# Patient Record
Sex: MALE | Race: ASIAN | NOT HISPANIC OR LATINO | URBAN - METROPOLITAN AREA
[De-identification: names, ages, dates, MRNs, and addresses within clinical notes are randomized per-mention and may not be internally consistent; named-entity substitution may affect disease eponyms.]

---

## 2019-08-21 ENCOUNTER — EMERGENCY (EMERGENCY)
Facility: HOSPITAL | Age: 69
LOS: 1 days | Discharge: HOME | End: 2019-08-21
Admitting: EMERGENCY MEDICINE
Payer: MEDICARE

## 2019-08-21 VITALS
SYSTOLIC BLOOD PRESSURE: 189 MMHG | OXYGEN SATURATION: 98 % | HEART RATE: 80 BPM | RESPIRATION RATE: 18 BRPM | DIASTOLIC BLOOD PRESSURE: 88 MMHG | TEMPERATURE: 98 F

## 2019-08-21 VITALS
TEMPERATURE: 99 F | RESPIRATION RATE: 18 BRPM | OXYGEN SATURATION: 100 % | SYSTOLIC BLOOD PRESSURE: 178 MMHG | HEART RATE: 80 BPM | DIASTOLIC BLOOD PRESSURE: 88 MMHG

## 2019-08-21 PROCEDURE — 93971 EXTREMITY STUDY: CPT | Mod: 26,LT

## 2019-08-21 PROCEDURE — 99284 EMERGENCY DEPT VISIT MOD MDM: CPT

## 2019-08-21 PROCEDURE — 73502 X-RAY EXAM HIP UNI 2-3 VIEWS: CPT | Mod: 26,LT

## 2019-08-21 RX ORDER — LIDOCAINE 4 G/100G
1 CREAM TOPICAL ONCE
Refills: 0 | Status: COMPLETED | OUTPATIENT
Start: 2019-08-21 | End: 2019-08-21

## 2019-08-21 RX ORDER — ACETAMINOPHEN 500 MG
650 TABLET ORAL ONCE
Refills: 0 | Status: COMPLETED | OUTPATIENT
Start: 2019-08-21 | End: 2019-08-21

## 2019-08-21 RX ADMIN — Medication 650 MILLIGRAM(S): at 12:44

## 2019-08-21 RX ADMIN — LIDOCAINE 1 PATCH: 4 CREAM TOPICAL at 12:44

## 2019-08-21 NOTE — ED PROVIDER NOTE - CLINICAL SUMMARY MEDICAL DECISION MAKING FREE TEXT BOX
Left hip pain will obtain xray low suspicion for fx, pain control.  Left calf pain will obtain us. Left hip pain will obtain x-ray low suspicion for fx, pain control.  Left calf pain will obtain us.  Follow up PMD/Orthopedics

## 2019-08-21 NOTE — ED PROVIDER NOTE - OBJECTIVE STATEMENT
69 y/o male with a hx of DM(not on meds) presents to the ER c/o left hip pain since yesterday after getting a massage.  Pt reports intermittent left calf pain x 2 months.  Pt denies fall, trauma, numbness, tingling, chest pain, shortness of breath, fecal/urinary incontinence.  Pt took 2 aleve this AM with relief. 69 y/o male with a hx of DM(not on meds) presents to the ER c/o left hip pain since yesterday after getting a massage.  Pt reports intermittent left calf pain x 2 months.  Pt denies fall, trauma, numbness, tingling, chest pain, shortness of breath, fecal/urinary incontinence.  Pt took 2 aleve this AM with relief.  Pt states he took himself off his DM meds pt states he will see PMD tomorrow.

## 2019-08-21 NOTE — ED ADULT TRIAGE NOTE - CHIEF COMPLAINT QUOTE
Patient c/o left hip pain for days,  getting worse, painful to walk . Denies any trauma to the area.

## 2019-08-21 NOTE — ED PROVIDER NOTE - PHYSICAL EXAMINATION
+TTP lateral aspect of left hip, NV intact, FROM, no swelling, no deformity, mild TTP at calf no swelling noted.

## 2019-08-21 NOTE — ED PROVIDER NOTE - PROGRESS NOTE DETAILS
FABIOLA Wilcox: pt feels better pain improved.  Pt is ambulatory.  Discharge and results reviewed with patient.  Pt advised of the importance of follow up ASAP for blood pressure check and DM management.  Pt states he will see his PMD tomorrow.  Family at bedside.

## 2019-08-21 NOTE — ED PROVIDER NOTE - NSFOLLOWUPINSTRUCTIONS_ED_ALL_ED_FT
Follow up with your Primary Medical Doctor tomorrow as discussed.    Follow up with Orthopedics in 1-2 days see attached list.  Repeat ultrasound in 1 week.  Rest.  Heat to back.  Take Tylenol 650mg orally every 6 hours as needed for pain.  Return to the ER for any persistent/worsening or new symptoms weakness, numbness, tingling, chest pain, shortness of breath or any concerning symptoms.

## 2020-07-03 ENCOUNTER — EMERGENCY (EMERGENCY)
Facility: HOSPITAL | Age: 70
LOS: 1 days | Discharge: HOME | End: 2020-07-03
Attending: STUDENT IN AN ORGANIZED HEALTH CARE EDUCATION/TRAINING PROGRAM | Admitting: STUDENT IN AN ORGANIZED HEALTH CARE EDUCATION/TRAINING PROGRAM
Payer: MEDICARE

## 2020-07-03 VITALS — RESPIRATION RATE: 18 BRPM | TEMPERATURE: 98 F | OXYGEN SATURATION: 100 % | HEART RATE: 87 BPM

## 2020-07-03 PROBLEM — E11.9 TYPE 2 DIABETES MELLITUS WITHOUT COMPLICATIONS: Chronic | Status: ACTIVE | Noted: 2019-08-21

## 2020-07-03 PROCEDURE — 99283 EMERGENCY DEPT VISIT LOW MDM: CPT

## 2020-07-03 PROCEDURE — 73660 X-RAY EXAM OF TOE(S): CPT | Mod: 26,RT

## 2020-07-03 NOTE — ED PROVIDER NOTE - OBJECTIVE STATEMENT
70 Y/O M with PMHX of non-insulin dependent diabetes presents to ER for RT greater toe discoloration. Noticed toe nail was discolored when he took his shoes off 3 days ago. Admits to no sensation of greater toe for some years. No known RIVERA, trauma, accident or fall. Evaluated at  who recommended ER for further work up. Has not followed up with podiatry for some time.  Denies chest pain , abdominal pain, headache, dizziness, SOB, NVD or fever.

## 2020-07-03 NOTE — ED ADULT TRIAGE NOTE - CHIEF COMPLAINT QUOTE
Pt with PMH of DM, c/o left big toe discoloration for the past 3 days. Denies injury or pain. Big toe noted bruised under the nailbed. Denies fevers.  Pt with PMH of DM, c/o left big toe discoloration for the past 3 days. Denies injury or pain, states both of his feet feel numb due to DM. Big toe noted bruised under the nailbed. Denies fevers.

## 2020-07-03 NOTE — ED PROVIDER NOTE - CLINICAL SUMMARY MEDICAL DECISION MAKING FREE TEXT BOX
68 Y/O M with PMHX of non-insulin dependent diabetes presents to ER for RT greater toe discoloration. R/O fracture. Referral to podiatry 70 Y/O M with PMHX of non-insulin dependent diabetes presents to ER for RT greater toe discoloration. R/O fracture. Referral to podiatry. no signs of infection 68 Y/O M with PMHX of non-insulin dependent diabetes presents to ER for RT greater toe discoloration. R/O fracture. Referral to podiatry. no signs of infection. 68 Y/O M with PMHX of non-insulin dependent diabetes presents to ER for RT greater toe discoloration. R/O fracture. Referral to podiatry. no signs of infection.    XR demonstrates no acute findings. PT provided podiatry referral. Instructed to f/u for further management. Discussed importance of daily foot checks. Precautions provided. Will f/u for worsening symptoms

## 2020-07-03 NOTE — ED PROVIDER NOTE - NSFOLLOWUPINSTRUCTIONS_ED_ALL_ED_FT
1) Follow up with podiatry or contact [phone] to assist with scheduling appointment for further care  2) Return to the ED immediately for new or worsening symptoms including redness to site, swelling or fever  3) Please continue to take any home medications as prescribed. Take tylenol 325 mg every 4 hours for pain relief/fever control or ibuprofen 600 mg every 6 hours for pain relief/fever control  4) Your test results from your ED visit were discussed with you prior to discharge  5) You were provided with a copy of your test results

## 2020-07-03 NOTE — ED PROVIDER NOTE - ATTENDING CONTRIBUTION TO CARE
68 Y/O M with PMHX of non-insulin dependent diabetes presents to ER for RT greater toe discoloration. Pt states he notice his toe was discolored 3 days prior denies recent known trauma. Pt has peripheral neuropathy and numbness of the right foot at area of toe. He denies fever, chills, chest pain, SOB, abdominal pain, recent trauma, bleeding, discharge. Toe appears discolored w/ bruising no signs of infection.   ROS; otherwise negative  Gen: Awake, Alert, WD, WN, NAD  Head:  NC/AT  Eyes:  PERRL, EOMI, Conjunctiva pink, lids normal, no scleral icterus  ENT: OP clear, no exudates, no erythema, uvula midline,  moist mucus membranes  Neck: supple, nontender, no meningismus, no JVD, trachea midline  Cardiac/CV:  S1 S2, RRR, no M/G/R  Respiratory/Pulm:  CTAB, good air movement, normal resp effort, no wheezes/stridor/retractions/rales/rhonchi  Gastrointestinal/Abdomen:  Soft, nontender, nondistended, +BS, no rebound/guarding  Back:  no CVAT, no MLT  Ext:  warm, well perfused, moving all extremities spontaneously, no peripheral edema, distal pulses intact  Skin: intact, discoloration of right 1st toe, no lesions   Neuro:  AAOx3, sensation decreased in RLE from level of mid foot below, motor 5/5 x 4 extremities, normal gait, speech clear  MDM as above

## 2020-07-03 NOTE — ED PROVIDER NOTE - PHYSICAL EXAMINATION
Vital signs reviewed.   CONSTITUTIONAL: Well-appearing; well-nourished; in no apparent distress. Non-toxic appearing.   HEAD: Normocephalic, atraumatic.  NECK/LYMPH: Supple; non-tender  CARD: Normal S1, S2  RESP: Normal chest excursion with respiration; breath sounds clear and equal bilaterally  ABD/GI: soft, non-distended; non-tender  EXT/MS: RT greater toe subungual hematoma noted. Nailbed intact. FAROM. Diminished sensation. Pedal pulse intact  SKIN: Normal for age and race; warm; dry; good turgor; no apparent lesions or exudate noted.  NEURO: Awake, alert, oriented x 3, no gross deficits, CN II-XII grossly intact  PSYCH: Normal mood; appropriate affect.

## 2020-07-03 NOTE — ED PROVIDER NOTE - PROGRESS NOTE DETAILS
Dr. Rodriguez: I have personally seen and examined this patient at the bedside. I have fully participated in the care of this patient. I have reviewed all pertinent clinical information, including history, physical exam, plan and the PA's note and agree except as noted. HPI above as by me. PE above as by me. DDX PLAN

## 2023-03-29 ENCOUNTER — INPATIENT (INPATIENT)
Facility: HOSPITAL | Age: 73
LOS: 7 days | Discharge: HOME | DRG: 264 | End: 2023-04-06
Attending: STUDENT IN AN ORGANIZED HEALTH CARE EDUCATION/TRAINING PROGRAM | Admitting: STUDENT IN AN ORGANIZED HEALTH CARE EDUCATION/TRAINING PROGRAM
Payer: COMMERCIAL

## 2023-03-29 VITALS
OXYGEN SATURATION: 99 % | DIASTOLIC BLOOD PRESSURE: 72 MMHG | HEIGHT: 65 IN | RESPIRATION RATE: 18 BRPM | TEMPERATURE: 100 F | SYSTOLIC BLOOD PRESSURE: 168 MMHG | WEIGHT: 158.07 LBS | HEART RATE: 86 BPM

## 2023-03-29 PROCEDURE — 99285 EMERGENCY DEPT VISIT HI MDM: CPT

## 2023-03-30 DIAGNOSIS — Z29.9 ENCOUNTER FOR PROPHYLACTIC MEASURES, UNSPECIFIED: ICD-10-CM

## 2023-03-30 DIAGNOSIS — E11.628 TYPE 2 DIABETES MELLITUS WITH OTHER SKIN COMPLICATIONS: ICD-10-CM

## 2023-03-30 DIAGNOSIS — L03.115 CELLULITIS OF RIGHT LOWER LIMB: ICD-10-CM

## 2023-03-30 DIAGNOSIS — E11.9 TYPE 2 DIABETES MELLITUS WITHOUT COMPLICATIONS: ICD-10-CM

## 2023-03-30 DIAGNOSIS — E11.40 TYPE 2 DIABETES MELLITUS WITH DIABETIC NEUROPATHY, UNSPECIFIED: ICD-10-CM

## 2023-03-30 DIAGNOSIS — I10 ESSENTIAL (PRIMARY) HYPERTENSION: ICD-10-CM

## 2023-03-30 LAB
GLUCOSE BLDC GLUCOMTR-MCNC: 112 MG/DL — HIGH (ref 70–99)
GLUCOSE BLDC GLUCOMTR-MCNC: 163 MG/DL — HIGH (ref 70–99)
GLUCOSE BLDC GLUCOMTR-MCNC: 239 MG/DL — HIGH (ref 70–99)
GLUCOSE BLDC GLUCOMTR-MCNC: 249 MG/DL — HIGH (ref 70–99)

## 2023-03-30 PROCEDURE — 99222 1ST HOSP IP/OBS MODERATE 55: CPT

## 2023-03-30 PROCEDURE — 93923 UPR/LXTR ART STDY 3+ LVLS: CPT | Mod: 26

## 2023-03-30 PROCEDURE — 73630 X-RAY EXAM OF FOOT: CPT | Mod: 26,RT

## 2023-03-30 PROCEDURE — 99223 1ST HOSP IP/OBS HIGH 75: CPT

## 2023-03-30 PROCEDURE — 71045 X-RAY EXAM CHEST 1 VIEW: CPT | Mod: 26

## 2023-03-30 PROCEDURE — 93971 EXTREMITY STUDY: CPT | Mod: 26,RT

## 2023-03-30 RX ORDER — LOSARTAN POTASSIUM 100 MG/1
25 TABLET, FILM COATED ORAL DAILY
Refills: 0 | Status: DISCONTINUED | OUTPATIENT
Start: 2023-03-30 | End: 2023-04-01

## 2023-03-30 RX ORDER — ACETAMINOPHEN 500 MG
650 TABLET ORAL EVERY 6 HOURS
Refills: 0 | Status: DISCONTINUED | OUTPATIENT
Start: 2023-03-30 | End: 2023-04-01

## 2023-03-30 RX ORDER — GLYBURIDE 5 MG
1 TABLET ORAL
Refills: 0 | DISCHARGE

## 2023-03-30 RX ORDER — AMLODIPINE BESYLATE 2.5 MG/1
2.5 TABLET ORAL DAILY
Refills: 0 | Status: DISCONTINUED | OUTPATIENT
Start: 2023-03-30 | End: 2023-04-01

## 2023-03-30 RX ORDER — CEFEPIME 1 G/1
1000 INJECTION, POWDER, FOR SOLUTION INTRAMUSCULAR; INTRAVENOUS EVERY 8 HOURS
Refills: 0 | Status: DISCONTINUED | OUTPATIENT
Start: 2023-03-30 | End: 2023-04-01

## 2023-03-30 RX ORDER — HEPARIN SODIUM 5000 [USP'U]/ML
5000 INJECTION INTRAVENOUS; SUBCUTANEOUS EVERY 8 HOURS
Refills: 0 | Status: DISCONTINUED | OUTPATIENT
Start: 2023-03-30 | End: 2023-04-01

## 2023-03-30 RX ORDER — DEXTROSE 50 % IN WATER 50 %
15 SYRINGE (ML) INTRAVENOUS ONCE
Refills: 0 | Status: DISCONTINUED | OUTPATIENT
Start: 2023-03-30 | End: 2023-04-01

## 2023-03-30 RX ORDER — INSULIN LISPRO 100/ML
VIAL (ML) SUBCUTANEOUS
Refills: 0 | Status: DISCONTINUED | OUTPATIENT
Start: 2023-03-30 | End: 2023-04-01

## 2023-03-30 RX ORDER — VANCOMYCIN HCL 1 G
750 VIAL (EA) INTRAVENOUS EVERY 12 HOURS
Refills: 0 | Status: DISCONTINUED | OUTPATIENT
Start: 2023-03-30 | End: 2023-04-01

## 2023-03-30 RX ORDER — GABAPENTIN 400 MG/1
200 CAPSULE ORAL AT BEDTIME
Refills: 0 | Status: DISCONTINUED | OUTPATIENT
Start: 2023-03-30 | End: 2023-04-01

## 2023-03-30 RX ORDER — DEXTROSE 50 % IN WATER 50 %
25 SYRINGE (ML) INTRAVENOUS ONCE
Refills: 0 | Status: DISCONTINUED | OUTPATIENT
Start: 2023-03-30 | End: 2023-04-01

## 2023-03-30 RX ORDER — GLUCAGON INJECTION, SOLUTION 0.5 MG/.1ML
1 INJECTION, SOLUTION SUBCUTANEOUS ONCE
Refills: 0 | Status: DISCONTINUED | OUTPATIENT
Start: 2023-03-30 | End: 2023-04-01

## 2023-03-30 RX ORDER — ASPIRIN/CALCIUM CARB/MAGNESIUM 324 MG
81 TABLET ORAL DAILY
Refills: 0 | Status: DISCONTINUED | OUTPATIENT
Start: 2023-03-30 | End: 2023-04-01

## 2023-03-30 RX ORDER — INSULIN LISPRO 100/ML
VIAL (ML) SUBCUTANEOUS AT BEDTIME
Refills: 0 | Status: DISCONTINUED | OUTPATIENT
Start: 2023-03-30 | End: 2023-04-01

## 2023-03-30 RX ADMIN — HEPARIN SODIUM 5000 UNIT(S): 5000 INJECTION INTRAVENOUS; SUBCUTANEOUS at 21:20

## 2023-03-30 RX ADMIN — Medication 250 MILLIGRAM(S): at 21:11

## 2023-03-30 RX ADMIN — Medication 81 MILLIGRAM(S): at 11:58

## 2023-03-30 RX ADMIN — Medication 650 MILLIGRAM(S): at 20:45

## 2023-03-30 RX ADMIN — GABAPENTIN 200 MILLIGRAM(S): 400 CAPSULE ORAL at 21:19

## 2023-03-30 RX ADMIN — AMLODIPINE BESYLATE 2.5 MILLIGRAM(S): 2.5 TABLET ORAL at 11:58

## 2023-03-30 RX ADMIN — CEFEPIME 100 MILLIGRAM(S): 1 INJECTION, POWDER, FOR SOLUTION INTRAMUSCULAR; INTRAVENOUS at 22:18

## 2023-03-30 RX ADMIN — CEFEPIME 100 MILLIGRAM(S): 1 INJECTION, POWDER, FOR SOLUTION INTRAMUSCULAR; INTRAVENOUS at 12:00

## 2023-03-30 RX ADMIN — Medication 650 MILLIGRAM(S): at 21:30

## 2023-03-30 RX ADMIN — HEPARIN SODIUM 5000 UNIT(S): 5000 INJECTION INTRAVENOUS; SUBCUTANEOUS at 12:00

## 2023-03-30 RX ADMIN — LOSARTAN POTASSIUM 25 MILLIGRAM(S): 100 TABLET, FILM COATED ORAL at 11:58

## 2023-03-30 RX ADMIN — Medication 2: at 11:59

## 2023-03-30 NOTE — CONSULT NOTE ADULT - ASSESSMENT
72M with hx of DM and HTN with R hallux wound.     Plan:  - Admit to vascular with Dr. Mullins   - EYDA/PVR   - Appreciate podiatry recs  - Please document medical optimization for angiogram   - Consent for RLE angiogram, possible angioplasty, possible stent   - Regular diet  - Continue IV abx   - DVT ppx     Plan discussed with Dr. Susanna Briggs PGY-2   Vascular Surgery  [id number]  72M with hx of DM and HTN with R hallux wound.     Plan:  - Admit to vascular with Dr. Mullins   - EYAD/PVR   - Appreciate podiatry recs  - Please document medical optimization for angiogram   - Cardiology consult for optimization for angiogram   - OR Sat for RLE angiogram, possible angioplasty possible stent   - Consent in chart   - Regular diet  - Continue IV abx   - DVT ppx     Plan discussed with Dr. Susanna Briggs PGY-2   Vascular Surgery  [id number]

## 2023-03-30 NOTE — CONSULT NOTE ADULT - ASSESSMENT
ALEE DUNN , [id number]  72y Male with R foot wound to sub Q  - Patient seen and evaluated  - T(F): 100, WBC 7.41, ESR pending , CRP 6.9  -  L foot no signs of infection. R foot hallux blister to dermis, mild fluctuance, mild serous drainage, mild malodor, global erythema to the level of hindfoot, edema globally to R foot, no crepitus to the hallux area.   - Verbal consent obtained, then excisional debridement was carried out with fine suture kit to R foot hallux nail and blister, depth to nail bed dorsally and dermis level to distal plantar aspect, mild serous drainage and mild malodor encountered, then a stab incision was placed to the distal plantar hallux to the depth of subQ, no pus encountered. Irrigated wound with copious amount of sterile saline. Applied Betadine, followed by DSD. Patient tolerated well with no other incidents.   - R foot wound cultured  - recommended Vasc consult   - recommended ABIPVR  - recommended R foot MR   - Pod plan local wound care with IV abx vs possible partial hallux amp pending [facility] recs / MR finding   - Please document Medical and Cardiac optimization for anesthesia for Podiatry surgery.   - Discussed with attending. ALEE DUNN , [id number]  72y Male with R foot wound to sub Q  - Patient seen and evaluated  - T(F): 100, WBC 7.41, ESR pending , CRP 6.9  -  L foot no signs of infection. R foot hallux blister to dermis, mild fluctuance, mild serous drainage, mild malodor, global erythema to the level of hindfoot, edema globally to R foot, no crepitus to the hallux area.   - Verbal consent obtained, then excisional debridement was carried out with fine suture kit to R foot hallux nail and blister, depth to nail bed dorsally and dermis level to distal plantar aspect, mild serous drainage and mild malodor encountered, then a stab incision was placed to the distal plantar hallux to the depth of subQ, no pus encountered. Irrigated wound with copious amount of sterile saline. Applied Betadine, followed by DSD. Patient tolerated well with no other incidents.   - R foot wound cultured  - recommended Vanco Cefepime  - recommended med admission   - recommended Vasc consult   - recommended ABIPVR  - recommended R foot MR   - Pod plan local wound care with IV abx vs possible partial hallux amp pending Vasc recs / MR finding   - Please document Medical and Cardiac optimization for anesthesia for Podiatry surgery.   - Discussed with attending.   72y Male with R foot wound to sub Q  - Patient seen and evaluated  - T(F): 100, WBC 7.41, ESR pending , CRP 6.9  -  L foot no signs of infection. R foot hallux blister to dermis, mild fluctuance, mild serous drainage, mild malodor, global erythema to the level of hindfoot, edema globally to R foot, no crepitus to the hallux area.   - Verbal consent obtained, then excisional debridement was carried out with fine suture kit to R foot hallux nail and blister, depth to nail bed dorsally and dermis level to distal plantar aspect, mild serous drainage and mild malodor encountered, then a stab incision was placed to the distal plantar hallux to the depth of subQ, no pus encountered. Irrigated wound with copious amount of sterile saline. Applied Betadine, followed by DSD. Patient tolerated well with no other incidents.   - R foot Xray: no fracture, no gas.   - R foot wound cultured  - recommended Vanco Cefepime  - recommended med admission   - recommended Vasc consult   - recommended ABIPVR  - recommended R foot MR   - Pod plan local wound care with IV abx vs possible partial hallux amp pending Vasc recs / MR finding   - Please document Medical and Cardiac optimization for anesthesia for Podiatry surgery.   - Discussed with attending.   72y Male with R foot wound to sub Q  - Patient seen and evaluated  - T(F): 100, WBC 7.41, ESR pending , CRP 6.9  -  L foot no signs of infection. R foot hallux blister to dermis, mild fluctuance, mild serous drainage, mild malodor, global erythema to the level of hindfoot, edema globally to R foot, no crepitus to the hallux area.   - Verbal consent obtained, then excisional debridement was carried out with fine suture kit to R foot hallux nail and blister, depth to nail bed dorsally and dermis level to distal plantar aspect, mild serous drainage and mild malodor encountered, then a stab incision was placed to the distal plantar hallux to the depth of subQ, no pus encountered. Irrigated wound with copious amount of sterile saline. Applied Betadine, followed by DSD. Patient tolerated well with no other incidents.   - R foot Xray: no fracture, no gas.   - R foot wound cultured  - recommended Vanco Cefepime  - recommended med admission   - recommended Vasc consult   - recommended ABIPVR  - recommended Bilateral US Duplex   - recommended R foot MR   - Pod plan local wound care with IV abx vs possible partial hallux amp pending Vasc recs / MR finding   - Please document Medical and Cardiac optimization for anesthesia for Podiatry surgery.   - Discussed with attending.

## 2023-03-30 NOTE — H&P ADULT - PROBLEM SELECTOR PLAN 3
Has been on Metformin and Glyburide  - [facility] home meds, for now ISS  - ARB  - Urine to see if proteinuria

## 2023-03-30 NOTE — H&P ADULT - RESPIRATORY
Infant awake with hunger cues. Fed 32cc over 40 mins with some encouragement. Infant very drowsy after first 10 cc. clear to auscultation bilaterally/no wheezes/no rales/no rhonchi/no respiratory distress/breath sounds equal

## 2023-03-30 NOTE — ED ADULT NURSE NOTE - OBJECTIVE STATEMENT
First encounter with the pt, pt received from the previous shift with complaints of right foot wound. Pt is a/ox4 and verbal. Speech is clear and able to speak in full sentences without distress. Airway is patent with no obstruction nor blocking secretions. Breathing is even and unlabored on room air. Pt has strong pulses palpated bilaterally. Neuro check is wnl. Full rom. Pt denies chest pain, n/v and sob. No acute distress noted. Pt has call light within the reach of the pt at the bedside. Will continue to monitor the pt.

## 2023-03-30 NOTE — PATIENT PROFILE ADULT - FALL HARM RISK - HARM RISK INTERVENTIONS
Communicate Risk of Fall with Harm to all staff/Monitor for mental status changes/Monitor gait and stability/Reinforce activity limits and safety measures with patient and family/Reorient to person, place and time as needed/Review medications for side effects contributing to fall risk/Tailored Fall Risk Interventions/Visual Cue: Yellow wristband and red socks/Bed in lowest position, wheels locked, appropriate side rails in place/Call bell, personal items and telephone in reach/Instruct patient to call for assistance before getting out of bed or chair/Non-slip footwear when patient is out of bed/[place] to call system/Physically safe environment - no spills, clutter or unnecessary equipment/Purposeful Proactive Rounding/Room/bathroom lighting operational, light cord in reach

## 2023-03-30 NOTE — CONSULT NOTE ADULT - ASSESSMENT
72M with h/o T2DM with neuropathy, HTN presents with c/o pain, blistering and fluid drainage on R large toe and worsening pain for last 2 days.     Plan:  - Admit to vascular surgery under Dr. Mullins  - Obtain EYAD/PVR  - Plan for RLE angiogram, possible angioplasty, possible stent, possible atherectomy on Saturday 4/1  - Obtain medicine and cardiology clearances  - Reg diet  - DVT ppx  - Analgesia prn  - Ambulate OOB    Discussed with Dr. Mullins, attending vascular surgeon    Vascular Surgery  [id number]

## 2023-03-30 NOTE — H&P ADULT - ASSESSMENT
Patient is a 72M with h/o T2DM with neuropathy, HTN presents with c/o pain, blistering and fluid drainage on R foot and worsening pain fr last 2 days. Denies any trauma, hitting foot. On and off he was having chills and fever for last 1 week.  He states that he has been experiencing bilateral calf pain about a month, pain on walking < 2 blocks, noticed R foot swelling and bleeding from the big toe about two days ago.    Seen his PCP a month ago and at that time he didn't have problem except tingling and numbness. A year ago he had some problem with L foot and didn't require any intervention he added. In ED temp 97.6F, WBC 7.4, Podiatry evaluated and did some excision.   72M with hx of DM and HTN with R hallux wound.     Plan:  - Admit to vascular with Dr. Mullins   - EYAD/PVR   - Appreciate podiatry recs  - Please document medical optimization for angiogram   - Cardiology consult for optimization for angiogram   - OR Sat for RLE angiogram, possible angioplasty possible stent   - Consent in chart   - Regular diet  - Continue IV abx   - DVT ppx     Plan discussed with Dr. Susanna Briggs PGY-2   Vascular Surgery  [id number]

## 2023-03-30 NOTE — H&P ADULT - NEUROLOGICAL
details… AAOx3, nonfocal/cranial nerves II-XII intact/sensation intact/responds to verbal commands/deep reflexes intact/cranial nerves intact/superficial reflexes intact

## 2023-03-30 NOTE — CONSULT NOTE ADULT - SUBJECTIVE AND OBJECTIVE BOX
Echo ordered, pending LE angiogram Saturday    Will risk stratify pending Echo      Matt Calhoun DO [facility]  Cardiovascular Medicine  [address] [place] [address], [address]  Office [phone]  Available via call/text on [employer] Teams

## 2023-03-30 NOTE — CONSULT NOTE ADULT - ATTENDING COMMENTS
PAD  CLTI of RLE  EYAD/PVRs pending  appreciate excellent care by podiatry  RLE angiogram, possible revascularization pending non invasive studies  admit to vascular surgery    H and P to follow

## 2023-03-30 NOTE — PROGRESS NOTE ADULT - ASSESSMENT
72y Male with R foot wound to sub Q  - Patient seen and evaluated  - Afebrile, no leukocytosis  - R foot hallux circumferential wound to subq with granular base and scant areas of partial thickness necrosis, no purulence or malodor, erythema extends to midfoot without fluctuance  - R foot Xray: No OM, no tracking soft tissue air  - R foot wound culture pending  - Cx'd R foot MR, low concern for abscess or OM  - EYAD/PVR pending, nonpalpable pedal pulses, Vascular surgeon Dr. Mateus Mullins aware  - Pod plan local wound care with IV abx pending erythema improvement  - Seen with attending.

## 2023-03-30 NOTE — ED PROVIDER NOTE - CLINICAL SUMMARY MEDICAL DECISION MAKING FREE TEXT BOX
diabetic male presents with 2 days of progressively worsening pain, erythema, swelling over right toe, now with some blistering on exam as well as fever.  There is concern about infection of the toe ranging from cellulitis to necrotizing fasciitis particularly given the rapid transgression of symptoms and blistering.  Patient will get labs, x-ray, IV antibiotics, and may require further advanced imaging such as CT for better characterization and will require podiatry evaluation.  Clinically the foot is well-perfused so less likely to be acute arterial occlusion

## 2023-03-30 NOTE — H&P ADULT - NSHPPHYSICALEXAM_GEN_ALL_CORE
GEN: resting comfortably in bed, in NAD  HEAD: Normocephalic, atraumatic  NECK: Supple, no JVD  RESP: no acute respiratory distress, breathing comfortably     EXT:      - RLE: R hallux wound with tip dry gangrene. 2+ signal DP/PT. Palpable femoral pulse          - LLE: No wounds. 2+ signals DP/PT. Palpable femoral pulse  VASC: Right: 2+ signal DP/PT. Palpable femoral pulse. Left: 2+ signals DP/PT. Palpable femoral pulse  NEURO:  no focal neuro deficits  CV: well perfused  PSYCH: Normal affect

## 2023-03-30 NOTE — H&P ADULT - HISTORY OF PRESENT ILLNESS
Patient is a 72M with h/o T2DM with neuropathy, HTN presents with c/o pain, blistering and fluid drainage on R foot and worsening pain fr last 2 days. Denies any trauma, hitting foot. On and off he was having chills and fever for last 1 week.  He states that he has been experiencing bilateral calf pain about a month, pain on walking < 2 blocks, noticed R foot swelling and bleeding from the big toe about two days ago.    Seen his PCP a month ago and at that time he didn't have problem except tingling and numbness. A year ago he had some problem with L foot and didn't require any intervention he added. Patient is a 72M with h/o T2DM with neuropathy, HTN presents with c/o pain, blistering and fluid drainage on R foot and worsening pain fr last 2 days. Denies any trauma, hitting foot. On and off he was having chills and fever for last 1 week.  He states that he has been experiencing bilateral calf pain about a month, pain on walking < 2 blocks, noticed R foot swelling and bleeding from the big toe about two days ago.    Seen by his PCP a month ago and at that time he didn't have problem except tingling and numbness. A year ago he had some problem with L foot and didn't require any intervention he added. (30 Mar 2023 10:55)

## 2023-03-30 NOTE — ED PROVIDER NOTE - PHYSICAL EXAMINATION
GEN: Well Appearing, Nontoxic, NAD  HEENT: NC/AT, MMM  Neck: supple  CV: reg rate, intact DP pulse on right foot  RESP: normal WOB, no distress  ABD: non-distended  EXT/MSK:  tenderness and swelling over the right toe extending proximally onto dorsum of foot.  Full range of motion in all toes.  SKIN: well perfused with intact cap refill, blister over medial aspect of right toe.  Erythema over toe and dorsum of foot  Neuro: Motor and sensory intact on affected foot

## 2023-03-30 NOTE — H&P ADULT - PROBLEM SELECTOR PLAN 4
BP has been styable  - c/w Home Amlodipine 2.5mg/d and Losartan 25mg/d BP has been stable  - c/w Home Amlodipine 2.5mg/d and Losartan 25mg/d

## 2023-03-30 NOTE — H&P ADULT - ATTENDING COMMENTS
see consult note  PAD  CLTI of RLE  Alexander 5, right toe wound  angiogram, possible revascularization planning  appreciate medical and cardiac risk stratification and optimization  appreciate excellent care by podiatry team under Dr. Abraham Mcclure

## 2023-03-30 NOTE — H&P ADULT - NSICDXPASTMEDICALHX_GEN_ALL_CORE_FT
PAST MEDICAL HISTORY:  DM (diabetes mellitus)     HTN (hypertension)     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

## 2023-03-30 NOTE — H&P ADULT - PROBLEM SELECTOR PLAN 1
Afebrile, WBC wnl. X-ray R foot- no cortical erosion suggestive of OM   - Appreciated Podiatry eval and plans- s/p excisional debridement in ED, mild serous drainage and mild malodor encountered, Irrigated wound with copious amount of sterile saline. Applied Betadine,   - No need of MRI per conversation  - EYAD/PVR ordered for possible ischemic   - Vascular Sx consult called  - c/w IV Anbx- Vanco/Cefepime Afebrile, WBC wnl. X-ray R foot- no cortical erosion suggestive of OM . On exam LE distal pulses are not palpable  - Appreciated Podiatry eval and plans- s/p excisional debridement in ED, mild serous drainage and mild malodor encountered, Irrigated wound with copious amount of sterile saline. Applied Betadine,   - No need of MRI per conversation  - EYAD/PVR ordered for possible ischemic chages, no distal pulses  - Vascular Sx consult called  - c/w IV Anbx- Vanco/Cefepime  - Echo ordered to risk stratification for invasive Vascular procedure

## 2023-03-30 NOTE — ED PROVIDER NOTE - OBJECTIVE STATEMENT
72-year-old diabetic male presents with pain and redness over his right toe.  Patient states he noted 2 days ago that he had numbness and pain over his right toe and it was swollen.  Over the last 2 days it has progressed to blistering and fluid drainage from the blisters and worsening pain.  Patient also endorses fever at home, chills, fatigue, feeling unwell.  He still endorses normal appetite.  He does endorse a cough, but denies shortness of breath, other upper respiratory symptoms.

## 2023-03-30 NOTE — CONSULT NOTE ADULT - SUBJECTIVE AND OBJECTIVE BOX
GENERAL SURGERY CONSULT NOTE  Consulting surgical team: Vascular  Consulting attending: Dr. Mateus Mullins    HPI:  HPI:  Patient is a 72M with h/o T2DM with neuropathy, HTN presents with c/o pain, blistering and fluid drainage on R foot and worsening pain fr last 2 days. Denies any trauma, hitting foot. On and off he was having chills and fever for last 1 week.  He states that he has been experiencing bilateral calf pain about a month, pain on walking < 2 blocks, noticed R foot swelling and bleeding from the big toe about two days ago.    Seen his PCP a month ago and at that time he didn't have problem except tingling and numbness. A year ago he had some problem with L foot and didn't require any intervention he added. (30 Mar 2023 10:55)      PAST MEDICAL HISTORY:  DM (diabetes mellitus)    HTN (hypertension)    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        PAST SURGICAL HISTORY:  No significant past surgical history        SOCIAL HISTORY:  - Denies EtOH abuse, smoking, IVDA    MEDICATIONS:  acetaminophen     Tablet .. 650 milliGRAM(s) Oral every 6 hours PRN  amLODIPine   Tablet 2.5 milliGRAM(s) Oral daily  aspirin enteric coated 81 milliGRAM(s) Oral daily  cefepime   IVPB 1000 milliGRAM(s) IV Intermittent every 8 hours  dextrose 50% Injectable 25 Gram(s) IV Push once  dextrose Oral Gel 15 Gram(s) Oral once  glucagon  Injectable 1 milliGRAM(s) IntraMuscular once  heparin   Injectable 5000 Unit(s) SubCutaneous every 8 hours  insulin lispro (ADMELOG) corrective regimen sliding scale   SubCutaneous three times a day before meals  insulin lispro (ADMELOG) corrective regimen sliding scale   SubCutaneous at bedtime  losartan 25 milliGRAM(s) Oral daily  vancomycin  IVPB 750 milliGRAM(s) IV Intermittent every 12 hours      ALLERGIES:  No Known Allergies      VITALS & I/Os:  Vital Signs Last 24 Hrs  T(C): 36.4 (30 Mar 2023 10:11), Max: 37.8 (29 Mar 2023 18:55)  T(F): 97.6 (30 Mar 2023 10:11), Max: 100 (29 Mar 2023 18:55)  HR: 84 (30 Mar 2023 10:11) (80 - 86)  BP: 171/77 (30 Mar 2023 10:11) (165/75 - 171/77)  BP(mean): --  RR: 17 (30 Mar 2023 10:11) (17 - 18)  SpO2: 98% (30 Mar 2023 10:11) (98% - 99%)    Parameters below as of 30 Mar 2023 10:11  Patient On (Oxygen Delivery Method): room air        I&O's Summary      PHYSICAL EXAM:  GEN: resting comfortably in bed, in NAD  RESP: no acute respiratory distress, breathing comfortably   ABD: soft, non-distended, non-tender   EXT:  Right big toe with erythematous wound superiorly. Absent toe nail. Necrotic tip. No drainage or malodor   VASC: dopplerable PT and DP bilaterally  NEURO:  no focal neuro deficits     LABS:                        11.6   7.41  )-----------( 250      ( 30 Mar 2023 01:26 )             36.0     03-30    136  |  101  |  23  ----------------------------<  221<H>  4.8   |  26  |  0.99    Ca    9.4      30 Mar 2023 03:17    TPro  7.4  /  Alb  3.7  /  TBili  0.3  /  DBili  x   /  AST  13  /  ALT  18  /  AlkPhos  71  03-30    Lactate:  03-30 @ 01:20  0.8    PT/INR - ( 30 Mar 2023 01:26 )   PT: 12.7 sec;   INR: 1.09 ratio         PTT - ( 30 Mar 2023 01:26 )  PTT:31.1 sec              IMAGING:

## 2023-03-30 NOTE — PROGRESS NOTE ADULT - SUBJECTIVE AND OBJECTIVE BOX
Patient is a 72y old  Male who presents with a chief complaint of R toe infection (30 Mar 2023 10:55)       INTERVAL HPI/OVERNIGHT EVENTS:  Patient seen and evaluated at bedside.  Pt is resting comfortable in NAD. Denies N/V/F/C.    Allergies    No Known Allergies    Intolerances        Vital Signs Last 24 Hrs  T(C): 36.4 (30 Mar 2023 10:11), Max: 37.8 (29 Mar 2023 18:55)  T(F): 97.6 (30 Mar 2023 10:11), Max: 100 (29 Mar 2023 18:55)  HR: 84 (30 Mar 2023 10:11) (80 - 86)  BP: 171/77 (30 Mar 2023 10:11) (165/75 - 171/77)  BP(mean): --  RR: 17 (30 Mar 2023 10:11) (17 - 18)  SpO2: 98% (30 Mar 2023 10:11) (98% - 99%)    Parameters below as of 30 Mar 2023 10:11  Patient On (Oxygen Delivery Method): room air        LABS:                        11.6   7.41  )-----------( 250      ( 30 Mar 2023 01:26 )             36.0     03-30    136  |  101  |  23  ----------------------------<  221<H>  4.8   |  26  |  0.99    Ca    9.4      30 Mar 2023 03:17    TPro  7.4  /  Alb  3.7  /  TBili  0.3  /  DBili  x   /  AST  13  /  ALT  18  /  AlkPhos  71  03-30    PT/INR - ( 30 Mar 2023 01:26 )   PT: 12.7 sec;   INR: 1.09 ratio         PTT - ( 30 Mar 2023 01:26 )  PTT:31.1 sec    CAPILLARY BLOOD GLUCOSE      POCT Blood Glucose.: 249 mg/dL (30 Mar 2023 11:18)  POCT Blood Glucose.: 163 mg/dL (30 Mar 2023 08:07)      Lower Extremity Physical Exam:  Vascular: DP/PT 0/4, B/L, CFT <3 seconds B/L, Temperature gradient warm to cool B/L.   Neuro: Epicritic sensation diminished  to the level of forefoot B/L.  Musculoskeletal/Ortho: R foot hallux dorsiflexion strength 3/5.   Skin: R foot hallux circumferential wound to subq with granular base and scant areas of partial thickness necrosis, no purulence or malodor, erythema extends to midfoot without fluctuance    RADIOLOGY & ADDITIONAL TESTS:

## 2023-03-30 NOTE — CONSULT NOTE ADULT - SUBJECTIVE AND OBJECTIVE BOX
Patient is a 72y old  Male who presents with a chief complaint of foot pain     HPI:    patient states he has been experiencing bilateral calf pain about a month, pain on walking < 2 blocks, states he noticed R foot swelling and bleeding from the big toe about two days ago and today decided to come to the ED. Last PCP visit was a month ago, can not recall the last a1c and glucose level.     PAST MEDICAL & SURGICAL HISTORY:  DM (diabetes mellitus)      No significant past surgical history          MEDICATIONS  (STANDING):    MEDICATIONS  (PRN):      Allergies    No Known Allergies    Intolerances        VITALS:    Vital Signs Last 24 Hrs  T(C): 37.8 (29 Mar 2023 18:55), Max: 37.8 (29 Mar 2023 18:55)  T(F): 100 (29 Mar 2023 18:55), Max: 100 (29 Mar 2023 18:55)  HR: 86 (29 Mar 2023 18:55) (86 - 86)  BP: 168/72 (29 Mar 2023 18:55) (168/72 - 168/72)  BP(mean): --  RR: 18 (29 Mar 2023 18:55) (18 - 18)  SpO2: 99% (29 Mar 2023 18:55) (99% - 99%)    Parameters below as of 29 Mar 2023 18:55  Patient On (Oxygen Delivery Method): room air        LABS:                          11.6   7.41  )-----------( 250      ( 30 Mar 2023 01:26 )             36.0       03-30    136  |  101  |  23  ----------------------------<  221<H>  4.8   |  26  |  0.99    Ca    9.4      30 Mar 2023 03:17    TPro  7.4  /  Alb  3.7  /  TBili  0.3  /  DBili  x   /  AST  13  /  ALT  18  /  AlkPhos  71  03-30      CAPILLARY BLOOD GLUCOSE          PT/INR - ( 30 Mar 2023 01:26 )   PT: 12.7 sec;   INR: 1.09 ratio         PTT - ( 30 Mar 2023 01:26 )  PTT:31.1 sec    LOWER EXTREMITY PHYSICAL EXAM:    Vascular: DP/PT 0/4, B/L, CFT <3 seconds B/L, Temperature gradient warm to cool B/L.   Neuro: Epicritic sensation diminished  to the level of forefoot B/L.  Musculoskeletal/Ortho: R foot hallux dorsiflexion strength 3/5.   Skin: L foot no signs of infection. R foot hallux blister to dermis, mild fluctuance, mild serous drainage, mild malodor, global erythema to the level of hindfoot, edema globally to R foot, no crepitus to the hallux area.,         RADIOLOGY & ADDITIONAL STUDIES:    ******PRELIMINARY REPORT******      ******PRELIMINARY REPORT******         ACC: [id number] EXAM:  XR FOOT COMP MIN 3 VIEWS RT   ORDERED BY: LUDA RAY     PROCEDURE DATE:  03/30/2023    ******PRELIMINARY REPORT******      ******PRELIMINARYREPORT******           INTERPRETATION:  CLINICAL INDICATION: Atraumatic right toe pain evaluate   for soft tissue gas.    TECHNIQUE: 3 views of the right foot.    COMPARISON: None.    FINDINGS:  Full evaluation the soft tissues of the foot is limitedsecondary to   overlying sock.    No acute fracture or dislocation. Joint spaces are largely maintained. No   overt evidence of tracking soft tissue gas or discernible fluid   collections. No bony erosions.    IMPRESSION:  No acute fracture or dislocation.    No overt evidence of soft tissue gas.        ******PRELIMINARY REPORT******      ******PRELIMINARY REPORT******        LORENA FAULKNER MD; Resident Radiologist  This document is a PRELIMINARY interpretation and is pending final   attending approval. Mar 30 2023  4:24AM         Patient is a 72y old  Male who presents with a chief complaint of foot pain     HPI:    patient states he has been experiencing bilateral calf pain about a month, pain on walking < 2 blocks, states he noticed R foot swelling and bleeding from the big toe about two days ago and today decided to come to the ED. Last PCP visit was a month ago, can not recall the last a1c and glucose level.  Over the last 2 days it has progressed to blistering and fluid drainage from the blisters and worsening pain.  Patient also endorses fever at home, chills, fatigue, feeling unwell.  He still endorses normal appetite.  He does endorse a cough, but denies shortness of breath, other upper respiratory symptoms.      PAST MEDICAL & SURGICAL HISTORY:  DM (diabetes mellitus)      No significant past surgical history          MEDICATIONS  (STANDING):    MEDICATIONS  (PRN):      Allergies    No Known Allergies    Intolerances        VITALS:    Vital Signs Last 24 Hrs  T(C): 37.8 (29 Mar 2023 18:55), Max: 37.8 (29 Mar 2023 18:55)  T(F): 100 (29 Mar 2023 18:55), Max: 100 (29 Mar 2023 18:55)  HR: 86 (29 Mar 2023 18:55) (86 - 86)  BP: 168/72 (29 Mar 2023 18:55) (168/72 - 168/72)  BP(mean): --  RR: 18 (29 Mar 2023 18:55) (18 - 18)  SpO2: 99% (29 Mar 2023 18:55) (99% - 99%)    Parameters below as of 29 Mar 2023 18:55  Patient On (Oxygen Delivery Method): room air        LABS:                          11.6   7.41  )-----------( 250      ( 30 Mar 2023 01:26 )             36.0       03-30    136  |  101  |  23  ----------------------------<  221<H>  4.8   |  26  |  0.99    Ca    9.4      30 Mar 2023 03:17    TPro  7.4  /  Alb  3.7  /  TBili  0.3  /  DBili  x   /  AST  13  /  ALT  18  /  AlkPhos  71  03-30      CAPILLARY BLOOD GLUCOSE          PT/INR - ( 30 Mar 2023 01:26 )   PT: 12.7 sec;   INR: 1.09 ratio         PTT - ( 30 Mar 2023 01:26 )  PTT:31.1 sec    LOWER EXTREMITY PHYSICAL EXAM:    Vascular: DP/PT 0/4, B/L, CFT <3 seconds B/L, Temperature gradient warm to cool B/L.   Neuro: Epicritic sensation diminished  to the level of forefoot B/L.  Musculoskeletal/Ortho: R foot hallux dorsiflexion strength 3/5.   Skin: L foot no signs of infection. R foot hallux blister to dermis, mild fluctuance, mild serous drainage, mild malodor, global erythema to the level of hindfoot, edema globally to R foot, no crepitus to the hallux area.,         RADIOLOGY & ADDITIONAL STUDIES:    ******PRELIMINARY REPORT******      ******PRELIMINARY REPORT******         ACC: [id number] EXAM:  XR FOOT COMP MIN 3 VIEWS RT   ORDERED BY: LUDA RAY     PROCEDURE DATE:  03/30/2023    ******PRELIMINARY REPORT******      ******PRELIMINARYREPORT******           INTERPRETATION:  CLINICAL INDICATION: Atraumatic right toe pain evaluate   for soft tissue gas.    TECHNIQUE: 3 views of the right foot.    COMPARISON: None.    FINDINGS:  Full evaluation the soft tissues of the foot is limitedsecondary to   overlying sock.    No acute fracture or dislocation. Joint spaces are largely maintained. No   overt evidence of tracking soft tissue gas or discernible fluid   collections. No bony erosions.    IMPRESSION:  No acute fracture or dislocation.    No overt evidence of soft tissue gas.        ******PRELIMINARY REPORT******      ******PRELIMINARY REPORT******        LORENA FAULKNER MD; Resident Radiologist  This document is a PRELIMINARY interpretation and is pending final   attending approval. Mar 30 2023  4:24AM

## 2023-03-30 NOTE — CONSULT NOTE ADULT - SUBJECTIVE AND OBJECTIVE BOX
GENERAL SURGERY CONSULT NOTE  Consulting surgical team: Vascular Surgery  Consulting attending: Dr. Mullins     HPI:  HPI:  Patient is a 72M with h/o T2DM with neuropathy, HTN presents with c/o pain, blistering and fluid drainage on R foot and worsening pain fr last 2 days. Denies any trauma, hitting foot. On and off he was having chills and fever for last 1 week.  He states that he has been experiencing bilateral calf pain about a month, pain on walking < 2 blocks, noticed R foot swelling and bleeding from the big toe about two days ago.    Seen his PCP a month ago and at that time he didn't have problem except tingling and numbness. A year ago he had some problem with L foot and didn't require any intervention he added. (30 Mar 2023 10:55)      PAST MEDICAL HISTORY:  DM (diabetes mellitus)    HTN (hypertension)    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        PAST SURGICAL HISTORY:  No significant past surgical history        SOCIAL HISTORY:  - Denies EtOH abuse, smoking, IVDA    MEDICATIONS:  acetaminophen     Tablet .. 650 milliGRAM(s) Oral every 6 hours PRN  amLODIPine   Tablet 2.5 milliGRAM(s) Oral daily  aspirin enteric coated 81 milliGRAM(s) Oral daily  cefepime   IVPB 1000 milliGRAM(s) IV Intermittent every 8 hours  dextrose 50% Injectable 25 Gram(s) IV Push once  dextrose Oral Gel 15 Gram(s) Oral once  glucagon  Injectable 1 milliGRAM(s) IntraMuscular once  heparin   Injectable 5000 Unit(s) SubCutaneous every 8 hours  insulin lispro (ADMELOG) corrective regimen sliding scale   SubCutaneous three times a day before meals  insulin lispro (ADMELOG) corrective regimen sliding scale   SubCutaneous at bedtime  losartan 25 milliGRAM(s) Oral daily  vancomycin  IVPB 750 milliGRAM(s) IV Intermittent every 12 hours      ALLERGIES:  No Known Allergies      VITALS & I/Os:  Vital Signs Last 24 Hrs  T(C): 36.4 (30 Mar 2023 10:11), Max: 37.8 (29 Mar 2023 18:55)  T(F): 97.6 (30 Mar 2023 10:11), Max: 100 (29 Mar 2023 18:55)  HR: 84 (30 Mar 2023 10:11) (80 - 86)  BP: 171/77 (30 Mar 2023 10:11) (165/75 - 171/77)  BP(mean): --  RR: 17 (30 Mar 2023 10:11) (17 - 18)  SpO2: 98% (30 Mar 2023 10:11) (98% - 99%)    Parameters below as of 30 Mar 2023 10:11  Patient On (Oxygen Delivery Method): room air        I&O's Summary      PHYSICAL EXAM:  GEN: resting comfortably in bed, in NAD  RESP: no acute respiratory distress, breathing comfortably   ABD: soft, non-distended, non-tender   EXT:      - RLE: R hallux wound with tip dry gangrene. 2+ signal DP/PT. Palpable femoral pulse    - LLE: No wounds. 2+ signals DP/PT. Palpable femoral pulse   NEURO:  no focal neuro deficits     LABS:                        11.6   7.41  )-----------( 250      ( 30 Mar 2023 01:26 )             36.0     03-30    136  |  101  |  23  ----------------------------<  221<H>  4.8   |  26  |  0.99    Ca    9.4      30 Mar 2023 03:17    TPro  7.4  /  Alb  3.7  /  TBili  0.3  /  DBili  x   /  AST  13  /  ALT  18  /  AlkPhos  71  03-30    Lactate:  03-30 @ 01:20  0.8    PT/INR - ( 30 Mar 2023 01:26 )   PT: 12.7 sec;   INR: 1.09 ratio         PTT - ( 30 Mar 2023 01:26 )  PTT:31.1 sec              IMAGING:   GENERAL SURGERY CONSULT NOTE Consulting surgical team: Vascular Surgery Consulting attending: Dr. Mullins   HPI: HPI: Patient is a 72M with h/o T2DM with neuropathy, HTN presents with c/o pain, blistering and fluid drainage on R foot and worsening pain fr last 2 days. Denies any trauma, hitting foot. On and off he was having chills and fever for last 1 week. He states that he has been experiencing bilateral calf pain about a month, pain on walking < 2 blocks, noticed R foot swelling and bleeding from the big toe about two days ago.  Seen his PCP a month ago and at that time he didn't have problem except tingling and numbness. A year ago he had some problem with L foot and didn't require any intervention he added. (30 Mar 2023 10:55)   PAST MEDICAL HISTORY: DM (diabetes mellitus)  HTN (hypertension)  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    PAST SURGICAL HISTORY: No significant past surgical history    SOCIAL HISTORY: - Denies EtOH abuse, smoking, IVDA  MEDICATIONS: acetaminophen     Tablet .. 650 milliGRAM(s) Oral every 6 hours PRN amLODIPine   Tablet 2.5 milliGRAM(s) Oral daily aspirin enteric coated 81 milliGRAM(s) Oral daily cefepime   IVPB 1000 milliGRAM(s) IV Intermittent every 8 hours dextrose 50% Injectable 25 Gram(s) IV Push once dextrose Oral Gel 15 Gram(s) Oral once glucagon  Injectable 1 milliGRAM(s) IntraMuscular once heparin   Injectable 5000 Unit(s) SubCutaneous every 8 hours insulin lispro (ADMELOG) corrective regimen sliding scale   SubCutaneous three times a day before meals insulin lispro (ADMELOG) corrective regimen sliding scale   SubCutaneous at bedtime losartan 25 milliGRAM(s) Oral daily vancomycin  IVPB 750 milliGRAM(s) IV Intermittent every 12 hours   ALLERGIES: No Known Allergies   VITALS & I/Os: Vital Signs Last 24 Hrs T(C): 36.4 (30 Mar 2023 10:11), Max: 37.8 (29 Mar 2023 18:55) T(F): 97.6 (30 Mar 2023 10:11), Max: 100 (29 Mar 2023 18:55) HR: 84 (30 Mar 2023 10:11) (80 - 86) BP: 171/77 (30 Mar 2023 10:11) (165/75 - 171/77) BP(mean): -- RR: 17 (30 Mar 2023 10:11) (17 - 18) SpO2: 98% (30 Mar 2023 10:11) (98% - 99%)  Parameters below as of 30 Mar 2023 10:11 Patient On (Oxygen Delivery Method): room air    I&O's Summary   PHYSICAL EXAM: GEN: resting comfortably in bed, in NAD RESP: no acute respiratory distress, breathing comfortably    EXT:     - RLE: R hallux wound with tip dry gangrene. 2+ signal DP/PT. Palpable femoral pulse        - LLE: No wounds. 2+ signals DP/PT. Palpable femoral pulse  NEURO:  no focal neuro deficits    LABS:                      11.6  7.41  )-----------( 250      ( 30 Mar 2023 01:26 )            36.0   03-30  136  |  101  |  23 ----------------------------<  221<H> 4.8   |  26  |  0.99  Ca    9.4      30 Mar 2023 03:17  TPro  7.4  /  Alb  3.7  /  TBili  0.3  /  DBili  x   /  AST  13  /  ALT  18  /  AlkPhos  71  03-30  Lactate:  03-30 @ 01:20  0.8  PT/INR - ( 30 Mar 2023 01:26 )   PT: 12.7 sec;   INR: 1.09 ratio      PTT - ( 30 Mar 2023 01:26 )  PTT:31.1 sec       IMAGING:

## 2023-03-30 NOTE — ED CLERICAL - DIVISION
Per Dr. Wilver Noriega     Patient needs to be scheduled for Cystoscopy with bladder wall injection. Patient will need authorization from Dr. Karie Chester to hold Rivaroxaban (Xarelto) and Aspirin for 7 days. Patient does not have a pacemaker/defibrillator. Patient procedure will not require a type and screen prior. Covid order placed. Cardiac/medical forms sent to Dr. Karie Chester. Patient medication instruction letter is complete and given to him on 2/16/2021 appt with Dr. Nicolas Hernandez. Pending reply from PCP with medical forms. [facility]...

## 2023-03-31 ENCOUNTER — TRANSCRIPTION ENCOUNTER (OUTPATIENT)
Age: 73
End: 2023-03-31

## 2023-03-31 PROBLEM — I73.9 PERIPHERAL VASCULAR DISEASE, UNSPECIFIED: Chronic | Status: ACTIVE | Noted: 2023-03-30

## 2023-03-31 PROBLEM — Z00.00 ENCOUNTER FOR PREVENTIVE HEALTH EXAMINATION: Status: ACTIVE | Noted: 2023-03-31

## 2023-03-31 PROBLEM — I10 ESSENTIAL (PRIMARY) HYPERTENSION: Chronic | Status: ACTIVE | Noted: 2023-03-30

## 2023-03-31 LAB
A1C WITH ESTIMATED AVERAGE GLUCOSE RESULT: 8.3 % — HIGH (ref 4–5.6)
BUN SERPL-MCNC: 22 MG/DL — SIGNIFICANT CHANGE UP (ref 7–23)
CALCIUM SERPL-MCNC: 9.4 MG/DL — SIGNIFICANT CHANGE UP (ref 8.4–10.5)
CHLORIDE SERPL-SCNC: 102 MMOL/L — SIGNIFICANT CHANGE UP (ref 96–108)
CHOLEST SERPL-MCNC: 137 MG/DL — SIGNIFICANT CHANGE UP
CO2 SERPL-SCNC: 26 MMOL/L — SIGNIFICANT CHANGE UP (ref 22–31)
CREAT SERPL-MCNC: 0.97 MG/DL — SIGNIFICANT CHANGE UP (ref 0.5–1.3)
EGFR: 83 ML/MIN/1.73M2 — SIGNIFICANT CHANGE UP
ERYTHROCYTE [SEDIMENTATION RATE] IN BLOOD: 117 MM/HR — HIGH (ref 0–20)
ESTIMATED AVERAGE GLUCOSE: 192 MG/DL — HIGH (ref 68–114)
GLUCOSE BLDC GLUCOMTR-MCNC: 192 MG/DL — HIGH (ref 70–99)
GLUCOSE BLDC GLUCOMTR-MCNC: 225 MG/DL — HIGH (ref 70–99)
GLUCOSE BLDC GLUCOMTR-MCNC: 256 MG/DL — HIGH (ref 70–99)
GLUCOSE BLDC GLUCOMTR-MCNC: 258 MG/DL — HIGH (ref 70–99)
GLUCOSE SERPL-MCNC: 166 MG/DL — HIGH (ref 70–99)
HCT VFR BLD CALC: 34.7 % — LOW (ref 39–50)
HCV AB S/CO SERPL IA: 0.12 S/CO — SIGNIFICANT CHANGE UP (ref 0–0.99)
HCV AB SERPL-IMP: SIGNIFICANT CHANGE UP
HDLC SERPL-MCNC: 37 MG/DL — LOW
HGB BLD-MCNC: 11.4 G/DL — LOW (ref 13–17)
LIPID PNL WITH DIRECT LDL SERPL: 82 MG/DL — SIGNIFICANT CHANGE UP
MCHC RBC-ENTMCNC: 30.3 PG — SIGNIFICANT CHANGE UP (ref 27–34)
MCHC RBC-ENTMCNC: 32.9 GM/DL — SIGNIFICANT CHANGE UP (ref 32–36)
MCV RBC AUTO: 92.3 FL — SIGNIFICANT CHANGE UP (ref 80–100)
NON HDL CHOLESTEROL: 100 MG/DL — SIGNIFICANT CHANGE UP
NRBC # BLD: 0 /100 WBCS — SIGNIFICANT CHANGE UP (ref 0–0)
PLATELET # BLD AUTO: 267 K/UL — SIGNIFICANT CHANGE UP (ref 150–400)
POTASSIUM SERPL-MCNC: 4.7 MMOL/L — SIGNIFICANT CHANGE UP (ref 3.5–5.3)
POTASSIUM SERPL-SCNC: 4.7 MMOL/L — SIGNIFICANT CHANGE UP (ref 3.5–5.3)
RBC # BLD: 3.76 M/UL — LOW (ref 4.2–5.8)
RBC # FLD: 11.4 % — SIGNIFICANT CHANGE UP (ref 10.3–14.5)
SODIUM SERPL-SCNC: 137 MMOL/L — SIGNIFICANT CHANGE UP (ref 135–145)
TRIGL SERPL-MCNC: 89 MG/DL — SIGNIFICANT CHANGE UP
TSH SERPL-MCNC: 1.04 UIU/ML — SIGNIFICANT CHANGE UP (ref 0.27–4.2)
WBC # BLD: 7.6 K/UL — SIGNIFICANT CHANGE UP (ref 3.8–10.5)
WBC # FLD AUTO: 7.6 K/UL — SIGNIFICANT CHANGE UP (ref 3.8–10.5)

## 2023-03-31 PROCEDURE — 99232 SBSQ HOSP IP/OBS MODERATE 35: CPT | Mod: 57

## 2023-03-31 PROCEDURE — 93306 TTE W/DOPPLER COMPLETE: CPT | Mod: 26

## 2023-03-31 RX ORDER — CHLORHEXIDINE GLUCONATE 213 G/1000ML
1 SOLUTION TOPICAL DAILY
Refills: 0 | Status: DISCONTINUED | OUTPATIENT
Start: 2023-03-31 | End: 2023-04-01

## 2023-03-31 RX ORDER — INFLUENZA VIRUS VACCINE 15; 15; 15; 15 UG/.5ML; UG/.5ML; UG/.5ML; UG/.5ML
0.7 SUSPENSION INTRAMUSCULAR ONCE
Refills: 0 | Status: COMPLETED | OUTPATIENT
Start: 2023-03-31 | End: 2023-03-31

## 2023-03-31 RX ADMIN — CEFEPIME 100 MILLIGRAM(S): 1 INJECTION, POWDER, FOR SOLUTION INTRAMUSCULAR; INTRAVENOUS at 21:20

## 2023-03-31 RX ADMIN — AMLODIPINE BESYLATE 2.5 MILLIGRAM(S): 2.5 TABLET ORAL at 06:28

## 2023-03-31 RX ADMIN — Medication 3: at 08:38

## 2023-03-31 RX ADMIN — Medication 3: at 18:48

## 2023-03-31 RX ADMIN — Medication 81 MILLIGRAM(S): at 12:23

## 2023-03-31 RX ADMIN — CEFEPIME 100 MILLIGRAM(S): 1 INJECTION, POWDER, FOR SOLUTION INTRAMUSCULAR; INTRAVENOUS at 14:39

## 2023-03-31 RX ADMIN — LOSARTAN POTASSIUM 25 MILLIGRAM(S): 100 TABLET, FILM COATED ORAL at 06:27

## 2023-03-31 RX ADMIN — GABAPENTIN 200 MILLIGRAM(S): 400 CAPSULE ORAL at 21:20

## 2023-03-31 RX ADMIN — CEFEPIME 100 MILLIGRAM(S): 1 INJECTION, POWDER, FOR SOLUTION INTRAMUSCULAR; INTRAVENOUS at 06:06

## 2023-03-31 RX ADMIN — Medication 250 MILLIGRAM(S): at 17:07

## 2023-03-31 RX ADMIN — HEPARIN SODIUM 5000 UNIT(S): 5000 INJECTION INTRAVENOUS; SUBCUTANEOUS at 06:27

## 2023-03-31 RX ADMIN — Medication 1: at 12:23

## 2023-03-31 RX ADMIN — HEPARIN SODIUM 5000 UNIT(S): 5000 INJECTION INTRAVENOUS; SUBCUTANEOUS at 14:38

## 2023-03-31 RX ADMIN — HEPARIN SODIUM 5000 UNIT(S): 5000 INJECTION INTRAVENOUS; SUBCUTANEOUS at 21:21

## 2023-03-31 RX ADMIN — Medication 250 MILLIGRAM(S): at 07:48

## 2023-03-31 NOTE — DISCHARGE NOTE PROVIDER - NSDCFUADDAPPT_GEN_ALL_CORE_FT
Podiatry Discharge Instructions:  Follow up: Please follow up with Dr. Abraham Mcclure within 1 week of discharge from the hospital, please call [phone] for appointment and discuss that you recently were seen in the hospital.  Wound Care: Please apply mupirocin to right great toe wound followed by sterile 4x4 gauze and radha, daily  Weight bearing: Please weight bear as tolerated in a surgical shoe.  Antibiotics: Please continue as instructed.

## 2023-03-31 NOTE — PROGRESS NOTE ADULT - SUBJECTIVE AND OBJECTIVE BOX
DATE OF SERVICE: 03-31-23 @ 15:13    Patient is a 72y old  Male who presents with a chief complaint of R toe infection (31 Mar 2023 12:20)      INTERVAL HISTORY: Feels ok.     REVIEW OF SYSTEMS:  CONSTITUTIONAL: No weakness  EYES/ENT: No visual changes;  No throat pain   NECK: No pain or stiffness  RESPIRATORY: No cough, wheezing; No shortness of breath  CARDIOVASCULAR: No chest pain or palpitations  GASTROINTESTINAL: No abdominal  pain. No nausea, vomiting, or hematemesis  GENITOURINARY: No dysuria, frequency or hematuria  NEUROLOGICAL: No stroke like symptoms  SKIN: No rashes    	  MEDICATIONS:  amLODIPine   Tablet 2.5 milliGRAM(s) Oral daily  losartan 25 milliGRAM(s) Oral daily        PHYSICAL EXAM:  T(C): 36.8 (03-31-23 @ 14:03), Max: 36.8 (03-30-23 @ 20:51)  HR: 90 (03-31-23 @ 14:03) (77 - 90)  BP: 146/75 (03-31-23 @ 14:03) (139/76 - 154/76)  RR: 18 (03-31-23 @ 14:03) (18 - 18)  SpO2: 96% (03-31-23 @ 14:03) (96% - 99%)  Wt(kg): --  I&O's Summary    30 Mar 2023 07:01  -  31 Mar 2023 07:00  --------------------------------------------------------  IN: 700 mL / OUT: 300 mL / NET: 400 mL    31 Mar 2023 07:01  -  31 Mar 2023 15:13  --------------------------------------------------------  IN: 500 mL / OUT: 0 mL / NET: 500 mL          Appearance: In no distress	  HEENT:    PERRL, EOMI	  Cardiovascular:  S1 S2, No JVD  Respiratory: Lungs clear to auscultation	  Gastrointestinal:  Soft, Non-tender, + BS	  Vascularature:  No edema of LE  Psychiatric: Appropriate affect   Neuro: no acute focal deficits                               11.4   7.60  )-----------( 267      ( 31 Mar 2023 06:43 )             34.7     03-31    137  |  102  |  22  ----------------------------<  166<H>  4.7   |  26  |  0.97    Ca    9.4      31 Mar 2023 06:43    TPro  7.4  /  Alb  3.7  /  TBili  0.3  /  DBili  x   /  AST  13  /  ALT  18  /  AlkPhos  71  03-30        Labs personally reviewed      ASSESSMENT/PLAN: 	    Patient is a 72 year old male with a PMHx of HTN, Type II DM associated with neuropathy whore presents to [facility] with a chief complaint of pain, blistering and fluid drainage on his right foot. Patient reports worsening pain and discomfort in his right lower extremity. Patient denies any chest pain, palpitations, SOB or dyspnea. Patient denies dyspnea on exertion or shortness of breath with ambulation or walking.     Problem/Plan -1  Problem: Cardiac Risk Stratification  - Denies CP or SOB  - Not in decompensated HF  - No hx of tachy supa arrhythmia  - Patient is low to mod risk for low risk peripheral angiogram. No contraindication to proceed pending review of TTE.    Problem/Plan -2  Problem: Hypertension  - c/w amlodipine 2.5mg PO daily  - c/w losartan 25mg PO daily    Problem/Plan -3  Problem: DM II  - Check HgbA1c  - ISS as per primary team      Berenice Branch, RISHABH-NP   Matt Calhoun DO [facility]  Cardiovascular Medicine  [address] [place] [address], [address]  Available through call or text on [employer] TEAM[employer]  Office: [phone]   DATE OF SERVICE: 03-31-23 @ 15:13    Patient is a 72y old  Male who presents with a chief complaint of R toe infection (31 Mar 2023 12:20)      INTERVAL HISTORY: Feels ok.     REVIEW OF SYSTEMS:  CONSTITUTIONAL: No weakness  EYES/ENT: No visual changes;  No throat pain   NECK: No pain or stiffness  RESPIRATORY: No cough, wheezing; No shortness of breath  CARDIOVASCULAR: No chest pain or palpitations  GASTROINTESTINAL: No abdominal  pain. No nausea, vomiting, or hematemesis  GENITOURINARY: No dysuria, frequency or hematuria  NEUROLOGICAL: No stroke like symptoms  SKIN: No rashes    	  MEDICATIONS:  amLODIPine   Tablet 2.5 milliGRAM(s) Oral daily  losartan 25 milliGRAM(s) Oral daily        PHYSICAL EXAM:  T(C): 36.8 (03-31-23 @ 14:03), Max: 36.8 (03-30-23 @ 20:51)  HR: 90 (03-31-23 @ 14:03) (77 - 90)  BP: 146/75 (03-31-23 @ 14:03) (139/76 - 154/76)  RR: 18 (03-31-23 @ 14:03) (18 - 18)  SpO2: 96% (03-31-23 @ 14:03) (96% - 99%)  Wt(kg): --  I&O's Summary    30 Mar 2023 07:01  -  31 Mar 2023 07:00  --------------------------------------------------------  IN: 700 mL / OUT: 300 mL / NET: 400 mL    31 Mar 2023 07:01  -  31 Mar 2023 15:13  --------------------------------------------------------  IN: 500 mL / OUT: 0 mL / NET: 500 mL          Appearance: In no distress	  HEENT:    PERRL, EOMI	  Cardiovascular:  S1 S2, No JVD  Respiratory: Lungs clear to auscultation	  Gastrointestinal:  Soft, Non-tender, + BS	  Vascularature:  No edema of LE  Psychiatric: Appropriate affect   Neuro: no acute focal deficits                               11.4   7.60  )-----------( 267      ( 31 Mar 2023 06:43 )             34.7     03-31    137  |  102  |  22  ----------------------------<  166<H>  4.7   |  26  |  0.97    Ca    9.4      31 Mar 2023 06:43    TPro  7.4  /  Alb  3.7  /  TBili  0.3  /  DBili  x   /  AST  13  /  ALT  18  /  AlkPhos  71  03-30        Labs personally reviewed    < from: TTE Congenital Anomalies W or WO Ultrasound Enhancing Agent (03.31.23 @ 13:27) >      1. The left ventricular systolic function is normal with an ejection fractionof 61 % by Dawson's method of disks.   2. Normal right ventricular cavity size, normal wall thickness and normal systolic function.   3. Mild left ventricular hypertrophy.   4. The aortic root at sinuses of Valsalva is mildly dilated.    < end of copied text >      ASSESSMENT/PLAN: 	    Patient is a 72 year old male with a PMHx of HTN, Type II DM associated with neuropathy whore presents to [facility] with a chief complaint of pain, blistering and fluid drainage on his right foot. Patient reports worsening pain and discomfort in his right lower extremity. Patient denies any chest pain, palpitations, SOB or dyspnea. Patient denies dyspnea on exertion or shortness of breath with ambulation or walking.     Problem/Plan -1  Problem: Cardiac Risk Stratification  - Denies CP or SOB  - TTE shows preserved EF, mild LVH  - Not in decompensated HF  - No hx of tachy supa arrhythmia  - Patient is low to mod risk for low risk peripheral angiogram. No contraindication to proceed.    Problem/Plan -2  Problem: Hypertension  - c/w amlodipine 2.5mg PO daily  - c/w losartan 25mg PO daily    Problem/Plan -3  Problem: DM II  - Check HgbA1c  - ISS as per primary team      Berenice Branch, RISHABH-NP   Matt Calhoun DO [facility]  Cardiovascular Medicine  [address], [address]  Available through call or text on [employer] TEAM[employer]  Office: [phone]   DATE OF SERVICE: 03-31-23 @ 15:13    Patient is a 72y old  Male who presents with a chief complaint of R toe infection (31 Mar 2023 12:20)      INTERVAL HISTORY: Feels ok.     REVIEW OF SYSTEMS:  CONSTITUTIONAL: No weakness  EYES/ENT: No visual changes;  No throat pain   NECK: No pain or stiffness  RESPIRATORY: No cough, wheezing; No shortness of breath  CARDIOVASCULAR: No chest pain or palpitations  GASTROINTESTINAL: No abdominal  pain. No nausea, vomiting, or hematemesis  GENITOURINARY: No dysuria, frequency or hematuria  NEUROLOGICAL: No stroke like symptoms  SKIN: No rashes    	  MEDICATIONS:  amLODIPine   Tablet 2.5 milliGRAM(s) Oral daily  losartan 25 milliGRAM(s) Oral daily        PHYSICAL EXAM:  T(C): 36.8 (03-31-23 @ 14:03), Max: 36.8 (03-30-23 @ 20:51)  HR: 90 (03-31-23 @ 14:03) (77 - 90)  BP: 146/75 (03-31-23 @ 14:03) (139/76 - 154/76)  RR: 18 (03-31-23 @ 14:03) (18 - 18)  SpO2: 96% (03-31-23 @ 14:03) (96% - 99%)  Wt(kg): --  I&O's Summary    30 Mar 2023 07:01  -  31 Mar 2023 07:00  --------------------------------------------------------  IN: 700 mL / OUT: 300 mL / NET: 400 mL    31 Mar 2023 07:01  -  31 Mar 2023 15:13  --------------------------------------------------------  IN: 500 mL / OUT: 0 mL / NET: 500 mL          Appearance: In no distress	  HEENT:    PERRL, EOMI	  Cardiovascular:  S1 S2, No JVD  Respiratory: Lungs clear to auscultation	  Gastrointestinal:  Soft, Non-tender, + BS	  Vascularature:  No edema of LE  Psychiatric: Appropriate affect   Neuro: no acute focal deficits                               11.4   7.60  )-----------( 267      ( 31 Mar 2023 06:43 )             34.7     03-31    137  |  102  |  22  ----------------------------<  166<H>  4.7   |  26  |  0.97    Ca    9.4      31 Mar 2023 06:43    TPro  7.4  /  Alb  3.7  /  TBili  0.3  /  DBili  x   /  AST  13  /  ALT  18  /  AlkPhos  71  03-30        Labs personally reviewed    < from: TTE Congenital Anomalies W or WO Ultrasound Enhancing Agent (03.31.23 @ 13:27) >      1. The left ventricular systolic function is normal with an ejection fractionof 61 % by Dawson's method of disks.   2. Normal right ventricular cavity size, normal wall thickness and normal systolic function.   3. Mild left ventricular hypertrophy.   4. The aortic root at sinuses of Valsalva is mildly dilated.    < end of copied text >      ASSESSMENT/PLAN: 	    Patient is a 72 year old male with a PMHx of HTN, Type II DM associated with neuropathy whore presents to [facility] with a chief complaint of pain, blistering and fluid drainage on his right foot. Patient reports worsening pain and discomfort in his right lower extremity. Patient denies any chest pain, palpitations, SOB or dyspnea. Patient denies dyspnea on exertion or shortness of breath with ambulation or walking.     Problem/Plan -1  Problem: Cardiac Risk Stratification  - Denies CP or SOB  - TTE shows preserved EF, mild LVH  - Not in decompensated HF  - No hx of tachy supa arrhythmia  - Patient is low to mod risk for low risk peripheral angiogram. No contraindication to proceed.    Problem/Plan -2  Problem: Hypertension  - c/w amlodipine 2.5mg PO daily  - c/w losartan 25mg PO daily    Problem/Plan -3  Problem: DM II  - Check HgbA1c  - ISS as per primary team      Berenice Branch, RISHABH-NP   Matt Calhoun DO [facility]  Cardiovascular Medicine  [address], [address]  Available through call or text on [employer] TEAM[employer]  Office: [phone]

## 2023-03-31 NOTE — DISCHARGE NOTE PROVIDER - CARE PROVIDER_API CALL
Mateus Mullins (MD)  Surgery  Vascular  [address], [address] [place] [address]  Phone: [phone]  Fax: [phone]  Follow Up Time: 1-3 days

## 2023-03-31 NOTE — DISCHARGE NOTE PROVIDER - NSDCFUSCHEDAPPT_GEN_ALL_CORE_FT
[facility]  VASCULAR  Communit  Scheduled Appointment: 04/01/2023     [facility]  VASCULAR  Communit  Scheduled Appointment: 04/10/2023

## 2023-03-31 NOTE — DISCHARGE NOTE PROVIDER - NSDCMRMEDTOKEN_GEN_ALL_CORE_FT
amLODIPine 2.5 mg oral tablet: 1 orally once a day  aspirin 81 mg oral tablet: 3 tab(s) orally once a day (in the morning) 5 days a week  aspirin 81 mg oral tablet: 2 tab(s) orally once a day (in the afternoon) 5 days in a week  glipiZIDE 5 mg oral tablet: 1 tab(s) orally once a day (in the morning)  glipiZIDE 5 mg oral tablet: 2 tab(s) orally once a day (in the afternoon) patient takes 1 tab in Am and 2 tabs in PM  losartan 25 mg oral tablet: 1 tab(s) orally once a day  metFORMIN 750 mg oral tablet, extended release: 1 orally once a day  rosuvastatin 10 mg oral tablet: 1 tab(s) orally once a day   acetaminophen 325 mg oral tablet: 2 tab(s) orally every 6 hours As needed Temp greater or equal to 38C (100.4F), Moderate Pain (4 - 6)  amLODIPine 2.5 mg oral tablet: 1 orally once a day  aspirin 81 mg oral tablet: 3 tab(s) orally once a day (in the morning) 5 days a week  aspirin 81 mg oral tablet: 2 tab(s) orally once a day (in the afternoon) 5 days in a week  cephalexin 500 mg oral capsule: 1 cap(s) orally 4 times a day  glipiZIDE 5 mg oral tablet: 1 tab(s) orally once a day (in the morning)  glipiZIDE 5 mg oral tablet: 2 tab(s) orally once a day (in the afternoon) patient takes 1 tab in Am and 2 tabs in PM  levoFLOXacin 500 mg oral tablet: 1 tab(s) orally once a day  losartan 25 mg oral tablet: 1 tab(s) orally once a day  metFORMIN 750 mg oral tablet, extended release: 1 orally once a day  mupirocin 2% topical ointment: Apply topically to affected area once a day  rosuvastatin 10 mg oral tablet: 1 tab(s) orally once a day   acetaminophen 325 mg oral tablet: 2 tab(s) orally every 6 hours As needed Temp greater or equal to 38C (100.4F), Moderate Pain (4 - 6)  amLODIPine 10 mg oral tablet: 1 tab(s) orally once a day  aspirin 81 mg oral delayed release tablet: 1 tab(s) orally once a day  cephalexin 500 mg oral capsule: 1 cap(s) orally 4 times a day  glipiZIDE 5 mg oral tablet: 1 tab(s) orally once a day (in the morning)  glipiZIDE 5 mg oral tablet: 2 tab(s) orally once a day (in the afternoon) patient takes 1 tab in Am and 2 tabs in PM  levoFLOXacin 500 mg oral tablet: 1 tab(s) orally once a day  losartan 25 mg oral tablet: 1 tab(s) orally once a day  metFORMIN 750 mg oral tablet, extended release: 1 orally once a day  mupirocin 2% topical ointment: Apply topically to affected area once a day  rosuvastatin 10 mg oral tablet: 1 tab(s) orally once a day

## 2023-03-31 NOTE — CONSULT NOTE ADULT - SUBJECTIVE AND OBJECTIVE BOX
HPI: Patient is a 72 year old male with a PMHx of HTN, Type II DM associated with neuropathy whore presents to [facility] with a chief complaint of pain, blistering and fluid drainage on his right foot. Patient reports worsening pain and discomfort in his right lower extremity. Patient reports intermittent chills for the past week prior to his arrival. Patient reports that he has been experiencing bilateral calf pain for about one month associated with pain and discomfort with walking less than 2 blocks. Patient reports that he noticed right lower extremity edema and bleeding from his great toe about two days prior to arrival. Internal medicine has been consulted on Mr. Paredes's care for medical management and medical clearance. Patient denies any chest pain, palpitations, SOB or dyspnea. Patient denies dyspnea on exertion or shortness of breath with ambulation or walking. Patient reports that his walking is limited by lower extremity pain. Denies history of HLD, MI, CVA, Cardiac arrythmia, PE or DVT in the past.           REVIEW OF SYSTEMS:    CONSTITUTIONAL: Chills 1 week prior to arrival   EYES/ENT: No visual changes;  No vertigo or throat pain   NECK: No pain or stiffness  RESPIRATORY: No cough, wheezing, hemoptysis; No shortness of breath  CARDIOVASCULAR: No chest pain or palpitations  GASTROINTESTINAL: No abdominal or epigastric pain. No nausea, vomiting, or hematemesis; No diarrhea or constipation. No melena or hematochezia.  GENITOURINARY: No dysuria, frequency or hematuria  NEUROLOGICAL: No numbness or weakness  SKIN: No itching, burning, rashes, or lesions   MSK: RLE pain, discomfort, edema, bleeding from great toe   All other review of systems is negative unless indicated above.      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  No significant past surgical history    MEDICATIONS  (STANDING):  amLODIPine   Tablet 2.5 milliGRAM(s) Oral daily  aspirin enteric coated 81 milliGRAM(s) Oral daily  cefepime   IVPB 1000 milliGRAM(s) IV Intermittent every 8 hours  dextrose 50% Injectable 25 Gram(s) IV Push once  dextrose Oral Gel 15 Gram(s) Oral once  gabapentin 200 milliGRAM(s) Oral at bedtime  glucagon  Injectable 1 milliGRAM(s) IntraMuscular once  heparin   Injectable 5000 Unit(s) SubCutaneous every 8 hours  influenza  Vaccine (HIGH DOSE) 0.7 milliLiter(s) IntraMuscular once  insulin lispro (ADMELOG) corrective regimen sliding scale   SubCutaneous three times a day before meals  insulin lispro (ADMELOG) corrective regimen sliding scale   SubCutaneous at bedtime  losartan 25 milliGRAM(s) Oral daily  vancomycin  IVPB 750 milliGRAM(s) IV Intermittent every 12 hours        Allergies    No Known Allergies    Intolerances        Vital Signs Last 24 Hrs  T(C): 36.8 (31 Mar 2023 09:01), Max: 36.8 (30 Mar 2023 20:51)  T(F): 98.2 (31 Mar 2023 09:01), Max: 98.2 (30 Mar 2023 20:51)  HR: 89 (31 Mar 2023 09:01) (77 - 89)  BP: 149/75 (31 Mar 2023 09:01) (139/76 - 154/76)  BP(mean): --  RR: 18 (31 Mar 2023 09:01) (18 - 18)  SpO2: 99% (31 Mar 2023 09:01) (97% - 99%)    Parameters below as of 31 Mar 2023 09:01  Patient On (Oxygen Delivery Method): room air        Appearance: Normal; Sitting up in bed 	  HEENT:   Normal oral mucosa, Eyes are open 	  Lymphatic: No lymphadenopathy , no edema  Cardiovascular: Normal S1 S2, No JVD, No murmurs   Respiratory: Lungs clear to auscultation, normal effort 	  Gastrointestinal:  Soft, Non-tender, + BS	  Skin: No rashes, No ecchymoses, No cyanosis, warm to touch  Musculoskeletal: Normal range of motion, normal strength  Psychiatry:  Mood & affect appropriate  Ext: RLE dressing                           11.4   7.60  )-----------( 267      ( 31 Mar 2023 06:43 )             34.7               03-31    137  |  102  |  22  ----------------------------<  166<H>  4.7   |  26  |  0.97    Ca    9.4      31 Mar 2023 06:43    TPro  7.4  /  Alb  3.7  /  TBili  0.3  /  DBili  x   /  AST  13  /  ALT  18  /  AlkPhos  71  03-30    PT/INR - ( 30 Mar 2023 01:26 )   PT: 12.7 sec;   INR: 1.09 ratio         PTT - ( 30 Mar 2023 01:26 )  PTT:31.1 sec                         Culture - Blood (03.30.23 @ 01:20)   Specimen Source: .Blood  Culture Results: No growth to date.      Culture - Blood (03.30.23 @ 01:10)   Specimen Source: .Blood  Culture Results: No growth to date.    < from: Xray Chest 1 View- PORTABLE-Urgent (03.30.23 @ 03:53) >    IMPRESSION:  Clear lungs.    < end of copied text >    < from: Xray Foot AP + Lateral + Oblique, Right (03.30.23 @ 03:53) >  IMPRESSION:    Evaluation limited due to the patient's overlying sock.    No acute displaced fracture or dislocation. Osteoarthritic changes   throughout the forefoot. No cortical erosion or periostitis to suggest   osteomyelitis. Vascular calcifications noted.    < end of copied text >        < from: VA Duplex Lower Ext Vein Scan, Right (03.30.23 @ 14:48) >  IMPRESSION:  No evidence of right lower extremity deep venous thrombosis.      < end of copied text >      < from: VA Physiol Extremity Lower 3+ Level, BI (03.30.23 @ 16:59) >  IMPRESSION: Limited study due to noncompressibility of vessels.    PVR waveforms suggestive of small vessel disease in the feet.    < end of copied text >

## 2023-03-31 NOTE — CONSULT NOTE ADULT - ASSESSMENT
Patient is a 72 year old male with a PMHx of HTN, Type II DM associated with neuropathy whore presents to [facility] with a chief complaint of pain, blistering and fluid drainage on his right foot. Patient reports worsening pain and discomfort in his right lower extremity. Patient reports intermittent chills for the past week prior to his arrival. Patient reports that he has been experiencing bilateral calf pain for about one month associated with pain and discomfort with walking less than 2 blocks. Patient reports that he noticed right lower extremity edema and bleeding from his great toe about two days prior to arrival. Internal medicine has been consulted on Mr. Paredes's care for medical management and medical clearance. Patient denies any chest pain, palpitations, SOB or dyspnea. Patient denies dyspnea on exertion or shortness of breath with ambulation or walking. Patient reports that his walking is limited by lower extremity pain. Denies history of HLD, MI, CVA, Cardiac arrythmia, PE or DVT in the past.     Peripheral Arterial Disease, Neuropathy, Right Toe Wound   - LE X-Ray -- Without acute displaced Fx or Dislocation, Osteoarthritic changes, Neg for OM   - EYAD consistent w/ small vessel disease in feer   - LE Duplex -- Neg for DVT   - On ASA 81 and Gabapentin   - Seen by podiatry  - Care per vascular appreciated -- Planned for RLE Angio, possible angioplasty, possible stent     Chills  - 03/30 BCx2 NGTD; F/u final   - F/u wound culture -- in lab   - On Cefepime and Vancomycin for ABX   - No leukocytosis, reports chills have resolved  - Cont to monitor CBC, Temp curve, VS and patient closely  - If febrile, check pan cultures     HTN  - C/w Norvasc 2.5 and Losartan 25   - Monitor BP, VS and patient closely    DM  - Check A1C  - C/w Sliding scale   - Monitor glucose levels     Pre-OP Risk Stratification   - Pending TTE     PPX  - Hep 5K Q8    Patient is a 72 year old male with a PMHx of HTN, Type II DM associated with neuropathy whore presents to [facility] with a chief complaint of pain, blistering and fluid drainage on his right foot. Patient reports worsening pain and discomfort in his right lower extremity. Patient reports intermittent chills for the past week prior to his arrival. Patient reports that he has been experiencing bilateral calf pain for about one month associated with pain and discomfort with walking less than 2 blocks. Patient reports that he noticed right lower extremity edema and bleeding from his great toe about two days prior to arrival. Internal medicine has been consulted on Mr. Paredes's care for medical management and medical clearance. Patient denies any chest pain, palpitations, SOB or dyspnea. Patient denies dyspnea on exertion or shortness of breath with ambulation or walking. Patient reports that his walking is limited by lower extremity pain. Denies history of HLD, MI, CVA, Cardiac arrythmia, PE or DVT in the past.     Peripheral Arterial Disease, Neuropathy, Right Toe Wound   - LE X-Ray -- Without acute displaced Fx or Dislocation, Osteoarthritic changes, Neg for OM   - EYAD consistent w/ small vessel disease in feer   - LE Duplex -- Neg for DVT   - On ASA 81 and Gabapentin   - Seen by podiatry  - Care per vascular appreciated -- Planned for RLE Angio, possible angioplasty, possible stent   - echo noted   - patient is medically optimized for angio.     Chills  - 03/30 BCx2 NGTD; F/u final   - F/u wound culture -- in lab   - On Cefepime and Vancomycin for ABX   - No leukocytosis, reports chills have resolved  - Cont to monitor CBC, Temp curve, VS and patient closely  - If febrile, check pan cultures     HTN  - C/w Norvasc 2.5 and Losartan 25   - Monitor BP, VS and patient closely    DM  - Check A1C  - C/w Sliding scale   - Monitor glucose levels     Pre-OP Risk Stratification   - Pending TTE     PPX  - Hep 5K Q8

## 2023-03-31 NOTE — DISCHARGE NOTE PROVIDER - NSDCCPCAREPLAN_GEN_ALL_CORE_FT
Post Transfusion Instructions for Out-Patients    Most recipients of blood transfusions do not experience any adverse effects. You may resume your normal activities 4 to 6 hours after your blood transfusion.   Occasionally, reactions of blood transfusions
PRINCIPAL DISCHARGE DIAGNOSIS  Diagnosis: Toe infection  Assessment and Plan of Treatment: WOUND CARE:  mupirocin then gauze and kerlix to right toe  BATHING: You may shower and/or sponge bathe.  ACTIVITY: No heavy lifting anything more than 10-15lbs or straining. Otherwise, you may return to your usual level of physical activity. Please take tylenol for pain.   NOTIFY YOUR SURGEON IF: You have any bleeding that does not stop, any fever (over 100.4 F) or chills, persistent nausea/vomiting with inability to tolerate food or liquids, persistent diarrhea, or if your pain is not controlled on your discharge pain medications.  FOLLOW-UP:  You will be sent home on 2 antibiotics- Levaquin 500mg daily and Keflex 500 every 6 hours. Please start taking the Levaquin tomorrow for 3 days. Please start the Keflex tonight at 6 pm until Monday.   Please show up to second floor ([facility]) surgery check in at 6 am on Monday. Please call Dr. Mullins office tomorrow to confirm for Monday.

## 2023-03-31 NOTE — PROGRESS NOTE ADULT - ASSESSMENT
72y Male with R foot wound to sub Q  - Patient seen and evaluated  - Afebrile, no leukocytosis  - R foot hallux circumferential wound to subq with granular base and scant areas of partial thickness necrosis, no purulence or malodor, erythema resolved   - R foot Xray: No OM, no tracking soft tissue air  - R foot wound culture pending  - low concern for abscess or OM  - EYAD/PVR non compressible vessels   - Vascular recs appreciated, angiogram tomorrow   - Pod plan local wound care with IV antibiotics  - Seen with attending.   72y Male with R foot wound to sub Q  - Patient seen and evaluated  - Afebrile, no leukocytosis  - R foot hallux circumferential wound to subq with granular base and scant areas of partial thickness necrosis, no purulence or malodor, erythema resolved   - R foot Xray: No OM, no tracking soft tissue air  - R foot wound culture pending  - low concern for abscess or OM  - EYAD/PVR non compressible vessels   - Z flows ordered, to be worn at all times while in bed  - Vascular recs appreciated, angiogram tomorrow   - Pod plan local wound care with IV antibiotics  - Seen with attending.

## 2023-03-31 NOTE — PROGRESS NOTE ADULT - SUBJECTIVE AND OBJECTIVE BOX
VASCULAR SURGERY DAILY PROGRESS NOTE:     SUBJECTIVE/ROS: Patient seen and examined at bedside. No acute events overnight. Aware of procedure tomorrow.       MEDICATIONS  (STANDING):  amLODIPine   Tablet 2.5 milliGRAM(s) Oral daily  aspirin enteric coated 81 milliGRAM(s) Oral daily  cefepime   IVPB 1000 milliGRAM(s) IV Intermittent every 8 hours  dextrose 50% Injectable 25 Gram(s) IV Push once  dextrose Oral Gel 15 Gram(s) Oral once  gabapentin 200 milliGRAM(s) Oral at bedtime  glucagon  Injectable 1 milliGRAM(s) IntraMuscular once  heparin   Injectable 5000 Unit(s) SubCutaneous every 8 hours  influenza  Vaccine (HIGH DOSE) 0.7 milliLiter(s) IntraMuscular once  insulin lispro (ADMELOG) corrective regimen sliding scale   SubCutaneous three times a day before meals  insulin lispro (ADMELOG) corrective regimen sliding scale   SubCutaneous at bedtime  losartan 25 milliGRAM(s) Oral daily  vancomycin  IVPB 750 milliGRAM(s) IV Intermittent every 12 hours    MEDICATIONS  (PRN):  acetaminophen     Tablet .. 650 milliGRAM(s) Oral every 6 hours PRN Temp greater or equal to 38C (100.4F), Moderate Pain (4 - 6)      OBJECTIVE:    Vital Signs Last 24 Hrs  T(C): 36.4 (31 Mar 2023 05:12), Max: 36.8 (30 Mar 2023 20:51)  T(F): 97.6 (31 Mar 2023 05:12), Max: 98.2 (30 Mar 2023 20:51)  HR: 82 (31 Mar 2023 05:12) (77 - 89)  BP: 154/76 (31 Mar 2023 05:12) (139/76 - 171/77)  BP(mean): --  RR: 18 (31 Mar 2023 05:12) (17 - 18)  SpO2: 99% (31 Mar 2023 05:12) (97% - 99%)    Parameters below as of 31 Mar 2023 05:12  Patient On (Oxygen Delivery Method): room air            I&O's Detail    30 Mar 2023 07:01  -  31 Mar 2023 07:00  --------------------------------------------------------  IN:    IV PiggyBack: 600 mL    Oral Fluid: 100 mL  Total IN: 700 mL    OUT:    Voided (mL): 300 mL  Total OUT: 300 mL    Total NET: 400 mL          Daily     Daily     LABS:                        11.4   7.60  )-----------( 267      ( 31 Mar 2023 06:43 )             34.7     03-31    137  |  102  |  22  ----------------------------<  166<H>  4.7   |  26  |  0.97    Ca    9.4      31 Mar 2023 06:43    TPro  7.4  /  Alb  3.7  /  TBili  0.3  /  DBili  x   /  AST  13  /  ALT  18  /  AlkPhos  71  03-30    PT/INR - ( 30 Mar 2023 01:26 )   PT: 12.7 sec;   INR: 1.09 ratio         PTT - ( 30 Mar 2023 01:26 )  PTT:31.1 sec          Assessment/Plan: 72M with hx of DM and HTN with R hallux wound.     Plan:  - EYAD/PVR completed   - Appreciate podiatry recs  - Please document medical optimization for angiogram   - Cardiology consult for optimization for angiogram pending echo   - OR Sat for RLE angiogram, possible angioplasty possible stent   - Consent in chart   - Regular diet  - Continue IV abx   - DVT ppx       Vascular p [id number]

## 2023-03-31 NOTE — DISCHARGE NOTE PROVIDER - HOSPITAL COURSE
72M with h/o T2DM with neuropathy, HTN presents with c/o pain, blistering and fluid drainage on R foot and worsening pain fr last 2 days. Denies any trauma, hitting foot. On and off he was having chills and fever for last 1 week. He states that he has been experiencing bilateral calf pain about a month, pain on walking < 2 blocks, noticed R foot swelling and bleeding from the big toe about two days ago. R foot hallux circumferential wound to subq with granular base and scant areas of partial thickness necrosis, no purulence or malodor, erythema extends to midfoot without fluctuance. R foot Xray: No OM, no tracking soft tissue air. On 4/1, s/p RLE diagnostic Angiogram, showing single vessel runoff via the posterior tibial artery which is occluded proximally and reconstitutes via collaterals off the popliteal artery. Plan for right superficial femoral artery to posterior tibial artery bypass Monday. s/p Card cath on 4/5. Cleared from Cardiology and Medical standpoint. Will be sent home on keflex/levaquin per ID. Plan to return on Monday with Dr. Mullins.

## 2023-04-01 ENCOUNTER — APPOINTMENT (OUTPATIENT)
Dept: VASCULAR SURGERY | Facility: HOSPITAL | Age: 73
End: 2023-04-01

## 2023-04-01 LAB
A1C WITH ESTIMATED AVERAGE GLUCOSE RESULT: 8.4 % — HIGH (ref 4–5.6)
ANION GAP SERPL CALC-SCNC: 10 MMOL/L — SIGNIFICANT CHANGE UP (ref 5–17)
APTT BLD: 29.2 SEC — SIGNIFICANT CHANGE UP (ref 27.5–35.5)
BLD GP AB SCN SERPL QL: POSITIVE — SIGNIFICANT CHANGE UP
BUN SERPL-MCNC: 24 MG/DL — HIGH (ref 7–23)
CALCIUM SERPL-MCNC: 8.9 MG/DL — SIGNIFICANT CHANGE UP (ref 8.4–10.5)
CHLORIDE SERPL-SCNC: 103 MMOL/L — SIGNIFICANT CHANGE UP (ref 96–108)
CO2 SERPL-SCNC: 22 MMOL/L — SIGNIFICANT CHANGE UP (ref 22–31)
CREAT SERPL-MCNC: 1 MG/DL — SIGNIFICANT CHANGE UP (ref 0.5–1.3)
EGFR: 80 ML/MIN/1.73M2 — SIGNIFICANT CHANGE UP
ESTIMATED AVERAGE GLUCOSE: 194 MG/DL — HIGH (ref 68–114)
GLUCOSE BLDC GLUCOMTR-MCNC: 143 MG/DL — HIGH (ref 70–99)
GLUCOSE BLDC GLUCOMTR-MCNC: 179 MG/DL — HIGH (ref 70–99)
GLUCOSE BLDC GLUCOMTR-MCNC: 200 MG/DL — HIGH (ref 70–99)
GLUCOSE BLDC GLUCOMTR-MCNC: 204 MG/DL — HIGH (ref 70–99)
GLUCOSE SERPL-MCNC: 217 MG/DL — HIGH (ref 70–99)
HCT VFR BLD CALC: 32.8 % — LOW (ref 39–50)
HGB BLD-MCNC: 10.9 G/DL — LOW (ref 13–17)
INR BLD: 1.09 RATIO — SIGNIFICANT CHANGE UP (ref 0.88–1.16)
MAGNESIUM SERPL-MCNC: 2.4 MG/DL — SIGNIFICANT CHANGE UP (ref 1.6–2.6)
MCHC RBC-ENTMCNC: 30.8 PG — SIGNIFICANT CHANGE UP (ref 27–34)
MCHC RBC-ENTMCNC: 33.2 GM/DL — SIGNIFICANT CHANGE UP (ref 32–36)
MCV RBC AUTO: 92.7 FL — SIGNIFICANT CHANGE UP (ref 80–100)
MRSA PCR RESULT.: SIGNIFICANT CHANGE UP
NRBC # BLD: 0 /100 WBCS — SIGNIFICANT CHANGE UP (ref 0–0)
PHOSPHATE SERPL-MCNC: 3.3 MG/DL — SIGNIFICANT CHANGE UP (ref 2.5–4.5)
PLATELET # BLD AUTO: 242 K/UL — SIGNIFICANT CHANGE UP (ref 150–400)
POTASSIUM SERPL-MCNC: 4.5 MMOL/L — SIGNIFICANT CHANGE UP (ref 3.5–5.3)
POTASSIUM SERPL-SCNC: 4.5 MMOL/L — SIGNIFICANT CHANGE UP (ref 3.5–5.3)
PROTHROM AB SERPL-ACNC: 12.6 SEC — SIGNIFICANT CHANGE UP (ref 10.5–13.4)
RBC # BLD: 3.54 M/UL — LOW (ref 4.2–5.8)
RBC # FLD: 11.4 % — SIGNIFICANT CHANGE UP (ref 10.3–14.5)
RH IG SCN BLD-IMP: POSITIVE — SIGNIFICANT CHANGE UP
S AUREUS DNA NOSE QL NAA+PROBE: DETECTED
SODIUM SERPL-SCNC: 135 MMOL/L — SIGNIFICANT CHANGE UP (ref 135–145)
VANCOMYCIN TROUGH SERPL-MCNC: 10.9 UG/ML — SIGNIFICANT CHANGE UP (ref 10–20)
WBC # BLD: 6.57 K/UL — SIGNIFICANT CHANGE UP (ref 3.8–10.5)
WBC # FLD AUTO: 6.57 K/UL — SIGNIFICANT CHANGE UP (ref 3.8–10.5)

## 2023-04-01 PROCEDURE — 86077 PHYS BLOOD BANK SERV XMATCH: CPT

## 2023-04-01 PROCEDURE — 75625 CONTRAST EXAM ABDOMINL AORTA: CPT | Mod: 26

## 2023-04-01 PROCEDURE — 36246 INS CATH ABD/L-EXT ART 2ND: CPT | Mod: RT

## 2023-04-01 PROCEDURE — 75710 ARTERY X-RAYS ARM/LEG: CPT | Mod: 26,RT

## 2023-04-01 PROCEDURE — 93970 EXTREMITY STUDY: CPT | Mod: 26

## 2023-04-01 DEVICE — INTRODUCER SET MICROPUNCTURE ACCESS 21G X 7CM: Type: IMPLANTABLE DEVICE | Site: RIGHT | Status: FUNCTIONAL

## 2023-04-01 DEVICE — CATH QUICK CROSS .035X135CM: Type: IMPLANTABLE DEVICE | Site: RIGHT | Status: FUNCTIONAL

## 2023-04-01 DEVICE — DEVICE CLOSURE 5F MYNX GRIP MUST ORDER MIN OF 10 EA: Type: IMPLANTABLE DEVICE | Site: RIGHT | Status: FUNCTIONAL

## 2023-04-01 DEVICE — GUIDEWIRE BENTSON 0.035" X 145CM: Type: IMPLANTABLE DEVICE | Site: RIGHT | Status: FUNCTIONAL

## 2023-04-01 DEVICE — GUIDEWIRE RADIFOCUS GLIDEWIRE STANDARD ANGLED TIP 0.035" X 260CM: Type: IMPLANTABLE DEVICE | Site: RIGHT | Status: FUNCTIONAL

## 2023-04-01 DEVICE — SHEATH INTRODUCER TERUMO PINNACLE PERIPHERAL 5FR X 10CM: Type: IMPLANTABLE DEVICE | Site: RIGHT | Status: FUNCTIONAL

## 2023-04-01 DEVICE — CATH OMNI FLSH 0.035IN 5FRX65: Type: IMPLANTABLE DEVICE | Site: RIGHT | Status: FUNCTIONAL

## 2023-04-01 RX ORDER — ONDANSETRON 8 MG/1
4 TABLET, FILM COATED ORAL EVERY 6 HOURS
Refills: 0 | Status: DISCONTINUED | OUTPATIENT
Start: 2023-04-01 | End: 2023-04-01

## 2023-04-01 RX ORDER — SODIUM CHLORIDE 9 MG/ML
1000 INJECTION, SOLUTION INTRAVENOUS
Refills: 0 | Status: DISCONTINUED | OUTPATIENT
Start: 2023-04-01 | End: 2023-04-02

## 2023-04-01 RX ORDER — INSULIN LISPRO 100/ML
VIAL (ML) SUBCUTANEOUS
Refills: 0 | Status: DISCONTINUED | OUTPATIENT
Start: 2023-04-01 | End: 2023-04-02

## 2023-04-01 RX ORDER — DEXTROSE 50 % IN WATER 50 %
25 SYRINGE (ML) INTRAVENOUS ONCE
Refills: 0 | Status: DISCONTINUED | OUTPATIENT
Start: 2023-04-01 | End: 2023-04-06

## 2023-04-01 RX ORDER — VANCOMYCIN HCL 1 G
750 VIAL (EA) INTRAVENOUS EVERY 12 HOURS
Refills: 0 | Status: DISCONTINUED | OUTPATIENT
Start: 2023-04-01 | End: 2023-04-02

## 2023-04-01 RX ORDER — GABAPENTIN 400 MG/1
200 CAPSULE ORAL AT BEDTIME
Refills: 0 | Status: DISCONTINUED | OUTPATIENT
Start: 2023-04-01 | End: 2023-04-06

## 2023-04-01 RX ORDER — ASPIRIN/CALCIUM CARB/MAGNESIUM 324 MG
81 TABLET ORAL DAILY
Refills: 0 | Status: DISCONTINUED | OUTPATIENT
Start: 2023-04-01 | End: 2023-04-06

## 2023-04-01 RX ORDER — DEXTROSE 50 % IN WATER 50 %
15 SYRINGE (ML) INTRAVENOUS ONCE
Refills: 0 | Status: DISCONTINUED | OUTPATIENT
Start: 2023-04-01 | End: 2023-04-06

## 2023-04-01 RX ORDER — INSULIN LISPRO 100/ML
VIAL (ML) SUBCUTANEOUS AT BEDTIME
Refills: 0 | Status: DISCONTINUED | OUTPATIENT
Start: 2023-04-01 | End: 2023-04-01

## 2023-04-01 RX ORDER — GLUCAGON INJECTION, SOLUTION 0.5 MG/.1ML
1 INJECTION, SOLUTION SUBCUTANEOUS ONCE
Refills: 0 | Status: DISCONTINUED | OUTPATIENT
Start: 2023-04-01 | End: 2023-04-06

## 2023-04-01 RX ORDER — AMLODIPINE BESYLATE 2.5 MG/1
2.5 TABLET ORAL DAILY
Refills: 0 | Status: DISCONTINUED | OUTPATIENT
Start: 2023-04-01 | End: 2023-04-04

## 2023-04-01 RX ORDER — CEFEPIME 1 G/1
1000 INJECTION, POWDER, FOR SOLUTION INTRAMUSCULAR; INTRAVENOUS EVERY 8 HOURS
Refills: 0 | Status: DISCONTINUED | OUTPATIENT
Start: 2023-04-01 | End: 2023-04-05

## 2023-04-01 RX ORDER — HYDROMORPHONE HYDROCHLORIDE 2 MG/ML
0.25 INJECTION INTRAMUSCULAR; INTRAVENOUS; SUBCUTANEOUS
Refills: 0 | Status: DISCONTINUED | OUTPATIENT
Start: 2023-04-01 | End: 2023-04-01

## 2023-04-01 RX ORDER — INSULIN LISPRO 100/ML
VIAL (ML) SUBCUTANEOUS
Refills: 0 | Status: DISCONTINUED | OUTPATIENT
Start: 2023-04-01 | End: 2023-04-01

## 2023-04-01 RX ORDER — CHLORHEXIDINE GLUCONATE 213 G/1000ML
1 SOLUTION TOPICAL DAILY
Refills: 0 | Status: DISCONTINUED | OUTPATIENT
Start: 2023-04-01 | End: 2023-04-06

## 2023-04-01 RX ORDER — HEPARIN SODIUM 5000 [USP'U]/ML
5000 INJECTION INTRAVENOUS; SUBCUTANEOUS EVERY 8 HOURS
Refills: 0 | Status: DISCONTINUED | OUTPATIENT
Start: 2023-04-01 | End: 2023-04-06

## 2023-04-01 RX ORDER — LOSARTAN POTASSIUM 100 MG/1
25 TABLET, FILM COATED ORAL DAILY
Refills: 0 | Status: DISCONTINUED | OUTPATIENT
Start: 2023-04-01 | End: 2023-04-06

## 2023-04-01 RX ORDER — INSULIN LISPRO 100/ML
VIAL (ML) SUBCUTANEOUS AT BEDTIME
Refills: 0 | Status: DISCONTINUED | OUTPATIENT
Start: 2023-04-01 | End: 2023-04-02

## 2023-04-01 RX ORDER — ACETAMINOPHEN 500 MG
650 TABLET ORAL EVERY 6 HOURS
Refills: 0 | Status: DISCONTINUED | OUTPATIENT
Start: 2023-04-01 | End: 2023-04-06

## 2023-04-01 RX ADMIN — LOSARTAN POTASSIUM 25 MILLIGRAM(S): 100 TABLET, FILM COATED ORAL at 16:33

## 2023-04-01 RX ADMIN — Medication 81 MILLIGRAM(S): at 21:42

## 2023-04-01 RX ADMIN — HEPARIN SODIUM 5000 UNIT(S): 5000 INJECTION INTRAVENOUS; SUBCUTANEOUS at 05:02

## 2023-04-01 RX ADMIN — CHLORHEXIDINE GLUCONATE 1 APPLICATION(S): 213 SOLUTION TOPICAL at 21:43

## 2023-04-01 RX ADMIN — LOSARTAN POTASSIUM 25 MILLIGRAM(S): 100 TABLET, FILM COATED ORAL at 05:03

## 2023-04-01 RX ADMIN — HEPARIN SODIUM 5000 UNIT(S): 5000 INJECTION INTRAVENOUS; SUBCUTANEOUS at 15:38

## 2023-04-01 RX ADMIN — GABAPENTIN 200 MILLIGRAM(S): 400 CAPSULE ORAL at 21:42

## 2023-04-01 RX ADMIN — SODIUM CHLORIDE 75 MILLILITER(S): 9 INJECTION, SOLUTION INTRAVENOUS at 14:10

## 2023-04-01 RX ADMIN — AMLODIPINE BESYLATE 2.5 MILLIGRAM(S): 2.5 TABLET ORAL at 05:03

## 2023-04-01 RX ADMIN — CEFEPIME 100 MILLIGRAM(S): 1 INJECTION, POWDER, FOR SOLUTION INTRAMUSCULAR; INTRAVENOUS at 21:42

## 2023-04-01 RX ADMIN — HEPARIN SODIUM 5000 UNIT(S): 5000 INJECTION INTRAVENOUS; SUBCUTANEOUS at 21:41

## 2023-04-01 RX ADMIN — Medication 250 MILLIGRAM(S): at 06:27

## 2023-04-01 RX ADMIN — Medication 250 MILLIGRAM(S): at 17:45

## 2023-04-01 RX ADMIN — CEFEPIME 100 MILLIGRAM(S): 1 INJECTION, POWDER, FOR SOLUTION INTRAMUSCULAR; INTRAVENOUS at 05:02

## 2023-04-01 RX ADMIN — AMLODIPINE BESYLATE 2.5 MILLIGRAM(S): 2.5 TABLET ORAL at 16:33

## 2023-04-01 NOTE — PROGRESS NOTE ADULT - SUBJECTIVE AND OBJECTIVE BOX
VASCULAR SURGERY DAILY PROGRESS NOTE:     SUBJECTIVE/ROS: Patient seen and examined at bedside. No acute events overnight. Aware of procedure tomorrow.       MEDICATIONS  (STANDING):  amLODIPine   Tablet 2.5 milliGRAM(s) Oral daily  aspirin enteric coated 81 milliGRAM(s) Oral daily  cefepime   IVPB 1000 milliGRAM(s) IV Intermittent every 8 hours  dextrose 50% Injectable 25 Gram(s) IV Push once  dextrose Oral Gel 15 Gram(s) Oral once  gabapentin 200 milliGRAM(s) Oral at bedtime  glucagon  Injectable 1 milliGRAM(s) IntraMuscular once  heparin   Injectable 5000 Unit(s) SubCutaneous every 8 hours  influenza  Vaccine (HIGH DOSE) 0.7 milliLiter(s) IntraMuscular once  insulin lispro (ADMELOG) corrective regimen sliding scale   SubCutaneous three times a day before meals  insulin lispro (ADMELOG) corrective regimen sliding scale   SubCutaneous at bedtime  losartan 25 milliGRAM(s) Oral daily  vancomycin  IVPB 750 milliGRAM(s) IV Intermittent every 12 hours    MEDICATIONS  (PRN):  acetaminophen     Tablet .. 650 milliGRAM(s) Oral every 6 hours PRN Temp greater or equal to 38C (100.4F), Moderate Pain (4 - 6)      OBJECTIVE:    Vital Signs Last 24 Hrs  T(C): 36.4 (31 Mar 2023 05:12), Max: 36.8 (30 Mar 2023 20:51)  T(F): 97.6 (31 Mar 2023 05:12), Max: 98.2 (30 Mar 2023 20:51)  HR: 82 (31 Mar 2023 05:12) (77 - 89)  BP: 154/76 (31 Mar 2023 05:12) (139/76 - 171/77)  BP(mean): --  RR: 18 (31 Mar 2023 05:12) (17 - 18)  SpO2: 99% (31 Mar 2023 05:12) (97% - 99%)    Parameters below as of 31 Mar 2023 05:12  Patient On (Oxygen Delivery Method): room air            I&O's Detail    30 Mar 2023 07:01  -  31 Mar 2023 07:00  --------------------------------------------------------  IN:    IV PiggyBack: 600 mL    Oral Fluid: 100 mL  Total IN: 700 mL    OUT:    Voided (mL): 300 mL  Total OUT: 300 mL    Total NET: 400 mL          Daily     Daily     LABS:                        11.4   7.60  )-----------( 267      ( 31 Mar 2023 06:43 )             34.7     03-31    137  |  102  |  22  ----------------------------<  166<H>  4.7   |  26  |  0.97    Ca    9.4      31 Mar 2023 06:43    TPro  7.4  /  Alb  3.7  /  TBili  0.3  /  DBili  x   /  AST  13  /  ALT  18  /  AlkPhos  71  03-30    PT/INR - ( 30 Mar 2023 01:26 )   PT: 12.7 sec;   INR: 1.09 ratio         PTT - ( 30 Mar 2023 01:26 )  PTT:31.1 sec           VASCULAR SURGERY DAILY PROGRESS NOTE:     SUBJECTIVE/ROS: Patient seen and examined at bedside. No acute events overnight.       MEDICATIONS  (STANDING):  amLODIPine   Tablet 2.5 milliGRAM(s) Oral daily  aspirin enteric coated 81 milliGRAM(s) Oral daily  cefepime   IVPB 1000 milliGRAM(s) IV Intermittent every 8 hours  dextrose 50% Injectable 25 Gram(s) IV Push once  dextrose Oral Gel 15 Gram(s) Oral once  gabapentin 200 milliGRAM(s) Oral at bedtime  glucagon  Injectable 1 milliGRAM(s) IntraMuscular once  heparin   Injectable 5000 Unit(s) SubCutaneous every 8 hours  influenza  Vaccine (HIGH DOSE) 0.7 milliLiter(s) IntraMuscular once  insulin lispro (ADMELOG) corrective regimen sliding scale   SubCutaneous three times a day before meals  insulin lispro (ADMELOG) corrective regimen sliding scale   SubCutaneous at bedtime  losartan 25 milliGRAM(s) Oral daily  vancomycin  IVPB 750 milliGRAM(s) IV Intermittent every 12 hours    MEDICATIONS  (PRN):  acetaminophen     Tablet .. 650 milliGRAM(s) Oral every 6 hours PRN Temp greater or equal to 38C (100.4F), Moderate Pain (4 - 6)      OBJECTIVE:    Vital Signs Last 24 Hrs  T(C): 36.4 (31 Mar 2023 05:12), Max: 36.8 (30 Mar 2023 20:51)  T(F): 97.6 (31 Mar 2023 05:12), Max: 98.2 (30 Mar 2023 20:51)  HR: 82 (31 Mar 2023 05:12) (77 - 89)  BP: 154/76 (31 Mar 2023 05:12) (139/76 - 171/77)  BP(mean): --  RR: 18 (31 Mar 2023 05:12) (17 - 18)  SpO2: 99% (31 Mar 2023 05:12) (97% - 99%)    Parameters below as of 31 Mar 2023 05:12  Patient On (Oxygen Delivery Method): room air            I&O's Detail    30 Mar 2023 07:01  -  31 Mar 2023 07:00  --------------------------------------------------------  IN:    IV PiggyBack: 600 mL    Oral Fluid: 100 mL  Total IN: 700 mL    OUT:    Voided (mL): 300 mL  Total OUT: 300 mL    Total NET: 400 mL    Gen: Laying in bed, NAD,   Neuro: alert and awake  Resp: Unlabored breathing  CV: normal rate, regular rhythm  Abd: soft, NTND  EXT:      - RLE: R hallux wound with dressing c/d/i     - LLE: No wounds.            LABS:                        11.4   7.60  )-----------( 267      ( 31 Mar 2023 06:43 )             34.7     03-31    137  |  102  |  22  ----------------------------<  166<H>  4.7   |  26  |  0.97    Ca    9.4      31 Mar 2023 06:43    TPro  7.4  /  Alb  3.7  /  TBili  0.3  /  DBili  x   /  AST  13  /  ALT  18  /  AlkPhos  71  03-30    PT/INR - ( 30 Mar 2023 01:26 )   PT: 12.7 sec;   INR: 1.09 ratio         PTT - ( 30 Mar 2023 01:26 )  PTT:31.1 sec

## 2023-04-01 NOTE — PRE-OP CHECKLIST - AS BP NONINV SITE
Start using Humalog - 4 units with breakfast and lunch; 6 units with dinner    Continue Lantus 24 units once daily, Trulicity 3mg once weekly and Glimepiride 2mg once daily in the morning
right upper arm

## 2023-04-01 NOTE — CHART NOTE - NSCHARTNOTEFT_GEN_A_CORE
Post Operative Note  Patient: ALEE DUNN 72y (1950) Male   MRN: [id number]  Location: [facility] 9MON 910 D1  Visit: 03-30-23 Inpatient  Date: 04-01-23 @ 16:30    Procedure: S/P right lower extremity angiogram    Subjective: Patient seen and examined post operatively. Reports some back pain but otherwise feels pain is controlled.     Objective:  Vitals: T(F): 97.7 (04-01-23 @ 16:28), Max: 99 (03-31-23 @ 21:36)  HR: 80 (04-01-23 @ 16:28)  BP: 151/77 (04-01-23 @ 16:28) (110/54 - 175/79)  RR: 18 (04-01-23 @ 16:28)  SpO2: 96% (04-01-23 @ 16:28)  Vent Settings:     In:   03-31-23 @ 07:01  -  04-01-23 @ 07:00  --------------------------------------------------------  IN: 1190 mL    04-01-23 @ 07:01  -  04-01-23 @ 16:30  --------------------------------------------------------  IN: 150 mL      IV Fluids: lactated ringers. 1000 milliLiter(s) (75 mL/Hr) IV Continuous <Continuous>      Out:   03-31-23 @ 07:01  -  04-01-23 @ 07:00  --------------------------------------------------------  OUT: 0 mL    04-01-23 @ 07:01  -  04-01-23 @ 16:30  --------------------------------------------------------  OUT: 1000 mL      EBL:     Voided Urine:   03-31-23 @ 07:01  -  04-01-23 @ 07:00  --------------------------------------------------------  OUT: 0 mL    04-01-23 @ 07:01  -  04-01-23 @ 16:30  --------------------------------------------------------  OUT: 1000 mL            Physical Examination:  General: NAD, resting comfortably in bed  HEENT: Normocephalic atraumatic  Respiratory: Nonlabored respirations, normal CW expansion.  Cardio: S1S2, regular rate and rhythm.  Abdomen: soft, NT, ND  Vascular: b/l Lower extremities warm, +PT LE signals  Extremities: b/l lower extremity sensorimotor intact    Imaging:  No post-op imaging studies    Assessment:  72yMale patient S/P right lower extremity angiogram    Plan:  - IV Abx: cefepime and vanc  - Pain control PRN  - Diet: regualr  - IVF  - Activity: ambulate as tolerated  - DVT ppx: SCD's & SQH    Date/Time: 04-01-23 @ 16:30 Post Operative Note  Patient: ALEE DUNN 72y (1950) Male   MRN: [id number]  Location: [facility] 9MON 910 D1  Visit: 03-30-23 Inpatient  Date: 04-01-23 @ 16:30    Procedure: S/P right lower extremity angiogram    Subjective: Patient seen and examined post operatively. Reports some back pain but otherwise feels pain is controlled.     Objective:  Vitals: T(F): 97.7 (04-01-23 @ 16:28), Max: 99 (03-31-23 @ 21:36)  HR: 80 (04-01-23 @ 16:28)  BP: 151/77 (04-01-23 @ 16:28) (110/54 - 175/79)  RR: 18 (04-01-23 @ 16:28)  SpO2: 96% (04-01-23 @ 16:28)  Vent Settings:     In:   03-31-23 @ 07:01  -  04-01-23 @ 07:00  --------------------------------------------------------  IN: 1190 mL    04-01-23 @ 07:01  -  04-01-23 @ 16:30  --------------------------------------------------------  IN: 150 mL      IV Fluids: lactated ringers. 1000 milliLiter(s) (75 mL/Hr) IV Continuous <Continuous>      Out:   03-31-23 @ 07:01  -  04-01-23 @ 07:00  --------------------------------------------------------  OUT: 0 mL    04-01-23 @ 07:01  -  04-01-23 @ 16:30  --------------------------------------------------------  OUT: 1000 mL      EBL:     Voided Urine:   03-31-23 @ 07:01  -  04-01-23 @ 07:00  --------------------------------------------------------  OUT: 0 mL    04-01-23 @ 07:01  -  04-01-23 @ 16:30  --------------------------------------------------------  OUT: 1000 mL            Physical Examination:  General: NAD, resting comfortably in bed  HEENT: Normocephalic atraumatic  Respiratory: Nonlabored respirations, normal CW expansion.  Cardio: S1S2, regular rate and rhythm.  Abdomen: soft, NT, ND  Vascular: b/l Lower extremities warm, +PT LE signals  Extremities: b/l lower extremity sensorimotor intact  Left femoral access: dressing in place, some strikethrough, no hematoma or fluctuance     Imaging:  No post-op imaging studies    Assessment:  72yMale patient S/P right lower extremity angiogram    Plan:  - IV Abx: cefepime and vanc  - Pain control PRN  - Diet: regualr  - IVF  - Activity: ambulate as tolerated  - DVT ppx: SCD's & SQH    Date/Time: 04-01-23 @ 16:30

## 2023-04-01 NOTE — PROGRESS NOTE ADULT - ASSESSMENT
Assessment/Plan: 72M with hx of DM and HTN with R hallux wound.     Plan:  - EYAD/PVR completed   - Appreciate podiatry recs  - Please document medical optimization for angiogram   - Cardiology consult for optimization for angiogram pending echo   - OR Sat for RLE angiogram, possible angioplasty possible stent   - Consent in chart   - Regular diet  - Continue IV abx   - DVT ppx       Vascular p [phone]  Assessment/Plan: 72M with hx of DM and HTN with R hallux wound.     Plan:  - OR today for Right Angiogram, possible angioplasty, possible stent  - Medical and cardiology clearance documented   - Consent in chart   - NPO  - Continue IV abx   - DVT ppx   - Appreciate podiatry recs      Vascular p [phone]

## 2023-04-01 NOTE — PROGRESS NOTE ADULT - SUBJECTIVE AND OBJECTIVE BOX
Name of Patient : ALEE DUNN  MRN: [id number]  Date of visit: 04-01-23     Subjective: Patient seen and examined. No new events except as noted.   doing okay  angio today     REVIEW OF SYSTEMS:    CONSTITUTIONAL: No weakness, fevers or chills  EYES/ENT: No visual changes;  No vertigo or throat pain   NECK: No pain or stiffness  RESPIRATORY: No cough, wheezing, hemoptysis; No shortness of breath  CARDIOVASCULAR: No chest pain or palpitations  GASTROINTESTINAL: No abdominal or epigastric pain. No nausea, vomiting, or hematemesis; No diarrhea or constipation. No melena or hematochezia.  GENITOURINARY: No dysuria, frequency or hematuria  NEUROLOGICAL: No numbness or weakness  SKIN: No itching, burning, rashes, or lesions   All other review of systems is negative unless indicated above.    MEDICATIONS:  MEDICATIONS  (STANDING):  amLODIPine   Tablet 2.5 milliGRAM(s) Oral daily  aspirin enteric coated 81 milliGRAM(s) Oral daily  cefepime   IVPB 1000 milliGRAM(s) IV Intermittent every 8 hours  chlorhexidine 2% Cloths 1 Application(s) Topical daily  dextrose 50% Injectable 25 Gram(s) IV Push once  dextrose Oral Gel 15 Gram(s) Oral once  gabapentin 200 milliGRAM(s) Oral at bedtime  glucagon  Injectable 1 milliGRAM(s) IntraMuscular once  heparin   Injectable 5000 Unit(s) SubCutaneous every 8 hours  influenza  Vaccine (HIGH DOSE) 0.7 milliLiter(s) IntraMuscular once  insulin lispro (ADMELOG) corrective regimen sliding scale   SubCutaneous three times a day before meals  insulin lispro (ADMELOG) corrective regimen sliding scale   SubCutaneous at bedtime  lactated ringers. 1000 milliLiter(s) (75 mL/Hr) IV Continuous <Continuous>  losartan 25 milliGRAM(s) Oral daily  vancomycin  IVPB 750 milliGRAM(s) IV Intermittent every 12 hours      PHYSICAL EXAM:  T(C): 36.5 (04-01-23 @ 18:25), Max: 37.2 (03-31-23 @ 21:36)  HR: 87 (04-01-23 @ 18:25) (68 - 87)  BP: 146/67 (04-01-23 @ 18:25) (110/54 - 175/79)  RR: 18 (04-01-23 @ 18:25) (14 - 18)  SpO2: 96% (04-01-23 @ 16:28) (95% - 100%)  Wt(kg): --  I&O's Summary    31 Mar 2023 07:01  -  01 Apr 2023 07:00  --------------------------------------------------------  IN: 1190 mL / OUT: 0 mL / NET: 1190 mL    01 Apr 2023 07:01  -  01 Apr 2023 20:26  --------------------------------------------------------  IN: 150 mL / OUT: 1000 mL / NET: -850 mL      Height (cm): 165.1 (04-01 @ 11:26)  Weight (kg): 71.7 (04-01 @ 11:26)  BMI (kg/m2): 26.3 (04-01 @ 11:26)  BSA (m2): 1.79 (04-01 @ 11:26)    Appearance: Normal	  HEENT:  PERRLA   Lymphatic: No lymphadenopathy   Cardiovascular: Normal S1 S2, no JVD  Respiratory: normal effort , clear  Gastrointestinal:  Soft, Non-tender  Skin: No rashes,  warm to touch  Psychiatry:  Mood & affect appropriate  Musculuskeletal: No edema    recent labs, Imaging and EKGs personally reviewed   03-31-23 @ 07:01  -  04-01-23 @ 07:00  --------------------------------------------------------  IN: 1190 mL / OUT: 0 mL / NET: 1190 mL    04-01-23 @ 07:01  -  04-01-23 @ 20:26  --------------------------------------------------------  IN: 150 mL / OUT: 1000 mL / NET: -850 mL                          10.9   6.57  )-----------( 242      ( 01 Apr 2023 05:15 )             32.8               04-01    135  |  103  |  24<H>  ----------------------------<  217<H>  4.5   |  22  |  1.00    Ca    8.9      01 Apr 2023 05:15  Phos  3.3     04-01  Mg     2.4     04-01      PT/INR - ( 01 Apr 2023 05:15 )   PT: 12.6 sec;   INR: 1.09 ratio         PTT - ( 01 Apr 2023 05:15 )  PTT:29.2 sec

## 2023-04-01 NOTE — PROGRESS NOTE ADULT - SUBJECTIVE AND OBJECTIVE BOX
DATE OF SERVICE: 04-01-23 @ 14:16    Patient is a 72y old  Male who presents with a chief complaint of R toe infection (01 Apr 2023 06:23)      INTERVAL HISTORY: Feels ok.     REVIEW OF SYSTEMS:  CONSTITUTIONAL: No weakness  EYES/ENT: No visual changes;  No throat pain   NECK: No pain or stiffness  RESPIRATORY: No cough, wheezing; No shortness of breath  CARDIOVASCULAR: No chest pain or palpitations  GASTROINTESTINAL: No abdominal  pain. No nausea, vomiting, or hematemesis  GENITOURINARY: No dysuria, frequency or hematuria  NEUROLOGICAL: No stroke like symptoms  SKIN: No rashes    MEDICATIONS:  amLODIPine   Tablet 2.5 milliGRAM(s) Oral daily  losartan 25 milliGRAM(s) Oral daily        PHYSICAL EXAM:  T(C): 36 (04-01-23 @ 12:15), Max: 37.2 (03-31-23 @ 21:36)  HR: 79 (04-01-23 @ 14:00) (68 - 87)  BP: 149/82 (04-01-23 @ 14:00) (110/54 - 175/79)  RR: 17 (04-01-23 @ 13:45) (14 - 18)  SpO2: 99% (04-01-23 @ 14:00) (95% - 100%)  Wt(kg): --  I&O's Summary    31 Mar 2023 07:01  -  01 Apr 2023 07:00  --------------------------------------------------------  IN: 1190 mL / OUT: 0 mL / NET: 1190 mL      Height (cm): 165.1 (04-01 @ 11:26)  Weight (kg): 71.7 (04-01 @ 11:26)  BMI (kg/m2): 26.3 (04-01 @ 11:26)  BSA (m2): 1.79 (04-01 @ 11:26)    Appearance: In no distress	  HEENT:    PERRL, EOMI	  Cardiovascular:  S1 S2, No JVD  Respiratory: Lungs clear to auscultation	  Gastrointestinal:  Soft, Non-tender, + BS	  Vascularature:  No edema of LE  Psychiatric: Appropriate affect   Neuro: no acute focal deficits                               10.9   6.57  )-----------( 242      ( 01 Apr 2023 05:15 )             32.8     04-01    135  |  103  |  24<H>  ----------------------------<  217<H>  4.5   |  22  |  1.00    Ca    8.9      01 Apr 2023 05:15  Phos  3.3     04-01  Mg     2.4     04-01          Labs personally reviewed      ASSESSMENT/PLAN: 	    Patient is a 72 year old male with a PMHx of HTN, 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 Patient reports worsening pain and discomfort in his right lower extremity. Patient denies any chest pain, palpitations, SOB or dyspnea. Patient denies dyspnea on exertion or shortness of breath with ambulation or walking.     Problem/Plan -1  Problem: Cardiac Risk Stratification  - Denies CP or SOB  - TTE shows preserved EF, mild LVH  - Not in decompensated HF  - No hx of tachy supa arrhythmia  - Patient is low to mod risk for low risk peripheral angiogram. No contraindication to proceed.    Problem/Plan -2  Problem: Hypertension  - c/w amlodipine 2.5mg PO daily  - c/w losartan 25mg PO daily    Problem/Plan -3  Problem: DM II  - Check HgbA1c  - ISS as per primary team        Berenice Branch, RISHABH-NP   Matt Calhoun DO [facility]  Cardiovascular Medicine  [address] [place] [address], [address]  Available through call or text on [employer] TE[employer]  Office: [phone]   DATE OF SERVICE: 04-01-23 @ 14:16    Patient is a 72y old  Male who presents with a chief complaint of R toe infection (01 Apr 2023 06:23)      INTERVAL HISTORY: Feels ok.     REVIEW OF SYSTEMS:  CONSTITUTIONAL: No weakness  EYES/ENT: No visual changes;  No throat pain   NECK: No pain or stiffness  RESPIRATORY: No cough, wheezing; No shortness of breath  CARDIOVASCULAR: No chest pain or palpitations  GASTROINTESTINAL: No abdominal  pain. No nausea, vomiting, or hematemesis  GENITOURINARY: No dysuria, frequency or hematuria  NEUROLOGICAL: No stroke like symptoms  SKIN: No rashes    MEDICATIONS:  amLODIPine   Tablet 2.5 milliGRAM(s) Oral daily  losartan 25 milliGRAM(s) Oral daily        PHYSICAL EXAM:  T(C): 36 (04-01-23 @ 12:15), Max: 37.2 (03-31-23 @ 21:36)  HR: 79 (04-01-23 @ 14:00) (68 - 87)  BP: 149/82 (04-01-23 @ 14:00) (110/54 - 175/79)  RR: 17 (04-01-23 @ 13:45) (14 - 18)  SpO2: 99% (04-01-23 @ 14:00) (95% - 100%)  Wt(kg): --  I&O's Summary    31 Mar 2023 07:01  -  01 Apr 2023 07:00  --------------------------------------------------------  IN: 1190 mL / OUT: 0 mL / NET: 1190 mL      Height (cm): 165.1 (04-01 @ 11:26)  Weight (kg): 71.7 (04-01 @ 11:26)  BMI (kg/m2): 26.3 (04-01 @ 11:26)  BSA (m2): 1.79 (04-01 @ 11:26)    Appearance: In no distress	  HEENT:    PERRL, EOMI	  Cardiovascular:  S1 S2, No JVD  Respiratory: Lungs clear to auscultation	  Gastrointestinal:  Soft, Non-tender, + BS	  Vascularature:  No edema of LE  Psychiatric: Appropriate affect   Neuro: no acute focal deficits                               10.9   6.57  )-----------( 242      ( 01 Apr 2023 05:15 )             32.8     04-01    135  |  103  |  24<H>  ----------------------------<  217<H>  4.5   |  22  |  1.00    Ca    8.9      01 Apr 2023 05:15  Phos  3.3     04-01  Mg     2.4     04-01          Labs personally reviewed      ASSESSMENT/PLAN: 	    Patient is a 72 year old male with a PMHx of HTN, Type II DM associated with neuropathy whore presents to [facility] with a chief complaint of pain, blistering and fluid drainage on his right foot. Patient reports worsening pain and discomfort in his right lower extremity. Patient denies any chest pain, palpitations, SOB or dyspnea. Patient denies dyspnea on exertion or shortness of breath with ambulation or walking.     Problem/Plan -1  Problem: Cardiac Risk Stratification for LE bypass surgery  - Denies CP or SOB  - TTE shows preserved EF, mild LVH  - Not in decompensated HF  - No hx of tachy supa arrhythmia  - NM stress test ordered given bypass planned     Problem/Plan -2  Problem: Hypertension  - c/w amlodipine 2.5mg PO daily  - c/w losartan 25mg PO daily    Problem/Plan -3  Problem: DM II  - Check HgbA1c  - ISS as per primary team        RISHABH Bello-LASHAUN Calhoun DO [facility]  Cardiovascular Medicine  [address] [place] [address], [place] [address]  Available through call or text on [employer] TEAM[employer]  Office: [phone]

## 2023-04-01 NOTE — PROGRESS NOTE ADULT - ASSESSMENT
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 Patient reports worsening pain and discomfort in his right lower extremity. Patient reports intermittent chills for the past week prior to his arrival. Patient reports that he has been experiencing bilateral calf pain for about one month associated with pain and discomfort with walking less than 2 blocks. Patient reports that he noticed right lower extremity edema and bleeding from his great toe about two days prior to arrival. Internal medicine has been consulted on Mr. Paredes's care for medical management and medical clearance. Patient denies any chest pain, palpitations, SOB or dyspnea. Patient denies dyspnea on exertion or shortness of breath with ambulation or walking. Patient reports that his walking is limited by lower extremity pain. Denies history of HLD, MI, CVA, Cardiac arrythmia, PE or DVT in the past.     Peripheral Arterial Disease, Neuropathy, Right Toe Wound   - LE X-Ray -- Without acute displaced Fx or Dislocation, Osteoarthritic changes, Neg for OM   - EYAD consistent w/ small vessel disease in feer   - LE Duplex -- Neg for DVT   - On ASA 81 and Gabapentin   - Seen by podiatry  - Care per vascular appreciated -- Planned for RLE Angio, possible angioplasty, possible stent   - echo noted   - patient is medically optimized for angio.     Chills  - 03/30 BCx2 NGTD; F/u final   - F/u wound culture -- in lab   - On Cefepime and Vancomycin for ABX   - No leukocytosis, reports chills have resolved  - Cont to monitor CBC, Temp curve, VS and patient closely  - If febrile, check pan cultures     HTN  - C/w Norvasc 2.5 and Losartan 25   - Monitor BP, VS and patient closely    DM  - Check A1C  - C/w Sliding scale   - Monitor glucose levels     Pre-OP Risk Stratification   - Pending TTE     PPX  - Hep 5K Q8

## 2023-04-02 LAB
ANION GAP SERPL CALC-SCNC: 10 MMOL/L — SIGNIFICANT CHANGE UP (ref 5–17)
BUN SERPL-MCNC: 18 MG/DL — SIGNIFICANT CHANGE UP (ref 7–23)
CALCIUM SERPL-MCNC: 9 MG/DL — SIGNIFICANT CHANGE UP (ref 8.4–10.5)
CHLORIDE SERPL-SCNC: 102 MMOL/L — SIGNIFICANT CHANGE UP (ref 96–108)
CO2 SERPL-SCNC: 24 MMOL/L — SIGNIFICANT CHANGE UP (ref 22–31)
CREAT SERPL-MCNC: 0.93 MG/DL — SIGNIFICANT CHANGE UP (ref 0.5–1.3)
EGFR: 87 ML/MIN/1.73M2 — SIGNIFICANT CHANGE UP
GLUCOSE BLDC GLUCOMTR-MCNC: 231 MG/DL — HIGH (ref 70–99)
GLUCOSE BLDC GLUCOMTR-MCNC: 261 MG/DL — HIGH (ref 70–99)
GLUCOSE BLDC GLUCOMTR-MCNC: 364 MG/DL — HIGH (ref 70–99)
GLUCOSE SERPL-MCNC: 168 MG/DL — HIGH (ref 70–99)
HCT VFR BLD CALC: 34.2 % — LOW (ref 39–50)
HGB BLD-MCNC: 11.1 G/DL — LOW (ref 13–17)
MAGNESIUM SERPL-MCNC: 2.4 MG/DL — SIGNIFICANT CHANGE UP (ref 1.6–2.6)
MCHC RBC-ENTMCNC: 30.3 PG — SIGNIFICANT CHANGE UP (ref 27–34)
MCHC RBC-ENTMCNC: 32.5 GM/DL — SIGNIFICANT CHANGE UP (ref 32–36)
MCV RBC AUTO: 93.4 FL — SIGNIFICANT CHANGE UP (ref 80–100)
NRBC # BLD: 0 /100 WBCS — SIGNIFICANT CHANGE UP (ref 0–0)
PHOSPHATE SERPL-MCNC: 3 MG/DL — SIGNIFICANT CHANGE UP (ref 2.5–4.5)
PLATELET # BLD AUTO: 279 K/UL — SIGNIFICANT CHANGE UP (ref 150–400)
POTASSIUM SERPL-MCNC: 4.5 MMOL/L — SIGNIFICANT CHANGE UP (ref 3.5–5.3)
POTASSIUM SERPL-SCNC: 4.5 MMOL/L — SIGNIFICANT CHANGE UP (ref 3.5–5.3)
RBC # BLD: 3.66 M/UL — LOW (ref 4.2–5.8)
RBC # FLD: 11.5 % — SIGNIFICANT CHANGE UP (ref 10.3–14.5)
SODIUM SERPL-SCNC: 136 MMOL/L — SIGNIFICANT CHANGE UP (ref 135–145)
VANCOMYCIN TROUGH SERPL-MCNC: 24 UG/ML — HIGH (ref 10–20)
WBC # BLD: 6.9 K/UL — SIGNIFICANT CHANGE UP (ref 3.8–10.5)
WBC # FLD AUTO: 6.9 K/UL — SIGNIFICANT CHANGE UP (ref 3.8–10.5)

## 2023-04-02 PROCEDURE — 99232 SBSQ HOSP IP/OBS MODERATE 35: CPT

## 2023-04-02 PROCEDURE — 78452 HT MUSCLE IMAGE SPECT MULT: CPT | Mod: 26

## 2023-04-02 PROCEDURE — 93016 CV STRESS TEST SUPVJ ONLY: CPT

## 2023-04-02 PROCEDURE — 93018 CV STRESS TEST I&R ONLY: CPT

## 2023-04-02 RX ORDER — INSULIN LISPRO 100/ML
VIAL (ML) SUBCUTANEOUS AT BEDTIME
Refills: 0 | Status: DISCONTINUED | OUTPATIENT
Start: 2023-04-02 | End: 2023-04-05

## 2023-04-02 RX ORDER — VANCOMYCIN HCL 1 G
500 VIAL (EA) INTRAVENOUS EVERY 12 HOURS
Refills: 0 | Status: DISCONTINUED | OUTPATIENT
Start: 2023-04-02 | End: 2023-04-05

## 2023-04-02 RX ORDER — INSULIN LISPRO 100/ML
VIAL (ML) SUBCUTANEOUS
Refills: 0 | Status: DISCONTINUED | OUTPATIENT
Start: 2023-04-02 | End: 2023-04-05

## 2023-04-02 RX ADMIN — GABAPENTIN 200 MILLIGRAM(S): 400 CAPSULE ORAL at 21:37

## 2023-04-02 RX ADMIN — CEFEPIME 100 MILLIGRAM(S): 1 INJECTION, POWDER, FOR SOLUTION INTRAMUSCULAR; INTRAVENOUS at 06:00

## 2023-04-02 RX ADMIN — Medication 81 MILLIGRAM(S): at 13:22

## 2023-04-02 RX ADMIN — HEPARIN SODIUM 5000 UNIT(S): 5000 INJECTION INTRAVENOUS; SUBCUTANEOUS at 06:05

## 2023-04-02 RX ADMIN — HEPARIN SODIUM 5000 UNIT(S): 5000 INJECTION INTRAVENOUS; SUBCUTANEOUS at 21:37

## 2023-04-02 RX ADMIN — CEFEPIME 100 MILLIGRAM(S): 1 INJECTION, POWDER, FOR SOLUTION INTRAMUSCULAR; INTRAVENOUS at 21:37

## 2023-04-02 RX ADMIN — Medication 250 MILLIGRAM(S): at 17:01

## 2023-04-02 RX ADMIN — Medication 250 MILLIGRAM(S): at 06:02

## 2023-04-02 RX ADMIN — Medication 6: at 18:08

## 2023-04-02 RX ADMIN — AMLODIPINE BESYLATE 2.5 MILLIGRAM(S): 2.5 TABLET ORAL at 06:00

## 2023-04-02 RX ADMIN — Medication 5: at 13:23

## 2023-04-02 RX ADMIN — HEPARIN SODIUM 5000 UNIT(S): 5000 INJECTION INTRAVENOUS; SUBCUTANEOUS at 13:23

## 2023-04-02 RX ADMIN — CHLORHEXIDINE GLUCONATE 1 APPLICATION(S): 213 SOLUTION TOPICAL at 13:32

## 2023-04-02 RX ADMIN — LOSARTAN POTASSIUM 25 MILLIGRAM(S): 100 TABLET, FILM COATED ORAL at 06:00

## 2023-04-02 RX ADMIN — CEFEPIME 100 MILLIGRAM(S): 1 INJECTION, POWDER, FOR SOLUTION INTRAMUSCULAR; INTRAVENOUS at 13:23

## 2023-04-02 NOTE — PROGRESS NOTE ADULT - SUBJECTIVE AND OBJECTIVE BOX
DATE OF SERVICE: 04-02-23 @ 11:55    Patient is a 72y old  Male who presents with a chief complaint of R toe infection (02 Apr 2023 07:40)      INTERVAL HISTORY: Feels ok.     REVIEW OF SYSTEMS:  CONSTITUTIONAL: No weakness  EYES/ENT: No visual changes;  No throat pain   NECK: No pain or stiffness  RESPIRATORY: No cough, wheezing; No shortness of breath  CARDIOVASCULAR: No chest pain or palpitations  GASTROINTESTINAL: No abdominal  pain. No nausea, vomiting, or hematemesis  GENITOURINARY: No dysuria, frequency or hematuria  NEUROLOGICAL: No stroke like symptoms  SKIN: No rashes      MEDICATIONS:  amLODIPine   Tablet 2.5 milliGRAM(s) Oral daily  losartan 25 milliGRAM(s) Oral daily        PHYSICAL EXAM:  T(C): 36.6 (04-02-23 @ 05:58), Max: 36.8 (04-01-23 @ 21:08)  HR: 79 (04-02-23 @ 05:58) (68 - 88)  BP: 120/66 (04-02-23 @ 05:58) (110/54 - 164/79)  RR: 18 (04-02-23 @ 05:58) (14 - 18)  SpO2: 99% (04-02-23 @ 05:58) (96% - 100%)  Wt(kg): --  I&O's Summary    01 Apr 2023 07:01  -  02 Apr 2023 07:00  --------------------------------------------------------  IN: 550 mL / OUT: 1400 mL / NET: -850 mL          Appearance: In no distress	  HEENT:    PERRL, EOMI	  Cardiovascular:  S1 S2, No JVD  Respiratory: Lungs clear to auscultation	  Gastrointestinal:  Soft, Non-tender, + BS	  Vascularature:  No edema of LE  Psychiatric: Appropriate affect   Neuro: no acute focal deficits                               11.1   6.90  )-----------( 279      ( 02 Apr 2023 07:03 )             34.2     04-02    136  |  102  |  18  ----------------------------<  168<H>  4.5   |  24  |  0.93    Ca    9.0      02 Apr 2023 07:03  Phos  3.0     04-02  Mg     2.4     04-02          Labs personally reviewed      ASSESSMENT/PLAN: 	      Patient is a 72 year old male with a PMHx of HTN, 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 Patient reports worsening pain and discomfort in his right lower extremity. Patient denies any chest pain, palpitations, SOB or dyspnea. Patient denies dyspnea on exertion or shortness of breath with ambulation or walking.     Problem/Plan -1  Problem: Cardiac Risk Stratification for LE bypass surgery  - Denies CP or SOB  - TTE shows preserved EF, mild LVH  - Not in decompensated HF  - No hx of tachy supa arrhythmia  - NM stress test ordered given bypass planned     Problem/Plan -2  Problem: Hypertension  - c/w amlodipine 2.5mg PO daily  - c/w losartan 25mg PO daily    Problem/Plan -3  Problem: DM II  - Check HgbA1c  - ISS as per primary team          Berenice Branch, AG-NP   Matt Calhoun DO [facility]  Cardiovascular Medicine  [address] [place] [address], [address]  Available through call or text on [employer] TE[employer]  Office: [phone]

## 2023-04-02 NOTE — PROGRESS NOTE ADULT - ASSESSMENT
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 Patient reports worsening pain and discomfort in his right lower extremity. Patient reports intermittent chills for the past week prior to his arrival. Patient reports that he has been experiencing bilateral calf pain for about one month associated with pain and discomfort with walking less than 2 blocks. Patient reports that he noticed right lower extremity edema and bleeding from his great toe about two days prior to arrival. Internal medicine has been consulted on Mr. Paredes's care for medical management and medical clearance. Patient denies any chest pain, palpitations, SOB or dyspnea. Patient denies dyspnea on exertion or shortness of breath with ambulation or walking. Patient reports that his walking is limited by lower extremity pain. Denies history of HLD, MI, CVA, Cardiac arrythmia, PE or DVT in the past.     Peripheral Arterial Disease, Neuropathy, Right Toe Wound   - LE X-Ray -- Without acute displaced Fx or Dislocation, Osteoarthritic changes, Neg for OM   - EYAD consistent w/ small vessel disease in feer   - LE Duplex -- Neg for DVT   - On ASA 81 and Gabapentin   - Seen by podiatry  - Care per vascular appreciated -- S/P Angio, plan for bypass  - chest stress test     Chills  - 03/30 BCx2 NGTD; F/u final   - F/u wound culture -- in lab   - On Cefepime and Vancomycin for ABX   - No leukocytosis, reports chills have resolved  - Cont to monitor CBC, Temp curve, VS and patient closely  - If febrile, check pan cultures     HTN  - C/w Norvasc 2.5 and Losartan 25   - Monitor BP, VS and patient closely    DM  - Check A1C  - C/w Sliding scale   - Monitor glucose levels     Pre-OP Risk Stratification   - Pending TTE     PPX  - Hep 5K Q8

## 2023-04-02 NOTE — PROGRESS NOTE ADULT - SUBJECTIVE AND OBJECTIVE BOX
Name of Patient : ALEE DUNN  MRN: [id number]  Date of visit: 04-02-23       Subjective: Patient seen and examined. No new events except as noted.     REVIEW OF SYSTEMS:    CONSTITUTIONAL: No weakness, fevers or chills  EYES/ENT: No visual changes;  No vertigo or throat pain   NECK: No pain or stiffness  RESPIRATORY: No cough, wheezing, hemoptysis; No shortness of breath  CARDIOVASCULAR: No chest pain or palpitations  GASTROINTESTINAL: No abdominal or epigastric pain. No nausea, vomiting, or hematemesis; No diarrhea or constipation. No melena or hematochezia.  GENITOURINARY: No dysuria, frequency or hematuria  NEUROLOGICAL: No numbness or weakness  SKIN: No itching, burning, rashes, or lesions   All other review of systems is negative unless indicated above.    MEDICATIONS:  MEDICATIONS  (STANDING):  amLODIPine   Tablet 2.5 milliGRAM(s) Oral daily  aspirin enteric coated 81 milliGRAM(s) Oral daily  cefepime   IVPB 1000 milliGRAM(s) IV Intermittent every 8 hours  chlorhexidine 2% Cloths 1 Application(s) Topical daily  dextrose 50% Injectable 25 Gram(s) IV Push once  dextrose Oral Gel 15 Gram(s) Oral once  gabapentin 200 milliGRAM(s) Oral at bedtime  glucagon  Injectable 1 milliGRAM(s) IntraMuscular once  heparin   Injectable 5000 Unit(s) SubCutaneous every 8 hours  influenza  Vaccine (HIGH DOSE) 0.7 milliLiter(s) IntraMuscular once  insulin lispro (ADMELOG) corrective regimen sliding scale   SubCutaneous three times a day before meals  insulin lispro (ADMELOG) corrective regimen sliding scale   SubCutaneous at bedtime  losartan 25 milliGRAM(s) Oral daily  vancomycin  IVPB 500 milliGRAM(s) IV Intermittent every 12 hours      PHYSICAL EXAM:  T(C): 36.7 (04-02-23 @ 21:30), Max: 36.8 (04-02-23 @ 13:16)  HR: 92 (04-02-23 @ 21:30) (69 - 92)  BP: 150/66 (04-02-23 @ 21:30) (120/66 - 155/71)  RR: 18 (04-02-23 @ 21:30) (18 - 18)  SpO2: 97% (04-02-23 @ 21:30) (95% - 99%)  Wt(kg): --  I&O's Summary    01 Apr 2023 07:01  -  02 Apr 2023 07:00  --------------------------------------------------------  IN: 550 mL / OUT: 1400 mL / NET: -850 mL    02 Apr 2023 07:01  -  02 Apr 2023 22:13  --------------------------------------------------------  IN: 480 mL / OUT: 0 mL / NET: 480 mL          Appearance: Normal	  HEENT:  PERRLA   Lymphatic: No lymphadenopathy   Cardiovascular: Normal S1 S2, no JVD  Respiratory: normal effort , clear  Gastrointestinal:  Soft, Non-tender  Skin: No rashes,  warm to touch  Psychiatry:  Mood & affect appropriate  Musculuskeletal: No edema    recent labs, Imaging and EKGs personally reviewed     04-01-23 @ 07:01  -  04-02-23 @ 07:00  --------------------------------------------------------  IN: 550 mL / OUT: 1400 mL / NET: -850 mL    04-02-23 @ 07:01  -  04-02-23 @ 22:13  --------------------------------------------------------  IN: 480 mL / OUT: 0 mL / NET: 480 mL                          11.1   6.90  )-----------( 279      ( 02 Apr 2023 07:03 )             34.2               04-02    136  |  102  |  18  ----------------------------<  168<H>  4.5   |  24  |  0.93    Ca    9.0      02 Apr 2023 07:03  Phos  3.0     04-02  Mg     2.4     04-02      PT/INR - ( 01 Apr 2023 05:15 )   PT: 12.6 sec;   INR: 1.09 ratio         PTT - ( 01 Apr 2023 05:15 )  PTT:29.2 sec

## 2023-04-02 NOTE — PROGRESS NOTE ADULT - SUBJECTIVE AND OBJECTIVE BOX
VASCULAR SURGERY DAILY PROGRESS NOTE:     SUBJECTIVE/ROS: Patient seen and examined at bedside. No acute events overnight.     MEDICATIONS  (STANDING):  amLODIPine   Tablet 2.5 milliGRAM(s) Oral daily  aspirin enteric coated 81 milliGRAM(s) Oral daily  cefepime   IVPB 1000 milliGRAM(s) IV Intermittent every 8 hours  chlorhexidine 2% Cloths 1 Application(s) Topical daily  dextrose 50% Injectable 25 Gram(s) IV Push once  dextrose Oral Gel 15 Gram(s) Oral once  gabapentin 200 milliGRAM(s) Oral at bedtime  glucagon  Injectable 1 milliGRAM(s) IntraMuscular once  heparin   Injectable 5000 Unit(s) SubCutaneous every 8 hours  influenza  Vaccine (HIGH DOSE) 0.7 milliLiter(s) IntraMuscular once  insulin lispro (ADMELOG) corrective regimen sliding scale   SubCutaneous three times a day before meals  insulin lispro (ADMELOG) corrective regimen sliding scale   SubCutaneous at bedtime  lactated ringers. 1000 milliLiter(s) (75 mL/Hr) IV Continuous <Continuous>  losartan 25 milliGRAM(s) Oral daily  vancomycin  IVPB 750 milliGRAM(s) IV Intermittent every 12 hours    MEDICATIONS  (PRN):  acetaminophen     Tablet .. 650 milliGRAM(s) Oral every 6 hours PRN Temp greater or equal to 38C (100.4F), Moderate Pain (4 - 6)      OBJECTIVE:    Vital Signs Last 24 Hrs  T(C): 36.6 (02 Apr 2023 05:58), Max: 36.8 (01 Apr 2023 21:08)  T(F): 97.9 (02 Apr 2023 05:58), Max: 98.3 (01 Apr 2023 21:08)  HR: 79 (02 Apr 2023 05:58) (68 - 88)  BP: 120/66 (02 Apr 2023 05:58) (110/54 - 175/79)  BP(mean): 107 (01 Apr 2023 14:30) (77 - 114)  RR: 18 (02 Apr 2023 05:58) (14 - 18)  SpO2: 99% (02 Apr 2023 05:58) (96% - 100%)    Parameters below as of 02 Apr 2023 05:58  Patient On (Oxygen Delivery Method): room air      I&O's Detail    01 Apr 2023 07:01  -  02 Apr 2023 07:00  --------------------------------------------------------  IN:    Lactated Ringers: 150 mL  Total IN: 150 mL    OUT:    Voided (mL): 1400 mL  Total OUT: 1400 mL    Total NET: -1250 mL          Gen: Laying in bed, NAD,   Neuro: alert and awake  Resp: Unlabored breathing  CV: normal rate, regular rhythm  Abd: soft, NTND  EXT:      - RLE: R hallux wound with dressing c/d/i     - LLE: No wounds.            LABS:                                              10.9   6.57  )-----------( 242      ( 01 Apr 2023 05:15 )             32.8     04-01    135  |  103  |  24<H>  ----------------------------<  217<H>  4.5   |  22  |  1.00    Ca    8.9      01 Apr 2023 05:15  Phos  3.3     04-01  Mg     2.4     04-01               VASCULAR SURGERY DAILY PROGRESS NOTE:     SUBJECTIVE/ROS: Patient seen and examined at bedside. No acute events overnight.     MEDICATIONS  (STANDING):  amLODIPine   Tablet 2.5 milliGRAM(s) Oral daily  aspirin enteric coated 81 milliGRAM(s) Oral daily  cefepime   IVPB 1000 milliGRAM(s) IV Intermittent every 8 hours  chlorhexidine 2% Cloths 1 Application(s) Topical daily  dextrose 50% Injectable 25 Gram(s) IV Push once  dextrose Oral Gel 15 Gram(s) Oral once  gabapentin 200 milliGRAM(s) Oral at bedtime  glucagon  Injectable 1 milliGRAM(s) IntraMuscular once  heparin   Injectable 5000 Unit(s) SubCutaneous every 8 hours  influenza  Vaccine (HIGH DOSE) 0.7 milliLiter(s) IntraMuscular once  insulin lispro (ADMELOG) corrective regimen sliding scale   SubCutaneous three times a day before meals  insulin lispro (ADMELOG) corrective regimen sliding scale   SubCutaneous at bedtime  lactated ringers. 1000 milliLiter(s) (75 mL/Hr) IV Continuous <Continuous>  losartan 25 milliGRAM(s) Oral daily  vancomycin  IVPB 750 milliGRAM(s) IV Intermittent every 12 hours    MEDICATIONS  (PRN):  acetaminophen     Tablet .. 650 milliGRAM(s) Oral every 6 hours PRN Temp greater or equal to 38C (100.4F), Moderate Pain (4 - 6)      OBJECTIVE:    Vital Signs Last 24 Hrs  T(C): 36.6 (02 Apr 2023 05:58), Max: 36.8 (01 Apr 2023 21:08)  T(F): 97.9 (02 Apr 2023 05:58), Max: 98.3 (01 Apr 2023 21:08)  HR: 79 (02 Apr 2023 05:58) (68 - 88)  BP: 120/66 (02 Apr 2023 05:58) (110/54 - 175/79)  BP(mean): 107 (01 Apr 2023 14:30) (77 - 114)  RR: 18 (02 Apr 2023 05:58) (14 - 18)  SpO2: 99% (02 Apr 2023 05:58) (96% - 100%)    Parameters below as of 02 Apr 2023 05:58  Patient On (Oxygen Delivery Method): room air      I&O's Detail    01 Apr 2023 07:01  -  02 Apr 2023 07:00  --------------------------------------------------------  IN:    Lactated Ringers: 150 mL  Total IN: 150 mL    OUT:    Voided (mL): 1400 mL  Total OUT: 1400 mL    Total NET: -1250 mL          Gen: Laying in bed, NAD,   Neuro: alert and awake  Resp: Unlabored breathing  CV: normal rate, regular rhythm  Abd: soft, NTND  EXT:      - RLE: R hallux wound with dressing c/d/i     - LLE: No wounds.            LABS:                                                                    11.1   6.90  )-----------( 279      ( 02 Apr 2023 07:03 )             34.2     04-02    136  |  102  |  18  ----------------------------<  168<H>  4.5   |  24  |  0.93    Ca    9.0      02 Apr 2023 07:03  Phos  3.0     04-02  Mg     2.4     04-02          PT/INR - ( 01 Apr 2023 05:15 )   PT: 12.6 sec;   INR: 1.09 ratio         PTT - ( 01 Apr 2023 05:15 )  PTT:29.2 sec

## 2023-04-02 NOTE — PROGRESS NOTE ADULT - ASSESSMENT
Assessment/Plan: 72M with hx of DM and HTN with R hallux wound, s/p RLE angiogram    Plan:  - Continue IV abx   - DVT ppx   - Appreciate podiatry recs      Vascular p 1235  Assessment/Plan: 72M with hx of DM and HTN with R hallux wound, s/p RLE angiogram    Plan:  -  see attending attestation      Vascular p 5631  72 year old man with peripheral arterial disease, chronic limb threatening ischemia of the right lower extremity, Linh 5, right hallux wound, now POD1 s/p RLE diagnostic angiogram showing single vessel runoff via the posterior tibial artery which is occluded proximally and reconstitutes via collaterals off the popliteal artery  - plan for right superficial femoral artery to posterior tibial artery bypass   - needs echo/stress test  - appreciate medical and cardiac risk stratification and optimization  - bilateral lower extremity vein mapping      Vascular p 4105  72 year old man with peripheral arterial disease, chronic limb threatening ischemia of the right lower extremity, Linh 5, right hallux wound, now POD1 s/p RLE diagnostic angiogram showing single vessel runoff via the posterior tibial artery which is occluded proximally and reconstitutes via collaterals off the popliteal artery  - plan for right superficial femoral artery to posterior tibial artery bypass   - follow-up pharmacologic stress test today  - TTE completed 3/31  - appreciate medical and cardiac risk stratification and optimization  - bilateral lower extremity vein mapping      Vascular p 4224  72 year old man with peripheral arterial disease, chronic limb threatening ischemia of the right lower extremity, Linh 5, right hallux wound, now POD1 s/p RLE diagnostic angiogram showing single vessel runoff via the posterior tibial artery which is occluded proximally and reconstitutes via collaterals off the popliteal artery  - plan for right superficial femoral artery to posterior tibial artery bypass   - follow-up pharmacologic stress test today  - TTE completed 3/31  - appreciate medical and cardiac risk stratification and optimization  - bilateral lower extremity vein mapping--completed 4/1      Vascular p [phone]

## 2023-04-03 LAB
ANION GAP SERPL CALC-SCNC: 10 MMOL/L — SIGNIFICANT CHANGE UP (ref 5–17)
BUN SERPL-MCNC: 30 MG/DL — HIGH (ref 7–23)
CALCIUM SERPL-MCNC: 8.9 MG/DL — SIGNIFICANT CHANGE UP (ref 8.4–10.5)
CHLORIDE SERPL-SCNC: 102 MMOL/L — SIGNIFICANT CHANGE UP (ref 96–108)
CO2 SERPL-SCNC: 23 MMOL/L — SIGNIFICANT CHANGE UP (ref 22–31)
CREAT SERPL-MCNC: 1.23 MG/DL — SIGNIFICANT CHANGE UP (ref 0.5–1.3)
EGFR: 62 ML/MIN/1.73M2 — SIGNIFICANT CHANGE UP
GLUCOSE BLDC GLUCOMTR-MCNC: 151 MG/DL — HIGH (ref 70–99)
GLUCOSE BLDC GLUCOMTR-MCNC: 244 MG/DL — HIGH (ref 70–99)
GLUCOSE BLDC GLUCOMTR-MCNC: 268 MG/DL — HIGH (ref 70–99)
GLUCOSE BLDC GLUCOMTR-MCNC: 325 MG/DL — HIGH (ref 70–99)
GLUCOSE SERPL-MCNC: 244 MG/DL — HIGH (ref 70–99)
HCT VFR BLD CALC: 32.4 % — LOW (ref 39–50)
HGB BLD-MCNC: 10.7 G/DL — LOW (ref 13–17)
MAGNESIUM SERPL-MCNC: 2.2 MG/DL — SIGNIFICANT CHANGE UP (ref 1.6–2.6)
MCHC RBC-ENTMCNC: 30.6 PG — SIGNIFICANT CHANGE UP (ref 27–34)
MCHC RBC-ENTMCNC: 33 GM/DL — SIGNIFICANT CHANGE UP (ref 32–36)
MCV RBC AUTO: 92.6 FL — SIGNIFICANT CHANGE UP (ref 80–100)
NRBC # BLD: 0 /100 WBCS — SIGNIFICANT CHANGE UP (ref 0–0)
PHOSPHATE SERPL-MCNC: 3 MG/DL — SIGNIFICANT CHANGE UP (ref 2.5–4.5)
PLATELET # BLD AUTO: 273 K/UL — SIGNIFICANT CHANGE UP (ref 150–400)
POTASSIUM SERPL-MCNC: 4.4 MMOL/L — SIGNIFICANT CHANGE UP (ref 3.5–5.3)
POTASSIUM SERPL-SCNC: 4.4 MMOL/L — SIGNIFICANT CHANGE UP (ref 3.5–5.3)
RBC # BLD: 3.5 M/UL — LOW (ref 4.2–5.8)
RBC # FLD: 11.3 % — SIGNIFICANT CHANGE UP (ref 10.3–14.5)
SODIUM SERPL-SCNC: 135 MMOL/L — SIGNIFICANT CHANGE UP (ref 135–145)
WBC # BLD: 7.99 K/UL — SIGNIFICANT CHANGE UP (ref 3.8–10.5)
WBC # FLD AUTO: 7.99 K/UL — SIGNIFICANT CHANGE UP (ref 3.8–10.5)

## 2023-04-03 PROCEDURE — 99232 SBSQ HOSP IP/OBS MODERATE 35: CPT

## 2023-04-03 RX ORDER — INSULIN GLARGINE 100 [IU]/ML
8 INJECTION, SOLUTION SUBCUTANEOUS AT BEDTIME
Refills: 0 | Status: DISCONTINUED | OUTPATIENT
Start: 2023-04-03 | End: 2023-04-04

## 2023-04-03 RX ORDER — SODIUM CHLORIDE 9 MG/ML
1000 INJECTION INTRAMUSCULAR; INTRAVENOUS; SUBCUTANEOUS
Refills: 0 | Status: DISCONTINUED | OUTPATIENT
Start: 2023-04-03 | End: 2023-04-04

## 2023-04-03 RX ADMIN — HEPARIN SODIUM 5000 UNIT(S): 5000 INJECTION INTRAVENOUS; SUBCUTANEOUS at 22:00

## 2023-04-03 RX ADMIN — AMLODIPINE BESYLATE 2.5 MILLIGRAM(S): 2.5 TABLET ORAL at 05:41

## 2023-04-03 RX ADMIN — CHLORHEXIDINE GLUCONATE 1 APPLICATION(S): 213 SOLUTION TOPICAL at 11:52

## 2023-04-03 RX ADMIN — Medication 4: at 09:39

## 2023-04-03 RX ADMIN — Medication 100 MILLIGRAM(S): at 06:23

## 2023-04-03 RX ADMIN — LOSARTAN POTASSIUM 25 MILLIGRAM(S): 100 TABLET, FILM COATED ORAL at 05:41

## 2023-04-03 RX ADMIN — Medication 8: at 18:45

## 2023-04-03 RX ADMIN — Medication 6: at 13:25

## 2023-04-03 RX ADMIN — GABAPENTIN 200 MILLIGRAM(S): 400 CAPSULE ORAL at 22:01

## 2023-04-03 RX ADMIN — CEFEPIME 100 MILLIGRAM(S): 1 INJECTION, POWDER, FOR SOLUTION INTRAMUSCULAR; INTRAVENOUS at 22:01

## 2023-04-03 RX ADMIN — CEFEPIME 100 MILLIGRAM(S): 1 INJECTION, POWDER, FOR SOLUTION INTRAMUSCULAR; INTRAVENOUS at 05:41

## 2023-04-03 RX ADMIN — HEPARIN SODIUM 5000 UNIT(S): 5000 INJECTION INTRAVENOUS; SUBCUTANEOUS at 15:32

## 2023-04-03 RX ADMIN — Medication 100 MILLIGRAM(S): at 17:44

## 2023-04-03 RX ADMIN — Medication 81 MILLIGRAM(S): at 11:51

## 2023-04-03 RX ADMIN — SODIUM CHLORIDE 75 MILLILITER(S): 9 INJECTION INTRAMUSCULAR; INTRAVENOUS; SUBCUTANEOUS at 15:37

## 2023-04-03 RX ADMIN — CEFEPIME 100 MILLIGRAM(S): 1 INJECTION, POWDER, FOR SOLUTION INTRAMUSCULAR; INTRAVENOUS at 15:31

## 2023-04-03 RX ADMIN — INSULIN GLARGINE 8 UNIT(S): 100 INJECTION, SOLUTION SUBCUTANEOUS at 22:01

## 2023-04-03 RX ADMIN — HEPARIN SODIUM 5000 UNIT(S): 5000 INJECTION INTRAVENOUS; SUBCUTANEOUS at 05:41

## 2023-04-03 NOTE — PROGRESS NOTE ADULT - ASSESSMENT
72y Male with R foot wound to sub Q  - Patient seen and evaluated  - Afebrile, no leukocytosis  - R foot hallux circumferential dry gangrene, dorsal distal hallux wound with granular wound bed, no drainage, no wet conversion, no boggyness, no purulence or malodor, erythema resolved   - R foot Xray: No OM, no tracking soft tissue air  - R foot wound culture E cloacae, staph lugdunensis delftia acidovorans  - EYAD/PVR non compressible vessels   - Z flows ordered, to be worn at all times while in bed  - s/p RLE angio with 1 vessel runoff via PT at popliteal via collaterals, vasc planning RLE bypass - appreciated   - Pod plan local wound care pending vasc recs   - Discussed with attending

## 2023-04-03 NOTE — PROGRESS NOTE ADULT - ATTENDING COMMENTS
MD advised of situation  
PAD  CLTI of RLE  right hallux wound  s/p diagnostic angiogram  bypass planning  vein mapping reviewed, adequate vein for conduit  appreciate cardiology recommendations  awaiting stress tests result
72 year old man with peripheral arterial disease  chronic limb threatening ischemia of the right lower extremity, [place] 5, right hallux wound  s/p diagnostic angiogram  single vessel runoff via the posterior tibial artery which is occluded proximally and reconstitutes via collaterals off the popliteal artery  will plan for right superficial femoral artery to posterior tibial artery bypass   echo/stress test  appreciate medical and cardiac risk stratification and optimization  bilateral lower extremity vein mapping
OR today right leg angio, possible revascularization

## 2023-04-03 NOTE — PROGRESS NOTE ADULT - SUBJECTIVE AND OBJECTIVE BOX
Patient is a 72y old  Male who presents with a chief complaint of R toe infection (03 Apr 2023 05:16)       INTERVAL HPI/OVERNIGHT EVENTS:  Patient seen and evaluated at bedside.  Pt is resting comfortable in NAD. Denies N/V/F/C.  Pain rated at X/10    Allergies    No Known Allergies    Intolerances        Vital Signs Last 24 Hrs  T(C): 36.7 (03 Apr 2023 09:44), Max: 36.8 (02 Apr 2023 13:16)  T(F): 98 (03 Apr 2023 09:44), Max: 98.2 (02 Apr 2023 13:16)  HR: 75 (03 Apr 2023 09:44) (69 - 92)  BP: 138/71 (03 Apr 2023 09:44) (138/71 - 179/75)  BP(mean): --  RR: 18 (03 Apr 2023 09:44) (18 - 18)  SpO2: 98% (03 Apr 2023 09:44) (95% - 98%)    Parameters below as of 03 Apr 2023 09:44  Patient On (Oxygen Delivery Method): room air        LABS:                        10.7   7.99  )-----------( 273      ( 03 Apr 2023 06:44 )             32.4     04-03    135  |  102  |  30<H>  ----------------------------<  244<H>  4.4   |  23  |  1.23    Ca    8.9      03 Apr 2023 06:44  Phos  3.0     04-03  Mg     2.2     04-03          CAPILLARY BLOOD GLUCOSE      POCT Blood Glucose.: 244 mg/dL (03 Apr 2023 09:38)  POCT Blood Glucose.: 231 mg/dL (02 Apr 2023 21:27)  POCT Blood Glucose.: 261 mg/dL (02 Apr 2023 17:46)  POCT Blood Glucose.: 364 mg/dL (02 Apr 2023 13:02)      Lower Extremity Physical Exam:  Vascular: DP/PT 0/4, B/L, CFT <3 seconds B/L, Temperature gradient warm to cool B/L.   Neuro: Epicritic sensation diminished  to the level of forefoot B/L.  Musculoskeletal/Ortho: R foot hallux dorsiflexion strength 3/5.   Skin: R foot hallux circumferential dry gangrene, dorsal distal hallux wound with granular wound bed, no drainage, no wet conversion, no boggyness, no purulence or malodor, erythema resolved     RADIOLOGY & ADDITIONAL TESTS:

## 2023-04-03 NOTE — PROGRESS NOTE ADULT - SUBJECTIVE AND OBJECTIVE BOX
Name of Patient : ALEE DUNN  MRN: [id number]  Date of visit: 04-03-23 @ 14:59      Subjective: Patient seen and examined. No new events except as noted.   Patient seen earlier this AM.      REVIEW OF SYSTEMS:    CONSTITUTIONAL: No weakness, fevers or chills  EYES/ENT: No visual changes;  No vertigo or throat pain   NECK: No pain or stiffness  RESPIRATORY: No cough, wheezing, hemoptysis; No shortness of breath  CARDIOVASCULAR: No chest pain or palpitations  GASTROINTESTINAL: No abdominal or epigastric pain. No nausea, vomiting, or hematemesis; No diarrhea or constipation. No melena or hematochezia.  GENITOURINARY: No dysuria, frequency or hematuria  NEUROLOGICAL: No numbness or weakness  SKIN: No itching, burning, rashes, or lesions   All other review of systems is negative unless indicated above.    MEDICATIONS:  MEDICATIONS  (STANDING):  amLODIPine   Tablet 2.5 milliGRAM(s) Oral daily  aspirin enteric coated 81 milliGRAM(s) Oral daily  cefepime   IVPB 1000 milliGRAM(s) IV Intermittent every 8 hours  chlorhexidine 2% Cloths 1 Application(s) Topical daily  dextrose 50% Injectable 25 Gram(s) IV Push once  dextrose Oral Gel 15 Gram(s) Oral once  gabapentin 200 milliGRAM(s) Oral at bedtime  glucagon  Injectable 1 milliGRAM(s) IntraMuscular once  heparin   Injectable 5000 Unit(s) SubCutaneous every 8 hours  influenza  Vaccine (HIGH DOSE) 0.7 milliLiter(s) IntraMuscular once  insulin lispro (ADMELOG) corrective regimen sliding scale   SubCutaneous three times a day before meals  insulin lispro (ADMELOG) corrective regimen sliding scale   SubCutaneous at bedtime  losartan 25 milliGRAM(s) Oral daily  sodium chloride 0.9%. 1000 milliLiter(s) (75 mL/Hr) IV Continuous <Continuous>  vancomycin  IVPB 500 milliGRAM(s) IV Intermittent every 12 hours      PHYSICAL EXAM:  T(C): 36.6 (04-03-23 @ 13:24), Max: 36.7 (04-02-23 @ 21:30)  HR: 79 (04-03-23 @ 13:24) (75 - 92)  BP: 148/73 (04-03-23 @ 13:24) (138/71 - 179/75)  RR: 18 (04-03-23 @ 13:24) (18 - 18)  SpO2: 97% (04-03-23 @ 13:24) (96% - 98%)  Wt(kg): --  I&O's Summary    02 Apr 2023 07:01  -  03 Apr 2023 07:00  --------------------------------------------------------  IN: 880 mL / OUT: 0 mL / NET: 880 mL    03 Apr 2023 07:01  -  03 Apr 2023 14:59  --------------------------------------------------------  IN: 220 mL / OUT: 0 mL / NET: 220 mL          Appearance: Normal	  HEENT:  PERRLA   Lymphatic: No lymphadenopathy   Cardiovascular: Normal S1 S2, no JVD  Respiratory: normal effort , clear  Gastrointestinal:  Soft, Non-tender  Skin: No rashes,  warm to touch  Psychiatry:  Mood & affect appropriate  Musculuskeletal: No edema      All labs, Imaging and EKGs personally reviewed                 04-02-23 @ 07:01  -  04-03-23 @ 07:00  --------------------------------------------------------  IN: 880 mL / OUT: 0 mL / NET: 880 mL    04-03-23 @ 07:01  -  04-03-23 @ 15:00  --------------------------------------------------------  IN: 220 mL / OUT: 0 mL / NET: 220 mL             Name of Patient : ALEE DUNN  MRN: [id number]  Date of visit: 04-03-23 @ 14:59      Subjective: Patient seen and examined. No new events except as noted.   Patient seen earlier this AM.  Lying down in bed. S/P Stress test yesterday.     REVIEW OF SYSTEMS:    CONSTITUTIONAL: No weakness, fevers or chills  EYES/ENT: No visual changes;  No vertigo or throat pain   NECK: No pain or stiffness  RESPIRATORY: No cough, wheezing, hemoptysis; No shortness of breath  CARDIOVASCULAR: No chest pain or palpitations  GASTROINTESTINAL: No abdominal or epigastric pain. No nausea, vomiting, or hematemesis; No diarrhea or constipation. No melena or hematochezia.  GENITOURINARY: No dysuria, frequency or hematuria  NEUROLOGICAL: No numbness or weakness  SKIN: No itching, burning, rashes, or lesions   MSK: RLE Wound   All other review of systems is negative unless indicated above.    MEDICATIONS:  MEDICATIONS  (STANDING):  amLODIPine   Tablet 2.5 milliGRAM(s) Oral daily  aspirin enteric coated 81 milliGRAM(s) Oral daily  cefepime   IVPB 1000 milliGRAM(s) IV Intermittent every 8 hours  chlorhexidine 2% Cloths 1 Application(s) Topical daily  dextrose 50% Injectable 25 Gram(s) IV Push once  dextrose Oral Gel 15 Gram(s) Oral once  gabapentin 200 milliGRAM(s) Oral at bedtime  glucagon  Injectable 1 milliGRAM(s) IntraMuscular once  heparin   Injectable 5000 Unit(s) SubCutaneous every 8 hours  influenza  Vaccine (HIGH DOSE) 0.7 milliLiter(s) IntraMuscular once  insulin lispro (ADMELOG) corrective regimen sliding scale   SubCutaneous three times a day before meals  insulin lispro (ADMELOG) corrective regimen sliding scale   SubCutaneous at bedtime  losartan 25 milliGRAM(s) Oral daily  sodium chloride 0.9%. 1000 milliLiter(s) (75 mL/Hr) IV Continuous <Continuous>  vancomycin  IVPB 500 milliGRAM(s) IV Intermittent every 12 hours      PHYSICAL EXAM:  T(C): 36.6 (04-03-23 @ 13:24), Max: 36.7 (04-02-23 @ 21:30)  HR: 79 (04-03-23 @ 13:24) (75 - 92)  BP: 148/73 (04-03-23 @ 13:24) (138/71 - 179/75)  RR: 18 (04-03-23 @ 13:24) (18 - 18)  SpO2: 97% (04-03-23 @ 13:24) (96% - 98%)  Wt(kg): --  I&O's Summary    02 Apr 2023 07:01  -  03 Apr 2023 07:00  --------------------------------------------------------  IN: 880 mL / OUT: 0 mL / NET: 880 mL    03 Apr 2023 07:01  -  03 Apr 2023 14:59  --------------------------------------------------------  IN: 220 mL / OUT: 0 mL / NET: 220 mL          Appearance: Normal	  HEENT:  Eyes are open   Lymphatic: No lymphadenopathy   Cardiovascular: Normal S1 S2, no JVD  Respiratory: normal effort , clear  Gastrointestinal:  Soft, Non-tender  Skin: No rashes,  warm to touch  Psychiatry:  Mood & affect appropriate  Musculoskeletal: ALMAE dressing           04-02-23 @ 07:01  -  04-03-23 @ 07:00  --------------------------------------------------------  IN: 880 mL / OUT: 0 mL / NET: 880 mL    04-03-23 @ 07:01  -  04-03-23 @ 15:00  --------------------------------------------------------  IN: 220 mL / OUT: 0 mL / NET: 220 mL                                  10.7   7.99  )-----------( 273      ( 03 Apr 2023 06:44 )             32.4               04-03    135  |  102  |  30<H>  ----------------------------<  244<H>  4.4   |  23  |  1.23    Ca    8.9      03 Apr 2023 06:44  Phos  3.0     04-03  Mg     2.2     04-03        Culture - Blood (03.30.23 @ 01:20)   Specimen Source: .Blood  Culture Results: No growth to date.    Culture - Blood (03.30.23 @ 01:10)   Specimen Source: .Blood  Culture Results: No growth to date.          < from: VA Duplex Lower Ext Vein Scan, Bilat (04.01.23 @ 19:32) >  IMPRESSION: Diameters of the right and left greater and lesser saphenous   veins as tabulated above.    < end of copied text >      < from: TTE Congenital Anomalies W or WO Ultrasound Enhancing Agent (03.31.23 @ 13:27) >  CONCLUSIONS:      1. The left ventricular systolic function is normal with an ejection fractionof 61 % by Dawson's method of disks.   2. Normal right ventricular cavity size, normal wall thickness and normal systolic function.   3. Mild left ventricular hypertrophy.   4. The aortic root at sinuses of Valsalva is mildly dilated.    < end of copied text >      < from: Nuclear Stress Test-Pharmacologic (Nuclear Stress Test-Pharmacologic .) (04.02.23 @ 11:00) >  IMPRESSIONS:Mildly Abnormal Study  * Chest Pain: No chest pain with administration of  Regadenoson.  * Symptom: No Symptom.  * HR Response: Appropriate.  * BP Response: Appropriate.  * Heart Rhythm: Sinus Rhythm - Occassional VPD's - 78 BPM.  * Baseline ECG: non specific T wave abnormality in III.  * ECG Abnormalities: No ischemic ECG changes beyond  baseline abnormalities.  * Arrhythmia: Occasional VPDs occurred during stress and  recovery. Ventricular couplet in recovery.  * Myocardial Perfusion SPECT results are mildly abnormal.  * Review of raw data shows: The study is of good technical  quality.  * The left ventricle was normal in size. Thereis a small,  mild defect in distal inferior wall that is reversible,  suggestive of mild ischemia. Patient was unable to perform  prone imaging.  * Post-stress gated wall motion analysis was performed  (LVEF = 56 %;LVEDV = 79 ml.), revealing overall preserved  left ventricular ejection fraction with mild hypokinesis  of the distal inferior wall.  ------------------------------------------------------------------------  Confirmed on  4/2/2023 - 13:31:14 by Otis Kiser M.D.  ------------------------------------------------------------------------    < end of copied text >

## 2023-04-03 NOTE — PROGRESS NOTE ADULT - SUBJECTIVE AND OBJECTIVE BOX
DATE OF SERVICE: 04-03-23 @ 23:16    Patient is a 72y old  Male who presents with a chief complaint of R toe infection (03 Apr 2023 14:59)      INTERVAL HISTORY: feels ok    	  MEDICATIONS:  amLODIPine   Tablet 2.5 milliGRAM(s) Oral daily  losartan 25 milliGRAM(s) Oral daily        PHYSICAL EXAM:  T(C): 36.6 (04-03-23 @ 21:24), Max: 36.7 (04-03-23 @ 05:26)  HR: 85 (04-03-23 @ 21:24) (75 - 87)  BP: 136/67 (04-03-23 @ 21:24) (136/67 - 179/75)  RR: 18 (04-03-23 @ 21:24) (18 - 18)  SpO2: 95% (04-03-23 @ 21:24) (95% - 98%)  Wt(kg): --  I&O's Summary    02 Apr 2023 07:01  -  03 Apr 2023 07:00  --------------------------------------------------------  IN: 880 mL / OUT: 0 mL / NET: 880 mL    03 Apr 2023 07:01  -  03 Apr 2023 23:16  --------------------------------------------------------  IN: 1645 mL / OUT: 0 mL / NET: 1645 mL          Appearance: In no distress	  HEENT:    PERRL, EOMI	  Cardiovascular:  S1 S2, No JVD  Respiratory: Lungs clear to auscultation	  Gastrointestinal:  Soft, Non-tender, + BS	  Vascularature:  No edema of LE  Psychiatric: Appropriate affect   Neuro: no acute focal deficits                               10.7   7.99  )-----------( 273      ( 03 Apr 2023 06:44 )             32.4     04-03    135  |  102  |  30<H>  ----------------------------<  244<H>  4.4   |  23  |  1.23    Ca    8.9      03 Apr 2023 06:44  Phos  3.0     04-03  Mg     2.2     04-03          Labs personally reviewed    < from: Nuclear Stress Test-Pharmacologic (Nuclear Stress Test-Pharmacologic .) (04.02.23 @ 11:00) >  IMPRESSIONS:Mildly Abnormal Study  * Chest Pain: No chest pain with administration of  Regadenoson.  * Symptom: No Symptom.  * HR Response: Appropriate.  * BP Response: Appropriate.  * Heart Rhythm: Sinus Rhythm - Occassional VPD's - 78 BPM.  * Baseline ECG: non specific T wave abnormality in III.  * ECG Abnormalities: No ischemic ECG changes beyond  baseline abnormalities.  * Arrhythmia: Occasional VPDs occurred during stress and  recovery. Ventricular couplet in recovery.  * Myocardial Perfusion SPECT results are mildly abnormal.  * Review of raw data shows: The study is of good technical  quality.  * The left ventricle was normal in size. Thereis a small,  mild defect in distal inferior wall that is reversible,  suggestive of mild ischemia. Patient was unable to perform  prone imaging.  * Post-stress gated wall motion analysis was performed  (LVEF = 56 %;LVEDV = 79 ml.), revealing overall preserved  left ventricular ejection fraction with mild hypokinesis  of the distal inferior wall.    < end of copied text >        ASSESSMENT/PLAN: 	  Patient is a 72 year old male with a PMHx of HTN, Type II DM associated with neuropathy whore presents to [facility] with a chief complaint of pain, blistering and fluid drainage on his right foot. Patient reports worsening pain and discomfort in his right lower extremity. Patient denies any chest pain, palpitations, SOB or dyspnea. Patient denies dyspnea on exertion or shortness of breath with ambulation or walking.     Problem/Plan -1  Problem: Cardiac Risk Stratification for LE bypass surgery  - Denies CP or SOB  - TTE shows preserved EF, mild LVH  - Not in decompensated HF  - No hx of tachy supa arrhythmia  - NM stress test with small, mild defect in distal inferior wall that is reversible, suggestive of mild ischemia.  -- given asymptomatic with only mild small area of ischemia in distal inferior wall will manage medically   - elevated risk for high risk vascular surgery, no cardiac contraindication to proceed     Problem/Plan -2  Problem: Hypertension  - c/w amlodipine 2.5mg PO daily  - c/w losartan 25mg PO daily    Problem/Plan -3  Problem: DM II  - Check HgbA1c  - ISS as per primary team            Matt Calhoun DO [facility]  Cardiovascular Medicine  [address] [place] [address], [place] [address]  Office: [phone]  Available via Text/call on Microsoft Teams

## 2023-04-03 NOTE — PROGRESS NOTE ADULT - SUBJECTIVE AND OBJECTIVE BOX
VASCULAR SURGERY DAILY PROGRESS NOTE:     SUBJECTIVE/ROS: Patient seen and examined at bedside. No acute events overnight.     MEDICATIONS  (STANDING):  amLODIPine   Tablet 2.5 milliGRAM(s) Oral daily  aspirin enteric coated 81 milliGRAM(s) Oral daily  cefepime   IVPB 1000 milliGRAM(s) IV Intermittent every 8 hours  chlorhexidine 2% Cloths 1 Application(s) Topical daily  dextrose 50% Injectable 25 Gram(s) IV Push once  dextrose Oral Gel 15 Gram(s) Oral once  gabapentin 200 milliGRAM(s) Oral at bedtime  glucagon  Injectable 1 milliGRAM(s) IntraMuscular once  heparin   Injectable 5000 Unit(s) SubCutaneous every 8 hours  influenza  Vaccine (HIGH DOSE) 0.7 milliLiter(s) IntraMuscular once  insulin lispro (ADMELOG) corrective regimen sliding scale   SubCutaneous three times a day before meals  insulin lispro (ADMELOG) corrective regimen sliding scale   SubCutaneous at bedtime  lactated ringers. 1000 milliLiter(s) (75 mL/Hr) IV Continuous <Continuous>  losartan 25 milliGRAM(s) Oral daily  vancomycin  IVPB 750 milliGRAM(s) IV Intermittent every 12 hours    MEDICATIONS  (PRN):  acetaminophen     Tablet .. 650 milliGRAM(s) Oral every 6 hours PRN Temp greater or equal to 38C (100.4F), Moderate Pain (4 - 6)      OBJECTIVE:    Vital Signs Last 24 Hrs  T(C): 36.6 (02 Apr 2023 05:58), Max: 36.8 (01 Apr 2023 21:08)  T(F): 97.9 (02 Apr 2023 05:58), Max: 98.3 (01 Apr 2023 21:08)  HR: 79 (02 Apr 2023 05:58) (68 - 88)  BP: 120/66 (02 Apr 2023 05:58) (110/54 - 175/79)  BP(mean): 107 (01 Apr 2023 14:30) (77 - 114)  RR: 18 (02 Apr 2023 05:58) (14 - 18)  SpO2: 99% (02 Apr 2023 05:58) (96% - 100%)    Parameters below as of 02 Apr 2023 05:58  Patient On (Oxygen Delivery Method): room air      I&O's Detail    01 Apr 2023 07:01  -  02 Apr 2023 07:00  --------------------------------------------------------  IN:    Lactated Ringers: 150 mL  Total IN: 150 mL    OUT:    Voided (mL): 1400 mL  Total OUT: 1400 mL    Total NET: -1250 mL          Gen: Laying in bed, NAD,   Neuro: alert and awake  Resp: Unlabored breathing  CV: normal rate, regular rhythm  Abd: soft, NTND  EXT:      - RLE: R hallux wound with dressing c/d/i     - LLE: No wounds.            LABS:                                                                    11.1   6.90  )-----------( 279      ( 02 Apr 2023 07:03 )             34.2     04-02    136  |  102  |  18  ----------------------------<  168<H>  4.5   |  24  |  0.93    Ca    9.0      02 Apr 2023 07:03  Phos  3.0     04-02  Mg     2.4     04-02          PT/INR - ( 01 Apr 2023 05:15 )   PT: 12.6 sec;   INR: 1.09 ratio         PTT - ( 01 Apr 2023 05:15 )  PTT:29.2 sec

## 2023-04-03 NOTE — PROGRESS NOTE ADULT - ASSESSMENT
72 year old man with peripheral arterial disease, chronic limb threatening ischemia of the right lower extremity, [place] 5, right hallux wound, now POD1 s/p RLE diagnostic angiogram showing single vessel runoff via the posterior tibial artery which is occluded proximally and reconstitutes via collaterals off the popliteal artery  - plan for right superficial femoral artery to posterior tibial artery bypass   - follow-up pharmacologic stress test today  - TTE completed 3/31  - appreciate medical and cardiac risk stratification and optimization  - bilateral lower extremity vein mapping--completed 4/1      Vascular p [id number]

## 2023-04-03 NOTE — PHARMACOTHERAPY INTERVENTION NOTE - COMMENTS
Confirmed home medications with patient and patient's wife, updated in Outpatient Medication Review.    Home Medications:  amLODIPine 2.5 mg oral tablet: 1 orally once a day   aspirin 81 mg oral tablet: 3 tab(s) orally in the morning, and 2 tab(s) orally in the evening, only 5 days a week  glipiZIDE 5 mg oral tablet: 1 tab(s) orally in the morning, and 2 tab(s) orally in the afteroon  losartan 25 mg oral tablet: 1 tab(s) orally once a day  metFORMIN 750 mg oral tablet, extended release: 1 orally once a day   rosuvastatin 10 mg oral tablet: 1 tab(s) orally once a day     Shayna Perez, PharmD  PGY1 Pharmacy Resident  Available on [employer]  [place] [id number]    
Counseled patient on the following inpatient medications names (brand/generic), indication, and possible side effects:    MEDICATIONS  (STANDING):  amLODIPine Tablet 2.5 milliGRAM(s) Oral daily  aspirin enteric coated 81 milliGRAM(s) Oral daily  cefepime IVPB 1000 milliGRAM(s) IV Intermittent every 8 hours  gabapentin 200 milliGRAM(s) Oral at bedtime  heparin Injectable 5000 Unit(s) SubCutaneous every 8 hours  insulin lispro (ADMELOG) corrective regimen sliding scale SubCutaneous three times a day before meals  insulin lispro (ADMELOG) corrective regimen sliding scale SubCutaneous at bedtime  losartan 25 milliGRAM(s) Oral daily  vancomycin  IVPB 500 milliGRAM(s) IV Intermittent every 12 hours    Provided medication guides. Discussed in depth with patient how vancomycin is dosed and monitored. Patient questions and concerns were answered and addressed. Patient demonstrated understanding.    Shayna Perez, PharmD  PGY1 Pharmacy Resident  Available on [employer] [phone]

## 2023-04-03 NOTE — PROGRESS NOTE ADULT - ASSESSMENT
Patient is a 72 year old male with a PMHx of HTN, Type II DM associated with neuropathy whore presents to [facility] with a chief complaint of pain, blistering and fluid drainage on his right foot. Patient reports worsening pain and discomfort in his right lower extremity. Patient reports intermittent chills for the past week prior to his arrival. Patient reports that he has been experiencing bilateral calf pain for about one month associated with pain and discomfort with walking less than 2 blocks. Patient reports that he noticed right lower extremity edema and bleeding from his great toe about two days prior to arrival. Internal medicine has been consulted on Mr. Paredes's care for medical management and medical clearance. Patient denies any chest pain, palpitations, SOB or dyspnea. Patient denies dyspnea on exertion or shortness of breath with ambulation or walking. Patient reports that his walking is limited by lower extremity pain. Denies history of HLD, MI, CVA, Cardiac arrythmia, PE or DVT in the past.     Peripheral Arterial Disease, Neuropathy, Right Toe Wound   - LE X-Ray -- Without acute displaced Fx or Dislocation, Osteoarthritic changes, Neg for OM   - EYAD consistent w/ small vessel disease in feet   - LE Duplex -- Neg for DVT   - On ASA 81 and Gabapentin   - Seen by podiatry  - Care per vascular appreciated -- S/P Angio, planned for bypass  - LE Duplex as noted, per vascular   - Stress test Abnormal. F/u cardio recs     Chills  - 03/30 BCx2 NGTD; F/u final   - F/u wound culture -- Multiple organisms   - On Cefepime and Vancomycin for ABX, Foot culture sensitive   - No leukocytosis, reports chills have resolved  - Cont to monitor CBC, Temp curve, VS and patient closely  - If febrile, check pan cultures     Up-Trending Cr  - Started on IVF for hydration 75 CC X 24 hours  - Repeat labs in AM   - Monitor Cr closely    HTN  - C/w Norvasc 2.5 and Losartan 25   - Monitor BP, VS and patient closely    DM  - A1C 8.4   - C/w Sliding scale   - Monitor glucose levels     Pre-OP Risk Stratification   - TTE w/ EF of 61%, Normal RV function, Mild LVH   - Stress test abnormal. F/u cardiology     PPX  - Hep 5K Q8

## 2023-04-04 LAB
ANION GAP SERPL CALC-SCNC: 9 MMOL/L — SIGNIFICANT CHANGE UP (ref 5–17)
BUN SERPL-MCNC: 18 MG/DL — SIGNIFICANT CHANGE UP (ref 7–23)
CALCIUM SERPL-MCNC: 8.9 MG/DL — SIGNIFICANT CHANGE UP (ref 8.4–10.5)
CHLORIDE SERPL-SCNC: 106 MMOL/L — SIGNIFICANT CHANGE UP (ref 96–108)
CO2 SERPL-SCNC: 22 MMOL/L — SIGNIFICANT CHANGE UP (ref 22–31)
CREAT SERPL-MCNC: 0.77 MG/DL — SIGNIFICANT CHANGE UP (ref 0.5–1.3)
EGFR: 95 ML/MIN/1.73M2 — SIGNIFICANT CHANGE UP
GLUCOSE BLDC GLUCOMTR-MCNC: 157 MG/DL — HIGH (ref 70–99)
GLUCOSE BLDC GLUCOMTR-MCNC: 163 MG/DL — HIGH (ref 70–99)
GLUCOSE BLDC GLUCOMTR-MCNC: 174 MG/DL — HIGH (ref 70–99)
GLUCOSE BLDC GLUCOMTR-MCNC: 251 MG/DL — HIGH (ref 70–99)
GLUCOSE SERPL-MCNC: 210 MG/DL — HIGH (ref 70–99)
HCT VFR BLD CALC: 32.8 % — LOW (ref 39–50)
HGB BLD-MCNC: 10.6 G/DL — LOW (ref 13–17)
MAGNESIUM SERPL-MCNC: 2.2 MG/DL — SIGNIFICANT CHANGE UP (ref 1.6–2.6)
MCHC RBC-ENTMCNC: 30.3 PG — SIGNIFICANT CHANGE UP (ref 27–34)
MCHC RBC-ENTMCNC: 32.3 GM/DL — SIGNIFICANT CHANGE UP (ref 32–36)
MCV RBC AUTO: 93.7 FL — SIGNIFICANT CHANGE UP (ref 80–100)
NRBC # BLD: 0 /100 WBCS — SIGNIFICANT CHANGE UP (ref 0–0)
PHOSPHATE SERPL-MCNC: 2.8 MG/DL — SIGNIFICANT CHANGE UP (ref 2.5–4.5)
PLATELET # BLD AUTO: 252 K/UL — SIGNIFICANT CHANGE UP (ref 150–400)
POTASSIUM SERPL-MCNC: 4.1 MMOL/L — SIGNIFICANT CHANGE UP (ref 3.5–5.3)
POTASSIUM SERPL-SCNC: 4.1 MMOL/L — SIGNIFICANT CHANGE UP (ref 3.5–5.3)
RBC # BLD: 3.5 M/UL — LOW (ref 4.2–5.8)
RBC # FLD: 11.7 % — SIGNIFICANT CHANGE UP (ref 10.3–14.5)
SODIUM SERPL-SCNC: 137 MMOL/L — SIGNIFICANT CHANGE UP (ref 135–145)
VANCOMYCIN TROUGH SERPL-MCNC: <4 UG/ML — LOW (ref 10–20)
WBC # BLD: 6.09 K/UL — SIGNIFICANT CHANGE UP (ref 3.8–10.5)
WBC # FLD AUTO: 6.09 K/UL — SIGNIFICANT CHANGE UP (ref 3.8–10.5)

## 2023-04-04 RX ORDER — AMLODIPINE BESYLATE 2.5 MG/1
5 TABLET ORAL ONCE
Refills: 0 | Status: COMPLETED | OUTPATIENT
Start: 2023-04-04 | End: 2023-04-04

## 2023-04-04 RX ORDER — POLYETHYLENE GLYCOL 3350 17 G/17G
17 POWDER, FOR SOLUTION ORAL ONCE
Refills: 0 | Status: COMPLETED | OUTPATIENT
Start: 2023-04-04 | End: 2023-04-04

## 2023-04-04 RX ORDER — AMLODIPINE BESYLATE 2.5 MG/1
2.5 TABLET ORAL ONCE
Refills: 0 | Status: COMPLETED | OUTPATIENT
Start: 2023-04-04 | End: 2023-04-04

## 2023-04-04 RX ORDER — AMLODIPINE BESYLATE 2.5 MG/1
5 TABLET ORAL DAILY
Refills: 0 | Status: DISCONTINUED | OUTPATIENT
Start: 2023-04-04 | End: 2023-04-04

## 2023-04-04 RX ORDER — AMLODIPINE BESYLATE 2.5 MG/1
10 TABLET ORAL DAILY
Refills: 0 | Status: DISCONTINUED | OUTPATIENT
Start: 2023-04-05 | End: 2023-04-06

## 2023-04-04 RX ORDER — INSULIN GLARGINE 100 [IU]/ML
10 INJECTION, SOLUTION SUBCUTANEOUS AT BEDTIME
Refills: 0 | Status: DISCONTINUED | OUTPATIENT
Start: 2023-04-04 | End: 2023-04-06

## 2023-04-04 RX ADMIN — CEFEPIME 100 MILLIGRAM(S): 1 INJECTION, POWDER, FOR SOLUTION INTRAMUSCULAR; INTRAVENOUS at 21:46

## 2023-04-04 RX ADMIN — AMLODIPINE BESYLATE 2.5 MILLIGRAM(S): 2.5 TABLET ORAL at 14:49

## 2023-04-04 RX ADMIN — GABAPENTIN 200 MILLIGRAM(S): 400 CAPSULE ORAL at 21:46

## 2023-04-04 RX ADMIN — LOSARTAN POTASSIUM 25 MILLIGRAM(S): 100 TABLET, FILM COATED ORAL at 05:21

## 2023-04-04 RX ADMIN — AMLODIPINE BESYLATE 5 MILLIGRAM(S): 2.5 TABLET ORAL at 16:18

## 2023-04-04 RX ADMIN — POLYETHYLENE GLYCOL 3350 17 GRAM(S): 17 POWDER, FOR SOLUTION ORAL at 14:52

## 2023-04-04 RX ADMIN — AMLODIPINE BESYLATE 2.5 MILLIGRAM(S): 2.5 TABLET ORAL at 05:21

## 2023-04-04 RX ADMIN — Medication 81 MILLIGRAM(S): at 11:41

## 2023-04-04 RX ADMIN — HEPARIN SODIUM 5000 UNIT(S): 5000 INJECTION INTRAVENOUS; SUBCUTANEOUS at 05:21

## 2023-04-04 RX ADMIN — Medication 100 MILLIGRAM(S): at 10:46

## 2023-04-04 RX ADMIN — Medication 2: at 08:18

## 2023-04-04 RX ADMIN — INSULIN GLARGINE 10 UNIT(S): 100 INJECTION, SOLUTION SUBCUTANEOUS at 21:46

## 2023-04-04 RX ADMIN — CEFEPIME 100 MILLIGRAM(S): 1 INJECTION, POWDER, FOR SOLUTION INTRAMUSCULAR; INTRAVENOUS at 05:22

## 2023-04-04 RX ADMIN — CEFEPIME 100 MILLIGRAM(S): 1 INJECTION, POWDER, FOR SOLUTION INTRAMUSCULAR; INTRAVENOUS at 14:43

## 2023-04-04 RX ADMIN — CHLORHEXIDINE GLUCONATE 1 APPLICATION(S): 213 SOLUTION TOPICAL at 11:40

## 2023-04-04 RX ADMIN — HEPARIN SODIUM 5000 UNIT(S): 5000 INJECTION INTRAVENOUS; SUBCUTANEOUS at 13:47

## 2023-04-04 RX ADMIN — Medication 100 MILLIGRAM(S): at 21:47

## 2023-04-04 RX ADMIN — Medication 6: at 13:48

## 2023-04-04 RX ADMIN — Medication 2: at 18:01

## 2023-04-04 RX ADMIN — HEPARIN SODIUM 5000 UNIT(S): 5000 INJECTION INTRAVENOUS; SUBCUTANEOUS at 21:47

## 2023-04-04 NOTE — PROGRESS NOTE ADULT - ASSESSMENT
72 year old man with peripheral arterial disease, chronic limb threatening ischemia of the right lower extremity, Linh 5, right hallux wound, now POD1 s/p RLE diagnostic angiogram showing single vessel runoff via the posterior tibial artery which is occluded proximally and reconstitutes via collaterals off the popliteal artery  - plan for right superficial femoral artery to posterior tibial artery bypass monday  - TTE completed 3/31  - appreciate medical and cardiac risk stratification and optimization  - bilateral lower extremity vein mapping--completed 4/1      Vascular p [id number]  72 year old man with peripheral arterial disease, chronic limb threatening ischemia of the right lower extremity, Linh 5, right hallux wound, now s/p RLE diagnostic angiogram (4/1) showing single vessel runoff via the posterior tibial artery which is occluded proximally and reconstitutes via collaterals off the popliteal artery  - plan for right superficial femoral artery to posterior tibial artery bypass monday  - TTE completed 3/31  - appreciate medical and cardiac risk stratification and optimization  - bilateral lower extremity vein mapping--completed 4/1      Vascular p [phone]

## 2023-04-04 NOTE — PROGRESS NOTE ADULT - NS ATTEND AMEND GEN_ALL_CORE FT
Patient care and plan discussed and reviewed with Advanced Care Provider. Plan as outlined above edited by me to reflect our discussion.
agree with above  PAD  CLTI of RLE   right hallux wound  added on to OR tomorrow right leg angio, possible revascularization  NPO at midnight  am labs
Patient care and plan discussed and reviewed with Advanced Care Provider. Plan as outlined above edited by me to reflect our discussion.
Pt care and plan discussed and reviewed with PA. Plan as outlined above edited by me to reflect our discussion.
agree with above  PAD  CLTI of RLE   right hallux wound  added on to OR tomorrow right leg angio, possible revascularization  NPO at midnight  am labs

## 2023-04-04 NOTE — PROGRESS NOTE ADULT - ASSESSMENT
Patient is a 72 year old male with a PMHx of HTN, Type II DM associated with neuropathy whore presents to [facility] with a chief complaint of pain, blistering and fluid drainage on his right foot. Patient reports worsening pain and discomfort in his right lower extremity. Patient reports intermittent chills for the past week prior to his arrival. Patient reports that he has been experiencing bilateral calf pain for about one month associated with pain and discomfort with walking less than 2 blocks. Patient reports that he noticed right lower extremity edema and bleeding from his great toe about two days prior to arrival. Internal medicine has been consulted on Mr. Paredes's care for medical management and medical clearance. Patient denies any chest pain, palpitations, SOB or dyspnea. Patient denies dyspnea on exertion or shortness of breath with ambulation or walking. Patient reports that his walking is limited by lower extremity pain. Denies history of HLD, MI, CVA, Cardiac arrythmia, PE or DVT in the past.     Peripheral Arterial Disease, Neuropathy, Right Toe Wound   - LE X-Ray -- Without acute displaced Fx or Dislocation, Osteoarthritic changes, Neg for OM   - EYAD consistent w/ small vessel disease in feet   - LE Duplex -- Neg for DVT   - On ASA 81 and Gabapentin   - Seen by podiatry  - Care per vascular appreciated -- S/P Angio, planned for bypass Monday   - LE Duplex as noted, per vascular   - Stress test Abnormal. F/u cardio recs --> Medical management as per cardio     Chills  - 03/30 BCx2 Neg  final   - F/u wound culture -- Multiple organisms   - On Cefepime and Vancomycin for ABX, Foot culture sensitive   - No leukocytosis, reports chills have resolved  - Cont to monitor CBC, Temp curve, VS and patient closely  - If febrile, check pan cultures     Up-Trending Cr  - S/P IVF for hydration   - Cr improving, avoid nephrotoxic agents   - Monitor Cr closely    HTN  - C/w Norvasc 2.5 and Losartan 25   - Monitor BP, VS and patient closely    DM  - A1C 8.4   - C/w Sliding scale   - Monitor glucose levels     Pre-OP Risk Stratification   - TTE w/ EF of 61%, Normal RV function, Mild LVH   - Stress test abnormal. F/u cardiology     PPX  - Hep 5K Q8    Patient is a 72 year old male with a PMHx of HTN, Type II DM associated with neuropathy whore presents to [facility] with a chief complaint of pain, blistering and fluid drainage on his right foot. Patient reports worsening pain and discomfort in his right lower extremity. Patient reports intermittent chills for the past week prior to his arrival. Patient reports that he has been experiencing bilateral calf pain for about one month associated with pain and discomfort with walking less than 2 blocks. Patient reports that he noticed right lower extremity edema and bleeding from his great toe about two days prior to arrival. Internal medicine has been consulted on Mr. Paredes's care for medical management and medical clearance. Patient denies any chest pain, palpitations, SOB or dyspnea. Patient denies dyspnea on exertion or shortness of breath with ambulation or walking. Patient reports that his walking is limited by lower extremity pain. Denies history of HLD, MI, CVA, Cardiac arrythmia, PE or DVT in the past.     Peripheral Arterial Disease, Neuropathy, Right Toe Wound   - LE X-Ray -- Without acute displaced Fx or Dislocation, Osteoarthritic changes, Neg for OM   - EYAD consistent w/ small vessel disease in feet   - LE Duplex -- Neg for DVT   - On ASA 81 and Gabapentin   - Seen by podiatry  - Care per vascular appreciated -- S/P Angio, planned for bypass Monday   - LE Duplex as noted, per vascular   - Stress test Abnormal. F/u cardio recs --> Medical management as per cardio     Chills  - 03/30 BCx2 Neg  final   - F/u wound culture -- Multiple organisms   - On Cefepime and Vancomycin for ABX, Foot culture sensitive   - No leukocytosis, reports chills have resolved  - Cont to monitor CBC, Temp curve, VS and patient closely  - If febrile, check pan cultures     Up-Trending Cr  - S/P IVF for hydration   - Cr improving, avoid nephrotoxic agents   - Monitor Cr closely    HTN  - C/w Norvasc 2.5 and Losartan 25  ---> BP uncontrolled. Norvasc increased to 5 Qd.   - Monitor BP, VS and patient closely    DM  - A1C 8.4   - C/w Sliding scale   - Monitor glucose levels     Pre-OP Risk Stratification   - TTE w/ EF of 61%, Normal RV function, Mild LVH   - Stress test abnormal. F/u cardiology     PPX  - Hep 5K Q8

## 2023-04-04 NOTE — PROGRESS NOTE ADULT - SUBJECTIVE AND OBJECTIVE BOX
Name of Patient : ALEE DUNN  MRN: [id number]  Date of visit: 04-04-23 @ 14:10      Subjective: Patient seen and examined. No new events except as noted.   Patient seen earlier this AM. Sitting OOB TC.   Planned for Bypass on Monday.     REVIEW OF SYSTEMS:    CONSTITUTIONAL: No weakness, fevers or chills  EYES/ENT: No visual changes;  No vertigo or throat pain   NECK: No pain or stiffness  RESPIRATORY: No cough, wheezing, hemoptysis; No shortness of breath  CARDIOVASCULAR: No chest pain or palpitations  GASTROINTESTINAL: No abdominal or epigastric pain. No nausea, vomiting, or hematemesis; No diarrhea or constipation. No melena or hematochezia.  GENITOURINARY: No dysuria, frequency or hematuria  NEUROLOGICAL: No numbness or weakness  SKIN: No itching, burning, rashes, or lesions   MSK: RLE wound   All other review of systems is negative unless indicated above.    MEDICATIONS:  MEDICATIONS  (STANDING):  amLODIPine   Tablet 2.5 milliGRAM(s) Oral daily  aspirin enteric coated 81 milliGRAM(s) Oral daily  cefepime   IVPB 1000 milliGRAM(s) IV Intermittent every 8 hours  chlorhexidine 2% Cloths 1 Application(s) Topical daily  dextrose 50% Injectable 25 Gram(s) IV Push once  dextrose Oral Gel 15 Gram(s) Oral once  gabapentin 200 milliGRAM(s) Oral at bedtime  glucagon  Injectable 1 milliGRAM(s) IntraMuscular once  heparin   Injectable 5000 Unit(s) SubCutaneous every 8 hours  influenza  Vaccine (HIGH DOSE) 0.7 milliLiter(s) IntraMuscular once  insulin glargine Injectable (LANTUS) 8 Unit(s) SubCutaneous at bedtime  insulin lispro (ADMELOG) corrective regimen sliding scale   SubCutaneous three times a day before meals  insulin lispro (ADMELOG) corrective regimen sliding scale   SubCutaneous at bedtime  losartan 25 milliGRAM(s) Oral daily  vancomycin  IVPB 500 milliGRAM(s) IV Intermittent every 12 hours      PHYSICAL EXAM:  T(C): 36.7 (04-04-23 @ 13:19), Max: 36.7 (04-04-23 @ 01:55)  HR: 81 (04-04-23 @ 13:19) (80 - 85)  BP: 156/70 (04-04-23 @ 13:35) (136/67 - 183/72)  RR: 18 (04-04-23 @ 13:19) (18 - 18)  SpO2: 98% (04-04-23 @ 13:19) (95% - 99%)  Wt(kg): --  I&O's Summary    03 Apr 2023 07:01  -  04 Apr 2023 07:00  --------------------------------------------------------  IN: 2370 mL / OUT: 0 mL / NET: 2370 mL    04 Apr 2023 07:01  -  04 Apr 2023 14:10  --------------------------------------------------------  IN: 160 mL / OUT: 0 mL / NET: 160 mL          Appearance: Normal; OOB TC   HEENT:  Eyes are open   Lymphatic: No lymphadenopathy   Cardiovascular: Normal S1 S2, no JVD  Respiratory: normal effort , clear  Gastrointestinal:  Soft, Non-tender  Skin: No rashes,  warm to touch  Psychiatry:  Mood & affect appropriate  Musculoskeletal: RLE dressing          04-03-23 @ 07:01  -  04-04-23 @ 07:00  --------------------------------------------------------  IN: 2370 mL / OUT: 0 mL / NET: 2370 mL    04-04-23 @ 07:01  -  04-04-23 @ 14:10  --------------------------------------------------------  IN: 160 mL / OUT: 0 mL / NET: 160 mL                                10.6   6.09  )-----------( 252      ( 04 Apr 2023 07:05 )             32.8               04-04    137  |  106  |  18  ----------------------------<  210<H>  4.1   |  22  |  0.77    Ca    8.9      04 Apr 2023 07:06  Phos  2.8     04-04  Mg     2.2     04-04               Culture - Blood (03.30.23 @ 01:20)   Specimen Source: .Blood  Culture Results: No Growth Final           Culture - Blood (03.30.23 @ 01:10)   Specimen Source: .Blood  Culture Results: No Growth Final Name of Patient : ALEE DUNN  MRN: [id number]  Date of visit: 04-04-23 @ 14:10      Subjective: Patient seen and examined. No new events except as noted.   Patient seen earlier this AM. Sitting OOB TC.   Planned for Bypass on Monday.   BP uncontrolled. Norvasc increased to 5 Qd.     REVIEW OF SYSTEMS:    CONSTITUTIONAL: No weakness, fevers or chills  EYES/ENT: No visual changes;  No vertigo or throat pain   NECK: No pain or stiffness  RESPIRATORY: No cough, wheezing, hemoptysis; No shortness of breath  CARDIOVASCULAR: No chest pain or palpitations  GASTROINTESTINAL: No abdominal or epigastric pain. No nausea, vomiting, or hematemesis; No diarrhea or constipation. No melena or hematochezia.  GENITOURINARY: No dysuria, frequency or hematuria  NEUROLOGICAL: No numbness or weakness  SKIN: No itching, burning, rashes, or lesions   MSK: RLE wound   All other review of systems is negative unless indicated above.    MEDICATIONS:  MEDICATIONS  (STANDING):  amLODIPine   Tablet 2.5 milliGRAM(s) Oral daily  aspirin enteric coated 81 milliGRAM(s) Oral daily  cefepime   IVPB 1000 milliGRAM(s) IV Intermittent every 8 hours  chlorhexidine 2% Cloths 1 Application(s) Topical daily  dextrose 50% Injectable 25 Gram(s) IV Push once  dextrose Oral Gel 15 Gram(s) Oral once  gabapentin 200 milliGRAM(s) Oral at bedtime  glucagon  Injectable 1 milliGRAM(s) IntraMuscular once  heparin   Injectable 5000 Unit(s) SubCutaneous every 8 hours  influenza  Vaccine (HIGH DOSE) 0.7 milliLiter(s) IntraMuscular once  insulin glargine Injectable (LANTUS) 8 Unit(s) SubCutaneous at bedtime  insulin lispro (ADMELOG) corrective regimen sliding scale   SubCutaneous three times a day before meals  insulin lispro (ADMELOG) corrective regimen sliding scale   SubCutaneous at bedtime  losartan 25 milliGRAM(s) Oral daily  vancomycin  IVPB 500 milliGRAM(s) IV Intermittent every 12 hours      PHYSICAL EXAM:  T(C): 36.7 (04-04-23 @ 13:19), Max: 36.7 (04-04-23 @ 01:55)  HR: 81 (04-04-23 @ 13:19) (80 - 85)  BP: 156/70 (04-04-23 @ 13:35) (136/67 - 183/72)  RR: 18 (04-04-23 @ 13:19) (18 - 18)  SpO2: 98% (04-04-23 @ 13:19) (95% - 99%)  Wt(kg): --  I&O's Summary    03 Apr 2023 07:01  -  04 Apr 2023 07:00  --------------------------------------------------------  IN: 2370 mL / OUT: 0 mL / NET: 2370 mL    04 Apr 2023 07:01  -  04 Apr 2023 14:10  --------------------------------------------------------  IN: 160 mL / OUT: 0 mL / NET: 160 mL          Appearance: Normal; OOB TC   HEENT:  Eyes are open   Lymphatic: No lymphadenopathy   Cardiovascular: Normal S1 S2, no JVD  Respiratory: normal effort , clear  Gastrointestinal:  Soft, Non-tender  Skin: No rashes,  warm to touch  Psychiatry:  Mood & affect appropriate  Musculoskeletal: RLE dressing          04-03-23 @ 07:01  -  04-04-23 @ 07:00  --------------------------------------------------------  IN: 2370 mL / OUT: 0 mL / NET: 2370 mL    04-04-23 @ 07:01  -  04-04-23 @ 14:10  --------------------------------------------------------  IN: 160 mL / OUT: 0 mL / NET: 160 mL                                10.6   6.09  )-----------( 252      ( 04 Apr 2023 07:05 )             32.8               04-04    137  |  106  |  18  ----------------------------<  210<H>  4.1   |  22  |  0.77    Ca    8.9      04 Apr 2023 07:06  Phos  2.8     04-04  Mg     2.2     04-04               Culture - Blood (03.30.23 @ 01:20)   Specimen Source: .Blood  Culture Results: No Growth Final           Culture - Blood (03.30.23 @ 01:10)   Specimen Source: .Blood  Culture Results: No Growth Final

## 2023-04-04 NOTE — PROGRESS NOTE ADULT - SUBJECTIVE AND OBJECTIVE BOX
VASCULAR SURGERY DAILY PROGRESS NOTE:     SUBJECTIVE/ROS: Patient seen and examined at bedside. No acute events overnight.     Vital Signs Last 24 Hrs  T(C): 36.6 (04 Apr 2023 08:39), Max: 36.7 (04 Apr 2023 01:55)  T(F): 97.8 (04 Apr 2023 08:39), Max: 98.1 (04 Apr 2023 01:55)  HR: 81 (04 Apr 2023 08:39) (79 - 85)  BP: 168/72 (04 Apr 2023 08:39) (136/67 - 175/76)  BP(mean): --  RR: 18 (04 Apr 2023 08:39) (18 - 18)  SpO2: 99% (04 Apr 2023 08:39) (95% - 99%)    Parameters below as of 04 Apr 2023 08:39  Patient On (Oxygen Delivery Method): room air      I&O's Detail    03 Apr 2023 07:01  -  04 Apr 2023 07:00  --------------------------------------------------------  IN:    IV PiggyBack: 250 mL    Oral Fluid: 920 mL    sodium chloride 0.9%: 1200 mL  Total IN: 2370 mL    OUT:  Total OUT: 0 mL    Total NET: 2370 mL      04 Apr 2023 07:01  -  04 Apr 2023 12:56  --------------------------------------------------------  IN:    Oral Fluid: 160 mL  Total IN: 160 mL    OUT:  Total OUT: 0 mL    Total NET: 160 mL      PE  Gen: Laying in bed, NAD,   Neuro: alert and awake  Resp: Unlabored breathing  CV: normal rate, regular rhythm  Abd: soft, NTND  EXT:      - RLE: R hallux wound with dressing c/d/i     - LLE: No wounds.            LABS:                                                                                         10.6   6.09  )-----------( 252      ( 04 Apr 2023 07:05 )             32.8     04-04    137  |  106  |  18  ----------------------------<  210<H>  4.1   |  22  |  0.77    Ca    8.9      04 Apr 2023 07:06  Phos  2.8     04-04  Mg     2.2     04-04

## 2023-04-04 NOTE — PROGRESS NOTE ADULT - SUBJECTIVE AND OBJECTIVE BOX
DATE OF SERVICE: 04-04-23 @ 14:19    Patient is a 72y old  Male who presents with a chief complaint of R toe infection (04 Apr 2023 14:10)      INTERVAL HISTORY: Reports feeling well, sitting comfortably in chair     REVIEW OF SYSTEMS:  CONSTITUTIONAL: No weakness  EYES/ENT: No visual changes;  No throat pain   NECK: No pain or stiffness  RESPIRATORY: No cough, wheezing; No shortness of breath  CARDIOVASCULAR: No chest pain or palpitations  GASTROINTESTINAL: No abdominal  pain. No nausea, vomiting, or hematemesis  GENITOURINARY: No dysuria, frequency or hematuria  NEUROLOGICAL: No stroke like symptoms  SKIN: No rashes    TELEMETRY Personally reviewed:  	  MEDICATIONS:  amLODIPine   Tablet 2.5 milliGRAM(s) Oral daily  losartan 25 milliGRAM(s) Oral daily        PHYSICAL EXAM:  T(C): 36.7 (04-04-23 @ 13:19), Max: 36.7 (04-04-23 @ 01:55)  HR: 81 (04-04-23 @ 13:19) (80 - 85)  BP: 156/70 (04-04-23 @ 13:35) (136/67 - 183/72)  RR: 18 (04-04-23 @ 13:19) (18 - 18)  SpO2: 98% (04-04-23 @ 13:19) (95% - 99%)  Wt(kg): --  I&O's Summary    03 Apr 2023 07:01  -  04 Apr 2023 07:00  --------------------------------------------------------  IN: 2370 mL / OUT: 0 mL / NET: 2370 mL    04 Apr 2023 07:01  -  04 Apr 2023 14:19  --------------------------------------------------------  IN: 160 mL / OUT: 0 mL / NET: 160 mL          Appearance: In no distress	  HEENT:    PERRL, EOMI	  Cardiovascular:  S1 S2, No JVD  Respiratory: Lungs clear to auscultation	  Gastrointestinal:  Soft, Non-tender, + BS	  Vascularature:  No edema of LE  Psychiatric: Appropriate affect   Neuro: no acute focal deficits                               10.6   6.09  )-----------( 252      ( 04 Apr 2023 07:05 )             32.8     04-04    137  |  106  |  18  ----------------------------<  210<H>  4.1   |  22  |  0.77    Ca    8.9      04 Apr 2023 07:06  Phos  2.8     04-04  Mg     2.2     04-04          Labs personally reviewed      ASSESSMENT/PLAN: 	  Patient is a 72 year old male with a PMHx of HTN, Type II DM associated with neuropathy whore presents to [facility] with a chief complaint of pain, blistering and fluid drainage on his right foot. Patient reports worsening pain and discomfort in his right lower extremity. Patient denies any chest pain, palpitations, SOB or dyspnea. Patient denies dyspnea on exertion or shortness of breath with ambulation or walking.     Problem/Plan -1  Problem: Cardiac Risk Stratification for LE bypass surgery  - Denies CP or SOB  - TTE shows preserved EF, mild LVH  - Not in decompensated HF  - No hx of tachy supa arrhythmia  - NM stress test with small, mild defect in distal inferior wall that is reversible, suggestive of mild ischemia.  - given asymptomatic with only mild small area of ischemia in distal inferior wall will manage medically   - elevated risk for high risk vascular surgery, no cardiac contraindication to proceed   - Planned Bypass on Monday    Problem/Plan -2  Problem: Hypertension  - Increase amlodipine to 5mg PO daily  - c/w losartan to 25mg PO daily    Problem/Plan -3  Problem: DM II  - HgbA1c 8.4  - ISS as per primary team        Berenice Branch, RISHABH-NP   Matt Calhoun DO [facility]  Cardiovascular Medicine  [address], [address]  Available through call or text on [employer]  Office: [phone]   DATE OF SERVICE: 04-04-23 @ 14:19    Patient is a 72y old  Male who presents with a chief complaint of R toe infection (04 Apr 2023 14:10)      INTERVAL HISTORY: Reports feeling well, sitting comfortably in chair     REVIEW OF SYSTEMS:  CONSTITUTIONAL: No weakness  EYES/ENT: No visual changes;  No throat pain   NECK: No pain or stiffness  RESPIRATORY: No cough, wheezing; No shortness of breath  CARDIOVASCULAR: No chest pain or palpitations  GASTROINTESTINAL: No abdominal  pain. No nausea, vomiting, or hematemesis  GENITOURINARY: No dysuria, frequency or hematuria  NEUROLOGICAL: No stroke like symptoms  SKIN: No rashes    TELEMETRY Personally reviewed:  	  MEDICATIONS:  amLODIPine   Tablet 2.5 milliGRAM(s) Oral daily  losartan 25 milliGRAM(s) Oral daily        PHYSICAL EXAM:  T(C): 36.7 (04-04-23 @ 13:19), Max: 36.7 (04-04-23 @ 01:55)  HR: 81 (04-04-23 @ 13:19) (80 - 85)  BP: 156/70 (04-04-23 @ 13:35) (136/67 - 183/72)  RR: 18 (04-04-23 @ 13:19) (18 - 18)  SpO2: 98% (04-04-23 @ 13:19) (95% - 99%)  Wt(kg): --  I&O's Summary    03 Apr 2023 07:01  -  04 Apr 2023 07:00  --------------------------------------------------------  IN: 2370 mL / OUT: 0 mL / NET: 2370 mL    04 Apr 2023 07:01  -  04 Apr 2023 14:19  --------------------------------------------------------  IN: 160 mL / OUT: 0 mL / NET: 160 mL          Appearance: In no distress	  HEENT:    PERRL, EOMI	  Cardiovascular:  S1 S2, No JVD  Respiratory: Lungs clear to auscultation	  Gastrointestinal:  Soft, Non-tender, + BS	  Vascularature:  No edema of LE  Psychiatric: Appropriate affect   Neuro: no acute focal deficits                               10.6   6.09  )-----------( 252      ( 04 Apr 2023 07:05 )             32.8     04-04    137  |  106  |  18  ----------------------------<  210<H>  4.1   |  22  |  0.77    Ca    8.9      04 Apr 2023 07:06  Phos  2.8     04-04  Mg     2.2     04-04          Labs personally reviewed      ASSESSMENT/PLAN: 	  Patient is a 72 year old male with a PMHx of HTN, Type II DM associated with neuropathy whore presents to [facility] with a chief complaint of pain, blistering and fluid drainage on his right foot. Patient reports worsening pain and discomfort in his right lower extremity. Patient denies any chest pain, palpitations, SOB or dyspnea. Patient denies dyspnea on exertion or shortness of breath with ambulation or walking.     Problem/Plan -1  Problem: Cardiac Risk Stratification for LE bypass surgery  - Denies CP or SOB  - TTE shows preserved EF, mild LVH  - Not in decompensated HF  - No hx of tachy supa arrhythmia  - NM stress test with small, mild defect in distal inferior wall that is reversible, suggestive of mild ischemia.  - given asymptomatic with only mild small area of ischemia in distal inferior wall will manage medically   - discussed with vascular surgery, given high risk surgery will plan for cath Wednesday  - Planned Bypass on Monday    Problem/Plan -2  Problem: Hypertension  - Increase amlodipine to 5mg PO daily  - c/w losartan to 25mg PO daily    Problem/Plan -3  Problem: DM II  - HgbA1c 8.4  - ISS as per primary team        Berenice Branch, RISHABH-NP   Matt Calhoun DO [facility]  Cardiovascular Medicine  [address], [address]  Available through call or text on [employer]  Office: [phone]

## 2023-04-05 LAB
ANION GAP SERPL CALC-SCNC: 10 MMOL/L — SIGNIFICANT CHANGE UP (ref 5–17)
BUN SERPL-MCNC: 19 MG/DL — SIGNIFICANT CHANGE UP (ref 7–23)
CALCIUM SERPL-MCNC: 9.2 MG/DL — SIGNIFICANT CHANGE UP (ref 8.4–10.5)
CHLORIDE SERPL-SCNC: 103 MMOL/L — SIGNIFICANT CHANGE UP (ref 96–108)
CO2 SERPL-SCNC: 24 MMOL/L — SIGNIFICANT CHANGE UP (ref 22–31)
CREAT SERPL-MCNC: 0.82 MG/DL — SIGNIFICANT CHANGE UP (ref 0.5–1.3)
EGFR: 93 ML/MIN/1.73M2 — SIGNIFICANT CHANGE UP
GLUCOSE BLDC GLUCOMTR-MCNC: 163 MG/DL — HIGH (ref 70–99)
GLUCOSE BLDC GLUCOMTR-MCNC: 176 MG/DL — HIGH (ref 70–99)
GLUCOSE BLDC GLUCOMTR-MCNC: 260 MG/DL — HIGH (ref 70–99)
GLUCOSE SERPL-MCNC: 102 MG/DL — HIGH (ref 70–99)
HCT VFR BLD CALC: 31.9 % — LOW (ref 39–50)
HGB BLD-MCNC: 10.3 G/DL — LOW (ref 13–17)
IRON SATN MFR SERPL: 20 % — SIGNIFICANT CHANGE UP (ref 16–55)
IRON SATN MFR SERPL: 48 UG/DL — SIGNIFICANT CHANGE UP (ref 45–165)
MAGNESIUM SERPL-MCNC: 2.2 MG/DL — SIGNIFICANT CHANGE UP (ref 1.6–2.6)
MCHC RBC-ENTMCNC: 30 PG — SIGNIFICANT CHANGE UP (ref 27–34)
MCHC RBC-ENTMCNC: 32.3 GM/DL — SIGNIFICANT CHANGE UP (ref 32–36)
MCV RBC AUTO: 93 FL — SIGNIFICANT CHANGE UP (ref 80–100)
NRBC # BLD: 0 /100 WBCS — SIGNIFICANT CHANGE UP (ref 0–0)
PHOSPHATE SERPL-MCNC: 3.2 MG/DL — SIGNIFICANT CHANGE UP (ref 2.5–4.5)
PLATELET # BLD AUTO: 306 K/UL — SIGNIFICANT CHANGE UP (ref 150–400)
POTASSIUM SERPL-MCNC: 4.2 MMOL/L — SIGNIFICANT CHANGE UP (ref 3.5–5.3)
POTASSIUM SERPL-SCNC: 4.2 MMOL/L — SIGNIFICANT CHANGE UP (ref 3.5–5.3)
RBC # BLD: 3.43 M/UL — LOW (ref 4.2–5.8)
RBC # FLD: 11.7 % — SIGNIFICANT CHANGE UP (ref 10.3–14.5)
SARS-COV-2 RNA SPEC QL NAA+PROBE: SIGNIFICANT CHANGE UP
SODIUM SERPL-SCNC: 137 MMOL/L — SIGNIFICANT CHANGE UP (ref 135–145)
TIBC SERPL-MCNC: 242 UG/DL — SIGNIFICANT CHANGE UP (ref 220–430)
UIBC SERPL-MCNC: 194 UG/DL — SIGNIFICANT CHANGE UP (ref 110–370)
WBC # BLD: 7.04 K/UL — SIGNIFICANT CHANGE UP (ref 3.8–10.5)
WBC # FLD AUTO: 7.04 K/UL — SIGNIFICANT CHANGE UP (ref 3.8–10.5)

## 2023-04-05 PROCEDURE — 99222 1ST HOSP IP/OBS MODERATE 55: CPT

## 2023-04-05 PROCEDURE — 93458 L HRT ARTERY/VENTRICLE ANGIO: CPT | Mod: 26

## 2023-04-05 PROCEDURE — 99152 MOD SED SAME PHYS/QHP 5/>YRS: CPT

## 2023-04-05 RX ORDER — INSULIN LISPRO 100/ML
VIAL (ML) SUBCUTANEOUS
Refills: 0 | Status: DISCONTINUED | OUTPATIENT
Start: 2023-04-05 | End: 2023-04-06

## 2023-04-05 RX ORDER — CEFEPIME 1 G/1
2000 INJECTION, POWDER, FOR SOLUTION INTRAMUSCULAR; INTRAVENOUS EVERY 12 HOURS
Refills: 0 | Status: DISCONTINUED | OUTPATIENT
Start: 2023-04-05 | End: 2023-04-05

## 2023-04-05 RX ORDER — INSULIN LISPRO 100/ML
VIAL (ML) SUBCUTANEOUS EVERY 6 HOURS
Refills: 0 | Status: DISCONTINUED | OUTPATIENT
Start: 2023-04-05 | End: 2023-04-05

## 2023-04-05 RX ORDER — VANCOMYCIN HCL 1 G
750 VIAL (EA) INTRAVENOUS EVERY 12 HOURS
Refills: 0 | Status: DISCONTINUED | OUTPATIENT
Start: 2023-04-05 | End: 2023-04-05

## 2023-04-05 RX ORDER — INSULIN LISPRO 100/ML
VIAL (ML) SUBCUTANEOUS AT BEDTIME
Refills: 0 | Status: DISCONTINUED | OUTPATIENT
Start: 2023-04-05 | End: 2023-04-06

## 2023-04-05 RX ORDER — SODIUM CHLORIDE 9 MG/ML
1000 INJECTION, SOLUTION INTRAVENOUS
Refills: 0 | Status: DISCONTINUED | OUTPATIENT
Start: 2023-04-05 | End: 2023-04-05

## 2023-04-05 RX ORDER — CEFEPIME 1 G/1
2000 INJECTION, POWDER, FOR SOLUTION INTRAMUSCULAR; INTRAVENOUS EVERY 12 HOURS
Refills: 0 | Status: DISCONTINUED | OUTPATIENT
Start: 2023-04-05 | End: 2023-04-06

## 2023-04-05 RX ADMIN — INSULIN GLARGINE 10 UNIT(S): 100 INJECTION, SOLUTION SUBCUTANEOUS at 21:43

## 2023-04-05 RX ADMIN — Medication 250 MILLIGRAM(S): at 06:08

## 2023-04-05 RX ADMIN — CEFEPIME 100 MILLIGRAM(S): 1 INJECTION, POWDER, FOR SOLUTION INTRAMUSCULAR; INTRAVENOUS at 18:46

## 2023-04-05 RX ADMIN — GABAPENTIN 200 MILLIGRAM(S): 400 CAPSULE ORAL at 21:43

## 2023-04-05 RX ADMIN — Medication 2: at 18:48

## 2023-04-05 RX ADMIN — AMLODIPINE BESYLATE 10 MILLIGRAM(S): 2.5 TABLET ORAL at 05:38

## 2023-04-05 RX ADMIN — Medication 1: at 21:43

## 2023-04-05 RX ADMIN — LOSARTAN POTASSIUM 25 MILLIGRAM(S): 100 TABLET, FILM COATED ORAL at 05:38

## 2023-04-05 RX ADMIN — Medication 2: at 12:02

## 2023-04-05 RX ADMIN — HEPARIN SODIUM 5000 UNIT(S): 5000 INJECTION INTRAVENOUS; SUBCUTANEOUS at 21:43

## 2023-04-05 RX ADMIN — Medication 81 MILLIGRAM(S): at 14:05

## 2023-04-05 RX ADMIN — HEPARIN SODIUM 5000 UNIT(S): 5000 INJECTION INTRAVENOUS; SUBCUTANEOUS at 05:38

## 2023-04-05 RX ADMIN — CEFEPIME 100 MILLIGRAM(S): 1 INJECTION, POWDER, FOR SOLUTION INTRAMUSCULAR; INTRAVENOUS at 05:38

## 2023-04-05 NOTE — PROGRESS NOTE ADULT - ASSESSMENT
Patient is a 72 year old male with a PMHx of HTN, Type II DM associated with neuropathy whore presents to [facility] with a chief complaint of pain, blistering and fluid drainage on his right foot. Patient reports worsening pain and discomfort in his right lower extremity. Patient reports intermittent chills for the past week prior to his arrival. Patient reports that he has been experiencing bilateral calf pain for about one month associated with pain and discomfort with walking less than 2 blocks. Patient reports that he noticed right lower extremity edema and bleeding from his great toe about two days prior to arrival. Internal medicine has been consulted on Mr. Paredes's care for medical management and medical clearance. Patient denies any chest pain, palpitations, SOB or dyspnea. Patient denies dyspnea on exertion or shortness of breath with ambulation or walking. Patient reports that his walking is limited by lower extremity pain. Denies history of HLD, MI, CVA, Cardiac arrythmia, PE or DVT in the past.     Peripheral Arterial Disease, Neuropathy, Right Toe Wound   - LE X-Ray -- Without acute displaced Fx or Dislocation, Osteoarthritic changes, Neg for OM   - EYAD consistent w/ small vessel disease in feet   - LE Duplex -- Neg for DVT   - On ASA 81 and Gabapentin   - Seen by podiatry  - Care per vascular appreciated -- S/P Angio, planned for bypass Monday   - LE Duplex as noted, per vascular   - Stress test Abnormal. F/u cardio recs --> Medical management as per cardio     Chills  - 03/30 BCx2 Neg  final   - F/u wound culture -- Multiple organisms   - On Cefepime and Vancomycin for ABX, Foot culture sensitive   - No leukocytosis, reports chills have resolved  - Cont to monitor CBC, Temp curve, VS and patient closely  - If febrile, check pan cultures     Up-Trending Cr  - S/P IVF for hydration   - Cr improving, avoid nephrotoxic agents   - Monitor Cr closely    HTN  - C/w Norvasc 2.5 and Losartan 25  ---> BP uncontrolled. Norvasc increased to 5 Qd.   - Monitor BP, VS and patient closely    DM  - A1C 8.4   - C/w Sliding scale   - Monitor glucose levels     Pre-OP Risk Stratification   - TTE w/ EF of 61%, Normal RV function, Mild LVH   - Stress test abnormal. F/u cardiology     PPX  - Hep 5K Q8    Patient is a 72 year old male with a PMHx of HTN, Type II DM associated with neuropathy whore presents to [facility] with a chief complaint of pain, blistering and fluid drainage on his right foot. Patient reports worsening pain and discomfort in his right lower extremity. Patient reports intermittent chills for the past week prior to his arrival. Patient reports that he has been experiencing bilateral calf pain for about one month associated with pain and discomfort with walking less than 2 blocks. Patient reports that he noticed right lower extremity edema and bleeding from his great toe about two days prior to arrival. Internal medicine has been consulted on Mr. Paredes's care for medical management and medical clearance. Patient denies any chest pain, palpitations, SOB or dyspnea. Patient denies dyspnea on exertion or shortness of breath with ambulation or walking. Patient reports that his walking is limited by lower extremity pain. Denies history of HLD, MI, CVA, Cardiac arrythmia, PE or DVT in the past.     Peripheral Arterial Disease, Neuropathy, Right Toe Wound   - LE X-Ray -- Without acute displaced Fx or Dislocation, Osteoarthritic changes, Neg for OM   - EYAD consistent w/ small vessel disease in feet   - LE Duplex -- Neg for DVT   - On ASA 81 and Gabapentin   - Seen by podiatry  - Care per vascular appreciated -- S/P Angio, planned for bypass Monday   - LE Duplex as noted, per vascular   - Stress test Abnormal. F/u cardio recs --> Planned for cardiac cath today 04/05; F/u results      Chills  - 03/30 BCx2 Neg  final   - F/u wound culture -- Multiple organisms   - On Cefepime and Vancomycin for ABX, Foot culture sensitive --> ID eval may be of benefit   - No leukocytosis, reports chills have resolved  - Cont to monitor CBC, Temp curve, VS and patient closely  - If febrile, check pan cultures     Up-Trending Cr  - S/P IVF for hydration   - Cr improving, avoid nephrotoxic agents   - Monitor Cr closely    HTN  - C/w Norvasc and Losartan 25  ---> BP uncontrolled. Norvasc increased to 10 Qd.   - Monitor BP, VS and patient closely    DM  - A1C 8.4   - C/w Sliding scale   - Monitor glucose levels     Pre-OP Risk Stratification   - TTE w/ EF of 61%, Normal RV function, Mild LVH   - Stress test abnormal. Planned for cath today 04/05    PPX  - Hep 5K Q8

## 2023-04-05 NOTE — PROGRESS NOTE ADULT - SUBJECTIVE AND OBJECTIVE BOX
Name of Patient : ALEE DUNN  MRN: [id number]  Date of visit: 04-05-23 @ 11:35      Subjective: Patient seen and examined. No new events except as noted.   Patient seen earlier this AM. Planned for Cath today. Denies chest pain, palpitations, SOB or dyspnea.   Norvasc increased to 10 for BP control     REVIEW OF SYSTEMS:    CONSTITUTIONAL: No weakness, fevers or chills  EYES/ENT: No visual changes;  No vertigo or throat pain   NECK: No pain or stiffness  RESPIRATORY: No cough, wheezing, hemoptysis; No shortness of breath  CARDIOVASCULAR: No chest pain or palpitations  GASTROINTESTINAL: No abdominal or epigastric pain. No nausea, vomiting, or hematemesis; No diarrhea or constipation. No melena or hematochezia.  GENITOURINARY: No dysuria, frequency or hematuria  NEUROLOGICAL: No numbness or weakness  SKIN: No itching, burning, rashes, or lesions   MSK: RLE wound   All other review of systems is negative unless indicated above.    MEDICATIONS:  MEDICATIONS  (STANDING):  amLODIPine   Tablet 10 milliGRAM(s) Oral daily  aspirin enteric coated 81 milliGRAM(s) Oral daily  cefepime   IVPB 1000 milliGRAM(s) IV Intermittent every 8 hours  chlorhexidine 2% Cloths 1 Application(s) Topical daily  dextrose 50% Injectable 25 Gram(s) IV Push once  dextrose Oral Gel 15 Gram(s) Oral once  gabapentin 200 milliGRAM(s) Oral at bedtime  glucagon  Injectable 1 milliGRAM(s) IntraMuscular once  heparin   Injectable 5000 Unit(s) SubCutaneous every 8 hours  influenza  Vaccine (HIGH DOSE) 0.7 milliLiter(s) IntraMuscular once  insulin glargine Injectable (LANTUS) 10 Unit(s) SubCutaneous at bedtime  insulin lispro (ADMELOG) corrective regimen sliding scale   SubCutaneous every 6 hours  lactated ringers. 1000 milliLiter(s) (50 mL/Hr) IV Continuous <Continuous>  losartan 25 milliGRAM(s) Oral daily  vancomycin  IVPB 750 milliGRAM(s) IV Intermittent every 12 hours      PHYSICAL EXAM:  T(C): 36.7 (04-05-23 @ 10:19), Max: 36.9 (04-05-23 @ 00:45)  HR: 79 (04-05-23 @ 10:19) (78 - 85)  BP: 157/67 (04-05-23 @ 10:19) (147/73 - 183/72)  RR: 18 (04-05-23 @ 10:19) (18 - 18)  SpO2: 98% (04-05-23 @ 10:19) (96% - 98%)  Wt(kg): --  I&O's Summary    04 Apr 2023 07:01  -  05 Apr 2023 07:00  --------------------------------------------------------  IN: 1090 mL / OUT: 0 mL / NET: 1090 mL          Appearance: Normal	  HEENT:  Eyes are open   Lymphatic: No lymphadenopathy   Cardiovascular: Normal S1 S2, no JVD  Respiratory: normal effort , clear  Gastrointestinal:  Soft, Non-tender  Skin: No rashes,  warm to touch  Psychiatry:  Mood & affect appropriate  Musculoskeletal: RLE wound            04-04-23 @ 07:01  -  04-05-23 @ 07:00  --------------------------------------------------------  IN: 1090 mL / OUT: 0 mL / NET: 1090 mL                                10.3   7.04  )-----------( 306      ( 05 Apr 2023 07:16 )             31.9               04-05    137  |  103  |  19  ----------------------------<  102<H>  4.2   |  24  |  0.82    Ca    9.2      05 Apr 2023 07:16  Phos  3.2     04-05  Mg     2.2     04-05

## 2023-04-05 NOTE — CONSULT NOTE ADULT - ASSESSMENT
72M with h/o T2DM (A1c=8.4) with neuropathy, HTN admitted 3/30 with R 1st toe infection.  Banged his tow 3/27 but has neuropathy.  Then noted to be bleeding 3/29.  Here Tm 100, no leukocytosis but elevated inflammatory markers.  Started on vanc/cefepime.  Bedside debridement was polymicrobial.  Angiogram with stenosis.  For Bypass.      Diabetic foot infection, polymicrobial  - s/p debridement  - can stop vancomycin  - change cefepime to 2g IV q12H  - for vascular bypass  - likely limited course given no OM    I have discussed plan of care as detailed above with vascular PA

## 2023-04-05 NOTE — CHART NOTE - NSCHARTNOTEFT_GEN_A_CORE
Post Operative Note  Patient: ALEE DUNN 72y (1950) Male   MRN: [id number]  Location: [facility] 9MON 910 D1  Visit: 03-30-23 Inpatient  Date: 04-05-23 @ 17:07    Procedure: S/P cardiac cath    Subjective: Patient seen and examined post operatively. Denies any pain, numbness or tingling. Denies any chest pain or SOB.     Objective:  Vitals: T(F): 97.9 (04-05-23 @ 14:25), Max: 98.4 (04-05-23 @ 00:45)  HR: 80 (04-05-23 @ 14:25)  BP: 137/69 (04-05-23 @ 14:25) (121/63 - 163/73)  RR: 18 (04-05-23 @ 14:25)  SpO2: 97% (04-05-23 @ 14:25)  Vent Settings:     In:   04-04-23 @ 07:01  -  04-05-23 @ 07:00  --------------------------------------------------------  IN: 1090 mL      IV Fluids:     Out:   04-04-23 @ 07:01  -  04-05-23 @ 07:00  --------------------------------------------------------  OUT: 0 mL      EBL:     Voided Urine:   04-04-23 @ 07:01  -  04-05-23 @ 07:00  --------------------------------------------------------  OUT: 0 mL            Physical Examination:  General: NAD, resting comfortably in bed  HEENT: Normocephalic atraumatic  Respiratory: Nonlabored respirations, normal CW expansion.  Cardio: S1S2, regular rate and rhythm.  Abdomen: soft, ND, NT  Vascular: b/l palpable radial pulses  Extremities: right hand warm, normal sensorimotor function  left right hand warm, normal sensorimotor function    Imaging:  No post-op imaging studies    Assessment:  72yMale patient S/P cardiac cath    Plan:  - plan for right superficial femoral artery to posterior tibial artery bypass Monday  - TTE completed 3/31  - regular diet  - IV abx  - appreciate medical and cardiac risk stratification and optimization  - bilateral lower extremity vein mapping--completed 4/1      Vascular p [id number]   Date/Time: 04-05-23 @ 17:07

## 2023-04-05 NOTE — PROGRESS NOTE ADULT - ASSESSMENT
72 year old man with peripheral arterial disease, chronic limb threatening ischemia of the right lower extremity, Linh 5, right hallux wound, now POD1 s/p RLE diagnostic angiogram showing single vessel runoff via the posterior tibial artery which is occluded proximally and reconstitutes via collaterals off the popliteal artery  - plan for right superficial femoral artery to posterior tibial artery bypass Monday  - TTE completed 3/31  - Possible Cardiac Cath today  - NPO  - appreciate medical and cardiac risk stratification and optimization  - bilateral lower extremity vein mapping--completed 4/1      Vascular p 2530  72 year old man with peripheral arterial disease, chronic limb threatening ischemia of the right lower extremity, Linh 5, right hallux wound, now s/p RLE diagnostic angiogram (4/1) showing single vessel runoff via the posterior tibial artery which is occluded proximally and reconstitutes via collaterals off the popliteal artery  - plan for right superficial femoral artery to posterior tibial artery bypass Monday  - TTE completed 3/31  - Possible Cardiac Cath today  - NPO  - appreciate medical and cardiac risk stratification and optimization  - bilateral lower extremity vein mapping--completed 4/1      Vascular p 0033

## 2023-04-05 NOTE — CONSULT NOTE ADULT - CONSULT REQUESTED DATE/TIME
30-Mar-2023
31-Mar-2023 12:21
30-Mar-2023 05:39
30-Mar-2023 13:20
05-Apr-2023 12:00
30-Mar-2023 13:24

## 2023-04-05 NOTE — CONSULT NOTE ADULT - CONSULT REASON
Right toe wound
Preop RIsk Stratification
Medical clearance
R hallux wound
right foot wound
antibiotic management

## 2023-04-05 NOTE — CONSULT NOTE ADULT - SUBJECTIVE AND OBJECTIVE BOX
Patient is a 72y old  Male who presents with a chief complaint of R toe infection (2023 11:35)    HPI:  72M with h/o T2DM (A1c=8.4) with neuropathy, HTN admitted 3/30 with R 1st toe infection.  Banged his tow 3/27 but has neuropathy.  Then noted to be bleeding 3/29.  Here Tm 100, no leukocytosis but elevated inflammatory markers.  Started on vanc/cefepime.  Bedside debridement was polymicrobial.  Angiogram with stenosis.  For Bypass.      prior hospital charts reviewed [  ]  primary team notes reviewed [  x]  other consultant notes reviewed [ x ]    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    Allergies  No Known Allergies    ANTIMICROBIALS:  cefepime   IVPB 1000 every 8 hours (3/30-)  vancomycin  IVPB 750 every 12 hours (3/30-)    MEDICATIONS  (STANDING):  amLODIPine   Tablet 10 daily  aspirin enteric coated 81 daily  gabapentin 200 at bedtime  heparin   Injectable 5000 every 8 hours  insulin glargine Injectable (LANTUS) 10 at bedtime  insulin lispro (ADMELOG) corrective regimen sliding scale  every 6 hours  losartan 25 daily    SOCIAL HISTORY:   not a smoker but exposed to 2nd hand smoke from father; from [facility]; lives with wife and daughter    FAMILY HISTORY:  sister with diabetes (); mother  age 91 of "old age", father  of a stroke in his 60s    REVIEW OF SYSTEMS  [  ] ROS unobtainable because:    [  ] All other systems negative except as noted below:	    Constitutional:  [ ] fever [ ] chills  [ ] weight loss  [ ] weakness  Skin:  [ ] rash [ ] phlebitis	  Eyes: [ ] icterus [ ] pain  [ ] discharge	  ENMT: [ ] sore throat  [ ] thrush [ ] ulcers [ ] exudates  Respiratory: [ ] dyspnea [ ] hemoptysis [ ] cough [ ] sputum	  Cardiovascular:  [ ] chest pain [ ] palpitations [ ] edema	  Gastrointestinal:  [ ] nausea [ ] vomiting [ ] diarrhea [ ] constipation [ ] pain	  Genitourinary:  [ ] dysuria [ ] frequency [ ] hematuria [ ] discharge [ ] flank pain  [ ] incontinence  Musculoskeletal:  [ ] myalgias [ ] arthralgias [ ] arthritis  [ ] back pain  Neurological:  [ ] headache [ ] seizures  [ ] confusion/altered mental status  Psychiatric:  [ ] anxiety [ ] depression	  Hematology/Lymphatics:  [ ] lymphadenopathy  Endocrine:  [ ] adrenal [ ] thyroid  Allergic/Immunologic:	 [ ] transplant [ ] seasonal    Vital Signs Last 24 Hrs  T(F): 98 (23 @ 10:19), Max: 100 (23 @ 18:55)  Vital Signs Last 24 Hrs  HR: 84 (23 @ 11:35) (78 - 85)  BP: 146/78 (23 @ 11:35) (146/78 - 183/72)  RR: 18 (23 @ 11:35)  SpO2: 96% (23 @ 11:35) (96% - 98%)  Wt(kg): --    PHYSICAL EXAM:  Constitutional: non-toxic  HEAD/EYES: anicteric  ENT:  supple  Cardiovascular:   normal S1, S2  Respiratory:  clear BS bilaterally  GI:  soft, non-tender, normal bowel sounds  :  no cabello  Musculoskeletal:  no synovitis  Neurologic: awake and alert, normal strength  Skin:  R great toe with dry gangrenous changes at the tip; nail removed; decreased sensation; no drainage, mild swelling  Psychiatric:  awake, alert, appropriate mood                        10.3   7.04  )-----------( 306      ( 2023 07:16 )             31.9     137  |  103  |  19  ----------------------------<  102<H>  4.2   |  24  |  0.82    Ca    9.2      2023 07:16  Phos  3.2     -  Mg     2.2     -    WBC Count: 7.04 (23 @ 07:16)  WBC Count: 6.09 (23 @ 07:05)  WBC Count: 7.99 (23 @ 06:44)  WBC Count: 6.90 (23 @ 07:03)  WBC Count: 6.57 (23 @ 05:15)  WBC Count: 7.60 (23 @ 06:43)  WBC Count: 7.41 (23 @ 01:26)    Sedimentation Rate, Erythrocyte: 117 ( @ 06:43)  C-Reactive Protein, Serum: 69 ( @ 01:26)    MICROBIOLOGY:  Vancomycin Level, Trough: <4.0 ( @ 09:29)  Vancomycin Level, Trough: 24.0 ( @ 17:55)  Vancomycin Level, Trough: 10.9 ( @ 05:15)      Culture - Other (collected 23 @ 06:35)  Source: .Other Right foot  Final Report (23 @ 10:47):    Rare Enterobacter cloacae complex    Numerous Staphylococcus lugdunensis    Rare Delftia acidovorans group    Normal skin narcisa isolated  Organism: Enterobacter cloacae complex  Staphylococcus lugdunensis  Delftia acidovorans group (23 @ 10:47)  Organism: Delftia acidovorans group (23 @ 10:47)      Method Type: AMI      -  Amikacin: S <=16      -  Aztreonam: S <=4      -  Cefepime: S <=2      -  Ceftriaxone: S <=1      -  Ciprofloxacin: S <=0.25      -  Gentamicin: R >8      -  Levofloxacin: S <=0.5      -  Meropenem: S <=1      -  Piperacillin/Tazobactam: S <=8      -  Tobramycin: I 8      -  Trimethoprim/Sulfamethoxazole: S <=0.5/9.5  Organism: Staphylococcus lugdunensis (23 @ 10:47)      Method Type: AMI      -  Ampicillin/Sulbactam: S <=8/4      -  Cefazolin: S <=4      -  Clindamycin: S <=0.25      -  Erythromycin: S <=0.25      -  Gentamicin: S <=1 Should not be used as monotherapy      -  Oxacillin: S <=0.25      -  Rifampin: S <=1 Should not be used as monotherapy      -  Tetracycline: S <=1      -  Trimethoprim/Sulfamethoxazole: S <=0.5/9.5      -  Vancomycin: S 1  Organism: Enterobacter cloacae complex (23 @ 10:47)      Method Type: AMI      -  Amikacin: S <=16      -  Amoxicillin/Clavulanic Acid: R >16/8      -  Ampicillin: R <=8 These ampicillin results predict results for amoxicillin      -  Ampicillin/Sulbactam: R <=4/2 Enterobacter, Klebsiella aerogenes, Citrobacter, and Serratia may develop resistance during prolonged therapy (3-4 days)      -  Aztreonam: S <=4      -  Cefazolin: R >16 Enterobacter, Klebsiella aerogenes, Citrobacter, and Serratia may develop resistance during prolonged therapy (3-4 days)      -  Cefepime: S <=2      -  Cefoxitin: R >16      -  Ceftriaxone: S <=1 Enterobacter, Klebsiella aerogenes, Citrobacter, and Serratia may develop resistance during prolonged therapy      -  Ciprofloxacin: S <=0.25      -  Ertapenem: S <=0.5      -  Gentamicin: S <=2      -  Imipenem: S <=1      -  Levofloxacin: S <=0.5      -  Meropenem: S <=1      -  Piperacillin/Tazobactam: S <=8      -  Tobramycin: S <=2      -  Trimethoprim/Sulfamethoxazole: S <=0.5/9.5    Culture - Blood (collected 23 @ 01:20)  Source: .Blood  Final Report (23 @ 09:01):    No Growth Final    Culture - Blood (collected 23 @ 01:10)  Source: .Blood  Final Report (23 @ 09:01):    No Growth Final    Rapid RVP Result: NotDetec ( @ 03:27)    RADIOLOGY:  imaging below personally reviewed and agree with findings    VA Duplex Lower Ext Vein Scan, Bilat (23 @ 19:32) >  IMPRESSION: Diameters of the right and left greater and lesser saphenous veins as tabulated above.    VA Duplex Lower Ext Vein Scan, Right (23 @ 14:48) >  IMPRESSION: No evidence of right lower extremity deep venous thrombosis.    Xray Chest 1 View- PORTABLE-Urgent (23 @ 03:53) >  IMPRESSION:  Clear lungs.    Xray Foot AP + Lateral + Oblique, Right (23 @ 03:53) >  IMPRESSION:  Evaluation limited due to the patient's overlying sock.  No acute displaced fracture or dislocation. Osteoarthritic changes throughout the forefoot. No cortical erosion or periostitis to suggest osteomyelitis. Vascular calcifications noted.

## 2023-04-05 NOTE — PROGRESS NOTE ADULT - SUBJECTIVE AND OBJECTIVE BOX
DATE OF SERVICE: 04-05-23 @ 16:51    Patient is a 72y old  Male who presents with a chief complaint of R toe infection (05 Apr 2023 11:51)      INTERVAL HISTORY: feels ok    TELEMETRY Personally reviewed: no events  	  MEDICATIONS:  amLODIPine   Tablet 10 milliGRAM(s) Oral daily  losartan 25 milliGRAM(s) Oral daily        PHYSICAL EXAM:  T(C): 36.6 (04-05-23 @ 14:25), Max: 36.9 (04-05-23 @ 00:45)  HR: 80 (04-05-23 @ 14:25) (73 - 85)  BP: 137/69 (04-05-23 @ 14:25) (121/63 - 163/73)  RR: 18 (04-05-23 @ 14:25) (18 - 18)  SpO2: 97% (04-05-23 @ 14:25) (95% - 99%)  Wt(kg): --  I&O's Summary    04 Apr 2023 07:01  -  05 Apr 2023 07:00  --------------------------------------------------------  IN: 1090 mL / OUT: 0 mL / NET: 1090 mL          Appearance: In no distress	  HEENT:    PERRL, EOMI	  Cardiovascular:  S1 S2, No JVD  Respiratory: Lungs clear to auscultation	  Gastrointestinal:  Soft, Non-tender, + BS	  Vascularature:  No edema of LE  Psychiatric: Appropriate affect   Neuro: no acute focal deficits                               10.3   7.04  )-----------( 306      ( 05 Apr 2023 07:16 )             31.9     04-05    137  |  103  |  19  ----------------------------<  102<H>  4.2   |  24  |  0.82    Ca    9.2      05 Apr 2023 07:16  Phos  3.2     04-05  Mg     2.2     04-05          Labs personally reviewed      ASSESSMENT/PLAN: 	  Patient is a 72 year old male with a PMHx of HTN, Type II DM associated with neuropathy whore presents to [facility] with a chief complaint of pain, blistering and fluid drainage on his right foot. Patient reports worsening pain and discomfort in his right lower extremity. Patient denies any chest pain, palpitations, SOB or dyspnea. Patient denies dyspnea on exertion or shortness of breath with ambulation or walking.     Problem/Plan -1  Problem: Cardiac Risk Stratification for LE bypass surgery  - Denies CP or SOB  - TTE shows preserved EF, mild LVH  - Not in decompensated HF  - No hx of tachy supa arrhythmia  - NM stress test with small, mild defect in distal inferior wall that is reversible, suggestive of mild ischemia.  - given asymptomatic with only mild small area of ischemia in distal inferior wall will manage medically   -s/p cath with nonobstructive moderate CAD  - Elevated risk for high risk vascular surgery procedure, no contraindication to proceed   - Planned Bypass on Monday    Problem/Plan -2  Problem: Hypertension  - Increase amlodipine to 5mg PO daily  - c/w losartan to 25mg PO daily    Problem/Plan -3  Problem: DM II  - HgbA1c 8.4  - ISS as per primary team              Matt Calhoun DO [facility]  Cardiovascular Medicine  [address] [place] [address], [address]  Office: [phone]  Available via Text/call on [employer] Teams

## 2023-04-06 ENCOUNTER — TRANSCRIPTION ENCOUNTER (OUTPATIENT)
Age: 73
End: 2023-04-06

## 2023-04-06 VITALS
TEMPERATURE: 98 F | RESPIRATION RATE: 18 BRPM | DIASTOLIC BLOOD PRESSURE: 67 MMHG | SYSTOLIC BLOOD PRESSURE: 110 MMHG | OXYGEN SATURATION: 98 % | HEART RATE: 79 BPM

## 2023-04-06 LAB
ANION GAP SERPL CALC-SCNC: 10 MMOL/L — SIGNIFICANT CHANGE UP (ref 5–17)
BUN SERPL-MCNC: 19 MG/DL — SIGNIFICANT CHANGE UP (ref 7–23)
CALCIUM SERPL-MCNC: 9.2 MG/DL — SIGNIFICANT CHANGE UP (ref 8.4–10.5)
CHLORIDE SERPL-SCNC: 103 MMOL/L — SIGNIFICANT CHANGE UP (ref 96–108)
CO2 SERPL-SCNC: 25 MMOL/L — SIGNIFICANT CHANGE UP (ref 22–31)
CREAT SERPL-MCNC: 0.8 MG/DL — SIGNIFICANT CHANGE UP (ref 0.5–1.3)
EGFR: 94 ML/MIN/1.73M2 — SIGNIFICANT CHANGE UP
FERRITIN SERPL-MCNC: 515 NG/ML — HIGH (ref 30–400)
GLUCOSE BLDC GLUCOMTR-MCNC: 149 MG/DL — HIGH (ref 70–99)
GLUCOSE BLDC GLUCOMTR-MCNC: 248 MG/DL — HIGH (ref 70–99)
GLUCOSE SERPL-MCNC: 161 MG/DL — HIGH (ref 70–99)
HCT VFR BLD CALC: 31.8 % — LOW (ref 39–50)
HGB BLD-MCNC: 10.4 G/DL — LOW (ref 13–17)
MAGNESIUM SERPL-MCNC: 2.3 MG/DL — SIGNIFICANT CHANGE UP (ref 1.6–2.6)
MCHC RBC-ENTMCNC: 30.1 PG — SIGNIFICANT CHANGE UP (ref 27–34)
MCHC RBC-ENTMCNC: 32.7 GM/DL — SIGNIFICANT CHANGE UP (ref 32–36)
MCV RBC AUTO: 91.9 FL — SIGNIFICANT CHANGE UP (ref 80–100)
NRBC # BLD: 0 /100 WBCS — SIGNIFICANT CHANGE UP (ref 0–0)
PHOSPHATE SERPL-MCNC: 3.5 MG/DL — SIGNIFICANT CHANGE UP (ref 2.5–4.5)
PLATELET # BLD AUTO: 289 K/UL — SIGNIFICANT CHANGE UP (ref 150–400)
POTASSIUM SERPL-MCNC: 4.2 MMOL/L — SIGNIFICANT CHANGE UP (ref 3.5–5.3)
POTASSIUM SERPL-SCNC: 4.2 MMOL/L — SIGNIFICANT CHANGE UP (ref 3.5–5.3)
RBC # BLD: 3.46 M/UL — LOW (ref 4.2–5.8)
RBC # FLD: 11.7 % — SIGNIFICANT CHANGE UP (ref 10.3–14.5)
SARS-COV-2 RNA SPEC QL NAA+PROBE: SIGNIFICANT CHANGE UP
SODIUM SERPL-SCNC: 138 MMOL/L — SIGNIFICANT CHANGE UP (ref 135–145)
WBC # BLD: 5.42 K/UL — SIGNIFICANT CHANGE UP (ref 3.8–10.5)
WBC # FLD AUTO: 5.42 K/UL — SIGNIFICANT CHANGE UP (ref 3.8–10.5)

## 2023-04-06 PROCEDURE — 82962 GLUCOSE BLOOD TEST: CPT

## 2023-04-06 PROCEDURE — 87077 CULTURE AEROBIC IDENTIFY: CPT

## 2023-04-06 PROCEDURE — C1894: CPT

## 2023-04-06 PROCEDURE — C8929: CPT

## 2023-04-06 PROCEDURE — 82947 ASSAY GLUCOSE BLOOD QUANT: CPT

## 2023-04-06 PROCEDURE — U0003: CPT

## 2023-04-06 PROCEDURE — 87641 MR-STAPH DNA AMP PROBE: CPT

## 2023-04-06 PROCEDURE — 85018 HEMOGLOBIN: CPT

## 2023-04-06 PROCEDURE — 99285 EMERGENCY DEPT VISIT HI MDM: CPT | Mod: 25

## 2023-04-06 PROCEDURE — 36415 COLL VENOUS BLD VENIPUNCTURE: CPT

## 2023-04-06 PROCEDURE — 86870 RBC ANTIBODY IDENTIFICATION: CPT

## 2023-04-06 PROCEDURE — 86900 BLOOD TYPING SEROLOGIC ABO: CPT

## 2023-04-06 PROCEDURE — C1769: CPT

## 2023-04-06 PROCEDURE — 78452 HT MUSCLE IMAGE SPECT MULT: CPT

## 2023-04-06 PROCEDURE — 87070 CULTURE OTHR SPECIMN AEROBIC: CPT

## 2023-04-06 PROCEDURE — 99232 SBSQ HOSP IP/OBS MODERATE 35: CPT

## 2023-04-06 PROCEDURE — C1887: CPT

## 2023-04-06 PROCEDURE — 73630 X-RAY EXAM OF FOOT: CPT

## 2023-04-06 PROCEDURE — 83550 IRON BINDING TEST: CPT

## 2023-04-06 PROCEDURE — 86803 HEPATITIS C AB TEST: CPT

## 2023-04-06 PROCEDURE — 84100 ASSAY OF PHOSPHORUS: CPT

## 2023-04-06 PROCEDURE — A9500: CPT

## 2023-04-06 PROCEDURE — 86901 BLOOD TYPING SEROLOGIC RH(D): CPT

## 2023-04-06 PROCEDURE — 93923 UPR/LXTR ART STDY 3+ LVLS: CPT

## 2023-04-06 PROCEDURE — 76000 FLUOROSCOPY <1 HR PHYS/QHP: CPT

## 2023-04-06 PROCEDURE — 84295 ASSAY OF SERUM SODIUM: CPT

## 2023-04-06 PROCEDURE — 82728 ASSAY OF FERRITIN: CPT

## 2023-04-06 PROCEDURE — 80061 LIPID PANEL: CPT

## 2023-04-06 PROCEDURE — 85027 COMPLETE CBC AUTOMATED: CPT

## 2023-04-06 PROCEDURE — 80202 ASSAY OF VANCOMYCIN: CPT

## 2023-04-06 PROCEDURE — 85025 COMPLETE CBC W/AUTO DIFF WBC: CPT

## 2023-04-06 PROCEDURE — 85610 PROTHROMBIN TIME: CPT

## 2023-04-06 PROCEDURE — 82330 ASSAY OF CALCIUM: CPT

## 2023-04-06 PROCEDURE — 85014 HEMATOCRIT: CPT

## 2023-04-06 PROCEDURE — 71045 X-RAY EXAM CHEST 1 VIEW: CPT

## 2023-04-06 PROCEDURE — 80053 COMPREHEN METABOLIC PANEL: CPT

## 2023-04-06 PROCEDURE — 84132 ASSAY OF SERUM POTASSIUM: CPT

## 2023-04-06 PROCEDURE — 82803 BLOOD GASES ANY COMBINATION: CPT

## 2023-04-06 PROCEDURE — 96374 THER/PROPH/DIAG INJ IV PUSH: CPT

## 2023-04-06 PROCEDURE — 83735 ASSAY OF MAGNESIUM: CPT

## 2023-04-06 PROCEDURE — 85730 THROMBOPLASTIN TIME PARTIAL: CPT

## 2023-04-06 PROCEDURE — 86880 COOMBS TEST DIRECT: CPT

## 2023-04-06 PROCEDURE — 97165 OT EVAL LOW COMPLEX 30 MIN: CPT

## 2023-04-06 PROCEDURE — 96375 TX/PRO/DX INJ NEW DRUG ADDON: CPT

## 2023-04-06 PROCEDURE — 85652 RBC SED RATE AUTOMATED: CPT

## 2023-04-06 PROCEDURE — 86140 C-REACTIVE PROTEIN: CPT

## 2023-04-06 PROCEDURE — 93970 EXTREMITY STUDY: CPT

## 2023-04-06 PROCEDURE — 87640 STAPH A DNA AMP PROBE: CPT

## 2023-04-06 PROCEDURE — 86922 COMPATIBILITY TEST ANTIGLOB: CPT

## 2023-04-06 PROCEDURE — 86850 RBC ANTIBODY SCREEN: CPT

## 2023-04-06 PROCEDURE — 84443 ASSAY THYROID STIM HORMONE: CPT

## 2023-04-06 PROCEDURE — 87040 BLOOD CULTURE FOR BACTERIA: CPT

## 2023-04-06 PROCEDURE — 93017 CV STRESS TEST TRACING ONLY: CPT

## 2023-04-06 PROCEDURE — 80048 BASIC METABOLIC PNL TOTAL CA: CPT

## 2023-04-06 PROCEDURE — 87186 SC STD MICRODIL/AGAR DIL: CPT

## 2023-04-06 PROCEDURE — C1760: CPT

## 2023-04-06 PROCEDURE — 83036 HEMOGLOBIN GLYCOSYLATED A1C: CPT

## 2023-04-06 PROCEDURE — 86905 BLOOD TYPING RBC ANTIGENS: CPT

## 2023-04-06 PROCEDURE — 93458 L HRT ARTERY/VENTRICLE ANGIO: CPT

## 2023-04-06 PROCEDURE — U0005: CPT

## 2023-04-06 PROCEDURE — 93971 EXTREMITY STUDY: CPT

## 2023-04-06 PROCEDURE — 83605 ASSAY OF LACTIC ACID: CPT

## 2023-04-06 PROCEDURE — 83540 ASSAY OF IRON: CPT

## 2023-04-06 PROCEDURE — 82435 ASSAY OF BLOOD CHLORIDE: CPT

## 2023-04-06 PROCEDURE — 0225U NFCT DS DNA&RNA 21 SARSCOV2: CPT

## 2023-04-06 RX ORDER — CEPHALEXIN 500 MG
1 CAPSULE ORAL
Qty: 16 | Refills: 0
Start: 2023-04-06 | End: 2023-04-09

## 2023-04-06 RX ORDER — ASPIRIN/CALCIUM CARB/MAGNESIUM 324 MG
3 TABLET ORAL
Refills: 0 | DISCHARGE

## 2023-04-06 RX ORDER — AMLODIPINE BESYLATE 2.5 MG/1
1 TABLET ORAL
Qty: 30 | Refills: 0
Start: 2023-04-06

## 2023-04-06 RX ORDER — ASPIRIN/CALCIUM CARB/MAGNESIUM 324 MG
1 TABLET ORAL
Qty: 0 | Refills: 0 | DISCHARGE
Start: 2023-04-06

## 2023-04-06 RX ORDER — MUPIROCIN 20 MG/G
1 OINTMENT TOPICAL
Qty: 1 | Refills: 0
Start: 2023-04-06

## 2023-04-06 RX ORDER — AMLODIPINE BESYLATE 2.5 MG/1
1 TABLET ORAL
Refills: 0 | DISCHARGE

## 2023-04-06 RX ORDER — ASPIRIN/CALCIUM CARB/MAGNESIUM 324 MG
2 TABLET ORAL
Refills: 0 | DISCHARGE

## 2023-04-06 RX ORDER — ACETAMINOPHEN 500 MG
2 TABLET ORAL
Qty: 0 | Refills: 0 | DISCHARGE
Start: 2023-04-06

## 2023-04-06 RX ADMIN — Medication 4: at 12:27

## 2023-04-06 RX ADMIN — Medication 81 MILLIGRAM(S): at 12:00

## 2023-04-06 RX ADMIN — CHLORHEXIDINE GLUCONATE 1 APPLICATION(S): 213 SOLUTION TOPICAL at 11:59

## 2023-04-06 RX ADMIN — AMLODIPINE BESYLATE 10 MILLIGRAM(S): 2.5 TABLET ORAL at 05:59

## 2023-04-06 RX ADMIN — HEPARIN SODIUM 5000 UNIT(S): 5000 INJECTION INTRAVENOUS; SUBCUTANEOUS at 05:59

## 2023-04-06 RX ADMIN — CEFEPIME 100 MILLIGRAM(S): 1 INJECTION, POWDER, FOR SOLUTION INTRAMUSCULAR; INTRAVENOUS at 05:59

## 2023-04-06 RX ADMIN — HEPARIN SODIUM 5000 UNIT(S): 5000 INJECTION INTRAVENOUS; SUBCUTANEOUS at 14:29

## 2023-04-06 RX ADMIN — LOSARTAN POTASSIUM 25 MILLIGRAM(S): 100 TABLET, FILM COATED ORAL at 05:59

## 2023-04-06 NOTE — DISCHARGE NOTE NURSING/CASE MANAGEMENT/SOCIAL WORK - NSDCFUADDAPPT_GEN_ALL_CORE_FT
Podiatry Discharge Instructions:  Follow up: Please follow up with Dr. Abraham Mcclure within 1 week of discharge from the hospital, please call [phone] for appointment and discuss that you recently were seen in the hospital.  Wound Care: Please apply mupirocin to right great toe wound followed by sterile 4x4 gauze and radha, daily  Weight bearing: Please weight bear as tolerated in a surgical shoe.  Antibiotics: Please continue as instructed.

## 2023-04-06 NOTE — DISCHARGE NOTE NURSING/CASE MANAGEMENT/SOCIAL WORK - PATIENT PORTAL LINK FT
You can access the FollowMyHealth Patient Portal offered by [facility] by registering at the following website: [email or web address]. By joining [employer]’s FollowMyHealth portal, you will also be able to view your health information using other applications (apps) compatible with our system.

## 2023-04-06 NOTE — DISCHARGE NOTE NURSING/CASE MANAGEMENT/SOCIAL WORK - NSDCPEFALRISK_GEN_ALL_CORE
For information on Fall & Injury Prevention, visit: [email or web address] OR  [email or web address] OR  https://www.cdc.gov/steadi/patient.html

## 2023-04-06 NOTE — PROGRESS NOTE ADULT - ASSESSMENT
72 year old man with peripheral arterial disease, chronic limb threatening ischemia of the right lower extremity, Linh 5, right hallux wound, now POD1 s/p RLE diagnostic angiogram showing single vessel runoff via the posterior tibial artery which is occluded proximally and reconstitutes via collaterals off the popliteal artery  - plan for right superficial femoral artery to posterior tibial artery bypass Monday  - TTE completed 3/31  - s/p cardiac cath yesterday  - Diet: regular  - appreciate medical and cardiac risk stratification and optimization  - bilateral lower extremity vein mapping--completed 4/1      Vascular p [id number]  72 year old man with peripheral arterial disease, chronic limb threatening ischemia of the right lower extremity, Linh 5, right hallux wound, now s/p RLE diagnostic angiogram (4/1) showing single vessel runoff via the posterior tibial artery which is occluded proximally and reconstitutes via collaterals off the popliteal artery  - plan for right superficial femoral artery to posterior tibial artery bypass Monday  - TTE completed 3/31  - s/p cardiac cath yesterday  - Diet: regular  - appreciate medical and cardiac risk stratification and optimization  - bilateral lower extremity vein mapping--completed 4/1      Vascular p [id number]

## 2023-04-06 NOTE — PROGRESS NOTE ADULT - ASSESSMENT
72M with h/o T2DM (A1c=8.4) with neuropathy, HTN admitted 3/30 with R 1st toe infection.  Banged his tow 3/27 but has neuropathy.  Then noted to be bleeding 3/29.  Here Tm 100, no leukocytosis but elevated inflammatory markers.  Started on vanc/cefepime.  Bedside debridement was polymicrobial.  Angiogram with stenosis.  For Bypass.      Diabetic foot infection, polymicrobial  - s/p debridement  - to continue cefepime to 2g IV q12H  - D#8 IV antibiotics  - for vascular bypass 4/10  - likely limited course given no OM   72M with h/o T2DM (A1c=8.4) with neuropathy, HTN admitted 3/30 with R 1st toe infection.  Banged his tow 3/27 but has neuropathy.  Then noted to be bleeding 3/29.  Here Tm 100, no leukocytosis but elevated inflammatory markers.  Started on vanc/cefepime.  Bedside debridement was polymicrobial.  Angiogram with stenosis.  For Bypass.      Diabetic foot infection, polymicrobial  - s/p debridement  - to continue cefepime to 2g IV q12H  - D#8 IV antibiotics  - for vascular bypass 4/10  - likely limited course given no OM  - if plan is for discharge today and readmission 4/10, can change to keflex 500mg 4xd and levaquin 500mg daily until he returns    I have discussed plan of care as detailed above with vascular PA

## 2023-04-06 NOTE — PROGRESS NOTE ADULT - PROVIDER SPECIALTY LIST ADULT
Cardiology
Internal Medicine
Internal Medicine
Vascular Surgery
Vascular Surgery
Cardiology
Infectious Disease
Internal Medicine
Podiatry
Vascular Surgery
Vascular Surgery
Cardiology
Cardiology
Internal Medicine
Internal Medicine
Podiatry
Vascular Surgery
Vascular Surgery
Podiatry
Vascular Surgery

## 2023-04-06 NOTE — PROGRESS NOTE ADULT - REASON FOR ADMISSION
R toe infection

## 2023-04-06 NOTE — PROGRESS NOTE ADULT - SUBJECTIVE AND OBJECTIVE BOX
VASCULAR SURGERY DAILY PROGRESS NOTE:     SUBJECTIVE/ROS: Patient seen and examined at bedside. No acute events overnight.     Vital Signs Last 24 Hrs  T(C): 36.6 (06 Apr 2023 05:52), Max: 36.7 (05 Apr 2023 10:19)  T(F): 97.9 (06 Apr 2023 05:52), Max: 98.1 (05 Apr 2023 21:49)  HR: 71 (06 Apr 2023 05:52) (71 - 85)  BP: 171/78 (06 Apr 2023 05:52) (121/63 - 173/78)  BP(mean): 94 (05 Apr 2023 13:55) (91 - 99)  RR: 18 (06 Apr 2023 05:52) (18 - 18)  SpO2: 98% (06 Apr 2023 05:52) (95% - 99%)    Parameters below as of 06 Apr 2023 05:52  Patient On (Oxygen Delivery Method): room air    I&O's Detail    05 Apr 2023 07:01  -  06 Apr 2023 07:00  --------------------------------------------------------  IN:    Lactated Ringers: 350 mL    Oral Fluid: 220 mL  Total IN: 570 mL    OUT:  Total OUT: 0 mL    Total NET: 570 mL      PE  Gen: Laying in bed, NAD,   Neuro: alert and awake  Resp: Unlabored breathing  CV: normal rate, regular rhythm  Abd: soft, NTND  EXT:      - RLE: R hallux wound with dressing c/d/i     - LLE: No wounds.        LABS:                                                                                                               10.4   5.42  )-----------( 289      ( 06 Apr 2023 07:10 )             31.8     04-06    138  |  103  |  19  ----------------------------<  161<H>  4.2   |  25  |  0.80    Ca    9.2      06 Apr 2023 07:10  Phos  3.5     04-06  Mg     2.3     04-06

## 2023-04-07 RX ORDER — LEVOFLOXACIN 5 MG/ML
1 INJECTION, SOLUTION INTRAVENOUS
Qty: 3 | Refills: 0
Start: 2023-04-07 | End: 2023-04-09

## 2023-04-09 ENCOUNTER — TRANSCRIPTION ENCOUNTER (OUTPATIENT)
Age: 73
End: 2023-04-09

## 2023-04-10 ENCOUNTER — INPATIENT (INPATIENT)
Facility: HOSPITAL | Age: 73
LOS: 6 days | Discharge: HOME HEALTH | DRG: 240 | End: 2023-04-17
Attending: STUDENT IN AN ORGANIZED HEALTH CARE EDUCATION/TRAINING PROGRAM | Admitting: STUDENT IN AN ORGANIZED HEALTH CARE EDUCATION/TRAINING PROGRAM
Payer: COMMERCIAL

## 2023-04-10 ENCOUNTER — APPOINTMENT (OUTPATIENT)
Dept: VASCULAR SURGERY | Facility: HOSPITAL | Age: 73
End: 2023-04-10

## 2023-04-10 ENCOUNTER — TRANSCRIPTION ENCOUNTER (OUTPATIENT)
Age: 73
End: 2023-04-10

## 2023-04-10 VITALS
RESPIRATION RATE: 18 BRPM | HEIGHT: 69 IN | DIASTOLIC BLOOD PRESSURE: 62 MMHG | WEIGHT: 158.95 LBS | TEMPERATURE: 98 F | HEART RATE: 81 BPM | SYSTOLIC BLOOD PRESSURE: 133 MMHG | OXYGEN SATURATION: 99 %

## 2023-04-10 DIAGNOSIS — I73.9 PERIPHERAL VASCULAR DISEASE, UNSPECIFIED: ICD-10-CM

## 2023-04-10 DIAGNOSIS — L03.119 CELLULITIS OF UNSPECIFIED PART OF LIMB: ICD-10-CM

## 2023-04-10 LAB
ANION GAP SERPL CALC-SCNC: 12 MMOL/L — SIGNIFICANT CHANGE UP (ref 5–17)
BLD GP AB SCN SERPL QL: NEGATIVE — SIGNIFICANT CHANGE UP
BUN SERPL-MCNC: 18 MG/DL — SIGNIFICANT CHANGE UP (ref 7–23)
CALCIUM SERPL-MCNC: 8.1 MG/DL — LOW (ref 8.4–10.5)
CHLORIDE SERPL-SCNC: 107 MMOL/L — SIGNIFICANT CHANGE UP (ref 96–108)
CO2 SERPL-SCNC: 21 MMOL/L — LOW (ref 22–31)
CREAT SERPL-MCNC: 0.81 MG/DL — SIGNIFICANT CHANGE UP (ref 0.5–1.3)
EGFR: 94 ML/MIN/1.73M2 — SIGNIFICANT CHANGE UP
GAS PNL BLDA: SIGNIFICANT CHANGE UP
GAS PNL BLDA: SIGNIFICANT CHANGE UP
GLUCOSE BLDC GLUCOMTR-MCNC: 171 MG/DL — HIGH (ref 70–99)
GLUCOSE BLDC GLUCOMTR-MCNC: 210 MG/DL — HIGH (ref 70–99)
GLUCOSE BLDC GLUCOMTR-MCNC: 347 MG/DL — HIGH (ref 70–99)
GLUCOSE SERPL-MCNC: 237 MG/DL — HIGH (ref 70–99)
HCT VFR BLD CALC: 28.4 % — LOW (ref 39–50)
HGB BLD-MCNC: 9.4 G/DL — LOW (ref 13–17)
MCHC RBC-ENTMCNC: 30.4 PG — SIGNIFICANT CHANGE UP (ref 27–34)
MCHC RBC-ENTMCNC: 33.1 GM/DL — SIGNIFICANT CHANGE UP (ref 32–36)
MCV RBC AUTO: 91.9 FL — SIGNIFICANT CHANGE UP (ref 80–100)
NRBC # BLD: 0 /100 WBCS — SIGNIFICANT CHANGE UP (ref 0–0)
PLATELET # BLD AUTO: 245 K/UL — SIGNIFICANT CHANGE UP (ref 150–400)
POTASSIUM SERPL-MCNC: 4.4 MMOL/L — SIGNIFICANT CHANGE UP (ref 3.5–5.3)
POTASSIUM SERPL-SCNC: 4.4 MMOL/L — SIGNIFICANT CHANGE UP (ref 3.5–5.3)
RBC # BLD: 3.09 M/UL — LOW (ref 4.2–5.8)
RBC # FLD: 12 % — SIGNIFICANT CHANGE UP (ref 10.3–14.5)
RH IG SCN BLD-IMP: POSITIVE — SIGNIFICANT CHANGE UP
SODIUM SERPL-SCNC: 140 MMOL/L — SIGNIFICANT CHANGE UP (ref 135–145)
WBC # BLD: 14.28 K/UL — HIGH (ref 3.8–10.5)
WBC # FLD AUTO: 14.28 K/UL — HIGH (ref 3.8–10.5)

## 2023-04-10 PROCEDURE — 35666 BPG FEM-ANT TIB PST TIB/PRNL: CPT | Mod: RT

## 2023-04-10 DEVICE — CLIP APPLIER ETHICON LIGACLIP 9 3/8" SMALL: Type: IMPLANTABLE DEVICE | Site: RIGHT | Status: FUNCTIONAL

## 2023-04-10 DEVICE — KIT A-LINE 1LUM 20G X 12CM SAFE KIT: Type: IMPLANTABLE DEVICE | Site: RIGHT | Status: FUNCTIONAL

## 2023-04-10 DEVICE — SURGICEL NU-KNIT 6 X 9": Type: IMPLANTABLE DEVICE | Site: RIGHT | Status: FUNCTIONAL

## 2023-04-10 DEVICE — CLIP APPLIER ETHICON LIGACLIP 9 3/8" MEDIUM: Type: IMPLANTABLE DEVICE | Site: RIGHT | Status: FUNCTIONAL

## 2023-04-10 DEVICE — GRAFT VASC PROPATEN 6MMX60X80CM TW REMOV RING: Type: IMPLANTABLE DEVICE | Site: RIGHT | Status: FUNCTIONAL

## 2023-04-10 DEVICE — CLIP APPLIER COVIDIEN SURGICLIP II 9.75" MEDIUM: Type: IMPLANTABLE DEVICE | Site: RIGHT | Status: FUNCTIONAL

## 2023-04-10 DEVICE — CLIP APPLIER COVIDIEN SURGICLIP 13" LARGE: Type: IMPLANTABLE DEVICE | Site: RIGHT | Status: FUNCTIONAL

## 2023-04-10 DEVICE — SURGIFOAM PAD 8CM X 12.5CM X 10MM (100): Type: IMPLANTABLE DEVICE | Site: RIGHT | Status: FUNCTIONAL

## 2023-04-10 DEVICE — VISTASEAL FIBRIN HUMAN 4ML: Type: IMPLANTABLE DEVICE | Site: RIGHT | Status: FUNCTIONAL

## 2023-04-10 RX ORDER — DEXTROSE 50 % IN WATER 50 %
15 SYRINGE (ML) INTRAVENOUS ONCE
Refills: 0 | Status: DISCONTINUED | OUTPATIENT
Start: 2023-04-10 | End: 2023-04-14

## 2023-04-10 RX ORDER — CEFAZOLIN SODIUM 1 G
2000 VIAL (EA) INJECTION EVERY 8 HOURS
Refills: 0 | Status: DISCONTINUED | OUTPATIENT
Start: 2023-04-10 | End: 2023-04-14

## 2023-04-10 RX ORDER — OXYCODONE HYDROCHLORIDE 5 MG/1
5 TABLET ORAL EVERY 4 HOURS
Refills: 0 | Status: DISCONTINUED | OUTPATIENT
Start: 2023-04-10 | End: 2023-04-14

## 2023-04-10 RX ORDER — DEXTROSE 50 % IN WATER 50 %
12.5 SYRINGE (ML) INTRAVENOUS ONCE
Refills: 0 | Status: DISCONTINUED | OUTPATIENT
Start: 2023-04-10 | End: 2023-04-14

## 2023-04-10 RX ORDER — FENTANYL CITRATE 50 UG/ML
25 INJECTION INTRAVENOUS
Refills: 0 | Status: DISCONTINUED | OUTPATIENT
Start: 2023-04-10 | End: 2023-04-10

## 2023-04-10 RX ORDER — DEXTROSE 50 % IN WATER 50 %
25 SYRINGE (ML) INTRAVENOUS ONCE
Refills: 0 | Status: DISCONTINUED | OUTPATIENT
Start: 2023-04-10 | End: 2023-04-14

## 2023-04-10 RX ORDER — INSULIN LISPRO 100/ML
VIAL (ML) SUBCUTANEOUS
Refills: 0 | Status: DISCONTINUED | OUTPATIENT
Start: 2023-04-10 | End: 2023-04-14

## 2023-04-10 RX ORDER — ASPIRIN/CALCIUM CARB/MAGNESIUM 324 MG
81 TABLET ORAL DAILY
Refills: 0 | Status: DISCONTINUED | OUTPATIENT
Start: 2023-04-10 | End: 2023-04-14

## 2023-04-10 RX ORDER — SODIUM CHLORIDE 9 MG/ML
1000 INJECTION, SOLUTION INTRAVENOUS
Refills: 0 | Status: DISCONTINUED | OUTPATIENT
Start: 2023-04-10 | End: 2023-04-14

## 2023-04-10 RX ORDER — CEFAZOLIN SODIUM 1 G
VIAL (EA) INJECTION
Refills: 0 | Status: DISCONTINUED | OUTPATIENT
Start: 2023-04-10 | End: 2023-04-14

## 2023-04-10 RX ORDER — ACETAMINOPHEN 500 MG
1000 TABLET ORAL ONCE
Refills: 0 | Status: DISCONTINUED | OUTPATIENT
Start: 2023-04-10 | End: 2023-04-10

## 2023-04-10 RX ORDER — AMLODIPINE BESYLATE 2.5 MG/1
10 TABLET ORAL DAILY
Refills: 0 | Status: DISCONTINUED | OUTPATIENT
Start: 2023-04-10 | End: 2023-04-14

## 2023-04-10 RX ORDER — GLUCAGON INJECTION, SOLUTION 0.5 MG/.1ML
1 INJECTION, SOLUTION SUBCUTANEOUS ONCE
Refills: 0 | Status: DISCONTINUED | OUTPATIENT
Start: 2023-04-10 | End: 2023-04-14

## 2023-04-10 RX ORDER — LOSARTAN POTASSIUM 100 MG/1
25 TABLET, FILM COATED ORAL DAILY
Refills: 0 | Status: DISCONTINUED | OUTPATIENT
Start: 2023-04-10 | End: 2023-04-14

## 2023-04-10 RX ORDER — ONDANSETRON 8 MG/1
4 TABLET, FILM COATED ORAL ONCE
Refills: 0 | Status: DISCONTINUED | OUTPATIENT
Start: 2023-04-10 | End: 2023-04-10

## 2023-04-10 RX ORDER — CEFAZOLIN SODIUM 1 G
2000 VIAL (EA) INJECTION ONCE
Refills: 0 | Status: COMPLETED | OUTPATIENT
Start: 2023-04-10 | End: 2023-04-10

## 2023-04-10 RX ORDER — INSULIN LISPRO 100/ML
VIAL (ML) SUBCUTANEOUS AT BEDTIME
Refills: 0 | Status: DISCONTINUED | OUTPATIENT
Start: 2023-04-10 | End: 2023-04-14

## 2023-04-10 RX ORDER — OXYCODONE HYDROCHLORIDE 5 MG/1
10 TABLET ORAL EVERY 4 HOURS
Refills: 0 | Status: DISCONTINUED | OUTPATIENT
Start: 2023-04-10 | End: 2023-04-14

## 2023-04-10 RX ORDER — SODIUM CHLORIDE 9 MG/ML
1000 INJECTION, SOLUTION INTRAVENOUS
Refills: 0 | Status: DISCONTINUED | OUTPATIENT
Start: 2023-04-10 | End: 2023-04-10

## 2023-04-10 RX ORDER — ATORVASTATIN CALCIUM 80 MG/1
40 TABLET, FILM COATED ORAL AT BEDTIME
Refills: 0 | Status: DISCONTINUED | OUTPATIENT
Start: 2023-04-10 | End: 2023-04-14

## 2023-04-10 RX ORDER — HYDROMORPHONE HYDROCHLORIDE 2 MG/ML
1 INJECTION INTRAMUSCULAR; INTRAVENOUS; SUBCUTANEOUS
Refills: 0 | Status: DISCONTINUED | OUTPATIENT
Start: 2023-04-10 | End: 2023-04-10

## 2023-04-10 RX ADMIN — HYDROMORPHONE HYDROCHLORIDE 1 MILLIGRAM(S): 2 INJECTION INTRAMUSCULAR; INTRAVENOUS; SUBCUTANEOUS at 13:00

## 2023-04-10 RX ADMIN — OXYCODONE HYDROCHLORIDE 10 MILLIGRAM(S): 5 TABLET ORAL at 18:56

## 2023-04-10 RX ADMIN — SODIUM CHLORIDE 75 MILLILITER(S): 9 INJECTION, SOLUTION INTRAVENOUS at 14:50

## 2023-04-10 RX ADMIN — Medication 100 MILLIGRAM(S): at 15:36

## 2023-04-10 RX ADMIN — ATORVASTATIN CALCIUM 40 MILLIGRAM(S): 80 TABLET, FILM COATED ORAL at 21:51

## 2023-04-10 RX ADMIN — HYDROMORPHONE HYDROCHLORIDE 1 MILLIGRAM(S): 2 INJECTION INTRAMUSCULAR; INTRAVENOUS; SUBCUTANEOUS at 13:15

## 2023-04-10 RX ADMIN — Medication 4: at 21:51

## 2023-04-10 RX ADMIN — Medication 4: at 13:45

## 2023-04-10 NOTE — H&P ADULT - HISTORY OF PRESENT ILLNESS
Patient is a 72M with h/o T2DM with neuropathy, HTN presents with c/o pain, blistering and fluid drainage on R foot and worsening pain fr last 2 days. Denies any trauma, hitting foot. On and off he was having chills and fever for last 1 week.  He states that he has been experiencing bilateral calf pain about a month, pain on walking < 2 blocks, noticed R foot swelling and bleeding from the big toe about two days ago.    Seen by his PCP a month ago and at that time he didn't have problem except tingling and numbness. A year ago he had some problem with L foot and didn't require any intervention he added. (30 Mar 2023 10:55)

## 2023-04-10 NOTE — H&P ADULT - ASSESSMENT
72M with hx of DM and HTN with R hallux wound.     Plan:  - Admit to vascular with Dr. Mullins   - EYAD/PVR   - Appreciate podiatry recs  - Please document medical optimization for angiogram   - Cardiology consult for optimization for angiogram   - OR Sat for RLE angiogram, possible angioplasty possible stent   - Consent in chart   - Regular diet  - Continue IV abx   - DVT ppx     Plan discussed with Dr. Susanna Briggs PGY-2   Vascular Surgery  [id number]

## 2023-04-10 NOTE — BRIEF OPERATIVE NOTE - OPERATION/FINDINGS
RLE SFA to TP trunk bypass graft with ptfe  PT signal at end of case RLE SFA to PT bypass graft with ptfe  PT signal at end of case

## 2023-04-10 NOTE — PATIENT PROFILE ADULT - FALL HARM RISK - RISK INTERVENTIONS
Assistance OOB with selected safe patient handling equipment/Assistance with ambulation/Communicate Fall Risk and Risk Factors to all staff, patient, and family/Discuss with provider need for PT consult/Monitor gait and stability/Reinforce activity limits and safety measures with patient and family/Visual Cue: Yellow wristband/Bed in lowest position, wheels locked, appropriate side rails in place/Call bell, personal items and telephone in reach/Instruct patient to call for assistance before getting out of bed or chair/Non-slip footwear when patient is out of bed/[place] to call system/Physically safe environment - no spills, clutter or unnecessary equipment/Purposeful Proactive Rounding/Room/bathroom lighting operational, light cord in reach

## 2023-04-10 NOTE — CHART NOTE - NSCHARTNOTEFT_GEN_A_CORE
Surgery Post-Op Note    Pre-Op Dx: PAD    Procedure: RLE SFA to PT bypass graft with ptfe    Surgeon: Susanna    SUBJECTIVE:  Pt seen and examined at the bedside. Endorses pain in R foot, is thirsty    OBJECTIVE:  Vital Signs Last 24 Hrs  T(C): 36.5 (10 Apr 2023 12:45), Max: 36.9 (10 Apr 2023 06:06)  T(F): 97.7 (10 Apr 2023 12:45), Max: 98.4 (10 Apr 2023 06:06)  HR: 73 (10 Apr 2023 16:00) (71 - 85)  BP: 121/58 (10 Apr 2023 16:00) (115/58 - 159/73)  BP(mean): 84 (10 Apr 2023 16:00) (78 - 105)  RR: 16 (10 Apr 2023 16:00) (16 - 18)  SpO2: 95% (10 Apr 2023 16:00) (95% - 100%)    Parameters below as of 10 Apr 2023 16:00  Patient On (Oxygen Delivery Method): room air        Physical Exam:  General: NAD, resting comfortably in bed  Pulmonary: No respiratory distress  Incision: RLE incision C/D/I, aquacel dressing  Extremities: WWP, RLE AT, PT signals  : [place] to gravity    LABS:                        9.4    14.28 )-----------( 245      ( 10 Apr 2023 13:46 )             28.4     04-10    140  |  107  |  18  ----------------------------<  237<H>  4.4   |  21<L>  |  0.81    Ca    8.1<L>      10 Apr 2023 13:46        CAPILLARY BLOOD GLUCOSE      POCT Blood Glucose.: 210 mg/dL (10 Apr 2023 13:02)  POCT Blood Glucose.: 171 mg/dL (10 Apr 2023 06:00)        ABO Interpretation: B (04-10 @ 06:53)      IMAGING:    ASSESSMENT:72M 4hours s/p RLE SFA to PT bypass graft with PTFE    PLAN:  - home meds  - [place]  - pain control  - oob

## 2023-04-11 LAB
ANION GAP SERPL CALC-SCNC: 10 MMOL/L — SIGNIFICANT CHANGE UP (ref 5–17)
BUN SERPL-MCNC: 19 MG/DL — SIGNIFICANT CHANGE UP (ref 7–23)
CALCIUM SERPL-MCNC: 8.7 MG/DL — SIGNIFICANT CHANGE UP (ref 8.4–10.5)
CHLORIDE SERPL-SCNC: 103 MMOL/L — SIGNIFICANT CHANGE UP (ref 96–108)
CO2 SERPL-SCNC: 24 MMOL/L — SIGNIFICANT CHANGE UP (ref 22–31)
CREAT SERPL-MCNC: 0.85 MG/DL — SIGNIFICANT CHANGE UP (ref 0.5–1.3)
EGFR: 92 ML/MIN/1.73M2 — SIGNIFICANT CHANGE UP
GLUCOSE BLDC GLUCOMTR-MCNC: 186 MG/DL — HIGH (ref 70–99)
GLUCOSE BLDC GLUCOMTR-MCNC: 212 MG/DL — HIGH (ref 70–99)
GLUCOSE BLDC GLUCOMTR-MCNC: 256 MG/DL — HIGH (ref 70–99)
GLUCOSE BLDC GLUCOMTR-MCNC: 284 MG/DL — HIGH (ref 70–99)
GLUCOSE SERPL-MCNC: 184 MG/DL — HIGH (ref 70–99)
HCT VFR BLD CALC: 26.8 % — LOW (ref 39–50)
HGB BLD-MCNC: 8.6 G/DL — LOW (ref 13–17)
MCHC RBC-ENTMCNC: 30.2 PG — SIGNIFICANT CHANGE UP (ref 27–34)
MCHC RBC-ENTMCNC: 32.1 GM/DL — SIGNIFICANT CHANGE UP (ref 32–36)
MCV RBC AUTO: 94 FL — SIGNIFICANT CHANGE UP (ref 80–100)
NRBC # BLD: 0 /100 WBCS — SIGNIFICANT CHANGE UP (ref 0–0)
PLATELET # BLD AUTO: 216 K/UL — SIGNIFICANT CHANGE UP (ref 150–400)
POTASSIUM SERPL-MCNC: 4.1 MMOL/L — SIGNIFICANT CHANGE UP (ref 3.5–5.3)
POTASSIUM SERPL-SCNC: 4.1 MMOL/L — SIGNIFICANT CHANGE UP (ref 3.5–5.3)
RBC # BLD: 2.85 M/UL — LOW (ref 4.2–5.8)
RBC # FLD: 12.1 % — SIGNIFICANT CHANGE UP (ref 10.3–14.5)
SODIUM SERPL-SCNC: 137 MMOL/L — SIGNIFICANT CHANGE UP (ref 135–145)
WBC # BLD: 8.51 K/UL — SIGNIFICANT CHANGE UP (ref 3.8–10.5)
WBC # FLD AUTO: 8.51 K/UL — SIGNIFICANT CHANGE UP (ref 3.8–10.5)

## 2023-04-11 PROCEDURE — 99223 1ST HOSP IP/OBS HIGH 75: CPT

## 2023-04-11 PROCEDURE — 73630 X-RAY EXAM OF FOOT: CPT | Mod: 26,RT

## 2023-04-11 PROCEDURE — 99233 SBSQ HOSP IP/OBS HIGH 50: CPT

## 2023-04-11 PROCEDURE — 99232 SBSQ HOSP IP/OBS MODERATE 35: CPT

## 2023-04-11 RX ORDER — GABAPENTIN 400 MG/1
100 CAPSULE ORAL EVERY 8 HOURS
Refills: 0 | Status: DISCONTINUED | OUTPATIENT
Start: 2023-04-11 | End: 2023-04-12

## 2023-04-11 RX ORDER — AMLODIPINE BESYLATE 2.5 MG/1
10 TABLET ORAL DAILY
Refills: 0 | Status: DISCONTINUED | OUTPATIENT
Start: 2023-04-11 | End: 2023-04-11

## 2023-04-11 RX ORDER — ACETAMINOPHEN 500 MG
975 TABLET ORAL EVERY 8 HOURS
Refills: 0 | Status: DISCONTINUED | OUTPATIENT
Start: 2023-04-11 | End: 2023-04-14

## 2023-04-11 RX ORDER — HEPARIN SODIUM 5000 [USP'U]/ML
5000 INJECTION INTRAVENOUS; SUBCUTANEOUS EVERY 12 HOURS
Refills: 0 | Status: DISCONTINUED | OUTPATIENT
Start: 2023-04-11 | End: 2023-04-14

## 2023-04-11 RX ADMIN — OXYCODONE HYDROCHLORIDE 10 MILLIGRAM(S): 5 TABLET ORAL at 06:27

## 2023-04-11 RX ADMIN — Medication 100 MILLIGRAM(S): at 21:26

## 2023-04-11 RX ADMIN — Medication 975 MILLIGRAM(S): at 21:26

## 2023-04-11 RX ADMIN — LOSARTAN POTASSIUM 25 MILLIGRAM(S): 100 TABLET, FILM COATED ORAL at 05:27

## 2023-04-11 RX ADMIN — AMLODIPINE BESYLATE 10 MILLIGRAM(S): 2.5 TABLET ORAL at 05:27

## 2023-04-11 RX ADMIN — GABAPENTIN 100 MILLIGRAM(S): 400 CAPSULE ORAL at 21:26

## 2023-04-11 RX ADMIN — Medication 2: at 22:27

## 2023-04-11 RX ADMIN — ATORVASTATIN CALCIUM 40 MILLIGRAM(S): 80 TABLET, FILM COATED ORAL at 21:25

## 2023-04-11 RX ADMIN — OXYCODONE HYDROCHLORIDE 10 MILLIGRAM(S): 5 TABLET ORAL at 05:27

## 2023-04-11 RX ADMIN — OXYCODONE HYDROCHLORIDE 10 MILLIGRAM(S): 5 TABLET ORAL at 11:00

## 2023-04-11 RX ADMIN — Medication 100 MILLIGRAM(S): at 13:46

## 2023-04-11 RX ADMIN — Medication 100 MILLIGRAM(S): at 00:16

## 2023-04-11 RX ADMIN — HEPARIN SODIUM 5000 UNIT(S): 5000 INJECTION INTRAVENOUS; SUBCUTANEOUS at 18:35

## 2023-04-11 RX ADMIN — Medication 4: at 18:30

## 2023-04-11 RX ADMIN — OXYCODONE HYDROCHLORIDE 10 MILLIGRAM(S): 5 TABLET ORAL at 10:16

## 2023-04-11 RX ADMIN — Medication 100 MILLIGRAM(S): at 07:45

## 2023-04-11 RX ADMIN — Medication 81 MILLIGRAM(S): at 11:42

## 2023-04-11 RX ADMIN — Medication 6: at 13:44

## 2023-04-11 NOTE — PROGRESS NOTE ADULT - SUBJECTIVE AND OBJECTIVE BOX
VASCULAR SURGERY DAILY PROGRESS NOTE:       SUBJECTIVE/ROS: Patient seen and examined at bedside. Pain controlled. No acute events overnight.       MEDICATIONS  (STANDING):  amLODIPine   Tablet 10 milliGRAM(s) Oral daily  aspirin enteric coated 81 milliGRAM(s) Oral daily  atorvastatin 40 milliGRAM(s) Oral at bedtime  ceFAZolin   IVPB      ceFAZolin   IVPB 2000 milliGRAM(s) IV Intermittent every 8 hours  dextrose 5%. 1000 milliLiter(s) (100 mL/Hr) IV Continuous <Continuous>  dextrose 5%. 1000 milliLiter(s) (50 mL/Hr) IV Continuous <Continuous>  dextrose 50% Injectable 25 Gram(s) IV Push once  dextrose 50% Injectable 12.5 Gram(s) IV Push once  dextrose 50% Injectable 25 Gram(s) IV Push once  glucagon  Injectable 1 milliGRAM(s) IntraMuscular once  heparin   Injectable 5000 Unit(s) SubCutaneous every 12 hours  insulin lispro (ADMELOG) corrective regimen sliding scale   SubCutaneous three times a day before meals  insulin lispro (ADMELOG) corrective regimen sliding scale   SubCutaneous at bedtime  levoFLOXacin  Tablet 500 milliGRAM(s) Oral every 24 hours  losartan 25 milliGRAM(s) Oral daily    MEDICATIONS  (PRN):  dextrose Oral Gel 15 Gram(s) Oral once PRN Blood Glucose LESS THAN 70 milliGRAM(s)/deciliter  oxyCODONE    IR 5 milliGRAM(s) Oral every 4 hours PRN Moderate Pain (4 - 6)  oxyCODONE    IR 10 milliGRAM(s) Oral every 4 hours PRN Severe Pain (7 - 10)      OBJECTIVE:    Vital Signs Last 24 Hrs  T(C): 36.7 (11 Apr 2023 05:14), Max: 36.9 (10 Apr 2023 08:35)  T(F): 98.1 (11 Apr 2023 05:14), Max: 98.5 (10 Apr 2023 23:00)  HR: 87 (11 Apr 2023 05:14) (71 - 87)  BP: 128/61 (11 Apr 2023 05:14) (113/64 - 159/73)  BP(mean): 95 (10 Apr 2023 19:30) (78 - 105)  RR: 18 (11 Apr 2023 05:14) (16 - 18)  SpO2: 96% (11 Apr 2023 05:14) (95% - 100%)    Parameters below as of 11 Apr 2023 05:14  Patient On (Oxygen Delivery Method): room air      Physical Exam:  General: NAD, resting comfortably in bed  Pulmonary: No respiratory distress  Incision: RLE incision C/D/I, aquacel dressing  Extremities: WWP, RLE AT, PT signals  : [place] to gravity      I&O's Detail    10 Apr 2023 07:01  -  11 Apr 2023 07:00  --------------------------------------------------------  IN:    Lactated Ringers: 525 mL    Oral Fluid: 350 mL  Total IN: 875 mL    OUT:    Indwelling Catheter - Urethral (mL): 1160 mL  Total OUT: 1160 mL    Total NET: -285 mL          Daily Height in cm: 175.26 (10 Apr 2023 08:35)    Daily     LABS:                        9.4    14.28 )-----------( 245      ( 10 Apr 2023 13:46 )             28.4     04-10    140  |  107  |  18  ----------------------------<  237<H>  4.4   |  21<L>  |  0.81    Ca    8.1<L>      10 Apr 2023 13:46            Assessment/Plan: 72M s/p RLE SFA to PT bypass graft with PTFE on 4/10     PLAN:  - DVT ppx/ ASA  - f/u with ID regarding abx   - home meds  - discontinue cabello  - pain control prn   - Regular diet   - PT consult      Vascular p 0017

## 2023-04-11 NOTE — PHYSICAL THERAPY INITIAL EVALUATION ADULT - PERTINENT HX OF CURRENT PROBLEM, REHAB EVAL
72M with h/o T2DM with neuropathy, HTN presents with c/o pain, blistering and fluid drainage on R foot and worsening pain fr last 2 days. Denies any trauma, hitting foot. On and off he was having chills and fever for last 1 week. now s/p above procedure.

## 2023-04-11 NOTE — CONSULT NOTE ADULT - SUBJECTIVE AND OBJECTIVE BOX
HPI:  Patient is a 72 year old male with a PMHx of HTN, Type II DM associated with neuropathy whore presents to [facility] with a chief complaint of pain, blistering and fluid drainage on his right foot. Patient reports worsening pain and discomfort in his right lower extremity. Patient reports intermittent chills for the past week prior to his arrival. Patient reports that he has been experiencing bilateral calf pain for about one month associated with pain and discomfort with walking less than 2 blocks. Patient reports that he noticed right lower extremity edema and bleeding from his great toe about two days prior to arrival. Internal medicine has been consulted on Mr. Paredes's care for medical management and medical clearance. Patient is S/P cardiac cath on previous admission. Patient denies any chest pain, palpitations, SOB or dyspnea. Patient denies dyspnea on exertion or shortness of breath with ambulation or walking. Patient reports that his walking is limited by lower extremity pain. Denies history of HLD, MI, CVA, Cardiac arrythmia, PE or DVT in the past.       REVIEW OF SYSTEMS:    CONSTITUTIONAL: Generalized weakness S/P Bypass   EYES/ENT: No visual changes;  No vertigo or throat pain   NECK: No pain or stiffness  RESPIRATORY: No cough, wheezing, hemoptysis; No shortness of breath  CARDIOVASCULAR: No chest pain or palpitations  GASTROINTESTINAL: No abdominal or epigastric pain. No nausea, vomiting, or hematemesis; No diarrhea or constipation. No melena or hematochezia.  GENITOURINARY: No dysuria, frequency or hematuria  NEUROLOGICAL: No numbness or weakness  SKIN: No itching, burning, rashes, or lesions   MSK: S/P RLE Bypass; C/o LE Pain   All other review of systems is negative unless indicated above.      PAST MEDICAL & SURGICAL HISTORY:  DM (diabetes mellitus)    HTN (hypertension)    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    No significant past surgical history    MEDICATIONS  (STANDING):  amLODIPine   Tablet 10 milliGRAM(s) Oral daily  aspirin enteric coated 81 milliGRAM(s) Oral daily  atorvastatin 40 milliGRAM(s) Oral at bedtime  ceFAZolin   IVPB      ceFAZolin   IVPB 2000 milliGRAM(s) IV Intermittent every 8 hours  dextrose 5%. 1000 milliLiter(s) (50 mL/Hr) IV Continuous <Continuous>  dextrose 5%. 1000 milliLiter(s) (100 mL/Hr) IV Continuous <Continuous>  dextrose 50% Injectable 25 Gram(s) IV Push once  dextrose 50% Injectable 12.5 Gram(s) IV Push once  dextrose 50% Injectable 25 Gram(s) IV Push once  glucagon  Injectable 1 milliGRAM(s) IntraMuscular once  heparin   Injectable 5000 Unit(s) SubCutaneous every 12 hours  insulin lispro (ADMELOG) corrective regimen sliding scale   SubCutaneous three times a day before meals  insulin lispro (ADMELOG) corrective regimen sliding scale   SubCutaneous at bedtime  levoFLOXacin  Tablet 500 milliGRAM(s) Oral every 24 hours  losartan 25 milliGRAM(s) Oral daily        Allergies    No Known Allergies    Intolerances        Vital Signs Last 24 Hrs  T(C): 37.2 (11 Apr 2023 13:46), Max: 37.2 (11 Apr 2023 13:46)  T(F): 99 (11 Apr 2023 13:46), Max: 99 (11 Apr 2023 13:46)  HR: 85 (11 Apr 2023 13:46) (71 - 90)  BP: 129/72 (11 Apr 2023 13:46) (113/64 - 147/77)  BP(mean): 95 (10 Apr 2023 19:30) (78 - 96)  RR: 18 (11 Apr 2023 13:46) (16 - 18)  SpO2: 95% (11 Apr 2023 13:46) (94% - 100%)    Parameters below as of 11 Apr 2023 13:46  Patient On (Oxygen Delivery Method): room air        Appearance: Normal; OOB TC   HEENT:   Normal oral mucosa, Eyes are open 	  Lymphatic: No lymphadenopathy , no edema  Cardiovascular: Normal S1 S2, No JVD, No murmurs    Respiratory: Lungs clear to auscultation, normal effort 	  Gastrointestinal:  Soft, Non-tender   Skin: No rashes, No ecchymoses, No cyanosis, warm to touch  Musculoskeletal: Differed; S/P Bypass; Dressings in place   Psychiatry:  Mood & affect appropriate  Ext:  S/P Bypass; Dressings in place                              8.6    8.51  )-----------( 216      ( 11 Apr 2023 07:15 )             26.8               04-11    137  |  103  |  19  ----------------------------<  184<H>  4.1   |  24  |  0.85    Ca    8.7      11 Apr 2023 07:16                       ABG - ( 10 Apr 2023 11:02 )  pH, Arterial: 7.41  pH, Blood: x     /  pCO2: 34    /  pO2: 255   / HCO3: 22    / Base Excess: -2.6  /  SaO2: 98.9                    < from: Cardiac Catheterization (04.05.23 @ 10:37) >    Diagnostic Conclusions:     Moderate nonobstructive atherosclerosis in mLAD and pRCA with TIMI3  flow.  Recommendations:     Optimal medical management for CAD.      < end of copied text >

## 2023-04-11 NOTE — CONSULT NOTE ADULT - SUBJECTIVE AND OBJECTIVE BOX
Patient is a 72y old  Male who presents with a chief complaint of R toe infection (11 Apr 2023 07:52)      HPI:  Patient is a 72M with h/o T2DM with neuropathy, HTN presents with c/o pain, blistering and fluid drainage on R foot and worsening pain fr last 2 days. Denies any trauma, hitting foot. On and off he was having chills and fever for last 1 week.  He states that he has been experiencing bilateral calf pain about a month, pain on walking < 2 blocks, noticed R foot swelling and bleeding from the big toe about two days ago.    Seen by his PCP a month ago and at that time he didn't have problem except tingling and numbness. A year ago he had some problem with L foot and didn't require any intervention he added. (30 Mar 2023 10:55)   (10 Apr 2023 07:16)      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      No significant past surgical history          Social history:   Social History:    Marital Status:   Occupation:   Lives with:     Substance Use :  Tobacco Usage:  (   ) never smoked   (   ) former smoker   (   ) current smoker  (     ) pack year  (        ) last tobacco use date  Alcohol Usage:  Travel:  Pets:          FAMILY HISTORY:      REVIEW OF SYSTEMS  General:	Denies any malaise fatigue or chills. Fevers absent    Skin:No rash  	  Ophthalmologic:Denies any visual complaints,discharge redness or photophobia  	  ENMT:No nasal discharge,headache,sinus congestion or throat pain.No dental complaints    Respiratory and Thorax:No cough,sputum or chest pain.Denies shortness of breath  	  Cardiovascular:	No chest pain,palpitaions or dizziness    Gastrointestinal:	NO nausea,abdominal pain or diarrhea.    Genitourinary:	No dysuria,frequency. No flank pain    Musculoskeletal:	No joint swelling or pain.No weakness    Neurological:No confusion,diziness.No extremity weakness.No bladder or bowel incontinence	    Psychiatric:No delusions or hallucinations	    Hematology/Lymphatics:	No LN swelling.No gum bleeding     Endocrine:	No recent weight gain or loss.No abnormal heat/cold intolerance    Allergic/Immunologic:	No hives or rash     Allergies    No Known Allergies    Intolerances        Antimicrobials:       MEDICATIONS  (prior antimicrobials ):    ceFAZolin   IVPB   100 mL/Hr IV Intermittent (04-10-23 @ 15:36)    ceFAZolin   IVPB   100 mL/Hr IV Intermittent (04-11-23 @ 07:45)   100 mL/Hr IV Intermittent (04-11-23 @ 00:16)    levoFLOXacin  Tablet   500 milliGRAM(s) Oral (04-10-23 @ 21:51)             ceFAZolin   IVPB      ceFAZolin   IVPB 2000 milliGRAM(s) IV Intermittent every 8 hours  levoFLOXacin  Tablet 500 milliGRAM(s) Oral every 24 hours      MEDICATIONS  (STANDING):  amLODIPine   Tablet 10 milliGRAM(s) Oral daily  aspirin enteric coated 81 milliGRAM(s) Oral daily  atorvastatin 40 milliGRAM(s) Oral at bedtime  ceFAZolin   IVPB      ceFAZolin   IVPB 2000 milliGRAM(s) IV Intermittent every 8 hours  dextrose 5%. 1000 milliLiter(s) (50 mL/Hr) IV Continuous <Continuous>  dextrose 5%. 1000 milliLiter(s) (100 mL/Hr) IV Continuous <Continuous>  dextrose 50% Injectable 25 Gram(s) IV Push once  dextrose 50% Injectable 12.5 Gram(s) IV Push once  dextrose 50% Injectable 25 Gram(s) IV Push once  glucagon  Injectable 1 milliGRAM(s) IntraMuscular once  heparin   Injectable 5000 Unit(s) SubCutaneous every 12 hours  insulin lispro (ADMELOG) corrective regimen sliding scale   SubCutaneous three times a day before meals  insulin lispro (ADMELOG) corrective regimen sliding scale   SubCutaneous at bedtime  levoFLOXacin  Tablet 500 milliGRAM(s) Oral every 24 hours  losartan 25 milliGRAM(s) Oral daily    MEDICATIONS  (PRN):  dextrose Oral Gel 15 Gram(s) Oral once PRN Blood Glucose LESS THAN 70 milliGRAM(s)/deciliter  oxyCODONE    IR 5 milliGRAM(s) Oral every 4 hours PRN Moderate Pain (4 - 6)  oxyCODONE    IR 10 milliGRAM(s) Oral every 4 hours PRN Severe Pain (7 - 10)        Vital Signs Last 24 Hrs  T(C): 36.8 (11 Apr 2023 10:50), Max: 36.9 (10 Apr 2023 23:00)  T(F): 98.3 (11 Apr 2023 10:50), Max: 98.5 (10 Apr 2023 23:00)  HR: 90 (11 Apr 2023 10:50) (71 - 90)  BP: 117/69 (11 Apr 2023 10:50) (113/64 - 159/73)  BP(mean): 95 (10 Apr 2023 19:30) (78 - 105)  RR: 18 (11 Apr 2023 10:50) (16 - 18)  SpO2: 94% (11 Apr 2023 10:50) (94% - 100%)    Parameters below as of 11 Apr 2023 10:50  Patient On (Oxygen Delivery Method): room air        PHYSICAL EXAM:Pleasant patient in no acute distress.      Constitutional:Comfortable.Awake and alert  No cachexia     Eyes:PERRL EOMI.NO discharge or conjunctival injection    ENMT:No sinus tenderness.No thrush.No pharyngeal exudate or erythema.Fair dental hygiene    Neck:Supple,No LN,no JVD      Respiratory:Good air entry bilaterally,CTA    Cardiovascular:S1 S2 wnl, No murmurs,rub or gallops    Gastrointestinal:Soft BS(+) no tenderness no masses ,No rebound or guarding    Genitourinary:No CVA tendereness     Rectal:    Extremities:No cyanosis,clubbing or edema.    Vascular:peripheral pulses felt    Neurological:AAO X 3,No grossly focal deficits    Skin:No rash     Lymph Nodes:No palpable LNs    Musculoskeletal:No joint swelling or LOM    Psychiatric:Affect normal.                                8.6    8.51  )-----------( 216      ( 11 Apr 2023 07:15 )             26.8         04-11    137  |  103  |  19  ----------------------------<  184<H>  4.1   |  24  |  0.85    Ca    8.7      11 Apr 2023 07:16                RECENT CULTURES:      MICROBIOLOGY:    MRSA/MSSA PCR (03.31.23 @ 15:34)    MRSA PCR Result.: NotDetec: The results of this test should be interpreted with consideration of  clinical context.  Not Detected result indicates the absence of organisms or that the number  of organisms is below the assay limit of detection.  Detected result indicates the presence of organism nucleic acid.  Indeterminate result may indicate the presence of amplification  inhibitors in specimen; presence or absence of organisms cannot be  determined. Consider collecting new specimen if further testing is still  needed.  This qualitative PCR assay is FDA-approved, and its performance was  established by [facility], [place], [place].   Staph aureus PCR Result: Detected    Culture - Other (03.30.23 @ 06:35)    -  Amikacin: S <=16   -  Amikacin: S <=16   -  Amoxicillin/Clavulanic Acid: R >16/8   -  Ampicillin: R <=8 These ampicillin results predict results for amoxicillin   -  Ampicillin/Sulbactam: R <=4/2 Enterobacter, Klebsiella aerogenes, Citrobacter, and Serratia may develop resistance during prolonged therapy (3-4 days)   -  Ampicillin/Sulbactam: S <=8/4   -  Aztreonam: S <=4   -  Aztreonam: S <=4   -  Cefazolin: R >16 Enterobacter, Klebsiella aerogenes, Citrobacter, and Serratia may develop resistance during prolonged therapy (3-4 days)   -  Cefazolin: S <=4   -  Cefepime: S <=2   -  Cefepime: S <=2   -  Cefoxitin: R >16   -  Ceftriaxone: S <=1 Enterobacter, Klebsiella aerogenes, Citrobacter, and Serratia may develop resistance during prolonged therapy   -  Ceftriaxone: S <=1   -  Ciprofloxacin: S <=0.25   -  Ciprofloxacin: S <=0.25   -  Clindamycin: S <=0.25   -  Ertapenem: S <=0.5   -  Erythromycin: S <=0.25   -  Gentamicin: S <=1 Should not be used as monotherapy   -  Gentamicin: R >8   -  Gentamicin: S <=2   -  Imipenem: S <=1   -  Levofloxacin: S <=0.5   -  Levofloxacin: S <=0.5   -  Meropenem: S <=1   -  Meropenem: S <=1   -  Oxacillin: S <=0.25   -  Piperacillin/Tazobactam: S <=8   -  Piperacillin/Tazobactam: S <=8   -  Rifampin: S <=1 Should not be used as monotherapy   -  Tetracycline: S <=1   -  Tobramycin: I 8   -  Tobramycin: S <=2   -  Trimethoprim/Sulfamethoxazole: S <=0.5/9.5   -  Trimethoprim/Sulfamethoxazole: S <=0.5/9.5   -  Trimethoprim/Sulfamethoxazole: S <=0.5/9.5   -  Vancomycin: S 1   Specimen Source: .Other Right foot   Culture Results:   Rare Enterobacter cloacae complex  Numerous Staphylococcus lugdunensis  Rare Delftia acidovorans group  Normal skin narcisa isolated   Organism Identification: Enterobacter cloacae complex  Staphylococcus lugdunensis  Delftia acidovorans group   Organism: Enterobacter cloacae complex   Organism: Staphylococcus lugdunensis   Organism: Delftia acidovorans group   Method Type: AMI   Method Type: AMI   Method Type: AMI          Radiology:             Patient is a 72y old  Male who presents with a chief complaint of R toe infection (2023 07:52)      HPI:  Patient is a 72M with h/o T2DM with neuropathy, HTN presents with c/o pain, blistering and fluid drainage on R foot and worsening pain fr last 2 days. Denies any trauma, hitting foot. On and off he was having chills and fever for last 1 week.  He states that he has been experiencing bilateral calf pain about a month, pain on walking < 2 blocks, noticed R foot swelling and bleeding from the big toe about two days ago.    Seen by his PCP a month ago and at that time he didn't have problem except tingling and numbness. A year ago he had some problem with L foot and didn't require any intervention he added. (30 Mar 2023 10:55)   (10 Apr 2023 07:16)    Pt s/p hospitalization  -  when he was seen by colleague. R foot hallux circumferential wound to subq with granular base and scant areas of partial thickness necrosis, no purulence or malodor, erythema extends to midfoot without fluctuance. R foot Xray: No OM, no tracking soft tissue air. On , s/p RLE diagnostic Angiogram, showing single vessel runoff via the posterior tibial artery which is occluded proximally and reconstitutes via collaterals off the popliteal artery. Plan for right superficial femoral artery to posterior tibial artery bypass Monday. s/p Card cath on . Pt was sent home on Keflex and cipro in anticipation for readmission for the bypass procedure. Pt underwent RLE SFA to PT bypass graft with PTFE yesterday.  ID is asked to evaluate     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      No significant past surgical history          Social history:   Marital Status:   Occupation:   Lives with:  wife    Substance Use : denies  Tobacco Usage:  ( x  ) never smoked   (   ) former smoker   (   ) current smoker  (     ) pack year  (        ) last tobacco use date  h/o second hand smoke  Alcohol Usage: denies  Travel: from [facility]  Pets: parrot and dog          FAMILY HISTORY:  sister with diabetes (); mother  age 91 of "old age", father  of a stroke in his 60s    REVIEW OF SYSTEMS  General:	Denies any malaise fatigue or chills. Fevers absent    Skin:No rash  	  Ophthalmologic:Denies any visual complaints,discharge redness or photophobia  	  ENMT:No nasal discharge,headache,sinus congestion or throat pain.No dental complaints    Respiratory and Thorax:No cough,sputum or chest pain.Denies shortness of breath  	  Cardiovascular:	No chest pain,palpitaions or dizziness    Gastrointestinal:	NO nausea,abdominal pain or diarrhea.    Genitourinary:	No dysuria,frequency. No flank pain    Musculoskeletal:	 toes as above     Neurological:No confusion,diziness.No extremity weakness.No bladder or bowel incontinence	    Psychiatric:No delusions or hallucinations	    Hematology/Lymphatics:	No LN swelling.No gum bleeding     Endocrine:	No recent weight gain or loss.No abnormal heat/cold intolerance    Allergic/Immunologic:	No hives or rash     Allergies    No Known Allergies    Intolerances        Antimicrobials:       MEDICATIONS  (prior antimicrobials ):    ceFAZolin   IVPB   100 mL/Hr IV Intermittent (04-10-23 @ 15:36)    ceFAZolin   IVPB   100 mL/Hr IV Intermittent (23 @ 07:45)   100 mL/Hr IV Intermittent (23 @ 00:16)    levoFLOXacin  Tablet   500 milliGRAM(s) Oral (04-10-23 @ 21:51)             ceFAZolin   IVPB      ceFAZolin   IVPB 2000 milliGRAM(s) IV Intermittent every 8 hours  levoFLOXacin  Tablet 500 milliGRAM(s) Oral every 24 hours      MEDICATIONS  (STANDING):  amLODIPine   Tablet 10 milliGRAM(s) Oral daily  aspirin enteric coated 81 milliGRAM(s) Oral daily  atorvastatin 40 milliGRAM(s) Oral at bedtime  ceFAZolin   IVPB      ceFAZolin   IVPB 2000 milliGRAM(s) IV Intermittent every 8 hours  dextrose 5%. 1000 milliLiter(s) (50 mL/Hr) IV Continuous <Continuous>  dextrose 5%. 1000 milliLiter(s) (100 mL/Hr) IV Continuous <Continuous>  dextrose 50% Injectable 25 Gram(s) IV Push once  dextrose 50% Injectable 12.5 Gram(s) IV Push once  dextrose 50% Injectable 25 Gram(s) IV Push once  glucagon  Injectable 1 milliGRAM(s) IntraMuscular once  heparin   Injectable 5000 Unit(s) SubCutaneous every 12 hours  insulin lispro (ADMELOG) corrective regimen sliding scale   SubCutaneous three times a day before meals  insulin lispro (ADMELOG) corrective regimen sliding scale   SubCutaneous at bedtime  levoFLOXacin  Tablet 500 milliGRAM(s) Oral every 24 hours  losartan 25 milliGRAM(s) Oral daily    MEDICATIONS  (PRN):  dextrose Oral Gel 15 Gram(s) Oral once PRN Blood Glucose LESS THAN 70 milliGRAM(s)/deciliter  oxyCODONE    IR 5 milliGRAM(s) Oral every 4 hours PRN Moderate Pain (4 - 6)  oxyCODONE    IR 10 milliGRAM(s) Oral every 4 hours PRN Severe Pain (7 - 10)        Vital Signs Last 24 Hrs  T(C): 36.8 (2023 10:50), Max: 36.9 (10 Apr 2023 23:00)  T(F): 98.3 (2023 10:50), Max: 98.5 (10 Apr 2023 23:00)  HR: 90 (2023 10:50) (71 - 90)  BP: 117/69 (2023 10:50) (113/64 - 159/73)  BP(mean): 95 (10 Apr 2023 19:30) (78 - 105)  RR: 18 (2023 10:50) (16 - 18)  SpO2: 94% (2023 10:50) (94% - 100%)    Parameters below as of 2023 10:50  Patient On (Oxygen Delivery Method): room air        PHYSICAL EXAM:Pleasant patient in no acute distress.      Constitutional:Comfortable.Awake and alert  No cachexia     Eyes:PERRL EOMI.NO discharge or conjunctival injection    ENMT:No sinus tenderness.No thrush.No pharyngeal exudate or erythema.Fair dental hygiene    Neck:Supple,No LN,no JVD      Respiratory:Good air entry bilaterally,CTA    Cardiovascular:S1 S2   Gastrointestinal:Soft BS(+) no tenderness     Extremities: RLE wound dressed   toe inspected necrotic tip discolored  nail bed damaged .    Vascular:peripheral pulses felt    Neurological:AAO X 3,No grossly focal deficits    Skin:No rash     Lymph Nodes:No palpable LNs    Musculoskeletal:No joint swelling or LOM    Psychiatric:Affect normal.                                8.6    8.51  )-----------( 216      ( 2023 07:15 )             26.8         04-11    137  |  103  |  19  ----------------------------<  184<H>  4.1   |  24  |  0.85    Ca    8.7      2023 07:16                RECENT CULTURES:      MICROBIOLOGY:    MRSA/MSSA PCR (23 @ 15:34)    MRSA PCR Result.: NotDetec: The results of this test should be interpreted with consideration of  clinical context.  Not Detected result indicates the absence of organisms or that the number  of organisms is below the assay limit of detection.  Detected result indicates the presence of organism nucleic acid.  Indeterminate result may indicate the presence of amplification  inhibitors in specimen; presence or absence of organisms cannot be  determined. Consider collecting new specimen if further testing is still  needed.  This qualitative PCR assay is FDA-approved, and its performance was  established by [facility], [place], [place].   Staph aureus PCR Result: Detected    Culture - Other (23 @ 06:35)    -  Amikacin: S <=16   -  Amikacin: S <=16   -  Amoxicillin/Clavulanic Acid: R >16/8   -  Ampicillin: R <=8 These ampicillin results predict results for amoxicillin   -  Ampicillin/Sulbactam: R <=4/2 Enterobacter, Klebsiella aerogenes, Citrobacter, and Serratia may develop resistance during prolonged therapy (3-4 days)   -  Ampicillin/Sulbactam: S <=8/4   -  Aztreonam: S <=4   -  Aztreonam: S <=4   -  Cefazolin: R >16 Enterobacter, Klebsiella aerogenes, Citrobacter, and Serratia may develop resistance during prolonged therapy (3-4 days)   -  Cefazolin: S <=4   -  Cefepime: S <=2   -  Cefepime: S <=2   -  Cefoxitin: R >16   -  Ceftriaxone: S <=1 Enterobacter, Klebsiella aerogenes, Citrobacter, and Serratia may develop resistance during prolonged therapy   -  Ceftriaxone: S <=1   -  Ciprofloxacin: S <=0.25   -  Ciprofloxacin: S <=0.25   -  Clindamycin: S <=0.25   -  Ertapenem: S <=0.5   -  Erythromycin: S <=0.25   -  Gentamicin: S <=1 Should not be used as monotherapy   -  Gentamicin: R >8   -  Gentamicin: S <=2   -  Imipenem: S <=1   -  Levofloxacin: S <=0.5   -  Levofloxacin: S <=0.5   -  Meropenem: S <=1   -  Meropenem: S <=1   -  Oxacillin: S <=0.25   -  Piperacillin/Tazobactam: S <=8   -  Piperacillin/Tazobactam: S <=8   -  Rifampin: S <=1 Should not be used as monotherapy   -  Tetracycline: S <=1   -  Tobramycin: I 8   -  Tobramycin: S <=2   -  Trimethoprim/Sulfamethoxazole: S <=0.5/9.5   -  Trimethoprim/Sulfamethoxazole: S <=0.5/9.5   -  Trimethoprim/Sulfamethoxazole: S <=0.5/9.5   -  Vancomycin: S 1   Specimen Source: .Other Right foot   Culture Results:   Rare Enterobacter cloacae complex  Numerous Staphylococcus lugdunensis  Rare Delftia acidovorans group  Normal skin narcisa isolated   Organism Identification: Enterobacter cloacae complex  Staphylococcus lugdunensis  Delftia acidovorans group   Organism: Enterobacter cloacae complex   Organism: Staphylococcus lugdunensis   Organism: Delftia acidovorans group   Method Type: AMI   Method Type: AMI   Method Type: AMI          Radiology:      < from: Xray Foot AP + Lateral + Oblique, Right (23 @ 03:53) >    IMPRESSION:    Evaluation limited due to the patient's overlying sock.    No acute displaced fracture or dislocation. Osteoarthritic changes   throughout the forefoot. No cortical erosion or periostitis to suggest   osteomyelitis. Vascular calcifications noted.    --- End of Report ---      < end of copied text >

## 2023-04-11 NOTE — PHYSICAL THERAPY INITIAL EVALUATION ADULT - ADDITIONAL COMMENTS
as per pt, resides in a  with spouse, +2 stairs to enter, one flight up to bedroom, PTA, pt amb (I), (I) with ADLs. able to amb one block outdoor

## 2023-04-11 NOTE — CONSULT NOTE ADULT - ASSESSMENT
ASSESSMENT  - Pt was seen and evaluated.   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 No open wounds or signs of infection to the left foot.   - No culture taken secondary to no signs of infection   - Pt is s/p RLE SFA to PT bypass graft with PT signal w/ Dr. Mullins on 4/10  - Lengthy discussion bedside with the patient regarding risk-benefit of surgical procedure of right foot partial first ray resection. At this time, pt would like to discuss options with his family.   - Pod Plan: Local wound care versus possible right foot partial hallux amputation pending patient decision   - Please document medical AND cardiac clearance for surgical intervention under local anesthesia and light IV sedation  - Discused with attending.  
Patient is a 72 year old male with a PMHx of HTN, Type II DM associated with neuropathy whore presents to [facility] with a chief complaint of pain, blistering and fluid drainage on his right foot. Patient reports worsening pain and discomfort in his right lower extremity. Patient reports intermittent chills for the past week prior to his arrival. Patient reports that he has been experiencing bilateral calf pain for about one month associated with pain and discomfort with walking less than 2 blocks. Patient reports that he noticed right lower extremity edema and bleeding from his great toe about two days prior to arrival. Internal medicine has been consulted on Mr. Paredes's care for medical management and medical clearance. Patient is S/P cardiac cath on previous admission. Patient denies any chest pain, palpitations, SOB or dyspnea. Patient denies dyspnea on exertion or shortness of breath with ambulation or walking. Patient reports that his walking is limited by lower extremity pain. Denies history of HLD, MI, CVA, Cardiac arrythmia, PE or DVT in the past.     Peripheral Arterial Disease, Neuropathy, Right Toe Wound   - LE X-Ray -- Without acute displaced Fx or Dislocation, Osteoarthritic changes, Neg for OM   - EYAD consistent w/ small vessel disease in feet   - LE Duplex -- Neg for DVT   - On ASA 81     - Seen by podiatry on previous admission   - Care per vascular appreciated -- S/P Angio, S/P bypass  with RLE SFA bypass graft   - LE Duplex as noted, per vascular   - Stress test Abnormal. F/u cardio recs --> S/P cardiac cath on previous admission -- Moderate non-obstructive atherosclerosis, on Med management with ASA and Lipitor    - ABX as per ID     R Toe Infection   - 03/30 BCx2 Neg  final   - Previous  wound culture -- Multiple organisms   - On Ancef and Levofloxacin, ABX as per ID   - Cont to monitor CBC, Temp curve, VS and patient closely    Anemia  - Monitor and trend levels  - Previous admission Iron panel WNL and Ferritin elevated  - Trend Hgb closely   - Transfuse for Hgb < 8.0 in view of CAD     HTN  - C/w Norvasc and Losartan 25     - Monitor BP, VS and patient closely    DM  - A1C 8.4   - C/w Sliding scale   - Monitor glucose levels     Pre-OP Risk Stratification   - TTE w/ EF of 61%, Normal RV function, Mild LVH   - Stress test abnormal. S/P cath, non-obstructive CAD     PPX  - Hep 5K Q12   
Patient is a 72M with h/o T2DM with neuropathy, HTN presents with c/o pain, blistering and fluid drainage on R foot and worsening pain fr last 2 days. Denies any trauma, hitting foot. On and off he was having chills and fever for last 1 week.  He states that he has been experiencing bilateral calf pain about a month, pain on walking < 2 blocks, noticed R foot swelling and bleeding from the big toe about two days ago.    Seen by his PCP a month ago and at that time he didn't have problem except tingling and numbness. A year ago he had some problem with L foot and didn't require any intervention he added. (30 Mar 2023 10:55)   (10 Apr 2023 07:16)    Pt s/p hospitalization march 30th - APril 6th when he was seen by colleague. R foot hallux circumferential wound to subq with granular base and scant areas of partial thickness necrosis, no purulence or malodor, erythema extends to midfoot without fluctuance. R foot Xray: No OM, no tracking soft tissue air. On 4/1, s/p RLE diagnostic Angiogram, showing single vessel runoff via the posterior tibial artery which is occluded proximally and reconstitutes via collaterals off the popliteal artery. Plan for right superficial femoral artery to posterior tibial artery bypass Monday. s/p Card cath on 4/5. Cultures grew Enterobacter cloacae complex, Numerous Staphylococcus lugdunensis, and  rare Delftia acidovorans group Pt was sent home on Keflex and cipro in anticipation for readmission for the bypass procedure. Pt underwent RLE SFA to PT bypass graft with PTFE yesterday.  ID is asked to evaluate     A/P  Pt with previous xray without cortical erosion. pt with necrotic tip of toe.   Suggest podiatry input to r/o osteo, would re-image, especially now with revascularization, our best shot at healing if infected or surgery if necesary  continue current abs for now externally no - active signs of infection, no open wounds, no drainage, no malodor. 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 ,   please reach via teams   If no answer, or after 5PM/ weekends,  then please call  [phone]    Assessment and plan discussed with the vascular resident

## 2023-04-11 NOTE — CONSULT NOTE ADULT - SUBJECTIVE AND OBJECTIVE BOX
Patient is a 72y old  Male who presents with a chief complaint of R toe infection (11 Apr 2023 14:08)      HPI:  Patient is a 72M with h/o T2DM with neuropathy, HTN presents with c/o pain, blistering and fluid drainage on R foot and worsening pain fr last 2 days. Denies any trauma, hitting foot. On and off he was having chills and fever for last 1 week.  He states that he has been experiencing bilateral calf pain about a month, pain on walking < 2 blocks, noticed R foot swelling and bleeding from the big toe about two days ago.    Seen by his PCP a month ago and at that time he didn't have problem except tingling and numbness. A year ago he had some problem with L foot and didn't require any intervention he added. (30 Mar 2023 10:55)   (10 Apr 2023 07:16)      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      No significant past surgical history          MEDICATIONS  (STANDING):  amLODIPine   Tablet 10 milliGRAM(s) Oral daily  aspirin enteric coated 81 milliGRAM(s) Oral daily  atorvastatin 40 milliGRAM(s) Oral at bedtime  ceFAZolin   IVPB      ceFAZolin   IVPB 2000 milliGRAM(s) IV Intermittent every 8 hours  dextrose 5%. 1000 milliLiter(s) (50 mL/Hr) IV Continuous <Continuous>  dextrose 5%. 1000 milliLiter(s) (100 mL/Hr) IV Continuous <Continuous>  dextrose 50% Injectable 25 Gram(s) IV Push once  dextrose 50% Injectable 12.5 Gram(s) IV Push once  dextrose 50% Injectable 25 Gram(s) IV Push once  glucagon  Injectable 1 milliGRAM(s) IntraMuscular once  heparin   Injectable 5000 Unit(s) SubCutaneous every 12 hours  insulin lispro (ADMELOG) corrective regimen sliding scale   SubCutaneous three times a day before meals  insulin lispro (ADMELOG) corrective regimen sliding scale   SubCutaneous at bedtime  levoFLOXacin  Tablet 500 milliGRAM(s) Oral every 24 hours  losartan 25 milliGRAM(s) Oral daily    MEDICATIONS  (PRN):  dextrose Oral Gel 15 Gram(s) Oral once PRN Blood Glucose LESS THAN 70 milliGRAM(s)/deciliter  oxyCODONE    IR 5 milliGRAM(s) Oral every 4 hours PRN Moderate Pain (4 - 6)  oxyCODONE    IR 10 milliGRAM(s) Oral every 4 hours PRN Severe Pain (7 - 10)      Allergies    No Known Allergies    Intolerances        VITALS:    Vital Signs Last 24 Hrs  T(C): 37.2 (11 Apr 2023 13:46), Max: 37.2 (11 Apr 2023 13:46)  T(F): 99 (11 Apr 2023 13:46), Max: 99 (11 Apr 2023 13:46)  HR: 85 (11 Apr 2023 13:46) (72 - 90)  BP: 129/72 (11 Apr 2023 13:46) (113/64 - 147/77)  BP(mean): 95 (10 Apr 2023 19:30) (83 - 96)  RR: 18 (11 Apr 2023 13:46) (16 - 18)  SpO2: 95% (11 Apr 2023 13:46) (94% - 100%)    Parameters below as of 11 Apr 2023 13:46  Patient On (Oxygen Delivery Method): room air        LABS:                          8.6    8.51  )-----------( 216      ( 11 Apr 2023 07:15 )             26.8       04-11    137  |  103  |  19  ----------------------------<  184<H>  4.1   |  24  |  0.85    Ca    8.7      11 Apr 2023 07:16        CAPILLARY BLOOD GLUCOSE      POCT Blood Glucose.: 284 mg/dL (11 Apr 2023 13:36)  POCT Blood Glucose.: 186 mg/dL (11 Apr 2023 08:58)  POCT Blood Glucose.: 347 mg/dL (10 Apr 2023 21:46)          LOWER EXTREMITY PHYSICAL EXAM:  Vascular: DP/PT 0/4, B/L, CFT <3 seconds B/L x9, Temperature gradient warm to cool, B/L.   Neuro: Epicritic sensation decreased to the level of digits, B/L.  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 No open wounds or signs of infection to the left foot.       RADIOLOGY & ADDITIONAL STUDIES:

## 2023-04-11 NOTE — PHYSICAL THERAPY INITIAL EVALUATION ADULT - NSPTDISCHREC_GEN_A_CORE
pt preferred home, if home, Home with Home PT for strengthening, bed mob, transfer, gait and balance training, home with assist for ADls, amb with rolling walker, shower chair/Sub-acute Rehab

## 2023-04-12 LAB
ANION GAP SERPL CALC-SCNC: 11 MMOL/L — SIGNIFICANT CHANGE UP (ref 5–17)
BUN SERPL-MCNC: 23 MG/DL — SIGNIFICANT CHANGE UP (ref 7–23)
CALCIUM SERPL-MCNC: 8.9 MG/DL — SIGNIFICANT CHANGE UP (ref 8.4–10.5)
CHLORIDE SERPL-SCNC: 101 MMOL/L — SIGNIFICANT CHANGE UP (ref 96–108)
CO2 SERPL-SCNC: 24 MMOL/L — SIGNIFICANT CHANGE UP (ref 22–31)
CREAT SERPL-MCNC: 0.97 MG/DL — SIGNIFICANT CHANGE UP (ref 0.5–1.3)
EGFR: 83 ML/MIN/1.73M2 — SIGNIFICANT CHANGE UP
GLUCOSE BLDC GLUCOMTR-MCNC: 183 MG/DL — HIGH (ref 70–99)
GLUCOSE BLDC GLUCOMTR-MCNC: 229 MG/DL — HIGH (ref 70–99)
GLUCOSE BLDC GLUCOMTR-MCNC: 264 MG/DL — HIGH (ref 70–99)
GLUCOSE BLDC GLUCOMTR-MCNC: 283 MG/DL — HIGH (ref 70–99)
GLUCOSE SERPL-MCNC: 184 MG/DL — HIGH (ref 70–99)
HCT VFR BLD CALC: 26.4 % — LOW (ref 39–50)
HGB BLD-MCNC: 8.6 G/DL — LOW (ref 13–17)
MAGNESIUM SERPL-MCNC: 2.1 MG/DL — SIGNIFICANT CHANGE UP (ref 1.6–2.6)
MCHC RBC-ENTMCNC: 30.6 PG — SIGNIFICANT CHANGE UP (ref 27–34)
MCHC RBC-ENTMCNC: 32.6 GM/DL — SIGNIFICANT CHANGE UP (ref 32–36)
MCV RBC AUTO: 94 FL — SIGNIFICANT CHANGE UP (ref 80–100)
NRBC # BLD: 0 /100 WBCS — SIGNIFICANT CHANGE UP (ref 0–0)
PHOSPHATE SERPL-MCNC: 2.8 MG/DL — SIGNIFICANT CHANGE UP (ref 2.5–4.5)
PLATELET # BLD AUTO: 199 K/UL — SIGNIFICANT CHANGE UP (ref 150–400)
POTASSIUM SERPL-MCNC: 4 MMOL/L — SIGNIFICANT CHANGE UP (ref 3.5–5.3)
POTASSIUM SERPL-SCNC: 4 MMOL/L — SIGNIFICANT CHANGE UP (ref 3.5–5.3)
RBC # BLD: 2.81 M/UL — LOW (ref 4.2–5.8)
RBC # FLD: 12.2 % — SIGNIFICANT CHANGE UP (ref 10.3–14.5)
SODIUM SERPL-SCNC: 136 MMOL/L — SIGNIFICANT CHANGE UP (ref 135–145)
WBC # BLD: 10.82 K/UL — HIGH (ref 3.8–10.5)
WBC # FLD AUTO: 10.82 K/UL — HIGH (ref 3.8–10.5)

## 2023-04-12 PROCEDURE — 99232 SBSQ HOSP IP/OBS MODERATE 35: CPT

## 2023-04-12 PROCEDURE — 71045 X-RAY EXAM CHEST 1 VIEW: CPT | Mod: 26

## 2023-04-12 RX ORDER — POLYETHYLENE GLYCOL 3350 17 G/17G
17 POWDER, FOR SOLUTION ORAL DAILY
Refills: 0 | Status: DISCONTINUED | OUTPATIENT
Start: 2023-04-12 | End: 2023-04-14

## 2023-04-12 RX ORDER — SENNA PLUS 8.6 MG/1
2 TABLET ORAL AT BEDTIME
Refills: 0 | Status: DISCONTINUED | OUTPATIENT
Start: 2023-04-12 | End: 2023-04-14

## 2023-04-12 RX ORDER — INSULIN GLARGINE 100 [IU]/ML
6 INJECTION, SOLUTION SUBCUTANEOUS AT BEDTIME
Refills: 0 | Status: DISCONTINUED | OUTPATIENT
Start: 2023-04-12 | End: 2023-04-14

## 2023-04-12 RX ORDER — INSULIN LISPRO 100/ML
3 VIAL (ML) SUBCUTANEOUS
Refills: 0 | Status: DISCONTINUED | OUTPATIENT
Start: 2023-04-12 | End: 2023-04-14

## 2023-04-12 RX ORDER — GABAPENTIN 400 MG/1
200 CAPSULE ORAL EVERY 8 HOURS
Refills: 0 | Status: DISCONTINUED | OUTPATIENT
Start: 2023-04-12 | End: 2023-04-14

## 2023-04-12 RX ADMIN — Medication 975 MILLIGRAM(S): at 22:40

## 2023-04-12 RX ADMIN — GABAPENTIN 200 MILLIGRAM(S): 400 CAPSULE ORAL at 21:39

## 2023-04-12 RX ADMIN — Medication 100 MILLIGRAM(S): at 13:46

## 2023-04-12 RX ADMIN — Medication 100 MILLIGRAM(S): at 05:54

## 2023-04-12 RX ADMIN — SENNA PLUS 2 TABLET(S): 8.6 TABLET ORAL at 21:39

## 2023-04-12 RX ADMIN — Medication 3 UNIT(S): at 17:58

## 2023-04-12 RX ADMIN — ATORVASTATIN CALCIUM 40 MILLIGRAM(S): 80 TABLET, FILM COATED ORAL at 21:40

## 2023-04-12 RX ADMIN — Medication 4: at 09:37

## 2023-04-12 RX ADMIN — INSULIN GLARGINE 6 UNIT(S): 100 INJECTION, SOLUTION SUBCUTANEOUS at 21:38

## 2023-04-12 RX ADMIN — Medication 975 MILLIGRAM(S): at 05:53

## 2023-04-12 RX ADMIN — GABAPENTIN 200 MILLIGRAM(S): 400 CAPSULE ORAL at 13:47

## 2023-04-12 RX ADMIN — Medication 6: at 14:09

## 2023-04-12 RX ADMIN — Medication 3 UNIT(S): at 14:18

## 2023-04-12 RX ADMIN — Medication 81 MILLIGRAM(S): at 11:22

## 2023-04-12 RX ADMIN — Medication 975 MILLIGRAM(S): at 21:40

## 2023-04-12 RX ADMIN — LOSARTAN POTASSIUM 25 MILLIGRAM(S): 100 TABLET, FILM COATED ORAL at 05:53

## 2023-04-12 RX ADMIN — GABAPENTIN 100 MILLIGRAM(S): 400 CAPSULE ORAL at 05:52

## 2023-04-12 RX ADMIN — HEPARIN SODIUM 5000 UNIT(S): 5000 INJECTION INTRAVENOUS; SUBCUTANEOUS at 05:53

## 2023-04-12 RX ADMIN — HEPARIN SODIUM 5000 UNIT(S): 5000 INJECTION INTRAVENOUS; SUBCUTANEOUS at 17:59

## 2023-04-12 RX ADMIN — AMLODIPINE BESYLATE 10 MILLIGRAM(S): 2.5 TABLET ORAL at 05:52

## 2023-04-12 RX ADMIN — Medication 975 MILLIGRAM(S): at 13:47

## 2023-04-12 RX ADMIN — Medication 6: at 17:57

## 2023-04-12 RX ADMIN — Medication 975 MILLIGRAM(S): at 14:47

## 2023-04-12 RX ADMIN — POLYETHYLENE GLYCOL 3350 17 GRAM(S): 17 POWDER, FOR SOLUTION ORAL at 13:46

## 2023-04-12 RX ADMIN — Medication 100 MILLIGRAM(S): at 21:42

## 2023-04-12 NOTE — PROGRESS NOTE ADULT - SUBJECTIVE AND OBJECTIVE BOX
Name of Patient : ALEE DUNN  MRN: [id number]  Date of visit: 04-12-23 @ 11:42      Subjective: Patient seen and examined. No new events except as noted.   Patient seen earlier this AM. Lying down in bed. Reports RLE pain is better controlled following pain medication this AM.  On ABX as per ID. Podiatry plans for possible intervention.   Hyperglycemic.     REVIEW OF SYSTEMS:    CONSTITUTIONAL: Generalized weakness   EYES/ENT: No visual changes;  No vertigo or throat pain   NECK: No pain or stiffness  RESPIRATORY: No cough, wheezing, hemoptysis; No shortness of breath  CARDIOVASCULAR: No chest pain or palpitations  GASTROINTESTINAL: No abdominal or epigastric pain. No nausea, vomiting, or hematemesis; No diarrhea or constipation. No melena or hematochezia.  GENITOURINARY: No dysuria, frequency or hematuria  NEUROLOGICAL: No numbness or weakness  SKIN: No itching, burning, rashes, or lesions   MSK: RLE wound; S/P RLE bypass   All other review of systems is negative unless indicated above.    MEDICATIONS:  MEDICATIONS  (STANDING):  acetaminophen     Tablet .. 975 milliGRAM(s) Oral every 8 hours  amLODIPine   Tablet 10 milliGRAM(s) Oral daily  aspirin enteric coated 81 milliGRAM(s) Oral daily  atorvastatin 40 milliGRAM(s) Oral at bedtime  ceFAZolin   IVPB      ceFAZolin   IVPB 2000 milliGRAM(s) IV Intermittent every 8 hours  dextrose 5%. 1000 milliLiter(s) (50 mL/Hr) IV Continuous <Continuous>  dextrose 5%. 1000 milliLiter(s) (100 mL/Hr) IV Continuous <Continuous>  dextrose 50% Injectable 25 Gram(s) IV Push once  dextrose 50% Injectable 12.5 Gram(s) IV Push once  dextrose 50% Injectable 25 Gram(s) IV Push once  gabapentin 100 milliGRAM(s) Oral every 8 hours  glucagon  Injectable 1 milliGRAM(s) IntraMuscular once  heparin   Injectable 5000 Unit(s) SubCutaneous every 12 hours  insulin lispro (ADMELOG) corrective regimen sliding scale   SubCutaneous three times a day before meals  insulin lispro (ADMELOG) corrective regimen sliding scale   SubCutaneous at bedtime  levoFLOXacin  Tablet 500 milliGRAM(s) Oral every 24 hours  losartan 25 milliGRAM(s) Oral daily      PHYSICAL EXAM:  T(C): 36.8 (04-12-23 @ 09:58), Max: 37.2 (04-11-23 @ 13:46)  HR: 91 (04-12-23 @ 09:58) (80 - 103)  BP: 137/71 (04-12-23 @ 09:58) (117/67 - 146/68)  RR: 18 (04-12-23 @ 09:58) (18 - 18)  SpO2: 95% (04-12-23 @ 09:58) (92% - 95%)  Wt(kg): --  I&O's Summary    11 Apr 2023 07:01  -  12 Apr 2023 07:00  --------------------------------------------------------  IN: 850 mL / OUT: 900 mL / NET: -50 mL    12 Apr 2023 07:01  -  12 Apr 2023 11:42  --------------------------------------------------------  IN: 240 mL / OUT: 150 mL / NET: 90 mL          Appearance: Normal	  HEENT: Eyes are open    Lymphatic: No lymphadenopathy   Cardiovascular: Normal S1 S2, no JVD  Respiratory: normal effort , clear  Gastrointestinal:  Soft, Non-tender  Skin: No rashes,  warm to touch  Psychiatry:  Mood & affect appropriate  Musculoskeletal: RLE toe wound; RLE bypass dressings in place           04-11-23 @ 07:01  -  04-12-23 @ 07:00  --------------------------------------------------------  IN: 850 mL / OUT: 900 mL / NET: -50 mL    04-12-23 @ 07:01  -  04-12-23 @ 11:42  --------------------------------------------------------  IN: 240 mL / OUT: 150 mL / NET: 90 mL                              8.6    10.82 )-----------( 199      ( 12 Apr 2023 07:18 )             26.4               04-12    136  |  101  |  23  ----------------------------<  184<H>  4.0   |  24  |  0.97    Ca    8.9      12 Apr 2023 07:18  Phos  2.8     04-12  Mg     2.1     04-12                               < from: Xray Foot AP + Lateral + Oblique, Right (04.11.23 @ 17:04) >  IMPRESSION:  No tracking soft tissue gas collections, radiopaque foreign bodies, or   gross radiographic evidence for osteomyelitis.    No fractures or dislocations.    Tarsometatarsal alignment maintained without evidence for a Lisfranc   injury.    Congenitally fused 5th DIP joint. Preserved remaining visualized joint   spaces and no joint margin erosions. Plantar and posterior calcaneal   enthesophytes.    Generalized osteopenia otherwise no discrete lytic or blastic lesions.    Vascular calcifications.    < end of copied text >

## 2023-04-12 NOTE — PROGRESS NOTE ADULT - ASSESSMENT
Patient is a 72 year old male with a PMHx of HTN, Type II DM associated with neuropathy whore presents to [facility] with a chief complaint of pain, blistering and fluid drainage on his right foot. Patient reports worsening pain and discomfort in his right lower extremity. Patient reports intermittent chills for the past week prior to his arrival. Patient reports that he has been experiencing bilateral calf pain for about one month associated with pain and discomfort with walking less than 2 blocks. Patient reports that he noticed right lower extremity edema and bleeding from his great toe about two days prior to arrival. Internal medicine has been consulted on Mr. Paredes's care for medical management and medical clearance. Patient is S/P cardiac cath on previous admission. Patient denies any chest pain, palpitations, SOB or dyspnea. Patient denies dyspnea on exertion or shortness of breath with ambulation or walking. Patient reports that his walking is limited by lower extremity pain. Denies history of HLD, MI, CVA, Cardiac arrythmia, PE or DVT in the past.     Peripheral Arterial Disease, Neuropathy, Right Toe Wound   - LE X-Ray -- Without acute displaced Fx or Dislocation, Osteoarthritic changes, Neg for OM   - EYAD consistent w/ small vessel disease in feet   - LE Duplex -- Neg for DVT   - On ASA 81  and gabapentin    - Care per vascular appreciated -- LE Duplex as noted, S/P Angio, S/P bypass  with RLE SFA bypass graft   - Stress test Abnormal.--> S/P cardiac cath on previous admission -- Moderate non-obstructive atherosclerosis, on Med management with ASA and Lipitor    - ABX as per ID   - Pain control   - Care per Vascular / Podiatry appreciated    Right Toe Infection   - 03/30 BCx2 Neg  final   - Previous  wound culture -- Multiple organisms   - On Ancef and Levofloxacin, ABX as per ID   - 04/12 Xray - as noted  - Podiatry planning for possible first ray resection of R foot versus local wound care pending patient decision   - Cont to monitor CBC, Temp curve, VS and patient closely    Anemia  - Monitor and trend levels  - Previous admission Iron panel WNL and Ferritin elevated  - Trend Hgb closely   - Transfuse for Hgb < 8.0 in view of CAD     HTN  - C/w Norvasc and Losartan 25     - Monitor BP, VS and patient closely    DM  - A1C 8.4   - C/w Sliding scale   - Hyperglycemic. Will add 3 units pre-meal and 6 units lantus QHs; Monitor and adjust accordingly   - Monitor glucose levels     Pre-OP Risk Stratification   - TTE w/ EF of 61%, Normal RV function, Mild LVH   - Stress test abnormal. S/P cath, non-obstructive CAD   - Patient is medically optimized for Podiatric intervention.     PPX  - Hep 5K Q12    Patient is a 72 year old male with a PMHx of HTN, Type II DM associated with neuropathy whore presents to [facility] with a chief complaint of pain, blistering and fluid drainage on his right foot. Patient reports worsening pain and discomfort in his right lower extremity. Patient reports intermittent chills for the past week prior to his arrival. Patient reports that he has been experiencing bilateral calf pain for about one month associated with pain and discomfort with walking less than 2 blocks. Patient reports that he noticed right lower extremity edema and bleeding from his great toe about two days prior to arrival. Internal medicine has been consulted on Mr. Paredes's care for medical management and medical clearance. Patient is S/P cardiac cath on previous admission. Patient denies any chest pain, palpitations, SOB or dyspnea. Patient denies dyspnea on exertion or shortness of breath with ambulation or walking. Patient reports that his walking is limited by lower extremity pain. Denies history of HLD, MI, CVA, Cardiac arrythmia, PE or DVT in the past.     Peripheral Arterial Disease, Neuropathy, Right Toe Wound   - LE X-Ray -- Without acute displaced Fx or Dislocation, Osteoarthritic changes, Neg for OM   - EYAD consistent w/ small vessel disease in feet   - LE Duplex -- Neg for DVT   - On ASA 81  and gabapentin    - Care per vascular appreciated -- LE Duplex as noted, S/P Angio, S/P bypass  with RLE SFA bypass graft   - Stress test Abnormal.--> S/P cardiac cath on previous admission -- Moderate non-obstructive atherosclerosis, on Med management with ASA and Lipitor    - ABX as per ID   - Pain control   - Care per Vascular / Podiatry appreciated    Right Toe Infection   - 03/30 BCx2 Neg  final   - Previous  wound culture -- Multiple organisms   - On Ancef and Levofloxacin, ABX as per ID   - 04/12 Xray - as noted  - Podiatry planning for possible first ray resection of R foot versus local wound care pending patient decision   - Cont to monitor CBC, Temp curve, VS and patient closely  - Patient is medically optimized for OR     Anemia  - Monitor and trend levels  - Previous admission Iron panel WNL and Ferritin elevated  - Trend Hgb closely   - Transfuse for Hgb < 8.0 in view of CAD     HTN  - C/w Norvasc and Losartan 25     - Monitor BP, VS and patient closely    DM  - A1C 8.4   - C/w Sliding scale   - Hyperglycemic. Will add 3 units pre-meal and 6 units lantus QHs; Monitor and adjust accordingly   - Monitor glucose levels     Pre-OP Risk Stratification   - TTE w/ EF of 61%, Normal RV function, Mild LVH   - Stress test abnormal. S/P cath, non-obstructive CAD   - Patient is medically optimized for Podiatric intervention.     PPX  - Hep 5K Q12

## 2023-04-12 NOTE — PROGRESS NOTE ADULT - ASSESSMENT
72M s/p RLE SFA to PT bypass graft with PTFE on 4/10     PLAN:  - Diet, Regular  - DVT ppx/ ASA  - f/u with ID regarding abx   - Please document cards/med clearance for possible pods intervention  - r/s home meds  - pain control prn   - PT consult      Vascular p [phone]

## 2023-04-12 NOTE — PROGRESS NOTE ADULT - ASSESSMENT
73 y/o M w/ PMHx DM/ PVD, right hallux gangrene  - Pt was seen and evaluated.   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  - Pt is s/p RLE SFA to PT bypass graft with PT signal w/ Dr. Mullins on 4/10  - ID recs appreciated  - Pt amenable to partial hallux amputation, scheduled for R foot partial hallux amputation Friday 4/14 @730am  - Consent signed and in chart, will place pre-op orders tomorrow  - Documented med/cards optimization appreciated  - Seen with attending.

## 2023-04-12 NOTE — PROGRESS NOTE ADULT - ASSESSMENT
Patient is a 72M with h/o T2DM with neuropathy, HTN presents with c/o pain, blistering and fluid drainage on R foot and worsening pain fr last 2 days. Denies any trauma, hitting foot. On and off he was having chills and fever for last 1 week.  He states that he has been experiencing bilateral calf pain about a month, pain on walking < 2 blocks, noticed R foot swelling and bleeding from the big toe about two days ago.    Seen by his PCP a month ago and at that time he didn't have problem except tingling and numbness. A year ago he had some problem with L foot and didn't require any intervention he added. (30 Mar 2023 10:55)   (10 Apr 2023 07:16)    Pt s/p hospitalization march 30th - APril 6th when he was seen by colleague. R foot hallux circumferential wound to subq with granular base and scant areas of partial thickness necrosis, no purulence or malodor, erythema extends to midfoot without fluctuance. R foot Xray: No OM, no tracking soft tissue air. On 4/1, s/p RLE diagnostic Angiogram, showing single vessel runoff via the posterior tibial artery which is occluded proximally and reconstitutes via collaterals off the popliteal artery. Plan for right superficial femoral artery to posterior tibial artery bypass Monday. s/p Card cath on 4/5. Cultures grew Enterobacter cloacae complex, Numerous Staphylococcus lugdunensis, and  rare Delftia acidovorans group Pt was sent home on Keflex and cipro in anticipation for readmission for the bypass procedure. Pt underwent RLE SFA to PT bypass graft with PTFE yesterday.  ID is asked to evaluate     A/P  Pt with previous xray without cortical erosion. pt with necrotic tip of toe.  appreciate podiatry in put  for surgery on friday  discussed the need for cultures of the "clean" margins    Cont to monitor CBC, Temp curve, VS and patient closely  check ESR and CRP  medicine to address the anemia prior to surgery       Marissa Altamirano M.D. ,   please reach via teams   If no answer, or after 5PM/ weekends,  then please call  [phone]    Assessment and plan discussed with podiatry

## 2023-04-12 NOTE — PROGRESS NOTE ADULT - SUBJECTIVE AND OBJECTIVE BOX
Patient is a 72y old  Male who presents with a chief complaint of R toe infection (12 Apr 2023 12:57)       INTERVAL HPI/OVERNIGHT EVENTS:  Patient seen and evaluated at bedside.  Pt is resting comfortable in NAD. Denies N/V/F/C.      Allergies    No Known Allergies    Intolerances        Vital Signs Last 24 Hrs  T(C): 37.7 (12 Apr 2023 13:09), Max: 37.7 (12 Apr 2023 13:09)  T(F): 99.9 (12 Apr 2023 13:09), Max: 99.9 (12 Apr 2023 13:09)  HR: 87 (12 Apr 2023 13:09) (80 - 103)  BP: 119/54 (12 Apr 2023 13:09) (117/67 - 146/68)  BP(mean): --  RR: 18 (12 Apr 2023 13:09) (18 - 18)  SpO2: 98% (12 Apr 2023 13:09) (92% - 98%)    Parameters below as of 12 Apr 2023 13:09  Patient On (Oxygen Delivery Method): room air        LABS:                        8.6    10.82 )-----------( 199      ( 12 Apr 2023 07:18 )             26.4     04-12    136  |  101  |  23  ----------------------------<  184<H>  4.0   |  24  |  0.97    Ca    8.9      12 Apr 2023 07:18  Phos  2.8     04-12  Mg     2.1     04-12          CAPILLARY BLOOD GLUCOSE      POCT Blood Glucose.: 229 mg/dL (12 Apr 2023 09:35)  POCT Blood Glucose.: 256 mg/dL (11 Apr 2023 21:45)  POCT Blood Glucose.: 212 mg/dL (11 Apr 2023 18:16)      Lower Extremity Physical Exam:  Vascular: DP/PT 0/4, B/L, CFT <3 seconds B/L x9, Temperature gradient warm to cool, B/L.   Neuro: Epicritic sensation decreased to the level of digits, B/L.  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 No open wounds or signs of infection to the left foot.     RADIOLOGY & ADDITIONAL TESTS:

## 2023-04-12 NOTE — CONSULT NOTE ADULT - SUBJECTIVE AND OBJECTIVE BOX
DATE OF SERVICE: 04-12-23 @ 09:52    CHIEF COMPLAINT:Patient is a 72y old  Male who presents with a chief complaint of R toe infection (12 Apr 2023 07:47)      HISTORY OF PRESENT ILLNESS:HPI:  Patient is a 72M with h/o T2DM with neuropathy, HTN presents with c/o pain, blistering and fluid drainage on R foot and worsening pain fr last 2 days. Denies any trauma, hitting foot. On and off he was having chills and fever for last 1 week.  He states that he has been experiencing bilateral calf pain about a month, pain on walking < 2 blocks, noticed R foot swelling and bleeding from the big toe about two days ago.    Seen by his PCP a month ago and at that time he didn't have problem except tingling and numbness. A year ago he had some problem with L foot and didn't require any intervention he added. (30 Mar 2023 10:55)   (10 Apr 2023 07:16)      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      No significant past surgical history              MEDICATIONS:  amLODIPine   Tablet 10 milliGRAM(s) Oral daily  aspirin enteric coated 81 milliGRAM(s) Oral daily  heparin   Injectable 5000 Unit(s) SubCutaneous every 12 hours  losartan 25 milliGRAM(s) Oral daily    ceFAZolin   IVPB      ceFAZolin   IVPB 2000 milliGRAM(s) IV Intermittent every 8 hours  levoFLOXacin  Tablet 500 milliGRAM(s) Oral every 24 hours      acetaminophen     Tablet .. 975 milliGRAM(s) Oral every 8 hours  gabapentin 100 milliGRAM(s) Oral every 8 hours  oxyCODONE    IR 5 milliGRAM(s) Oral every 4 hours PRN  oxyCODONE    IR 10 milliGRAM(s) Oral every 4 hours PRN      atorvastatin 40 milliGRAM(s) Oral at bedtime  dextrose 50% Injectable 25 Gram(s) IV Push once  dextrose 50% Injectable 12.5 Gram(s) IV Push once  dextrose 50% Injectable 25 Gram(s) IV Push once  dextrose Oral Gel 15 Gram(s) Oral once PRN  glucagon  Injectable 1 milliGRAM(s) IntraMuscular once  insulin lispro (ADMELOG) corrective regimen sliding scale   SubCutaneous three times a day before meals  insulin lispro (ADMELOG) corrective regimen sliding scale   SubCutaneous at bedtime    dextrose 5%. 1000 milliLiter(s) IV Continuous <Continuous>  dextrose 5%. 1000 milliLiter(s) IV Continuous <Continuous>      FAMILY HISTORY:      Non-contributory    SOCIAL HISTORY:    [ ] Tobacco  [ ] Drugs  [ ] Alcohol    Allergies    No Known Allergies    Intolerances    	    REVIEW OF SYSTEMS:  CONSTITUTIONAL: No fever  EYES: No eye pain, visual disturbances, or discharge  ENMT:  No difficulty hearing, tinnitus  NECK: No pain or stiffness  RESPIRATORY: No cough, wheezing,  CARDIOVASCULAR: No chest pain, palpitations, passing out, dizziness, or leg swelling  GASTROINTESTINAL:  No nausea, vomiting, diarrhea or constipation. No melena.  GENITOURINARY: No dysuria, hematuria  NEUROLOGICAL: No stroke like symptoms  SKIN: No burning or lesions   ENDOCRINE: No heat or cold intolerance  MUSCULOSKELETAL: No joint pain or swelling  PSYCHIATRIC: No  anxiety, mood swings  HEME/LYMPH: No bleeding gums  ALLERGY AND IMMUNOLOGIC: No hives or eczema	    All other ROS negative    PHYSICAL EXAM:  T(C): 36.9 (04-12-23 @ 05:25), Max: 37.2 (04-11-23 @ 13:46)  HR: 89 (04-12-23 @ 05:25) (80 - 103)  BP: 137/63 (04-12-23 @ 05:25) (117/67 - 146/68)  RR: 18 (04-12-23 @ 05:25) (18 - 18)  SpO2: 95% (04-12-23 @ 05:25) (92% - 95%)  Wt(kg): --  I&O's Summary    11 Apr 2023 07:01  -  12 Apr 2023 07:00  --------------------------------------------------------  IN: 850 mL / OUT: 900 mL / NET: -50 mL        Appearance: Normal	  HEENT:   Normal oral mucosa, EOMI	  Cardiovascular:  S1 S2, No JVD,    Respiratory: Lungs clear to auscultation	  Psychiatry: Alert  Gastrointestinal:  Soft, Non-tender, + BS	  Skin: No rashes   Neurologic: Non-focal  Extremities:  No edema  Vascular: Peripheral pulses palpable    	    	  	  CARDIAC MARKERS:  Labs personally reviewed by me                                  8.6    10.82 )-----------( 199      ( 12 Apr 2023 07:18 )             26.4     04-12    136  |  101  |  23  ----------------------------<  184<H>  4.0   |  24  |  0.97    Ca    8.9      12 Apr 2023 07:18  Phos  2.8     04-12  Mg     2.1     04-12            EKG: Personally reviewed by me -   Radiology: Personally reviewed by me -       Assessment /Plan:   1. Preop Risk Stratification - mod risk for low-mod risk pods surgery, no contraindication to proceed    Full recs to follow           Differential diagnosis and plan of care discussed with patient after the evaluation. Counseling on diet, nutritional counseling, weight management, exercise and medication compliance was done.   Advanced care planning/advanced directives discussed with patient/family. DNR status including forceful chest compressions to attempt to restart the heart, ventilator support/artificial breathing, electric shock, artificial nutrition, health care proxy, Molst form all discussed with pt. Pt wishes to consider. More than fifteen minutes spent on discussing advanced directives.           Matt Calhoun DO [facility]  Cardiovascular Medicine  [address] [place] [address], [address]  Office [phone]  Available via call/text on [employer] Teams DATE OF SERVICE: 04-12-23 @ 09:52    CHIEF COMPLAINT:Patient is a 72y old  Male who presents with a chief complaint of R toe infection (12 Apr 2023 07:47)      HISTORY OF PRESENT ILLNESS:HPI:  Patient is a 72M with h/o T2DM with neuropathy, HTN presents with c/o pain, blistering and fluid drainage on R foot and worsening pain fr last 2 days. Denies any trauma, hitting foot. On and off he was having chills and fever for last 1 week.  He states that he has been experiencing bilateral calf pain about a month, pain on walking < 2 blocks, noticed R foot swelling and bleeding from the big toe about two days ago.    Seen by his PCP a month ago and at that time he didn't have problem except tingling and numbness. A year ago he had some problem with L foot and didn't require any intervention he added. (30 Mar 2023 10:55)   (10 Apr 2023 07:16)      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      No significant past surgical history              MEDICATIONS:  amLODIPine   Tablet 10 milliGRAM(s) Oral daily  aspirin enteric coated 81 milliGRAM(s) Oral daily  heparin   Injectable 5000 Unit(s) SubCutaneous every 12 hours  losartan 25 milliGRAM(s) Oral daily    ceFAZolin   IVPB      ceFAZolin   IVPB 2000 milliGRAM(s) IV Intermittent every 8 hours  levoFLOXacin  Tablet 500 milliGRAM(s) Oral every 24 hours      acetaminophen     Tablet .. 975 milliGRAM(s) Oral every 8 hours  gabapentin 100 milliGRAM(s) Oral every 8 hours  oxyCODONE    IR 5 milliGRAM(s) Oral every 4 hours PRN  oxyCODONE    IR 10 milliGRAM(s) Oral every 4 hours PRN      atorvastatin 40 milliGRAM(s) Oral at bedtime  dextrose 50% Injectable 25 Gram(s) IV Push once  dextrose 50% Injectable 12.5 Gram(s) IV Push once  dextrose 50% Injectable 25 Gram(s) IV Push once  dextrose Oral Gel 15 Gram(s) Oral once PRN  glucagon  Injectable 1 milliGRAM(s) IntraMuscular once  insulin lispro (ADMELOG) corrective regimen sliding scale   SubCutaneous three times a day before meals  insulin lispro (ADMELOG) corrective regimen sliding scale   SubCutaneous at bedtime    dextrose 5%. 1000 milliLiter(s) IV Continuous <Continuous>  dextrose 5%. 1000 milliLiter(s) IV Continuous <Continuous>      FAMILY HISTORY:      Non-contributory    SOCIAL HISTORY:    [ ] not a smoker    Allergies    No Known Allergies    Intolerances    	    REVIEW OF SYSTEMS:  CONSTITUTIONAL: No fever  EYES: No eye pain, visual disturbances, or discharge  ENMT:  No difficulty hearing, tinnitus  NECK: No pain or stiffness  RESPIRATORY: No cough, wheezing,  CARDIOVASCULAR: No chest pain, palpitations, passing out, dizziness, or leg swelling  GASTROINTESTINAL:  No nausea, vomiting, diarrhea or constipation. No melena.  GENITOURINARY: No dysuria, hematuria  NEUROLOGICAL: No stroke like symptoms  SKIN: No burning or lesions   ENDOCRINE: No heat or cold intolerance  MUSCULOSKELETAL: No joint pain or swelling  PSYCHIATRIC: No  anxiety, mood swings  HEME/LYMPH: No bleeding gums  ALLERGY AND IMMUNOLOGIC: No hives or eczema	    All other ROS negative    PHYSICAL EXAM:  T(C): 36.9 (04-12-23 @ 05:25), Max: 37.2 (04-11-23 @ 13:46)  HR: 89 (04-12-23 @ 05:25) (80 - 103)  BP: 137/63 (04-12-23 @ 05:25) (117/67 - 146/68)  RR: 18 (04-12-23 @ 05:25) (18 - 18)  SpO2: 95% (04-12-23 @ 05:25) (92% - 95%)  Wt(kg): --  I&O's Summary    11 Apr 2023 07:01  -  12 Apr 2023 07:00  --------------------------------------------------------  IN: 850 mL / OUT: 900 mL / NET: -50 mL        Appearance: Normal	  HEENT:   Normal oral mucosa, EOMI	  Cardiovascular:  S1 S2, No JVD,    Respiratory: Lungs clear to auscultation	  Psychiatry: Alert  Gastrointestinal:  Soft, Non-tender, + BS	  Skin: No rashes   Neurologic: Non-focal  Extremities:  No edema  Vascular: Peripheral pulses palpable    	    	  	  CARDIAC MARKERS:  Labs personally reviewed by me                                  8.6    10.82 )-----------( 199      ( 12 Apr 2023 07:18 )             26.4     04-12    136  |  101  |  23  ----------------------------<  184<H>  4.0   |  24  |  0.97    Ca    8.9      12 Apr 2023 07:18  Phos  2.8     04-12  Mg     2.1     04-12            EKG: Personally reviewed by me - NSR  Radiology: Personally reviewed by me - CXR clear lungs      ASSESSMENT/PLAN: 	  Patient is a 72 year old male with a PMHx of HTN, Type II DM associated with neuropathy whore presents to [facility] with a chief complaint of pain, blistering and fluid drainage on his right foot.    Problem/Plan -1  Problem: Cardiac Risk Stratification for podiatry surgery  - Denies CP or SOB  - TTE shows preserved EF, mild LVH  - Not in decompensated HF  - No hx of tachy supa arrhythmia  -s/p cath with nonobstructive moderate CAD  - Elevated risk for low-mod risk pods surgery procedure, no contraindication to proceed     Problem/Plan -2  Problem: Hypertension  - Increase amlodipine to 5mg PO daily  - c/w losartan to 25mg PO daily    Problem/Plan -3  Problem: DM II  - HgbA1c 8.4  - ISS as per primary team          Differential diagnosis and plan of care discussed with patient after the evaluation. Counseling on diet, nutritional counseling, weight management, exercise and medication compliance was done.   Advanced care planning/advanced directives discussed with patient/family. DNR status including forceful chest compressions to attempt to restart the heart, ventilator support/artificial breathing, electric shock, artificial nutrition, health care proxy, Molst form all discussed with pt. Pt wishes to consider. More than fifteen minutes spent on discussing advanced directives.           Matt Calhoun DO [facility]  Cardiovascular Medicine  [address] [facility], [address]  Office [phone]  Available via call/text on [employer] Teams

## 2023-04-12 NOTE — PROGRESS NOTE ADULT - SUBJECTIVE AND OBJECTIVE BOX
Patient is a 72y old  Male who presents with a chief complaint of R toe infection (12 Apr 2023 11:42)    Being followed by ID for        Interval history:  No other acute events      ROS:  No cough,SOB,CP  No N/V/D  No abd pain  No urinary complaints  No HA  No joint or limb pain  No other complaints    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      No significant past surgical history        Allergies    No Known Allergies    Intolerances      Antimicrobials:    ceFAZolin   IVPB      ceFAZolin   IVPB 2000 milliGRAM(s) IV Intermittent every 8 hours  levoFLOXacin  Tablet 500 milliGRAM(s) Oral every 24 hours    MEDICATIONS  (STANDING):  acetaminophen     Tablet .. 975 milliGRAM(s) Oral every 8 hours  amLODIPine   Tablet 10 milliGRAM(s) Oral daily  aspirin enteric coated 81 milliGRAM(s) Oral daily  atorvastatin 40 milliGRAM(s) Oral at bedtime  ceFAZolin   IVPB      ceFAZolin   IVPB 2000 milliGRAM(s) IV Intermittent every 8 hours  dextrose 5%. 1000 milliLiter(s) (50 mL/Hr) IV Continuous <Continuous>  dextrose 5%. 1000 milliLiter(s) (100 mL/Hr) IV Continuous <Continuous>  dextrose 50% Injectable 25 Gram(s) IV Push once  dextrose 50% Injectable 12.5 Gram(s) IV Push once  dextrose 50% Injectable 25 Gram(s) IV Push once  gabapentin 200 milliGRAM(s) Oral every 8 hours  glucagon  Injectable 1 milliGRAM(s) IntraMuscular once  heparin   Injectable 5000 Unit(s) SubCutaneous every 12 hours  insulin glargine Injectable (LANTUS) 6 Unit(s) SubCutaneous at bedtime  insulin lispro (ADMELOG) corrective regimen sliding scale   SubCutaneous three times a day before meals  insulin lispro (ADMELOG) corrective regimen sliding scale   SubCutaneous at bedtime  insulin lispro Injectable (ADMELOG) 3 Unit(s) SubCutaneous three times a day before meals  levoFLOXacin  Tablet 500 milliGRAM(s) Oral every 24 hours  losartan 25 milliGRAM(s) Oral daily  polyethylene glycol 3350 17 Gram(s) Oral daily  senna 2 Tablet(s) Oral at bedtime      Vital Signs Last 24 Hrs  T(C): 36.8 (04-12-23 @ 09:58), Max: 37.2 (04-11-23 @ 13:46)  T(F): 98.2 (04-12-23 @ 09:58), Max: 99 (04-11-23 @ 13:46)  HR: 91 (04-12-23 @ 09:58) (80 - 103)  BP: 137/71 (04-12-23 @ 09:58) (117/67 - 146/68)  BP(mean): --  RR: 18 (04-12-23 @ 09:58) (18 - 18)  SpO2: 95% (04-12-23 @ 09:58) (92% - 95%)    Physical Exam:    Constitutional well preserved,comfortable,pleasant    HEENT PERRLA EOMI,No pallor or icterus    No oral exudate or erythema    Neck supple no JVD or LN    Chest Good AE,CTA    CVS RRR S1 S2 WNl No murmur or rub or gallop    Abd soft BS normal No tenderness no masses    Ext No cyanosis clubbing or edema    IV site no erythema tenderness or discharge    Joints no swelling or LOM    CNS AAO X 3 no focal    Lab Data:                          8.6    10.82 )-----------( 199      ( 12 Apr 2023 07:18 )             26.4       04-12    136  |  101  |  23  ----------------------------<  184<H>  4.0   |  24  |  0.97    Ca    8.9      12 Apr 2023 07:18  Phos  2.8     04-12  Mg     2.1     04-12                          WBC Count: 10.82 (04-12-23 @ 07:18)  WBC Count: 8.51 (04-11-23 @ 07:15)  WBC Count: 14.28 (04-10-23 @ 13:46)  WBC Count: 5.42 (04-06-23 @ 07:10)             Patient is a 72y old  Male who presents with a chief complaint of R toe infection (12 Apr 2023 11:42)    Being followed by ID for        Interval history:  pt seen with podiatry  pt scheduled for surgery on friday   No other acute events          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      No significant past surgical history        Allergies    No Known Allergies    Intolerances      Antimicrobials:    ceFAZolin   IVPB      ceFAZolin   IVPB 2000 milliGRAM(s) IV Intermittent every 8 hours  levoFLOXacin  Tablet 500 milliGRAM(s) Oral every 24 hours    MEDICATIONS  (STANDING):  acetaminophen     Tablet .. 975 milliGRAM(s) Oral every 8 hours  amLODIPine   Tablet 10 milliGRAM(s) Oral daily  aspirin enteric coated 81 milliGRAM(s) Oral daily  atorvastatin 40 milliGRAM(s) Oral at bedtime  ceFAZolin   IVPB      ceFAZolin   IVPB 2000 milliGRAM(s) IV Intermittent every 8 hours  dextrose 5%. 1000 milliLiter(s) (50 mL/Hr) IV Continuous <Continuous>  dextrose 5%. 1000 milliLiter(s) (100 mL/Hr) IV Continuous <Continuous>  dextrose 50% Injectable 25 Gram(s) IV Push once  dextrose 50% Injectable 12.5 Gram(s) IV Push once  dextrose 50% Injectable 25 Gram(s) IV Push once  gabapentin 200 milliGRAM(s) Oral every 8 hours  glucagon  Injectable 1 milliGRAM(s) IntraMuscular once  heparin   Injectable 5000 Unit(s) SubCutaneous every 12 hours  insulin glargine Injectable (LANTUS) 6 Unit(s) SubCutaneous at bedtime  insulin lispro (ADMELOG) corrective regimen sliding scale   SubCutaneous three times a day before meals  insulin lispro (ADMELOG) corrective regimen sliding scale   SubCutaneous at bedtime  insulin lispro Injectable (ADMELOG) 3 Unit(s) SubCutaneous three times a day before meals  levoFLOXacin  Tablet 500 milliGRAM(s) Oral every 24 hours  losartan 25 milliGRAM(s) Oral daily  polyethylene glycol 3350 17 Gram(s) Oral daily  senna 2 Tablet(s) Oral at bedtime      Vital Signs Last 24 Hrs  T(C): 36.8 (04-12-23 @ 09:58), Max: 37.2 (04-11-23 @ 13:46)  T(F): 98.2 (04-12-23 @ 09:58), Max: 99 (04-11-23 @ 13:46)  HR: 91 (04-12-23 @ 09:58) (80 - 103)  BP: 137/71 (04-12-23 @ 09:58) (117/67 - 146/68)  BP(mean): --  RR: 18 (04-12-23 @ 09:58) (18 - 18)  SpO2: 95% (04-12-23 @ 09:58) (92% - 95%)    Physical Exam:    Constitutional well preserved,comfortable,pleasant    HEENT PERRLA EOMI,No pallor or icterus    No oral exudate or erythema    Neck supple no JVD or LN    Chest Good AE,CTA    CVS S1 S2   Abd soft BS normal No tenderness     Ext No cyanosis clubbing or edema toe unchanged, necrotic tip and base of nail   incisons dressed along leg    IV site no erythema tenderness or discharge    Joints no swelling or LOM    CNS AAO X 3 no focal    Lab Data:                          8.6    10.82 )-----------( 199      ( 12 Apr 2023 07:18 )             26.4       04-12    136  |  101  |  23  ----------------------------<  184<H>  4.0   |  24  |  0.97    Ca    8.9      12 Apr 2023 07:18  Phos  2.8     04-12  Mg     2.1     04-12            WBC Count: 10.82 (04-12-23 @ 07:18)  WBC Count: 8.51 (04-11-23 @ 07:15)  WBC Count: 14.28 (04-10-23 @ 13:46)  WBC Count: 5.42 (04-06-23 @ 07:10)

## 2023-04-13 ENCOUNTER — TRANSCRIPTION ENCOUNTER (OUTPATIENT)
Age: 73
End: 2023-04-13

## 2023-04-13 LAB
ANION GAP SERPL CALC-SCNC: 11 MMOL/L — SIGNIFICANT CHANGE UP (ref 5–17)
BLD GP AB SCN SERPL QL: POSITIVE — SIGNIFICANT CHANGE UP
BUN SERPL-MCNC: 20 MG/DL — SIGNIFICANT CHANGE UP (ref 7–23)
CALCIUM SERPL-MCNC: 8.7 MG/DL — SIGNIFICANT CHANGE UP (ref 8.4–10.5)
CHLORIDE SERPL-SCNC: 99 MMOL/L — SIGNIFICANT CHANGE UP (ref 96–108)
CO2 SERPL-SCNC: 26 MMOL/L — SIGNIFICANT CHANGE UP (ref 22–31)
CREAT SERPL-MCNC: 0.96 MG/DL — SIGNIFICANT CHANGE UP (ref 0.5–1.3)
EGFR: 84 ML/MIN/1.73M2 — SIGNIFICANT CHANGE UP
GLUCOSE BLDC GLUCOMTR-MCNC: 164 MG/DL — HIGH (ref 70–99)
GLUCOSE BLDC GLUCOMTR-MCNC: 238 MG/DL — HIGH (ref 70–99)
GLUCOSE BLDC GLUCOMTR-MCNC: 242 MG/DL — HIGH (ref 70–99)
GLUCOSE BLDC GLUCOMTR-MCNC: 257 MG/DL — HIGH (ref 70–99)
GLUCOSE SERPL-MCNC: 157 MG/DL — HIGH (ref 70–99)
HCT VFR BLD CALC: 26.3 % — LOW (ref 39–50)
HGB BLD-MCNC: 8.6 G/DL — LOW (ref 13–17)
MAGNESIUM SERPL-MCNC: 2.4 MG/DL — SIGNIFICANT CHANGE UP (ref 1.6–2.6)
MCHC RBC-ENTMCNC: 30.3 PG — SIGNIFICANT CHANGE UP (ref 27–34)
MCHC RBC-ENTMCNC: 32.7 GM/DL — SIGNIFICANT CHANGE UP (ref 32–36)
MCV RBC AUTO: 92.6 FL — SIGNIFICANT CHANGE UP (ref 80–100)
NRBC # BLD: 0 /100 WBCS — SIGNIFICANT CHANGE UP (ref 0–0)
PHOSPHATE SERPL-MCNC: 2.5 MG/DL — SIGNIFICANT CHANGE UP (ref 2.5–4.5)
PLATELET # BLD AUTO: 215 K/UL — SIGNIFICANT CHANGE UP (ref 150–400)
POTASSIUM SERPL-MCNC: 4 MMOL/L — SIGNIFICANT CHANGE UP (ref 3.5–5.3)
POTASSIUM SERPL-SCNC: 4 MMOL/L — SIGNIFICANT CHANGE UP (ref 3.5–5.3)
RBC # BLD: 2.84 M/UL — LOW (ref 4.2–5.8)
RBC # FLD: 11.9 % — SIGNIFICANT CHANGE UP (ref 10.3–14.5)
RH IG SCN BLD-IMP: POSITIVE — SIGNIFICANT CHANGE UP
SARS-COV-2 RNA SPEC QL NAA+PROBE: SIGNIFICANT CHANGE UP
SODIUM SERPL-SCNC: 136 MMOL/L — SIGNIFICANT CHANGE UP (ref 135–145)
WBC # BLD: 10 K/UL — SIGNIFICANT CHANGE UP (ref 3.8–10.5)
WBC # FLD AUTO: 10 K/UL — SIGNIFICANT CHANGE UP (ref 3.8–10.5)

## 2023-04-13 PROCEDURE — 86077 PHYS BLOOD BANK SERV XMATCH: CPT

## 2023-04-13 PROCEDURE — 99232 SBSQ HOSP IP/OBS MODERATE 35: CPT

## 2023-04-13 PROCEDURE — 93010 ELECTROCARDIOGRAM REPORT: CPT

## 2023-04-13 RX ADMIN — GABAPENTIN 200 MILLIGRAM(S): 400 CAPSULE ORAL at 13:29

## 2023-04-13 RX ADMIN — OXYCODONE HYDROCHLORIDE 5 MILLIGRAM(S): 5 TABLET ORAL at 11:10

## 2023-04-13 RX ADMIN — Medication 975 MILLIGRAM(S): at 22:10

## 2023-04-13 RX ADMIN — Medication 81 MILLIGRAM(S): at 11:47

## 2023-04-13 RX ADMIN — Medication 4: at 18:18

## 2023-04-13 RX ADMIN — Medication 975 MILLIGRAM(S): at 05:55

## 2023-04-13 RX ADMIN — AMLODIPINE BESYLATE 10 MILLIGRAM(S): 2.5 TABLET ORAL at 05:54

## 2023-04-13 RX ADMIN — OXYCODONE HYDROCHLORIDE 5 MILLIGRAM(S): 5 TABLET ORAL at 10:10

## 2023-04-13 RX ADMIN — Medication 3 UNIT(S): at 10:11

## 2023-04-13 RX ADMIN — POLYETHYLENE GLYCOL 3350 17 GRAM(S): 17 POWDER, FOR SOLUTION ORAL at 11:47

## 2023-04-13 RX ADMIN — Medication 3 UNIT(S): at 14:20

## 2023-04-13 RX ADMIN — HEPARIN SODIUM 5000 UNIT(S): 5000 INJECTION INTRAVENOUS; SUBCUTANEOUS at 05:56

## 2023-04-13 RX ADMIN — Medication 100 MILLIGRAM(S): at 13:32

## 2023-04-13 RX ADMIN — Medication 100 MILLIGRAM(S): at 22:10

## 2023-04-13 RX ADMIN — Medication 975 MILLIGRAM(S): at 06:55

## 2023-04-13 RX ADMIN — INSULIN GLARGINE 6 UNIT(S): 100 INJECTION, SOLUTION SUBCUTANEOUS at 22:11

## 2023-04-13 RX ADMIN — LOSARTAN POTASSIUM 25 MILLIGRAM(S): 100 TABLET, FILM COATED ORAL at 05:55

## 2023-04-13 RX ADMIN — HEPARIN SODIUM 5000 UNIT(S): 5000 INJECTION INTRAVENOUS; SUBCUTANEOUS at 18:18

## 2023-04-13 RX ADMIN — Medication 4: at 14:20

## 2023-04-13 RX ADMIN — Medication 975 MILLIGRAM(S): at 13:29

## 2023-04-13 RX ADMIN — Medication 975 MILLIGRAM(S): at 23:10

## 2023-04-13 RX ADMIN — Medication 3 UNIT(S): at 18:19

## 2023-04-13 RX ADMIN — GABAPENTIN 200 MILLIGRAM(S): 400 CAPSULE ORAL at 22:10

## 2023-04-13 RX ADMIN — Medication 2: at 22:13

## 2023-04-13 RX ADMIN — Medication 975 MILLIGRAM(S): at 14:29

## 2023-04-13 RX ADMIN — ATORVASTATIN CALCIUM 40 MILLIGRAM(S): 80 TABLET, FILM COATED ORAL at 22:10

## 2023-04-13 RX ADMIN — GABAPENTIN 200 MILLIGRAM(S): 400 CAPSULE ORAL at 05:55

## 2023-04-13 RX ADMIN — Medication 100 MILLIGRAM(S): at 06:00

## 2023-04-13 RX ADMIN — Medication 2: at 10:10

## 2023-04-13 NOTE — PROGRESS NOTE ADULT - SUBJECTIVE AND OBJECTIVE BOX
Name of Patient : ALEE DUNN  MRN: [id number]  Date of visit: 04-13-23 @ 12:30      Subjective: Patient seen and examined. No new events except as noted.   Patient seen earlier this AM. Lying down in bed.   Reports RLE discomfort.   Pending OR tomorrow with Vascular surgery    REVIEW OF SYSTEMS:    CONSTITUTIONAL: Generalized weakness   EYES/ENT: No visual changes;  No vertigo or throat pain   NECK: No pain or stiffness  RESPIRATORY: No cough, wheezing, hemoptysis; No shortness of breath  CARDIOVASCULAR: No chest pain or palpitations  GASTROINTESTINAL: No abdominal or epigastric pain. No nausea, vomiting, or hematemesis; No diarrhea or constipation. No melena or hematochezia.  GENITOURINARY: No dysuria, frequency or hematuria  NEUROLOGICAL: No numbness or weakness  SKIN: RLE bypass; RLE toe wound   All other review of systems is negative unless indicated above.    MEDICATIONS:  MEDICATIONS  (STANDING):  acetaminophen     Tablet .. 975 milliGRAM(s) Oral every 8 hours  amLODIPine   Tablet 10 milliGRAM(s) Oral daily  aspirin enteric coated 81 milliGRAM(s) Oral daily  atorvastatin 40 milliGRAM(s) Oral at bedtime  ceFAZolin   IVPB      ceFAZolin   IVPB 2000 milliGRAM(s) IV Intermittent every 8 hours  dextrose 5%. 1000 milliLiter(s) (50 mL/Hr) IV Continuous <Continuous>  dextrose 5%. 1000 milliLiter(s) (100 mL/Hr) IV Continuous <Continuous>  dextrose 50% Injectable 25 Gram(s) IV Push once  dextrose 50% Injectable 12.5 Gram(s) IV Push once  dextrose 50% Injectable 25 Gram(s) IV Push once  gabapentin 200 milliGRAM(s) Oral every 8 hours  glucagon  Injectable 1 milliGRAM(s) IntraMuscular once  heparin   Injectable 5000 Unit(s) SubCutaneous every 12 hours  insulin glargine Injectable (LANTUS) 6 Unit(s) SubCutaneous at bedtime  insulin lispro (ADMELOG) corrective regimen sliding scale   SubCutaneous three times a day before meals  insulin lispro (ADMELOG) corrective regimen sliding scale   SubCutaneous at bedtime  insulin lispro Injectable (ADMELOG) 3 Unit(s) SubCutaneous three times a day before meals  levoFLOXacin  Tablet 500 milliGRAM(s) Oral every 24 hours  losartan 25 milliGRAM(s) Oral daily  polyethylene glycol 3350 17 Gram(s) Oral daily  senna 2 Tablet(s) Oral at bedtime      PHYSICAL EXAM:  T(C): 36.6 (04-13-23 @ 09:42), Max: 37.7 (04-12-23 @ 13:09)  HR: 77 (04-13-23 @ 09:42) (77 - 89)  BP: 103/56 (04-13-23 @ 09:42) (103/56 - 145/75)  RR: 18 (04-13-23 @ 09:42) (18 - 18)  SpO2: 97% (04-13-23 @ 09:42) (96% - 99%)  Wt(kg): --  I&O's Summary    12 Apr 2023 07:01  -  13 Apr 2023 07:00  --------------------------------------------------------  IN: 1390 mL / OUT: 1600 mL / NET: -210 mL    13 Apr 2023 07:01  -  13 Apr 2023 12:30  --------------------------------------------------------  IN: 240 mL / OUT: 300 mL / NET: -60 mL          Appearance: Normal; Lying down in bed 	  HEENT:  Eyes are open   Lymphatic: No lymphadenopathy   Cardiovascular: Normal    Respiratory: normal effort , clear  Gastrointestinal:  Soft, Non-tender  Skin: No rashes,  warm to touch  Psychiatry:  Mood & affect appropriate  Musculoskeletal: RLE dressing         04-12-23 @ 07:01  -  04-13-23 @ 07:00  --------------------------------------------------------  IN: 1390 mL / OUT: 1600 mL / NET: -210 mL    04-13-23 @ 07:01  -  04-13-23 @ 12:30  --------------------------------------------------------  IN: 240 mL / OUT: 300 mL / NET: -60 mL                              8.6    10.00 )-----------( 215      ( 13 Apr 2023 07:14 )             26.3               04-13    136  |  99  |  20  ----------------------------<  157<H>  4.0   |  26  |  0.96    Ca    8.7      13 Apr 2023 07:16  Phos  2.5     04-13  Mg     2.4     04-13                             < from: Xray Chest 1 View- PORTABLE-Urgent (Xray Chest 1 View- PORTABLE-Urgent .) (04.12.23 @ 11:16) >  IMPRESSION:  Clear lungs.    < end of copied text >

## 2023-04-13 NOTE — PROGRESS NOTE ADULT - SUBJECTIVE AND OBJECTIVE BOX
DATE OF SERVICE: 04-13-23 @ 12:31    Patient is a 72y old  Male who presents with a chief complaint of R toe infection (13 Apr 2023 10:50)      INTERVAL HISTORY: Feels ok.     REVIEW OF SYSTEMS:  CONSTITUTIONAL: No weakness  EYES/ENT: No visual changes;  No throat pain   NECK: No pain or stiffness  RESPIRATORY: No cough, wheezing; No shortness of breath  CARDIOVASCULAR: No chest pain or palpitations  GASTROINTESTINAL: No abdominal  pain. No nausea, vomiting, or hematemesis  GENITOURINARY: No dysuria, frequency or hematuria  NEUROLOGICAL: No stroke like symptoms  SKIN: No rashes      	  MEDICATIONS:  amLODIPine   Tablet 10 milliGRAM(s) Oral daily  losartan 25 milliGRAM(s) Oral daily        PHYSICAL EXAM:  T(C): 36.6 (04-13-23 @ 09:42), Max: 37.7 (04-12-23 @ 13:09)  HR: 77 (04-13-23 @ 09:42) (77 - 89)  BP: 103/56 (04-13-23 @ 09:42) (103/56 - 145/75)  RR: 18 (04-13-23 @ 09:42) (18 - 18)  SpO2: 97% (04-13-23 @ 09:42) (96% - 99%)  Wt(kg): --  I&O's Summary    12 Apr 2023 07:01  -  13 Apr 2023 07:00  --------------------------------------------------------  IN: 1390 mL / OUT: 1600 mL / NET: -210 mL    13 Apr 2023 07:01  -  13 Apr 2023 12:31  --------------------------------------------------------  IN: 240 mL / OUT: 300 mL / NET: -60 mL          Appearance: In no distress	  HEENT:    PERRL, EOMI	  Cardiovascular:  S1 S2, No JVD  Respiratory: Lungs clear to auscultation	  Gastrointestinal:  Soft, Non-tender, + BS	  Vascularature:  No edema of LE  Psychiatric: Appropriate affect   Neuro: no acute focal deficits                               8.6    10.00 )-----------( 215      ( 13 Apr 2023 07:14 )             26.3     04-13    136  |  99  |  20  ----------------------------<  157<H>  4.0   |  26  |  0.96    Ca    8.7      13 Apr 2023 07:16  Phos  2.5     04-13  Mg     2.4     04-13          Labs personally reviewed      ASSESSMENT/PLAN: 	      Patient is a 72 year old male with a PMHx of HTN, 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    Problem/Plan -1  Problem: Cardiac Risk Stratification for podiatry surgery  - Denies CP or SOB  - TTE shows preserved EF, mild LVH  - Not in decompensated HF  - No hx of tachy supa arrhythmia  -s/p cath with nonobstructive moderate CAD  - Elevated risk for low-mod risk pods surgery procedure, no contraindication to proceed     Problem/Plan -2  Problem: Hypertension  - amlodipine increased to 5mg PO daily  - c/w losartan to 25mg PO daily    Problem/Plan -3  Problem: DM II  - HgbA1c 8.4  - ISS as per primary team          RISHABH Bello-NP   Matt Calhoun DO [facility]  Cardiovascular Medicine  [address] [place] [address], [address]  Available through call or text on [employer] TEAM[employer]  Office: [phone]   DATE OF SERVICE: 04-13-23 @ 12:31    Patient is a 72y old  Male who presents with a chief complaint of R toe infection (13 Apr 2023 10:50)      INTERVAL HISTORY: Feels ok.     REVIEW OF SYSTEMS:  CONSTITUTIONAL: No weakness  EYES/ENT: No visual changes;  No throat pain   NECK: No pain or stiffness  RESPIRATORY: No cough, wheezing; No shortness of breath  CARDIOVASCULAR: No chest pain or palpitations  GASTROINTESTINAL: No abdominal  pain. No nausea, vomiting, or hematemesis  GENITOURINARY: No dysuria, frequency or hematuria  NEUROLOGICAL: No stroke like symptoms  SKIN: No rashes      	  MEDICATIONS:  amLODIPine   Tablet 10 milliGRAM(s) Oral daily  losartan 25 milliGRAM(s) Oral daily        PHYSICAL EXAM:  T(C): 36.6 (04-13-23 @ 09:42), Max: 37.7 (04-12-23 @ 13:09)  HR: 77 (04-13-23 @ 09:42) (77 - 89)  BP: 103/56 (04-13-23 @ 09:42) (103/56 - 145/75)  RR: 18 (04-13-23 @ 09:42) (18 - 18)  SpO2: 97% (04-13-23 @ 09:42) (96% - 99%)  Wt(kg): --  I&O's Summary    12 Apr 2023 07:01  -  13 Apr 2023 07:00  --------------------------------------------------------  IN: 1390 mL / OUT: 1600 mL / NET: -210 mL    13 Apr 2023 07:01  -  13 Apr 2023 12:31  --------------------------------------------------------  IN: 240 mL / OUT: 300 mL / NET: -60 mL          Appearance: In no distress	  HEENT:    PERRL, EOMI	  Cardiovascular:  S1 S2, No JVD  Respiratory: Lungs clear to auscultation	  Gastrointestinal:  Soft, Non-tender, + BS	  Vascularature:  No edema of LE  Psychiatric: Appropriate affect   Neuro: no acute focal deficits                               8.6    10.00 )-----------( 215      ( 13 Apr 2023 07:14 )             26.3     04-13    136  |  99  |  20  ----------------------------<  157<H>  4.0   |  26  |  0.96    Ca    8.7      13 Apr 2023 07:16  Phos  2.5     04-13  Mg     2.4     04-13          Labs personally reviewed      ASSESSMENT/PLAN: 	      Patient is a 72 year old male with a PMHx of HTN, Type II DM associated with neuropathy whore presents to [facility] with a chief complaint of pain, blistering and fluid drainage on his right foot.    Problem/Plan -1  Problem: Cardiac Risk Stratification for podiatry surgery  - Denies CP or SOB  - TTE shows preserved EF, mild LVH  - Not in decompensated HF  - No hx of tachy supa arrhythmia  -s/p cath with nonobstructive moderate CAD  - Elevated risk for low-mod risk pods surgery procedure, no contraindication to proceed     Problem/Plan -2  Problem: Hypertension  - amlodipine increased to 5mg PO daily  - c/w losartan to 25mg PO daily    Problem/Plan -3  Problem: DM II  - HgbA1c 8.4  - ISS as per primary team          RISHABH Bello-NP   Matt Calhoun DO [facility]  Cardiovascular Medicine  [address] [place] [address], [address]  Available through call or text on [employer] TEAM[employer]  Office: [phone]

## 2023-04-13 NOTE — PROGRESS NOTE ADULT - SUBJECTIVE AND OBJECTIVE BOX
Vascular Surgery Progress Note     Subjective/24hour Events: No acute events overnight.     Vital Signs:  Vital Signs Last 24 Hrs  T(C): 37 (13 Apr 2023 05:50), Max: 37.7 (12 Apr 2023 13:09)  T(F): 98.6 (13 Apr 2023 05:50), Max: 99.9 (12 Apr 2023 13:09)  HR: 87 (13 Apr 2023 05:50) (84 - 91)  BP: 145/75 (13 Apr 2023 05:50) (119/54 - 145/75)  BP(mean): --  RR: 18 (13 Apr 2023 05:50) (18 - 18)  SpO2: 98% (13 Apr 2023 05:50) (95% - 99%)    Parameters below as of 13 Apr 2023 05:50  Patient On (Oxygen Delivery Method): room air            I&O's Detail    12 Apr 2023 07:01  -  13 Apr 2023 07:00  --------------------------------------------------------  IN:    IV PiggyBack: 50 mL    Oral Fluid: 1340 mL  Total IN: 1390 mL    OUT:    Voided (mL): 1600 mL  Total OUT: 1600 mL    Total NET: -210 mL            MEDICATIONS  (STANDING):  acetaminophen     Tablet .. 975 milliGRAM(s) Oral every 8 hours  amLODIPine   Tablet 10 milliGRAM(s) Oral daily  aspirin enteric coated 81 milliGRAM(s) Oral daily  atorvastatin 40 milliGRAM(s) Oral at bedtime  ceFAZolin   IVPB      ceFAZolin   IVPB 2000 milliGRAM(s) IV Intermittent every 8 hours  dextrose 5%. 1000 milliLiter(s) (50 mL/Hr) IV Continuous <Continuous>  dextrose 5%. 1000 milliLiter(s) (100 mL/Hr) IV Continuous <Continuous>  dextrose 50% Injectable 25 Gram(s) IV Push once  dextrose 50% Injectable 12.5 Gram(s) IV Push once  dextrose 50% Injectable 25 Gram(s) IV Push once  gabapentin 200 milliGRAM(s) Oral every 8 hours  glucagon  Injectable 1 milliGRAM(s) IntraMuscular once  heparin   Injectable 5000 Unit(s) SubCutaneous every 12 hours  insulin glargine Injectable (LANTUS) 6 Unit(s) SubCutaneous at bedtime  insulin lispro (ADMELOG) corrective regimen sliding scale   SubCutaneous three times a day before meals  insulin lispro (ADMELOG) corrective regimen sliding scale   SubCutaneous at bedtime  insulin lispro Injectable (ADMELOG) 3 Unit(s) SubCutaneous three times a day before meals  levoFLOXacin  Tablet 500 milliGRAM(s) Oral every 24 hours  losartan 25 milliGRAM(s) Oral daily  polyethylene glycol 3350 17 Gram(s) Oral daily  senna 2 Tablet(s) Oral at bedtime    MEDICATIONS  (PRN):  dextrose Oral Gel 15 Gram(s) Oral once PRN Blood Glucose LESS THAN 70 milliGRAM(s)/deciliter  oxyCODONE    IR 5 milliGRAM(s) Oral every 4 hours PRN Moderate Pain (4 - 6)  oxyCODONE    IR 10 milliGRAM(s) Oral every 4 hours PRN Severe Pain (7 - 10)        Physical Exam:  General: NAD, resting comfortably in bed  Pulmonary: No respiratory distress  Incision: RLE incision C/D/I, aquacel dressing  Extremities: WWP, RLE AT, PT signals    Labs:    04-13    136  |  99  |  20  ----------------------------<  157<H>  4.0   |  26  |  0.96    Ca    8.7      13 Apr 2023 07:16  Phos  2.5     04-13  Mg     2.4     04-13                              8.6    10.00 )-----------( 215      ( 13 Apr 2023 07:14 )             26.3         CAPILLARY BLOOD GLUCOSE      POCT Blood Glucose.: 183 mg/dL (12 Apr 2023 21:24)  POCT Blood Glucose.: 264 mg/dL (12 Apr 2023 17:56)  POCT Blood Glucose.: 283 mg/dL (12 Apr 2023 14:05)  POCT Blood Glucose.: 229 mg/dL (12 Apr 2023 09:35)          Imaging:

## 2023-04-13 NOTE — PROGRESS NOTE ADULT - ASSESSMENT
Patient is a 72M with h/o T2DM with neuropathy, HTN presents with c/o pain, blistering and fluid drainage on R foot and worsening pain fr last 2 days. Denies any trauma, hitting foot. On and off he was having chills and fever for last 1 week.  He states that he has been experiencing bilateral calf pain about a month, pain on walking < 2 blocks, noticed R foot swelling and bleeding from the big toe about two days ago.    Seen by his PCP a month ago and at that time he didn't have problem except tingling and numbness. A year ago he had some problem with L foot and didn't require any intervention he added. (30 Mar 2023 10:55)   (10 Apr 2023 07:16)    Pt s/p hospitalization march 30th - APril 6th when he was seen by colleague. R foot hallux circumferential wound to subq with granular base and scant areas of partial thickness necrosis, no purulence or malodor, erythema extends to midfoot without fluctuance. R foot Xray: No OM, no tracking soft tissue air. On 4/1, s/p RLE diagnostic Angiogram, showing single vessel runoff via the posterior tibial artery which is occluded proximally and reconstitutes via collaterals off the popliteal artery. Plan for right superficial femoral artery to posterior tibial artery bypass Monday. s/p Card cath on 4/5. Cultures grew Enterobacter cloacae complex, Numerous Staphylococcus lugdunensis, and  rare Delftia acidovorans group Pt was sent home on Keflex and cipro in anticipation for readmission for the bypass procedure. Pt underwent RLE SFA to PT bypass graft with PTFE yesterday.  ID is asked to evaluate     A/P  Pt with previous xray without cortical erosion. pt with necrotic tip of toe.  appreciate podiatry in put  for surgery on friday  discussed the need for cultures of the "clean" margins    Cont to monitor CBC, Temp curve, VS and patient closely  check ESR and CRP  PLEASE SEND OR CULTURES OF CLEAN MARGINS       Marissa Altamirano M.D. ,   please reach via teams   If no answer, or after 5PM/ weekends,  then please call  [phone]    Assessment and plan discussed with the primary team .

## 2023-04-13 NOTE — PROGRESS NOTE ADULT - ASSESSMENT
71 y/o M w/ PMHx DM/ PVD, right hallux gangrene  - Pt was seen and evaluated.   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  - Pt is s/p RLE SFA to PT bypass graft with PT signal w/ Dr. Mullins on 4/10  - ID recs appreciated  - To OR tomorrow 4/14 for R foot partial hallux amputation  - NPO midnight, AM labs, coags, type and screen, updated COVID, ecg  - Consent signed and in chart  - Documented med/cards optimization appreciated  - Discussed with attending.

## 2023-04-13 NOTE — PROGRESS NOTE ADULT - SUBJECTIVE AND OBJECTIVE BOX
Patient is a 72y old  Male who presents with a chief complaint of R toe infection (13 Apr 2023 08:42)       INTERVAL HPI/OVERNIGHT EVENTS:  Patient seen and evaluated at bedside.  Pt is resting comfortable in NAD. Denies N/V/F/C.      Allergies    No Known Allergies    Intolerances        Vital Signs Last 24 Hrs  T(C): 36.6 (13 Apr 2023 09:42), Max: 37.7 (12 Apr 2023 13:09)  T(F): 97.8 (13 Apr 2023 09:42), Max: 99.9 (12 Apr 2023 13:09)  HR: 77 (13 Apr 2023 09:42) (77 - 89)  BP: 103/56 (13 Apr 2023 09:42) (103/56 - 145/75)  BP(mean): --  RR: 18 (13 Apr 2023 09:42) (18 - 18)  SpO2: 97% (13 Apr 2023 09:42) (96% - 99%)    Parameters below as of 13 Apr 2023 09:42  Patient On (Oxygen Delivery Method): room air        LABS:                        8.6    10.00 )-----------( 215      ( 13 Apr 2023 07:14 )             26.3     04-13    136  |  99  |  20  ----------------------------<  157<H>  4.0   |  26  |  0.96    Ca    8.7      13 Apr 2023 07:16  Phos  2.5     04-13  Mg     2.4     04-13          CAPILLARY BLOOD GLUCOSE      POCT Blood Glucose.: 164 mg/dL (13 Apr 2023 09:42)  POCT Blood Glucose.: 183 mg/dL (12 Apr 2023 21:24)  POCT Blood Glucose.: 264 mg/dL (12 Apr 2023 17:56)  POCT Blood Glucose.: 283 mg/dL (12 Apr 2023 14:05)      Lower Extremity Physical Exam:  Vascular: DP/PT 0/4, B/L, CFT <3 seconds B/L x9, Temperature gradient warm to cool, B/L.   Neuro: Epicritic sensation decreased to the level of digits, B/L.  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 No open wounds or signs of infection to the left foot.     RADIOLOGY & ADDITIONAL TESTS:

## 2023-04-13 NOTE — PROGRESS NOTE ADULT - SUBJECTIVE AND OBJECTIVE BOX
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 975 milliGRAM(s) Oral every 8 hours  amLODIPine   Tablet 10 milliGRAM(s) Oral daily  aspirin enteric coated 81 milliGRAM(s) Oral daily  atorvastatin 40 milliGRAM(s) Oral at bedtime  ceFAZolin   IVPB      ceFAZolin   IVPB 2000 milliGRAM(s) IV Intermittent every 8 hours  dextrose 5%. 1000 milliLiter(s) (50 mL/Hr) IV Continuous <Continuous>  dextrose 5%. 1000 milliLiter(s) (100 mL/Hr) IV Continuous <Continuous>  dextrose 50% Injectable 25 Gram(s) IV Push once  dextrose 50% Injectable 12.5 Gram(s) IV Push once  dextrose 50% Injectable 25 Gram(s) IV Push once  gabapentin 200 milliGRAM(s) Oral every 8 hours  glucagon  Injectable 1 milliGRAM(s) IntraMuscular once  heparin   Injectable 5000 Unit(s) SubCutaneous every 12 hours  insulin glargine Injectable (LANTUS) 6 Unit(s) SubCutaneous at bedtime  insulin lispro (ADMELOG) corrective regimen sliding scale   SubCutaneous three times a day before meals  insulin lispro (ADMELOG) corrective regimen sliding scale   SubCutaneous at bedtime  insulin lispro Injectable (ADMELOG) 3 Unit(s) SubCutaneous three times a day before meals  levoFLOXacin  Tablet 500 milliGRAM(s) Oral every 24 hours  losartan 25 milliGRAM(s) Oral daily  polyethylene glycol 3350 17 Gram(s) Oral daily  senna 2 Tablet(s) Oral at bedtime      Vital Signs Last 24 Hrs  T(C): 36.7 (04-13-23 @ 17:16), Max: 37.2 (04-12-23 @ 21:08)  T(F): 98.1 (04-13-23 @ 17:16), Max: 99 (04-12-23 @ 21:08)  HR: 79 (04-13-23 @ 17:16) (77 - 87)  BP: 104/64 (04-13-23 @ 17:16) (103/56 - 145/75)  BP(mean): --  RR: 18 (04-13-23 @ 17:16) (18 - 18)  SpO2: 97% (04-13-23 @ 17:16) (96% - 99%)    Physical Exam:    Constitutional well preserved,comfortable,pleasant    HEENT PERRLA EOMI,No pallor or icterus    No oral exudate or erythema    Neck supple no JVD or LN    Chest Good AE,CTA    CVS  S1 S2     Abd soft BS normal No tenderness     Ext RLE wound dressed toe dressed     IV site no erythema tenderness or discharge    Joints no swelling or LOM    CNS AAO X 3 no focal    Lab Data:                          8.6    10.00 )-----------( 215      ( 13 Apr 2023 07:14 )             26.3       04-13    136  |  99  |  20  ----------------------------<  157<H>  4.0   |  26  |  0.96    Ca    8.7      13 Apr 2023 07:16  Phos  2.5     04-13  Mg     2.4     04-13        WBC Count: 10.00 (04-13-23 @ 07:14)  WBC Count: 10.82 (04-12-23 @ 07:18)  WBC Count: 8.51 (04-11-23 @ 07:15)  WBC Count: 14.28 (04-10-23 @ 13:46)

## 2023-04-13 NOTE — PROGRESS NOTE ADULT - ASSESSMENT
72M s/p RLE SFA to PT bypass graft with PTFE on 4/10     PLAN:  - Diet, Regular  - DVT ppx/ ASA  - appreciate ID regarding abx   - Please document cards/med clearance for pods intervention Friday  - Pre op; NPO @ [facility]  - r/s home meds  - pain control prn   - PT: Rec CESAR; PT prefers home w/ PT    Vascular p [id number]

## 2023-04-13 NOTE — PROGRESS NOTE ADULT - ASSESSMENT
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 Patient reports worsening pain and discomfort in his right lower extremity. Patient reports intermittent chills for the past week prior to his arrival. Patient reports that he has been experiencing bilateral calf pain for about one month associated with pain and discomfort with walking less than 2 blocks. Patient reports that he noticed right lower extremity edema and bleeding from his great toe about two days prior to arrival. Internal medicine has been consulted on Mr. Paredes's care for medical management and medical clearance. Patient is S/P cardiac cath on previous admission. Patient denies any chest pain, palpitations, SOB or dyspnea. Patient denies dyspnea on exertion or shortness of breath with ambulation or walking. Patient reports that his walking is limited by lower extremity pain. Denies history of HLD, MI, CVA, Cardiac arrythmia, PE or DVT in the past.     Peripheral Arterial Disease, Neuropathy, Right Toe Wound   - LE X-Ray -- Without acute displaced Fx or Dislocation, Osteoarthritic changes, Neg for OM   - EYAD consistent w/ small vessel disease in feet   - LE Duplex -- Neg for DVT   - On ASA 81  and gabapentin    - Care per vascular appreciated -- LE Duplex as noted, S/P Angio, S/P bypass  with RLE SFA bypass graft   - Stress test Abnormal.--> S/P cardiac cath on previous admission -- Moderate non-obstructive atherosclerosis, on Med management with ASA and Lipitor    - ABX as per ID   - Pain control   - Care per Vascular / Podiatry appreciated    Right Toe Infection   - 03/30 BCx2 Neg  final   - Previous  wound culture -- Multiple organisms   - On Ancef and Levofloxacin, ABX as per ID   - 04/12 Xray - as noted  - Podiatry planning for possible first ray resection of R foot versus local wound care pending patient decision   - Cont to monitor CBC, Temp curve, VS and patient closely  - Patient is medically optimized for OR     Anemia  - Monitor and trend levels  - Previous admission Iron panel WNL and Ferritin elevated  - Trend Hgb closely   - Transfuse for Hgb < 8.0 in view of CAD     HTN  - C/w Norvasc and Losartan 25     - Monitor BP, VS and patient closely    DM  - A1C 8.4   - C/w Sliding scale   - Hyperglycemic. Will add 3 units pre-meal and 6 units lantus QHs; Monitor and adjust accordingly   - Monitor glucose levels     Pre-OP Risk Stratification   - TTE w/ EF of 61%, Normal RV function, Mild LVH   - Stress test abnormal. S/P cath, non-obstructive CAD   - Patient is medically optimized for Podiatric intervention.     PPX  - Hep 5K Q12

## 2023-04-14 ENCOUNTER — RESULT REVIEW (OUTPATIENT)
Age: 73
End: 2023-04-14

## 2023-04-14 ENCOUNTER — TRANSCRIPTION ENCOUNTER (OUTPATIENT)
Age: 73
End: 2023-04-14

## 2023-04-14 LAB
ANION GAP SERPL CALC-SCNC: 8 MMOL/L — SIGNIFICANT CHANGE UP (ref 5–17)
APTT BLD: 28.2 SEC — SIGNIFICANT CHANGE UP (ref 27.5–35.5)
BLD GP AB SCN SERPL QL: POSITIVE — SIGNIFICANT CHANGE UP
BUN SERPL-MCNC: 30 MG/DL — HIGH (ref 7–23)
CALCIUM SERPL-MCNC: 8.4 MG/DL — SIGNIFICANT CHANGE UP (ref 8.4–10.5)
CHLORIDE SERPL-SCNC: 99 MMOL/L — SIGNIFICANT CHANGE UP (ref 96–108)
CO2 SERPL-SCNC: 26 MMOL/L — SIGNIFICANT CHANGE UP (ref 22–31)
CREAT SERPL-MCNC: 1.14 MG/DL — SIGNIFICANT CHANGE UP (ref 0.5–1.3)
EGFR: 68 ML/MIN/1.73M2 — SIGNIFICANT CHANGE UP
GLUCOSE BLDC GLUCOMTR-MCNC: 178 MG/DL — HIGH (ref 70–99)
GLUCOSE BLDC GLUCOMTR-MCNC: 179 MG/DL — HIGH (ref 70–99)
GLUCOSE BLDC GLUCOMTR-MCNC: 179 MG/DL — HIGH (ref 70–99)
GLUCOSE BLDC GLUCOMTR-MCNC: 233 MG/DL — HIGH (ref 70–99)
GLUCOSE BLDC GLUCOMTR-MCNC: 245 MG/DL — HIGH (ref 70–99)
GLUCOSE BLDC GLUCOMTR-MCNC: 266 MG/DL — HIGH (ref 70–99)
GLUCOSE BLDC GLUCOMTR-MCNC: 275 MG/DL — HIGH (ref 70–99)
GLUCOSE SERPL-MCNC: 202 MG/DL — HIGH (ref 70–99)
GRAM STN FLD: SIGNIFICANT CHANGE UP
HCT VFR BLD CALC: 22.9 % — LOW (ref 39–50)
HGB BLD-MCNC: 7.4 G/DL — LOW (ref 13–17)
INR BLD: 1.14 RATIO — SIGNIFICANT CHANGE UP (ref 0.88–1.16)
MAGNESIUM SERPL-MCNC: 2.4 MG/DL — SIGNIFICANT CHANGE UP (ref 1.6–2.6)
MCHC RBC-ENTMCNC: 29.6 PG — SIGNIFICANT CHANGE UP (ref 27–34)
MCHC RBC-ENTMCNC: 32.3 GM/DL — SIGNIFICANT CHANGE UP (ref 32–36)
MCV RBC AUTO: 91.6 FL — SIGNIFICANT CHANGE UP (ref 80–100)
NRBC # BLD: 0 /100 WBCS — SIGNIFICANT CHANGE UP (ref 0–0)
PHOSPHATE SERPL-MCNC: 3.1 MG/DL — SIGNIFICANT CHANGE UP (ref 2.5–4.5)
PLATELET # BLD AUTO: 231 K/UL — SIGNIFICANT CHANGE UP (ref 150–400)
POTASSIUM SERPL-MCNC: 3.9 MMOL/L — SIGNIFICANT CHANGE UP (ref 3.5–5.3)
POTASSIUM SERPL-SCNC: 3.9 MMOL/L — SIGNIFICANT CHANGE UP (ref 3.5–5.3)
PROTHROM AB SERPL-ACNC: 13.3 SEC — SIGNIFICANT CHANGE UP (ref 10.5–13.4)
RBC # BLD: 2.5 M/UL — LOW (ref 4.2–5.8)
RBC # FLD: 11.9 % — SIGNIFICANT CHANGE UP (ref 10.3–14.5)
RH IG SCN BLD-IMP: POSITIVE — SIGNIFICANT CHANGE UP
SODIUM SERPL-SCNC: 133 MMOL/L — LOW (ref 135–145)
SPECIMEN SOURCE: SIGNIFICANT CHANGE UP
WBC # BLD: 6.42 K/UL — SIGNIFICANT CHANGE UP (ref 3.8–10.5)
WBC # FLD AUTO: 6.42 K/UL — SIGNIFICANT CHANGE UP (ref 3.8–10.5)

## 2023-04-14 PROCEDURE — 73630 X-RAY EXAM OF FOOT: CPT | Mod: 26,RT

## 2023-04-14 PROCEDURE — 88305 TISSUE EXAM BY PATHOLOGIST: CPT | Mod: 26

## 2023-04-14 PROCEDURE — 99232 SBSQ HOSP IP/OBS MODERATE 35: CPT

## 2023-04-14 PROCEDURE — 88311 DECALCIFY TISSUE: CPT | Mod: 26

## 2023-04-14 RX ORDER — ONDANSETRON 8 MG/1
4 TABLET, FILM COATED ORAL ONCE
Refills: 0 | Status: DISCONTINUED | OUTPATIENT
Start: 2023-04-14 | End: 2023-04-14

## 2023-04-14 RX ORDER — DEXTROSE 50 % IN WATER 50 %
25 SYRINGE (ML) INTRAVENOUS ONCE
Refills: 0 | Status: DISCONTINUED | OUTPATIENT
Start: 2023-04-14 | End: 2023-04-17

## 2023-04-14 RX ORDER — GABAPENTIN 400 MG/1
200 CAPSULE ORAL EVERY 8 HOURS
Refills: 0 | Status: DISCONTINUED | OUTPATIENT
Start: 2023-04-14 | End: 2023-04-17

## 2023-04-14 RX ORDER — SODIUM CHLORIDE 9 MG/ML
1000 INJECTION, SOLUTION INTRAVENOUS
Refills: 0 | Status: DISCONTINUED | OUTPATIENT
Start: 2023-04-14 | End: 2023-04-14

## 2023-04-14 RX ORDER — SODIUM CHLORIDE 9 MG/ML
1000 INJECTION, SOLUTION INTRAVENOUS
Refills: 0 | Status: DISCONTINUED | OUTPATIENT
Start: 2023-04-14 | End: 2023-04-17

## 2023-04-14 RX ORDER — HEPARIN SODIUM 5000 [USP'U]/ML
5000 INJECTION INTRAVENOUS; SUBCUTANEOUS EVERY 12 HOURS
Refills: 0 | Status: DISCONTINUED | OUTPATIENT
Start: 2023-04-14 | End: 2023-04-17

## 2023-04-14 RX ORDER — HYDROMORPHONE HYDROCHLORIDE 2 MG/ML
0.25 INJECTION INTRAMUSCULAR; INTRAVENOUS; SUBCUTANEOUS
Refills: 0 | Status: DISCONTINUED | OUTPATIENT
Start: 2023-04-14 | End: 2023-04-14

## 2023-04-14 RX ORDER — INSULIN LISPRO 100/ML
VIAL (ML) SUBCUTANEOUS
Refills: 0 | Status: DISCONTINUED | OUTPATIENT
Start: 2023-04-14 | End: 2023-04-17

## 2023-04-14 RX ORDER — POLYETHYLENE GLYCOL 3350 17 G/17G
17 POWDER, FOR SOLUTION ORAL DAILY
Refills: 0 | Status: DISCONTINUED | OUTPATIENT
Start: 2023-04-14 | End: 2023-04-17

## 2023-04-14 RX ORDER — AMLODIPINE BESYLATE 2.5 MG/1
10 TABLET ORAL DAILY
Refills: 0 | Status: DISCONTINUED | OUTPATIENT
Start: 2023-04-14 | End: 2023-04-17

## 2023-04-14 RX ORDER — LOSARTAN POTASSIUM 100 MG/1
25 TABLET, FILM COATED ORAL DAILY
Refills: 0 | Status: DISCONTINUED | OUTPATIENT
Start: 2023-04-14 | End: 2023-04-17

## 2023-04-14 RX ORDER — ASPIRIN/CALCIUM CARB/MAGNESIUM 324 MG
81 TABLET ORAL DAILY
Refills: 0 | Status: DISCONTINUED | OUTPATIENT
Start: 2023-04-14 | End: 2023-04-17

## 2023-04-14 RX ORDER — DEXTROSE 50 % IN WATER 50 %
15 SYRINGE (ML) INTRAVENOUS ONCE
Refills: 0 | Status: DISCONTINUED | OUTPATIENT
Start: 2023-04-14 | End: 2023-04-17

## 2023-04-14 RX ORDER — INSULIN LISPRO 100/ML
3 VIAL (ML) SUBCUTANEOUS
Refills: 0 | Status: DISCONTINUED | OUTPATIENT
Start: 2023-04-14 | End: 2023-04-17

## 2023-04-14 RX ORDER — INSULIN LISPRO 100/ML
VIAL (ML) SUBCUTANEOUS AT BEDTIME
Refills: 0 | Status: DISCONTINUED | OUTPATIENT
Start: 2023-04-14 | End: 2023-04-17

## 2023-04-14 RX ORDER — INSULIN LISPRO 100/ML
VIAL (ML) SUBCUTANEOUS EVERY 6 HOURS
Refills: 0 | Status: DISCONTINUED | OUTPATIENT
Start: 2023-04-14 | End: 2023-04-14

## 2023-04-14 RX ORDER — OXYCODONE HYDROCHLORIDE 5 MG/1
10 TABLET ORAL EVERY 4 HOURS
Refills: 0 | Status: DISCONTINUED | OUTPATIENT
Start: 2023-04-14 | End: 2023-04-17

## 2023-04-14 RX ORDER — GLUCAGON INJECTION, SOLUTION 0.5 MG/.1ML
1 INJECTION, SOLUTION SUBCUTANEOUS ONCE
Refills: 0 | Status: DISCONTINUED | OUTPATIENT
Start: 2023-04-14 | End: 2023-04-17

## 2023-04-14 RX ORDER — OXYCODONE AND ACETAMINOPHEN 5; 325 MG/1; MG/1
2 TABLET ORAL EVERY 4 HOURS
Refills: 0 | Status: DISCONTINUED | OUTPATIENT
Start: 2023-04-14 | End: 2023-04-14

## 2023-04-14 RX ORDER — ATORVASTATIN CALCIUM 80 MG/1
40 TABLET, FILM COATED ORAL AT BEDTIME
Refills: 0 | Status: DISCONTINUED | OUTPATIENT
Start: 2023-04-14 | End: 2023-04-17

## 2023-04-14 RX ORDER — SENNA PLUS 8.6 MG/1
2 TABLET ORAL AT BEDTIME
Refills: 0 | Status: DISCONTINUED | OUTPATIENT
Start: 2023-04-14 | End: 2023-04-17

## 2023-04-14 RX ORDER — ACETAMINOPHEN 500 MG
975 TABLET ORAL EVERY 8 HOURS
Refills: 0 | Status: DISCONTINUED | OUTPATIENT
Start: 2023-04-14 | End: 2023-04-17

## 2023-04-14 RX ORDER — INSULIN GLARGINE 100 [IU]/ML
6 INJECTION, SOLUTION SUBCUTANEOUS AT BEDTIME
Refills: 0 | Status: DISCONTINUED | OUTPATIENT
Start: 2023-04-14 | End: 2023-04-17

## 2023-04-14 RX ORDER — DEXTROSE 50 % IN WATER 50 %
12.5 SYRINGE (ML) INTRAVENOUS ONCE
Refills: 0 | Status: DISCONTINUED | OUTPATIENT
Start: 2023-04-14 | End: 2023-04-17

## 2023-04-14 RX ORDER — OXYCODONE HYDROCHLORIDE 5 MG/1
5 TABLET ORAL EVERY 4 HOURS
Refills: 0 | Status: DISCONTINUED | OUTPATIENT
Start: 2023-04-14 | End: 2023-04-17

## 2023-04-14 RX ORDER — CEFAZOLIN SODIUM 1 G
2000 VIAL (EA) INJECTION EVERY 8 HOURS
Refills: 0 | Status: DISCONTINUED | OUTPATIENT
Start: 2023-04-14 | End: 2023-04-17

## 2023-04-14 RX ORDER — FENTANYL CITRATE 50 UG/ML
25 INJECTION INTRAVENOUS
Refills: 0 | Status: DISCONTINUED | OUTPATIENT
Start: 2023-04-14 | End: 2023-04-14

## 2023-04-14 RX ADMIN — ATORVASTATIN CALCIUM 40 MILLIGRAM(S): 80 TABLET, FILM COATED ORAL at 22:05

## 2023-04-14 RX ADMIN — Medication 3 UNIT(S): at 17:25

## 2023-04-14 RX ADMIN — INSULIN GLARGINE 6 UNIT(S): 100 INJECTION, SOLUTION SUBCUTANEOUS at 22:06

## 2023-04-14 RX ADMIN — GABAPENTIN 200 MILLIGRAM(S): 400 CAPSULE ORAL at 22:05

## 2023-04-14 RX ADMIN — Medication 975 MILLIGRAM(S): at 23:05

## 2023-04-14 RX ADMIN — HEPARIN SODIUM 5000 UNIT(S): 5000 INJECTION INTRAVENOUS; SUBCUTANEOUS at 17:25

## 2023-04-14 RX ADMIN — Medication 100 MILLIGRAM(S): at 15:00

## 2023-04-14 RX ADMIN — Medication 975 MILLIGRAM(S): at 06:44

## 2023-04-14 RX ADMIN — Medication 975 MILLIGRAM(S): at 05:40

## 2023-04-14 RX ADMIN — Medication 3: at 01:38

## 2023-04-14 RX ADMIN — HEPARIN SODIUM 5000 UNIT(S): 5000 INJECTION INTRAVENOUS; SUBCUTANEOUS at 05:43

## 2023-04-14 RX ADMIN — Medication 3 UNIT(S): at 12:20

## 2023-04-14 RX ADMIN — Medication 975 MILLIGRAM(S): at 15:59

## 2023-04-14 RX ADMIN — Medication 100 MILLIGRAM(S): at 22:08

## 2023-04-14 RX ADMIN — Medication 975 MILLIGRAM(S): at 14:59

## 2023-04-14 RX ADMIN — GABAPENTIN 200 MILLIGRAM(S): 400 CAPSULE ORAL at 05:40

## 2023-04-14 RX ADMIN — SENNA PLUS 2 TABLET(S): 8.6 TABLET ORAL at 22:05

## 2023-04-14 RX ADMIN — Medication 2: at 12:17

## 2023-04-14 RX ADMIN — GABAPENTIN 200 MILLIGRAM(S): 400 CAPSULE ORAL at 15:00

## 2023-04-14 RX ADMIN — Medication 100 MILLIGRAM(S): at 05:41

## 2023-04-14 RX ADMIN — Medication 975 MILLIGRAM(S): at 22:05

## 2023-04-14 RX ADMIN — Medication 81 MILLIGRAM(S): at 12:17

## 2023-04-14 RX ADMIN — Medication 4: at 17:24

## 2023-04-14 NOTE — PROGRESS NOTE ADULT - SUBJECTIVE AND OBJECTIVE BOX
Patient is a 72y old  Male who presents with a chief complaint of R toe infection (14 Apr 2023 09:02)    Being followed by ID for        Interval history:  s/p surgery -	s/p R foot Partial hallux Amputation : at proximal resection bone was of good quality, no evidence of purulence or infection tracking proximally. 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 975 milliGRAM(s) Oral every 8 hours  amLODIPine   Tablet 10 milliGRAM(s) Oral daily  aspirin enteric coated 81 milliGRAM(s) Oral daily  atorvastatin 40 milliGRAM(s) Oral at bedtime  ceFAZolin   IVPB 2000 milliGRAM(s) IV Intermittent every 8 hours  dextrose 5%. 1000 milliLiter(s) (100 mL/Hr) IV Continuous <Continuous>  dextrose 5%. 1000 milliLiter(s) (50 mL/Hr) IV Continuous <Continuous>  dextrose 50% Injectable 25 Gram(s) IV Push once  dextrose 50% Injectable 12.5 Gram(s) IV Push once  dextrose 50% Injectable 25 Gram(s) IV Push once  gabapentin 200 milliGRAM(s) Oral every 8 hours  glucagon  Injectable 1 milliGRAM(s) IntraMuscular once  heparin   Injectable 5000 Unit(s) SubCutaneous every 12 hours  insulin glargine Injectable (LANTUS) 6 Unit(s) SubCutaneous at bedtime  insulin lispro (ADMELOG) corrective regimen sliding scale   SubCutaneous three times a day before meals  insulin lispro (ADMELOG) corrective regimen sliding scale   SubCutaneous at bedtime  insulin lispro Injectable (ADMELOG) 3 Unit(s) SubCutaneous three times a day before meals  levoFLOXacin  Tablet 500 milliGRAM(s) Oral every 24 hours  losartan 25 milliGRAM(s) Oral daily  polyethylene glycol 3350 17 Gram(s) Oral daily  senna 2 Tablet(s) Oral at bedtime      Vital Signs Last 24 Hrs  T(C): 36.3 (04-14-23 @ 11:40), Max: 37.1 (04-13-23 @ 21:34)  T(F): 97.4 (04-14-23 @ 11:40), Max: 98.7 (04-13-23 @ 21:34)  HR: 68 (04-14-23 @ 11:40) (59 - 88)  BP: 136/70 (04-14-23 @ 11:40) (101/54 - 138/75)  BP(mean): 82 (04-14-23 @ 09:30) (74 - 95)  RR: 18 (04-14-23 @ 11:40) (16 - 18)  SpO2: 99% (04-14-23 @ 11:40) (96% - 100%)    Physical Exam:    Constitutional well preserved,comfortable,pleasant    HEENT PERRLA EOMI,No pallor or icterus    No oral exudate or erythema    Neck supple no JVD or LN    Chest Good AE,CTA    CVS RRR S1 S2 WNl     Abd soft BS normal No tenderness     Ext right foot dressed ,RLE wound from vascular procedure also dressed  IV site no erythema tenderness or discharge    Joints no swelling or LOM    CNS AAO X 3 no focal    Lab Data:                          7.4    6.42  )-----------( 231      ( 14 Apr 2023 04:51 )             22.9       04-14    133<L>  |  99  |  30<H>  ----------------------------<  202<H>  3.9   |  26  |  1.14    Ca    8.4      14 Apr 2023 04:51  Phos  3.1     04-14  Mg     2.4     04-14        WBC Count: 6.42 (04-14-23 @ 04:51)  WBC Count: 10.00 (04-13-23 @ 07:14)  WBC Count: 10.82 (04-12-23 @ 07:18)  WBC Count: 8.51 (04-11-23 @ 07:15)  WBC Count: 14.28 (04-10-23 @ 13:46)

## 2023-04-14 NOTE — BRIEF OPERATIVE NOTE - COMMENTS
Pt is s/p R foot Partial Hallux Amputation closed  - Low concern for residual bone infection, bone was hard at level of resection and appeared vitalized intra-op  - Low concern for viability, adequate intro-op bleeding.   - Stability of discharge from Podiatry standpoint is pending final ID recs

## 2023-04-14 NOTE — BRIEF OPERATIVE NOTE - NSICDXBRIEFPREOP_GEN_ALL_CORE_FT
PRE-OP DIAGNOSIS:  Dry gangrene 14-Apr-2023 09:00:30  Seth Toure  
PRE-OP DIAGNOSIS:  PAD (peripheral artery disease) 10-Apr-2023 13:07:57  Faina Arellano

## 2023-04-14 NOTE — PROGRESS NOTE ADULT - SUBJECTIVE AND OBJECTIVE BOX
Patient is a 72y old  Male who presents with a chief complaint of R toe infection (13 Apr 2023 17:44)      INTERVAL HPI/OVERNIGHT EVENTS:   Pt is scheduled for R foot partial hallux amputation with Dr. Abraham Mcclure at 730am. Patient is aware of procedure and is NPO since midnight.    MEDICATIONS  (STANDING):  acetaminophen     Tablet .. 975 milliGRAM(s) Oral every 8 hours  amLODIPine   Tablet 10 milliGRAM(s) Oral daily  aspirin enteric coated 81 milliGRAM(s) Oral daily  atorvastatin 40 milliGRAM(s) Oral at bedtime  ceFAZolin   IVPB 2000 milliGRAM(s) IV Intermittent every 8 hours  ceFAZolin   IVPB      dextrose 5%. 1000 milliLiter(s) (50 mL/Hr) IV Continuous <Continuous>  dextrose 5%. 1000 milliLiter(s) (100 mL/Hr) IV Continuous <Continuous>  dextrose 50% Injectable 25 Gram(s) IV Push once  dextrose 50% Injectable 12.5 Gram(s) IV Push once  dextrose 50% Injectable 25 Gram(s) IV Push once  gabapentin 200 milliGRAM(s) Oral every 8 hours  glucagon  Injectable 1 milliGRAM(s) IntraMuscular once  heparin   Injectable 5000 Unit(s) SubCutaneous every 12 hours  insulin glargine Injectable (LANTUS) 6 Unit(s) SubCutaneous at bedtime  insulin lispro (ADMELOG) corrective regimen sliding scale   SubCutaneous every 6 hours  insulin lispro Injectable (ADMELOG) 3 Unit(s) SubCutaneous three times a day before meals  levoFLOXacin  Tablet 500 milliGRAM(s) Oral every 24 hours  losartan 25 milliGRAM(s) Oral daily  polyethylene glycol 3350 17 Gram(s) Oral daily  senna 2 Tablet(s) Oral at bedtime    MEDICATIONS  (PRN):  dextrose Oral Gel 15 Gram(s) Oral once PRN Blood Glucose LESS THAN 70 milliGRAM(s)/deciliter  oxyCODONE    IR 5 milliGRAM(s) Oral every 4 hours PRN Moderate Pain (4 - 6)  oxyCODONE    IR 10 milliGRAM(s) Oral every 4 hours PRN Severe Pain (7 - 10)      Allergies    No Known Allergies    Intolerances        Vital Signs Last 24 Hrs  T(C): 36.7 (14 Apr 2023 06:39), Max: 37.1 (13 Apr 2023 21:34)  T(F): 98.1 (14 Apr 2023 06:35), Max: 98.7 (13 Apr 2023 21:34)  HR: 84 (14 Apr 2023 06:39) (77 - 88)  BP: 138/75 (14 Apr 2023 06:39) (103/56 - 138/75)  BP(mean): 95 (14 Apr 2023 06:39) (95 - 95)  RR: 16 (14 Apr 2023 06:39) (16 - 18)  SpO2: 97% (14 Apr 2023 06:39) (96% - 99%)    Parameters below as of 14 Apr 2023 06:39    O2 Flow (L/min): 2      LABS:                        7.4    6.42  )-----------( 231      ( 14 Apr 2023 04:51 )             22.9     04-14    133<L>  |  99  |  30<H>  ----------------------------<  202<H>  3.9   |  26  |  1.14    Ca    8.4      14 Apr 2023 04:51  Phos  3.1     04-14  Mg     2.4     04-14      PT/INR - ( 14 Apr 2023 04:51 )   PT: 13.3 sec;   INR: 1.14 ratio         PTT - ( 14 Apr 2023 04:51 )  PTT:28.2 sec    CAPILLARY BLOOD GLUCOSE      POCT Blood Glucose.: 178 mg/dL (14 Apr 2023 06:32)  POCT Blood Glucose.: 266 mg/dL (14 Apr 2023 01:36)  POCT Blood Glucose.: 275 mg/dL (14 Apr 2023 00:10)  POCT Blood Glucose.: 257 mg/dL (13 Apr 2023 21:27)  POCT Blood Glucose.: 242 mg/dL (13 Apr 2023 18:16)  POCT Blood Glucose.: 238 mg/dL (13 Apr 2023 14:07)  POCT Blood Glucose.: 164 mg/dL (13 Apr 2023 09:42)      RADIOLOGY & ADDITIONAL TESTS:    Plan:   To OR today at 730 with Dr. Mcclure for R foot partial hallux amputation.   CXR on sunrise.  EKG on sunrise.  Medical/Cardiac clearance since 4/12 and documented in chart.  Consent signed and in chart.  Procedure was explained to patient in detail. All alternatives, risks and complications were discussed. All questions answered.

## 2023-04-14 NOTE — DISCHARGE NOTE PROVIDER - NSDCMRMEDTOKEN_GEN_ALL_CORE_FT
acetaminophen 325 mg oral tablet: 2 tab(s) orally every 6 hours As needed Temp greater or equal to 38C (100.4F), Moderate Pain (4 - 6)  amLODIPine 10 mg oral tablet: 1 tab(s) orally once a day  aspirin 81 mg oral delayed release tablet: 1 tab(s) orally once a day  cephalexin 500 mg oral capsule: 1 cap(s) orally 4 times a day  glipiZIDE 5 mg oral tablet: 1 tab(s) orally once a day (in the morning)  levoFLOXacin 500 mg oral tablet: 1 tab(s) orally once a day  losartan 25 mg oral tablet: 1 tab(s) orally once a day  metFORMIN 750 mg oral tablet, extended release: 1 orally once a day  mupirocin 2% topical ointment: Apply topically to affected area once a day  rolling walker: I73.9  rosuvastatin 10 mg oral tablet: 1 tab(s) orally once a day  shower chair: I73.9  shower chair: I73.9   acetaminophen 325 mg oral tablet: 3 tab(s) orally every 8 hours  amLODIPine 10 mg oral tablet: 1 tab(s) orally once a day  aspirin 81 mg oral delayed release tablet: 1 tab(s) orally once a day  cephalexin 500 mg oral capsule: 1 cap(s) orally 4 times a day  glipiZIDE 5 mg oral tablet: 1 tab(s) orally once a day (in the morning)  levoFLOXacin 500 mg oral tablet: 1 tab(s) orally once a day  losartan 25 mg oral tablet: 1 tab(s) orally once a day  metFORMIN 750 mg oral tablet, extended release: 1 orally once a day  polyethylene glycol 3350 oral powder for reconstitution: 17 gram(s) orally once a day  rolling walker: I73.9  rosuvastatin 10 mg oral tablet: 1 tab(s) orally once a day  senna leaf extract oral tablet: 2 tab(s) orally once a day (at bedtime)  shower chair: I73.9  shower chair: I73.9   acetaminophen 325 mg oral tablet: 3 tab(s) orally every 8 hours  amLODIPine 10 mg oral tablet: 1 tab(s) orally once a day  aspirin 81 mg oral delayed release tablet: 1 tab(s) orally once a day  cephalexin 500 mg oral capsule: 1 cap(s) orally 4 times a day  gabapentin 100 mg oral capsule: 2 cap(s) orally every 8 hours  glipiZIDE 5 mg oral tablet: 1 tab(s) orally once a day (in the morning)  levoFLOXacin 500 mg oral tablet: 1 tab(s) orally once a day  losartan 25 mg oral tablet: 1 tab(s) orally once a day  metFORMIN 750 mg oral tablet, extended release: 1 orally once a day  oxyCODONE 5 mg oral tablet: 1 tab(s) orally every 6 hours as needed for  severe pain MDD: 4  polyethylene glycol 3350 oral powder for reconstitution: 17 gram(s) orally once a day  rolling walker: I73.9  rosuvastatin 10 mg oral tablet: 1 tab(s) orally once a day  senna leaf extract oral tablet: 2 tab(s) orally once a day (at bedtime)  shower chair: I73.9

## 2023-04-14 NOTE — DISCHARGE NOTE PROVIDER - NSDCCPCAREPLAN_GEN_ALL_CORE_FT
PRINCIPAL DISCHARGE DIAGNOSIS  Diagnosis: PAD (peripheral artery disease)  Assessment and Plan of Treatment: Antibiotics: Continue taking levaquin 500mg once daily and Keflex 500mg 4x /day for 1 week, follow up with Dr. Mcclure as bone cultures still pending to determine if a longer duration of antibiotics is needed.  WOUND CARE: RLE dry gauze and paper tape, change daily or as needed.  BATHING: Please do not submerge wound underwater. You may shower and/or sponge bathe.  ACTIVITY: No heavy lifting anything more than 10-15lbs or straining. Otherwise, you may return to your usual level of physical activity. If you are taking narcotic pain medication (such as Percocet), do NOT drive a car, operate machinery or make important decisions.  DIET: Low fiber diet  NOTIFY YOUR SURGEON IF: You have any bleeding that does not stop, any pus draining from your wound, any fever (over 100.4 F) or chills, persistent nausea/vomiting with inability to tolerate food or liquids, persistent diarrhea, or if your pain is not controlled on your discharge pain medications.  FOLLOW-UP:  1. Please call to make a follow-up appointment within one week of discharge with Dr. Mcclure and Dr. Mullins.  2. Please follow up with your primary care physician in one week regarding your hospitalization.     PRINCIPAL DISCHARGE DIAGNOSIS  Diagnosis: PAD (peripheral artery disease)  Assessment and Plan of Treatment: Antibiotics: Continue taking levaquin 500mg once daily and Keflex 500mg 4x /day for 1 week, follow up with Dr. Mcclure as bone cultures still pending to determine if a longer duration of antibiotics is needed.  WOUND CARE: RLE dry gauze and paper tape, change daily or as needed.  BATHING: Please do not submerge wound underwater. You may shower and/or sponge bathe.  ACTIVITY: No heavy lifting anything more than 10-15lbs or straining. Otherwise, you may return to your usual level of physical activity. If you are taking narcotic pain medication (such as Percocet), do NOT drive a car, operate machinery or make important decisions.  DIET: Low fiber diet  PAIN: A prescription for oxycodone has been sent to the pharmacy. You should only take these for severe pain. For mild or moderate pain, you may take 975mg of tylenol every 6 hours. Do not exceed more than 4G per day. Please take a stool softener (senna, colace, or miralax) while taking narcotic pain medication to avoid opioid-induced constipation.   NOTIFY YOUR SURGEON IF: You have any bleeding that does not stop, any pus draining from your wound, any fever (over 100.4 F) or chills, persistent nausea/vomiting with inability to tolerate food or liquids, persistent diarrhea, or if your pain is not controlled on your discharge pain medications.  FOLLOW-UP:  1. Please call to make a follow-up appointment within one week of discharge with Dr. Mcclure and Dr. Mullins.  2. Please follow up with your primary care physician in one week regarding your hospitalization.

## 2023-04-14 NOTE — PROGRESS NOTE ADULT - SUBJECTIVE AND OBJECTIVE BOX
Name of Patient : ALEE DUNN  MRN: [id number]  Date of visit: 04-14-23 @ 16:00      Subjective: Patient seen and examined. No new events except as noted.   Patient seen S/P OR for R foot partial hallux resection with podiatry.   Drop in hemoglobin. Planned for 1 unit PRBCs today   Reports pain is controlled.     REVIEW OF SYSTEMS:    CONSTITUTIONAL: Generalized Post- OR weakness   EYES/ENT: No visual changes;  No vertigo or throat pain   NECK: No pain or stiffness  RESPIRATORY: No cough, wheezing, hemoptysis; No shortness of breath  CARDIOVASCULAR: No chest pain or palpitations  GASTROINTESTINAL: No abdominal or epigastric pain. No nausea, vomiting, or hematemesis; No diarrhea or constipation. No melena or hematochezia.  GENITOURINARY: No dysuria, frequency or hematuria  NEUROLOGICAL: No numbness or weakness  SKIN: No itching, burning, rashes, or lesions   MSK: RLE resection; Dressing in place; Pain controlled   All other review of systems is negative unless indicated above.    MEDICATIONS:  MEDICATIONS  (STANDING):  acetaminophen     Tablet .. 975 milliGRAM(s) Oral every 8 hours  amLODIPine   Tablet 10 milliGRAM(s) Oral daily  aspirin enteric coated 81 milliGRAM(s) Oral daily  atorvastatin 40 milliGRAM(s) Oral at bedtime  ceFAZolin   IVPB 2000 milliGRAM(s) IV Intermittent every 8 hours  dextrose 5%. 1000 milliLiter(s) (50 mL/Hr) IV Continuous <Continuous>  dextrose 5%. 1000 milliLiter(s) (100 mL/Hr) IV Continuous <Continuous>  dextrose 50% Injectable 25 Gram(s) IV Push once  dextrose 50% Injectable 12.5 Gram(s) IV Push once  dextrose 50% Injectable 25 Gram(s) IV Push once  gabapentin 200 milliGRAM(s) Oral every 8 hours  glucagon  Injectable 1 milliGRAM(s) IntraMuscular once  heparin   Injectable 5000 Unit(s) SubCutaneous every 12 hours  insulin glargine Injectable (LANTUS) 6 Unit(s) SubCutaneous at bedtime  insulin lispro (ADMELOG) corrective regimen sliding scale   SubCutaneous three times a day before meals  insulin lispro (ADMELOG) corrective regimen sliding scale   SubCutaneous at bedtime  insulin lispro Injectable (ADMELOG) 3 Unit(s) SubCutaneous three times a day before meals  levoFLOXacin  Tablet 500 milliGRAM(s) Oral every 24 hours  losartan 25 milliGRAM(s) Oral daily  polyethylene glycol 3350 17 Gram(s) Oral daily  senna 2 Tablet(s) Oral at bedtime      PHYSICAL EXAM:  T(C): 36.7 (04-14-23 @ 13:21), Max: 37.1 (04-13-23 @ 21:34)  HR: 80 (04-14-23 @ 13:21) (59 - 88)  BP: 124/68 (04-14-23 @ 13:21) (101/54 - 138/75)  RR: 18 (04-14-23 @ 13:21) (16 - 18)  SpO2: 98% (04-14-23 @ 13:21) (96% - 100%)  Wt(kg): --  I&O's Summary    13 Apr 2023 07:01  -  14 Apr 2023 07:00  --------------------------------------------------------  IN: 840 mL / OUT: 1400 mL / NET: -560 mL    14 Apr 2023 07:01  -  14 Apr 2023 16:00  --------------------------------------------------------  IN: 555 mL / OUT: 675 mL / NET: -120 mL      Height (cm): 175.3 (04-14 @ 06:39)  Weight (kg): 72.1 (04-14 @ 06:39)  BMI (kg/m2): 23.5 (04-14 @ 06:39)  BSA (m2): 1.87 (04-14 @ 06:39)    Appearance: Normal; Lying down in bed   HEENT:  Eyes are open   Lymphatic: No lymphadenopathy   Cardiovascular: Normal S1 S2, no JVD  Respiratory: normal effort , clear  Gastrointestinal:  Soft, Non-tender  Skin: No rashes,  warm to touch  Psychiatry:  Mood & affect appropriate  Musculoskeletal: RLE dressing in place       04-13-23 @ 07:01  -  04-14-23 @ 07:00  --------------------------------------------------------  IN: 840 mL / OUT: 1400 mL / NET: -560 mL    04-14-23 @ 07:01  -  04-14-23 @ 16:00  --------------------------------------------------------  IN: 555 mL / OUT: 675 mL / NET: -120 mL                                7.4    6.42  )-----------( 231      ( 14 Apr 2023 04:51 )             22.9               04-14    133<L>  |  99  |  30<H>  ----------------------------<  202<H>  3.9   |  26  |  1.14    Ca    8.4      14 Apr 2023 04:51  Phos  3.1     04-14  Mg     2.4     04-14      PT/INR - ( 14 Apr 2023 04:51 )   PT: 13.3 sec;   INR: 1.14 ratio         PTT - ( 14 Apr 2023 04:51 )  PTT:28.2 sec                   < from: Xray Foot AP + Lateral + Oblique, Right (04.14.23 @ 08:44) >      IMPRESSION:  Status post partial amputation of the first ray at the level of the   proximal phalanx. Cortical margins are sharp. Joint alignment is intact.   There are vascular calcifications.    --- End of Report ---    < end of copied text >

## 2023-04-14 NOTE — DISCHARGE NOTE PROVIDER - NSDCFUADDAPPT_GEN_ALL_CORE_FT
========================  Podiatry Discharge Instructions:  - Follow up: Please follow up with Dr. Mcclure within 1 week of discharge from the hospital, please call [phone] for appointment and discuss that you recently were seen in the hospital.  - Wound Care: keep dressing  Clean Dry and Intact until follow up  - Weight bearing: Please weight bearing as tolerated in a surgical shoe to R foot   - Antibiotics: Please continue as instructed.    ========================

## 2023-04-14 NOTE — DISCHARGE NOTE PROVIDER - PROVIDER TOKENS
PROVIDER:[TOKEN:[[id number]]] PROVIDER:[TOKEN:[[id number]],FOLLOWUP:[1 week]],PROVIDER:[TOKEN:[[id number]],FOLLOWUP:[1 week]],PROVIDER:[TOKEN:[[id number]]],PROVIDER:[TOKEN:[[id number]]],PROVIDER:[TOKEN:[[id number]]]

## 2023-04-14 NOTE — PRE-ANESTHESIA EVALUATION ADULT - TEMPERATURE IN CELSIUS (DEGREES C)
CHIEF COMPLAINT  Chief Complaint   Patient presents with   • Cancer Pain     OXYCODONE LAST DOSE COUPLE DAYS AGO.       Primary Care  Angelica Rodriguez MD    Subjective   Gladys Garrison is a 59 y.o. female  who presents for cancer-related pain.  She has a significant history of breast cancer which is unfortunately metastatic.  She states she has lesions on her ribs as well as in her low back.  She describes pain in her low back which radiates in her legs as well as pain in her chest and side from reported rib fractures.  She is very hesitant to take any narcotic pain medication because she does not want to become dependent on medication.  She also has significant concerns regarding escalating pain requirements and inability to meet the requirements that she starts on narcotic pain medication.  She states in the past, she had excellent results with steroid injections for her shoulder knee pain.  She was referred by her oncologist for further management and nonnarcotic options.    History of Present Illness     Location: Neck, back, ribs  Onset: Years ago  Duration: Gradually worsening  Timing: Throughout the day  Quality: Stabbing, aching  Severity: Today: 8       Last Week: 8       Worst: 9  Modifying Factors: The pain is fairly constant without any significant exacerbating or relieving factors    Physical Therapy: no    Interval Update 01/10/2022: Recently had her psych eval done.  Awaiting results.  Otherwise, anticipating intrathecal pain pump trial    The following portions of the patient's history were reviewed and updated as appropriate: allergies, current medications, past family history, past medical history, past social history, past surgical history and problem list.      Current Outpatient Medications:   •  amLODIPine (Norvasc) 5 MG tablet, Take 1 tablet by mouth Daily., Disp: 30 tablet, Rfl: 1  •  anastrozole (ARIMIDEX) 1 MG tablet, Take 1 tablet by mouth Daily., Disp: 30 tablet, Rfl: 6  •  aspirin 
"81 MG chewable tablet, Chew 1 tablet Daily., Disp: 30 tablet, Rfl: 1  •  Blood Glucose Monitoring Suppl (Accu-Chek Araceli) device, Use as instructed   To test blood sugar bid, Disp: 1 each, Rfl: 0  •  Calcium Carbonate-Vit D-Min (Calcium 600+D Plus Minerals) 600-400 MG-UNIT chewable tablet, Chew 600 mg 2 (Two) Times a Day., Disp: 60 each, Rfl: 6  •  celecoxib (CeleBREX) 200 MG capsule, Take 1 capsule by mouth Daily., Disp: 30 capsule, Rfl: 1  •  glucose blood (Accu-Chek Araceli Plus) test strip, Use as instructed   To test bid, Disp: 200 each, Rfl: 3  •  insulin NPH-insulin regular (humuLIN 70/30,novoLIN 70/30) (70-30) 100 UNIT/ML injection, Inject 40 Units under the skin into the appropriate area as directed Every Morning., Disp: , Rfl:   •  insulin NPH-insulin regular (humuLIN 70/30,novoLIN 70/30) (70-30) 100 UNIT/ML injection, Inject 30 Units under the skin into the appropriate area as directed Every Evening., Disp: , Rfl:   •  Lancets (accu-chek soft touch) lancets, Test bid, Disp: 200 each, Rfl: 3  •  lisinopril (PRINIVIL,ZESTRIL) 20 MG tablet, Take 20 mg by mouth Daily., Disp: , Rfl:   •  oxyCODONE-acetaminophen (PERCOCET)  MG per tablet, Take 1 tablet by mouth Daily As Needed for Moderate Pain  or Severe Pain . 1 tablet every 4-6 hours prn pain, Disp: 20 tablet, Rfl: 0  •  Palbociclib (Ibrance) 125 MG capsule capsule, Take 1 capsule by mouth Daily. Take on days 1-21 with food and then off for 7 days., Disp: 21 capsule, Rfl: 6  •  rosuvastatin (Crestor) 20 MG tablet, Take 1 tablet by mouth Every Night., Disp: 90 tablet, Rfl: 0    Review of Systems   Cardiovascular: Positive for chest pain.   Genitourinary: Positive for flank pain.   Musculoskeletal: Positive for arthralgias, back pain, gait problem, myalgias and neck pain.       Vitals:    01/10/22 1036   BP: 156/75   Pulse: 87   Resp: 16   SpO2: 95%   Weight: 72.1 kg (159 lb)   Height: 154.9 cm (61\")   PainSc:   8       Objective   Physical Exam  Vitals "
and nursing note reviewed.   Constitutional:       General: She is not in acute distress.     Appearance: Normal appearance. She is obese.   Musculoskeletal:         General: Normal range of motion.   Neurological:      General: No focal deficit present.      Mental Status: She is alert.      Sensory: No sensory deficit.      Motor: No weakness.           Assessment/Plan   Problems Addressed this Visit     None      Visit Diagnoses     Cancer associated pain    -  Primary    Relevant Orders    Epidural Block      Diagnoses       Codes Comments    Cancer associated pain    -  Primary ICD-10-CM: G89.3  ICD-9-CM: 338.3           Plan:  1. She states that she had her psychological evaluation done  2. We will go ahead and schedule intrathecal pain pump trial pending the results of the psych eval  3. Will schedule for intrathecal pain pump trial with 25 mcg of fentanyl  4. She is scheduled to undergo MRI on 19 January  --- Follow-up next available for trial of intrathecal pain pump           INSPECT REPORT    As part of the patient's treatment plan, I may be prescribing controlled substances. The patient has been made aware of appropriate use of such medications, including potential risk of somnolence, limited ability to drive and/or work safely, and the potential for dependence or overdose. It has also bee made clear that these medications are for use by this patient only, without concomitant use of alcohol or other substances unless prescribed.     Patient has completed prescribing agreement detailing terms of continued prescribing of controlled substances, including monitoring JULIA reports, urine drug screening, and pill counts if necessary. The patient is aware that inappropriate use will results in cessation of prescribing such medications.    INSPECT report has been reviewed and scanned into the patient's chart.    As the clinician, I personally reviewed the INSPECT from 1/9/2022.    History and physical exam 
exhibit continued safe and appropriate use of controlled substances.      EMR Dragon/Transcription disclaimer:   Much of this encounter note is an electronic transcription/translation of spoken language to printed text. The electronic translation of spoken language may permit erroneous, or at times, nonsensical words or phrases to be inadvertently transcribed; Although I have reviewed the note for such errors, some may still exist.         
36.7
36.9

## 2023-04-14 NOTE — BRIEF OPERATIVE NOTE - OPERATION/FINDINGS
s/p R foot Partial hallux Amputation : at proximal resection bone was of good quality, no evidence of purulence or infection tracking proximally. Incision primarily closed

## 2023-04-14 NOTE — PRE-OP CHECKLIST - 1.
patient oriented to unit and procedure
emotional support and preop teaching provided to pt and family.

## 2023-04-14 NOTE — BRIEF OPERATIVE NOTE - NSICDXBRIEFPOSTOP_GEN_ALL_CORE_FT
POST-OP DIAGNOSIS:  Dry gangrene 14-Apr-2023 09:01:00  Seth Toure  
POST-OP DIAGNOSIS:  PAD (peripheral artery disease) 10-Apr-2023 13:08:26  Faina Arellano

## 2023-04-14 NOTE — PROGRESS NOTE ADULT - ASSESSMENT
Patient is a 72 year old male with a PMHx of HTN, Type II DM associated with neuropathy whore presents to [facility] with a chief complaint of pain, blistering and fluid drainage on his right foot. Patient reports worsening pain and discomfort in his right lower extremity. Patient reports intermittent chills for the past week prior to his arrival. Patient reports that he has been experiencing bilateral calf pain for about one month associated with pain and discomfort with walking less than 2 blocks. Patient reports that he noticed right lower extremity edema and bleeding from his great toe about two days prior to arrival. Internal medicine has been consulted on Mr. Paredes's care for medical management and medical clearance. Patient is S/P cardiac cath on previous admission. Patient denies any chest pain, palpitations, SOB or dyspnea. Patient denies dyspnea on exertion or shortness of breath with ambulation or walking. Patient reports that his walking is limited by lower extremity pain. Denies history of HLD, MI, CVA, Cardiac arrythmia, PE or DVT in the past.     Peripheral Arterial Disease, Neuropathy, Right Toe Wound   - LE X-Ray -- Without acute displaced Fx or Dislocation, Osteoarthritic changes, Neg for OM   - EYAD consistent w/ small vessel disease in feet   - LE Duplex -- Neg for DVT   - On ASA 81  and gabapentin    - Care per vascular appreciated -- LE Duplex as noted, S/P Angio, S/P bypass  with RLE SFA bypass graft   - Stress test Abnormal --> S/P cardiac cath on previous admission -- Moderate non-obstructive atherosclerosis, on Med management with ASA and Lipitor    - ABX as per ID   - Pain control   - Care per Vascular / Podiatry appreciated    Right Toe Infection   - 03/30 BCx2 Neg  final   - Previous  wound culture -- Multiple organisms   - On Ancef and Levofloxacin, ABX as per ID   - 04/12 Xray - as noted  - S/P OR with podiatry 04/14, S/P partial amputation of 1st ray; F/u pathology    - Cont to monitor CBC, Temp curve, VS and patient closely  - Pain control     Anemia  - Monitor and trend levels  - Previous admission Iron panel WNL and Ferritin elevated  - Trend Hgb closely   - Transfuse for Hgb < 8.0 in view of CAD   - Drop in Hgb, Planned for 1 unit of PRBCs 04/14. Most post-transfusion CBC and for gross bleeding.     HTN  - C/w Norvasc and Losartan 25     - Monitor BP, VS and patient closely    DM  - A1C 8.4   - C/w Sliding scale   - Hyperglycemic. Will add 3 units pre-meal and 6 units lantus QHs; Monitor and adjust accordingly   - Monitor glucose levels     Pre-OP Risk Stratification   - TTE w/ EF of 61%, Normal RV function, Mild LVH   - Stress test abnormal. S/P cath, non-obstructive CAD   - Patient is medically optimized for Podiatric intervention.     PPX  - Hep 5K Q12

## 2023-04-14 NOTE — DISCHARGE NOTE PROVIDER - HOSPITAL COURSE
Patient is a 72M with h/o T2DM with neuropathy, HTN presents with c/o pain, blistering and fluid drainage on R foot and worsening pain fr last 2 days. Denies any trauma, hitting foot. On and off he was having chills and fever for last 1 week.  He states that he has been experiencing bilateral calf pain about a month, pain on walking < 2 blocks, noticed R foot swelling and bleeding from the big toe about two days ago.    Seen by his PCP a month ago and at that time he didn't have problem except tingling and numbness. A year ago he had some problem with L foot and didn't require any intervention he added. (30 Mar 2023 10:55).  Patient admitted to Vascular, s/p RLE SFA to PT bypass graft with PTFE on 4/10. cabello dc and passed TOV.  PT eval: home with no needs. ID/Pods/Medicine/Cards consulted. On Levaquin and Ancef.  Planning for OR Friday with Pods. ON 4/12, pt s/p R foot partial hallux amputation. Patient tolerated and sent to pacu then back to floor. Per ID, ok for po abx on dc x 1 week. At the time of discharge, the patient was hemodynamically stable, was tolerating PO diet, was voiding urine and passing stool, was ambulating, and was comfortable with adequate pain control. The patient was instructed to follow up with Dr. Mullins, Dr. Mcclure, Cards/Medicine  within 1-2 weeks after discharge from the hospital. The patient felt comfortable with discharge. The patient was discharged to home with po abx. The patient had no other issues.

## 2023-04-14 NOTE — PROGRESS NOTE ADULT - ASSESSMENT
Patient is a 72M with h/o T2DM with neuropathy, HTN presents with c/o pain, blistering and fluid drainage on R foot and worsening pain fr last 2 days. Denies any trauma, hitting foot. On and off he was having chills and fever for last 1 week.  He states that he has been experiencing bilateral calf pain about a month, pain on walking < 2 blocks, noticed R foot swelling and bleeding from the big toe about two days ago.    Seen by his PCP a month ago and at that time he didn't have problem except tingling and numbness. A year ago he had some problem with L foot and didn't require any intervention he added. (30 Mar 2023 10:55)   (10 Apr 2023 07:16)    Pt s/p hospitalization march 30th - APril 6th when he was seen by colleague. R foot hallux circumferential wound to subq with granular base and scant areas of partial thickness necrosis, no purulence or malodor, erythema extends to midfoot without fluctuance. R foot Xray: No OM, no tracking soft tissue air. On 4/1, s/p RLE diagnostic Angiogram, showing single vessel runoff via the posterior tibial artery which is occluded proximally and reconstitutes via collaterals off the popliteal artery. Plan for right superficial femoral artery to posterior tibial artery bypass Monday. s/p Card cath on 4/5. Cultures grew Enterobacter cloacae complex, Numerous Staphylococcus lugdunensis, and  rare Delftia acidovorans group Pt was sent home on Keflex and cipro in anticipation for readmission for the bypass procedure. Pt underwent RLE SFA to PT bypass graft with PTFE yesterday.  ID is asked to evaluate     A/P  Pt with previous xray without cortical erosion. pt with necrotic tip of toe.  appreciate podiatry in put  s/p surgery -	s/p R foot Partial hallux Amputation : at proximal resection bone was of good quality, no evidence of purulence or infection tracking proximally. Incision primarily closed  awaitr cultures of the "clean" margins , to make final plans  continue current abs for now  monitor Qtc and LFTs on cipro          Marissa Altamirano M.D. ,   please reach via teams   If no answer, or after 5PM/ weekends,  then please call  [phone]    Assessment and plan discussed with the primary team   ID service will be covering over the weekend. Please call for acute issues or questions. [phone]  .

## 2023-04-14 NOTE — PROGRESS NOTE ADULT - SUBJECTIVE AND OBJECTIVE BOX
DATE OF SERVICE: 04-14-23 @ 14:45    Patient is a 72y old  Male who presents with a chief complaint of R toe infection (14 Apr 2023 11:49)      INTERVAL HISTORY: Feels ok.     REVIEW OF SYSTEMS:  CONSTITUTIONAL: No weakness  EYES/ENT: No visual changes;  No throat pain   NECK: No pain or stiffness  RESPIRATORY: No cough, wheezing; No shortness of breath  CARDIOVASCULAR: No chest pain or palpitations  GASTROINTESTINAL: No abdominal  pain. No nausea, vomiting, or hematemesis  GENITOURINARY: No dysuria, frequency or hematuria  NEUROLOGICAL: No stroke like symptoms  SKIN: No rashes    	  MEDICATIONS:  amLODIPine   Tablet 10 milliGRAM(s) Oral daily  losartan 25 milliGRAM(s) Oral daily        PHYSICAL EXAM:  T(C): 36.3 (04-14-23 @ 11:40), Max: 37.1 (04-13-23 @ 21:34)  HR: 68 (04-14-23 @ 11:40) (59 - 88)  BP: 136/70 (04-14-23 @ 11:40) (101/54 - 138/75)  RR: 18 (04-14-23 @ 11:40) (16 - 18)  SpO2: 99% (04-14-23 @ 11:40) (96% - 100%)  Wt(kg): --  I&O's Summary    13 Apr 2023 07:01  -  14 Apr 2023 07:00  --------------------------------------------------------  IN: 840 mL / OUT: 1400 mL / NET: -560 mL    14 Apr 2023 07:01  -  14 Apr 2023 14:45  --------------------------------------------------------  IN: 75 mL / OUT: 375 mL / NET: -300 mL      Height (cm): 175.3 (04-14 @ 06:39)  Weight (kg): 72.1 (04-14 @ 06:39)  BMI (kg/m2): 23.5 (04-14 @ 06:39)  BSA (m2): 1.87 (04-14 @ 06:39)    Appearance: In no distress	  HEENT:    PERRL, EOMI	  Cardiovascular:  S1 S2, No JVD  Respiratory: Lungs clear to auscultation	  Gastrointestinal:  Soft, Non-tender, + BS	  Vascularature:  No edema of LE  Psychiatric: Appropriate affect   Neuro: no acute focal deficits                               7.4    6.42  )-----------( 231      ( 14 Apr 2023 04:51 )             22.9     04-14    133<L>  |  99  |  30<H>  ----------------------------<  202<H>  3.9   |  26  |  1.14    Ca    8.4      14 Apr 2023 04:51  Phos  3.1     04-14  Mg     2.4     04-14          Labs personally reviewed      ASSESSMENT/PLAN: 	      Patient is a 72 year old male with a PMHx of HTN, Type II DM associated with neuropathy whore presents to [facility] with a chief complaint of pain, blistering and fluid drainage on his right foot.    Problem/Plan -1  Problem: Cardiac Risk Stratification for podiatry surgery  - Denies CP or SOB  - TTE shows preserved EF, mild LVH  - Not in decompensated HF  - No hx of tachy supa arrhythmia  -s/p cath with nonobstructive moderate CAD  - Elevated risk for low-mod risk pods surgery procedure, no contraindication to proceed     Problem/Plan -2  Problem: Hypertension  - amlodipine increased to 5mg PO daily  - c/w losartan to 25mg PO daily    Problem/Plan -3  Problem: DM II  - HgbA1c 8.4  - ISS as per primary team      Berenice Branch, RISHABH-NP   Matt Calhoun DO [facility]  Cardiovascular Medicine  [address] [facility], [address]  Available through call or text on [employer] TEAM[employer]  Office: [phone]   DATE OF SERVICE: 04-14-23 @ 14:45    Patient is a 72y old  Male who presents with a chief complaint of R toe infection (14 Apr 2023 11:49)      INTERVAL HISTORY: Feels ok.     REVIEW OF SYSTEMS:  CONSTITUTIONAL: No weakness  EYES/ENT: No visual changes;  No throat pain   NECK: No pain or stiffness  RESPIRATORY: No cough, wheezing; No shortness of breath  CARDIOVASCULAR: No chest pain or palpitations  GASTROINTESTINAL: No abdominal  pain. No nausea, vomiting, or hematemesis  GENITOURINARY: No dysuria, frequency or hematuria  NEUROLOGICAL: No stroke like symptoms  SKIN: No rashes    	  MEDICATIONS:  amLODIPine   Tablet 10 milliGRAM(s) Oral daily  losartan 25 milliGRAM(s) Oral daily        PHYSICAL EXAM:  T(C): 36.3 (04-14-23 @ 11:40), Max: 37.1 (04-13-23 @ 21:34)  HR: 68 (04-14-23 @ 11:40) (59 - 88)  BP: 136/70 (04-14-23 @ 11:40) (101/54 - 138/75)  RR: 18 (04-14-23 @ 11:40) (16 - 18)  SpO2: 99% (04-14-23 @ 11:40) (96% - 100%)  Wt(kg): --  I&O's Summary    13 Apr 2023 07:01  -  14 Apr 2023 07:00  --------------------------------------------------------  IN: 840 mL / OUT: 1400 mL / NET: -560 mL    14 Apr 2023 07:01  -  14 Apr 2023 14:45  --------------------------------------------------------  IN: 75 mL / OUT: 375 mL / NET: -300 mL      Height (cm): 175.3 (04-14 @ 06:39)  Weight (kg): 72.1 (04-14 @ 06:39)  BMI (kg/m2): 23.5 (04-14 @ 06:39)  BSA (m2): 1.87 (04-14 @ 06:39)    Appearance: In no distress	  HEENT:    PERRL, EOMI	  Cardiovascular:  S1 S2, No JVD  Respiratory: Lungs clear to auscultation	  Gastrointestinal:  Soft, Non-tender, + BS	  Vascularature:  No edema of LE  Psychiatric: Appropriate affect   Neuro: no acute focal deficits                               7.4    6.42  )-----------( 231      ( 14 Apr 2023 04:51 )             22.9     04-14    133<L>  |  99  |  30<H>  ----------------------------<  202<H>  3.9   |  26  |  1.14    Ca    8.4      14 Apr 2023 04:51  Phos  3.1     04-14  Mg     2.4     04-14          Labs personally reviewed      ASSESSMENT/PLAN: 	  Patient is a 72 year old male with a PMHx of HTN, Type II DM associated with neuropathy whore presents to [facility] with a chief complaint of pain, blistering and fluid drainage on his right foot.    Problem/Plan -1  Problem: Cardiac Risk Stratification for podiatry surgery  - Denies CP or SOB  - TTE shows preserved EF, mild LVH  - Not in decompensated HF  - No hx of tachy supa arrhythmia  -s/p cath with nonobstructive moderate CAD  - Elevated risk for low-mod risk pods surgery procedure, no contraindication to proceed     Problem/Plan -2  Problem: Hypertension  - amlodipine increased to 5mg PO daily  - c/w losartan to 25mg PO daily    Problem/Plan -3  Problem: DM II  - HgbA1c 8.4  - ISS as per primary team      Berenice Branch, RISHABH-NP   Matt Calhoun DO [facility]  Cardiovascular Medicine  [address] [facility], [address]  Available through call or text on [employer] TEAMs  Office: [phone]

## 2023-04-14 NOTE — PROGRESS NOTE ADULT - ASSESSMENT
72M s/p RLE SFA to PT bypass graft with PTFE on 4/10     PLAN:  - OR today with podiatry  - Diet: NPO for OR  - DVT ppx/ ASA  - appreciate ID regarding abx   - aquacel dressing takedown POD5 4/15  - r/s home meds  - pain control prn   - PT: Rec CESAR; PT prefers home w/ PT    Vascular p [phone]

## 2023-04-14 NOTE — DISCHARGE NOTE PROVIDER - CARE PROVIDERS DIRECT ADDRESSES
,[email or web address],DirectAddress_Unknown,DirectAddress_Unknown,[email or web address]

## 2023-04-14 NOTE — DISCHARGE NOTE PROVIDER - CARE PROVIDER_API CALL
Abraham Mcclure (DPM)  Podiatric Medicine and Surgery  [address] [place] [address]  Phone: [phone]  Fax: [phone]  Follow Up Time:    Abraham Mcclure (DPM)  Podiatric Medicine and Surgery  [address] [place] [address]  Phone: [phone]  Fax: [phone]  Follow Up Time: 1 week    Mateus Mullins)  Surgery  Vascular  [address] [facility], [address] [place] [address]  Phone: [phone]  Fax: [phone]  Follow Up Time: 1 week    Taz Lord (DO)  Medicine  [address] [place] [address] [address]  [place] [address]  Phone: [phone]  Fax: [phone]  Follow Up Time:     Matt Calhoun (DO)  Cardiovascular Disease; Internal Medicine; Nuclear Cardiology  [address] [facility], [address]  [place] [address]  Phone: [phone]  Fax: [phone]  Follow Up Time:     Jessica Monroy)  Infectious Disease; Internal Medicine  [address] [place] [address]  Phone: [phone]  Fax: [phone]  Follow Up Time:

## 2023-04-15 LAB
ANION GAP SERPL CALC-SCNC: 10 MMOL/L — SIGNIFICANT CHANGE UP (ref 5–17)
BASOPHILS # BLD AUTO: 0.02 K/UL — SIGNIFICANT CHANGE UP (ref 0–0.2)
BASOPHILS NFR BLD AUTO: 0.3 % — SIGNIFICANT CHANGE UP (ref 0–2)
BUN SERPL-MCNC: 18 MG/DL — SIGNIFICANT CHANGE UP (ref 7–23)
CALCIUM SERPL-MCNC: 8.6 MG/DL — SIGNIFICANT CHANGE UP (ref 8.4–10.5)
CHLORIDE SERPL-SCNC: 101 MMOL/L — SIGNIFICANT CHANGE UP (ref 96–108)
CO2 SERPL-SCNC: 27 MMOL/L — SIGNIFICANT CHANGE UP (ref 22–31)
CREAT SERPL-MCNC: 0.86 MG/DL — SIGNIFICANT CHANGE UP (ref 0.5–1.3)
EGFR: 92 ML/MIN/1.73M2 — SIGNIFICANT CHANGE UP
EOSINOPHIL # BLD AUTO: 0.15 K/UL — SIGNIFICANT CHANGE UP (ref 0–0.5)
EOSINOPHIL NFR BLD AUTO: 2.6 % — SIGNIFICANT CHANGE UP (ref 0–6)
GLUCOSE BLDC GLUCOMTR-MCNC: 173 MG/DL — HIGH (ref 70–99)
GLUCOSE BLDC GLUCOMTR-MCNC: 194 MG/DL — HIGH (ref 70–99)
GLUCOSE BLDC GLUCOMTR-MCNC: 223 MG/DL — HIGH (ref 70–99)
GLUCOSE BLDC GLUCOMTR-MCNC: 246 MG/DL — HIGH (ref 70–99)
GLUCOSE SERPL-MCNC: 169 MG/DL — HIGH (ref 70–99)
HCT VFR BLD CALC: 29.1 % — LOW (ref 39–50)
HGB BLD-MCNC: 9.5 G/DL — LOW (ref 13–17)
IMM GRANULOCYTES NFR BLD AUTO: 0.5 % — SIGNIFICANT CHANGE UP (ref 0–0.9)
LYMPHOCYTES # BLD AUTO: 1.48 K/UL — SIGNIFICANT CHANGE UP (ref 1–3.3)
LYMPHOCYTES # BLD AUTO: 25.3 % — SIGNIFICANT CHANGE UP (ref 13–44)
MCHC RBC-ENTMCNC: 29.7 PG — SIGNIFICANT CHANGE UP (ref 27–34)
MCHC RBC-ENTMCNC: 32.6 GM/DL — SIGNIFICANT CHANGE UP (ref 32–36)
MCV RBC AUTO: 90.9 FL — SIGNIFICANT CHANGE UP (ref 80–100)
MONOCYTES # BLD AUTO: 0.65 K/UL — SIGNIFICANT CHANGE UP (ref 0–0.9)
MONOCYTES NFR BLD AUTO: 11.1 % — SIGNIFICANT CHANGE UP (ref 2–14)
NEUTROPHILS # BLD AUTO: 3.53 K/UL — SIGNIFICANT CHANGE UP (ref 1.8–7.4)
NEUTROPHILS NFR BLD AUTO: 60.2 % — SIGNIFICANT CHANGE UP (ref 43–77)
NRBC # BLD: 0 /100 WBCS — SIGNIFICANT CHANGE UP (ref 0–0)
PLATELET # BLD AUTO: 236 K/UL — SIGNIFICANT CHANGE UP (ref 150–400)
POTASSIUM SERPL-MCNC: 4.2 MMOL/L — SIGNIFICANT CHANGE UP (ref 3.5–5.3)
POTASSIUM SERPL-SCNC: 4.2 MMOL/L — SIGNIFICANT CHANGE UP (ref 3.5–5.3)
RBC # BLD: 3.2 M/UL — LOW (ref 4.2–5.8)
RBC # FLD: 13 % — SIGNIFICANT CHANGE UP (ref 10.3–14.5)
SODIUM SERPL-SCNC: 138 MMOL/L — SIGNIFICANT CHANGE UP (ref 135–145)
WBC # BLD: 5.86 K/UL — SIGNIFICANT CHANGE UP (ref 3.8–10.5)
WBC # FLD AUTO: 5.86 K/UL — SIGNIFICANT CHANGE UP (ref 3.8–10.5)

## 2023-04-15 RX ADMIN — HEPARIN SODIUM 5000 UNIT(S): 5000 INJECTION INTRAVENOUS; SUBCUTANEOUS at 05:38

## 2023-04-15 RX ADMIN — HEPARIN SODIUM 5000 UNIT(S): 5000 INJECTION INTRAVENOUS; SUBCUTANEOUS at 17:30

## 2023-04-15 RX ADMIN — GABAPENTIN 200 MILLIGRAM(S): 400 CAPSULE ORAL at 05:37

## 2023-04-15 RX ADMIN — GABAPENTIN 200 MILLIGRAM(S): 400 CAPSULE ORAL at 21:59

## 2023-04-15 RX ADMIN — SENNA PLUS 2 TABLET(S): 8.6 TABLET ORAL at 21:59

## 2023-04-15 RX ADMIN — Medication 100 MILLIGRAM(S): at 22:01

## 2023-04-15 RX ADMIN — Medication 3 UNIT(S): at 14:01

## 2023-04-15 RX ADMIN — Medication 975 MILLIGRAM(S): at 21:58

## 2023-04-15 RX ADMIN — Medication 975 MILLIGRAM(S): at 06:20

## 2023-04-15 RX ADMIN — INSULIN GLARGINE 6 UNIT(S): 100 INJECTION, SOLUTION SUBCUTANEOUS at 21:59

## 2023-04-15 RX ADMIN — GABAPENTIN 200 MILLIGRAM(S): 400 CAPSULE ORAL at 12:49

## 2023-04-15 RX ADMIN — Medication 100 MILLIGRAM(S): at 14:01

## 2023-04-15 RX ADMIN — Medication 4: at 14:00

## 2023-04-15 RX ADMIN — OXYCODONE HYDROCHLORIDE 5 MILLIGRAM(S): 5 TABLET ORAL at 13:49

## 2023-04-15 RX ADMIN — Medication 975 MILLIGRAM(S): at 05:37

## 2023-04-15 RX ADMIN — Medication 975 MILLIGRAM(S): at 13:49

## 2023-04-15 RX ADMIN — AMLODIPINE BESYLATE 10 MILLIGRAM(S): 2.5 TABLET ORAL at 05:37

## 2023-04-15 RX ADMIN — Medication 81 MILLIGRAM(S): at 11:16

## 2023-04-15 RX ADMIN — Medication 4: at 17:29

## 2023-04-15 RX ADMIN — Medication 975 MILLIGRAM(S): at 22:58

## 2023-04-15 RX ADMIN — OXYCODONE HYDROCHLORIDE 5 MILLIGRAM(S): 5 TABLET ORAL at 12:49

## 2023-04-15 RX ADMIN — Medication 3 UNIT(S): at 09:58

## 2023-04-15 RX ADMIN — ATORVASTATIN CALCIUM 40 MILLIGRAM(S): 80 TABLET, FILM COATED ORAL at 21:59

## 2023-04-15 RX ADMIN — Medication 3 UNIT(S): at 17:29

## 2023-04-15 RX ADMIN — OXYCODONE HYDROCHLORIDE 10 MILLIGRAM(S): 5 TABLET ORAL at 21:34

## 2023-04-15 RX ADMIN — LOSARTAN POTASSIUM 25 MILLIGRAM(S): 100 TABLET, FILM COATED ORAL at 05:37

## 2023-04-15 RX ADMIN — OXYCODONE HYDROCHLORIDE 10 MILLIGRAM(S): 5 TABLET ORAL at 20:34

## 2023-04-15 RX ADMIN — Medication 2: at 09:57

## 2023-04-15 RX ADMIN — Medication 100 MILLIGRAM(S): at 05:39

## 2023-04-15 RX ADMIN — Medication 975 MILLIGRAM(S): at 12:49

## 2023-04-15 NOTE — PROGRESS NOTE ADULT - SUBJECTIVE AND OBJECTIVE BOX
Patient is a 72y old  Male who presents with a chief complaint of R toe infection (14 Apr 2023 16:00)       INTERVAL HPI/OVERNIGHT EVENTS:  Patient seen and evaluated at bedside.  Pt is resting comfortable in NAD. Denies N/V/F/C.  Pain rated at X/10    Allergies    No Known Allergies    Intolerances        Vital Signs Last 24 Hrs  T(C): 37 (15 Apr 2023 05:33), Max: 37.3 (14 Apr 2023 20:07)  T(F): 98.6 (15 Apr 2023 05:33), Max: 99.1 (14 Apr 2023 20:07)  HR: 85 (15 Apr 2023 05:33) (59 - 93)  BP: 160/72 (15 Apr 2023 05:33) (101/54 - 160/72)  BP(mean): 82 (14 Apr 2023 09:30) (74 - 87)  RR: 18 (15 Apr 2023 05:33) (16 - 18)  SpO2: 100% (15 Apr 2023 05:33) (96% - 100%)    Parameters below as of 15 Apr 2023 05:33  Patient On (Oxygen Delivery Method): room air        LABS:                        7.4    6.42  )-----------( 231      ( 14 Apr 2023 04:51 )             22.9     04-14    133<L>  |  99  |  30<H>  ----------------------------<  202<H>  3.9   |  26  |  1.14    Ca    8.4      14 Apr 2023 04:51  Phos  3.1     04-14  Mg     2.4     04-14      PT/INR - ( 14 Apr 2023 04:51 )   PT: 13.3 sec;   INR: 1.14 ratio         PTT - ( 14 Apr 2023 04:51 )  PTT:28.2 sec    CAPILLARY BLOOD GLUCOSE      POCT Blood Glucose.: 233 mg/dL (14 Apr 2023 21:51)  POCT Blood Glucose.: 245 mg/dL (14 Apr 2023 17:23)  POCT Blood Glucose.: 179 mg/dL (14 Apr 2023 12:01)  POCT Blood Glucose.: 179 mg/dL (14 Apr 2023 08:27)      Lower Extremity Physical Exam:  Vascular: DP/PT 0/4, B/L, CFT <3 seconds B/L x9, Temperature gradient warm to cool, B/L.   Neuro: Epicritic sensation decreased to the level of digits, B/L.  Musculoskeletal/Ortho: unremarkable   Skin: 4/15 s/p right foot partial hallux amputation, closed: sutures intact, skin well coapted, no dehiscence, no drainage, flaps warm and viable.       RADIOLOGY & ADDITIONAL TESTS:

## 2023-04-15 NOTE — PROGRESS NOTE ADULT - ASSESSMENT
73 y/o M 4/15 s/p right foot partial hallux amputation, closed  - Pt was seen and evaluated.   - Afebrile, no leucocytosis  - 4/15 s/p right foot partial hallux amputation, closed: sutures intact, skin well coapted, no dehiscence, no drainage, flaps warm and viable.   - Pt is s/p RLE SFA to PT bypass graft with PT signal w/ Dr. Mullins on 4/10  - Per intraop findings, low concern for residual infection and viability   - OR clean bone culture and pathology pending   - Patient is stable for discharge from podiatry standpoint pending clean bone culture no growth x 48hrs   - F/u information and instructions can be found in the f/u section of d/c provider note   - Discussed with attending

## 2023-04-15 NOTE — PROGRESS NOTE ADULT - ASSESSMENT
Patient is a 72 year old male with a PMHx of HTN, Type II DM associated with neuropathy whore presents to [facility] with a chief complaint of pain, blistering and fluid drainage on his right foot. Patient reports worsening pain and discomfort in his right lower extremity. Patient reports intermittent chills for the past week prior to his arrival. Patient reports that he has been experiencing bilateral calf pain for about one month associated with pain and discomfort with walking less than 2 blocks. Patient reports that he noticed right lower extremity edema and bleeding from his great toe about two days prior to arrival. Internal medicine has been consulted on Mr. Paredes's care for medical management and medical clearance. Patient is S/P cardiac cath on previous admission. Patient denies any chest pain, palpitations, SOB or dyspnea. Patient denies dyspnea on exertion or shortness of breath with ambulation or walking. Patient reports that his walking is limited by lower extremity pain. Denies history of HLD, MI, CVA, Cardiac arrythmia, PE or DVT in the past.     Peripheral Arterial Disease, Neuropathy, Right Toe Wound   - LE X-Ray -- Without acute displaced Fx or Dislocation, Osteoarthritic changes, Neg for OM   - EYAD consistent w/ small vessel disease in feet   - LE Duplex -- Neg for DVT   - On ASA 81  and gabapentin    - Care per vascular appreciated -- LE Duplex as noted, S/P Angio, S/P bypass  with RLE SFA bypass graft   - Stress test Abnormal --> S/P cardiac cath on previous admission -- Moderate non-obstructive atherosclerosis, on Med management with ASA and Lipitor    - ABX as per ID   - Pain control   - Care per Vascular / Podiatry appreciated    Right Toe Infection   - 03/30 BCx2 Neg  final   - Previous  wound culture -- Multiple organisms   - On Ancef and Levofloxacin, ABX as per ID   - 04/12 Xray - as noted  - S/P OR with podiatry 04/14, S/P partial amputation of 1st ray; F/u pathology    - Cont to monitor CBC, Temp curve, VS and patient closely  - Pain control     Anemia  - Monitor and trend levels  - Previous admission Iron panel WNL and Ferritin elevated  - Trend Hgb closely   - Transfuse for Hgb < 8.0 in view of CAD   - S/P 1 unit PRBC   - Hgb stable post transfusion     HTN  - C/w Norvasc and Losartan 25     - Monitor BP, VS and patient closely    DM  - A1C 8.4   - C/w Sliding scale   - Hyperglycemic. Will add 3 units pre-meal and 6 units lantus QHs; Monitor and adjust accordingly   - Monitor glucose levels     Pre-OP Risk Stratification   - TTE w/ EF of 61%, Normal RV function, Mild LVH   - Stress test abnormal. S/P cath, non-obstructive CAD   - Patient is medically optimized for Podiatric intervention.     PPX  - Hep 5K Q12

## 2023-04-15 NOTE — PROGRESS NOTE ADULT - SUBJECTIVE AND OBJECTIVE BOX
DATE OF SERVICE: 04-15-23 @ 13:21    Patient is a 72y old  Male who presents with a chief complaint of R toe infection (15 Apr 2023 10:12)      INTERVAL HISTORY: no complaints     REVIEW OF SYSTEMS:  CONSTITUTIONAL: No weakness  EYES/ENT: No visual changes;  No throat pain   NECK: No pain or stiffness  RESPIRATORY: No cough, wheezing; No shortness of breath  CARDIOVASCULAR: No chest pain or palpitations  GASTROINTESTINAL: No abdominal  pain. No nausea, vomiting, or hematemesis  GENITOURINARY: No dysuria, frequency or hematuria  NEUROLOGICAL: No stroke like symptoms  SKIN: No rashes  	  MEDICATIONS:  amLODIPine   Tablet 10 milliGRAM(s) Oral daily  losartan 25 milliGRAM(s) Oral daily        PHYSICAL EXAM:  T(C): 36.3 (04-15-23 @ 10:20), Max: 37.3 (04-14-23 @ 20:07)  HR: 81 (04-15-23 @ 10:20) (81 - 93)  BP: 150/73 (04-15-23 @ 10:20) (138/70 - 160/72)  RR: 18 (04-15-23 @ 10:20) (18 - 18)  SpO2: 99% (04-15-23 @ 10:20) (96% - 100%)  Wt(kg): --  I&O's Summary    14 Apr 2023 07:01  -  15 Apr 2023 07:00  --------------------------------------------------------  IN: 1375 mL / OUT: 3375 mL / NET: -2000 mL    15 Apr 2023 07:01  -  15 Apr 2023 13:21  --------------------------------------------------------  IN: 0 mL / OUT: 400 mL / NET: -400 mL          Appearance: In no distress	  HEENT:    PERRL, EOMI	  Cardiovascular:  S1 S2, No JVD  Respiratory: Lungs clear to auscultation	  Gastrointestinal:  Soft, Non-tender, + BS	  Vascularature:  No edema of LE  Psychiatric: Appropriate affect   Neuro: no acute focal deficits                               9.5    5.86  )-----------( 236      ( 15 Apr 2023 07:27 )             29.1     04-15    138  |  101  |  18  ----------------------------<  169<H>  4.2   |  27  |  0.86    Ca    8.6      15 Apr 2023 07:28  Phos  3.1     04-14  Mg     2.4     04-14          Labs personally reviewed      ASSESSMENT/PLAN: 	  Patient is a 72 year old male with a PMHx of HTN, Type II DM associated with neuropathy whore presents to [facility] with a chief complaint of pain, blistering and fluid drainage on his right foot.    Problem/Plan -1  Problem: Cardiac Risk Stratification for podiatry surgery  - Denies CP or SOB  - TTE shows preserved EF, mild LVH  - Not in decompensated HF  - No hx of tachy supa arrhythmia  -s/p cath with nonobstructive moderate CAD  - Elevated risk for low-mod risk pods surgery procedure, no contraindication to proceed     Problem/Plan -2  Problem: Hypertension  - amlodipine increased to 5mg PO daily  - c/w losartan to 25mg PO daily    Problem/Plan -3  Problem: DM II  - HgbA1c 8.4  - ISS as per primary team          SEFERINO Steiner DO [facility]  Cardiovascular Medicine  [address] [place] [address], [address]  Office: [phone]  Available via call/text on [employer] Teams

## 2023-04-15 NOTE — PROGRESS NOTE ADULT - ASSESSMENT
72M s/p RLE SFA to PT bypass graft with PTFE on 4/10 now s/p R hallux partial amp    PLAN:  - DVT ppx/ ASA  - appreciate ID regarding abx   - aquacel dressing takedown POD5 4/15  - r/s home meds  - pain control prn   - PT: Rec CESAR; PT prefers home w/ PT    Vascular p [phone]

## 2023-04-15 NOTE — PROGRESS NOTE ADULT - SUBJECTIVE AND OBJECTIVE BOX
Name of Patient : ALEE DUNN  MRN: [id number]  Date of visit: 04-15-23       Subjective: Patient seen and examined. No new events except as noted.   Doing okay     REVIEW OF SYSTEMS:    CONSTITUTIONAL: No weakness, fevers or chills  EYES/ENT: No visual changes;  No vertigo or throat pain   NECK: No pain or stiffness  RESPIRATORY: No cough, wheezing, hemoptysis; No shortness of breath  CARDIOVASCULAR: No chest pain or palpitations  GASTROINTESTINAL: No abdominal or epigastric pain. No nausea, vomiting, or hematemesis; No diarrhea or constipation. No melena or hematochezia.  GENITOURINARY: No dysuria, frequency or hematuria  NEUROLOGICAL: No numbness or weakness  SKIN: No itching, burning, rashes, or lesions   All other review of systems is negative unless indicated above.    MEDICATIONS:  MEDICATIONS  (STANDING):  acetaminophen     Tablet .. 975 milliGRAM(s) Oral every 8 hours  amLODIPine   Tablet 10 milliGRAM(s) Oral daily  aspirin enteric coated 81 milliGRAM(s) Oral daily  atorvastatin 40 milliGRAM(s) Oral at bedtime  ceFAZolin   IVPB 2000 milliGRAM(s) IV Intermittent every 8 hours  dextrose 5%. 1000 milliLiter(s) (50 mL/Hr) IV Continuous <Continuous>  dextrose 5%. 1000 milliLiter(s) (100 mL/Hr) IV Continuous <Continuous>  dextrose 50% Injectable 25 Gram(s) IV Push once  dextrose 50% Injectable 12.5 Gram(s) IV Push once  dextrose 50% Injectable 25 Gram(s) IV Push once  gabapentin 200 milliGRAM(s) Oral every 8 hours  glucagon  Injectable 1 milliGRAM(s) IntraMuscular once  heparin   Injectable 5000 Unit(s) SubCutaneous every 12 hours  insulin glargine Injectable (LANTUS) 6 Unit(s) SubCutaneous at bedtime  insulin lispro (ADMELOG) corrective regimen sliding scale   SubCutaneous three times a day before meals  insulin lispro (ADMELOG) corrective regimen sliding scale   SubCutaneous at bedtime  insulin lispro Injectable (ADMELOG) 3 Unit(s) SubCutaneous three times a day before meals  levoFLOXacin  Tablet 500 milliGRAM(s) Oral every 24 hours  losartan 25 milliGRAM(s) Oral daily  polyethylene glycol 3350 17 Gram(s) Oral daily  senna 2 Tablet(s) Oral at bedtime      PHYSICAL EXAM:  T(C): 36.7 (04-15-23 @ 16:36), Max: 37.3 (04-14-23 @ 20:07)  HR: 76 (04-15-23 @ 16:36) (76 - 92)  BP: 115/64 (04-15-23 @ 16:36) (115/64 - 160/72)  RR: 18 (04-15-23 @ 16:36) (17 - 18)  SpO2: 98% (04-15-23 @ 16:36) (96% - 100%)  Wt(kg): --  I&O's Summary    14 Apr 2023 07:01  -  15 Apr 2023 07:00  --------------------------------------------------------  IN: 1375 mL / OUT: 3375 mL / NET: -2000 mL    15 Apr 2023 07:01  -  15 Apr 2023 19:21  --------------------------------------------------------  IN: 720 mL / OUT: 900 mL / NET: -180 mL    Appearance: Normal	  HEENT:  PERRLA   Lymphatic: No lymphadenopathy   Cardiovascular: Normal S1 S2, no JVD  Respiratory: normal effort , clear  Gastrointestinal:  Soft, Non-tender  Skin: No rashes,  warm to touch  Psychiatry:  Mood & affect appropriate  Musculuskeletal: No edema    recent labs, Imaging and EKGs personally reviewed     04-14-23 @ 07:01  -  04-15-23 @ 07:00  --------------------------------------------------------  IN: 1375 mL / OUT: 3375 mL / NET: -2000 mL    04-15-23 @ 07:01  -  04-15-23 @ 19:21  --------------------------------------------------------  IN: 720 mL / OUT: 900 mL / NET: -180 mL                          9.5    5.86  )-----------( 236      ( 15 Apr 2023 07:27 )             29.1               04-15    138  |  101  |  18  ----------------------------<  169<H>  4.2   |  27  |  0.86    Ca    8.6      15 Apr 2023 07:28  Phos  3.1     04-14  Mg     2.4     04-14      PT/INR - ( 14 Apr 2023 04:51 )   PT: 13.3 sec;   INR: 1.14 ratio         PTT - ( 14 Apr 2023 04:51 )  PTT:28.2 sec

## 2023-04-15 NOTE — PROGRESS NOTE ADULT - SUBJECTIVE AND OBJECTIVE BOX
TEAM Surgery Progress Note  Patient is a 72y old  Male who presents with a chief complaint of R toe infection (15 Apr 2023 07:53)      INTERVAL EVENTS: R hallux partial amp c pods    SUBJECTIVE: Patient seen and examined at bedside with surgical team.    OBJECTIVE:    Vital Signs Last 24 Hrs  T(C): 37 (15 Apr 2023 05:33), Max: 37.3 (14 Apr 2023 20:07)  T(F): 98.6 (15 Apr 2023 05:33), Max: 99.1 (14 Apr 2023 20:07)  HR: 85 (15 Apr 2023 05:33) (68 - 93)  BP: 160/72 (15 Apr 2023 05:33) (124/68 - 160/72)  BP(mean): --  RR: 18 (15 Apr 2023 05:33) (18 - 18)  SpO2: 100% (15 Apr 2023 05:33) (96% - 100%)    Parameters below as of 15 Apr 2023 05:33  Patient On (Oxygen Delivery Method): room air    I&O's Detail    14 Apr 2023 07:01  -  15 Apr 2023 07:00  --------------------------------------------------------  IN:    Lactated Ringers: 75 mL    Oral Fluid: 1300 mL  Total IN: 1375 mL    OUT:    Incontinent per Condom Catheter (mL): 3375 mL  Total OUT: 3375 mL    Total NET: -2000 mL      MEDICATIONS  (STANDING):  acetaminophen     Tablet .. 975 milliGRAM(s) Oral every 8 hours  amLODIPine   Tablet 10 milliGRAM(s) Oral daily  aspirin enteric coated 81 milliGRAM(s) Oral daily  atorvastatin 40 milliGRAM(s) Oral at bedtime  ceFAZolin   IVPB 2000 milliGRAM(s) IV Intermittent every 8 hours  dextrose 5%. 1000 milliLiter(s) (50 mL/Hr) IV Continuous <Continuous>  dextrose 5%. 1000 milliLiter(s) (100 mL/Hr) IV Continuous <Continuous>  dextrose 50% Injectable 25 Gram(s) IV Push once  dextrose 50% Injectable 12.5 Gram(s) IV Push once  dextrose 50% Injectable 25 Gram(s) IV Push once  gabapentin 200 milliGRAM(s) Oral every 8 hours  glucagon  Injectable 1 milliGRAM(s) IntraMuscular once  heparin   Injectable 5000 Unit(s) SubCutaneous every 12 hours  insulin glargine Injectable (LANTUS) 6 Unit(s) SubCutaneous at bedtime  insulin lispro (ADMELOG) corrective regimen sliding scale   SubCutaneous three times a day before meals  insulin lispro (ADMELOG) corrective regimen sliding scale   SubCutaneous at bedtime  insulin lispro Injectable (ADMELOG) 3 Unit(s) SubCutaneous three times a day before meals  levoFLOXacin  Tablet 500 milliGRAM(s) Oral every 24 hours  losartan 25 milliGRAM(s) Oral daily  polyethylene glycol 3350 17 Gram(s) Oral daily  senna 2 Tablet(s) Oral at bedtime    MEDICATIONS  (PRN):  dextrose Oral Gel 15 Gram(s) Oral once PRN Blood Glucose LESS THAN 70 milliGRAM(s)/deciliter  oxyCODONE    IR 5 milliGRAM(s) Oral every 4 hours PRN Moderate Pain (4 - 6)  oxyCODONE    IR 10 milliGRAM(s) Oral every 4 hours PRN Severe Pain (7 - 10)      Physical Exam:  General: NAD, resting comfortably in bed  Pulmonary: No respiratory distress  Incision: RLE incision C/D/I, aquacel dressing  Extremities: WWP, RLE DP/PT signals    LABS:                        9.5    5.86  )-----------( 236      ( 15 Apr 2023 07:27 )             29.1     04-15    138  |  101  |  18  ----------------------------<  169<H>  4.2   |  27  |  0.86    Ca    8.6      15 Apr 2023 07:28  Phos  3.1     04-14  Mg     2.4     04-14      PT/INR - ( 14 Apr 2023 04:51 )   PT: 13.3 sec;   INR: 1.14 ratio         PTT - ( 14 Apr 2023 04:51 )  PTT:28.2 sec          IMAGING:

## 2023-04-16 LAB
ANION GAP SERPL CALC-SCNC: 11 MMOL/L — SIGNIFICANT CHANGE UP (ref 5–17)
BUN SERPL-MCNC: 18 MG/DL — SIGNIFICANT CHANGE UP (ref 7–23)
CALCIUM SERPL-MCNC: 8.8 MG/DL — SIGNIFICANT CHANGE UP (ref 8.4–10.5)
CHLORIDE SERPL-SCNC: 100 MMOL/L — SIGNIFICANT CHANGE UP (ref 96–108)
CO2 SERPL-SCNC: 25 MMOL/L — SIGNIFICANT CHANGE UP (ref 22–31)
CREAT SERPL-MCNC: 0.92 MG/DL — SIGNIFICANT CHANGE UP (ref 0.5–1.3)
EGFR: 88 ML/MIN/1.73M2 — SIGNIFICANT CHANGE UP
GLUCOSE BLDC GLUCOMTR-MCNC: 181 MG/DL — HIGH (ref 70–99)
GLUCOSE BLDC GLUCOMTR-MCNC: 182 MG/DL — HIGH (ref 70–99)
GLUCOSE BLDC GLUCOMTR-MCNC: 205 MG/DL — HIGH (ref 70–99)
GLUCOSE BLDC GLUCOMTR-MCNC: 285 MG/DL — HIGH (ref 70–99)
GLUCOSE SERPL-MCNC: 172 MG/DL — HIGH (ref 70–99)
HCT VFR BLD CALC: 31.8 % — LOW (ref 39–50)
HGB BLD-MCNC: 10.3 G/DL — LOW (ref 13–17)
MAGNESIUM SERPL-MCNC: 2.2 MG/DL — SIGNIFICANT CHANGE UP (ref 1.6–2.6)
MCHC RBC-ENTMCNC: 29.6 PG — SIGNIFICANT CHANGE UP (ref 27–34)
MCHC RBC-ENTMCNC: 32.4 GM/DL — SIGNIFICANT CHANGE UP (ref 32–36)
MCV RBC AUTO: 91.4 FL — SIGNIFICANT CHANGE UP (ref 80–100)
NRBC # BLD: 0 /100 WBCS — SIGNIFICANT CHANGE UP (ref 0–0)
PHOSPHATE SERPL-MCNC: 3.5 MG/DL — SIGNIFICANT CHANGE UP (ref 2.5–4.5)
PLATELET # BLD AUTO: 254 K/UL — SIGNIFICANT CHANGE UP (ref 150–400)
POTASSIUM SERPL-MCNC: 4.5 MMOL/L — SIGNIFICANT CHANGE UP (ref 3.5–5.3)
POTASSIUM SERPL-SCNC: 4.5 MMOL/L — SIGNIFICANT CHANGE UP (ref 3.5–5.3)
RBC # BLD: 3.48 M/UL — LOW (ref 4.2–5.8)
RBC # FLD: 13 % — SIGNIFICANT CHANGE UP (ref 10.3–14.5)
SODIUM SERPL-SCNC: 136 MMOL/L — SIGNIFICANT CHANGE UP (ref 135–145)
WBC # BLD: 5.81 K/UL — SIGNIFICANT CHANGE UP (ref 3.8–10.5)
WBC # FLD AUTO: 5.81 K/UL — SIGNIFICANT CHANGE UP (ref 3.8–10.5)

## 2023-04-16 RX ADMIN — Medication 6: at 12:28

## 2023-04-16 RX ADMIN — Medication 975 MILLIGRAM(S): at 06:15

## 2023-04-16 RX ADMIN — Medication 975 MILLIGRAM(S): at 13:24

## 2023-04-16 RX ADMIN — HEPARIN SODIUM 5000 UNIT(S): 5000 INJECTION INTRAVENOUS; SUBCUTANEOUS at 06:15

## 2023-04-16 RX ADMIN — Medication 975 MILLIGRAM(S): at 13:54

## 2023-04-16 RX ADMIN — OXYCODONE HYDROCHLORIDE 5 MILLIGRAM(S): 5 TABLET ORAL at 22:46

## 2023-04-16 RX ADMIN — GABAPENTIN 200 MILLIGRAM(S): 400 CAPSULE ORAL at 13:24

## 2023-04-16 RX ADMIN — HEPARIN SODIUM 5000 UNIT(S): 5000 INJECTION INTRAVENOUS; SUBCUTANEOUS at 18:04

## 2023-04-16 RX ADMIN — Medication 3 UNIT(S): at 18:04

## 2023-04-16 RX ADMIN — Medication 975 MILLIGRAM(S): at 07:04

## 2023-04-16 RX ADMIN — OXYCODONE HYDROCHLORIDE 5 MILLIGRAM(S): 5 TABLET ORAL at 21:46

## 2023-04-16 RX ADMIN — Medication 100 MILLIGRAM(S): at 13:32

## 2023-04-16 RX ADMIN — Medication 3 UNIT(S): at 12:29

## 2023-04-16 RX ADMIN — Medication 3 UNIT(S): at 09:55

## 2023-04-16 RX ADMIN — LOSARTAN POTASSIUM 25 MILLIGRAM(S): 100 TABLET, FILM COATED ORAL at 06:16

## 2023-04-16 RX ADMIN — GABAPENTIN 200 MILLIGRAM(S): 400 CAPSULE ORAL at 06:16

## 2023-04-16 RX ADMIN — Medication 975 MILLIGRAM(S): at 22:44

## 2023-04-16 RX ADMIN — Medication 2: at 18:04

## 2023-04-16 RX ADMIN — Medication 975 MILLIGRAM(S): at 21:44

## 2023-04-16 RX ADMIN — SENNA PLUS 2 TABLET(S): 8.6 TABLET ORAL at 21:46

## 2023-04-16 RX ADMIN — GABAPENTIN 200 MILLIGRAM(S): 400 CAPSULE ORAL at 21:45

## 2023-04-16 RX ADMIN — Medication 100 MILLIGRAM(S): at 06:14

## 2023-04-16 RX ADMIN — INSULIN GLARGINE 6 UNIT(S): 100 INJECTION, SOLUTION SUBCUTANEOUS at 21:45

## 2023-04-16 RX ADMIN — AMLODIPINE BESYLATE 10 MILLIGRAM(S): 2.5 TABLET ORAL at 06:16

## 2023-04-16 RX ADMIN — ATORVASTATIN CALCIUM 40 MILLIGRAM(S): 80 TABLET, FILM COATED ORAL at 21:45

## 2023-04-16 RX ADMIN — Medication 100 MILLIGRAM(S): at 21:45

## 2023-04-16 RX ADMIN — Medication 2: at 09:55

## 2023-04-16 RX ADMIN — Medication 81 MILLIGRAM(S): at 13:24

## 2023-04-16 NOTE — PROGRESS NOTE ADULT - SUBJECTIVE AND OBJECTIVE BOX
Name of Patient : ALEE DUNN  MRN: [id number]  Date of visit: 04-16-23       Subjective: Patient seen and examined. No new events except as noted.  Doing okay      REVIEW OF SYSTEMS:    CONSTITUTIONAL: No weakness, fevers or chills  EYES/ENT: No visual changes;  No vertigo or throat pain   NECK: No pain or stiffness  RESPIRATORY: No cough, wheezing, hemoptysis; No shortness of breath  CARDIOVASCULAR: No chest pain or palpitations  GASTROINTESTINAL: No abdominal or epigastric pain. No nausea, vomiting, or hematemesis; No diarrhea or constipation. No melena or hematochezia.  GENITOURINARY: No dysuria, frequency or hematuria  NEUROLOGICAL: No numbness or weakness  SKIN: No itching, burning, rashes, or lesions   All other review of systems is negative unless indicated above.    MEDICATIONS:  MEDICATIONS  (STANDING):  acetaminophen     Tablet .. 975 milliGRAM(s) Oral every 8 hours  amLODIPine   Tablet 10 milliGRAM(s) Oral daily  aspirin enteric coated 81 milliGRAM(s) Oral daily  atorvastatin 40 milliGRAM(s) Oral at bedtime  ceFAZolin   IVPB 2000 milliGRAM(s) IV Intermittent every 8 hours  dextrose 5%. 1000 milliLiter(s) (50 mL/Hr) IV Continuous <Continuous>  dextrose 5%. 1000 milliLiter(s) (100 mL/Hr) IV Continuous <Continuous>  dextrose 50% Injectable 25 Gram(s) IV Push once  dextrose 50% Injectable 25 Gram(s) IV Push once  dextrose 50% Injectable 12.5 Gram(s) IV Push once  gabapentin 200 milliGRAM(s) Oral every 8 hours  glucagon  Injectable 1 milliGRAM(s) IntraMuscular once  heparin   Injectable 5000 Unit(s) SubCutaneous every 12 hours  insulin glargine Injectable (LANTUS) 6 Unit(s) SubCutaneous at bedtime  insulin lispro (ADMELOG) corrective regimen sliding scale   SubCutaneous at bedtime  insulin lispro (ADMELOG) corrective regimen sliding scale   SubCutaneous three times a day before meals  insulin lispro Injectable (ADMELOG) 3 Unit(s) SubCutaneous three times a day before meals  levoFLOXacin  Tablet 500 milliGRAM(s) Oral every 24 hours  losartan 25 milliGRAM(s) Oral daily  polyethylene glycol 3350 17 Gram(s) Oral daily  senna 2 Tablet(s) Oral at bedtime      PHYSICAL EXAM:  T(C): 37.2 (04-16-23 @ 21:41), Max: 37.2 (04-16-23 @ 01:25)  HR: 90 (04-16-23 @ 21:41) (77 - 90)  BP: 163/75 (04-16-23 @ 21:41) (110/66 - 163/75)  RR: 18 (04-16-23 @ 21:41) (18 - 18)  SpO2: 98% (04-16-23 @ 21:41) (94% - 99%)  Wt(kg): --  I&O's Summary    15 Apr 2023 07:01  -  16 Apr 2023 07:00  --------------------------------------------------------  IN: 1020 mL / OUT: 1775 mL / NET: -755 mL    16 Apr 2023 07:01  -  16 Apr 2023 22:59  --------------------------------------------------------  IN: 970 mL / OUT: 1200 mL / NET: -230 mL          Appearance: Normal	  HEENT:  PERRLA   Lymphatic: No lymphadenopathy   Cardiovascular: Normal S1 S2, no JVD  Respiratory: normal effort , clear  Gastrointestinal:  Soft, Non-tender  Skin: No rashes,  warm to touch  Psychiatry:  Mood & affect appropriate  Musculuskeletal: No edema    recent labs, Imaging and EKGs personally reviewed     04-15-23 @ 07:01  -  04-16-23 @ 07:00  --------------------------------------------------------  IN: 1020 mL / OUT: 1775 mL / NET: -755 mL    04-16-23 @ 07:01  -  04-16-23 @ 22:59  --------------------------------------------------------  IN: 970 mL / OUT: 1200 mL / NET: -230 mL                            10.3   5.81  )-----------( 254      ( 16 Apr 2023 07:23 )             31.8               04-16    136  |  100  |  18  ----------------------------<  172<H>  4.5   |  25  |  0.92    Ca    8.8      16 Apr 2023 07:18  Phos  3.5     04-16  Mg     2.2     04-16

## 2023-04-16 NOTE — PROGRESS NOTE ADULT - SUBJECTIVE AND OBJECTIVE BOX
DATE OF SERVICE: 04-16-23 @ 13:51    Patient is a 72y old  Male who presents with a chief complaint of R toe infection (16 Apr 2023 09:37)      INTERVAL HISTORY: no complaints     REVIEW OF SYSTEMS:  CONSTITUTIONAL: No weakness  EYES/ENT: No visual changes;  No throat pain   NECK: No pain or stiffness  RESPIRATORY: No cough, wheezing; No shortness of breath  CARDIOVASCULAR: No chest pain or palpitations  GASTROINTESTINAL: No abdominal  pain. No nausea, vomiting, or hematemesis  GENITOURINARY: No dysuria, frequency or hematuria  NEUROLOGICAL: No stroke like symptoms  SKIN: No rashes    MEDICATIONS:  amLODIPine   Tablet 10 milliGRAM(s) Oral daily  losartan 25 milliGRAM(s) Oral daily        PHYSICAL EXAM:  T(C): 36.8 (04-16-23 @ 10:56), Max: 37.6 (04-15-23 @ 21:49)  HR: 82 (04-16-23 @ 10:56) (76 - 94)  BP: 126/65 (04-16-23 @ 10:56) (115/64 - 167/70)  RR: 18 (04-16-23 @ 10:56) (18 - 18)  SpO2: 98% (04-16-23 @ 10:56) (94% - 98%)  Wt(kg): --  I&O's Summary    15 Apr 2023 07:01  -  16 Apr 2023 07:00  --------------------------------------------------------  IN: 1020 mL / OUT: 1775 mL / NET: -755 mL    16 Apr 2023 07:01  -  16 Apr 2023 13:51  --------------------------------------------------------  IN: 360 mL / OUT: 600 mL / NET: -240 mL          Appearance: In no distress	  HEENT:    PERRL, EOMI	  Cardiovascular:  S1 S2, No JVD  Respiratory: Lungs clear to auscultation	  Gastrointestinal:  Soft, Non-tender, + BS	  Vascularature:  No edema of LE  Psychiatric: Appropriate affect   Neuro: no acute focal deficits                               10.3   5.81  )-----------( 254      ( 16 Apr 2023 07:23 )             31.8     04-16    136  |  100  |  18  ----------------------------<  172<H>  4.5   |  25  |  0.92    Ca    8.8      16 Apr 2023 07:18  Phos  3.5     04-16  Mg     2.2     04-16          Labs personally reviewed      ASSESSMENT/PLAN: 	    Patient is a 72 year old male with a PMHx of HTN, Type II DM associated with neuropathy whore presents to [facility] with a chief complaint of pain, blistering and fluid drainage on his right foot.    Problem/Plan -1  Problem: Cardiac Risk Stratification for podiatry surgery  - Denies CP or SOB  - TTE shows preserved EF, mild LVH  - Not in decompensated HF  - No hx of tachy supa arrhythmia  -s/p cath with nonobstructive moderate CAD  - Elevated risk for low-mod risk pods surgery procedure, no contraindication to proceed     Problem/Plan -2  Problem: Hypertension  - amlodipine increased to 5mg PO daily  - c/w losartan to 25mg PO daily    Problem/Plan -3  Problem: DM II  - HgbA1c 8.4  - ISS as per primary team              SEFERINO Steiner DO [facility]  Cardiovascular Medicine  [address] [place] [address], [address]  Office: [phone]  Available via call/text on [employer] Teams

## 2023-04-16 NOTE — PROGRESS NOTE ADULT - SUBJECTIVE AND OBJECTIVE BOX
TEAM Surgery Progress Note  Patient is a 72y old  Male who presents with a chief complaint of R toe infection (15 Apr 2023 13:21)      SUBJECTIVE: Patient seen and examined at bedside with surgical team.     OBJECTIVE:    Vital Signs Last 24 Hrs  T(C): 36.8 (16 Apr 2023 05:51), Max: 37.6 (15 Apr 2023 21:49)  T(F): 98.3 (16 Apr 2023 05:51), Max: 99.6 (15 Apr 2023 21:49)  HR: 77 (16 Apr 2023 05:51) (76 - 94)  BP: 143/70 (16 Apr 2023 05:51) (115/64 - 167/70)  BP(mean): --  RR: 18 (16 Apr 2023 05:51) (17 - 18)  SpO2: 98% (16 Apr 2023 05:51) (94% - 100%)    Parameters below as of 16 Apr 2023 05:51  Patient On (Oxygen Delivery Method): room air    I&O's Detail    15 Apr 2023 07:01  -  16 Apr 2023 07:00  --------------------------------------------------------  IN:    Oral Fluid: 1020 mL  Total IN: 1020 mL    OUT:    Incontinent per Condom Catheter (mL): 300 mL    Voided (mL): 1475 mL  Total OUT: 1775 mL    Total NET: -755 mL      MEDICATIONS  (STANDING):  acetaminophen     Tablet .. 975 milliGRAM(s) Oral every 8 hours  amLODIPine   Tablet 10 milliGRAM(s) Oral daily  aspirin enteric coated 81 milliGRAM(s) Oral daily  atorvastatin 40 milliGRAM(s) Oral at bedtime  ceFAZolin   IVPB 2000 milliGRAM(s) IV Intermittent every 8 hours  dextrose 5%. 1000 milliLiter(s) (50 mL/Hr) IV Continuous <Continuous>  dextrose 5%. 1000 milliLiter(s) (100 mL/Hr) IV Continuous <Continuous>  dextrose 50% Injectable 12.5 Gram(s) IV Push once  dextrose 50% Injectable 25 Gram(s) IV Push once  dextrose 50% Injectable 25 Gram(s) IV Push once  gabapentin 200 milliGRAM(s) Oral every 8 hours  glucagon  Injectable 1 milliGRAM(s) IntraMuscular once  heparin   Injectable 5000 Unit(s) SubCutaneous every 12 hours  insulin glargine Injectable (LANTUS) 6 Unit(s) SubCutaneous at bedtime  insulin lispro (ADMELOG) corrective regimen sliding scale   SubCutaneous three times a day before meals  insulin lispro (ADMELOG) corrective regimen sliding scale   SubCutaneous at bedtime  insulin lispro Injectable (ADMELOG) 3 Unit(s) SubCutaneous three times a day before meals  levoFLOXacin  Tablet 500 milliGRAM(s) Oral every 24 hours  losartan 25 milliGRAM(s) Oral daily  polyethylene glycol 3350 17 Gram(s) Oral daily  senna 2 Tablet(s) Oral at bedtime    MEDICATIONS  (PRN):  dextrose Oral Gel 15 Gram(s) Oral once PRN Blood Glucose LESS THAN 70 milliGRAM(s)/deciliter  oxyCODONE    IR 10 milliGRAM(s) Oral every 4 hours PRN Severe Pain (7 - 10)  oxyCODONE    IR 5 milliGRAM(s) Oral every 4 hours PRN Moderate Pain (4 - 6)      PHYSICAL EXAM:  Constitutional: A&Ox3, NAD  Respiratory: Unlabored breathing  Extremities: WWP, RLE incision cdi, open to air, R DP/PT signals    LABS:                        10.3   5.81  )-----------( 254      ( 16 Apr 2023 07:23 )             31.8     04-16    136  |  100  |  18  ----------------------------<  172<H>  4.5   |  25  |  0.92    Ca    8.8      16 Apr 2023 07:18  Phos  3.5     04-16  Mg     2.2     04-16                IMAGING:

## 2023-04-16 NOTE — PROGRESS NOTE ADULT - ASSESSMENT
72M s/p RLE SFA to PT bypass graft with PTFE on 4/10 now s/p R hallux partial amp    PLAN:  - DVT ppx/ ASA  - appreciate ID regarding abx   - r/s home meds  - pain control prn   - pending PT re eval p partial hallux amp    Vascular p [id number]  72M s/p RLE SFA to PT bypass graft with PTFE on 4/10 now s/p R hallux partial amp    PLAN:  - DVT ppx/ ASA  - appreciate ID regarding abx   - r/s home meds  - pain control prn   - pending PT re eval p partial hallux amp- cleared to dc home c home PT 4/16  - pods- cleared to dc home 4/16    Vascular p [id number]

## 2023-04-16 NOTE — PROGRESS NOTE ADULT - ASSESSMENT
Patient is a 72 year old male with a PMHx of HTN, Type II DM associated with neuropathy whore presents to [facility] with a chief complaint of pain, blistering and fluid drainage on his right foot. Patient reports worsening pain and discomfort in his right lower extremity. Patient reports intermittent chills for the past week prior to his arrival. Patient reports that he has been experiencing bilateral calf pain for about one month associated with pain and discomfort with walking less than 2 blocks. Patient reports that he noticed right lower extremity edema and bleeding from his great toe about two days prior to arrival. Internal medicine has been consulted on Mr. Paredes's care for medical management and medical clearance. Patient is S/P cardiac cath on previous admission. Patient denies any chest pain, palpitations, SOB or dyspnea. Patient denies dyspnea on exertion or shortness of breath with ambulation or walking. Patient reports that his walking is limited by lower extremity pain. Denies history of HLD, MI, CVA, Cardiac arrythmia, PE or DVT in the past.     Peripheral Arterial Disease, Neuropathy, Right Toe Wound   - LE X-Ray -- Without acute displaced Fx or Dislocation, Osteoarthritic changes, Neg for OM   - EYAD consistent w/ small vessel disease in feet   - LE Duplex -- Neg for DVT   - On ASA 81  and gabapentin    - Care per vascular appreciated -- LE Duplex as noted, S/P Angio, S/P bypass  with RLE SFA bypass graft   - Stress test Abnormal --> S/P cardiac cath on previous admission -- Moderate non-obstructive atherosclerosis, on Med management with ASA and Lipitor    - ABX as per ID   - Pain control   - Care per Vascular / Podiatry appreciated    Right Toe Infection   - 03/30 BCx2 Neg  final   - Previous  wound culture -- Multiple organisms   - On Ancef and Levofloxacin, ABX as per ID   - 04/12 Xray - as noted  - S/P OR with podiatry 04/14, S/P partial amputation of 1st ray; F/u pathology    - Cont to monitor CBC, Temp curve, VS and patient closely  - Pain control     Anemia  - Monitor and trend levels  - Previous admission Iron panel WNL and Ferritin elevated  - Trend Hgb closely   - Transfuse for Hgb < 8.0 in view of CAD   - S/P 1 unit PRBC   - Hgb stable post transfusion     HTN  - C/w Norvasc and Losartan 25     - Monitor BP, VS and patient closely    DM  - A1C 8.4   - C/w Sliding scale   - Hyperglycemic. Will add 3 units pre-meal and 6 units lantus QHs; Monitor and adjust accordingly   - Monitor glucose levels     Pre-OP Risk Stratification   - TTE w/ EF of 61%, Normal RV function, Mild LVH   - Stress test abnormal. S/P cath, non-obstructive CAD   - Patient is medically optimized for Podiatric intervention.     PPX  - Hep 5K Q12

## 2023-04-17 ENCOUNTER — TRANSCRIPTION ENCOUNTER (OUTPATIENT)
Age: 73
End: 2023-04-17

## 2023-04-17 VITALS
RESPIRATION RATE: 17 BRPM | SYSTOLIC BLOOD PRESSURE: 126 MMHG | DIASTOLIC BLOOD PRESSURE: 67 MMHG | HEART RATE: 78 BPM | OXYGEN SATURATION: 98 % | TEMPERATURE: 98 F

## 2023-04-17 LAB
ANION GAP SERPL CALC-SCNC: 14 MMOL/L — SIGNIFICANT CHANGE UP (ref 5–17)
BUN SERPL-MCNC: 18 MG/DL — SIGNIFICANT CHANGE UP (ref 7–23)
CALCIUM SERPL-MCNC: 9.2 MG/DL — SIGNIFICANT CHANGE UP (ref 8.4–10.5)
CHLORIDE SERPL-SCNC: 98 MMOL/L — SIGNIFICANT CHANGE UP (ref 96–108)
CO2 SERPL-SCNC: 25 MMOL/L — SIGNIFICANT CHANGE UP (ref 22–31)
CREAT SERPL-MCNC: 0.89 MG/DL — SIGNIFICANT CHANGE UP (ref 0.5–1.3)
EGFR: 91 ML/MIN/1.73M2 — SIGNIFICANT CHANGE UP
GLUCOSE BLDC GLUCOMTR-MCNC: 154 MG/DL — HIGH (ref 70–99)
GLUCOSE SERPL-MCNC: 153 MG/DL — HIGH (ref 70–99)
HCT VFR BLD CALC: 32.1 % — LOW (ref 39–50)
HGB BLD-MCNC: 10.4 G/DL — LOW (ref 13–17)
MAGNESIUM SERPL-MCNC: 2.2 MG/DL — SIGNIFICANT CHANGE UP (ref 1.6–2.6)
MCHC RBC-ENTMCNC: 29.5 PG — SIGNIFICANT CHANGE UP (ref 27–34)
MCHC RBC-ENTMCNC: 32.4 GM/DL — SIGNIFICANT CHANGE UP (ref 32–36)
MCV RBC AUTO: 91.2 FL — SIGNIFICANT CHANGE UP (ref 80–100)
NRBC # BLD: 0 /100 WBCS — SIGNIFICANT CHANGE UP (ref 0–0)
PHOSPHATE SERPL-MCNC: 3.8 MG/DL — SIGNIFICANT CHANGE UP (ref 2.5–4.5)
PLATELET # BLD AUTO: 251 K/UL — SIGNIFICANT CHANGE UP (ref 150–400)
POTASSIUM SERPL-MCNC: 4.2 MMOL/L — SIGNIFICANT CHANGE UP (ref 3.5–5.3)
POTASSIUM SERPL-SCNC: 4.2 MMOL/L — SIGNIFICANT CHANGE UP (ref 3.5–5.3)
RBC # BLD: 3.52 M/UL — LOW (ref 4.2–5.8)
RBC # FLD: 12.6 % — SIGNIFICANT CHANGE UP (ref 10.3–14.5)
SODIUM SERPL-SCNC: 137 MMOL/L — SIGNIFICANT CHANGE UP (ref 135–145)
WBC # BLD: 6.42 K/UL — SIGNIFICANT CHANGE UP (ref 3.8–10.5)
WBC # FLD AUTO: 6.42 K/UL — SIGNIFICANT CHANGE UP (ref 3.8–10.5)

## 2023-04-17 PROCEDURE — C1889: CPT

## 2023-04-17 PROCEDURE — 97530 THERAPEUTIC ACTIVITIES: CPT

## 2023-04-17 PROCEDURE — 83735 ASSAY OF MAGNESIUM: CPT

## 2023-04-17 PROCEDURE — 87075 CULTR BACTERIA EXCEPT BLOOD: CPT

## 2023-04-17 PROCEDURE — U0005: CPT

## 2023-04-17 PROCEDURE — 86901 BLOOD TYPING SEROLOGIC RH(D): CPT

## 2023-04-17 PROCEDURE — 73630 X-RAY EXAM OF FOOT: CPT

## 2023-04-17 PROCEDURE — 36415 COLL VENOUS BLD VENIPUNCTURE: CPT

## 2023-04-17 PROCEDURE — 85018 HEMOGLOBIN: CPT

## 2023-04-17 PROCEDURE — 36430 TRANSFUSION BLD/BLD COMPNT: CPT

## 2023-04-17 PROCEDURE — 86900 BLOOD TYPING SEROLOGIC ABO: CPT

## 2023-04-17 PROCEDURE — 99232 SBSQ HOSP IP/OBS MODERATE 35: CPT

## 2023-04-17 PROCEDURE — 97162 PT EVAL MOD COMPLEX 30 MIN: CPT

## 2023-04-17 PROCEDURE — 85025 COMPLETE CBC W/AUTO DIFF WBC: CPT

## 2023-04-17 PROCEDURE — 71045 X-RAY EXAM CHEST 1 VIEW: CPT

## 2023-04-17 PROCEDURE — 97110 THERAPEUTIC EXERCISES: CPT

## 2023-04-17 PROCEDURE — 84295 ASSAY OF SERUM SODIUM: CPT

## 2023-04-17 PROCEDURE — 82947 ASSAY GLUCOSE BLOOD QUANT: CPT

## 2023-04-17 PROCEDURE — 86870 RBC ANTIBODY IDENTIFICATION: CPT

## 2023-04-17 PROCEDURE — 85014 HEMATOCRIT: CPT

## 2023-04-17 PROCEDURE — 82803 BLOOD GASES ANY COMBINATION: CPT

## 2023-04-17 PROCEDURE — U0003: CPT

## 2023-04-17 PROCEDURE — 86922 COMPATIBILITY TEST ANTIGLOB: CPT

## 2023-04-17 PROCEDURE — C1769: CPT

## 2023-04-17 PROCEDURE — C1768: CPT

## 2023-04-17 PROCEDURE — 82435 ASSAY OF BLOOD CHLORIDE: CPT

## 2023-04-17 PROCEDURE — 76000 FLUOROSCOPY <1 HR PHYS/QHP: CPT

## 2023-04-17 PROCEDURE — P9016: CPT

## 2023-04-17 PROCEDURE — 93005 ELECTROCARDIOGRAM TRACING: CPT

## 2023-04-17 PROCEDURE — 86880 COOMBS TEST DIRECT: CPT

## 2023-04-17 PROCEDURE — 82565 ASSAY OF CREATININE: CPT

## 2023-04-17 PROCEDURE — 86850 RBC ANTIBODY SCREEN: CPT

## 2023-04-17 PROCEDURE — 82330 ASSAY OF CALCIUM: CPT

## 2023-04-17 PROCEDURE — 84100 ASSAY OF PHOSPHORUS: CPT

## 2023-04-17 PROCEDURE — 87070 CULTURE OTHR SPECIMN AEROBIC: CPT

## 2023-04-17 PROCEDURE — 80048 BASIC METABOLIC PNL TOTAL CA: CPT

## 2023-04-17 PROCEDURE — 85730 THROMBOPLASTIN TIME PARTIAL: CPT

## 2023-04-17 PROCEDURE — 84132 ASSAY OF SERUM POTASSIUM: CPT

## 2023-04-17 PROCEDURE — C9399: CPT

## 2023-04-17 PROCEDURE — 97116 GAIT TRAINING THERAPY: CPT

## 2023-04-17 PROCEDURE — 85610 PROTHROMBIN TIME: CPT

## 2023-04-17 PROCEDURE — 88305 TISSUE EXAM BY PATHOLOGIST: CPT

## 2023-04-17 PROCEDURE — 82962 GLUCOSE BLOOD TEST: CPT

## 2023-04-17 PROCEDURE — 83605 ASSAY OF LACTIC ACID: CPT

## 2023-04-17 PROCEDURE — 85027 COMPLETE CBC AUTOMATED: CPT

## 2023-04-17 PROCEDURE — 88311 DECALCIFY TISSUE: CPT

## 2023-04-17 RX ORDER — POLYETHYLENE GLYCOL 3350 17 G/17G
17 POWDER, FOR SOLUTION ORAL
Qty: 0 | Refills: 0 | DISCHARGE
Start: 2023-04-17

## 2023-04-17 RX ORDER — OXYCODONE HYDROCHLORIDE 5 MG/1
1 TABLET ORAL
Qty: 8 | Refills: 0
Start: 2023-04-17

## 2023-04-17 RX ORDER — SENNA PLUS 8.6 MG/1
2 TABLET ORAL
Qty: 0 | Refills: 0 | DISCHARGE
Start: 2023-04-17

## 2023-04-17 RX ORDER — ACETAMINOPHEN 500 MG
3 TABLET ORAL
Qty: 0 | Refills: 0 | DISCHARGE
Start: 2023-04-17

## 2023-04-17 RX ORDER — CEPHALEXIN 500 MG
1 CAPSULE ORAL
Qty: 28 | Refills: 0
Start: 2023-04-17 | End: 2023-04-23

## 2023-04-17 RX ORDER — GABAPENTIN 400 MG/1
2 CAPSULE ORAL
Qty: 84 | Refills: 0
Start: 2023-04-17 | End: 2023-04-30

## 2023-04-17 RX ORDER — LEVOFLOXACIN 5 MG/ML
1 INJECTION, SOLUTION INTRAVENOUS
Qty: 7 | Refills: 0
Start: 2023-04-17 | End: 2023-04-23

## 2023-04-17 RX ORDER — CEPHALEXIN 500 MG
1 CAPSULE ORAL
Qty: 16 | Refills: 0
Start: 2023-04-17 | End: 2023-04-20

## 2023-04-17 RX ADMIN — AMLODIPINE BESYLATE 10 MILLIGRAM(S): 2.5 TABLET ORAL at 05:28

## 2023-04-17 RX ADMIN — Medication 3 UNIT(S): at 09:05

## 2023-04-17 RX ADMIN — LOSARTAN POTASSIUM 25 MILLIGRAM(S): 100 TABLET, FILM COATED ORAL at 05:28

## 2023-04-17 RX ADMIN — GABAPENTIN 200 MILLIGRAM(S): 400 CAPSULE ORAL at 05:28

## 2023-04-17 RX ADMIN — Medication 975 MILLIGRAM(S): at 06:28

## 2023-04-17 RX ADMIN — Medication 100 MILLIGRAM(S): at 05:28

## 2023-04-17 RX ADMIN — HEPARIN SODIUM 5000 UNIT(S): 5000 INJECTION INTRAVENOUS; SUBCUTANEOUS at 05:27

## 2023-04-17 RX ADMIN — Medication 975 MILLIGRAM(S): at 05:28

## 2023-04-17 RX ADMIN — Medication 2: at 09:04

## 2023-04-17 NOTE — DISCHARGE NOTE NURSING/CASE MANAGEMENT/SOCIAL WORK - NSDCFUADDAPPT_GEN_ALL_CORE_FT
========================  Podiatry Discharge Instructions:  - Follow up: Please follow up with Dr. Mcclure within 1 week of discharge from the hospital, please call [phone] for appointment and discuss that you recently were seen in the hospital.  - Wound Care: keep dressing  Clean Dry and Intact until follow up  - Weight bearing: Please weight bearing as tolerated in a surgical shoe to R foot   - Antibiotics: Please continue as instructed.    ========================

## 2023-04-17 NOTE — PROGRESS NOTE ADULT - SUBJECTIVE AND OBJECTIVE BOX
Patient is a 72y old  Male who presents with a chief complaint of R toe infection (14 Apr 2023 09:02)    f/u toe infection    Interval history:  he is s/p RLE SFA to PT bypass graft with ptfe 4/10 and s/p R foot Partial hallux Amputation 4/14 (at proximal resection bone was of good quality, no evidence of purulence or infection tracking proximally. Incision primarily closed).  No fever.  OR cultures negative.  Pathology pending    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    Allergies  No Known Allergies    ANTIMICROBIALS:  vancomycin  IVPB 750 every 12 hours (3/30-4/5)  cefepime   IVPB 2000 every 12 hours (3/30-4/6)  keflex (4/6-4/10)  Active:  ceFAZolin   IVPB 2000 milliGRAM(s) IV Intermittent every 8 hours (4/10-)  levoFLOXacin  Tablet 500 milliGRAM(s) Oral every 24 hours (4/6-)    MEDICATIONS  (STANDING):  acetaminophen     Tablet .. 975 every 8 hours  amLODIPine   Tablet 10 daily  aspirin enteric coated 81 daily  atorvastatin 40 at bedtime  gabapentin 200 every 8 hours  heparin   Injectable 5000 every 12 hours  insulin glargine Injectable (LANTUS) 6 at bedtime  insulin lispro (ADMELOG) corrective regimen sliding scale  three times a day before meals  insulin lispro (ADMELOG) corrective regimen sliding scale  at bedtime  insulin lispro Injectable (ADMELOG) 3 three times a day before meals  losartan 25 daily    PHYSICAL EXAM:  Constitutional: non-toxic  HEAD/EYES: anicteric  ENT:  supple  Cardiovascular:   normal S1, S2  Respiratory:  clear BS bilaterally  GI:  soft, non-tender, normal bowel sounds  :  no cabello  Musculoskeletal:  no synovitis  Neurologic: awake and alert, normal strength, no focal findings  Skin:  toe c/d/i  Psychiatric:  awake, alert, appropriate mood                        10.4   6.42  )-----------( 251      ( 17 Apr 2023 07:05 )             32.1 04-17    137  |  98  |  18  ----------------------------<  153  4.2   |  25  |  0.89  Ca    9.2      17 Apr 2023 07:06Phos  3.8     04-17Mg     2.2     04-17    MICROBIOLOGY:  Culture - Tissue with Gram Stain (collected 04-14-23 @ 14:11)  Source: .Tissue Other  Gram Stain (04-14-23 @ 21:05):    No polymorphonuclear leukocytes seen per low power field    No organisms seen per oil power field  Preliminary Report (04-15-23 @ 17:49):    No growth    Culture - Other (collected 03-30-23 @ 06:35)  Source: .Other Right foot  Final Report (04-02-23 @ 10:47):    Rare Enterobacter cloacae complex    Numerous Staphylococcus lugdunensis    Rare Delftia acidovorans group    Normal skin narcisa isolated  Organism: Enterobacter cloacae complex  Staphylococcus lugdunensis  Delftia acidovorans group (04-02-23 @ 10:47)  Organism: Delftia acidovorans group (04-02-23 @ 10:47)      Method Type: AMI      -  Amikacin: S <=16      -  Aztreonam: S <=4      -  Cefepime: S <=2      -  Ceftriaxone: S <=1      -  Ciprofloxacin: S <=0.25      -  Gentamicin: R >8      -  Levofloxacin: S <=0.5      -  Meropenem: S <=1      -  Piperacillin/Tazobactam: S <=8      -  Tobramycin: I 8      -  Trimethoprim/Sulfamethoxazole: S <=0.5/9.5  Organism: Staphylococcus lugdunensis (04-02-23 @ 10:47)      Method Type: AMI      -  Ampicillin/Sulbactam: S <=8/4      -  Cefazolin: S <=4      -  Clindamycin: S <=0.25      -  Erythromycin: S <=0.25      -  Gentamicin: S <=1 Should not be used as monotherapy      -  Oxacillin: S <=0.25      -  Rifampin: S <=1 Should not be used as monotherapy      -  Tetracycline: S <=1      -  Trimethoprim/Sulfamethoxazole: S <=0.5/9.5      -  Vancomycin: S 1  Organism: Enterobacter cloacae complex (04-02-23 @ 10:47)      Method Type: AMI      -  Amikacin: S <=16      -  Amoxicillin/Clavulanic Acid: R >16/8      -  Ampicillin: R <=8 These ampicillin results predict results for amoxicillin      -  Ampicillin/Sulbactam: R <=4/2 Enterobacter, Klebsiella aerogenes, Citrobacter, and Serratia may develop resistance during prolonged therapy (3-4 days)      -  Aztreonam: S <=4      -  Cefazolin: R >16 Enterobacter, Klebsiella aerogenes, Citrobacter, and Serratia may develop resistance during prolonged therapy (3-4 days)      -  Cefepime: S <=2      -  Cefoxitin: R >16      -  Ceftriaxone: S <=1 Enterobacter, Klebsiella aerogenes, Citrobacter, and Serratia may develop resistance during prolonged therapy      -  Ciprofloxacin: S <=0.25      -  Ertapenem: S <=0.5      -  Gentamicin: S <=2      -  Imipenem: S <=1      -  Levofloxacin: S <=0.5      -  Meropenem: S <=1      -  Piperacillin/Tazobactam: S <=8      -  Tobramycin: S <=2      -  Trimethoprim/Sulfamethoxazole: S <=0.5/9.5    Culture - Blood (collected 03-30-23 @ 01:20)  Source: .Blood  Final Report (04-04-23 @ 09:01):    No Growth Final    Culture - Blood (collected 03-30-23 @ 01:10)  Source: .Blood  Final Report (04-04-23 @ 09:01):    No Growth Final    RADIOLOGY:  Xray Foot AP + Lateral + Oblique, Right (04.14.23 @ 08:44) >  IMPRESSION:  Status post partial amputation of the first ray at the level of the proximal phalanx. Cortical margins are sharp. Joint alignment is intact. There are vascular calcifications.    Xray Chest 1 View- PORTABLE-Urgent (Xray Chest 1 View- PORTABLE-Urgent .) (04.12.23 @ 11:16) >  IMPRESSION:  Clear lungs.    Xray Foot AP + Lateral + Oblique, Right (04.11.23 @ 17:04) >  IMPRESSION:  No tracking soft tissue gas collections, radiopaque foreign bodies, or gross radiographic evidence for osteomyelitis.  No fractures or dislocations.  Tarsometatarsal alignment maintained without evidence for a Lisfranc injury.  Congenitally fused 5th DIP joint. Preserved remaining visualized joint spaces and no joint margin erosions. Plantar and posterior calcaneal enthesophytes.  Generalized osteopenia otherwise no discrete lytic or blastic lesions.  Vascular calcifications.    VA Duplex Lower Ext Vein Scan, Bilat (04.01.23 @ 19:32) >  IMPRESSION: Diameters of the right and left greater and lesser saphenous veins as tabulated above.    VA Duplex Lower Ext Vein Scan, Right (03.30.23 @ 14:48) >  IMPRESSION: No evidence of right lower extremity deep venous thrombosis.    Xray Chest 1 View- PORTABLE-Urgent (03.30.23 @ 03:53) >  IMPRESSION:  Clear lungs.    Xray Foot AP + Lateral + Oblique, Right (03.30.23 @ 03:53) >  IMPRESSION:  Evaluation limited due to the patient's overlying sock.  No acute displaced fracture or dislocation. Osteoarthritic changes throughout the forefoot. No cortical erosion or periostitis to suggest osteomyelitis. Vascular calcifications noted.

## 2023-04-17 NOTE — PROGRESS NOTE ADULT - SUBJECTIVE AND OBJECTIVE BOX
Name of Patient : ALEE DUNN  MRN: [id number]  Date of visit: 04-17-23        Subjective: Patient seen and examined. No new events except as noted.   Patient seen earlier this AM. Lying down in bed. Reports pain is controlled. Reports feeling well.     REVIEW OF SYSTEMS:    CONSTITUTIONAL: No weakness, fevers or chills  EYES/ENT: No visual changes;  No vertigo or throat pain   NECK: No pain or stiffness  RESPIRATORY: No cough, wheezing, hemoptysis; No shortness of breath  CARDIOVASCULAR: No chest pain or palpitations  GASTROINTESTINAL: No abdominal or epigastric pain. No nausea, vomiting, or hematemesis; No diarrhea or constipation. No melena or hematochezia.  GENITOURINARY: No dysuria, frequency or hematuria  NEUROLOGICAL: No numbness or weakness  SKIN: No itching, burning, rashes, or lesions   MSK: RLE S/P amputation; Pain controlled  All other review of systems is negative unless indicated above.    MEDICATIONS:  MEDICATIONS  (STANDING):  acetaminophen     Tablet .. 975 milliGRAM(s) Oral every 8 hours  amLODIPine   Tablet 10 milliGRAM(s) Oral daily  aspirin enteric coated 81 milliGRAM(s) Oral daily  atorvastatin 40 milliGRAM(s) Oral at bedtime  ceFAZolin   IVPB 2000 milliGRAM(s) IV Intermittent every 8 hours  dextrose 5%. 1000 milliLiter(s) (50 mL/Hr) IV Continuous <Continuous>  dextrose 5%. 1000 milliLiter(s) (100 mL/Hr) IV Continuous <Continuous>  dextrose 50% Injectable 25 Gram(s) IV Push once  dextrose 50% Injectable 12.5 Gram(s) IV Push once  dextrose 50% Injectable 25 Gram(s) IV Push once  gabapentin 200 milliGRAM(s) Oral every 8 hours  glucagon  Injectable 1 milliGRAM(s) IntraMuscular once  heparin   Injectable 5000 Unit(s) SubCutaneous every 12 hours  insulin glargine Injectable (LANTUS) 6 Unit(s) SubCutaneous at bedtime  insulin lispro (ADMELOG) corrective regimen sliding scale   SubCutaneous at bedtime  insulin lispro (ADMELOG) corrective regimen sliding scale   SubCutaneous three times a day before meals  insulin lispro Injectable (ADMELOG) 3 Unit(s) SubCutaneous three times a day before meals  levoFLOXacin  Tablet 500 milliGRAM(s) Oral every 24 hours  losartan 25 milliGRAM(s) Oral daily  polyethylene glycol 3350 17 Gram(s) Oral daily  senna 2 Tablet(s) Oral at bedtime      PHYSICAL EXAM:  T(C): 36.5 (04-17-23 @ 10:14), Max: 37.2 (04-16-23 @ 21:41)  HR: 78 (04-17-23 @ 10:14) (77 - 90)  BP: 126/67 (04-17-23 @ 10:14) (110/66 - 163/75)  RR: 17 (04-17-23 @ 10:14) (17 - 18)  SpO2: 98% (04-17-23 @ 10:14) (96% - 99%)  Wt(kg): --  I&O's Summary    16 Apr 2023 07:01  -  17 Apr 2023 07:00  --------------------------------------------------------  IN: 1310 mL / OUT: 2115 mL / NET: -805 mL    17 Apr 2023 07:01  -  17 Apr 2023 14:12  --------------------------------------------------------  IN: 280 mL / OUT: 700 mL / NET: -420 mL          Appearance: Normal; Lying down in bed   HEENT:  Eyes are open   Lymphatic: No lymphadenopathy   Cardiovascular: Normal    Respiratory: normal effort , clear  Gastrointestinal:  Soft, Non-tender  Skin: No rashes,  warm to touch  Psychiatry:  Mood & affect appropriate  Musculoskeletal: RLE dressing          04-16-23 @ 07:01  -  04-17-23 @ 07:00  --------------------------------------------------------  IN: 1310 mL / OUT: 2115 mL / NET: -805 mL    04-17-23 @ 07:01  -  04-17-23 @ 14:12  --------------------------------------------------------  IN: 280 mL / OUT: 700 mL / NET: -420 mL                            10.4   6.42  )-----------( 251      ( 17 Apr 2023 07:05 )             32.1               04-17    137  |  98  |  18  ----------------------------<  153<H>  4.2   |  25  |  0.89    Ca    9.2      17 Apr 2023 07:06  Phos  3.8     04-17  Mg     2.2     04-17                               Culture - Tissue with Gram Stain (04.14.23 @ 14:11)   Gram Stain: No polymorphonuclear leukocytes seen per low power field   No organisms seen per oil power field  Specimen Source: .Tissue Other  Culture Results: No growth

## 2023-04-17 NOTE — PROGRESS NOTE ADULT - SUBJECTIVE AND OBJECTIVE BOX
DATE OF SERVICE: 04-17-23 @ 15:53    Patient is a 72y old  Male who presents with a chief complaint of R toe infection (17 Apr 2023 11:20)      INTERVAL HISTORY: Feels ok.     REVIEW OF SYSTEMS:  CONSTITUTIONAL: No weakness  EYES/ENT: No visual changes;  No throat pain   NECK: No pain or stiffness  RESPIRATORY: No cough, wheezing; No shortness of breath  CARDIOVASCULAR: No chest pain or palpitations  GASTROINTESTINAL: No abdominal  pain. No nausea, vomiting, or hematemesis  GENITOURINARY: No dysuria, frequency or hematuria  NEUROLOGICAL: No stroke like symptoms  SKIN: No rashes      MEDICATIONS:  amLODIPine   Tablet 10 milliGRAM(s) Oral daily  losartan 25 milliGRAM(s) Oral daily        PHYSICAL EXAM:  T(C): 36.5 (04-17-23 @ 10:14), Max: 37.2 (04-16-23 @ 21:41)  HR: 78 (04-17-23 @ 10:14) (77 - 90)  BP: 126/67 (04-17-23 @ 10:14) (126/67 - 163/75)  RR: 17 (04-17-23 @ 10:14) (17 - 18)  SpO2: 98% (04-17-23 @ 10:14) (96% - 98%)  Wt(kg): --  I&O's Summary    16 Apr 2023 07:01  -  17 Apr 2023 07:00  --------------------------------------------------------  IN: 1310 mL / OUT: 2115 mL / NET: -805 mL    17 Apr 2023 07:01  -  17 Apr 2023 15:53  --------------------------------------------------------  IN: 280 mL / OUT: 700 mL / NET: -420 mL          Appearance: In no distress	  HEENT:    PERRL, EOMI	  Cardiovascular:  S1 S2, No JVD  Respiratory: Lungs clear to auscultation	  Gastrointestinal:  Soft, Non-tender, + BS	  Vascularature:  No edema of LE  Psychiatric: Appropriate affect   Neuro: no acute focal deficits                               10.4   6.42  )-----------( 251      ( 17 Apr 2023 07:05 )             32.1     04-17    137  |  98  |  18  ----------------------------<  153<H>  4.2   |  25  |  0.89    Ca    9.2      17 Apr 2023 07:06  Phos  3.8     04-17  Mg     2.2     04-17          Labs personally reviewed      ASSESSMENT/PLAN: 	  Patient is a 72 year old male with a PMHx of HTN, Type II DM associated with neuropathy whore presents to [facility] with a chief complaint of pain, blistering and fluid drainage on his right foot.    Problem/Plan -1  Problem: Cardiac Risk Stratification for podiatry surgery  - Denies CP or SOB  - TTE shows preserved EF, mild LVH  - Not in decompensated HF  - No hx of tachy supa arrhythmia  -s/p cath with nonobstructive moderate CAD  - Elevated risk for low-mod risk pods surgery procedure, no contraindication to proceed   - Tolerated surgery well.    Problem/Plan -2  Problem: Hypertension  - amlodipine increased to 5mg PO daily  - c/w losartan to 25mg PO daily    Problem/Plan -3  Problem: DM II  - HgbA1c 8.4  - ISS as per primary team          RISHABH Bello-NP   Matt Calhoun DO formerly [facility]  Cardiovascular Medicine  [address] [place] [address], [address]  Available through call or text on [employer]  Office: [phone]

## 2023-04-17 NOTE — DISCHARGE NOTE NURSING/CASE MANAGEMENT/SOCIAL WORK - PATIENT PORTAL LINK FT
You can access the FollowMyHealth Patient Portal offered by [facility] by registering at the following website: [email or web address]. By joining [employer]’s FollowMyHealth portal, you will also be able to view your health information using other applications (apps) compatible with our system.

## 2023-04-17 NOTE — PROGRESS NOTE ADULT - ASSESSMENT
72M s/p RLE SFA to PT bypass graft with PTFE on 4/10 now s/p R hallux partial amp    PLAN:  - DVT ppx/ ASA  - f/u ID regarding abx plan for dispo  - r/s home meds  - pain control prn   - PT re eval p partial hallux amp- cleared to dc home c home PT 4/16  - pods- cleared to dc home 4/16  - dispo home today pending final ID recs    Vascular p [id number]

## 2023-04-17 NOTE — DISCHARGE NOTE NURSING/CASE MANAGEMENT/SOCIAL WORK - NSDCPEFALRISK_GEN_ALL_CORE
For information on Fall & Injury Prevention, visit: [email or web address] OR  [email or web address] OR  https://www.cdc.gov/steadi/patient.html

## 2023-04-17 NOTE — PROGRESS NOTE ADULT - REASON FOR ADMISSION
R toe infection

## 2023-04-17 NOTE — PROGRESS NOTE ADULT - NS ATTEND AMEND GEN_ALL_CORE FT
No acute events overnight  No vision changes  Pt. rates pain as a 1 out of 10 after 2130 7/12 ketorolac injection    Problem: Stroke: Ischemic (Transient/Permanent)  Goal: Neurological status is maintained/restored to status at baseline  Description: Monitor neurological and mental status including symptoms of increasing intracranial pressure (headache, nausea/vomiting, or change in behavior). Hypertension (greater than 180 systolic) may also indicate a change in status related to stroke.  Outcome: Outcome Met, Continue evaluating goal progress toward completion  Goal: Normal temperature is maintained  Outcome: Outcome Met, Continue evaluating goal progress toward completion  Goal: Elimination status is maintained/returned to baseline  Description: Remove indwelling urinary catheter as soon as possible or collaborate with provider for order/reason for continued use.   Outcome: Outcome Met, Continue evaluating goal progress toward completion  Goal: #Depressive s/s (self-reported/observed) are recognized and monitored  Outcome: Outcome Met, Continue evaluating goal progress toward completion  Goal: Personal stroke risk factors are identified with initial plan for risk reduction  Description: Stroke risk reduction involves taking medication, changing diet, increasing physical exercise, smoking cessation, or alcohol/drug use reduction/cessation based on identified risks.  Outcome: Outcome Met, Continue evaluating goal progress toward completion  Goal: Verbalizes understanding of condition and treatment plan  Description: Document on Patient Education Activity  Outcome: Outcome Met, Continue evaluating goal progress toward completion     Problem: Pain  Goal: #Acceptable pain level achieved/maintained at rest using NRS/Faces  Description: This goal is used for patients who can self-report.  Acceptable means the level is at or below the identified comfort/function goal.  Outcome: Outcome Met, Continue evaluating goal 
progress toward completion  Goal: # Acceptable pain level achieved/maintained with activity using NRS/Faces  Description: This goal is used for patients who can self-report and are not achieving acceptable pain control during activity.  Outcome: Outcome Met, Continue evaluating goal progress toward completion  Goal: Acceptable pain/comfort level is achieved/maintained at rest (based on Pain Behaviors Scale)  Description: This goal is used for patients who are not able to self-report pain and are assessed for pain using the Pain Behaviors Scale  Outcome: Outcome Met, Continue evaluating goal progress toward completion  Goal: Acceptable pain/comfort level is achieved/maintained at rest (based on pediatric behavior tool: NIPS, NPASS, or FLACC)  Description: This goal is used for pediatric patients who are not able to self report pain.  Outcome: Outcome Met, Continue evaluating goal progress toward completion  Goal: # Verbalizes understanding of pain management  Description: Documented in Patient Education Activity  Outcome: Outcome Met, Continue evaluating goal progress toward completion     
Patient care and plan discussed and reviewed with Advanced Care Provider. Plan as outlined above edited by me to reflect our discussion.
Pt care and plan discussed and reviewed with PA. Plan as outlined above edited by me to reflect our discussion. Advanced care planning/advanced directives discussed with patient/family. DNR status including forceful chest compressions to attempt to restart the heart, ventilator support/artificial breathing, electric shock, artificial nutrition, health care proxy, Molst form all discussed with pt.
Pt care and plan discussed and reviewed with PA. Plan as outlined above edited by me to reflect our discussion. Advanced care planning/advanced directives discussed with patient/family. DNR status including forceful chest compressions to attempt to restart the heart, ventilator support/artificial breathing, electric shock, artificial nutrition, health care proxy, Molst form all discussed with pt.
Patient care and plan discussed and reviewed with Advanced Care Provider. Plan as outlined above edited by me to reflect our discussion.
Patient care and plan discussed and reviewed with Advanced Care Provider. Plan as outlined above edited by me to reflect our discussion.
Pt care and plan discussed and reviewed with PA. Plan as outlined above edited by me to reflect our discussion.     Discussed with patient at the bedside. plan for OR on friday

## 2023-04-17 NOTE — PROGRESS NOTE ADULT - SUBJECTIVE AND OBJECTIVE BOX
Vascular Surgery Progress Note     Subjective/24hour Events: No acute events overnight.     Vital Signs:  Vital Signs Last 24 Hrs  T(C): 36.9 (17 Apr 2023 05:14), Max: 37.2 (16 Apr 2023 21:41)  T(F): 98.5 (17 Apr 2023 05:14), Max: 98.9 (16 Apr 2023 21:41)  HR: 84 (17 Apr 2023 05:14) (77 - 90)  BP: 155/72 (17 Apr 2023 05:14) (110/66 - 163/75)  BP(mean): --  RR: 18 (17 Apr 2023 05:14) (18 - 18)  SpO2: 96% (17 Apr 2023 05:14) (96% - 99%)    Parameters below as of 17 Apr 2023 05:14  Patient On (Oxygen Delivery Method): room air            I&O's Detail    16 Apr 2023 07:01  -  17 Apr 2023 07:00  --------------------------------------------------------  IN:    IV PiggyBack: 150 mL    Oral Fluid: 1160 mL  Total IN: 1310 mL    OUT:    Incontinent per Condom Catheter (mL): 2115 mL  Total OUT: 2115 mL    Total NET: -805 mL            MEDICATIONS  (STANDING):  acetaminophen     Tablet .. 975 milliGRAM(s) Oral every 8 hours  amLODIPine   Tablet 10 milliGRAM(s) Oral daily  aspirin enteric coated 81 milliGRAM(s) Oral daily  atorvastatin 40 milliGRAM(s) Oral at bedtime  ceFAZolin   IVPB 2000 milliGRAM(s) IV Intermittent every 8 hours  dextrose 5%. 1000 milliLiter(s) (100 mL/Hr) IV Continuous <Continuous>  dextrose 5%. 1000 milliLiter(s) (50 mL/Hr) IV Continuous <Continuous>  dextrose 50% Injectable 25 Gram(s) IV Push once  dextrose 50% Injectable 25 Gram(s) IV Push once  dextrose 50% Injectable 12.5 Gram(s) IV Push once  gabapentin 200 milliGRAM(s) Oral every 8 hours  glucagon  Injectable 1 milliGRAM(s) IntraMuscular once  heparin   Injectable 5000 Unit(s) SubCutaneous every 12 hours  insulin glargine Injectable (LANTUS) 6 Unit(s) SubCutaneous at bedtime  insulin lispro (ADMELOG) corrective regimen sliding scale   SubCutaneous three times a day before meals  insulin lispro (ADMELOG) corrective regimen sliding scale   SubCutaneous at bedtime  insulin lispro Injectable (ADMELOG) 3 Unit(s) SubCutaneous three times a day before meals  levoFLOXacin  Tablet 500 milliGRAM(s) Oral every 24 hours  losartan 25 milliGRAM(s) Oral daily  polyethylene glycol 3350 17 Gram(s) Oral daily  senna 2 Tablet(s) Oral at bedtime    MEDICATIONS  (PRN):  dextrose Oral Gel 15 Gram(s) Oral once PRN Blood Glucose LESS THAN 70 milliGRAM(s)/deciliter  oxyCODONE    IR 5 milliGRAM(s) Oral every 4 hours PRN Moderate Pain (4 - 6)  oxyCODONE    IR 10 milliGRAM(s) Oral every 4 hours PRN Severe Pain (7 - 10)        Physical Exam:  Constitutional: A&Ox3, NAD  Respiratory: Unlabored breathing  Extremities: WWP, RLE incision cdi, open to air, R DP/PT signals    Labs:    04-16    136  |  100  |  18  ----------------------------<  172<H>  4.5   |  25  |  0.92    Ca    8.8      16 Apr 2023 07:18  Phos  3.5     04-16  Mg     2.2     04-16                              10.4   6.42  )-----------( 251      ( 17 Apr 2023 07:05 )             32.1         CAPILLARY BLOOD GLUCOSE      POCT Blood Glucose.: 205 mg/dL (16 Apr 2023 21:29)  POCT Blood Glucose.: 182 mg/dL (16 Apr 2023 17:30)  POCT Blood Glucose.: 285 mg/dL (16 Apr 2023 12:06)  POCT Blood Glucose.: 181 mg/dL (16 Apr 2023 09:39)          Imaging:

## 2023-04-17 NOTE — PROGRESS NOTE ADULT - ASSESSMENT
Patient is a 72 year old male with a PMHx of HTN, Type II DM associated with neuropathy whore presents to [facility] with a chief complaint of pain, blistering and fluid drainage on his right foot. Patient reports worsening pain and discomfort in his right lower extremity. Patient reports intermittent chills for the past week prior to his arrival. Patient reports that he has been experiencing bilateral calf pain for about one month associated with pain and discomfort with walking less than 2 blocks. Patient reports that he noticed right lower extremity edema and bleeding from his great toe about two days prior to arrival. Internal medicine has been consulted on Mr. Paredes's care for medical management and medical clearance. Patient is S/P cardiac cath on previous admission. Patient denies any chest pain, palpitations, SOB or dyspnea. Patient denies dyspnea on exertion or shortness of breath with ambulation or walking. Patient reports that his walking is limited by lower extremity pain. Denies history of HLD, MI, CVA, Cardiac arrythmia, PE or DVT in the past.     Peripheral Arterial Disease, Neuropathy, Right Toe Wound   - LE X-Ray -- Without acute displaced Fx or Dislocation, Osteoarthritic changes, Neg for OM   - EYAD consistent w/ small vessel disease in feet   - LE Duplex -- Neg for DVT   - On ASA 81  and gabapentin    - Care per vascular appreciated -- LE Duplex as noted, S/P Angio, S/P bypass  with RLE SFA bypass graft   - Stress test Abnormal --> S/P cardiac cath on previous admission -- Moderate non-obstructive atherosclerosis, on Med management with ASA and Lipitor    - ABX as per ID   - Pain control   - Care per Vascular / Podiatry appreciated    Right Toe Infection   - 03/30 BCx2 Neg  final   - Previous  wound culture -- Multiple organisms   - On Ancef and Levofloxacin, ABX as per ID   - 04/12 Xray - as noted  - S/P OR with podiatry 04/14, S/P partial amputation of 1st ray; F/u pathology  -- Pending   - Cx w/ No growth to date.   - Cont to monitor CBC, Temp curve, VS and patient closely  - Pain control   - DC ABX as per ID; Outpatient ID follow up for duration of ABX.     Anemia  - Monitor and trend levels  - Previous admission Iron panel WNL and Ferritin elevated  - Trend Hgb closely   - Transfuse for Hgb < 8.0 in view of CAD   - S/P 1 unit PRBC   - Hgb stable post transfusion     HTN  - C/w Norvasc and Losartan 25     - Monitor BP, VS and patient closely    DM  - A1C 8.4   - C/w Sliding scale   - Hyperglycemic. Will add 3 units pre-meal and 6 units lantus QHs; Monitor and adjust accordingly   - Monitor glucose levels     Pre-OP Risk Stratification   - TTE w/ EF of 61%, Normal RV function, Mild LVH   - Stress test abnormal. S/P cath, non-obstructive CAD   - Patient is medically optimized for Podiatric intervention --> S/P OR     PPX  - Hep 5K Q12     DC planning per surgery team. Patient will require outpatient PCP, ID, Vascular, Podiatry and Cardiology follow ups upon discharge.

## 2023-04-17 NOTE — PROGRESS NOTE ADULT - ASSESSMENT
73 y/o M 4/14 s/p right foot partial hallux amputation, closed  - Pt was seen and evaluated.   - Afebrile, no leukocytosis   - 4/14 s/p right foot partial hallux amputation, closed: sutures intact, skin well coapted, no dehiscence, no hematoma, no drainage, flaps warm and viable.    - Pt is s/p RLE SFA to PT bypass graft with PT signal w/ Dr. Mullins on 4/10  - Per intraop findings, low concern for residual infection and viability   - OR clean bone culture no growth prelim and pathology pending   - Patient is stable for discharge from podiatry standpoint   - F/u information and instructions can be found in the f/u section of d/c provider note   - Discussed with attending

## 2023-04-17 NOTE — PROGRESS NOTE ADULT - ASSESSMENT
72M with h/o T2DM (A1c=8.4) with neuropathy, HTN admitted 3/30 with R 1st toe infection.  Banged his toe 3/27 but has neuropathy.  Admitted initially 3/30-4/6 for toe infection.  No OM noted.  Cultures of purulence seen when nail removed (+) polymicrobial.  4/10 s/p RLE SFA to PT bypass graft with PTFE followed by R foot Partial hallux Amputation 4/14 (at proximal resection bone was of good quality, no evidence of purulence or infection tracking proximally. Incision primarily closed).  OR cultures negative.     Diabetic foot infection, polymicrobial  - s/p debridement  - s/p bypass  - s/p partial hallux resection  - OR cultures negative, however, pathology is pending  - low suspicion for continued infection  - can d/c home on another week of keflex and levaquin  - if pathology returns with OM at the margin, can extend to complete 6 weeks (from 3/30)  - outpatient f/u    Dispo  - for d/c home today    I have discussed plan of care as detailed above with vascular PA

## 2023-04-17 NOTE — PROGRESS NOTE ADULT - SUBJECTIVE AND OBJECTIVE BOX
Patient is a 72y old  Male who presents with a chief complaint of R toe infection (17 Apr 2023 07:39)       INTERVAL HPI/OVERNIGHT EVENTS:  Patient seen and evaluated at bedside.  Pt is resting comfortable in NAD. Denies N/V/F/C.  Pain rated at X/10    Allergies    No Known Allergies    Intolerances        Vital Signs Last 24 Hrs  T(C): 36.9 (17 Apr 2023 05:14), Max: 37.2 (16 Apr 2023 21:41)  T(F): 98.5 (17 Apr 2023 05:14), Max: 98.9 (16 Apr 2023 21:41)  HR: 84 (17 Apr 2023 05:14) (77 - 90)  BP: 155/72 (17 Apr 2023 05:14) (110/66 - 163/75)  BP(mean): --  RR: 18 (17 Apr 2023 05:14) (18 - 18)  SpO2: 96% (17 Apr 2023 05:14) (96% - 99%)    Parameters below as of 17 Apr 2023 05:14  Patient On (Oxygen Delivery Method): room air        LABS:                        10.4   6.42  )-----------( 251      ( 17 Apr 2023 07:05 )             32.1     04-17    137  |  98  |  18  ----------------------------<  153<H>  4.2   |  25  |  0.89    Ca    9.2      17 Apr 2023 07:06  Phos  3.8     04-17  Mg     2.2     04-17          CAPILLARY BLOOD GLUCOSE      POCT Blood Glucose.: 205 mg/dL (16 Apr 2023 21:29)  POCT Blood Glucose.: 182 mg/dL (16 Apr 2023 17:30)  POCT Blood Glucose.: 285 mg/dL (16 Apr 2023 12:06)  POCT Blood Glucose.: 181 mg/dL (16 Apr 2023 09:39)      Lower Extremity Physical Exam:  Vascular: DP/PT 0/4, B/L, CFT <3 seconds B/L x9, Temperature gradient warm to cool, B/L.   Neuro: Epicritic sensation decreased to the level of digits, B/L.  Musculoskeletal/Ortho: unremarkable   Skin: 4/14 s/p right foot partial hallux amputation, closed: sutures intact, skin well coapted, no dehiscence, no hematoma, no drainage, flaps warm and viable.     RADIOLOGY & ADDITIONAL TESTS:

## 2023-04-17 NOTE — PROGRESS NOTE ADULT - PROVIDER SPECIALTY LIST ADULT
Infectious Disease
Internal Medicine
Podiatry
Vascular Surgery
Cardiology
Internal Medicine
Podiatry
Vascular Surgery
Cardiology
Infectious Disease
Internal Medicine
Podiatry
Podiatry
Vascular Surgery
Internal Medicine
Podiatry
Vascular Surgery

## 2023-04-19 LAB
CULTURE RESULTS: NO GROWTH — SIGNIFICANT CHANGE UP
SPECIMEN SOURCE: SIGNIFICANT CHANGE UP

## 2023-05-17 ENCOUNTER — APPOINTMENT (OUTPATIENT)
Dept: VASCULAR SURGERY | Facility: CLINIC | Age: 73
End: 2023-05-17
Payer: MEDICARE

## 2023-05-17 VITALS
BODY MASS INDEX: 24.96 KG/M2 | HEART RATE: 81 BPM | WEIGHT: 159 LBS | SYSTOLIC BLOOD PRESSURE: 160 MMHG | HEIGHT: 67 IN | TEMPERATURE: 97.6 F | DIASTOLIC BLOOD PRESSURE: 88 MMHG

## 2023-05-17 PROCEDURE — 99024 POSTOP FOLLOW-UP VISIT: CPT

## 2023-05-17 RX ORDER — GLIPIZIDE 2.5 MG/1
TABLET ORAL
Refills: 0 | Status: ACTIVE | COMMUNITY

## 2023-05-17 RX ORDER — LOSARTAN POTASSIUM 50 MG/1
50 TABLET, FILM COATED ORAL
Refills: 0 | Status: ACTIVE | COMMUNITY

## 2023-05-17 RX ORDER — AMLODIPINE BESYLATE 5 MG/1
TABLET ORAL
Refills: 0 | Status: ACTIVE | COMMUNITY

## 2023-05-17 RX ORDER — CEPHALEXIN 500 MG/1
TABLET ORAL
Refills: 0 | Status: ACTIVE | COMMUNITY

## 2023-05-17 RX ORDER — ROSUVASTATIN CALCIUM 5 MG/1
TABLET, FILM COATED ORAL
Refills: 0 | Status: ACTIVE | COMMUNITY

## 2023-05-17 RX ORDER — ASPIRIN 81 MG
81 TABLET, DELAYED RELEASE (ENTERIC COATED) ORAL
Refills: 0 | Status: ACTIVE | COMMUNITY

## 2023-05-17 RX ORDER — METFORMIN HYDROCHLORIDE 625 MG/1
TABLET ORAL
Refills: 0 | Status: ACTIVE | COMMUNITY

## 2023-05-17 RX ORDER — LEVOTHYROXINE SODIUM 0.17 MG/1
TABLET ORAL
Refills: 0 | Status: ACTIVE | COMMUNITY

## 2023-05-17 NOTE — REASON FOR VISIT
[Spouse] : spouse [Family Member] : family member [de-identified] : Right superficial femoral artery to posterior tibial artery bypass with PTFE [de-identified] : 04/10/23 [de-identified] : Denies complaints. No pain in the foot or along incisions. No fevers or chills.

## 2023-05-17 NOTE — DISCUSSION/SUMMARY
[FreeTextEntry1] : Problem #1 chronic limb threatening ischemia of the right lower extremity, [place] 5\par - s/p R SFA-PT bypass with PTFE\par - staples removed today\par - follow up in one month with bypass duplex at that time

## 2023-05-23 ENCOUNTER — NON-APPOINTMENT (OUTPATIENT)
Age: 73
End: 2023-05-23

## 2023-05-24 ENCOUNTER — APPOINTMENT (OUTPATIENT)
Dept: INFECTIOUS DISEASE | Facility: CLINIC | Age: 73
End: 2023-05-24

## 2023-06-21 ENCOUNTER — APPOINTMENT (OUTPATIENT)
Dept: VASCULAR SURGERY | Facility: CLINIC | Age: 73
End: 2023-06-21
Payer: MEDICARE

## 2023-06-21 VITALS
WEIGHT: 159 LBS | DIASTOLIC BLOOD PRESSURE: 75 MMHG | BODY MASS INDEX: 24.96 KG/M2 | HEIGHT: 67 IN | SYSTOLIC BLOOD PRESSURE: 168 MMHG | TEMPERATURE: 98 F | HEART RATE: 76 BPM

## 2023-06-21 PROCEDURE — 93926 LOWER EXTREMITY STUDY: CPT

## 2023-06-21 PROCEDURE — 99024 POSTOP FOLLOW-UP VISIT: CPT

## 2023-06-21 NOTE — REASON FOR VISIT
[Spouse] : spouse [Family Member] : family member [de-identified] : Right femoral to posterior tibial artery bypass  [de-identified] : 04/10/23 [de-identified] : Patient states he feels fine. Ambulating without difficulty. Denies any rest pain.

## 2023-06-21 NOTE — DISCUSSION/SUMMARY
[FreeTextEntry1] : Problem #1 chronic limb threatening ischemia of the right lower extremity, [place] 5\par - s/p R SFA-PT bypass with PTFE\par - bypass occluded on today's arterial duplex\par - will plan for RLE angiogram, possible revascularization

## 2023-06-21 NOTE — PHYSICAL EXAM
[0] : right 0 [Alert] : alert [Oriented to Person] : oriented to person [Oriented to Place] : oriented to place [Oriented to Time] : oriented to time [Calm] : calm

## 2023-07-03 ENCOUNTER — APPOINTMENT (OUTPATIENT)
Dept: VASCULAR SURGERY | Facility: HOSPITAL | Age: 73
End: 2023-07-03

## 2023-07-03 ENCOUNTER — OUTPATIENT (OUTPATIENT)
Dept: OUTPATIENT SERVICES | Facility: HOSPITAL | Age: 73
LOS: 1 days | Discharge: HOME | End: 2023-07-03
Payer: MEDICARE

## 2023-07-03 DIAGNOSIS — Z89.429 ACQUIRED ABSENCE OF OTHER TOE(S), UNSPECIFIED SIDE: Chronic | ICD-10-CM

## 2023-07-03 DIAGNOSIS — I73.9 PERIPHERAL VASCULAR DISEASE, UNSPECIFIED: ICD-10-CM

## 2023-07-03 LAB
ANION GAP SERPL CALC-SCNC: 13 MMOL/L — SIGNIFICANT CHANGE UP (ref 7–14)
BUN SERPL-MCNC: 29 MG/DL — HIGH (ref 7–23)
CALCIUM SERPL-MCNC: 9.8 MG/DL — SIGNIFICANT CHANGE UP (ref 8.4–10.5)
CHLORIDE SERPL-SCNC: 102 MMOL/L — SIGNIFICANT CHANGE UP (ref 98–107)
CO2 SERPL-SCNC: 21 MMOL/L — LOW (ref 22–31)
CREAT SERPL-MCNC: 1.06 MG/DL — SIGNIFICANT CHANGE UP (ref 0.5–1.3)
EGFR: 75 ML/MIN/1.73M2 — SIGNIFICANT CHANGE UP
GLUCOSE BLDC GLUCOMTR-MCNC: 176 MG/DL — HIGH (ref 70–99)
GLUCOSE SERPL-MCNC: 189 MG/DL — HIGH (ref 70–99)
HCT VFR BLD CALC: 32.2 % — LOW (ref 39–50)
HGB BLD-MCNC: 10.5 G/DL — LOW (ref 13–17)
MCHC RBC-ENTMCNC: 28.5 PG — SIGNIFICANT CHANGE UP (ref 27–34)
MCHC RBC-ENTMCNC: 32.6 GM/DL — SIGNIFICANT CHANGE UP (ref 32–36)
MCV RBC AUTO: 87.5 FL — SIGNIFICANT CHANGE UP (ref 80–100)
NRBC # BLD: 0 /100 WBCS — SIGNIFICANT CHANGE UP (ref 0–0)
NRBC # FLD: 0 K/UL — SIGNIFICANT CHANGE UP (ref 0–0)
PLATELET # BLD AUTO: 258 K/UL — SIGNIFICANT CHANGE UP (ref 150–400)
POTASSIUM SERPL-MCNC: 4.4 MMOL/L — SIGNIFICANT CHANGE UP (ref 3.5–5.3)
POTASSIUM SERPL-SCNC: 4.4 MMOL/L — SIGNIFICANT CHANGE UP (ref 3.5–5.3)
RBC # BLD: 3.68 M/UL — LOW (ref 4.2–5.8)
RBC # FLD: 12.6 % — SIGNIFICANT CHANGE UP (ref 10.3–14.5)
SODIUM SERPL-SCNC: 136 MMOL/L — SIGNIFICANT CHANGE UP (ref 135–145)
WBC # BLD: 8.9 K/UL — SIGNIFICANT CHANGE UP (ref 3.8–10.5)
WBC # FLD AUTO: 8.9 K/UL — SIGNIFICANT CHANGE UP (ref 3.8–10.5)

## 2023-07-03 PROCEDURE — 99152 MOD SED SAME PHYS/QHP 5/>YRS: CPT

## 2023-07-03 PROCEDURE — 37184 PRIM ART M-THRMBC 1ST VSL: CPT | Mod: RT,79

## 2023-07-03 PROCEDURE — ZZZZZ: CPT

## 2023-07-03 PROCEDURE — 37227: CPT | Mod: RT,79

## 2023-07-03 PROCEDURE — 75710 ARTERY X-RAYS ARM/LEG: CPT | Mod: 26,59

## 2023-07-03 PROCEDURE — 75625 CONTRAST EXAM ABDOMINL AORTA: CPT | Mod: 26,59

## 2023-07-03 RX ORDER — SODIUM CHLORIDE 9 MG/ML
3 INJECTION INTRAMUSCULAR; INTRAVENOUS; SUBCUTANEOUS EVERY 8 HOURS
Refills: 0 | Status: DISCONTINUED | OUTPATIENT
Start: 2023-07-03 | End: 2023-07-17

## 2023-07-03 RX ORDER — ASPIRIN/CALCIUM CARB/MAGNESIUM 324 MG
81 TABLET ORAL ONCE
Refills: 0 | Status: COMPLETED | OUTPATIENT
Start: 2023-07-03 | End: 2023-07-03

## 2023-07-03 RX ORDER — SODIUM CHLORIDE 9 MG/ML
500 INJECTION INTRAMUSCULAR; INTRAVENOUS; SUBCUTANEOUS
Refills: 0 | Status: COMPLETED | OUTPATIENT
Start: 2023-07-03 | End: 2023-07-03

## 2023-07-03 RX ADMIN — Medication 81 MILLIGRAM(S): at 08:38

## 2023-07-03 RX ADMIN — SODIUM CHLORIDE 3 MILLILITER(S): 9 INJECTION INTRAMUSCULAR; INTRAVENOUS; SUBCUTANEOUS at 17:52

## 2023-07-03 RX ADMIN — SODIUM CHLORIDE 75 MILLILITER(S): 9 INJECTION INTRAMUSCULAR; INTRAVENOUS; SUBCUTANEOUS at 17:52

## 2023-07-03 NOTE — H&P CARDIOLOGY - HISTORY OF PRESENT ILLNESS
72 y.o. male presents today for elective RLE peripheral angiogram.  72 y.o. male presents today for elective RLE peripheral angiogram. The patient is s/p RLE arterial bypass in 3/2023, c/o R distal foot skin discoloration. The patient denies chest pain, SOB, palpitations, dizziness, presyncope, syncope,  headache, visual disturbances, CVA, PE, DVT, DEBRA, abdominal pain, N/V/D/C, hematochezia, melena, dysuria, hematuria, fever, chills.   72 y.o. male presents today for elective RLE peripheral angiogram. The patient is s/p RLE arterial bypass in 3/2023, c/o R distal foot skin discoloration. He was evaluated by a vascular surgeon, was found to have abnormal RLE arterial doppler, recommended to have angiogram, possible angioplasty.  The patient denies chest pain, SOB, palpitations, dizziness, presyncope, syncope,  headache, visual disturbances, CVA, PE, DVT, DEBRA, abdominal pain, N/V/D/C, hematochezia, melena, dysuria, hematuria, fever, chills.

## 2023-07-03 NOTE — H&P CARDIOLOGY - NSICDXPASTMEDICALHX_GEN_ALL_CORE_FT
PAST MEDICAL HISTORY:  DM (diabetes mellitus)     HTN (hypertension)     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     PAD (peripheral artery disease)

## 2023-07-03 NOTE — H&P CARDIOLOGY - NSICDXPASTSURGICALHX_GEN_ALL_CORE_FT
PAST SURGICAL HISTORY:  No significant past surgical history      PAST SURGICAL HISTORY:  History of amputation of toe

## 2023-07-03 NOTE — BRIEF OPERATIVE NOTE - OPERATION/FINDINGS
Right lower extremity angiogram via Left groin. Severe occluded AT and PT in the mid calf with 1 single vessel (peroneal) runoff to the foot. Rotational atherectomy of the proximal portion of the graft in the SFA, followed by MIKE that improved flow. However, there is a proximal lesion that is not amenable to endovascular measures.

## 2023-07-03 NOTE — H&P CARDIOLOGY - REVIEW OF SYSTEMS
The patient denies chest pain, SOB, palpitations, dizziness, presyncope, syncope,  headache, visual disturbances, CVA, PE, DVT, DEBRA, abdominal pain, N/V/D/C, hematochezia, melena, dysuria, hematuria, fever, chills.

## 2023-07-05 PROBLEM — I73.9 PERIPHERAL VASCULAR DISEASE, UNSPECIFIED: Chronic | Status: ACTIVE | Noted: 2023-07-03

## 2023-07-08 ENCOUNTER — INPATIENT (INPATIENT)
Facility: HOSPITAL | Age: 73
LOS: 18 days | Discharge: NURSING, REHAB OR LONG-TERM CARE | DRG: 240 | End: 2023-07-27
Attending: STUDENT IN AN ORGANIZED HEALTH CARE EDUCATION/TRAINING PROGRAM | Admitting: STUDENT IN AN ORGANIZED HEALTH CARE EDUCATION/TRAINING PROGRAM
Payer: COMMERCIAL

## 2023-07-08 VITALS
HEIGHT: 67 IN | WEIGHT: 158.95 LBS | SYSTOLIC BLOOD PRESSURE: 145 MMHG | DIASTOLIC BLOOD PRESSURE: 68 MMHG | TEMPERATURE: 99 F | RESPIRATION RATE: 18 BRPM | HEART RATE: 90 BPM | OXYGEN SATURATION: 99 %

## 2023-07-08 DIAGNOSIS — M79.671 PAIN IN RIGHT FOOT: ICD-10-CM

## 2023-07-08 DIAGNOSIS — Z89.429 ACQUIRED ABSENCE OF OTHER TOE(S), UNSPECIFIED SIDE: Chronic | ICD-10-CM

## 2023-07-08 LAB
ALBUMIN SERPL ELPH-MCNC: 3.7 G/DL — SIGNIFICANT CHANGE UP (ref 3.3–5)
ALP SERPL-CCNC: 82 U/L — SIGNIFICANT CHANGE UP (ref 40–120)
ALT FLD-CCNC: 36 U/L — SIGNIFICANT CHANGE UP (ref 10–45)
ANION GAP SERPL CALC-SCNC: 10 MMOL/L — SIGNIFICANT CHANGE UP (ref 5–17)
APPEARANCE UR: CLEAR — SIGNIFICANT CHANGE UP
APTT BLD: 29.9 SEC — SIGNIFICANT CHANGE UP (ref 27.5–35.5)
AST SERPL-CCNC: 18 U/L — SIGNIFICANT CHANGE UP (ref 10–40)
BACTERIA # UR AUTO: NEGATIVE — SIGNIFICANT CHANGE UP
BASE EXCESS BLDV CALC-SCNC: 1.5 MMOL/L — SIGNIFICANT CHANGE UP (ref -2–3)
BASOPHILS # BLD AUTO: 0.09 K/UL — SIGNIFICANT CHANGE UP (ref 0–0.2)
BASOPHILS NFR BLD AUTO: 0.9 % — SIGNIFICANT CHANGE UP (ref 0–2)
BILIRUB SERPL-MCNC: 0.2 MG/DL — SIGNIFICANT CHANGE UP (ref 0.2–1.2)
BILIRUB UR-MCNC: NEGATIVE — SIGNIFICANT CHANGE UP
BUN SERPL-MCNC: 22 MG/DL — SIGNIFICANT CHANGE UP (ref 7–23)
CA-I SERPL-SCNC: 1.2 MMOL/L — SIGNIFICANT CHANGE UP (ref 1.15–1.33)
CALCIUM SERPL-MCNC: 9.5 MG/DL — SIGNIFICANT CHANGE UP (ref 8.4–10.5)
CHLORIDE BLDV-SCNC: 98 MMOL/L — SIGNIFICANT CHANGE UP (ref 96–108)
CHLORIDE SERPL-SCNC: 100 MMOL/L — SIGNIFICANT CHANGE UP (ref 96–108)
CO2 BLDV-SCNC: 29 MMOL/L — HIGH (ref 22–26)
CO2 SERPL-SCNC: 24 MMOL/L — SIGNIFICANT CHANGE UP (ref 22–31)
COLOR SPEC: SIGNIFICANT CHANGE UP
CREAT SERPL-MCNC: 1.03 MG/DL — SIGNIFICANT CHANGE UP (ref 0.5–1.3)
DIFF PNL FLD: NEGATIVE — SIGNIFICANT CHANGE UP
EGFR: 77 ML/MIN/1.73M2 — SIGNIFICANT CHANGE UP
EOSINOPHIL # BLD AUTO: 0.17 K/UL — SIGNIFICANT CHANGE UP (ref 0–0.5)
EOSINOPHIL NFR BLD AUTO: 1.7 % — SIGNIFICANT CHANGE UP (ref 0–6)
EPI CELLS # UR: 0 /HPF — SIGNIFICANT CHANGE UP
GAS PNL BLDV: 129 MMOL/L — LOW (ref 136–145)
GAS PNL BLDV: SIGNIFICANT CHANGE UP
GAS PNL BLDV: SIGNIFICANT CHANGE UP
GLUCOSE BLDC GLUCOMTR-MCNC: 165 MG/DL — HIGH (ref 70–99)
GLUCOSE BLDC GLUCOMTR-MCNC: 204 MG/DL — HIGH (ref 70–99)
GLUCOSE BLDV-MCNC: 198 MG/DL — HIGH (ref 70–99)
GLUCOSE SERPL-MCNC: 198 MG/DL — HIGH (ref 70–99)
GLUCOSE UR QL: NEGATIVE — SIGNIFICANT CHANGE UP
HCO3 BLDV-SCNC: 28 MMOL/L — SIGNIFICANT CHANGE UP (ref 22–29)
HCT VFR BLD CALC: 29.3 % — LOW (ref 39–50)
HCT VFR BLDA CALC: 48 % — SIGNIFICANT CHANGE UP (ref 39–51)
HGB BLD CALC-MCNC: 15.9 G/DL — SIGNIFICANT CHANGE UP (ref 12.6–17.4)
HGB BLD-MCNC: 9.8 G/DL — LOW (ref 13–17)
HYALINE CASTS # UR AUTO: 1 /LPF — SIGNIFICANT CHANGE UP (ref 0–2)
INR BLD: 1.07 RATIO — SIGNIFICANT CHANGE UP (ref 0.88–1.16)
KETONES UR-MCNC: NEGATIVE — SIGNIFICANT CHANGE UP
LACTATE BLDV-MCNC: 1.2 MMOL/L — SIGNIFICANT CHANGE UP (ref 0.5–2)
LEUKOCYTE ESTERASE UR-ACNC: NEGATIVE — SIGNIFICANT CHANGE UP
LYMPHOCYTES # BLD AUTO: 1.32 K/UL — SIGNIFICANT CHANGE UP (ref 1–3.3)
LYMPHOCYTES # BLD AUTO: 13 % — SIGNIFICANT CHANGE UP (ref 13–44)
MANUAL SMEAR VERIFICATION: SIGNIFICANT CHANGE UP
MCHC RBC-ENTMCNC: 29.3 PG — SIGNIFICANT CHANGE UP (ref 27–34)
MCHC RBC-ENTMCNC: 33.4 GM/DL — SIGNIFICANT CHANGE UP (ref 32–36)
MCV RBC AUTO: 87.5 FL — SIGNIFICANT CHANGE UP (ref 80–100)
MONOCYTES # BLD AUTO: 0.88 K/UL — SIGNIFICANT CHANGE UP (ref 0–0.9)
MONOCYTES NFR BLD AUTO: 8.7 % — SIGNIFICANT CHANGE UP (ref 2–14)
NEUTROPHILS # BLD AUTO: 7.68 K/UL — HIGH (ref 1.8–7.4)
NEUTROPHILS NFR BLD AUTO: 75.7 % — SIGNIFICANT CHANGE UP (ref 43–77)
NITRITE UR-MCNC: NEGATIVE — SIGNIFICANT CHANGE UP
PCO2 BLDV: 48 MMHG — SIGNIFICANT CHANGE UP (ref 42–55)
PH BLDV: 7.37 — SIGNIFICANT CHANGE UP (ref 7.32–7.43)
PH UR: 6 — SIGNIFICANT CHANGE UP (ref 5–8)
PLAT MORPH BLD: NORMAL — SIGNIFICANT CHANGE UP
PLATELET # BLD AUTO: 235 K/UL — SIGNIFICANT CHANGE UP (ref 150–400)
PO2 BLDV: 21 MMHG — LOW (ref 25–45)
POTASSIUM BLDV-SCNC: 4.5 MMOL/L — SIGNIFICANT CHANGE UP (ref 3.5–5.1)
POTASSIUM SERPL-MCNC: 4.7 MMOL/L — SIGNIFICANT CHANGE UP (ref 3.5–5.3)
POTASSIUM SERPL-SCNC: 4.7 MMOL/L — SIGNIFICANT CHANGE UP (ref 3.5–5.3)
PROT SERPL-MCNC: 8 G/DL — SIGNIFICANT CHANGE UP (ref 6–8.3)
PROT UR-MCNC: ABNORMAL
PROTHROM AB SERPL-ACNC: 12.3 SEC — SIGNIFICANT CHANGE UP (ref 10.5–13.4)
RBC # BLD: 3.35 M/UL — LOW (ref 4.2–5.8)
RBC # FLD: 12.6 % — SIGNIFICANT CHANGE UP (ref 10.3–14.5)
RBC BLD AUTO: SIGNIFICANT CHANGE UP
RBC CASTS # UR COMP ASSIST: 1 /HPF — SIGNIFICANT CHANGE UP (ref 0–4)
SAO2 % BLDV: 28.9 % — LOW (ref 67–88)
SODIUM SERPL-SCNC: 134 MMOL/L — LOW (ref 135–145)
SP GR SPEC: 1.01 — SIGNIFICANT CHANGE UP (ref 1.01–1.02)
UROBILINOGEN FLD QL: NEGATIVE — SIGNIFICANT CHANGE UP
WBC # BLD: 10.14 K/UL — SIGNIFICANT CHANGE UP (ref 3.8–10.5)
WBC # FLD AUTO: 10.14 K/UL — SIGNIFICANT CHANGE UP (ref 3.8–10.5)
WBC UR QL: 0 /HPF — SIGNIFICANT CHANGE UP (ref 0–5)

## 2023-07-08 PROCEDURE — 99285 EMERGENCY DEPT VISIT HI MDM: CPT

## 2023-07-08 PROCEDURE — 73620 X-RAY EXAM OF FOOT: CPT | Mod: 26,RT

## 2023-07-08 PROCEDURE — 93923 UPR/LXTR ART STDY 3+ LVLS: CPT | Mod: 26

## 2023-07-08 PROCEDURE — 99223 1ST HOSP IP/OBS HIGH 75: CPT | Mod: GC,24

## 2023-07-08 PROCEDURE — 93926 LOWER EXTREMITY STUDY: CPT | Mod: 26,LT

## 2023-07-08 RX ORDER — PIPERACILLIN AND TAZOBACTAM 4; .5 G/20ML; G/20ML
3.38 INJECTION, POWDER, LYOPHILIZED, FOR SOLUTION INTRAVENOUS EVERY 8 HOURS
Refills: 0 | Status: DISCONTINUED | OUTPATIENT
Start: 2023-07-08 | End: 2023-07-09

## 2023-07-08 RX ORDER — SODIUM CHLORIDE 9 MG/ML
1000 INJECTION, SOLUTION INTRAVENOUS
Refills: 0 | Status: DISCONTINUED | OUTPATIENT
Start: 2023-07-08 | End: 2023-07-13

## 2023-07-08 RX ORDER — VANCOMYCIN HCL 1 G
1000 VIAL (EA) INTRAVENOUS EVERY 12 HOURS
Refills: 0 | Status: DISCONTINUED | OUTPATIENT
Start: 2023-07-09 | End: 2023-07-09

## 2023-07-08 RX ORDER — INSULIN LISPRO 100/ML
VIAL (ML) SUBCUTANEOUS
Refills: 0 | Status: DISCONTINUED | OUTPATIENT
Start: 2023-07-08 | End: 2023-07-13

## 2023-07-08 RX ORDER — GLUCAGON INJECTION, SOLUTION 0.5 MG/.1ML
1 INJECTION, SOLUTION SUBCUTANEOUS ONCE
Refills: 0 | Status: DISCONTINUED | OUTPATIENT
Start: 2023-07-08 | End: 2023-07-13

## 2023-07-08 RX ORDER — ATORVASTATIN CALCIUM 80 MG/1
40 TABLET, FILM COATED ORAL AT BEDTIME
Refills: 0 | Status: DISCONTINUED | OUTPATIENT
Start: 2023-07-08 | End: 2023-07-13

## 2023-07-08 RX ORDER — GABAPENTIN 400 MG/1
1 CAPSULE ORAL
Refills: 0 | DISCHARGE

## 2023-07-08 RX ORDER — DEXTROSE 50 % IN WATER 50 %
25 SYRINGE (ML) INTRAVENOUS ONCE
Refills: 0 | Status: DISCONTINUED | OUTPATIENT
Start: 2023-07-08 | End: 2023-07-13

## 2023-07-08 RX ORDER — DEXTROSE 50 % IN WATER 50 %
12.5 SYRINGE (ML) INTRAVENOUS ONCE
Refills: 0 | Status: DISCONTINUED | OUTPATIENT
Start: 2023-07-08 | End: 2023-07-13

## 2023-07-08 RX ORDER — VANCOMYCIN HCL 1 G
1500 VIAL (EA) INTRAVENOUS EVERY 12 HOURS
Refills: 0 | Status: DISCONTINUED | OUTPATIENT
Start: 2023-07-08 | End: 2023-07-08

## 2023-07-08 RX ORDER — AMLODIPINE BESYLATE 2.5 MG/1
2.5 TABLET ORAL DAILY
Refills: 0 | Status: DISCONTINUED | OUTPATIENT
Start: 2023-07-08 | End: 2023-07-13

## 2023-07-08 RX ORDER — INSULIN LISPRO 100/ML
VIAL (ML) SUBCUTANEOUS AT BEDTIME
Refills: 0 | Status: DISCONTINUED | OUTPATIENT
Start: 2023-07-08 | End: 2023-07-13

## 2023-07-08 RX ORDER — ASPIRIN/CALCIUM CARB/MAGNESIUM 324 MG
81 TABLET ORAL DAILY
Refills: 0 | Status: DISCONTINUED | OUTPATIENT
Start: 2023-07-08 | End: 2023-07-13

## 2023-07-08 RX ORDER — ENOXAPARIN SODIUM 100 MG/ML
40 INJECTION SUBCUTANEOUS EVERY 24 HOURS
Refills: 0 | Status: DISCONTINUED | OUTPATIENT
Start: 2023-07-08 | End: 2023-07-13

## 2023-07-08 RX ORDER — DEXTROSE 50 % IN WATER 50 %
15 SYRINGE (ML) INTRAVENOUS ONCE
Refills: 0 | Status: DISCONTINUED | OUTPATIENT
Start: 2023-07-08 | End: 2023-07-13

## 2023-07-08 RX ORDER — GABAPENTIN 400 MG/1
100 CAPSULE ORAL EVERY 8 HOURS
Refills: 0 | Status: DISCONTINUED | OUTPATIENT
Start: 2023-07-08 | End: 2023-07-13

## 2023-07-08 RX ORDER — LOSARTAN POTASSIUM 100 MG/1
25 TABLET, FILM COATED ORAL DAILY
Refills: 0 | Status: DISCONTINUED | OUTPATIENT
Start: 2023-07-08 | End: 2023-07-13

## 2023-07-08 RX ORDER — VANCOMYCIN HCL 1 G
1000 VIAL (EA) INTRAVENOUS ONCE
Refills: 0 | Status: COMPLETED | OUTPATIENT
Start: 2023-07-08 | End: 2023-07-08

## 2023-07-08 RX ORDER — PIPERACILLIN AND TAZOBACTAM 4; .5 G/20ML; G/20ML
3.38 INJECTION, POWDER, LYOPHILIZED, FOR SOLUTION INTRAVENOUS ONCE
Refills: 0 | Status: COMPLETED | OUTPATIENT
Start: 2023-07-08 | End: 2023-07-08

## 2023-07-08 RX ORDER — CEFTRIAXONE 500 MG/1
1000 INJECTION, POWDER, FOR SOLUTION INTRAMUSCULAR; INTRAVENOUS ONCE
Refills: 0 | Status: DISCONTINUED | OUTPATIENT
Start: 2023-07-08 | End: 2023-07-08

## 2023-07-08 RX ADMIN — GABAPENTIN 100 MILLIGRAM(S): 400 CAPSULE ORAL at 21:49

## 2023-07-08 RX ADMIN — GABAPENTIN 100 MILLIGRAM(S): 400 CAPSULE ORAL at 16:54

## 2023-07-08 RX ADMIN — PIPERACILLIN AND TAZOBACTAM 25 GRAM(S): 4; .5 INJECTION, POWDER, LYOPHILIZED, FOR SOLUTION INTRAVENOUS at 16:52

## 2023-07-08 RX ADMIN — ATORVASTATIN CALCIUM 40 MILLIGRAM(S): 80 TABLET, FILM COATED ORAL at 21:49

## 2023-07-08 RX ADMIN — PIPERACILLIN AND TAZOBACTAM 200 GRAM(S): 4; .5 INJECTION, POWDER, LYOPHILIZED, FOR SOLUTION INTRAVENOUS at 12:48

## 2023-07-08 RX ADMIN — Medication 1: at 16:52

## 2023-07-08 RX ADMIN — Medication 250 MILLIGRAM(S): at 14:09

## 2023-07-08 RX ADMIN — ENOXAPARIN SODIUM 40 MILLIGRAM(S): 100 INJECTION SUBCUTANEOUS at 16:54

## 2023-07-08 RX ADMIN — PIPERACILLIN AND TAZOBACTAM 25 GRAM(S): 4; .5 INJECTION, POWDER, LYOPHILIZED, FOR SOLUTION INTRAVENOUS at 23:19

## 2023-07-08 NOTE — ED PROVIDER NOTE - PROGRESS NOTE DETAILS
Podiatry and vascular contacted.  They will consult on pt. Podiatry and vascular contacted.  Per podiatry, pt requires further amputation of site. Surgery states pt can be admitted to Dr. Dwyer (vascular surgery).

## 2023-07-08 NOTE — ED PROVIDER NOTE - OBJECTIVE STATEMENT
73 yo M, hx of HTN, DM, peripheral vascular disease, presents sent by podiatry for evaluation of osteomyelitis at base right great toe amputation site.  Pt also c/o pain to his left groin s/p LLE stent placement by Dr. Mullins.  No CP, SOB, fever, chills. 71 yo M, hx of HTN, DM, peripheral vascular disease, presents sent by podiatry for evaluation of osteomyelitis at base right great toe amputation site (performed April 2023).  Pt also c/o pain to his left groin s/p LLE stent placement by Dr. Mullins 6 days ago.  No CP, SOB, fever, chills.

## 2023-07-08 NOTE — ED PROVIDER NOTE - MUSCULOSKELETAL MINIMAL EXAM
right great toe amputated; darkened area surrounding right great toe stump base without warmth, tenderness, drainage; DP pulses intact bilaterally; right great toe amputated; necrotic area surrounding right great toe stump base; DP pulses intact bilaterally;

## 2023-07-08 NOTE — H&P ADULT - HISTORY OF PRESENT ILLNESS
72M with h/o T2DM (A1c=8.4) with neuropathy, HTN recently admitted 3/30 with R 1st toe infection, s/p 4/10 s/p RLE SFA to PT bypass graft with PTFE followed by R foot Partial hallux Amputation 4/14, and RLE angiogram 7/3 with rotational athrectomy of proximal portion of the graft in the SFA, following by MIKE that improved flow, however a proximal lesion was demonstrated at that time that was not amenable to endovascular procedures. He now presents with nonhealing R foot wound. Denies fevers and chills.

## 2023-07-08 NOTE — ED ADULT NURSE NOTE - NSFALLUNIVINTERV_ED_ALL_ED
Bed/Stretcher in lowest position, wheels locked, appropriate side rails in place/Call bell, personal items and telephone in reach/Instruct patient to call for assistance before getting out of bed/chair/stretcher/Non-slip footwear applied when patient is off stretcher/[place] to call system/Physically safe environment - no spills, clutter or unnecessary equipment/Purposeful proactive rounding/Room/bathroom lighting operational, light cord in reach

## 2023-07-08 NOTE — ED ADULT NURSE NOTE - OBJECTIVE STATEMENT
Male 72 years old with past medical history of HTN, DM and PVD, alert and orientedx4 came in for left foot pain. Pt had right big toe amputation April 2023, went to Podiatrist yesterday for dressing change and was advised to come to ED for evaluation of amputee site. Pt reports also left groin pain, s/p Left lower extremity stent placement. Denies chest pain, sob, fever or chills. Safety and comfort maintained. Call bell within easy reach. Will continue to monitor. Male 72 years old with past medical history of HTN, DM and PVD, alert and orientedx4 came in for left foot pain. Pt had right big toe amputation April 2023, went to Podiatrist yesterday for dressing change and was advised to come to ED for evaluation of amputee site. Pt reports also left groin pain, s/p Left groin stent placement July3. Denies chest pain, sob, fever or chills. Safety and comfort maintained. Call bell within easy reach. Will continue to monitor.

## 2023-07-08 NOTE — PROGRESS NOTE ADULT - ASSESSMENT
72M with h/o T2DM (A1c=8.4) with neuropathy, HTN recently admitted 3/30 with R 1st toe infection, s/p 4/10 s/p RLE SFA to PT bypass graft with PTFE followed by R foot Partial hallux Amputation 4/14, and RLE angiogram 7/3 with rotational athrectomy of proximal portion of the graft in the SFA, following by MIKE that improved flow, however a proximal lesion was demonstrated at that time that was not amenable to endovascular procedures, now presenting with nonhealing R hallux wound.    Plan:   - IV vanco/zosyn  - Regular diet  - Home meds resumed  - Appreciate Podiatry recs    Discussed with Vascular Surgery Fellow, Dr. Lantigua.    Vascular Surgery [id number]

## 2023-07-08 NOTE — PROGRESS NOTE ADULT - SUBJECTIVE AND OBJECTIVE BOX
VASCULAR SURGERY DAILY PROGRESS NOTE:     SUBJECTIVE/ROS:     Overnight: no acute events   Patient seen and evaluated at bedside. Patient resting well. Patient otherwise denies nausea, vomiting, chest pain, shortness of breath     OBJECTIVE:  Vital Signs Last 24 Hrs  T(C): 36.9 (2023 21:27), Max: 37.3 (2023 17:07)  T(F): 98.5 (2023 21:27), Max: 99.1 (2023 17:07)  HR: 83 (2023 21:27) (76 - 90)  BP: 146/71 (2023 21:) (140/76 - 156/70)  BP(mean): --  RR: 18 (:) (18 - 18)  SpO2: 98% (2023 21:27) (96% - 99%)    Parameters below as of 2023 21:27  Patient On (Oxygen Delivery Method): room air      I&O's Detail    2023 07:01  -  2023 23:27  --------------------------------------------------------  IN:  Total IN: 0 mL    OUT:    Voided (mL): 800 mL  Total OUT: 800 mL    Total NET: -800 mL        Daily Height in cm: 170.18 (2023 09:03)    Daily   MEDICATIONS  (STANDING):  amLODIPine   Tablet 2.5 milliGRAM(s) Oral daily  aspirin enteric coated 81 milliGRAM(s) Oral daily  atorvastatin 40 milliGRAM(s) Oral at bedtime  dextrose 5%. 1000 milliLiter(s) (50 mL/Hr) IV Continuous <Continuous>  dextrose 5%. 1000 milliLiter(s) (100 mL/Hr) IV Continuous <Continuous>  dextrose 50% Injectable 12.5 Gram(s) IV Push once  dextrose 50% Injectable 25 Gram(s) IV Push once  dextrose 50% Injectable 25 Gram(s) IV Push once  enoxaparin Injectable 40 milliGRAM(s) SubCutaneous every 24 hours  gabapentin 100 milliGRAM(s) Oral every 8 hours  glucagon  Injectable 1 milliGRAM(s) IntraMuscular once  insulin lispro (ADMELOG) corrective regimen sliding scale   SubCutaneous three times a day before meals  insulin lispro (ADMELOG) corrective regimen sliding scale   SubCutaneous at bedtime  losartan 25 milliGRAM(s) Oral daily  piperacillin/tazobactam IVPB.. 3.375 Gram(s) IV Intermittent every 8 hours  vancomycin  IVPB 1000 milliGRAM(s) IV Intermittent every 12 hours    MEDICATIONS  (PRN):  dextrose Oral Gel 15 Gram(s) Oral once PRN Blood Glucose LESS THAN 70 milliGRAM(s)/deciliter      Labs:                        9.8    10.14 )-----------( 235      ( 2023 11:05 )             29.3     07-08    134<L>  |  100  |  22  ----------------------------<  198<H>  4.7   |  24  |  1.03    Ca    9.5      2023 11:05    TPro  8.0  /  Alb  3.7  /  TBili  0.2  /  DBili  x   /  AST  18  /  ALT  36  /  AlkPhos  82  07-08    PT/INR - ( 2023 11:05 )   PT: 12.3 sec;   INR: 1.07 ratio         PTT - ( 2023 11:05 )  PTT:29.9 sec  Urinalysis Basic - ( 2023 11:56 )    Color: Light Yellow / Appearance: Clear / S.012 / pH: x  Gluc: x / Ketone: Negative  / Bili: Negative / Urobili: Negative   Blood: x / Protein: 30 mg/dL / Nitrite: Negative   Leuk Esterase: Negative / RBC: 1 /hpf / WBC 0 /HPF   Sq Epi: x / Non Sq Epi: x / Bacteria: Negative      Physical Exam:  General: well developed, well nourished, NAD  Cardiovascular: appears well perfused   Respiratory: respirations non labored  Gastrointestinal: soft, nontender, nondistended  Extremities: FROM, warm  Neurological: A+Ox3.   Vascular: Dressings c/d/i. Doppler signal DP and PT bilaterally.          VASCULAR SURGERY DAILY PROGRESS NOTE:     SUBJECTIVE/ROS:     Overnight: no acute events   Patient seen and evaluated at bedside. Patient resting well. Patient otherwise denies nausea, vomiting, chest pain, shortness of breath     OBJECTIVE:  Vital Signs Last 24 Hrs  T(C): 36.9 (2023 21:27), Max: 37.3 (2023 17:07)  T(F): 98.5 (2023 21:27), Max: 99.1 (2023 17:07)  HR: 83 (2023 21:27) (76 - 90)  BP: 146/71 (2023 21:) (140/76 - 156/70)  BP(mean): --  RR: 18 (:) (18 - 18)  SpO2: 98% (2023 21:27) (96% - 99%)    Parameters below as of 2023 21:27  Patient On (Oxygen Delivery Method): room air      I&O's Detail    2023 07:01  -  2023 23:27  --------------------------------------------------------  IN:  Total IN: 0 mL    OUT:    Voided (mL): 800 mL  Total OUT: 800 mL    Total NET: -800 mL        Daily Height in cm: 170.18 (2023 09:03)    Daily   MEDICATIONS  (STANDING):  amLODIPine   Tablet 2.5 milliGRAM(s) Oral daily  aspirin enteric coated 81 milliGRAM(s) Oral daily  atorvastatin 40 milliGRAM(s) Oral at bedtime  dextrose 5%. 1000 milliLiter(s) (50 mL/Hr) IV Continuous <Continuous>  dextrose 5%. 1000 milliLiter(s) (100 mL/Hr) IV Continuous <Continuous>  dextrose 50% Injectable 12.5 Gram(s) IV Push once  dextrose 50% Injectable 25 Gram(s) IV Push once  dextrose 50% Injectable 25 Gram(s) IV Push once  enoxaparin Injectable 40 milliGRAM(s) SubCutaneous every 24 hours  gabapentin 100 milliGRAM(s) Oral every 8 hours  glucagon  Injectable 1 milliGRAM(s) IntraMuscular once  insulin lispro (ADMELOG) corrective regimen sliding scale   SubCutaneous three times a day before meals  insulin lispro (ADMELOG) corrective regimen sliding scale   SubCutaneous at bedtime  losartan 25 milliGRAM(s) Oral daily  piperacillin/tazobactam IVPB.. 3.375 Gram(s) IV Intermittent every 8 hours  vancomycin  IVPB 1000 milliGRAM(s) IV Intermittent every 12 hours    MEDICATIONS  (PRN):  dextrose Oral Gel 15 Gram(s) Oral once PRN Blood Glucose LESS THAN 70 milliGRAM(s)/deciliter      Labs:                        9.8    10.14 )-----------( 235      ( 2023 11:05 )             29.3     07-08    134<L>  |  100  |  22  ----------------------------<  198<H>  4.7   |  24  |  1.03    Ca    9.5      2023 11:05    TPro  8.0  /  Alb  3.7  /  TBili  0.2  /  DBili  x   /  AST  18  /  ALT  36  /  AlkPhos  82  07-08    PT/INR - ( 2023 11:05 )   PT: 12.3 sec;   INR: 1.07 ratio         PTT - ( 2023 11:05 )  PTT:29.9 sec  Urinalysis Basic - ( 2023 11:56 )    Color: Light Yellow / Appearance: Clear / S.012 / pH: x  Gluc: x / Ketone: Negative  / Bili: Negative / Urobili: Negative   Blood: x / Protein: 30 mg/dL / Nitrite: Negative   Leuk Esterase: Negative / RBC: 1 /hpf / WBC 0 /HPF   Sq Epi: x / Non Sq Epi: x / Bacteria: Negative      Physical Exam:  General: well developed, well nourished, NAD  Cardiovascular: appears well perfused   Respiratory: respirations non labored  Gastrointestinal: soft, nontender, nondistended  Extremities: FROM, warm  Neurological: A+Ox3.   Vascular: Right foot wrapped with gauze. Dressings c/d/i. (+) signals DP and PT bilaterally.          VASCULAR SURGERY DAILY PROGRESS NOTE:     SUBJECTIVE/ROS:     Overnight: no acute events   Patient seen and evaluated at bedside. Patient resting well. Patient otherwise denies nausea, vomiting, chest pain, shortness of breath     OBJECTIVE:  Vital Signs Last 24 Hrs  T(C): 36.9 (2023 21:27), Max: 37.3 (2023 17:07)  T(F): 98.5 (2023 21:27), Max: 99.1 (2023 17:07)  HR: 83 (2023 21:27) (76 - 90)  BP: 146/71 (2023 21:) (140/76 - 156/70)  BP(mean): --  RR: 18 (:) (18 - 18)  SpO2: 98% (2023 21:27) (96% - 99%)    Parameters below as of 2023 21:27  Patient On (Oxygen Delivery Method): room air      I&O's Detail    2023 07:01  -  2023 23:27  --------------------------------------------------------  IN:  Total IN: 0 mL    OUT:    Voided (mL): 800 mL  Total OUT: 800 mL    Total NET: -800 mL        Daily Height in cm: 170.18 (2023 09:03)    Daily   MEDICATIONS  (STANDING):  amLODIPine   Tablet 2.5 milliGRAM(s) Oral daily  aspirin enteric coated 81 milliGRAM(s) Oral daily  atorvastatin 40 milliGRAM(s) Oral at bedtime  dextrose 5%. 1000 milliLiter(s) (50 mL/Hr) IV Continuous <Continuous>  dextrose 5%. 1000 milliLiter(s) (100 mL/Hr) IV Continuous <Continuous>  dextrose 50% Injectable 12.5 Gram(s) IV Push once  dextrose 50% Injectable 25 Gram(s) IV Push once  dextrose 50% Injectable 25 Gram(s) IV Push once  enoxaparin Injectable 40 milliGRAM(s) SubCutaneous every 24 hours  gabapentin 100 milliGRAM(s) Oral every 8 hours  glucagon  Injectable 1 milliGRAM(s) IntraMuscular once  insulin lispro (ADMELOG) corrective regimen sliding scale   SubCutaneous three times a day before meals  insulin lispro (ADMELOG) corrective regimen sliding scale   SubCutaneous at bedtime  losartan 25 milliGRAM(s) Oral daily  piperacillin/tazobactam IVPB.. 3.375 Gram(s) IV Intermittent every 8 hours  vancomycin  IVPB 1000 milliGRAM(s) IV Intermittent every 12 hours    MEDICATIONS  (PRN):  dextrose Oral Gel 15 Gram(s) Oral once PRN Blood Glucose LESS THAN 70 milliGRAM(s)/deciliter      Labs:                        9.8    10.14 )-----------( 235      ( 2023 11:05 )             29.3     07-08    134<L>  |  100  |  22  ----------------------------<  198<H>  4.7   |  24  |  1.03    Ca    9.5      2023 11:05    TPro  8.0  /  Alb  3.7  /  TBili  0.2  /  DBili  x   /  AST  18  /  ALT  36  /  AlkPhos  82  07-08    PT/INR - ( 2023 11:05 )   PT: 12.3 sec;   INR: 1.07 ratio         PTT - ( 2023 11:05 )  PTT:29.9 sec  Urinalysis Basic - ( 2023 11:56 )    Color: Light Yellow / Appearance: Clear / S.012 / pH: x  Gluc: x / Ketone: Negative  / Bili: Negative / Urobili: Negative   Blood: x / Protein: 30 mg/dL / Nitrite: Negative   Leuk Esterase: Negative / RBC: 1 /hpf / WBC 0 /HPF   Sq Epi: x / Non Sq Epi: x / Bacteria: Negative      Physical Exam:  General: well developed, well nourished, NAD  Cardiovascular: appears well perfused   Respiratory: respirations non labored  Gastrointestinal: soft, nontender, nondistended  Extremities: FROM, warm  Neurological: A+Ox3.   Vascular: Right foot wrapped with gauze. Dressings c/d/i. (+) signals DP and PT bilaterally.       EYAD + PVR:     Limited study due to vessel noncompressibility and right lower extremity stents.    Pulse volume recordings demonstrate no evidence of significant arterial occlusive disease at either thigh, calf or ankle levels at rest.  Findings are consistent with significant disease at both foot levels. Findings are similar compared to prior study.   VASCULAR SURGERY DAILY PROGRESS NOTE:     SUBJECTIVE/ROS:     Overnight: no acute events   Patient seen and evaluated at bedside. Patient resting well. Patient otherwise denies nausea, vomiting, chest pain, shortness of breath     OBJECTIVE:  Vital Signs Last 24 Hrs  T(C): 36.9 (2023 21:27), Max: 37.3 (2023 17:07)  T(F): 98.5 (2023 21:27), Max: 99.1 (2023 17:07)  HR: 83 (2023 21:27) (76 - 90)  BP: 146/71 (2023 21:) (140/76 - 156/70)  BP(mean): --  RR: 18 (:) (18 - 18)  SpO2: 98% (2023 21:27) (96% - 99%)    Parameters below as of 2023 21:27  Patient On (Oxygen Delivery Method): room air      I&O's Detail    2023 07:01  -  2023 23:27  --------------------------------------------------------  IN:  Total IN: 0 mL    OUT:    Voided (mL): 800 mL  Total OUT: 800 mL    Total NET: -800 mL        Daily Height in cm: 170.18 (2023 09:03)    Daily   MEDICATIONS  (STANDING):  amLODIPine   Tablet 2.5 milliGRAM(s) Oral daily  aspirin enteric coated 81 milliGRAM(s) Oral daily  atorvastatin 40 milliGRAM(s) Oral at bedtime  dextrose 5%. 1000 milliLiter(s) (50 mL/Hr) IV Continuous <Continuous>  dextrose 5%. 1000 milliLiter(s) (100 mL/Hr) IV Continuous <Continuous>  dextrose 50% Injectable 12.5 Gram(s) IV Push once  dextrose 50% Injectable 25 Gram(s) IV Push once  dextrose 50% Injectable 25 Gram(s) IV Push once  enoxaparin Injectable 40 milliGRAM(s) SubCutaneous every 24 hours  gabapentin 100 milliGRAM(s) Oral every 8 hours  glucagon  Injectable 1 milliGRAM(s) IntraMuscular once  insulin lispro (ADMELOG) corrective regimen sliding scale   SubCutaneous three times a day before meals  insulin lispro (ADMELOG) corrective regimen sliding scale   SubCutaneous at bedtime  losartan 25 milliGRAM(s) Oral daily  piperacillin/tazobactam IVPB.. 3.375 Gram(s) IV Intermittent every 8 hours  vancomycin  IVPB 1000 milliGRAM(s) IV Intermittent every 12 hours    MEDICATIONS  (PRN):  dextrose Oral Gel 15 Gram(s) Oral once PRN Blood Glucose LESS THAN 70 milliGRAM(s)/deciliter      Labs:                        9.8    10.14 )-----------( 235      ( 2023 11:05 )             29.3     07-08    134<L>  |  100  |  22  ----------------------------<  198<H>  4.7   |  24  |  1.03    Ca    9.5      2023 11:05    TPro  8.0  /  Alb  3.7  /  TBili  0.2  /  DBili  x   /  AST  18  /  ALT  36  /  AlkPhos  82  07-08    PT/INR - ( 2023 11:05 )   PT: 12.3 sec;   INR: 1.07 ratio         PTT - ( 2023 11:05 )  PTT:29.9 sec  Urinalysis Basic - ( 2023 11:56 )    Color: Light Yellow / Appearance: Clear / S.012 / pH: x  Gluc: x / Ketone: Negative  / Bili: Negative / Urobili: Negative   Blood: x / Protein: 30 mg/dL / Nitrite: Negative   Leuk Esterase: Negative / RBC: 1 /hpf / WBC 0 /HPF   Sq Epi: x / Non Sq Epi: x / Bacteria: Negative      Physical Exam:  General: well developed, well nourished, NAD  Cardiovascular: appears well perfused   Respiratory: respirations non labored  Gastrointestinal: soft, nontender, nondistended  Extremities: FROM, warm  Neurological: A+Ox3.   Vascular: Right foot wrapped with gauze. Dressings c/d/i. (+) signals DP and PT bilaterally.       EYAD + PVR:     Limited study due to vessel noncompressibility and right lower extremity stents.    Pulse volume recordings demonstrate no evidence of significant arterial occlusive disease at either thigh, calf or ankle levels at rest.  Findings are consistent with significant disease at both foot levels. Findings are similar compared to prior study.      DUPPLEX:     Focused limited field-of-view sonography of the left groin without sonographic evidence of pseudoaneurysm, hematoma, or AV fistula.   VASCULAR SURGERY DAILY PROGRESS NOTE:     SUBJECTIVE/ROS:     Overnight: no acute events   Patient seen and evaluated at bedside. Patient resting well. Patient otherwise denies nausea, vomiting, chest pain, shortness of breath     OBJECTIVE:  Vital Signs Last 24 Hrs  T(C): 36.9 (2023 21:27), Max: 37.3 (2023 17:07)  T(F): 98.5 (2023 21:27), Max: 99.1 (2023 17:07)  HR: 83 (2023 21:27) (76 - 90)  BP: 146/71 (2023 21:) (140/76 - 156/70)  BP(mean): --  RR: 18 (:) (18 - 18)  SpO2: 98% (2023 21:27) (96% - 99%)    Parameters below as of 2023 21:27  Patient On (Oxygen Delivery Method): room air      I&O's Detail    2023 07:01  -  2023 23:27  --------------------------------------------------------  IN:  Total IN: 0 mL    OUT:    Voided (mL): 800 mL  Total OUT: 800 mL    Total NET: -800 mL        Daily Height in cm: 170.18 (2023 09:03)    Daily   MEDICATIONS  (STANDING):  amLODIPine   Tablet 2.5 milliGRAM(s) Oral daily  aspirin enteric coated 81 milliGRAM(s) Oral daily  atorvastatin 40 milliGRAM(s) Oral at bedtime  dextrose 5%. 1000 milliLiter(s) (50 mL/Hr) IV Continuous <Continuous>  dextrose 5%. 1000 milliLiter(s) (100 mL/Hr) IV Continuous <Continuous>  dextrose 50% Injectable 12.5 Gram(s) IV Push once  dextrose 50% Injectable 25 Gram(s) IV Push once  dextrose 50% Injectable 25 Gram(s) IV Push once  enoxaparin Injectable 40 milliGRAM(s) SubCutaneous every 24 hours  gabapentin 100 milliGRAM(s) Oral every 8 hours  glucagon  Injectable 1 milliGRAM(s) IntraMuscular once  insulin lispro (ADMELOG) corrective regimen sliding scale   SubCutaneous three times a day before meals  insulin lispro (ADMELOG) corrective regimen sliding scale   SubCutaneous at bedtime  losartan 25 milliGRAM(s) Oral daily  piperacillin/tazobactam IVPB.. 3.375 Gram(s) IV Intermittent every 8 hours  vancomycin  IVPB 1000 milliGRAM(s) IV Intermittent every 12 hours    MEDICATIONS  (PRN):  dextrose Oral Gel 15 Gram(s) Oral once PRN Blood Glucose LESS THAN 70 milliGRAM(s)/deciliter      Labs:                        9.8    10.14 )-----------( 235      ( 2023 11:05 )             29.3     07-08    134<L>  |  100  |  22  ----------------------------<  198<H>  4.7   |  24  |  1.03    Ca    9.5      2023 11:05    TPro  8.0  /  Alb  3.7  /  TBili  0.2  /  DBili  x   /  AST  18  /  ALT  36  /  AlkPhos  82  07-08    PT/INR - ( 2023 11:05 )   PT: 12.3 sec;   INR: 1.07 ratio         PTT - ( 2023 11:05 )  PTT:29.9 sec  Urinalysis Basic - ( 2023 11:56 )    Color: Light Yellow / Appearance: Clear / S.012 / pH: x  Gluc: x / Ketone: Negative  / Bili: Negative / Urobili: Negative   Blood: x / Protein: 30 mg/dL / Nitrite: Negative   Leuk Esterase: Negative / RBC: 1 /hpf / WBC 0 /HPF   Sq Epi: x / Non Sq Epi: x / Bacteria: Negative      Physical Exam:  General: well developed, well nourished, NAD  Cardiovascular: appears well perfused   Respiratory: respirations non labored  Gastrointestinal: soft, nontender, nondistended  Extremities: FROM, warm  Neurological: A+Ox3.   Vascular: Right foot wrapped with gauze. Dressings c/d/i. (+) signals DP and PT bilaterally.       EYAD + PVR:     Limited study due to vessel noncompressibility and right lower extremity stents.    Pulse volume recordings demonstrate no evidence of significant arterial occlusive disease at either thigh, calf or ankle levels at rest.  Findings are consistent with significant disease at both foot levels. Findings are similar compared to prior study.      DUPPLEX:     Focused limited field-of-view sonography of the left groin without sonographic evidence of pseudoaneurysm, hematoma, or AV fistula.    XR FOOT 2 VIEWS RIGHT:     Mild erosive changes of the medial cortical margin of the first proximal phalanx which appears to extend past the soft tissue, suspicious for osteomyelitis.

## 2023-07-08 NOTE — ED PROVIDER NOTE - CLINICAL SUMMARY MEDICAL DECISION MAKING FREE TEXT BOX
73 yo M, with hx of DM and right great toe amputation, presents for evaluation of right foot.   Pt was seen by podiatry and referred to ED for evaluation for infection.  Podiatry and vascular surgery consulted and recommend pt be admitted to surgery. 73 yo M, with hx of DM and right great toe amputation, presents for evaluation of right foot.   Pt was seen by podiatry and referred to ED for evaluation for infection.  X-ray with signs of osteomyelitis.  Lactate negative and blood cultures sent.  Vancomycin and Zosyn ordered per Surgery recommendation.  Podiatry and vascular surgery consulted and recommend pt be admitted to surgery. 73 yo M, with hx of DM and right great toe amputation, presents for evaluation of right foot.   Pt was seen by podiatry and referred to ED for evaluation for infection.  Vital signs within normal limit.  On physical exam, right foot with great toe amputation and wound.  Wound has small bone fragment protruding with surrounding necrosis at the base.  DP pulses intact bilaterally.  Concern for osteomyelitis and cellulitis.  Will order CBC, CMP, ESR/CRP, cultures, lactate and right foot x-ray to evaluate.

## 2023-07-08 NOTE — ED PROVIDER NOTE - ATTENDING CONTRIBUTION TO CARE
This is a 72-year-old gentleman who was sent in by the podiatrist in anticipation of doing amputation.  Right great toe.  Recently had vascular procedure by Dr. Steen.  He also notes left groin pain  Mild tenderness to left groin, 2+ dorsalis pedis pulses bilaterally.  The legs appear just slightly cool.  Sensory to light touch normal.  Great toe is status post amputation with open slightly area and foul odor of discharge which is yellow in color.  Discussed with podiatry and vascular surgery.  Plan 2 OR.  Will admit to the hospital for further treatment and care.  IV antibiotics blood cultures basic blood work preoperative labs.

## 2023-07-08 NOTE — PATIENT PROFILE ADULT - FALL HARM RISK - RISK INTERVENTIONS
Assistance OOB with selected safe patient handling equipment/Assistance with ambulation/Communicate Fall Risk and Risk Factors to all staff, patient, and family/Discuss with provider need for PT consult/Monitor gait and stability/Reinforce activity limits and safety measures with patient and family/Visual Cue: Yellow wristband/Bed in lowest position, wheels locked, appropriate side rails in place/Call bell, personal items and telephone in reach/Instruct patient to call for assistance before getting out of bed or chair/Non-slip footwear when patient is out of bed/[place] to call system/Physically safe environment - no spills, clutter or unnecessary equipment/Purposeful Proactive Rounding/Room/bathroom lighting operational, light cord in reach

## 2023-07-08 NOTE — ED ADULT TRIAGE NOTE - PATIENT ON (OXYGEN DELIVERY METHOD)
[address] [facility] [id number]    Wound Management Visit    Date: 2021  Patient: Josh Garcia  MRN: [id number]  : 1960    Reason for Visit:  Josh Garcia is here today for a dressing change and a wound check.  He is accompanied by no one.    Referred by:  Patient is referred by Dr. Chavez .    HPI:  Patient states that home care RN room air

## 2023-07-08 NOTE — H&P ADULT - NSHPPHYSICALEXAM_GEN_ALL_CORE
General: well developed, well nourished, NAD  Neuro: alert and oriented, no focal deficits, moves all extremities spontaneously  HEENT: NCAT, EOMI, anicteric, mucosa moist  Respiratory: airway patent, respirations unlabored  CVS: regular rate and rhythm  Abdomen: soft, nontender, nondistended  Extremities: R hallux wound open and dry, foot is cool proximal to wound, +PT doppler signal present  Skin: warm, dry, appropriate color

## 2023-07-08 NOTE — CONSULT NOTE ADULT - ASSESSMENT
73 y/o M 4/14 s/p right foot partial hallux amputation, closed  - Pt was seen and evaluated.   - Afebrile, WBC 10.14, ESR/CRP pending   - right foot 1st metatarsal wound to bone, proximal phalanx exposed, ischemic changes noted periwound, no drainage, no erythema no malodor, no signs of infection. left foot with no open wounds or signs of infection.   - Right foot xray: OM of proximal phalanx, no gas, (resident read)   - Pt is s/p RLE SFA to PT bypass graft with PT signal w/ Dr. Mullins on 4/10  - Pt is s/p RLE Angiogram w/ intervention w/ Dr. Mullins on 7/3, which was noted pt may not a bypass candidate   - Recommend IV Unasyn   - Right foot wound cultured  - Right foot MRI ordered   - Pod plan for Right hallux hallux revisional amputation pending vascular recommendations/MR  - Please document medical clearance for podiatric surgery   - Discussed with attending  71 y/o M 4/14 s/p right foot partial hallux amputation, closed  - Pt was seen and evaluated.   - Afebrile, WBC 10.14, ESR/CRP pending   - right foot 1st metatarsal wound to bone, proximal phalanx exposed, ischemic changes noted periwound, no drainage, no erythema no malodor, no signs of infection. left foot with no open wounds or signs of infection.   - Right foot xray: OM of proximal phalanx, no gas, (resident read)   - Pt is s/p RLE SFA to PT bypass graft with PT signal w/ Dr. Mullins on 4/10  - Pt is s/p RLE Angiogram w/ intervention w/ Dr. Mullins on 7/3, which was noted pt may not a bypass candidate   - Recommend IV Unasyn   - Right foot wound cultured  - Right foot MRI ordered   - Ordered EYAD/PVR   - Pod plan for Right hallux hallux revisional amputation pending vascular recommendations/MR  - Please document medical clearance for podiatric surgery   - Discussed with attending  73 y/o M 4/14 s/p right foot partial hallux amputation, closed  - Pt was seen and evaluated.   - Afebrile, WBC 10.14, ESR/CRP pending   - right foot 1st metatarsal wound to bone, proximal phalanx exposed, ischemic changes noted periwound, no drainage, no erythema no malodor, no signs of infection. left foot with no open wounds or signs of infection.   - Right foot xray: OM of proximal phalanx, no gas, (resident read)   - Pt is s/p RLE SFA to PT bypass graft with PT signal w/ Dr. Mullins on 4/10  - Pt is s/p RLE Angiogram w/ intervention w/ Dr. Mullins on 7/3, which was noted pt may not a bypass candidate   - Recommend IV Unasyn   - Right foot wound cultured  - Recommend admission to vascular - Dr. Mullins   - Right foot MRI ordered   - Ordered EYAD/PVR   - Pod plan for Right hallux hallux revisional amputation pending vascular recommendations/MR  - Please document medical clearance for podiatric surgery   - Discussed with attending  73 y/o M 4/14 s/p right foot partial hallux amputation, closed  - Pt was seen and evaluated.   - Afebrile, WBC 10.14, ESR/CRP pending   - right foot 1st metatarsal wound to bone, proximal phalanx exposed, ischemic changes noted periwound, no drainage, no erythema no malodor, no signs of infection. left foot with no open wounds or signs of infection.   - Right foot xray: OM of proximal phalanx, no gas, (resident read)   - Pt is s/p RLE SFA to PT bypass graft with PT signal w/ Dr. Mullins on 4/10  - Pt is s/p RLE Angiogram w/ intervention w/ Dr. Mullins on 7/3, which was noted pt may not a bypass candidate   - Recommend IV Unasyn   - Right foot wound cultured  - Recommend admission to vascular - Dr. Mullins   - Right foot MRI ordered   - Ordered EYAD/PVR   - Pod plan for Right hallux hallux revisional amputation Wednesday 7/12 w/ Dr. Mcclrue pending vascular recommendations/MR  - Please document medical clearance for podiatric surgery   - Discussed with attending

## 2023-07-08 NOTE — H&P ADULT - ATTENDING COMMENTS
72 year old man with peripheral arterial disease  chronic limb threatening ischemia of the right lower extremity, Linh 5  s/p right hallux amputation with poor wound healing due to lack of adequate perfusion  s/p right SFA-PT bypass, now thrombosed due to poor outflow  high risk for further limb loss  will discuss with podiatry  offload right foot  EYAD/PVRs reviewed  pain control as needed  continue IV abx  appreciate ID input

## 2023-07-08 NOTE — H&P ADULT - ASSESSMENT
72M with h/o T2DM (A1c=8.4) with neuropathy, HTN recently admitted 3/30 with R 1st toe infection, s/p 4/10 s/p RLE SFA to PT bypass graft with PTFE followed by R foot Partial hallux Amputation 4/14, and RLE angiogram 7/3 with rotational athrectomy of proximal portion of the graft in the SFA, following by MIKE that improved flow, however a proximal lesion was demonstrated at that time that was not amenable to endovascular procedures, now presenting with nonhealing R hallux wound.    - Admit to Dr. Mullins  - LISETTE mark/zosyn  - Regular diet  - Home meds resumed  - Appreciate Podiatry recs    Discussed with Vascular Surgery Fellow, Dr. Lantigua.    Vascular Surgery [id number]

## 2023-07-08 NOTE — CONSULT NOTE ADULT - SUBJECTIVE AND OBJECTIVE BOX
Patient is a 72y old  Male who presents with a chief complaint of right foot wound     HPI: 73 yo M, hx of HTN, DM, peripheral vascular disease, presents sent by podiatry for evaluation of osteomyelitis at base right great toe amputation site (performed April 2023).  Pt also c/o pain to his left groin s/p LLE stent placement by Dr. Mullins 6 days ago.  No CP, SOB, fever, chills.      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 1000 milliGRAM(s) IV Intermittent once    MEDICATIONS  (PRN):      Allergies    No Known Allergies    Intolerances        VITALS:    Vital Signs Last 24 Hrs  T(C): 37.2 (08 Jul 2023 09:03), Max: 37.2 (08 Jul 2023 09:03)  T(F): 98.9 (08 Jul 2023 09:03), Max: 98.9 (08 Jul 2023 09:03)  HR: 90 (08 Jul 2023 09:03) (90 - 90)  BP: 145/68 (08 Jul 2023 09:03) (145/68 - 145/68)  BP(mean): --  RR: 18 (08 Jul 2023 09:03) (18 - 18)  SpO2: 99% (08 Jul 2023 09:03) (99% - 99%)    Parameters below as of 08 Jul 2023 09:03  Patient On (Oxygen Delivery Method): room air        LABS:                          9.8    10.14 )-----------( 235      ( 08 Jul 2023 11:05 )             29.3       07-08    134<L>  |  100  |  22  ----------------------------<  198<H>  4.7   |  24  |  1.03    Ca    9.5      08 Jul 2023 11:05    TPro  8.0  /  Alb  3.7  /  TBili  0.2  /  DBili  x   /  AST  18  /  ALT  36  /  AlkPhos  82  07-08      CAPILLARY BLOOD GLUCOSE          PT/INR - ( 08 Jul 2023 11:05 )   PT: 12.3 sec;   INR: 1.07 ratio         PTT - ( 08 Jul 2023 11:05 )  PTT:29.9 sec    LOWER EXTREMITY PHYSICAL EXAM:  Vascular: DP/PT 2/4, B/L, CFT <3 seconds B/L x9, Temperature gradient warm to cold, right, and warm to cool, left.  Neuro: Epicritic sensation decreased to the level of digits, B/L.  Musculoskeletal/Ortho: 4/14 s/p right foot partial hallux amputation  Skin: right foot 1st metatarsal wound to bone, proximal phalanx exposed, ischemic changes noted periwound, no drainage, no erythema no malodor, no signs of infection. left foot with no open wounds or signs of infection.     RADIOLOGY & ADDITIONAL STUDIES:  < from: Xray Foot AP + Lateral, Right (07.08.23 @ 11:17) >    ACC: [id number] EXAM:  XR FOOT 2 VIEWS RT   ORDERED BY:  NADINE GUEVARA     PROCEDURE DATE:  07/08/2023          INTERPRETATION:  XR FOOT 2 VIEWS RIGHT    CLINICAL INDICATION: foot pain    TECHNIQUE: 2 views the right foot.    COMPARISON: Right foot radiographs from 4/14/2023.    FINDINGS:  Status post partial amputation of the first proximal phalanx. Mild   erosive changes of the medial cortical margin which appears to extend   past the soft tissue. Calcaneal enthesophytes. No acute fracture or   dislocation. Midfoot joint arthrosis. Vascular calcifications.    IMPRESSION:  Mild erosive changes of the medial cortical margin of the first proximal   phalanx which appears to extend past the soft tissue, suspicious for   osteomyelitis.    --- Endof Report ---           OLIVIA LARSON MD; Resident Radiologist  This document has been electronically signed.  SRDIEVI CASTANO MD; Attending Radiologist  This document has been electronically signed. Jul 8 2023 12:03PM    < end of copied text >

## 2023-07-08 NOTE — H&P ADULT - NSHPLABSRESULTS_GEN_ALL_CORE
9.8    10.14 )-----------( 235      ( 2023 11:05 )             29.3       07-08    134<L>  |  100  |  22  ----------------------------<  198<H>  4.7   |  24  |  1.03    Ca    9.5      2023 11:05    TPro  8.0  /  Alb  3.7  /  TBili  0.2  /  DBili  x   /  AST  18  /  ALT  36  /  AlkPhos  82  07-08              Urinalysis Basic - ( 2023 11:56 )    Color: Light Yellow / Appearance: Clear / S.012 / pH: x  Gluc: x / Ketone: Negative  / Bili: Negative / Urobili: Negative   Blood: x / Protein: 30 mg/dL / Nitrite: Negative   Leuk Esterase: Negative / RBC: 1 /hpf / WBC 0 /HPF   Sq Epi: x / Non Sq Epi: x / Bacteria: Negative        PT/INR - ( 2023 11:05 )   PT: 12.3 sec;   INR: 1.07 ratio         PTT - ( 2023 11:05 )  PTT:29.9 sec

## 2023-07-09 LAB
A1C WITH ESTIMATED AVERAGE GLUCOSE RESULT: 7.9 % — HIGH (ref 4–5.6)
ALBUMIN SERPL ELPH-MCNC: 3.5 G/DL — SIGNIFICANT CHANGE UP (ref 3.3–5)
ALP SERPL-CCNC: 70 U/L — SIGNIFICANT CHANGE UP (ref 40–120)
ALT FLD-CCNC: 28 U/L — SIGNIFICANT CHANGE UP (ref 10–45)
ANION GAP SERPL CALC-SCNC: 14 MMOL/L — SIGNIFICANT CHANGE UP (ref 5–17)
AST SERPL-CCNC: 13 U/L — SIGNIFICANT CHANGE UP (ref 10–40)
BILIRUB SERPL-MCNC: 0.3 MG/DL — SIGNIFICANT CHANGE UP (ref 0.2–1.2)
BUN SERPL-MCNC: 16 MG/DL — SIGNIFICANT CHANGE UP (ref 7–23)
CALCIUM SERPL-MCNC: 9.4 MG/DL — SIGNIFICANT CHANGE UP (ref 8.4–10.5)
CHLORIDE SERPL-SCNC: 102 MMOL/L — SIGNIFICANT CHANGE UP (ref 96–108)
CO2 SERPL-SCNC: 22 MMOL/L — SIGNIFICANT CHANGE UP (ref 22–31)
CREAT SERPL-MCNC: 0.9 MG/DL — SIGNIFICANT CHANGE UP (ref 0.5–1.3)
CULTURE RESULTS: NO GROWTH — SIGNIFICANT CHANGE UP
EGFR: 91 ML/MIN/1.73M2 — SIGNIFICANT CHANGE UP
ESTIMATED AVERAGE GLUCOSE: 180 MG/DL — HIGH (ref 68–114)
GLUCOSE BLDC GLUCOMTR-MCNC: 157 MG/DL — HIGH (ref 70–99)
GLUCOSE BLDC GLUCOMTR-MCNC: 194 MG/DL — HIGH (ref 70–99)
GLUCOSE BLDC GLUCOMTR-MCNC: 203 MG/DL — HIGH (ref 70–99)
GLUCOSE BLDC GLUCOMTR-MCNC: 216 MG/DL — HIGH (ref 70–99)
GLUCOSE SERPL-MCNC: 153 MG/DL — HIGH (ref 70–99)
HCT VFR BLD CALC: 27.7 % — LOW (ref 39–50)
HGB BLD-MCNC: 9.1 G/DL — LOW (ref 13–17)
MAGNESIUM SERPL-MCNC: 2.3 MG/DL — SIGNIFICANT CHANGE UP (ref 1.6–2.6)
MCHC RBC-ENTMCNC: 28.7 PG — SIGNIFICANT CHANGE UP (ref 27–34)
MCHC RBC-ENTMCNC: 32.9 GM/DL — SIGNIFICANT CHANGE UP (ref 32–36)
MCV RBC AUTO: 87.4 FL — SIGNIFICANT CHANGE UP (ref 80–100)
NRBC # BLD: 0 /100 WBCS — SIGNIFICANT CHANGE UP (ref 0–0)
PHOSPHATE SERPL-MCNC: 3.3 MG/DL — SIGNIFICANT CHANGE UP (ref 2.5–4.5)
PLATELET # BLD AUTO: 246 K/UL — SIGNIFICANT CHANGE UP (ref 150–400)
POTASSIUM SERPL-MCNC: 4 MMOL/L — SIGNIFICANT CHANGE UP (ref 3.5–5.3)
POTASSIUM SERPL-SCNC: 4 MMOL/L — SIGNIFICANT CHANGE UP (ref 3.5–5.3)
PROT SERPL-MCNC: 7.6 G/DL — SIGNIFICANT CHANGE UP (ref 6–8.3)
RBC # BLD: 3.17 M/UL — LOW (ref 4.2–5.8)
RBC # FLD: 12.5 % — SIGNIFICANT CHANGE UP (ref 10.3–14.5)
SODIUM SERPL-SCNC: 138 MMOL/L — SIGNIFICANT CHANGE UP (ref 135–145)
SPECIMEN SOURCE: SIGNIFICANT CHANGE UP
WBC # BLD: 7.05 K/UL — SIGNIFICANT CHANGE UP (ref 3.8–10.5)
WBC # FLD AUTO: 7.05 K/UL — SIGNIFICANT CHANGE UP (ref 3.8–10.5)

## 2023-07-09 PROCEDURE — 99223 1ST HOSP IP/OBS HIGH 75: CPT

## 2023-07-09 RX ORDER — CEFEPIME 1 G/1
1000 INJECTION, POWDER, FOR SOLUTION INTRAMUSCULAR; INTRAVENOUS EVERY 12 HOURS
Refills: 0 | Status: DISCONTINUED | OUTPATIENT
Start: 2023-07-09 | End: 2023-07-12

## 2023-07-09 RX ORDER — METRONIDAZOLE 500 MG
500 TABLET ORAL EVERY 12 HOURS
Refills: 0 | Status: DISCONTINUED | OUTPATIENT
Start: 2023-07-09 | End: 2023-07-12

## 2023-07-09 RX ADMIN — Medication 100 MILLIGRAM(S): at 17:50

## 2023-07-09 RX ADMIN — GABAPENTIN 100 MILLIGRAM(S): 400 CAPSULE ORAL at 22:28

## 2023-07-09 RX ADMIN — Medication 1: at 09:00

## 2023-07-09 RX ADMIN — CEFEPIME 100 MILLIGRAM(S): 1 INJECTION, POWDER, FOR SOLUTION INTRAMUSCULAR; INTRAVENOUS at 17:21

## 2023-07-09 RX ADMIN — PIPERACILLIN AND TAZOBACTAM 25 GRAM(S): 4; .5 INJECTION, POWDER, LYOPHILIZED, FOR SOLUTION INTRAVENOUS at 09:00

## 2023-07-09 RX ADMIN — GABAPENTIN 100 MILLIGRAM(S): 400 CAPSULE ORAL at 13:12

## 2023-07-09 RX ADMIN — AMLODIPINE BESYLATE 2.5 MILLIGRAM(S): 2.5 TABLET ORAL at 06:39

## 2023-07-09 RX ADMIN — ENOXAPARIN SODIUM 40 MILLIGRAM(S): 100 INJECTION SUBCUTANEOUS at 17:21

## 2023-07-09 RX ADMIN — GABAPENTIN 100 MILLIGRAM(S): 400 CAPSULE ORAL at 06:39

## 2023-07-09 RX ADMIN — Medication 2: at 13:27

## 2023-07-09 RX ADMIN — LOSARTAN POTASSIUM 25 MILLIGRAM(S): 100 TABLET, FILM COATED ORAL at 06:39

## 2023-07-09 RX ADMIN — ATORVASTATIN CALCIUM 40 MILLIGRAM(S): 80 TABLET, FILM COATED ORAL at 22:28

## 2023-07-09 RX ADMIN — Medication 1: at 17:40

## 2023-07-09 RX ADMIN — Medication 250 MILLIGRAM(S): at 02:52

## 2023-07-09 RX ADMIN — Medication 81 MILLIGRAM(S): at 13:12

## 2023-07-09 NOTE — PROGRESS NOTE ADULT - ASSESSMENT
72M with h/o T2DM (A1c=8.4) with neuropathy, HTN recently admitted 3/30 with R 1st toe infection, s/p 4/10 s/p RLE SFA to PT bypass graft with PTFE followed by R foot Partial hallux Amputation 4/14, and RLE angiogram 7/3 with rotational athrectomy of proximal portion of the graft in the SFA, following by MIKE that improved flow, however a proximal lesion was demonstrated at that time that was not amenable to endovascular procedures, now presenting with nonhealing R hallux wound.    Plan:   - IV vanco/zosyn  - Will consult ID for abx recs today  - Will obtain LLE arterial duplex to r/o L groin PSA  - Will obtain medical clearance per Podiatry  - Will f/u AM labs  - Regular diet  - Home meds resumed  - Appreciate Podiatry recs        Vascular Surgery   [id number]

## 2023-07-09 NOTE — PROGRESS NOTE ADULT - SUBJECTIVE AND OBJECTIVE BOX
[facility] Vascular Surgery Daily Progress Note  =====================================================    SUBJECTIVE: Patient seen and examined at bedside on AM rounds. Patient reports that they're feeling well.    ALLERGIES:  No Known Allergies      --------------------------------------------------------------------------------------    MEDICATIONS:    Neurologic Medications  gabapentin 100 milliGRAM(s) Oral every 8 hours    Respiratory Medications    Cardiovascular Medications  amLODIPine   Tablet 2.5 milliGRAM(s) Oral daily  losartan 25 milliGRAM(s) Oral daily    Gastrointestinal Medications  dextrose 5%. 1000 milliLiter(s) IV Continuous <Continuous>  dextrose 5%. 1000 milliLiter(s) IV Continuous <Continuous>    Genitourinary Medications    Hematologic/Oncologic Medications  aspirin enteric coated 81 milliGRAM(s) Oral daily  enoxaparin Injectable 40 milliGRAM(s) SubCutaneous every 24 hours    Antimicrobial/Immunologic Medications  piperacillin/tazobactam IVPB.. 3.375 Gram(s) IV Intermittent every 8 hours  vancomycin  IVPB 1000 milliGRAM(s) IV Intermittent every 12 hours    Endocrine/Metabolic Medications  atorvastatin 40 milliGRAM(s) Oral at bedtime  dextrose 50% Injectable 12.5 Gram(s) IV Push once  dextrose 50% Injectable 25 Gram(s) IV Push once  dextrose 50% Injectable 25 Gram(s) IV Push once  dextrose Oral Gel 15 Gram(s) Oral once PRN Blood Glucose LESS THAN 70 milliGRAM(s)/deciliter  glucagon  Injectable 1 milliGRAM(s) IntraMuscular once  insulin lispro (ADMELOG) corrective regimen sliding scale   SubCutaneous three times a day before meals  insulin lispro (ADMELOG) corrective regimen sliding scale   SubCutaneous at bedtime    Topical/Other Medications    --------------------------------------------------------------------------------------    VITAL SIGNS:  Vital Signs Last 24 Hrs  T(C): 36.9 (09 Jul 2023 09:10), Max: 37.3 (08 Jul 2023 17:07)  T(F): 98.4 (09 Jul 2023 09:10), Max: 99.1 (08 Jul 2023 17:07)  HR: 81 (09 Jul 2023 09:10) (76 - 83)  BP: 100/60 (09 Jul 2023 09:10) (100/60 - 156/70)  BP(mean): --  ABP: --  ABP(mean): --  RR: 18 (09 Jul 2023 09:10) (18 - 18)  SpO2: 99% (09 Jul 2023 09:10) (96% - 99%)    O2 Parameters below as of 09 Jul 2023 09:10  Patient On (Oxygen Delivery Method): room air          --------------------------------------------------------------------------------------    EXAM    General: well developed, well nourished, NAD  Neuro: alert and oriented, no focal deficits, moves all extremities spontaneously  Respiratory: airway patent, respirations unlabored  Abdomen: soft, nontender, nondistended  Extremities: R hallux wound open and dry, foot is cool proximal to wound, +PT doppler signal present  Skin: warm, dry, appropriate color    --------------------------------------------------------------------------------------    LABS                          9.1    7.05  )-----------( 246      ( 09 Jul 2023 07:11 )             27.7     07-09    138  |  102  |  16  ----------------------------<  153<H>  4.0   |  22  |  0.90    Ca    9.4      09 Jul 2023 07:16  Phos  3.3     07-09  Mg     2.3     07-09    TPro  7.6  /  Alb  3.5  /  TBili  0.3  /  DBili  x   /  AST  13  /  ALT  28  /  AlkPhos  70  07-09    PT/INR - ( 08 Jul 2023 11:05 )   PT: 12.3 sec;   INR: 1.07 ratio         PTT - ( 08 Jul 2023 11:05 )  PTT:29.9 sec    --------------------------------------------------------------------------------------    INS AND OUTS:  I&O's Detail    08 Jul 2023 07:01  -  09 Jul 2023 07:00  --------------------------------------------------------  IN:    IV PiggyBack: 350 mL    Oral Fluid: 240 mL  Total IN: 590 mL    OUT:    Voided (mL): 1300 mL  Total OUT: 1300 mL    Total NET: -710 mL    --------------------------------------------------------------------------------------

## 2023-07-09 NOTE — CONSULT NOTE ADULT - ASSESSMENT
72 M PMH DM, HTN    Recently admitted in March/April for R 1st toe OM  Wound cx polymicrobial with E. Cloacae, Staph Lugdunensis S to Oxacillin, and Delftia acidovorans  Underwent RLE SFA to PT bypass graft with PTFE on 4/10  Followed by R foot partial hallux amputation on 4/14 (OR cx and clean margin pathology negative for OM)  Discharged home on a week of Levaquin and Keflex  Now s/p RLE angiogram on 7/3 with rotational atherectomy of the proximal portion of the graft in the SFA, followed by MIKE that improved flow, however proximal lesion was present that was not amenable to endovascular measures  Presents to the ED for non healing R foot wound    No fevers, no purulent discharge from R foot 1st metatarsal wound  On exam, R 1st metatarsal wound to bone, proximal phalanx exposed, ischemic/gangrenous changes around the wound  Mild leukocytosis that resolved  Elevated ESR and CRP >100  R foot XR with findings c/f OM  On Vancomycin and Zosyn    Overall;  R foot OM in a diabetic gentleman with PAD with lesion that was not amenable to endovascular measures  Leukocytosis, elevated ESR and CRP, open 1st metatarsal wound exposed to bone     Suggest;  Can stop Vancomycin based on prior cx results  Change Zosyn to Cefepime   F/u blood cx  F/u MR Foot  F/u Vascular and Podiatry     All recommendations are tentative pending discussion with Attending.    Liz Rosales MD, PGY-5   ID Fellow  [employer] Teams Preferred  After 5pm/weekends call [phone]  72 M PMH DM, HTN    Recently admitted in March/April for R 1st toe OM  Wound cx polymicrobial with E. Cloacae, Staph Lugdunensis S to Oxacillin, and Delftia acidovorans  Underwent RLE SFA to PT bypass graft with PTFE on 4/10  Followed by R foot partial hallux amputation on 4/14 (OR cx and clean margin pathology negative for OM)  Discharged home on a week of Levaquin and Keflex  Now s/p RLE angiogram on 7/3 with rotational atherectomy of the proximal portion of the graft in the SFA, followed by MIKE that improved flow, however proximal lesion was present that was not amenable to endovascular measures  Presents to the ED for non healing R foot wound    No fevers, no purulent discharge from R foot 1st metatarsal wound  On exam, R 1st metatarsal wound to bone, proximal phalanx exposed, ischemic/gangrenous changes around the wound  Mild leukocytosis that resolved  Elevated ESR and CRP >100  R foot XR with findings c/f OM  On Vancomycin and Zosyn    Overall;  R foot OM in a diabetic gentleman with PAD with lesion that was not amenable to endovascular measures  Leukocytosis, elevated ESR and CRP, open 1st metatarsal wound exposed to bone     Suggest;  Can stop Vancomycin based on prior cx results  Change Zosyn to Cefepime 1g IV Q12h and Flagyl 500mg OV Q12h   F/u blood cx  F/u MR Foot  F/u Vascular and Podiatry     All recommendations are tentative pending discussion with Attending.    Liz Rosales MD, PGY-5   ID Fellow  [employer] Teams Preferred  After 5pm/weekends call [phone]

## 2023-07-09 NOTE — CONSULT NOTE ADULT - ATTENDING COMMENTS
72 M PMH DM, HTN, PAD    Recently admitted in March/April for R 1st toe OM  Wound cx polymicrobial with E. Cloacae, Staph Lugdunensis S to Oxacillin, and Delftia acidovorans  Underwent RLE SFA to PT bypass graft with PTFE on 4/10  Followed by R foot partial hallux amputation on 4/14 (OR cx and clean margin pathology negative for OM)  Discharged home on a week of Levaquin and Keflex  Now s/p RLE angiogram on 7/3 with rotational atherectomy of the proximal portion of the graft in the SFA, followed by MIKE that improved flow, however proximal lesion was present that was not amenable to endovascular measures  Presents to the ED for non healing R foot wound  admitted 7/8/23     No fevers, no purulent discharge from R foot 1st metatarsal wound  On exam, R 1st metatarsal wound to bone, proximal phalanx exposed, ischemic/gangrenous changes around the wound  Mild leukocytosis that resolved  7/8/23:  ESR = 120;  CRP=73  R foot XR with findings c/f OM  On Vancomycin and Zosyn      R foot OM in a diabetic gentleman with PAD with lesion that was not amenable to endovascular measures  Leukocytosis, elevated ESR and CRP, open 1st metatarsal wound exposed to bone     For podiatric surgery 7/12    Suggest  Can stop Zosyn/Vancomycin 7/8-->  BEGIN Cefepime 1g IV Q12h  BEGIN Flagyl 500mg OV Q12h

## 2023-07-09 NOTE — CONSULT NOTE ADULT - SUBJECTIVE AND OBJECTIVE BOX
Patient is a 71 yo Male who presents with a chief complaint of non healing R foot wound    HPI:  72 M PMH DM, HTN, recently admitted in March/April for R 1st toe OM, wound cx polymicrobial with E. Cloacae, Staph Lugdunensis S to Oxacillin, and Delftia acidovorans, underwent RLE SFA to PT bypass graft with PTFE on 4/10, followed by R foot partial hallux amputation on 4/14 (OR cx and clean margin pathology negative for OM), discharged home on a week of Levaquin and Keflex, now s/p RLE angiogram on 7/3 with rotational atherectomy of the proximal portion of the graft in the SFA, followed by MIKE that improved flow, however proximal lesion was present that was not amenable to endovascular measures, who presents to the ED for non healing R foot wound.     No fevers, chills or purulent discharge from the R foot 1st metatarsal wound. Here afebrile, mild leukocytosis that resolved, and elevated ESR and CRP >100. R foot XR with findings c/f OM. On Vancomycin and Zosyn. ID consulted for recommendations.      REVIEW OF SYSTEMS  Constitutional: No fevers, chills  Skin: No rash, no phlebitis	  Eyes: No discharge	  ENMT: No sore throat  Respiratory: No cough, no SOB  Cardiovascular:  No chest pain  Gastrointestinal: No pain, nausea, vomiting, diarrhea  Genitourinary: No dysuria  MSK: No arthralgias  Neurological: No AMS     prior hospital charts reviewed [V]  primary team notes reviewed [V]  other consultant notes reviewed [V]    PAST MEDICAL & SURGICAL HISTORY:  DM (diabetes mellitus)    HTN (hypertension)    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    PAD (peripheral artery disease)    History of amputation of toe    SOCIAL HISTORY:  No sick contacts  No fevers    FAMILY HISTORY:    Allergies  No Known Allergies    ANTIMICROBIALS:  piperacillin/tazobactam IVPB.. 3.375 every 8 hours  vancomycin  IVPB 1000 every 12 hours    ANTIMICROBIALS (past 90 days):  MEDICATIONS  (STANDING):    piperacillin/tazobactam IVPB..   25 mL/Hr IV Intermittent (07-09-23 @ 09:00)   25 mL/Hr IV Intermittent (07-08-23 @ 23:19)   25 mL/Hr IV Intermittent (07-08-23 @ 16:52)    piperacillin/tazobactam IVPB...   200 mL/Hr IV Intermittent (07-08-23 @ 12:48)    vancomycin  IVPB   250 mL/Hr IV Intermittent (07-09-23 @ 02:52)    vancomycin  IVPB.   250 mL/Hr IV Intermittent (07-08-23 @ 14:09)    OTHER MEDS:   MEDICATIONS  (STANDING):  amLODIPine   Tablet 2.5 daily  aspirin enteric coated 81 daily  atorvastatin 40 at bedtime  dextrose 50% Injectable 12.5 once  dextrose 50% Injectable 25 once  dextrose 50% Injectable 25 once  dextrose Oral Gel 15 once PRN  enoxaparin Injectable 40 every 24 hours  gabapentin 100 every 8 hours  glucagon  Injectable 1 once  insulin lispro (ADMELOG) corrective regimen sliding scale  three times a day before meals  insulin lispro (ADMELOG) corrective regimen sliding scale  at bedtime  losartan 25 daily    VITALS:  Vital Signs Last 24 Hrs  T(F): 98.4 (07-09-23 @ 09:10), Max: 99.1 (07-08-23 @ 17:07)    Vital Signs Last 24 Hrs  HR: 81 (07-09-23 @ 09:10) (76 - 83)  BP: 100/60 (07-09-23 @ 09:10) (100/60 - 156/70)  RR: 18 (07-09-23 @ 09:10)  SpO2: 99% (07-09-23 @ 09:10) (96% - 99%)  Wt(kg): --    EXAM:  General: Patient in NAD  HEENT: NCAT  CV: S1+S2, no m/r/g appreciated   Lungs: No respiratory distress, CTAB  Abd: Soft, nontender, no guarding  : No suprapubic tenderness  Neuro: Alert and oriented   Ext: No cyanosis  R 1st metatarsal wound to bone, proximal phalanx exposed, ischemic/gangrenous changes around the wound  Skin: No phlebitis     Labs:                        9.1    7.05  )-----------( 246      ( 09 Jul 2023 07:11 )             27.7     07-09    138  |  102  |  16  ----------------------------<  153<H>  4.0   |  22  |  0.90    Ca    9.4      09 Jul 2023 07:16  Phos  3.3     07-09  Mg     2.3     07-09    TPro  7.6  /  Alb  3.5  /  TBili  0.3  /  DBili  x   /  AST  13  /  ALT  28  /  AlkPhos  70  07-09    WBC Trend:  WBC Count: 7.05 (07-09-23 @ 07:11)  WBC Count: 10.14 (07-08-23 @ 11:05)    Auto Neutrophil #: 7.68 K/uL (07-08-23 @ 11:05)  Auto Neutrophil #: 3.53 K/uL (04-15-23 @ 07:27)  Auto Neutrophil #: 4.71 K/uL (03-30-23 @ 01:26)    Creatine Trend:  Creatinine: 0.90 (07-09)  Creatinine: 1.03 (07-08)  Creatinine: 1.06 (07-03)    Liver Biochemical Testing Trend:  Alanine Aminotransferase (ALT/SGPT): 28 (07-09)  Alanine Aminotransferase (ALT/SGPT): 36 (07-08)  Alanine Aminotransferase (ALT/SGPT): 18 (03-30)  Alanine Aminotransferase (ALT/SGPT): 20 (03-30)  Aspartate Aminotransferase (AST/SGOT): 13 (07-09-23 @ 07:16)  Aspartate Aminotransferase (AST/SGOT): 18 (07-08-23 @ 11:05)  Aspartate Aminotransferase (AST/SGOT): 13 (03-30-23 @ 03:17)  Aspartate Aminotransferase (AST/SGOT): 41 (03-30-23 @ 01:26)  Bilirubin Total: 0.3 (07-09)  Bilirubin Total: 0.2 (07-08)  Bilirubin Total, Serum: 0.3 (03-30)  Bilirubin Total, Serum: 0.3 (03-30)    Auto Eosinophil %: 1.7 % (07-08-23 @ 11:05)    Urinalysis Basic - ( 09 Jul 2023 07:16 )    Color: x / Appearance: x / SG: x / pH: x  Gluc: 153 mg/dL / Ketone: x  / Bili: x / Urobili: x   Blood: x / Protein: x / Nitrite: x   Leuk Esterase: x / RBC: x / WBC x   Sq Epi: x / Non Sq Epi: x / Bacteria: x    MICROBIOLOGY:    MRSA PCR Result.: Lázaro (03-31-23 @ 15:34)    Culture - Tissue with Gram Stain (collected 14 Apr 2023 14:11)  Source: .Tissue Other  Final Report:    No growth    Culture - Other (collected 30 Mar 2023 06:35)  Source: .Other Right foot  Final Report:    Rare Enterobacter cloacae complex    Numerous Staphylococcus lugdunensis    Rare Delftia acidovorans group    Normal skin narcisa isolated  Organism: Enterobacter cloacae complex  Staphylococcus lugdunensis  Delftia acidovorans group  Organism: Delftia acidovorans group    Sensitivities:      Method Type: AMI      -  Amikacin: S <=16      -  Aztreonam: S <=4      -  Cefepime: S <=2      -  Ceftriaxone: S <=1      -  Ciprofloxacin: S <=0.25      -  Gentamicin: R >8      -  Levofloxacin: S <=0.5      -  Meropenem: S <=1      -  Piperacillin/Tazobactam: S <=8      -  Tobramycin: I 8      -  Trimethoprim/Sulfamethoxazole: S <=0.5/9.5  Organism: Staphylococcus lugdunensis    Sensitivities:      Method Type: AMI      -  Ampicillin/Sulbactam: S <=8/4      -  Cefazolin: S <=4      -  Clindamycin: S <=0.25      -  Erythromycin: S <=0.25      -  Gentamicin: S <=1 Should not be used as monotherapy      -  Oxacillin: S <=0.25      -  Rifampin: S <=1 Should not be used as monotherapy      -  Tetracycline: S <=1      -  Trimethoprim/Sulfamethoxazole: S <=0.5/9.5      -  Vancomycin: S 1  Organism: Enterobacter cloacae complex    Sensitivities:      Method Type: AMI      -  Amikacin: S <=16      -  Amoxicillin/Clavulanic Acid: R >16/8      -  Ampicillin: R <=8 These ampicillin results predict results for amoxicillin      -  Ampicillin/Sulbactam: R <=4/2 Enterobacter, Klebsiella aerogenes, Citrobacter, and Serratia may develop resistance during prolonged therapy (3-4 days)      -  Aztreonam: S <=4      -  Cefazolin: R >16 Enterobacter, Klebsiella aerogenes, Citrobacter, and Serratia may develop resistance during prolonged therapy (3-4 days)      -  Cefepime: S <=2      -  Cefoxitin: R >16      -  Ceftriaxone: S <=1 Enterobacter, Klebsiella aerogenes, Citrobacter, and Serratia may develop resistance during prolonged therapy      -  Ciprofloxacin: S <=0.25      -  Ertapenem: S <=0.5      -  Gentamicin: S <=2      -  Imipenem: S <=1      -  Levofloxacin: S <=0.5      -  Meropenem: S <=1      -  Piperacillin/Tazobactam: S <=8      -  Tobramycin: S <=2      -  Trimethoprim/Sulfamethoxazole: S <=0.5/9.5    Culture - Blood (collected 30 Mar 2023 01:20)  Source: .Blood  Final Report:    No Growth Final    Culture - Blood (collected 30 Mar 2023 01:10)  Source: .Blood  Final Report:    No Growth Final    C-Reactive Protein, Serum: 73 (07-08)    Blood Gas Venous - Lactate: 1.2 (07-08 @ 10:35)    A1C with Estimated Average Glucose Result: 8.4 % (04-01-23 @ 05:15)  A1C with Estimated Average Glucose Result: 8.3 % (03-31-23 @ 06:43)    RADIOLOGY:  imaging below personally reviewed    < from: Xray Foot AP + Lateral, Right (07.08.23 @ 11:17) >  IMPRESSION:  Mild erosive changes of the medial cortical margin of the first proximal   phalanx which appears to extend past the soft tissue, suspicious for   osteomyelitis.    < end of copied text >

## 2023-07-10 LAB
ANION GAP SERPL CALC-SCNC: 9 MMOL/L — SIGNIFICANT CHANGE UP (ref 5–17)
APPEARANCE UR: CLEAR — SIGNIFICANT CHANGE UP
BACTERIA # UR AUTO: NEGATIVE — SIGNIFICANT CHANGE UP
BILIRUB UR-MCNC: NEGATIVE — SIGNIFICANT CHANGE UP
BUN SERPL-MCNC: 20 MG/DL — SIGNIFICANT CHANGE UP (ref 7–23)
CALCIUM SERPL-MCNC: 9.2 MG/DL — SIGNIFICANT CHANGE UP (ref 8.4–10.5)
CHLORIDE SERPL-SCNC: 101 MMOL/L — SIGNIFICANT CHANGE UP (ref 96–108)
CO2 SERPL-SCNC: 24 MMOL/L — SIGNIFICANT CHANGE UP (ref 22–31)
COLOR SPEC: YELLOW — SIGNIFICANT CHANGE UP
CREAT SERPL-MCNC: 1 MG/DL — SIGNIFICANT CHANGE UP (ref 0.5–1.3)
DIFF PNL FLD: ABNORMAL
EGFR: 80 ML/MIN/1.73M2 — SIGNIFICANT CHANGE UP
EPI CELLS # UR: 1 /HPF — SIGNIFICANT CHANGE UP
GLUCOSE BLDC GLUCOMTR-MCNC: 179 MG/DL — HIGH (ref 70–99)
GLUCOSE BLDC GLUCOMTR-MCNC: 183 MG/DL — HIGH (ref 70–99)
GLUCOSE BLDC GLUCOMTR-MCNC: 269 MG/DL — HIGH (ref 70–99)
GLUCOSE BLDC GLUCOMTR-MCNC: 302 MG/DL — HIGH (ref 70–99)
GLUCOSE SERPL-MCNC: 161 MG/DL — HIGH (ref 70–99)
GLUCOSE UR QL: ABNORMAL
HCT VFR BLD CALC: 27.9 % — LOW (ref 39–50)
HGB BLD-MCNC: 9.1 G/DL — LOW (ref 13–17)
HYALINE CASTS # UR AUTO: 6 /LPF — HIGH (ref 0–2)
KETONES UR-MCNC: NEGATIVE — SIGNIFICANT CHANGE UP
LEUKOCYTE ESTERASE UR-ACNC: NEGATIVE — SIGNIFICANT CHANGE UP
MAGNESIUM SERPL-MCNC: 2.2 MG/DL — SIGNIFICANT CHANGE UP (ref 1.6–2.6)
MCHC RBC-ENTMCNC: 28.3 PG — SIGNIFICANT CHANGE UP (ref 27–34)
MCHC RBC-ENTMCNC: 32.6 GM/DL — SIGNIFICANT CHANGE UP (ref 32–36)
MCV RBC AUTO: 86.9 FL — SIGNIFICANT CHANGE UP (ref 80–100)
NITRITE UR-MCNC: NEGATIVE — SIGNIFICANT CHANGE UP
NRBC # BLD: 0 /100 WBCS — SIGNIFICANT CHANGE UP (ref 0–0)
PH UR: 5.5 — SIGNIFICANT CHANGE UP (ref 5–8)
PHOSPHATE SERPL-MCNC: 3.3 MG/DL — SIGNIFICANT CHANGE UP (ref 2.5–4.5)
PLATELET # BLD AUTO: 257 K/UL — SIGNIFICANT CHANGE UP (ref 150–400)
POTASSIUM SERPL-MCNC: 4.1 MMOL/L — SIGNIFICANT CHANGE UP (ref 3.5–5.3)
POTASSIUM SERPL-SCNC: 4.1 MMOL/L — SIGNIFICANT CHANGE UP (ref 3.5–5.3)
PROT UR-MCNC: ABNORMAL
RAPID RVP RESULT: SIGNIFICANT CHANGE UP
RBC # BLD: 3.21 M/UL — LOW (ref 4.2–5.8)
RBC # FLD: 12.5 % — SIGNIFICANT CHANGE UP (ref 10.3–14.5)
RBC CASTS # UR COMP ASSIST: 3 /HPF — SIGNIFICANT CHANGE UP (ref 0–4)
SARS-COV-2 RNA SPEC QL NAA+PROBE: SIGNIFICANT CHANGE UP
SODIUM SERPL-SCNC: 134 MMOL/L — LOW (ref 135–145)
SP GR SPEC: 1.02 — SIGNIFICANT CHANGE UP (ref 1.01–1.02)
UROBILINOGEN FLD QL: NEGATIVE — SIGNIFICANT CHANGE UP
WBC # BLD: 7.34 K/UL — SIGNIFICANT CHANGE UP (ref 3.8–10.5)
WBC # FLD AUTO: 7.34 K/UL — SIGNIFICANT CHANGE UP (ref 3.8–10.5)
WBC UR QL: 1 /HPF — SIGNIFICANT CHANGE UP (ref 0–5)

## 2023-07-10 PROCEDURE — 71045 X-RAY EXAM CHEST 1 VIEW: CPT | Mod: 26

## 2023-07-10 PROCEDURE — 99231 SBSQ HOSP IP/OBS SF/LOW 25: CPT | Mod: GC

## 2023-07-10 PROCEDURE — 99232 SBSQ HOSP IP/OBS MODERATE 35: CPT

## 2023-07-10 PROCEDURE — 73720 MRI LWR EXTREMITY W/O&W/DYE: CPT | Mod: 26,RT

## 2023-07-10 RX ORDER — ACETAMINOPHEN 500 MG
650 TABLET ORAL EVERY 6 HOURS
Refills: 0 | Status: DISCONTINUED | OUTPATIENT
Start: 2023-07-10 | End: 2023-07-12

## 2023-07-10 RX ORDER — ACETAMINOPHEN 500 MG
650 TABLET ORAL EVERY 6 HOURS
Refills: 0 | Status: DISCONTINUED | OUTPATIENT
Start: 2023-07-10 | End: 2023-07-13

## 2023-07-10 RX ORDER — ACETAMINOPHEN 500 MG
1000 TABLET ORAL ONCE
Refills: 0 | Status: COMPLETED | OUTPATIENT
Start: 2023-07-10 | End: 2023-07-10

## 2023-07-10 RX ORDER — INSULIN LISPRO 100/ML
3 VIAL (ML) SUBCUTANEOUS
Refills: 0 | Status: DISCONTINUED | OUTPATIENT
Start: 2023-07-10 | End: 2023-07-13

## 2023-07-10 RX ORDER — INSULIN GLARGINE 100 [IU]/ML
6 INJECTION, SOLUTION SUBCUTANEOUS AT BEDTIME
Refills: 0 | Status: DISCONTINUED | OUTPATIENT
Start: 2023-07-10 | End: 2023-07-13

## 2023-07-10 RX ADMIN — INSULIN GLARGINE 6 UNIT(S): 100 INJECTION, SOLUTION SUBCUTANEOUS at 22:41

## 2023-07-10 RX ADMIN — Medication 1: at 09:21

## 2023-07-10 RX ADMIN — AMLODIPINE BESYLATE 2.5 MILLIGRAM(S): 2.5 TABLET ORAL at 05:21

## 2023-07-10 RX ADMIN — ATORVASTATIN CALCIUM 40 MILLIGRAM(S): 80 TABLET, FILM COATED ORAL at 22:42

## 2023-07-10 RX ADMIN — LOSARTAN POTASSIUM 25 MILLIGRAM(S): 100 TABLET, FILM COATED ORAL at 05:21

## 2023-07-10 RX ADMIN — ENOXAPARIN SODIUM 40 MILLIGRAM(S): 100 INJECTION SUBCUTANEOUS at 17:13

## 2023-07-10 RX ADMIN — CEFEPIME 100 MILLIGRAM(S): 1 INJECTION, POWDER, FOR SOLUTION INTRAMUSCULAR; INTRAVENOUS at 17:17

## 2023-07-10 RX ADMIN — GABAPENTIN 100 MILLIGRAM(S): 400 CAPSULE ORAL at 13:42

## 2023-07-10 RX ADMIN — CEFEPIME 100 MILLIGRAM(S): 1 INJECTION, POWDER, FOR SOLUTION INTRAMUSCULAR; INTRAVENOUS at 05:30

## 2023-07-10 RX ADMIN — Medication 3: at 17:31

## 2023-07-10 RX ADMIN — Medication 100 MILLIGRAM(S): at 17:17

## 2023-07-10 RX ADMIN — Medication 100 MILLIGRAM(S): at 05:31

## 2023-07-10 RX ADMIN — Medication 4: at 12:20

## 2023-07-10 RX ADMIN — GABAPENTIN 100 MILLIGRAM(S): 400 CAPSULE ORAL at 05:21

## 2023-07-10 RX ADMIN — Medication 3 UNIT(S): at 17:31

## 2023-07-10 RX ADMIN — Medication 400 MILLIGRAM(S): at 15:35

## 2023-07-10 RX ADMIN — Medication 81 MILLIGRAM(S): at 11:33

## 2023-07-10 RX ADMIN — GABAPENTIN 100 MILLIGRAM(S): 400 CAPSULE ORAL at 22:42

## 2023-07-10 NOTE — PROGRESS NOTE ADULT - ASSESSMENT
72 M PMH DM, HTN, PAD    Recently admitted in March/April for R 1st toe OM  Wound cx polymicrobial with E. Cloacae, Staph Lugdunensis S to Oxacillin, and Delftia acidovorans  Underwent RLE SFA to PT bypass graft with PTFE on 4/10  Followed by R foot partial hallux amputation on 4/14 (OR cx and clean margin pathology negative for OM)  Discharged home on a week of Levaquin and Keflex  Now s/p RLE angiogram on 7/3 with rotational atherectomy of the proximal portion of the graft in the SFA, followed by MIKE that improved flow, however proximal lesion was present that was not amenable to endovascular measures  Presents to the ED for non healing R foot wound  admitted 7/8/23     No fevers, no purulent discharge from R foot 1st metatarsal wound  On exam, R 1st metatarsal wound to bone, proximal phalanx exposed, ischemic/gangrenous changes around the wound  Mild leukocytosis that resolved  7/8/23:  ESR = 120;  CRP=73  R foot XR with findings c/f OM  On Vancomycin and Zosyn      R foot OM in a diabetic gentleman with PAD with lesion that was not amenable to endovascular measures  Leukocytosis, elevated ESR and CRP, open 1st metatarsal wound exposed to bone     Suggest  S/p Zosyn/Vancomycin 7/8--> 7/9  Continue Cefepime 1g IV Q12h 7/9-->  Continue  Flagyl 500mg OV Q12h 7/9     probable  podiatric surgery and resection of right 1st metatarsal head 7/12    I will be out 7/1  - please call ID if needed

## 2023-07-10 NOTE — PROGRESS NOTE ADULT - ASSESSMENT
72M with h/o T2DM (A1c=8.4) with neuropathy, HTN recently admitted 3/30 with R 1st toe infection, s/p 4/10 s/p RLE SFA to PT bypass graft with PTFE followed by R foot Partial hallux Amputation 4/14, and RLE angiogram 7/3 with rotational athrectomy of proximal portion of the graft in the SFA, following by MIKE that improved flow, however a proximal lesion was demonstrated at that time that was not amenable to endovascular procedures, now presenting with nonhealing R hallux wound.    Plan:     - Appreciated ID for abx recs 7/9 - D/c'd vanco and zosyn, add Cefepime + Flagil (done 7/9)  - LLE arterial duplex to r/o L groin PSA (done 7/8) - no evidence of pseudoaneurysm   - Will obtain medical clearance per Podiatry (Podiatry plan for OR Wednesday 7/12)  - Will f/u AM labs  - Regular diet  - Home meds resumed  - Appreciate Podiatry recs        Vascular Surgery   [id number]

## 2023-07-10 NOTE — CONSULT NOTE ADULT - SUBJECTIVE AND OBJECTIVE BOX
HPI: Patient is a 72 year old male with a PMHx of Type II DM associated with neuropathy, HTN who was recently admitted to [facility] with Right 1st toe infection, S/P Right LE SFA to PT bypass graft with PTFE followed by Right foot Partial hallux Amputation on 04/14, and RLE angiogram 07/03 with rotational atherectomy of proximal portion of the graft in the SFA, following by MIKE that improved flow, however a proximal lesion was demonstrated at that time that was not amenable to endovascular procedures. Patient presents to [facility] due to nonhealing Right foot wound. Internal medicine has been consulted on Mr. Paredes's care for medical management. Patient denies chest pain, palpitations, SOB or dyspnea.       REVIEW OF SYSTEMS:    CONSTITUTIONAL: No weakness, fevers or chills  EYES/ENT: No visual changes;  No vertigo or throat pain   NECK: No pain or stiffness  RESPIRATORY: No cough, wheezing, hemoptysis; No shortness of breath  CARDIOVASCULAR: No chest pain or palpitations  GASTROINTESTINAL: No abdominal or epigastric pain. No nausea, vomiting, or hematemesis; No diarrhea or constipation. No melena or hematochezia.  GENITOURINARY: No dysuria, frequency or hematuria  NEUROLOGICAL: No numbness or weakness  SKIN: No itching, burning, rashes, or lesions   All other review of systems is negative unless indicated above.      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  PAD (peripheral artery disease)  History of amputation of toe    MEDICATIONS  (STANDING):  amLODIPine   Tablet 2.5 milliGRAM(s) Oral daily  aspirin enteric coated 81 milliGRAM(s) Oral daily  atorvastatin 40 milliGRAM(s) Oral at bedtime  cefepime   IVPB 1000 milliGRAM(s) IV Intermittent every 12 hours  dextrose 5%. 1000 milliLiter(s) (100 mL/Hr) IV Continuous <Continuous>  dextrose 5%. 1000 milliLiter(s) (50 mL/Hr) IV Continuous <Continuous>  dextrose 50% Injectable 12.5 Gram(s) IV Push once  dextrose 50% Injectable 25 Gram(s) IV Push once  dextrose 50% Injectable 25 Gram(s) IV Push once  enoxaparin Injectable 40 milliGRAM(s) SubCutaneous every 24 hours  gabapentin 100 milliGRAM(s) Oral every 8 hours  glucagon  Injectable 1 milliGRAM(s) IntraMuscular once  insulin lispro (ADMELOG) corrective regimen sliding scale   SubCutaneous three times a day before meals  insulin lispro (ADMELOG) corrective regimen sliding scale   SubCutaneous at bedtime  losartan 25 milliGRAM(s) Oral daily  metroNIDAZOLE  IVPB 500 milliGRAM(s) IV Intermittent every 12 hours      Allergies    No Known Allergies    Intolerances      Vital Signs Last 24 Hrs  T(C): 36.7 (10 Jul 2023 09:06), Max: 37.5 (09 Jul 2023 13:20)  T(F): 98.1 (10 Jul 2023 09:06), Max: 99.5 (09 Jul 2023 13:20)  HR: 87 (10 Jul 2023 09:06) (77 - 99)  BP: 139/66 (10 Jul 2023 09:06) (121/72 - 165/80)  BP(mean): --  RR: 18 (10 Jul 2023 09:06) (18 - 18)  SpO2: 98% (10 Jul 2023 09:06) (96% - 98%)    Parameters below as of 10 Jul 2023 09:06  Patient On (Oxygen Delivery Method): room air        Appearance: Normal	  HEENT:   Normal oral mucosa, PERRL, EOMI	  Lymphatic: No lymphadenopathy , no edema  Cardiovascular: Normal S1 S2, No JVD, No murmurs , Peripheral pulses palpable 2+ bilaterally  Respiratory: Lungs clear to auscultation, normal effort 	  Gastrointestinal:  Soft, Non-tender, + BS	  Skin: No rashes, No ecchymoses, No cyanosis, warm to touch  Musculoskeletal: Normal range of motion, normal strength  Psychiatry:  Mood & affect appropriate  Ext: RLE great toe amputation, in dressing                               9.1    7.34  )-----------( 257      ( 10 Jul 2023 07:19 )             27.9               07-10    134<L>  |  101  |  20  ----------------------------<  161<H>  4.1   |  24  |  1.00    Ca    9.2      10 Jul 2023 07:18  Phos  3.3     07-10  Mg     2.2     07-10    TPro  7.6  /  Alb  3.5  /  TBili  0.3  /  DBili  x   /  AST  13  /  ALT  28  /  AlkPhos  70  07-09                       Urinalysis Basic - ( 10 Jul 2023 07:18 )    Color: x / Appearance: x / SG: x / pH: x  Gluc: 161 mg/dL / Ketone: x  / Bili: x / Urobili: x   Blood: x / Protein: x / Nitrite: x   Leuk Esterase: x / RBC: x / WBC x   Sq Epi: x / Non Sq Epi: x / Bacteria: x        < from: VA Physiol Extremity Lower 3+ Level, BI (07.08.23 @ 13:45) >    IMPRESSION:  Limited study due to vessel noncompressibility and right lower extremity   stents.    Pulse volume recordings demonstrate no evidence of significant arterial   occlusive disease at either thigh, calf or ankle levels at rest. Findings   are consistent with significant disease at both foot levels. Findings are   similar compared to prior study.    --- End of Report ---    < end of copied text >      < from: US Duplex Arterial Lower Ext Ltd, Left (07.08.23 @ 13:37) >  IMPRESSION:    Focused limited field-of-view sonography of the left groin without   sonographic evidence of pseudoaneurysm, hematoma, or AV fistula.    --- End of Report ---    < end of copied text >      < from: Xray Foot AP + Lateral, Right (07.08.23 @ 11:17) >  IMPRESSION:  Mild erosive changes of the medial cortical margin of the first proximal   phalanx which appears to extend past the soft tissue, suspicious for   osteomyelitis.    --- Endof Report ---    < end of copied text >      Culture - Urine (07.08.23 @ 11:56)   Specimen Source: Clean Catch Clean Catch (Midstream)  Culture Results: No growth    Culture - Blood (07.08.23 @ 11:25)   Specimen Source: .Blood Blood-Peripheral  Culture Results: No growth at 24 hours    Culture - Blood (07.08.23 @ 11:15)   Specimen Source: .Blood Blood-Peripheral  Culture Results: No growth at 24 hours           HPI: Patient is a 72 year old male with a PMHx of Type II DM associated with neuropathy, HTN who was recently admitted to [facility] with Right 1st toe infection, S/P Right LE SFA to PT bypass graft with PTFE followed by Right foot Partial hallux Amputation on 04/14, and RLE angiogram 07/03 with rotational atherectomy of proximal portion of the graft in the SFA, following by MIKE that improved flow, however a proximal lesion was demonstrated at that time that was not amenable to endovascular procedures. Patient presents to [facility] due to nonhealing Right foot wound. Internal medicine has been consulted on Mr. Paredes's care for medical management. Patient denies chest pain, palpitations, SOB or dyspnea.       REVIEW OF SYSTEMS:    CONSTITUTIONAL: No weakness, fevers or chills  EYES/ENT: No visual changes;  No vertigo or throat pain   NECK: No pain or stiffness  RESPIRATORY: No cough, wheezing, hemoptysis; No shortness of breath  CARDIOVASCULAR: No chest pain or palpitations  GASTROINTESTINAL: No abdominal or epigastric pain. No nausea, vomiting, or hematemesis; No diarrhea or constipation. No melena or hematochezia.  GENITOURINARY: No dysuria, frequency or hematuria  NEUROLOGICAL: No numbness or weakness  SKIN: No itching, burning, rashes, or lesions; RLE Toe wound   All other review of systems is negative unless indicated above.      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  PAD (peripheral artery disease)  History of amputation of toe    MEDICATIONS  (STANDING):  amLODIPine   Tablet 2.5 milliGRAM(s) Oral daily  aspirin enteric coated 81 milliGRAM(s) Oral daily  atorvastatin 40 milliGRAM(s) Oral at bedtime  cefepime   IVPB 1000 milliGRAM(s) IV Intermittent every 12 hours  dextrose 5%. 1000 milliLiter(s) (100 mL/Hr) IV Continuous <Continuous>  dextrose 5%. 1000 milliLiter(s) (50 mL/Hr) IV Continuous <Continuous>  dextrose 50% Injectable 12.5 Gram(s) IV Push once  dextrose 50% Injectable 25 Gram(s) IV Push once  dextrose 50% Injectable 25 Gram(s) IV Push once  enoxaparin Injectable 40 milliGRAM(s) SubCutaneous every 24 hours  gabapentin 100 milliGRAM(s) Oral every 8 hours  glucagon  Injectable 1 milliGRAM(s) IntraMuscular once  insulin lispro (ADMELOG) corrective regimen sliding scale   SubCutaneous three times a day before meals  insulin lispro (ADMELOG) corrective regimen sliding scale   SubCutaneous at bedtime  losartan 25 milliGRAM(s) Oral daily  metroNIDAZOLE  IVPB 500 milliGRAM(s) IV Intermittent every 12 hours      Allergies    No Known Allergies    Intolerances      Vital Signs Last 24 Hrs  T(C): 36.7 (10 Jul 2023 09:06), Max: 37.5 (09 Jul 2023 13:20)  T(F): 98.1 (10 Jul 2023 09:06), Max: 99.5 (09 Jul 2023 13:20)  HR: 87 (10 Jul 2023 09:06) (77 - 99)  BP: 139/66 (10 Jul 2023 09:06) (121/72 - 165/80)  BP(mean): --  RR: 18 (10 Jul 2023 09:06) (18 - 18)  SpO2: 98% (10 Jul 2023 09:06) (96% - 98%)    Parameters below as of 10 Jul 2023 09:06  Patient On (Oxygen Delivery Method): room air        Appearance: Normal	  HEENT:   Normal oral mucosa, Eyes are open 	  Lymphatic: No lymphadenopathy , no edema  Cardiovascular: Normal S1 S2, No JVD   Respiratory: Lungs clear to auscultation, normal effort 	  Gastrointestinal:  Soft, Non-tender   Skin: No rashes, No ecchymoses, No cyanosis, warm to touch  Musculoskeletal: Normal range of motion, normal strength  Psychiatry:  Mood & affect appropriate  Ext: RLE great toe amputation, in dressing                               9.1    7.34  )-----------( 257      ( 10 Jul 2023 07:19 )             27.9               07-10    134<L>  |  101  |  20  ----------------------------<  161<H>  4.1   |  24  |  1.00    Ca    9.2      10 Jul 2023 07:18  Phos  3.3     07-10  Mg     2.2     07-10    TPro  7.6  /  Alb  3.5  /  TBili  0.3  /  DBili  x   /  AST  13  /  ALT  28  /  AlkPhos  70  07-09                       Urinalysis Basic - ( 10 Jul 2023 07:18 )    Color: x / Appearance: x / SG: x / pH: x  Gluc: 161 mg/dL / Ketone: x  / Bili: x / Urobili: x   Blood: x / Protein: x / Nitrite: x   Leuk Esterase: x / RBC: x / WBC x   Sq Epi: x / Non Sq Epi: x / Bacteria: x        < from: VA Physiol Extremity Lower 3+ Level, BI (07.08.23 @ 13:45) >    IMPRESSION:  Limited study due to vessel noncompressibility and right lower extremity   stents.    Pulse volume recordings demonstrate no evidence of significant arterial   occlusive disease at either thigh, calf or ankle levels at rest. Findings   are consistent with significant disease at both foot levels. Findings are   similar compared to prior study.    --- End of Report ---    < end of copied text >      < from: US Duplex Arterial Lower Ext Ltd, Left (07.08.23 @ 13:37) >  IMPRESSION:    Focused limited field-of-view sonography of the left groin without   sonographic evidence of pseudoaneurysm, hematoma, or AV fistula.    --- End of Report ---    < end of copied text >      < from: Xray Foot AP + Lateral, Right (07.08.23 @ 11:17) >  IMPRESSION:  Mild erosive changes of the medial cortical margin of the first proximal   phalanx which appears to extend past the soft tissue, suspicious for   osteomyelitis.    --- Endof Report ---    < end of copied text >      Culture - Urine (07.08.23 @ 11:56)   Specimen Source: Clean Catch Clean Catch (Midstream)  Culture Results: No growth    Culture - Blood (07.08.23 @ 11:25)   Specimen Source: .Blood Blood-Peripheral  Culture Results: No growth at 24 hours    Culture - Blood (07.08.23 @ 11:15)   Specimen Source: .Blood Blood-Peripheral  Culture Results: No growth at 24 hours

## 2023-07-10 NOTE — PROGRESS NOTE ADULT - ASSESSMENT
71 y/o M 4/14 s/p right foot partial hallux amputation, closed  - Pt was seen and evaluated.   - T(F): 101 , WBC 7.34  - R foot proximal phalanx exposed, full thickness dry gangrene surrounding, ischemic changes to the level of MPJs, no drainage, no erythema no malodor, no signs of infection. L foot with no open wounds or signs of infection.   - R foot Xray: hallux proximal phalanx medial cortex OM  - Pt is s/p RLE SFA to PT bypass graft with PT signal w/ Dr. Mullins on 4/10, then s/p RLE Angiogram w/ intervention w/ Dr. Mullins on 7/3, which was noted pt may not a bypass candidate   - Cont IV abx   - Right foot MRI ordered   - ABIPVR: RABI non compressive, LABI 1.23, LTBI 0.65, significant disease at both foot level  - Pod plan for Right hallux hallux revisional amputation Wednesday 7/12 w/ Dr. Mcclure pending [facility] recommendations / R foot MR  - Please document Medical and Cardiac optimization for anesthesia for Podiatry surgery.   - Seen with attending

## 2023-07-10 NOTE — PROGRESS NOTE ADULT - SUBJECTIVE AND OBJECTIVE BOX
VASCULAR SURGERY DAILY PROGRESS NOTE:     SUBJECTIVE/ROS:     Overnight: no acute events   Patient seen and evaluated on AM rounds.  Patient reports that they're feeling well.  Patient otherwise denies nausea, vomiting, chest pain, shortness of breath     OBJECTIVE:  Vital Signs Last 24 Hrs  T(C): 36.7 (10 Jul 2023 09:06), Max: 37.5 (09 Jul 2023 13:20)  T(F): 98.1 (10 Jul 2023 09:06), Max: 99.5 (09 Jul 2023 13:20)  HR: 87 (10 Jul 2023 09:06) (77 - 99)  BP: 139/66 (10 Jul 2023 09:06) (121/72 - 165/80)  BP(mean): --  RR: 18 (10 Jul 2023 09:06) (18 - 18)  SpO2: 98% (10 Jul 2023 09:06) (96% - 98%)    Parameters below as of 10 Jul 2023 09:06  Patient On (Oxygen Delivery Method): room air      I&O's Detail    09 Jul 2023 07:01  -  10 Jul 2023 07:00  --------------------------------------------------------  IN:    Oral Fluid: 720 mL  Total IN: 720 mL    OUT:    Voided (mL): 350 mL  Total OUT: 350 mL    Total NET: 370 mL      10 Jul 2023 07:01  -  10 Jul 2023 10:36  --------------------------------------------------------  IN:    Oral Fluid: 360 mL  Total IN: 360 mL    OUT:  Total OUT: 0 mL    Total NET: 360 mL        Daily     Daily   MEDICATIONS  (STANDING):  amLODIPine   Tablet 2.5 milliGRAM(s) Oral daily  aspirin enteric coated 81 milliGRAM(s) Oral daily  atorvastatin 40 milliGRAM(s) Oral at bedtime  cefepime   IVPB 1000 milliGRAM(s) IV Intermittent every 12 hours  dextrose 5%. 1000 milliLiter(s) (50 mL/Hr) IV Continuous <Continuous>  dextrose 5%. 1000 milliLiter(s) (100 mL/Hr) IV Continuous <Continuous>  dextrose 50% Injectable 12.5 Gram(s) IV Push once  dextrose 50% Injectable 25 Gram(s) IV Push once  dextrose 50% Injectable 25 Gram(s) IV Push once  enoxaparin Injectable 40 milliGRAM(s) SubCutaneous every 24 hours  gabapentin 100 milliGRAM(s) Oral every 8 hours  glucagon  Injectable 1 milliGRAM(s) IntraMuscular once  insulin lispro (ADMELOG) corrective regimen sliding scale   SubCutaneous three times a day before meals  insulin lispro (ADMELOG) corrective regimen sliding scale   SubCutaneous at bedtime  losartan 25 milliGRAM(s) Oral daily  metroNIDAZOLE  IVPB 500 milliGRAM(s) IV Intermittent every 12 hours    MEDICATIONS  (PRN):  dextrose Oral Gel 15 Gram(s) Oral once PRN Blood Glucose LESS THAN 70 milliGRAM(s)/deciliter      Labs:                        9.1    7.34  )-----------( 257      ( 10 Jul 2023 07:19 )             27.9     07-10    134<L>  |  101  |  20  ----------------------------<  161<H>  4.1   |  24  |  1.00    Ca    9.2      10 Jul 2023 07:18  Phos  3.3     07-10  Mg     2.2     07-10    TPro  7.6  /  Alb  3.5  /  TBili  0.3  /  DBili  x   /  AST  13  /  ALT  28  /  AlkPhos  70  07-09    PT/INR - ( 08 Jul 2023 11:05 )   PT: 12.3 sec;   INR: 1.07 ratio         PTT - ( 08 Jul 2023 11:05 )  PTT:29.9 sec  Urinalysis Basic - ( 10 Jul 2023 07:18 )    Color: x / Appearance: x / SG: x / pH: x  Gluc: 161 mg/dL / Ketone: x  / Bili: x / Urobili: x   Blood: x / Protein: x / Nitrite: x   Leuk Esterase: x / RBC: x / WBC x   Sq Epi: x / Non Sq Epi: x / Bacteria: x                Physical Exam:  General: well developed, well nourished, NAD  Neuro: alert and oriented, no focal deficits, moves all extremities spontaneously  Respiratory: airway patent, respirations unlabored  Abdomen: soft, nontender, nondistended  Extremities: Dressing to Rt foot C/D/I.  +PT doppler signal present.  Skin: warm, dry, appropriate color

## 2023-07-10 NOTE — PROGRESS NOTE ADULT - ASSESSMENT
72M with h/o T2DM (A1c=8.4) with neuropathy, HTN recently admitted 3/30 with R 1st toe infection, s/p 4/10 s/p RLE SFA to PT bypass graft with PTFE followed by R foot Partial hallux Amputation 4/14, and RLE angiogram 7/3 with rotational athrectomy of proximal portion of the graft in the SFA, following by MIKE that improved flow, however a proximal lesion was demonstrated at that time that was not amenable to endovascular procedures, now presenting with nonhealing R hallux wound xray concerning for OM.    Plan:   - ID care and recs appreciated, abx changed to cefepime and flagyl, BCx neg x 2   - LLE arterial duplex negative for PSA  -MRI foot pending   - Podiatry care and recs appreciated, Possible OR 7/12    - Will f/u AM labs  - Regular diet  - Continue home medications   -Dispo: Pending         Vascular Surgery   [id number]

## 2023-07-10 NOTE — PROGRESS NOTE ADULT - SUBJECTIVE AND OBJECTIVE BOX
Follow Up:  foot wound    Interval History/ROS:  feels ok  mild discomfort    Allergies  No Known Allergies    ANTIMICROBIALS:  cefepime   IVPB 1000 every 12 hours  metroNIDAZOLE  IVPB 500 every 12 hours      OTHER MEDS:  MEDICATIONS  (STANDING):  acetaminophen     Tablet .. 650 every 6 hours PRN  amLODIPine   Tablet 2.5 daily  aspirin enteric coated 81 daily  atorvastatin 40 at bedtime  dextrose 50% Injectable 12.5 once  dextrose 50% Injectable 25 once  dextrose 50% Injectable 25 once  dextrose Oral Gel 15 once PRN  enoxaparin Injectable 40 every 24 hours  gabapentin 100 every 8 hours  glucagon  Injectable 1 once  insulin glargine Injectable (LANTUS) 6 at bedtime  insulin lispro (ADMELOG) corrective regimen sliding scale  three times a day before meals  insulin lispro (ADMELOG) corrective regimen sliding scale  at bedtime  insulin lispro Injectable (ADMELOG) 3 three times a day before meals  losartan 25 daily      Vital Signs Last 24 Hrs  T(C): 38.3 (10 Jul 2023 14:10), Max: 38.3 (10 Jul 2023 14:10)  T(F): 101 (10 Jul 2023 14:10), Max: 101 (10 Jul 2023 14:10)  HR: 83 (10 Jul 2023 13:25) (83 - 99)  BP: 138/70 (10 Jul 2023 13:25) (125/67 - 165/80)  BP(mean): --  RR: 18 (10 Jul 2023 13:25) (18 - 18)  SpO2: 98% (10 Jul 2023 13:25) (96% - 98%)    Parameters below as of 10 Jul 2023 13:25  Patient On (Oxygen Delivery Method): room air        PHYSICAL EXAM:  General: WN/WD NAD, Non-toxic  Neurology: A&Ox3, nonfocal  Respiratory: Clear to auscultation bilaterally  CV: RRR, S1S2, no murmurs, rubs or gallops  Abdominal: Soft, Non-tender, non-distended, normal bowel sounds  Extremities: No edema, dry gangernous wound  Line Sites: Clear  Skin: No rash                        9.1    7.34  )-----------( 257      ( 10 Jul 2023 07:19 )             27.9     07-10    134<L>  |  101  |  20  ----------------------------<  161<H>  4.1   |  24  |  1.00    Ca    9.2      10 Jul 2023 07:18  Phos  3.3     07-10  Mg     2.2     07-10    TPro  7.6  /  Alb  3.5  /  TBili  0.3  /  DBili  x   /  AST  13  /  ALT  28  /  AlkPhos  70  07-09    Urinalysis Basic - ( 10 Jul 2023 07:18 )    Color: x / Appearance: x / SG: x / pH: x  Gluc: 161 mg/dL / Ketone: x  / Bili: x / Urobili: x   Blood: x / Protein: x / Nitrite: x   Leuk Esterase: x / RBC: x / WBC x   Sq Epi: x / Non Sq Epi: x / Bacteria: x      MICROBIOLOGY:  Clean Catch Clean Catch (Midstream)  07-08-23   No growth  --  --      .Blood Blood-Peripheral  07-08-23   No growth at 24 hours  --  --      .Blood Blood-Peripheral  07-08-23   No growth at 24 hours  --  --    RADIOLOGY:  < from: [facility] Physiol Extremity Lower 3+ Level, BI (07.08.23 @ 13:45) >  IMPRESSION:  Limited study due to vessel noncompressibility and right lower extremity   stents.    Pulse volume recordings demonstrate no evidence of significant arterial   occlusive disease at either thigh, calf or ankle levels at rest. Findings   are consistent with significant disease at both foot levels. Findings are   similar compared to prior study.    < end of copied text >  < from: Xray Foot AP + Lateral, Right (07.08.23 @ 11:17) >  FINDINGS:  Status post partial amputation of the first proximal phalanx. Mild   erosive changes of the medial cortical margin which appears to extend   past the soft tissue. Calcaneal enthesophytes. No acute fracture or   dislocation. Midfoot joint arthrosis. Vascular calcifications.    IMPRESSION:  Mild erosive changes of the medial cortical margin of the first proximal   phalanx which appears to extend past the soft tissue, suspicious for   osteomyelitis.    < end of copied text >      Thai Stein MD; Division of Infectious Disease; Pager: [phone]; nights and weekends: [phone]

## 2023-07-10 NOTE — CONSULT NOTE ADULT - ASSESSMENT
Patient is a 72 year old male with a PMHx of Type II DM associated with neuropathy, HTN who was recently admitted to [facility] with Right 1st toe infection, S/P Right LE SFA to PT bypass graft with PTFE followed by Right foot Partial hallux Amputation on 04/14, and RLE angiogram 07/03 with rotational atherectomy of proximal portion of the graft in the SFA, following by MIKE that improved flow, however a proximal lesion was demonstrated at that time that was not amenable to endovascular procedures. Patient presents to [facility] due to nonhealing Right foot wound. Internal medicine has been consulted on Mr. Paredes's care for medical management. Patient denies chest pain, palpitations, SOB or dyspnea.       Peripheral Arterial Disease   - S/P R LE SFA to PT bypass graft, RLE angiogram 07/03  - On ASA 81 and [id number]   - US as noted  - Care per vascular surgery appreciated       H/O R Toe infection   - S/P Great toe amputation   - Foot Xray with Mild erosive changes concerning for OM  - Pending MR  - ABX as per ID, currently on Flagyl and Cefepime   - UCx and BCx2 w/ NGTD; F/u final   - Care per Vascular/ Podiatry appreciated   - Monitor CBC, temp curve, VS and patient closely       Type II DM   - A1C 7.9   - C/w sliding scale  - Diabetic/ DASH Diet   - Monitor glucose levels closely       Anemia   - Monitor Hgb closely   - If down-trends, recommend to check anemia work up       HTN   - C/w Norvasc 2.5, Losartan 25   - Monitor BP, VS and patient closely     Pre-Op risk stratification   - Previous TTE with EF of 6%, Mild LVH, normal RV    PPX       Patient is a 72 year old male with a PMHx of Type II DM associated with neuropathy, HTN who was recently admitted to [facility] with Right 1st toe infection, S/P Right LE SFA to PT bypass graft with PTFE followed by Right foot Partial hallux Amputation on 04/14, and RLE angiogram 07/03 with rotational atherectomy of proximal portion of the graft in the SFA, following by MIKE that improved flow, however a proximal lesion was demonstrated at that time that was not amenable to endovascular procedures. Patient presents to [facility] due to nonhealing Right foot wound. Internal medicine has been consulted on Mr. Paredes's care for medical management. Patient denies chest pain, palpitations, SOB or dyspnea.       Peripheral Arterial Disease   - S/P R LE SFA to PT bypass graft, RLE angiogram 07/03  - 07/08 Arterial Duplex negative for pseudoaneurysm   - On ASA 81 and [id number]   - US as noted  - Care per vascular surgery appreciated       H/O R Toe infection   - S/P Great toe amputation   - Foot Xray with Mild erosive changes concerning for OM  - Pending MR  - ABX as per ID, currently on Flagyl and Cefepime   - UCx and BCx2 w/ NGTD; F/u final   - Care per Vascular/ Podiatry appreciated   - Timing of OR as per podiatry   - Monitor CBC, temp curve, VS and patient closely     Type II DM   - A1C 7.9   - C/w sliding scale  - Start Lantus 6 units QHs and Pre-Meal 3 units TID; If NPO, hold pre-meal and likely reduce Lantus to half dose based on glucose levels   - Diabetic/ DASH Diet   - Monitor glucose levels closely       Anemia   - Monitor Hgb closely   - If down-trends, recommend to check anemia work up       HTN   - C/w Norvasc 2.5, Losartan 25   - Monitor BP, VS and patient closely     Pre-Op risk stratification   - Previous TTE with EF of 61%, Mild LVH, normal RV  - Patient is medically optimized for OR. Moderate Risk candidate     PPX    Discussed with Attending.    Patient is a 72 year old male with a PMHx of Type II DM associated with neuropathy, HTN who was recently admitted to [facility] with Right 1st toe infection, S/P Right LE SFA to PT bypass graft with PTFE followed by Right foot Partial hallux Amputation on 04/14, and RLE angiogram 07/03 with rotational atherectomy of proximal portion of the graft in the SFA, following by MIKE that improved flow, however a proximal lesion was demonstrated at that time that was not amenable to endovascular procedures. Patient presents to [facility] due to nonhealing Right foot wound. Internal medicine has been consulted on Mr. Paredes's care for medical management. Patient denies chest pain, palpitations, SOB or dyspnea.       Peripheral Arterial Disease   - S/P R LE SFA to PT bypass graft, RLE angiogram 07/03  - 07/08 Arterial Duplex negative for pseudoaneurysm   - On ASA 81 and [id number]   - US as noted  - Care per vascular surgery appreciated       H/O R Toe infection   - S/P Great toe amputation   - Foot Xray with Mild erosive changes concerning for OM  - Pending MR  - ABX as per ID, currently on Flagyl and Cefepime   - UCx and BCx2 w/ NGTD; F/u final   - Care per Vascular/ Podiatry appreciated   - Timing of OR as per podiatry   - Monitor CBC, temp curve, VS and patient closely     Type II DM   - A1C 7.9   - C/w sliding scale  - Start Lantus 6 units QHs and Pre-Meal 3 units TID; If NPO, hold pre-meal and likely reduce Lantus to half dose based on glucose levels   - Diabetic/ DASH Diet   - Monitor glucose levels closely       Anemia   - Monitor Hgb closely   - If down-trends, recommend to check anemia work up       HTN   - C/w Norvasc 2.5, Losartan 25   - Monitor BP, VS and patient closely     Pre-Op risk stratification   - Previous TTE with EF of 61%, Mild LVH, normal RV  - Patient is medically optimized for OR. Moderate Risk candidate     DVT and GI PPX

## 2023-07-10 NOTE — PROGRESS NOTE ADULT - SUBJECTIVE AND OBJECTIVE BOX
SURGERY DAILY PROGRESS NOTE    STATUS POST:      SUBJECTIVE: Pt seen and examined at bedside. No acute events overnight.       OBJECTIVE:  Vital Signs Last 24 Hrs  T(C): 36.8 (10 Jul 2023 05:12), Max: 37.5 (09 Jul 2023 13:20)  T(F): 98.2 (10 Jul 2023 05:12), Max: 99.5 (09 Jul 2023 13:20)  HR: 89 (10 Jul 2023 05:12) (77 - 99)  BP: 130/73 (10 Jul 2023 05:12) (100/60 - 165/80)  BP(mean): --  RR: 18 (10 Jul 2023 05:12) (18 - 18)  SpO2: 97% (10 Jul 2023 05:12) (96% - 99%)    Parameters below as of 10 Jul 2023 05:12  Patient On (Oxygen Delivery Method): room air        I&O's Summary    09 Jul 2023 07:01  -  10 Jul 2023 07:00  --------------------------------------------------------  IN: 720 mL / OUT: 350 mL / NET: 370 mL        Physical Exam:  General Appearance: Appears well, NAD, A& O x 3  Chest: Equal expansion bilaterally, NAD  Extremities: Dressing to Rt foot C/D/I. +palpable DP/PT pulses. Extremity warm and well perfused.     LABS:                        9.1    7.34  )-----------( 257      ( 10 Jul 2023 07:19 )             27.9     07-10    134<L>  |  101  |  20  ----------------------------<  161<H>  4.1   |  24  |  1.00    Ca    9.2      10 Jul 2023 07:18  Phos  3.3     07-10  Mg     2.2     07-10    TPro  7.6  /  Alb  3.5  /  TBili  0.3  /  DBili  x   /  AST  13  /  ALT  28  /  AlkPhos  70  07-09    PT/INR - ( 08 Jul 2023 11:05 )   PT: 12.3 sec;   INR: 1.07 ratio         PTT - ( 08 Jul 2023 11:05 )  PTT:29.9 sec  Urinalysis Basic - ( 10 Jul 2023 07:18 )    Color: x / Appearance: x / SG: x / pH: x  Gluc: 161 mg/dL / Ketone: x  / Bili: x / Urobili: x   Blood: x / Protein: x / Nitrite: x   Leuk Esterase: x / RBC: x / WBC x   Sq Epi: x / Non Sq Epi: x / Bacteria: x        RADIOLOGY & ADDITIONAL STUDIES:

## 2023-07-10 NOTE — PROGRESS NOTE ADULT - SUBJECTIVE AND OBJECTIVE BOX
ALEE DUNN, MRN [id number], 72ymale      Patient is a 72y old  Male who presents with a chief complaint of      INTERVAL HPI/OVERNIGHT EVENTS:  Patient seen and evaluated at bedside.  Pt is resting comfortable in NAD. Denies N/V/F/C.    Allergies    No Known Allergies    Intolerances        Vital Signs Last 24 Hrs  T(C): 38.3 (10 Jul 2023 14:10), Max: 38.3 (10 Jul 2023 14:10)  T(F): 101 (10 Jul 2023 14:10), Max: 101 (10 Jul 2023 14:10)  HR: 83 (10 Jul 2023 13:25) (83 - 99)  BP: 138/70 (10 Jul 2023 13:25) (125/67 - 165/80)  BP(mean): --  RR: 18 (10 Jul 2023 13:25) (18 - 18)  SpO2: 98% (10 Jul 2023 13:25) (96% - 98%)    Parameters below as of 10 Jul 2023 13:25  Patient On (Oxygen Delivery Method): room air        LABS:                        9.1    7.34  )-----------( 257      ( 10 Jul 2023 07:19 )             27.9     07-10    134<L>  |  101  |  20  ----------------------------<  161<H>  4.1   |  24  |  1.00    Ca    9.2      10 Jul 2023 07:18  Phos  3.3     07-10  Mg     2.2     07-10    TPro  7.6  /  Alb  3.5  /  TBili  0.3  /  DBili  x   /  AST  13  /  ALT  28  /  AlkPhos  70  07-09      Urinalysis Basic - ( 10 Jul 2023 07:18 )    Color: x / Appearance: x / SG: x / pH: x  Gluc: 161 mg/dL / Ketone: x  / Bili: x / Urobili: x   Blood: x / Protein: x / Nitrite: x   Leuk Esterase: x / RBC: x / WBC x   Sq Epi: x / Non Sq Epi: x / Bacteria: x      CAPILLARY BLOOD GLUCOSE      POCT Blood Glucose.: 302 mg/dL (10 Jul 2023 12:13)  POCT Blood Glucose.: 179 mg/dL (10 Jul 2023 08:58)  POCT Blood Glucose.: 216 mg/dL (09 Jul 2023 21:20)  POCT Blood Glucose.: 194 mg/dL (09 Jul 2023 17:37)      LOWER EXTREMITY PHYSICAL EXAM:  Vascular: DP/PT 2/4, B/L, CFT <3 seconds B/L x9, Temperature gradient warm to cold, right, and warm to cool, left.  Neuro: Epicritic sensation decreased to the level of digits, B/L.  Musculoskeletal/Ortho: 4/14 s/p right foot partial hallux amputation  Skin: right foot 1st metatarsal wound to bone, proximal phalanx exposed, ischemic changes noted periwound, no drainage, no erythema no malodor, no signs of infection. left foot with no open wounds or signs of infection.           RADIOLOGY & ADDITIONAL TESTS:\    ACC: [id number] EXAM:  XR FOOT 2 VIEWS RT   ORDERED BY:  NADINE GUEVARA     PROCEDURE DATE:  07/08/2023          INTERPRETATION:  XR FOOT 2 VIEWS RIGHT    CLINICAL INDICATION: foot pain    TECHNIQUE: 2 views the right foot.    COMPARISON: Right foot radiographs from 4/14/2023.    FINDINGS:  Status post partial amputation of the first proximal phalanx. Mild   erosive changes of the medial cortical margin which appears to extend   past the soft tissue. Calcaneal enthesophytes. No acute fracture or   dislocation. Midfoot joint arthrosis. Vascular calcifications.    IMPRESSION:  Mild erosive changes of the medial cortical margin of the first proximal   phalanx which appears to extend past the soft tissue, suspicious for   osteomyelitis.    --- Endof Report ---           OLIVIA LARSON MD; Resident Radiologist  This document has been electronically signed.  SRIDEVI CASTANO MD; Attending Radiologist  This document has been electronically signed. Jul 8 2023 12:03PM      ACC: [id number] EXAM:  PHYSIOL EXTREM LOW 3+ LEV BI   ORDERED BY:  JADA SUÁREZ     PROCEDURE DATE:  07/08/2023          INTERPRETATION:  CLINICAL HISTORY: Right cold foot. History of diabetes,   hypertension and right superficial femoral and popliteal artery stents.   Status post right first digit amputation. Assess for arterial occlusive   disease.    COMPARISON: 3/30/2023.    BRACHIAL INDICES: The right ankle-brachial index was not due to   underlying stents and vessel noncompressibility.The left ankle-brachial   index equals 1.23 and left toe brachial index equals 0.65.    SEGMENTAL BLOOD PRESSURES IN THE RIGHT LOWER EXTREMITY ARE AS FOLLOWS:   162 at the calf, greater than 250 at the posterior tibial artery, and   greater than 250 at the dorsalis pedis. The segmental pressures of the   upper and lower thigh were not obtained due to underlying stents.  SEGMENTAL BLOOD PRESSURES IN THE LEFT LOWER EXTREMITY ARE AS FOLLOWS:   Greater than 250 at the upper thigh, lower thigh, calf, and posterior   tibial artery, 195 at the dorsalis pedis, and 103 at the great toe.    The amplitude of the pulse volume recordings bilaterally are normal at   the thigh, calf and ankle levels. Decreased flow is seen at both   metatarsal levels, rightworse than left. Decreased flow seen at the left   great toe level.    IMPRESSION:  Limited study due to vessel noncompressibility and right lower extremity   stents.    Pulse volume recordings demonstrate no evidence of significant arterial   occlusive disease at either thigh, calf or ankle levels at rest. Findings   are consistent with significant disease at both foot levels. Findings are   similar compared to prior study.    --- End of Report ---           SIMONA ROCHA MD; Resident Radiologist  This document has been electronically signed.  RAQUEL GROSS MD; Attending Radiologist  This document has been electronically signed. Jul 8 2023  2:28PM

## 2023-07-11 LAB
ANION GAP SERPL CALC-SCNC: 11 MMOL/L — SIGNIFICANT CHANGE UP (ref 5–17)
BLD GP AB SCN SERPL QL: POSITIVE — SIGNIFICANT CHANGE UP
BUN SERPL-MCNC: 23 MG/DL — SIGNIFICANT CHANGE UP (ref 7–23)
CALCIUM SERPL-MCNC: 9.3 MG/DL — SIGNIFICANT CHANGE UP (ref 8.4–10.5)
CHLORIDE SERPL-SCNC: 103 MMOL/L — SIGNIFICANT CHANGE UP (ref 96–108)
CO2 SERPL-SCNC: 21 MMOL/L — LOW (ref 22–31)
CREAT SERPL-MCNC: 1 MG/DL — SIGNIFICANT CHANGE UP (ref 0.5–1.3)
EGFR: 80 ML/MIN/1.73M2 — SIGNIFICANT CHANGE UP
GLUCOSE BLDC GLUCOMTR-MCNC: 182 MG/DL — HIGH (ref 70–99)
GLUCOSE BLDC GLUCOMTR-MCNC: 183 MG/DL — HIGH (ref 70–99)
GLUCOSE BLDC GLUCOMTR-MCNC: 244 MG/DL — HIGH (ref 70–99)
GLUCOSE BLDC GLUCOMTR-MCNC: 260 MG/DL — HIGH (ref 70–99)
GLUCOSE SERPL-MCNC: 184 MG/DL — HIGH (ref 70–99)
HCT VFR BLD CALC: 27.9 % — LOW (ref 39–50)
HGB BLD-MCNC: 9 G/DL — LOW (ref 13–17)
MAGNESIUM SERPL-MCNC: 2.4 MG/DL — SIGNIFICANT CHANGE UP (ref 1.6–2.6)
MCHC RBC-ENTMCNC: 28.1 PG — SIGNIFICANT CHANGE UP (ref 27–34)
MCHC RBC-ENTMCNC: 32.3 GM/DL — SIGNIFICANT CHANGE UP (ref 32–36)
MCV RBC AUTO: 87.2 FL — SIGNIFICANT CHANGE UP (ref 80–100)
NRBC # BLD: 0 /100 WBCS — SIGNIFICANT CHANGE UP (ref 0–0)
PHOSPHATE SERPL-MCNC: 3.3 MG/DL — SIGNIFICANT CHANGE UP (ref 2.5–4.5)
PLATELET # BLD AUTO: 272 K/UL — SIGNIFICANT CHANGE UP (ref 150–400)
POTASSIUM SERPL-MCNC: 3.9 MMOL/L — SIGNIFICANT CHANGE UP (ref 3.5–5.3)
POTASSIUM SERPL-SCNC: 3.9 MMOL/L — SIGNIFICANT CHANGE UP (ref 3.5–5.3)
RBC # BLD: 3.2 M/UL — LOW (ref 4.2–5.8)
RBC # FLD: 12.5 % — SIGNIFICANT CHANGE UP (ref 10.3–14.5)
RH IG SCN BLD-IMP: POSITIVE — SIGNIFICANT CHANGE UP
SODIUM SERPL-SCNC: 135 MMOL/L — SIGNIFICANT CHANGE UP (ref 135–145)
WBC # BLD: 10.06 K/UL — SIGNIFICANT CHANGE UP (ref 3.8–10.5)
WBC # FLD AUTO: 10.06 K/UL — SIGNIFICANT CHANGE UP (ref 3.8–10.5)

## 2023-07-11 PROCEDURE — 86077 PHYS BLOOD BANK SERV XMATCH: CPT

## 2023-07-11 PROCEDURE — 99232 SBSQ HOSP IP/OBS MODERATE 35: CPT

## 2023-07-11 RX ORDER — INSULIN LISPRO 100/ML
VIAL (ML) SUBCUTANEOUS EVERY 6 HOURS
Refills: 0 | Status: DISCONTINUED | OUTPATIENT
Start: 2023-07-11 | End: 2023-07-11

## 2023-07-11 RX ORDER — INSULIN LISPRO 100/ML
VIAL (ML) SUBCUTANEOUS AT BEDTIME
Refills: 0 | Status: DISCONTINUED | OUTPATIENT
Start: 2023-07-11 | End: 2023-07-11

## 2023-07-11 RX ORDER — POTASSIUM CHLORIDE 20 MEQ
40 PACKET (EA) ORAL ONCE
Refills: 0 | Status: COMPLETED | OUTPATIENT
Start: 2023-07-11 | End: 2023-07-11

## 2023-07-11 RX ORDER — SODIUM CHLORIDE 9 MG/ML
1000 INJECTION, SOLUTION INTRAVENOUS
Refills: 0 | Status: DISCONTINUED | OUTPATIENT
Start: 2023-07-11 | End: 2023-07-13

## 2023-07-11 RX ORDER — POLYETHYLENE GLYCOL 3350 17 G/17G
17 POWDER, FOR SOLUTION ORAL ONCE
Refills: 0 | Status: COMPLETED | OUTPATIENT
Start: 2023-07-11 | End: 2023-07-11

## 2023-07-11 RX ADMIN — Medication 3 UNIT(S): at 18:09

## 2023-07-11 RX ADMIN — Medication 650 MILLIGRAM(S): at 21:56

## 2023-07-11 RX ADMIN — ENOXAPARIN SODIUM 40 MILLIGRAM(S): 100 INJECTION SUBCUTANEOUS at 18:08

## 2023-07-11 RX ADMIN — Medication 1: at 09:45

## 2023-07-11 RX ADMIN — ATORVASTATIN CALCIUM 40 MILLIGRAM(S): 80 TABLET, FILM COATED ORAL at 21:55

## 2023-07-11 RX ADMIN — CEFEPIME 100 MILLIGRAM(S): 1 INJECTION, POWDER, FOR SOLUTION INTRAMUSCULAR; INTRAVENOUS at 05:32

## 2023-07-11 RX ADMIN — Medication 1: at 21:56

## 2023-07-11 RX ADMIN — Medication 40 MILLIEQUIVALENT(S): at 11:28

## 2023-07-11 RX ADMIN — LOSARTAN POTASSIUM 25 MILLIGRAM(S): 100 TABLET, FILM COATED ORAL at 05:32

## 2023-07-11 RX ADMIN — Medication 650 MILLIGRAM(S): at 22:43

## 2023-07-11 RX ADMIN — Medication 2: at 13:40

## 2023-07-11 RX ADMIN — GABAPENTIN 100 MILLIGRAM(S): 400 CAPSULE ORAL at 05:33

## 2023-07-11 RX ADMIN — POLYETHYLENE GLYCOL 3350 17 GRAM(S): 17 POWDER, FOR SOLUTION ORAL at 21:55

## 2023-07-11 RX ADMIN — AMLODIPINE BESYLATE 2.5 MILLIGRAM(S): 2.5 TABLET ORAL at 05:32

## 2023-07-11 RX ADMIN — GABAPENTIN 100 MILLIGRAM(S): 400 CAPSULE ORAL at 21:55

## 2023-07-11 RX ADMIN — GABAPENTIN 100 MILLIGRAM(S): 400 CAPSULE ORAL at 13:33

## 2023-07-11 RX ADMIN — Medication 1: at 18:09

## 2023-07-11 RX ADMIN — Medication 100 MILLIGRAM(S): at 18:06

## 2023-07-11 RX ADMIN — Medication 650 MILLIGRAM(S): at 15:19

## 2023-07-11 RX ADMIN — INSULIN GLARGINE 6 UNIT(S): 100 INJECTION, SOLUTION SUBCUTANEOUS at 21:56

## 2023-07-11 RX ADMIN — CEFEPIME 100 MILLIGRAM(S): 1 INJECTION, POWDER, FOR SOLUTION INTRAMUSCULAR; INTRAVENOUS at 18:05

## 2023-07-11 RX ADMIN — Medication 3 UNIT(S): at 13:41

## 2023-07-11 RX ADMIN — Medication 3 UNIT(S): at 09:45

## 2023-07-11 RX ADMIN — Medication 100 MILLIGRAM(S): at 05:32

## 2023-07-11 RX ADMIN — Medication 81 MILLIGRAM(S): at 11:28

## 2023-07-11 NOTE — PROGRESS NOTE ADULT - SUBJECTIVE AND OBJECTIVE BOX
Patient is a 72y old  Male who presents with a chief complaint of foot wound (10 Jul 2023 15:49)       INTERVAL HPI/OVERNIGHT EVENTS:  Patient seen and evaluated at bedside.  Pt is resting comfortable in NAD. Denies N/V/F/C.    Allergies    No Known Allergies    Intolerances        Vital Signs Last 24 Hrs  T(C): 37.5 (11 Jul 2023 09:21), Max: 38.3 (10 Jul 2023 14:10)  T(F): 99.5 (11 Jul 2023 09:21), Max: 101 (10 Jul 2023 14:10)  HR: 88 (11 Jul 2023 09:21) (83 - 97)  BP: 147/67 (11 Jul 2023 09:21) (96/56 - 148/74)  BP(mean): --  RR: 18 (11 Jul 2023 09:21) (18 - 18)  SpO2: 93% (11 Jul 2023 09:21) (93% - 98%)    Parameters below as of 11 Jul 2023 09:21  Patient On (Oxygen Delivery Method): room air        LABS:                        9.0    10.06 )-----------( 272      ( 11 Jul 2023 06:44 )             27.9     07-11    135  |  103  |  23  ----------------------------<  184<H>  3.9   |  21<L>  |  1.00    Ca    9.3      11 Jul 2023 06:44  Phos  3.3     07-11  Mg     2.4     07-11        Urinalysis Basic - ( 11 Jul 2023 06:44 )    Color: x / Appearance: x / SG: x / pH: x  Gluc: 184 mg/dL / Ketone: x  / Bili: x / Urobili: x   Blood: x / Protein: x / Nitrite: x   Leuk Esterase: x / RBC: x / WBC x   Sq Epi: x / Non Sq Epi: x / Bacteria: x      CAPILLARY BLOOD GLUCOSE      POCT Blood Glucose.: 182 mg/dL (11 Jul 2023 09:23)  POCT Blood Glucose.: 183 mg/dL (10 Jul 2023 22:33)  POCT Blood Glucose.: 269 mg/dL (10 Jul 2023 17:26)  POCT Blood Glucose.: 302 mg/dL (10 Jul 2023 12:13)        LOWER EXTREMITY PHYSICAL EXAM:  Vascular: DP/PT 2/4, B/L, CFT <3 seconds B/L x9, Temperature gradient warm to cold, right, and warm to cool, left.  Neuro: Epicritic sensation decreased to the level of digits, B/L.  Musculoskeletal/Ortho: 4/14 s/p right foot partial hallux amputation  Skin: right foot 1st metatarsal wound to bone, proximal phalanx exposed, ischemic changes noted periwound, no drainage, no erythema no malodor, no signs of infection. left foot with no open wounds or signs of infection.           RADIOLOGY & ADDITIONAL TESTS:\par  ACC: [id number] EXAM:  XR FOOT 2 VIEWS RT   ORDERED BY:  NADINE GUEVARA     PROCEDURE DATE:  07/08/2023          INTERPRETATION:  XR FOOT 2 VIEWS RIGHT    CLINICAL INDICATION: foot pain    TECHNIQUE: 2 views the right foot.    COMPARISON: Right foot radiographs from 4/14/2023.    FINDINGS:  Status post partial amputation of the first proximal phalanx. Mild   erosive changes of the medial cortical margin which appears to extend   past the soft tissue. Calcaneal enthesophytes. No acute fracture or   dislocation. Midfoot joint arthrosis. Vascular calcifications.    IMPRESSION:  Mild erosive changes of the medial cortical margin of the first proximal   phalanx which appears to extend past the soft tissue, suspicious for   osteomyelitis.    --- Endof Report ---           OLIVIA LARSON MD; Resident Radiologist  This document has been electronically signed.  SRIDEVI CASTANO MD; Attending Radiologist  This document has been electronically signed. Jul 8 2023 12:03PM      ACC: [id number] EXAM:  PHYSIOL EXTREM LOW 3+ LEV BI   ORDERED BY:  JADA SUÁREZ     PROCEDURE DATE:  07/08/2023          INTERPRETATION:  CLINICAL HISTORY: Right cold foot. History of diabetes,   hypertension and right superficial femoral and popliteal artery stents.   Status post right first digit amputation. Assess for arterial occlusive   disease.    COMPARISON: 3/30/2023.    BRACHIAL INDICES: The right ankle-brachial index was not due to   underlying stents and vessel noncompressibility.The left ankle-brachial   index equals 1.23 and left toe brachial index equals 0.65.    SEGMENTAL BLOOD PRESSURES IN THE RIGHT LOWER EXTREMITY ARE AS FOLLOWS:   162 at the calf, greater than 250 at the posterior tibial artery, and   greater than 250 at the dorsalis pedis. The segmental pressures of the   upper and lower thigh were not obtained due to underlying stents.  SEGMENTAL BLOOD PRESSURES IN THE LEFT LOWER EXTREMITY ARE AS FOLLOWS:   Greater than 250 at the upper thigh, lower thigh, calf, and posterior   tibial artery, 195 at the dorsalis pedis, and 103 at the great toe.    The amplitude of the pulse volume recordings bilaterally are normal at   the thigh, calf and ankle levels. Decreased flow is seen at both   metatarsal levels, rightworse than left. Decreased flow seen at the left   great toe level.    IMPRESSION:  Limited study due to vessel noncompressibility and right lower extremity   stents.    Pulse volume recordings demonstrate no evidence of significant arterial   occlusive disease at either thigh, calf or ankle levels at rest. Findings   are consistent with significant disease at both foot levels. Findings are   similar compared to prior study.    --- End of Report ---           SIMONA ROCHA MD; Resident Radiologist  This document has been electronically signed.  RAQUEL GROSS MD; Attending Radiologist  This document has been electronically signed. Jul 8 2023  2:28PM

## 2023-07-11 NOTE — PROGRESS NOTE ADULT - ASSESSMENT
72M with h/o T2DM (A1c=8.4) with neuropathy, HTN recently admitted 3/30 with R 1st toe infection, s/p 4/10 s/p RLE SFA to PT bypass graft with PTFE followed by R foot Partial hallux Amputation 4/14, and RLE angiogram 7/3 with rotational athrectomy of proximal portion of the graft in the SFA, following by MIKE that improved flow, however a proximal lesion was demonstrated at that time that was not amenable to endovascular procedures, now presenting with nonhealing R hallux wound.    Plan:     - Appreciated ID for abx recs 7/9 - D/c'd vanco and zosyn, add Cefepime + Flagyl         -patient was febrile yesterday, UA negative, RVP negative, CXR normal. F/u blood cultures.   - LLE arterial duplex to r/o L groin PSA (done 7/8) - no evidence of pseudoaneurysm   - Medicine following and recs appreciated.   -Podiatry care and recs appreciated, plan for OR 7/12   - Will f/u AM labs  - Regular diet  - Continue home meds   -PT eval s/p OR  Dispo: Pending       Vascular Surgery   [id number]

## 2023-07-11 NOTE — PROGRESS NOTE ADULT - ASSESSMENT
73 y/o M 4/14 s/p right foot partial hallux amputation, closed  - Pt was seen and evaluated.   - T(F): 99.5 , WBC 10.06  - R foot proximal phalanx exposed, full thickness dry gangrene surrounding, ischemic changes to the level of MPJs, no drainage, no erythema no malodor, no signs of infection. L foot with no open wounds or signs of infection.   - R foot Xray: hallux proximal phalanx medial cortex OM  - Pt is s/p RLE SFA to PT bypass graft with PT signal w/ Dr. Mullins on 4/10, then s/p RLE Angiogram w/ intervention w/ Dr. Mullins on 7/3, which was noted pt may not a bypass candidate   - Cont IV abx   - Right foot MR: no OM   - ABIPVR: RABI non compressive, LABI 1.23, LTBI 0.65, significant disease at both foot level  - Pod plan for Right hallux hallux amputation vs transmertaWednesday 7/12 w/ Dr. Mcclure pending [facility] recommendations / R foot MR  - Please document Medical and Cardiac optimization for anesthesia for Podiatry surgery.   - Seen with attending  73 y/o M 4/14 s/p right foot partial hallux amputation, closed  - Pt was seen and evaluated.   - T(F): 99.5 , WBC 10.06  - R foot proximal phalanx exposed, full thickness dry gangrene surrounding, ischemic changes to the level of MPJs, no drainage, no erythema no malodor, no signs of infection. L foot with no open wounds or signs of infection.   - R foot Xray: hallux proximal phalanx medial cortex OM  - Pt is s/p RLE SFA to PT bypass graft with PT signal w/ Dr. Mullins on 4/10, then s/p RLE Angiogram w/ intervention w/ Dr. Mullins on 7/3, which was noted pt may not a bypass candidate   - Cont IV abx   - Right foot MR: no OM   - ABIPVR: RABI non compressive, LABI 1.23, LTBI 0.65, significant disease at both foot level  - Pod plan for Right hallux hallux amputation vs transmetatarsal amputation Wednesday 7/12 w/ Dr. Mcclure   - Medical and cardiac optimization appreciated   - NPO after midnight  - CBC, BMP, Coags, and Type and screen ordered for AM   - Discussed with attending   71 y/o M 4/14 s/p right foot partial hallux amputation, closed  - Pt was seen and evaluated.   - T(F): 99.5 , WBC 10.06  - R foot proximal phalanx exposed, full thickness dry gangrene surrounding, ischemic changes to the level of MPJs, no drainage, no erythema no malodor, no signs of infection. L foot with no open wounds or signs of infection.   - R foot Xray: hallux proximal phalanx medial cortex OM  - Pt is s/p RLE SFA to PT bypass graft with PT signal w/ Dr. Mullins on 4/10, then s/p RLE Angiogram w/ intervention w/ Dr. Mullins on 7/3, which was noted pt may not a bypass candidate   - Cont IV abx   - Right foot MR: no OM   - ABIPVR: RABI non compressive, LABI 1.23, LTBI 0.65, significant disease at both foot level  - After extensive discussion with patient and vascular, pt has opted to follow through with a Right lower extremity proximal amputation   - Vascular recommendations, apprecaited  - Seen with attending  71 y/o M 4/14 s/p right foot partial hallux amputation, closed  - Pt was seen and evaluated.   - T(F): 99.5 , WBC 10.06  - R foot proximal phalanx exposed, full thickness dry gangrene surrounding, ischemic changes to the level of MPJs, no drainage, no erythema no malodor, no signs of infection. L foot with no open wounds or signs of infection.   - R foot Xray: hallux proximal phalanx medial cortex OM  - Pt is s/p RLE SFA to PT bypass graft with PT signal w/ Dr. Mullins on 4/10, then s/p RLE Angiogram w/ intervention w/ Dr. Mullins on 7/3, which was noted pt may not a bypass candidate   - Cont IV abx   - Right foot MR: no OM   - ABIPVR: RABI non compressive, LABI 1.23, LTBI 0.65, significant disease at both foot level  - After extensive discussion with patient and vascular, pt has opted to follow through with a Right lower extremity proximal amputation   - Vascular recommendations, appreciated  - Pod plan local wound care pending vascular recommendations   - Seen with attending

## 2023-07-11 NOTE — PROGRESS NOTE ADULT - SUBJECTIVE AND OBJECTIVE BOX
Name of Patient : ALEE DUNN  MRN: [id number]  Date of visit: 07-11-23 @ 12:08      Subjective: Patient seen and examined. No new events except as noted.   Patient seen earlier this AM. Sitting OOB TC. S/P MR yesterday.     REVIEW OF SYSTEMS:    CONSTITUTIONAL: Generalized weakness   EYES/ENT: No visual changes;  No vertigo or throat pain   NECK: No pain or stiffness  RESPIRATORY: No cough, wheezing, hemoptysis; No shortness of breath  CARDIOVASCULAR: No chest pain or palpitations  GASTROINTESTINAL: No abdominal or epigastric pain. No nausea, vomiting, or hematemesis; No diarrhea or constipation. No melena or hematochezia.  GENITOURINARY: No dysuria, frequency or hematuria  NEUROLOGICAL: No numbness or weakness  SKIN: No itching, burning, rashes, or lesions   MSK: R great toe amputation; RLE toe wound   All other review of systems is negative unless indicated above.    MEDICATIONS:  MEDICATIONS  (STANDING):  amLODIPine   Tablet 2.5 milliGRAM(s) Oral daily  aspirin enteric coated 81 milliGRAM(s) Oral daily  atorvastatin 40 milliGRAM(s) Oral at bedtime  cefepime   IVPB 1000 milliGRAM(s) IV Intermittent every 12 hours  dextrose 5%. 1000 milliLiter(s) (50 mL/Hr) IV Continuous <Continuous>  dextrose 5%. 1000 milliLiter(s) (100 mL/Hr) IV Continuous <Continuous>  dextrose 50% Injectable 12.5 Gram(s) IV Push once  dextrose 50% Injectable 25 Gram(s) IV Push once  dextrose 50% Injectable 25 Gram(s) IV Push once  enoxaparin Injectable 40 milliGRAM(s) SubCutaneous every 24 hours  gabapentin 100 milliGRAM(s) Oral every 8 hours  glucagon  Injectable 1 milliGRAM(s) IntraMuscular once  insulin glargine Injectable (LANTUS) 6 Unit(s) SubCutaneous at bedtime  insulin lispro (ADMELOG) corrective regimen sliding scale   SubCutaneous three times a day before meals  insulin lispro (ADMELOG) corrective regimen sliding scale   SubCutaneous at bedtime  insulin lispro Injectable (ADMELOG) 3 Unit(s) SubCutaneous three times a day before meals  losartan 25 milliGRAM(s) Oral daily  metroNIDAZOLE  IVPB 500 milliGRAM(s) IV Intermittent every 12 hours      PHYSICAL EXAM:  T(C): 37.5 (07-11-23 @ 09:21), Max: 38.3 (07-10-23 @ 14:10)  HR: 88 (07-11-23 @ 09:21) (83 - 97)  BP: 147/67 (07-11-23 @ 09:21) (96/56 - 148/74)  RR: 18 (07-11-23 @ 09:21) (18 - 18)  SpO2: 93% (07-11-23 @ 09:21) (93% - 98%)  Wt(kg): --  I&O's Summary    10 Jul 2023 07:01  -  11 Jul 2023 07:00  --------------------------------------------------------  IN: 840 mL / OUT: 200 mL / NET: 640 mL    11 Jul 2023 07:01  -  11 Jul 2023 12:08  --------------------------------------------------------  IN: 360 mL / OUT: 150 mL / NET: 210 mL          Appearance: Normal; OOB TC 	  HEENT:  Eyes are open   Lymphatic: No lymphadenopathy   Cardiovascular: Normal S1 S2, no JVD  Respiratory: normal effort , clear  Gastrointestinal:  Soft, Non-tender  Skin: No rashes,  warm to touch  Psychiatry:  Mood & affect appropriate  Musculoskeletal:  RLE great toe amputation, in dressing       07-10-23 @ 07:01  -  07-11-23 @ 07:00  --------------------------------------------------------  IN: 840 mL / OUT: 200 mL / NET: 640 mL    07-11-23 @ 07:01  -  07-11-23 @ 12:08  --------------------------------------------------------  IN: 360 mL / OUT: 150 mL / NET: 210 mL                                9.0    10.06 )-----------( 272      ( 11 Jul 2023 06:44 )             27.9               07-11    135  |  103  |  23  ----------------------------<  184<H>  3.9   |  21<L>  |  1.00    Ca    9.3      11 Jul 2023 06:44  Phos  3.3     07-11  Mg     2.4     07-11                         Urinalysis Basic - ( 11 Jul 2023 06:44 )    Color: x / Appearance: x / SG: x / pH: x  Gluc: 184 mg/dL / Ketone: x  / Bili: x / Urobili: x   Blood: x / Protein: x / Nitrite: x   Leuk Esterase: x / RBC: x / WBC x   Sq Epi: x / Non Sq Epi: x / Bacteria: x      Culture - Urine (07.08.23 @ 11:56)   Specimen Source: Clean Catch Clean Catch (Midstream)  Culture Results: No growth    Culture - Blood (07.08.23 @ 11:25)   Specimen Source: .Blood Blood-Peripheral  Culture Results: No growth at 48 Hours    Culture - Blood (07.08.23 @ 11:15)   Specimen Source: .Blood Blood-Peripheral  Culture Results: No growth at 48 Hours      < from: MR Foot w/wo IV Cont, Right (07.10.23 @ 22:26) >      IMPRESSION:  Prior amputation of thefirst digit at the level of the base of the   proximal phalanx. Diminutive overlying soft tissues, without MRI evidence   of osteomyelitis. No abscess.    --- End of Report ---    < end of copied text >

## 2023-07-11 NOTE — CONSULT NOTE ADULT - SUBJECTIVE AND OBJECTIVE BOX
DATE OF SERVICE: 07-12-23 @ 00:18    CHIEF COMPLAINT:Patient is a 72y old  Male who presents with a chief complaint of foot wound (10 Jul 2023 15:49)      HISTORY OF PRESENT ILLNESS:HPI:  72M with h/o T2DM (A1c=8.4) with neuropathy, HTN recently admitted 3/30 with R 1st toe infection, s/p 4/10 s/p RLE SFA to PT bypass graft with PTFE followed by R foot Partial hallux Amputation 4/14, and RLE angiogram 7/3 with rotational athrectomy of proximal portion of the graft in the SFA, following by MIKE that improved flow, however a proximal lesion was demonstrated at that time that was not amenable to endovascular procedures. He now presents with nonhealing R foot wound. Denies fevers and chills. 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 650 milliGRAM(s) Oral every 6 hours PRN  acetaminophen     Tablet .. 650 milliGRAM(s) Oral every 6 hours PRN  gabapentin 100 milliGRAM(s) Oral every 8 hours      atorvastatin 40 milliGRAM(s) Oral at bedtime  dextrose 50% Injectable 12.5 Gram(s) IV Push once  dextrose 50% Injectable 25 Gram(s) IV Push once  dextrose 50% Injectable 25 Gram(s) IV Push once  dextrose Oral Gel 15 Gram(s) Oral once PRN  glucagon  Injectable 1 milliGRAM(s) IntraMuscular once  insulin glargine Injectable (LANTUS) 6 Unit(s) SubCutaneous at bedtime  insulin lispro (ADMELOG) corrective regimen sliding scale   SubCutaneous three times a day before meals  insulin lispro (ADMELOG) corrective regimen sliding scale   SubCutaneous at bedtime  insulin lispro Injectable (ADMELOG) 3 Unit(s) SubCutaneous three times a day before meals    dextrose 5%. 1000 milliLiter(s) IV Continuous <Continuous>  dextrose 5%. 1000 milliLiter(s) IV Continuous <Continuous>  lactated ringers. 1000 milliLiter(s) IV Continuous <Continuous>      FAMILY HISTORY:      Non-contributory    SOCIAL HISTORY:    [ ] Tobacco  [ ] Drugs  [ ] Alcohol    Allergies    No Known Allergies    Intolerances    	    REVIEW OF SYSTEMS:  CONSTITUTIONAL: No fever  EYES: No eye pain, visual disturbances, or discharge  ENMT:  No difficulty hearing, tinnitus  NECK: No pain or stiffness  RESPIRATORY: No cough, wheezing,  CARDIOVASCULAR: No chest pain, palpitations, passing out, dizziness, or leg swelling  GASTROINTESTINAL:  No nausea, vomiting, diarrhea or constipation. No melena.  GENITOURINARY: No dysuria, hematuria  NEUROLOGICAL: No stroke like symptoms  SKIN: No burning or lesions   ENDOCRINE: No heat or cold intolerance  MUSCULOSKELETAL: No joint pain or swelling  PSYCHIATRIC: No  anxiety, mood swings  HEME/LYMPH: No bleeding gums  ALLERGY AND IMMUNOLOGIC: No hives or eczema	    All other ROS negative    PHYSICAL EXAM:  T(C): 37.5 (07-11-23 @ 22:30), Max: 38.6 (07-11-23 @ 21:29)  HR: 95 (07-11-23 @ 21:29) (85 - 95)  BP: 157/76 (07-11-23 @ 21:29) (123/66 - 170/76)  RR: 18 (07-11-23 @ 21:29) (18 - 18)  SpO2: 97% (07-11-23 @ 21:29) (93% - 98%)  Wt(kg): --  I&O's Summary    10 Jul 2023 07:01  -  11 Jul 2023 07:00  --------------------------------------------------------  IN: 840 mL / OUT: 200 mL / NET: 640 mL    11 Jul 2023 07:01  -  12 Jul 2023 00:18  --------------------------------------------------------  IN: 840 mL / OUT: 150 mL / NET: 690 mL        Appearance: Normal	  HEENT:   Normal oral mucosa, EOMI	  Cardiovascular:  S1 S2, No JVD,    Respiratory: Lungs clear to auscultation	  Psychiatry: Alert  Gastrointestinal:  Soft, Non-tender, + BS	  Skin: No rashes   Neurologic: Non-focal  Extremities:  No edema  Vascular: Peripheral pulses palpable    	    	  	  CARDIAC MARKERS:  Labs personally reviewed by me                                  9.0    10.06 )-----------( 272      ( 11 Jul 2023 06:44 )             27.9     07-11    135  |  103  |  23  ----------------------------<  184<H>  3.9   |  21<L>  |  1.00    Ca    9.3      11 Jul 2023 06:44  Phos  3.3     07-11  Mg     2.4     07-11            EKG: Personally reviewed by me - NSR  Radiology: Personally reviewed by me - CXR clear lungs      Assessment /Plan:     ASSESSMENT/PLAN: 	  Patient is a 72 year old male with a PMHx of HTN, Type II DM associated with neuropathy whore presents to [facility] with a chief complaint of pain, blistering and fluid drainage on his right foot.    Problem/Plan -1  Problem: Cardiac Risk Stratification for podiatry surgery  - Denies CP or SOB  - TTE shows preserved EF, mild LVH  - Not in decompensated HF  - No] tachy supa arrhythmia  -s/p cath with nonobstructive moderate CAD  - Elevated risk for low-mod risk pods surgery procedure, no contraindication to proceed     Problem/Plan -2  Problem: Hypertension  - amlodipine  2.5mg PO daily  - c/w losartan to 25mg PO daily    Problem/Plan -3  Problem: DM II  - HgbA1c 8.4  - ISS as per primary team        Differential diagnosis and plan of care discussed with patient after the evaluation. Counseling on diet, nutritional counseling, weight management, exercise and medication compliance was done.   Advanced care planning/advanced directives discussed with patient/family. DNR status including forceful chest compressions to attempt to restart the heart, ventilator support/artificial breathing, electric shock, artificial nutrition, health care proxy, Molst form all discussed with pt. Pt wishes to consider. More than fifteen minutes spent on discussing advanced directives.            Matt Calhoun DO [facility]  Cardiovascular Medicine  [address], [address]  Office [phone]  Available via call/text on [employer] Teams DATE OF SERVICE: 07-11-23     CHIEF COMPLAINT:Patient is a 72y old  Male who presents with a chief complaint of foot wound (10 Jul 2023 15:49)      HISTORY OF PRESENT ILLNESS:HPI:  72M with h/o T2DM (A1c=8.4) with neuropathy, HTN recently admitted 3/30 with R 1st toe infection, s/p 4/10 s/p RLE SFA to PT bypass graft with PTFE followed by R foot Partial hallux Amputation 4/14, and RLE angiogram 7/3 with rotational athrectomy of proximal portion of the graft in the SFA, following by MIKE that improved flow, however a proximal lesion was demonstrated at that time that was not amenable to endovascular procedures. He now presents with nonhealing R foot wound. Denies fevers and chills. 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 650 milliGRAM(s) Oral every 6 hours PRN  acetaminophen     Tablet .. 650 milliGRAM(s) Oral every 6 hours PRN  gabapentin 100 milliGRAM(s) Oral every 8 hours      atorvastatin 40 milliGRAM(s) Oral at bedtime  dextrose 50% Injectable 12.5 Gram(s) IV Push once  dextrose 50% Injectable 25 Gram(s) IV Push once  dextrose 50% Injectable 25 Gram(s) IV Push once  dextrose Oral Gel 15 Gram(s) Oral once PRN  glucagon  Injectable 1 milliGRAM(s) IntraMuscular once  insulin glargine Injectable (LANTUS) 6 Unit(s) SubCutaneous at bedtime  insulin lispro (ADMELOG) corrective regimen sliding scale   SubCutaneous three times a day before meals  insulin lispro (ADMELOG) corrective regimen sliding scale   SubCutaneous at bedtime  insulin lispro Injectable (ADMELOG) 3 Unit(s) SubCutaneous three times a day before meals    dextrose 5%. 1000 milliLiter(s) IV Continuous <Continuous>  dextrose 5%. 1000 milliLiter(s) IV Continuous <Continuous>  lactated ringers. 1000 milliLiter(s) IV Continuous <Continuous>      FAMILY HISTORY:      Non-contributory    SOCIAL HISTORY:    [ ] Tobacco  [ ] Drugs  [ ] Alcohol    Allergies    No Known Allergies    Intolerances    	    REVIEW OF SYSTEMS:  CONSTITUTIONAL: No fever  EYES: No eye pain, visual disturbances, or discharge  ENMT:  No difficulty hearing, tinnitus  NECK: No pain or stiffness  RESPIRATORY: No cough, wheezing,  CARDIOVASCULAR: No chest pain, palpitations, passing out, dizziness, or leg swelling  GASTROINTESTINAL:  No nausea, vomiting, diarrhea or constipation. No melena.  GENITOURINARY: No dysuria, hematuria  NEUROLOGICAL: No stroke like symptoms  SKIN: No burning or lesions   ENDOCRINE: No heat or cold intolerance  MUSCULOSKELETAL: No joint pain or swelling  PSYCHIATRIC: No  anxiety, mood swings  HEME/LYMPH: No bleeding gums  ALLERGY AND IMMUNOLOGIC: No hives or eczema	    All other ROS negative    PHYSICAL EXAM:  T(C): 37.5 (07-11-23 @ 22:30), Max: 38.6 (07-11-23 @ 21:29)  HR: 95 (07-11-23 @ 21:29) (85 - 95)  BP: 157/76 (07-11-23 @ 21:29) (123/66 - 170/76)  RR: 18 (07-11-23 @ 21:29) (18 - 18)  SpO2: 97% (07-11-23 @ 21:29) (93% - 98%)  Wt(kg): --  I&O's Summary    10 Jul 2023 07:01  -  11 Jul 2023 07:00  --------------------------------------------------------  IN: 840 mL / OUT: 200 mL / NET: 640 mL    11 Jul 2023 07:01  -  12 Jul 2023 00:18  --------------------------------------------------------  IN: 840 mL / OUT: 150 mL / NET: 690 mL        Appearance: Normal	  HEENT:   Normal oral mucosa, EOMI	  Cardiovascular:  S1 S2, No JVD,    Respiratory: Lungs clear to auscultation	  Psychiatry: Alert  Gastrointestinal:  Soft, Non-tender, + BS	  Skin: No rashes   Neurologic: Non-focal  Extremities:  No edema  Vascular: Peripheral pulses palpable    	    	  	  CARDIAC MARKERS:  Labs personally reviewed by me                                  9.0    10.06 )-----------( 272      ( 11 Jul 2023 06:44 )             27.9     07-11    135  |  103  |  23  ----------------------------<  184<H>  3.9   |  21<L>  |  1.00    Ca    9.3      11 Jul 2023 06:44  Phos  3.3     07-11  Mg     2.4     07-11            EKG: Personally reviewed by me - NSR  Radiology: Personally reviewed by me - CXR clear lungs      Assessment /Plan:     ASSESSMENT/PLAN: 	  Patient is a 72 year old male with a PMHx of HTN, Type II DM associated with neuropathy whore presents to [facility] with a chief complaint of pain, blistering and fluid drainage on his right foot.    Problem/Plan -1  Problem: Cardiac Risk Stratification for podiatry surgery  - Denies CP or SOB  - TTE shows preserved EF, mild LVH  - Not in decompensated HF  - No] tachy supa arrhythmia  -s/p cath with nonobstructive moderate CAD  - Elevated risk for low-mod risk pods surgery procedure, no contraindication to proceed     Problem/Plan -2  Problem: Hypertension  - amlodipine  2.5mg PO daily  - c/w losartan to 25mg PO daily    Problem/Plan -3  Problem: DM II  - HgbA1c 8.4  - ISS as per primary team        Differential diagnosis and plan of care discussed with patient after the evaluation. Counseling on diet, nutritional counseling, weight management, exercise and medication compliance was done.   Advanced care planning/advanced directives discussed with patient/family. DNR status including forceful chest compressions to attempt to restart the heart, ventilator support/artificial breathing, electric shock, artificial nutrition, health care proxy, Molst form all discussed with pt. Pt wishes to consider. More than fifteen minutes spent on discussing advanced directives.            Matt Calhoun DO [facility]  Cardiovascular Medicine  [address] [place] [address], [address]  Office [phone]  Available via call/text on [employer] Teams

## 2023-07-11 NOTE — PROGRESS NOTE ADULT - SUBJECTIVE AND OBJECTIVE BOX
SURGERY DAILY PROGRESS NOTE    STATUS POST:      SUBJECTIVE: Pt seen and examined at bedside. Pt now afebrile, no complaints. Denies chest pain, SOB, palpitations, HA, fever, chills, N/V.      OBJECTIVE:  Vital Signs Last 24 Hrs  T(C): 37.5 (11 Jul 2023 09:21), Max: 38.3 (10 Jul 2023 14:10)  T(F): 99.5 (11 Jul 2023 09:21), Max: 101 (10 Jul 2023 14:10)  HR: 88 (11 Jul 2023 09:21) (83 - 97)  BP: 147/67 (11 Jul 2023 09:21) (96/56 - 148/74)  BP(mean): --  RR: 18 (11 Jul 2023 09:21) (18 - 18)  SpO2: 93% (11 Jul 2023 09:21) (93% - 98%)    Parameters below as of 11 Jul 2023 09:21  Patient On (Oxygen Delivery Method): room air        I&O's Summary    10 Jul 2023 07:01  -  11 Jul 2023 07:00  --------------------------------------------------------  IN: 840 mL / OUT: 200 mL / NET: 640 mL    11 Jul 2023 07:01  -  11 Jul 2023 12:37  --------------------------------------------------------  IN: 360 mL / OUT: 150 mL / NET: 210 mL        Physical Exam:  General Appearance: Appears well, NAD, A& O x 3  Neck: Trachea midline   Chest: Equal expansion bilaterally, NAD   Extremities: Rt foot dressing in place, C/D/I. Grossly symmetric, SCD's in place     LABS:                        9.0    10.06 )-----------( 272      ( 11 Jul 2023 06:44 )             27.9     07-11    135  |  103  |  23  ----------------------------<  184<H>  3.9   |  21<L>  |  1.00    Ca    9.3      11 Jul 2023 06:44  Phos  3.3     07-11  Mg     2.4     07-11        Urinalysis Basic - ( 11 Jul 2023 06:44 )    Color: x / Appearance: x / SG: x / pH: x  Gluc: 184 mg/dL / Ketone: x  / Bili: x / Urobili: x   Blood: x / Protein: x / Nitrite: x   Leuk Esterase: x / RBC: x / WBC x   Sq Epi: x / Non Sq Epi: x / Bacteria: x        RADIOLOGY & ADDITIONAL STUDIES:

## 2023-07-11 NOTE — PROGRESS NOTE ADULT - ASSESSMENT
Patient is a 72 year old male with a PMHx of Type II DM associated with neuropathy, HTN who was recently admitted to [facility] with Right 1st toe infection, S/P Right LE SFA to PT bypass graft with PTFE followed by Right foot Partial hallux Amputation on 04/14, and RLE angiogram 07/03 with rotational atherectomy of proximal portion of the graft in the SFA, following by MIKE that improved flow, however a proximal lesion was demonstrated at that time that was not amenable to endovascular procedures. Patient presents to [facility] due to nonhealing Right foot wound. Internal medicine has been consulted on Mr. Paredes's care for medical management. Patient denies chest pain, palpitations, SOB or dyspnea.       Peripheral Arterial Disease   - S/P R LE SFA to PT bypass graft, RLE angiogram 07/03  - 07/08 Arterial Duplex negative for pseudoaneurysm   - On ASA 81 and [id number]   - US as noted  - Care per vascular surgery appreciated     H/O R Toe infection   - S/P Great toe amputation   - Foot Xray with Mild erosive changes concerning for OM  - MR without evidence of OM, negative for abscess. F/u podiatry recs --> Planned for amputation 7/12   - ABX as per ID, currently on Flagyl and Cefepime   - UCx and BCx2 w/ NGTD; F/u final   - Care per Vascular/ Podiatry appreciated   - Monitor CBC, temp curve, VS and patient closely     Type II DM   - A1C 7.9   - C/w sliding scale  - C/w Lantus 6 units QHs and Pre-Meal 3 units TID; If NPO, hold pre-meal and likely reduce Lantus to half dose based on glucose levels   - Diabetic/ DASH Diet   - Monitor glucose levels closely       Anemia   - Monitor Hgb closely   - If down-trends, recommend to check anemia work up       HTN   - C/w Norvasc 2.5, Losartan 25   - Monitor BP, VS and patient closely     Pre-Op risk stratification   - Previous TTE with EF of 61%, Mild LVH, normal RV  - Patient is medically optimized for OR. Moderate Risk candidate     DVT and GI PPX       Discussed with Attending.       Patient is a 72 year old male with a PMHx of Type II DM associated with neuropathy, HTN who was recently admitted to [facility] with Right 1st toe infection, S/P Right LE SFA to PT bypass graft with PTFE followed by Right foot Partial hallux Amputation on 04/14, and RLE angiogram 07/03 with rotational atherectomy of proximal portion of the graft in the SFA, following by MIKE that improved flow, however a proximal lesion was demonstrated at that time that was not amenable to endovascular procedures. Patient presents to [facility] due to nonhealing Right foot wound. Internal medicine has been consulted on Mr. Paredes's care for medical management. Patient denies chest pain, palpitations, SOB or dyspnea.       Peripheral Arterial Disease   - S/P R LE SFA to PT bypass graft, RLE angiogram 07/03  - 07/08 Arterial Duplex negative for pseudoaneurysm   - On ASA 81 and Lipitor 40   - US as noted  - Care per vascular surgery appreciated     H/O R Toe infection   - S/P Great toe amputation   - Foot Xray with Mild erosive changes concerning for OM  - MR without evidence of OM, negative for abscess. F/u podiatry recs --> Planned for amputation 7/12   - ABX as per ID, currently on Flagyl and Cefepime   - UCx and BCx2 w/ NGTD; F/u final   - Care per Vascular/ Podiatry appreciated   - Monitor CBC, temp curve, VS and patient closely     Type II DM   - A1C 7.9   - C/w sliding scale  - C/w Lantus 6 units QHs and Pre-Meal 3 units TID; If NPO, hold pre-meal   - Diabetic/ DASH Diet   - Monitor glucose levels closely     Anemia   - Monitor Hgb closely   - Check Iron and Ferritin   - If down-trends, recommend to check anemia work up       HTN   - C/w Norvasc 2.5, Losartan 25   - Monitor BP, VS and patient closely     Pre-Op risk stratification   - Previous TTE with EF of 61%, Mild LVH, normal RV  - Patient is medically optimized for OR. Moderate Risk candidate     DVT and GI PPX       Discussed with Attending.       Patient is a 72 year old male with a PMHx of Type II DM associated with neuropathy, HTN who was recently admitted to [facility] with Right 1st toe infection, S/P Right LE SFA to PT bypass graft with PTFE followed by Right foot Partial hallux Amputation on 04/14, and RLE angiogram 07/03 with rotational atherectomy of proximal portion of the graft in the SFA, following by MIKE that improved flow, however a proximal lesion was demonstrated at that time that was not amenable to endovascular procedures. Patient presents to [facility] due to nonhealing Right foot wound. Internal medicine has been consulted on Mr. Paredes's care for medical management. Patient denies chest pain, palpitations, SOB or dyspnea.       Peripheral Arterial Disease   - S/P R LE SFA to PT bypass graft, RLE angiogram 07/03  - 07/08 Arterial Duplex negative for pseudoaneurysm   - On ASA 81 and Lipitor 40   - US as noted  - Care per vascular surgery appreciated     H/O R Toe infection   - S/P Great toe amputation   - Foot Xray with Mild erosive changes concerning for OM  - MR without evidence of OM, negative for abscess. F/u podiatry recs --> Planned for amputation 7/12   - ABX as per ID, currently on Flagyl and Cefepime   - UCx and BCx2 w/ NGTD; F/u final   - Care per Vascular/ Podiatry appreciated   - Monitor CBC, temp curve, VS and patient closely     Type II DM   - A1C 7.9   - C/w sliding scale  - C/w Lantus 6 units QHs and Pre-Meal 3 units TID; If NPO, hold pre-meal   - Diabetic/ DASH Diet   - Monitor glucose levels closely     Anemia   - Monitor Hgb closely   - Check Iron and Ferritin   - If down-trends, recommend to check anemia work up       HTN   - C/w Norvasc 2.5, Losartan 25   - Monitor BP, VS and patient closely     Pre-Op risk stratification   - Previous TTE with EF of 61%, Mild LVH, normal RV  - Patient is medically optimized for OR. Moderate Risk candidate     DVT and GI PPX

## 2023-07-12 ENCOUNTER — TRANSCRIPTION ENCOUNTER (OUTPATIENT)
Age: 73
End: 2023-07-12

## 2023-07-12 LAB
ANION GAP SERPL CALC-SCNC: 12 MMOL/L — SIGNIFICANT CHANGE UP (ref 5–17)
APTT BLD: 29.6 SEC — SIGNIFICANT CHANGE UP (ref 27.5–35.5)
BLD GP AB SCN SERPL QL: POSITIVE — SIGNIFICANT CHANGE UP
BUN SERPL-MCNC: 21 MG/DL — SIGNIFICANT CHANGE UP (ref 7–23)
CALCIUM SERPL-MCNC: 9.4 MG/DL — SIGNIFICANT CHANGE UP (ref 8.4–10.5)
CHLORIDE SERPL-SCNC: 101 MMOL/L — SIGNIFICANT CHANGE UP (ref 96–108)
CO2 SERPL-SCNC: 23 MMOL/L — SIGNIFICANT CHANGE UP (ref 22–31)
CREAT SERPL-MCNC: 1.03 MG/DL — SIGNIFICANT CHANGE UP (ref 0.5–1.3)
EGFR: 77 ML/MIN/1.73M2 — SIGNIFICANT CHANGE UP
FERRITIN SERPL-MCNC: 379 NG/ML — SIGNIFICANT CHANGE UP (ref 30–400)
GLUCOSE BLDC GLUCOMTR-MCNC: 202 MG/DL — HIGH (ref 70–99)
GLUCOSE BLDC GLUCOMTR-MCNC: 228 MG/DL — HIGH (ref 70–99)
GLUCOSE BLDC GLUCOMTR-MCNC: 234 MG/DL — HIGH (ref 70–99)
GLUCOSE BLDC GLUCOMTR-MCNC: 252 MG/DL — HIGH (ref 70–99)
GLUCOSE SERPL-MCNC: 147 MG/DL — HIGH (ref 70–99)
HCT VFR BLD CALC: 28.2 % — LOW (ref 39–50)
HGB BLD-MCNC: 9.1 G/DL — LOW (ref 13–17)
INR BLD: 1.27 RATIO — HIGH (ref 0.88–1.16)
IRON SATN MFR SERPL: 10 % — LOW (ref 16–55)
IRON SATN MFR SERPL: 19 UG/DL — LOW (ref 45–165)
MAGNESIUM SERPL-MCNC: 2.3 MG/DL — SIGNIFICANT CHANGE UP (ref 1.6–2.6)
MCHC RBC-ENTMCNC: 28.4 PG — SIGNIFICANT CHANGE UP (ref 27–34)
MCHC RBC-ENTMCNC: 32.3 GM/DL — SIGNIFICANT CHANGE UP (ref 32–36)
MCV RBC AUTO: 88.1 FL — SIGNIFICANT CHANGE UP (ref 80–100)
NRBC # BLD: 0 /100 WBCS — SIGNIFICANT CHANGE UP (ref 0–0)
PHOSPHATE SERPL-MCNC: 2.6 MG/DL — SIGNIFICANT CHANGE UP (ref 2.5–4.5)
PLATELET # BLD AUTO: 275 K/UL — SIGNIFICANT CHANGE UP (ref 150–400)
POTASSIUM SERPL-MCNC: 4.2 MMOL/L — SIGNIFICANT CHANGE UP (ref 3.5–5.3)
POTASSIUM SERPL-SCNC: 4.2 MMOL/L — SIGNIFICANT CHANGE UP (ref 3.5–5.3)
PROTHROM AB SERPL-ACNC: 14.6 SEC — HIGH (ref 10.5–13.4)
RBC # BLD: 3.2 M/UL — LOW (ref 4.2–5.8)
RBC # FLD: 12.7 % — SIGNIFICANT CHANGE UP (ref 10.3–14.5)
RH IG SCN BLD-IMP: POSITIVE — SIGNIFICANT CHANGE UP
SODIUM SERPL-SCNC: 136 MMOL/L — SIGNIFICANT CHANGE UP (ref 135–145)
TIBC SERPL-MCNC: 189 UG/DL — LOW (ref 220–430)
UIBC SERPL-MCNC: 170 UG/DL — SIGNIFICANT CHANGE UP (ref 110–370)
WBC # BLD: 9.41 K/UL — SIGNIFICANT CHANGE UP (ref 3.8–10.5)
WBC # FLD AUTO: 9.41 K/UL — SIGNIFICANT CHANGE UP (ref 3.8–10.5)

## 2023-07-12 PROCEDURE — 99232 SBSQ HOSP IP/OBS MODERATE 35: CPT

## 2023-07-12 RX ORDER — MEROPENEM 1 G/30ML
1000 INJECTION INTRAVENOUS ONCE
Refills: 0 | Status: COMPLETED | OUTPATIENT
Start: 2023-07-12 | End: 2023-07-12

## 2023-07-12 RX ORDER — MEROPENEM 1 G/30ML
INJECTION INTRAVENOUS
Refills: 0 | Status: DISCONTINUED | OUTPATIENT
Start: 2023-07-12 | End: 2023-07-13

## 2023-07-12 RX ORDER — OXYCODONE HYDROCHLORIDE 5 MG/1
5 TABLET ORAL EVERY 6 HOURS
Refills: 0 | Status: DISCONTINUED | OUTPATIENT
Start: 2023-07-12 | End: 2023-07-13

## 2023-07-12 RX ORDER — MEROPENEM 1 G/30ML
1000 INJECTION INTRAVENOUS EVERY 8 HOURS
Refills: 0 | Status: DISCONTINUED | OUTPATIENT
Start: 2023-07-13 | End: 2023-07-13

## 2023-07-12 RX ORDER — ACETAMINOPHEN 500 MG
650 TABLET ORAL EVERY 6 HOURS
Refills: 0 | Status: DISCONTINUED | OUTPATIENT
Start: 2023-07-12 | End: 2023-07-13

## 2023-07-12 RX ADMIN — Medication 100 MILLIGRAM(S): at 17:40

## 2023-07-12 RX ADMIN — CEFEPIME 100 MILLIGRAM(S): 1 INJECTION, POWDER, FOR SOLUTION INTRAMUSCULAR; INTRAVENOUS at 17:40

## 2023-07-12 RX ADMIN — OXYCODONE HYDROCHLORIDE 5 MILLIGRAM(S): 5 TABLET ORAL at 11:10

## 2023-07-12 RX ADMIN — Medication 2: at 14:25

## 2023-07-12 RX ADMIN — Medication 3 UNIT(S): at 19:00

## 2023-07-12 RX ADMIN — AMLODIPINE BESYLATE 2.5 MILLIGRAM(S): 2.5 TABLET ORAL at 06:07

## 2023-07-12 RX ADMIN — ENOXAPARIN SODIUM 40 MILLIGRAM(S): 100 INJECTION SUBCUTANEOUS at 17:40

## 2023-07-12 RX ADMIN — GABAPENTIN 100 MILLIGRAM(S): 400 CAPSULE ORAL at 14:26

## 2023-07-12 RX ADMIN — Medication 100 MILLIGRAM(S): at 06:07

## 2023-07-12 RX ADMIN — Medication 81 MILLIGRAM(S): at 14:26

## 2023-07-12 RX ADMIN — MEROPENEM 100 MILLIGRAM(S): 1 INJECTION INTRAVENOUS at 21:48

## 2023-07-12 RX ADMIN — LOSARTAN POTASSIUM 25 MILLIGRAM(S): 100 TABLET, FILM COATED ORAL at 06:07

## 2023-07-12 RX ADMIN — Medication 650 MILLIGRAM(S): at 17:41

## 2023-07-12 RX ADMIN — GABAPENTIN 100 MILLIGRAM(S): 400 CAPSULE ORAL at 06:07

## 2023-07-12 RX ADMIN — CEFEPIME 100 MILLIGRAM(S): 1 INJECTION, POWDER, FOR SOLUTION INTRAMUSCULAR; INTRAVENOUS at 06:06

## 2023-07-12 RX ADMIN — OXYCODONE HYDROCHLORIDE 5 MILLIGRAM(S): 5 TABLET ORAL at 10:40

## 2023-07-12 RX ADMIN — Medication 3 UNIT(S): at 14:25

## 2023-07-12 RX ADMIN — Medication 650 MILLIGRAM(S): at 18:11

## 2023-07-12 RX ADMIN — GABAPENTIN 100 MILLIGRAM(S): 400 CAPSULE ORAL at 21:48

## 2023-07-12 RX ADMIN — Medication 3: at 19:00

## 2023-07-12 RX ADMIN — Medication 85 MILLIMOLE(S): at 12:32

## 2023-07-12 RX ADMIN — INSULIN GLARGINE 6 UNIT(S): 100 INJECTION, SOLUTION SUBCUTANEOUS at 21:48

## 2023-07-12 RX ADMIN — Medication 650 MILLIGRAM(S): at 10:05

## 2023-07-12 RX ADMIN — Medication 3 UNIT(S): at 10:13

## 2023-07-12 RX ADMIN — ATORVASTATIN CALCIUM 40 MILLIGRAM(S): 80 TABLET, FILM COATED ORAL at 21:48

## 2023-07-12 RX ADMIN — Medication 2: at 10:13

## 2023-07-12 NOTE — PROVIDER CONTACT NOTE (OTHER) - ASSESSMENT
Pt A&Ox4, unable to have BM and requesting laxative. Pt verbalized that laxatives in the hospital helps to promote his BM's.  No BM today.

## 2023-07-12 NOTE — PROGRESS NOTE ADULT - ASSESSMENT
72M with h/o T2DM (A1c=8.4) with neuropathy, HTN recently admitted 3/30 with R 1st toe infection, s/p 4/10 s/p RLE SFA to PT bypass graft with PTFE followed by R foot Partial hallux Amputation 4/14, and RLE angiogram 7/3 with rotational athrectomy of proximal portion of the graft in the SFA, following by MIKE that improved flow, however a proximal lesion was demonstrated at that time that was not amenable to endovascular procedures, now presenting with nonhealing R hallux wound.    Plan:     - Appreciated ID for abx recs 7/9 - D/c'd vanco and zosyn, add Cefepime + Flagyl  -patient was febrile yesterday, UA negative, RVP negative, CXR normal. F/u blood cultures.   - LLE arterial duplex to r/o L groin PSA (done 7/8) - no evidence of pseudoaneurysm   - Medicine following and recs appreciated.   - OR tomorrow for Right BKA  - Will f/u AM labs  - Regular diet  - Continue home meds   -PT eval s/p OR      Vascular Surgery   [id number]

## 2023-07-12 NOTE — PROGRESS NOTE ADULT - SUBJECTIVE AND OBJECTIVE BOX
Follow Up:  foot wound    Interval History/ROS:  patient hopeful that definitive BKA will allow him freedom from frequent hospitalizations    Allergies  No Known Allergies    ANTIMICROBIALS:  cefepime   IVPB 1000 every 12 hours  metroNIDAZOLE  IVPB 500 every 12 hours      OTHER MEDS:  MEDICATIONS  (STANDING):  acetaminophen     Tablet .. 650 every 6 hours PRN  acetaminophen     Tablet .. 650 every 6 hours PRN  amLODIPine   Tablet 2.5 daily  aspirin enteric coated 81 daily  atorvastatin 40 at bedtime  dextrose 50% Injectable 12.5 once  dextrose 50% Injectable 25 once  dextrose 50% Injectable 25 once  dextrose Oral Gel 15 once PRN  enoxaparin Injectable 40 every 24 hours  gabapentin 100 every 8 hours  glucagon  Injectable 1 once  insulin glargine Injectable (LANTUS) 6 at bedtime  insulin lispro (ADMELOG) corrective regimen sliding scale  three times a day before meals  insulin lispro (ADMELOG) corrective regimen sliding scale  at bedtime  insulin lispro Injectable (ADMELOG) 3 three times a day before meals  losartan 25 daily  oxyCODONE    IR 5 every 6 hours PRN      Vital Signs Last 24 Hrs  T(C): 36.4 (12 Jul 2023 16:54), Max: 38.6 (11 Jul 2023 21:29)  T(F): 97.5 (12 Jul 2023 16:54), Max: 101.5 (11 Jul 2023 21:29)  HR: 86 (12 Jul 2023 16:54) (79 - 99)  BP: 148/69 (12 Jul 2023 16:54) (120/66 - 167/73)  BP(mean): --  RR: 18 (12 Jul 2023 16:54) (18 - 18)  SpO2: 98% (12 Jul 2023 16:54) (97% - 99%)    Parameters below as of 12 Jul 2023 16:54  Patient On (Oxygen Delivery Method): room air        PHYSICAL EXAM:  General: WN/WD NAD, Non-toxic  Neurology: A&Ox3, nonfocal  Respiratory: Clear to auscultation bilaterally  CV: RRR, S1S2, no murmurs, rubs or gallops  Abdominal: Soft, Non-tender, non-distended, normal bowel sounds  Extremities: No edema, deressing in place  Line Sites: Clear  Skin: No rash                          9.1    9.41  )-----------( 275      ( 12 Jul 2023 07:20 )             28.2   WBC Count: 9.41 (07-12 @ 07:20)  WBC Count: 10.06 (07-11 @ 06:44)  WBC Count: 7.34 (07-10 @ 07:19)  WBC Count: 7.05 (07-09 @ 07:11)  WBC Count: 10.14 (07-08 @ 11:05)      07-12    136  |  101  |  21  ----------------------------<  147<H>  4.2   |  23  |  1.03    Ca    9.4      12 Jul 2023 07:22  Phos  2.6     07-12  Mg     2.3     07-12        Urinalysis Basic - ( 12 Jul 2023 07:22 )    Color: x / Appearance: x / SG: x / pH: x  Gluc: 147 mg/dL / Ketone: x  / Bili: x / Urobili: x   Blood: x / Protein: x / Nitrite: x   Leuk Esterase: x / RBC: x / WBC x   Sq Epi: x / Non Sq Epi: x / Bacteria: x    MICROBIOLOGY:  .Blood Blood  07-10-23   No growth at 24 hours  --  --    Clean Catch Clean Catch (Midstream)  07-08-23   No growth  --  --    .Blood Blood-Peripheral  07-08-23   No growth at 4 days  --  --    .Blood Blood-Peripheral  07-08-23   No growth at 4 days  --  --      Rapid RVP Result: NotDetec (07-10 @ 16:50)    RADIOLOGY:  < from: MR Foot w/wo IV Cont, Right (07.10.23 @ 22:26) >  INTERPRETATION:    Bones: There has been previouspartial amputation of the first digit,   across the base of the proximal phalanx. There is no evidence of stump   osteomyelitis. There is no marrow signal abnormality within the remainder   of the forefoot to suggest acute osteomyelitis.    Soft Tissues: The soft tissues overlying the first metatarsal amputation   stump are diminutive. There is edema within the intrinsic muscles of the   foot which may be seen in neuropathy. There is no localized abscess.      IMPRESSION:  Prior amputation of thefirst digit at the level of the base of the   proximal phalanx. Diminutive overlying soft tissues, without MRI evidence   of osteomyelitis. No abscess.    < end of copied text >  < from: Xray Chest 1 View- PORTABLE-Urgent (Xray Chest 1 View- PORTABLE-Urgent .) (07.10.23 @ 15:46) >  Impression:  Clear lungs.    < end of copied text >  < from: [facility] Physiol Extremity Lower 3+ Level, BI (07.08.23 @ 13:45) >  IMPRESSION:  Limited study due to vessel noncompressibility and right lower extremity   stents.    Pulse volume recordings demonstrate no evidence of significant arterial   occlusive disease at either thigh, calf or ankle levels at rest. Findings   are consistent with significant disease at both foot levels. Findings are   similar compared to prior study.    < end of copied text >      Thai Stein MD; Division of Infectious Disease; Pager: [phone]; nights and weekends: [phone]

## 2023-07-12 NOTE — PROGRESS NOTE ADULT - SUBJECTIVE AND OBJECTIVE BOX
Name of Patient : ALEE DUNN  MRN: [id number]  Date of visit: 07-12-23 @ 16:10      Subjective: Patient seen and examined. No new events except as noted.   Patient seen earlier this AM. Febrile yesterday with Tmax of 101.5 and again today with temperature of 101.2   Reports continued LE discomfort.   Planned for OR with Vascular surgery.     REVIEW OF SYSTEMS:    CONSTITUTIONAL: + Febrile   EYES/ENT: No visual changes;  No vertigo or throat pain   NECK: No pain or stiffness  RESPIRATORY: No cough, wheezing, hemoptysis; No shortness of breath  CARDIOVASCULAR: No chest pain or palpitations  GASTROINTESTINAL: No abdominal or epigastric pain. No nausea, vomiting, or hematemesis; No diarrhea or constipation. No melena or hematochezia.  GENITOURINARY: No dysuria, frequency or hematuria  NEUROLOGICAL: No numbness or weakness  SKIN: No itching, burning, rashes, or lesions   MSK: R great toe amputation; RLE toe wound   All other review of systems is negative unless indicated above.    MEDICATIONS:  MEDICATIONS  (STANDING):  amLODIPine   Tablet 2.5 milliGRAM(s) Oral daily  aspirin enteric coated 81 milliGRAM(s) Oral daily  atorvastatin 40 milliGRAM(s) Oral at bedtime  cefepime   IVPB 1000 milliGRAM(s) IV Intermittent every 12 hours  dextrose 5%. 1000 milliLiter(s) (50 mL/Hr) IV Continuous <Continuous>  dextrose 5%. 1000 milliLiter(s) (100 mL/Hr) IV Continuous <Continuous>  dextrose 50% Injectable 12.5 Gram(s) IV Push once  dextrose 50% Injectable 25 Gram(s) IV Push once  dextrose 50% Injectable 25 Gram(s) IV Push once  enoxaparin Injectable 40 milliGRAM(s) SubCutaneous every 24 hours  gabapentin 100 milliGRAM(s) Oral every 8 hours  glucagon  Injectable 1 milliGRAM(s) IntraMuscular once  insulin glargine Injectable (LANTUS) 6 Unit(s) SubCutaneous at bedtime  insulin lispro (ADMELOG) corrective regimen sliding scale   SubCutaneous three times a day before meals  insulin lispro (ADMELOG) corrective regimen sliding scale   SubCutaneous at bedtime  insulin lispro Injectable (ADMELOG) 3 Unit(s) SubCutaneous three times a day before meals  lactated ringers. 1000 milliLiter(s) (75 mL/Hr) IV Continuous <Continuous>  losartan 25 milliGRAM(s) Oral daily  metroNIDAZOLE  IVPB 500 milliGRAM(s) IV Intermittent every 12 hours      PHYSICAL EXAM:  T(C): 36.8 (07-12-23 @ 13:17), Max: 38.6 (07-11-23 @ 21:29)  HR: 81 (07-12-23 @ 13:17) (79 - 99)  BP: 120/66 (07-12-23 @ 13:17) (120/66 - 167/73)  RR: 18 (07-12-23 @ 13:17) (18 - 18)  SpO2: 99% (07-12-23 @ 13:17) (97% - 99%)  Wt(kg): --  I&O's Summary    11 Jul 2023 07:01  -  12 Jul 2023 07:00  --------------------------------------------------------  IN: 900 mL / OUT: 152 mL / NET: 748 mL    12 Jul 2023 07:01  -  12 Jul 2023 16:10  --------------------------------------------------------  IN: 450 mL / OUT: 0 mL / NET: 450 mL        Appearance: Normal; OOB TC 	  HEENT:  Eyes are open   Lymphatic: No lymphadenopathy   Cardiovascular: Normal S1 S2, no JVD  Respiratory: normal effort , clear  Gastrointestinal:  Soft, Non-tender  Skin: No rashes,  warm to touch  Psychiatry:  Mood & affect appropriate  Musculoskeletal:  RLE great toe amputation, in dressing           07-11-23 @ 07:01  -  07-12-23 @ 07:00  --------------------------------------------------------  IN: 900 mL / OUT: 152 mL / NET: 748 mL    07-12-23 @ 07:01  -  07-12-23 @ 16:10  --------------------------------------------------------  IN: 450 mL / OUT: 0 mL / NET: 450 mL                              9.1    9.41  )-----------( 275      ( 12 Jul 2023 07:20 )             28.2               07-12    136  |  101  |  21  ----------------------------<  147<H>  4.2   |  23  |  1.03    Ca    9.4      12 Jul 2023 07:22  Phos  2.6     07-12  Mg     2.3     07-12      PT/INR - ( 12 Jul 2023 07:20 )   PT: 14.6 sec;   INR: 1.27 ratio         PTT - ( 12 Jul 2023 07:20 )  PTT:29.6 sec                   Urinalysis Basic - ( 12 Jul 2023 07:22 )    Color: x / Appearance: x / SG: x / pH: x  Gluc: 147 mg/dL / Ketone: x  / Bili: x / Urobili: x   Blood: x / Protein: x / Nitrite: x   Leuk Esterase: x / RBC: x / WBC x   Sq Epi: x / Non Sq Epi: x / Bacteria: x          Culture - Blood (07.10.23 @ 16:07)   Specimen Source: .Blood Blood-Peripheral  Culture Results:   No growth at 24 hours    Culture - Blood (07.10.23 @ 16:07)   Specimen Source: .Blood Blood  Culture Results:   No growth at 24 hours

## 2023-07-12 NOTE — PROGRESS NOTE ADULT - SUBJECTIVE AND OBJECTIVE BOX
DATE OF SERVICE: 07-12-23 @ 09:39    Patient is a 72y old  Male who presents with a chief complaint of PAD (11 Jul 2023 18:17)      INTERVAL HISTORY: Reports LE discomfort, denies SOB and chest pain    REVIEW OF SYSTEMS:  CONSTITUTIONAL: No weakness  EYES/ENT: No visual changes;  No throat pain   NECK: No pain or stiffness  RESPIRATORY: No cough, wheezing; No shortness of breath  CARDIOVASCULAR: No chest pain or palpitations  GASTROINTESTINAL: No abdominal  pain. No nausea, vomiting, or hematemesis  GENITOURINARY: No dysuria, frequency or hematuria  NEUROLOGICAL: No stroke like symptoms  SKIN: No rashes    	  MEDICATIONS:  amLODIPine   Tablet 2.5 milliGRAM(s) Oral daily  losartan 25 milliGRAM(s) Oral daily        PHYSICAL EXAM:  T(C): 36.8 (07-12-23 @ 05:52), Max: 38.6 (07-11-23 @ 21:29)  HR: 97 (07-12-23 @ 05:52) (79 - 97)  BP: 167/73 (07-12-23 @ 05:52) (143/73 - 170/76)  RR: 18 (07-12-23 @ 05:52) (18 - 18)  SpO2: 97% (07-12-23 @ 05:52) (97% - 98%)  Wt(kg): --  I&O's Summary    11 Jul 2023 07:01  -  12 Jul 2023 07:00  --------------------------------------------------------  IN: 900 mL / OUT: 152 mL / NET: 748 mL          Appearance: In no distress	  HEENT:    PERRL, EOMI	  Cardiovascular:  S1 S2, No JVD  Respiratory: Lungs clear to auscultation	  Gastrointestinal:  Soft, Non-tender, + BS	  Vascularature:  No edema of LE  Psychiatric: Appropriate affect   Neuro: no acute focal deficits                               9.1    9.41  )-----------( 275      ( 12 Jul 2023 07:20 )             28.2     07-12    136  |  101  |  21  ----------------------------<  147<H>  4.2   |  23  |  1.03    Ca    9.4      12 Jul 2023 07:22  Phos  2.6     07-12  Mg     2.3     07-12          Labs personally reviewed      ASSESSMENT/PLAN: 	  Patient is a 72 year old male with a PMHx of HTN, Type II DM associated with neuropathy whore presents to [facility] with a chief complaint of pain, blistering and fluid drainage on his right foot.    Problem/Plan -1  Problem: Cardiac Risk Stratification for podiatry surgery  - Denies CP or SOB  - TTE shows preserved EF, mild LVH  - Not in decompensated HF  - No] tachy supa arrhythmia  -s/p cath with nonobstructive moderate CAD  - Elevated risk for low-mod risk pods surgery procedure, no contraindication to proceed   - Plan for OR 7/12    Problem/Plan -2  Problem: Hypertension  - amlodipine  2.5mg PO daily  - c/w losartan to 25mg PO daily    Problem/Plan -3  Problem: DM II  - HgbA1c 8.4  - ISS as per primary team          SUSY Broderick DO [facility]  Cardiovascular Medicine  [address] [place] [address], [address]  Available via call or text on [employer]  Office: [phone]

## 2023-07-12 NOTE — PROGRESS NOTE ADULT - ASSESSMENT
Patient is a 72 year old male with a PMHx of Type II DM associated with neuropathy, HTN who was recently admitted to [facility] with Right 1st toe infection, S/P Right LE SFA to PT bypass graft with PTFE followed by Right foot Partial hallux Amputation on 04/14, and RLE angiogram 07/03 with rotational atherectomy of proximal portion of the graft in the SFA, following by MIKE that improved flow, however a proximal lesion was demonstrated at that time that was not amenable to endovascular procedures. Patient presents to [facility] due to nonhealing Right foot wound. Internal medicine has been consulted on Mr. Paredes's care for medical management. Patient denies chest pain, palpitations, SOB or dyspnea.       Peripheral Arterial Disease   - S/P R LE SFA to PT bypass graft, RLE angiogram 07/03  - 07/08 Arterial Duplex negative for pseudoaneurysm   - On ASA 81 and Lipitor 40   - US as noted  - Care per vascular surgery appreciated     H/O R Toe infection   - S/P Great toe amputation   - Foot Xray with Mild erosive changes concerning for OM  - MR without evidence of OM, negative for abscess. F/u podiatry recs --> Was planned for amputation, however now planned for BKA with Vascular, defer per Pod/Vasc   - ABX as per ID, currently on Flagyl and Cefepime   - UCx and BCx2 w/ NGTD; F/u final   - Care per Vascular/ Podiatry appreciated   - Monitor CBC, temp curve, VS and patient closely     Febrile  - Pt febrile while on Cefepime and Flagyl  - Recommend to repeat pan cultures to isolate source  - F/u with ID   - Monitor CBC, temp curve, VS and patient closely; Antipyretics PRN     Type II DM   - A1C 7.9   - C/w sliding scale  - C/w Lantus 6 units QHs and Pre-Meal 3 units TID; If NPO, hold pre-meal   - Diabetic/ DASH Diet   - Monitor glucose levels closely     Anemia   - Monitor Hgb closely   - Check Iron and Ferritin   - If down-trends, recommend to check anemia work up       HTN   - C/w Norvasc 2.5, Losartan 25   - Monitor BP, VS and patient closely     Pre-Op risk stratification   - Previous TTE with EF of 61%, Mild LVH, normal RV  - Patient is medically optimized for OR. Moderate Risk candidate     DVT and GI PPX       Discussed with Attending.

## 2023-07-12 NOTE — PROGRESS NOTE ADULT - SUBJECTIVE AND OBJECTIVE BOX
VASCULAR SURGERY DAILY PROGRESS NOTE:     SUBJECTIVE/ROS: Patient seen and examined. He feels well. No new complaints.      MEDICATIONS  (STANDING):  amLODIPine   Tablet 2.5 milliGRAM(s) Oral daily  aspirin enteric coated 81 milliGRAM(s) Oral daily  atorvastatin 40 milliGRAM(s) Oral at bedtime  cefepime   IVPB 1000 milliGRAM(s) IV Intermittent every 12 hours  dextrose 5%. 1000 milliLiter(s) (100 mL/Hr) IV Continuous <Continuous>  dextrose 5%. 1000 milliLiter(s) (50 mL/Hr) IV Continuous <Continuous>  dextrose 50% Injectable 12.5 Gram(s) IV Push once  dextrose 50% Injectable 25 Gram(s) IV Push once  dextrose 50% Injectable 25 Gram(s) IV Push once  enoxaparin Injectable 40 milliGRAM(s) SubCutaneous every 24 hours  gabapentin 100 milliGRAM(s) Oral every 8 hours  glucagon  Injectable 1 milliGRAM(s) IntraMuscular once  insulin glargine Injectable (LANTUS) 6 Unit(s) SubCutaneous at bedtime  insulin lispro (ADMELOG) corrective regimen sliding scale   SubCutaneous three times a day before meals  insulin lispro (ADMELOG) corrective regimen sliding scale   SubCutaneous at bedtime  insulin lispro Injectable (ADMELOG) 3 Unit(s) SubCutaneous three times a day before meals  lactated ringers. 1000 milliLiter(s) (75 mL/Hr) IV Continuous <Continuous>  losartan 25 milliGRAM(s) Oral daily  metroNIDAZOLE  IVPB 500 milliGRAM(s) IV Intermittent every 12 hours  sodium phosphate 30 milliMole(s)/500 mL IVPB 30 milliMole(s) IV Intermittent once    MEDICATIONS  (PRN):  acetaminophen     Tablet .. 650 milliGRAM(s) Oral every 6 hours PRN Moderate Pain (4 - 6)  acetaminophen     Tablet .. 650 milliGRAM(s) Oral every 6 hours PRN Temp greater or equal to 38C (100.4F)  dextrose Oral Gel 15 Gram(s) Oral once PRN Blood Glucose LESS THAN 70 milliGRAM(s)/deciliter  oxyCODONE    IR 5 milliGRAM(s) Oral every 6 hours PRN Severe Pain (7 - 10)      OBJECTIVE:    Vital Signs Last 24 Hrs  T(C): 38.4 (12 Jul 2023 10:52), Max: 38.6 (11 Jul 2023 21:29)  T(F): 101.2 (12 Jul 2023 10:52), Max: 101.5 (11 Jul 2023 21:29)  HR: 99 (12 Jul 2023 10:52) (79 - 99)  BP: 126/65 (12 Jul 2023 10:52) (126/65 - 170/76)  BP(mean): --  RR: 18 (12 Jul 2023 10:52) (18 - 18)  SpO2: 99% (12 Jul 2023 10:52) (97% - 99%)    Parameters below as of 12 Jul 2023 10:52  Patient On (Oxygen Delivery Method): room air            I&O's Detail    11 Jul 2023 07:01  -  12 Jul 2023 07:00  --------------------------------------------------------  IN:    Oral Fluid: 900 mL  Total IN: 900 mL    OUT:    Voided (mL): 152 mL  Total OUT: 152 mL    Total NET: 748 mL          Daily     Daily     LABS:                        9.1    9.41  )-----------( 275      ( 12 Jul 2023 07:20 )             28.2     07-12    136  |  101  |  21  ----------------------------<  147<H>  4.2   |  23  |  1.03    Ca    9.4      12 Jul 2023 07:22  Phos  2.6     07-12  Mg     2.3     07-12      PT/INR - ( 12 Jul 2023 07:20 )   PT: 14.6 sec;   INR: 1.27 ratio         PTT - ( 12 Jul 2023 07:20 )  PTT:29.6 sec  Urinalysis Basic - ( 12 Jul 2023 07:22 )    Color: x / Appearance: x / SG: x / pH: x  Gluc: 147 mg/dL / Ketone: x  / Bili: x / Urobili: x   Blood: x / Protein: x / Nitrite: x   Leuk Esterase: x / RBC: x / WBC x   Sq Epi: x / Non Sq Epi: x / Bacteria: x                PHYSICAL EXAM:    General Appearance: Appears well, NAD, A& O x 3  Neck: Trachea midline   Chest: Equal expansion bilaterally, NAD   Extremities: Rt foot dressing in place, C/D/I. Grossly symmetric, SCD's in place

## 2023-07-12 NOTE — PROGRESS NOTE ADULT - ASSESSMENT
72 M PMH DM, HTN, PAD    Recently admitted in March/April for R 1st toe OM  Wound cx polymicrobial with E. Cloacae, Staph Lugdunensis S to Oxacillin, and Delftia acidovorans  Underwent RLE SFA to PT bypass graft with PTFE on 4/10  Followed by R foot partial hallux amputation on 4/14 (OR cx and clean margin pathology negative for OM)  Discharged home on a week of Levaquin and Keflex  Now s/p RLE angiogram on 7/3 with rotational atherectomy of the proximal portion of the graft in the SFA, followed by MIKE that improved flow, however proximal lesion was present that was not amenable to endovascular measures  Presents to the ED for non healing R foot wound  admitted 7/8/23     No fevers, no purulent discharge from R foot 1st metatarsal wound  On exam, R 1st metatarsal wound to bone, proximal phalanx exposed, ischemic/gangrenous changes around the wound  Mild leukocytosis that resolved  7/8/23:  ESR = 120;  CRP=73  R foot XR with findings c/f OM  On Vancomycin and Zosyn      R foot OM in a diabetic gentleman with PAD with lesion that was not amenable to endovascular measures  Leukocytosis, elevated ESR and CRP, open 1st metatarsal wound exposed to bone  continued fever on Cefepime Flagyl       Suggest  S/p Zosyn/Vancomycin 7/8--> 7/9  STOP Cefepime 1g IV Q12h 7/9-->  STOP  Flagyl 500mg OV Q12h 7/9   Meropenem 1000 mg IV every 12 hours          short course anticipated

## 2023-07-13 ENCOUNTER — TRANSCRIPTION ENCOUNTER (OUTPATIENT)
Age: 73
End: 2023-07-13

## 2023-07-13 ENCOUNTER — APPOINTMENT (OUTPATIENT)
Dept: VASCULAR SURGERY | Facility: HOSPITAL | Age: 73
End: 2023-07-13

## 2023-07-13 LAB
ANION GAP SERPL CALC-SCNC: 15 MMOL/L — SIGNIFICANT CHANGE UP (ref 5–17)
APTT BLD: 29.9 SEC — SIGNIFICANT CHANGE UP (ref 27.5–35.5)
BUN SERPL-MCNC: 26 MG/DL — HIGH (ref 7–23)
CALCIUM SERPL-MCNC: 9.5 MG/DL — SIGNIFICANT CHANGE UP (ref 8.4–10.5)
CHLORIDE SERPL-SCNC: 99 MMOL/L — SIGNIFICANT CHANGE UP (ref 96–108)
CO2 SERPL-SCNC: 21 MMOL/L — LOW (ref 22–31)
CREAT SERPL-MCNC: 1.13 MG/DL — SIGNIFICANT CHANGE UP (ref 0.5–1.3)
CULTURE RESULTS: SIGNIFICANT CHANGE UP
CULTURE RESULTS: SIGNIFICANT CHANGE UP
EGFR: 69 ML/MIN/1.73M2 — SIGNIFICANT CHANGE UP
GLUCOSE BLDC GLUCOMTR-MCNC: 150 MG/DL — HIGH (ref 70–99)
GLUCOSE BLDC GLUCOMTR-MCNC: 169 MG/DL — HIGH (ref 70–99)
GLUCOSE BLDC GLUCOMTR-MCNC: 169 MG/DL — HIGH (ref 70–99)
GLUCOSE BLDC GLUCOMTR-MCNC: 196 MG/DL — HIGH (ref 70–99)
GLUCOSE BLDC GLUCOMTR-MCNC: 199 MG/DL — HIGH (ref 70–99)
GLUCOSE BLDC GLUCOMTR-MCNC: 201 MG/DL — HIGH (ref 70–99)
GLUCOSE BLDC GLUCOMTR-MCNC: 203 MG/DL — HIGH (ref 70–99)
GLUCOSE SERPL-MCNC: 200 MG/DL — HIGH (ref 70–99)
HCT VFR BLD CALC: 28.3 % — LOW (ref 39–50)
HGB BLD-MCNC: 9 G/DL — LOW (ref 13–17)
INR BLD: 1.17 RATIO — HIGH (ref 0.88–1.16)
MAGNESIUM SERPL-MCNC: 2.4 MG/DL — SIGNIFICANT CHANGE UP (ref 1.6–2.6)
MCHC RBC-ENTMCNC: 28.8 PG — SIGNIFICANT CHANGE UP (ref 27–34)
MCHC RBC-ENTMCNC: 31.8 GM/DL — LOW (ref 32–36)
MCV RBC AUTO: 90.7 FL — SIGNIFICANT CHANGE UP (ref 80–100)
NRBC # BLD: 0 /100 WBCS — SIGNIFICANT CHANGE UP (ref 0–0)
PHOSPHATE SERPL-MCNC: 3.8 MG/DL — SIGNIFICANT CHANGE UP (ref 2.5–4.5)
PLATELET # BLD AUTO: 328 K/UL — SIGNIFICANT CHANGE UP (ref 150–400)
POTASSIUM SERPL-MCNC: 4 MMOL/L — SIGNIFICANT CHANGE UP (ref 3.5–5.3)
POTASSIUM SERPL-SCNC: 4 MMOL/L — SIGNIFICANT CHANGE UP (ref 3.5–5.3)
PROTHROM AB SERPL-ACNC: 13.6 SEC — HIGH (ref 10.5–13.4)
RBC # BLD: 3.12 M/UL — LOW (ref 4.2–5.8)
RBC # FLD: 12.8 % — SIGNIFICANT CHANGE UP (ref 10.3–14.5)
SODIUM SERPL-SCNC: 135 MMOL/L — SIGNIFICANT CHANGE UP (ref 135–145)
SPECIMEN SOURCE: SIGNIFICANT CHANGE UP
SPECIMEN SOURCE: SIGNIFICANT CHANGE UP
WBC # BLD: 11.47 K/UL — HIGH (ref 3.8–10.5)
WBC # FLD AUTO: 11.47 K/UL — HIGH (ref 3.8–10.5)

## 2023-07-13 PROCEDURE — 27882 AMPUTATION OF LOWER LEG: CPT | Mod: RT

## 2023-07-13 PROCEDURE — 99232 SBSQ HOSP IP/OBS MODERATE 35: CPT

## 2023-07-13 RX ORDER — INSULIN GLARGINE 100 [IU]/ML
8 INJECTION, SOLUTION SUBCUTANEOUS AT BEDTIME
Refills: 0 | Status: DISCONTINUED | OUTPATIENT
Start: 2023-07-13 | End: 2023-07-14

## 2023-07-13 RX ORDER — DEXTROSE 50 % IN WATER 50 %
15 SYRINGE (ML) INTRAVENOUS ONCE
Refills: 0 | Status: DISCONTINUED | OUTPATIENT
Start: 2023-07-13 | End: 2023-07-13

## 2023-07-13 RX ORDER — MEROPENEM 1 G/30ML
1000 INJECTION INTRAVENOUS EVERY 8 HOURS
Refills: 0 | Status: DISCONTINUED | OUTPATIENT
Start: 2023-07-13 | End: 2023-07-13

## 2023-07-13 RX ORDER — ACETAMINOPHEN 500 MG
650 TABLET ORAL EVERY 6 HOURS
Refills: 0 | Status: DISCONTINUED | OUTPATIENT
Start: 2023-07-13 | End: 2023-07-21

## 2023-07-13 RX ORDER — GABAPENTIN 400 MG/1
100 CAPSULE ORAL EVERY 8 HOURS
Refills: 0 | Status: DISCONTINUED | OUTPATIENT
Start: 2023-07-13 | End: 2023-07-13

## 2023-07-13 RX ORDER — INSULIN LISPRO 100/ML
VIAL (ML) SUBCUTANEOUS EVERY 6 HOURS
Refills: 0 | Status: DISCONTINUED | OUTPATIENT
Start: 2023-07-13 | End: 2023-07-14

## 2023-07-13 RX ORDER — LOSARTAN POTASSIUM 100 MG/1
25 TABLET, FILM COATED ORAL DAILY
Refills: 0 | Status: DISCONTINUED | OUTPATIENT
Start: 2023-07-13 | End: 2023-07-13

## 2023-07-13 RX ORDER — ASPIRIN/CALCIUM CARB/MAGNESIUM 324 MG
81 TABLET ORAL DAILY
Refills: 0 | Status: DISCONTINUED | OUTPATIENT
Start: 2023-07-13 | End: 2023-07-21

## 2023-07-13 RX ORDER — ATORVASTATIN CALCIUM 80 MG/1
40 TABLET, FILM COATED ORAL AT BEDTIME
Refills: 0 | Status: DISCONTINUED | OUTPATIENT
Start: 2023-07-13 | End: 2023-07-21

## 2023-07-13 RX ORDER — ATORVASTATIN CALCIUM 80 MG/1
40 TABLET, FILM COATED ORAL AT BEDTIME
Refills: 0 | Status: DISCONTINUED | OUTPATIENT
Start: 2023-07-13 | End: 2023-07-13

## 2023-07-13 RX ORDER — SODIUM CHLORIDE 9 MG/ML
1000 INJECTION, SOLUTION INTRAVENOUS
Refills: 0 | Status: DISCONTINUED | OUTPATIENT
Start: 2023-07-13 | End: 2023-07-21

## 2023-07-13 RX ORDER — DEXTROSE 50 % IN WATER 50 %
25 SYRINGE (ML) INTRAVENOUS ONCE
Refills: 0 | Status: DISCONTINUED | OUTPATIENT
Start: 2023-07-13 | End: 2023-07-13

## 2023-07-13 RX ORDER — GLUCAGON INJECTION, SOLUTION 0.5 MG/.1ML
1 INJECTION, SOLUTION SUBCUTANEOUS ONCE
Refills: 0 | Status: DISCONTINUED | OUTPATIENT
Start: 2023-07-13 | End: 2023-07-21

## 2023-07-13 RX ORDER — SODIUM CHLORIDE 9 MG/ML
1000 INJECTION, SOLUTION INTRAVENOUS
Refills: 0 | Status: DISCONTINUED | OUTPATIENT
Start: 2023-07-13 | End: 2023-07-13

## 2023-07-13 RX ORDER — GABAPENTIN 400 MG/1
100 CAPSULE ORAL EVERY 8 HOURS
Refills: 0 | Status: DISCONTINUED | OUTPATIENT
Start: 2023-07-13 | End: 2023-07-21

## 2023-07-13 RX ORDER — ENOXAPARIN SODIUM 100 MG/ML
40 INJECTION SUBCUTANEOUS EVERY 24 HOURS
Refills: 0 | Status: DISCONTINUED | OUTPATIENT
Start: 2023-07-13 | End: 2023-07-13

## 2023-07-13 RX ORDER — DEXTROSE 50 % IN WATER 50 %
12.5 SYRINGE (ML) INTRAVENOUS ONCE
Refills: 0 | Status: DISCONTINUED | OUTPATIENT
Start: 2023-07-13 | End: 2023-07-13

## 2023-07-13 RX ORDER — INSULIN LISPRO 100/ML
3 VIAL (ML) SUBCUTANEOUS
Refills: 0 | Status: DISCONTINUED | OUTPATIENT
Start: 2023-07-13 | End: 2023-07-14

## 2023-07-13 RX ORDER — ONDANSETRON 8 MG/1
4 TABLET, FILM COATED ORAL ONCE
Refills: 0 | Status: DISCONTINUED | OUTPATIENT
Start: 2023-07-13 | End: 2023-07-13

## 2023-07-13 RX ORDER — OXYCODONE HYDROCHLORIDE 5 MG/1
5 TABLET ORAL ONCE
Refills: 0 | Status: DISCONTINUED | OUTPATIENT
Start: 2023-07-13 | End: 2023-07-13

## 2023-07-13 RX ORDER — INSULIN LISPRO 100/ML
VIAL (ML) SUBCUTANEOUS EVERY 6 HOURS
Refills: 0 | Status: DISCONTINUED | OUTPATIENT
Start: 2023-07-13 | End: 2023-07-13

## 2023-07-13 RX ORDER — DEXTROSE 50 % IN WATER 50 %
25 SYRINGE (ML) INTRAVENOUS ONCE
Refills: 0 | Status: DISCONTINUED | OUTPATIENT
Start: 2023-07-13 | End: 2023-07-14

## 2023-07-13 RX ORDER — ENOXAPARIN SODIUM 100 MG/ML
40 INJECTION SUBCUTANEOUS EVERY 24 HOURS
Refills: 0 | Status: DISCONTINUED | OUTPATIENT
Start: 2023-07-13 | End: 2023-07-21

## 2023-07-13 RX ORDER — SODIUM CHLORIDE 9 MG/ML
1000 INJECTION, SOLUTION INTRAVENOUS
Refills: 0 | Status: DISCONTINUED | OUTPATIENT
Start: 2023-07-13 | End: 2023-07-15

## 2023-07-13 RX ORDER — GLUCAGON INJECTION, SOLUTION 0.5 MG/.1ML
1 INJECTION, SOLUTION SUBCUTANEOUS ONCE
Refills: 0 | Status: DISCONTINUED | OUTPATIENT
Start: 2023-07-13 | End: 2023-07-13

## 2023-07-13 RX ORDER — HYDROMORPHONE HYDROCHLORIDE 2 MG/ML
0.3 INJECTION INTRAMUSCULAR; INTRAVENOUS; SUBCUTANEOUS
Refills: 0 | Status: DISCONTINUED | OUTPATIENT
Start: 2023-07-13 | End: 2023-07-13

## 2023-07-13 RX ORDER — LOSARTAN POTASSIUM 100 MG/1
25 TABLET, FILM COATED ORAL DAILY
Refills: 0 | Status: DISCONTINUED | OUTPATIENT
Start: 2023-07-13 | End: 2023-07-21

## 2023-07-13 RX ORDER — DEXTROSE 50 % IN WATER 50 %
15 SYRINGE (ML) INTRAVENOUS ONCE
Refills: 0 | Status: DISCONTINUED | OUTPATIENT
Start: 2023-07-13 | End: 2023-07-14

## 2023-07-13 RX ORDER — MEROPENEM 1 G/30ML
1000 INJECTION INTRAVENOUS EVERY 8 HOURS
Refills: 0 | Status: DISCONTINUED | OUTPATIENT
Start: 2023-07-13 | End: 2023-07-14

## 2023-07-13 RX ORDER — OXYCODONE HYDROCHLORIDE 5 MG/1
10 TABLET ORAL ONCE
Refills: 0 | Status: DISCONTINUED | OUTPATIENT
Start: 2023-07-13 | End: 2023-07-13

## 2023-07-13 RX ORDER — ASPIRIN/CALCIUM CARB/MAGNESIUM 324 MG
81 TABLET ORAL DAILY
Refills: 0 | Status: DISCONTINUED | OUTPATIENT
Start: 2023-07-13 | End: 2023-07-13

## 2023-07-13 RX ORDER — INSULIN LISPRO 100/ML
VIAL (ML) SUBCUTANEOUS AT BEDTIME
Refills: 0 | Status: DISCONTINUED | OUTPATIENT
Start: 2023-07-13 | End: 2023-07-13

## 2023-07-13 RX ORDER — AMLODIPINE BESYLATE 2.5 MG/1
2.5 TABLET ORAL DAILY
Refills: 0 | Status: DISCONTINUED | OUTPATIENT
Start: 2023-07-13 | End: 2023-07-21

## 2023-07-13 RX ORDER — AMLODIPINE BESYLATE 2.5 MG/1
2.5 TABLET ORAL DAILY
Refills: 0 | Status: DISCONTINUED | OUTPATIENT
Start: 2023-07-13 | End: 2023-07-13

## 2023-07-13 RX ORDER — ACETAMINOPHEN 500 MG
1000 TABLET ORAL ONCE
Refills: 0 | Status: DISCONTINUED | OUTPATIENT
Start: 2023-07-13 | End: 2023-07-13

## 2023-07-13 RX ORDER — INSULIN GLARGINE 100 [IU]/ML
8 INJECTION, SOLUTION SUBCUTANEOUS AT BEDTIME
Refills: 0 | Status: DISCONTINUED | OUTPATIENT
Start: 2023-07-13 | End: 2023-07-13

## 2023-07-13 RX ORDER — OXYCODONE HYDROCHLORIDE 5 MG/1
5 TABLET ORAL EVERY 6 HOURS
Refills: 0 | Status: DISCONTINUED | OUTPATIENT
Start: 2023-07-13 | End: 2023-07-13

## 2023-07-13 RX ADMIN — MEROPENEM 100 MILLIGRAM(S): 1 INJECTION INTRAVENOUS at 22:37

## 2023-07-13 RX ADMIN — GABAPENTIN 100 MILLIGRAM(S): 400 CAPSULE ORAL at 22:38

## 2023-07-13 RX ADMIN — GABAPENTIN 100 MILLIGRAM(S): 400 CAPSULE ORAL at 13:02

## 2023-07-13 RX ADMIN — ATORVASTATIN CALCIUM 40 MILLIGRAM(S): 80 TABLET, FILM COATED ORAL at 22:36

## 2023-07-13 RX ADMIN — Medication 650 MILLIGRAM(S): at 02:24

## 2023-07-13 RX ADMIN — SODIUM CHLORIDE 75 MILLILITER(S): 9 INJECTION, SOLUTION INTRAVENOUS at 01:04

## 2023-07-13 RX ADMIN — LOSARTAN POTASSIUM 25 MILLIGRAM(S): 100 TABLET, FILM COATED ORAL at 06:36

## 2023-07-13 RX ADMIN — MEROPENEM 100 MILLIGRAM(S): 1 INJECTION INTRAVENOUS at 06:35

## 2023-07-13 RX ADMIN — Medication 650 MILLIGRAM(S): at 01:24

## 2023-07-13 RX ADMIN — Medication 650 MILLIGRAM(S): at 13:12

## 2023-07-13 RX ADMIN — Medication 1: at 13:02

## 2023-07-13 RX ADMIN — MEROPENEM 100 MILLIGRAM(S): 1 INJECTION INTRAVENOUS at 13:02

## 2023-07-13 RX ADMIN — Medication 2: at 06:36

## 2023-07-13 RX ADMIN — Medication 1: at 01:47

## 2023-07-13 RX ADMIN — GABAPENTIN 100 MILLIGRAM(S): 400 CAPSULE ORAL at 06:36

## 2023-07-13 RX ADMIN — INSULIN GLARGINE 8 UNIT(S): 100 INJECTION, SOLUTION SUBCUTANEOUS at 22:39

## 2023-07-13 RX ADMIN — Medication 650 MILLIGRAM(S): at 13:42

## 2023-07-13 RX ADMIN — AMLODIPINE BESYLATE 2.5 MILLIGRAM(S): 2.5 TABLET ORAL at 06:36

## 2023-07-13 NOTE — PROGRESS NOTE ADULT - SUBJECTIVE AND OBJECTIVE BOX
DATE OF SERVICE: 07-13-23 @ 13:16    Patient is a 72y old  Male who presents with a chief complaint of foot wound (12 Jul 2023 18:23)      INTERVAL HISTORY: Feels ok. Planned for R BKA later today.     REVIEW OF SYSTEMS:  CONSTITUTIONAL: No weakness  EYES/ENT: No visual changes;  No throat pain   NECK: No pain or stiffness  RESPIRATORY: No cough, wheezing; No shortness of breath  CARDIOVASCULAR: No chest pain or palpitations  GASTROINTESTINAL: No abdominal  pain. No nausea, vomiting, or hematemesis  GENITOURINARY: No dysuria, frequency or hematuria  NEUROLOGICAL: No stroke like symptoms  SKIN: No rashes    	  MEDICATIONS:  amLODIPine   Tablet 2.5 milliGRAM(s) Oral daily  losartan 25 milliGRAM(s) Oral daily        PHYSICAL EXAM:  T(C): 36.4 (07-13-23 @ 13:04), Max: 37.7 (07-13-23 @ 01:09)  HR: 89 (07-13-23 @ 13:04) (81 - 103)  BP: 138/74 (07-13-23 @ 13:04) (103/58 - 149/77)  RR: 18 (07-13-23 @ 13:04) (18 - 18)  SpO2: 98% (07-13-23 @ 13:04) (96% - 99%)  Wt(kg): --  I&O's Summary    12 Jul 2023 07:01  -  13 Jul 2023 07:00  --------------------------------------------------------  IN: 2030 mL / OUT: 0 mL / NET: 2030 mL    13 Jul 2023 07:01  -  13 Jul 2023 13:16  --------------------------------------------------------  IN: 0 mL / OUT: 0 mL / NET: 0 mL          Appearance: In no distress	  HEENT:    PERRL, EOMI	  Cardiovascular:  S1 S2, No JVD  Respiratory: Lungs clear to auscultation	  Gastrointestinal:  Soft, Non-tender, + BS	  Vascularature:  No edema of LE  Psychiatric: Appropriate affect   Neuro: no acute focal deficits                               9.0    11.47 )-----------( 328      ( 13 Jul 2023 07:34 )             28.3     07-13    135  |  99  |  26<H>  ----------------------------<  200<H>  4.0   |  21<L>  |  1.13    Ca    9.5      13 Jul 2023 07:23  Phos  3.8     07-13  Mg     2.4     07-13          Labs personally reviewed      ASSESSMENT/PLAN: 	    Patient is a 72 year old male with a PMHx of HTN, Type II DM associated with neuropathy whore presents to [facility] with a chief complaint of pain, blistering and fluid drainage on his right foot.    Problem/Plan -1  Problem: Cardiac Risk Stratification for podiatry surgery  - Denies CP or SOB  - TTE shows preserved EF, mild LVH  - Not in decompensated HF  - No tachy supa arrhythmia  - s/p cath with nonobstructive moderate CAD  - Elevated risk for low-mod risk pods surgery procedure, no contraindication to proceed   - Plan for OR R BKA today 7/13    Problem/Plan -2  Problem: Hypertension  - amlodipine  2.5mg PO daily  - c/w losartan to 25mg PO daily    Problem/Plan -3  Problem: DM II  - HgbA1c 8.4  - ISS as per primary team        Berenice Branch, RISHABH-NP   Matt Calhoun DO [facility]  Cardiovascular Medicine  [address] [place] [address], [address]  Available through call or text on [employer]  Office: [phone]

## 2023-07-13 NOTE — PROGRESS NOTE ADULT - ASSESSMENT
72M with h/o T2DM (A1c=8.4) with neuropathy, HTN recently admitted 3/30 with R 1st toe infection, s/p 4/10 s/p RLE SFA to PT bypass graft with PTFE followed by R foot Partial hallux Amputation 4/14, and RLE angiogram 7/3 with rotational athrectomy of proximal portion of the graft in the SFA, following by MIKE that improved flow, however a proximal lesion was demonstrated at that time that was not amenable to endovascular procedures, now presenting with nonhealing R hallux wound.    Plan:   - Appreciated ID: Cefepime + Flagyl  - intermittent fevers, UA negative, RVP negative, CXR normal. F/u blood cultures.   - LLE arterial duplex to r/o L groin PSA (done 7/8) - no evidence of pseudoaneurysm   - Medicine following and recs appreciated.   - OR today Right BKA  - Will f/u AM labs  - Regular diet  - Continue home meds   -PT eval after OR      Vascular Surgery   [id number]

## 2023-07-13 NOTE — PROGRESS NOTE ADULT - SUBJECTIVE AND OBJECTIVE BOX
Follow Up:  foot wound    Interval History/ROS:  seen earlier today  - left foot pain, no sob, cough, diarrhea, dysuria  - fatigued, hungry awaiting OR    Allergies  No Known Allergies        ANTIMICROBIALS:  meropenem  IVPB    meropenem  IVPB 1000 every 8 hours      OTHER MEDS:  MEDICATIONS  (STANDING):  acetaminophen     Tablet .. 650 every 6 hours PRN  acetaminophen     Tablet .. 650 every 6 hours PRN  amLODIPine   Tablet 2.5 daily  aspirin enteric coated 81 daily  atorvastatin 40 at bedtime  dextrose 50% Injectable 25 once  dextrose 50% Injectable 25 once  dextrose 50% Injectable 12.5 once  dextrose Oral Gel 15 once PRN  enoxaparin Injectable 40 every 24 hours  gabapentin 100 every 8 hours  glucagon  Injectable 1 once  insulin glargine Injectable (LANTUS) 8 at bedtime  insulin lispro (ADMELOG) corrective regimen sliding scale  every 6 hours  insulin lispro (ADMELOG) corrective regimen sliding scale  at bedtime  insulin lispro Injectable (ADMELOG) 3 three times a day before meals  losartan 25 daily  oxyCODONE    IR 5 every 6 hours PRN      Vital Signs Last 24 Hrs  T(C): 37.1 (13 Jul 2023 17:48), Max: 37.7 (13 Jul 2023 01:09)  T(F): 98.8 (13 Jul 2023 17:48), Max: 99.9 (13 Jul 2023 01:09)  HR: 83 (13 Jul 2023 17:48) (83 - 103)  BP: 133/73 (13 Jul 2023 17:48) (103/58 - 149/77)  BP(mean): --  RR: 18 (13 Jul 2023 17:48) (18 - 18)  SpO2: 97% (13 Jul 2023 17:48) (96% - 99%)    Parameters below as of 13 Jul 2023 17:48  Patient On (Oxygen Delivery Method): room air        PHYSICAL EXAM:  General: NAD, Non-toxic  Neurology: A&Ox3, nonfocal  Respiratory: Clear to auscultation bilaterally  CV: RRR, S1S2, no murmurs, rubs or gallops  Abdominal: Soft, Non-tender, non-distended, normal bowel sounds  Extremities: No edema, dressing RLE  Line Sites: Clear  Skin: No rash                          9.0    11.47 )-----------( 328      ( 13 Jul 2023 07:34 )             28.3       07-13    135  |  99  |  26<H>  ----------------------------<  200<H>  4.0   |  21<L>  |  1.13    Ca    9.5      13 Jul 2023 07:23  Phos  3.8     07-13  Mg     2.4     07-13        Urinalysis Basic - ( 13 Jul 2023 07:23 )    Color: x / Appearance: x / SG: x / pH: x  Gluc: 200 mg/dL / Ketone: x  / Bili: x / Urobili: x   Blood: x / Protein: x / Nitrite: x   Leuk Esterase: x / RBC: x / WBC x   Sq Epi: x / Non Sq Epi: x / Bacteria: x        MICROBIOLOGY:  .Blood Blood  07-10-23   No growth at 48 Hours  --  --      Clean Catch Clean Catch (Midstream)  07-08-23   No growth  --  --      .Blood Blood-Peripheral  07-08-23   No growth at 5 days  --  --      .Blood Blood-Peripheral  07-08-23   No growth at 5 days  --  --    Rapid RVP Result: NotDetec (07-10 @ 16:50)    RADIOLOGY:  < from: MR Foot w/wo IV Cont, Right (07.10.23 @ 22:26) >  INTERPRETATION:    Bones: There has been previouspartial amputation of the first digit,   across the base of the proximal phalanx. There is no evidence of stump   osteomyelitis. There is no marrow signal abnormality within the remainder   of the forefoot to suggest acute osteomyelitis.    Soft Tissues: The soft tissues overlying the first metatarsal amputation   stump are diminutive. There is edema within the intrinsic muscles of the   foot which may be seen in neuropathy. There is no localized abscess.      IMPRESSION:  Prior amputation of thefirst digit at the level of the base of the   proximal phalanx. Diminutive overlying soft tissues, without MRI evidence   of osteomyelitis. No abscess.    < end of copied text >  < from: Xray Chest 1 View- PORTABLE-Urgent (Xray Chest 1 View- PORTABLE-Urgent .) (07.10.23 @ 15:46) >  Impression:  Clear lungs.    < end of copied text >      Thai Stein MD; Division of Infectious Disease; Pager: [phone]; nights and weekends: [phone]

## 2023-07-13 NOTE — PROGRESS NOTE ADULT - SUBJECTIVE AND OBJECTIVE BOX
SURGERY DAILY PROGRESS NOTE:         SUBJECTIVE/ROS: Patient seen and examined at bedside.  No events overnight. In good spirits.  Pending OR today for BKA.       MEDICATIONS  (STANDING):  amLODIPine   Tablet 2.5 milliGRAM(s) Oral daily  aspirin enteric coated 81 milliGRAM(s) Oral daily  atorvastatin 40 milliGRAM(s) Oral at bedtime  dextrose 5%. 1000 milliLiter(s) (100 mL/Hr) IV Continuous <Continuous>  dextrose 5%. 1000 milliLiter(s) (50 mL/Hr) IV Continuous <Continuous>  dextrose 50% Injectable 25 Gram(s) IV Push once  dextrose 50% Injectable 25 Gram(s) IV Push once  dextrose 50% Injectable 12.5 Gram(s) IV Push once  enoxaparin Injectable 40 milliGRAM(s) SubCutaneous every 24 hours  gabapentin 100 milliGRAM(s) Oral every 8 hours  glucagon  Injectable 1 milliGRAM(s) IntraMuscular once  insulin glargine Injectable (LANTUS) 6 Unit(s) SubCutaneous at bedtime  insulin lispro (ADMELOG) corrective regimen sliding scale   SubCutaneous every 6 hours  insulin lispro (ADMELOG) corrective regimen sliding scale   SubCutaneous at bedtime  insulin lispro Injectable (ADMELOG) 3 Unit(s) SubCutaneous three times a day before meals  lactated ringers. 1000 milliLiter(s) (75 mL/Hr) IV Continuous <Continuous>  losartan 25 milliGRAM(s) Oral daily  meropenem  IVPB      meropenem  IVPB 1000 milliGRAM(s) IV Intermittent every 8 hours    MEDICATIONS  (PRN):  acetaminophen     Tablet .. 650 milliGRAM(s) Oral every 6 hours PRN Temp greater or equal to 38C (100.4F)  acetaminophen     Tablet .. 650 milliGRAM(s) Oral every 6 hours PRN Moderate Pain (4 - 6)  dextrose Oral Gel 15 Gram(s) Oral once PRN Blood Glucose LESS THAN 70 milliGRAM(s)/deciliter  oxyCODONE    IR 5 milliGRAM(s) Oral every 6 hours PRN Severe Pain (7 - 10)      OBJECTIVE:    Vital Signs Last 24 Hrs  T(C): 36.8 (13 Jul 2023 05:49), Max: 38.4 (12 Jul 2023 10:52)  T(F): 98.3 (13 Jul 2023 05:49), Max: 101.2 (12 Jul 2023 10:52)  HR: 85 (13 Jul 2023 05:49) (81 - 103)  BP: 149/77 (13 Jul 2023 05:49) (117/67 - 149/77)  BP(mean): --  RR: 18 (13 Jul 2023 05:49) (18 - 18)  SpO2: 98% (13 Jul 2023 05:49) (96% - 99%)    Parameters below as of 13 Jul 2023 05:49  Patient On (Oxygen Delivery Method): room air            I&O's Detail    12 Jul 2023 07:01  -  13 Jul 2023 07:00  --------------------------------------------------------  IN:    IV PiggyBack: 650 mL    Lactated Ringers: 450 mL    Oral Fluid: 930 mL  Total IN: 2030 mL    OUT:  Total OUT: 0 mL    Total NET: 2030 mL      13 Jul 2023 07:01  -  13 Jul 2023 09:09  --------------------------------------------------------  IN:  Total IN: 0 mL    OUT:    Oral Fluid: 0 mL  Total OUT: 0 mL    Total NET: 0 mL          PHYSICAL EXAM:  General: laying in bed, NAD  CV: S1, S2  Respiratory: nonlabored breathing   Ext: right foot dressing in place      LABS:                        9.0    11.47 )-----------( 328      ( 13 Jul 2023 07:34 )             28.3     07-13    135  |  99  |  26<H>  ----------------------------<  200<H>  4.0   |  21<L>  |  1.13    Ca    9.5      13 Jul 2023 07:23  Phos  3.8     07-13  Mg     2.4     07-13      PT/INR - ( 13 Jul 2023 07:31 )   PT: 13.6 sec;   INR: 1.17 ratio         PTT - ( 13 Jul 2023 07:31 )  PTT:29.9 sec  Urinalysis Basic - ( 13 Jul 2023 07:23 )    Color: x / Appearance: x / SG: x / pH: x  Gluc: 200 mg/dL / Ketone: x  / Bili: x / Urobili: x   Blood: x / Protein: x / Nitrite: x   Leuk Esterase: x / RBC: x / WBC x   Sq Epi: x / Non Sq Epi: x / Bacteria: x

## 2023-07-13 NOTE — PROGRESS NOTE ADULT - ASSESSMENT
72 M PMH DM, HTN, PAD    Recently admitted in March/April for R 1st toe OM  Wound cx polymicrobial with E. Cloacae, Staph Lugdunensis S to Oxacillin, and Delftia acidovorans  Underwent RLE SFA to PT bypass graft with PTFE on 4/10/23  Followed by R foot partial hallux amputation on 4/14/23  (OR cx and clean margin pathology negative for OM)  Discharged home on a week of Levaquin and Keflex    s/p RLE angiogram on 7/3/23 with rotational atherectomy of the proximal portion of the graft in the SFA, followed by MIKE that improved flow, however proximal lesion was present that was not amenable to endovascular measures  Presents to the ED for non healing R foot wound  admitted 7/8/23     No fevers, no purulent discharge from R foot 1st metatarsal wound  On exam, R 1st metatarsal wound to bone, proximal phalanx exposed, ischemic/gangrenous changes around the wound  Mild leukocytosis that resolved  7/8/23:  ESR = 120;  CRP=73  R foot XR with findings c/f OM    R foot OM in a diabetic man with PAD with lesion that was not amenable to endovascular measures  Leukocytosis, elevated ESR and CRP, open 1st metatarsal wound exposed to bone  has persistentfever on Cefepime Flagyl,   7/13 - decreased fever on Meropenem,  cultures negative     Antibiotics  Zosyn/Vancomycin 7/8--> 7/9  Cefepime 1g IV Q12h 7/9--> 7/12  Flagyl 500mg OV Q12h 7/9  --> 7/12  Meropenem 1000 mg IV every 12 hours  7/12 -->    Suggest  Continue Meropenem        short course anticipated

## 2023-07-13 NOTE — PROGRESS NOTE ADULT - SUBJECTIVE AND OBJECTIVE BOX
Name of Patient : ALEE DUNN  MRN: [id number]  Date of visit: 07-13-23 @ 14:44      Subjective: Patient seen and examined. No new events except as noted.   Patient seen earlier this AM. Sitting OOB TC. Pending OR for BKA today.   Up-trending leukocytosis Patient reports he had chills overnight.   ABX switched to Meropenem as per ID.     REVIEW OF SYSTEMS:    CONSTITUTIONAL: + Chills overnight   EYES/ENT: No visual changes;  No vertigo or throat pain   NECK: No pain or stiffness  RESPIRATORY: No cough, wheezing, hemoptysis; No shortness of breath  CARDIOVASCULAR: No chest pain or palpitations  GASTROINTESTINAL: No abdominal or epigastric pain. No nausea, vomiting, or hematemesis; No diarrhea or constipation. No melena or hematochezia.  GENITOURINARY: No dysuria, frequency or hematuria  NEUROLOGICAL: No numbness or weakness  SKIN: No itching, burning, rashes, or lesions   MSK: R great toe amputation; RLE toe wound   All other review of systems is negative unless indicated above.    MEDICATIONS:  MEDICATIONS  (STANDING):  amLODIPine   Tablet 2.5 milliGRAM(s) Oral daily  aspirin enteric coated 81 milliGRAM(s) Oral daily  atorvastatin 40 milliGRAM(s) Oral at bedtime  dextrose 5%. 1000 milliLiter(s) (50 mL/Hr) IV Continuous <Continuous>  dextrose 5%. 1000 milliLiter(s) (100 mL/Hr) IV Continuous <Continuous>  dextrose 50% Injectable 25 Gram(s) IV Push once  dextrose 50% Injectable 25 Gram(s) IV Push once  dextrose 50% Injectable 12.5 Gram(s) IV Push once  enoxaparin Injectable 40 milliGRAM(s) SubCutaneous every 24 hours  gabapentin 100 milliGRAM(s) Oral every 8 hours  glucagon  Injectable 1 milliGRAM(s) IntraMuscular once  insulin glargine Injectable (LANTUS) 6 Unit(s) SubCutaneous at bedtime  insulin lispro (ADMELOG) corrective regimen sliding scale   SubCutaneous every 6 hours  insulin lispro (ADMELOG) corrective regimen sliding scale   SubCutaneous at bedtime  insulin lispro Injectable (ADMELOG) 3 Unit(s) SubCutaneous three times a day before meals  lactated ringers. 1000 milliLiter(s) (75 mL/Hr) IV Continuous <Continuous>  losartan 25 milliGRAM(s) Oral daily  meropenem  IVPB      meropenem  IVPB 1000 milliGRAM(s) IV Intermittent every 8 hours      PHYSICAL EXAM:  T(C): 36.4 (07-13-23 @ 13:04), Max: 37.7 (07-13-23 @ 01:09)  HR: 89 (07-13-23 @ 13:04) (84 - 103)  BP: 138/74 (07-13-23 @ 13:04) (103/58 - 149/77)  RR: 18 (07-13-23 @ 13:04) (18 - 18)  SpO2: 98% (07-13-23 @ 13:04) (96% - 99%)  Wt(kg): --  I&O's Summary    12 Jul 2023 07:01  -  13 Jul 2023 07:00  --------------------------------------------------------  IN: 2030 mL / OUT: 0 mL / NET: 2030 mL    13 Jul 2023 07:01  -  13 Jul 2023 14:44  --------------------------------------------------------  IN: 0 mL / OUT: 0 mL / NET: 0 mL        Appearance: Normal; OOB TC 	  HEENT:  Eyes are open   Lymphatic: No lymphadenopathy   Cardiovascular: Normal S1 S2, no JVD  Respiratory: normal effort , clear  Gastrointestinal:  Soft, Non-tender  Skin: No rashes,  warm to touch  Psychiatry:  Mood & affect appropriate  Musculoskeletal:  RLE great toe amputation, in dressing             07-12-23 @ 07:01  -  07-13-23 @ 07:00  --------------------------------------------------------  IN: 2030 mL / OUT: 0 mL / NET: 2030 mL    07-13-23 @ 07:01  -  07-13-23 @ 14:44  --------------------------------------------------------  IN: 0 mL / OUT: 0 mL / NET: 0 mL                              9.0    11.47 )-----------( 328      ( 13 Jul 2023 07:34 )             28.3               07-13    135  |  99  |  26<H>  ----------------------------<  200<H>  4.0   |  21<L>  |  1.13    Ca    9.5      13 Jul 2023 07:23  Phos  3.8     07-13  Mg     2.4     07-13      PT/INR - ( 13 Jul 2023 07:31 )   PT: 13.6 sec;   INR: 1.17 ratio         PTT - ( 13 Jul 2023 07:31 )  PTT:29.9 sec                   Urinalysis Basic - ( 13 Jul 2023 07:23 )    Color: x / Appearance: x / SG: x / pH: x  Gluc: 200 mg/dL / Ketone: x  / Bili: x / Urobili: x   Blood: x / Protein: x / Nitrite: x   Leuk Esterase: x / RBC: x / WBC x   Sq Epi: x / Non Sq Epi: x / Bacteria: x          Culture - Blood (07.10.23 @ 16:07)   Specimen Source: .Blood Blood-Peripheral  Culture Results: No growth at 48 Hours    Culture - Blood (07.10.23 @ 16:07)   Specimen Source: .Blood Blood  Culture Results: No growth at 48 Hours

## 2023-07-13 NOTE — PROGRESS NOTE ADULT - ASSESSMENT
Patient is a 72 year old male with a PMHx of Type II DM associated with neuropathy, HTN who was recently admitted to [facility] with Right 1st toe infection, S/P Right LE SFA to PT bypass graft with PTFE followed by Right foot Partial hallux Amputation on 04/14, and RLE angiogram 07/03 with rotational atherectomy of proximal portion of the graft in the SFA, following by MIKE that improved flow, however a proximal lesion was demonstrated at that time that was not amenable to endovascular procedures. Patient presents to [facility] due to nonhealing Right foot wound. Internal medicine has been consulted on Mr. Paredes's care for medical management. Patient denies chest pain, palpitations, SOB or dyspnea.       Peripheral Arterial Disease   - S/P R LE SFA to PT bypass graft, RLE angiogram 07/03  - 07/08 Arterial Duplex negative for pseudoaneurysm   - On ASA 81 and Lipitor 40   - US as noted  - Care per vascular surgery appreciated     H/O R Toe infection   - S/P Great toe amputation   - Foot Xray with Mild erosive changes concerning for OM  - MR without evidence of OM, negative for abscess. F/u podiatry recs --> Was planned for amputation, however now planned for BKA with Vascular, defer per Pod/Vasc   - ABX as per ID, Was on Flagyl and Cefepime, now on Meropenem as per ID   - UCx and BCx2 w/ NGTD; F/u final   - Care per Vascular/ Podiatry appreciated   - Monitor CBC, temp curve, VS and patient closely     Febrile  - Pt febrile while on Cefepime and Flagyl  - Recommend to repeat pan cultures to isolate source --> Repeat BCx2 7/12 in lab; F/u final  - 7/10 BCx2 w/ NGTD; F/u final   - F/u with ID  --> ABX switched to Meropenem   - Monitor CBC, temp curve, VS and patient closely; Antipyretics PRN     Type II DM   - A1C 7.9   - C/w sliding scale  - C/w Lantus, increased to 8 units QHs and Pre-Meal 3 units TID; If NPO, hold pre-meal   - Diabetic/ DASH Diet   - Monitor glucose levels closely     Anemia   - Monitor Hgb closely   - Check Iron and Ferritin, noted, not ferritin deficient    - Transfuse for Hgb < 7.0     HTN   - C/w Norvasc 2.5, Losartan 25   - Monitor BP, VS and patient closely     Pre-Op risk stratification   - Previous TTE with EF of 61%, Mild LVH, normal RV  - Patient is medically optimized for OR. Moderate Risk candidate     DVT and GI PPX       Discussed with Attending.

## 2023-07-14 ENCOUNTER — RESULT REVIEW (OUTPATIENT)
Age: 73
End: 2023-07-14

## 2023-07-14 LAB
ANION GAP SERPL CALC-SCNC: 12 MMOL/L — SIGNIFICANT CHANGE UP (ref 5–17)
BUN SERPL-MCNC: 18 MG/DL — SIGNIFICANT CHANGE UP (ref 7–23)
CALCIUM SERPL-MCNC: 9.4 MG/DL — SIGNIFICANT CHANGE UP (ref 8.4–10.5)
CHLORIDE SERPL-SCNC: 103 MMOL/L — SIGNIFICANT CHANGE UP (ref 96–108)
CO2 SERPL-SCNC: 24 MMOL/L — SIGNIFICANT CHANGE UP (ref 22–31)
CREAT SERPL-MCNC: 0.95 MG/DL — SIGNIFICANT CHANGE UP (ref 0.5–1.3)
EGFR: 85 ML/MIN/1.73M2 — SIGNIFICANT CHANGE UP
GLUCOSE BLDC GLUCOMTR-MCNC: 141 MG/DL — HIGH (ref 70–99)
GLUCOSE BLDC GLUCOMTR-MCNC: 188 MG/DL — HIGH (ref 70–99)
GLUCOSE BLDC GLUCOMTR-MCNC: 217 MG/DL — HIGH (ref 70–99)
GLUCOSE BLDC GLUCOMTR-MCNC: 244 MG/DL — HIGH (ref 70–99)
GLUCOSE SERPL-MCNC: 112 MG/DL — HIGH (ref 70–99)
HCT VFR BLD CALC: 27.9 % — LOW (ref 39–50)
HGB BLD-MCNC: 8.9 G/DL — LOW (ref 13–17)
MAGNESIUM SERPL-MCNC: 2.6 MG/DL — SIGNIFICANT CHANGE UP (ref 1.6–2.6)
MCHC RBC-ENTMCNC: 28.8 PG — SIGNIFICANT CHANGE UP (ref 27–34)
MCHC RBC-ENTMCNC: 31.9 GM/DL — LOW (ref 32–36)
MCV RBC AUTO: 90.3 FL — SIGNIFICANT CHANGE UP (ref 80–100)
NRBC # BLD: 0 /100 WBCS — SIGNIFICANT CHANGE UP (ref 0–0)
PHOSPHATE SERPL-MCNC: 3.1 MG/DL — SIGNIFICANT CHANGE UP (ref 2.5–4.5)
PLATELET # BLD AUTO: 270 K/UL — SIGNIFICANT CHANGE UP (ref 150–400)
POTASSIUM SERPL-MCNC: 4.3 MMOL/L — SIGNIFICANT CHANGE UP (ref 3.5–5.3)
POTASSIUM SERPL-SCNC: 4.3 MMOL/L — SIGNIFICANT CHANGE UP (ref 3.5–5.3)
RBC # BLD: 3.09 M/UL — LOW (ref 4.2–5.8)
RBC # FLD: 13.1 % — SIGNIFICANT CHANGE UP (ref 10.3–14.5)
SODIUM SERPL-SCNC: 139 MMOL/L — SIGNIFICANT CHANGE UP (ref 135–145)
WBC # BLD: 6.69 K/UL — SIGNIFICANT CHANGE UP (ref 3.8–10.5)
WBC # FLD AUTO: 6.69 K/UL — SIGNIFICANT CHANGE UP (ref 3.8–10.5)

## 2023-07-14 PROCEDURE — 99232 SBSQ HOSP IP/OBS MODERATE 35: CPT

## 2023-07-14 PROCEDURE — 88311 DECALCIFY TISSUE: CPT | Mod: 26

## 2023-07-14 PROCEDURE — 88307 TISSUE EXAM BY PATHOLOGIST: CPT | Mod: 26

## 2023-07-14 RX ORDER — DEXTROSE 50 % IN WATER 50 %
25 SYRINGE (ML) INTRAVENOUS ONCE
Refills: 0 | Status: DISCONTINUED | OUTPATIENT
Start: 2023-07-14 | End: 2023-07-21

## 2023-07-14 RX ORDER — INSULIN GLARGINE 100 [IU]/ML
8 INJECTION, SOLUTION SUBCUTANEOUS AT BEDTIME
Refills: 0 | Status: DISCONTINUED | OUTPATIENT
Start: 2023-07-14 | End: 2023-07-17

## 2023-07-14 RX ORDER — DEXTROSE 50 % IN WATER 50 %
15 SYRINGE (ML) INTRAVENOUS ONCE
Refills: 0 | Status: DISCONTINUED | OUTPATIENT
Start: 2023-07-14 | End: 2023-07-21

## 2023-07-14 RX ORDER — INSULIN LISPRO 100/ML
VIAL (ML) SUBCUTANEOUS
Refills: 0 | Status: DISCONTINUED | OUTPATIENT
Start: 2023-07-14 | End: 2023-07-20

## 2023-07-14 RX ORDER — DEXTROSE 50 % IN WATER 50 %
12.5 SYRINGE (ML) INTRAVENOUS ONCE
Refills: 0 | Status: DISCONTINUED | OUTPATIENT
Start: 2023-07-14 | End: 2023-07-21

## 2023-07-14 RX ORDER — INSULIN LISPRO 100/ML
3 VIAL (ML) SUBCUTANEOUS
Refills: 0 | Status: DISCONTINUED | OUTPATIENT
Start: 2023-07-14 | End: 2023-07-18

## 2023-07-14 RX ORDER — INSULIN LISPRO 100/ML
VIAL (ML) SUBCUTANEOUS AT BEDTIME
Refills: 0 | Status: DISCONTINUED | OUTPATIENT
Start: 2023-07-14 | End: 2023-07-20

## 2023-07-14 RX ADMIN — Medication 81 MILLIGRAM(S): at 11:11

## 2023-07-14 RX ADMIN — Medication 1: at 12:20

## 2023-07-14 RX ADMIN — ATORVASTATIN CALCIUM 40 MILLIGRAM(S): 80 TABLET, FILM COATED ORAL at 21:51

## 2023-07-14 RX ADMIN — Medication 3 UNIT(S): at 09:36

## 2023-07-14 RX ADMIN — MEROPENEM 100 MILLIGRAM(S): 1 INJECTION INTRAVENOUS at 14:22

## 2023-07-14 RX ADMIN — SODIUM CHLORIDE 75 MILLILITER(S): 9 INJECTION, SOLUTION INTRAVENOUS at 20:04

## 2023-07-14 RX ADMIN — GABAPENTIN 100 MILLIGRAM(S): 400 CAPSULE ORAL at 06:11

## 2023-07-14 RX ADMIN — ENOXAPARIN SODIUM 40 MILLIGRAM(S): 100 INJECTION SUBCUTANEOUS at 06:11

## 2023-07-14 RX ADMIN — LOSARTAN POTASSIUM 25 MILLIGRAM(S): 100 TABLET, FILM COATED ORAL at 06:12

## 2023-07-14 RX ADMIN — Medication 3 UNIT(S): at 17:22

## 2023-07-14 RX ADMIN — Medication 1 TABLET(S): at 20:04

## 2023-07-14 RX ADMIN — INSULIN GLARGINE 8 UNIT(S): 100 INJECTION, SOLUTION SUBCUTANEOUS at 21:54

## 2023-07-14 RX ADMIN — Medication 3 UNIT(S): at 12:23

## 2023-07-14 RX ADMIN — GABAPENTIN 100 MILLIGRAM(S): 400 CAPSULE ORAL at 21:51

## 2023-07-14 RX ADMIN — GABAPENTIN 100 MILLIGRAM(S): 400 CAPSULE ORAL at 13:56

## 2023-07-14 RX ADMIN — MEROPENEM 100 MILLIGRAM(S): 1 INJECTION INTRAVENOUS at 06:56

## 2023-07-14 RX ADMIN — AMLODIPINE BESYLATE 2.5 MILLIGRAM(S): 2.5 TABLET ORAL at 06:12

## 2023-07-14 RX ADMIN — Medication 2: at 17:22

## 2023-07-14 NOTE — PROGRESS NOTE ADULT - SUBJECTIVE AND OBJECTIVE BOX
Name of Patient : ALEE DUNN  MRN: [id number]  Date of visit: 07-14-23 @ 14:36      Subjective: Patient seen and examined. No new events except as noted.   Patient seen earlier this AM. S/P OR for R BKA. Reports pain is controlled at time of encounter.   Afebrile.     REVIEW OF SYSTEMS:    CONSTITUTIONAL: No weakness, fevers or chills  EYES/ENT: No visual changes;  No vertigo or throat pain   NECK: No pain or stiffness  RESPIRATORY: No cough, wheezing, hemoptysis; No shortness of breath  CARDIOVASCULAR: No chest pain or palpitations  GASTROINTESTINAL: No abdominal or epigastric pain. No nausea, vomiting, or hematemesis; No diarrhea or constipation. No melena or hematochezia.  GENITOURINARY: No dysuria, frequency or hematuria  NEUROLOGICAL: No numbness or weakness  SKIN: No itching, burning, rashes, or lesions   MSK: R great toe amputation; RLE toe wound; S/P RLE BKA   All other review of systems is negative unless indicated above.    MEDICATIONS:  MEDICATIONS  (STANDING):  amLODIPine   Tablet 2.5 milliGRAM(s) Oral daily  aspirin enteric coated 81 milliGRAM(s) Oral daily  atorvastatin 40 milliGRAM(s) Oral at bedtime  dextrose 5%. 1000 milliLiter(s) (50 mL/Hr) IV Continuous <Continuous>  dextrose 50% Injectable 12.5 Gram(s) IV Push once  dextrose 50% Injectable 25 Gram(s) IV Push once  dextrose 50% Injectable 25 Gram(s) IV Push once  enoxaparin Injectable 40 milliGRAM(s) SubCutaneous every 24 hours  gabapentin 100 milliGRAM(s) Oral every 8 hours  glucagon  Injectable 1 milliGRAM(s) IntraMuscular once  insulin glargine Injectable (LANTUS) 8 Unit(s) SubCutaneous at bedtime  insulin lispro (ADMELOG) corrective regimen sliding scale   SubCutaneous three times a day before meals  insulin lispro (ADMELOG) corrective regimen sliding scale   SubCutaneous at bedtime  insulin lispro Injectable (ADMELOG) 3 Unit(s) SubCutaneous three times a day before meals  lactated ringers. 1000 milliLiter(s) (75 mL/Hr) IV Continuous <Continuous>  losartan 25 milliGRAM(s) Oral daily  meropenem  IVPB 1000 milliGRAM(s) IV Intermittent every 8 hours      PHYSICAL EXAM:  T(C): 37.2 (07-14-23 @ 13:48), Max: 37.7 (07-13-23 @ 19:35)  HR: 82 (07-14-23 @ 13:48) (76 - 98)  BP: 151/75 (07-14-23 @ 13:48) (119/54 - 183/68)  RR: 17 (07-14-23 @ 13:48) (12 - 18)  SpO2: 99% (07-14-23 @ 13:48) (94% - 99%)  Wt(kg): --  I&O's Summary    13 Jul 2023 07:01  -  14 Jul 2023 07:00  --------------------------------------------------------  IN: 1725 mL / OUT: 750 mL / NET: 975 mL    14 Jul 2023 07:01  -  14 Jul 2023 14:36  --------------------------------------------------------  IN: 340 mL / OUT: 1500 mL / NET: -1160 mL      Height (cm): 170.2 (07-13 @ 19:47)  Weight (kg): 72.1 (07-13 @ 19:47)  BMI (kg/m2): 24.9 (07-13 @ 19:47)  BSA (m2): 1.83 (07-13 @ 19:47)      Appearance: Normal; OOB TC 	  HEENT:  Eyes are open   Lymphatic: No lymphadenopathy   Cardiovascular: Normal S1 S2, no JVD  Respiratory: normal effort , clear  Gastrointestinal:  Soft, Non-tender  Skin: No rashes,  warm to touch  Psychiatry:  Mood & affect appropriate  Musculoskeletal:  RLE great toe amputation, in dressing, R BKA, dressing in place         07-13-23 @ 07:01  -  07-14-23 @ 07:00  --------------------------------------------------------  IN: 1725 mL / OUT: 750 mL / NET: 975 mL    07-14-23 @ 07:01  -  07-14-23 @ 14:36  --------------------------------------------------------  IN: 340 mL / OUT: 1500 mL / NET: -1160 mL                            8.9    6.69  )-----------( 270      ( 14 Jul 2023 06:41 )             27.9               07-14    139  |  103  |  18  ----------------------------<  112<H>  4.3   |  24  |  0.95    Ca    9.4      14 Jul 2023 06:41  Phos  3.1     07-14  Mg     2.6     07-14      PT/INR - ( 13 Jul 2023 07:31 )   PT: 13.6 sec;   INR: 1.17 ratio         PTT - ( 13 Jul 2023 07:31 )  PTT:29.9 sec                   Urinalysis Basic - ( 14 Jul 2023 06:41 )    Color: x / Appearance: x / SG: x / pH: x  Gluc: 112 mg/dL / Ketone: x  / Bili: x / Urobili: x   Blood: x / Protein: x / Nitrite: x   Leuk Esterase: x / RBC: x / WBC x   Sq Epi: x / Non Sq Epi: x / Bacteria: x            Culture - Blood (07.12.23 @ 19:45)   Specimen Source: .Blood Blood-Venous  Culture Results: No growth at 24 hours    Culture - Blood (07.12.23 @ 19:25)   Specimen Source: .Blood Blood-Venous  Culture Results: No growth at 24 hours    Culture - Blood (07.10.23 @ 16:07)   Specimen Source: .Blood Blood-Peripheral  Culture Results: No growth at 72 Hours    Culture - Blood (07.10.23 @ 16:07)   Specimen Source: .Blood Blood  Culture Results: No growth at 72 Hours

## 2023-07-14 NOTE — PROGRESS NOTE ADULT - PROVIDER SPECIALTY LIST ADULT
"Physical Therapy Treatment completed.   Bed Mobility:  Supine to Sit: Maximal Assist  Transfers: Sit to Stand: Moderate Assist  Gait: Level Of Assist: Moderate Assist with Front-Wheel Walker       Plan of Care: Will benefit from Physical Therapy 4 times per week  Discharge Recommendations: Equipment: Will Continue to Assess for Equipment Needs. Recommend inpatient transitional care services for continued physical therapy services.      See \"Rehab Therapy-Acute\" Patient Summary Report for complete documentation.     Pt is progressing gradually with functional mobility and therapy. Pt was able to demonstrate improved standing balance, pain tolerance, and activity tolerance. Pt continues to be limited due to pain, fatigue, and weakness. Pt was able to tolerate supine ther-ex well, however, requires lots of cues and demo for approirate mechanics, demonstrates with poor quad activiation at this time. Pt was able to demonstrate Max A for supine to sit, Mod A for sit<>stand, and Min A for transfer to chair. Pt was able to take a few steps forward and back, however, very limited due to pain. Pt will continue to benefit from skilled PT while in house, with recommendation for post acute therapy prior to d/c home given current objective findings.   " Infectious Disease

## 2023-07-14 NOTE — PROGRESS NOTE ADULT - ASSESSMENT
Patient is a 72 year old male with a PMHx of Type II DM associated with neuropathy, HTN who was recently admitted to [facility] with Right 1st toe infection, S/P Right LE SFA to PT bypass graft with PTFE followed by Right foot Partial hallux Amputation on 04/14, and RLE angiogram 07/03 with rotational atherectomy of proximal portion of the graft in the SFA, following by MIKE that improved flow, however a proximal lesion was demonstrated at that time that was not amenable to endovascular procedures. Patient presents to [facility] due to nonhealing Right foot wound. Internal medicine has been consulted on Mr. Paredes's care for medical management. Patient denies chest pain, palpitations, SOB or dyspnea.       Peripheral Arterial Disease   - S/P R LE SFA to PT bypass graft, RLE angiogram 07/03  - 07/08 Arterial Duplex negative for pseudoaneurysm   - On ASA 81 and Lipitor 40   - US as noted  - Care per vascular surgery appreciated     H/O R Toe infection   - S/P Great toe amputation   - Foot Xray with Mild erosive changes concerning for OM  - MR without evidence of OM, negative for abscess. F/u podiatry recs --> S/P BKA   - ABX as per ID, Was on Flagyl and Cefepime, now on Meropenem as per ID   - UCx and BCx2 w/ NGTD; F/u final   - Pain control   - Incentive spirometer   - Care per Vascular/ Podiatry appreciated   - Monitor CBC, temp curve, VS and patient closely     Febrile  - Pt febrile while on Cefepime and Flagyl  - Recommend to repeat pan cultures to isolate source --> Repeat BCx2 7/12 NGTD; F/u final  - 7/10 BCx2 w/ NGTD; F/u final   - F/u with ID  --> ABX switched to Meropenem   - Monitor CBC, temp curve, VS and patient closely; Antipyretics PRN     Type II DM   - A1C 7.9   - C/w sliding scale  - C/w Lantus, increased to 8 units QHs and Pre-Meal 3 units TID; If NPO, hold pre-meal   - Diabetic/ DASH Diet   - Monitor glucose levels closely     Anemia   - Monitor Hgb closely   - Check Iron and Ferritin, noted, not ferritin deficient    - Transfuse for Hgb < 7.0     HTN   - C/w Norvasc 2.5, Losartan 25   - Monitor BP, VS and patient closely     Pre-Op risk stratification   - Previous TTE with EF of 61%, Mild LVH, normal RV  - Patient is medically optimized for OR. Moderate Risk candidate; S/P OR     DVT and GI PPX       Discussed with Attending.

## 2023-07-14 NOTE — CHART NOTE - NSCHARTNOTEFT_GEN_A_CORE
POST-OPERATIVE NOTE    Subjective:  Patient is s/p BKA. Recovering appropriately resting comfortably in bed. No SOB, N/V, or numbness. Urinating and passing gas. No BM    Vital Signs Last 24 Hrs  T(C): 36.9 (14 Jul 2023 01:47), Max: 37.7 (13 Jul 2023 19:35)  T(F): 98.4 (14 Jul 2023 01:47), Max: 99.9 (13 Jul 2023 19:35)  HR: 77 (14 Jul 2023 01:47) (77 - 98)  BP: 141/69 (14 Jul 2023 01:47) (103/58 - 183/68)  BP(mean): 92 (13 Jul 2023 23:15) (81 - 92)  RR: 16 (14 Jul 2023 01:47) (12 - 18)  SpO2: 97% (14 Jul 2023 01:47) (94% - 99%)    Parameters below as of 14 Jul 2023 01:47  Patient On (Oxygen Delivery Method): room air      I&O's Detail    12 Jul 2023 07:01  -  13 Jul 2023 07:00  --------------------------------------------------------  IN:    IV PiggyBack: 650 mL    Lactated Ringers: 450 mL    Oral Fluid: 930 mL  Total IN: 2030 mL    OUT:  Total OUT: 0 mL    Total NET: 2030 mL      13 Jul 2023 07:01  -  14 Jul 2023 03:10  --------------------------------------------------------  IN:    IV PiggyBack: 100 mL    Lactated Ringers: 450 mL    Lactated Ringers: 225 mL    Oral Fluid: 150 mL  Total IN: 925 mL    OUT:    Voided (mL): 300 mL  Total OUT: 300 mL    Total NET: 625 mL        meropenem  IVPB 1000  amLODIPine   Tablet 2.5  aspirin enteric coated 81  enoxaparin Injectable 40  losartan 25  meropenem  IVPB 1000    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      PAD (peripheral artery disease)      History of amputation of toe            Physical Exam:  General: NAD, resting comfortably in bed  Pulmonary: Nonlabored breathing, no respiratory distress  Cardiovascular: NSR  Abdominal: soft, NT/ND  Extremities: WWP, Dressings clean      LABS:                        9.0    11.47 )-----------( 328      ( 13 Jul 2023 07:34 )             28.3     07-13    135  |  99  |  26<H>  ----------------------------<  200<H>  4.0   |  21<L>  |  1.13    Ca    9.5      13 Jul 2023 07:23  Phos  3.8     07-13  Mg     2.4     07-13      PT/INR - ( 13 Jul 2023 07:31 )   PT: 13.6 sec;   INR: 1.17 ratio         PTT - ( 13 Jul 2023 07:31 )  PTT:29.9 sec  CAPILLARY BLOOD GLUCOSE      POCT Blood Glucose.: 169 mg/dL (13 Jul 2023 22:36)  POCT Blood Glucose.: 169 mg/dL (13 Jul 2023 21:18)  POCT Blood Glucose.: 150 mg/dL (13 Jul 2023 18:09)  POCT Blood Glucose.: 199 mg/dL (13 Jul 2023 12:43)  POCT Blood Glucose.: 203 mg/dL (13 Jul 2023 06:20)      Radiology and Additional Studies:    Assessment:  The patient is a 72y Male who is now several hours post-op from a BKA. No acute concerns.

## 2023-07-14 NOTE — PROGRESS NOTE ADULT - SUBJECTIVE AND OBJECTIVE BOX
Follow Up:  foot wound    Interval History/ROS:  in good spirits,   relieved to have amputation,  no pain, no phantom sensation    Allergies  No Known Allergies      ANTIMICROBIALS:  meropenem  IVPB 1000 every 8 hours      OTHER MEDS:  MEDICATIONS  (STANDING):  acetaminophen     Tablet .. 650 every 6 hours PRN  amLODIPine   Tablet 2.5 daily  aspirin enteric coated 81 daily  atorvastatin 40 at bedtime  dextrose 50% Injectable 25 once  dextrose 50% Injectable 12.5 once  dextrose 50% Injectable 25 once  dextrose Oral Gel 15 once PRN  enoxaparin Injectable 40 every 24 hours  gabapentin 100 every 8 hours  glucagon  Injectable 1 once  insulin glargine Injectable (LANTUS) 8 at bedtime  insulin lispro (ADMELOG) corrective regimen sliding scale  three times a day before meals  insulin lispro (ADMELOG) corrective regimen sliding scale  at bedtime  insulin lispro Injectable (ADMELOG) 3 three times a day before meals  losartan 25 daily  oxyCODONE    IR 5 every 6 hours PRN    Vital Signs Last 24 Hrs  T(C): 37.2 (14 Jul 2023 13:48), Max: 37.7 (13 Jul 2023 19:35)  T(F): 98.9 (14 Jul 2023 13:48), Max: 99.9 (13 Jul 2023 19:35)  HR: 82 (14 Jul 2023 13:48) (76 - 98)  BP: 151/75 (14 Jul 2023 13:48) (119/54 - 183/68)  BP(mean): 92 (13 Jul 2023 23:15) (81 - 92)  RR: 17 (14 Jul 2023 13:48) (12 - 18)  SpO2: 99% (14 Jul 2023 13:48) (94% - 99%)    Parameters below as of 14 Jul 2023 13:48  Patient On (Oxygen Delivery Method): room air      PHYSICAL EXAM:  General: WN/WD NAD, Non-toxic  Neurology: A&Ox3, nonfocal  Respiratory: Clear to auscultation bilaterally  CV: RRR, S1S2, no murmurs, rubs or gallops  Abdominal: Soft, Non-tender, non-distended, normal bowel sounds  Extremities: No edema, dressing clean and dry  Line Sites: Clear  Skin: No rash                          8.9    6.69  )-----------( 270      ( 14 Jul 2023 06:41 )             27.9       07-14    139  |  103  |  18  ----------------------------<  112<H>  4.3   |  24  |  0.95    Ca    9.4      14 Jul 2023 06:41  Phos  3.1     07-14  Mg     2.6     07-14      Urinalysis Basic - ( 14 Jul 2023 06:41 )    Color: x / Appearance: x / SG: x / pH: x  Gluc: 112 mg/dL / Ketone: x  / Bili: x / Urobili: x   Blood: x / Protein: x / Nitrite: x   Leuk Esterase: x / RBC: x / WBC x   Sq Epi: x / Non Sq Epi: x / Bacteria: x      MICROBIOLOGY:  .Blood Blood-Venous  07-12-23   No growth at 24 hours  --  --      .Blood Blood-Venous  07-12-23   No growth at 24 hours  --  --      .Blood Blood  07-10-23   No growth at 72 Hours  --  --      Clean Catch Clean Catch (Midstream)  07-08-23   No growth  --  --      .Blood Blood-Peripheral  07-08-23   No growth at 5 days  --  --      .Blood Blood-Peripheral  07-08-23   No growth at 5 days  --  --    Rapid RVP Result: NotDetec (07-10 @ 16:50)        RADIOLOGY:    Thai Stein MD; Division of Infectious Disease; Pager: [phone]; nights and weekends: [phone]

## 2023-07-14 NOTE — PROGRESS NOTE ADULT - ASSESSMENT
72 M PMH DM, HTN, PAD    Recently admitted in March/April for R 1st toe OM  Wound cx polymicrobial with E. Cloacae, Staph Lugdunensis S to Oxacillin, and Delftia acidovorans  Underwent RLE SFA to PT bypass graft with PTFE on 4/10/23  Followed by R foot partial hallux amputation on 4/14/23  (OR cx and clean margin pathology negative for OM)  Discharged home on a week of Levaquin and Keflex    s/p RLE angiogram on 7/3/23 with rotational atherectomy of the proximal portion of the graft in the SFA, followed by MIKE that improved flow, however proximal lesion was present that was not amenable to endovascular measures  Presents to the ED for non healing R foot wound  admitted 7/8/23     No fevers, no purulent discharge from R foot 1st metatarsal wound  On exam, R 1st metatarsal wound to bone, proximal phalanx exposed, ischemic/gangrenous changes around the wound  Mild leukocytosis that resolved  7/8/23:  ESR = 120;  CRP=73  R foot XR with findings c/f OM    R foot OM in a diabetic man with PAD with lesion that was not amenable to endovascular measures  Leukocytosis, elevated ESR and CRP, open 1st metatarsal wound exposed to bone  has persistentfever on Cefepime Flagyl,   7/13 - decreased fever on Meropenem,  cultures negative  7/13/23 OR:   guillotine amputation R BKA     Antibiotics  Zosyn/Vancomycin 7/8--> 7/9  Cefepime 1g IV Q12h 7/9--> 7/12  Flagyl 500mg OV Q12h 7/9  --> 7/12  Meropenem 1000 mg IV every 12 hours  7/12 --> 7/14    Suggest  STOP  Meropenem  (I ordered)      Please call ID if needed over weekend

## 2023-07-14 NOTE — PROGRESS NOTE ADULT - ASSESSMENT
72M with h/o T2DM (A1c=8.4) with neuropathy, HTN recently admitted 3/30 with R 1st toe infection, s/p 4/10 s/p RLE SFA to PT bypass graft with PTFE followed by R foot Partial hallux Amputation 4/14, and RLE angiogram 7/3 with rotational athrectomy of proximal portion of the graft in the SFA, following by MIKE that improved flow, however a proximal lesion was demonstrated at that time that was not amenable to endovascular procedures, now presenting with nonhealing R hallux wound. Now s/p R LINDA avendano (7/13).    Plan:   - Appreciated ID: Cefepime + Flagyl  - intermittent fevers, UA negative, RVP negative, CXR normal. BC; NGTD (24 hrs)- continue to follow.   - LLE arterial duplex to r/o L groin PSA (done 7/8) - no evidence of pseudoaneurysm   - Medicine following and recs appreciated.   - Will f/u AM labs  - Regular diet  - Continue home meds   - PT eval      Vascular Surgery   [id number]

## 2023-07-14 NOTE — PROGRESS NOTE ADULT - SUBJECTIVE AND OBJECTIVE BOX
VASCULAR SURGERY DAILY PROGRESS NOTE:     SUBJECTIVE/ROS:     Overnight: no acute events   Patient seen and evaluated on AM rounds. Patient doing well post-op. No acute event overnights. Pain well controlled.   Patient otherwise denies nausea, vomiting, chest pain, shortness of breath     OBJECTIVE:  Vital Signs Last 24 Hrs  T(C): 36.7 (14 Jul 2023 05:22), Max: 37.7 (13 Jul 2023 19:35)  T(F): 98 (14 Jul 2023 05:22), Max: 99.9 (13 Jul 2023 19:35)  HR: 76 (14 Jul 2023 05:22) (76 - 98)  BP: 168/68 (14 Jul 2023 05:22) (103/58 - 183/68)  BP(mean): 92 (13 Jul 2023 23:15) (81 - 92)  RR: 16 (14 Jul 2023 05:22) (12 - 18)  SpO2: 99% (14 Jul 2023 05:22) (94% - 99%)    Parameters below as of 14 Jul 2023 05:22  Patient On (Oxygen Delivery Method): room air      I&O's Detail    13 Jul 2023 07:01  -  14 Jul 2023 07:00  --------------------------------------------------------  IN:    IV PiggyBack: 150 mL    Lactated Ringers: 450 mL    Lactated Ringers: 825 mL    Oral Fluid: 300 mL  Total IN: 1725 mL    OUT:    Voided (mL): 750 mL  Total OUT: 750 mL    Total NET: 975 mL        Daily Height in cm: 170.18 (13 Jul 2023 19:47)    Daily   MEDICATIONS  (STANDING):  amLODIPine   Tablet 2.5 milliGRAM(s) Oral daily  aspirin enteric coated 81 milliGRAM(s) Oral daily  atorvastatin 40 milliGRAM(s) Oral at bedtime  dextrose 5%. 1000 milliLiter(s) (50 mL/Hr) IV Continuous <Continuous>  dextrose 50% Injectable 25 Gram(s) IV Push once  dextrose 50% Injectable 25 Gram(s) IV Push once  dextrose 50% Injectable 12.5 Gram(s) IV Push once  enoxaparin Injectable 40 milliGRAM(s) SubCutaneous every 24 hours  gabapentin 100 milliGRAM(s) Oral every 8 hours  glucagon  Injectable 1 milliGRAM(s) IntraMuscular once  insulin glargine Injectable (LANTUS) 8 Unit(s) SubCutaneous at bedtime  insulin lispro (ADMELOG) corrective regimen sliding scale   SubCutaneous at bedtime  insulin lispro (ADMELOG) corrective regimen sliding scale   SubCutaneous three times a day before meals  insulin lispro Injectable (ADMELOG) 3 Unit(s) SubCutaneous three times a day before meals  lactated ringers. 1000 milliLiter(s) (75 mL/Hr) IV Continuous <Continuous>  losartan 25 milliGRAM(s) Oral daily  meropenem  IVPB 1000 milliGRAM(s) IV Intermittent every 8 hours    MEDICATIONS  (PRN):  acetaminophen     Tablet .. 650 milliGRAM(s) Oral every 6 hours PRN Temp greater or equal to 38C (100.4F)  dextrose Oral Gel 15 Gram(s) Oral once PRN Blood Glucose LESS THAN 70 milliGRAM(s)/deciliter  oxyCODONE    IR 5 milliGRAM(s) Oral every 6 hours PRN Severe Pain (7 - 10)      Labs:                        8.9    6.69  )-----------( 270      ( 14 Jul 2023 06:41 )             27.9     07-14    139  |  103  |  18  ----------------------------<  112<H>  4.3   |  24  |  0.95    Ca    9.4      14 Jul 2023 06:41  Phos  3.1     07-14  Mg     2.6     07-14      PT/INR - ( 13 Jul 2023 07:31 )   PT: 13.6 sec;   INR: 1.17 ratio         PTT - ( 13 Jul 2023 07:31 )  PTT:29.9 sec  Urinalysis Basic - ( 14 Jul 2023 06:41 )    Color: x / Appearance: x / SG: x / pH: x  Gluc: 112 mg/dL / Ketone: x  / Bili: x / Urobili: x   Blood: x / Protein: x / Nitrite: x   Leuk Esterase: x / RBC: x / WBC x   Sq Epi: x / Non Sq Epi: x / Bacteria: x      Physical Exam:  General: well developed, well nourished, NAD  Cardiovascular: appears well perfused   Respiratory: respirations non labored  Gastrointestinal: soft, nontender, nondistended  Extremities: FROM, warm  Neurological: A+Ox3

## 2023-07-14 NOTE — PROGRESS NOTE ADULT - SUBJECTIVE AND OBJECTIVE BOX
DATE OF SERVICE: 07-14-23 @ 13:28    Patient is a 72y old  Male who presents with a chief complaint of foot wound (13 Jul 2023 18:42)      INTERVAL HISTORY: Feels ok s/p BKA.     REVIEW OF SYSTEMS:  CONSTITUTIONAL: No weakness  EYES/ENT: No visual changes;  No throat pain   NECK: No pain or stiffness  RESPIRATORY: No cough, wheezing; No shortness of breath  CARDIOVASCULAR: No chest pain or palpitations  GASTROINTESTINAL: No abdominal  pain. No nausea, vomiting, or hematemesis  GENITOURINARY: No dysuria, frequency or hematuria  NEUROLOGICAL: No stroke like symptoms  SKIN: No rashes      	  MEDICATIONS:  amLODIPine   Tablet 2.5 milliGRAM(s) Oral daily  losartan 25 milliGRAM(s) Oral daily        PHYSICAL EXAM:  T(C): 36.8 (07-14-23 @ 08:54), Max: 37.7 (07-13-23 @ 19:35)  HR: 76 (07-14-23 @ 08:54) (76 - 98)  BP: 138/70 (07-14-23 @ 08:54) (119/54 - 183/68)  RR: 17 (07-14-23 @ 08:54) (12 - 18)  SpO2: 98% (07-14-23 @ 08:54) (94% - 99%)  Wt(kg): --  I&O's Summary    13 Jul 2023 07:01  -  14 Jul 2023 07:00  --------------------------------------------------------  IN: 1725 mL / OUT: 750 mL / NET: 975 mL    14 Jul 2023 07:01  -  14 Jul 2023 13:28  --------------------------------------------------------  IN: 160 mL / OUT: 1300 mL / NET: -1140 mL      Height (cm): 170.2 (07-13 @ 19:47)  Weight (kg): 72.1 (07-13 @ 19:47)  BMI (kg/m2): 24.9 (07-13 @ 19:47)  BSA (m2): 1.83 (07-13 @ 19:47)    Appearance: In no distress	  HEENT:    PERRL, EOMI	  Cardiovascular:  S1 S2, No JVD  Respiratory: Lungs clear to auscultation	  Gastrointestinal:  Soft, Non-tender, + BS	  Vascularature:  No edema of LE  Psychiatric: Appropriate affect   Neuro: no acute focal deficits                               8.9    6.69  )-----------( 270      ( 14 Jul 2023 06:41 )             27.9     07-14    139  |  103  |  18  ----------------------------<  112<H>  4.3   |  24  |  0.95    Ca    9.4      14 Jul 2023 06:41  Phos  3.1     07-14  Mg     2.6     07-14          Labs personally reviewed      ASSESSMENT/PLAN: 	    Patient is a 72 year old male with a PMHx of HTN, Type II DM associated with neuropathy whore presents to [facility] with a chief complaint of pain, blistering and fluid drainage on his right foot.    Problem/Plan -1  Problem: Cardiac Risk Stratification for podiatry surgery  - Denies CP or SOB  - TTE shows preserved EF, mild LVH  - Not in decompensated HF  - No tachy supa arrhythmia  - s/p cath with nonobstructive moderate CAD  - Elevated risk for low-mod risk pods surgery procedure, no contraindication to proceed   - s/p R BKA today 7/13 tolerated surgery well    Problem/Plan -2  Problem: Hypertension  - amlodipine  2.5mg PO daily  - c/w losartan to 25mg PO daily    Problem/Plan -3  Problem: DM II  - HgbA1c 8.4  - ISS as per primary team        Berenice Branch, RISHABH-NP   Matt Calhoun DO [facility]  Cardiovascular Medicine  [address] [place] [address], [address]  Available through call or text on [employer]AM[employer]  Office: [phone]

## 2023-07-15 LAB
ANION GAP SERPL CALC-SCNC: 12 MMOL/L — SIGNIFICANT CHANGE UP (ref 5–17)
BUN SERPL-MCNC: 14 MG/DL — SIGNIFICANT CHANGE UP (ref 7–23)
CALCIUM SERPL-MCNC: 8.9 MG/DL — SIGNIFICANT CHANGE UP (ref 8.4–10.5)
CHLORIDE SERPL-SCNC: 100 MMOL/L — SIGNIFICANT CHANGE UP (ref 96–108)
CO2 SERPL-SCNC: 24 MMOL/L — SIGNIFICANT CHANGE UP (ref 22–31)
CREAT SERPL-MCNC: 0.78 MG/DL — SIGNIFICANT CHANGE UP (ref 0.5–1.3)
CULTURE RESULTS: SIGNIFICANT CHANGE UP
CULTURE RESULTS: SIGNIFICANT CHANGE UP
EGFR: 95 ML/MIN/1.73M2 — SIGNIFICANT CHANGE UP
GLUCOSE BLDC GLUCOMTR-MCNC: 178 MG/DL — HIGH (ref 70–99)
GLUCOSE BLDC GLUCOMTR-MCNC: 197 MG/DL — HIGH (ref 70–99)
GLUCOSE BLDC GLUCOMTR-MCNC: 219 MG/DL — HIGH (ref 70–99)
GLUCOSE BLDC GLUCOMTR-MCNC: 244 MG/DL — HIGH (ref 70–99)
GLUCOSE SERPL-MCNC: 178 MG/DL — HIGH (ref 70–99)
HCT VFR BLD CALC: 25.4 % — LOW (ref 39–50)
HGB BLD-MCNC: 8 G/DL — LOW (ref 13–17)
MAGNESIUM SERPL-MCNC: 2.1 MG/DL — SIGNIFICANT CHANGE UP (ref 1.6–2.6)
MCHC RBC-ENTMCNC: 28.4 PG — SIGNIFICANT CHANGE UP (ref 27–34)
MCHC RBC-ENTMCNC: 31.5 GM/DL — LOW (ref 32–36)
MCV RBC AUTO: 90.1 FL — SIGNIFICANT CHANGE UP (ref 80–100)
NRBC # BLD: 0 /100 WBCS — SIGNIFICANT CHANGE UP (ref 0–0)
PHOSPHATE SERPL-MCNC: 2.3 MG/DL — LOW (ref 2.5–4.5)
PLATELET # BLD AUTO: 271 K/UL — SIGNIFICANT CHANGE UP (ref 150–400)
POTASSIUM SERPL-MCNC: 4.3 MMOL/L — SIGNIFICANT CHANGE UP (ref 3.5–5.3)
POTASSIUM SERPL-SCNC: 4.3 MMOL/L — SIGNIFICANT CHANGE UP (ref 3.5–5.3)
RBC # BLD: 2.82 M/UL — LOW (ref 4.2–5.8)
RBC # FLD: 12.6 % — SIGNIFICANT CHANGE UP (ref 10.3–14.5)
SODIUM SERPL-SCNC: 136 MMOL/L — SIGNIFICANT CHANGE UP (ref 135–145)
SPECIMEN SOURCE: SIGNIFICANT CHANGE UP
SPECIMEN SOURCE: SIGNIFICANT CHANGE UP
WBC # BLD: 5.18 K/UL — SIGNIFICANT CHANGE UP (ref 3.8–10.5)
WBC # FLD AUTO: 5.18 K/UL — SIGNIFICANT CHANGE UP (ref 3.8–10.5)

## 2023-07-15 RX ADMIN — Medication 1 TABLET(S): at 17:06

## 2023-07-15 RX ADMIN — GABAPENTIN 100 MILLIGRAM(S): 400 CAPSULE ORAL at 06:30

## 2023-07-15 RX ADMIN — Medication 3 UNIT(S): at 18:34

## 2023-07-15 RX ADMIN — Medication 81 MILLIGRAM(S): at 11:07

## 2023-07-15 RX ADMIN — Medication 1: at 08:54

## 2023-07-15 RX ADMIN — GABAPENTIN 100 MILLIGRAM(S): 400 CAPSULE ORAL at 22:15

## 2023-07-15 RX ADMIN — Medication 2: at 14:20

## 2023-07-15 RX ADMIN — INSULIN GLARGINE 8 UNIT(S): 100 INJECTION, SOLUTION SUBCUTANEOUS at 22:15

## 2023-07-15 RX ADMIN — LOSARTAN POTASSIUM 25 MILLIGRAM(S): 100 TABLET, FILM COATED ORAL at 06:30

## 2023-07-15 RX ADMIN — ENOXAPARIN SODIUM 40 MILLIGRAM(S): 100 INJECTION SUBCUTANEOUS at 06:31

## 2023-07-15 RX ADMIN — GABAPENTIN 100 MILLIGRAM(S): 400 CAPSULE ORAL at 13:39

## 2023-07-15 RX ADMIN — Medication 3 UNIT(S): at 14:21

## 2023-07-15 RX ADMIN — ATORVASTATIN CALCIUM 40 MILLIGRAM(S): 80 TABLET, FILM COATED ORAL at 22:15

## 2023-07-15 RX ADMIN — Medication 3 UNIT(S): at 08:54

## 2023-07-15 RX ADMIN — AMLODIPINE BESYLATE 2.5 MILLIGRAM(S): 2.5 TABLET ORAL at 06:31

## 2023-07-15 RX ADMIN — SODIUM CHLORIDE 75 MILLILITER(S): 9 INJECTION, SOLUTION INTRAVENOUS at 22:15

## 2023-07-15 RX ADMIN — Medication 2: at 18:34

## 2023-07-15 RX ADMIN — Medication 1 TABLET(S): at 06:31

## 2023-07-15 NOTE — PROGRESS NOTE ADULT - SUBJECTIVE AND OBJECTIVE BOX
DATE OF SERVICE: 07-15-23 @ 17:54    Patient is a 72y old  Male who presents with a chief complaint of foot wound (14 Jul 2023 15:48)      INTERVAL HISTORY: no complaints     REVIEW OF SYSTEMS:  CONSTITUTIONAL: No weakness  EYES/ENT: No visual changes;  No throat pain   NECK: No pain or stiffness  RESPIRATORY: No cough, wheezing; No shortness of breath  CARDIOVASCULAR: No chest pain or palpitations  GASTROINTESTINAL: No abdominal  pain. No nausea, vomiting, or hematemesis  GENITOURINARY: No dysuria, frequency or hematuria  NEUROLOGICAL: No stroke like symptoms  SKIN: No rashes    	  MEDICATIONS:  amLODIPine   Tablet 2.5 milliGRAM(s) Oral daily  losartan 25 milliGRAM(s) Oral daily        PHYSICAL EXAM:  T(C): 36.7 (07-15-23 @ 16:44), Max: 37.3 (07-14-23 @ 21:10)  HR: 90 (07-15-23 @ 16:44) (76 - 90)  BP: 167/75 (07-15-23 @ 16:44) (146/72 - 173/72)  RR: 18 (07-15-23 @ 16:44) (18 - 18)  SpO2: 97% (07-15-23 @ 16:44) (95% - 98%)  Wt(kg): --  I&O's Summary    14 Jul 2023 07:01  -  15 Jul 2023 07:00  --------------------------------------------------------  IN: 1610 mL / OUT: 3050 mL / NET: -1440 mL    15 Jul 2023 07:01  -  15 Jul 2023 17:54  --------------------------------------------------------  IN: 380 mL / OUT: 1450 mL / NET: -1070 mL          Appearance: In no distress	  HEENT:    PERRL, EOMI	  Cardiovascular:  S1 S2, No JVD  Respiratory: Lungs clear to auscultation	  Gastrointestinal:  Soft, Non-tender, + BS	  Vascularature:  No edema of LE  Psychiatric: Appropriate affect   Neuro: no acute focal deficits                               8.0    5.18  )-----------( 271      ( 15 Jul 2023 08:34 )             25.4     07-15    136  |  100  |  14  ----------------------------<  178<H>  4.3   |  24  |  0.78    Ca    8.9      15 Jul 2023 08:34  Phos  2.3     07-15  Mg     2.1     07-15          Labs personally reviewed      ASSESSMENT/PLAN: 	  Patient is a 72 year old male with a PMHx of HTN, Type II DM associated with neuropathy whore presents to [facility] with a chief complaint of pain, blistering and fluid drainage on his right foot.    Problem/Plan -1  Problem: Cardiac Risk Stratification for podiatry surgery  - Denies CP or SOB  - TTE shows preserved EF, mild LVH  - Not in decompensated HF  - No tachy supa arrhythmia  - s/p cath with nonobstructive moderate CAD  - Elevated risk for low-mod risk pods surgery procedure, no contraindication to proceed   - s/p R BKA today 7/13 tolerated surgery well    Problem/Plan -2  Problem: Hypertension  - amlodipine  2.5mg PO daily  - c/w losartan to 25mg PO daily    Problem/Plan -3  Problem: DM II  - HgbA1c 8.4  - ISS as per primary team          SEFERINO Steiner DO [facility]  Cardiovascular Medicine  [address], [address]  Office: [phone]  Available via call/text on [employer] Teams

## 2023-07-15 NOTE — PROGRESS NOTE ADULT - ASSESSMENT
Patient is a 72 year old male with a PMHx of Type II DM associated with neuropathy, HTN who was recently admitted to [facility] with Right 1st toe infection, S/P Right LE SFA to PT bypass graft with PTFE followed by Right foot Partial hallux Amputation on 04/14, and RLE angiogram 07/03 with rotational atherectomy of proximal portion of the graft in the SFA, following by MIKE that improved flow, however a proximal lesion was demonstrated at that time that was not amenable to endovascular procedures. Patient presents to [facility] due to nonhealing Right foot wound. Internal medicine has been consulted on Mr. Paredes's care for medical management. Patient denies chest pain, palpitations, SOB or dyspnea.       Peripheral Arterial Disease   - S/P R LE SFA to PT bypass graft, RLE angiogram 07/03  - 07/08 Arterial Duplex negative for pseudoaneurysm   - On ASA 81 and Lipitor 40   - US as noted  - Care per vascular surgery appreciated     H/O R Toe infection   - S/P Great toe amputation   - Foot Xray with Mild erosive changes concerning for OM  - MR without evidence of OM, negative for abscess. F/u podiatry recs --> S/P BKA   - ABX as per ID, Was on Flagyl and Cefepime, now on Meropenem as per ID   - UCx and BCx2 w/ NGTD; F/u final   - Pain control   - Incentive spirometer   - Care per Vascular/ Podiatry appreciated   - Monitor CBC, temp curve, VS and patient closely     Febrile  - Pt febrile while on Cefepime and Flagyl  - Recommend to repeat pan cultures to isolate source --> Repeat BCx2 7/12 NGTD; F/u final  - 7/10 BCx2 w/ NGTD; F/u final   - F/u with ID  --> ABX switched to Meropenem   - Monitor CBC, temp curve, VS and patient closely; Antipyretics PRN     Type II DM   - A1C 7.9   - C/w sliding scale  - C/w Lantus, increased to 8 units QHs and Pre-Meal 3 units TID; If NPO, hold pre-meal   - Diabetic/ DASH Diet   - Monitor glucose levels closely     Anemia   - Monitor Hgb closely   - Check Iron and Ferritin, noted, not ferritin deficient    - Transfuse for Hgb < 7.0     HTN   - C/w Norvasc 2.5, Losartan 25   - Monitor BP, VS and patient closely     Pre-Op risk stratification   - Previous TTE with EF of 61%, Mild LVH, normal RV  - Patient is medically optimized for OR. Moderate Risk candidate; S/P OR     DVT and GI PPX

## 2023-07-15 NOTE — PROGRESS NOTE ADULT - SUBJECTIVE AND OBJECTIVE BOX
Name of Patient : ALEE DUNN  MRN: [id number]  Date of visit: 07-15-23       Subjective: Patient seen and examined. No new events except as noted.     REVIEW OF SYSTEMS:    CONSTITUTIONAL: No weakness, fevers or chills  EYES/ENT: No visual changes;  No vertigo or throat pain   NECK: No pain or stiffness  RESPIRATORY: No cough, wheezing, hemoptysis; No shortness of breath  CARDIOVASCULAR: No chest pain or palpitations  GASTROINTESTINAL: No abdominal or epigastric pain. No nausea, vomiting, or hematemesis; No diarrhea or constipation. No melena or hematochezia.  GENITOURINARY: No dysuria, frequency or hematuria  NEUROLOGICAL: No numbness or weakness  SKIN: No itching, burning, rashes, or lesions   All other review of systems is negative unless indicated above.    MEDICATIONS:  MEDICATIONS  (STANDING):  amLODIPine   Tablet 2.5 milliGRAM(s) Oral daily  amoxicillin  875 milliGRAM(s)/clavulanate 1 Tablet(s) Oral two times a day  aspirin enteric coated 81 milliGRAM(s) Oral daily  atorvastatin 40 milliGRAM(s) Oral at bedtime  dextrose 5%. 1000 milliLiter(s) (50 mL/Hr) IV Continuous <Continuous>  dextrose 50% Injectable 25 Gram(s) IV Push once  dextrose 50% Injectable 25 Gram(s) IV Push once  dextrose 50% Injectable 12.5 Gram(s) IV Push once  enoxaparin Injectable 40 milliGRAM(s) SubCutaneous every 24 hours  gabapentin 100 milliGRAM(s) Oral every 8 hours  glucagon  Injectable 1 milliGRAM(s) IntraMuscular once  insulin glargine Injectable (LANTUS) 8 Unit(s) SubCutaneous at bedtime  insulin lispro (ADMELOG) corrective regimen sliding scale   SubCutaneous three times a day before meals  insulin lispro (ADMELOG) corrective regimen sliding scale   SubCutaneous at bedtime  insulin lispro Injectable (ADMELOG) 3 Unit(s) SubCutaneous three times a day before meals  lactated ringers. 1000 milliLiter(s) (75 mL/Hr) IV Continuous <Continuous>  losartan 25 milliGRAM(s) Oral daily      PHYSICAL EXAM:  T(C): 36.8 (07-15-23 @ 21:05), Max: 37.3 (07-15-23 @ 01:30)  HR: 94 (07-15-23 @ 21:05) (76 - 94)  BP: 160/69 (07-15-23 @ 21:05) (146/72 - 170/77)  RR: 18 (07-15-23 @ 21:05) (18 - 18)  SpO2: 97% (07-15-23 @ 21:05) (95% - 98%)  Wt(kg): --  I&O's Summary    14 Jul 2023 07:01  -  15 Jul 2023 07:00  --------------------------------------------------------  IN: 1610 mL / OUT: 3050 mL / NET: -1440 mL    15 Jul 2023 07:01  -  15 Jul 2023 22:06  --------------------------------------------------------  IN: 1520 mL / OUT: 1950 mL / NET: -430 mL          Appearance: Normal	  HEENT:  PERRLA   Lymphatic: No lymphadenopathy   Cardiovascular: Normal S1 S2, no JVD  Respiratory: normal effort , clear  Gastrointestinal:  Soft, Non-tender  Skin: No rashes,  warm to touch  Psychiatry:  Mood & affect appropriate  Musculuskeletal: No edema    recent labs, Imaging and EKGs personally reviewed     07-14-23 @ 07:01  -  07-15-23 @ 07:00  --------------------------------------------------------  IN: 1610 mL / OUT: 3050 mL / NET: -1440 mL    07-15-23 @ 07:01  -  07-15-23 @ 22:06  --------------------------------------------------------  IN: 1520 mL / OUT: 1950 mL / NET: -430 mL                          8.0    5.18  )-----------( 271      ( 15 Jul 2023 08:34 )             25.4               07-15    136  |  100  |  14  ----------------------------<  178<H>  4.3   |  24  |  0.78    Ca    8.9      15 Jul 2023 08:34  Phos  2.3     07-15  Mg     2.1     07-15                         Urinalysis Basic - ( 15 Jul 2023 08:34 )    Color: x / Appearance: x / SG: x / pH: x  Gluc: 178 mg/dL / Ketone: x  / Bili: x / Urobili: x   Blood: x / Protein: x / Nitrite: x   Leuk Esterase: x / RBC: x / WBC x   Sq Epi: x / Non Sq Epi: x / Bacteria: x

## 2023-07-15 NOTE — PROGRESS NOTE ADULT - ASSESSMENT
72M with h/o T2DM (A1c=8.4) with neuropathy, HTN recently admitted 3/30 with R 1st toe infection, s/p 4/10 s/p RLE SFA to PT bypass graft with PTFE followed by R foot Partial hallux Amputation 4/14, and RLE angiogram 7/3 with rotational athrectomy of proximal portion of the graft in the SFA, following by MIKE that improved flow, however a proximal lesion was demonstrated at that time that was not amenable to endovascular procedures, now presenting with nonhealing R hallux wound. Now s/p R GUSA romana (7/13).    Plan:   - Appreciated ID: Cefepime + Flagyl  - intermittent fevers, UA negative, RVP negative, CXR normal. BC; NGTD (24 hrs)- continue to follow.   - LLE arterial duplex to r/o L groin PSA (done 7/8) - no evidence of pseudoaneurysm   - Medicine following and recs appreciated.   -  f/u AM labs  - Regular diet  - Continue home meds   - PT eval planned for today (7/15)      Vascular Surgery   [id number]

## 2023-07-15 NOTE — PROGRESS NOTE ADULT - SUBJECTIVE AND OBJECTIVE BOX
VASCULAR SURGERY DAILY PROGRESS NOTE:     SUBJECTIVE/ROS:     vernight: no acute events   Patient seen and evaluated on AM rounds. Patient doing well post-op. No acute event overnights. Pain well controlled.   Patient otherwise denies nausea, vomiting, chest pain, shortness of breath        OBJECTIVE:  Vital Signs Last 24 Hrs  T(C): 36.7 (15 Jul 2023 13:29), Max: 37.3 (14 Jul 2023 21:10)  T(F): 98.1 (15 Jul 2023 13:29), Max: 99.2 (14 Jul 2023 21:10)  HR: 82 (15 Jul 2023 13:29) (76 - 86)  BP: 146/72 (15 Jul 2023 13:29) (146/72 - 173/72)  BP(mean): --  RR: 18 (15 Jul 2023 13:29) (18 - 18)  SpO2: 96% (15 Jul 2023 13:29) (95% - 98%)    Parameters below as of 15 Jul 2023 13:29  Patient On (Oxygen Delivery Method): room air      I&O's Detail    14 Jul 2023 07:01  -  15 Jul 2023 07:00  --------------------------------------------------------  IN:    Lactated Ringers: 900 mL    Oral Fluid: 710 mL  Total IN: 1610 mL    OUT:    Voided (mL): 3050 mL  Total OUT: 3050 mL    Total NET: -1440 mL      15 Jul 2023 07:01  -  15 Jul 2023 16:36  --------------------------------------------------------  IN:    Oral Fluid: 380 mL  Total IN: 380 mL    OUT:    Voided (mL): 850 mL  Total OUT: 850 mL    Total NET: -470 mL        Daily     Daily   MEDICATIONS  (STANDING):  amLODIPine   Tablet 2.5 milliGRAM(s) Oral daily  amoxicillin  875 milliGRAM(s)/clavulanate 1 Tablet(s) Oral two times a day  aspirin enteric coated 81 milliGRAM(s) Oral daily  atorvastatin 40 milliGRAM(s) Oral at bedtime  dextrose 5%. 1000 milliLiter(s) (50 mL/Hr) IV Continuous <Continuous>  dextrose 50% Injectable 12.5 Gram(s) IV Push once  dextrose 50% Injectable 25 Gram(s) IV Push once  dextrose 50% Injectable 25 Gram(s) IV Push once  enoxaparin Injectable 40 milliGRAM(s) SubCutaneous every 24 hours  gabapentin 100 milliGRAM(s) Oral every 8 hours  glucagon  Injectable 1 milliGRAM(s) IntraMuscular once  insulin glargine Injectable (LANTUS) 8 Unit(s) SubCutaneous at bedtime  insulin lispro (ADMELOG) corrective regimen sliding scale   SubCutaneous three times a day before meals  insulin lispro (ADMELOG) corrective regimen sliding scale   SubCutaneous at bedtime  insulin lispro Injectable (ADMELOG) 3 Unit(s) SubCutaneous three times a day before meals  lactated ringers. 1000 milliLiter(s) (75 mL/Hr) IV Continuous <Continuous>  losartan 25 milliGRAM(s) Oral daily    MEDICATIONS  (PRN):  acetaminophen     Tablet .. 650 milliGRAM(s) Oral every 6 hours PRN Temp greater or equal to 38C (100.4F)  dextrose Oral Gel 15 Gram(s) Oral once PRN Blood Glucose LESS THAN 70 milliGRAM(s)/deciliter  oxyCODONE    IR 5 milliGRAM(s) Oral every 6 hours PRN Severe Pain (7 - 10)      Labs:                        8.0    5.18  )-----------( 271      ( 15 Jul 2023 08:34 )             25.4     07-15    136  |  100  |  14  ----------------------------<  178<H>  4.3   |  24  |  0.78    Ca    8.9      15 Jul 2023 08:34  Phos  2.3     07-15  Mg     2.1     07-15        Urinalysis Basic - ( 15 Jul 2023 08:34 )    Color: x / Appearance: x / SG: x / pH: x  Gluc: 178 mg/dL / Ketone: x  / Bili: x / Urobili: x   Blood: x / Protein: x / Nitrite: x   Leuk Esterase: x / RBC: x / WBC x   Sq Epi: x / Non Sq Epi: x / Bacteria: x                Physical Exam:  General: well developed, well nourished, NAD  Cardiovascular: appears well perfused   Respiratory: respirations non labored  Gastrointestinal: soft, nontender, nondistended  Extremities: RLE dressing for open BKA surgical site appears c/d/i. Extremities appear well perfused.   Neurological: A+Ox3

## 2023-07-16 LAB
ANION GAP SERPL CALC-SCNC: 7 MMOL/L — SIGNIFICANT CHANGE UP (ref 5–17)
BUN SERPL-MCNC: 17 MG/DL — SIGNIFICANT CHANGE UP (ref 7–23)
CALCIUM SERPL-MCNC: 9.1 MG/DL — SIGNIFICANT CHANGE UP (ref 8.4–10.5)
CHLORIDE SERPL-SCNC: 101 MMOL/L — SIGNIFICANT CHANGE UP (ref 96–108)
CO2 SERPL-SCNC: 29 MMOL/L — SIGNIFICANT CHANGE UP (ref 22–31)
CREAT SERPL-MCNC: 0.85 MG/DL — SIGNIFICANT CHANGE UP (ref 0.5–1.3)
EGFR: 92 ML/MIN/1.73M2 — SIGNIFICANT CHANGE UP
GLUCOSE BLDC GLUCOMTR-MCNC: 193 MG/DL — HIGH (ref 70–99)
GLUCOSE BLDC GLUCOMTR-MCNC: 194 MG/DL — HIGH (ref 70–99)
GLUCOSE BLDC GLUCOMTR-MCNC: 199 MG/DL — HIGH (ref 70–99)
GLUCOSE BLDC GLUCOMTR-MCNC: 254 MG/DL — HIGH (ref 70–99)
GLUCOSE SERPL-MCNC: 188 MG/DL — HIGH (ref 70–99)
HCT VFR BLD CALC: 29.6 % — LOW (ref 39–50)
HGB BLD-MCNC: 9.3 G/DL — LOW (ref 13–17)
MAGNESIUM SERPL-MCNC: 2.3 MG/DL — SIGNIFICANT CHANGE UP (ref 1.6–2.6)
MCHC RBC-ENTMCNC: 27.9 PG — SIGNIFICANT CHANGE UP (ref 27–34)
MCHC RBC-ENTMCNC: 31.4 GM/DL — LOW (ref 32–36)
MCV RBC AUTO: 88.9 FL — SIGNIFICANT CHANGE UP (ref 80–100)
NRBC # BLD: 0 /100 WBCS — SIGNIFICANT CHANGE UP (ref 0–0)
PHOSPHATE SERPL-MCNC: 3.2 MG/DL — SIGNIFICANT CHANGE UP (ref 2.5–4.5)
PLATELET # BLD AUTO: 336 K/UL — SIGNIFICANT CHANGE UP (ref 150–400)
POTASSIUM SERPL-MCNC: 4.5 MMOL/L — SIGNIFICANT CHANGE UP (ref 3.5–5.3)
POTASSIUM SERPL-SCNC: 4.5 MMOL/L — SIGNIFICANT CHANGE UP (ref 3.5–5.3)
RBC # BLD: 3.33 M/UL — LOW (ref 4.2–5.8)
RBC # FLD: 12.5 % — SIGNIFICANT CHANGE UP (ref 10.3–14.5)
SODIUM SERPL-SCNC: 137 MMOL/L — SIGNIFICANT CHANGE UP (ref 135–145)
WBC # BLD: 5.2 K/UL — SIGNIFICANT CHANGE UP (ref 3.8–10.5)
WBC # FLD AUTO: 5.2 K/UL — SIGNIFICANT CHANGE UP (ref 3.8–10.5)

## 2023-07-16 RX ADMIN — ATORVASTATIN CALCIUM 40 MILLIGRAM(S): 80 TABLET, FILM COATED ORAL at 21:45

## 2023-07-16 RX ADMIN — GABAPENTIN 100 MILLIGRAM(S): 400 CAPSULE ORAL at 13:20

## 2023-07-16 RX ADMIN — INSULIN GLARGINE 8 UNIT(S): 100 INJECTION, SOLUTION SUBCUTANEOUS at 21:45

## 2023-07-16 RX ADMIN — Medication 81 MILLIGRAM(S): at 11:21

## 2023-07-16 RX ADMIN — Medication 3 UNIT(S): at 18:24

## 2023-07-16 RX ADMIN — Medication 1: at 09:18

## 2023-07-16 RX ADMIN — Medication 1: at 21:45

## 2023-07-16 RX ADMIN — Medication 3 UNIT(S): at 13:24

## 2023-07-16 RX ADMIN — GABAPENTIN 100 MILLIGRAM(S): 400 CAPSULE ORAL at 21:45

## 2023-07-16 RX ADMIN — LOSARTAN POTASSIUM 25 MILLIGRAM(S): 100 TABLET, FILM COATED ORAL at 06:50

## 2023-07-16 RX ADMIN — ENOXAPARIN SODIUM 40 MILLIGRAM(S): 100 INJECTION SUBCUTANEOUS at 06:50

## 2023-07-16 RX ADMIN — Medication 1: at 13:24

## 2023-07-16 RX ADMIN — GABAPENTIN 100 MILLIGRAM(S): 400 CAPSULE ORAL at 06:50

## 2023-07-16 RX ADMIN — Medication 1: at 18:23

## 2023-07-16 RX ADMIN — Medication 1 TABLET(S): at 06:50

## 2023-07-16 RX ADMIN — Medication 1 TABLET(S): at 17:29

## 2023-07-16 RX ADMIN — AMLODIPINE BESYLATE 2.5 MILLIGRAM(S): 2.5 TABLET ORAL at 06:50

## 2023-07-16 RX ADMIN — Medication 3 UNIT(S): at 09:19

## 2023-07-16 NOTE — PROGRESS NOTE ADULT - SUBJECTIVE AND OBJECTIVE BOX
Name of Patient : ALEE DUNN  MRN: [id number]  Date of visit: 07-16-23 @ 17:12      Subjective: Patient seen and examined. No new events except as noted.     REVIEW OF SYSTEMS:    CONSTITUTIONAL: No weakness, fevers or chills  EYES/ENT: No visual changes;  No vertigo or throat pain   NECK: No pain or stiffness  RESPIRATORY: No cough, wheezing, hemoptysis; No shortness of breath  CARDIOVASCULAR: No chest pain or palpitations  GASTROINTESTINAL: No abdominal or epigastric pain. No nausea, vomiting, or hematemesis; No diarrhea or constipation. No melena or hematochezia.  GENITOURINARY: No dysuria, frequency or hematuria  NEUROLOGICAL: No numbness or weakness  SKIN: No itching, burning, rashes, or lesions   All other review of systems is negative unless indicated above.    MEDICATIONS:  MEDICATIONS  (STANDING):  amLODIPine   Tablet 2.5 milliGRAM(s) Oral daily  amoxicillin  875 milliGRAM(s)/clavulanate 1 Tablet(s) Oral two times a day  aspirin enteric coated 81 milliGRAM(s) Oral daily  atorvastatin 40 milliGRAM(s) Oral at bedtime  dextrose 5%. 1000 milliLiter(s) (50 mL/Hr) IV Continuous <Continuous>  dextrose 50% Injectable 12.5 Gram(s) IV Push once  dextrose 50% Injectable 25 Gram(s) IV Push once  dextrose 50% Injectable 25 Gram(s) IV Push once  enoxaparin Injectable 40 milliGRAM(s) SubCutaneous every 24 hours  gabapentin 100 milliGRAM(s) Oral every 8 hours  glucagon  Injectable 1 milliGRAM(s) IntraMuscular once  insulin glargine Injectable (LANTUS) 8 Unit(s) SubCutaneous at bedtime  insulin lispro (ADMELOG) corrective regimen sliding scale   SubCutaneous at bedtime  insulin lispro (ADMELOG) corrective regimen sliding scale   SubCutaneous three times a day before meals  insulin lispro Injectable (ADMELOG) 3 Unit(s) SubCutaneous three times a day before meals  losartan 25 milliGRAM(s) Oral daily      PHYSICAL EXAM:  T(C): 36.7 (07-16-23 @ 13:27), Max: 36.8 (07-15-23 @ 21:05)  HR: 76 (07-16-23 @ 13:27) (76 - 94)  BP: 118/64 (07-16-23 @ 13:27) (109/68 - 164/79)  RR: 18 (07-16-23 @ 13:27) (18 - 18)  SpO2: 98% (07-16-23 @ 13:27) (97% - 98%)  Wt(kg): --  I&O's Summary    15 Jul 2023 07:01  -  16 Jul 2023 07:00  --------------------------------------------------------  IN: 2060 mL / OUT: 3275 mL / NET: -1215 mL    16 Jul 2023 07:01  -  16 Jul 2023 17:12  --------------------------------------------------------  IN: 460 mL / OUT: 450 mL / NET: 10 mL          Appearance: Normal	  HEENT:  PERRLA   Lymphatic: No lymphadenopathy   Cardiovascular: Normal S1 S2, no JVD  Respiratory: normal effort , clear  Gastrointestinal:  Soft, Non-tender  Skin: No rashes,  warm to touch  Psychiatry:  Mood & affect appropriate  Musculuskeletal: No edema    recent labs, Imaging and EKGs personally reviewed     07-15-23 @ 07:01  -  07-16-23 @ 07:00  --------------------------------------------------------  IN: 2060 mL / OUT: 3275 mL / NET: -1215 mL    07-16-23 @ 07:01  -  07-16-23 @ 17:12  --------------------------------------------------------  IN: 460 mL / OUT: 450 mL / NET: 10 mL    CBC:            9.3    5.20  )-----------( 336      ( 07-16-23 @ 14:38 )             29.6         Chem:         ( 07-16-23 @ 07:21 )    137  |  101  |  17  ----------------------------<  188<H>  4.5   |  29  |  0.85        Liver Functions:     Type & Screen:

## 2023-07-16 NOTE — PROGRESS NOTE ADULT - ASSESSMENT
Patient is a 72 year old male with a PMHx of Type II DM associated with neuropathy, HTN who was recently admitted to [facility] with Right 1st toe infection, S/P Right LE SFA to PT bypass graft with PTFE followed by Right foot Partial hallux Amputation on 04/14, and RLE angiogram 07/03 with rotational atherectomy of proximal portion of the graft in the SFA, following by MIKE that improved flow, however a proximal lesion was demonstrated at that time that was not amenable to endovascular procedures. Patient presents to [facility] due to nonhealing Right foot wound. Internal medicine has been consulted on Mr. Paredes's care for medical management. Patient denies chest pain, palpitations, SOB or dyspnea.       Peripheral Arterial Disease   - S/P R LE SFA to PT bypass graft, RLE angiogram 07/03  - 07/08 Arterial Duplex negative for pseudoaneurysm   - On ASA 81 and Lipitor 40   - US as noted  - Care per vascular surgery appreciated     H/O R Toe infection   - S/P Great toe amputation   - Foot Xray with Mild erosive changes concerning for OM  - MR without evidence of OM, negative for abscess. F/u podiatry recs --> S/P BKA   - ABX as per ID, Was on Flagyl and Cefepime, now on Meropenem as per ID   - UCx and BCx2 w/ NGTD; F/u final   - Pain control   - Incentive spirometer   - Care per Vascular/ Podiatry appreciated   - Monitor CBC, temp curve, VS and patient closely     Febrile  - Pt febrile while on Cefepime and Flagyl  - Recommend to repeat pan cultures to isolate source --> Repeat BCx2 7/12 NGTD; F/u final  - 7/10 BCx2 w/ NGTD; F/u final   - F/u with ID  --> ABX switched to Meropenem   - Monitor CBC, temp curve, VS and patient closely; Antipyretics PRN     Type II DM   - A1C 7.9   - C/w sliding scale  - C/w Lantus, increased to 8 units QHs and Pre-Meal 3 units TID; If NPO, hold pre-meal   - Diabetic/ DASH Diet   - Monitor glucose levels closely     Anemia   - Monitor Hgb closely   - Check Iron and Ferritin, noted, not ferritin deficient    - Transfuse for Hgb < 7.0     HTN   - C/w Norvasc 2.5, Losartan 25   - Monitor BP, VS and patient closely     Pre-Op risk stratification   - Previous TTE with EF of 61%, Mild LVH, normal RV  - Patient is medically optimized for OR. Moderate Risk candidate; S/P OR     DVT and GI PPX

## 2023-07-16 NOTE — PROGRESS NOTE ADULT - SUBJECTIVE AND OBJECTIVE BOX
VASCULAR SURGERY DAILY PROGRESS NOTE:     SUBJECTIVE/ROS:     Patient seen and evaluated on AM rounds. Patient doing well post-op. No acute event overnights. Pain well controlled. Dressing changed on AM rounds.   Patient otherwise denies nausea, vomiting, chest pain, shortness of breath        OBJECTIVE:  Vital Signs Last 24 Hrs  T(C): 36.7 (16 Jul 2023 17:16), Max: 36.8 (15 Jul 2023 21:05)  T(F): 98 (16 Jul 2023 17:16), Max: 98.3 (15 Jul 2023 21:05)  HR: 75 (16 Jul 2023 17:16) (75 - 94)  BP: 136/78 (16 Jul 2023 17:16) (109/68 - 164/79)  BP(mean): --  ABP: --  ABP(mean): --  RR: 18 (16 Jul 2023 17:16) (18 - 18)  SpO2: 99% (16 Jul 2023 17:16) (97% - 99%)    O2 Parameters below as of 16 Jul 2023 17:16  Patient On (Oxygen Delivery Method): room air        I&O's Detail    15 Jul 2023 07:01  -  16 Jul 2023 07:00  --------------------------------------------------------  IN:    Lactated Ringers: 1200 mL    Oral Fluid: 860 mL  Total IN: 2060 mL    OUT:    Voided (mL): 3275 mL  Total OUT: 3275 mL    Total NET: -1215 mL      16 Jul 2023 07:01  -  16 Jul 2023 19:43  --------------------------------------------------------  IN:    Oral Fluid: 700 mL  Total IN: 700 mL    OUT:    Voided (mL): 750 mL  Total OUT: 750 mL    Total NET: -50 mL            Daily     Daily   MEDICATIONS  (STANDING):  amLODIPine   Tablet 2.5 milliGRAM(s) Oral daily  amoxicillin  875 milliGRAM(s)/clavulanate 1 Tablet(s) Oral two times a day  aspirin enteric coated 81 milliGRAM(s) Oral daily  atorvastatin 40 milliGRAM(s) Oral at bedtime  dextrose 5%. 1000 milliLiter(s) (50 mL/Hr) IV Continuous <Continuous>  dextrose 50% Injectable 12.5 Gram(s) IV Push once  dextrose 50% Injectable 25 Gram(s) IV Push once  dextrose 50% Injectable 25 Gram(s) IV Push once  enoxaparin Injectable 40 milliGRAM(s) SubCutaneous every 24 hours  gabapentin 100 milliGRAM(s) Oral every 8 hours  glucagon  Injectable 1 milliGRAM(s) IntraMuscular once  insulin glargine Injectable (LANTUS) 8 Unit(s) SubCutaneous at bedtime  insulin lispro (ADMELOG) corrective regimen sliding scale   SubCutaneous three times a day before meals  insulin lispro (ADMELOG) corrective regimen sliding scale   SubCutaneous at bedtime  insulin lispro Injectable (ADMELOG) 3 Unit(s) SubCutaneous three times a day before meals  lactated ringers. 1000 milliLiter(s) (75 mL/Hr) IV Continuous <Continuous>  losartan 25 milliGRAM(s) Oral daily    MEDICATIONS  (PRN):  acetaminophen     Tablet .. 650 milliGRAM(s) Oral every 6 hours PRN Temp greater or equal to 38C (100.4F)  dextrose Oral Gel 15 Gram(s) Oral once PRN Blood Glucose LESS THAN 70 milliGRAM(s)/deciliter  oxyCODONE    IR 5 milliGRAM(s) Oral every 6 hours PRN Severe Pain (7 - 10)      Labs:                          9.3    5.20  )-----------( 336      ( 16 Jul 2023 14:38 )             29.6   07-16    137  |  101  |  17  ----------------------------<  188<H>  4.5   |  29  |  0.85    Ca    9.1      16 Jul 2023 07:21  Phos  3.2     07-16  Mg     2.3     07-16      Physical Exam:  General: well developed, well nourished, NAD  Cardiovascular: appears well perfused   Respiratory: respirations non labored  Gastrointestinal: soft, nontender, nondistended  Extremities: RLE dressing for open BKA surgical site appears c/d/i.  Neurological: A+Ox3

## 2023-07-16 NOTE — PROGRESS NOTE ADULT - ASSESSMENT
72M with h/o T2DM (A1c=8.4) with neuropathy, HTN recently admitted 3/30 with R 1st toe infection, s/p 4/10 s/p RLE SFA to PT bypass graft with PTFE followed by R foot Partial hallux Amputation 4/14, and RLE angiogram 7/3 with rotational athrectomy of proximal portion of the graft in the SFA, following by MIKE that improved flow, however a proximal lesion was demonstrated at that time that was not amenable to endovascular procedures, now presenting with nonhealing R hallux wound. Now s/p R BKA romana (7/13).    Plan:   - Abx: Augmentin  - intermittent fevers, UA negative, RVP negative, CXR normal. BC; NGTD (24 hrs)- continue to follow.   - LLE arterial duplex to r/o L groin PSA (done 7/8) - no evidence of pseudoaneurysm   - Medicine following and recs appreciated.   -  f/u AM labs  - Regular diet  - Continue home meds   - PT - Home PT  - Plan for BKA formalization on Thursday      Vascular Surgery   [id number]

## 2023-07-16 NOTE — PROGRESS NOTE ADULT - SUBJECTIVE AND OBJECTIVE BOX
DATE OF SERVICE: 07-16-23 @ 16:02    Patient is a 72y old  Male who presents with a chief complaint of foot wound (14 Jul 2023 15:48)      INTERVAL HISTORY: no complaints     REVIEW OF SYSTEMS:  CONSTITUTIONAL: No weakness  EYES/ENT: No visual changes;  No throat pain   NECK: No pain or stiffness  RESPIRATORY: No cough, wheezing; No shortness of breath  CARDIOVASCULAR: No chest pain or palpitations  GASTROINTESTINAL: No abdominal  pain. No nausea, vomiting, or hematemesis  GENITOURINARY: No dysuria, frequency or hematuria  NEUROLOGICAL: No stroke like symptoms  SKIN: No rashes    	  MEDICATIONS:  amLODIPine   Tablet 2.5 milliGRAM(s) Oral daily  losartan 25 milliGRAM(s) Oral daily        PHYSICAL EXAM:  T(C): 36.7 (07-16-23 @ 13:27), Max: 36.8 (07-15-23 @ 21:05)  HR: 76 (07-16-23 @ 13:27) (76 - 94)  BP: 118/64 (07-16-23 @ 13:27) (109/68 - 167/75)  RR: 18 (07-16-23 @ 13:27) (18 - 18)  SpO2: 98% (07-16-23 @ 13:27) (97% - 98%)  Wt(kg): --  I&O's Summary    15 Jul 2023 07:01  -  16 Jul 2023 07:00  --------------------------------------------------------  IN: 2060 mL / OUT: 3275 mL / NET: -1215 mL    16 Jul 2023 07:01  -  16 Jul 2023 16:02  --------------------------------------------------------  IN: 460 mL / OUT: 450 mL / NET: 10 mL          Appearance: In no distress	  HEENT:    PERRL, EOMI	  Cardiovascular:  S1 S2, No JVD  Respiratory: Lungs clear to auscultation	  Gastrointestinal:  Soft, Non-tender, + BS	  Vascularature:  No edema of LE  Psychiatric: Appropriate affect   Neuro: no acute focal deficits                               9.3    5.20  )-----------( 336      ( 16 Jul 2023 14:38 )             29.6     07-16    137  |  101  |  17  ----------------------------<  188<H>  4.5   |  29  |  0.85    Ca    9.1      16 Jul 2023 07:21  Phos  3.2     07-16  Mg     2.3     07-16          Labs personally reviewed      ASSESSMENT/PLAN: 	    Patient is a 72 year old male with a PMHx of HTN, Type II DM associated with neuropathy whore presents to [facility] with a chief complaint of pain, blistering and fluid drainage on his right foot.    Problem/Plan -1  Problem: Cardiac Risk Stratification for podiatry surgery  - Denies CP or SOB  - TTE shows preserved EF, mild LVH  - Not in decompensated HF  - No tachy supa arrhythmia  - s/p cath with nonobstructive moderate CAD  - Elevated risk for low-mod risk pods surgery procedure, no contraindication to proceed   - s/p R BKA  7/13 tolerated surgery well    Problem/Plan -2  Problem: Hypertension  - amlodipine  2.5mg PO daily  - c/w losartan to 25mg PO daily    Problem/Plan -3  Problem: DM II  - HgbA1c 8.4  - ISS as per primary team        SEFERINO Steiner DO [facility]  Cardiovascular Medicine  [address] [place] [address], [address]  Office: [phone]  Available via call/text on [employer] Teams

## 2023-07-16 NOTE — PHYSICAL THERAPY INITIAL EVALUATION ADULT - GAIT TRAINING, PT EVAL
GOAL: Patient will ambulate 300 feet independently with least restrictive device in 2 weeks.
GOAL: Patient will ambulate 50 feet independently with RW in 2-3 weeks. Pt will be able to neg 2 steps w/ Min A and 1 crutch in 2-3 weeks

## 2023-07-17 LAB
ANION GAP SERPL CALC-SCNC: 12 MMOL/L — SIGNIFICANT CHANGE UP (ref 5–17)
BUN SERPL-MCNC: 20 MG/DL — SIGNIFICANT CHANGE UP (ref 7–23)
CALCIUM SERPL-MCNC: 9.5 MG/DL — SIGNIFICANT CHANGE UP (ref 8.4–10.5)
CHLORIDE SERPL-SCNC: 103 MMOL/L — SIGNIFICANT CHANGE UP (ref 96–108)
CO2 SERPL-SCNC: 25 MMOL/L — SIGNIFICANT CHANGE UP (ref 22–31)
CREAT SERPL-MCNC: 0.88 MG/DL — SIGNIFICANT CHANGE UP (ref 0.5–1.3)
EGFR: 91 ML/MIN/1.73M2 — SIGNIFICANT CHANGE UP
GLUCOSE BLDC GLUCOMTR-MCNC: 188 MG/DL — HIGH (ref 70–99)
GLUCOSE BLDC GLUCOMTR-MCNC: 198 MG/DL — HIGH (ref 70–99)
GLUCOSE BLDC GLUCOMTR-MCNC: 198 MG/DL — HIGH (ref 70–99)
GLUCOSE BLDC GLUCOMTR-MCNC: 258 MG/DL — HIGH (ref 70–99)
GLUCOSE SERPL-MCNC: 185 MG/DL — HIGH (ref 70–99)
HCT VFR BLD CALC: 27.8 % — LOW (ref 39–50)
HGB BLD-MCNC: 8.8 G/DL — LOW (ref 13–17)
MAGNESIUM SERPL-MCNC: 2.4 MG/DL — SIGNIFICANT CHANGE UP (ref 1.6–2.6)
MCHC RBC-ENTMCNC: 28.3 PG — SIGNIFICANT CHANGE UP (ref 27–34)
MCHC RBC-ENTMCNC: 31.7 GM/DL — LOW (ref 32–36)
MCV RBC AUTO: 89.4 FL — SIGNIFICANT CHANGE UP (ref 80–100)
NRBC # BLD: 0 /100 WBCS — SIGNIFICANT CHANGE UP (ref 0–0)
PHOSPHATE SERPL-MCNC: 3.6 MG/DL — SIGNIFICANT CHANGE UP (ref 2.5–4.5)
PLATELET # BLD AUTO: 313 K/UL — SIGNIFICANT CHANGE UP (ref 150–400)
POTASSIUM SERPL-MCNC: 4.4 MMOL/L — SIGNIFICANT CHANGE UP (ref 3.5–5.3)
POTASSIUM SERPL-SCNC: 4.4 MMOL/L — SIGNIFICANT CHANGE UP (ref 3.5–5.3)
RBC # BLD: 3.11 M/UL — LOW (ref 4.2–5.8)
RBC # FLD: 12.6 % — SIGNIFICANT CHANGE UP (ref 10.3–14.5)
SODIUM SERPL-SCNC: 140 MMOL/L — SIGNIFICANT CHANGE UP (ref 135–145)
WBC # BLD: 5.16 K/UL — SIGNIFICANT CHANGE UP (ref 3.8–10.5)
WBC # FLD AUTO: 5.16 K/UL — SIGNIFICANT CHANGE UP (ref 3.8–10.5)

## 2023-07-17 PROCEDURE — 99232 SBSQ HOSP IP/OBS MODERATE 35: CPT

## 2023-07-17 RX ORDER — INSULIN GLARGINE 100 [IU]/ML
10 INJECTION, SOLUTION SUBCUTANEOUS AT BEDTIME
Refills: 0 | Status: DISCONTINUED | OUTPATIENT
Start: 2023-07-17 | End: 2023-07-18

## 2023-07-17 RX ADMIN — ATORVASTATIN CALCIUM 40 MILLIGRAM(S): 80 TABLET, FILM COATED ORAL at 21:35

## 2023-07-17 RX ADMIN — Medication 3 UNIT(S): at 09:13

## 2023-07-17 RX ADMIN — INSULIN GLARGINE 10 UNIT(S): 100 INJECTION, SOLUTION SUBCUTANEOUS at 21:42

## 2023-07-17 RX ADMIN — Medication 1 TABLET(S): at 07:04

## 2023-07-17 RX ADMIN — AMLODIPINE BESYLATE 2.5 MILLIGRAM(S): 2.5 TABLET ORAL at 07:04

## 2023-07-17 RX ADMIN — LOSARTAN POTASSIUM 25 MILLIGRAM(S): 100 TABLET, FILM COATED ORAL at 07:04

## 2023-07-17 RX ADMIN — Medication 1: at 21:41

## 2023-07-17 RX ADMIN — GABAPENTIN 100 MILLIGRAM(S): 400 CAPSULE ORAL at 21:35

## 2023-07-17 RX ADMIN — Medication 1: at 13:51

## 2023-07-17 RX ADMIN — Medication 1: at 09:12

## 2023-07-17 RX ADMIN — Medication 81 MILLIGRAM(S): at 12:16

## 2023-07-17 RX ADMIN — Medication 1 TABLET(S): at 18:19

## 2023-07-17 RX ADMIN — Medication 1: at 18:20

## 2023-07-17 RX ADMIN — GABAPENTIN 100 MILLIGRAM(S): 400 CAPSULE ORAL at 07:04

## 2023-07-17 RX ADMIN — ENOXAPARIN SODIUM 40 MILLIGRAM(S): 100 INJECTION SUBCUTANEOUS at 07:04

## 2023-07-17 RX ADMIN — GABAPENTIN 100 MILLIGRAM(S): 400 CAPSULE ORAL at 13:16

## 2023-07-17 RX ADMIN — Medication 3 UNIT(S): at 18:20

## 2023-07-17 RX ADMIN — Medication 3 UNIT(S): at 13:51

## 2023-07-17 NOTE — PROGRESS NOTE ADULT - ASSESSMENT
72M with h/o T2DM (A1c=8.4) with neuropathy, HTN recently admitted 3/30 with R 1st toe infection, s/p 4/10 s/p RLE SFA to PT bypass graft with PTFE followed by R foot Partial hallux Amputation 4/14, and RLE angiogram 7/3 with rotational athrectomy of proximal portion of the graft in the SFA, following by MIKE that improved flow, however a proximal lesion was demonstrated at that time that was not amenable to endovascular procedures, now presenting with nonhealing R hallux wound. Now s/p R BKA romana (7/13).    Plan:   - Abx: Augmentin finished  - LLE arterial duplex to r/o L groin PSA (done 7/8) - no evidence of pseudoaneurysm   - Medicine and cardiology following and recs appreciated.   -  continue to monitor AM labs  - Regular diet  - Continue home meds   - PT - Home PT  - Plan for BKA formalization on Thursday      Vascular Surgery   [id number]

## 2023-07-17 NOTE — PROGRESS NOTE ADULT - ASSESSMENT
72 M PMH DM, HTN, PAD    Recently admitted in March/April for R 1st toe OM  Wound cx polymicrobial with E. Cloacae, Staph Lugdunensis S to Oxacillin, and Delftia acidovorans  Underwent RLE SFA to PT bypass graft with PTFE on 4/10/23  Followed by R foot partial hallux amputation on 4/14/23  (OR cx and clean margin pathology negative for OM)  Discharged home on a week of Levaquin and Keflex    s/p RLE angiogram on 7/3/23 with rotational atherectomy of the proximal portion of the graft in the SFA, followed by MIKE that improved flow, however proximal lesion was present that was not amenable to endovascular measures  Presents to the ED for non healing R foot wound  admitted 7/8/23     No fevers, no purulent discharge from R foot 1st metatarsal wound  On exam, R 1st metatarsal wound to bone, proximal phalanx exposed, ischemic/gangrenous changes around the wound  Mild leukocytosis that resolved  7/8/23:  ESR = 120;  CRP=73  R foot XR with findings c/f OM    R foot OM in a diabetic man with PAD with lesion that was not amenable to endovascular measures  Leukocytosis, elevated ESR and CRP, open 1st metatarsal wound exposed to bone  had persistent fever on Cefepime Flagyl,   7/13 - decreased fever on Meropenem,  cultures negative  7/13/23 OR:   guillotine amputation R BKA     Antibiotics  Zosyn/Vancomycin 7/8--> 7/9  Cefepime 1g IV Q12h 7/9--> 7/12  Flagyl 500mg OV Q12h 7/9  --> 7/12  Meropenem 1000 mg IV every 12 hours  7/12 --> 7/14  Augmentin 7/14-->    Suggest  continue po augmentin    for formalization BKA  7/20    discussed with primary team

## 2023-07-17 NOTE — PROGRESS NOTE ADULT - SUBJECTIVE AND OBJECTIVE BOX
SURGERY DAILY PROGRESS NOTE    STATUS POST:  Guillotine amputation below knee        SUBJECTIVE: Pt seen and examined at bedside during AM rounds. No acute events overnight. Dressing changed in AM.  Denies chest pain, SOB, palpitations, HA, fever, chills, N/V.      OBJECTIVE:  Vital Signs Last 24 Hrs  T(C): 36.6 (17 Jul 2023 06:15), Max: 36.8 (16 Jul 2023 21:24)  T(F): 97.8 (17 Jul 2023 06:15), Max: 98.3 (16 Jul 2023 21:24)  HR: 76 (17 Jul 2023 06:15) (75 - 78)  BP: 116/66 (17 Jul 2023 06:15) (109/68 - 158/77)  BP(mean): --  RR: 18 (17 Jul 2023 06:15) (18 - 18)  SpO2: 98% (17 Jul 2023 06:15) (97% - 99%)    Parameters below as of 17 Jul 2023 06:15  Patient On (Oxygen Delivery Method): room air        I&O's Summary    16 Jul 2023 07:01  -  17 Jul 2023 07:00  --------------------------------------------------------  IN: 940 mL / OUT: 2300 mL / NET: -1360 mL        Physical Exam:  General: well developed, well nourished, NAD  Cardiovascular: appears well perfused   Respiratory: respirations non labored  Gastrointestinal: soft, nontender, nondistended  Extremities: RLE dressing for open BKA surgical site appears c/d/i.  Neurological: A+Ox3     LABS:                        8.8    5.16  )-----------( 313      ( 17 Jul 2023 07:19 )             27.8     07-16    137  |  101  |  17  ----------------------------<  188<H>  4.5   |  29  |  0.85    Ca    9.1      16 Jul 2023 07:21  Phos  3.2     07-16  Mg     2.3     07-16        Urinalysis Basic - ( 16 Jul 2023 07:21 )    Color: x / Appearance: x / SG: x / pH: x  Gluc: 188 mg/dL / Ketone: x  / Bili: x / Urobili: x   Blood: x / Protein: x / Nitrite: x   Leuk Esterase: x / RBC: x / WBC x   Sq Epi: x / Non Sq Epi: x / Bacteria: x        RADIOLOGY & ADDITIONAL STUDIES:

## 2023-07-17 NOTE — PROGRESS NOTE ADULT - SUBJECTIVE AND OBJECTIVE BOX
Name of Patient : ALEE DUNN  MRN: [id number]  Date of visit: 07-17-23 @ 10:55      Subjective: Patient seen and examined. No new events except as noted.   Patient seen earlier this AM. Sitting up in bed, eating breakfast. Reports his dressing was changed this morning. States that his pain is controlled.  Planned for formalization with Vascular surgery on Thursday.     REVIEW OF SYSTEMS:    CONSTITUTIONAL: Generalized weakness   EYES/ENT: No visual changes;  No vertigo or throat pain   NECK: No pain or stiffness  RESPIRATORY: No cough, wheezing, hemoptysis; No shortness of breath  CARDIOVASCULAR: No chest pain or palpitations  GASTROINTESTINAL: No abdominal or epigastric pain. No nausea, vomiting, or hematemesis; No diarrhea or constipation. No melena or hematochezia.  GENITOURINARY: No dysuria, frequency or hematuria  NEUROLOGICAL: No numbness or weakness  SKIN: No itching, burning, rashes, or lesions  MSK: S/P R BKA; Dressing changed this AM; Pain controlled    All other review of systems is negative unless indicated above.    MEDICATIONS:  MEDICATIONS  (STANDING):  amLODIPine   Tablet 2.5 milliGRAM(s) Oral daily  aspirin enteric coated 81 milliGRAM(s) Oral daily  atorvastatin 40 milliGRAM(s) Oral at bedtime  dextrose 5%. 1000 milliLiter(s) (50 mL/Hr) IV Continuous <Continuous>  dextrose 50% Injectable 25 Gram(s) IV Push once  dextrose 50% Injectable 25 Gram(s) IV Push once  dextrose 50% Injectable 12.5 Gram(s) IV Push once  enoxaparin Injectable 40 milliGRAM(s) SubCutaneous every 24 hours  gabapentin 100 milliGRAM(s) Oral every 8 hours  glucagon  Injectable 1 milliGRAM(s) IntraMuscular once  insulin glargine Injectable (LANTUS) 8 Unit(s) SubCutaneous at bedtime  insulin lispro (ADMELOG) corrective regimen sliding scale   SubCutaneous at bedtime  insulin lispro (ADMELOG) corrective regimen sliding scale   SubCutaneous three times a day before meals  insulin lispro Injectable (ADMELOG) 3 Unit(s) SubCutaneous three times a day before meals  losartan 25 milliGRAM(s) Oral daily      PHYSICAL EXAM:  T(C): 36.6 (07-17-23 @ 08:46), Max: 36.8 (07-16-23 @ 21:24)  HR: 71 (07-17-23 @ 08:46) (71 - 78)  BP: 164/81 (07-17-23 @ 08:46) (116/66 - 164/81)  RR: 18 (07-17-23 @ 08:46) (18 - 18)  SpO2: 98% (07-17-23 @ 08:46) (98% - 99%)  Wt(kg): --  I&O's Summary    16 Jul 2023 07:01  -  17 Jul 2023 07:00  --------------------------------------------------------  IN: 1060 mL / OUT: 2300 mL / NET: -1240 mL    17 Jul 2023 07:01  -  17 Jul 2023 10:55  --------------------------------------------------------  IN: 0 mL / OUT: 400 mL / NET: -400 mL          Appearance: Normal; Sitting up in bed 	  HEENT: Eyes are open  Lymphatic: No lymphadenopathy   Cardiovascular: Normal S1 S2  Respiratory: normal effort , clear  Gastrointestinal:  Soft, Non-tender  Skin: No rashes,  warm to touch  Psychiatry:  Mood & affect appropriate  Musculoskeletal: S/P R BKA; Dressing and ACE wrap in place       07-16-23 @ 07:01  -  07-17-23 @ 07:00  --------------------------------------------------------  IN: 1060 mL / OUT: 2300 mL / NET: -1240 mL    07-17-23 @ 07:01  -  07-17-23 @ 10:55  --------------------------------------------------------  IN: 0 mL / OUT: 400 mL / NET: -400 mL                                8.8    5.16  )-----------( 313      ( 17 Jul 2023 07:19 )             27.8               07-17    140  |  103  |  20  ----------------------------<  185<H>  4.4   |  25  |  0.88    Ca    9.5      17 Jul 2023 07:23  Phos  3.6     07-17  Mg     2.4     07-17                         Urinalysis Basic - ( 17 Jul 2023 07:23 )    Color: x / Appearance: x / SG: x / pH: x  Gluc: 185 mg/dL / Ketone: x  / Bili: x / Urobili: x   Blood: x / Protein: x / Nitrite: x   Leuk Esterase: x / RBC: x / WBC x   Sq Epi: x / Non Sq Epi: x / Bacteria: x          Culture - Blood (07.12.23 @ 19:45)   Specimen Source: .Blood Blood-Venous  Culture Results: No growth at 4 days    Culture - Blood (07.12.23 @ 19:25)   Specimen Source: .Blood Blood-Venous  Culture Results: No growth at 4 days    Culture - Blood (07.10.23 @ 16:07)   Specimen Source: .Blood Blood-Peripheral  Culture Results: No growth at 5 days    Culture - Blood (07.10.23 @ 16:07)   Specimen Source: .Blood Blood  Culture Results: No growth at 5 days

## 2023-07-17 NOTE — PROGRESS NOTE ADULT - SUBJECTIVE AND OBJECTIVE BOX
Follow Up:  s/p BKA    Interval History/ROS:  denies complaints, no incisional pain, no diarrhea, GI upset    Allergies  No Known Allergies    ANTIMICROBIALS:  amoxicillin  875 milliGRAM(s)/clavulanate 1 two times a day      OTHER MEDS:  MEDICATIONS  (STANDING):  acetaminophen     Tablet .. 650 every 6 hours PRN  amLODIPine   Tablet 2.5 daily  aspirin enteric coated 81 daily  atorvastatin 40 at bedtime  dextrose 50% Injectable 25 once  dextrose 50% Injectable 25 once  dextrose 50% Injectable 12.5 once  dextrose Oral Gel 15 once PRN  enoxaparin Injectable 40 every 24 hours  gabapentin 100 every 8 hours  glucagon  Injectable 1 once  insulin glargine Injectable (LANTUS) 10 at bedtime  insulin lispro (ADMELOG) corrective regimen sliding scale  three times a day before meals  insulin lispro (ADMELOG) corrective regimen sliding scale  at bedtime  insulin lispro Injectable (ADMELOG) 3 three times a day before meals  losartan 25 daily  oxyCODONE    IR 5 every 6 hours PRN      Vital Signs Last 24 Hrs  T(C): 36.7 (17 Jul 2023 17:26), Max: 36.8 (16 Jul 2023 21:24)  T(F): 98 (17 Jul 2023 17:26), Max: 98.3 (16 Jul 2023 21:24)  HR: 80 (17 Jul 2023 18:31) (71 - 80)  BP: 135/73 (17 Jul 2023 18:31) (116/66 - 170/70)  BP(mean): --  RR: 18 (17 Jul 2023 18:31) (18 - 18)  SpO2: 98% (17 Jul 2023 18:31) (95% - 99%)    Parameters below as of 17 Jul 2023 18:31  Patient On (Oxygen Delivery Method): room air    PHYSICAL EXAM:  General: WN/WD NAD, Non-toxic  Neurology: A&Ox3, nonfocal  Respiratory: Clear to auscultation bilaterally  CV: RRR, S1S2, no murmurs, rubs or gallops  Abdominal: Soft, Non-tender, non-distended, normal bowel sounds  Extremities: No edema, dressing at amp site clean and dry  Line Sites: Clear  Skin: No rash                          8.8    5.16  )-----------( 313      ( 17 Jul 2023 07:19 )             27.8       07-17    140  |  103  |  20  ----------------------------<  185<H>  4.4   |  25  |  0.88    Ca    9.5      17 Jul 2023 07:23  Phos  3.6     07-17  Mg     2.4     07-17        Urinalysis Basic - ( 17 Jul 2023 07:23 )    Color: x / Appearance: x / SG: x / pH: x  Gluc: 185 mg/dL / Ketone: x  / Bili: x / Urobili: x   Blood: x / Protein: x / Nitrite: x   Leuk Esterase: x / RBC: x / WBC x   Sq Epi: x / Non Sq Epi: x / Bacteria: x    MICROBIOLOGY:  .Blood Blood-Venous  07-12-23   No growth at 4 days  --  --      .Blood Blood-Venous  07-12-23   No growth at 4 days  --  --      .Blood Blood  07-10-23   No growth at 5 days  --  --      Clean Catch Clean Catch (Midstream)  07-08-23   No growth  --  --      .Blood Blood-Peripheral  07-08-23   No growth at 5 days  --  --      .Blood Blood-Peripheral  07-08-23   No growth at 5 days  --  --          v          RADIOLOGY:    Thai Stein MD; Division of Infectious Disease; Pager: [phone]; nights and weekends: [phone]

## 2023-07-17 NOTE — PROGRESS NOTE ADULT - ASSESSMENT
Patient is a 72 year old male with a PMHx of Type II DM associated with neuropathy, HTN who was recently admitted to [facility] with Right 1st toe infection, S/P Right LE SFA to PT bypass graft with PTFE followed by Right foot Partial hallux Amputation on 04/14, and RLE angiogram 07/03 with rotational atherectomy of proximal portion of the graft in the SFA, following by MIKE that improved flow, however a proximal lesion was demonstrated at that time that was not amenable to endovascular procedures. Patient presents to [facility] due to nonhealing Right foot wound. Internal medicine has been consulted on Mr. Paredes's care for medical management. Patient denies chest pain, palpitations, SOB or dyspnea.       Peripheral Arterial Disease   - S/P R LE SFA to PT bypass graft, RLE angiogram 07/03  - 07/08 Arterial Duplex negative for pseudoaneurysm   - On ASA 81 and Lipitor 40   - US as noted  - Care per vascular surgery appreciated     H/O R Toe infection   - S/P Great toe amputation   - Foot Xray with Mild erosive changes concerning for OM  - MR without evidence of OM, negative for abscess. F/u podiatry recs --> S/P BKA, Planned for formalization on Thursday with Vasc  - ABX as per ID, Was on Flagyl and Cefepime, now on Meropenem as per ID   - UCx and BCx2 w/ NGTD; F/u final   - Pain control   - Incentive spirometer   - Care per Vascular/ Podiatry appreciated   - Monitor CBC, temp curve, VS and patient closely     Febrile  - Pt febrile while on Cefepime and Flagyl  - 7/10 BCx2 w/ Neg  final   - Repeat BCx2 7/12 NGTD; F/u final  - F/u with ID  --> ABX switched to Meropenem   - Monitor CBC, temp curve, VS and patient closely; Antipyretics PRN     Type II DM   - A1C 7.9   - C/w sliding scale  - C/w Lantus, increased to 10 units QHs and Pre-Meal 3 units TID; If NPO, hold pre-meal   - Diabetic/ DASH Diet   - Monitor glucose levels closely     Anemia   - Monitor Hgb closely   - Check Iron and Ferritin, noted, not ferritin deficient    - Transfuse for Hgb < 7.0     HTN   - C/w Norvasc 2.5, Losartan 25   - Monitor BP, VS and patient closely     Pre-Op risk stratification   - Previous TTE with EF of 61%, Mild LVH, normal RV  - Patient is medically optimized for OR. Moderate Risk candidate; S/P OR of BKA      DVT and GI PPX       Discussed with Attending.     Patient is a 72 year old male with a PMHx of Type II DM associated with neuropathy, HTN who was recently admitted to [facility] with Right 1st toe infection, S/P Right LE SFA to PT bypass graft with PTFE followed by Right foot Partial hallux Amputation on 04/14, and RLE angiogram 07/03 with rotational atherectomy of proximal portion of the graft in the SFA, following by MIKE that improved flow, however a proximal lesion was demonstrated at that time that was not amenable to endovascular procedures. Patient presents to [facility] due to nonhealing Right foot wound. Internal medicine has been consulted on Mr. Paredes's care for medical management. Patient denies chest pain, palpitations, SOB or dyspnea.       Peripheral Arterial Disease   - S/P R LE SFA to PT bypass graft, RLE angiogram 07/03  - 07/08 Arterial Duplex negative for pseudoaneurysm   - On ASA 81 and Lipitor 40   - US as noted  - Care per vascular surgery appreciated     H/O R Toe infection   - S/P Great toe amputation   - Foot Xray with Mild erosive changes concerning for OM  - MR without evidence of OM, negative for abscess. F/u podiatry recs --> S/P BKA, Planned for formalization on Thursday with Vasc  - ABX as per ID, Was on Flagyl and Cefepime, now on Meropenem as per ID   - UCx and BCx2 w/ NGTD; F/u final   - Pain control   - Incentive spirometer   - Care per Vascular/ Podiatry appreciated   - Monitor CBC, temp curve, VS and patient closely     Febrile  - Pt febrile while on Cefepime and Flagyl  - 7/10 BCx2 w/ Neg  final   - Repeat BCx2 7/12 NGTD; F/u final  - F/u with ID  --> ABX switched to Meropenem   - Monitor CBC, temp curve, VS and patient closely; Antipyretics PRN     Type II DM   - A1C 7.9   - C/w sliding scale  - C/w Lantus, increased to 10 units QHs and Pre-Meal 3 units TID; If NPO, hold pre-meal   - Diabetic/ DASH Diet   - Monitor glucose levels closely     Anemia   - Monitor Hgb closely   - Check Iron and Ferritin, noted, not ferritin deficient    - Transfuse for Hgb < 7.0     HTN   - C/w Norvasc 2.5, Losartan 25   - Monitor BP, VS and patient closely     Pre-Op risk stratification   - Previous TTE with EF of 61%, Mild LVH, normal RV  - Patient is medically optimized for OR. Moderate Risk candidate; plan for closure of BKA    DVT and GI PPX

## 2023-07-17 NOTE — PROGRESS NOTE ADULT - SUBJECTIVE AND OBJECTIVE BOX
DATE OF SERVICE: 07-17-23 @ 16:05    Patient is a 72y old  Male who presents with a chief complaint of foot wound (14 Jul 2023 15:48)      INTERVAL HISTORY: Feels ok.     REVIEW OF SYSTEMS:  CONSTITUTIONAL: No weakness  EYES/ENT: No visual changes;  No throat pain   NECK: No pain or stiffness  RESPIRATORY: No cough, wheezing; No shortness of breath  CARDIOVASCULAR: No chest pain or palpitations  GASTROINTESTINAL: No abdominal  pain. No nausea, vomiting, or hematemesis  GENITOURINARY: No dysuria, frequency or hematuria  NEUROLOGICAL: No stroke like symptoms  SKIN: No rashes    	  MEDICATIONS:  amLODIPine   Tablet 2.5 milliGRAM(s) Oral daily  losartan 25 milliGRAM(s) Oral daily        PHYSICAL EXAM:  T(C): 36.4 (07-17-23 @ 12:59), Max: 36.8 (07-16-23 @ 21:24)  HR: 77 (07-17-23 @ 12:59) (71 - 78)  BP: 134/69 (07-17-23 @ 12:59) (116/66 - 164/81)  RR: 18 (07-17-23 @ 12:59) (18 - 18)  SpO2: 96% (07-17-23 @ 12:59) (96% - 99%)  Wt(kg): --  I&O's Summary    16 Jul 2023 07:01  -  17 Jul 2023 07:00  --------------------------------------------------------  IN: 1060 mL / OUT: 2300 mL / NET: -1240 mL    17 Jul 2023 07:01  -  17 Jul 2023 16:05  --------------------------------------------------------  IN: 240 mL / OUT: 800 mL / NET: -560 mL          Appearance: In no distress	  HEENT:    PERRL, EOMI	  Cardiovascular:  S1 S2, No JVD  Respiratory: Lungs clear to auscultation	  Gastrointestinal:  Soft, Non-tender, + BS	  Vascularature:  No edema of LE  Psychiatric: Appropriate affect   Neuro: no acute focal deficits                               8.8    5.16  )-----------( 313      ( 17 Jul 2023 07:19 )             27.8     07-17    140  |  103  |  20  ----------------------------<  185<H>  4.4   |  25  |  0.88    Ca    9.5      17 Jul 2023 07:23  Phos  3.6     07-17  Mg     2.4     07-17          Labs personally reviewed      ASSESSMENT/PLAN: 	    Patient is a 72 year old male with a PMHx of HTN, Type II DM associated with neuropathy whore presents to [facility] with a chief complaint of pain, blistering and fluid drainage on his right foot.    Problem/Plan -1  Problem: Cardiac Risk Stratification for podiatry surgery  - Denies CP or SOB  - TTE shows preserved EF, mild LVH  - Not in decompensated HF  - No tachy supa arrhythmia  - s/p cath with nonobstructive moderate CAD  - Elevated risk for low-mod risk pods surgery procedure, no contraindication to proceed   - s/p R BKA  7/13 tolerated surgery well    Problem/Plan -2  Problem: Hypertension  - amlodipine  2.5mg PO daily  - c/w losartan to 25mg PO daily    Problem/Plan -3  Problem: DM II  - HgbA1c 8.4  - ISS as per primary team        Berenice Branch, RISHABH-NP   Matt Calhoun DO [facility]  Cardiovascular Medicine  [address] [place] [address], [address]  Available through call or text on [employer]  Office: [phone]

## 2023-07-18 ENCOUNTER — APPOINTMENT (OUTPATIENT)
Dept: VASCULAR SURGERY | Facility: CLINIC | Age: 73
End: 2023-07-18

## 2023-07-18 LAB
ANION GAP SERPL CALC-SCNC: 11 MMOL/L — SIGNIFICANT CHANGE UP (ref 5–17)
BUN SERPL-MCNC: 18 MG/DL — SIGNIFICANT CHANGE UP (ref 7–23)
CALCIUM SERPL-MCNC: 9.2 MG/DL — SIGNIFICANT CHANGE UP (ref 8.4–10.5)
CHLORIDE SERPL-SCNC: 102 MMOL/L — SIGNIFICANT CHANGE UP (ref 96–108)
CO2 SERPL-SCNC: 23 MMOL/L — SIGNIFICANT CHANGE UP (ref 22–31)
CREAT SERPL-MCNC: 0.91 MG/DL — SIGNIFICANT CHANGE UP (ref 0.5–1.3)
CULTURE RESULTS: SIGNIFICANT CHANGE UP
CULTURE RESULTS: SIGNIFICANT CHANGE UP
EGFR: 90 ML/MIN/1.73M2 — SIGNIFICANT CHANGE UP
GLUCOSE BLDC GLUCOMTR-MCNC: 156 MG/DL — HIGH (ref 70–99)
GLUCOSE BLDC GLUCOMTR-MCNC: 166 MG/DL — HIGH (ref 70–99)
GLUCOSE BLDC GLUCOMTR-MCNC: 195 MG/DL — HIGH (ref 70–99)
GLUCOSE BLDC GLUCOMTR-MCNC: 221 MG/DL — HIGH (ref 70–99)
GLUCOSE SERPL-MCNC: 177 MG/DL — HIGH (ref 70–99)
HCT VFR BLD CALC: 27.3 % — LOW (ref 39–50)
HGB BLD-MCNC: 8.7 G/DL — LOW (ref 13–17)
MAGNESIUM SERPL-MCNC: 2.3 MG/DL — SIGNIFICANT CHANGE UP (ref 1.6–2.6)
MCHC RBC-ENTMCNC: 28.5 PG — SIGNIFICANT CHANGE UP (ref 27–34)
MCHC RBC-ENTMCNC: 31.9 GM/DL — LOW (ref 32–36)
MCV RBC AUTO: 89.5 FL — SIGNIFICANT CHANGE UP (ref 80–100)
NRBC # BLD: 0 /100 WBCS — SIGNIFICANT CHANGE UP (ref 0–0)
PHOSPHATE SERPL-MCNC: 3.5 MG/DL — SIGNIFICANT CHANGE UP (ref 2.5–4.5)
PLATELET # BLD AUTO: 312 K/UL — SIGNIFICANT CHANGE UP (ref 150–400)
POTASSIUM SERPL-MCNC: 4 MMOL/L — SIGNIFICANT CHANGE UP (ref 3.5–5.3)
POTASSIUM SERPL-SCNC: 4 MMOL/L — SIGNIFICANT CHANGE UP (ref 3.5–5.3)
RBC # BLD: 3.05 M/UL — LOW (ref 4.2–5.8)
RBC # FLD: 12.9 % — SIGNIFICANT CHANGE UP (ref 10.3–14.5)
SODIUM SERPL-SCNC: 136 MMOL/L — SIGNIFICANT CHANGE UP (ref 135–145)
SPECIMEN SOURCE: SIGNIFICANT CHANGE UP
SPECIMEN SOURCE: SIGNIFICANT CHANGE UP
WBC # BLD: 6.47 K/UL — SIGNIFICANT CHANGE UP (ref 3.8–10.5)
WBC # FLD AUTO: 6.47 K/UL — SIGNIFICANT CHANGE UP (ref 3.8–10.5)

## 2023-07-18 RX ORDER — INSULIN GLARGINE 100 [IU]/ML
12 INJECTION, SOLUTION SUBCUTANEOUS AT BEDTIME
Refills: 0 | Status: DISCONTINUED | OUTPATIENT
Start: 2023-07-18 | End: 2023-07-20

## 2023-07-18 RX ORDER — INSULIN LISPRO 100/ML
4 VIAL (ML) SUBCUTANEOUS
Refills: 0 | Status: DISCONTINUED | OUTPATIENT
Start: 2023-07-18 | End: 2023-07-21

## 2023-07-18 RX ADMIN — AMLODIPINE BESYLATE 2.5 MILLIGRAM(S): 2.5 TABLET ORAL at 05:15

## 2023-07-18 RX ADMIN — Medication 1: at 18:32

## 2023-07-18 RX ADMIN — INSULIN GLARGINE 12 UNIT(S): 100 INJECTION, SOLUTION SUBCUTANEOUS at 22:03

## 2023-07-18 RX ADMIN — Medication 1: at 10:13

## 2023-07-18 RX ADMIN — Medication 4 UNIT(S): at 18:32

## 2023-07-18 RX ADMIN — Medication 1 TABLET(S): at 18:31

## 2023-07-18 RX ADMIN — Medication 81 MILLIGRAM(S): at 13:18

## 2023-07-18 RX ADMIN — GABAPENTIN 100 MILLIGRAM(S): 400 CAPSULE ORAL at 22:03

## 2023-07-18 RX ADMIN — ATORVASTATIN CALCIUM 40 MILLIGRAM(S): 80 TABLET, FILM COATED ORAL at 22:03

## 2023-07-18 RX ADMIN — Medication 1 TABLET(S): at 05:15

## 2023-07-18 RX ADMIN — GABAPENTIN 100 MILLIGRAM(S): 400 CAPSULE ORAL at 13:19

## 2023-07-18 RX ADMIN — ENOXAPARIN SODIUM 40 MILLIGRAM(S): 100 INJECTION SUBCUTANEOUS at 05:15

## 2023-07-18 RX ADMIN — GABAPENTIN 100 MILLIGRAM(S): 400 CAPSULE ORAL at 05:15

## 2023-07-18 RX ADMIN — Medication 2: at 14:07

## 2023-07-18 RX ADMIN — Medication 4 UNIT(S): at 14:08

## 2023-07-18 RX ADMIN — LOSARTAN POTASSIUM 25 MILLIGRAM(S): 100 TABLET, FILM COATED ORAL at 05:15

## 2023-07-18 NOTE — PROGRESS NOTE ADULT - SUBJECTIVE AND OBJECTIVE BOX
Name of Patient : ALEE DUNN  MRN: [id number]  Date of visit: 07-18-23 @ 15:43      Subjective: Patient seen and examined. No new events except as noted.   Patient seen earlier this AM. Sitting up in bed, eating breakfast.   Hyperglycemic. Insulins adjusted   Denies pain or discomfort.     REVIEW OF SYSTEMS:    CONSTITUTIONAL: Generalized weakness   EYES/ENT: No visual changes;  No vertigo or throat pain   NECK: No pain or stiffness  RESPIRATORY: No cough, wheezing, hemoptysis; No shortness of breath  CARDIOVASCULAR: No chest pain or palpitations  GASTROINTESTINAL: No abdominal or epigastric pain. No nausea, vomiting, or hematemesis; No diarrhea or constipation. No melena or hematochezia.  GENITOURINARY: No dysuria, frequency or hematuria  NEUROLOGICAL: No numbness or weakness  SKIN: No itching, burning, rashes, or lesions  MSK: S/P R BKA; Dressing changed this AM; Pain controlled    All other review of systems is negative unless indicated above.    MEDICATIONS:  MEDICATIONS  (STANDING):  amLODIPine   Tablet 2.5 milliGRAM(s) Oral daily  amoxicillin  875 milliGRAM(s)/clavulanate 1 Tablet(s) Oral two times a day  aspirin enteric coated 81 milliGRAM(s) Oral daily  atorvastatin 40 milliGRAM(s) Oral at bedtime  dextrose 5%. 1000 milliLiter(s) (50 mL/Hr) IV Continuous <Continuous>  dextrose 50% Injectable 12.5 Gram(s) IV Push once  dextrose 50% Injectable 25 Gram(s) IV Push once  dextrose 50% Injectable 25 Gram(s) IV Push once  enoxaparin Injectable 40 milliGRAM(s) SubCutaneous every 24 hours  gabapentin 100 milliGRAM(s) Oral every 8 hours  glucagon  Injectable 1 milliGRAM(s) IntraMuscular once  insulin glargine Injectable (LANTUS) 12 Unit(s) SubCutaneous at bedtime  insulin lispro (ADMELOG) corrective regimen sliding scale   SubCutaneous three times a day before meals  insulin lispro (ADMELOG) corrective regimen sliding scale   SubCutaneous at bedtime  insulin lispro Injectable (ADMELOG) 4 Unit(s) SubCutaneous three times a day before meals  losartan 25 milliGRAM(s) Oral daily      PHYSICAL EXAM:  T(C): 36.8 (07-18-23 @ 13:46), Max: 36.9 (07-17-23 @ 21:50)  HR: 78 (07-18-23 @ 13:46) (69 - 80)  BP: 125/62 (07-18-23 @ 13:46) (117/75 - 170/70)  RR: 18 (07-18-23 @ 13:46) (18 - 18)  SpO2: 97% (07-18-23 @ 13:46) (95% - 98%)  Wt(kg): --  I&O's Summary    17 Jul 2023 07:01  -  18 Jul 2023 07:00  --------------------------------------------------------  IN: 480 mL / OUT: 800 mL / NET: -320 mL    18 Jul 2023 07:01  -  18 Jul 2023 15:43  --------------------------------------------------------  IN: 240 mL / OUT: 720 mL / NET: -480 mL          Appearance: Normal; Sitting up in bed 	  HEENT: Eyes are open  Lymphatic: No lymphadenopathy   Cardiovascular: Normal S1 S2  Respiratory: normal effort , clear  Gastrointestinal:  Soft, Non-tender  Skin: No rashes,  warm to touch  Psychiatry:  Mood & affect appropriate  Musculoskeletal: S/P R BKA; Dressing and ACE wrap in place           07-17-23 @ 07:01  -  07-18-23 @ 07:00  --------------------------------------------------------  IN: 480 mL / OUT: 800 mL / NET: -320 mL    07-18-23 @ 07:01  -  07-18-23 @ 15:43  --------------------------------------------------------  IN: 240 mL / OUT: 720 mL / NET: -480 mL                              8.7    6.47  )-----------( 312      ( 18 Jul 2023 07:10 )             27.3               07-18    136  |  102  |  18  ----------------------------<  177<H>  4.0   |  23  |  0.91    Ca    9.2      18 Jul 2023 07:05  Phos  3.5     07-18  Mg     2.3     07-18                         Urinalysis Basic - ( 18 Jul 2023 07:05 )    Color: x / Appearance: x / SG: x / pH: x  Gluc: 177 mg/dL / Ketone: x  / Bili: x / Urobili: x   Blood: x / Protein: x / Nitrite: x   Leuk Esterase: x / RBC: x / WBC x   Sq Epi: x / Non Sq Epi: x / Bacteria: x

## 2023-07-18 NOTE — PROGRESS NOTE ADULT - SUBJECTIVE AND OBJECTIVE BOX
SURGERY DAILY PROGRESS NOTE    STATUS POST:  Guillotine amputation below knee        SUBJECTIVE: Pt seen and examined at bedside. No acute events overnight. Pain well controlled. Dressing changed during AM rounds. Denies chest pain, SOB, palpitations, HA, fever, chills, N/V.      OBJECTIVE:  Vital Signs Last 24 Hrs  T(C): 36.8 (18 Jul 2023 05:00), Max: 36.9 (17 Jul 2023 21:50)  T(F): 98.2 (18 Jul 2023 05:00), Max: 98.5 (17 Jul 2023 21:50)  HR: 76 (18 Jul 2023 05:00) (71 - 80)  BP: 157/71 (18 Jul 2023 05:00) (134/69 - 170/70)  BP(mean): --  RR: 18 (18 Jul 2023 05:00) (18 - 18)  SpO2: 97% (18 Jul 2023 05:00) (95% - 98%)    Parameters below as of 18 Jul 2023 05:00  Patient On (Oxygen Delivery Method): room air        I&O's Summary    17 Jul 2023 07:01  -  18 Jul 2023 07:00  --------------------------------------------------------  IN: 480 mL / OUT: 800 mL / NET: -320 mL    18 Jul 2023 07:01  -  18 Jul 2023 08:06  --------------------------------------------------------  IN: 0 mL / OUT: 200 mL / NET: -200 mL        Physical Exam:  General Appearance: Appears well, NAD, A& O x 3  Neck: Trachea midline   Chest: Equal expansion bilaterally, NAD  Extremities: RLE dressing for open BKA surgical site appears c/d/i.     LABS:                        8.8    5.16  )-----------( 313      ( 17 Jul 2023 07:19 )             27.8     07-18    136  |  102  |  18  ----------------------------<  177<H>  4.0   |  23  |  0.91    Ca    9.2      18 Jul 2023 07:05  Phos  3.5     07-18  Mg     2.3     07-18        Urinalysis Basic - ( 18 Jul 2023 07:05 )    Color: x / Appearance: x / SG: x / pH: x  Gluc: 177 mg/dL / Ketone: x  / Bili: x / Urobili: x   Blood: x / Protein: x / Nitrite: x   Leuk Esterase: x / RBC: x / WBC x   Sq Epi: x / Non Sq Epi: x / Bacteria: x        RADIOLOGY & ADDITIONAL STUDIES:

## 2023-07-18 NOTE — PROGRESS NOTE ADULT - SUBJECTIVE AND OBJECTIVE BOX
DATE OF SERVICE: 07-18-23 @ 11:26    Patient is a 72y old  Male who presents with a chief complaint of foot infection (17 Jul 2023 19:22)      INTERVAL HISTORY: Feels well, denies chest pain and palpitations     REVIEW OF SYSTEMS:  CONSTITUTIONAL: No weakness  EYES/ENT: No visual changes;  No throat pain   NECK: No pain or stiffness  RESPIRATORY: No cough, wheezing; No shortness of breath  CARDIOVASCULAR: No chest pain or palpitations  GASTROINTESTINAL: No abdominal  pain. No nausea, vomiting, or hematemesis  GENITOURINARY: No dysuria, frequency or hematuria  NEUROLOGICAL: No stroke like symptoms  SKIN: No rashes       	  MEDICATIONS:  amLODIPine   Tablet 2.5 milliGRAM(s) Oral daily  losartan 25 milliGRAM(s) Oral daily        PHYSICAL EXAM:  T(C): 36.6 (07-18-23 @ 09:48), Max: 36.9 (07-17-23 @ 21:50)  HR: 69 (07-18-23 @ 09:48) (69 - 80)  BP: 117/75 (07-18-23 @ 09:48) (117/75 - 170/70)  RR: 18 (07-18-23 @ 09:48) (18 - 18)  SpO2: 97% (07-18-23 @ 09:48) (95% - 98%)  Wt(kg): --  I&O's Summary    17 Jul 2023 07:01  -  18 Jul 2023 07:00  --------------------------------------------------------  IN: 480 mL / OUT: 800 mL / NET: -320 mL    18 Jul 2023 07:01  -  18 Jul 2023 11:26  --------------------------------------------------------  IN: 240 mL / OUT: 720 mL / NET: -480 mL          Appearance: In no distress	  HEENT:    PERRL, EOMI	  Cardiovascular:  S1 S2, No JVD  Respiratory: Lungs clear to auscultation	  Gastrointestinal:  Soft, Non-tender, + BS	  Vascularature:  No edema of LE  Psychiatric: Appropriate affect   Neuro: no acute focal deficits                               8.7    6.47  )-----------( 312      ( 18 Jul 2023 07:10 )             27.3     07-18    136  |  102  |  18  ----------------------------<  177<H>  4.0   |  23  |  0.91    Ca    9.2      18 Jul 2023 07:05  Phos  3.5     07-18  Mg     2.3     07-18          Labs personally reviewed      ASSESSMENT/PLAN: 	  Patient is a 72 year old male with a PMHx of HTN, Type II DM associated with neuropathy whore presents to [facility] with a chief complaint of pain, blistering and fluid drainage on his right foot.    Problem/Plan -1  Problem: Cardiac Risk Stratification for podiatry surgery  - Denies CP or SOB  - TTE shows preserved EF, mild LVH  - Not in decompensated HF  - No tachy supa arrhythmia  - s/p cath with nonobstructive moderate CAD  - Elevated risk for low-mod risk pods surgery procedure, no contraindication to proceed   - s/p R BKA  7/13 tolerated surgery well    Problem/Plan -2  Problem: Hypertension  - amlodipine  2.5mg PO daily  - c/w losartan to 25mg PO daily    Problem/Plan -3  Problem: DM II  - HgbA1c 8.4  - ISS as per primary team          SUSY Broderick DO [facility]  Cardiovascular Medicine  [address] [place] [address], [address]  Available via call or text on [employer]  Office: [phone]

## 2023-07-18 NOTE — PROGRESS NOTE ADULT - ASSESSMENT
72M with h/o T2DM (A1c=8.4) with neuropathy, HTN recently admitted 3/30 with R 1st toe infection, s/p 4/10 s/p RLE SFA to PT bypass graft with PTFE followed by R foot Partial hallux Amputation 4/14, and RLE angiogram 7/3 with rotational athrectomy of proximal portion of the graft in the SFA, following by MIKE that improved flow, however a proximal lesion was demonstrated at that time that was not amenable to endovascular procedures, now presenting with nonhealing R hallux wound. Now s/p R BKA romana (7/13).    Plan:   - Abx: Augmentin to continue until formalization of BKA as per ID recs.   - LLE arterial duplex to r/o L groin PSA (done 7/8) - no evidence of pseudoaneurysm   - Medicine and cardiology following and recs appreciated.   -  continue to monitor AM labs  - Regular diet  - Continue home meds   - PT - Home PT with wheelchair  - Plan for BKA formalization on Thursday      Vascular Surgery   [id number]

## 2023-07-18 NOTE — PROGRESS NOTE ADULT - ASSESSMENT
Patient is a 72 year old male with a PMHx of Type II DM associated with neuropathy, HTN who was recently admitted to [facility] with Right 1st toe infection, S/P Right LE SFA to PT bypass graft with PTFE followed by Right foot Partial hallux Amputation on 04/14, and RLE angiogram 07/03 with rotational atherectomy of proximal portion of the graft in the SFA, following by MIKE that improved flow, however a proximal lesion was demonstrated at that time that was not amenable to endovascular procedures. Patient presents to [facility] due to nonhealing Right foot wound. Internal medicine has been consulted on Mr. Paredes's care for medical management. Patient denies chest pain, palpitations, SOB or dyspnea.       Peripheral Arterial Disease   - S/P R LE SFA to PT bypass graft, RLE angiogram 07/03  - 07/08 Arterial Duplex negative for pseudoaneurysm   - On ASA 81 and Lipitor 40   - US as noted  - Care per vascular surgery appreciated     H/O R Toe infection   - S/P Great toe amputation   - Foot Xray with Mild erosive changes concerning for OM  - MR without evidence of OM, negative for abscess. F/u podiatry recs --> S/P BKA, Planned for formalization on Thursday with Vasc  - ABX as per ID, Was on Flagyl and Cefepime, Was on Meropenem, now on Augmentin PO as per ID   - UCx and BCx2 w/ NGTD; F/u final   - Pain control   - Incentive spirometer   - Care per Vascular/ Podiatry appreciated   - Monitor CBC, temp curve, VS and patient closely     Febrile  - Pt febrile while on Cefepime and Flagyl  - 7/10 BCx2 w/ Neg  final   - Repeat BCx2 7/12 NGTD; F/u final  - F/u with ID  --> ABX switched to Meropenem, now on PO Augmentin   - Monitor CBC, temp curve, VS and patient closely; Antipyretics PRN     Type II DM   - A1C 7.9   - C/w sliding scale  - C/w Lantus, increased to 12 units QHs and Pre-Meal 4 units TID; If NPO, hold pre-meal   - Diabetic/ DASH Diet   - Monitor glucose levels closely     Anemia   - Monitor Hgb closely   - Iron and Ferritin, noted, not ferritin deficient    - Transfuse for Hgb < 7.0     HTN   - C/w Norvasc 2.5, Losartan 25   - Monitor BP, VS and patient closely     Pre-Op risk stratification   - Previous TTE with EF of 61%, Mild LVH, normal RV  - Patient is medically optimized for OR. Moderate Risk candidate; plan for closure of BKA    DVT and GI PPX

## 2023-07-18 NOTE — CHART NOTE - NSCHARTNOTEFT_GEN_A_CORE
Due to patient deconditioning and generalized weakness secondary to BKA. Pt will require a standard wheelchair. This is necessary to achieve daily tasks and therapies which cannot be achieved with the use of a cane or rolling walker. Patient and family are in agreement with the standard wheelchair use at home and assistance will be provided as needed.

## 2023-07-19 LAB
ANION GAP SERPL CALC-SCNC: 11 MMOL/L — SIGNIFICANT CHANGE UP (ref 5–17)
BUN SERPL-MCNC: 26 MG/DL — HIGH (ref 7–23)
CALCIUM SERPL-MCNC: 9.5 MG/DL — SIGNIFICANT CHANGE UP (ref 8.4–10.5)
CHLORIDE SERPL-SCNC: 103 MMOL/L — SIGNIFICANT CHANGE UP (ref 96–108)
CO2 SERPL-SCNC: 24 MMOL/L — SIGNIFICANT CHANGE UP (ref 22–31)
CREAT SERPL-MCNC: 0.91 MG/DL — SIGNIFICANT CHANGE UP (ref 0.5–1.3)
EGFR: 90 ML/MIN/1.73M2 — SIGNIFICANT CHANGE UP
GLUCOSE BLDC GLUCOMTR-MCNC: 171 MG/DL — HIGH (ref 70–99)
GLUCOSE BLDC GLUCOMTR-MCNC: 208 MG/DL — HIGH (ref 70–99)
GLUCOSE BLDC GLUCOMTR-MCNC: 209 MG/DL — HIGH (ref 70–99)
GLUCOSE BLDC GLUCOMTR-MCNC: 233 MG/DL — HIGH (ref 70–99)
GLUCOSE SERPL-MCNC: 166 MG/DL — HIGH (ref 70–99)
HCT VFR BLD CALC: 29.7 % — LOW (ref 39–50)
HGB BLD-MCNC: 9.4 G/DL — LOW (ref 13–17)
MAGNESIUM SERPL-MCNC: 2.3 MG/DL — SIGNIFICANT CHANGE UP (ref 1.6–2.6)
MCHC RBC-ENTMCNC: 28.2 PG — SIGNIFICANT CHANGE UP (ref 27–34)
MCHC RBC-ENTMCNC: 31.6 GM/DL — LOW (ref 32–36)
MCV RBC AUTO: 89.2 FL — SIGNIFICANT CHANGE UP (ref 80–100)
NRBC # BLD: 0 /100 WBCS — SIGNIFICANT CHANGE UP (ref 0–0)
PHOSPHATE SERPL-MCNC: 3.9 MG/DL — SIGNIFICANT CHANGE UP (ref 2.5–4.5)
PLATELET # BLD AUTO: 337 K/UL — SIGNIFICANT CHANGE UP (ref 150–400)
POTASSIUM SERPL-MCNC: 4.3 MMOL/L — SIGNIFICANT CHANGE UP (ref 3.5–5.3)
POTASSIUM SERPL-SCNC: 4.3 MMOL/L — SIGNIFICANT CHANGE UP (ref 3.5–5.3)
RBC # BLD: 3.33 M/UL — LOW (ref 4.2–5.8)
RBC # FLD: 13 % — SIGNIFICANT CHANGE UP (ref 10.3–14.5)
SODIUM SERPL-SCNC: 138 MMOL/L — SIGNIFICANT CHANGE UP (ref 135–145)
WBC # BLD: 7.57 K/UL — SIGNIFICANT CHANGE UP (ref 3.8–10.5)
WBC # FLD AUTO: 7.57 K/UL — SIGNIFICANT CHANGE UP (ref 3.8–10.5)

## 2023-07-19 RX ORDER — DEXTROSE MONOHYDRATE, SODIUM CHLORIDE, AND POTASSIUM CHLORIDE 50; .745; 4.5 G/1000ML; G/1000ML; G/1000ML
1000 INJECTION, SOLUTION INTRAVENOUS
Refills: 0 | Status: DISCONTINUED | OUTPATIENT
Start: 2023-07-20 | End: 2023-07-21

## 2023-07-19 RX ADMIN — ATORVASTATIN CALCIUM 40 MILLIGRAM(S): 80 TABLET, FILM COATED ORAL at 22:15

## 2023-07-19 RX ADMIN — Medication 4 UNIT(S): at 14:07

## 2023-07-19 RX ADMIN — GABAPENTIN 100 MILLIGRAM(S): 400 CAPSULE ORAL at 22:15

## 2023-07-19 RX ADMIN — LOSARTAN POTASSIUM 25 MILLIGRAM(S): 100 TABLET, FILM COATED ORAL at 06:21

## 2023-07-19 RX ADMIN — Medication 4 UNIT(S): at 18:28

## 2023-07-19 RX ADMIN — Medication 4 UNIT(S): at 09:40

## 2023-07-19 RX ADMIN — INSULIN GLARGINE 12 UNIT(S): 100 INJECTION, SOLUTION SUBCUTANEOUS at 22:16

## 2023-07-19 RX ADMIN — Medication 81 MILLIGRAM(S): at 12:58

## 2023-07-19 RX ADMIN — ENOXAPARIN SODIUM 40 MILLIGRAM(S): 100 INJECTION SUBCUTANEOUS at 06:22

## 2023-07-19 RX ADMIN — Medication 2: at 18:27

## 2023-07-19 RX ADMIN — Medication 1: at 09:39

## 2023-07-19 RX ADMIN — Medication 1 TABLET(S): at 17:02

## 2023-07-19 RX ADMIN — AMLODIPINE BESYLATE 2.5 MILLIGRAM(S): 2.5 TABLET ORAL at 06:21

## 2023-07-19 RX ADMIN — GABAPENTIN 100 MILLIGRAM(S): 400 CAPSULE ORAL at 12:57

## 2023-07-19 RX ADMIN — GABAPENTIN 100 MILLIGRAM(S): 400 CAPSULE ORAL at 06:21

## 2023-07-19 RX ADMIN — Medication 2: at 14:02

## 2023-07-19 RX ADMIN — Medication 1 TABLET(S): at 06:21

## 2023-07-19 NOTE — DIETITIAN INITIAL EVALUATION ADULT - ORAL NUTRITION SUPPLEMENTS
recommend Glucerna Shake 1x daily (220kcals, 10g protein) to ensure adequate kcal/nutrients intake while in house.

## 2023-07-19 NOTE — PROGRESS NOTE ADULT - ASSESSMENT
Patient is a 72 year old male with a PMHx of Type II DM associated with neuropathy, HTN who was recently admitted to [facility] with Right 1st toe infection, S/P Right LE SFA to PT bypass graft with PTFE followed by Right foot Partial hallux Amputation on 04/14, and RLE angiogram 07/03 with rotational atherectomy of proximal portion of the graft in the SFA, following by MIKE that improved flow, however a proximal lesion was demonstrated at that time that was not amenable to endovascular procedures. Patient presents to [facility] due to nonhealing Right foot wound. Internal medicine has been consulted on Mr. Paredes's care for medical management. Patient denies chest pain, palpitations, SOB or dyspnea.       Peripheral Arterial Disease   - S/P R LE SFA to PT bypass graft, RLE angiogram 07/03  - 07/08 Arterial Duplex negative for pseudoaneurysm   - On ASA 81 and Lipitor 40   - US as noted  - Care per vascular surgery appreciated     H/O R Toe infection   - S/P Great toe amputation   - Foot Xray with Mild erosive changes concerning for OM  - MR without evidence of OM, negative for abscess. F/u podiatry recs --> S/P BKA, Planned for formalization on 7/20 with Vasc  - ABX as per ID, Was on Flagyl and Cefepime, Was on Meropenem, now on Augmentin PO as per ID   - UCx and BCx2 w/ Neg  final   - Pain control   - Incentive spirometer   - Care per Vascular/ Podiatry appreciated   - Monitor CBC, temp curve, VS and patient closely     Febrile  - Pt febrile while on Cefepime and Flagyl  - 7/10 BCx2 w/ Neg  final   - Repeat BCx2 7/12 Neg; F/u final  - F/u with ID  --> ABX switched to Meropenem, now on PO Augmentin   - Monitor CBC, temp curve, VS and patient closely; Antipyretics PRN     Type II DM   - A1C 7.9   - C/w sliding scale  - C/w Lantus, increased to 12 units QHs and Pre-Meal 4 units TID; If NPO, hold pre-meal   - Diabetic/ DASH Diet   - Monitor glucose levels closely     Anemia   - Monitor Hgb closely   - Iron and Ferritin, noted, not ferritin deficient    - Transfuse for Hgb < 7.0     HTN   - C/w Norvasc 2.5, Losartan 25   - Monitor BP, VS and patient closely     Pre-Op risk stratification   - Previous TTE with EF of 61%, Mild LVH, normal RV  - Patient is medically optimized for OR. Moderate / High Risk candidate; plan for closure of BKA    DVT and GI PPX   Discussed with Attending.

## 2023-07-19 NOTE — PROGRESS NOTE ADULT - SUBJECTIVE AND OBJECTIVE BOX
DATE OF SERVICE: 07-19-23 @ 09:11    Patient is a 72y old  Male who presents with a chief complaint of foot infection (17 Jul 2023 19:22)      INTERVAL HISTORY: Feels well, denies chest pain and SOB    REVIEW OF SYSTEMS:  CONSTITUTIONAL: No weakness  EYES/ENT: No visual changes;  No throat pain   NECK: No pain or stiffness  RESPIRATORY: No cough, wheezing; No shortness of breath  CARDIOVASCULAR: No chest pain or palpitations  GASTROINTESTINAL: No abdominal  pain. No nausea, vomiting, or hematemesis  GENITOURINARY: No dysuria, frequency or hematuria  NEUROLOGICAL: No stroke like symptoms  SKIN: No rashes      	  MEDICATIONS:  amLODIPine   Tablet 2.5 milliGRAM(s) Oral daily  losartan 25 milliGRAM(s) Oral daily        PHYSICAL EXAM:  T(C): 36.8 (07-19-23 @ 06:07), Max: 37.2 (07-18-23 @ 21:46)  HR: 75 (07-19-23 @ 06:07) (69 - 84)  BP: 160/81 (07-19-23 @ 06:07) (110/60 - 172/62)  RR: 18 (07-19-23 @ 06:07) (18 - 18)  SpO2: 98% (07-19-23 @ 06:07) (97% - 100%)  Wt(kg): --  I&O's Summary    18 Jul 2023 07:01  -  19 Jul 2023 07:00  --------------------------------------------------------  IN: 840 mL / OUT: 2120 mL / NET: -1280 mL    19 Jul 2023 07:01  -  19 Jul 2023 09:11  --------------------------------------------------------  IN: 0 mL / OUT: 325 mL / NET: -325 mL          Appearance: In no distress	  HEENT:    PERRL, EOMI	  Cardiovascular:  S1 S2, No JVD  Respiratory: Lungs clear to auscultation	  Gastrointestinal:  Soft, Non-tender, + BS	  Vascularature:  No edema of LE  Psychiatric: Appropriate affect   Neuro: no acute focal deficits                               9.4    7.57  )-----------( 337      ( 19 Jul 2023 08:38 )             29.7     07-19    138  |  103  |  26<H>  ----------------------------<  166<H>  4.3   |  24  |  0.91    Ca    9.5      19 Jul 2023 08:38  Phos  3.9     07-19  Mg     2.3     07-19          Labs personally reviewed      ASSESSMENT/PLAN: 	  Patient is a 72 year old male with a PMHx of HTN, Type II DM associated with neuropathy whore presents to [facility] with a chief complaint of pain, blistering and fluid drainage on his right foot.    Problem/Plan -1  Problem: Cardiac Risk Stratification for podiatry surgery  - Denies CP or SOB  - TTE shows preserved EF, mild LVH  - Not in decompensated HF  - No tachy supa arrhythmia  - s/p cath with nonobstructive moderate CAD  - Elevated risk for low-mod risk pods surgery procedure, no contraindication to proceed   - s/p R BKA  7/13 tolerated surgery well  - OR tomorrow for BKA formalization    Problem/Plan -2  Problem: Hypertension  - amlodipine  2.5mg PO daily  - c/w losartan to 25mg PO daily    Problem/Plan -3  Problem: DM II  - HgbA1c 8.4  - ISS as per primary team        SUSY Broderick DO [facility]  Cardiovascular Medicine  [address] [place] [address], [address]  Available via call or text on [employer]  Office: [phone]

## 2023-07-19 NOTE — PROGRESS NOTE ADULT - SUBJECTIVE AND OBJECTIVE BOX
Name of Patient : ALEE DUNN  MRN: [id number]  Date of visit: 07-19-23 @ 16:14      Subjective: Patient seen and examined. No new events except as noted.   Patient seen earlier this AM. Lying down in bed. Reports he had some LE pain this AM. States his pain has improved.   Planned for OR tomorrow with Vascular surgery for formalization of BKA.   Patient denies chest pain, palpitations, shortness of breath or dyspnea.     REVIEW OF SYSTEMS:    CONSTITUTIONAL: Generalized weakness   EYES/ENT: No visual changes;  No vertigo or throat pain   NECK: No pain or stiffness  RESPIRATORY: No cough, wheezing, hemoptysis; No shortness of breath  CARDIOVASCULAR: No chest pain or palpitations  GASTROINTESTINAL: No abdominal or epigastric pain. No nausea, vomiting, or hematemesis; No diarrhea or constipation. No melena or hematochezia.  GENITOURINARY: No dysuria, frequency or hematuria  NEUROLOGICAL: No numbness or weakness  SKIN: No itching, burning, rashes, or lesions  MSK: S/P R BKA; Mild pain this AM, improved   All other review of systems is negative unless indicated above.    MEDICATIONS:  MEDICATIONS  (STANDING):  amLODIPine   Tablet 2.5 milliGRAM(s) Oral daily  amoxicillin  875 milliGRAM(s)/clavulanate 1 Tablet(s) Oral two times a day  aspirin enteric coated 81 milliGRAM(s) Oral daily  atorvastatin 40 milliGRAM(s) Oral at bedtime  dextrose 5%. 1000 milliLiter(s) (50 mL/Hr) IV Continuous <Continuous>  dextrose 50% Injectable 12.5 Gram(s) IV Push once  dextrose 50% Injectable 25 Gram(s) IV Push once  dextrose 50% Injectable 25 Gram(s) IV Push once  enoxaparin Injectable 40 milliGRAM(s) SubCutaneous every 24 hours  gabapentin 100 milliGRAM(s) Oral every 8 hours  glucagon  Injectable 1 milliGRAM(s) IntraMuscular once  insulin glargine Injectable (LANTUS) 12 Unit(s) SubCutaneous at bedtime  insulin lispro (ADMELOG) corrective regimen sliding scale   SubCutaneous three times a day before meals  insulin lispro (ADMELOG) corrective regimen sliding scale   SubCutaneous at bedtime  insulin lispro Injectable (ADMELOG) 4 Unit(s) SubCutaneous three times a day before meals  losartan 25 milliGRAM(s) Oral daily      PHYSICAL EXAM:  T(C): 36.7 (07-19-23 @ 14:01), Max: 37.2 (07-18-23 @ 21:46)  HR: 75 (07-19-23 @ 14:01) (74 - 84)  BP: 158/80 (07-19-23 @ 14:01) (110/60 - 172/62)  RR: 18 (07-19-23 @ 14:01) (18 - 18)  SpO2: 99% (07-19-23 @ 14:01) (97% - 100%)  Wt(kg): --  I&O's Summary    18 Jul 2023 07:01  -  19 Jul 2023 07:00  --------------------------------------------------------  IN: 840 mL / OUT: 2120 mL / NET: -1280 mL    19 Jul 2023 07:01  -  19 Jul 2023 16:14  --------------------------------------------------------  IN: 420 mL / OUT: 425 mL / NET: -5 mL          Appearance: Normal; Sitting up in bed 	  HEENT: Eyes are open  Lymphatic: No lymphadenopathy   Cardiovascular: Normal S1 S2  Respiratory: normal effort , clear  Gastrointestinal:  Soft, Non-tender  Skin: No rashes,  warm to touch  Psychiatry:  Mood & affect appropriate  Musculoskeletal: S/P R BKA; Dressing and ACE wrap in place       07-18-23 @ 07:01  -  07-19-23 @ 07:00  --------------------------------------------------------  IN: 840 mL / OUT: 2120 mL / NET: -1280 mL    07-19-23 @ 07:01  -  07-19-23 @ 16:14  --------------------------------------------------------  IN: 420 mL / OUT: 425 mL / NET: -5 mL                                9.4    7.57  )-----------( 337      ( 19 Jul 2023 08:38 )             29.7               07-19    138  |  103  |  26<H>  ----------------------------<  166<H>  4.3   |  24  |  0.91    Ca    9.5      19 Jul 2023 08:38  Phos  3.9     07-19  Mg     2.3     07-19                         Urinalysis Basic - ( 19 Jul 2023 08:38 )    Color: x / Appearance: x / SG: x / pH: x  Gluc: 166 mg/dL / Ketone: x  / Bili: x / Urobili: x   Blood: x / Protein: x / Nitrite: x   Leuk Esterase: x / RBC: x / WBC x   Sq Epi: x / Non Sq Epi: x / Bacteria: x        Culture - Blood (07.12.23 @ 19:45)   Specimen Source: .Blood Blood-Venous  Culture Results: No growth at 5 days    Culture - Blood (07.12.23 @ 19:25)   Specimen Source: .Blood Blood-Venous  Culture Results: No growth at 5 days

## 2023-07-19 NOTE — DIETITIAN INITIAL EVALUATION ADULT - REASON FOR ADMISSION
Right foot pain    Per chart: "72M with h/o T2DM (A1c=8.4) with neuropathy, HTN recently admitted 3/30 with R 1st toe infection, s/p 4/10 s/p RLE SFA to PT bypass graft with PTFE followed by R foot Partial hallux Amputation 4/14, and RLE angiogram 7/3 with rotational athrectomy of proximal portion of the graft in the SFA, following by MIKE that improved flow, however a proximal lesion was demonstrated at that time that was not amenable to endovascular procedures. He now presents with nonhealing R foot wound. Denies fevers and chills."

## 2023-07-19 NOTE — DIETITIAN INITIAL EVALUATION ADULT - NSFNSGIIOFT_GEN_A_CORE
Pt confirms Ht 67"  adjusted BMI for right BKA: 23.6   Dosing wt: 72.1kg/158.9lb (7/13) wt taken prior to amputation of Right BKA  UBW: ~158lb PTA, denies any history of wt change PTA  Wt from current admission ([facility]): 143.9lb (7/19 after amputation), 159.8lb (7/12 before amputation).   -- noted w/ wt loss of 15.9lb/9.9% might due to amputation of right BKA, RD will continue to monitor wt trends as available/able.   Wt history from previous admission: no history   Wt history per [facility] HIE: 72.1kg (6/21/23), 72.1kg (5/18/23), 72.1kg (4/14/23), 71.7kg (4/1/23)

## 2023-07-19 NOTE — DIETITIAN INITIAL EVALUATION ADULT - ADD RECOMMEND
-- Recommend multivitamins/minerals and Vitamin C, pending no medical contraindications, to aid wound healing.   -- Monitor PO intake, GI tolerance, skin integrity, labs, weight, and bowel movement regularity.   -- Will continue to honor food and beverage preferences within diet restriction of patient in an effort to maximize level of nutrient intake.  -- Assist with meals PRN and encourage PO intake.

## 2023-07-19 NOTE — PRE PROCEDURE NOTE - PRE PROCEDURE EVALUATION
SURGERY PRE-OP NOTE    Preop Diagnosis:  nonhealing R hallux wound  Planned Procedure:  Right BKA formalization   Surgeon: Dr. Mateus Mullins      Pertinent Labs:                            9.4    7.57  )-----------( 337      ( 19 Jul 2023 08:38 )             29.7       07-19    138  |  103  |  26<H>  ----------------------------<  166<H>  4.3   |  24  |  0.91    Ca    9.5      19 Jul 2023 08:38  Phos  3.9     07-19  Mg     2.3     07-19            -----------------------------------------------------------------------------------------------------------------------------------  Type + Screen (07.12.23 @ 07:31)   ABO Interpretation: B  Rh Interpretation: Positive  Antibody Screen: Positive      -----------------------------------------------------------------------------------------------------------------------------------  U/A: Urinalysis + Microscopic Examination (07.10.23 @ 18:02)   pH Urine: 5.5  Urine Appearance: Clear  Color: Yellow  Specific Gravity: 1.020  Protein, Urine: 100 mg/dl  Glucose Qualitative, Urine: 100 mg/dL  Ketone - Urine: Negative  Blood, Urine: Trace  Bilirubin: Negative  Urobilinogen: Negative  Leukocyte Esterase Concentration: Negative  Nitrite: Negative  White Blood Cell - Urine: 1 /HPF  Red Blood Cell - Urine: 3 /hpf  Bacteria: Negative  Hyaline Casts: 6 /lpf  Squamous Epithelial Cells: 1 /hpf    -----------------------------------------------------------------------------------------------------------------------------------  CXR:  < from: Xray Chest 1 View- PORTABLE-Urgent (Xray Chest 1 View- PORTABLE-Urgent .) (07.10.23 @ 15:46) >    Impression:  Clear lungs.    < end of copied text >            -----------------------------------------------------------------------------------------------------------------------------------  EKG:   < from: 12 Lead ECG (07.08.23 @ 11:04) >  NORMAL SINUS RHYTHM  NORMAL ECG  WHEN COMPARED WITH ECG OF 13-APR-2023 10:08,  NO SIGNIFICANT CHANGE WAS FOUND  Confirmed by ZEHRA CAM, NIK ([id number]) on 7/12/2023 8:54:09 AM    < end of copied text >      -----------------------------------------------------------------------------------------------------------------------------------  Echo:   < from: TTE Congenital Anomalies W or WO Ultrasound Enhancing Agent (03.31.23 @ 13:27) >   1. The left ventricular systolic function is normal with an ejection fractionof 61 % by Dawson's method of disks.   2. Normal right ventricular cavity size, normal wall thickness and normal systolic function.   3. Mild left ventricular hypertrophy.   4. The aortic root at sinuses of Valsalva is mildly dilated.    < end of copied text >  -----------------------------------------------------------------------------------------------------------------------------------  Cardiac Catheterization    < from: Cardiac Catheterization (04.05.23 @ 10:37) >  Diagnostic Conclusions: Moderate nonobstructive atherosclerosis in mLAD and pRCA with TIMI3  flow.    Recommendations: Optimal medical management for CAD.      < end of copied text >  --------------------------------------------------------------------------------------------------------------------------------  Assessment: 72M with h/o T2DM (A1c=8.4) with neuropathy, HTN recently admitted 3/30 with R 1st toe infection, s/p 4/10 s/p RLE SFA to PT bypass graft with PTFE followed by R foot Partial hallux Amputation 4/14, and RLE angiogram 7/3 with rotational athrectomy of proximal portion of the graft in the SFA, following by MIKE that improved flow, however a proximal lesion was demonstrated at that time that was not amenable to endovascular procedures, now presenting with nonhealing R hallux wound. Now s/p R BKA romana (7/13). Planned for Right BKA formalization tomorrow.    Plan:  - NPO after midnight   - consent signed in chart  - Preop for Right BKA formalization with Dr. Mateus Mullins  - pre op labs 4:00 am of 7/20/23: cbc, bmp, mg, phos, PTT/INR, type and screenx1  - Cardiac clearance documented   - Medicine Clearance documented

## 2023-07-19 NOTE — DIETITIAN INITIAL EVALUATION ADULT - OTHER INFO
-- Pt is on Amoxicilin, Amlodipine, Atorvastatin, Losartan.  -- Pt is on SSI (Lispro, Lantus) to regulate blood glucose.   -- Pt is s/p right BKA on 7/13, to be NPO post midnight today for formalization 7/20.      --

## 2023-07-19 NOTE — DIETITIAN INITIAL EVALUATION ADULT - PERTINENT MEDS FT
MEDICATIONS  (STANDING):  amLODIPine   Tablet 2.5 milliGRAM(s) Oral daily  amoxicillin  875 milliGRAM(s)/clavulanate 1 Tablet(s) Oral two times a day  aspirin enteric coated 81 milliGRAM(s) Oral daily  atorvastatin 40 milliGRAM(s) Oral at bedtime  dextrose 5%. 1000 milliLiter(s) (50 mL/Hr) IV Continuous <Continuous>  dextrose 50% Injectable 12.5 Gram(s) IV Push once  dextrose 50% Injectable 25 Gram(s) IV Push once  dextrose 50% Injectable 25 Gram(s) IV Push once  enoxaparin Injectable 40 milliGRAM(s) SubCutaneous every 24 hours  gabapentin 100 milliGRAM(s) Oral every 8 hours  glucagon  Injectable 1 milliGRAM(s) IntraMuscular once  insulin glargine Injectable (LANTUS) 12 Unit(s) SubCutaneous at bedtime  insulin lispro (ADMELOG) corrective regimen sliding scale   SubCutaneous at bedtime  insulin lispro (ADMELOG) corrective regimen sliding scale   SubCutaneous three times a day before meals  insulin lispro Injectable (ADMELOG) 4 Unit(s) SubCutaneous three times a day before meals  losartan 25 milliGRAM(s) Oral daily    MEDICATIONS  (PRN):  acetaminophen     Tablet .. 650 milliGRAM(s) Oral every 6 hours PRN Temp greater or equal to 38C (100.4F)  dextrose Oral Gel 15 Gram(s) Oral once PRN Blood Glucose LESS THAN 70 milliGRAM(s)/deciliter  oxyCODONE    IR 5 milliGRAM(s) Oral every 6 hours PRN Severe Pain (7 - 10)

## 2023-07-19 NOTE — DIETITIAN INITIAL EVALUATION ADULT - ENERGY INTAKE
Pt states his PO intake is fair to good (~50- >75%) while in house due to dislike some foods provided. Pt made aware of menu ordering procedure in house, encourage pt to order foods from the menu to optimize PO intake. Pt is amendable for oral nutrition supplement to ensure adequate kcal/nutrients intake while in house. Preferences noted, no pork and beef.

## 2023-07-19 NOTE — DIETITIAN INITIAL EVALUATION ADULT - PERTINENT LABORATORY DATA
07-19    138  |  103  |  26<H>  ----------------------------<  166<H>  4.3   |  24  |  0.91    Ca    9.5      19 Jul 2023 08:38  Phos  3.9     07-19  Mg     2.3     07-19    CAPILLARY BLOOD GLUCOSE  POCT Blood Glucose.: 208 mg/dL (19 Jul 2023 13:58)  POCT Blood Glucose.: 171 mg/dL (19 Jul 2023 09:07)  POCT Blood Glucose.: 156 mg/dL (18 Jul 2023 21:32)  POCT Blood Glucose.: 195 mg/dL (18 Jul 2023 18:22)    A1C with Estimated Average Glucose Result: 7.9 % (07-09-23 @ 07:10)  A1C with Estimated Average Glucose Result: 8.4 % (04-01-23 @ 05:15)  A1C with Estimated Average Glucose Result: 8.3 % (03-31-23 @ 06:43)

## 2023-07-19 NOTE — DIETITIAN INITIAL EVALUATION ADULT - NS FNS DIET ORDER
Diet, NPO after Midnight:      NPO Start Date: 19-Jul-2023,   NPO Start Time: 23:59 (07-19-23 @ 10:09)    Diet, NPO after Midnight:      NPO Start Date: 19-Jul-2023,   NPO Start Time: 23:59 (07-19-23 @ 10:09) [Active]  Diet, Regular:   Consistent Carbohydrate {Evening Snack} (CSTCHOSN) (07-13-23 @ 21:23) [Active]

## 2023-07-19 NOTE — DIETITIAN INITIAL EVALUATION ADULT - OTHER CALCULATIONS
Energy, protein and fluids needs based on current wt of 143.9lb in consideration increased nutrient needs to aid wound healing.

## 2023-07-19 NOTE — DIETITIAN INITIAL EVALUATION ADULT - NSFNSADHERENCEPTAFT_GEN_A_CORE
Pt was on fingerstick, Metformin and Glipizide to regulate blood glucose PTA. Most recent HbA1c of 7.9 on 7/9/23 indicates suboptimal glycemic control.

## 2023-07-19 NOTE — DIETITIAN INITIAL EVALUATION ADULT - EDUCATION DIETARY MODIFICATIONS
Encouraged PO intake as tolerated with emphasis on protein foods as tolerated. Educated pt on foods rich in protein and encouraged consumption as able. Provided education on Carbohydrate Consistent diet including sources of carbohydrates, portion sizes, pairing protein with carbohydrates, limiting sugar sweetened beverages in diet and the importance of consistent eating pattern to help optimize glycemic control./(1) partially meets; needs review/practice/verbalization

## 2023-07-19 NOTE — DIETITIAN INITIAL EVALUATION ADULT - REASON INDICATOR FOR ASSESSMENT
Pt seen for length of stay. Information obtained from pt, RN, electronic medical record. Chart reviewed, events noted.

## 2023-07-19 NOTE — DIETITIAN INITIAL EVALUATION ADULT - ORAL INTAKE PTA/DIET HISTORY
Pt reports good PO intake at baseline, denies history of poor PO intake PTA. Pt does not eat pork and beef, trying to comply w/ consistent carbohydrate diet at home to improve blood glucose management.   Confirms NKFA/intolerance  Micronutrient/Other supplementation: none  Protein-energy supplementation: none

## 2023-07-19 NOTE — PROGRESS NOTE ADULT - ASSESSMENT
72M with h/o T2DM (A1c=8.4) with neuropathy, HTN recently admitted 3/30 with R 1st toe infection, s/p 4/10 s/p RLE SFA to PT bypass graft with PTFE followed by R foot Partial hallux Amputation 4/14, and RLE angiogram 7/3 with rotational athrectomy of proximal portion of the graft in the SFA, following by MIKE that improved flow, however a proximal lesion was demonstrated at that time that was not amenable to endovascular procedures, now presenting with nonhealing R hallux wound. Now s/p R BKA romana (7/13). Remains afebrile and hemodynamically stable, pain well-controlled.     Plan:   - OR tomorrow for BKA formalization  - Abx: Augmentin to continue until formalization of BKA as per ID recs.   - LLE arterial duplex to r/o L groin PSA (done 7/8) - no evidence of pseudoaneurysm   - Medicine and cardiology following, appreciate recs  - F/u AM labs, replete as needed  - Regular diet, NPO at midnight  - Continue home meds   - PT - Home PT with wheelchair      Vascular Surgery   [id number]

## 2023-07-19 NOTE — PROGRESS NOTE ADULT - SUBJECTIVE AND OBJECTIVE BOX
Vascular Surgery Progress Note    Subjective: Patient resting comfortably in bed. Pain is well-controlled. Denies fevers, chills, CP, SOB.      Objective:  Vitals:  T(C): 36.8 (07-19-23 @ 06:07), Max: 37.2 (07-18-23 @ 21:46)  HR: 75 (07-19-23 @ 06:07) (69 - 84)  BP: 160/81 (07-19-23 @ 06:07) (110/60 - 172/62)  RR: 18 (07-19-23 @ 06:07) (18 - 18)  SpO2: 98% (07-19-23 @ 06:07) (97% - 100%)  Wt(kg): --    07-18 @ 07:01  -  07-19 @ 07:00  --------------------------------------------------------  IN:    Oral Fluid: 840 mL  Total IN: 840 mL    OUT:    Voided (mL): 2120 mL  Total OUT: 2120 mL    Total NET: -1280 mL          Physical Exam:  General: WN/WD NAD  Neurology: A&Ox3, nonfocal, follows commands  Respiratory: nonlabored breathing on RA  CV: hemodynamically stable  Abdominal: Soft, NT, ND +BS,   Extremities: R BK guillotine amputation dressing C/D/I, stump without overt s/s of bleeding or infection        Labs:                        8.7    6.47  )-----------( 312      ( 18 Jul 2023 07:10 )             27.3     07-18    136  |  102  |  18  ----------------------------<  177<H>  4.0   |  23  |  0.91    Ca    9.2      18 Jul 2023 07:05  Phos  3.5     07-18  Mg     2.3     07-18          Urinalysis Basic - ( 18 Jul 2023 07:05 )    Color: x / Appearance: x / SG: x / pH: x  Gluc: 177 mg/dL / Ketone: x  / Bili: x / Urobili: x   Blood: x / Protein: x / Nitrite: x   Leuk Esterase: x / RBC: x / WBC x   Sq Epi: x / Non Sq Epi: x / Bacteria: x

## 2023-07-20 ENCOUNTER — TRANSCRIPTION ENCOUNTER (OUTPATIENT)
Age: 73
End: 2023-07-20

## 2023-07-20 LAB
ANION GAP SERPL CALC-SCNC: 10 MMOL/L — SIGNIFICANT CHANGE UP (ref 5–17)
APTT BLD: 29 SEC — SIGNIFICANT CHANGE UP (ref 27.5–35.5)
BLD GP AB SCN SERPL QL: POSITIVE — SIGNIFICANT CHANGE UP
BUN SERPL-MCNC: 25 MG/DL — HIGH (ref 7–23)
CALCIUM SERPL-MCNC: 9.3 MG/DL — SIGNIFICANT CHANGE UP (ref 8.4–10.5)
CHLORIDE SERPL-SCNC: 102 MMOL/L — SIGNIFICANT CHANGE UP (ref 96–108)
CO2 SERPL-SCNC: 24 MMOL/L — SIGNIFICANT CHANGE UP (ref 22–31)
CREAT SERPL-MCNC: 0.92 MG/DL — SIGNIFICANT CHANGE UP (ref 0.5–1.3)
EGFR: 88 ML/MIN/1.73M2 — SIGNIFICANT CHANGE UP
GLUCOSE BLDC GLUCOMTR-MCNC: 185 MG/DL — HIGH (ref 70–99)
GLUCOSE BLDC GLUCOMTR-MCNC: 204 MG/DL — HIGH (ref 70–99)
GLUCOSE BLDC GLUCOMTR-MCNC: 211 MG/DL — HIGH (ref 70–99)
GLUCOSE BLDC GLUCOMTR-MCNC: 222 MG/DL — HIGH (ref 70–99)
GLUCOSE SERPL-MCNC: 193 MG/DL — HIGH (ref 70–99)
HCT VFR BLD CALC: 28.6 % — LOW (ref 39–50)
HGB BLD-MCNC: 8.9 G/DL — LOW (ref 13–17)
INR BLD: 1.09 RATIO — SIGNIFICANT CHANGE UP (ref 0.88–1.16)
MAGNESIUM SERPL-MCNC: 2.2 MG/DL — SIGNIFICANT CHANGE UP (ref 1.6–2.6)
MCHC RBC-ENTMCNC: 28.3 PG — SIGNIFICANT CHANGE UP (ref 27–34)
MCHC RBC-ENTMCNC: 31.1 GM/DL — LOW (ref 32–36)
MCV RBC AUTO: 91.1 FL — SIGNIFICANT CHANGE UP (ref 80–100)
NRBC # BLD: 0 /100 WBCS — SIGNIFICANT CHANGE UP (ref 0–0)
PHOSPHATE SERPL-MCNC: 3.2 MG/DL — SIGNIFICANT CHANGE UP (ref 2.5–4.5)
PLATELET # BLD AUTO: 361 K/UL — SIGNIFICANT CHANGE UP (ref 150–400)
POTASSIUM SERPL-MCNC: 4.2 MMOL/L — SIGNIFICANT CHANGE UP (ref 3.5–5.3)
POTASSIUM SERPL-SCNC: 4.2 MMOL/L — SIGNIFICANT CHANGE UP (ref 3.5–5.3)
PROTHROM AB SERPL-ACNC: 12.5 SEC — SIGNIFICANT CHANGE UP (ref 10.5–13.4)
RBC # BLD: 3.14 M/UL — LOW (ref 4.2–5.8)
RBC # FLD: 13.2 % — SIGNIFICANT CHANGE UP (ref 10.3–14.5)
RH IG SCN BLD-IMP: POSITIVE — SIGNIFICANT CHANGE UP
SODIUM SERPL-SCNC: 136 MMOL/L — SIGNIFICANT CHANGE UP (ref 135–145)
WBC # BLD: 6.1 K/UL — SIGNIFICANT CHANGE UP (ref 3.8–10.5)
WBC # FLD AUTO: 6.1 K/UL — SIGNIFICANT CHANGE UP (ref 3.8–10.5)

## 2023-07-20 PROCEDURE — 99232 SBSQ HOSP IP/OBS MODERATE 35: CPT

## 2023-07-20 RX ORDER — SODIUM CHLORIDE 9 MG/ML
1000 INJECTION, SOLUTION INTRAVENOUS
Refills: 0 | Status: DISCONTINUED | OUTPATIENT
Start: 2023-07-20 | End: 2023-07-21

## 2023-07-20 RX ORDER — INSULIN GLARGINE 100 [IU]/ML
14 INJECTION, SOLUTION SUBCUTANEOUS AT BEDTIME
Refills: 0 | Status: DISCONTINUED | OUTPATIENT
Start: 2023-07-20 | End: 2023-07-21

## 2023-07-20 RX ORDER — INSULIN LISPRO 100/ML
VIAL (ML) SUBCUTANEOUS EVERY 6 HOURS
Refills: 0 | Status: DISCONTINUED | OUTPATIENT
Start: 2023-07-20 | End: 2023-07-21

## 2023-07-20 RX ORDER — INSULIN LISPRO 100/ML
VIAL (ML) SUBCUTANEOUS AT BEDTIME
Refills: 0 | Status: DISCONTINUED | OUTPATIENT
Start: 2023-07-20 | End: 2023-07-20

## 2023-07-20 RX ORDER — INSULIN LISPRO 100/ML
VIAL (ML) SUBCUTANEOUS
Refills: 0 | Status: DISCONTINUED | OUTPATIENT
Start: 2023-07-20 | End: 2023-07-20

## 2023-07-20 RX ADMIN — Medication 2: at 11:47

## 2023-07-20 RX ADMIN — Medication 2: at 17:29

## 2023-07-20 RX ADMIN — Medication 2: at 21:58

## 2023-07-20 RX ADMIN — AMLODIPINE BESYLATE 2.5 MILLIGRAM(S): 2.5 TABLET ORAL at 05:39

## 2023-07-20 RX ADMIN — Medication 1 TABLET(S): at 17:28

## 2023-07-20 RX ADMIN — GABAPENTIN 100 MILLIGRAM(S): 400 CAPSULE ORAL at 22:00

## 2023-07-20 RX ADMIN — LOSARTAN POTASSIUM 25 MILLIGRAM(S): 100 TABLET, FILM COATED ORAL at 05:39

## 2023-07-20 RX ADMIN — GABAPENTIN 100 MILLIGRAM(S): 400 CAPSULE ORAL at 05:39

## 2023-07-20 RX ADMIN — DEXTROSE MONOHYDRATE, SODIUM CHLORIDE, AND POTASSIUM CHLORIDE 100 MILLILITER(S): 50; .745; 4.5 INJECTION, SOLUTION INTRAVENOUS at 02:59

## 2023-07-20 RX ADMIN — GABAPENTIN 100 MILLIGRAM(S): 400 CAPSULE ORAL at 13:05

## 2023-07-20 RX ADMIN — ENOXAPARIN SODIUM 40 MILLIGRAM(S): 100 INJECTION SUBCUTANEOUS at 06:52

## 2023-07-20 RX ADMIN — Medication 81 MILLIGRAM(S): at 11:46

## 2023-07-20 RX ADMIN — Medication 1 TABLET(S): at 05:39

## 2023-07-20 RX ADMIN — INSULIN GLARGINE 14 UNIT(S): 100 INJECTION, SOLUTION SUBCUTANEOUS at 21:59

## 2023-07-20 RX ADMIN — SODIUM CHLORIDE 100 MILLILITER(S): 9 INJECTION, SOLUTION INTRAVENOUS at 22:02

## 2023-07-20 RX ADMIN — ATORVASTATIN CALCIUM 40 MILLIGRAM(S): 80 TABLET, FILM COATED ORAL at 22:00

## 2023-07-20 RX ADMIN — Medication 1: at 05:49

## 2023-07-20 RX ADMIN — Medication 4 UNIT(S): at 21:59

## 2023-07-20 NOTE — PROGRESS NOTE ADULT - ASSESSMENT
72M with h/o T2DM (A1c=8.4) with neuropathy, HTN recently admitted 3/30 with R 1st toe infection, s/p 4/10 s/p RLE SFA to PT bypass graft with PTFE followed by R foot Partial hallux Amputation 4/14, and RLE angiogram 7/3 with rotational athrectomy of proximal portion of the graft in the SFA, following by MIKE that improved flow, however a proximal lesion was demonstrated at that time that was not amenable to endovascular procedures, now presenting with nonhealing R hallux wound. Now s/p R BKA romana (7/13). Remains afebrile and hemodynamically stable, pain well-controlled.     Plan:   - OR Today 7/20 for BKA formalization  - Abx: Augmentin to continue until formalization of BKA as per ID recs.   - LLE arterial duplex to r/o L groin PSA (done 7/8) - no evidence of pseudoaneurysm   - Medicine and cardiology following, appreciate recs  - F/u AM labs, replete as needed  - Regular diet after OR today  - Continue home meds   - PT - Home PT with wheelchair      Vascular Surgery   [id number]

## 2023-07-20 NOTE — PROGRESS NOTE ADULT - ASSESSMENT
Patient is a 72 year old male with a PMHx of Type II DM associated with neuropathy, HTN who was recently admitted to [facility] with Right 1st toe infection, S/P Right LE SFA to PT bypass graft with PTFE followed by Right foot Partial hallux Amputation on 04/14, and RLE angiogram 07/03 with rotational atherectomy of proximal portion of the graft in the SFA, following by MIKE that improved flow, however a proximal lesion was demonstrated at that time that was not amenable to endovascular procedures. Patient presents to [facility] due to nonhealing Right foot wound. Internal medicine has been consulted on Mr. Paredes's care for medical management. Patient denies chest pain, palpitations, SOB or dyspnea.       Peripheral Arterial Disease   - S/P R LE SFA to PT bypass graft, RLE angiogram 07/03  - 07/08 Arterial Duplex negative for pseudoaneurysm   - On ASA 81 and Lipitor 40   - US as noted  - Care per vascular surgery appreciated     H/O R Toe infection   - S/P Great toe amputation   - Foot Xray with Mild erosive changes concerning for OM  - MR without evidence of OM, negative for abscess. F/u podiatry recs --> S/P BKA, Planned for formalization today 7/20 with Vasc  - ABX as per ID, now on Augmentin PO as per ID   - UCx and BCx2 w/ Neg  final   - Pain control   - Incentive spirometer   - Care per Vascular/ Podiatry appreciated   - Monitor CBC, temp curve, VS and patient closely     Febrile  - Pt febrile while on Cefepime and Flagyl  - 7/10 BCx2 w/ Neg  final   - Repeat BCx2 7/12 Neg; F/u final  - F/u with ID  --> ABX switched to Meropenem, now on PO Augmentin   - Monitor CBC, temp curve, VS and patient closely; Antipyretics PRN     Type II DM   - A1C 7.9   - C/w sliding scale  - C/w Lantus, increased to 14 units QHs and Pre-Meal 4 units TID   - Diabetic/ DASH Diet   - Monitor glucose levels closely     Anemia   - Monitor Hgb closely   - Iron and Ferritin, noted, not ferritin deficient    - Transfuse for Hgb < 7.0     HTN   - C/w Norvasc 2.5, Losartan 25   - Monitor BP, VS and patient closely     Pre-Op risk stratification   - Previous TTE with EF of 61%, Mild LVH, normal RV  - Patient is medically optimized for OR. Moderate / High Risk candidate; plan for closure of BKA    DVT and GI PPX   Discussed with Attending.

## 2023-07-20 NOTE — PROGRESS NOTE ADULT - ASSESSMENT
72 M PMH DM, HTN, PAD    Recently admitted in March/April for R 1st toe OM  Wound cx polymicrobial with E. Cloacae, Staph Lugdunensis S to Oxacillin, and Delftia acidovorans  Underwent RLE SFA to PT bypass graft with PTFE on 4/10/23  Followed by R foot partial hallux amputation on 4/14/23  (OR cx and clean margin pathology negative for OM)  Discharged home on a week of Levaquin and Keflex    s/p RLE angiogram on 7/3/23 with rotational atherectomy of the proximal portion of the graft in the SFA, followed by MIKE that improved flow, however proximal lesion was present that was not amenable to endovascular measures  Presents to the ED for non healing R foot wound  admitted 7/8/23     No fevers, no purulent discharge from R foot 1st metatarsal wound  On exam, R 1st metatarsal wound to bone, proximal phalanx exposed, ischemic/gangrenous changes around the wound  Mild leukocytosis that resolved  7/8/23:  ESR = 120;  CRP=73  R foot XR with findings c/f OM    R foot OM in a diabetic man with PAD with lesion that was not amenable to endovascular measures  Leukocytosis, elevated ESR and CRP, open 1st metatarsal wound exposed to bone  had persistent fever on Cefepime Flagyl,   7/13 - decreased fever on Meropenem,  cultures negative  7/13/23 OR:   guillotine amputation R BKA     Antibiotics  Zosyn/Vancomycin 7/8--> 7/9  Cefepime 1g IV Q12h 7/9--> 7/12  Flagyl 500mg OV Q12h 7/9  --> 7/12  Meropenem 1000 mg IV every 12 hours  7/12 --> 7/14  Augmentin 7/14-->    Suggest  for formalization BKA  7/20    if stable discontinue antibiotics post op

## 2023-07-20 NOTE — PROGRESS NOTE ADULT - SUBJECTIVE AND OBJECTIVE BOX
VASCULAR SURGERY DAILY PROGRESS NOTE:     SUBJECTIVE/ROS:     Overnight: no acute events   Patient seen and evaluated on AM rounds. Patient resting comfortably in bed. Pain is well-controlled.  Patient otherwise denies nausea, vomiting, chest pain, shortness of breath     OBJECTIVE:  Vital Signs Last 24 Hrs  T(C): 37.1 (2023 05:37), Max: 37.1 (2023 05:37)  T(F): 98.8 (2023 05:37), Max: 98.8 (2023 05:37)  HR: 79 (2023 05:37) (73 - 82)  BP: 126/73 (2023 05:37) (126/73 - 160/77)  BP(mean): --  RR: 18 (2023 05:37) (18 - 18)  SpO2: 98% (2023 05:37) (97% - 99%)    Parameters below as of 2023 05:37  Patient On (Oxygen Delivery Method): room air      I&O's Detail    2023 07:01  -  2023 07:00  --------------------------------------------------------  IN:    dextrose 5% + sodium chloride 0.45% w/ Additives: 500 mL    Oral Fluid: 540 mL  Total IN: 1040 mL    OUT:    Voided (mL): 1725 mL  Total OUT: 1725 mL    Total NET: -685 mL        Daily     Daily Weight in k.3 (2023 10:06)  MEDICATIONS  (STANDING):  amLODIPine   Tablet 2.5 milliGRAM(s) Oral daily  amoxicillin  875 milliGRAM(s)/clavulanate 1 Tablet(s) Oral two times a day  aspirin enteric coated 81 milliGRAM(s) Oral daily  atorvastatin 40 milliGRAM(s) Oral at bedtime  dextrose 5% + sodium chloride 0.45% with potassium chloride 20 mEq/L 1000 milliLiter(s) (100 mL/Hr) IV Continuous <Continuous>  dextrose 5%. 1000 milliLiter(s) (50 mL/Hr) IV Continuous <Continuous>  dextrose 50% Injectable 25 Gram(s) IV Push once  dextrose 50% Injectable 12.5 Gram(s) IV Push once  dextrose 50% Injectable 25 Gram(s) IV Push once  enoxaparin Injectable 40 milliGRAM(s) SubCutaneous every 24 hours  gabapentin 100 milliGRAM(s) Oral every 8 hours  glucagon  Injectable 1 milliGRAM(s) IntraMuscular once  insulin glargine Injectable (LANTUS) 12 Unit(s) SubCutaneous at bedtime  insulin lispro (ADMELOG) corrective regimen sliding scale   SubCutaneous every 6 hours  insulin lispro Injectable (ADMELOG) 4 Unit(s) SubCutaneous three times a day before meals  losartan 25 milliGRAM(s) Oral daily    MEDICATIONS  (PRN):  acetaminophen     Tablet .. 650 milliGRAM(s) Oral every 6 hours PRN Temp greater or equal to 38C (100.4F)  dextrose Oral Gel 15 Gram(s) Oral once PRN Blood Glucose LESS THAN 70 milliGRAM(s)/deciliter  oxyCODONE    IR 5 milliGRAM(s) Oral every 6 hours PRN Severe Pain (7 - 10)      Labs:                        8.9    6.10  )-----------( 361      ( 2023 07:27 )             28.6     07-20    136  |  102  |  25<H>  ----------------------------<  193<H>  4.2   |  24  |  0.92    Ca    9.3      2023 07:27  Phos  3.2     07-20  Mg     2.2     07-20      PT/INR - ( 2023 07:27 )   PT: 12.5 sec;   INR: 1.09 ratio         PTT - ( 2023 07:27 )  PTT:29.0 sec  Urinalysis Basic - ( 2023 07:27 )    Color: x / Appearance: x / SG: x / pH: x  Gluc: 193 mg/dL / Ketone: x  / Bili: x / Urobili: x   Blood: x / Protein: x / Nitrite: x   Leuk Esterase: x / RBC: x / WBC x   Sq Epi: x / Non Sq Epi: x / Bacteria: x                Physical Exam:  General: Pt appears well nourished,  NAD  Neurology: A&Ox3, nonfocal, follows commands  Respiratory: nonlabored breathing on RA  CV: hemodynamically stable  Extremities: Right Below Kneww guillotine amputation - dressing C/D/I, stump without overt s/s of bleeding or infection.

## 2023-07-20 NOTE — PROGRESS NOTE ADULT - SUBJECTIVE AND OBJECTIVE BOX
DATE OF SERVICE: 07-20-23 @ 16:44    Patient is a 72y old  Male who presents with a chief complaint of foot infection (20 Jul 2023 14:44)      INTERVAL HISTORY: Feels ok.     REVIEW OF SYSTEMS:  CONSTITUTIONAL: No weakness  EYES/ENT: No visual changes;  No throat pain   NECK: No pain or stiffness  RESPIRATORY: No cough, wheezing; No shortness of breath  CARDIOVASCULAR: No chest pain or palpitations  GASTROINTESTINAL: No abdominal  pain. No nausea, vomiting, or hematemesis  GENITOURINARY: No dysuria, frequency or hematuria  NEUROLOGICAL: No stroke like symptoms  SKIN: No rashes      	  MEDICATIONS:  amLODIPine   Tablet 2.5 milliGRAM(s) Oral daily  losartan 25 milliGRAM(s) Oral daily        PHYSICAL EXAM:  T(C): 36.6 (07-20-23 @ 13:24), Max: 37.1 (07-20-23 @ 05:37)  HR: 72 (07-20-23 @ 13:24) (72 - 82)  BP: 110/69 (07-20-23 @ 13:24) (107/67 - 160/77)  RR: 18 (07-20-23 @ 13:24) (18 - 18)  SpO2: 96% (07-20-23 @ 13:24) (96% - 98%)  Wt(kg): --  I&O's Summary    19 Jul 2023 07:01  -  20 Jul 2023 07:00  --------------------------------------------------------  IN: 1040 mL / OUT: 1725 mL / NET: -685 mL    20 Jul 2023 07:01  -  20 Jul 2023 16:44  --------------------------------------------------------  IN: 0 mL / OUT: 200 mL / NET: -200 mL      Height (cm): 170.2 (07-20 @ 13:24)  Weight (kg): 72.1 (07-20 @ 13:24)  BMI (kg/m2): 24.9 (07-20 @ 13:24)  BSA (m2): 1.83 (07-20 @ 13:24)    Appearance: In no distress	  HEENT:    PERRL, EOMI	  Cardiovascular:  S1 S2, No JVD  Respiratory: Lungs clear to auscultation	  Gastrointestinal:  Soft, Non-tender, + BS	  Vascularature:  No edema of LE  Psychiatric: Appropriate affect   Neuro: no acute focal deficits                               8.9    6.10  )-----------( 361      ( 20 Jul 2023 07:27 )             28.6     07-20    136  |  102  |  25<H>  ----------------------------<  193<H>  4.2   |  24  |  0.92    Ca    9.3      20 Jul 2023 07:27  Phos  3.2     07-20  Mg     2.2     07-20          Labs personally reviewed      ASSESSMENT/PLAN: 	  Patient is a 72 year old male with a PMHx of HTN, Type II DM associated with neuropathy whore presents to [facility] with a chief complaint of pain, blistering and fluid drainage on his right foot.    Problem/Plan -1  Problem: Cardiac Risk Stratification for podiatry surgery  - Denies CP or SOB  - TTE shows preserved EF, mild LVH  - Not in decompensated HF  - No tachy supa arrhythmia  - s/p cath with nonobstructive moderate CAD  - Elevated risk for low-mod risk pods surgery procedure, no contraindication to proceed   - s/p R BKA  7/13 tolerated surgery well    Problem/Plan -2  Problem: Hypertension  - amlodipine  2.5mg PO daily  - c/w losartan to 25mg PO daily    Problem/Plan -3  Problem: DM II  - HgbA1c 8.4  - ISS as per primary team          Berenice Branch, AG-NP   Matt Calhoun DO [facility]  Cardiovascular Medicine  [address], [address]  Available through call or text on [employer]  Office: [phone]

## 2023-07-20 NOTE — PROGRESS NOTE ADULT - SUBJECTIVE AND OBJECTIVE BOX
Follow Up:  for formalization BKA this afternoon    Interval History/ROS:  mild discomfort at incision site    Allergies  No Known Allergies    ANTIMICROBIALS:  amoxicillin  875 milliGRAM(s)/clavulanate 1 two times a day      OTHER MEDS:  MEDICATIONS  (STANDING):  acetaminophen     Tablet .. 650 every 6 hours PRN  amLODIPine   Tablet 2.5 daily  aspirin enteric coated 81 daily  atorvastatin 40 at bedtime  dextrose 50% Injectable 12.5 once  dextrose 50% Injectable 25 once  dextrose 50% Injectable 25 once  dextrose Oral Gel 15 once PRN  enoxaparin Injectable 40 every 24 hours  gabapentin 100 every 8 hours  glucagon  Injectable 1 once  insulin glargine Injectable (LANTUS) 12 at bedtime  insulin lispro (ADMELOG) corrective regimen sliding scale  every 6 hours  insulin lispro Injectable (ADMELOG) 4 three times a day before meals  losartan 25 daily  oxyCODONE    IR 5 every 6 hours PRN    Vital Signs Last 24 Hrs  T(C): 36.6 (20 Jul 2023 13:24), Max: 37.1 (20 Jul 2023 05:37)  T(F): 97.8 (20 Jul 2023 13:15), Max: 98.8 (20 Jul 2023 05:37)  HR: 72 (20 Jul 2023 13:24) (72 - 82)  BP: 110/69 (20 Jul 2023 13:24) (107/67 - 160/77)  BP(mean): 92 (20 Jul 2023 13:24) (92 - 92)  RR: 18 (20 Jul 2023 13:24) (18 - 18)  SpO2: 96% (20 Jul 2023 13:24) (96% - 98%)    Parameters below as of 20 Jul 2023 13:15  Patient On (Oxygen Delivery Method): room air        PHYSICAL EXAM:  General: WN/WD NAD, Non-toxic  Neurology: A&Ox3, nonfocal  Respiratory: Clear to auscultation bilaterally  CV: RRR, S1S2, no murmurs, rubs or gallops  Abdominal: Soft, Non-tender, non-distended, normal bowel sounds  Extremities: No edema, ace wrap dressing clean and dry  Line Sites: Clear  Skin: No rash                        8.9    6.10  )-----------( 361      ( 20 Jul 2023 07:27 )             28.6       07-20    136  |  102  |  25<H>  ----------------------------<  193<H>  4.2   |  24  |  0.92    Ca    9.3      20 Jul 2023 07:27  Phos  3.2     07-20  Mg     2.2     07-20        Urinalysis Basic - ( 20 Jul 2023 07:27 )    Color: x / Appearance: x / SG: x / pH: x  Gluc: 193 mg/dL / Ketone: x  / Bili: x / Urobili: x   Blood: x / Protein: x / Nitrite: x   Leuk Esterase: x / RBC: x / WBC x   Sq Epi: x / Non Sq Epi: x / Bacteria: x        MICROBIOLOGY:  .Blood Blood-Venous  07-12-23   No growth at 5 days  --  --      .Blood Blood-Venous  07-12-23   No growth at 5 days  --  --      .Blood Blood  07-10-23   No growth at 5 days  --  --      Clean Catch Clean Catch (Midstream)  07-08-23   No growth  --  --      .Blood Blood-Peripheral  07-08-23   No growth at 5 days  --  --      .Blood Blood-Peripheral  07-08-23   No growth at 5 days  --  -      Thai Stein MD; Division of Infectious Disease; Pager: [phone]; nights and weekends: [phone]

## 2023-07-20 NOTE — PRE-ANESTHESIA EVALUATION ADULT - NSANTHPMHFT_GEN_ALL_CORE
PMHx: DM2 (A1c 8.4), HTN, Non-obstructive CAD (on recent cardiac cath), TTE with preserved EF and mild LVH

## 2023-07-20 NOTE — PROGRESS NOTE ADULT - SUBJECTIVE AND OBJECTIVE BOX
Name of Patient : ALEE DUNN  MRN: [id number]  Date of visit: 07-20-23 @ 17:02      Subjective: Patient seen and examined. No new events except as noted.   Patient seen earlier this AM. Sitting OOB TC. Was awaiting OR at that time.   Planned for OR tomorrow with Vascular surgery for formalization of BKA.   Patient denies chest pain, palpitations, shortness of breath or dyspnea.     REVIEW OF SYSTEMS:  CONSTITUTIONAL: Generalized weakness   EYES/ENT: No visual changes;  No vertigo or throat pain   NECK: No pain or stiffness  RESPIRATORY: No cough, wheezing, hemoptysis; No shortness of breath  CARDIOVASCULAR: No chest pain or palpitations  GASTROINTESTINAL: No abdominal or epigastric pain. No nausea, vomiting, or hematemesis; No diarrhea or constipation. No melena or hematochezia.  GENITOURINARY: No dysuria, frequency or hematuria  NEUROLOGICAL: No numbness or weakness  SKIN: No itching, burning, rashes, or lesions  MSK: S/P R BKA;  Pain controlled per patient   All other review of systems is negative unless indicated above.    MEDICATIONS:  MEDICATIONS  (STANDING):  amLODIPine   Tablet 2.5 milliGRAM(s) Oral daily  amoxicillin  875 milliGRAM(s)/clavulanate 1 Tablet(s) Oral two times a day  aspirin enteric coated 81 milliGRAM(s) Oral daily  atorvastatin 40 milliGRAM(s) Oral at bedtime  dextrose 5% + sodium chloride 0.45% with potassium chloride 20 mEq/L 1000 milliLiter(s) (100 mL/Hr) IV Continuous <Continuous>  dextrose 5%. 1000 milliLiter(s) (50 mL/Hr) IV Continuous <Continuous>  dextrose 50% Injectable 12.5 Gram(s) IV Push once  dextrose 50% Injectable 25 Gram(s) IV Push once  dextrose 50% Injectable 25 Gram(s) IV Push once  enoxaparin Injectable 40 milliGRAM(s) SubCutaneous every 24 hours  gabapentin 100 milliGRAM(s) Oral every 8 hours  glucagon  Injectable 1 milliGRAM(s) IntraMuscular once  insulin glargine Injectable (LANTUS) 12 Unit(s) SubCutaneous at bedtime  insulin lispro (ADMELOG) corrective regimen sliding scale   SubCutaneous every 6 hours  insulin lispro Injectable (ADMELOG) 4 Unit(s) SubCutaneous three times a day before meals  losartan 25 milliGRAM(s) Oral daily      PHYSICAL EXAM:  T(C): 36.6 (07-20-23 @ 13:24), Max: 37.1 (07-20-23 @ 05:37)  HR: 72 (07-20-23 @ 13:24) (72 - 82)  BP: 110/69 (07-20-23 @ 13:24) (107/67 - 160/77)  RR: 18 (07-20-23 @ 13:24) (18 - 18)  SpO2: 96% (07-20-23 @ 13:24) (96% - 98%)  Wt(kg): --  I&O's Summary    19 Jul 2023 07:01  -  20 Jul 2023 07:00  --------------------------------------------------------  IN: 1040 mL / OUT: 1725 mL / NET: -685 mL    20 Jul 2023 07:01  -  20 Jul 2023 17:02  --------------------------------------------------------  IN: 0 mL / OUT: 200 mL / NET: -200 mL      Height (cm): 170.2 (07-20 @ 13:24)  Weight (kg): 72.1 (07-20 @ 13:24)  BMI (kg/m2): 24.9 (07-20 @ 13:24)  BSA (m2): 1.83 (07-20 @ 13:24)      Appearance: Normal; OOB TC	  HEENT: Eyes are open  Lymphatic: No lymphadenopathy   Cardiovascular: Normal S1 S2  Respiratory: normal effort , clear  Gastrointestinal:  Soft, Non-tender  Skin: No rashes,  warm to touch  Psychiatry:  Mood & affect appropriate  Musculoskeletal: S/P R BKA; Dressing and ACE wrap in place         07-19-23 @ 07:01  -  07-20-23 @ 07:00  --------------------------------------------------------  IN: 1040 mL / OUT: 1725 mL / NET: -685 mL    07-20-23 @ 07:01  -  07-20-23 @ 17:02  --------------------------------------------------------  IN: 0 mL / OUT: 200 mL / NET: -200 mL                                  8.9    6.10  )-----------( 361      ( 20 Jul 2023 07:27 )             28.6               07-20    136  |  102  |  25<H>  ----------------------------<  193<H>  4.2   |  24  |  0.92    Ca    9.3      20 Jul 2023 07:27  Phos  3.2     07-20  Mg     2.2     07-20      PT/INR - ( 20 Jul 2023 07:27 )   PT: 12.5 sec;   INR: 1.09 ratio         PTT - ( 20 Jul 2023 07:27 )  PTT:29.0 sec                   Urinalysis Basic - ( 20 Jul 2023 07:27 )    Color: x / Appearance: x / SG: x / pH: x  Gluc: 193 mg/dL / Ketone: x  / Bili: x / Urobili: x   Blood: x / Protein: x / Nitrite: x   Leuk Esterase: x / RBC: x / WBC x   Sq Epi: x / Non Sq Epi: x / Bacteria: x

## 2023-07-21 ENCOUNTER — RESULT REVIEW (OUTPATIENT)
Age: 73
End: 2023-07-21

## 2023-07-21 ENCOUNTER — APPOINTMENT (OUTPATIENT)
Dept: VASCULAR SURGERY | Facility: HOSPITAL | Age: 73
End: 2023-07-21

## 2023-07-21 LAB
ANION GAP SERPL CALC-SCNC: 11 MMOL/L — SIGNIFICANT CHANGE UP (ref 5–17)
APTT BLD: 29.2 SEC — SIGNIFICANT CHANGE UP (ref 27.5–35.5)
BUN SERPL-MCNC: 16 MG/DL — SIGNIFICANT CHANGE UP (ref 7–23)
CALCIUM SERPL-MCNC: 9.3 MG/DL — SIGNIFICANT CHANGE UP (ref 8.4–10.5)
CHLORIDE SERPL-SCNC: 106 MMOL/L — SIGNIFICANT CHANGE UP (ref 96–108)
CO2 SERPL-SCNC: 23 MMOL/L — SIGNIFICANT CHANGE UP (ref 22–31)
CREAT SERPL-MCNC: 0.9 MG/DL — SIGNIFICANT CHANGE UP (ref 0.5–1.3)
EGFR: 91 ML/MIN/1.73M2 — SIGNIFICANT CHANGE UP
GLUCOSE BLDC GLUCOMTR-MCNC: 141 MG/DL — HIGH (ref 70–99)
GLUCOSE BLDC GLUCOMTR-MCNC: 184 MG/DL — HIGH (ref 70–99)
GLUCOSE BLDC GLUCOMTR-MCNC: 281 MG/DL — HIGH (ref 70–99)
GLUCOSE BLDC GLUCOMTR-MCNC: 293 MG/DL — HIGH (ref 70–99)
GLUCOSE SERPL-MCNC: 143 MG/DL — HIGH (ref 70–99)
HCT VFR BLD CALC: 28.5 % — LOW (ref 39–50)
HGB BLD-MCNC: 8.9 G/DL — LOW (ref 13–17)
INR BLD: 1.1 RATIO — SIGNIFICANT CHANGE UP (ref 0.88–1.16)
MAGNESIUM SERPL-MCNC: 2.3 MG/DL — SIGNIFICANT CHANGE UP (ref 1.6–2.6)
MCHC RBC-ENTMCNC: 28.2 PG — SIGNIFICANT CHANGE UP (ref 27–34)
MCHC RBC-ENTMCNC: 31.2 GM/DL — LOW (ref 32–36)
MCV RBC AUTO: 90.2 FL — SIGNIFICANT CHANGE UP (ref 80–100)
NRBC # BLD: 0 /100 WBCS — SIGNIFICANT CHANGE UP (ref 0–0)
PHOSPHATE SERPL-MCNC: 3.6 MG/DL — SIGNIFICANT CHANGE UP (ref 2.5–4.5)
PLATELET # BLD AUTO: 295 K/UL — SIGNIFICANT CHANGE UP (ref 150–400)
POTASSIUM SERPL-MCNC: 4.2 MMOL/L — SIGNIFICANT CHANGE UP (ref 3.5–5.3)
POTASSIUM SERPL-SCNC: 4.2 MMOL/L — SIGNIFICANT CHANGE UP (ref 3.5–5.3)
PROTHROM AB SERPL-ACNC: 12.8 SEC — SIGNIFICANT CHANGE UP (ref 10.5–13.4)
RBC # BLD: 3.16 M/UL — LOW (ref 4.2–5.8)
RBC # FLD: 13.1 % — SIGNIFICANT CHANGE UP (ref 10.3–14.5)
SODIUM SERPL-SCNC: 140 MMOL/L — SIGNIFICANT CHANGE UP (ref 135–145)
WBC # BLD: 6.06 K/UL — SIGNIFICANT CHANGE UP (ref 3.8–10.5)
WBC # FLD AUTO: 6.06 K/UL — SIGNIFICANT CHANGE UP (ref 3.8–10.5)

## 2023-07-21 PROCEDURE — 27884 AMPUTATION FOLLOW-UP SURGERY: CPT | Mod: RT,78

## 2023-07-21 PROCEDURE — 88311 DECALCIFY TISSUE: CPT | Mod: 26

## 2023-07-21 PROCEDURE — 88307 TISSUE EXAM BY PATHOLOGIST: CPT | Mod: 26

## 2023-07-21 DEVICE — VISTASEAL FIBRIN HUMAN 4ML: Type: IMPLANTABLE DEVICE | Site: RIGHT | Status: FUNCTIONAL

## 2023-07-21 DEVICE — SURGICEL POWDER 3 GRAMS: Type: IMPLANTABLE DEVICE | Site: RIGHT | Status: FUNCTIONAL

## 2023-07-21 DEVICE — SURGICEL NU-KNIT 6 X 9": Type: IMPLANTABLE DEVICE | Site: RIGHT | Status: FUNCTIONAL

## 2023-07-21 RX ORDER — DEXTROSE 50 % IN WATER 50 %
25 SYRINGE (ML) INTRAVENOUS ONCE
Refills: 0 | Status: DISCONTINUED | OUTPATIENT
Start: 2023-07-21 | End: 2023-07-27

## 2023-07-21 RX ORDER — ASPIRIN/CALCIUM CARB/MAGNESIUM 324 MG
81 TABLET ORAL DAILY
Refills: 0 | Status: DISCONTINUED | OUTPATIENT
Start: 2023-07-21 | End: 2023-07-27

## 2023-07-21 RX ORDER — HYDROMORPHONE HYDROCHLORIDE 2 MG/ML
0.25 INJECTION INTRAMUSCULAR; INTRAVENOUS; SUBCUTANEOUS
Refills: 0 | Status: DISCONTINUED | OUTPATIENT
Start: 2023-07-21 | End: 2023-07-21

## 2023-07-21 RX ORDER — ACETAMINOPHEN 500 MG
650 TABLET ORAL EVERY 6 HOURS
Refills: 0 | Status: DISCONTINUED | OUTPATIENT
Start: 2023-07-21 | End: 2023-07-22

## 2023-07-21 RX ORDER — SODIUM CHLORIDE 9 MG/ML
1000 INJECTION, SOLUTION INTRAVENOUS
Refills: 0 | Status: DISCONTINUED | OUTPATIENT
Start: 2023-07-21 | End: 2023-07-21

## 2023-07-21 RX ORDER — ASCORBIC ACID 60 MG
500 TABLET,CHEWABLE ORAL DAILY
Refills: 0 | Status: DISCONTINUED | OUTPATIENT
Start: 2023-07-21 | End: 2023-07-27

## 2023-07-21 RX ORDER — INSULIN GLARGINE 100 [IU]/ML
14 INJECTION, SOLUTION SUBCUTANEOUS AT BEDTIME
Refills: 0 | Status: DISCONTINUED | OUTPATIENT
Start: 2023-07-21 | End: 2023-07-24

## 2023-07-21 RX ORDER — OXYCODONE HYDROCHLORIDE 5 MG/1
5 TABLET ORAL EVERY 4 HOURS
Refills: 0 | Status: DISCONTINUED | OUTPATIENT
Start: 2023-07-21 | End: 2023-07-27

## 2023-07-21 RX ORDER — INSULIN LISPRO 100/ML
VIAL (ML) SUBCUTANEOUS EVERY 6 HOURS
Refills: 0 | Status: DISCONTINUED | OUTPATIENT
Start: 2023-07-21 | End: 2023-07-21

## 2023-07-21 RX ORDER — INSULIN LISPRO 100/ML
VIAL (ML) SUBCUTANEOUS AT BEDTIME
Refills: 0 | Status: DISCONTINUED | OUTPATIENT
Start: 2023-07-21 | End: 2023-07-27

## 2023-07-21 RX ORDER — ENOXAPARIN SODIUM 100 MG/ML
40 INJECTION SUBCUTANEOUS EVERY 24 HOURS
Refills: 0 | Status: DISCONTINUED | OUTPATIENT
Start: 2023-07-21 | End: 2023-07-27

## 2023-07-21 RX ORDER — INSULIN LISPRO 100/ML
VIAL (ML) SUBCUTANEOUS
Refills: 0 | Status: DISCONTINUED | OUTPATIENT
Start: 2023-07-21 | End: 2023-07-27

## 2023-07-21 RX ORDER — LOSARTAN POTASSIUM 100 MG/1
25 TABLET, FILM COATED ORAL DAILY
Refills: 0 | Status: DISCONTINUED | OUTPATIENT
Start: 2023-07-21 | End: 2023-07-27

## 2023-07-21 RX ORDER — OXYCODONE HYDROCHLORIDE 5 MG/1
5 TABLET ORAL EVERY 4 HOURS
Refills: 0 | Status: DISCONTINUED | OUTPATIENT
Start: 2023-07-21 | End: 2023-07-21

## 2023-07-21 RX ORDER — OXYCODONE HYDROCHLORIDE 5 MG/1
10 TABLET ORAL EVERY 4 HOURS
Refills: 0 | Status: DISCONTINUED | OUTPATIENT
Start: 2023-07-21 | End: 2023-07-27

## 2023-07-21 RX ORDER — SODIUM CHLORIDE 9 MG/ML
1000 INJECTION, SOLUTION INTRAVENOUS
Refills: 0 | Status: DISCONTINUED | OUTPATIENT
Start: 2023-07-21 | End: 2023-07-27

## 2023-07-21 RX ORDER — GABAPENTIN 400 MG/1
100 CAPSULE ORAL EVERY 8 HOURS
Refills: 0 | Status: DISCONTINUED | OUTPATIENT
Start: 2023-07-21 | End: 2023-07-22

## 2023-07-21 RX ORDER — AMLODIPINE BESYLATE 2.5 MG/1
2.5 TABLET ORAL DAILY
Refills: 0 | Status: DISCONTINUED | OUTPATIENT
Start: 2023-07-21 | End: 2023-07-27

## 2023-07-21 RX ORDER — AMLODIPINE BESYLATE 2.5 MG/1
5 TABLET ORAL DAILY
Refills: 0 | Status: CANCELLED | OUTPATIENT
Start: 2023-07-22 | End: 2023-07-21

## 2023-07-21 RX ORDER — INSULIN LISPRO 100/ML
4 VIAL (ML) SUBCUTANEOUS
Refills: 0 | Status: DISCONTINUED | OUTPATIENT
Start: 2023-07-21 | End: 2023-07-24

## 2023-07-21 RX ORDER — MULTIVIT-MIN/FERROUS GLUCONATE 9 MG/15 ML
1 LIQUID (ML) ORAL DAILY
Refills: 0 | Status: DISCONTINUED | OUTPATIENT
Start: 2023-07-21 | End: 2023-07-27

## 2023-07-21 RX ORDER — ATORVASTATIN CALCIUM 80 MG/1
40 TABLET, FILM COATED ORAL AT BEDTIME
Refills: 0 | Status: DISCONTINUED | OUTPATIENT
Start: 2023-07-21 | End: 2023-07-27

## 2023-07-21 RX ORDER — DEXTROSE 50 % IN WATER 50 %
15 SYRINGE (ML) INTRAVENOUS ONCE
Refills: 0 | Status: DISCONTINUED | OUTPATIENT
Start: 2023-07-21 | End: 2023-07-27

## 2023-07-21 RX ORDER — GLUCAGON INJECTION, SOLUTION 0.5 MG/.1ML
1 INJECTION, SOLUTION SUBCUTANEOUS ONCE
Refills: 0 | Status: DISCONTINUED | OUTPATIENT
Start: 2023-07-21 | End: 2023-07-27

## 2023-07-21 RX ORDER — DEXTROSE 50 % IN WATER 50 %
12.5 SYRINGE (ML) INTRAVENOUS ONCE
Refills: 0 | Status: DISCONTINUED | OUTPATIENT
Start: 2023-07-21 | End: 2023-07-27

## 2023-07-21 RX ADMIN — ENOXAPARIN SODIUM 40 MILLIGRAM(S): 100 INJECTION SUBCUTANEOUS at 18:29

## 2023-07-21 RX ADMIN — OXYCODONE HYDROCHLORIDE 5 MILLIGRAM(S): 5 TABLET ORAL at 16:48

## 2023-07-21 RX ADMIN — GABAPENTIN 100 MILLIGRAM(S): 400 CAPSULE ORAL at 23:55

## 2023-07-21 RX ADMIN — OXYCODONE HYDROCHLORIDE 5 MILLIGRAM(S): 5 TABLET ORAL at 22:21

## 2023-07-21 RX ADMIN — OXYCODONE HYDROCHLORIDE 5 MILLIGRAM(S): 5 TABLET ORAL at 16:18

## 2023-07-21 RX ADMIN — OXYCODONE HYDROCHLORIDE 5 MILLIGRAM(S): 5 TABLET ORAL at 23:21

## 2023-07-21 RX ADMIN — Medication 3: at 18:28

## 2023-07-21 RX ADMIN — AMLODIPINE BESYLATE 2.5 MILLIGRAM(S): 2.5 TABLET ORAL at 06:09

## 2023-07-21 RX ADMIN — Medication 4 UNIT(S): at 18:28

## 2023-07-21 RX ADMIN — INSULIN GLARGINE 14 UNIT(S): 100 INJECTION, SOLUTION SUBCUTANEOUS at 22:19

## 2023-07-21 RX ADMIN — GABAPENTIN 100 MILLIGRAM(S): 400 CAPSULE ORAL at 06:09

## 2023-07-21 RX ADMIN — HYDROMORPHONE HYDROCHLORIDE 0.25 MILLIGRAM(S): 2 INJECTION INTRAMUSCULAR; INTRAVENOUS; SUBCUTANEOUS at 14:57

## 2023-07-21 RX ADMIN — Medication 500 MILLIGRAM(S): at 18:29

## 2023-07-21 RX ADMIN — HYDROMORPHONE HYDROCHLORIDE 0.25 MILLIGRAM(S): 2 INJECTION INTRAMUSCULAR; INTRAVENOUS; SUBCUTANEOUS at 14:30

## 2023-07-21 RX ADMIN — HYDROMORPHONE HYDROCHLORIDE 0.25 MILLIGRAM(S): 2 INJECTION INTRAMUSCULAR; INTRAVENOUS; SUBCUTANEOUS at 14:43

## 2023-07-21 RX ADMIN — Medication 1 TABLET(S): at 06:09

## 2023-07-21 RX ADMIN — ENOXAPARIN SODIUM 40 MILLIGRAM(S): 100 INJECTION SUBCUTANEOUS at 06:08

## 2023-07-21 RX ADMIN — Medication 81 MILLIGRAM(S): at 18:28

## 2023-07-21 RX ADMIN — Medication 650 MILLIGRAM(S): at 23:55

## 2023-07-21 RX ADMIN — Medication 650 MILLIGRAM(S): at 18:28

## 2023-07-21 RX ADMIN — Medication 1 TABLET(S): at 18:28

## 2023-07-21 RX ADMIN — HYDROMORPHONE HYDROCHLORIDE 0.25 MILLIGRAM(S): 2 INJECTION INTRAMUSCULAR; INTRAVENOUS; SUBCUTANEOUS at 14:40

## 2023-07-21 RX ADMIN — SODIUM CHLORIDE 75 MILLILITER(S): 9 INJECTION, SOLUTION INTRAVENOUS at 15:02

## 2023-07-21 RX ADMIN — Medication 2: at 22:19

## 2023-07-21 RX ADMIN — LOSARTAN POTASSIUM 25 MILLIGRAM(S): 100 TABLET, FILM COATED ORAL at 06:09

## 2023-07-21 RX ADMIN — ATORVASTATIN CALCIUM 40 MILLIGRAM(S): 80 TABLET, FILM COATED ORAL at 22:21

## 2023-07-21 RX ADMIN — GABAPENTIN 100 MILLIGRAM(S): 400 CAPSULE ORAL at 15:17

## 2023-07-21 NOTE — PROGRESS NOTE ADULT - SUBJECTIVE AND OBJECTIVE BOX
DATE OF SERVICE: 07-21-23 @ 10:59    Patient is a 72y old  Male who presents with a chief complaint of foot infection (20 Jul 2023 14:44)      INTERVAL HISTORY: Feels well    REVIEW OF SYSTEMS:  CONSTITUTIONAL: No weakness  EYES/ENT: No visual changes;  No throat pain   NECK: No pain or stiffness  RESPIRATORY: No cough, wheezing; No shortness of breath  CARDIOVASCULAR: No chest pain or palpitations  GASTROINTESTINAL: No abdominal  pain. No nausea, vomiting, or hematemesis  GENITOURINARY: No dysuria, frequency or hematuria  NEUROLOGICAL: No stroke like symptoms  SKIN: No rashes      	  MEDICATIONS:  losartan 25 milliGRAM(s) Oral daily        PHYSICAL EXAM:  T(C): 37.1 (07-21-23 @ 10:25), Max: 37.2 (07-21-23 @ 09:12)  HR: 83 (07-21-23 @ 10:25) (68 - 85)  BP: 190/83 (07-21-23 @ 10:25) (110/69 - 190/83)  RR: 18 (07-21-23 @ 10:25) (18 - 18)  SpO2: 98% (07-21-23 @ 10:25) (96% - 100%)  Wt(kg): --  I&O's Summary    20 Jul 2023 07:01  -  21 Jul 2023 07:00  --------------------------------------------------------  IN: 2520 mL / OUT: 1500 mL / NET: 1020 mL      Height (cm): 170.2 (07-21 @ 10:25)  Weight (kg): 72.1 (07-21 @ 10:25)  BMI (kg/m2): 24.9 (07-21 @ 10:25)  BSA (m2): 1.83 (07-21 @ 10:25)    Appearance: In no distress	  HEENT:    PERRL, EOMI	  Cardiovascular:  S1 S2, No JVD  Respiratory: Lungs clear to auscultation	  Gastrointestinal:  Soft, Non-tender, + BS	  Vascularature:  No edema of LE  Psychiatric: Appropriate affect   Neuro: no acute focal deficits                               8.9    6.06  )-----------( 295      ( 21 Jul 2023 07:15 )             28.5     07-21    140  |  106  |  16  ----------------------------<  143<H>  4.2   |  23  |  0.90    Ca    9.3      21 Jul 2023 07:14  Phos  3.6     07-21  Mg     2.3     07-21          Labs personally reviewed      ASSESSMENT/PLAN: 	  Patient is a 72 year old male with a PMHx of HTN, Type II DM associated with neuropathy whore presents to [facility] with a chief complaint of pain, blistering and fluid drainage on his right foot.    Problem/Plan -1  Problem: Cardiac Risk Stratification for podiatry surgery  - Denies CP or SOB  - TTE shows preserved EF, mild LVH  - Not in decompensated HF  - No tachy supa arrhythmia  - s/p cath with nonobstructive moderate CAD  - Elevated risk for low-mod risk pods surgery procedure, no contraindication to proceed   - s/p R BKA  7/13 tolerated surgery well  - BKA formalization today    Problem/Plan -2  Problem: Hypertension  - amlodipine  2.5mg PO daily  - c/w losartan to 25mg PO daily    Problem/Plan -3  Problem: DM II  - HgbA1c 8.4  - ISS as per primary team          SUSY Broderick DO [facility]  Cardiovascular Medicine  [address] [place] [address], [address]  Available via call or text on [employer]  Office: [phone]

## 2023-07-21 NOTE — PROGRESS NOTE ADULT - ASSESSMENT
72M with h/o T2DM (A1c=8.4) with neuropathy, HTN recently admitted 3/30 with R 1st toe infection, s/p 4/10 s/p RLE SFA to PT bypass graft with PTFE followed by R foot Partial hallux Amputation 4/14, and RLE angiogram 7/3 with rotational athrectomy of proximal portion of the graft in the SFA, following by MIKE that improved flow, however a proximal lesion was demonstrated at that time that was not amenable to endovascular procedures, now presenting with nonhealing R hallux wound. Now s/p R BKA romana (7/13)      Plan:   - OR Today for BKA formalization  - Abx: Augmentin to continue until formalization of BKA as per ID recs.   - LLE arterial duplex to r/o L groin PSA (done 7/8) - no evidence of pseudoaneurysm   - Medicine and cardiology following, appreciate recs  - F/u AM labs, replete as needed  - Regular diet after OR today  - PT - Home PT with wheelchair    Vascular Surgery   [id number]

## 2023-07-21 NOTE — PROGRESS NOTE ADULT - ASSESSMENT
Patient is a 72 year old male with a PMHx of Type II DM associated with neuropathy, HTN who was recently admitted to [facility] with Right 1st toe infection, S/P Right LE SFA to PT bypass graft with PTFE followed by Right foot Partial hallux Amputation on 04/14, and RLE angiogram 07/03 with rotational atherectomy of proximal portion of the graft in the SFA, following by MIKE that improved flow, however a proximal lesion was demonstrated at that time that was not amenable to endovascular procedures. Patient presents to [facility] due to nonhealing Right foot wound. Internal medicine has been consulted on Mr. Paredes's care for medical management. Patient denies chest pain, palpitations, SOB or dyspnea.       Peripheral Arterial Disease   - S/P R LE SFA to PT bypass graft, RLE angiogram 07/03  - 07/08 Arterial Duplex negative for pseudoaneurysm   - On ASA 81 and Lipitor 40   - US as noted  - Care per vascular surgery appreciated     H/O R Toe infection   - S/P Great toe amputation   - Foot Xray with Mild erosive changes concerning for OM  - MR without evidence of OM, negative for abscess. F/u podiatry recs --> S/P BKA, Planned for formalization 7/21 with Vasc  - ABX as per ID, now on Augmentin PO as per ID   - UCx and BCx2 w/ Neg  final   - Pain control   - Incentive spirometer   - Care per Vascular/ Podiatry appreciated   - Monitor CBC, temp curve, VS and patient closely     Febrile  - Pt febrile while on Cefepime and Flagyl  - 7/10 BCx2 w/ Neg  final   - Repeat BCx2 7/12 Neg; F/u final  - F/u with ID  --> ABX switched to Meropenem, now on PO Augmentin   - Monitor CBC, temp curve, VS and patient closely; Antipyretics PRN     Type II DM   - A1C 7.9   - C/w sliding scale  - C/w Lantus, increased to 14 units QHs and Pre-Meal 4 units TID; adjust as tolerated   - Diabetic/ DASH Diet   - Monitor glucose levels closely     Anemia   - Monitor Hgb closely   - Iron and Ferritin, noted, not ferritin deficient    - Transfuse for Hgb < 7.0     HTN   - C/w Norvasc, Losartan 25 --> BP Uncontrolled. Norvasc increased to 5. Monitor and adjust as tolerated.   - Monitor BP, VS and patient closely     Pre-Op risk stratification   - Previous TTE with EF of 61%, Mild LVH, normal RV  - Patient is medically optimized for OR. Moderate / High Risk candidate for formalization of BKA    DVT and GI PPX   Discussed with Attending.

## 2023-07-21 NOTE — CHART NOTE - NSCHARTNOTEFT_GEN_A_CORE
STATUS POST:  R BKA formalization revision    POST OPERATIVE DAY #: 0    SUBJECTIVE: Pt seen and examined. States he is having a lot of pain at the surgical site. No other complaints.      Vital Signs Last 24 Hrs  T(C): 36.6 (21 Jul 2023 18:14), Max: 37.2 (21 Jul 2023 09:12)  T(F): 97.8 (21 Jul 2023 18:14), Max: 99 (21 Jul 2023 09:12)  HR: 72 (21 Jul 2023 18:14) (68 - 85)  BP: 113/64 (21 Jul 2023 18:14) (102/53 - 190/83)  BP(mean): 85 (21 Jul 2023 15:30) (73 - 92)  RR: 18 (21 Jul 2023 18:14) (15 - 18)  SpO2: 96% (21 Jul 2023 18:14) (96% - 100%)    Parameters below as of 21 Jul 2023 18:14  Patient On (Oxygen Delivery Method): room air        MEDICATIONS  (STANDING):  acetaminophen     Tablet .. 650 milliGRAM(s) Oral every 6 hours  amLODIPine   Tablet 2.5 milliGRAM(s) Oral daily  amoxicillin  875 milliGRAM(s)/clavulanate 1 Tablet(s) Oral two times a day  ascorbic acid 500 milliGRAM(s) Oral daily  aspirin enteric coated 81 milliGRAM(s) Oral daily  atorvastatin 40 milliGRAM(s) Oral at bedtime  dextrose 5% + sodium chloride 0.45% with potassium chloride 20 mEq/L 1000 milliLiter(s) (100 mL/Hr) IV Continuous <Continuous>  enoxaparin Injectable 40 milliGRAM(s) SubCutaneous every 24 hours  gabapentin 100 milliGRAM(s) Oral every 8 hours  insulin glargine Injectable (LANTUS) 14 Unit(s) SubCutaneous at bedtime  insulin lispro (ADMELOG) corrective regimen sliding scale   SubCutaneous every 6 hours  insulin lispro Injectable (ADMELOG) 4 Unit(s) SubCutaneous three times a day before meals  losartan 25 milliGRAM(s) Oral daily  multivitamin/minerals 1 Tablet(s) Oral daily    MEDICATIONS  (PRN):  dextrose Oral Gel 15 Gram(s) Oral once PRN Blood Glucose LESS THAN 70 milliGRAM(s)/deciliter  oxyCODONE    IR 5 milliGRAM(s) Oral every 4 hours PRN Severe Pain (7 - 10)      Physical Exam  Gen: NAD, A&Ox3  Pulm: nonlabored breathing on room air  CV: hemodynamically stable  Extremities: Right leg in knee immobilizer. Entire leg warm and appears well perfused. Dressing C/D/I with no strikethrough bleeding noted. Appropriate tenderness at stump, particularly inferior aspect. No significant swelling, collections, skin changes noted    LABS:                        8.9    6.06  )-----------( 295      ( 21 Jul 2023 07:15 )             28.5     07-21    140  |  106  |  16  ----------------------------<  143<H>  4.2   |  23  |  0.90    Ca    9.3      21 Jul 2023 07:14  Phos  3.6     07-21  Mg     2.3     07-21      PT/INR - ( 21 Jul 2023 07:15 )   PT: 12.8 sec;   INR: 1.10 ratio         PTT - ( 21 Jul 2023 07:15 )  PTT:29.2 sec  Urinalysis Basic - ( 21 Jul 2023 07:14 )    Assessment:  72-year-old male now POD0 s/p formalization of a right BKA. Recovering appropriately on the floor however he reports his pain is not well controlled.    Plan  - Regular diet  - Resume home meds  - Keep right knee straight  - Pain control  - PT  - AM labs, replete as needed  - ID consulted, appreciate recs, abx

## 2023-07-21 NOTE — PRE-OP CHECKLIST - 1.
emotional support and preop teaching provided to pt
preop teaching and emotional support provided to patient.

## 2023-07-21 NOTE — PRE-ANESTHESIA EVALUATION ADULT - NSANTHOSAYNRD_GEN_A_CORE
No. DEBRA screening performed.  STOP BANG Legend: 0-2 = LOW Risk; 3-4 = INTERMEDIATE Risk; 5-8 = HIGH Risk

## 2023-07-21 NOTE — BRIEF OPERATIVE NOTE - NSICDXBRIEFPROCEDURE_GEN_ALL_CORE_FT
PROCEDURES:  Revision of below knee amputation 21-Jul-2023 16:13:00  Elizabet Moe  
PROCEDURES:  Bhavin amputation below knee 13-Jul-2023 21:24:59  Caden Castillo

## 2023-07-21 NOTE — PROGRESS NOTE ADULT - SUBJECTIVE AND OBJECTIVE BOX
Name of Patient : ALEE DUNN  MRN: [id number]  Date of visit: 07-21-23 @ 11:04      Subjective: Patient seen and examined. No new events except as noted.   Patient seen earlier this AM. Offers no new complaints.  Planned for OR today for formalization of BKA.  BP Uncontrolled. Norvasc increased.   Patient denies chest pain, palpitations, shortness of breath or dyspnea.     REVIEW OF SYSTEMS:    CONSTITUTIONAL: No weakness, fevers or chills  EYES/ENT: No visual changes;  No vertigo or throat pain   NECK: No pain or stiffness  RESPIRATORY: No cough, wheezing, hemoptysis; No shortness of breath  CARDIOVASCULAR: No chest pain or palpitations  GASTROINTESTINAL: No abdominal or epigastric pain. No nausea, vomiting, or hematemesis; No diarrhea or constipation. No melena or hematochezia.  GENITOURINARY: No dysuria, frequency or hematuria  NEUROLOGICAL: No numbness or weakness  SKIN: No itching, burning, rashes, or lesions   MSK: S/P R BKA;  Pain controlled per patient   All other review of systems is negative unless indicated above.    MEDICATIONS:  MEDICATIONS  (STANDING):  aspirin enteric coated 81 milliGRAM(s) Oral daily  atorvastatin 40 milliGRAM(s) Oral at bedtime  dextrose 5% + sodium chloride 0.45% with potassium chloride 20 mEq/L 1000 milliLiter(s) (100 mL/Hr) IV Continuous <Continuous>  dextrose 5%. 1000 milliLiter(s) (50 mL/Hr) IV Continuous <Continuous>  dextrose 50% Injectable 12.5 Gram(s) IV Push once  dextrose 50% Injectable 25 Gram(s) IV Push once  dextrose 50% Injectable 25 Gram(s) IV Push once  enoxaparin Injectable 40 milliGRAM(s) SubCutaneous every 24 hours  gabapentin 100 milliGRAM(s) Oral every 8 hours  glucagon  Injectable 1 milliGRAM(s) IntraMuscular once  insulin glargine Injectable (LANTUS) 14 Unit(s) SubCutaneous at bedtime  insulin lispro (ADMELOG) corrective regimen sliding scale   SubCutaneous every 6 hours  insulin lispro Injectable (ADMELOG) 4 Unit(s) SubCutaneous three times a day before meals  lactated ringers. 1000 milliLiter(s) (100 mL/Hr) IV Continuous <Continuous>  losartan 25 milliGRAM(s) Oral daily      PHYSICAL EXAM:  T(C): 37.1 (07-21-23 @ 10:25), Max: 37.2 (07-21-23 @ 09:12)  HR: 83 (07-21-23 @ 10:25) (68 - 85)  BP: 190/83 (07-21-23 @ 10:25) (110/69 - 190/83)  RR: 18 (07-21-23 @ 10:25) (18 - 18)  SpO2: 98% (07-21-23 @ 10:25) (96% - 100%)  Wt(kg): --  I&O's Summary    20 Jul 2023 07:01  -  21 Jul 2023 07:00  --------------------------------------------------------  IN: 2520 mL / OUT: 1500 mL / NET: 1020 mL      Height (cm): 170.2 (07-21 @ 10:25)  Weight (kg): 72.1 (07-21 @ 10:25)  BMI (kg/m2): 24.9 (07-21 @ 10:25)  BSA (m2): 1.83 (07-21 @ 10:25)    Appearance: Normal; Sitting up in bed   HEENT: Eyes are open  Lymphatic: No lymphadenopathy   Cardiovascular: Normal S1 S2  Respiratory: normal effort , clear  Gastrointestinal:  Soft, Non-tender  Skin: No rashes,  warm to touch  Psychiatry:  Mood & affect appropriate  Musculoskeletal: S/P R BKA; Dressing and ACE wrap in place       07-20-23 @ 07:01  -  07-21-23 @ 07:00  --------------------------------------------------------  IN: 2520 mL / OUT: 1500 mL / NET: 1020 mL                                8.9    6.06  )-----------( 295      ( 21 Jul 2023 07:15 )             28.5               07-21    140  |  106  |  16  ----------------------------<  143<H>  4.2   |  23  |  0.90    Ca    9.3      21 Jul 2023 07:14  Phos  3.6     07-21  Mg     2.3     07-21      PT/INR - ( 21 Jul 2023 07:15 )   PT: 12.8 sec;   INR: 1.10 ratio         PTT - ( 21 Jul 2023 07:15 )  PTT:29.2 sec                   Urinalysis Basic - ( 21 Jul 2023 07:14 )    Color: x / Appearance: x / SG: x / pH: x  Gluc: 143 mg/dL / Ketone: x  / Bili: x / Urobili: x   Blood: x / Protein: x / Nitrite: x   Leuk Esterase: x / RBC: x / WBC x   Sq Epi: x / Non Sq Epi: x / Bacteria: x

## 2023-07-21 NOTE — PROGRESS NOTE ADULT - SUBJECTIVE AND OBJECTIVE BOX
Vascular surgery Progress Note     Subjective: no event overnight, npo since midnight       Vital Signs Last 24 Hrs  T(C): 36.3 (21 Jul 2023 05:57), Max: 36.8 (20 Jul 2023 09:49)  T(F): 97.4 (21 Jul 2023 05:57), Max: 98.2 (20 Jul 2023 09:49)  HR: 68 (21 Jul 2023 05:57) (68 - 85)  BP: 164/75 (21 Jul 2023 07:37) (107/67 - 182/75)  BP(mean): 92 (20 Jul 2023 13:24) (92 - 92)  RR: 18 (21 Jul 2023 05:57) (18 - 18)  SpO2: 98% (21 Jul 2023 05:57) (96% - 100%)    Parameters below as of 21 Jul 2023 05:57  Patient On (Oxygen Delivery Method): room air        I&O's Detail    20 Jul 2023 07:01  -  21 Jul 2023 07:00  --------------------------------------------------------  IN:    dextrose 5% + sodium chloride 0.45% w/ Additives: 1400 mL    Lactated Ringers: 1000 mL    Oral Fluid: 120 mL  Total IN: 2520 mL    OUT:    Voided (mL): 1500 mL  Total OUT: 1500 mL    Total NET: 1020 mL    Physical Exam:  General: NAD lying comfortably in bed   Respiratory: nonlabored breathing on RA  Extremities: Right Below Kneww guillotine amputation - dressing C/D/I, stump without overt s/s of bleeding or infection, dressing changed     MEDICATIONS  (STANDING):  amLODIPine   Tablet 2.5 milliGRAM(s) Oral daily  aspirin enteric coated 81 milliGRAM(s) Oral daily  atorvastatin 40 milliGRAM(s) Oral at bedtime  dextrose 5% + sodium chloride 0.45% with potassium chloride 20 mEq/L 1000 milliLiter(s) (100 mL/Hr) IV Continuous <Continuous>  dextrose 5%. 1000 milliLiter(s) (50 mL/Hr) IV Continuous <Continuous>  dextrose 50% Injectable 25 Gram(s) IV Push once  dextrose 50% Injectable 12.5 Gram(s) IV Push once  dextrose 50% Injectable 25 Gram(s) IV Push once  enoxaparin Injectable 40 milliGRAM(s) SubCutaneous every 24 hours  gabapentin 100 milliGRAM(s) Oral every 8 hours  glucagon  Injectable 1 milliGRAM(s) IntraMuscular once  insulin glargine Injectable (LANTUS) 14 Unit(s) SubCutaneous at bedtime  insulin lispro (ADMELOG) corrective regimen sliding scale   SubCutaneous every 6 hours  insulin lispro Injectable (ADMELOG) 4 Unit(s) SubCutaneous three times a day before meals  lactated ringers. 1000 milliLiter(s) (100 mL/Hr) IV Continuous <Continuous>  losartan 25 milliGRAM(s) Oral daily    MEDICATIONS  (PRN):  acetaminophen     Tablet .. 650 milliGRAM(s) Oral every 6 hours PRN Temp greater or equal to 38C (100.4F)  dextrose Oral Gel 15 Gram(s) Oral once PRN Blood Glucose LESS THAN 70 milliGRAM(s)/deciliter      LABS:                        8.9    6.06  )-----------( 295      ( 21 Jul 2023 07:15 )             28.5     07-21    140  |  106  |  16  ----------------------------<  143<H>  4.2   |  23  |  0.90    Ca    9.3      21 Jul 2023 07:14  Phos  3.6     07-21  Mg     2.3     07-21      PT/INR - ( 21 Jul 2023 07:15 )   PT: 12.8 sec;   INR: 1.10 ratio         PTT - ( 21 Jul 2023 07:15 )  PTT:29.2 sec  Urinalysis Basic - ( 21 Jul 2023 07:14 )    Color: x / Appearance: x / SG: x / pH: x  Gluc: 143 mg/dL / Ketone: x  / Bili: x / Urobili: x   Blood: x / Protein: x / Nitrite: x   Leuk Esterase: x / RBC: x / WBC x   Sq Epi: x / Non Sq Epi: x / Bacteria: x

## 2023-07-21 NOTE — BRIEF OPERATIVE NOTE - NSICDXBRIEFPOSTOP_GEN_ALL_CORE_FT
POST-OP DIAGNOSIS:  PAD (peripheral artery disease) 21-Jul-2023 16:13:22  Elizabet Moe  
POST-OP DIAGNOSIS:  Foot infection 13-Jul-2023 21:25:40  Caden Castillo

## 2023-07-21 NOTE — BRIEF OPERATIVE NOTE - NSICDXBRIEFPREOP_GEN_ALL_CORE_FT
PRE-OP DIAGNOSIS:  PAD (peripheral artery disease) 21-Jul-2023 16:13:17  Elizabet Moe  
PRE-OP DIAGNOSIS:  Foot infection 13-Jul-2023 21:25:10  Caden Castillo

## 2023-07-22 LAB
ANION GAP SERPL CALC-SCNC: 11 MMOL/L — SIGNIFICANT CHANGE UP (ref 5–17)
ANION GAP SERPL CALC-SCNC: 14 MMOL/L — SIGNIFICANT CHANGE UP (ref 5–17)
BUN SERPL-MCNC: 26 MG/DL — HIGH (ref 7–23)
BUN SERPL-MCNC: 30 MG/DL — HIGH (ref 7–23)
CALCIUM SERPL-MCNC: 8.3 MG/DL — LOW (ref 8.4–10.5)
CALCIUM SERPL-MCNC: 8.6 MG/DL — SIGNIFICANT CHANGE UP (ref 8.4–10.5)
CHLORIDE SERPL-SCNC: 102 MMOL/L — SIGNIFICANT CHANGE UP (ref 96–108)
CHLORIDE SERPL-SCNC: 103 MMOL/L — SIGNIFICANT CHANGE UP (ref 96–108)
CO2 SERPL-SCNC: 20 MMOL/L — LOW (ref 22–31)
CO2 SERPL-SCNC: 20 MMOL/L — LOW (ref 22–31)
CREAT SERPL-MCNC: 1.23 MG/DL — SIGNIFICANT CHANGE UP (ref 0.5–1.3)
CREAT SERPL-MCNC: 1.27 MG/DL — SIGNIFICANT CHANGE UP (ref 0.5–1.3)
EGFR: 60 ML/MIN/1.73M2 — SIGNIFICANT CHANGE UP
EGFR: 62 ML/MIN/1.73M2 — SIGNIFICANT CHANGE UP
GLUCOSE BLDC GLUCOMTR-MCNC: 183 MG/DL — HIGH (ref 70–99)
GLUCOSE BLDC GLUCOMTR-MCNC: 213 MG/DL — HIGH (ref 70–99)
GLUCOSE BLDC GLUCOMTR-MCNC: 225 MG/DL — HIGH (ref 70–99)
GLUCOSE BLDC GLUCOMTR-MCNC: 259 MG/DL — HIGH (ref 70–99)
GLUCOSE BLDC GLUCOMTR-MCNC: 280 MG/DL — HIGH (ref 70–99)
GLUCOSE SERPL-MCNC: 163 MG/DL — HIGH (ref 70–99)
GLUCOSE SERPL-MCNC: 196 MG/DL — HIGH (ref 70–99)
HCT VFR BLD CALC: 21.4 % — LOW (ref 39–50)
HCT VFR BLD CALC: 25.9 % — LOW (ref 39–50)
HGB BLD-MCNC: 6.8 G/DL — CRITICAL LOW (ref 13–17)
HGB BLD-MCNC: 7.9 G/DL — LOW (ref 13–17)
MAGNESIUM SERPL-MCNC: 2.3 MG/DL — SIGNIFICANT CHANGE UP (ref 1.6–2.6)
MCHC RBC-ENTMCNC: 28.7 PG — SIGNIFICANT CHANGE UP (ref 27–34)
MCHC RBC-ENTMCNC: 28.8 PG — SIGNIFICANT CHANGE UP (ref 27–34)
MCHC RBC-ENTMCNC: 30.5 GM/DL — LOW (ref 32–36)
MCHC RBC-ENTMCNC: 31.8 GM/DL — LOW (ref 32–36)
MCV RBC AUTO: 90.3 FL — SIGNIFICANT CHANGE UP (ref 80–100)
MCV RBC AUTO: 94.5 FL — SIGNIFICANT CHANGE UP (ref 80–100)
NRBC # BLD: 0 /100 WBCS — SIGNIFICANT CHANGE UP (ref 0–0)
NRBC # BLD: 0 /100 WBCS — SIGNIFICANT CHANGE UP (ref 0–0)
PHOSPHATE SERPL-MCNC: 3.6 MG/DL — SIGNIFICANT CHANGE UP (ref 2.5–4.5)
PLATELET # BLD AUTO: 263 K/UL — SIGNIFICANT CHANGE UP (ref 150–400)
PLATELET # BLD AUTO: 323 K/UL — SIGNIFICANT CHANGE UP (ref 150–400)
POTASSIUM SERPL-MCNC: 4.2 MMOL/L — SIGNIFICANT CHANGE UP (ref 3.5–5.3)
POTASSIUM SERPL-MCNC: 4.2 MMOL/L — SIGNIFICANT CHANGE UP (ref 3.5–5.3)
POTASSIUM SERPL-SCNC: 4.2 MMOL/L — SIGNIFICANT CHANGE UP (ref 3.5–5.3)
POTASSIUM SERPL-SCNC: 4.2 MMOL/L — SIGNIFICANT CHANGE UP (ref 3.5–5.3)
RBC # BLD: 2.37 M/UL — LOW (ref 4.2–5.8)
RBC # BLD: 2.74 M/UL — LOW (ref 4.2–5.8)
RBC # FLD: 13.4 % — SIGNIFICANT CHANGE UP (ref 10.3–14.5)
RBC # FLD: 13.5 % — SIGNIFICANT CHANGE UP (ref 10.3–14.5)
SODIUM SERPL-SCNC: 134 MMOL/L — LOW (ref 135–145)
SODIUM SERPL-SCNC: 136 MMOL/L — SIGNIFICANT CHANGE UP (ref 135–145)
WBC # BLD: 9.25 K/UL — SIGNIFICANT CHANGE UP (ref 3.8–10.5)
WBC # BLD: 9.59 K/UL — SIGNIFICANT CHANGE UP (ref 3.8–10.5)
WBC # FLD AUTO: 9.25 K/UL — SIGNIFICANT CHANGE UP (ref 3.8–10.5)
WBC # FLD AUTO: 9.59 K/UL — SIGNIFICANT CHANGE UP (ref 3.8–10.5)

## 2023-07-22 PROCEDURE — 71045 X-RAY EXAM CHEST 1 VIEW: CPT | Mod: 26

## 2023-07-22 RX ORDER — GABAPENTIN 400 MG/1
300 CAPSULE ORAL THREE TIMES A DAY
Refills: 0 | Status: DISCONTINUED | OUTPATIENT
Start: 2023-07-22 | End: 2023-07-22

## 2023-07-22 RX ORDER — GABAPENTIN 400 MG/1
300 CAPSULE ORAL EVERY 8 HOURS
Refills: 0 | Status: DISCONTINUED | OUTPATIENT
Start: 2023-07-22 | End: 2023-07-27

## 2023-07-22 RX ORDER — ACETAMINOPHEN 500 MG
975 TABLET ORAL EVERY 6 HOURS
Refills: 0 | Status: DISCONTINUED | OUTPATIENT
Start: 2023-07-22 | End: 2023-07-27

## 2023-07-22 RX ADMIN — Medication 975 MILLIGRAM(S): at 16:45

## 2023-07-22 RX ADMIN — Medication 650 MILLIGRAM(S): at 07:06

## 2023-07-22 RX ADMIN — Medication 4 UNIT(S): at 18:08

## 2023-07-22 RX ADMIN — Medication 4: at 10:08

## 2023-07-22 RX ADMIN — Medication 1 TABLET(S): at 18:09

## 2023-07-22 RX ADMIN — Medication 1 TABLET(S): at 14:32

## 2023-07-22 RX ADMIN — Medication 6: at 18:08

## 2023-07-22 RX ADMIN — Medication 4 UNIT(S): at 14:32

## 2023-07-22 RX ADMIN — Medication 81 MILLIGRAM(S): at 14:33

## 2023-07-22 RX ADMIN — OXYCODONE HYDROCHLORIDE 5 MILLIGRAM(S): 5 TABLET ORAL at 07:06

## 2023-07-22 RX ADMIN — GABAPENTIN 100 MILLIGRAM(S): 400 CAPSULE ORAL at 06:02

## 2023-07-22 RX ADMIN — Medication 650 MILLIGRAM(S): at 06:03

## 2023-07-22 RX ADMIN — GABAPENTIN 100 MILLIGRAM(S): 400 CAPSULE ORAL at 14:32

## 2023-07-22 RX ADMIN — Medication 1 TABLET(S): at 06:02

## 2023-07-22 RX ADMIN — Medication 650 MILLIGRAM(S): at 00:55

## 2023-07-22 RX ADMIN — LOSARTAN POTASSIUM 25 MILLIGRAM(S): 100 TABLET, FILM COATED ORAL at 06:02

## 2023-07-22 RX ADMIN — GABAPENTIN 300 MILLIGRAM(S): 400 CAPSULE ORAL at 22:32

## 2023-07-22 RX ADMIN — OXYCODONE HYDROCHLORIDE 10 MILLIGRAM(S): 5 TABLET ORAL at 08:24

## 2023-07-22 RX ADMIN — ATORVASTATIN CALCIUM 40 MILLIGRAM(S): 80 TABLET, FILM COATED ORAL at 22:32

## 2023-07-22 RX ADMIN — Medication 500 MILLIGRAM(S): at 14:33

## 2023-07-22 RX ADMIN — AMLODIPINE BESYLATE 2.5 MILLIGRAM(S): 2.5 TABLET ORAL at 06:02

## 2023-07-22 RX ADMIN — OXYCODONE HYDROCHLORIDE 5 MILLIGRAM(S): 5 TABLET ORAL at 06:06

## 2023-07-22 RX ADMIN — OXYCODONE HYDROCHLORIDE 10 MILLIGRAM(S): 5 TABLET ORAL at 08:54

## 2023-07-22 RX ADMIN — Medication 4 UNIT(S): at 10:08

## 2023-07-22 RX ADMIN — Medication 4: at 14:32

## 2023-07-22 RX ADMIN — INSULIN GLARGINE 14 UNIT(S): 100 INJECTION, SOLUTION SUBCUTANEOUS at 22:31

## 2023-07-22 RX ADMIN — ENOXAPARIN SODIUM 40 MILLIGRAM(S): 100 INJECTION SUBCUTANEOUS at 18:09

## 2023-07-22 RX ADMIN — Medication 975 MILLIGRAM(S): at 23:17

## 2023-07-22 NOTE — OCCUPATIONAL THERAPY INITIAL EVALUATION ADULT - FINE MOTOR COORDINATION, RIGHT HAND, MANIPULATE OBJECTS, OT EVAL
Dx: Female stress incontinence (N39.3)          Authorized # of Visits:  6         Next MD visit: none scheduled  Fall Risk: standard         Precautions: n/a             Subjective: Elyse Hernandez reportsu getting some of her exercises in.  Difficulty feeling cont exercises.  If limited time, perform hip add shawna PF pre-activation table top alternating x 8         Instruct vaginal sensor hygiene PF pre-activation arm raises x 10        - -        - -        - Review cross leg stretch   30 sec x 3        -         - function less frequency of leaking. Pt goals include reduce leaking with running, jumping and sneezing.   Past medical history was reviewed with Patricia Gastelum and not significant other than fibroid    ASSESSMENT  Ms. Eleuterio Gonzalez demonstrates pelvic floor weakness, tig with pre-activation of PF during sneeze and cough majority of time. Patient will demonstrate no UI with 10 modified jumping jacks without arms to return to fitness activities and reduce UI with jogging after child.       Frequency / Duration: Patient will normal performance

## 2023-07-22 NOTE — PROGRESS NOTE ADULT - SUBJECTIVE AND OBJECTIVE BOX
DATE OF SERVICE: 07-22-23 @ 13:34    Patient is a 72y old  Male who presents with a chief complaint of foot infection (20 Jul 2023 14:44)      INTERVAL HISTORY: Feels ok.     REVIEW OF SYSTEMS:  CONSTITUTIONAL: No weakness  EYES/ENT: No visual changes;  No throat pain   NECK: No pain or stiffness  RESPIRATORY: No cough, wheezing; No shortness of breath  CARDIOVASCULAR: No chest pain or palpitations  GASTROINTESTINAL: No abdominal  pain. No nausea, vomiting, or hematemesis  GENITOURINARY: No dysuria, frequency or hematuria  NEUROLOGICAL: No stroke like symptoms  SKIN: No rashes    	  MEDICATIONS:  amLODIPine   Tablet 2.5 milliGRAM(s) Oral daily  losartan 25 milliGRAM(s) Oral daily        PHYSICAL EXAM:  T(C): 36.9 (07-22-23 @ 13:14), Max: 37.3 (07-22-23 @ 05:50)  HR: 87 (07-22-23 @ 13:14) (72 - 89)  BP: 97/60 (07-22-23 @ 13:14) (96/60 - 132/63)  RR: 18 (07-22-23 @ 13:14) (15 - 18)  SpO2: 94% (07-22-23 @ 13:14) (94% - 100%)  Wt(kg): --  I&O's Summary    21 Jul 2023 07:01  -  22 Jul 2023 07:00  --------------------------------------------------------  IN: 195 mL / OUT: 750 mL / NET: -555 mL    22 Jul 2023 07:01  -  22 Jul 2023 13:34  --------------------------------------------------------  IN: 240 mL / OUT: 200 mL / NET: 40 mL          Appearance: In no distress	  HEENT:    PERRL, EOMI	  Cardiovascular:  S1 S2, No JVD  Respiratory: Lungs clear to auscultation	  Gastrointestinal:  Soft, Non-tender, + BS	  Vascularature:  Right BKA  Psychiatric: Appropriate affect   Neuro: no acute focal deficits                               7.9    9.25  )-----------( 323      ( 22 Jul 2023 07:19 )             25.9     07-22    136  |  102  |  26<H>  ----------------------------<  196<H>  4.2   |  20<L>  |  1.23    Ca    8.6      22 Jul 2023 07:19  Phos  3.6     07-22  Mg     2.3     07-22          Labs personally reviewed      ASSESSMENT/PLAN: 	    Patient is a 72 year old male with a PMHx of HTN, Type II DM associated with neuropathy whore presents to [facility] with a chief complaint of pain, blistering and fluid drainage on his right foot.    Problem/Plan -1  Problem: Cardiac Risk Stratification for podiatry surgery  - Denies CP or SOB  - TTE shows preserved EF, mild LVH  - Not in decompensated HF  - No tachy supa arrhythmia  - s/p cath with nonobstructive moderate CAD  - Elevated risk for low-mod risk pods surgery procedure, no contraindication to proceed   - s/p R BKA  7/13 tolerated surgery well  - s/p BKA formalization 7/21    Problem/Plan -2  Problem: Hypertension  - amlodipine  2.5mg PO daily  - c/w losartan to 25mg PO daily    Problem/Plan -3  Problem: DM II  - HgbA1c 8.4  - ISS as per primary team        RISHABH Bello-NP   Matt Calhoun DO [facility]  Cardiovascular Medicine  [address] [place] [address], [address]  Available through call or text on [employer]  Office: [phone]

## 2023-07-22 NOTE — PROGRESS NOTE ADULT - SUBJECTIVE AND OBJECTIVE BOX
VASCULAR SURGERY DAILY PROGRESS NOTE:     SUBJECTIVE/ROS:   Pt is S/P BKA formalization on 7/21. Recovered well in PACU and transferred back to the floor.     Overnight: no acute events   Patient seen and evaluated on AM rounds. Pt stated in AM that he was having some break through pain in AM and pain meds were adjusted accordingly.   Patient otherwise denies nausea, vomiting, chest pain, shortness of breath     OBJECTIVE:  Vital Signs Last 24 Hrs  T(C): 38.2 (22 Jul 2023 18:01), Max: 38.7 (22 Jul 2023 16:33)  T(F): 100.7 (22 Jul 2023 18:01), Max: 101.6 (22 Jul 2023 16:33)  HR: 100 (22 Jul 2023 16:33) (80 - 100)  BP: 134/64 (22 Jul 2023 16:33) (93/56 - 134/64)  BP(mean): --  RR: 18 (22 Jul 2023 16:33) (18 - 18)  SpO2: 94% (22 Jul 2023 16:33) (94% - 97%)    Parameters below as of 22 Jul 2023 16:33  Patient On (Oxygen Delivery Method): room air      I&O's Detail    21 Jul 2023 07:01  -  22 Jul 2023 07:00  --------------------------------------------------------  IN:    Lactated Ringers: 75 mL    Oral Fluid: 120 mL  Total IN: 195 mL    OUT:    Voided (mL): 750 mL  Total OUT: 750 mL    Total NET: -555 mL      22 Jul 2023 07:01  -  22 Jul 2023 20:23  --------------------------------------------------------  IN:    Oral Fluid: 480 mL  Total IN: 480 mL    OUT:    Voided (mL): 400 mL  Total OUT: 400 mL    Total NET: 80 mL        Daily     Daily   MEDICATIONS  (STANDING):  acetaminophen     Tablet .. 975 milliGRAM(s) Oral every 6 hours  amLODIPine   Tablet 2.5 milliGRAM(s) Oral daily  amoxicillin  875 milliGRAM(s)/clavulanate 1 Tablet(s) Oral two times a day  ascorbic acid 500 milliGRAM(s) Oral daily  aspirin enteric coated 81 milliGRAM(s) Oral daily  atorvastatin 40 milliGRAM(s) Oral at bedtime  dextrose 5%. 1000 milliLiter(s) (50 mL/Hr) IV Continuous <Continuous>  dextrose 5%. 1000 milliLiter(s) (100 mL/Hr) IV Continuous <Continuous>  dextrose 50% Injectable 25 Gram(s) IV Push once  dextrose 50% Injectable 12.5 Gram(s) IV Push once  dextrose 50% Injectable 25 Gram(s) IV Push once  enoxaparin Injectable 40 milliGRAM(s) SubCutaneous every 24 hours  gabapentin 300 milliGRAM(s) Oral every 8 hours  glucagon  Injectable 1 milliGRAM(s) IntraMuscular once  insulin glargine Injectable (LANTUS) 14 Unit(s) SubCutaneous at bedtime  insulin lispro (ADMELOG) corrective regimen sliding scale   SubCutaneous three times a day before meals  insulin lispro (ADMELOG) corrective regimen sliding scale   SubCutaneous at bedtime  insulin lispro Injectable (ADMELOG) 4 Unit(s) SubCutaneous three times a day before meals  losartan 25 milliGRAM(s) Oral daily  multivitamin/minerals 1 Tablet(s) Oral daily    MEDICATIONS  (PRN):  dextrose Oral Gel 15 Gram(s) Oral once PRN Blood Glucose LESS THAN 70 milliGRAM(s)/deciliter  oxyCODONE    IR 5 milliGRAM(s) Oral every 4 hours PRN Moderate Pain (4 - 6)  oxyCODONE    IR 10 milliGRAM(s) Oral every 4 hours PRN Severe Pain (7 - 10)      Labs:                        7.9    9.25  )-----------( 323      ( 22 Jul 2023 07:19 )             25.9     07-22    136  |  102  |  26<H>  ----------------------------<  196<H>  4.2   |  20<L>  |  1.23    Ca    8.6      22 Jul 2023 07:19  Phos  3.6     07-22  Mg     2.3     07-22      PT/INR - ( 21 Jul 2023 07:15 )   PT: 12.8 sec;   INR: 1.10 ratio         PTT - ( 21 Jul 2023 07:15 )  PTT:29.2 sec  Urinalysis Basic - ( 22 Jul 2023 07:19 )    Color: x / Appearance: x / SG: x / pH: x  Gluc: 196 mg/dL / Ketone: x  / Bili: x / Urobili: x   Blood: x / Protein: x / Nitrite: x   Leuk Esterase: x / RBC: x / WBC x   Sq Epi: x / Non Sq Epi: x / Bacteria: x            Physical Exam  Gen: Patient spiked a fever of 101.6, patient lethargic, opens eyes to voice and following commands,   Pulm: nonlabored breathing on room air  CV: hemodynamically stable  Extremities: Right leg in knee immobilizer. Entire leg warm and appears well perfused. Dressing C/D/I with no strikethrough bleeding noted. No significant swelling, collections, skin changes noted

## 2023-07-22 NOTE — PROGRESS NOTE ADULT - SUBJECTIVE AND OBJECTIVE BOX
Name of Patient : ALEE DUNN  MRN: [id number]  Date of visit: 07-22-23       Subjective: Patient seen and examined. No new events except as noted.   Doing okay     REVIEW OF SYSTEMS:    CONSTITUTIONAL: No weakness, fevers or chills  EYES/ENT: No visual changes;  No vertigo or throat pain   NECK: No pain or stiffness  RESPIRATORY: No cough, wheezing, hemoptysis; No shortness of breath  CARDIOVASCULAR: No chest pain or palpitations  GASTROINTESTINAL: No abdominal or epigastric pain. No nausea, vomiting, or hematemesis; No diarrhea or constipation. No melena or hematochezia.  GENITOURINARY: No dysuria, frequency or hematuria  NEUROLOGICAL: No numbness or weakness  SKIN: No itching, burning, rashes, or lesions   All other review of systems is negative unless indicated above.    MEDICATIONS:  MEDICATIONS  (STANDING):  acetaminophen     Tablet .. 975 milliGRAM(s) Oral every 6 hours  amLODIPine   Tablet 2.5 milliGRAM(s) Oral daily  amoxicillin  875 milliGRAM(s)/clavulanate 1 Tablet(s) Oral two times a day  ascorbic acid 500 milliGRAM(s) Oral daily  aspirin enteric coated 81 milliGRAM(s) Oral daily  atorvastatin 40 milliGRAM(s) Oral at bedtime  dextrose 5%. 1000 milliLiter(s) (50 mL/Hr) IV Continuous <Continuous>  dextrose 5%. 1000 milliLiter(s) (100 mL/Hr) IV Continuous <Continuous>  dextrose 50% Injectable 25 Gram(s) IV Push once  dextrose 50% Injectable 12.5 Gram(s) IV Push once  dextrose 50% Injectable 25 Gram(s) IV Push once  enoxaparin Injectable 40 milliGRAM(s) SubCutaneous every 24 hours  gabapentin 300 milliGRAM(s) Oral every 8 hours  glucagon  Injectable 1 milliGRAM(s) IntraMuscular once  insulin glargine Injectable (LANTUS) 14 Unit(s) SubCutaneous at bedtime  insulin lispro (ADMELOG) corrective regimen sliding scale   SubCutaneous three times a day before meals  insulin lispro (ADMELOG) corrective regimen sliding scale   SubCutaneous at bedtime  insulin lispro Injectable (ADMELOG) 4 Unit(s) SubCutaneous three times a day before meals  losartan 25 milliGRAM(s) Oral daily  multivitamin/minerals 1 Tablet(s) Oral daily      PHYSICAL EXAM:  T(C): 38.6 (07-22-23 @ 22:20), Max: 38.7 (07-22-23 @ 16:33)  HR: 94 (07-22-23 @ 22:20) (80 - 100)  BP: 144/60 (07-22-23 @ 22:20) (93/56 - 144/60)  RR: 18 (07-22-23 @ 22:20) (18 - 18)  SpO2: 94% (07-22-23 @ 22:20) (94% - 97%)  Wt(kg): --  I&O's Summary    21 Jul 2023 07:01  -  22 Jul 2023 07:00  --------------------------------------------------------  IN: 195 mL / OUT: 750 mL / NET: -555 mL    22 Jul 2023 07:01  -  22 Jul 2023 23:34  --------------------------------------------------------  IN: 840 mL / OUT: 400 mL / NET: 440 mL          Appearance: Normal	  HEENT:  PERRLA   Lymphatic: No lymphadenopathy   Cardiovascular: Normal S1 S2, no JVD  Respiratory: normal effort , clear  Gastrointestinal:  Soft, Non-tender  Skin: No rashes,  warm to touch  Psychiatry:  Mood & affect appropriate  Musculuskeletal: No edema    recent labs, Imaging and EKGs personally reviewed     07-21-23 @ 07:01  -  07-22-23 @ 07:00  --------------------------------------------------------  IN: 195 mL / OUT: 750 mL / NET: -555 mL    07-22-23 @ 07:01  -  07-22-23 @ 23:34  --------------------------------------------------------  IN: 840 mL / OUT: 400 mL / NET: 440 mL                          7.9    9.25  )-----------( 323      ( 22 Jul 2023 07:19 )             25.9               07-22    134<L>  |  103  |  30<H>  ----------------------------<  163<H>  4.2   |  20<L>  |  1.27    Ca    8.3<L>      22 Jul 2023 22:50  Phos  3.6     07-22  Mg     2.3     07-22      PT/INR - ( 21 Jul 2023 07:15 )   PT: 12.8 sec;   INR: 1.10 ratio         PTT - ( 21 Jul 2023 07:15 )  PTT:29.2 sec                   Urinalysis Basic - ( 22 Jul 2023 22:50 )    Color: x / Appearance: x / SG: x / pH: x  Gluc: 163 mg/dL / Ketone: x  / Bili: x / Urobili: x   Blood: x / Protein: x / Nitrite: x   Leuk Esterase: x / RBC: x / WBC x   Sq Epi: x / Non Sq Epi: x / Bacteria: x

## 2023-07-22 NOTE — OCCUPATIONAL THERAPY INITIAL EVALUATION ADULT - DIAGNOSIS, OT EVAL
Pt presents s/p  RLE BKA with impaired functional mobility and inability to complete ADLs independently

## 2023-07-22 NOTE — OCCUPATIONAL THERAPY INITIAL EVALUATION ADULT - LIVES WITH, PROFILE
Pt reports living with wife+ son, 2 steps to enter ,private house with walk-in shower. Reports being IND  prior with no AD ,but owns a RW and a cane./children/spouse

## 2023-07-22 NOTE — OCCUPATIONAL THERAPY INITIAL EVALUATION ADULT - STRENGTHENING, PT EVAL
"Chief Complaint   Patient presents with     Vaginal Problem     vaginal pain       Initial /82 (Cuff Size: Adult Regular)  Pulse 80  Resp 16  Wt 127 lb 12 oz (57.9 kg)  BMI 20.95 kg/m2 Estimated body mass index is 20.95 kg/(m^2) as calculated from the following:    Height as of 1/12/17: 5' 5.47\" (1.663 m).    Weight as of this encounter: 127 lb 12 oz (57.9 kg).  Medication Reconciliation: complete   Marina Bueno CMA (AAMA)      "
GOAL: Pt will increase UE strength 1 grade to assist in mobility  for increased safety and independence with ADL task in 4 weeks.

## 2023-07-22 NOTE — PROGRESS NOTE ADULT - ASSESSMENT
Patient is a 72 year old male with a PMHx of Type II DM associated with neuropathy, HTN who was recently admitted to [facility] with Right 1st toe infection, S/P Right LE SFA to PT bypass graft with PTFE followed by Right foot Partial hallux Amputation on 04/14, and RLE angiogram 07/03 with rotational atherectomy of proximal portion of the graft in the SFA, following by MIKE that improved flow, however a proximal lesion was demonstrated at that time that was not amenable to endovascular procedures. Patient presents to [facility] due to nonhealing Right foot wound. Internal medicine has been consulted on Mr. Paredes's care for medical management. Patient denies chest pain, palpitations, SOB or dyspnea.       Peripheral Arterial Disease   - S/P R LE SFA to PT bypass graft, RLE angiogram 07/03  - 07/08 Arterial Duplex negative for pseudoaneurysm   - On ASA 81 and Lipitor 40   - US as noted  - Care per vascular surgery appreciated     H/O R Toe infection   - S/P Great toe amputation   - Foot Xray with Mild erosive changes concerning for OM  - MR without evidence of OM, negative for abscess. F/u podiatry recs --> S/P BKA, Planned for formalization 7/21 with Vasc  - ABX as per ID, now on Augmentin PO as per ID   - UCx and BCx2 w/ Neg  final   - Pain control   - Incentive spirometer   - Care per Vascular/ Podiatry appreciated   - Monitor CBC, temp curve, VS and patient closely     Febrile  - Pt febrile while on Cefepime and Flagyl  - 7/10 BCx2 w/ Neg  final   - Repeat BCx2 7/12 Neg; F/u final  - F/u with ID  --> ABX switched to Meropenem, now on PO Augmentin   - Monitor CBC, temp curve, VS and patient closely; Antipyretics PRN     Type II DM   - A1C 7.9   - C/w sliding scale  - C/w Lantus, increased to 14 units QHs and Pre-Meal 4 units TID; adjust as tolerated   - Diabetic/ DASH Diet   - Monitor glucose levels closely     Anemia   - Monitor Hgb closely   - Iron and Ferritin, noted, not ferritin deficient    - Transfuse for Hgb < 7.0     HTN   - C/w Norvasc, Losartan 25 --> BP Uncontrolled. Norvasc increased to 5. Monitor and adjust as tolerated.   - Monitor BP, VS and patient closely     Pre-Op risk stratification   - Previous TTE with EF of 61%, Mild LVH, normal RV  - Patient is medically optimized for OR. Moderate / High Risk candidate for formalization of BKA    DVT and GI PPX

## 2023-07-22 NOTE — PROGRESS NOTE ADULT - ASSESSMENT
72-year-old male s/p formalization of a right BKA on 7/21. Was recovering well in PACU then transferred to floor. On 7/22 patients pain was not adequately controlled and pain regimen was updated. On 7/22 PM patient spiked a fever tmax of 101.6 and a fever workup was initiated.     Plan  -Fever workup - Chest XR, UA, CBC, BMP, blood cult x 2 - F/u lab results  -Monitor patient vitals  -Plan to change dressing POD day 3 unless indicated otherwise.  - Keep right knee straight  - Regular diet  - Resume home meds  - Pain control  - Follow up PT  - AM labs, replete as needed  - ID consulted, appreciate recs, abx.

## 2023-07-22 NOTE — OCCUPATIONAL THERAPY INITIAL EVALUATION ADULT - PERTINENT HX OF CURRENT PROBLEM, REHAB EVAL
72M with h/o T2DM (A1c=8.4) with neuropathy, HTN recently admitted 3/30 with R 1st toe infection, s/p 4/10 s/p RLE SFA to PT bypass graft with PTFE followed by R foot Partial hallux Amputation 4/14, and RLE angiogram 7/3 with rotational athrectomy of proximal portion of the graft in the SFA, following by MIKE that improved flow, however a proximal lesion was demonstrated at that time that was not amenable to endovascular procedures. He now presents with nonhealing R foot wound. Denies fevers and chills.Pt now s/p formalization of a right BKA.  MR R FOOT: Prior amputation of the first digit at the level of the base of the proximal phalanx. Diminutive overlying soft tissues, without MRI evidence of osteomyelitis. No abscess.   US LE : Focused limited field-of-view sonography of the left groin without sonographic evidence of pseudoaneurysm, hematoma, or AV fistula.  XRAY FOOT: Mild erosive changes of the medial cortical margin of the first proximal phalanx which appears to extend past the soft tissue, suspicious for osteomyelitis.

## 2023-07-23 LAB
ANION GAP SERPL CALC-SCNC: 12 MMOL/L — SIGNIFICANT CHANGE UP (ref 5–17)
APPEARANCE UR: CLEAR — SIGNIFICANT CHANGE UP
BACTERIA # UR AUTO: NEGATIVE — SIGNIFICANT CHANGE UP
BILIRUB UR-MCNC: NEGATIVE — SIGNIFICANT CHANGE UP
BUN SERPL-MCNC: 23 MG/DL — SIGNIFICANT CHANGE UP (ref 7–23)
CALCIUM SERPL-MCNC: 8.5 MG/DL — SIGNIFICANT CHANGE UP (ref 8.4–10.5)
CHLORIDE SERPL-SCNC: 106 MMOL/L — SIGNIFICANT CHANGE UP (ref 96–108)
CO2 SERPL-SCNC: 21 MMOL/L — LOW (ref 22–31)
COLOR SPEC: SIGNIFICANT CHANGE UP
CREAT SERPL-MCNC: 0.98 MG/DL — SIGNIFICANT CHANGE UP (ref 0.5–1.3)
DIFF PNL FLD: ABNORMAL
EGFR: 82 ML/MIN/1.73M2 — SIGNIFICANT CHANGE UP
EPI CELLS # UR: 0 /HPF — SIGNIFICANT CHANGE UP
GLUCOSE BLDC GLUCOMTR-MCNC: 151 MG/DL — HIGH (ref 70–99)
GLUCOSE BLDC GLUCOMTR-MCNC: 211 MG/DL — HIGH (ref 70–99)
GLUCOSE BLDC GLUCOMTR-MCNC: 265 MG/DL — HIGH (ref 70–99)
GLUCOSE BLDC GLUCOMTR-MCNC: 281 MG/DL — HIGH (ref 70–99)
GLUCOSE SERPL-MCNC: 99 MG/DL — SIGNIFICANT CHANGE UP (ref 70–99)
GLUCOSE UR QL: ABNORMAL
HCT VFR BLD CALC: 26.3 % — LOW (ref 39–50)
HGB BLD-MCNC: 8.4 G/DL — LOW (ref 13–17)
HYALINE CASTS # UR AUTO: 0 /LPF — SIGNIFICANT CHANGE UP (ref 0–2)
KETONES UR-MCNC: NEGATIVE — SIGNIFICANT CHANGE UP
LEUKOCYTE ESTERASE UR-ACNC: NEGATIVE — SIGNIFICANT CHANGE UP
MAGNESIUM SERPL-MCNC: 2.4 MG/DL — SIGNIFICANT CHANGE UP (ref 1.6–2.6)
MCHC RBC-ENTMCNC: 29.4 PG — SIGNIFICANT CHANGE UP (ref 27–34)
MCHC RBC-ENTMCNC: 31.9 GM/DL — LOW (ref 32–36)
MCV RBC AUTO: 92 FL — SIGNIFICANT CHANGE UP (ref 80–100)
NITRITE UR-MCNC: NEGATIVE — SIGNIFICANT CHANGE UP
NRBC # BLD: 0 /100 WBCS — SIGNIFICANT CHANGE UP (ref 0–0)
PH UR: 5.5 — SIGNIFICANT CHANGE UP (ref 5–8)
PHOSPHATE SERPL-MCNC: 2.7 MG/DL — SIGNIFICANT CHANGE UP (ref 2.5–4.5)
PLATELET # BLD AUTO: 264 K/UL — SIGNIFICANT CHANGE UP (ref 150–400)
POTASSIUM SERPL-MCNC: 4.1 MMOL/L — SIGNIFICANT CHANGE UP (ref 3.5–5.3)
POTASSIUM SERPL-SCNC: 4.1 MMOL/L — SIGNIFICANT CHANGE UP (ref 3.5–5.3)
PROT UR-MCNC: ABNORMAL
RBC # BLD: 2.86 M/UL — LOW (ref 4.2–5.8)
RBC # FLD: 13.6 % — SIGNIFICANT CHANGE UP (ref 10.3–14.5)
RBC CASTS # UR COMP ASSIST: 2 /HPF — SIGNIFICANT CHANGE UP (ref 0–4)
SODIUM SERPL-SCNC: 139 MMOL/L — SIGNIFICANT CHANGE UP (ref 135–145)
SP GR SPEC: 1.02 — SIGNIFICANT CHANGE UP (ref 1.01–1.02)
UROBILINOGEN FLD QL: NEGATIVE — SIGNIFICANT CHANGE UP
WBC # BLD: 11.08 K/UL — HIGH (ref 3.8–10.5)
WBC # FLD AUTO: 11.08 K/UL — HIGH (ref 3.8–10.5)
WBC UR QL: 1 /HPF — SIGNIFICANT CHANGE UP (ref 0–5)

## 2023-07-23 RX ORDER — SODIUM,POTASSIUM PHOSPHATES 278-250MG
1 POWDER IN PACKET (EA) ORAL ONCE
Refills: 0 | Status: COMPLETED | OUTPATIENT
Start: 2023-07-23 | End: 2023-07-23

## 2023-07-23 RX ORDER — SODIUM,POTASSIUM PHOSPHATES 278-250MG
1 POWDER IN PACKET (EA) ORAL ONCE
Refills: 0 | Status: DISCONTINUED | OUTPATIENT
Start: 2023-07-23 | End: 2023-07-23

## 2023-07-23 RX ADMIN — Medication 975 MILLIGRAM(S): at 18:12

## 2023-07-23 RX ADMIN — GABAPENTIN 300 MILLIGRAM(S): 400 CAPSULE ORAL at 22:39

## 2023-07-23 RX ADMIN — Medication 4 UNIT(S): at 10:42

## 2023-07-23 RX ADMIN — Medication 2: at 10:42

## 2023-07-23 RX ADMIN — AMLODIPINE BESYLATE 2.5 MILLIGRAM(S): 2.5 TABLET ORAL at 05:38

## 2023-07-23 RX ADMIN — Medication 4: at 18:13

## 2023-07-23 RX ADMIN — GABAPENTIN 300 MILLIGRAM(S): 400 CAPSULE ORAL at 13:10

## 2023-07-23 RX ADMIN — Medication 975 MILLIGRAM(S): at 23:40

## 2023-07-23 RX ADMIN — Medication 81 MILLIGRAM(S): at 13:10

## 2023-07-23 RX ADMIN — INSULIN GLARGINE 14 UNIT(S): 100 INJECTION, SOLUTION SUBCUTANEOUS at 22:39

## 2023-07-23 RX ADMIN — Medication 975 MILLIGRAM(S): at 13:11

## 2023-07-23 RX ADMIN — Medication 2: at 22:38

## 2023-07-23 RX ADMIN — Medication 975 MILLIGRAM(S): at 05:38

## 2023-07-23 RX ADMIN — Medication 1 TABLET(S): at 13:11

## 2023-07-23 RX ADMIN — Medication 4 UNIT(S): at 18:12

## 2023-07-23 RX ADMIN — Medication 1 TABLET(S): at 18:12

## 2023-07-23 RX ADMIN — Medication 975 MILLIGRAM(S): at 06:38

## 2023-07-23 RX ADMIN — GABAPENTIN 300 MILLIGRAM(S): 400 CAPSULE ORAL at 05:38

## 2023-07-23 RX ADMIN — LOSARTAN POTASSIUM 25 MILLIGRAM(S): 100 TABLET, FILM COATED ORAL at 05:38

## 2023-07-23 RX ADMIN — Medication 1 PACKET(S): at 18:12

## 2023-07-23 RX ADMIN — Medication 4 UNIT(S): at 13:12

## 2023-07-23 RX ADMIN — ATORVASTATIN CALCIUM 40 MILLIGRAM(S): 80 TABLET, FILM COATED ORAL at 22:38

## 2023-07-23 RX ADMIN — ENOXAPARIN SODIUM 40 MILLIGRAM(S): 100 INJECTION SUBCUTANEOUS at 18:12

## 2023-07-23 RX ADMIN — Medication 975 MILLIGRAM(S): at 00:47

## 2023-07-23 RX ADMIN — Medication 1 TABLET(S): at 05:38

## 2023-07-23 RX ADMIN — Medication 500 MILLIGRAM(S): at 13:10

## 2023-07-23 RX ADMIN — Medication 6: at 13:13

## 2023-07-23 NOTE — PROVIDER CONTACT NOTE (CRITICAL VALUE NOTIFICATION) - ASSESSMENT
Lo Sanderson from lab with critical lab value of HGB 6.8. HCT 21.4. Upon assessment, pt is asymptomatic, no visible bleeding noted. MD Trupti at bedside to assess R-bka site for bleeding.

## 2023-07-23 NOTE — PROGRESS NOTE ADULT - SUBJECTIVE AND OBJECTIVE BOX
Name of Patient : ALEE DUNN  MRN: [id number]  Date of visit: 23       Subjective: Patient seen and examined. No new events except as noted.     REVIEW OF SYSTEMS:    CONSTITUTIONAL: No weakness, fevers or chills  EYES/ENT: No visual changes;  No vertigo or throat pain   NECK: No pain or stiffness  RESPIRATORY: No cough, wheezing, hemoptysis; No shortness of breath  CARDIOVASCULAR: No chest pain or palpitations  GASTROINTESTINAL: No abdominal or epigastric pain. No nausea, vomiting, or hematemesis; No diarrhea or constipation. No melena or hematochezia.  GENITOURINARY: No dysuria, frequency or hematuria  NEUROLOGICAL: No numbness or weakness  SKIN: No itching, burning, rashes, or lesions   All other review of systems is negative unless indicated above.    MEDICATIONS:  MEDICATIONS  (STANDING):  acetaminophen     Tablet .. 975 milliGRAM(s) Oral every 6 hours  amLODIPine   Tablet 2.5 milliGRAM(s) Oral daily  amoxicillin  875 milliGRAM(s)/clavulanate 1 Tablet(s) Oral two times a day  ascorbic acid 500 milliGRAM(s) Oral daily  aspirin enteric coated 81 milliGRAM(s) Oral daily  atorvastatin 40 milliGRAM(s) Oral at bedtime  dextrose 5%. 1000 milliLiter(s) (100 mL/Hr) IV Continuous <Continuous>  dextrose 5%. 1000 milliLiter(s) (50 mL/Hr) IV Continuous <Continuous>  dextrose 50% Injectable 25 Gram(s) IV Push once  dextrose 50% Injectable 12.5 Gram(s) IV Push once  dextrose 50% Injectable 25 Gram(s) IV Push once  enoxaparin Injectable 40 milliGRAM(s) SubCutaneous every 24 hours  gabapentin 300 milliGRAM(s) Oral every 8 hours  glucagon  Injectable 1 milliGRAM(s) IntraMuscular once  insulin glargine Injectable (LANTUS) 14 Unit(s) SubCutaneous at bedtime  insulin lispro (ADMELOG) corrective regimen sliding scale   SubCutaneous three times a day before meals  insulin lispro (ADMELOG) corrective regimen sliding scale   SubCutaneous at bedtime  insulin lispro Injectable (ADMELOG) 4 Unit(s) SubCutaneous three times a day before meals  losartan 25 milliGRAM(s) Oral daily  multivitamin/minerals 1 Tablet(s) Oral daily      PHYSICAL EXAM:  T(C): 37.1 (23 @ 17:34), Max: 38.6 (23 @ 22:20)  HR: 86 (23 @ 17:34) (77 - 94)  BP: 160/66 (23 @ 17:34) (111/63 - 160/66)  RR: 18 (23 @ 17:34) (18 - 18)  SpO2: 99% (23 @ 17:34) (94% - 99%)  Wt(kg): --  I&O's Summary    2023 07:  -  2023 07:00  --------------------------------------------------------  IN: 1200 mL / OUT: 400 mL / NET: 800 mL    2023 07:  -  2023 21:15  --------------------------------------------------------  IN: 840 mL / OUT: 950 mL / NET: -110 mL          Appearance: Normal	  HEENT:  PERRLA   Lymphatic: No lymphadenopathy   Cardiovascular: Normal S1 S2, no JVD  Respiratory: normal effort , clear  Gastrointestinal:  Soft, Non-tender  Skin: No rashes,  warm to touch  Psychiatry:  Mood & affect appropriate  Musculuskeletal: No edema    recent labs, Imaging and EKGs personally reviewed     23 @ 07:  -  23 @ 07:00  --------------------------------------------------------  IN: 1200 mL / OUT: 400 mL / NET: 800 mL    23 @ 07:  -  23 @ 21:15  --------------------------------------------------------  IN: 840 mL / OUT: 950 mL / NET: -110 mL                          8.4    11.08 )-----------( 264      ( 2023 08:24 )             26.3               0723    139  |  106  |  23  ----------------------------<  99  4.1   |  21<L>  |  0.98    Ca    8.5      2023 08:18  Phos  2.7       Mg     2.4                              Urinalysis Basic - ( 2023 14:04 )    Color: Light Yellow / Appearance: Clear / S.018 / pH: x  Gluc: x / Ketone: Negative  / Bili: Negative / Urobili: Negative   Blood: x / Protein: 30 mg/dL / Nitrite: Negative   Leuk Esterase: Negative / RBC: 2 /hpf / WBC 1 /HPF   Sq Epi: x / Non Sq Epi: x / Bacteria: Negative               Protocol failed.

## 2023-07-23 NOTE — PROVIDER CONTACT NOTE (CRITICAL VALUE NOTIFICATION) - ACTION/TREATMENT ORDERED:
MD notified, 1 unit of PRBC ordered as per MD. MD at bedside for assessment of patient and surgical incision site.

## 2023-07-23 NOTE — PROGRESS NOTE ADULT - ASSESSMENT
Patient is a 72 year old male with a PMHx of Type II DM associated with neuropathy, HTN who was recently admitted to [facility] with Right 1st toe infection, S/P Right LE SFA to PT bypass graft with PTFE followed by Right foot Partial hallux Amputation on 04/14, and RLE angiogram 07/03 with rotational atherectomy of proximal portion of the graft in the SFA, following by MIKE that improved flow, however a proximal lesion was demonstrated at that time that was not amenable to endovascular procedures. Patient presents to [facility] due to nonhealing Right foot wound. Internal medicine has been consulted on Mr. Paredes's care for medical management. Patient denies chest pain, palpitations, SOB or dyspnea.       Peripheral Arterial Disease   - S/P R LE SFA to PT bypass graft, RLE angiogram 07/03  - 07/08 Arterial Duplex negative for pseudoaneurysm   - On ASA 81 and Lipitor 40   - US as noted  - Care per vascular surgery appreciated     H/O R Toe infection   - S/P Great toe amputation   - Foot Xray with Mild erosive changes concerning for OM  - MR without evidence of OM, negative for abscess. F/u podiatry recs --> S/P BKA, Planned for formalization 7/21 with Vasc  - ABX as per ID, now on Augmentin PO as per ID   - UCx and BCx2 w/ Neg  final   - Pain control   - Incentive spirometer   - Care per Vascular/ Podiatry appreciated   - Monitor CBC, temp curve, VS and patient closely     Febrile  - Pt febrile while on Cefepime and Flagyl  - 7/10 BCx2 w/ Neg  final   - Repeat BCx2 7/12 Neg; F/u final  - F/u with ID  --> ABX switched to Meropenem, now on PO Augmentin   - Monitor CBC, temp curve, VS and patient closely; Antipyretics PRN     Type II DM   - A1C 7.9   - C/w sliding scale  - C/w Lantus, increased to 14 units QHs and Pre-Meal 4 units TID; adjust as tolerated   - Diabetic/ DASH Diet   - Monitor glucose levels closely     Anemia   - Monitor Hgb closely   - Iron and Ferritin, noted, not ferritin deficient    - Transfuse for Hgb < 7.0     HTN   - C/w Norvasc, Losartan 25 --> BP Uncontrolled. Norvasc increased to 5. Monitor and adjust as tolerated.   - Monitor BP, VS and patient closely     Pre-Op risk stratification   - Previous TTE with EF of 61%, Mild LVH, normal RV  - Patient is medically optimized for OR. Moderate / High Risk candidate for formalization of BKA    DVT and GI PPX

## 2023-07-23 NOTE — PROGRESS NOTE ADULT - SUBJECTIVE AND OBJECTIVE BOX
DATE OF SERVICE: 07-23-23 @ 08:15    Patient is a 72y old  Male who presents with a chief complaint of foot infection (20 Jul 2023 14:44)      INTERVAL HISTORY: Feels ok.     REVIEW OF SYSTEMS:  CONSTITUTIONAL: No weakness  EYES/ENT: No visual changes;  No throat pain   NECK: No pain or stiffness  RESPIRATORY: No cough, wheezing; No shortness of breath  CARDIOVASCULAR: No chest pain or palpitations  GASTROINTESTINAL: No abdominal  pain. No nausea, vomiting, or hematemesis  GENITOURINARY: No dysuria, frequency or hematuria  NEUROLOGICAL: No stroke like symptoms  SKIN: No rashes    	  MEDICATIONS:  amLODIPine   Tablet 2.5 milliGRAM(s) Oral daily  losartan 25 milliGRAM(s) Oral daily        PHYSICAL EXAM:  T(C): 36.9 (07-23-23 @ 05:20), Max: 38.7 (07-22-23 @ 16:33)  HR: 77 (07-23-23 @ 05:20) (77 - 100)  BP: 138/73 (07-23-23 @ 05:20) (93/56 - 146/68)  RR: 18 (07-23-23 @ 05:20) (18 - 18)  SpO2: 97% (07-23-23 @ 05:20) (94% - 97%)  Wt(kg): --  I&O's Summary    22 Jul 2023 07:01  -  23 Jul 2023 07:00  --------------------------------------------------------  IN: 1080 mL / OUT: 400 mL / NET: 680 mL          Appearance: In no distress	  HEENT:    PERRL, EOMI	  Cardiovascular:  S1 S2, No JVD  Respiratory: Lungs clear to auscultation	  Gastrointestinal:  Soft, Non-tender, + BS	  Vascularature:  No edema of LE  Psychiatric: Appropriate affect   Neuro: no acute focal deficits                               6.8    9.59  )-----------( 263      ( 22 Jul 2023 22:50 )             21.4     07-22    134<L>  |  103  |  30<H>  ----------------------------<  163<H>  4.2   |  20<L>  |  1.27    Ca    8.3<L>      22 Jul 2023 22:50  Phos  3.6     07-22  Mg     2.3     07-22          Labs personally reviewed      ASSESSMENT/PLAN: 	      Patient is a 72 year old male with a PMHx of HTN, Type II DM associated with neuropathy whore presents to [facility] with a chief complaint of pain, blistering and fluid drainage on his right foot.    Problem/Plan -1  Problem: Cardiac Risk Stratification for podiatry surgery  - Denies CP or SOB  - TTE shows preserved EF, mild LVH  - Not in decompensated HF  - No tachy supa arrhythmia  - s/p cath with nonobstructive moderate CAD  - Elevated risk for low-mod risk pods surgery procedure, no contraindication to proceed   - s/p R BKA  7/13 tolerated surgery well  - s/p BKA formalization 7/21    Problem/Plan -2  Problem: Hypertension  - amlodipine  2.5mg PO daily  - c/w losartan to 25mg PO daily    Problem/Plan -3  Problem: DM II  - HgbA1c 8.4  - ISS as per primary team        Berenice Branch, RISHABH-NP   Matt Calhoun DO [facility]  Cardiovascular Medicine  [address] [place] [address], [address]  Available through call or text on [employer]  Office: [phone]

## 2023-07-23 NOTE — PROGRESS NOTE ADULT - ASSESSMENT
72-year-old male now POD2 s/p formalization of a right BKA on 7/21. No recurrent fevers since Tmax 101.6, possibly postoperative atelectasis given noninfectious appearing surgical site. Awaiting repeat CBC results s/p 1u transfusion.     Plan  - f/u fever workup results  - Trend CBC  - Monitor vitals  - ISS  - Dressing change tomorrow  - Keep right knee straight  - Regular diet  - Resume home meds  - Pain control  - Follow up PT  - AM labs, replete as needed  - ID consulted, appreciate recs, abx.

## 2023-07-23 NOTE — PROGRESS NOTE ADULT - SUBJECTIVE AND OBJECTIVE BOX
Vascular Surgery Progress Note    24hr events: Became febrile (Tmax 101.6) and tachy (100) overnight, Hgb 6.8 down from 7.9 on CBC, received 1u pRBC. Amputation dressing changed, surgical site stable C/D/I    Subjective: Patient resting in bed. States his pain is currently well-controlled and denies fevers. Tired but oriented.    Objective:  Vitals:  T(C): 36.9 (07-23-23 @ 05:20), Max: 38.7 (07-22-23 @ 16:33)  HR: 77 (07-23-23 @ 05:20) (77 - 100)  BP: 138/73 (07-23-23 @ 05:20) (93/56 - 146/68)  RR: 18 (07-23-23 @ 05:20) (18 - 18)  SpO2: 97% (07-23-23 @ 05:20) (94% - 97%)  Wt(kg): --    07-22 @ 07:01  -  07-23 @ 07:00  --------------------------------------------------------  IN:    Oral Fluid: 1200 mL  Total IN: 1200 mL    OUT:    Voided (mL): 400 mL  Total OUT: 400 mL    Total NET: 800 mL          Physical Exam:  General: WN/WD NAD  Neurology: A&Ox3, nonfocal, follows commands  Respiratory: nonlabored breathing on room air  CV: hemodynamically stable, appears well-perfused  Extremities: R BKA dressing C/D/I. No tenderness or fullness appreciated at stump. Extremities grossly well-perfused      Labs:                        8.4    11.08 )-----------( 264      ( 23 Jul 2023 08:24 )             26.3     07-23    139  |  106  |  23  ----------------------------<  99  4.1   |  21<L>  |  0.98    Ca    8.5      23 Jul 2023 08:18  Phos  2.7     07-23  Mg     2.4     07-23        Urinalysis Basic - ( 23 Jul 2023 08:18 )    Color: x / Appearance: x / SG: x / pH: x  Gluc: 99 mg/dL / Ketone: x  / Bili: x / Urobili: x   Blood: x / Protein: x / Nitrite: x   Leuk Esterase: x / RBC: x / WBC x   Sq Epi: x / Non Sq Epi: x / Bacteria: x

## 2023-07-24 LAB
ANION GAP SERPL CALC-SCNC: 11 MMOL/L — SIGNIFICANT CHANGE UP (ref 5–17)
BUN SERPL-MCNC: 19 MG/DL — SIGNIFICANT CHANGE UP (ref 7–23)
CALCIUM SERPL-MCNC: 9.1 MG/DL — SIGNIFICANT CHANGE UP (ref 8.4–10.5)
CHLORIDE SERPL-SCNC: 99 MMOL/L — SIGNIFICANT CHANGE UP (ref 96–108)
CO2 SERPL-SCNC: 22 MMOL/L — SIGNIFICANT CHANGE UP (ref 22–31)
CREAT SERPL-MCNC: 0.94 MG/DL — SIGNIFICANT CHANGE UP (ref 0.5–1.3)
EGFR: 86 ML/MIN/1.73M2 — SIGNIFICANT CHANGE UP
GLUCOSE BLDC GLUCOMTR-MCNC: 203 MG/DL — HIGH (ref 70–99)
GLUCOSE BLDC GLUCOMTR-MCNC: 222 MG/DL — HIGH (ref 70–99)
GLUCOSE BLDC GLUCOMTR-MCNC: 286 MG/DL — HIGH (ref 70–99)
GLUCOSE BLDC GLUCOMTR-MCNC: 347 MG/DL — HIGH (ref 70–99)
GLUCOSE SERPL-MCNC: 183 MG/DL — HIGH (ref 70–99)
HCT VFR BLD CALC: 27.4 % — LOW (ref 39–50)
HGB BLD-MCNC: 8.7 G/DL — LOW (ref 13–17)
MAGNESIUM SERPL-MCNC: 2.2 MG/DL — SIGNIFICANT CHANGE UP (ref 1.6–2.6)
MCHC RBC-ENTMCNC: 28.9 PG — SIGNIFICANT CHANGE UP (ref 27–34)
MCHC RBC-ENTMCNC: 31.8 GM/DL — LOW (ref 32–36)
MCV RBC AUTO: 91 FL — SIGNIFICANT CHANGE UP (ref 80–100)
NRBC # BLD: 0 /100 WBCS — SIGNIFICANT CHANGE UP (ref 0–0)
OSMOLALITY UR: 486 MOS/KG — SIGNIFICANT CHANGE UP (ref 300–900)
PHOSPHATE SERPL-MCNC: 2.3 MG/DL — LOW (ref 2.5–4.5)
PLATELET # BLD AUTO: 269 K/UL — SIGNIFICANT CHANGE UP (ref 150–400)
POTASSIUM SERPL-MCNC: 4.3 MMOL/L — SIGNIFICANT CHANGE UP (ref 3.5–5.3)
POTASSIUM SERPL-SCNC: 4.3 MMOL/L — SIGNIFICANT CHANGE UP (ref 3.5–5.3)
RBC # BLD: 3.01 M/UL — LOW (ref 4.2–5.8)
RBC # FLD: 13.5 % — SIGNIFICANT CHANGE UP (ref 10.3–14.5)
SODIUM SERPL-SCNC: 132 MMOL/L — LOW (ref 135–145)
SODIUM UR-SCNC: 35 MMOL/L — SIGNIFICANT CHANGE UP
SURGICAL PATHOLOGY STUDY: SIGNIFICANT CHANGE UP
WBC # BLD: 10.95 K/UL — HIGH (ref 3.8–10.5)
WBC # FLD AUTO: 10.95 K/UL — HIGH (ref 3.8–10.5)

## 2023-07-24 RX ORDER — SENNA PLUS 8.6 MG/1
2 TABLET ORAL AT BEDTIME
Refills: 0 | Status: DISCONTINUED | OUTPATIENT
Start: 2023-07-24 | End: 2023-07-27

## 2023-07-24 RX ORDER — INSULIN GLARGINE 100 [IU]/ML
16 INJECTION, SOLUTION SUBCUTANEOUS AT BEDTIME
Refills: 0 | Status: DISCONTINUED | OUTPATIENT
Start: 2023-07-24 | End: 2023-07-25

## 2023-07-24 RX ORDER — POLYETHYLENE GLYCOL 3350 17 G/17G
17 POWDER, FOR SOLUTION ORAL DAILY
Refills: 0 | Status: DISCONTINUED | OUTPATIENT
Start: 2023-07-24 | End: 2023-07-27

## 2023-07-24 RX ORDER — INSULIN LISPRO 100/ML
6 VIAL (ML) SUBCUTANEOUS
Refills: 0 | Status: DISCONTINUED | OUTPATIENT
Start: 2023-07-24 | End: 2023-07-25

## 2023-07-24 RX ADMIN — Medication 975 MILLIGRAM(S): at 23:13

## 2023-07-24 RX ADMIN — POLYETHYLENE GLYCOL 3350 17 GRAM(S): 17 POWDER, FOR SOLUTION ORAL at 16:25

## 2023-07-24 RX ADMIN — Medication 1 TABLET(S): at 12:40

## 2023-07-24 RX ADMIN — Medication 975 MILLIGRAM(S): at 17:19

## 2023-07-24 RX ADMIN — Medication 975 MILLIGRAM(S): at 00:40

## 2023-07-24 RX ADMIN — Medication 975 MILLIGRAM(S): at 06:33

## 2023-07-24 RX ADMIN — ATORVASTATIN CALCIUM 40 MILLIGRAM(S): 80 TABLET, FILM COATED ORAL at 22:31

## 2023-07-24 RX ADMIN — GABAPENTIN 300 MILLIGRAM(S): 400 CAPSULE ORAL at 12:39

## 2023-07-24 RX ADMIN — GABAPENTIN 300 MILLIGRAM(S): 400 CAPSULE ORAL at 22:32

## 2023-07-24 RX ADMIN — Medication 4: at 09:30

## 2023-07-24 RX ADMIN — AMLODIPINE BESYLATE 2.5 MILLIGRAM(S): 2.5 TABLET ORAL at 06:34

## 2023-07-24 RX ADMIN — Medication 6 UNIT(S): at 18:31

## 2023-07-24 RX ADMIN — Medication 1 TABLET(S): at 06:33

## 2023-07-24 RX ADMIN — Medication 1 TABLET(S): at 17:19

## 2023-07-24 RX ADMIN — Medication 4: at 22:30

## 2023-07-24 RX ADMIN — OXYCODONE HYDROCHLORIDE 5 MILLIGRAM(S): 5 TABLET ORAL at 07:33

## 2023-07-24 RX ADMIN — Medication 4: at 18:31

## 2023-07-24 RX ADMIN — Medication 975 MILLIGRAM(S): at 12:39

## 2023-07-24 RX ADMIN — Medication 6 UNIT(S): at 13:09

## 2023-07-24 RX ADMIN — Medication 85 MILLIMOLE(S): at 12:39

## 2023-07-24 RX ADMIN — LOSARTAN POTASSIUM 25 MILLIGRAM(S): 100 TABLET, FILM COATED ORAL at 06:34

## 2023-07-24 RX ADMIN — ENOXAPARIN SODIUM 40 MILLIGRAM(S): 100 INJECTION SUBCUTANEOUS at 17:19

## 2023-07-24 RX ADMIN — INSULIN GLARGINE 16 UNIT(S): 100 INJECTION, SOLUTION SUBCUTANEOUS at 22:30

## 2023-07-24 RX ADMIN — GABAPENTIN 300 MILLIGRAM(S): 400 CAPSULE ORAL at 06:34

## 2023-07-24 RX ADMIN — Medication 975 MILLIGRAM(S): at 07:33

## 2023-07-24 RX ADMIN — Medication 500 MILLIGRAM(S): at 12:40

## 2023-07-24 RX ADMIN — Medication 81 MILLIGRAM(S): at 12:39

## 2023-07-24 RX ADMIN — Medication 6: at 13:11

## 2023-07-24 RX ADMIN — OXYCODONE HYDROCHLORIDE 5 MILLIGRAM(S): 5 TABLET ORAL at 06:33

## 2023-07-24 RX ADMIN — Medication 4 UNIT(S): at 09:29

## 2023-07-24 NOTE — PROGRESS NOTE ADULT - SUBJECTIVE AND OBJECTIVE BOX
SURGERY DAILY PROGRESS NOTE:         SUBJECTIVE/ROS: Patient seen and examined at bedside.  No events overnight.  Patient remains afebrile.  States pain controlled with analgesia.   Denies nausea, vomiting, chest pain, shortness of breath.  In good spirits.          MEDICATIONS  (STANDING):  acetaminophen     Tablet .. 975 milliGRAM(s) Oral every 6 hours  amLODIPine   Tablet 2.5 milliGRAM(s) Oral daily  amoxicillin  875 milliGRAM(s)/clavulanate 1 Tablet(s) Oral two times a day  ascorbic acid 500 milliGRAM(s) Oral daily  aspirin enteric coated 81 milliGRAM(s) Oral daily  atorvastatin 40 milliGRAM(s) Oral at bedtime  dextrose 5%. 1000 milliLiter(s) (50 mL/Hr) IV Continuous <Continuous>  dextrose 5%. 1000 milliLiter(s) (100 mL/Hr) IV Continuous <Continuous>  dextrose 50% Injectable 25 Gram(s) IV Push once  dextrose 50% Injectable 12.5 Gram(s) IV Push once  dextrose 50% Injectable 25 Gram(s) IV Push once  enoxaparin Injectable 40 milliGRAM(s) SubCutaneous every 24 hours  gabapentin 300 milliGRAM(s) Oral every 8 hours  glucagon  Injectable 1 milliGRAM(s) IntraMuscular once  insulin glargine Injectable (LANTUS) 14 Unit(s) SubCutaneous at bedtime  insulin lispro (ADMELOG) corrective regimen sliding scale   SubCutaneous three times a day before meals  insulin lispro (ADMELOG) corrective regimen sliding scale   SubCutaneous at bedtime  insulin lispro Injectable (ADMELOG) 4 Unit(s) SubCutaneous three times a day before meals  losartan 25 milliGRAM(s) Oral daily  multivitamin/minerals 1 Tablet(s) Oral daily    MEDICATIONS  (PRN):  dextrose Oral Gel 15 Gram(s) Oral once PRN Blood Glucose LESS THAN 70 milliGRAM(s)/deciliter  oxyCODONE    IR 5 milliGRAM(s) Oral every 4 hours PRN Moderate Pain (4 - 6)  oxyCODONE    IR 10 milliGRAM(s) Oral every 4 hours PRN Severe Pain (7 - 10)      OBJECTIVE:    Vital Signs Last 24 Hrs  T(C): 36.7 (2023 06:23), Max: 37.4 (2023 13:05)  T(F): 98.1 (2023 06:23), Max: 99.3 (2023 13:05)  HR: 86 (2023 06:23) (73 - 88)  BP: 168/68 (2023 06:23) (146/63 - 170/70)  BP(mean): --  RR: 18 (2023 06:23) (18 - 18)  SpO2: 98% (2023 06:23) (97% - 99%)    Parameters below as of 2023 06:23  Patient On (Oxygen Delivery Method): room air            I&O's Detail    2023 07:01  -  2023 07:00  --------------------------------------------------------  IN:    Oral Fluid: 1080 mL  Total IN: 1080 mL    OUT:    Voided (mL): 2250 mL  Total OUT: 2250 mL    Total NET: -1170 mL        PHYSICAL EXAM:   General: laying in bed, NAD  Respiratory: nonlabored breathing  Extremity:  right lower extremity BKA, staples intact, no active bleeding/discharge; dressing changed with xerform, gauze, and tegaderms         LABS:                        8.4    11.08 )-----------( 264      ( 2023 08:24 )             26.3     07-23    139  |  106  |  23  ----------------------------<  99  4.1   |  21<L>  |  0.98    Ca    8.5      2023 08:18  Phos  2.7     07-23  Mg     2.4     07-23        Urinalysis Basic - ( 2023 14:04 )    Color: Light Yellow / Appearance: Clear / S.018 / pH: x  Gluc: x / Ketone: Negative  / Bili: Negative / Urobili: Negative   Blood: x / Protein: 30 mg/dL / Nitrite: Negative   Leuk Esterase: Negative / RBC: 2 /hpf / WBC 1 /HPF   Sq Epi: x / Non Sq Epi: x / Bacteria: Negative

## 2023-07-24 NOTE — PHYSICAL THERAPY INITIAL EVALUATION ADULT - PLANNED THERAPY INTERVENTIONS, PT EVAL
balance training/bed mobility training/gait training/strengthening/transfer training
balance training/bed mobility training/strengthening/transfer training
stair training: GOAL: Pt will negotiate up/down 2 steps with 1 handrail ascending independently in 2 weeks./balance training/gait training/strengthening

## 2023-07-24 NOTE — PHYSICAL THERAPY INITIAL EVALUATION ADULT - FUNCTIONAL LIMITATIONS, PT EVAL
self-care/home management/work
self-care/home management/community/leisure
self-care/home management

## 2023-07-24 NOTE — PHYSICAL THERAPY INITIAL EVALUATION ADULT - PATIENT/FAMILY AGREES WITH PLAN
Subacute Rehab; Pt with limitations in self-care & home management, will benefit from Sub acute rehab prior to D/C home to increase strength, balance and endurance to improve functional mobility to level necessary for safe return home/yes
yes

## 2023-07-24 NOTE — PHYSICAL THERAPY INITIAL EVALUATION ADULT - NSPTDISCHREC_GEN_A_CORE
Subacute Rehab; Pt with limitations in self-care & home management, will benefit from Sub acute rehab prior to D/C home to increase strength, balance and endurance to improve functional mobility to level necessary for safe return home/Sub-acute Rehab
Home PT
TBD pending OR

## 2023-07-24 NOTE — PROGRESS NOTE ADULT - SUBJECTIVE AND OBJECTIVE BOX
Name of Patient : ALEE DUNN  MRN: [id number]  Date of visit: 07-24-23 @ 12:39      Subjective: Patient seen and examined. No new events except as noted.   Patient seen earlier this AM. Sitting OOB TC. Eating breakfast.  Reports that he had some pain in his RLE surgical site this morning that has subsided.   S/P 1 unit of PRBCs 7/23  Afebrile. Denies chills.     REVIEW OF SYSTEMS:    CONSTITUTIONAL: Generalized weakness; Afebrile   EYES/ENT: No visual changes;  No vertigo or throat pain   NECK: No pain or stiffness  RESPIRATORY: No cough, wheezing, hemoptysis; No shortness of breath  CARDIOVASCULAR: No chest pain or palpitations  GASTROINTESTINAL: No abdominal or epigastric pain. No nausea, vomiting, or hematemesis; No diarrhea or constipation. No melena or hematochezia.  GENITOURINARY: No dysuria, frequency or hematuria  NEUROLOGICAL: No numbness or weakness  SKIN: No itching, burning, rashes, or lesions   All other review of systems is negative unless indicated above.    MEDICATIONS:  MEDICATIONS  (STANDING):  acetaminophen     Tablet .. 975 milliGRAM(s) Oral every 6 hours  amLODIPine   Tablet 2.5 milliGRAM(s) Oral daily  amoxicillin  875 milliGRAM(s)/clavulanate 1 Tablet(s) Oral two times a day  ascorbic acid 500 milliGRAM(s) Oral daily  aspirin enteric coated 81 milliGRAM(s) Oral daily  atorvastatin 40 milliGRAM(s) Oral at bedtime  dextrose 5%. 1000 milliLiter(s) (50 mL/Hr) IV Continuous <Continuous>  dextrose 5%. 1000 milliLiter(s) (100 mL/Hr) IV Continuous <Continuous>  dextrose 50% Injectable 25 Gram(s) IV Push once  dextrose 50% Injectable 12.5 Gram(s) IV Push once  dextrose 50% Injectable 25 Gram(s) IV Push once  enoxaparin Injectable 40 milliGRAM(s) SubCutaneous every 24 hours  gabapentin 300 milliGRAM(s) Oral every 8 hours  glucagon  Injectable 1 milliGRAM(s) IntraMuscular once  insulin glargine Injectable (LANTUS) 14 Unit(s) SubCutaneous at bedtime  insulin lispro (ADMELOG) corrective regimen sliding scale   SubCutaneous three times a day before meals  insulin lispro (ADMELOG) corrective regimen sliding scale   SubCutaneous at bedtime  insulin lispro Injectable (ADMELOG) 4 Unit(s) SubCutaneous three times a day before meals  losartan 25 milliGRAM(s) Oral daily  multivitamin/minerals 1 Tablet(s) Oral daily  sodium phosphate 30 milliMole(s)/500 mL IVPB 30 milliMole(s) IV Intermittent once      PHYSICAL EXAM:  T(C): 36.9 (07-24-23 @ 08:46), Max: 37.4 (07-23-23 @ 13:05)  HR: 88 (07-24-23 @ 08:46) (73 - 88)  BP: 111/75 (07-24-23 @ 08:46) (111/75 - 170/70)  RR: 18 (07-24-23 @ 08:46) (18 - 18)  SpO2: 98% (07-24-23 @ 08:46) (97% - 99%)  Wt(kg): --  I&O's Summary    23 Jul 2023 07:01  -  24 Jul 2023 07:00  --------------------------------------------------------  IN: 1080 mL / OUT: 2250 mL / NET: -1170 mL    24 Jul 2023 07:01  -  24 Jul 2023 12:39  --------------------------------------------------------  IN: 240 mL / OUT: 200 mL / NET: 40 mL          Appearance: Normal; OOB TC	  HEENT: Eyes are open   Lymphatic: No lymphadenopathy   Cardiovascular: Normal S1 S2   Respiratory: normal effort , clear  Gastrointestinal:  Soft, Non-tender  Skin: No rashes,  warm to touch  Psychiatry:  Mood & affect appropriate  Musculoskeletal: JENN MCKEON; Dressing in place           07-23-23 @ 07:01  -  07-24-23 @ 07:00  --------------------------------------------------------  IN: 1080 mL / OUT: 2250 mL / NET: -1170 mL    07-24-23 @ 07:01  -  07-24-23 @ 12:39  --------------------------------------------------------  IN: 240 mL / OUT: 200 mL / NET: 40 mL                              8.7    10.95 )-----------( 269      ( 24 Jul 2023 07:51 )             27.4               07-24    132<L>  |  99  |  19  ----------------------------<  183<H>  4.3   |  22  |  0.94    Ca    9.1      24 Jul 2023 07:51  Phos  2.3     07-24  Mg     2.2     07-24                         Urinalysis Basic - ( 24 Jul 2023 07:51 )    Color: x / Appearance: x / SG: x / pH: x  Gluc: 183 mg/dL / Ketone: x  / Bili: x / Urobili: x   Blood: x / Protein: x / Nitrite: x   Leuk Esterase: x / RBC: x / WBC x   Sq Epi: x / Non Sq Epi: x / Bacteria: x          Culture - Blood (07.22.23 @ 22:25)   Specimen Source: .Blood Blood-Peripheral  Culture Results: No growth at 24 hours    Culture - Blood (07.22.23 @ 22:20)   Specimen Source: .Blood Blood-Peripheral  Culture Results: No growth at 24 hours

## 2023-07-24 NOTE — PHYSICAL THERAPY INITIAL EVALUATION ADULT - PERTINENT HX OF CURRENT PROBLEM, REHAB EVAL
72M with h/o T2DM (A1c=8.4) with neuropathy, HTN recently admitted 3/30 with R 1st toe infection, s/p 4/10 s/p RLE SFA to PT bypass graft with PTFE followed by R foot Partial hallux Amputation 4/14, and RLE angiogram 7/3 with rotational athrectomy of proximal portion of the graft in the SFA, following by MIKE that improved flow, however a proximal lesion was demonstrated at that time that was not amenable to endovascular procedures. He now presents with nonhealing R foot wound. Denies fevers and chills.
72M with h/o T2DM (A1c=8.4) with neuropathy, HTN recently admitted 3/30 with R 1st toe infection, s/p 4/10 s/p RLE SFA to PT bypass graft with PTFE followed by R foot Partial hallux Amputation 4/14, and RLE angiogram 7/3 with rotational athrectomy of proximal portion of the graft in the SFA, following by MIKE that improved flow, however a proximal lesion was demonstrated at that time that was not amenable to endovascular procedures. He now presents with nonhealing R foot wound. Denies fevers and chills. S/p R BKA guillotine (7/13), s/p formalization of right BKA (7/21)
72M with h/o T2DM (A1c=8.4) with neuropathy, HTN recently admitted 3/30 with R 1st toe infection, s/p 4/10 s/p RLE SFA to PT bypass graft with PTFE followed by R foot Partial hallux Amputation 4/14, and RLE angiogram 7/3 with rotational athrectomy of proximal portion of the graft in the SFA, following by MIKE that improved flow, however a proximal lesion was demonstrated at that time that was not amenable to endovascular procedures, now presenting with nonhealing R hallux wound. Now s/p R LINDA avendano (7/13).

## 2023-07-24 NOTE — PHYSICAL THERAPY INITIAL EVALUATION ADULT - MANUAL MUSCLE TESTING RESULTS, REHAB EVAL
Strength is at least 3/5 through extremities/grossly assessed due to
grossly 3/5 b/l UE and LE extremities/grossly assessed due to
no strength deficits were identified

## 2023-07-24 NOTE — PHYSICAL THERAPY INITIAL EVALUATION ADULT - STRENGTHENING, PT EVAL
GOAL: Pt will improve b/l  (UE/LE) strength by at least 1/2 MMT grade within 2-3 weeks to assist with performing functional mobility and ADLs.
GOAL: Patient will demonstrate a 1/3 increase in strength where deficient to assist with performing functional mobility and ADLs in 2 weeks.
GOAL: Patient will demonstrate a 1/3 increase in strength where deficient within 2-3 weeks to assist with performing functional mobility and ADLs.

## 2023-07-24 NOTE — PHYSICAL THERAPY INITIAL EVALUATION ADULT - BALANCE TRAINING, PT EVAL
GOAL: Pt will improve  balance during (static/dynamic) (standing) activities by at least 1 balance grade within 3-4 weeks to assist with greater independence during functional mobility and ADL's.
GOAL: Patient will demonstrate an increase in static/dynamic balance in sitting/standing where deficient by at least 1 grade within 2-3 weeks to facilitate greater independence during functional mobility and ADL's.
GOAL: Patient will demonstrate an increase in static/dynamic balance in sitting/standing where deficient by at least 1 grade to facilitate greater independence during functional mobility and ADL's in 2 weeks.

## 2023-07-24 NOTE — PHYSICAL THERAPY INITIAL EVALUATION ADULT - DIAGNOSIS, PT EVAL
Impaired functional mobility.
Decreased functional mobility/capacity secondary to impairments listed below
Impaired functional mobility

## 2023-07-24 NOTE — PROGRESS NOTE ADULT - ASSESSMENT
Patient is a 72 year old male with a PMHx of Type II DM associated with neuropathy, HTN who was recently admitted to [facility] with Right 1st toe infection, S/P Right LE SFA to PT bypass graft with PTFE followed by Right foot Partial hallux Amputation on 04/14, and RLE angiogram 07/03 with rotational atherectomy of proximal portion of the graft in the SFA, following by MIKE that improved flow, however a proximal lesion was demonstrated at that time that was not amenable to endovascular procedures. Patient presents to [facility] due to nonhealing Right foot wound. Internal medicine has been consulted on Mr. Paredes's care for medical management. Patient denies chest pain, palpitations, SOB or dyspnea.       Peripheral Arterial Disease   - S/P R LE SFA to PT bypass graft, RLE angiogram 07/03  - 07/08 Arterial Duplex negative for pseudoaneurysm   - On ASA 81 and Lipitor 40   - US as noted  - Care per vascular surgery appreciated     H/O R Toe infection   - S/P Great toe amputation   - Foot Xray with Mild erosive changes concerning for OM  - MR without evidence of OM, negative for abscess. F/u podiatry recs --> S/P BKA, S/P formalization 7/21 with Vasc  - ABX as per ID, now on Augmentin PO as per ID   - UCx and BCx2 w/ Neg  final   - Pain control   - Incentive spirometer   - Care per Vascular/ Podiatry appreciated   - Monitor CBC, temp curve, VS and patient closely     Febrile  - Pt febrile while on Cefepime and Flagyl  - 7/10 BCx2 w/ Neg  final   - Repeat BCx2 7/12 Neg; F/u final  - F/u with ID  --> ABX switched to Meropenem, now on PO Augmentin   - Febrile 7/22. Repeat cultures with NGTD; F/u final   - On ABX as per ID, on Augmentin PO   - Monitor CBC, temp curve, VS and patient closely; Antipyretics PRN     Type II DM   - A1C 7.9   - C/w sliding scale  - C/w Lantus, increased to 16 units QHs and Pre-Meal 6 units TID; adjust as tolerated   - Diabetic/ DASH Diet   - Monitor glucose levels closely     Anemia   - Monitor Hgb closely   - Iron and Ferritin, noted, not ferritin deficient    - S/P 1 unit PRBC 7/23. Monitor H/H   - Transfuse for Hgb < 7.0     HTN   - C/w Norvasc 5, Losartan 25 -->If BP elevated, increase Norvasc to 10. Monitor and adjust as tolerated.   - Monitor BP, VS and patient closely     Pre-Op risk stratification   - Previous TTE with EF of 61%, Mild LVH, normal RV  - Patient is medically optimized for OR. Moderate / High Risk candidate for formalization of BKA    DVT and GI PPX       Discussed with Attending.    Patient is a 72 year old male with a PMHx of Type II DM associated with neuropathy, HTN who was recently admitted to [facility] with Right 1st toe infection, S/P Right LE SFA to PT bypass graft with PTFE followed by Right foot Partial hallux Amputation on 04/14, and RLE angiogram 07/03 with rotational atherectomy of proximal portion of the graft in the SFA, following by MIKE that improved flow, however a proximal lesion was demonstrated at that time that was not amenable to endovascular procedures. Patient presents to [facility] due to nonhealing Right foot wound. Internal medicine has been consulted on Mr. Paredes's care for medical management. Patient denies chest pain, palpitations, SOB or dyspnea.       Peripheral Arterial Disease   - S/P R LE SFA to PT bypass graft, RLE angiogram 07/03  - 07/08 Arterial Duplex negative for pseudoaneurysm   - On ASA 81 and Lipitor 40   - US as noted  - Care per vascular surgery appreciated     H/O R Toe infection   - S/P Great toe amputation   - Foot Xray with Mild erosive changes concerning for OM  - MR without evidence of OM, negative for abscess. F/u podiatry recs --> S/P BKA, S/P formalization 7/21 with Vasc  - ABX as per ID, now on Augmentin PO as per ID   - UCx and BCx2 w/ Neg  final   - Pain control   - Incentive spirometer   - Care per Vascular/ Podiatry appreciated   - Monitor CBC, temp curve, VS and patient closely     Febrile  - Pt febrile while on Cefepime and Flagyl  - 7/10 BCx2 w/ Neg  final   - Repeat BCx2 7/12 Neg; F/u final  - F/u with ID  --> ABX switched to Meropenem, now on PO Augmentin   - Febrile 7/22. Repeat cultures with NGTD; F/u final   - On ABX as per ID, on Augmentin PO   - Monitor CBC, temp curve, VS and patient closely; Antipyretics PRN     HypoNa  - Na of 132. Cont to monitor and trend  - Check Serum Na and osmolality to R/O SIADH   - Avoid overcorrection > 6-8 mEq in 24 hours    Type II DM   - A1C 7.9   - C/w sliding scale  - C/w Lantus, increased to 16 units QHs and Pre-Meal 6 units TID; adjust as tolerated   - Diabetic/ DASH Diet   - Monitor glucose levels closely     Anemia   - Monitor Hgb closely   - Iron and Ferritin, noted, not ferritin deficient    - S/P 1 unit PRBC 7/23. Monitor H/H   - Transfuse for Hgb < 7.0     HTN   - C/w Norvasc 5, Losartan 25 -->If BP elevated, increase Norvasc to 10. Monitor and adjust as tolerated.   - Monitor BP, VS and patient closely     Pre-Op risk stratification   - Previous TTE with EF of 61%, Mild LVH, normal RV  - Patient is medically optimized for OR. Moderate / High Risk candidate for formalization of BKA    DVT and GI PPX       Discussed with Attending.

## 2023-07-24 NOTE — PHYSICAL THERAPY INITIAL EVALUATION ADULT - IMPAIRMENTS FOUND, PT EVAL
gait, locomotion, and balance/muscle strength
aerobic capacity/endurance/gait, locomotion, and balance/muscle strength
aerobic capacity/endurance/gait, locomotion, and balance/muscle strength

## 2023-07-24 NOTE — PHYSICAL THERAPY INITIAL EVALUATION ADULT - GAIT DEVIATIONS NOTED, PT EVAL
decreased lara/decreased step length/decreased stride length
decreased lara
decreased weight-shifting ability

## 2023-07-24 NOTE — PHYSICAL THERAPY INITIAL EVALUATION ADULT - TRANSFER TRAINING, PT EVAL
GOAL: Pt will perform sit to/from stand ; bed/chair transfers min a  with RW within 2-3 weeks.
Goal: Pt will be able to transfer from sit<>stand w/ RW independently in 2-3 weeks

## 2023-07-24 NOTE — PHYSICAL THERAPY INITIAL EVALUATION ADULT - BED MOBILITY TRAINING, PT EVAL
GOAL: Patient will perform bed mobility independently in 2-3 weeks
GOAL: Pt will perform all bed mobility independently within 2-3 wks.

## 2023-07-24 NOTE — PHYSICAL THERAPY INITIAL EVALUATION ADULT - CRITERIA FOR SKILLED THERAPEUTIC INTERVENTIONS
impairments found
impairments found
impairments found/functional limitations in following categories/risk reduction/prevention/rehab potential/therapy frequency/predicted duration of therapy intervention/anticipated equipment needs at discharge/anticipated discharge recommendation

## 2023-07-24 NOTE — PHYSICAL THERAPY INITIAL EVALUATION ADULT - ADDITIONAL COMMENTS
Pt states that he lives in a pvt house with his family +2steps to enter and no steps once inside. Pt states that prior to admission he was independent with a quad cane primarily but also owns a rolling walker. Pt reports owning a shower chair as well.
Pt lives with his family in a pvt house w/ 2 steps to enter. Pt was independent w/ quad cane PTA. Pt was independent w/ ADL's. Pt has a shower chair and RW at home.
Pt states that he lives in a pvt house with his family +2steps to enter and no steps once inside. Pt states that prior to admission he was independent with a quad cane primarily but also owns a rolling walker. Pt reports owning a shower chair as well.

## 2023-07-24 NOTE — PROGRESS NOTE ADULT - ASSESSMENT
72M with h/o T2DM (A1c=8.4) with neuropathy, HTN recently admitted 3/30 with R 1st toe infection, s/p 4/10 s/p RLE SFA to PT bypass graft with PTFE followed by R foot Partial hallux Amputation 4/14, and RLE angiogram 7/3 with rotational athrectomy of proximal portion of the graft in the SFA, following by MIKE that improved flow, however a proximal lesion was demonstrated at that time that was not amenable to endovascular procedures, now admitted with nonhealing R hallux wound. S/p R BKA guillotine (7/13), s/p formalization of right BKA (7/21)      Plan  - remains afebrile; fever work up negative  - ID consulted, appreciate recs, abx.  - Monitor AM labs and replete prn   - Monitor vitals  - Incentive spirometer   - Keep right knee straight  - Regular diet  - home meds  - Pain control  - Pending PT re-evaluation  - dispo: pending PT evaluation      Vascular Surgery   [id number]

## 2023-07-24 NOTE — PHYSICAL THERAPY INITIAL EVALUATION ADULT - GENERAL OBSERVATIONS, REHAB EVAL
received supine in bed in NAD. R BK Stump wound c/d/i.
Pt rec'd seated in recliner NAD +UE IVL and RLE foot bandage.
Pt encountered in sup in NAD. VSS, A/Ox3, +KI, NWB RLE

## 2023-07-25 ENCOUNTER — TRANSCRIPTION ENCOUNTER (OUTPATIENT)
Age: 73
End: 2023-07-25

## 2023-07-25 LAB
ANION GAP SERPL CALC-SCNC: 12 MMOL/L — SIGNIFICANT CHANGE UP (ref 5–17)
BUN SERPL-MCNC: 22 MG/DL — SIGNIFICANT CHANGE UP (ref 7–23)
CALCIUM SERPL-MCNC: 8.2 MG/DL — LOW (ref 8.4–10.5)
CHLORIDE SERPL-SCNC: 102 MMOL/L — SIGNIFICANT CHANGE UP (ref 96–108)
CO2 SERPL-SCNC: 21 MMOL/L — LOW (ref 22–31)
CREAT SERPL-MCNC: 1.02 MG/DL — SIGNIFICANT CHANGE UP (ref 0.5–1.3)
EGFR: 78 ML/MIN/1.73M2 — SIGNIFICANT CHANGE UP
GLUCOSE BLDC GLUCOMTR-MCNC: 108 MG/DL — HIGH (ref 70–99)
GLUCOSE BLDC GLUCOMTR-MCNC: 156 MG/DL — HIGH (ref 70–99)
GLUCOSE BLDC GLUCOMTR-MCNC: 209 MG/DL — HIGH (ref 70–99)
GLUCOSE BLDC GLUCOMTR-MCNC: 232 MG/DL — HIGH (ref 70–99)
GLUCOSE SERPL-MCNC: 90 MG/DL — SIGNIFICANT CHANGE UP (ref 70–99)
HCT VFR BLD CALC: 29.5 % — LOW (ref 39–50)
HGB BLD-MCNC: 9.7 G/DL — LOW (ref 13–17)
MAGNESIUM SERPL-MCNC: 2.4 MG/DL — SIGNIFICANT CHANGE UP (ref 1.6–2.6)
MCHC RBC-ENTMCNC: 29.8 PG — SIGNIFICANT CHANGE UP (ref 27–34)
MCHC RBC-ENTMCNC: 32.9 GM/DL — SIGNIFICANT CHANGE UP (ref 32–36)
MCV RBC AUTO: 90.8 FL — SIGNIFICANT CHANGE UP (ref 80–100)
NRBC # BLD: 0 /100 WBCS — SIGNIFICANT CHANGE UP (ref 0–0)
OSMOLALITY SERPL: 284 MOSMOL/KG — SIGNIFICANT CHANGE UP (ref 280–301)
PHOSPHATE SERPL-MCNC: 3 MG/DL — SIGNIFICANT CHANGE UP (ref 2.5–4.5)
PLATELET # BLD AUTO: 372 K/UL — SIGNIFICANT CHANGE UP (ref 150–400)
POTASSIUM SERPL-MCNC: 4.2 MMOL/L — SIGNIFICANT CHANGE UP (ref 3.5–5.3)
POTASSIUM SERPL-SCNC: 4.2 MMOL/L — SIGNIFICANT CHANGE UP (ref 3.5–5.3)
RBC # BLD: 3.25 M/UL — LOW (ref 4.2–5.8)
RBC # FLD: 13.7 % — SIGNIFICANT CHANGE UP (ref 10.3–14.5)
SODIUM SERPL-SCNC: 135 MMOL/L — SIGNIFICANT CHANGE UP (ref 135–145)
WBC # BLD: 10.31 K/UL — SIGNIFICANT CHANGE UP (ref 3.8–10.5)
WBC # FLD AUTO: 10.31 K/UL — SIGNIFICANT CHANGE UP (ref 3.8–10.5)

## 2023-07-25 RX ORDER — INSULIN LISPRO 100/ML
12 VIAL (ML) SUBCUTANEOUS
Refills: 0 | Status: DISCONTINUED | OUTPATIENT
Start: 2023-07-25 | End: 2023-07-27

## 2023-07-25 RX ORDER — INSULIN GLARGINE 100 [IU]/ML
20 INJECTION, SOLUTION SUBCUTANEOUS AT BEDTIME
Refills: 0 | Status: DISCONTINUED | OUTPATIENT
Start: 2023-07-25 | End: 2023-07-25

## 2023-07-25 RX ORDER — INSULIN GLARGINE 100 [IU]/ML
18 INJECTION, SOLUTION SUBCUTANEOUS AT BEDTIME
Refills: 0 | Status: DISCONTINUED | OUTPATIENT
Start: 2023-07-25 | End: 2023-07-27

## 2023-07-25 RX ORDER — INSULIN LISPRO 100/ML
10 VIAL (ML) SUBCUTANEOUS
Refills: 0 | Status: DISCONTINUED | OUTPATIENT
Start: 2023-07-25 | End: 2023-07-25

## 2023-07-25 RX ADMIN — Medication 500 MILLIGRAM(S): at 14:27

## 2023-07-25 RX ADMIN — ENOXAPARIN SODIUM 40 MILLIGRAM(S): 100 INJECTION SUBCUTANEOUS at 18:40

## 2023-07-25 RX ADMIN — Medication 81 MILLIGRAM(S): at 14:27

## 2023-07-25 RX ADMIN — Medication 975 MILLIGRAM(S): at 23:16

## 2023-07-25 RX ADMIN — Medication 975 MILLIGRAM(S): at 05:56

## 2023-07-25 RX ADMIN — Medication 4: at 18:40

## 2023-07-25 RX ADMIN — Medication 975 MILLIGRAM(S): at 14:31

## 2023-07-25 RX ADMIN — Medication 1 TABLET(S): at 14:27

## 2023-07-25 RX ADMIN — Medication 975 MILLIGRAM(S): at 14:59

## 2023-07-25 RX ADMIN — Medication 4: at 14:25

## 2023-07-25 RX ADMIN — LOSARTAN POTASSIUM 25 MILLIGRAM(S): 100 TABLET, FILM COATED ORAL at 05:56

## 2023-07-25 RX ADMIN — GABAPENTIN 300 MILLIGRAM(S): 400 CAPSULE ORAL at 05:57

## 2023-07-25 RX ADMIN — GABAPENTIN 300 MILLIGRAM(S): 400 CAPSULE ORAL at 14:27

## 2023-07-25 RX ADMIN — Medication 1 TABLET(S): at 05:56

## 2023-07-25 RX ADMIN — ATORVASTATIN CALCIUM 40 MILLIGRAM(S): 80 TABLET, FILM COATED ORAL at 22:29

## 2023-07-25 RX ADMIN — Medication 12 UNIT(S): at 14:26

## 2023-07-25 RX ADMIN — GABAPENTIN 300 MILLIGRAM(S): 400 CAPSULE ORAL at 22:30

## 2023-07-25 RX ADMIN — INSULIN GLARGINE 18 UNIT(S): 100 INJECTION, SOLUTION SUBCUTANEOUS at 23:15

## 2023-07-25 RX ADMIN — Medication 10 UNIT(S): at 10:29

## 2023-07-25 RX ADMIN — AMLODIPINE BESYLATE 2.5 MILLIGRAM(S): 2.5 TABLET ORAL at 05:56

## 2023-07-25 RX ADMIN — Medication 12 UNIT(S): at 18:40

## 2023-07-25 RX ADMIN — Medication 2: at 10:29

## 2023-07-25 NOTE — PROGRESS NOTE ADULT - ATTENDING COMMENTS
PAD  CLTI of RLE  no revascularization options left  will discuss with podiatry
physical therapy  pain control  discharge planning
OR today for R BKA formalization  maintain NPO status
physical therapy  discharge planning  surgical site looks good

## 2023-07-25 NOTE — DISCHARGE NOTE PROVIDER - NSDCCPCAREPLAN_GEN_ALL_CORE_FT
PRINCIPAL DISCHARGE DIAGNOSIS  Diagnosis: Foot pain, right  Assessment and Plan of Treatment: WOUND CARE: Staples will be removed at follow up office visit.    BATHING: Please do not submerge wound underwater. You may shower and/or sponge bathe.  ACTIVITY: No heavy lifting anything more than 10-15lbs or straining. Otherwise, you may return to your usual level of physical activity. If you are taking narcotic pain medication (such as oxycodone), do NOT drive a car, operate machinery or make important decisions.  DIET: Consistent Carbs   NOTIFY YOUR SURGEON IF: You have any bleeding that does not stop, any pus draining from your wound, any fever (over 100.4 F) or chills, persistent nausea/vomiting with inability to tolerate food or liquids, persistent diarrhea, or if your pain is not controlled on your discharge pain medications.  FOLLOW-UP:  1. Please call to make a follow-up appointment within one week of discharge with Dr. Mullins  2. Please follow up with your primary care physician in one week regarding your hospitalization.       PRINCIPAL DISCHARGE DIAGNOSIS  Diagnosis: Foot pain, right  Assessment and Plan of Treatment: Recovering from a below the knee amputation  WOUND CARE: Staples will be removed at follow up office visit. Daily dressing change: gauze and tegaderm.    BATHING: Please do not submerge wound underwater. You may shower and/or sponge bathe.  ACTIVITY: No heavy lifting anything more than 10-15lbs or straining. Otherwise, you may return to your usual level of physical activity. If you are taking narcotic pain medication (such as oxycodone), do NOT drive a car, operate machinery or make important decisions.  DIET: Consistent Carbs   NOTIFY YOUR SURGEON IF: You have any bleeding that does not stop, any pus draining from your wound, any fever (over 100.4 F) or chills, persistent nausea/vomiting with inability to tolerate food or liquids, persistent diarrhea, or if your pain is not controlled on your discharge pain medications.  FOLLOW-UP:  1. Please call to make a follow-up appointment within one week of discharge with Dr. Mullins  2. Please follow up with your primary care physician in one week regarding your hospitalization.

## 2023-07-25 NOTE — DISCHARGE NOTE PROVIDER - NSCORESITESY/N_GEN_A_CORE_RD
HPI  Marce Gamboa is a 32 y.o.   25w1d here for Routine Prenatal Visit (NO C/O'S.)    Natans allergies, medications, history, and problem list were updated as appropriate.    ROS:  A comprehensive review of symptoms was completed and negative except as noted above.    Physical Exam:  /70   Pulse 72   Wt 103 kg (227 lb)   LMP 2022   BMI 39.23 kg/m²   Gen: No distress  Abdomen: Gravid, non-tender  Extremities: No edema  Fundal Height (cm): 26 cm  Fetal Heart Rate: 152  Movement: Present  Recent Results (from the past 24 hour(s))   POCT Urinalysis, Dipstick, Automated, W/O Scope    Collection Time: 23  9:55 AM   Result Value Ref Range    POC Blood, Urine Negative Negative    POC Bilirubin, Urine Negative Negative    POC Urobilinogen, Urine 0.2 0.1 - 1.1    POC Ketones, Urine Positive (A) Negative    POC Protein, Urine Negative Negative    POC Nitrates, Urine Negative Negative    POC Glucose, Urine Negative Negative    pH, UA 7.0     POC Specific Gravity, Urine 1.020 1.003 - 1.029    POC Leukocytes, Urine Negative Negative     Chaperone Present  ASSESSMENT/PLAN:  1. Encounter for supervision of other normal pregnancy in second trimester    2. 25 weeks gestation of pregnancy  -     POCT Urinalysis, Dipstick, Automated, W/O Scope    3. Pre-existing hypertension complicating pregnancy in second trimester    4. Obesity complicating pregnancy in second trimester    5. Anxiety in pregnancy in second trimester, antepartum    pt bs log better since she has been on diet.  Labor unit, Kick Counts, and Pre-eclampsia given  Chaperone Present  Follow Up:  Follow up in about 4 weeks (around 2023) for OB VS.     
No

## 2023-07-25 NOTE — DISCHARGE NOTE PROVIDER - NSDCMRMEDTOKEN_GEN_ALL_CORE_FT
amLODIPine 2.5 mg oral tablet: 1 orally once a day  amoxicillin-clavulanate 875 mg-125 mg oral tablet: 1 orally 2 times a day  aspirin 81 mg oral delayed release tablet: 1 tab(s) orally once a day  gabapentin 100 mg oral tablet: 1 orally every 8 hours  glipiZIDE 5 mg oral tablet: 1 orally 2 times a day 1 tablet in morning and 2 tablets at night  losartan 25 mg oral tablet: 1 tab(s) orally once a day  metFORMIN 750 mg oral tablet, extended release: 1 orally once a day  rosuvastatin 10 mg oral tablet: 1 tab(s) orally once a day  Wheelchair: Use as directed   acetaminophen 325 mg oral tablet: 3 tab(s) orally every 6 hours  amLODIPine 2.5 mg oral tablet: 1 orally once a day  ascorbic acid 500 mg oral tablet: 1 tab(s) orally once a day  aspirin 81 mg oral delayed release tablet: 1 tab(s) orally once a day  gabapentin 100 mg oral tablet: 1 orally every 8 hours  glipiZIDE 5 mg oral tablet: 1 orally 2 times a day 1 tablet in morning and 2 tablets at night  losartan 25 mg oral tablet: 1 tab(s) orally once a day  metFORMIN 750 mg oral tablet, extended release: 1 orally once a day  Multiple Vitamins with Minerals oral tablet: 1 tab(s) orally once a day  oxyCODONE 10 mg oral tablet: 1 tab(s) orally every 4 hours As needed Severe Pain (7 - 10)  oxyCODONE 5 mg oral tablet: 1 tab(s) orally every 4 hours As needed Moderate Pain (4 - 6)  polyethylene glycol 3350 oral powder for reconstitution: 17 gram(s) orally once a day  rosuvastatin 10 mg oral tablet: 1 tab(s) orally once a day  senna leaf extract oral tablet: 2 tab(s) orally once a day (at bedtime)

## 2023-07-25 NOTE — PROGRESS NOTE ADULT - ASSESSMENT
72M with h/o T2DM (A1c=8.4) with neuropathy, HTN recently admitted 3/30 with R 1st toe infection, s/p 4/10 s/p RLE SFA to PT bypass graft with PTFE followed by R foot Partial hallux Amputation 4/14, and RLE angiogram 7/3 with rotational athrectomy of proximal portion of the graft in the SFA, following by MIKE that improved flow, however a proximal lesion was demonstrated at that time that was not amenable to endovascular procedures, now admitted with nonhealing R hallux wound. S/p R BKA romana (7/13), s/p formalization of right BKA (7/21)      Plan  - ID consulted, appreciate recs - completed antibiotics  - Monitor AM labs and replete prn   - Monitor vitals  - Incentive spirometer   - Keep right knee straight  - Regular diet  - home meds  - Pain control  - PT evaluation:  recommending [facility]  - dispo: discharge to Rehab once bed available    Vascular Surgery   [id number]

## 2023-07-25 NOTE — DISCHARGE NOTE PROVIDER - NSDCCPTREATMENT_GEN_ALL_CORE_FT
PRINCIPAL PROCEDURE  Procedure: Guillotine amputation below knee  Findings and Treatment:       SECONDARY PROCEDURE  Procedure: Revision of below knee amputation  Findings and Treatment:

## 2023-07-25 NOTE — PROGRESS NOTE ADULT - SUBJECTIVE AND OBJECTIVE BOX
SURGERY DAILY PROGRESS NOTE:         SUBJECTIVE/ROS: Patient seen and examined at bedside.  No events overnight. States pain controlled with analgesia.   Denies nausea, vomiting, chest pain, shortness of breath.           MEDICATIONS  (STANDING):  acetaminophen     Tablet .. 975 milliGRAM(s) Oral every 6 hours  amLODIPine   Tablet 2.5 milliGRAM(s) Oral daily  ascorbic acid 500 milliGRAM(s) Oral daily  aspirin enteric coated 81 milliGRAM(s) Oral daily  atorvastatin 40 milliGRAM(s) Oral at bedtime  dextrose 5%. 1000 milliLiter(s) (50 mL/Hr) IV Continuous <Continuous>  dextrose 5%. 1000 milliLiter(s) (100 mL/Hr) IV Continuous <Continuous>  dextrose 50% Injectable 25 Gram(s) IV Push once  dextrose 50% Injectable 12.5 Gram(s) IV Push once  dextrose 50% Injectable 25 Gram(s) IV Push once  enoxaparin Injectable 40 milliGRAM(s) SubCutaneous every 24 hours  gabapentin 300 milliGRAM(s) Oral every 8 hours  glucagon  Injectable 1 milliGRAM(s) IntraMuscular once  insulin glargine Injectable (LANTUS) 16 Unit(s) SubCutaneous at bedtime  insulin lispro (ADMELOG) corrective regimen sliding scale   SubCutaneous three times a day before meals  insulin lispro (ADMELOG) corrective regimen sliding scale   SubCutaneous at bedtime  insulin lispro Injectable (ADMELOG) 6 Unit(s) SubCutaneous three times a day before meals  losartan 25 milliGRAM(s) Oral daily  multivitamin/minerals 1 Tablet(s) Oral daily  polyethylene glycol 3350 17 Gram(s) Oral daily  senna 2 Tablet(s) Oral at bedtime    MEDICATIONS  (PRN):  dextrose Oral Gel 15 Gram(s) Oral once PRN Blood Glucose LESS THAN 70 milliGRAM(s)/deciliter  oxyCODONE    IR 5 milliGRAM(s) Oral every 4 hours PRN Moderate Pain (4 - 6)  oxyCODONE    IR 10 milliGRAM(s) Oral every 4 hours PRN Severe Pain (7 - 10)      OBJECTIVE:    Vital Signs Last 24 Hrs  T(C): 37.2 (25 Jul 2023 05:00), Max: 37.9 (24 Jul 2023 17:15)  T(F): 99 (25 Jul 2023 05:00), Max: 100.3 (24 Jul 2023 17:15)  HR: 81 (25 Jul 2023 05:00) (76 - 95)  BP: 125/69 (25 Jul 2023 05:00) (111/75 - 147/70)  BP(mean): --  RR: 18 (25 Jul 2023 05:00) (18 - 18)  SpO2: 96% (25 Jul 2023 05:00) (95% - 99%)    Parameters below as of 25 Jul 2023 05:00  Patient On (Oxygen Delivery Method): room air            I&O's Detail    24 Jul 2023 07:01  -  25 Jul 2023 07:00  --------------------------------------------------------  IN:    Oral Fluid: 440 mL  Total IN: 440 mL    OUT:    Voided (mL): 950 mL  Total OUT: 950 mL    Total NET: -510 mL        PHYSICAL EXAM:  General:  laying in bed, NAD  Respiratory: non-labored breathing  Extremity:  right lower extremity BKA, staples intact, no active bleeding/discharge; dressing changed with gauze, and tegaderms       LABS:                        8.7    10.95 )-----------( 269      ( 24 Jul 2023 07:51 )             27.4     07-24    132<L>  |  99  |  19  ----------------------------<  183<H>  4.3   |  22  |  0.94    Ca    9.1      24 Jul 2023 07:51  Phos  2.3     07-24  Mg     2.2     07-24        Urinalysis Basic - ( 24 Jul 2023 07:51 )    Color: x / Appearance: x / SG: x / pH: x  Gluc: 183 mg/dL / Ketone: x  / Bili: x / Urobili: x   Blood: x / Protein: x / Nitrite: x   Leuk Esterase: x / RBC: x / WBC x   Sq Epi: x / Non Sq Epi: x / Bacteria: x

## 2023-07-25 NOTE — PROGRESS NOTE ADULT - SUBJECTIVE AND OBJECTIVE BOX
DATE OF SERVICE: 07-25-23 @ 10:07    Patient is a 72y old  Male who presents with a chief complaint of foot infection (20 Jul 2023 14:44)      INTERVAL HISTORY: Reports pain well managed, no acute events    REVIEW OF SYSTEMS:  CONSTITUTIONAL: No weakness  EYES/ENT: No visual changes;  No throat pain   NECK: No pain or stiffness  RESPIRATORY: No cough, wheezing; No shortness of breath  CARDIOVASCULAR: No chest pain or palpitations  GASTROINTESTINAL: No abdominal  pain. No nausea, vomiting, or hematemesis  GENITOURINARY: No dysuria, frequency or hematuria  NEUROLOGICAL: No stroke like symptoms  SKIN: No rashes    	  MEDICATIONS:  amLODIPine   Tablet 2.5 milliGRAM(s) Oral daily  losartan 25 milliGRAM(s) Oral daily        PHYSICAL EXAM:  T(C): 37.2 (07-25-23 @ 09:00), Max: 37.9 (07-24-23 @ 17:15)  HR: 71 (07-25-23 @ 09:00) (71 - 95)  BP: 124/69 (07-25-23 @ 09:00) (118/66 - 147/70)  RR: 18 (07-25-23 @ 09:00) (18 - 18)  SpO2: 97% (07-25-23 @ 09:00) (95% - 99%)  Wt(kg): --  I&O's Summary    24 Jul 2023 07:01  -  25 Jul 2023 07:00  --------------------------------------------------------  IN: 440 mL / OUT: 950 mL / NET: -510 mL          Appearance: In no distress	  HEENT:    PERRL, EOMI	  Cardiovascular:  S1 S2, No JVD  Respiratory: Lungs clear to auscultation	  Gastrointestinal:  Soft, Non-tender, + BS	  Vascularature:  No edema of LE  Psychiatric: Appropriate affect   Neuro: no acute focal deficits                               8.7    10.95 )-----------( 269      ( 24 Jul 2023 07:51 )             27.4     07-25    135  |  102  |  22  ----------------------------<  90  4.2   |  21<L>  |  1.02    Ca    8.2<L>      25 Jul 2023 09:15  Phos  3.0     07-25  Mg     2.4     07-25          Labs personally reviewed      ASSESSMENT/PLAN: 	  Patient is a 72 year old male with a PMHx of HTN, Type II DM associated with neuropathy whore presents to [facility] with a chief complaint of pain, blistering and fluid drainage on his right foot.    Problem/Plan -1  Problem: Cardiac Risk Stratification for podiatry surgery  - Denies CP or SOB  - TTE shows preserved EF, mild LVH  - Not in decompensated HF  - No tachy supa arrhythmia  - s/p cath with nonobstructive moderate CAD  - Elevated risk for low-mod risk pods surgery procedure, no contraindication to proceed   - s/p R BKA  7/13 tolerated surgery well  - s/p BKA formalization 7/21    Problem/Plan -2  Problem: Hypertension  - amlodipine  2.5mg PO daily  - c/w losartan to 25mg PO daily    Problem/Plan -3  Problem: DM II  - HgbA1c 8.4  - ISS as per primary team            SUSY Broderick DO [facility]  Cardiovascular Medicine  [address] [place] [address], [address]  Available via call or text on [employer]  Office: [phone]

## 2023-07-25 NOTE — DISCHARGE NOTE PROVIDER - CARE PROVIDER_API CALL
Mateus Mullins  Vascular Surgery  [address] [place] [address]  Phone: [phone]  Fax: [phone]  Follow Up Time:    Mateus Mullins  Vascular Surgery  [address] [place] [address]  Phone: [phone]  Fax: [phone]  Follow Up Time: 1 week

## 2023-07-25 NOTE — PROGRESS NOTE ADULT - SUBJECTIVE AND OBJECTIVE BOX
Name of Patient : ALEE DUNN  MRN: [id number]  Date of visit: 07-25-23 @ 14:00      Subjective: Patient seen and examined. No new events except as noted.   Patient seen earlier this AM. Lying down in bed. Reports his LE pain is controlled.   Hyperglycemic. Insulins adjusted.   Low grade temperatures overnight.     REVIEW OF SYSTEMS:    CONSTITUTIONAL: Generalized weakness; Low grade temperature overnight   EYES/ENT: No visual changes;  No vertigo or throat pain   NECK: No pain or stiffness  RESPIRATORY: No cough, wheezing, hemoptysis; No shortness of breath  CARDIOVASCULAR: No chest pain or palpitations  GASTROINTESTINAL: No abdominal or epigastric pain. No nausea, vomiting, or hematemesis; No diarrhea or constipation. No melena or hematochezia.  GENITOURINARY: No dysuria, frequency or hematuria  NEUROLOGICAL: No numbness or weakness  SKIN: No itching, burning, rashes, or lesions   All other review of systems is negative unless indicated above.    MEDICATIONS:  MEDICATIONS  (STANDING):  acetaminophen     Tablet .. 975 milliGRAM(s) Oral every 6 hours  amLODIPine   Tablet 2.5 milliGRAM(s) Oral daily  ascorbic acid 500 milliGRAM(s) Oral daily  aspirin enteric coated 81 milliGRAM(s) Oral daily  atorvastatin 40 milliGRAM(s) Oral at bedtime  dextrose 5%. 1000 milliLiter(s) (100 mL/Hr) IV Continuous <Continuous>  dextrose 5%. 1000 milliLiter(s) (50 mL/Hr) IV Continuous <Continuous>  dextrose 50% Injectable 25 Gram(s) IV Push once  dextrose 50% Injectable 12.5 Gram(s) IV Push once  dextrose 50% Injectable 25 Gram(s) IV Push once  enoxaparin Injectable 40 milliGRAM(s) SubCutaneous every 24 hours  gabapentin 300 milliGRAM(s) Oral every 8 hours  glucagon  Injectable 1 milliGRAM(s) IntraMuscular once  insulin glargine Injectable (LANTUS) 20 Unit(s) SubCutaneous at bedtime  insulin lispro (ADMELOG) corrective regimen sliding scale   SubCutaneous three times a day before meals  insulin lispro (ADMELOG) corrective regimen sliding scale   SubCutaneous at bedtime  insulin lispro Injectable (ADMELOG) 10 Unit(s) SubCutaneous three times a day before meals  losartan 25 milliGRAM(s) Oral daily  multivitamin/minerals 1 Tablet(s) Oral daily  polyethylene glycol 3350 17 Gram(s) Oral daily  senna 2 Tablet(s) Oral at bedtime      PHYSICAL EXAM:  T(C): 36.5 (07-25-23 @ 13:17), Max: 37.9 (07-24-23 @ 17:15)  HR: 84 (07-25-23 @ 13:17) (71 - 95)  BP: 115/65 (07-25-23 @ 13:17) (115/65 - 147/70)  RR: 18 (07-25-23 @ 13:17) (18 - 18)  SpO2: 100% (07-25-23 @ 13:17) (95% - 100%)  Wt(kg): --  I&O's Summary    24 Jul 2023 07:01  -  25 Jul 2023 07:00  --------------------------------------------------------  IN: 440 mL / OUT: 950 mL / NET: -510 mL    25 Jul 2023 07:01  -  25 Jul 2023 14:00  --------------------------------------------------------  IN: 240 mL / OUT: 500 mL / NET: -260 mL          Appearance: Normal; Lying down in bed 	  HEENT: Eyes are open   Lymphatic: No lymphadenopathy   Cardiovascular: Normal S1 S2   Respiratory: normal effort , clear  Gastrointestinal:  Soft, Non-tender  Skin: No rashes,  warm to touch  Psychiatry:  Mood & affect appropriate  Musculoskeletal: RLE BKA; Dressing in place           07-24-23 @ 07:01  -  07-25-23 @ 07:00  --------------------------------------------------------  IN: 440 mL / OUT: 950 mL / NET: -510 mL    07-25-23 @ 07:01  -  07-25-23 @ 14:00  --------------------------------------------------------  IN: 240 mL / OUT: 500 mL / NET: -260 mL                              8.7    10.95 )-----------( 269      ( 24 Jul 2023 07:51 )             27.4               07-25    135  |  102  |  22  ----------------------------<  90  4.2   |  21<L>  |  1.02    Ca    8.2<L>      25 Jul 2023 09:15  Phos  3.0     07-25  Mg     2.4     07-25                         Urinalysis Basic - ( 25 Jul 2023 09:15 )    Color: x / Appearance: x / SG: x / pH: x  Gluc: 90 mg/dL / Ketone: x  / Bili: x / Urobili: x   Blood: x / Protein: x / Nitrite: x   Leuk Esterase: x / RBC: x / WBC x   Sq Epi: x / Non Sq Epi: x / Bacteria: x          Culture - Blood (07.22.23 @ 22:25)   Specimen Source: .Blood Blood-Peripheral  Culture Results: No growth at 48 Hours    Culture - Blood (07.22.23 @ 22:20)   Specimen Source: .Blood Blood-Peripheral  Culture Results: No growth at 48 Hours

## 2023-07-25 NOTE — PROGRESS NOTE ADULT - ASSESSMENT
Patient is a 72 year old male with a PMHx of Type II DM associated with neuropathy, HTN who was recently admitted to [facility] with Right 1st toe infection, S/P Right LE SFA to PT bypass graft with PTFE followed by Right foot Partial hallux Amputation on 04/14, and RLE angiogram 07/03 with rotational atherectomy of proximal portion of the graft in the SFA, following by MIKE that improved flow, however a proximal lesion was demonstrated at that time that was not amenable to endovascular procedures. Patient presents to [facility] due to nonhealing Right foot wound. Internal medicine has been consulted on Mr. Paredes's care for medical management. Patient denies chest pain, palpitations, SOB or dyspnea.       Peripheral Arterial Disease   - S/P R LE SFA to PT bypass graft, RLE angiogram 07/03  - 07/08 Arterial Duplex negative for pseudoaneurysm   - On ASA 81 and Lipitor 40   - US as noted  - Care per vascular surgery appreciated     H/O R Toe infection   - S/P Great toe amputation   - Foot Xray with Mild erosive changes concerning for OM  - MR without evidence of OM, negative for abscess. F/u podiatry recs --> S/P BKA, S/P formalization 7/21 with Vasc  - ABX as per ID, S/P Augmentin PO as per ID   - UCx and BCx2 w/ Neg  final   - Pain control   - Incentive spirometer   - Care per Vascular/ Podiatry appreciated   - Monitor CBC, temp curve, VS and patient closely     Febrile  - Pt febrile while on Cefepime and Flagyl  - 7/10 BCx2 w/ Neg  final   - Repeat BCx2 7/12 Neg; F/u final  - F/u with ID  --> ABX switched to Meropenem, S/P PO Augmentin as per ID   - Febrile 7/22. Repeat cultures with NGTD; F/u final   - Low grade temperature. Recommend repeat infectious work up and ID follow up. Continue to monitor.   - Monitor CBC, temp curve, VS and patient closely; Antipyretics PRN     HypoNa  - Na improved. Cont to monitor    - Check Serum Na and osmolality, consistent with SIADH   - Avoid overcorrection > 6-8 mEq in 24 hours    Type II DM   - A1C 7.9   - C/w sliding scale  - C/w Lantus, increased to 20  units QHs and Pre-Meal 12 units TID; adjust as tolerated   - Diabetic/ DASH Diet   - Monitor glucose levels closely     Anemia   - Monitor Hgb closely   - Iron and Ferritin, noted, not ferritin deficient    - S/P 1 unit PRBC 7/23. Monitor H/H   - Transfuse for Hgb < 7.0     HTN   - C/w Norvasc 2.5, Losartan 25 -->If BP elevated, increase Norvasc to 5.  Currently stable on current regimen.   - Monitor BP, VS and patient closely     Pre-Op risk stratification   - Previous TTE with EF of 61%, Mild LVH, normal RV  - Patient is S/P OR     DVT and GI PPX       Discussed with Attending.

## 2023-07-25 NOTE — DISCHARGE NOTE PROVIDER - HOSPITAL COURSE
72M with h/o T2DM (A1c=8.4) with neuropathy, HTN recently admitted 3/30 with R 1st toe infection, s/p 4/10 s/p RLE SFA to PT bypass graft with PTFE followed by R foot Partial hallux Amputation 4/14, and RLE angiogram 7/3 with rotational athrectomy of proximal portion of the graft in the SFA, following by MIKE that improved flow, however a proximal lesion was demonstrated at that time that was not amenable to endovascular procedures. He presented to the hospital with nonhealing R foot wound.    Vascular surgery was consulted and patient was admitted to the service.  Infectious disease was consulted and he completed his course of antibiotics.  During hospital stay he started to spike intermittent fevers.  Decision was made to perform R BKA guillotine (7/13) followed by formalization of right BKA (7/21).  Post operatively patient tolerated regular diet.      On day of discharge, pain was controlled, he was tolerating diet and voiding freely.  He was evaluated by physical therapy who recommended Subacute Rehab.     He is to follow up outpatient with Dr. Mullins in 1-2 weeks.

## 2023-07-25 NOTE — DISCHARGE NOTE PROVIDER - DISCHARGE SERVICE FOR PATIENT
on the discharge service for the patient. I have reviewed and made amendments to the documentation where necessary. Griseofulvin Pregnancy And Lactation Text: This medication is Pregnancy Category X and is known to cause serious birth defects. It is unknown if this medication is excreted in breast milk but breast feeding should be avoided.

## 2023-07-26 LAB
ANION GAP SERPL CALC-SCNC: 10 MMOL/L — SIGNIFICANT CHANGE UP (ref 5–17)
BUN SERPL-MCNC: 21 MG/DL — SIGNIFICANT CHANGE UP (ref 7–23)
CALCIUM SERPL-MCNC: 8.5 MG/DL — SIGNIFICANT CHANGE UP (ref 8.4–10.5)
CHLORIDE SERPL-SCNC: 104 MMOL/L — SIGNIFICANT CHANGE UP (ref 96–108)
CO2 SERPL-SCNC: 22 MMOL/L — SIGNIFICANT CHANGE UP (ref 22–31)
CREAT SERPL-MCNC: 1.04 MG/DL — SIGNIFICANT CHANGE UP (ref 0.5–1.3)
EGFR: 76 ML/MIN/1.73M2 — SIGNIFICANT CHANGE UP
GLUCOSE BLDC GLUCOMTR-MCNC: 111 MG/DL — HIGH (ref 70–99)
GLUCOSE BLDC GLUCOMTR-MCNC: 141 MG/DL — HIGH (ref 70–99)
GLUCOSE BLDC GLUCOMTR-MCNC: 246 MG/DL — HIGH (ref 70–99)
GLUCOSE BLDC GLUCOMTR-MCNC: 259 MG/DL — HIGH (ref 70–99)
GLUCOSE SERPL-MCNC: 135 MG/DL — HIGH (ref 70–99)
HCT VFR BLD CALC: 25.7 % — LOW (ref 39–50)
HGB BLD-MCNC: 8 G/DL — LOW (ref 13–17)
MAGNESIUM SERPL-MCNC: 2.3 MG/DL — SIGNIFICANT CHANGE UP (ref 1.6–2.6)
MCHC RBC-ENTMCNC: 28.8 PG — SIGNIFICANT CHANGE UP (ref 27–34)
MCHC RBC-ENTMCNC: 31.1 GM/DL — LOW (ref 32–36)
MCV RBC AUTO: 92.4 FL — SIGNIFICANT CHANGE UP (ref 80–100)
NRBC # BLD: 0 /100 WBCS — SIGNIFICANT CHANGE UP (ref 0–0)
PHOSPHATE SERPL-MCNC: 3 MG/DL — SIGNIFICANT CHANGE UP (ref 2.5–4.5)
PLATELET # BLD AUTO: 292 K/UL — SIGNIFICANT CHANGE UP (ref 150–400)
POTASSIUM SERPL-MCNC: 3.8 MMOL/L — SIGNIFICANT CHANGE UP (ref 3.5–5.3)
POTASSIUM SERPL-SCNC: 3.8 MMOL/L — SIGNIFICANT CHANGE UP (ref 3.5–5.3)
RBC # BLD: 2.78 M/UL — LOW (ref 4.2–5.8)
RBC # FLD: 13.8 % — SIGNIFICANT CHANGE UP (ref 10.3–14.5)
SODIUM SERPL-SCNC: 136 MMOL/L — SIGNIFICANT CHANGE UP (ref 135–145)
WBC # BLD: 8.86 K/UL — SIGNIFICANT CHANGE UP (ref 3.8–10.5)
WBC # FLD AUTO: 8.86 K/UL — SIGNIFICANT CHANGE UP (ref 3.8–10.5)

## 2023-07-26 PROCEDURE — 99232 SBSQ HOSP IP/OBS MODERATE 35: CPT

## 2023-07-26 RX ADMIN — Medication 4: at 17:37

## 2023-07-26 RX ADMIN — Medication 975 MILLIGRAM(S): at 00:45

## 2023-07-26 RX ADMIN — ENOXAPARIN SODIUM 40 MILLIGRAM(S): 100 INJECTION SUBCUTANEOUS at 17:36

## 2023-07-26 RX ADMIN — Medication 6: at 13:39

## 2023-07-26 RX ADMIN — Medication 975 MILLIGRAM(S): at 07:17

## 2023-07-26 RX ADMIN — GABAPENTIN 300 MILLIGRAM(S): 400 CAPSULE ORAL at 06:11

## 2023-07-26 RX ADMIN — Medication 975 MILLIGRAM(S): at 06:12

## 2023-07-26 RX ADMIN — Medication 1 TABLET(S): at 13:39

## 2023-07-26 RX ADMIN — INSULIN GLARGINE 18 UNIT(S): 100 INJECTION, SOLUTION SUBCUTANEOUS at 21:30

## 2023-07-26 RX ADMIN — Medication 12 UNIT(S): at 17:36

## 2023-07-26 RX ADMIN — LOSARTAN POTASSIUM 25 MILLIGRAM(S): 100 TABLET, FILM COATED ORAL at 06:12

## 2023-07-26 RX ADMIN — Medication 12 UNIT(S): at 09:59

## 2023-07-26 RX ADMIN — AMLODIPINE BESYLATE 2.5 MILLIGRAM(S): 2.5 TABLET ORAL at 06:12

## 2023-07-26 RX ADMIN — Medication 975 MILLIGRAM(S): at 17:36

## 2023-07-26 RX ADMIN — GABAPENTIN 300 MILLIGRAM(S): 400 CAPSULE ORAL at 13:40

## 2023-07-26 RX ADMIN — Medication 500 MILLIGRAM(S): at 13:39

## 2023-07-26 RX ADMIN — Medication 81 MILLIGRAM(S): at 13:39

## 2023-07-26 RX ADMIN — Medication 975 MILLIGRAM(S): at 23:31

## 2023-07-26 RX ADMIN — ATORVASTATIN CALCIUM 40 MILLIGRAM(S): 80 TABLET, FILM COATED ORAL at 21:30

## 2023-07-26 RX ADMIN — Medication 975 MILLIGRAM(S): at 17:57

## 2023-07-26 RX ADMIN — Medication 12 UNIT(S): at 13:39

## 2023-07-26 RX ADMIN — GABAPENTIN 300 MILLIGRAM(S): 400 CAPSULE ORAL at 21:30

## 2023-07-26 NOTE — PROGRESS NOTE ADULT - SUBJECTIVE AND OBJECTIVE BOX
Follow Up:  s/p bka,       Interval History/ROS:  notes some tenderness right ant tib plateau, denies incisional pain   loose stools  - 3 last night, 3 so far today    Allergies  No Known Allergies    ANTIMICROBIALS:      OTHER MEDS:  MEDICATIONS  (STANDING):  acetaminophen     Tablet .. 975 every 6 hours  amLODIPine   Tablet 2.5 daily  aspirin enteric coated 81 daily  atorvastatin 40 at bedtime  dextrose 50% Injectable 25 once  dextrose 50% Injectable 25 once  dextrose 50% Injectable 12.5 once  dextrose Oral Gel 15 once PRN  enoxaparin Injectable 40 every 24 hours  gabapentin 300 every 8 hours  glucagon  Injectable 1 once  insulin glargine Injectable (LANTUS) 18 at bedtime  insulin lispro (ADMELOG) corrective regimen sliding scale  three times a day before meals  insulin lispro (ADMELOG) corrective regimen sliding scale  at bedtime  insulin lispro Injectable (ADMELOG) 12 three times a day before meals  losartan 25 daily  oxyCODONE    IR 5 every 4 hours PRN  oxyCODONE    IR 10 every 4 hours PRN  polyethylene glycol 3350 17 daily  senna 2 at bedtime      Vital Signs Last 24 Hrs  T(C): 36.5 (26 Jul 2023 13:19), Max: 37.8 (25 Jul 2023 17:09)  T(F): 97.7 (26 Jul 2023 13:19), Max: 100 (25 Jul 2023 17:09)  HR: 77 (26 Jul 2023 13:19) (74 - 99)  BP: 104/62 (26 Jul 2023 13:19) (102/58 - 157/67)  BP(mean): --  RR: 20 (26 Jul 2023 13:19) (18 - 20)  SpO2: 100% (26 Jul 2023 13:19) (97% - 100%)    Parameters below as of 26 Jul 2023 13:19  Patient On (Oxygen Delivery Method): room air        PHYSICAL EXAM:  General: WN/WD NAD, Non-toxic  Neurology: A&Ox3, nonfocal  Respiratory: Clear to auscultation bilaterally  CV: RRR, S1S2, no murmurs, rubs or gallops  Abdominal: Soft, Non-tender, non-distended, normal bowel sounds  Extremities: No edema, dressing clean  dull  erythema at stump  Line Sites: Clear  Skin: No rash                          8.0    8.86  )-----------( 292      ( 26 Jul 2023 07:34 )             25.7       07-26    136  |  104  |  21  ----------------------------<  135<H>  3.8   |  22  |  1.04    Ca    8.5      26 Jul 2023 07:35  Phos  3.0     07-26  Mg     2.3     07-26    Urinalysis Basic - ( 26 Jul 2023 07:35 )    Color: x / Appearance: x / SG: x / pH: x  Gluc: 135 mg/dL / Ketone: x  / Bili: x / Urobili: x   Blood: x / Protein: x / Nitrite: x   Leuk Esterase: x / RBC: x / WBC x   Sq Epi: x / Non Sq Epi: x / Bacteria: x    MICROBIOLOGY:  .Blood Blood-Peripheral  07-22-23   No growth at 72 Hours  --  --      .Blood Blood-Peripheral  07-22-23   No growth at 72 Hours  --  --      .Blood Blood-Venous  07-12-23   No growth at 5 days  --  --      .Blood Blood-Venous  07-12-23   No growth at 5 days  --  --      .Blood Blood  07-10-23   No growth at 5 days  --  --      Clean Catch Clean Catch (Midstream)  07-08-23   No growth  --  --      .Blood Blood-Peripheral  07-08-23   No growth at 5 days  --  --      .Blood Blood-Peripheral  07-08-23   No growth at 5 days  --  --    RADIOLOGY:  < from: Xray Chest 1 View- PORTABLE-Urgent (Xray Chest 1 View- PORTABLE-Urgent .) (07.22.23 @ 20:25) >  IMPRESSION:    No focal consolidation.    < end of copied text >      Thai Stein MD; Division of Infectious Disease; Pager: [phone]; nights and weekends: [phone]

## 2023-07-26 NOTE — PROGRESS NOTE ADULT - ASSESSMENT
Patient is a 72 year old male with a PMHx of Type II DM associated with neuropathy, HTN who was recently admitted to [facility] with Right 1st toe infection, S/P Right LE SFA to PT bypass graft with PTFE followed by Right foot Partial hallux Amputation on 04/14, and RLE angiogram 07/03 with rotational atherectomy of proximal portion of the graft in the SFA, following by MIKE that improved flow, however a proximal lesion was demonstrated at that time that was not amenable to endovascular procedures. Patient presents to [facility] due to nonhealing Right foot wound. Internal medicine has been consulted on Mr. Paredes's care for medical management. Patient denies chest pain, palpitations, SOB or dyspnea.       Peripheral Arterial Disease   - S/P R LE SFA to PT bypass graft, RLE angiogram 07/03  - 07/08 Arterial Duplex negative for pseudoaneurysm   - On ASA 81 and Lipitor 40   - US as noted  - Care per vascular surgery appreciated     H/O R Toe infection   - S/P Great toe amputation   - Foot Xray with Mild erosive changes concerning for OM  - MR without evidence of OM, negative for abscess. F/u podiatry recs --> S/P BKA, S/P formalization 7/21 with Vasc  - ABX as per ID, S/P Augmentin PO as per ID   - UCx and BCx2 w/ Neg  final   - Pain control   - Incentive spirometer   - Care per Vascular/ Podiatry appreciated   - Monitor CBC, temp curve, VS and patient closely     Febrile  - Pt febrile while on Cefepime and Flagyl  - 7/10 BCx2 w/ Neg  final   - Repeat BCx2 7/12 Neg; F/u final  - F/u with ID  --> ABX switched to Meropenem, S/P PO Augmentin as per ID   - Febrile 7/22. Repeat cultures with NGTD; F/u final   - Low grade temperature. Recommend repeat infectious work up and ID follow up. Continue to monitor.   - Monitor CBC, temp curve, VS and patient closely; Antipyretics PRN     HypoNa  - Na improved. Cont to monitor    - Check Serum Na and osmolality, consistent with SIADH   - Avoid overcorrection > 6-8 mEq in 24 hours    Type II DM   - A1C 7.9   - C/w sliding scale  - C/w Lantus, increased to 20  units QHs and Pre-Meal 12 units TID; adjust as tolerated   - Diabetic/ DASH Diet   - Monitor glucose levels closely     Anemia   - Monitor Hgb closely   - Iron and Ferritin, noted, not ferritin deficient    - S/P 1 unit PRBC 7/23. Monitor H/H, hemoglobin down-trending. Repeat CBC   - Transfuse for Hgb < 7.0     HTN   - C/w Norvasc 2.5, Losartan 25 -->If BP elevated, increase Norvasc to 5.  Currently stable on current regimen.   - Monitor BP, VS and patient closely     Pre-Op risk stratification   - Previous TTE with EF of 61%, Mild LVH, normal RV  - Patient is S/P OR     DVT and GI PPX       Discussed with Attending.

## 2023-07-26 NOTE — PROGRESS NOTE ADULT - ASSESSMENT
72 M PMH DM, HTN, PAD    Recently admitted in March/April for R 1st toe OM  Wound cx polymicrobial with E. Cloacae, Staph Lugdunensis S to Oxacillin, and Delftia acidovorans  Underwent RLE SFA to PT bypass graft with PTFE on 4/10/23  Followed by R foot partial hallux amputation on 4/14/23  (OR cx and clean margin pathology negative for OM)  Discharged home on a week of Levaquin and Keflex    s/p RLE angiogram on 7/3/23 with rotational atherectomy of the proximal portion of the graft in the SFA, followed by MIKE that improved flow, however proximal lesion was present that was not amenable to endovascular measures  Presents to the ED for non healing R foot wound  admitted 7/8/23     No fevers, no purulent discharge from R foot 1st metatarsal wound  On exam, R 1st metatarsal wound to bone, proximal phalanx exposed, ischemic/gangrenous changes around the wound  Mild leukocytosis that resolved  7/8/23:  ESR = 120;  CRP=73  R foot XR with findings c/f OM    R foot OM in a diabetic man with PAD with lesion that was not amenable to endovascular measures  Leukocytosis, elevated ESR and CRP, open 1st metatarsal wound exposed to bone  had persistent fever on Cefepime Flagyl,   7/13 - decreased fever on Meropenem,  cultures negative  7/13/23 OR:   guillotine amputation R BKA   7/20 formalization BKA    7/26  diarrhea     - antibiotic related diarrhea should improve now off antibiotics  no leukocytosis    Antibiotics  Zosyn/Vancomycin 7/8--> 7/9  Cefepime 1g IV Q12h 7/9--> 7/12  Flagyl 500mg OV Q12h 7/9  --> 7/12  Meropenem 1000 mg IV every 12 hours  7/12 --> 7/14  Augmentin 7/14--> 7/25    Suggest  if diarrhea persists, check Cdiff    if stable discontinue antibiotics post op

## 2023-07-26 NOTE — PROGRESS NOTE ADULT - ASSESSMENT
72M with h/o T2DM (A1c=8.4) with neuropathy, HTN recently admitted 3/30 with R 1st toe infection, s/p 4/10 s/p RLE SFA to PT bypass graft with PTFE followed by R foot Partial hallux Amputation 4/14, and RLE angiogram 7/3 with rotational athrectomy of proximal portion of the graft in the SFA, following by MIKE that improved flow, however a proximal lesion was demonstrated at that time that was not amenable to endovascular procedures, now admitted with nonhealing R hallux wound. S/p R BKA guillotine (7/13), s/p formalization of right BKA (7/21).       Plan  - ID consulted, appreciate recs - completed antibiotics  - Monitor AM labs and replete prn   - Monitor vitals  - Incentive spirometer   - Keep right knee straight  - Regular diet  - home meds  - Pain control  - PT evaluation:  recommending CESAR  - dispo: discharge to Rehab once bed available    Vascular Surgery   [id number]

## 2023-07-26 NOTE — PROGRESS NOTE ADULT - SUBJECTIVE AND OBJECTIVE BOX
DATE OF SERVICE: 07-26-23 @ 09:38    Patient is a 72y old  Male who presents with a chief complaint of foot infection (20 Jul 2023 14:44)      INTERVAL HISTORY: Feels well, denies LE pain    REVIEW OF SYSTEMS:  CONSTITUTIONAL: No weakness  EYES/ENT: No visual changes;  No throat pain   NECK: No pain or stiffness  RESPIRATORY: No cough, wheezing; No shortness of breath  CARDIOVASCULAR: No chest pain or palpitations  GASTROINTESTINAL: No abdominal  pain. No nausea, vomiting, or hematemesis  GENITOURINARY: No dysuria, frequency or hematuria  NEUROLOGICAL: No stroke like symptoms  SKIN: No rashes      	  MEDICATIONS:  amLODIPine   Tablet 2.5 milliGRAM(s) Oral daily  losartan 25 milliGRAM(s) Oral daily        PHYSICAL EXAM:  T(C): 36.7 (07-26-23 @ 08:58), Max: 37.8 (07-25-23 @ 17:09)  HR: 74 (07-26-23 @ 08:58) (74 - 99)  BP: 103/55 (07-26-23 @ 08:58) (102/58 - 157/67)  RR: 20 (07-26-23 @ 08:58) (18 - 20)  SpO2: 99% (07-26-23 @ 08:58) (97% - 100%)  Wt(kg): --  I&O's Summary    25 Jul 2023 07:01  -  26 Jul 2023 07:00  --------------------------------------------------------  IN: 960 mL / OUT: 1125 mL / NET: -165 mL          Appearance: In no distress	  HEENT:    PERRL, EOMI	  Cardiovascular:  S1 S2, No JVD  Respiratory: Lungs clear to auscultation	  Gastrointestinal:  Soft, Non-tender, + BS	  Vascularature:  No edema of LE  Psychiatric: Appropriate affect   Neuro: no acute focal deficits                               8.0    8.86  )-----------( 292      ( 26 Jul 2023 07:34 )             25.7     07-26    136  |  104  |  21  ----------------------------<  135<H>  3.8   |  22  |  1.04    Ca    8.5      26 Jul 2023 07:35  Phos  3.0     07-26  Mg     2.3     07-26          Labs personally reviewed      ASSESSMENT/PLAN: 	  Patient is a 72 year old male with a PMHx of HTN, Type II DM associated with neuropathy whore presents to [facility] with a chief complaint of pain, blistering and fluid drainage on his right foot.    Problem/Plan -1  Problem: Cardiac Risk Stratification for podiatry surgery  - Denies CP or SOB  - TTE shows preserved EF, mild LVH  - Not in decompensated HF  - No tachy supa arrhythmia  - s/p cath with nonobstructive moderate CAD  - Elevated risk for low-mod risk pods surgery procedure, no contraindication to proceed   - s/p R BKA  7/13 tolerated surgery well  - s/p BKA formalization 7/21    Problem/Plan -2  Problem: Hypertension  - amlodipine  2.5mg PO daily  - c/w losartan to 25mg PO daily    Problem/Plan -3  Problem: DM II  - HgbA1c 8.4  - ISS as per primary team                SUSY Broderick DO [facility]  Cardiovascular Medicine  [address] [place] [address], [address]  Available via call or text on [employer]  Office: [phone]

## 2023-07-26 NOTE — PROGRESS NOTE ADULT - SUBJECTIVE AND OBJECTIVE BOX
SURGERY DAILY PROGRESS NOTE:       SUBJECTIVE/ROS: Patient seen and evaluated on AM rounds.   Denies nausea, vomiting, chest pain, shortness of breath.   Patient feels ok. Wondering when he is going to go to rehab.        OBJECTIVE:    Vital Signs Last 24 Hrs  T(C): 36.8 (26 Jul 2023 06:08), Max: 37.8 (25 Jul 2023 17:09)  T(F): 98.2 (26 Jul 2023 06:08), Max: 100 (25 Jul 2023 17:09)  HR: 86 (26 Jul 2023 06:08) (71 - 99)  BP: 157/67 (26 Jul 2023 06:08) (102/58 - 157/67)  BP(mean): --  RR: 20 (26 Jul 2023 06:08) (18 - 20)  SpO2: 98% (26 Jul 2023 06:08) (97% - 100%)    Parameters below as of 26 Jul 2023 06:08  Patient On (Oxygen Delivery Method): room air      I&O's Detail    25 Jul 2023 07:01  -  26 Jul 2023 07:00  --------------------------------------------------------  IN:    Oral Fluid: 960 mL  Total IN: 960 mL    OUT:    Voided (mL): 1125 mL  Total OUT: 1125 mL    Total NET: -165 mL        Daily     Daily   MEDICATIONS  (STANDING):  acetaminophen     Tablet .. 975 milliGRAM(s) Oral every 6 hours  amLODIPine   Tablet 2.5 milliGRAM(s) Oral daily  ascorbic acid 500 milliGRAM(s) Oral daily  aspirin enteric coated 81 milliGRAM(s) Oral daily  atorvastatin 40 milliGRAM(s) Oral at bedtime  dextrose 5%. 1000 milliLiter(s) (100 mL/Hr) IV Continuous <Continuous>  dextrose 5%. 1000 milliLiter(s) (50 mL/Hr) IV Continuous <Continuous>  dextrose 50% Injectable 12.5 Gram(s) IV Push once  dextrose 50% Injectable 25 Gram(s) IV Push once  dextrose 50% Injectable 25 Gram(s) IV Push once  enoxaparin Injectable 40 milliGRAM(s) SubCutaneous every 24 hours  gabapentin 300 milliGRAM(s) Oral every 8 hours  glucagon  Injectable 1 milliGRAM(s) IntraMuscular once  insulin glargine Injectable (LANTUS) 18 Unit(s) SubCutaneous at bedtime  insulin lispro (ADMELOG) corrective regimen sliding scale   SubCutaneous three times a day before meals  insulin lispro (ADMELOG) corrective regimen sliding scale   SubCutaneous at bedtime  insulin lispro Injectable (ADMELOG) 12 Unit(s) SubCutaneous three times a day before meals  losartan 25 milliGRAM(s) Oral daily  multivitamin/minerals 1 Tablet(s) Oral daily  polyethylene glycol 3350 17 Gram(s) Oral daily  senna 2 Tablet(s) Oral at bedtime    MEDICATIONS  (PRN):  dextrose Oral Gel 15 Gram(s) Oral once PRN Blood Glucose LESS THAN 70 milliGRAM(s)/deciliter  oxyCODONE    IR 10 milliGRAM(s) Oral every 4 hours PRN Severe Pain (7 - 10)  oxyCODONE    IR 5 milliGRAM(s) Oral every 4 hours PRN Moderate Pain (4 - 6)      LABS:                        9.7    10.31 )-----------( 372      ( 25 Jul 2023 13:27 )             29.5     07-25    135  |  102  |  22  ----------------------------<  90  4.2   |  21<L>  |  1.02    Ca    8.2<L>      25 Jul 2023 09:15  Phos  3.0     07-25  Mg     2.4     07-25        Urinalysis Basic - ( 25 Jul 2023 09:15 )    Color: x / Appearance: x / SG: x / pH: x  Gluc: 90 mg/dL / Ketone: x  / Bili: x / Urobili: x   Blood: x / Protein: x / Nitrite: x   Leuk Esterase: x / RBC: x / WBC x   Sq Epi: x / Non Sq Epi: x / Bacteria: x          PHYSICAL EXAM:  General:  laying in bed, NAD  Respiratory: non-labored breathing  Extremity:  right lower extremity BKA, staples intact, no active bleeding/discharge; dressing changed with gauze, and tegaderms

## 2023-07-26 NOTE — PROGRESS NOTE ADULT - SUBJECTIVE AND OBJECTIVE BOX
Name of Patient : ALEE DUNN  MRN: [id number]  Date of visit: 07-26-23 @ 12:22      Subjective: Patient seen and examined. No new events except as noted.   Patient seen earlier this AM. Lying down in bed. Denies pain or discomfort.   Drop in hemoglobin. No gross bleeding reported.     REVIEW OF SYSTEMS:    CONSTITUTIONAL: Generalized weakness   EYES/ENT: No visual changes;  No vertigo or throat pain   NECK: No pain or stiffness  RESPIRATORY: No cough, wheezing, hemoptysis; No shortness of breath  CARDIOVASCULAR: No chest pain or palpitations  GASTROINTESTINAL: No abdominal or epigastric pain. No nausea, vomiting, or hematemesis; No diarrhea or constipation. No melena or hematochezia.  GENITOURINARY: No dysuria, frequency or hematuria  NEUROLOGICAL: No numbness or weakness  SKIN: No itching, burning, rashes, or lesions   MSK: JENN S/P BKA, reports pain controlled   All other review of systems is negative unless indicated above.    MEDICATIONS:  MEDICATIONS  (STANDING):  acetaminophen     Tablet .. 975 milliGRAM(s) Oral every 6 hours  amLODIPine   Tablet 2.5 milliGRAM(s) Oral daily  ascorbic acid 500 milliGRAM(s) Oral daily  aspirin enteric coated 81 milliGRAM(s) Oral daily  atorvastatin 40 milliGRAM(s) Oral at bedtime  dextrose 5%. 1000 milliLiter(s) (50 mL/Hr) IV Continuous <Continuous>  dextrose 5%. 1000 milliLiter(s) (100 mL/Hr) IV Continuous <Continuous>  dextrose 50% Injectable 25 Gram(s) IV Push once  dextrose 50% Injectable 25 Gram(s) IV Push once  dextrose 50% Injectable 12.5 Gram(s) IV Push once  enoxaparin Injectable 40 milliGRAM(s) SubCutaneous every 24 hours  gabapentin 300 milliGRAM(s) Oral every 8 hours  glucagon  Injectable 1 milliGRAM(s) IntraMuscular once  insulin glargine Injectable (LANTUS) 18 Unit(s) SubCutaneous at bedtime  insulin lispro (ADMELOG) corrective regimen sliding scale   SubCutaneous three times a day before meals  insulin lispro (ADMELOG) corrective regimen sliding scale   SubCutaneous at bedtime  insulin lispro Injectable (ADMELOG) 12 Unit(s) SubCutaneous three times a day before meals  losartan 25 milliGRAM(s) Oral daily  multivitamin/minerals 1 Tablet(s) Oral daily  polyethylene glycol 3350 17 Gram(s) Oral daily  senna 2 Tablet(s) Oral at bedtime      PHYSICAL EXAM:  T(C): 36.7 (07-26-23 @ 08:58), Max: 37.8 (07-25-23 @ 17:09)  HR: 74 (07-26-23 @ 08:58) (74 - 99)  BP: 103/55 (07-26-23 @ 08:58) (102/58 - 157/67)  RR: 20 (07-26-23 @ 08:58) (18 - 20)  SpO2: 99% (07-26-23 @ 08:58) (97% - 100%)  Wt(kg): --  I&O's Summary    25 Jul 2023 07:01  -  26 Jul 2023 07:00  --------------------------------------------------------  IN: 960 mL / OUT: 1125 mL / NET: -165 mL    26 Jul 2023 07:01  -  26 Jul 2023 12:22  --------------------------------------------------------  IN: 240 mL / OUT: 200 mL / NET: 40 mL          Appearance: Normal; Lying down in bed 	  HEENT: Eyes are open   Lymphatic: No lymphadenopathy   Cardiovascular: Normal S1 S2   Respiratory: normal effort , clear  Gastrointestinal:  Soft, Non-tender  Skin: No rashes,  warm to touch  Psychiatry:  Mood & affect appropriate  Musculoskeletal: RLE BKA; Dressing in place, C/D/I           07-25-23 @ 07:01  -  07-26-23 @ 07:00  --------------------------------------------------------  IN: 960 mL / OUT: 1125 mL / NET: -165 mL    07-26-23 @ 07:01 - 07-26-23 @ 12:22  --------------------------------------------------------  IN: 240 mL / OUT: 200 mL / NET: 40 mL                              8.0    8.86  )-----------( 292      ( 26 Jul 2023 07:34 )             25.7               07-26    136  |  104  |  21  ----------------------------<  135<H>  3.8   |  22  |  1.04    Ca    8.5      26 Jul 2023 07:35  Phos  3.0     07-26  Mg     2.3     07-26                         Urinalysis Basic - ( 26 Jul 2023 07:35 )    Color: x / Appearance: x / SG: x / pH: x  Gluc: 135 mg/dL / Ketone: x  / Bili: x / Urobili: x   Blood: x / Protein: x / Nitrite: x   Leuk Esterase: x / RBC: x / WBC x   Sq Epi: x / Non Sq Epi: x / Bacteria: x        Culture - Blood (07.22.23 @ 22:25)   Specimen Source: .Blood Blood-Peripheral  Culture Results: No growth at 72 Hours    Culture - Blood (07.22.23 @ 22:20)   Specimen Source: .Blood Blood-Peripheral  Culture Results: No growth at 72 Hours

## 2023-07-27 ENCOUNTER — TRANSCRIPTION ENCOUNTER (OUTPATIENT)
Age: 73
End: 2023-07-27

## 2023-07-27 VITALS
RESPIRATION RATE: 18 BRPM | TEMPERATURE: 98 F | HEART RATE: 90 BPM | SYSTOLIC BLOOD PRESSURE: 164 MMHG | DIASTOLIC BLOOD PRESSURE: 70 MMHG | OXYGEN SATURATION: 98 %

## 2023-07-27 LAB
GLUCOSE BLDC GLUCOMTR-MCNC: 135 MG/DL — HIGH (ref 70–99)
GLUCOSE BLDC GLUCOMTR-MCNC: 168 MG/DL — HIGH (ref 70–99)

## 2023-07-27 PROCEDURE — 84132 ASSAY OF SERUM POTASSIUM: CPT

## 2023-07-27 PROCEDURE — 85610 PROTHROMBIN TIME: CPT

## 2023-07-27 PROCEDURE — 80053 COMPREHEN METABOLIC PANEL: CPT

## 2023-07-27 PROCEDURE — 86880 COOMBS TEST DIRECT: CPT

## 2023-07-27 PROCEDURE — 85652 RBC SED RATE AUTOMATED: CPT

## 2023-07-27 PROCEDURE — 93923 UPR/LXTR ART STDY 3+ LVLS: CPT

## 2023-07-27 PROCEDURE — 85027 COMPLETE CBC AUTOMATED: CPT

## 2023-07-27 PROCEDURE — 71045 X-RAY EXAM CHEST 1 VIEW: CPT

## 2023-07-27 PROCEDURE — 88311 DECALCIFY TISSUE: CPT

## 2023-07-27 PROCEDURE — G1004: CPT

## 2023-07-27 PROCEDURE — 97161 PT EVAL LOW COMPLEX 20 MIN: CPT

## 2023-07-27 PROCEDURE — 83930 ASSAY OF BLOOD OSMOLALITY: CPT

## 2023-07-27 PROCEDURE — 87086 URINE CULTURE/COLONY COUNT: CPT

## 2023-07-27 PROCEDURE — 86901 BLOOD TYPING SEROLOGIC RH(D): CPT

## 2023-07-27 PROCEDURE — 97110 THERAPEUTIC EXERCISES: CPT

## 2023-07-27 PROCEDURE — 86900 BLOOD TYPING SEROLOGIC ABO: CPT

## 2023-07-27 PROCEDURE — 85018 HEMOGLOBIN: CPT

## 2023-07-27 PROCEDURE — 93005 ELECTROCARDIOGRAM TRACING: CPT

## 2023-07-27 PROCEDURE — C9399: CPT

## 2023-07-27 PROCEDURE — 88307 TISSUE EXAM BY PATHOLOGIST: CPT

## 2023-07-27 PROCEDURE — 86860 RBC ANTIBODY ELUTION: CPT

## 2023-07-27 PROCEDURE — 83735 ASSAY OF MAGNESIUM: CPT

## 2023-07-27 PROCEDURE — 82728 ASSAY OF FERRITIN: CPT

## 2023-07-27 PROCEDURE — C1889: CPT

## 2023-07-27 PROCEDURE — 97530 THERAPEUTIC ACTIVITIES: CPT

## 2023-07-27 PROCEDURE — 82803 BLOOD GASES ANY COMBINATION: CPT

## 2023-07-27 PROCEDURE — 36430 TRANSFUSION BLD/BLD COMPNT: CPT

## 2023-07-27 PROCEDURE — 86140 C-REACTIVE PROTEIN: CPT

## 2023-07-27 PROCEDURE — 93926 LOWER EXTREMITY STUDY: CPT

## 2023-07-27 PROCEDURE — 84100 ASSAY OF PHOSPHORUS: CPT

## 2023-07-27 PROCEDURE — 85014 HEMATOCRIT: CPT

## 2023-07-27 PROCEDURE — 97164 PT RE-EVAL EST PLAN CARE: CPT

## 2023-07-27 PROCEDURE — 73720 MRI LWR EXTREMITY W/O&W/DYE: CPT | Mod: MG

## 2023-07-27 PROCEDURE — 84300 ASSAY OF URINE SODIUM: CPT

## 2023-07-27 PROCEDURE — 36415 COLL VENOUS BLD VENIPUNCTURE: CPT

## 2023-07-27 PROCEDURE — 83036 HEMOGLOBIN GLYCOSYLATED A1C: CPT

## 2023-07-27 PROCEDURE — 81001 URINALYSIS AUTO W/SCOPE: CPT

## 2023-07-27 PROCEDURE — 82435 ASSAY OF BLOOD CHLORIDE: CPT

## 2023-07-27 PROCEDURE — 82330 ASSAY OF CALCIUM: CPT

## 2023-07-27 PROCEDURE — 0225U NFCT DS DNA&RNA 21 SARSCOV2: CPT

## 2023-07-27 PROCEDURE — 80048 BASIC METABOLIC PNL TOTAL CA: CPT

## 2023-07-27 PROCEDURE — 99285 EMERGENCY DEPT VISIT HI MDM: CPT

## 2023-07-27 PROCEDURE — 83935 ASSAY OF URINE OSMOLALITY: CPT

## 2023-07-27 PROCEDURE — 82947 ASSAY GLUCOSE BLOOD QUANT: CPT

## 2023-07-27 PROCEDURE — 86870 RBC ANTIBODY IDENTIFICATION: CPT

## 2023-07-27 PROCEDURE — 83605 ASSAY OF LACTIC ACID: CPT

## 2023-07-27 PROCEDURE — 83550 IRON BINDING TEST: CPT

## 2023-07-27 PROCEDURE — 85025 COMPLETE CBC W/AUTO DIFF WBC: CPT

## 2023-07-27 PROCEDURE — 83540 ASSAY OF IRON: CPT

## 2023-07-27 PROCEDURE — 85730 THROMBOPLASTIN TIME PARTIAL: CPT

## 2023-07-27 PROCEDURE — A9585: CPT

## 2023-07-27 PROCEDURE — 86850 RBC ANTIBODY SCREEN: CPT

## 2023-07-27 PROCEDURE — 84295 ASSAY OF SERUM SODIUM: CPT

## 2023-07-27 PROCEDURE — 73620 X-RAY EXAM OF FOOT: CPT

## 2023-07-27 PROCEDURE — P9016: CPT

## 2023-07-27 PROCEDURE — 97165 OT EVAL LOW COMPLEX 30 MIN: CPT

## 2023-07-27 PROCEDURE — 82962 GLUCOSE BLOOD TEST: CPT

## 2023-07-27 PROCEDURE — 86922 COMPATIBILITY TEST ANTIGLOB: CPT

## 2023-07-27 PROCEDURE — 97535 SELF CARE MNGMENT TRAINING: CPT

## 2023-07-27 PROCEDURE — 87040 BLOOD CULTURE FOR BACTERIA: CPT

## 2023-07-27 RX ORDER — OXYCODONE HYDROCHLORIDE 5 MG/1
1 TABLET ORAL
Qty: 0 | Refills: 0 | DISCHARGE
Start: 2023-07-27

## 2023-07-27 RX ORDER — MULTIVIT-MIN/FERROUS GLUCONATE 9 MG/15 ML
1 LIQUID (ML) ORAL
Qty: 0 | Refills: 0 | DISCHARGE
Start: 2023-07-27

## 2023-07-27 RX ORDER — POLYETHYLENE GLYCOL 3350 17 G/17G
17 POWDER, FOR SOLUTION ORAL
Qty: 0 | Refills: 0 | DISCHARGE
Start: 2023-07-27

## 2023-07-27 RX ORDER — ASCORBIC ACID 60 MG
1 TABLET,CHEWABLE ORAL
Qty: 0 | Refills: 0 | DISCHARGE
Start: 2023-07-27

## 2023-07-27 RX ORDER — SENNA PLUS 8.6 MG/1
2 TABLET ORAL
Qty: 0 | Refills: 0 | DISCHARGE
Start: 2023-07-27

## 2023-07-27 RX ORDER — ACETAMINOPHEN 500 MG
3 TABLET ORAL
Qty: 0 | Refills: 0 | DISCHARGE
Start: 2023-07-27

## 2023-07-27 RX ADMIN — Medication 975 MILLIGRAM(S): at 00:31

## 2023-07-27 RX ADMIN — Medication 12 UNIT(S): at 13:22

## 2023-07-27 RX ADMIN — LOSARTAN POTASSIUM 25 MILLIGRAM(S): 100 TABLET, FILM COATED ORAL at 05:21

## 2023-07-27 RX ADMIN — Medication 2: at 13:22

## 2023-07-27 RX ADMIN — GABAPENTIN 300 MILLIGRAM(S): 400 CAPSULE ORAL at 13:24

## 2023-07-27 RX ADMIN — AMLODIPINE BESYLATE 2.5 MILLIGRAM(S): 2.5 TABLET ORAL at 05:20

## 2023-07-27 RX ADMIN — Medication 81 MILLIGRAM(S): at 13:25

## 2023-07-27 RX ADMIN — OXYCODONE HYDROCHLORIDE 5 MILLIGRAM(S): 5 TABLET ORAL at 09:46

## 2023-07-27 RX ADMIN — GABAPENTIN 300 MILLIGRAM(S): 400 CAPSULE ORAL at 05:21

## 2023-07-27 RX ADMIN — POLYETHYLENE GLYCOL 3350 17 GRAM(S): 17 POWDER, FOR SOLUTION ORAL at 13:49

## 2023-07-27 RX ADMIN — Medication 975 MILLIGRAM(S): at 05:20

## 2023-07-27 RX ADMIN — Medication 975 MILLIGRAM(S): at 06:21

## 2023-07-27 RX ADMIN — Medication 1 TABLET(S): at 13:24

## 2023-07-27 RX ADMIN — Medication 12 UNIT(S): at 09:51

## 2023-07-27 RX ADMIN — OXYCODONE HYDROCHLORIDE 5 MILLIGRAM(S): 5 TABLET ORAL at 10:50

## 2023-07-27 RX ADMIN — Medication 500 MILLIGRAM(S): at 13:24

## 2023-07-27 NOTE — PROGRESS NOTE ADULT - SUBJECTIVE AND OBJECTIVE BOX
SURGERY DAILY PROGRESS NOTE:       SUBJECTIVE/ROS: Patient seen and evaluated on AM rounds.   Feels fine, awaiting rehab.        OBJECTIVE:    Vital Signs Last 24 Hrs  T(C): 36.4 (27 Jul 2023 05:00), Max: 37.2 (26 Jul 2023 16:55)  T(F): 97.6 (27 Jul 2023 05:00), Max: 98.9 (26 Jul 2023 16:55)  HR: 76 (27 Jul 2023 05:00) (76 - 77)  BP: 123/68 (27 Jul 2023 05:00) (103/55 - 123/68)  BP(mean): --  RR: 18 (27 Jul 2023 05:00) (18 - 20)  SpO2: 100% (27 Jul 2023 05:00) (98% - 100%)    Parameters below as of 27 Jul 2023 05:00  Patient On (Oxygen Delivery Method): room air      I&O's Detail    26 Jul 2023 07:01  -  27 Jul 2023 07:00  --------------------------------------------------------  IN:    Oral Fluid: 1260 mL  Total IN: 1260 mL    OUT:    Voided (mL): 1200 mL  Total OUT: 1200 mL    Total NET: 60 mL        Daily     Daily   MEDICATIONS  (STANDING):  acetaminophen     Tablet .. 975 milliGRAM(s) Oral every 6 hours  amLODIPine   Tablet 2.5 milliGRAM(s) Oral daily  ascorbic acid 500 milliGRAM(s) Oral daily  aspirin enteric coated 81 milliGRAM(s) Oral daily  atorvastatin 40 milliGRAM(s) Oral at bedtime  dextrose 5%. 1000 milliLiter(s) (50 mL/Hr) IV Continuous <Continuous>  dextrose 5%. 1000 milliLiter(s) (100 mL/Hr) IV Continuous <Continuous>  dextrose 50% Injectable 25 Gram(s) IV Push once  dextrose 50% Injectable 25 Gram(s) IV Push once  dextrose 50% Injectable 12.5 Gram(s) IV Push once  enoxaparin Injectable 40 milliGRAM(s) SubCutaneous every 24 hours  gabapentin 300 milliGRAM(s) Oral every 8 hours  glucagon  Injectable 1 milliGRAM(s) IntraMuscular once  insulin glargine Injectable (LANTUS) 18 Unit(s) SubCutaneous at bedtime  insulin lispro (ADMELOG) corrective regimen sliding scale   SubCutaneous three times a day before meals  insulin lispro (ADMELOG) corrective regimen sliding scale   SubCutaneous at bedtime  insulin lispro Injectable (ADMELOG) 12 Unit(s) SubCutaneous three times a day before meals  losartan 25 milliGRAM(s) Oral daily  multivitamin/minerals 1 Tablet(s) Oral daily  polyethylene glycol 3350 17 Gram(s) Oral daily  senna 2 Tablet(s) Oral at bedtime    MEDICATIONS  (PRN):  dextrose Oral Gel 15 Gram(s) Oral once PRN Blood Glucose LESS THAN 70 milliGRAM(s)/deciliter  oxyCODONE    IR 10 milliGRAM(s) Oral every 4 hours PRN Severe Pain (7 - 10)  oxyCODONE    IR 5 milliGRAM(s) Oral every 4 hours PRN Moderate Pain (4 - 6)      LABS:                        8.0    8.86  )-----------( 292      ( 26 Jul 2023 07:34 )             25.7     07-26    136  |  104  |  21  ----------------------------<  135<H>  3.8   |  22  |  1.04    Ca    8.5      26 Jul 2023 07:35  Phos  3.0     07-26  Mg     2.3     07-26        Urinalysis Basic - ( 26 Jul 2023 07:35 )    Color: x / Appearance: x / SG: x / pH: x  Gluc: 135 mg/dL / Ketone: x  / Bili: x / Urobili: x   Blood: x / Protein: x / Nitrite: x   Leuk Esterase: x / RBC: x / WBC x   Sq Epi: x / Non Sq Epi: x / Bacteria: x            PHYSICAL EXAM:  General:  laying in bed, NAD  Respiratory: non-labored breathing  Extremity:  right lower extremity BKA, staples intact, no active bleeding/discharge; dressing changed with gauze, and tegaderms

## 2023-07-27 NOTE — PROGRESS NOTE ADULT - ASSESSMENT
72M with h/o T2DM (A1c=8.4) with neuropathy, HTN recently admitted 3/30 with R 1st toe infection, s/p 4/10 s/p RLE SFA to PT bypass graft with PTFE followed by R foot Partial hallux Amputation 4/14, and RLE angiogram 7/3 with rotational athrectomy of proximal portion of the graft in the SFA, following by MIKE that improved flow, however a proximal lesion was demonstrated at that time that was not amenable to endovascular procedures, now admitted with nonhealing R hallux wound. S/p R BKA guillotine (7/13), s/p formalization of right BKA (7/21).       Plan  - ID consulted, appreciate recs - completed antibiotics  - Monitor vitals  - Incentive spirometer   - Keep right knee straight  - Regular diet  - home meds  - Pain control  - PT evaluation:  recommending CESAR  - dispo: discharge to Rehab once bed available    Vascular Surgery   [id number]

## 2023-07-27 NOTE — DISCHARGE NOTE NURSING/CASE MANAGEMENT/SOCIAL WORK - PATIENT PORTAL LINK FT
You can access the FollowMyHealth Patient Portal offered by [facility] by registering at the following website: [email or web address]. By joining [facility]’s FollowMyHealth portal, you will also be able to view your health information using other applications (apps) compatible with our system.

## 2023-07-27 NOTE — PROGRESS NOTE ADULT - NS ATTEND BILL GEN_ALL_CORE
Attending to bill

## 2023-07-27 NOTE — PROGRESS NOTE ADULT - NS ATTEND AMEND GEN_ALL_CORE FT
Patient care and plan discussed and reviewed with Advanced Care Provider. Plan as outlined above edited by me to reflect our discussion.
Pt care and plan discussed and reviewed with PA. Plan as outlined above edited by me to reflect our discussion. Advanced care planning/advanced directives discussed with patient/family. DNR status including forceful chest compressions to attempt to restart the heart, ventilator support/artificial breathing, electric shock, artificial nutrition, health care proxy, Molst form all discussed with pt.
Patient care and plan discussed and reviewed with Advanced Care Provider. Plan as outlined above edited by me to reflect our discussion.
Patient care and plan discussed and reviewed with Advanced Care Provider. Plan as outlined above edited by me to reflect our discussion.
Pt care and plan discussed and reviewed with PA. Plan as outlined above edited by me to reflect our discussion. Advanced care planning/advanced directives discussed with patient/family. DNR status including forceful chest compressions to attempt to restart the heart, ventilator support/artificial breathing, electric shock, artificial nutrition, health care proxy, Molst form all discussed with pt.
OR today for right below knee amputation  maintain NPO status
Patient care and plan discussed and reviewed with Advanced Care Provider. Plan as outlined above edited by me to reflect our discussion.
Pt care and plan discussed and reviewed with PA. Plan as outlined above edited by me to reflect our discussion. Advanced care planning/advanced directives discussed with patient/family. DNR status including forceful chest compressions to attempt to restart the heart, ventilator support/artificial breathing, electric shock, artificial nutrition, health care proxy, Molst form all discussed with pt.

## 2023-07-27 NOTE — PROGRESS NOTE ADULT - NS ATTEND OPT1 GEN_ALL_CORE

## 2023-07-27 NOTE — PROGRESS NOTE ADULT - ASSESSMENT
Patient is a 72 year old male with a PMHx of Type II DM associated with neuropathy, HTN who was recently admitted to [facility] with Right 1st toe infection, S/P Right LE SFA to PT bypass graft with PTFE followed by Right foot Partial hallux Amputation on 04/14, and RLE angiogram 07/03 with rotational atherectomy of proximal portion of the graft in the SFA, following by MIKE that improved flow, however a proximal lesion was demonstrated at that time that was not amenable to endovascular procedures. Patient presents to [facility] due to nonhealing Right foot wound. Internal medicine has been consulted on Mr. Paredes's care for medical management. Patient denies chest pain, palpitations, SOB or dyspnea.       Peripheral Arterial Disease   - S/P R LE SFA to PT bypass graft, RLE angiogram 07/03  - 07/08 Arterial Duplex negative for pseudoaneurysm   - On ASA 81 and Lipitor 40   - US as noted  - Care per vascular surgery appreciated     H/O R Toe infection   - S/P Great toe amputation   - Foot Xray with Mild erosive changes concerning for OM  - MR without evidence of OM, negative for abscess. F/u podiatry recs --> S/P BKA, S/P formalization 7/21 with Vasc  - ABX as per ID, S/P Augmentin PO as per ID   - UCx and BCx2 w/ Neg  final   - Pain control; Incentive spirometer   - ID follow up appreciated   - Care per Vascular/ Podiatry appreciated   - Monitor CBC, temp curve, VS and patient closely     Febrile  - 7/10, 7/12 BCx2 Neg  - F/u with ID  --> ABX switched to Meropenem, S/P PO Augmentin as per ID   - Febrile 7/22. Repeat cultures with NGTD; F/u final   - Low grade temperature. Recommend repeat infectious work up and ID follow up. Continue to monitor.   - Pt reports soft BM. If . 3 BM in 24 hours, suggest to check infectious work up. Cont to monitor.   - Monitor CBC, temp curve, VS and patient closely; Antipyretics PRN   - ID clearance appreciated    HypoNa  - Na improved. Cont to monitor    - Check Serum Na and osmolality, consistent with SIADH   - Avoid overcorrection > 6-8 mEq in 24 hours    Type II DM   - A1C 7.9   - C/w sliding scale  - C/w Lantus, increased to 20  units QHs and Pre-Meal 12 units TID; adjust as tolerated   - Diabetic/ DASH Diet   - Monitor glucose levels closely     Anemia   - Monitor Hgb closely   - Iron and Ferritin, noted, not ferritin deficient    - S/P 1 unit PRBC 7/23. Monitor H/H, hemoglobin down-trending. Repeat CBC   - No gross bleeding reported. If patient continues to have down-trending Hgb, recommend to check [place] for further work up. Monitor for gross bleeding.   - Transfuse for Hgb < 7.0     HTN   - C/w Norvasc 2.5, Losartan 25 -->If BP elevated, increase Norvasc to 5.  Currently stable on current regimen.   - Monitor BP, VS and patient closely     Pre-Op risk stratification   - Previous TTE with EF of 61%, Mild LVH, normal RV  - Patient is S/P OR     DVT and GI PPX       Discussed with Attending.

## 2023-07-27 NOTE — PROGRESS NOTE ADULT - SUBJECTIVE AND OBJECTIVE BOX
DATE OF SERVICE: 07-27-23 @ 08:32    Patient is a 72y old  Male who presents with a chief complaint of foot infection (26 Jul 2023 13:21)      INTERVAL HISTORY: Feels ok.     REVIEW OF SYSTEMS: Generalized weakness  CONSTITUTIONAL: + weakness  EYES/ENT: No visual changes;  No throat pain   NECK: No pain or stiffness  RESPIRATORY: No cough, wheezing; No shortness of breath  CARDIOVASCULAR: No chest pain or palpitations  GASTROINTESTINAL: No abdominal  pain. No nausea, vomiting, or hematemesis, + diarrhea  GENITOURINARY: No dysuria, frequency or hematuria  NEUROLOGICAL: No stroke like symptoms  SKIN: No rashes    	  MEDICATIONS:  amLODIPine   Tablet 2.5 milliGRAM(s) Oral daily  losartan 25 milliGRAM(s) Oral daily        PHYSICAL EXAM:  T(C): 36.4 (07-27-23 @ 05:00), Max: 37.2 (07-26-23 @ 16:55)  HR: 76 (07-27-23 @ 05:00) (74 - 77)  BP: 123/68 (07-27-23 @ 05:00) (103/55 - 123/68)  RR: 18 (07-27-23 @ 05:00) (18 - 20)  SpO2: 100% (07-27-23 @ 05:00) (98% - 100%)  Wt(kg): --  I&O's Summary    26 Jul 2023 07:01  -  27 Jul 2023 07:00  --------------------------------------------------------  IN: 1260 mL / OUT: 1200 mL / NET: 60 mL          Appearance: In no distress	  HEENT:    PERRL, EOMI	  Cardiovascular:  S1 S2, No JVD  Respiratory: Lungs clear to auscultation	  Gastrointestinal:  Soft, Non-tender, + BS	  Vascularature:  No edema of LE  Psychiatric: Appropriate affect   Neuro: no acute focal deficits                               8.0    8.86  )-----------( 292      ( 26 Jul 2023 07:34 )             25.7     07-26    136  |  104  |  21  ----------------------------<  135<H>  3.8   |  22  |  1.04    Ca    8.5      26 Jul 2023 07:35  Phos  3.0     07-26  Mg     2.3     07-26          Labs personally reviewed      ASSESSMENT/PLAN: 	    Patient is a 72 year old male with a PMHx of HTN, Type II DM associated with neuropathy whore presents to [facility] with a chief complaint of pain, blistering and fluid drainage on his right foot.    Problem/Plan -1  Problem: Cardiac Risk Stratification for podiatry surgery  - Denies CP or SOB  - TTE shows preserved EF, mild LVH  - Not in decompensated HF  - No tachy supa arrhythmia  - s/p cath with nonobstructive moderate CAD  - Elevated risk for low-mod risk pods surgery procedure, no contraindication to proceed   - s/p R BKA  7/13 tolerated surgery well  - s/p BKA formalization 7/21    Problem/Plan -2  Problem: Hypertension  - amlodipine  2.5mg PO daily  - c/w losartan to 25mg PO daily  - BP downtrending and slightly soft.   - Noted that patient BCx 7/22 neg. Hgb labile. Will cont to closely monitor BP.  - If cont to be soft, will hold amlodipine.    Problem/Plan -3  Problem: DM II  - HgbA1c 8.4  - ISS as per primary team            RISHABH Bello-LASHAUN Calhoun DO [facility]  Cardiovascular Medicine  [address], [facility], [address]  Available through call or text on [employer]  Office: [phone]

## 2023-07-27 NOTE — PROGRESS NOTE ADULT - SUBJECTIVE AND OBJECTIVE BOX
Name of Patient : ALEE DUNN  MRN: [id number]  Date of visit: 07-27-23 @ 13:27      Subjective: Patient seen and examined. No new events except as noted.   Patient seen earlier this AM. Sitting up in bed eating breakfast. Reports LE pain is controlled. States that his dressing was changed this morning.   Patient reports that he had a soft BM this AM. States that it was of normal color. Denies dark BM.   Denies any gross bleeding. Denies chills, body aches. Denies chest pain, palpitations, shortness of breath or dyspnea.    REVIEW OF SYSTEMS:    CONSTITUTIONAL: Generalized weakness   EYES/ENT: No visual changes;  No vertigo or throat pain   NECK: No pain or stiffness  RESPIRATORY: No cough, wheezing, hemoptysis; No shortness of breath  CARDIOVASCULAR: No chest pain or palpitations  GASTROINTESTINAL: + Soft BM; No abdominal or epigastric pain. No nausea, vomiting, or hematemesis; No diarrhea or constipation. No melena or hematochezia.  GENITOURINARY: No dysuria, frequency or hematuria  NEUROLOGICAL: No numbness or weakness  SKIN: No itching, burning, rashes, or lesions   MSK: RLE S/P BKA, reports pain controlled   All other review of systems is negative unless indicated above.    MEDICATIONS:  MEDICATIONS  (STANDING):  acetaminophen     Tablet .. 975 milliGRAM(s) Oral every 6 hours  amLODIPine   Tablet 2.5 milliGRAM(s) Oral daily  ascorbic acid 500 milliGRAM(s) Oral daily  aspirin enteric coated 81 milliGRAM(s) Oral daily  atorvastatin 40 milliGRAM(s) Oral at bedtime  dextrose 5%. 1000 milliLiter(s) (50 mL/Hr) IV Continuous <Continuous>  dextrose 5%. 1000 milliLiter(s) (100 mL/Hr) IV Continuous <Continuous>  dextrose 50% Injectable 25 Gram(s) IV Push once  dextrose 50% Injectable 25 Gram(s) IV Push once  dextrose 50% Injectable 12.5 Gram(s) IV Push once  enoxaparin Injectable 40 milliGRAM(s) SubCutaneous every 24 hours  gabapentin 300 milliGRAM(s) Oral every 8 hours  glucagon  Injectable 1 milliGRAM(s) IntraMuscular once  insulin glargine Injectable (LANTUS) 18 Unit(s) SubCutaneous at bedtime  insulin lispro (ADMELOG) corrective regimen sliding scale   SubCutaneous three times a day before meals  insulin lispro (ADMELOG) corrective regimen sliding scale   SubCutaneous at bedtime  insulin lispro Injectable (ADMELOG) 12 Unit(s) SubCutaneous three times a day before meals  losartan 25 milliGRAM(s) Oral daily  multivitamin/minerals 1 Tablet(s) Oral daily  polyethylene glycol 3350 17 Gram(s) Oral daily  senna 2 Tablet(s) Oral at bedtime      PHYSICAL EXAM:  T(C): 36.4 (07-27-23 @ 10:22), Max: 37.2 (07-26-23 @ 16:55)  HR: 77 (07-27-23 @ 10:22) (76 - 77)  BP: 118/63 (07-27-23 @ 10:22) (103/55 - 123/68)  RR: 18 (07-27-23 @ 10:22) (18 - 18)  SpO2: 98% (07-27-23 @ 10:22) (98% - 100%)  Wt(kg): --  I&O's Summary    26 Jul 2023 07:01 - 27 Jul 2023 07:00  --------------------------------------------------------  IN: 1260 mL / OUT: 1200 mL / NET: 60 mL    27 Jul 2023 07:01  -  27 Jul 2023 13:27  --------------------------------------------------------  IN: 250 mL / OUT: 700 mL / NET: -450 mL          Appearance: Normal; Sitting up in bed 	  HEENT: Eyes are open   Lymphatic: No lymphadenopathy   Cardiovascular: Normal S1 S2   Respiratory: normal effort , clear  Gastrointestinal:  Soft, Non-tender  Skin: No rashes,  warm to touch  Psychiatry:  Mood & affect appropriate  Musculoskeletal: RLE BKA; Dressing in place, C/D/I         07-26-23 @ 07:01  -  07-27-23 @ 07:00  --------------------------------------------------------  IN: 1260 mL / OUT: 1200 mL / NET: 60 mL    07-27-23 @ 07:01  -  07-27-23 @ 13:27  --------------------------------------------------------  IN: 250 mL / OUT: 700 mL / NET: -450 mL                              8.0    8.86  )-----------( 292      ( 26 Jul 2023 07:34 )             25.7               07-26    136  |  104  |  21  ----------------------------<  135<H>  3.8   |  22  |  1.04    Ca    8.5      26 Jul 2023 07:35  Phos  3.0     07-26  Mg     2.3     07-26                         Urinalysis Basic - ( 26 Jul 2023 07:35 )    Color: x / Appearance: x / SG: x / pH: x  Gluc: 135 mg/dL / Ketone: x  / Bili: x / Urobili: x   Blood: x / Protein: x / Nitrite: x   Leuk Esterase: x / RBC: x / WBC x   Sq Epi: x / Non Sq Epi: x / Bacteria: x        Culture - Blood (07.22.23 @ 22:25)   Specimen Source: .Blood Blood-Peripheral  Culture Results: No growth at 4 days    Culture - Blood (07.22.23 @ 22:20)   Specimen Source: .Blood Blood-Peripheral  Culture Results: No growth at 4 days

## 2023-07-27 NOTE — DISCHARGE NOTE NURSING/CASE MANAGEMENT/SOCIAL WORK - NSDCPEFALRISK_GEN_ALL_CORE
For information on Fall & Injury Prevention, visit: [email or web address] OR  [email or web address] OR  https://www.cdc.gov/steadi/patient.html

## 2023-07-28 LAB
CULTURE RESULTS: SIGNIFICANT CHANGE UP
CULTURE RESULTS: SIGNIFICANT CHANGE UP
SPECIMEN SOURCE: SIGNIFICANT CHANGE UP
SPECIMEN SOURCE: SIGNIFICANT CHANGE UP

## 2023-08-01 LAB — SURGICAL PATHOLOGY STUDY: SIGNIFICANT CHANGE UP

## 2023-08-01 NOTE — ED ADULT TRIAGE NOTE - RESPIRATORY RATE (BREATHS/MIN)
OCCUPATIONAL THERAPY EVALUATION/DISCHARGE    Patient: Libby Sharma (89 y.o. male)  Date: 8/1/2023  Primary Diagnosis: Sepsis ([address] [facility]) [A41.9]  Cellulitis, unspecified cellulitis site [L03.90]  Ulcer of right foot, unspecified ulcer stage ([address]) [L97.519]  Chronic kidney disease, unspecified CKD stage [N18.9]  Procedure(s) (LRB):  RIGHT FOOT DEBRIDEMENT INCISION AND DRAINAGE ABCESS, INSCION BONE CORTEX, BONE BIOPSY, SURGICAL WOUND PREP ULCERS RIGHT FOOT (Right) 2 Days Post-Op   Precautions: Fall Risk, Weight Bearing, Right Lower Extremity Weight Bearing: Non Weight Bearing,  ,  ,  ,  ,  ,    PLOF: Patient has been dealing with foot issues for over a year, got the knee scooter and canes 10 months ago. Patient has been completing ADLs independently. ASSESSMENT AND RECOMMENDATIONS:  Patient supine in bed. Reports he has just gotten back to bed from , reports he did put his foot on the ground, but knew he should not have and that it was very painful and he will be more careful. Patient is dressed in shorts and shirt, reports he donned himself. Patient expressing disappointment that he will not be driving to [place], which he was supposed to be doing soon. Patient describing course of foot issues and shoulder problems from arthritis. Patient asked multiple times if he would be willing to work with OT, educated on potential benefits, patient continued to assert he did not need it. Patient reports the PT gave him theraband and arm exercises, but that he can't really do much because of shoulder arthritis. Patient reports he uses the urinal because of frequency and IV lines, but that he goes to the bathroom \"when he needs to\". Patient educated on getting up at least a few times a day to prevent blood clots and other issues, patient verbalized understanding and stating he sits up most of the day and just laid back down recently.    No Skilled OT: Patient refusal  Patient will be discharged from occupational therapy at this with: visual, verbal, tactile, kinesthetic cues/model    Home Situation:   Social/Functional History  Lives With: Spouse  Type of Home: House  Home Layout: One level  Home Access: Level entry  Bathroom Toilet: Standard  Home Equipment: Arlyne Croak, rolling, Wheelchair-manual, Roll About, Cane, Crutches (knee scooter)  ADL Assistance: Independent  Homemaking Assistance: Independent  Ambulation Assistance: Independent  Transfer Assistance: Independent  Active : Yes  Mode of Transportation: Car  Occupation: Retired  []  Right hand dominant   []  Left hand dominant    Cognitive/Behavioral Status:     WFL         Vision/Perceptual:    Vision: Within Functional Limits                          Coordination: BUE  Coordination: Within functional limits     Coordination: Generally decreased, functional      Balance:          Strength: BUE  Strength: Generally decreased, functional  Strength: Generally decreased, functional    Tone & Sensation: BUE  Tone: Normal  Sensation: Impaired  Tone: Normal  Sensation:  (pain with cold in shoulders d/t arthritis)    Range of Motion: BUE  AROM: Generally decreased, functional  PROM: Generally decreased, functional  AROM: Generally decreased, functional  PROM: Generally decreased, functional    Functional Mobility and Transfers for ADLs:  Bed Mobility:     Bed Mobility Training  Bed Mobility Training: No  Transfers:                 Transfer Training  Transfer Training: No    ADL Assessment:      Feeding: Independent  Grooming: Modified independent   UE Bathing: Modified independent   LE Bathing: Modified independent   UE Dressing: Modified independent   LE Dressing: Modified independent   Toileting: Stand by assistance      Pain:  Pain level pre-treatment: 3/10   Pain level post-treatment: 3/10   Pain Intervention(s): Rest, Ice, Repositioning   Response to intervention: Nurse notified    Activity Tolerance:   Activity Tolerance: Patient Tolerated treatment well  Please refer to the 18

## 2023-08-23 ENCOUNTER — APPOINTMENT (OUTPATIENT)
Dept: VASCULAR SURGERY | Facility: CLINIC | Age: 73
End: 2023-08-23
Payer: MEDICARE

## 2023-08-23 VITALS
WEIGHT: 164 LBS | TEMPERATURE: 97.8 F | DIASTOLIC BLOOD PRESSURE: 72 MMHG | SYSTOLIC BLOOD PRESSURE: 137 MMHG | HEIGHT: 67 IN | HEART RATE: 78 BPM | BODY MASS INDEX: 25.74 KG/M2

## 2023-08-23 PROCEDURE — 99024 POSTOP FOLLOW-UP VISIT: CPT

## 2023-08-24 NOTE — DISCUSSION/SUMMARY
[FreeTextEntry1] : Problem #1 peripheral arterial disease s/p right below knee amputation, doing well ready for prosthetic evaluation and management follow up as needed

## 2023-08-24 NOTE — REASON FOR VISIT
[de-identified] : Right below knee amputaton [de-identified] : 07/21/23 [de-identified] : Doing well. Denies complaints. No pain in right stump. Intermittent sensation of phantom limb, but not too bothersome. Compliant with gabapentin. Discharged from rehab, no drainage from wound. [Spouse] : spouse [Family Member] : family member

## 2023-08-24 NOTE — PHYSICAL EXAM
[Alert] : alert [Oriented to Person] : oriented to person [Oriented to Place] : oriented to place [Oriented to Time] : oriented to time [Calm] : calm [FreeTextEntry1] : R BKA stump well healed, no erythema, fluctuance, or induration

## 2023-09-12 ENCOUNTER — APPOINTMENT (OUTPATIENT)
Dept: PHYSICAL MEDICINE AND REHAB | Facility: CLINIC | Age: 73
End: 2023-09-12
Payer: MEDICARE

## 2023-09-12 DIAGNOSIS — I10 ESSENTIAL (PRIMARY) HYPERTENSION: ICD-10-CM

## 2023-09-12 DIAGNOSIS — Z86.79 PERSONAL HISTORY OF OTHER DISEASES OF THE CIRCULATORY SYSTEM: ICD-10-CM

## 2023-09-12 DIAGNOSIS — Z86.39 PERSONAL HISTORY OF OTHER ENDOCRINE, NUTRITIONAL AND METABOLIC DISEASE: ICD-10-CM

## 2023-09-12 PROCEDURE — 99204 OFFICE O/P NEW MOD 45 MIN: CPT | Mod: GC

## 2023-09-12 RX ORDER — METFORMIN HYDROCHLORIDE 625 MG/1
TABLET ORAL
Refills: 0 | Status: ACTIVE | COMMUNITY

## 2023-09-12 RX ORDER — GLIPIZIDE 2.5 MG/1
TABLET ORAL
Refills: 0 | Status: ACTIVE | COMMUNITY

## 2023-09-12 RX ORDER — LORATADINE 10 MG
17 TABLET,DISINTEGRATING ORAL
Refills: 0 | Status: ACTIVE | COMMUNITY

## 2023-09-12 RX ORDER — LOSARTAN POTASSIUM 100 MG/1
TABLET, FILM COATED ORAL
Refills: 0 | Status: ACTIVE | COMMUNITY

## 2023-09-12 RX ORDER — ASPIRIN 325 MG/1
TABLET, FILM COATED ORAL
Refills: 0 | Status: ACTIVE | COMMUNITY

## 2023-09-12 RX ORDER — AMLODIPINE BESYLATE 5 MG/1
TABLET ORAL
Refills: 0 | Status: ACTIVE | COMMUNITY

## 2023-09-12 RX ORDER — SENNOSIDES 8.6 MG TABLETS 8.6 MG/1
8.6 TABLET ORAL
Refills: 0 | Status: ACTIVE | COMMUNITY

## 2023-09-12 RX ORDER — ASCORBIC ACID 500 MG
500 TABLET ORAL
Refills: 0 | Status: ACTIVE | COMMUNITY

## 2023-09-12 RX ORDER — CHLORHEXIDINE GLUCONATE 4 %
325 (65 FE) LIQUID (ML) TOPICAL
Refills: 0 | Status: ACTIVE | COMMUNITY

## 2023-09-12 RX ORDER — ACETAMINOPHEN 500 MG
500 TABLET ORAL
Refills: 0 | Status: ACTIVE | COMMUNITY

## 2023-09-12 RX ORDER — ROSUVASTATIN CALCIUM 10 MG/1
10 TABLET, FILM COATED ORAL
Refills: 0 | Status: ACTIVE | COMMUNITY

## 2023-09-19 NOTE — PROGRESS NOTE ADULT - SUBJECTIVE AND OBJECTIVE BOX
VASCULAR SURGERY DAILY PROGRESS NOTE      Subjective/24hour Events: No acute events overnight.       OBJECTIVE:  Vital Signs Last 24 Hrs  T(C): 36.7 (14 Apr 2023 06:39), Max: 37.1 (13 Apr 2023 21:34)  T(F): 98.1 (14 Apr 2023 06:35), Max: 98.7 (13 Apr 2023 21:34)  HR: 84 (14 Apr 2023 06:39) (77 - 88)  BP: 138/75 (14 Apr 2023 06:39) (103/56 - 138/75)  BP(mean): 95 (14 Apr 2023 06:39) (95 - 95)  RR: 16 (14 Apr 2023 06:39) (16 - 18)  SpO2: 97% (14 Apr 2023 06:39) (96% - 99%)    Parameters below as of 14 Apr 2023 06:39    O2 Flow (L/min): 2      I&O's Summary    13 Apr 2023 07:01  -  14 Apr 2023 07:00  --------------------------------------------------------  IN: 840 mL / OUT: 1400 mL / NET: -560 mL      Physical Exam:  General: NAD, resting comfortably in bed  Pulmonary: No respiratory distress  Incision: RLE incision C/D/I, aquacel dressing  Extremities: WWP, RLE AT, PT signals      LABS:                        7.4    6.42  )-----------( 231      ( 14 Apr 2023 04:51 )             22.9     04-14    133<L>  |  99  |  30<H>  ----------------------------<  202<H>  3.9   |  26  |  1.14    Ca    8.4      14 Apr 2023 04:51  Phos  3.1     04-14  Mg     2.4     04-14      PT/INR - ( 14 Apr 2023 04:51 )   PT: 13.3 sec;   INR: 1.14 ratio         PTT - ( 14 Apr 2023 04:51 )  PTT:28.2 sec      RADIOLOGY & ADDITIONAL STUDIES: ambulatory

## 2023-10-01 NOTE — PRE-ANESTHESIA EVALUATION ADULT - NSANTHGENDERRD_ENT_A_CORE
ACCS Focus Note    Tracheal secretions seem to be improving somewhat. Not requiring quite as frequent suctioning. Case discussed with ID. I also called and gave an update to her guardian, Susan. All questions answered.    Adia York MD  Critical Care Medicine    
Yes

## 2023-11-20 NOTE — PRE-OP CHECKLIST - ORDERS/MEDICATION ADMINISTRATION RECORD ON CHART
Reviewed with Dr. Renae. We cannot fax a prescription for controlled substance, we need to send this electronically.  Please verify the pharmacy and add it to the patient's chart so Dr. Renae can send the medication for him.   done

## 2023-11-26 ENCOUNTER — INPATIENT (INPATIENT)
Facility: HOSPITAL | Age: 73
LOS: 8 days | Discharge: HOME | DRG: 300 | End: 2023-12-05
Attending: INTERNAL MEDICINE | Admitting: INTERNAL MEDICINE
Payer: COMMERCIAL

## 2023-11-26 VITALS
WEIGHT: 138.89 LBS | TEMPERATURE: 98 F | OXYGEN SATURATION: 96 % | RESPIRATION RATE: 20 BRPM | HEIGHT: 67 IN | SYSTOLIC BLOOD PRESSURE: 136 MMHG | DIASTOLIC BLOOD PRESSURE: 60 MMHG | HEART RATE: 85 BPM

## 2023-11-26 DIAGNOSIS — Z89.429 ACQUIRED ABSENCE OF OTHER TOE(S), UNSPECIFIED SIDE: Chronic | ICD-10-CM

## 2023-11-26 DIAGNOSIS — I73.9 PERIPHERAL VASCULAR DISEASE, UNSPECIFIED: ICD-10-CM

## 2023-11-26 LAB
ALBUMIN SERPL ELPH-MCNC: 4.1 G/DL — SIGNIFICANT CHANGE UP (ref 3.3–5)
ALBUMIN SERPL ELPH-MCNC: 4.1 G/DL — SIGNIFICANT CHANGE UP (ref 3.3–5)
ALP SERPL-CCNC: 83 U/L — SIGNIFICANT CHANGE UP (ref 40–120)
ALP SERPL-CCNC: 83 U/L — SIGNIFICANT CHANGE UP (ref 40–120)
ALT FLD-CCNC: 22 U/L — SIGNIFICANT CHANGE UP (ref 10–45)
ALT FLD-CCNC: 22 U/L — SIGNIFICANT CHANGE UP (ref 10–45)
ANION GAP SERPL CALC-SCNC: 14 MMOL/L — SIGNIFICANT CHANGE UP (ref 5–17)
ANION GAP SERPL CALC-SCNC: 14 MMOL/L — SIGNIFICANT CHANGE UP (ref 5–17)
AST SERPL-CCNC: 12 U/L — SIGNIFICANT CHANGE UP (ref 10–40)
AST SERPL-CCNC: 12 U/L — SIGNIFICANT CHANGE UP (ref 10–40)
BASE EXCESS BLDV CALC-SCNC: 1.4 MMOL/L — SIGNIFICANT CHANGE UP (ref -2–3)
BASE EXCESS BLDV CALC-SCNC: 1.4 MMOL/L — SIGNIFICANT CHANGE UP (ref -2–3)
BASOPHILS # BLD AUTO: 0.01 K/UL — SIGNIFICANT CHANGE UP (ref 0–0.2)
BASOPHILS # BLD AUTO: 0.01 K/UL — SIGNIFICANT CHANGE UP (ref 0–0.2)
BASOPHILS NFR BLD AUTO: 0.1 % — SIGNIFICANT CHANGE UP (ref 0–2)
BASOPHILS NFR BLD AUTO: 0.1 % — SIGNIFICANT CHANGE UP (ref 0–2)
BILIRUB SERPL-MCNC: 0.3 MG/DL — SIGNIFICANT CHANGE UP (ref 0.2–1.2)
BILIRUB SERPL-MCNC: 0.3 MG/DL — SIGNIFICANT CHANGE UP (ref 0.2–1.2)
BUN SERPL-MCNC: 28 MG/DL — HIGH (ref 7–23)
BUN SERPL-MCNC: 28 MG/DL — HIGH (ref 7–23)
CA-I SERPL-SCNC: 1.29 MMOL/L — SIGNIFICANT CHANGE UP (ref 1.15–1.33)
CA-I SERPL-SCNC: 1.29 MMOL/L — SIGNIFICANT CHANGE UP (ref 1.15–1.33)
CALCIUM SERPL-MCNC: 10.1 MG/DL — SIGNIFICANT CHANGE UP (ref 8.4–10.5)
CALCIUM SERPL-MCNC: 10.1 MG/DL — SIGNIFICANT CHANGE UP (ref 8.4–10.5)
CHLORIDE BLDV-SCNC: 103 MMOL/L — SIGNIFICANT CHANGE UP (ref 96–108)
CHLORIDE BLDV-SCNC: 103 MMOL/L — SIGNIFICANT CHANGE UP (ref 96–108)
CHLORIDE SERPL-SCNC: 100 MMOL/L — SIGNIFICANT CHANGE UP (ref 96–108)
CHLORIDE SERPL-SCNC: 100 MMOL/L — SIGNIFICANT CHANGE UP (ref 96–108)
CO2 BLDV-SCNC: 31 MMOL/L — HIGH (ref 22–26)
CO2 BLDV-SCNC: 31 MMOL/L — HIGH (ref 22–26)
CO2 SERPL-SCNC: 25 MMOL/L — SIGNIFICANT CHANGE UP (ref 22–31)
CO2 SERPL-SCNC: 25 MMOL/L — SIGNIFICANT CHANGE UP (ref 22–31)
CREAT SERPL-MCNC: 1.13 MG/DL — SIGNIFICANT CHANGE UP (ref 0.5–1.3)
CREAT SERPL-MCNC: 1.13 MG/DL — SIGNIFICANT CHANGE UP (ref 0.5–1.3)
CRP SERPL-MCNC: 30 MG/L — HIGH (ref 0–4)
CRP SERPL-MCNC: 30 MG/L — HIGH (ref 0–4)
EGFR: 69 ML/MIN/1.73M2 — SIGNIFICANT CHANGE UP
EGFR: 69 ML/MIN/1.73M2 — SIGNIFICANT CHANGE UP
EOSINOPHIL # BLD AUTO: 0.13 K/UL — SIGNIFICANT CHANGE UP (ref 0–0.5)
EOSINOPHIL # BLD AUTO: 0.13 K/UL — SIGNIFICANT CHANGE UP (ref 0–0.5)
EOSINOPHIL NFR BLD AUTO: 1.4 % — SIGNIFICANT CHANGE UP (ref 0–6)
EOSINOPHIL NFR BLD AUTO: 1.4 % — SIGNIFICANT CHANGE UP (ref 0–6)
ERYTHROCYTE [SEDIMENTATION RATE] IN BLOOD: 32 MM/HR — HIGH (ref 0–20)
ERYTHROCYTE [SEDIMENTATION RATE] IN BLOOD: 32 MM/HR — HIGH (ref 0–20)
GAS PNL BLDV: 136 MMOL/L — SIGNIFICANT CHANGE UP (ref 136–145)
GAS PNL BLDV: 136 MMOL/L — SIGNIFICANT CHANGE UP (ref 136–145)
GAS PNL BLDV: SIGNIFICANT CHANGE UP
GAS PNL BLDV: SIGNIFICANT CHANGE UP
GLUCOSE BLDC GLUCOMTR-MCNC: 105 MG/DL — HIGH (ref 70–99)
GLUCOSE BLDC GLUCOMTR-MCNC: 105 MG/DL — HIGH (ref 70–99)
GLUCOSE BLDC GLUCOMTR-MCNC: 115 MG/DL — HIGH (ref 70–99)
GLUCOSE BLDC GLUCOMTR-MCNC: 115 MG/DL — HIGH (ref 70–99)
GLUCOSE BLDC GLUCOMTR-MCNC: 118 MG/DL — HIGH (ref 70–99)
GLUCOSE BLDC GLUCOMTR-MCNC: 118 MG/DL — HIGH (ref 70–99)
GLUCOSE BLDC GLUCOMTR-MCNC: 133 MG/DL — HIGH (ref 70–99)
GLUCOSE BLDC GLUCOMTR-MCNC: 133 MG/DL — HIGH (ref 70–99)
GLUCOSE BLDC GLUCOMTR-MCNC: 209 MG/DL — HIGH (ref 70–99)
GLUCOSE BLDC GLUCOMTR-MCNC: 209 MG/DL — HIGH (ref 70–99)
GLUCOSE BLDC GLUCOMTR-MCNC: 55 MG/DL — LOW (ref 70–99)
GLUCOSE BLDC GLUCOMTR-MCNC: 55 MG/DL — LOW (ref 70–99)
GLUCOSE BLDC GLUCOMTR-MCNC: 57 MG/DL — LOW (ref 70–99)
GLUCOSE BLDC GLUCOMTR-MCNC: 57 MG/DL — LOW (ref 70–99)
GLUCOSE BLDC GLUCOMTR-MCNC: 89 MG/DL — SIGNIFICANT CHANGE UP (ref 70–99)
GLUCOSE BLDC GLUCOMTR-MCNC: 89 MG/DL — SIGNIFICANT CHANGE UP (ref 70–99)
GLUCOSE BLDV-MCNC: 218 MG/DL — HIGH (ref 70–99)
GLUCOSE BLDV-MCNC: 218 MG/DL — HIGH (ref 70–99)
GLUCOSE SERPL-MCNC: 212 MG/DL — HIGH (ref 70–99)
GLUCOSE SERPL-MCNC: 212 MG/DL — HIGH (ref 70–99)
HCO3 BLDV-SCNC: 29 MMOL/L — SIGNIFICANT CHANGE UP (ref 22–29)
HCO3 BLDV-SCNC: 29 MMOL/L — SIGNIFICANT CHANGE UP (ref 22–29)
HCT VFR BLD CALC: 35.9 % — LOW (ref 39–50)
HCT VFR BLD CALC: 35.9 % — LOW (ref 39–50)
HCT VFR BLDA CALC: 36 % — LOW (ref 39–51)
HCT VFR BLDA CALC: 36 % — LOW (ref 39–51)
HGB BLD CALC-MCNC: 12.1 G/DL — LOW (ref 12.6–17.4)
HGB BLD CALC-MCNC: 12.1 G/DL — LOW (ref 12.6–17.4)
HGB BLD-MCNC: 11.5 G/DL — LOW (ref 13–17)
HGB BLD-MCNC: 11.5 G/DL — LOW (ref 13–17)
IMM GRANULOCYTES NFR BLD AUTO: 0.3 % — SIGNIFICANT CHANGE UP (ref 0–0.9)
IMM GRANULOCYTES NFR BLD AUTO: 0.3 % — SIGNIFICANT CHANGE UP (ref 0–0.9)
INR BLD: 1.1 RATIO — SIGNIFICANT CHANGE UP (ref 0.85–1.18)
INR BLD: 1.1 RATIO — SIGNIFICANT CHANGE UP (ref 0.85–1.18)
LACTATE BLDV-MCNC: 3.3 MMOL/L — HIGH (ref 0.5–2)
LACTATE BLDV-MCNC: 3.3 MMOL/L — HIGH (ref 0.5–2)
LYMPHOCYTES # BLD AUTO: 1.43 K/UL — SIGNIFICANT CHANGE UP (ref 1–3.3)
LYMPHOCYTES # BLD AUTO: 1.43 K/UL — SIGNIFICANT CHANGE UP (ref 1–3.3)
LYMPHOCYTES # BLD AUTO: 14.9 % — SIGNIFICANT CHANGE UP (ref 13–44)
LYMPHOCYTES # BLD AUTO: 14.9 % — SIGNIFICANT CHANGE UP (ref 13–44)
MCHC RBC-ENTMCNC: 28.7 PG — SIGNIFICANT CHANGE UP (ref 27–34)
MCHC RBC-ENTMCNC: 28.7 PG — SIGNIFICANT CHANGE UP (ref 27–34)
MCHC RBC-ENTMCNC: 32 GM/DL — SIGNIFICANT CHANGE UP (ref 32–36)
MCHC RBC-ENTMCNC: 32 GM/DL — SIGNIFICANT CHANGE UP (ref 32–36)
MCV RBC AUTO: 89.5 FL — SIGNIFICANT CHANGE UP (ref 80–100)
MCV RBC AUTO: 89.5 FL — SIGNIFICANT CHANGE UP (ref 80–100)
MONOCYTES # BLD AUTO: 0.65 K/UL — SIGNIFICANT CHANGE UP (ref 0–0.9)
MONOCYTES # BLD AUTO: 0.65 K/UL — SIGNIFICANT CHANGE UP (ref 0–0.9)
MONOCYTES NFR BLD AUTO: 6.8 % — SIGNIFICANT CHANGE UP (ref 2–14)
MONOCYTES NFR BLD AUTO: 6.8 % — SIGNIFICANT CHANGE UP (ref 2–14)
NEUTROPHILS # BLD AUTO: 7.33 K/UL — SIGNIFICANT CHANGE UP (ref 1.8–7.4)
NEUTROPHILS # BLD AUTO: 7.33 K/UL — SIGNIFICANT CHANGE UP (ref 1.8–7.4)
NEUTROPHILS NFR BLD AUTO: 76.5 % — SIGNIFICANT CHANGE UP (ref 43–77)
NEUTROPHILS NFR BLD AUTO: 76.5 % — SIGNIFICANT CHANGE UP (ref 43–77)
NRBC # BLD: 0 /100 WBCS — SIGNIFICANT CHANGE UP (ref 0–0)
NRBC # BLD: 0 /100 WBCS — SIGNIFICANT CHANGE UP (ref 0–0)
PCO2 BLDV: 59 MMHG — HIGH (ref 42–55)
PCO2 BLDV: 59 MMHG — HIGH (ref 42–55)
PH BLDV: 7.3 — LOW (ref 7.32–7.43)
PH BLDV: 7.3 — LOW (ref 7.32–7.43)
PLATELET # BLD AUTO: 262 K/UL — SIGNIFICANT CHANGE UP (ref 150–400)
PLATELET # BLD AUTO: 262 K/UL — SIGNIFICANT CHANGE UP (ref 150–400)
PO2 BLDV: 32 MMHG — SIGNIFICANT CHANGE UP (ref 25–45)
PO2 BLDV: 32 MMHG — SIGNIFICANT CHANGE UP (ref 25–45)
POTASSIUM BLDV-SCNC: 4.3 MMOL/L — SIGNIFICANT CHANGE UP (ref 3.5–5.1)
POTASSIUM BLDV-SCNC: 4.3 MMOL/L — SIGNIFICANT CHANGE UP (ref 3.5–5.1)
POTASSIUM SERPL-MCNC: 4.2 MMOL/L — SIGNIFICANT CHANGE UP (ref 3.5–5.3)
POTASSIUM SERPL-MCNC: 4.2 MMOL/L — SIGNIFICANT CHANGE UP (ref 3.5–5.3)
POTASSIUM SERPL-SCNC: 4.2 MMOL/L — SIGNIFICANT CHANGE UP (ref 3.5–5.3)
POTASSIUM SERPL-SCNC: 4.2 MMOL/L — SIGNIFICANT CHANGE UP (ref 3.5–5.3)
PROT SERPL-MCNC: 8.6 G/DL — HIGH (ref 6–8.3)
PROT SERPL-MCNC: 8.6 G/DL — HIGH (ref 6–8.3)
PROTHROM AB SERPL-ACNC: 11.5 SEC — SIGNIFICANT CHANGE UP (ref 9.5–13)
PROTHROM AB SERPL-ACNC: 11.5 SEC — SIGNIFICANT CHANGE UP (ref 9.5–13)
RBC # BLD: 4.01 M/UL — LOW (ref 4.2–5.8)
RBC # BLD: 4.01 M/UL — LOW (ref 4.2–5.8)
RBC # FLD: 12.1 % — SIGNIFICANT CHANGE UP (ref 10.3–14.5)
RBC # FLD: 12.1 % — SIGNIFICANT CHANGE UP (ref 10.3–14.5)
SAO2 % BLDV: 29.2 % — LOW (ref 67–88)
SAO2 % BLDV: 29.2 % — LOW (ref 67–88)
SODIUM SERPL-SCNC: 139 MMOL/L — SIGNIFICANT CHANGE UP (ref 135–145)
SODIUM SERPL-SCNC: 139 MMOL/L — SIGNIFICANT CHANGE UP (ref 135–145)
WBC # BLD: 9.58 K/UL — SIGNIFICANT CHANGE UP (ref 3.8–10.5)
WBC # BLD: 9.58 K/UL — SIGNIFICANT CHANGE UP (ref 3.8–10.5)
WBC # FLD AUTO: 9.58 K/UL — SIGNIFICANT CHANGE UP (ref 3.8–10.5)
WBC # FLD AUTO: 9.58 K/UL — SIGNIFICANT CHANGE UP (ref 3.8–10.5)

## 2023-11-26 PROCEDURE — 99285 EMERGENCY DEPT VISIT HI MDM: CPT

## 2023-11-26 PROCEDURE — 86077 PHYS BLOOD BANK SERV XMATCH: CPT

## 2023-11-26 PROCEDURE — 99221 1ST HOSP IP/OBS SF/LOW 40: CPT

## 2023-11-26 PROCEDURE — 73630 X-RAY EXAM OF FOOT: CPT | Mod: 26,LT

## 2023-11-26 PROCEDURE — 71045 X-RAY EXAM CHEST 1 VIEW: CPT | Mod: 26

## 2023-11-26 PROCEDURE — 93923 UPR/LXTR ART STDY 3+ LVLS: CPT | Mod: 26

## 2023-11-26 RX ORDER — OXYCODONE HYDROCHLORIDE 5 MG/1
5 TABLET ORAL EVERY 4 HOURS
Refills: 0 | Status: DISCONTINUED | OUTPATIENT
Start: 2023-11-26 | End: 2023-11-29

## 2023-11-26 RX ORDER — DEXTROSE 50 % IN WATER 50 %
15 SYRINGE (ML) INTRAVENOUS ONCE
Refills: 0 | Status: DISCONTINUED | OUTPATIENT
Start: 2023-11-26 | End: 2023-11-29

## 2023-11-26 RX ORDER — DEXTROSE 50 % IN WATER 50 %
25 SYRINGE (ML) INTRAVENOUS ONCE
Refills: 0 | Status: DISCONTINUED | OUTPATIENT
Start: 2023-11-26 | End: 2023-11-29

## 2023-11-26 RX ORDER — ACETAMINOPHEN 500 MG
650 TABLET ORAL EVERY 6 HOURS
Refills: 0 | Status: DISCONTINUED | OUTPATIENT
Start: 2023-11-26 | End: 2023-11-29

## 2023-11-26 RX ORDER — GLUCAGON INJECTION, SOLUTION 0.5 MG/.1ML
1 INJECTION, SOLUTION SUBCUTANEOUS ONCE
Refills: 0 | Status: DISCONTINUED | OUTPATIENT
Start: 2023-11-26 | End: 2023-11-29

## 2023-11-26 RX ORDER — AMLODIPINE BESYLATE 2.5 MG/1
2.5 TABLET ORAL DAILY
Refills: 0 | Status: DISCONTINUED | OUTPATIENT
Start: 2023-11-26 | End: 2023-11-29

## 2023-11-26 RX ORDER — ACETAMINOPHEN 500 MG
1000 TABLET ORAL ONCE
Refills: 0 | Status: COMPLETED | OUTPATIENT
Start: 2023-11-26 | End: 2023-11-26

## 2023-11-26 RX ORDER — ASCORBIC ACID 60 MG
500 TABLET,CHEWABLE ORAL DAILY
Refills: 0 | Status: DISCONTINUED | OUTPATIENT
Start: 2023-11-26 | End: 2023-11-29

## 2023-11-26 RX ORDER — POLYETHYLENE GLYCOL 3350 17 G/17G
17 POWDER, FOR SOLUTION ORAL DAILY
Refills: 0 | Status: DISCONTINUED | OUTPATIENT
Start: 2023-11-26 | End: 2023-11-29

## 2023-11-26 RX ORDER — SODIUM CHLORIDE 9 MG/ML
1000 INJECTION, SOLUTION INTRAVENOUS
Refills: 0 | Status: DISCONTINUED | OUTPATIENT
Start: 2023-11-26 | End: 2023-11-29

## 2023-11-26 RX ORDER — ASPIRIN/CALCIUM CARB/MAGNESIUM 324 MG
81 TABLET ORAL DAILY
Refills: 0 | Status: DISCONTINUED | OUTPATIENT
Start: 2023-11-26 | End: 2023-11-29

## 2023-11-26 RX ORDER — INSULIN LISPRO 100/ML
VIAL (ML) SUBCUTANEOUS
Refills: 0 | Status: DISCONTINUED | OUTPATIENT
Start: 2023-11-26 | End: 2023-11-29

## 2023-11-26 RX ORDER — AMPICILLIN SODIUM AND SULBACTAM SODIUM 250; 125 MG/ML; MG/ML
3 INJECTION, POWDER, FOR SUSPENSION INTRAMUSCULAR; INTRAVENOUS EVERY 6 HOURS
Refills: 0 | Status: DISCONTINUED | OUTPATIENT
Start: 2023-11-26 | End: 2023-11-29

## 2023-11-26 RX ORDER — MULTIVIT-MIN/FERROUS GLUCONATE 9 MG/15 ML
1 LIQUID (ML) ORAL DAILY
Refills: 0 | Status: DISCONTINUED | OUTPATIENT
Start: 2023-11-26 | End: 2023-11-29

## 2023-11-26 RX ORDER — SENNA PLUS 8.6 MG/1
2 TABLET ORAL AT BEDTIME
Refills: 0 | Status: DISCONTINUED | OUTPATIENT
Start: 2023-11-26 | End: 2023-11-29

## 2023-11-26 RX ORDER — AMPICILLIN SODIUM AND SULBACTAM SODIUM 250; 125 MG/ML; MG/ML
3 INJECTION, POWDER, FOR SUSPENSION INTRAMUSCULAR; INTRAVENOUS ONCE
Refills: 0 | Status: COMPLETED | OUTPATIENT
Start: 2023-11-26 | End: 2023-11-26

## 2023-11-26 RX ORDER — GABAPENTIN 400 MG/1
100 CAPSULE ORAL EVERY 8 HOURS
Refills: 0 | Status: DISCONTINUED | OUTPATIENT
Start: 2023-11-26 | End: 2023-11-29

## 2023-11-26 RX ORDER — ATORVASTATIN CALCIUM 80 MG/1
40 TABLET, FILM COATED ORAL AT BEDTIME
Refills: 0 | Status: DISCONTINUED | OUTPATIENT
Start: 2023-11-26 | End: 2023-11-29

## 2023-11-26 RX ORDER — OXYCODONE HYDROCHLORIDE 5 MG/1
10 TABLET ORAL EVERY 4 HOURS
Refills: 0 | Status: DISCONTINUED | OUTPATIENT
Start: 2023-11-26 | End: 2023-11-29

## 2023-11-26 RX ORDER — LOSARTAN POTASSIUM 100 MG/1
25 TABLET, FILM COATED ORAL DAILY
Refills: 0 | Status: DISCONTINUED | OUTPATIENT
Start: 2023-11-26 | End: 2023-11-29

## 2023-11-26 RX ORDER — DEXTROSE 50 % IN WATER 50 %
12.5 SYRINGE (ML) INTRAVENOUS ONCE
Refills: 0 | Status: DISCONTINUED | OUTPATIENT
Start: 2023-11-26 | End: 2023-11-29

## 2023-11-26 RX ADMIN — Medication 1000 MILLIGRAM(S): at 13:19

## 2023-11-26 RX ADMIN — ATORVASTATIN CALCIUM 40 MILLIGRAM(S): 80 TABLET, FILM COATED ORAL at 22:15

## 2023-11-26 RX ADMIN — Medication 400 MILLIGRAM(S): at 11:57

## 2023-11-26 RX ADMIN — AMPICILLIN SODIUM AND SULBACTAM SODIUM 200 GRAM(S): 250; 125 INJECTION, POWDER, FOR SUSPENSION INTRAMUSCULAR; INTRAVENOUS at 22:14

## 2023-11-26 RX ADMIN — AMPICILLIN SODIUM AND SULBACTAM SODIUM 200 GRAM(S): 250; 125 INJECTION, POWDER, FOR SUSPENSION INTRAMUSCULAR; INTRAVENOUS at 15:02

## 2023-11-26 RX ADMIN — GABAPENTIN 100 MILLIGRAM(S): 400 CAPSULE ORAL at 22:15

## 2023-11-26 RX ADMIN — SENNA PLUS 2 TABLET(S): 8.6 TABLET ORAL at 22:14

## 2023-11-26 NOTE — CONSULT NOTE ADULT - SUBJECTIVE AND OBJECTIVE BOX
Patient is a 72y old  Male who presents with a chief complaint of     HPI:  72 year old male with hx of DM, HTN, right LE BKA, presents to the ED for left 2nd and 3rd toe wound x 2 weeks. He states he noticed a cut to the toe 2 weeks ago and it has progressively worsened. He first noticed the color change 1 week ago. Associated 6/10 pain of RLE. States he tried to make an appointment with podiatry but was unable to. No infectious symptoms including fever, chills    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      PAD (peripheral artery disease)      History of amputation of toe          MEDICATIONS  (STANDING):  ampicillin/sulbactam  IVPB 3 Gram(s) IV Intermittent once    MEDICATIONS  (PRN):      Allergies    No Known Allergies    Intolerances        VITALS:    Vital Signs Last 24 Hrs  T(C): 36.7 (26 Nov 2023 11:09), Max: 36.7 (26 Nov 2023 09:52)  T(F): 98 (26 Nov 2023 11:09), Max: 98 (26 Nov 2023 09:52)  HR: 84 (26 Nov 2023 11:09) (84 - 85)  BP: 149/79 (26 Nov 2023 11:09) (136/60 - 149/79)  BP(mean): --  RR: 20 (26 Nov 2023 11:09) (20 - 20)  SpO2: 100% (26 Nov 2023 11:09) (96% - 100%)    Parameters below as of 26 Nov 2023 11:09  Patient On (Oxygen Delivery Method): room air        LABS:                          11.5   9.58  )-----------( 262      ( 26 Nov 2023 11:22 )             35.9       11-26    139  |  100  |  28<H>  ----------------------------<  212<H>  4.2   |  25  |  1.13    Ca    10.1      26 Nov 2023 11:22    TPro  8.6<H>  /  Alb  4.1  /  TBili  0.3  /  DBili  x   /  AST  12  /  ALT  22  /  AlkPhos  83  11-26      CAPILLARY BLOOD GLUCOSE          PT/INR - ( 26 Nov 2023 11:22 )   PT: 11.5 sec;   INR: 1.10 ratio         PTT - ( 26 Nov 2023 11:22 )  PTT:33.6 sec    LOWER EXTREMITY PHYSICAL EXAM:    Vascular: DP/PT 0/4, B/L, Temperature gradient warm to cool, B/L.   Neuro: Epicritic sensation diminished to the level of digits, B/L.  Musculoskeletal/Ortho: s/p R BKA  Skin: Left foot 2nd and 3rd digit dorsal partial thickness gangrene to level of MTPJ, plantar 2nd and 3rd digit dusky/early ischemic changes, no drainage, no malodor no acute signs of infection. Right BKA.      RADIOLOGY & ADDITIONAL STUDIES:    < from: Xray Foot AP + Lateral + Oblique, Left (11.26.23 @ 11:33) >  ACC: [id number] EXAM:  XR FOOT COMP MIN 3 VIEWS LT   ORDERED BY:  RHIANNA PLUMMER     PROCEDURE DATE:  11/26/2023          INTERPRETATION:  CLINICAL INDICATION: Necrosis of left second and third   toes.    TECHNIQUE: Left foot radiographs 3 views.    COMPARISON: None available of left foot.    IMPRESSION:  Slightly comminuted impacted left 5th proximal phalanx neck region   fracture. No gross intra-articular involvement. No dislocations or   additional fractures.    Tarsometatarsal alignment maintained without evidence for a Lisfranc   injury. Preserved joint spaces and no joint margin erosions.    Plantar and posterior calcaneal enthesophytes.    No tracking soft tissue gas collections, radiopaque foreign bodies, or   gross radiographic evidence for osteomyelitis. If concern remains MRI   suggested to further assess.    Generalized osteopenia otherwise no discrete lytic or blastic lesions.    Vascular calcifications.    < end of copied text >

## 2023-11-26 NOTE — ED PROVIDER NOTE - PHYSICAL EXAMINATION
GEN: NAD, awake, eyes open spontaneously  EYES: normal conjunctiva, perrl  ENT: NCAT, MMM, Trachea midline  CHEST/LUNGS: Non-tachypneic, CTAB, bilateral breath sounds  CARDIAC: Non-tachycardic, normal perfusion  ABDOMEN: Soft, NTND, No rebound/guarding  MSK: Right BKA with prosthesis in place. Unable to appreciate left DP pulse. Normal sensation of left foot with good movement of toes and ankle.   SKIN: Necrosis of left second and third toe without drainage.   NEURO: CN grossly intact, normal coordination, no focal motor or sensory deficits  PSYCH: Alert, appropriate, cooperative, with capacity and insight

## 2023-11-26 NOTE — CONSULT NOTE ADULT - ATTENDING COMMENTS
72 year old man with peripheral arterial disease  s/p right below knee amputation  now with chronic limb threatening ischemia of the left lower extremity, [place] [facility]  EYAD/PVRs reviewed - high likelihood for distal tibial disease and small vessel disease  however, given limb loss on the right lower extremity, I will plan to perform angiogram of the left leg for limb salvage purposes  will schedule for left lower extremity angiogram, possible revascularization tentatively for Wednesday 11/29

## 2023-11-26 NOTE — ED ADULT NURSE NOTE - OBJECTIVE STATEMENT
patient comes to the ED via walk in accompanied by wife. c/o left toe Pain and discoloration. PMH RT BKA in march 2023, DM, Diabetic neuropathy, HLD, HTN. on assessment patient has left second and third toe gangrene. unable to assess pulses on doppler. MD Mullins at bedside and aware. patient is able to move extremity and can feel most parts of foot besides gangrenous toes. extremity is warm. VSS, AAOx4. patient updated on care. will continue to monitor. patient comes to the ED via walk in accompanied by wife. c/o left toe Pain and discoloration. PMH RT BKA in march 2023, DM, Diabetic neuropathy, HLD, HTN. on assessment patient has left second and third toe gangrene. unable to assess pulses on doppler. MD Mullins at bedside and aware. patient is able to move extremity, endorses decreased sensation on anterior aspect of the left foot. extremity is warm. VSS, AAOx4. patient updated on care. will continue to monitor. patient comes to the ED via walk in accompanied by wife. c/o left toe Pain and discoloration. PMH RT BKA in march 2023, DM, Diabetic neuropathy, HLD, HTN. on assessment patient has left second and third toe gangrene. unable to assess DP pulse on doppler but Popliteal intact. MD Lorenzot at bedside and aware. patient is able to move extremity, endorses decreased sensation on anterior aspect of the left foot. extremity is warm. VSS, AAOx4. patient updated on care. will continue to monitor.

## 2023-11-26 NOTE — CONSULT NOTE ADULT - TIME BILLING
peripheral arterial disease  chronic limb threatening ischemia   coordination of care  pre-operative planning

## 2023-11-26 NOTE — CONSULT NOTE ADULT - ASSESSMENT
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 Patient now presents to ED w/ LLE 2nd and 3rd toe wound x2 weeks. Podiatry planning local wound care vs ray resection. Vascular surgery consulted for evaluation.    Recommendations:   - No acute vascular surgical intervention      - Patient presenting w/ non-healing toe wounds, non-palpable pedal pulses (signals present). Will obtain non-invasive vascular tests to evaluate for possible intervention this admission   - Obtain EYAD/PVRs w/ L foot toe pressure.  - Continue local wound care for now   - Remainder care per primary     Discussed w/ fellow       Vascular Surgery   [id number]

## 2023-11-26 NOTE — CONSULT NOTE ADULT - SUBJECTIVE AND OBJECTIVE BOX
VASCULAR SURGERY CONSULT NOTE      HPI:  Patient is a 71 yo M w/ PMHx of T2DM with neuropathy, HTN, PAD s/p RLE SFA to PT bypass w/ PTFE on 4/10 followed by R foot partial hallux amputation 4/14 and RLQ angiogram 7/3 w/ atherectomy of graft and SFA w/ persistent nonhealing wound s/p R guillotine BKA 7/13 and formalization 7/21.     Patient now presents to ED w/ LLE 2nd and 3rd toe wound x2 weeks. Podiatry planning local wound care vs ray resection. Vascular surgery consulted for evaluation     In the ED, AVSS. No leukocytosis, lactate 3.3.     PAST MEDICAL HISTORY:  DM (diabetes mellitus)    HTN (hypertension)    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    PAD (peripheral artery disease)        PAST SURGICAL HISTORY:  No significant past surgical history    History of amputation of toe        MEDICATIONS:      ALLERGIES:  No Known Allergies      VITALS & I/Os:  Vital Signs Last 24 Hrs  T(C): 36.7 (26 Nov 2023 11:09), Max: 36.7 (26 Nov 2023 09:52)  T(F): 98 (26 Nov 2023 11:09), Max: 98 (26 Nov 2023 09:52)  HR: 84 (26 Nov 2023 11:09) (84 - 85)  BP: 149/79 (26 Nov 2023 11:09) (136/60 - 149/79)  BP(mean): --  RR: 20 (26 Nov 2023 11:09) (20 - 20)  SpO2: 100% (26 Nov 2023 11:09) (96% - 100%)    Parameters below as of 26 Nov 2023 11:09  Patient On (Oxygen Delivery Method): room air        I&O's Summary      PHYSICAL EXAM:  General: No acute distress  Respiratory: Nonlabored  Cardiovascular: RRR  Abdominal: Soft, nondistended, nontender. No rebound or guarding. No organomegaly, no palpable mass.  Extremities: Warm  - RLE: AKA site well healed  - LLE: 2nd and 3rd toe wounds. DP/PT signal. Neuropathy on plantar aspect of foot. Otherwise, sensory and motor function intact.       LABS:                        11.5   9.58  )-----------( 262      ( 26 Nov 2023 11:22 )             35.9     11-26    139  |  100  |  28<H>  ----------------------------<  212<H>  4.2   |  25  |  1.13    Ca    10.1      26 Nov 2023 11:22    TPro  8.6<H>  /  Alb  4.1  /  TBili  0.3  /  DBili  x   /  AST  12  /  ALT  22  /  AlkPhos  83  11-26    Lactate:  11-26 @ 11:02  3.3    PT/INR - ( 26 Nov 2023 11:22 )   PT: 11.5 sec;   INR: 1.10 ratio         PTT - ( 26 Nov 2023 11:22 )  PTT:33.6 sec          Urinalysis Basic - ( 26 Nov 2023 11:22 )    Color: x / Appearance: x / SG: x / pH: x  Gluc: 212 mg/dL / Ketone: x  / Bili: x / Urobili: x   Blood: x / Protein: x / Nitrite: x   Leuk Esterase: x / RBC: x / WBC x   Sq Epi: x / Non Sq Epi: x / Bacteria: x

## 2023-11-26 NOTE — ED PROVIDER NOTE - OBJECTIVE STATEMENT
72 year old male with hx of DM, HTN, right LE BKA, presents to the ED for left toe     72M with h/o T2DM (A1c=8.4) with neuropathy, HTN recently admitted 3/30 with R 1st toe infection, s/p 4/10 s/p RLE SFA to PT bypass graft with PTFE followed by R foot Partial hallux Amputation 4/14, and RLE angiogram 7/3 with rotational athrectomy of proximal portion of the graft in the SFA, following by MIKE that improved flow, however a proximal lesion was demonstrated at that time that was not amenable to endovascular procedures. He presented to the hospital with nonhealing R foot wound. 72 year old male with hx of DM, HTN, right LE BKA, presents to the ED for left 2nd and 3rd toe wound x 2 weeks. 72 year old male with hx of DM, HTN, right LE BKA, presents to the ED for left 2nd and 3rd toe wound x 2 weeks. He states he noticed a cut to the toe 2 weeks ago and it has progressively worsened. He first noticed the color change 1 week ago. Associated 6/10 pain of RLE. States he tried to make an appointment with podiatry but was unable to. No infectious symptoms including fever, chills.

## 2023-11-26 NOTE — ED PROVIDER NOTE - CLINICAL SUMMARY MEDICAL DECISION MAKING FREE TEXT BOX
72 year old male with hx of dm, htn, s/p left LE BKA, presents with necrotic left 2nd and 3rd toes. Plan for labs, inflammatory markers, ecg, foot XR to evaluate for osteomyelitis, and consult with podiatry/vascular. 72 year old male with hx of dm, htn, s/p left LE BKA,  pvd  presents with necrotic left 2nd and 3rd toes. Plan for labs, inflammatory markers, ecg, foot XR to evaluate for osteomyelitis, and consult with podiatry/vascular. ZR

## 2023-11-26 NOTE — ED PROVIDER NOTE - NS ED ROS FT
CONSTITUTIONAL: No fevers, no chills, no lightheadedness, no dizziness  EYES: no visual changes, no eye pain  EARS: no ear drainage, no ear pain, no change in hearing  NOSE: no nasal congestion  MOUTH/THROAT: no sore throat  CV: No chest pain, no palpitations  RESP: No SOB, no cough  GI: No n/v/d, no abd pain  : no dysuria, no hematuria, no flank pain  MSK: no back pain, no extremity pain  SKIN: no rashes, + left toe wound

## 2023-11-26 NOTE — ED PROVIDER NOTE - PROGRESS NOTE DETAILS
Bibi Castaneda DO (PGY-1): Spoke with podiatry. Aware of patient and will evaluate in ED. Bibi Castaneda DO (PGY-1): Podiatry evaluated at bedside. Recommends covering with Unasyn and admitting to medicine. paged vascular x2 with no reply. Will re-page.

## 2023-11-26 NOTE — ED ADULT NURSE NOTE - NSFALLRISKINTERV_ED_ALL_ED

## 2023-11-26 NOTE — CONSULT NOTE ADULT - ASSESSMENT
72M presents with left foot 2nd & 3rd digit partial thickness gangrene  - Patient seen and evaluated  - Afebrile, WBC 9.58, ESR pending, CRP 30  - Left foot 2nd and 3rd digit dorsal partial thickness gangrene to level of MTPJ, plantar 2nd and 3rd digit dusky/early ischemic changes, no drainage, no malodor no acute signs of infection. Right BKA.  - Left foot Xray: no OM,no gas  - Left foot cultured: pending  - Recommend admit to medicine  - Recommend IV Unasyn  - Recommend EYAD/ PVR  - Recommend Vascular consult  - Pod plan local wound care vs partial 2&3rd ray resection pending [facility] recs  - Please document medical clearnace for possible podiatric surgical intervention under anesthesia  - Discussed with attending

## 2023-11-26 NOTE — H&P ADULT - HISTORY OF PRESENT ILLNESS
72 year old male with hx of DM, HTN, right LE BKA, presents to the ED for left 2nd and 3rd toe wound x 2 weeks. He states he noticed a cut to the toe 2 weeks ago and it has progressively worsened. He first noticed the color change 1 week ago. Associated 6/10 pain of RLE. States he tried to make an appointment with podiatry but was unable to. No infectious symptoms including fever, chills.

## 2023-11-26 NOTE — H&P ADULT - ASSESSMENT
The patient is a 72y Male complaining of Lt foot wound.    Lt foot wound infection:    IV Unasyn  Pod/Vascular eval appreciated  EYAD/PVR    DM II:    FSSS    Diabetic Neuropathy:    Cont Neurontin

## 2023-11-26 NOTE — ED ADULT NURSE REASSESSMENT NOTE - NS ED NURSE REASSESS COMMENT FT1
Patient FS low. [place] scarlett at [phone] to notify her and see if patient is NPO. As per ACP Scarlett patient able to have juice at this time. Patient given 2 apple juices. RN will recheck FS in 15 minutes.

## 2023-11-26 NOTE — ED ADULT NURSE REASSESSMENT NOTE - NS ED NURSE REASSESS COMMENT FT1
patient laying in bed comfortably. VSS. patients fingerstick was 55 ACP made aware and juice provided 15 min post FS was 89. will reassess in 15 min. patient updated on care will continue to monitor. patient laying in bed comfortably. VSS. patients fingerstick was 55 ACP made aware and juice provided 15 min post FS was 89. 30 min retake was 118. patient updated on care will continue to monitor.

## 2023-11-27 LAB
A1C WITH ESTIMATED AVERAGE GLUCOSE RESULT: 7.2 % — HIGH (ref 4–5.6)
A1C WITH ESTIMATED AVERAGE GLUCOSE RESULT: 7.2 % — HIGH (ref 4–5.6)
ALBUMIN SERPL ELPH-MCNC: 3.5 G/DL — SIGNIFICANT CHANGE UP (ref 3.3–5)
ALBUMIN SERPL ELPH-MCNC: 3.5 G/DL — SIGNIFICANT CHANGE UP (ref 3.3–5)
ALP SERPL-CCNC: 79 U/L — SIGNIFICANT CHANGE UP (ref 40–120)
ALP SERPL-CCNC: 79 U/L — SIGNIFICANT CHANGE UP (ref 40–120)
ALT FLD-CCNC: 15 U/L — SIGNIFICANT CHANGE UP (ref 10–45)
ALT FLD-CCNC: 15 U/L — SIGNIFICANT CHANGE UP (ref 10–45)
ANION GAP SERPL CALC-SCNC: 10 MMOL/L — SIGNIFICANT CHANGE UP (ref 5–17)
ANION GAP SERPL CALC-SCNC: 10 MMOL/L — SIGNIFICANT CHANGE UP (ref 5–17)
AST SERPL-CCNC: 8 U/L — LOW (ref 10–40)
AST SERPL-CCNC: 8 U/L — LOW (ref 10–40)
BILIRUB SERPL-MCNC: 0.2 MG/DL — SIGNIFICANT CHANGE UP (ref 0.2–1.2)
BILIRUB SERPL-MCNC: 0.2 MG/DL — SIGNIFICANT CHANGE UP (ref 0.2–1.2)
BUN SERPL-MCNC: 23 MG/DL — SIGNIFICANT CHANGE UP (ref 7–23)
BUN SERPL-MCNC: 23 MG/DL — SIGNIFICANT CHANGE UP (ref 7–23)
CALCIUM SERPL-MCNC: 9.4 MG/DL — SIGNIFICANT CHANGE UP (ref 8.4–10.5)
CALCIUM SERPL-MCNC: 9.4 MG/DL — SIGNIFICANT CHANGE UP (ref 8.4–10.5)
CHLORIDE SERPL-SCNC: 103 MMOL/L — SIGNIFICANT CHANGE UP (ref 96–108)
CHLORIDE SERPL-SCNC: 103 MMOL/L — SIGNIFICANT CHANGE UP (ref 96–108)
CO2 SERPL-SCNC: 24 MMOL/L — SIGNIFICANT CHANGE UP (ref 22–31)
CO2 SERPL-SCNC: 24 MMOL/L — SIGNIFICANT CHANGE UP (ref 22–31)
CREAT SERPL-MCNC: 1.06 MG/DL — SIGNIFICANT CHANGE UP (ref 0.5–1.3)
CREAT SERPL-MCNC: 1.06 MG/DL — SIGNIFICANT CHANGE UP (ref 0.5–1.3)
EGFR: 75 ML/MIN/1.73M2 — SIGNIFICANT CHANGE UP
EGFR: 75 ML/MIN/1.73M2 — SIGNIFICANT CHANGE UP
ESTIMATED AVERAGE GLUCOSE: 160 MG/DL — HIGH (ref 68–114)
ESTIMATED AVERAGE GLUCOSE: 160 MG/DL — HIGH (ref 68–114)
GLUCOSE BLDC GLUCOMTR-MCNC: 140 MG/DL — HIGH (ref 70–99)
GLUCOSE BLDC GLUCOMTR-MCNC: 140 MG/DL — HIGH (ref 70–99)
GLUCOSE BLDC GLUCOMTR-MCNC: 142 MG/DL — HIGH (ref 70–99)
GLUCOSE BLDC GLUCOMTR-MCNC: 142 MG/DL — HIGH (ref 70–99)
GLUCOSE BLDC GLUCOMTR-MCNC: 184 MG/DL — HIGH (ref 70–99)
GLUCOSE BLDC GLUCOMTR-MCNC: 184 MG/DL — HIGH (ref 70–99)
GLUCOSE BLDC GLUCOMTR-MCNC: 242 MG/DL — HIGH (ref 70–99)
GLUCOSE BLDC GLUCOMTR-MCNC: 242 MG/DL — HIGH (ref 70–99)
GLUCOSE SERPL-MCNC: 159 MG/DL — HIGH (ref 70–99)
GLUCOSE SERPL-MCNC: 159 MG/DL — HIGH (ref 70–99)
GRAM STN FLD: ABNORMAL
GRAM STN FLD: ABNORMAL
HCT VFR BLD CALC: 31 % — LOW (ref 39–50)
HCT VFR BLD CALC: 31 % — LOW (ref 39–50)
HGB BLD-MCNC: 10.2 G/DL — LOW (ref 13–17)
HGB BLD-MCNC: 10.2 G/DL — LOW (ref 13–17)
LACTATE SERPL-SCNC: 0.8 MMOL/L — SIGNIFICANT CHANGE UP (ref 0.5–2)
LACTATE SERPL-SCNC: 0.8 MMOL/L — SIGNIFICANT CHANGE UP (ref 0.5–2)
MCHC RBC-ENTMCNC: 29.2 PG — SIGNIFICANT CHANGE UP (ref 27–34)
MCHC RBC-ENTMCNC: 29.2 PG — SIGNIFICANT CHANGE UP (ref 27–34)
MCHC RBC-ENTMCNC: 32.9 GM/DL — SIGNIFICANT CHANGE UP (ref 32–36)
MCHC RBC-ENTMCNC: 32.9 GM/DL — SIGNIFICANT CHANGE UP (ref 32–36)
MCV RBC AUTO: 88.8 FL — SIGNIFICANT CHANGE UP (ref 80–100)
MCV RBC AUTO: 88.8 FL — SIGNIFICANT CHANGE UP (ref 80–100)
NRBC # BLD: 0 /100 WBCS — SIGNIFICANT CHANGE UP (ref 0–0)
NRBC # BLD: 0 /100 WBCS — SIGNIFICANT CHANGE UP (ref 0–0)
PLATELET # BLD AUTO: 225 K/UL — SIGNIFICANT CHANGE UP (ref 150–400)
PLATELET # BLD AUTO: 225 K/UL — SIGNIFICANT CHANGE UP (ref 150–400)
POTASSIUM SERPL-MCNC: 4.7 MMOL/L — SIGNIFICANT CHANGE UP (ref 3.5–5.3)
POTASSIUM SERPL-MCNC: 4.7 MMOL/L — SIGNIFICANT CHANGE UP (ref 3.5–5.3)
POTASSIUM SERPL-SCNC: 4.7 MMOL/L — SIGNIFICANT CHANGE UP (ref 3.5–5.3)
POTASSIUM SERPL-SCNC: 4.7 MMOL/L — SIGNIFICANT CHANGE UP (ref 3.5–5.3)
PROT SERPL-MCNC: 7.4 G/DL — SIGNIFICANT CHANGE UP (ref 6–8.3)
PROT SERPL-MCNC: 7.4 G/DL — SIGNIFICANT CHANGE UP (ref 6–8.3)
RBC # BLD: 3.49 M/UL — LOW (ref 4.2–5.8)
RBC # BLD: 3.49 M/UL — LOW (ref 4.2–5.8)
RBC # FLD: 12.2 % — SIGNIFICANT CHANGE UP (ref 10.3–14.5)
RBC # FLD: 12.2 % — SIGNIFICANT CHANGE UP (ref 10.3–14.5)
SODIUM SERPL-SCNC: 137 MMOL/L — SIGNIFICANT CHANGE UP (ref 135–145)
SODIUM SERPL-SCNC: 137 MMOL/L — SIGNIFICANT CHANGE UP (ref 135–145)
SPECIMEN SOURCE: SIGNIFICANT CHANGE UP
SPECIMEN SOURCE: SIGNIFICANT CHANGE UP
WBC # BLD: 6.4 K/UL — SIGNIFICANT CHANGE UP (ref 3.8–10.5)
WBC # BLD: 6.4 K/UL — SIGNIFICANT CHANGE UP (ref 3.8–10.5)
WBC # FLD AUTO: 6.4 K/UL — SIGNIFICANT CHANGE UP (ref 3.8–10.5)
WBC # FLD AUTO: 6.4 K/UL — SIGNIFICANT CHANGE UP (ref 3.8–10.5)

## 2023-11-27 PROCEDURE — 93970 EXTREMITY STUDY: CPT | Mod: 26

## 2023-11-27 RX ADMIN — Medication 81 MILLIGRAM(S): at 08:12

## 2023-11-27 RX ADMIN — SENNA PLUS 2 TABLET(S): 8.6 TABLET ORAL at 21:45

## 2023-11-27 RX ADMIN — Medication 2: at 11:35

## 2023-11-27 RX ADMIN — AMPICILLIN SODIUM AND SULBACTAM SODIUM 200 GRAM(S): 250; 125 INJECTION, POWDER, FOR SUSPENSION INTRAMUSCULAR; INTRAVENOUS at 17:27

## 2023-11-27 RX ADMIN — Medication 1 TABLET(S): at 08:12

## 2023-11-27 RX ADMIN — GABAPENTIN 100 MILLIGRAM(S): 400 CAPSULE ORAL at 14:44

## 2023-11-27 RX ADMIN — AMPICILLIN SODIUM AND SULBACTAM SODIUM 200 GRAM(S): 250; 125 INJECTION, POWDER, FOR SUSPENSION INTRAMUSCULAR; INTRAVENOUS at 04:53

## 2023-11-27 RX ADMIN — Medication 500 MILLIGRAM(S): at 08:13

## 2023-11-27 RX ADMIN — GABAPENTIN 100 MILLIGRAM(S): 400 CAPSULE ORAL at 06:03

## 2023-11-27 RX ADMIN — GABAPENTIN 100 MILLIGRAM(S): 400 CAPSULE ORAL at 21:44

## 2023-11-27 RX ADMIN — AMPICILLIN SODIUM AND SULBACTAM SODIUM 200 GRAM(S): 250; 125 INJECTION, POWDER, FOR SUSPENSION INTRAMUSCULAR; INTRAVENOUS at 21:45

## 2023-11-27 RX ADMIN — AMPICILLIN SODIUM AND SULBACTAM SODIUM 200 GRAM(S): 250; 125 INJECTION, POWDER, FOR SUSPENSION INTRAMUSCULAR; INTRAVENOUS at 09:14

## 2023-11-27 RX ADMIN — AMLODIPINE BESYLATE 2.5 MILLIGRAM(S): 2.5 TABLET ORAL at 06:03

## 2023-11-27 RX ADMIN — LOSARTAN POTASSIUM 25 MILLIGRAM(S): 100 TABLET, FILM COATED ORAL at 06:03

## 2023-11-27 RX ADMIN — ATORVASTATIN CALCIUM 40 MILLIGRAM(S): 80 TABLET, FILM COATED ORAL at 21:45

## 2023-11-27 NOTE — PATIENT PROFILE ADULT - FALL HARM RISK - HARM RISK INTERVENTIONS
Assistance with ambulation/Assistance OOB with selected safe patient handling equipment/Communicate Risk of Fall with Harm to all staff/Discuss with provider need for PT consult/Monitor gait and stability/Reinforce activity limits and safety measures with patient and family/Tailored Fall Risk Interventions/Visual Cue: Yellow wristband and red socks/Bed in lowest position, wheels locked, appropriate side rails in place/Call bell, personal items and telephone in reach/Instruct patient to call for assistance before getting out of bed or chair/Non-slip footwear when patient is out of bed/[place] to call system/Physically safe environment - no spills, clutter or unnecessary equipment/Purposeful Proactive Rounding/Room/bathroom lighting operational, light cord in reach

## 2023-11-27 NOTE — ED ADULT NURSE REASSESSMENT NOTE - NS ED NURSE REASSESS COMMENT FT1
0715-patient received alert & oriented x4. Resting & sleeping during rounds. Awaiting for a bed to floor. No complaints offered. Noted wearing right  leg prosthesis.   0746-transfered to orange.

## 2023-11-27 NOTE — PATIENT PROFILE ADULT - FUNCTIONAL ASSESSMENT - BASIC MOBILITY 6.
2-calculated by average/Not able to assess (calculate score using [facility] averaging method)

## 2023-11-27 NOTE — PROGRESS NOTE ADULT - SUBJECTIVE AND OBJECTIVE BOX
Patient is a 72y old  Male who presents with a chief complaint of The patient is a 72y Male complaining of Lt foot wound. (27 Nov 2023 10:25)      SUBJECTIVE / OVERNIGHT EVENTS:    Events noted.  CONSTITUTIONAL: No fever,  or fatigue  RESPIRATORY: No cough, wheezing,  No shortness of breath  CARDIOVASCULAR: No chest pain, palpitations, dizziness, or leg swelling  GASTROINTESTINAL: No abdominal or epigastric pain.       MEDICATIONS  (STANDING):  amLODIPine   Tablet 2.5 milliGRAM(s) Oral daily  ampicillin/sulbactam  IVPB 3 Gram(s) IV Intermittent every 6 hours  ascorbic acid 500 milliGRAM(s) Oral daily  aspirin enteric coated 81 milliGRAM(s) Oral daily  atorvastatin 40 milliGRAM(s) Oral at bedtime  dextrose 5%. 1000 milliLiter(s) (50 mL/Hr) IV Continuous <Continuous>  dextrose 5%. 1000 milliLiter(s) (100 mL/Hr) IV Continuous <Continuous>  dextrose 50% Injectable 25 Gram(s) IV Push once  dextrose 50% Injectable 25 Gram(s) IV Push once  dextrose 50% Injectable 12.5 Gram(s) IV Push once  gabapentin 100 milliGRAM(s) Oral every 8 hours  glucagon  Injectable 1 milliGRAM(s) IntraMuscular once  insulin lispro (ADMELOG) corrective regimen sliding scale   SubCutaneous three times a day before meals  losartan 25 milliGRAM(s) Oral daily  multivitamin/minerals 1 Tablet(s) Oral daily  polyethylene glycol 3350 17 Gram(s) Oral daily  senna 2 Tablet(s) Oral at bedtime    MEDICATIONS  (PRN):  acetaminophen     Tablet .. 650 milliGRAM(s) Oral every 6 hours PRN Temp greater or equal to 38C (100.4F), Mild Pain (1 - 3)  dextrose Oral Gel 15 Gram(s) Oral once PRN Blood Glucose LESS THAN 70 milliGRAM(s)/deciliter  oxyCODONE    IR 10 milliGRAM(s) Oral every 4 hours PRN Severe Pain (7 - 10)  oxyCODONE    IR 5 milliGRAM(s) Oral every 4 hours PRN Moderate Pain (4 - 6)        CAPILLARY BLOOD GLUCOSE      POCT Blood Glucose.: 184 mg/dL (27 Nov 2023 22:15)  POCT Blood Glucose.: 140 mg/dL (27 Nov 2023 17:28)  POCT Blood Glucose.: 242 mg/dL (27 Nov 2023 11:20)  POCT Blood Glucose.: 142 mg/dL (27 Nov 2023 07:54)    I&O's Summary      T(C): 36.8 (11-27-23 @ 20:34), Max: 36.9 (11-27-23 @ 05:24)  HR: 88 (11-27-23 @ 20:34) (73 - 88)  BP: 174/78 (11-27-23 @ 20:34) (130/74 - 174/78)  RR: 18 (11-27-23 @ 20:34) (16 - 20)  SpO2: 97% (11-27-23 @ 20:34) (96% - 100%)    PHYSICAL EXAM:  GENERAL: NAD  NECK: Supple, No JVD  CHEST/LUNG: Clear to auscultation bilaterally; No wheezing.  HEART: Regular rate and rhythm; No murmurs, rubs, or gallops  ABDOMEN: Soft, Nontender, Nondistended; Bowel sounds present  EXTREMITIES:   Lt foot ulcer  NEUROLOGY: AAO X 3      LABS:                        10.2   6.40  )-----------( 225      ( 27 Nov 2023 06:45 )             31.0     11-27    137  |  103  |  23  ----------------------------<  159<H>  4.7   |  24  |  1.06    Ca    9.4      27 Nov 2023 06:45    TPro  7.4  /  Alb  3.5  /  TBili  0.2  /  DBili  x   /  AST  8<L>  /  ALT  15  /  AlkPhos  79  11-27    PT/INR - ( 26 Nov 2023 11:22 )   PT: 11.5 sec;   INR: 1.10 ratio         PTT - ( 26 Nov 2023 11:22 )  PTT:33.6 sec      Urinalysis Basic - ( 27 Nov 2023 06:45 )    Color: x / Appearance: x / SG: x / pH: x  Gluc: 159 mg/dL / Ketone: x  / Bili: x / Urobili: x   Blood: x / Protein: x / Nitrite: x   Leuk Esterase: x / RBC: x / WBC x   Sq Epi: x / Non Sq Epi: x / Bacteria: x      CAPILLARY BLOOD GLUCOSE      POCT Blood Glucose.: 184 mg/dL (27 Nov 2023 22:15)  POCT Blood Glucose.: 140 mg/dL (27 Nov 2023 17:28)  POCT Blood Glucose.: 242 mg/dL (27 Nov 2023 11:20)  POCT Blood Glucose.: 142 mg/dL (27 Nov 2023 07:54)        RADIOLOGY & ADDITIONAL TESTS:    Imaging Personally Reviewed:    Consultant(s) Notes Reviewed:      Care Discussed with Consultants/Other Providers:    Luciano Lo MD, CMD, FACP    [address] [place] [address]  Office Tel: [phone]  Cell: [phone]

## 2023-11-27 NOTE — PROGRESS NOTE ADULT - ASSESSMENT
The patient is a 72y Male complaining of Lt foot wound.    Lt foot wound infection:    IV Unasyn  Pod/Vascular f/up appreciated  EYAD/PVR    DM II:    FSSS    Diabetic Neuropathy:    Cont Neurontin    Considering pt's medical condition and nature of sx, pt is at intermediate risk for the surgery.

## 2023-11-27 NOTE — CONSULT NOTE ADULT - SUBJECTIVE AND OBJECTIVE BOX
[facility] DIVISION OF INFECTIOUS DISEASES  ERVIN Florentino S. Shah, Y. Patel, [facility]  [phone]  ([phone] - weekdays after 5pm and weekends)    ALEE DUNN  72y, Male  [id number]    HPI:  Patient is a 72 year old male with PMH of DM, HTN, R BKA who presents to the ED for left 2nd and 3rd toe wound x 2 weeks. He states he noticed a cut to the toe 2 weeks ago and it has progressively worsened. He first noticed the color change 1 week ago. Associated 6/10 pain of RLE. States he tried to make an appointment with podiatry but was unable to. No infectious symptoms including fever, chills. (26 Nov 2023 14:52)  Patient seen and examined at bedside this morning. Patient confirms above history, currently has no new complaints. States he is not following with any ID specialist as outpatient.   ROS: 14 point review of systems completed, pertinent positives and negatives as per HPI.    Allergies: No Known Allergies    PMH -- DM (diabetes mellitus)  HTN (hypertension)  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  PAD (peripheral artery disease)    PSH --History of amputation of toe, RLE BKA  FH -- noncontributory   Social History -- denies tobacco, alcohol or illicit drug use    Physical Exam--  Vital Signs Last 24 Hrs  T(F): 98.2 (27 Nov 2023 07:57), Max: 98.5 (27 Nov 2023 05:24)  HR: 74 (27 Nov 2023 07:57) (73 - 85)  BP: 152/72 (27 Nov 2023 07:57) (118/69 - 152/72)  RR: 18 (27 Nov 2023 07:57) (16 - 20)  SpO2: 96% (27 Nov 2023 07:57) (96% - 100%)  General: no acute distress  HEENT: NC/AT, EOMI, anicteric, neck supple  Lungs: Clear bilaterally without rales, wheezing or rhonchi  Heart: S1, S2 present, normal rate   Abdomen: Soft. Nondistended. Nontender. BS present.   Neuro: AAOx3, no obvious focal deficits   Extremities: R BKA, no edema.  Skin: Warm. Dry. L foot 2nd/3rd toe gangrene/ischemic changes  Psychiatric: Appropriate affect and mood for situation.   Lines: PIV    Laboratory & Imaging Data--  CBC:                       10.2   6.40  )-----------( 225      ( 27 Nov 2023 06:45 )             31.0     WBC Count: 6.40 K/uL (11-27-23 @ 06:45)  WBC Count: 9.58 K/uL (11-26-23 @ 11:22)    CMP: 11-27    137  |  103  |  23  ----------------------------<  159<H>  4.7   |  24  |  1.06    Ca    9.4      27 Nov 2023 06:45    TPro  7.4  /  Alb  3.5  /  TBili  0.2  /  DBili  x   /  AST  8<L>  /  ALT  15  /  AlkPhos  79  11-27    LIVER FUNCTIONS - ( 27 Nov 2023 06:45 )  Alb: 3.5 g/dL / Pro: 7.4 g/dL / ALK PHOS: 79 U/L / ALT: 15 U/L / AST: 8 U/L / GGT: x           Microbiology: reviewed  Culture - Abscess with Gram Stain (collected 11-26-23 @ 19:14)  Source: .Abscess left foot  Gram Stain (11-27-23 @ 06:59):    Few polymorphonuclear leukocytes seen per low power field    Numerous Gram positive cocci in pairs seen per oil power field    Numerous Gram Negative Rods seen per oil power field    Radiology--reviewed  < from: Xray Chest 1 View- PORTABLE-Urgent (Xray Chest 1 View- PORTABLE-Urgent .) (11.26.23 @ 11:33) >  IMPRESSION:  Underinflated but otherwise clear lungs. No pleural effusions or   pneumothorax.    Stable cardiac and mediastinal silhouettes within projection; do not   appear grossly enlarged.    Trachea midline.    Generalized osteopenia. Spine and bilateral AC joint degenerative changes.    < end of copied text >    < from: Xray Foot AP + Lateral + Oblique, Left (11.26.23 @ 11:33) >  IMPRESSION:  Slightly comminuted impacted left 5th proximal phalanx neck region   fracture. No gross intra-articular involvement. No dislocations or   additional fractures.    Tarsometatarsal alignment maintained without evidence for a Lisfranc   injury. Preserved joint spaces and no joint margin erosions.    Plantar and posterior calcaneal enthesophytes.    No tracking soft tissue gas collections, radiopaque foreign bodies, or   gross radiographic evidence for osteomyelitis. If concern remains MRI   suggested to further assess.    Generalized osteopenia otherwise no discrete lytic or blastic lesions.    Vascular calcifications.    < end of copied text >      Active Medications--  acetaminophen     Tablet .. 650 milliGRAM(s) Oral every 6 hours PRN  amLODIPine   Tablet 2.5 milliGRAM(s) Oral daily  ampicillin/sulbactam  IVPB 3 Gram(s) IV Intermittent every 6 hours  ascorbic acid 500 milliGRAM(s) Oral daily  aspirin enteric coated 81 milliGRAM(s) Oral daily  atorvastatin 40 milliGRAM(s) Oral at bedtime  dextrose 5%. 1000 milliLiter(s) IV Continuous <Continuous>  dextrose 5%. 1000 milliLiter(s) IV Continuous <Continuous>  dextrose 50% Injectable 25 Gram(s) IV Push once  dextrose 50% Injectable 25 Gram(s) IV Push once  dextrose 50% Injectable 12.5 Gram(s) IV Push once  dextrose Oral Gel 15 Gram(s) Oral once PRN  gabapentin 100 milliGRAM(s) Oral every 8 hours  glucagon  Injectable 1 milliGRAM(s) IntraMuscular once  insulin lispro (ADMELOG) corrective regimen sliding scale   SubCutaneous three times a day before meals  losartan 25 milliGRAM(s) Oral daily  multivitamin/minerals 1 Tablet(s) Oral daily  oxyCODONE    IR 10 milliGRAM(s) Oral every 4 hours PRN  oxyCODONE    IR 5 milliGRAM(s) Oral every 4 hours PRN  polyethylene glycol 3350 17 Gram(s) Oral daily  senna 2 Tablet(s) Oral at bedtime    Current Antimicrobials:   ampicillin/sulbactam  IVPB 3 Gram(s) IV Intermittent every 6 hours    Prior/Completed Antimicrobials:  ampicillin/sulbactam  IVPB

## 2023-11-27 NOTE — CONSULT NOTE ADULT - ASSESSMENT
Patient is a 72 year old male with PMH of DM, HTN, R BKA who presents to the ED for left 2nd and 3rd toe wound for 2 weeks after cutting his toe.     L foot 2nd and 3rd digit partial thickness gangrene  - Left foot 2nd and 3rd digit dorsal partial thickness gangrene to level of MTPJ, plantar 2nd and 3rd digit dusky/early ischemic changes, no drainage, no malodor. No acute signs of infection on exam  - L foot xray with no OM or gas   - Podiatry following, planning for local wound care vs partial 2&3rd ray resection  - Vascular following, no acute intervention at this time  - started on unasyn on admission per Podiatry   - prior cultures reviewed, h/o Enterobacter, Staph lugdunensis, Delftia group from RLE    Recommendations:  Follow L foot culture - gram stain noted   Follow up EYAD/PVR  Can continue unasyn pending above  Continue rest of care per Vascular and Podiatry teams  Monitor temps/WBC      Azucena Castillo M.D.  [facility] Division of Infectious Diseases  [phone]  After 5pm on weekdays and all day on weekends - please call [phone]     Patient is a 72 year old male with PMH of DM, HTN, R BKA who presents to the ED for left 2nd and 3rd toe wound for 2 weeks after cutting his toe.     L foot 2nd and 3rd digit partial thickness gangrene with c/f infection  - Left foot 2nd and 3rd digit dorsal partial thickness gangrene to level of MTPJ, plantar 2nd and 3rd digit dusky/early ischemic changes, no drainage, no malodor. No acute signs of infection on exam  - L foot xray with no OM or gas   - Podiatry following, planning for local wound care vs partial 2&3rd ray resection  - Vascular following, no acute intervention at this time  - started on unasyn on admission per Podiatry   - prior cultures reviewed, h/o Enterobacter, Staph lugdunensis, Delftia group from RLE    Recommendations:  Follow L foot culture - gram stain noted   Follow up EYAD/PVR  Can continue unasyn pending above  Continue rest of care per Vascular and Podiatry teams  Monitor temps/WBC      Azucena Castillo M.D.  [facility] Division of Infectious Diseases  [phone]  After 5pm on weekdays and all day on weekends - please call [phone]

## 2023-11-27 NOTE — PROGRESS NOTE ADULT - ASSESSMENT
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 Patient now presents to ED w/ LLE 2nd and 3rd toe wound x2 weeks. Podiatry planning local wound care vs ray resection. Vascular surgery consulted for evaluation.    Recommendations:   - No acute vascular surgical intervention      - Patient presenting w/ non-healing toe wounds, non-palpable pedal pulses (signals present). Will obtain non-invasive vascular tests to evaluate for possible intervention this admission   - Obtain EYAD/PVRs w/ L foot toe pressure.  - Appreciate podiatry recs  - Continue local wound care for now   - Remainder care per primary

## 2023-11-27 NOTE — PROGRESS NOTE ADULT - SUBJECTIVE AND OBJECTIVE BOX
Vascular Surgery Progress Note  Patient is a 72y old  Male who presents with a chief complaint of The patient is a 72y Male complaining of Lt foot wound. (27 Nov 2023 08:38)      INTERVAL EVENTS: No acute events overnight.  SUBJECTIVE: Patient seen and examined at bedside with surgical team, patient without complaints. Denies fever, chills, CP, SOB nausea, vomiting, abdominal pain.        OBJECTIVE:    Vital Signs Last 24 Hrs  T(C): 36.8 (27 Nov 2023 07:57), Max: 36.9 (26 Nov 2023 19:57)  T(F): 98.2 (27 Nov 2023 07:57), Max: 98.5 (27 Nov 2023 05:24)  HR: 74 (27 Nov 2023 07:57) (73 - 84)  BP: 152/72 (27 Nov 2023 07:57) (118/69 - 152/72)  BP(mean): --  RR: 18 (27 Nov 2023 07:57) (16 - 20)  SpO2: 96% (27 Nov 2023 07:57) (96% - 100%)    Parameters below as of 27 Nov 2023 07:57  Patient On (Oxygen Delivery Method): room air    I&O's Detail  MEDICATIONS  (STANDING):  amLODIPine   Tablet 2.5 milliGRAM(s) Oral daily  ampicillin/sulbactam  IVPB 3 Gram(s) IV Intermittent every 6 hours  ascorbic acid 500 milliGRAM(s) Oral daily  aspirin enteric coated 81 milliGRAM(s) Oral daily  atorvastatin 40 milliGRAM(s) Oral at bedtime  dextrose 5%. 1000 milliLiter(s) (100 mL/Hr) IV Continuous <Continuous>  dextrose 5%. 1000 milliLiter(s) (50 mL/Hr) IV Continuous <Continuous>  dextrose 50% Injectable 25 Gram(s) IV Push once  dextrose 50% Injectable 25 Gram(s) IV Push once  dextrose 50% Injectable 12.5 Gram(s) IV Push once  gabapentin 100 milliGRAM(s) Oral every 8 hours  glucagon  Injectable 1 milliGRAM(s) IntraMuscular once  insulin lispro (ADMELOG) corrective regimen sliding scale   SubCutaneous three times a day before meals  losartan 25 milliGRAM(s) Oral daily  multivitamin/minerals 1 Tablet(s) Oral daily  polyethylene glycol 3350 17 Gram(s) Oral daily  senna 2 Tablet(s) Oral at bedtime    MEDICATIONS  (PRN):  acetaminophen     Tablet .. 650 milliGRAM(s) Oral every 6 hours PRN Temp greater or equal to 38C (100.4F), Mild Pain (1 - 3)  dextrose Oral Gel 15 Gram(s) Oral once PRN Blood Glucose LESS THAN 70 milliGRAM(s)/deciliter  oxyCODONE    IR 5 milliGRAM(s) Oral every 4 hours PRN Moderate Pain (4 - 6)  oxyCODONE    IR 10 milliGRAM(s) Oral every 4 hours PRN Severe Pain (7 - 10)      PHYSICAL EXAM:    General: No acute distress  Respiratory: Nonlabored  Cardiovascular: RRR  Abdominal: Soft, nondistended, nontender. No rebound or guarding. No organomegaly, no palpable mass.  Extremities: Warm  - RLE: AKA site well healed  - LLE: 2nd and 3rd toe wounds. DP/PT signal. Neuropathy on plantar aspect of foot. Otherwise, sensory and motor function intact.       LABS:                        10.2   6.40  )-----------( 225      ( 27 Nov 2023 06:45 )             31.0     11-27    137  |  103  |  23  ----------------------------<  159<H>  4.7   |  24  |  1.06    Ca    9.4      27 Nov 2023 06:45    TPro  7.4  /  Alb  3.5  /  TBili  0.2  /  DBili  x   /  AST  8<L>  /  ALT  15  /  AlkPhos  79  11-27    PT/INR - ( 26 Nov 2023 11:22 )   PT: 11.5 sec;   INR: 1.10 ratio         PTT - ( 26 Nov 2023 11:22 )  PTT:33.6 sec  LIVER FUNCTIONS - ( 27 Nov 2023 06:45 )  Alb: 3.5 g/dL / Pro: 7.4 g/dL / ALK PHOS: 79 U/L / ALT: 15 U/L / AST: 8 U/L / GGT: x           Urinalysis Basic - ( 27 Nov 2023 06:45 )    Color: x / Appearance: x / SG: x / pH: x  Gluc: 159 mg/dL / Ketone: x  / Bili: x / Urobili: x   Blood: x / Protein: x / Nitrite: x   Leuk Esterase: x / RBC: x / WBC x   Sq Epi: x / Non Sq Epi: x / Bacteria: x      ABO Interpretation: B (11-26-23 @ 11:11)      IMAGING:

## 2023-11-28 ENCOUNTER — TRANSCRIPTION ENCOUNTER (OUTPATIENT)
Age: 73
End: 2023-11-28

## 2023-11-28 LAB
-  AMIKACIN: SIGNIFICANT CHANGE UP
-  AMIKACIN: SIGNIFICANT CHANGE UP
-  AMPICILLIN/SULBACTAM: SIGNIFICANT CHANGE UP
-  AMPICILLIN/SULBACTAM: SIGNIFICANT CHANGE UP
-  CEFEPIME: SIGNIFICANT CHANGE UP
-  CEFEPIME: SIGNIFICANT CHANGE UP
-  CEFTAZIDIME: SIGNIFICANT CHANGE UP
-  CEFTAZIDIME: SIGNIFICANT CHANGE UP
-  CIPROFLOXACIN: SIGNIFICANT CHANGE UP
-  CIPROFLOXACIN: SIGNIFICANT CHANGE UP
-  GENTAMICIN: SIGNIFICANT CHANGE UP
-  GENTAMICIN: SIGNIFICANT CHANGE UP
-  IMIPENEM: SIGNIFICANT CHANGE UP
-  IMIPENEM: SIGNIFICANT CHANGE UP
-  LEVOFLOXACIN: SIGNIFICANT CHANGE UP
-  LEVOFLOXACIN: SIGNIFICANT CHANGE UP
-  MEROPENEM: SIGNIFICANT CHANGE UP
-  MEROPENEM: SIGNIFICANT CHANGE UP
-  PIPERACILLIN/TAZOBACTAM: SIGNIFICANT CHANGE UP
-  PIPERACILLIN/TAZOBACTAM: SIGNIFICANT CHANGE UP
-  TOBRAMYCIN: SIGNIFICANT CHANGE UP
-  TOBRAMYCIN: SIGNIFICANT CHANGE UP
-  TRIMETHOPRIM/SULFAMETHOXAZOLE: SIGNIFICANT CHANGE UP
-  TRIMETHOPRIM/SULFAMETHOXAZOLE: SIGNIFICANT CHANGE UP
GLUCOSE BLDC GLUCOMTR-MCNC: 165 MG/DL — HIGH (ref 70–99)
GLUCOSE BLDC GLUCOMTR-MCNC: 165 MG/DL — HIGH (ref 70–99)
GLUCOSE BLDC GLUCOMTR-MCNC: 167 MG/DL — HIGH (ref 70–99)
GLUCOSE BLDC GLUCOMTR-MCNC: 167 MG/DL — HIGH (ref 70–99)
GLUCOSE BLDC GLUCOMTR-MCNC: 185 MG/DL — HIGH (ref 70–99)
GLUCOSE BLDC GLUCOMTR-MCNC: 185 MG/DL — HIGH (ref 70–99)
GLUCOSE BLDC GLUCOMTR-MCNC: 242 MG/DL — HIGH (ref 70–99)
GLUCOSE BLDC GLUCOMTR-MCNC: 242 MG/DL — HIGH (ref 70–99)
METHOD TYPE: SIGNIFICANT CHANGE UP

## 2023-11-28 RX ADMIN — Medication 500 MILLIGRAM(S): at 13:24

## 2023-11-28 RX ADMIN — AMLODIPINE BESYLATE 2.5 MILLIGRAM(S): 2.5 TABLET ORAL at 05:15

## 2023-11-28 RX ADMIN — Medication 1 TABLET(S): at 13:24

## 2023-11-28 RX ADMIN — GABAPENTIN 100 MILLIGRAM(S): 400 CAPSULE ORAL at 13:25

## 2023-11-28 RX ADMIN — Medication 81 MILLIGRAM(S): at 13:23

## 2023-11-28 RX ADMIN — AMPICILLIN SODIUM AND SULBACTAM SODIUM 200 GRAM(S): 250; 125 INJECTION, POWDER, FOR SUSPENSION INTRAMUSCULAR; INTRAVENOUS at 18:09

## 2023-11-28 RX ADMIN — ATORVASTATIN CALCIUM 40 MILLIGRAM(S): 80 TABLET, FILM COATED ORAL at 21:15

## 2023-11-28 RX ADMIN — SENNA PLUS 2 TABLET(S): 8.6 TABLET ORAL at 21:15

## 2023-11-28 RX ADMIN — AMPICILLIN SODIUM AND SULBACTAM SODIUM 200 GRAM(S): 250; 125 INJECTION, POWDER, FOR SUSPENSION INTRAMUSCULAR; INTRAVENOUS at 05:14

## 2023-11-28 RX ADMIN — POLYETHYLENE GLYCOL 3350 17 GRAM(S): 17 POWDER, FOR SOLUTION ORAL at 13:25

## 2023-11-28 RX ADMIN — Medication 1: at 09:40

## 2023-11-28 RX ADMIN — Medication 2: at 13:23

## 2023-11-28 RX ADMIN — GABAPENTIN 100 MILLIGRAM(S): 400 CAPSULE ORAL at 05:14

## 2023-11-28 RX ADMIN — Medication 1: at 18:09

## 2023-11-28 RX ADMIN — LOSARTAN POTASSIUM 25 MILLIGRAM(S): 100 TABLET, FILM COATED ORAL at 05:14

## 2023-11-28 RX ADMIN — GABAPENTIN 100 MILLIGRAM(S): 400 CAPSULE ORAL at 21:15

## 2023-11-28 RX ADMIN — AMPICILLIN SODIUM AND SULBACTAM SODIUM 200 GRAM(S): 250; 125 INJECTION, POWDER, FOR SUSPENSION INTRAMUSCULAR; INTRAVENOUS at 23:14

## 2023-11-28 RX ADMIN — AMPICILLIN SODIUM AND SULBACTAM SODIUM 200 GRAM(S): 250; 125 INJECTION, POWDER, FOR SUSPENSION INTRAMUSCULAR; INTRAVENOUS at 09:42

## 2023-11-28 NOTE — PROGRESS NOTE ADULT - ASSESSMENT
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 Patient now presents to ED w/ LLE 2nd and 3rd toe wound x2 weeks. Podiatry planning local wound care vs ray resection. Vascular surgery consulted for evaluation.    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 Patient now presents to ED w/ LLE 2nd and 3rd toe wound x2 weeks. Podiatry planning local wound care vs ray resection. Vascular surgery consulted for evaluation.    Recommendations:   - Plan for LLE angiogram on 11/29  - Appreciate podiatry recs  - Continue local wound care for now   - Remainder care per primary

## 2023-11-28 NOTE — PRE PROCEDURE NOTE - PRE PROCEDURE EVALUATION
------------------------------------------------------------  Vascular Surgery Pre-Procedure Note  ------------------------------------------------------------    Indication: 72y Male with history of PAD, most recently s/p R BKA in July, presenting with chronic wounds of the left toes. Planned for LLE angiography, possible intervention.    Past Medical History:  DM (diabetes mellitus)    HTN (hypertension)    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    PAD (peripheral artery disease)        Allergies: No Known Allergies      Medications:  amLODIPine   Tablet: 2.5 milliGRAM(s) Oral (11-28-23 @ 05:15)  ampicillin/sulbactam  IVPB: 200 mL/Hr IV Intermittent (11-28-23 @ 09:42)  ampicillin/sulbactam  IVPB: 200 mL/Hr IV Intermittent (11-26-23 @ 15:02)  aspirin enteric coated: 81 milliGRAM(s) Oral (11-27-23 @ 08:12)  losartan: 25 milliGRAM(s) Oral (11-28-23 @ 05:14)      Vital Signs:   T(F): 97.7 (05:33), Max: 98.4 (11:55)  HR: 86 (05:33)  BP: 153/77 (06:48)  RR: 18 (05:33)  SpO2: 97% (05:33)    Labs:           10.2  6.40)-----(225     (11-27-23 @ 06:45)         31.0     137 | 103 | 23  --------------------< 159     (11-27-23 @ 06:45)  4.7 | 24 | 1.06       PT: 11.5 11-26-23 @ 11:22  aPTT: 33.6 11-26-23 @ 11:22   INR: 1.10 11-26-23 @ 11:22    Consent: will obtain today    Procedure Plan: LLE angiogram, possible revascularization

## 2023-11-28 NOTE — PROGRESS NOTE ADULT - SUBJECTIVE AND OBJECTIVE BOX
Patient is a 72y old  Male who presents with a chief complaint of The patient is a 72y Male complaining of Lt foot wound. (28 Nov 2023 15:39)      SUBJECTIVE / OVERNIGHT EVENTS:    Events noted.  CONSTITUTIONAL: No fever,  or fatigue  RESPIRATORY: No cough, wheezing,  No shortness of breath  CARDIOVASCULAR: No chest pain, palpitations, dizziness, or leg swelling  GASTROINTESTINAL: No abdominal or epigastric pain.       MEDICATIONS  (STANDING):  amLODIPine   Tablet 2.5 milliGRAM(s) Oral daily  ampicillin/sulbactam  IVPB 3 Gram(s) IV Intermittent every 6 hours  ascorbic acid 500 milliGRAM(s) Oral daily  aspirin enteric coated 81 milliGRAM(s) Oral daily  atorvastatin 40 milliGRAM(s) Oral at bedtime  dextrose 5%. 1000 milliLiter(s) (50 mL/Hr) IV Continuous <Continuous>  dextrose 5%. 1000 milliLiter(s) (100 mL/Hr) IV Continuous <Continuous>  dextrose 50% Injectable 25 Gram(s) IV Push once  dextrose 50% Injectable 25 Gram(s) IV Push once  dextrose 50% Injectable 12.5 Gram(s) IV Push once  gabapentin 100 milliGRAM(s) Oral every 8 hours  glucagon  Injectable 1 milliGRAM(s) IntraMuscular once  insulin lispro (ADMELOG) corrective regimen sliding scale   SubCutaneous three times a day before meals  losartan 25 milliGRAM(s) Oral daily  multivitamin/minerals 1 Tablet(s) Oral daily  polyethylene glycol 3350 17 Gram(s) Oral daily  senna 2 Tablet(s) Oral at bedtime    MEDICATIONS  (PRN):  acetaminophen     Tablet .. 650 milliGRAM(s) Oral every 6 hours PRN Temp greater or equal to 38C (100.4F), Mild Pain (1 - 3)  dextrose Oral Gel 15 Gram(s) Oral once PRN Blood Glucose LESS THAN 70 milliGRAM(s)/deciliter  oxyCODONE    IR 10 milliGRAM(s) Oral every 4 hours PRN Severe Pain (7 - 10)  oxyCODONE    IR 5 milliGRAM(s) Oral every 4 hours PRN Moderate Pain (4 - 6)        CAPILLARY BLOOD GLUCOSE      POCT Blood Glucose.: 185 mg/dL (28 Nov 2023 22:12)  POCT Blood Glucose.: 167 mg/dL (28 Nov 2023 17:39)  POCT Blood Glucose.: 242 mg/dL (28 Nov 2023 12:24)  POCT Blood Glucose.: 165 mg/dL (28 Nov 2023 08:32)    I&O's Summary    28 Nov 2023 07:01  -  28 Nov 2023 22:51  --------------------------------------------------------  IN: 480 mL / OUT: 800 mL / NET: -320 mL        T(C): 36.3 (11-28-23 @ 21:25), Max: 36.5 (11-28-23 @ 05:33)  HR: 83 (11-28-23 @ 21:25) (79 - 86)  BP: 175/82 (11-28-23 @ 21:25) (152/71 - 175/82)  RR: 18 (11-28-23 @ 21:25) (18 - 18)  SpO2: 99% (11-28-23 @ 21:25) (97% - 99%)    PHYSICAL EXAM:  GENERAL: NAD  NECK: Supple, No JVD  CHEST/LUNG: Clear to auscultation bilaterally; No wheezing.  HEART: Regular rate and rhythm; No murmurs, rubs, or gallops  ABDOMEN: Soft, Nontender, Nondistended; Bowel sounds present  EXTREMITIES:   Lt foot wound  NEUROLOGY: AAO X 3      LABS:                        10.2   6.40  )-----------( 225      ( 27 Nov 2023 06:45 )             31.0     11-27    137  |  103  |  23  ----------------------------<  159<H>  4.7   |  24  |  1.06    Ca    9.4      27 Nov 2023 06:45    TPro  7.4  /  Alb  3.5  /  TBili  0.2  /  DBili  x   /  AST  8<L>  /  ALT  15  /  AlkPhos  79  11-27          Urinalysis Basic - ( 27 Nov 2023 06:45 )    Color: x / Appearance: x / SG: x / pH: x  Gluc: 159 mg/dL / Ketone: x  / Bili: x / Urobili: x   Blood: x / Protein: x / Nitrite: x   Leuk Esterase: x / RBC: x / WBC x   Sq Epi: x / Non Sq Epi: x / Bacteria: x      CAPILLARY BLOOD GLUCOSE      POCT Blood Glucose.: 185 mg/dL (28 Nov 2023 22:12)  POCT Blood Glucose.: 167 mg/dL (28 Nov 2023 17:39)  POCT Blood Glucose.: 242 mg/dL (28 Nov 2023 12:24)  POCT Blood Glucose.: 165 mg/dL (28 Nov 2023 08:32)        RADIOLOGY & ADDITIONAL TESTS:    Imaging Personally Reviewed:    Consultant(s) Notes Reviewed:      Care Discussed with Consultants/Other Providers:    Luciano Lo MD, CMD, FACP    [id number] [id number]  Office Tel: [phone]  Cell: [phone]

## 2023-11-28 NOTE — PROGRESS NOTE ADULT - SUBJECTIVE AND OBJECTIVE BOX
Patient is a 72y old  Male who presents with a chief complaint of The patient is a 72y Male complaining of Lt foot wound. (27 Nov 2023 15:20)       INTERVAL HPI/OVERNIGHT EVENTS:  Patient seen and evaluated at bedside.  Pt is resting comfortable in NAD. Denies N/V/F/C.    Allergies    No Known Allergies    Intolerances        Vital Signs Last 24 Hrs  T(C): 36.5 (28 Nov 2023 05:33), Max: 36.9 (27 Nov 2023 11:55)  T(F): 97.7 (28 Nov 2023 05:33), Max: 98.4 (27 Nov 2023 11:55)  HR: 86 (28 Nov 2023 05:33) (77 - 88)  BP: 153/77 (28 Nov 2023 06:48) (130/74 - 175/79)  BP(mean): --  RR: 18 (28 Nov 2023 05:33) (18 - 18)  SpO2: 97% (28 Nov 2023 05:33) (97% - 97%)    Parameters below as of 28 Nov 2023 05:33  Patient On (Oxygen Delivery Method): room air        LABS:                        10.2   6.40  )-----------( 225      ( 27 Nov 2023 06:45 )             31.0     11-27    137  |  103  |  23  ----------------------------<  159<H>  4.7   |  24  |  1.06    Ca    9.4      27 Nov 2023 06:45    TPro  7.4  /  Alb  3.5  /  TBili  0.2  /  DBili  x   /  AST  8<L>  /  ALT  15  /  AlkPhos  79  11-27    PT/INR - ( 26 Nov 2023 11:22 )   PT: 11.5 sec;   INR: 1.10 ratio         PTT - ( 26 Nov 2023 11:22 )  PTT:33.6 sec  Urinalysis Basic - ( 27 Nov 2023 06:45 )    Color: x / Appearance: x / SG: x / pH: x  Gluc: 159 mg/dL / Ketone: x  / Bili: x / Urobili: x   Blood: x / Protein: x / Nitrite: x   Leuk Esterase: x / RBC: x / WBC x   Sq Epi: x / Non Sq Epi: x / Bacteria: x      CAPILLARY BLOOD GLUCOSE      POCT Blood Glucose.: 165 mg/dL (28 Nov 2023 08:32)  POCT Blood Glucose.: 184 mg/dL (27 Nov 2023 22:15)  POCT Blood Glucose.: 140 mg/dL (27 Nov 2023 17:28)  POCT Blood Glucose.: 242 mg/dL (27 Nov 2023 11:20)      Lower Extremity Physical Exam:  Vascular: DP/PT 0/4, B/L, Temperature gradient warm to cool, B/L.   Neuro: Epicritic sensation diminished to the level of digits, B/L.  Musculoskeletal/Ortho: s/p R BKA  Skin: Left foot 2nd and 3rd digit dorsal partial thickness gangrene to level of MTPJ, plantar 2nd and 3rd digit dusky and early ischemic changes, no drainage, no malodor, no acute signs of infection.     RADIOLOGY & ADDITIONAL TESTS:

## 2023-11-28 NOTE — PROGRESS NOTE ADULT - SUBJECTIVE AND OBJECTIVE BOX
[facility] INF[facility] DISE[facility]ES  ERVIN Florentino Y. Patel, S. Shah, G. Ervin  [phone]  ([phone] - weekdays after 5pm and weekends)    Name: ALEE DUNN  Age/Gender: 72y Male  MRN: [id number]    Interval History:  Patient seen and examined this morning.   Denies fever, pain or any new complaints.  Notes reviewed  No concerning overnight events  Afebrile   Allergies: No Known Allergies      Objective:  Vitals:   T(F): 97.7 (11-28-23 @ 05:33), Max: 98.4 (11-27-23 @ 11:55)  HR: 86 (11-28-23 @ 05:33) (77 - 88)  BP: 153/77 (11-28-23 @ 06:48) (130/74 - 175/79)  RR: 18 (11-28-23 @ 05:33) (18 - 18)  SpO2: 97% (11-28-23 @ 05:33) (97% - 97%)  Physical Examination:  General: no acute distress  HEENT: NC/AT, anicteric, neck supple  Respiratory: no acc muscle use, breathing comfortably  Cardiovascular: S1 and S2 present  Gastrointestinal: normal appearing, nondistended  Extremities: R BKA, L foot dressing   Skin: no visible rash    Laboratory Studies:  CBC:                       10.2   6.40  )-----------( 225      ( 27 Nov 2023 06:45 )             31.0     WBC Trend:  6.40 11-27-23 @ 06:45  9.58 11-26-23 @ 11:22    CMP: 11-27    137  |  103  |  23  ----------------------------<  159<H>  4.7   |  24  |  1.06    Ca    9.4      27 Nov 2023 06:45    TPro  7.4  /  Alb  3.5  /  TBili  0.2  /  DBili  x   /  AST  8<L>  /  ALT  15  /  AlkPhos  79  11-27    Creatinine: 1.06 mg/dL (11-27-23 @ 06:45)  Creatinine: 1.13 mg/dL (11-26-23 @ 11:22)    LIVER FUNCTIONS - ( 27 Nov 2023 06:45 )  Alb: 3.5 g/dL / Pro: 7.4 g/dL / ALK PHOS: 79 U/L / ALT: 15 U/L / AST: 8 U/L / GGT: x           Microbiology: reviewed   Culture - Abscess with Gram Stain (collected 11-26-23 @ 19:14)  Source: .Abscess left foot  Gram Stain (11-27-23 @ 06:59):    Few polymorphonuclear leukocytes seen per low power field    Numerous Gram positive cocci in pairs seen per oil power field    Numerous Gram Negative Rods seen per oil power field  Preliminary Report (11-27-23 @ 22:05):    Culture yields >4 types of aerobic and/or anaerobic bacteria    Call client services within 7 days if further workup is clinically    indicated.    Culture includes    Moderate Acinetobacter ursingii Susceptibility to follow.    Few Serratia liquefaciens Susceptibility to follow.    Radiology: reviewed     Medications:  acetaminophen     Tablet .. 650 milliGRAM(s) Oral every 6 hours PRN  amLODIPine   Tablet 2.5 milliGRAM(s) Oral daily  ampicillin/sulbactam  IVPB 3 Gram(s) IV Intermittent every 6 hours  ascorbic acid 500 milliGRAM(s) Oral daily  aspirin enteric coated 81 milliGRAM(s) Oral daily  atorvastatin 40 milliGRAM(s) Oral at bedtime  dextrose 5%. 1000 milliLiter(s) IV Continuous <Continuous>  dextrose 5%. 1000 milliLiter(s) IV Continuous <Continuous>  dextrose 50% Injectable 25 Gram(s) IV Push once  dextrose 50% Injectable 25 Gram(s) IV Push once  dextrose 50% Injectable 12.5 Gram(s) IV Push once  dextrose Oral Gel 15 Gram(s) Oral once PRN  gabapentin 100 milliGRAM(s) Oral every 8 hours  glucagon  Injectable 1 milliGRAM(s) IntraMuscular once  insulin lispro (ADMELOG) corrective regimen sliding scale   SubCutaneous three times a day before meals  losartan 25 milliGRAM(s) Oral daily  multivitamin/minerals 1 Tablet(s) Oral daily  oxyCODONE    IR 5 milliGRAM(s) Oral every 4 hours PRN  oxyCODONE    IR 10 milliGRAM(s) Oral every 4 hours PRN  polyethylene glycol 3350 17 Gram(s) Oral daily  senna 2 Tablet(s) Oral at bedtime    Current Antimicrobials:  ampicillin/sulbactam  IVPB 3 Gram(s) IV Intermittent every 6 hours    Prior/Completed Antimicrobials:  ampicillin/sulbactam  IVPB

## 2023-11-28 NOTE — PROGRESS NOTE ADULT - ASSESSMENT
Patient is a 72 year old male with PMH of DM, HTN, R BKA who presents to the ED for left 2nd and 3rd toe wound for 2 weeks after cutting his toe.     L foot 2nd and 3rd digit partial thickness gangrene with c/f infection  - Left foot 2nd and 3rd digit dorsal partial thickness gangrene to level of MTPJ, plantar 2nd and 3rd digit dusky/early ischemic changes, no drainage, no malodor. No acute signs of infection on exam  - L foot xray with no OM or gas   - L foot culture with >4 organisms including noted with Acinetobacter ursingii and Serratia liquefaciens - may be contaminants   - started on unasyn on admission per Podiatry   - prior cultures reviewed, h/o Enterobacter, Staph lugdunensis, Delftia group from RLE    Recommendations:  Follow L foot culture for sensitivities   Continue unasyn pending above  Vascular following, follow up EYAD/PVR  Podiatry planning for L foot partial 2&3rd ray resection on 12/5 at 3pm  Monitor temps/WBC      Azucena Castillo M.D.  [facility] Division of Infectious Diseases  [phone]  After 5pm on weekdays and all day on weekends - please call [phone]

## 2023-11-28 NOTE — PROGRESS NOTE ADULT - SUBJECTIVE AND OBJECTIVE BOX
SUBJECTIVE: Patient seen and examined on AM rounds. Patient reports that they're feeling well. Denies fever, chills. Reports pain as controlled. No complaints at this time.     Vital Signs Last 24 Hrs  T(C): 36.2 (28 Nov 2023 12:15), Max: 36.8 (27 Nov 2023 20:34)  T(F): 97.2 (28 Nov 2023 12:15), Max: 98.3 (27 Nov 2023 20:34)  HR: 79 (28 Nov 2023 12:15) (79 - 88)  BP: 152/71 (28 Nov 2023 12:15) (152/71 - 175/79)  BP(mean): --  RR: 18 (28 Nov 2023 12:15) (18 - 18)  SpO2: 98% (28 Nov 2023 12:15) (97% - 98%)    Parameters below as of 28 Nov 2023 12:15  Patient On (Oxygen Delivery Method): room air        PHYSICAL EXAM:    General: No acute distress  Respiratory: Nonlabored  Cardiovascular: RRR  Abdominal: Soft, nondistended, nontender. No rebound or guarding. No organomegaly, no palpable mass.  Extremities: Warm  - RLE: AKA site well healed  - LLE: 2nd and 3rd toe wounds. DP/PT signal. Neuropathy on plantar aspect of foot. Otherwise, sensory and motor function intact.     I&O's Summary    28 Nov 2023 07:01  -  28 Nov 2023 15:39  --------------------------------------------------------  IN: 480 mL / OUT: 800 mL / NET: -320 mL      I&O's Detail    28 Nov 2023 07:01  -  28 Nov 2023 15:39  --------------------------------------------------------  IN:    Oral Fluid: 480 mL  Total IN: 480 mL    OUT:    Voided (mL): 800 mL  Total OUT: 800 mL    Total NET: -320 mL          LABS:                        10.2   6.40  )-----------( 225      ( 27 Nov 2023 06:45 )             31.0     11-27    137  |  103  |  23  ----------------------------<  159<H>  4.7   |  24  |  1.06    Ca    9.4      27 Nov 2023 06:45    TPro  7.4  /  Alb  3.5  /  TBili  0.2  /  DBili  x   /  AST  8<L>  /  ALT  15  /  AlkPhos  79  11-27      Urinalysis Basic - ( 27 Nov 2023 06:45 )    Color: x / Appearance: x / SG: x / pH: x  Gluc: 159 mg/dL / Ketone: x  / Bili: x / Urobili: x   Blood: x / Protein: x / Nitrite: x   Leuk Esterase: x / RBC: x / WBC x   Sq Epi: x / Non Sq Epi: x / Bacteria: x        RADIOLOGY & ADDITIONAL STUDIES:

## 2023-11-28 NOTE — PROGRESS NOTE ADULT - ASSESSMENT
72M presents with left foot 2nd & 3rd digit partial thickness gangrene.  - Patient seen and evaluated.  - Afebrile, no leukocytosis.  - Left foot 2nd and 3rd digit dorsal partial thickness gangrene to level of MTPJ, plantar 2nd and 3rd digit dusky and early ischemic changes, no drainage, no malodor, no acute signs of infection.   - Left Foot X-Ray: no OM, no gas.  - Left Foot Culture: >4 types of aerobic and anaerobic bacteria, Acinetobacter ursingii (prelim).  - ID recs, appreciated.  - Vasc planning LLE angio on Wed 11/29, appreciated.  - Pod Plan: Booked for for left foot partial 2nd and 3rd ray resections on Tues 12/5 at 3pm with Dr. Mcclure.  - Please document medical clearance for podiatric surgical intervention under anesthesia.  - Seen with attending. 72M presents with left foot 2nd & 3rd digit partial thickness gangrene.  - Patient seen and evaluated.  - Afebrile, no leukocytosis.  - Left foot 2nd and 3rd digit dorsal partial thickness gangrene to level of MTPJ, plantar 2nd and 3rd digit dusky and early ischemic changes, no drainage, no malodor, no acute signs of infection.   - Left Foot X-Ray: no OM, no gas.  - Left Foot Culture: >4 types of aerobic and anaerobic bacteria, Acinetobacter ursingii (prelim).  - ID recs, appreciated.  - Vasc planning LLE angio on Wed 11/29, appreciated.  - Pod Plan: Booked for for left foot partial 2nd and 3rd ray resections on Tues 12/5 at 3pm with Dr. Mcclure.  - Documented medical clearance for podiatric surgical intervention under anesthesia, appreciated.  - Seen with attending.

## 2023-11-28 NOTE — PROGRESS NOTE ADULT - ASSESSMENT
The patient is a 72y Male complaining of Lt foot wound.    Lt foot wound infection:    IV Unasyn  Pod/Vascular f/up appreciated  For LLE angio tomorrow  EYAD/PVR    DM II:    FSSS    Diabetic Neuropathy:    Cont Neurontin    Considering pt's medical condition and nature of sx, pt is at intermediate risk for the surgery.

## 2023-11-29 ENCOUNTER — APPOINTMENT (OUTPATIENT)
Dept: VASCULAR SURGERY | Facility: HOSPITAL | Age: 73
End: 2023-11-29

## 2023-11-29 LAB
-  AMOXICILLIN/CLAVULANIC ACID: SIGNIFICANT CHANGE UP
-  AMOXICILLIN/CLAVULANIC ACID: SIGNIFICANT CHANGE UP
-  AMPICILLIN/SULBACTAM: SIGNIFICANT CHANGE UP
-  AMPICILLIN/SULBACTAM: SIGNIFICANT CHANGE UP
-  AMPICILLIN: SIGNIFICANT CHANGE UP
-  AMPICILLIN: SIGNIFICANT CHANGE UP
-  AZTREONAM: SIGNIFICANT CHANGE UP
-  AZTREONAM: SIGNIFICANT CHANGE UP
-  CEFAZOLIN: SIGNIFICANT CHANGE UP
-  CEFAZOLIN: SIGNIFICANT CHANGE UP
-  CEFEPIME: SIGNIFICANT CHANGE UP
-  CEFEPIME: SIGNIFICANT CHANGE UP
-  CEFOXITIN: SIGNIFICANT CHANGE UP
-  CEFOXITIN: SIGNIFICANT CHANGE UP
-  CEFTRIAXONE: SIGNIFICANT CHANGE UP
-  CEFTRIAXONE: SIGNIFICANT CHANGE UP
-  CIPROFLOXACIN: SIGNIFICANT CHANGE UP
-  CIPROFLOXACIN: SIGNIFICANT CHANGE UP
-  ERTAPENEM: SIGNIFICANT CHANGE UP
-  ERTAPENEM: SIGNIFICANT CHANGE UP
-  GENTAMICIN: SIGNIFICANT CHANGE UP
-  GENTAMICIN: SIGNIFICANT CHANGE UP
-  LEVOFLOXACIN: SIGNIFICANT CHANGE UP
-  LEVOFLOXACIN: SIGNIFICANT CHANGE UP
-  MEROPENEM: SIGNIFICANT CHANGE UP
-  MEROPENEM: SIGNIFICANT CHANGE UP
-  PIPERACILLIN/TAZOBACTAM: SIGNIFICANT CHANGE UP
-  PIPERACILLIN/TAZOBACTAM: SIGNIFICANT CHANGE UP
-  TOBRAMYCIN: SIGNIFICANT CHANGE UP
-  TOBRAMYCIN: SIGNIFICANT CHANGE UP
-  TRIMETHOPRIM/SULFAMETHOXAZOLE: SIGNIFICANT CHANGE UP
-  TRIMETHOPRIM/SULFAMETHOXAZOLE: SIGNIFICANT CHANGE UP
ANION GAP SERPL CALC-SCNC: 10 MMOL/L — SIGNIFICANT CHANGE UP (ref 5–17)
ANION GAP SERPL CALC-SCNC: 10 MMOL/L — SIGNIFICANT CHANGE UP (ref 5–17)
APTT BLD: 30.4 SEC — SIGNIFICANT CHANGE UP (ref 24.5–35.6)
APTT BLD: 30.4 SEC — SIGNIFICANT CHANGE UP (ref 24.5–35.6)
BLD GP AB SCN SERPL QL: POSITIVE — SIGNIFICANT CHANGE UP
BLD GP AB SCN SERPL QL: POSITIVE — SIGNIFICANT CHANGE UP
BUN SERPL-MCNC: 20 MG/DL — SIGNIFICANT CHANGE UP (ref 7–23)
BUN SERPL-MCNC: 20 MG/DL — SIGNIFICANT CHANGE UP (ref 7–23)
CALCIUM SERPL-MCNC: 9.4 MG/DL — SIGNIFICANT CHANGE UP (ref 8.4–10.5)
CALCIUM SERPL-MCNC: 9.4 MG/DL — SIGNIFICANT CHANGE UP (ref 8.4–10.5)
CHLORIDE SERPL-SCNC: 104 MMOL/L — SIGNIFICANT CHANGE UP (ref 96–108)
CHLORIDE SERPL-SCNC: 104 MMOL/L — SIGNIFICANT CHANGE UP (ref 96–108)
CO2 SERPL-SCNC: 24 MMOL/L — SIGNIFICANT CHANGE UP (ref 22–31)
CO2 SERPL-SCNC: 24 MMOL/L — SIGNIFICANT CHANGE UP (ref 22–31)
CREAT SERPL-MCNC: 1.1 MG/DL — SIGNIFICANT CHANGE UP (ref 0.5–1.3)
CREAT SERPL-MCNC: 1.1 MG/DL — SIGNIFICANT CHANGE UP (ref 0.5–1.3)
EGFR: 71 ML/MIN/1.73M2 — SIGNIFICANT CHANGE UP
EGFR: 71 ML/MIN/1.73M2 — SIGNIFICANT CHANGE UP
GLUCOSE BLDC GLUCOMTR-MCNC: 133 MG/DL — HIGH (ref 70–99)
GLUCOSE BLDC GLUCOMTR-MCNC: 133 MG/DL — HIGH (ref 70–99)
GLUCOSE BLDC GLUCOMTR-MCNC: 140 MG/DL — HIGH (ref 70–99)
GLUCOSE BLDC GLUCOMTR-MCNC: 140 MG/DL — HIGH (ref 70–99)
GLUCOSE BLDC GLUCOMTR-MCNC: 147 MG/DL — HIGH (ref 70–99)
GLUCOSE BLDC GLUCOMTR-MCNC: 147 MG/DL — HIGH (ref 70–99)
GLUCOSE SERPL-MCNC: 152 MG/DL — HIGH (ref 70–99)
GLUCOSE SERPL-MCNC: 152 MG/DL — HIGH (ref 70–99)
HCT VFR BLD CALC: 31.4 % — LOW (ref 39–50)
HCT VFR BLD CALC: 31.4 % — LOW (ref 39–50)
HGB BLD-MCNC: 10.4 G/DL — LOW (ref 13–17)
HGB BLD-MCNC: 10.4 G/DL — LOW (ref 13–17)
INR BLD: 1.16 RATIO — SIGNIFICANT CHANGE UP (ref 0.85–1.18)
INR BLD: 1.16 RATIO — SIGNIFICANT CHANGE UP (ref 0.85–1.18)
MAGNESIUM SERPL-MCNC: 2.2 MG/DL — SIGNIFICANT CHANGE UP (ref 1.6–2.6)
MAGNESIUM SERPL-MCNC: 2.2 MG/DL — SIGNIFICANT CHANGE UP (ref 1.6–2.6)
MCHC RBC-ENTMCNC: 29 PG — SIGNIFICANT CHANGE UP (ref 27–34)
MCHC RBC-ENTMCNC: 29 PG — SIGNIFICANT CHANGE UP (ref 27–34)
MCHC RBC-ENTMCNC: 33.1 GM/DL — SIGNIFICANT CHANGE UP (ref 32–36)
MCHC RBC-ENTMCNC: 33.1 GM/DL — SIGNIFICANT CHANGE UP (ref 32–36)
MCV RBC AUTO: 87.5 FL — SIGNIFICANT CHANGE UP (ref 80–100)
MCV RBC AUTO: 87.5 FL — SIGNIFICANT CHANGE UP (ref 80–100)
METHOD TYPE: SIGNIFICANT CHANGE UP
METHOD TYPE: SIGNIFICANT CHANGE UP
NRBC # BLD: 0 /100 WBCS — SIGNIFICANT CHANGE UP (ref 0–0)
NRBC # BLD: 0 /100 WBCS — SIGNIFICANT CHANGE UP (ref 0–0)
PHOSPHATE SERPL-MCNC: 4.1 MG/DL — SIGNIFICANT CHANGE UP (ref 2.5–4.5)
PHOSPHATE SERPL-MCNC: 4.1 MG/DL — SIGNIFICANT CHANGE UP (ref 2.5–4.5)
PLATELET # BLD AUTO: 237 K/UL — SIGNIFICANT CHANGE UP (ref 150–400)
PLATELET # BLD AUTO: 237 K/UL — SIGNIFICANT CHANGE UP (ref 150–400)
POTASSIUM SERPL-MCNC: 4.2 MMOL/L — SIGNIFICANT CHANGE UP (ref 3.5–5.3)
POTASSIUM SERPL-MCNC: 4.2 MMOL/L — SIGNIFICANT CHANGE UP (ref 3.5–5.3)
POTASSIUM SERPL-SCNC: 4.2 MMOL/L — SIGNIFICANT CHANGE UP (ref 3.5–5.3)
POTASSIUM SERPL-SCNC: 4.2 MMOL/L — SIGNIFICANT CHANGE UP (ref 3.5–5.3)
PROTHROM AB SERPL-ACNC: 12.1 SEC — SIGNIFICANT CHANGE UP (ref 9.5–13)
PROTHROM AB SERPL-ACNC: 12.1 SEC — SIGNIFICANT CHANGE UP (ref 9.5–13)
RBC # BLD: 3.59 M/UL — LOW (ref 4.2–5.8)
RBC # BLD: 3.59 M/UL — LOW (ref 4.2–5.8)
RBC # FLD: 12.1 % — SIGNIFICANT CHANGE UP (ref 10.3–14.5)
RBC # FLD: 12.1 % — SIGNIFICANT CHANGE UP (ref 10.3–14.5)
RH IG SCN BLD-IMP: POSITIVE — SIGNIFICANT CHANGE UP
RH IG SCN BLD-IMP: POSITIVE — SIGNIFICANT CHANGE UP
SODIUM SERPL-SCNC: 138 MMOL/L — SIGNIFICANT CHANGE UP (ref 135–145)
SODIUM SERPL-SCNC: 138 MMOL/L — SIGNIFICANT CHANGE UP (ref 135–145)
WBC # BLD: 6.43 K/UL — SIGNIFICANT CHANGE UP (ref 3.8–10.5)
WBC # BLD: 6.43 K/UL — SIGNIFICANT CHANGE UP (ref 3.8–10.5)
WBC # FLD AUTO: 6.43 K/UL — SIGNIFICANT CHANGE UP (ref 3.8–10.5)
WBC # FLD AUTO: 6.43 K/UL — SIGNIFICANT CHANGE UP (ref 3.8–10.5)

## 2023-11-29 PROCEDURE — 75710 ARTERY X-RAYS ARM/LEG: CPT | Mod: 26,LT

## 2023-11-29 PROCEDURE — 36246 INS CATH ABD/L-EXT ART 2ND: CPT | Mod: LT

## 2023-11-29 PROCEDURE — 75625 CONTRAST EXAM ABDOMINL AORTA: CPT | Mod: 26

## 2023-11-29 DEVICE — INTRODUCER SET MICROPUNCTURE ACCESS 21G X 7CM: Type: IMPLANTABLE DEVICE | Site: LEFT | Status: FUNCTIONAL

## 2023-11-29 DEVICE — SHEATH INTRODUCER TERUMO PINNACLE PERIPHERAL 5FR X 10CM: Type: IMPLANTABLE DEVICE | Site: LEFT | Status: FUNCTIONAL

## 2023-11-29 DEVICE — CATH OMNI FLSH 0.035IN 5FRX65: Type: IMPLANTABLE DEVICE | Site: LEFT | Status: FUNCTIONAL

## 2023-11-29 DEVICE — GUIDEWIRE BENTSON 0.035" X 145CM: Type: IMPLANTABLE DEVICE | Site: LEFT | Status: FUNCTIONAL

## 2023-11-29 DEVICE — DEVICE CLOSURE 5F MYNX GRIP MUST ORDER MIN OF 10 EA: Type: IMPLANTABLE DEVICE | Site: LEFT | Status: FUNCTIONAL

## 2023-11-29 DEVICE — GUIDEWIRE RADIFOCUS GLIDEWIRE STANDARD ANGLED TIP 0.035" X 260CM: Type: IMPLANTABLE DEVICE | Site: LEFT | Status: FUNCTIONAL

## 2023-11-29 RX ORDER — OXYCODONE HYDROCHLORIDE 5 MG/1
5 TABLET ORAL ONCE
Refills: 0 | Status: DISCONTINUED | OUTPATIENT
Start: 2023-11-29 | End: 2023-11-29

## 2023-11-29 RX ORDER — ACETAMINOPHEN 500 MG
650 TABLET ORAL EVERY 6 HOURS
Refills: 0 | Status: DISCONTINUED | OUTPATIENT
Start: 2023-11-29 | End: 2023-12-05

## 2023-11-29 RX ORDER — SENNA PLUS 8.6 MG/1
2 TABLET ORAL AT BEDTIME
Refills: 0 | Status: DISCONTINUED | OUTPATIENT
Start: 2023-11-29 | End: 2023-12-05

## 2023-11-29 RX ORDER — ONDANSETRON 8 MG/1
4 TABLET, FILM COATED ORAL ONCE
Refills: 0 | Status: DISCONTINUED | OUTPATIENT
Start: 2023-11-29 | End: 2023-11-29

## 2023-11-29 RX ORDER — LOSARTAN POTASSIUM 100 MG/1
25 TABLET, FILM COATED ORAL DAILY
Refills: 0 | Status: DISCONTINUED | OUTPATIENT
Start: 2023-11-29 | End: 2023-12-05

## 2023-11-29 RX ORDER — POLYETHYLENE GLYCOL 3350 17 G/17G
17 POWDER, FOR SOLUTION ORAL DAILY
Refills: 0 | Status: DISCONTINUED | OUTPATIENT
Start: 2023-11-29 | End: 2023-12-05

## 2023-11-29 RX ORDER — OXYCODONE HYDROCHLORIDE 5 MG/1
10 TABLET ORAL EVERY 4 HOURS
Refills: 0 | Status: DISCONTINUED | OUTPATIENT
Start: 2023-11-29 | End: 2023-12-05

## 2023-11-29 RX ORDER — GABAPENTIN 400 MG/1
100 CAPSULE ORAL EVERY 8 HOURS
Refills: 0 | Status: DISCONTINUED | OUTPATIENT
Start: 2023-11-29 | End: 2023-12-05

## 2023-11-29 RX ORDER — AMLODIPINE BESYLATE 2.5 MG/1
2.5 TABLET ORAL DAILY
Refills: 0 | Status: DISCONTINUED | OUTPATIENT
Start: 2023-11-29 | End: 2023-12-05

## 2023-11-29 RX ORDER — ATORVASTATIN CALCIUM 80 MG/1
40 TABLET, FILM COATED ORAL AT BEDTIME
Refills: 0 | Status: DISCONTINUED | OUTPATIENT
Start: 2023-11-29 | End: 2023-12-05

## 2023-11-29 RX ORDER — OXYCODONE HYDROCHLORIDE 5 MG/1
10 TABLET ORAL ONCE
Refills: 0 | Status: DISCONTINUED | OUTPATIENT
Start: 2023-11-29 | End: 2023-11-29

## 2023-11-29 RX ORDER — MULTIVIT-MIN/FERROUS GLUCONATE 9 MG/15 ML
1 LIQUID (ML) ORAL DAILY
Refills: 0 | Status: DISCONTINUED | OUTPATIENT
Start: 2023-11-29 | End: 2023-12-05

## 2023-11-29 RX ORDER — FENTANYL CITRATE 50 UG/ML
50 INJECTION INTRAVENOUS
Refills: 0 | Status: DISCONTINUED | OUTPATIENT
Start: 2023-11-29 | End: 2023-11-29

## 2023-11-29 RX ORDER — ACETAMINOPHEN 500 MG
1000 TABLET ORAL ONCE
Refills: 0 | Status: DISCONTINUED | OUTPATIENT
Start: 2023-11-29 | End: 2023-11-29

## 2023-11-29 RX ORDER — OXYCODONE HYDROCHLORIDE 5 MG/1
5 TABLET ORAL EVERY 4 HOURS
Refills: 0 | Status: DISCONTINUED | OUTPATIENT
Start: 2023-11-29 | End: 2023-12-05

## 2023-11-29 RX ORDER — ASCORBIC ACID 60 MG
500 TABLET,CHEWABLE ORAL DAILY
Refills: 0 | Status: DISCONTINUED | OUTPATIENT
Start: 2023-11-29 | End: 2023-12-05

## 2023-11-29 RX ADMIN — Medication 500 MILLIGRAM(S): at 12:03

## 2023-11-29 RX ADMIN — AMPICILLIN SODIUM AND SULBACTAM SODIUM 200 GRAM(S): 250; 125 INJECTION, POWDER, FOR SUSPENSION INTRAMUSCULAR; INTRAVENOUS at 17:59

## 2023-11-29 RX ADMIN — AMPICILLIN SODIUM AND SULBACTAM SODIUM 200 GRAM(S): 250; 125 INJECTION, POWDER, FOR SUSPENSION INTRAMUSCULAR; INTRAVENOUS at 06:07

## 2023-11-29 RX ADMIN — AMPICILLIN SODIUM AND SULBACTAM SODIUM 200 GRAM(S): 250; 125 INJECTION, POWDER, FOR SUSPENSION INTRAMUSCULAR; INTRAVENOUS at 12:04

## 2023-11-29 RX ADMIN — LOSARTAN POTASSIUM 25 MILLIGRAM(S): 100 TABLET, FILM COATED ORAL at 06:07

## 2023-11-29 RX ADMIN — GABAPENTIN 100 MILLIGRAM(S): 400 CAPSULE ORAL at 06:07

## 2023-11-29 RX ADMIN — AMLODIPINE BESYLATE 2.5 MILLIGRAM(S): 2.5 TABLET ORAL at 06:07

## 2023-11-29 RX ADMIN — POLYETHYLENE GLYCOL 3350 17 GRAM(S): 17 POWDER, FOR SOLUTION ORAL at 12:04

## 2023-11-29 RX ADMIN — GABAPENTIN 100 MILLIGRAM(S): 400 CAPSULE ORAL at 13:41

## 2023-11-29 RX ADMIN — Medication 81 MILLIGRAM(S): at 12:03

## 2023-11-29 RX ADMIN — Medication 1 TABLET(S): at 12:03

## 2023-11-29 NOTE — PROGRESS NOTE ADULT - SUBJECTIVE AND OBJECTIVE BOX
SUBJECTIVE: Patient seen and examined on AM rounds. Patient reports that they're feeling well. Denies fever, chills. Reports pain as controlled. No complaints at this time.     Vital Signs Last 24 Hrs  T(C): 36.6 (29 Nov 2023 05:55), Max: 36.6 (29 Nov 2023 05:55)  T(F): 97.9 (29 Nov 2023 05:55), Max: 97.9 (29 Nov 2023 05:55)  HR: 74 (29 Nov 2023 05:55) (74 - 83)  BP: 158/77 (29 Nov 2023 05:55) (152/71 - 175/82)  BP(mean): --  RR: 18 (29 Nov 2023 05:55) (18 - 18)  SpO2: 96% (29 Nov 2023 05:55) (96% - 99%)    Parameters below as of 29 Nov 2023 05:55  Patient On (Oxygen Delivery Method): room air        General Appearance: Appears well, NAD  Neck: Supple  Chest: Equal expansion bilaterally  CV: Pulse regular presently  Abdomen: Soft, nontense, nontender  Extremities: L Foot wounds dressed, c/d/i.     I&O's Summary    28 Nov 2023 07:01  -  29 Nov 2023 07:00  --------------------------------------------------------  IN: 480 mL / OUT: 800 mL / NET: -320 mL      I&O's Detail    28 Nov 2023 07:01  -  29 Nov 2023 07:00  --------------------------------------------------------  IN:    Oral Fluid: 480 mL  Total IN: 480 mL    OUT:    Voided (mL): 800 mL  Total OUT: 800 mL    Total NET: -320 mL          LABS:                        10.4   6.43  )-----------( 237      ( 29 Nov 2023 04:34 )             31.4     11-29    138  |  104  |  20  ----------------------------<  152<H>  4.2   |  24  |  1.10    Ca    9.4      29 Nov 2023 04:34  Phos  4.1     11-29  Mg     2.2     11-29      PT/INR - ( 29 Nov 2023 04:34 )   PT: 12.1 sec;   INR: 1.16 ratio         PTT - ( 29 Nov 2023 04:34 )  PTT:30.4 sec  Urinalysis Basic - ( 29 Nov 2023 04:34 )    Color: x / Appearance: x / SG: x / pH: x  Gluc: 152 mg/dL / Ketone: x  / Bili: x / Urobili: x   Blood: x / Protein: x / Nitrite: x   Leuk Esterase: x / RBC: x / WBC x   Sq Epi: x / Non Sq Epi: x / Bacteria: x        RADIOLOGY & ADDITIONAL STUDIES:

## 2023-11-29 NOTE — PROGRESS NOTE ADULT - SUBJECTIVE AND OBJECTIVE BOX
[facility] DIV[facility] INFECTIOUS DISEASES  ERVIN Florentino Y. Patel, S. Shah, G. Ervin  [phone]  ([phone] - weekdays after 5pm and weekends)    Name: ALEE DUNN  Age/Gender: 72y Male  MRN: [id number]    Interval History:  Patient seen and examined this morning.   No new complaints noted.  Notes reviewed, for angiogram today  No concerning overnight events  Afebrile   Allergies: No Known Allergies      Objective:  Vitals:   T(F): 97.7 (11-29-23 @ 12:51), Max: 97.9 (11-29-23 @ 05:55)  HR: 68 (11-29-23 @ 12:51) (68 - 83)  BP: 156/66 (11-29-23 @ 12:51) (156/66 - 175/82)  RR: 18 (11-29-23 @ 12:51) (18 - 18)  SpO2: 97% (11-29-23 @ 12:51) (96% - 99%)  Physical Examination:  General: no acute distress  HEENT: NC/AT, anicteric, neck supple  Respiratory: no acc muscle use, breathing comfortably  Cardiovascular: S1 and S2 present  Gastrointestinal: normal appearing, nondistended  Extremities: R BKA, L foot dressing   Skin: no visible rash    Laboratory Studies:  CBC:                       10.4   6.43  )-----------( 237      ( 29 Nov 2023 04:34 )             31.4     WBC Trend:  6.43 11-29-23 @ 04:34  6.40 11-27-23 @ 06:45  9.58 11-26-23 @ 11:22    CMP: 11-29    138  |  104  |  20  ----------------------------<  152<H>  4.2   |  24  |  1.10    Ca    9.4      29 Nov 2023 04:34  Phos  4.1     11-29  Mg     2.2     11-29      Creatinine: 1.10 mg/dL (11-29-23 @ 04:34)  Creatinine: 1.06 mg/dL (11-27-23 @ 06:45)  Creatinine: 1.13 mg/dL (11-26-23 @ 11:22)    Microbiology: reviewed   Culture - Abscess with Gram Stain (collected 11-26-23 @ 19:14)  Source: .Abscess left foot  Gram Stain (11-27-23 @ 06:59):    Few polymorphonuclear leukocytes seen per low power field    Numerous Gram positive cocci in pairs seen per oil power field    Numerous Gram Negative Rods seen per oil power field  Preliminary Report (11-28-23 @ 17:11):    Culture yields >4 types of aerobic and/or anaerobic bacteria    Call client services within 7 days if further workup is clinically    indicated.    Culture includes    Moderate Acinetobacter ursingii    Few Serratia liquefaciens Susceptibility to follow.  Organism: Acinetobacter ursingii (11-28-23 @ 17:11)      -  Piperacillin/Tazobactam: S      Method Type: KB  Organism: Acinetobacter ursingii (11-28-23 @ 17:10)  Organism: Acinetobacter mellisaingii (11-28-23 @ 17:10)      -  Levofloxacin: S <=0.5      -  Tobramycin: S <=2      -  Gentamicin: S <=2      -  Cefepime: S <=2      -  Ciprofloxacin: S <=0.25      -  Imipenem: S <=1      Method Type: AMI      -  Meropenem: S <=1      -  Ampicillin/Sulbactam: S <=4/2      -  Ceftazidime: S 4      -  Amikacin: S <=16      -  Trimethoprim/Sulfamethoxazole: S <=0.5/9.5    Radiology: reviewed     Medications:  acetaminophen     Tablet .. 650 milliGRAM(s) Oral every 6 hours PRN  amLODIPine   Tablet 2.5 milliGRAM(s) Oral daily  ampicillin/sulbactam  IVPB 3 Gram(s) IV Intermittent every 6 hours  ascorbic acid 500 milliGRAM(s) Oral daily  aspirin enteric coated 81 milliGRAM(s) Oral daily  atorvastatin 40 milliGRAM(s) Oral at bedtime  dextrose 5%. 1000 milliLiter(s) IV Continuous <Continuous>  dextrose 5%. 1000 milliLiter(s) IV Continuous <Continuous>  dextrose 50% Injectable 25 Gram(s) IV Push once  dextrose 50% Injectable 25 Gram(s) IV Push once  dextrose 50% Injectable 12.5 Gram(s) IV Push once  dextrose Oral Gel 15 Gram(s) Oral once PRN  gabapentin 100 milliGRAM(s) Oral every 8 hours  glucagon  Injectable 1 milliGRAM(s) IntraMuscular once  insulin lispro (ADMELOG) corrective regimen sliding scale   SubCutaneous three times a day before meals  losartan 25 milliGRAM(s) Oral daily  multivitamin/minerals 1 Tablet(s) Oral daily  oxyCODONE    IR 5 milliGRAM(s) Oral every 4 hours PRN  oxyCODONE    IR 10 milliGRAM(s) Oral every 4 hours PRN  polyethylene glycol 3350 17 Gram(s) Oral daily  senna 2 Tablet(s) Oral at bedtime    Current Antimicrobials:  ampicillin/sulbactam  IVPB 3 Gram(s) IV Intermittent every 6 hours    Prior/Completed Antimicrobials:  ampicillin/sulbactam  IVPB

## 2023-11-29 NOTE — PROVIDER CONTACT NOTE (OTHER) - ASSESSMENT
pt a&ox4, other vitals stable, no acute distress noted. pt asymptomatic, pt denies pain, headache, or changes in vision.

## 2023-11-29 NOTE — PROGRESS NOTE ADULT - ASSESSMENT
The patient is a 72y Male complaining of Lt foot wound.    Lt foot wound infection:    IV Unasyn  Pod/Vascular f/up appreciated  For LLE angio   EYAD/PVR    DM II:    FSSS    Diabetic Neuropathy:    Cont Neurontin    Considering pt's medical condition and nature of sx, pt is at intermediate risk for the surgery.

## 2023-11-29 NOTE — PROVIDER CONTACT NOTE (OTHER) - BACKGROUND
pt admitted w/ PVD & LLE 2nd/3rd toe wound, pmhx DM, HTN, and PAD.
Patient is here for Left toe wound

## 2023-11-29 NOTE — PROGRESS NOTE ADULT - ASSESSMENT
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 Patient now presents to ED w/ LLE 2nd and 3rd toe wound x2 weeks. Podiatry planning local wound care vs ray resection. Vascular surgery consulted for evaluation.    Recommendations:   - Plan for LLE angiogram today  - Appreciate podiatry recs  - Continue local wound care for now   - Remainder care per primary     Plan discussed with fellow on behalf of attending.\    Vascular [id number]

## 2023-11-29 NOTE — PROGRESS NOTE ADULT - ASSESSMENT
Patient is a 72 year old male with PMH of DM, HTN, R BKA who presents to the ED for left 2nd and 3rd toe wound for 2 weeks after cutting his toe.     L foot 2nd and 3rd digit partial thickness gangrene with c/f infection  - Left foot 2nd and 3rd digit dorsal partial thickness gangrene to level of MTPJ, plantar 2nd and 3rd digit dusky/early ischemic changes, no drainage, no malodor. No acute signs of infection on exam  - L foot xray with no OM or gas   - L foot culture with >4 organisms including noted with Acinetobacter ursingii (sensitivities noted) and Serratia liquefaciens - may be contaminants   - started on unasyn on admission per Podiatry   - prior cultures reviewed, h/o Enterobacter, Staph lugdunensis, Delftia group from RLE    Recommendations:  Follow L foot culture for sensitivities   Continue unasyn for now   Vascular following, plan for LLE angiogram today  Podiatry planning for L foot partial 2&3rd ray resection on 12/5 at 3pm  Monitor temps/WBC      Azucena Castillo M.D.  [facility], Division of Infectious Diseases  [phone]  After 5pm on weekdays and all day on weekends - please call [phone]

## 2023-11-30 DIAGNOSIS — I73.9 PERIPHERAL VASCULAR DISEASE, UNSPECIFIED: ICD-10-CM

## 2023-11-30 LAB
CULTURE RESULTS: ABNORMAL
CULTURE RESULTS: ABNORMAL
GLUCOSE BLDC GLUCOMTR-MCNC: 188 MG/DL — HIGH (ref 70–99)
GLUCOSE BLDC GLUCOMTR-MCNC: 188 MG/DL — HIGH (ref 70–99)
GLUCOSE BLDC GLUCOMTR-MCNC: 205 MG/DL — HIGH (ref 70–99)
GLUCOSE BLDC GLUCOMTR-MCNC: 205 MG/DL — HIGH (ref 70–99)
GLUCOSE BLDC GLUCOMTR-MCNC: 245 MG/DL — HIGH (ref 70–99)
GLUCOSE BLDC GLUCOMTR-MCNC: 245 MG/DL — HIGH (ref 70–99)
ORGANISM # SPEC MICROSCOPIC CNT: ABNORMAL
SPECIMEN SOURCE: SIGNIFICANT CHANGE UP
SPECIMEN SOURCE: SIGNIFICANT CHANGE UP

## 2023-11-30 PROCEDURE — 93971 EXTREMITY STUDY: CPT | Mod: 26,LT

## 2023-11-30 RX ORDER — DEXTROSE 50 % IN WATER 50 %
25 SYRINGE (ML) INTRAVENOUS ONCE
Refills: 0 | Status: DISCONTINUED | OUTPATIENT
Start: 2023-11-30 | End: 2023-12-05

## 2023-11-30 RX ORDER — GLUCAGON INJECTION, SOLUTION 0.5 MG/.1ML
1 INJECTION, SOLUTION SUBCUTANEOUS ONCE
Refills: 0 | Status: DISCONTINUED | OUTPATIENT
Start: 2023-11-30 | End: 2023-12-05

## 2023-11-30 RX ORDER — DEXTROSE 50 % IN WATER 50 %
12.5 SYRINGE (ML) INTRAVENOUS ONCE
Refills: 0 | Status: DISCONTINUED | OUTPATIENT
Start: 2023-11-30 | End: 2023-12-05

## 2023-11-30 RX ORDER — INSULIN LISPRO 100/ML
VIAL (ML) SUBCUTANEOUS
Refills: 0 | Status: DISCONTINUED | OUTPATIENT
Start: 2023-11-30 | End: 2023-12-05

## 2023-11-30 RX ORDER — AMPICILLIN SODIUM AND SULBACTAM SODIUM 250; 125 MG/ML; MG/ML
3 INJECTION, POWDER, FOR SUSPENSION INTRAMUSCULAR; INTRAVENOUS EVERY 6 HOURS
Refills: 0 | Status: DISCONTINUED | OUTPATIENT
Start: 2023-11-30 | End: 2023-12-01

## 2023-11-30 RX ORDER — ASPIRIN/CALCIUM CARB/MAGNESIUM 324 MG
81 TABLET ORAL DAILY
Refills: 0 | Status: DISCONTINUED | OUTPATIENT
Start: 2023-11-30 | End: 2023-12-05

## 2023-11-30 RX ORDER — DEXTROSE 50 % IN WATER 50 %
15 SYRINGE (ML) INTRAVENOUS ONCE
Refills: 0 | Status: DISCONTINUED | OUTPATIENT
Start: 2023-11-30 | End: 2023-12-05

## 2023-11-30 RX ORDER — SODIUM CHLORIDE 9 MG/ML
1000 INJECTION, SOLUTION INTRAVENOUS
Refills: 0 | Status: DISCONTINUED | OUTPATIENT
Start: 2023-11-30 | End: 2023-12-05

## 2023-11-30 RX ADMIN — LOSARTAN POTASSIUM 25 MILLIGRAM(S): 100 TABLET, FILM COATED ORAL at 06:09

## 2023-11-30 RX ADMIN — AMPICILLIN SODIUM AND SULBACTAM SODIUM 200 GRAM(S): 250; 125 INJECTION, POWDER, FOR SUSPENSION INTRAMUSCULAR; INTRAVENOUS at 17:08

## 2023-11-30 RX ADMIN — GABAPENTIN 100 MILLIGRAM(S): 400 CAPSULE ORAL at 06:09

## 2023-11-30 RX ADMIN — ATORVASTATIN CALCIUM 40 MILLIGRAM(S): 80 TABLET, FILM COATED ORAL at 21:21

## 2023-11-30 RX ADMIN — Medication 2: at 08:45

## 2023-11-30 RX ADMIN — Medication 81 MILLIGRAM(S): at 12:08

## 2023-11-30 RX ADMIN — Medication 1: at 17:48

## 2023-11-30 RX ADMIN — Medication 1 TABLET(S): at 12:09

## 2023-11-30 RX ADMIN — AMLODIPINE BESYLATE 2.5 MILLIGRAM(S): 2.5 TABLET ORAL at 06:09

## 2023-11-30 RX ADMIN — GABAPENTIN 100 MILLIGRAM(S): 400 CAPSULE ORAL at 21:21

## 2023-11-30 RX ADMIN — AMPICILLIN SODIUM AND SULBACTAM SODIUM 200 GRAM(S): 250; 125 INJECTION, POWDER, FOR SUSPENSION INTRAMUSCULAR; INTRAVENOUS at 23:56

## 2023-11-30 RX ADMIN — GABAPENTIN 100 MILLIGRAM(S): 400 CAPSULE ORAL at 12:08

## 2023-11-30 RX ADMIN — SENNA PLUS 2 TABLET(S): 8.6 TABLET ORAL at 21:21

## 2023-11-30 RX ADMIN — Medication 500 MILLIGRAM(S): at 12:08

## 2023-11-30 RX ADMIN — AMPICILLIN SODIUM AND SULBACTAM SODIUM 200 GRAM(S): 250; 125 INJECTION, POWDER, FOR SUSPENSION INTRAMUSCULAR; INTRAVENOUS at 12:08

## 2023-11-30 RX ADMIN — AMPICILLIN SODIUM AND SULBACTAM SODIUM 200 GRAM(S): 250; 125 INJECTION, POWDER, FOR SUSPENSION INTRAMUSCULAR; INTRAVENOUS at 06:09

## 2023-11-30 RX ADMIN — AMPICILLIN SODIUM AND SULBACTAM SODIUM 200 GRAM(S): 250; 125 INJECTION, POWDER, FOR SUSPENSION INTRAMUSCULAR; INTRAVENOUS at 00:13

## 2023-11-30 NOTE — PROGRESS NOTE ADULT - SUBJECTIVE AND OBJECTIVE BOX
Patient is a 72y old  Male who presents with a chief complaint of The patient is a 72y Male complaining of Lt foot wound. (30 Nov 2023 12:33)      SUBJECTIVE / OVERNIGHT EVENTS: no new events    MEDICATIONS  (STANDING):  amLODIPine   Tablet 2.5 milliGRAM(s) Oral daily  ampicillin/sulbactam  IVPB 3 Gram(s) IV Intermittent every 6 hours  ascorbic acid 500 milliGRAM(s) Oral daily  aspirin enteric coated 81 milliGRAM(s) Oral daily  atorvastatin 40 milliGRAM(s) Oral at bedtime  dextrose 5%. 1000 milliLiter(s) (100 mL/Hr) IV Continuous <Continuous>  dextrose 5%. 1000 milliLiter(s) (50 mL/Hr) IV Continuous <Continuous>  dextrose 50% Injectable 12.5 Gram(s) IV Push once  dextrose 50% Injectable 25 Gram(s) IV Push once  dextrose 50% Injectable 25 Gram(s) IV Push once  gabapentin 100 milliGRAM(s) Oral every 8 hours  glucagon  Injectable 1 milliGRAM(s) IntraMuscular once  insulin lispro (ADMELOG) corrective regimen sliding scale   SubCutaneous three times a day before meals  losartan 25 milliGRAM(s) Oral daily  multivitamin/minerals 1 Tablet(s) Oral daily  polyethylene glycol 3350 17 Gram(s) Oral daily  senna 2 Tablet(s) Oral at bedtime    MEDICATIONS  (PRN):  acetaminophen     Tablet .. 650 milliGRAM(s) Oral every 6 hours PRN Temp greater or equal to 38C (100.4F), Mild Pain (1 - 3)  dextrose Oral Gel 15 Gram(s) Oral once PRN Blood Glucose LESS THAN 70 milliGRAM(s)/deciliter  oxyCODONE    IR 10 milliGRAM(s) Oral every 4 hours PRN Severe Pain (7 - 10)  oxyCODONE    IR 5 milliGRAM(s) Oral every 4 hours PRN Moderate Pain (4 - 6)      Vital Signs Last 24 Hrs  T(F): 98 (11-30-23 @ 20:46), Max: 98 (11-30-23 @ 20:46)  HR: 79 (11-30-23 @ 20:46) (79 - 85)  BP: 143/67 (11-30-23 @ 20:46) (138/66 - 163/66)  RR: 18 (11-30-23 @ 20:46) (17 - 18)  SpO2: 97% (11-30-23 @ 20:46) (97% - 99%)  Telemetry:   CAPILLARY BLOOD GLUCOSE      POCT Blood Glucose.: 205 mg/dL (30 Nov 2023 21:35)  POCT Blood Glucose.: 188 mg/dL (30 Nov 2023 17:31)  POCT Blood Glucose.: 245 mg/dL (30 Nov 2023 08:37)    I&O's Summary    29 Nov 2023 07:01  -  30 Nov 2023 07:00  --------------------------------------------------------  IN: 0 mL / OUT: 0 mL / NET: 0 mL        PHYSICAL EXAM:  GENERAL: NAD, well-developed  HEAD:  Atraumatic, Normocephalic  EYES: EOMI, PERRLA, conjunctiva and sclera clear  NECK: Supple, No JVD  CHEST/LUNG: Clear to auscultation bilaterally; No wheeze  HEART: Regular rate and rhythm; No murmurs, rubs, or gallops  ABDOMEN: Soft, Nontender, Nondistended; Bowel sounds present  EXTREMITIES:  2+ Peripheral Pulses, No clubbing, cyanosis, or edema  PSYCH: AAOx3  NEUROLOGY: non-focal  SKIN: No rashes or lesions    LABS:                        10.4   6.43  )-----------( 237      ( 29 Nov 2023 04:34 )             31.4     11-29    138  |  104  |  20  ----------------------------<  152<H>  4.2   |  24  |  1.10    Ca    9.4      29 Nov 2023 04:34  Phos  4.1     11-29  Mg     2.2     11-29      PT/INR - ( 29 Nov 2023 04:34 )   PT: 12.1 sec;   INR: 1.16 ratio         PTT - ( 29 Nov 2023 04:34 )  PTT:30.4 sec      Urinalysis Basic - ( 29 Nov 2023 04:34 )    Color: x / Appearance: x / SG: x / pH: x  Gluc: 152 mg/dL / Ketone: x  / Bili: x / Urobili: x   Blood: x / Protein: x / Nitrite: x   Leuk Esterase: x / RBC: x / WBC x   Sq Epi: x / Non Sq Epi: x / Bacteria: x        RADIOLOGY & ADDITIONAL TESTS:    Imaging Personally Reviewed:    Consultant(s) Notes Reviewed:      Care Discussed with Consultants/Other Providers:

## 2023-11-30 NOTE — PROGRESS NOTE ADULT - SUBJECTIVE AND OBJECTIVE BOX
Patient is a 72y old  Male who presents with a chief complaint of The patient is a 72y Male complaining of Lt foot wound. (30 Nov 2023 10:22)       INTERVAL HPI/OVERNIGHT EVENTS:  Patient seen and evaluated at bedside.  Pt is resting comfortable in NAD. Denies N/V/F/C.    Allergies    No Known Allergies    Intolerances        Vital Signs Last 24 Hrs  T(C): 36.4 (30 Nov 2023 06:28), Max: 37 (29 Nov 2023 18:47)  T(F): 97.5 (30 Nov 2023 06:28), Max: 98.6 (29 Nov 2023 18:47)  HR: 79 (30 Nov 2023 06:28) (68 - 85)  BP: 138/66 (30 Nov 2023 06:28) (138/66 - 186/77)  BP(mean): 109 (29 Nov 2023 22:20) (105 - 115)  RR: 17 (30 Nov 2023 06:28) (12 - 18)  SpO2: 97% (30 Nov 2023 06:28) (97% - 100%)    Parameters below as of 30 Nov 2023 06:28  Patient On (Oxygen Delivery Method): room air        LABS:                        10.4   6.43  )-----------( 237      ( 29 Nov 2023 04:34 )             31.4     11-29    138  |  104  |  20  ----------------------------<  152<H>  4.2   |  24  |  1.10    Ca    9.4      29 Nov 2023 04:34  Phos  4.1     11-29  Mg     2.2     11-29      PT/INR - ( 29 Nov 2023 04:34 )   PT: 12.1 sec;   INR: 1.16 ratio         PTT - ( 29 Nov 2023 04:34 )  PTT:30.4 sec  Urinalysis Basic - ( 29 Nov 2023 04:34 )    Color: x / Appearance: x / SG: x / pH: x  Gluc: 152 mg/dL / Ketone: x  / Bili: x / Urobili: x   Blood: x / Protein: x / Nitrite: x   Leuk Esterase: x / RBC: x / WBC x   Sq Epi: x / Non Sq Epi: x / Bacteria: x      CAPILLARY BLOOD GLUCOSE      POCT Blood Glucose.: 245 mg/dL (30 Nov 2023 08:37)  POCT Blood Glucose.: 133 mg/dL (29 Nov 2023 17:09)      Lower Extremity Physical Exam:    Vascular: DP/PT 0/4, B/L, Temperature gradient warm to cool, B/L.   Neuro: Epicritic sensation diminished to the level of digits, B/L.  Musculoskeletal/Ortho: s/p R BKA  Skin: Left foot 2nd and 3rd digit dorsal partial thickness gangrene to level of MTPJ, plantar 2nd and 3rd digit dusky and early ischemic changes, no drainage, no malodor, no acute signs of infection. RADIOLOGY & ADDITIONAL TESTS:

## 2023-11-30 NOTE — PROGRESS NOTE ADULT - SUBJECTIVE AND OBJECTIVE BOX
Vascular Surgery Progress Note  Patient is a 72y old  Male who presents with a chief complaint of The patient is a 72y Male complaining of Lt foot wound. (29 Nov 2023 14:22)      INTERVAL EVENTS: No acute events overnight.  SUBJECTIVE: Patient seen and examined at bedside with surgical team, patient without complaints.         OBJECTIVE:    Vital Signs Last 24 Hrs  T(C): 36.4 (30 Nov 2023 06:28), Max: 37 (29 Nov 2023 18:47)  T(F): 97.5 (30 Nov 2023 06:28), Max: 98.6 (29 Nov 2023 18:47)  HR: 79 (30 Nov 2023 06:28) (68 - 85)  BP: 138/66 (30 Nov 2023 06:28) (138/66 - 186/77)  BP(mean): 109 (29 Nov 2023 22:20) (105 - 115)  RR: 17 (30 Nov 2023 06:28) (12 - 18)  SpO2: 97% (30 Nov 2023 06:28) (97% - 100%)    Parameters below as of 30 Nov 2023 06:28  Patient On (Oxygen Delivery Method): room air    I&O's Detail    29 Nov 2023 07:01  -  30 Nov 2023 07:00  --------------------------------------------------------  IN:  Total IN: 0 mL    OUT:    Oral Fluid: 0 mL  Total OUT: 0 mL    Total NET: 0 mL      MEDICATIONS  (STANDING):  amLODIPine   Tablet 2.5 milliGRAM(s) Oral daily  ampicillin/sulbactam  IVPB 3 Gram(s) IV Intermittent every 6 hours  ascorbic acid 500 milliGRAM(s) Oral daily  aspirin enteric coated 81 milliGRAM(s) Oral daily  atorvastatin 40 milliGRAM(s) Oral at bedtime  dextrose 5%. 1000 milliLiter(s) (100 mL/Hr) IV Continuous <Continuous>  dextrose 5%. 1000 milliLiter(s) (50 mL/Hr) IV Continuous <Continuous>  dextrose 50% Injectable 25 Gram(s) IV Push once  dextrose 50% Injectable 12.5 Gram(s) IV Push once  dextrose 50% Injectable 25 Gram(s) IV Push once  gabapentin 100 milliGRAM(s) Oral every 8 hours  glucagon  Injectable 1 milliGRAM(s) IntraMuscular once  insulin lispro (ADMELOG) corrective regimen sliding scale   SubCutaneous three times a day before meals  losartan 25 milliGRAM(s) Oral daily  multivitamin/minerals 1 Tablet(s) Oral daily  polyethylene glycol 3350 17 Gram(s) Oral daily  senna 2 Tablet(s) Oral at bedtime    MEDICATIONS  (PRN):  acetaminophen     Tablet .. 650 milliGRAM(s) Oral every 6 hours PRN Temp greater or equal to 38C (100.4F), Mild Pain (1 - 3)  dextrose Oral Gel 15 Gram(s) Oral once PRN Blood Glucose LESS THAN 70 milliGRAM(s)/deciliter  oxyCODONE    IR 10 milliGRAM(s) Oral every 4 hours PRN Severe Pain (7 - 10)  oxyCODONE    IR 5 milliGRAM(s) Oral every 4 hours PRN Moderate Pain (4 - 6)      PHYSICAL EXAM:  Constitutional: A&Ox3, NAD  Respiratory: Unlabored breathing  Abdomen: Soft, nondistended, NTTP. No rebound or guarding.  Extremities: WWP, MAX spontaneously, R groin soft, LLE + DP/PT signals    LABS:                        10.4   6.43  )-----------( 237      ( 29 Nov 2023 04:34 )             31.4     11-29    138  |  104  |  20  ----------------------------<  152<H>  4.2   |  24  |  1.10    Ca    9.4      29 Nov 2023 04:34  Phos  4.1     11-29  Mg     2.2     11-29      PT/INR - ( 29 Nov 2023 04:34 )   PT: 12.1 sec;   INR: 1.16 ratio         PTT - ( 29 Nov 2023 04:34 )  PTT:30.4 sec    Urinalysis Basic - ( 29 Nov 2023 04:34 )    Color: x / Appearance: x / SG: x / pH: x  Gluc: 152 mg/dL / Ketone: x  / Bili: x / Urobili: x   Blood: x / Protein: x / Nitrite: x   Leuk Esterase: x / RBC: x / WBC x   Sq Epi: x / Non Sq Epi: x / Bacteria: x          IMAGING:

## 2023-11-30 NOTE — PROGRESS NOTE ADULT - ASSESSMENT
Patient is a 72 year old male with PMH of DM, HTN, R BKA who presents to the ED for left 2nd and 3rd toe wound for 2 weeks after cutting his toe.     L foot 2nd and 3rd digit partial thickness gangrene with c/f infection  - Left foot 2nd and 3rd digit dorsal partial thickness gangrene to level of MTPJ, plantar 2nd and 3rd digit dusky/early ischemic changes, no drainage, no malodor. No acute signs of infection on exam  - L foot xray with no OM or gas   - L foot culture with >4 organisms including noted with Acinetobacter ursingii (sensitivities noted) and Serratia liquefaciens - may be contaminants; sensitivities reviewed -both S to unasyn   - 11/30 overnight s/p LLE angiogram     Recommendations:  Continue unasyn for now   Vascular following, planning for bypass   Podiatry to reschedule L foot partial 2&3rd ray resection pending bypass   Monitor temps/WBC      Azucena Castillo M.D.  [facility], Division of Infectious Diseases  [phone]  After 5pm on weekdays and all day on weekends - please call [phone]

## 2023-11-30 NOTE — PROGRESS NOTE ADULT - ASSESSMENT
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 Patient now presents to ED w/ LLE 2nd and 3rd toe wound x2 weeks. Podiatry planning local wound care vs ray resection. Vascular surgery consulted for evaluation.    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 Patient now presents to ED w/ LLE 2nd and 3rd toe wound x2 weeks. Podiatry planning local wound care vs ray resection. Vascular surgery consulted for evaluation.    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 Patient now presents to ED w/ LLE 2nd and 3rd toe wound x2 weeks. Podiatry planning local wound care vs ray resection. Vascular surgery consulted for evaluation.    Recommendations:   - s/p LLE angio on 11/29  - Appreciate podiatry recs  - Continue local wound care for now   - please obtain vein mapping  - possible bypass vs deep vein arterialization planning- please document medicine/cardiology optimization/clearances  - Remainder care per primary

## 2023-11-30 NOTE — PROGRESS NOTE ADULT - ASSESSMENT
72M presents with left foot 2nd & 3rd digit partial thickness gangrene.  - Patient seen and evaluated.  - Afebrile, no leukocytosis.  - Left foot 2nd and 3rd digit dorsal partial thickness gangrene to level of MTPJ, plantar 2nd and 3rd digit dusky and early ischemic changes, no drainage, no malodor, no acute signs of infection.   - Left Foot X-Ray: no OM, no gas.  - Left Foot Culture: >4 types of aerobic and anaerobic bacteria, Acinetobacter ursingii (prelim).  - ID recs, appreciated.  - Vasc planning LLE angio on Wed 11/29 with PT run off, planned bypass appreciated  - Pod Plan: reschedule for left foot partial 2nd and 3rd ray resections pending bypass and final vasc recs  - Documented medical clearance for podiatric surgical intervention under anesthesia, appreciated.  - discussed with attending.

## 2023-11-30 NOTE — PROGRESS NOTE ADULT - SUBJECTIVE AND OBJECTIVE BOX
[facility] DIVISION OF INFECTIOUS DISEASES  ERVIN Florentino Y. Patel, S. Shah, G. Ervin  [phone]  ([phone] - weekdays after 5pm and weekends)    Name: ALEE DUNN  Age/Gender: 72y Male  MRN: [id number]    Interval History:  Patient seen and examined this morning.   Denies fever, pain or any new complaints.  Notes reviewed  No concerning overnight events  Afebrile   Allergies: No Known Allergies      Objective:  Vitals:   T(F): 97.4 (11-30-23 @ 12:38), Max: 98.6 (11-29-23 @ 18:47)  HR: 81 (11-30-23 @ 12:38) (72 - 85)  BP: 163/66 (11-30-23 @ 12:38) (138/66 - 186/77)  RR: 18 (11-30-23 @ 12:38) (12 - 18)  SpO2: 97% (11-30-23 @ 12:38) (97% - 100%)  Physical Examination:  General: no acute distress  HEENT: NC/AT, anicteric, neck supple  Respiratory: no acc muscle use, breathing comfortably  Cardiovascular: S1 and S2 present  Gastrointestinal: normal appearing, nondistended  Extremities: R BKA, L foot dressing   Skin: no visible rash    Laboratory Studies:  CBC:                       10.4   6.43  )-----------( 237      ( 29 Nov 2023 04:34 )             31.4     WBC Trend:  6.43 11-29-23 @ 04:34  6.40 11-27-23 @ 06:45  9.58 11-26-23 @ 11:22    CMP: 11-29    138  |  104  |  20  ----------------------------<  152<H>  4.2   |  24  |  1.10    Ca    9.4      29 Nov 2023 04:34  Phos  4.1     11-29  Mg     2.2     11-29    Creatinine: 1.10 mg/dL (11-29-23 @ 04:34)  Creatinine: 1.06 mg/dL (11-27-23 @ 06:45)  Creatinine: 1.13 mg/dL (11-26-23 @ 11:22)    Microbiology: reviewed   Culture - Abscess with Gram Stain (collected 11-26-23 @ 19:14)  Source: .Abscess left foot  Gram Stain (11-27-23 @ 06:59):    Few polymorphonuclear leukocytes seen per low power field    Numerous Gram positive cocci in pairs seen per oil power field    Numerous Gram Negative Rods seen per oil power field  Preliminary Report (11-28-23 @ 17:11):    Culture yields >4 types of aerobic and/or anaerobic bacteria    Call client services within 7 days if further workup is clinically    indicated.    Culture includes    Moderate Acinetobacter ursingii    Few Serratia liquefaciens Susceptibility to follow.  Organism: Acinetobacter ursingii  Serratia liquefaciens (11-29-23 @ 17:52)  Organism: Serratia liquefaciens (11-29-23 @ 17:52)      Method Type: AMI      -  Amoxicillin/Clavulanic Acid: S <=8/4      -  Ampicillin: S <=8 These ampicillin results predict results for amoxicillin      -  Ampicillin/Sulbactam: S <=4/2      -  Aztreonam: S <=4      -  Cefazolin: R 8      -  Cefepime: S <=2      -  Cefoxitin: S <=8      -  Ceftriaxone: S <=1      -  Ciprofloxacin: S <=0.25      -  Ertapenem: S <=0.5      -  Gentamicin: S <=2      -  Levofloxacin: S <=0.5      -  Meropenem: S <=1      -  Piperacillin/Tazobactam: S <=8      -  Tobramycin: S <=2      -  Trimethoprim/Sulfamethoxazole: S <=0.5/9.5  Organism: Acinetobacter mellisaingii (11-28-23 @ 17:11)      Method Type: KB      -  Piperacillin/Tazobactam: S  Organism: Acinetobacter mellisaingii (11-28-23 @ 17:10)      Method Type: AMI      -  Amikacin: S <=16      -  Ampicillin/Sulbactam: S <=4/2      -  Cefepime: S <=2      -  Ceftazidime: S 4      -  Ciprofloxacin: S <=0.25      -  Gentamicin: S <=2      -  Imipenem: S <=1      -  Levofloxacin: S <=0.5      -  Meropenem: S <=1      -  Tobramycin: S <=2      -  Trimethoprim/Sulfamethoxazole: S <=0.5/9.5    Radiology: reviewed     Medications:  acetaminophen     Tablet .. 650 milliGRAM(s) Oral every 6 hours PRN  amLODIPine   Tablet 2.5 milliGRAM(s) Oral daily  ampicillin/sulbactam  IVPB 3 Gram(s) IV Intermittent every 6 hours  ascorbic acid 500 milliGRAM(s) Oral daily  aspirin enteric coated 81 milliGRAM(s) Oral daily  atorvastatin 40 milliGRAM(s) Oral at bedtime  dextrose 5%. 1000 milliLiter(s) IV Continuous <Continuous>  dextrose 5%. 1000 milliLiter(s) IV Continuous <Continuous>  dextrose 50% Injectable 25 Gram(s) IV Push once  dextrose 50% Injectable 12.5 Gram(s) IV Push once  dextrose 50% Injectable 25 Gram(s) IV Push once  dextrose Oral Gel 15 Gram(s) Oral once PRN  gabapentin 100 milliGRAM(s) Oral every 8 hours  glucagon  Injectable 1 milliGRAM(s) IntraMuscular once  insulin lispro (ADMELOG) corrective regimen sliding scale   SubCutaneous three times a day before meals  losartan 25 milliGRAM(s) Oral daily  multivitamin/minerals 1 Tablet(s) Oral daily  oxyCODONE    IR 10 milliGRAM(s) Oral every 4 hours PRN  oxyCODONE    IR 5 milliGRAM(s) Oral every 4 hours PRN  polyethylene glycol 3350 17 Gram(s) Oral daily  senna 2 Tablet(s) Oral at bedtime    Current Antimicrobials:  ampicillin/sulbactam  IVPB 3 Gram(s) IV Intermittent every 6 hours    Prior/Completed Antimicrobials:  ampicillin/sulbactam  IVPB

## 2023-12-01 LAB
GLUCOSE BLDC GLUCOMTR-MCNC: 126 MG/DL — HIGH (ref 70–99)
GLUCOSE BLDC GLUCOMTR-MCNC: 126 MG/DL — HIGH (ref 70–99)
GLUCOSE BLDC GLUCOMTR-MCNC: 164 MG/DL — HIGH (ref 70–99)
GLUCOSE BLDC GLUCOMTR-MCNC: 164 MG/DL — HIGH (ref 70–99)
GLUCOSE BLDC GLUCOMTR-MCNC: 171 MG/DL — HIGH (ref 70–99)
GLUCOSE BLDC GLUCOMTR-MCNC: 171 MG/DL — HIGH (ref 70–99)
GLUCOSE BLDC GLUCOMTR-MCNC: 239 MG/DL — HIGH (ref 70–99)
GLUCOSE BLDC GLUCOMTR-MCNC: 239 MG/DL — HIGH (ref 70–99)

## 2023-12-01 RX ADMIN — GABAPENTIN 100 MILLIGRAM(S): 400 CAPSULE ORAL at 21:39

## 2023-12-01 RX ADMIN — AMPICILLIN SODIUM AND SULBACTAM SODIUM 200 GRAM(S): 250; 125 INJECTION, POWDER, FOR SUSPENSION INTRAMUSCULAR; INTRAVENOUS at 12:52

## 2023-12-01 RX ADMIN — POLYETHYLENE GLYCOL 3350 17 GRAM(S): 17 POWDER, FOR SOLUTION ORAL at 12:50

## 2023-12-01 RX ADMIN — LOSARTAN POTASSIUM 25 MILLIGRAM(S): 100 TABLET, FILM COATED ORAL at 05:30

## 2023-12-01 RX ADMIN — Medication 500 MILLIGRAM(S): at 12:51

## 2023-12-01 RX ADMIN — SENNA PLUS 2 TABLET(S): 8.6 TABLET ORAL at 21:39

## 2023-12-01 RX ADMIN — Medication 1 TABLET(S): at 12:50

## 2023-12-01 RX ADMIN — Medication 81 MILLIGRAM(S): at 12:50

## 2023-12-01 RX ADMIN — AMLODIPINE BESYLATE 2.5 MILLIGRAM(S): 2.5 TABLET ORAL at 05:30

## 2023-12-01 RX ADMIN — AMPICILLIN SODIUM AND SULBACTAM SODIUM 200 GRAM(S): 250; 125 INJECTION, POWDER, FOR SUSPENSION INTRAMUSCULAR; INTRAVENOUS at 05:30

## 2023-12-01 RX ADMIN — Medication 1: at 09:21

## 2023-12-01 RX ADMIN — Medication 2: at 17:36

## 2023-12-01 RX ADMIN — GABAPENTIN 100 MILLIGRAM(S): 400 CAPSULE ORAL at 15:40

## 2023-12-01 RX ADMIN — GABAPENTIN 100 MILLIGRAM(S): 400 CAPSULE ORAL at 05:30

## 2023-12-01 RX ADMIN — ATORVASTATIN CALCIUM 40 MILLIGRAM(S): 80 TABLET, FILM COATED ORAL at 21:39

## 2023-12-01 NOTE — PROGRESS NOTE ADULT - SUBJECTIVE AND OBJECTIVE BOX
Patient is a 72y old  Male who presents with a chief complaint of The patient is a 72y Male complaining of Lt foot wound. (01 Dec 2023 16:21)      SUBJECTIVE / OVERNIGHT EVENTS: concerned re PAD on LE    MEDICATIONS  (STANDING):  amLODIPine   Tablet 2.5 milliGRAM(s) Oral daily  ascorbic acid 500 milliGRAM(s) Oral daily  aspirin enteric coated 81 milliGRAM(s) Oral daily  atorvastatin 40 milliGRAM(s) Oral at bedtime  dextrose 5%. 1000 milliLiter(s) (50 mL/Hr) IV Continuous <Continuous>  dextrose 5%. 1000 milliLiter(s) (100 mL/Hr) IV Continuous <Continuous>  dextrose 50% Injectable 25 Gram(s) IV Push once  dextrose 50% Injectable 12.5 Gram(s) IV Push once  dextrose 50% Injectable 25 Gram(s) IV Push once  gabapentin 100 milliGRAM(s) Oral every 8 hours  glucagon  Injectable 1 milliGRAM(s) IntraMuscular once  insulin lispro (ADMELOG) corrective regimen sliding scale   SubCutaneous three times a day before meals  losartan 25 milliGRAM(s) Oral daily  multivitamin/minerals 1 Tablet(s) Oral daily  polyethylene glycol 3350 17 Gram(s) Oral daily  senna 2 Tablet(s) Oral at bedtime    MEDICATIONS  (PRN):  acetaminophen     Tablet .. 650 milliGRAM(s) Oral every 6 hours PRN Temp greater or equal to 38C (100.4F), Mild Pain (1 - 3)  dextrose Oral Gel 15 Gram(s) Oral once PRN Blood Glucose LESS THAN 70 milliGRAM(s)/deciliter  oxyCODONE    IR 10 milliGRAM(s) Oral every 4 hours PRN Severe Pain (7 - 10)  oxyCODONE    IR 5 milliGRAM(s) Oral every 4 hours PRN Moderate Pain (4 - 6)      Vital Signs Last 24 Hrs  T(F): 98.1 (12-01-23 @ 12:24), Max: 98.1 (12-01-23 @ 12:24)  HR: 66 (12-01-23 @ 12:24) (66 - 79)  BP: 152/70 (12-01-23 @ 12:24) (115/60 - 152/70)  RR: 18 (12-01-23 @ 12:24) (18 - 19)  SpO2: 97% (12-01-23 @ 12:24) (97% - 97%)  Telemetry:   CAPILLARY BLOOD GLUCOSE      POCT Blood Glucose.: 239 mg/dL (01 Dec 2023 17:06)  POCT Blood Glucose.: 126 mg/dL (01 Dec 2023 12:42)  POCT Blood Glucose.: 171 mg/dL (01 Dec 2023 08:42)  POCT Blood Glucose.: 205 mg/dL (30 Nov 2023 21:35)    I&O's Summary      PHYSICAL EXAM:  GENERAL: NAD, well-developed  HEAD:  Atraumatic, Normocephalic  EYES: EOMI, PERRLA, conjunctiva and sclera clear  NECK: Supple, No JVD  CHEST/LUNG: Clear to auscultation bilaterally; No wheeze  HEART: Regular rate and rhythm; No murmurs, rubs, or gallops  ABDOMEN: Soft, Nontender, Nondistended; Bowel sounds present  EXTREMITIES:  2+ Peripheral Pulses, No clubbing, cyanosis, or edema  PSYCH: AAOx3  NEUROLOGY: non-focal  SKIN: No rashes or lesions    LABS:                    RADIOLOGY & ADDITIONAL TESTS:    Imaging Personally Reviewed:    Consultant(s) Notes Reviewed:      Care Discussed with Consultants/Other Providers:

## 2023-12-01 NOTE — PROGRESS NOTE ADULT - SUBJECTIVE AND OBJECTIVE BOX
[facility], Division of Infectious Diseases  ERVIN Florentino Y. Patel, S. Shah, G. Ervin  [phone]  after hours and weekends [phone]    Name: ALEE DUNN  Age: 72y  Gender: Male  MRN: [id number]    Interval History--  Notes reviewed  worried about plan and low blood flow to foot       Allergies    No Known Allergies    Intolerances        Medications--  Antibiotics:  ampicillin/sulbactam  IVPB 3 Gram(s) IV Intermittent every 6 hours    Immunologic:    Other:  acetaminophen     Tablet .. PRN  amLODIPine   Tablet  ascorbic acid  aspirin enteric coated  atorvastatin  dextrose 5%.  dextrose 5%.  dextrose 50% Injectable  dextrose 50% Injectable  dextrose 50% Injectable  dextrose Oral Gel PRN  gabapentin  glucagon  Injectable  insulin lispro (ADMELOG) corrective regimen sliding scale  losartan  multivitamin/minerals  oxyCODONE    IR PRN  oxyCODONE    IR PRN  polyethylene glycol 3350  senna      Review of Systems--  A 10-point review of systems was obtained.     Pertinent positives and negatives--  Constitutional: No fevers. No Chills. No Rigors.   Cardiovascular: No chest pain. No palpitations.  Respiratory: No shortness of breath. No cough.  Gastrointestinal: No nausea or vomiting. No diarrhea or constipation.   Psychiatric: Pleasant. Appropriate affect.    Review of systems otherwise negative except as previously noted.    Physical Examination--  Vital Signs: T(F): 98.1 (12-01-23 @ 12:24), Max: 98.1 (12-01-23 @ 12:24)  HR: 66 (12-01-23 @ 12:24)  BP: 152/70 (12-01-23 @ 12:24)  RR: 18 (12-01-23 @ 12:24)  SpO2: 97% (12-01-23 @ 12:24)  Wt(kg): --  General: Nontoxic-appearing Male in no acute distress.  HEENT: AT/NC.   Neck: Not rigid. No sense of mass.  Nodes: None palpable.  Lungs: Clear bilaterally without rales, wheezing or rhonchi  Heart: Regular rate and rhythm. No Murmur.   Abdomen: Bowel sounds present and normoactive. Soft. Nondistended. Nontender.  Extremities: No cyanosis or clubbing. No edema.   Skin: Warm. Dry. Good turgor. No rash. No vasculitic stigmata.  Psychiatric: Appropriate affect and mood for situation.         Laboratory Studies--  CBC      Chemistries          Culture Data    Culture - Abscess with Gram Stain (collected 26 Nov 2023 19:14)  Source: .Abscess left foot  Gram Stain (27 Nov 2023 06:59):    Few polymorphonuclear leukocytes seen per low power field    Numerous Gram positive cocci in pairs seen per oil power field    Numerous Gram Negative Rods seen per oil power field  Final Report (30 Nov 2023 18:45):    Culture yields >4 types of aerobic and/or anaerobic bacteria    Call client services within 7 days if further workup is clinically    indicated.    Culture includes    Moderate Acinetobacter ursingii    Few Serratia liquefaciens  Organism: Acinetobacter ursingii  Serratia liquefaciens (30 Nov 2023 18:45)  Organism: Serratia liquefaciens (30 Nov 2023 18:45)  Organism: Acinetobacter ursingii (30 Nov 2023 18:45)  Organism: Acinetobacter ursingii (30 Nov 2023 18:45)

## 2023-12-01 NOTE — CONSULT NOTE ADULT - REASON FOR ADMISSION
The patient is a 72y Male complaining of Lt foot wound.

## 2023-12-01 NOTE — PROGRESS NOTE ADULT - ASSESSMENT
The patient is a 72y Male complaining of Lt foot wound.    Lt foot wound infection:    IV Unasyn  Pod/Vascular f/up appreciated  s/p LLE angio 11/29, awaiting BYPASS  cardiology called for clearance      DM II:    FSSS    Diabetic Neuropathy:    Cont Neurontin    Considering pt's medical condition and nature of sx, pt is at intermediate risk for the surgery.

## 2023-12-01 NOTE — CONSULT NOTE ADULT - SUBJECTIVE AND OBJECTIVE BOX
DATE OF SERVICE: 12-01-23 @ 16:22    CHIEF COMPLAINT:Patient is a 72y old  Male who presents with a chief complaint of The patient is a 72y Male complaining of Lt foot wound. (01 Dec 2023 15:33)      HISTORY OF PRESENT ILLNESS:HPI:  72 year old male with hx of DM, HTN, right LE BKA, presents to the ED for left 2nd and 3rd toe wound x 2 weeks. He states he noticed a cut to the toe 2 weeks ago and it has progressively worsened. He first noticed the color change 1 week ago. Associated 6/10 pain of RLE. States he tried to make an appointment with podiatry but was unable to. No infectious symptoms including fever, chills.   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 650 milliGRAM(s) Oral every 6 hours PRN  gabapentin 100 milliGRAM(s) Oral every 8 hours  oxyCODONE    IR 10 milliGRAM(s) Oral every 4 hours PRN  oxyCODONE    IR 5 milliGRAM(s) Oral every 4 hours PRN    polyethylene glycol 3350 17 Gram(s) Oral daily  senna 2 Tablet(s) Oral at bedtime    atorvastatin 40 milliGRAM(s) Oral at bedtime  dextrose 50% Injectable 25 Gram(s) IV Push once  dextrose 50% Injectable 12.5 Gram(s) IV Push once  dextrose 50% Injectable 25 Gram(s) IV Push once  dextrose Oral Gel 15 Gram(s) Oral once PRN  glucagon  Injectable 1 milliGRAM(s) IntraMuscular once  insulin lispro (ADMELOG) corrective regimen sliding scale   SubCutaneous three times a day before meals    ascorbic acid 500 milliGRAM(s) Oral daily  dextrose 5%. 1000 milliLiter(s) IV Continuous <Continuous>  dextrose 5%. 1000 milliLiter(s) IV Continuous <Continuous>  multivitamin/minerals 1 Tablet(s) Oral daily      FAMILY HISTORY:      Non-contributory    SOCIAL HISTORY:    Not an active smoker  Allergies    No Known Allergies    Intolerances    	    REVIEW OF SYSTEMS:  CONSTITUTIONAL: No fever  EYES: No eye pain, visual disturbances, or discharge  ENMT:  No difficulty hearing, tinnitus  NECK: No pain or stiffness  RESPIRATORY: No cough, wheezing,  CARDIOVASCULAR: No chest pain, palpitations, passing out, dizziness, or leg swelling  GASTROINTESTINAL:  No nausea, vomiting, diarrhea or constipation. No melena.  GENITOURINARY: No dysuria, hematuria  NEUROLOGICAL: No stroke like symptoms  SKIN: No burning or lesions   ENDOCRINE: No heat or cold intolerance  MUSCULOSKELETAL: No joint pain or swelling  PSYCHIATRIC: No  anxiety, mood swings  HEME/LYMPH: No bleeding gums  ALLERGY AND IMMUNOLOGIC: No hives or eczema	    All other ROS negative    PHYSICAL EXAM:  T(C): 36.7 (12-01-23 @ 12:24), Max: 36.7 (11-30-23 @ 20:46)  HR: 66 (12-01-23 @ 12:24) (66 - 79)  BP: 152/70 (12-01-23 @ 12:24) (115/60 - 152/70)  RR: 18 (12-01-23 @ 12:24) (18 - 19)  SpO2: 97% (12-01-23 @ 12:24) (97% - 97%)  Wt(kg): --  I&O's Summary      Appearance: Normal	  HEENT:   Normal oral mucosa, EOMI	  Cardiovascular:  S1 S2, No JVD,    Respiratory: Lungs clear to auscultation	  Psychiatry: Alert  Gastrointestinal:  Soft, Non-tender, + BS	  Skin: No rashes   Neurologic: Non-focal  Extremities:  R BKA  Vascular: Peripheral pulses palpable    	    	  	  CARDIAC MARKERS:  Labs personally reviewed by me                          EKG: Personally reviewed by me - NSR 11/26  Radiology: Personally reviewed by me -   < from: Xray Chest 1 View- PORTABLE-Urgent (Xray Chest 1 View- PORTABLE-Urgent .) (11.26.23 @ 11:33) >  Underinflated but otherwise clear lungs. No pleural effusions or   pneumothorax.    Stable cardiac and mediastinal silhouettes within projection; do not   appear grossly enlarged.    Trachea midline.    Generalized osteopenia. Spine and bilateral AC joint degenerative changes.    < end of copied text >  < from: TTE Congenital Anomalies W or WO Ultrasound Enhancing Agent (03.31.23 @ 13:27) >   1. The left ventricular systolic function is normal with an ejection fractionof 61 % by Dawson's method of disks.   2. Normal right ventricular cavity size, normal wall thickness and normal systolic function.   3. Mild left ventricular hypertrophy.   4. The aortic root at sinuses of Valsalva is mildly dilated.    < end of copied text >  < from: Cardiac Catheterization (04.05.23 @ 10:37) >  Moderate nonobstructive atherosclerosis in mLAD and pRCA with TIMI3  flow.  Recommendations:     Optimal medical management for CAD.      < end of copied text >      Assessment /Plan:     72 year old male with hx of DM, HTN, right LE BKA, presents to the ED for left 2nd and 3rd toe wound x 2 weeks. He states he noticed a cut to the toe 2 weeks ago and it has progressively worsened. No infectious symptoms including fever, chills.    Problem/Plan -1  Problem: Cardiac Risk Stratification for podiatry surgery  - Denies CP or SOB  - TTE shows preserved EF, mild LVH  - Not in decompensated HF  - No tachy supa arrhythmia  - s/p cath with nonobstructive moderate CAD in March 2023  - Elevated risk for low-mod risk pods surgery procedure, no contraindication to proceed.    Problem/Plan -2  Problem: Hypertension  - amlodipine  2.5mg PO daily  - c/w losartan to 25mg PO daily    Problem/Plan -3  Problem: DM II  - HgbA1c improved to 7.2  - ISS as per primary team    Differential diagnosis and plan of care discussed with patient after the evaluation. Counseling on diet, nutritional counseling, weight management, exercise and medication compliance was done.   Advanced care planning/advanced directives discussed with patient/family. DNR status including forceful chest compressions to attempt to restart the heart, ventilator support/artificial breathing, electric shock, artificial nutrition, health care proxy, Molst form all discussed with pt. Pt wishes to consider. More than fifteen minutes spent on discussing advanced directives.       Matt Calhoun DO [facility]  Cardiovascular Medicine  [address], [address]  Available for call or text via [employer]  Office [phone]   DATE OF SERVICE: 12-01-23 @ 16:22    CHIEF COMPLAINT:Patient is a 72y old  Male who presents with a chief complaint of The patient is a 72y Male complaining of Lt foot wound. (01 Dec 2023 15:33)      HISTORY OF PRESENT ILLNESS:HPI:  72 year old male with hx of DM, HTN, right LE BKA, presents to the ED for left 2nd and 3rd toe wound x 2 weeks. He states he noticed a cut to the toe 2 weeks ago and it has progressively worsened. He first noticed the color change 1 week ago. Associated 6/10 pain of RLE. States he tried to make an appointment with podiatry but was unable to. No infectious symptoms including fever, chills.   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 650 milliGRAM(s) Oral every 6 hours PRN  gabapentin 100 milliGRAM(s) Oral every 8 hours  oxyCODONE    IR 10 milliGRAM(s) Oral every 4 hours PRN  oxyCODONE    IR 5 milliGRAM(s) Oral every 4 hours PRN    polyethylene glycol 3350 17 Gram(s) Oral daily  senna 2 Tablet(s) Oral at bedtime    atorvastatin 40 milliGRAM(s) Oral at bedtime  dextrose 50% Injectable 25 Gram(s) IV Push once  dextrose 50% Injectable 12.5 Gram(s) IV Push once  dextrose 50% Injectable 25 Gram(s) IV Push once  dextrose Oral Gel 15 Gram(s) Oral once PRN  glucagon  Injectable 1 milliGRAM(s) IntraMuscular once  insulin lispro (ADMELOG) corrective regimen sliding scale   SubCutaneous three times a day before meals    ascorbic acid 500 milliGRAM(s) Oral daily  dextrose 5%. 1000 milliLiter(s) IV Continuous <Continuous>  dextrose 5%. 1000 milliLiter(s) IV Continuous <Continuous>  multivitamin/minerals 1 Tablet(s) Oral daily      FAMILY HISTORY:      Non-contributory    SOCIAL HISTORY:    Not an active smoker  Allergies    No Known Allergies    Intolerances    	    REVIEW OF SYSTEMS:  CONSTITUTIONAL: No fever  EYES: No eye pain, visual disturbances, or discharge  ENMT:  No difficulty hearing, tinnitus  NECK: No pain or stiffness  RESPIRATORY: No cough, wheezing,  CARDIOVASCULAR: No chest pain, palpitations, passing out, dizziness, or leg swelling  GASTROINTESTINAL:  No nausea, vomiting, diarrhea or constipation. No melena.  GENITOURINARY: No dysuria, hematuria  NEUROLOGICAL: No stroke like symptoms  SKIN: No burning or lesions   ENDOCRINE: No heat or cold intolerance  MUSCULOSKELETAL: No joint pain or swelling  PSYCHIATRIC: No  anxiety, mood swings  HEME/LYMPH: No bleeding gums  ALLERGY AND IMMUNOLOGIC: No hives or eczema	    All other ROS negative    PHYSICAL EXAM:  T(C): 36.7 (12-01-23 @ 12:24), Max: 36.7 (11-30-23 @ 20:46)  HR: 66 (12-01-23 @ 12:24) (66 - 79)  BP: 152/70 (12-01-23 @ 12:24) (115/60 - 152/70)  RR: 18 (12-01-23 @ 12:24) (18 - 19)  SpO2: 97% (12-01-23 @ 12:24) (97% - 97%)  Wt(kg): --  I&O's Summary      Appearance: Normal	  HEENT:   Normal oral mucosa, EOMI	  Cardiovascular:  S1 S2, No JVD,    Respiratory: Lungs clear to auscultation	  Psychiatry: Alert  Gastrointestinal:  Soft, Non-tender, + BS	  Skin: No rashes   Neurologic: Non-focal  Extremities:  R BKA  Vascular: Peripheral pulses palpable    	    	  	  CARDIAC MARKERS:  Labs personally reviewed by me                          EKG: Personally reviewed by me - NSR 11/26  Radiology: Personally reviewed by me -   < from: Xray Chest 1 View- PORTABLE-Urgent (Xray Chest 1 View- PORTABLE-Urgent .) (11.26.23 @ 11:33) >  Underinflated but otherwise clear lungs. No pleural effusions or   pneumothorax.    Stable cardiac and mediastinal silhouettes within projection; do not   appear grossly enlarged.    Trachea midline.    Generalized osteopenia. Spine and bilateral AC joint degenerative changes.    < end of copied text >  < from: TTE Congenital Anomalies W or WO Ultrasound Enhancing Agent (03.31.23 @ 13:27) >   1. The left ventricular systolic function is normal with an ejection fractionof 61 % by Dawson's method of disks.   2. Normal right ventricular cavity size, normal wall thickness and normal systolic function.   3. Mild left ventricular hypertrophy.   4. The aortic root at sinuses of Valsalva is mildly dilated.    < end of copied text >  < from: Cardiac Catheterization (04.05.23 @ 10:37) >  Moderate nonobstructive atherosclerosis in mLAD and pRCA with TIMI3  flow.  Recommendations:     Optimal medical management for CAD.      < end of copied text >      Assessment /Plan:     72 year old male with hx of DM, HTN, right LE BKA, presents to the ED for left 2nd and 3rd toe wound x 2 weeks. He states he noticed a cut to the toe 2 weeks ago and it has progressively worsened. No infectious symptoms including fever, chills.    Problem/Plan -1  Problem: Cardiac Risk Stratification for podiatry surgery  - Denies CP or SOB  - TTE shows preserved EF, mild LVH  - Not in decompensated HF  - No tachy supa arrhythmia  - s/p cath with nonobstructive moderate CAD in March 2023  - Elevated risk for low-mod risk pods surgery procedure, no contraindication to proceed.    Problem/Plan -2  Problem: Hypertension  - amlodipine  2.5mg PO daily  - c/w losartan to 25mg PO daily    Problem/Plan -3  Problem: DM II  - HgbA1c improved to 7.2  - ISS as per primary team          Differential diagnosis and plan of care discussed with patient after the evaluation. Counseling on diet, nutritional counseling, weight management, exercise and medication compliance was done.   Advanced care planning/advanced directives discussed with patient/family. DNR status including forceful chest compressions to attempt to restart the heart, ventilator support/artificial breathing, electric shock, artificial nutrition, health care proxy, Molst form all discussed with pt. Pt wishes to consider. More than fifteen minutes spent on discussing advanced directives.       Matt Calhoun DO [facility]  Cardiovascular Medicine  [address], [address]  Available for call or text via [employer]  Office [phone]

## 2023-12-01 NOTE — PROGRESS NOTE ADULT - ASSESSMENT
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 Patient now presents to ED w/ LLE 2nd and 3rd toe wound x2 weeks. Podiatry planning local wound care vs ray resection. Vascular surgery consulted for evaluation.    Recommendations:   - s/p LLE angio on 11/29  - Appreciate podiatry recs  - Continue local wound care for now   - please obtain vein mapping  - possible bypass vs deep vein arterialization planning- please document medicine/cardiology optimization/clearances  - Remainder care per primary

## 2023-12-01 NOTE — PROGRESS NOTE ADULT - SUBJECTIVE AND OBJECTIVE BOX
Vascular Surgery Progress Note  Patient is a 72y old  Male who presents with a chief complaint of The patient is a 72y Male complaining of Lt foot wound. (30 Nov 2023 23:04)      INTERVAL EVENTS: No acute events overnight.  SUBJECTIVE: Patient seen and examined at bedside with surgical team, patient without complaints.         OBJECTIVE:    Vital Signs Last 24 Hrs  T(C): 36.7 (01 Dec 2023 05:58), Max: 36.7 (30 Nov 2023 20:46)  T(F): 98 (01 Dec 2023 05:58), Max: 98 (30 Nov 2023 20:46)  HR: 73 (01 Dec 2023 05:58) (73 - 81)  BP: 115/60 (01 Dec 2023 05:58) (115/60 - 163/66)  BP(mean): --  RR: 19 (01 Dec 2023 05:58) (18 - 19)  SpO2: 97% (01 Dec 2023 05:58) (97% - 97%)    Parameters below as of 01 Dec 2023 05:58  Patient On (Oxygen Delivery Method): room air    I&O's Detail  MEDICATIONS  (STANDING):  amLODIPine   Tablet 2.5 milliGRAM(s) Oral daily  ampicillin/sulbactam  IVPB 3 Gram(s) IV Intermittent every 6 hours  ascorbic acid 500 milliGRAM(s) Oral daily  aspirin enteric coated 81 milliGRAM(s) Oral daily  atorvastatin 40 milliGRAM(s) Oral at bedtime  dextrose 5%. 1000 milliLiter(s) (50 mL/Hr) IV Continuous <Continuous>  dextrose 5%. 1000 milliLiter(s) (100 mL/Hr) IV Continuous <Continuous>  dextrose 50% Injectable 12.5 Gram(s) IV Push once  dextrose 50% Injectable 25 Gram(s) IV Push once  dextrose 50% Injectable 25 Gram(s) IV Push once  gabapentin 100 milliGRAM(s) Oral every 8 hours  glucagon  Injectable 1 milliGRAM(s) IntraMuscular once  insulin lispro (ADMELOG) corrective regimen sliding scale   SubCutaneous three times a day before meals  losartan 25 milliGRAM(s) Oral daily  multivitamin/minerals 1 Tablet(s) Oral daily  polyethylene glycol 3350 17 Gram(s) Oral daily  senna 2 Tablet(s) Oral at bedtime    MEDICATIONS  (PRN):  acetaminophen     Tablet .. 650 milliGRAM(s) Oral every 6 hours PRN Temp greater or equal to 38C (100.4F), Mild Pain (1 - 3)  dextrose Oral Gel 15 Gram(s) Oral once PRN Blood Glucose LESS THAN 70 milliGRAM(s)/deciliter  oxyCODONE    IR 10 milliGRAM(s) Oral every 4 hours PRN Severe Pain (7 - 10)  oxyCODONE    IR 5 milliGRAM(s) Oral every 4 hours PRN Moderate Pain (4 - 6)      PHYSICAL EXAM:  Constitutional: A&Ox3, NAD  Respiratory: Unlabored breathing  Abdomen: Soft, nondistended, NTTP. No rebound or guarding.  Extremities: WWP, MAX spontaneouslyR groin soft, LLE + DP/PT signals    LABS:                    IMAGING:

## 2023-12-01 NOTE — PROGRESS NOTE ADULT - ASSESSMENT
Patient is a 72 year old male with PMH of DM, HTN, R BKA who presents to the ED for left 2nd and 3rd toe wound for 2 weeks after cutting his toe.     L foot 2nd and 3rd digit partial thickness dry gangrene   - 11/30 overnight s/p LLE angiogram - vascular disease     Recommendations:  Vascular following, planning for bypass   Podiatry to reschedule L foot partial 2&3rd ray resection pending bypass   Monitor temps/WBC    no fever, no leukocytosis  no celllulitis   stop unasyn - no clear infection

## 2023-12-02 LAB
GLUCOSE BLDC GLUCOMTR-MCNC: 181 MG/DL — HIGH (ref 70–99)
GLUCOSE BLDC GLUCOMTR-MCNC: 181 MG/DL — HIGH (ref 70–99)
GLUCOSE BLDC GLUCOMTR-MCNC: 191 MG/DL — HIGH (ref 70–99)
GLUCOSE BLDC GLUCOMTR-MCNC: 191 MG/DL — HIGH (ref 70–99)
GLUCOSE BLDC GLUCOMTR-MCNC: 248 MG/DL — HIGH (ref 70–99)
GLUCOSE BLDC GLUCOMTR-MCNC: 248 MG/DL — HIGH (ref 70–99)
GLUCOSE BLDC GLUCOMTR-MCNC: 290 MG/DL — HIGH (ref 70–99)
GLUCOSE BLDC GLUCOMTR-MCNC: 290 MG/DL — HIGH (ref 70–99)

## 2023-12-02 PROCEDURE — 93306 TTE W/DOPPLER COMPLETE: CPT | Mod: 26

## 2023-12-02 RX ORDER — INSULIN LISPRO 100/ML
VIAL (ML) SUBCUTANEOUS AT BEDTIME
Refills: 0 | Status: DISCONTINUED | OUTPATIENT
Start: 2023-12-02 | End: 2023-12-05

## 2023-12-02 RX ADMIN — GABAPENTIN 100 MILLIGRAM(S): 400 CAPSULE ORAL at 05:15

## 2023-12-02 RX ADMIN — Medication 500 MILLIGRAM(S): at 12:56

## 2023-12-02 RX ADMIN — GABAPENTIN 100 MILLIGRAM(S): 400 CAPSULE ORAL at 21:16

## 2023-12-02 RX ADMIN — ATORVASTATIN CALCIUM 40 MILLIGRAM(S): 80 TABLET, FILM COATED ORAL at 21:17

## 2023-12-02 RX ADMIN — Medication 2: at 12:54

## 2023-12-02 RX ADMIN — GABAPENTIN 100 MILLIGRAM(S): 400 CAPSULE ORAL at 15:12

## 2023-12-02 RX ADMIN — Medication 1: at 17:33

## 2023-12-02 RX ADMIN — LOSARTAN POTASSIUM 25 MILLIGRAM(S): 100 TABLET, FILM COATED ORAL at 05:14

## 2023-12-02 RX ADMIN — Medication 1: at 22:06

## 2023-12-02 RX ADMIN — Medication 81 MILLIGRAM(S): at 12:56

## 2023-12-02 RX ADMIN — Medication 1 TABLET(S): at 12:56

## 2023-12-02 RX ADMIN — AMLODIPINE BESYLATE 2.5 MILLIGRAM(S): 2.5 TABLET ORAL at 05:14

## 2023-12-02 RX ADMIN — Medication 1: at 09:34

## 2023-12-02 NOTE — PROGRESS NOTE ADULT - ASSESSMENT
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 Patient now presents to ED w/ LLE 2nd and 3rd toe wound x2 weeks. Podiatry planning local wound care vs ray resection. Vascular surgery consulted for evaluation.    Recommendations:   - s/p LLE angio on 11/29  - Appreciate podiatry recs  - Continue local wound care for now   - please obtain bilateral vein mapping with GSV  - possible bypass vs deep vein arterialization planning- please document medicine/cardiology optimization/clearances  - Remainder care per primary

## 2023-12-02 NOTE — PROGRESS NOTE ADULT - SUBJECTIVE AND OBJECTIVE BOX
DATE OF SERVICE: 12-02-23 @ 12:13    Patient is a 72y old  Male who presents with a chief complaint of The patient is a 72y Male complaining of Lt foot wound. (02 Dec 2023 12:11)      INTERVAL HISTORY:     REVIEW OF SYSTEMS:  CONSTITUTIONAL: No weakness  EYES/ENT: No visual changes;  No throat pain   NECK: No pain or stiffness  RESPIRATORY: No cough, wheezing; No shortness of breath  CARDIOVASCULAR: No chest pain or palpitations  GASTROINTESTINAL: No abdominal  pain. No nausea, vomiting, or hematemesis  GENITOURINARY: No dysuria, frequency or hematuria  NEUROLOGICAL: No stroke like symptoms  SKIN: No rashes      	  MEDICATIONS:  amLODIPine   Tablet 2.5 milliGRAM(s) Oral daily  losartan 25 milliGRAM(s) Oral daily        PHYSICAL EXAM:  T(C): 36.7 (12-02-23 @ 04:50), Max: 36.9 (12-01-23 @ 20:38)  HR: 68 (12-02-23 @ 04:50) (66 - 77)  BP: 172/70 (12-02-23 @ 04:50) (152/70 - 172/70)  RR: 18 (12-02-23 @ 04:50) (18 - 18)  SpO2: 99% (12-02-23 @ 04:50) (96% - 99%)  Wt(kg): --  I&O's Summary    01 Dec 2023 07:01  -  02 Dec 2023 07:00  --------------------------------------------------------  IN: 780 mL / OUT: 0 mL / NET: 780 mL          Appearance: In no distress	  HEENT:    PERRL, EOMI	  Cardiovascular:  S1 S2, No JVD  Respiratory: Lungs clear to auscultation	  Gastrointestinal:  Soft, Non-tender, + BS	  Vascularature:  No edema of LE  Psychiatric: Appropriate affect   Neuro: no acute focal deficits                     Labs personally reviewed      ASSESSMENT/PLAN: 	  72 year old male with hx of DM, HTN, right LE BKA, presents to the ED for left 2nd and 3rd toe wound x 2 weeks. He states he noticed a cut to the toe 2 weeks ago and it has progressively worsened. No infectious symptoms including fever, chills.    Problem/Plan -1  Problem: Cardiac Risk Stratification for podiatry surgery  - Denies CP or SOB  - TTE shows preserved EF, mild LVH  - Not in decompensated HF  - No tachy supa arrhythmia  - s/p cath with nonobstructive moderate CAD in March 2023  - Elevated risk for low-mod risk pods surgery procedure, no contraindication to proceed.    Problem/Plan -2  Problem: Hypertension - not controlled 172/70 HR 68  check BMP    - amlodipine  2.5mg PO daily  - recommend increasing losartan from 25mg to 50mg for better bp control.     Problem/Plan -3  Problem: DM II  - HgbA1c improved to 7.2  - ISS as per primary team          LASHAUN Ramires DO [facility]  Cardiovascular Medicine  [address] [place] [address], [address]  Available through call or text on [employer]  Office: [phone]

## 2023-12-02 NOTE — PROGRESS NOTE ADULT - SUBJECTIVE AND OBJECTIVE BOX
Vascular Surgery Progress Note  Patient is a 72y old  Male who presents with a chief complaint of The patient is a 72y Male complaining of Lt foot wound. (01 Dec 2023 20:03)      INTERVAL EVENTS: No acute events overnight.  SUBJECTIVE: Patient seen and examined at bedside with surgical team, patient without complaints. Denies fever, chills, CP, SOB nausea, vomiting, abdominal pain.      OBJECTIVE:    Vital Signs Last 24 Hrs  T(C): 36.7 (02 Dec 2023 04:50), Max: 36.9 (01 Dec 2023 20:38)  T(F): 98 (02 Dec 2023 04:50), Max: 98.4 (01 Dec 2023 20:38)  HR: 68 (02 Dec 2023 04:50) (66 - 77)  BP: 172/70 (02 Dec 2023 04:50) (152/70 - 172/70)  BP(mean): --  RR: 18 (02 Dec 2023 04:50) (18 - 18)  SpO2: 99% (02 Dec 2023 04:50) (96% - 99%)    Parameters below as of 02 Dec 2023 04:50  Patient On (Oxygen Delivery Method): room air    I&O's Detail    01 Dec 2023 07:01  -  02 Dec 2023 07:00  --------------------------------------------------------  IN:    Oral Fluid: 780 mL  Total IN: 780 mL    OUT:  Total OUT: 0 mL    Total NET: 780 mL      MEDICATIONS  (STANDING):  amLODIPine   Tablet 2.5 milliGRAM(s) Oral daily  ascorbic acid 500 milliGRAM(s) Oral daily  aspirin enteric coated 81 milliGRAM(s) Oral daily  atorvastatin 40 milliGRAM(s) Oral at bedtime  dextrose 5%. 1000 milliLiter(s) (50 mL/Hr) IV Continuous <Continuous>  dextrose 5%. 1000 milliLiter(s) (100 mL/Hr) IV Continuous <Continuous>  dextrose 50% Injectable 25 Gram(s) IV Push once  dextrose 50% Injectable 25 Gram(s) IV Push once  dextrose 50% Injectable 12.5 Gram(s) IV Push once  gabapentin 100 milliGRAM(s) Oral every 8 hours  glucagon  Injectable 1 milliGRAM(s) IntraMuscular once  insulin lispro (ADMELOG) corrective regimen sliding scale   SubCutaneous three times a day before meals  losartan 25 milliGRAM(s) Oral daily  multivitamin/minerals 1 Tablet(s) Oral daily  polyethylene glycol 3350 17 Gram(s) Oral daily  senna 2 Tablet(s) Oral at bedtime    MEDICATIONS  (PRN):  acetaminophen     Tablet .. 650 milliGRAM(s) Oral every 6 hours PRN Temp greater or equal to 38C (100.4F), Mild Pain (1 - 3)  dextrose Oral Gel 15 Gram(s) Oral once PRN Blood Glucose LESS THAN 70 milliGRAM(s)/deciliter  oxyCODONE    IR 10 milliGRAM(s) Oral every 4 hours PRN Severe Pain (7 - 10)  oxyCODONE    IR 5 milliGRAM(s) Oral every 4 hours PRN Moderate Pain (4 - 6)      PHYSICAL EXAM:  Constitutional: A&Ox3, NAD  Respiratory: Unlabored breathing  Abdomen: Soft, nondistended, NTTP. No rebound or guarding.  Extremities: WWP, MAX spontaneouslyR groin soft, LLE + DP/PT signals

## 2023-12-03 LAB
GLUCOSE BLDC GLUCOMTR-MCNC: 143 MG/DL — HIGH (ref 70–99)
GLUCOSE BLDC GLUCOMTR-MCNC: 143 MG/DL — HIGH (ref 70–99)
GLUCOSE BLDC GLUCOMTR-MCNC: 163 MG/DL — HIGH (ref 70–99)
GLUCOSE BLDC GLUCOMTR-MCNC: 163 MG/DL — HIGH (ref 70–99)
GLUCOSE BLDC GLUCOMTR-MCNC: 232 MG/DL — HIGH (ref 70–99)
GLUCOSE BLDC GLUCOMTR-MCNC: 232 MG/DL — HIGH (ref 70–99)
GLUCOSE BLDC GLUCOMTR-MCNC: 253 MG/DL — HIGH (ref 70–99)
GLUCOSE BLDC GLUCOMTR-MCNC: 253 MG/DL — HIGH (ref 70–99)

## 2023-12-03 RX ADMIN — Medication 3: at 12:57

## 2023-12-03 RX ADMIN — GABAPENTIN 100 MILLIGRAM(S): 400 CAPSULE ORAL at 05:15

## 2023-12-03 RX ADMIN — ATORVASTATIN CALCIUM 40 MILLIGRAM(S): 80 TABLET, FILM COATED ORAL at 21:45

## 2023-12-03 RX ADMIN — Medication 81 MILLIGRAM(S): at 12:56

## 2023-12-03 RX ADMIN — Medication 1: at 09:30

## 2023-12-03 RX ADMIN — GABAPENTIN 100 MILLIGRAM(S): 400 CAPSULE ORAL at 21:45

## 2023-12-03 RX ADMIN — GABAPENTIN 100 MILLIGRAM(S): 400 CAPSULE ORAL at 14:00

## 2023-12-03 RX ADMIN — Medication 500 MILLIGRAM(S): at 12:57

## 2023-12-03 RX ADMIN — POLYETHYLENE GLYCOL 3350 17 GRAM(S): 17 POWDER, FOR SOLUTION ORAL at 12:57

## 2023-12-03 RX ADMIN — LOSARTAN POTASSIUM 25 MILLIGRAM(S): 100 TABLET, FILM COATED ORAL at 05:15

## 2023-12-03 RX ADMIN — AMLODIPINE BESYLATE 2.5 MILLIGRAM(S): 2.5 TABLET ORAL at 05:15

## 2023-12-03 RX ADMIN — Medication 1 TABLET(S): at 12:56

## 2023-12-03 NOTE — PROGRESS NOTE ADULT - SUBJECTIVE AND OBJECTIVE BOX
Patient is a 72y old  Male who presents with a chief complaint of The patient is a 72y Male complaining of Lt foot wound. (03 Dec 2023 10:36)      SUBJECTIVE / OVERNIGHT EVENTS:    Events noted.  CONSTITUTIONAL: No fever,  or fatigue  RESPIRATORY: No cough, wheezing,  No shortness of breath  CARDIOVASCULAR: No chest pain, palpitations, dizziness, or leg swelling  GASTROINTESTINAL: No abdominal or epigastric pain. No nausea, vomiting.  NEUROLOGICAL: No headache    MEDICATIONS  (STANDING):  amLODIPine   Tablet 2.5 milliGRAM(s) Oral daily  ascorbic acid 500 milliGRAM(s) Oral daily  aspirin enteric coated 81 milliGRAM(s) Oral daily  atorvastatin 40 milliGRAM(s) Oral at bedtime  dextrose 5%. 1000 milliLiter(s) (50 mL/Hr) IV Continuous <Continuous>  dextrose 5%. 1000 milliLiter(s) (100 mL/Hr) IV Continuous <Continuous>  dextrose 50% Injectable 25 Gram(s) IV Push once  dextrose 50% Injectable 12.5 Gram(s) IV Push once  dextrose 50% Injectable 25 Gram(s) IV Push once  gabapentin 100 milliGRAM(s) Oral every 8 hours  glucagon  Injectable 1 milliGRAM(s) IntraMuscular once  insulin lispro (ADMELOG) corrective regimen sliding scale   SubCutaneous three times a day before meals  insulin lispro (ADMELOG) corrective regimen sliding scale   SubCutaneous at bedtime  losartan 25 milliGRAM(s) Oral daily  multivitamin/minerals 1 Tablet(s) Oral daily  polyethylene glycol 3350 17 Gram(s) Oral daily  senna 2 Tablet(s) Oral at bedtime    MEDICATIONS  (PRN):  acetaminophen     Tablet .. 650 milliGRAM(s) Oral every 6 hours PRN Temp greater or equal to 38C (100.4F), Mild Pain (1 - 3)  dextrose Oral Gel 15 Gram(s) Oral once PRN Blood Glucose LESS THAN 70 milliGRAM(s)/deciliter  oxyCODONE    IR 10 milliGRAM(s) Oral every 4 hours PRN Severe Pain (7 - 10)  oxyCODONE    IR 5 milliGRAM(s) Oral every 4 hours PRN Moderate Pain (4 - 6)        CAPILLARY BLOOD GLUCOSE      POCT Blood Glucose.: 232 mg/dL (03 Dec 2023 21:35)  POCT Blood Glucose.: 143 mg/dL (03 Dec 2023 17:10)  POCT Blood Glucose.: 253 mg/dL (03 Dec 2023 12:32)  POCT Blood Glucose.: 163 mg/dL (03 Dec 2023 09:00)    I&O's Summary    02 Dec 2023 07:01  -  03 Dec 2023 07:00  --------------------------------------------------------  IN: 480 mL / OUT: 0 mL / NET: 480 mL    03 Dec 2023 07:01  -  03 Dec 2023 22:42  --------------------------------------------------------  IN: 240 mL / OUT: 0 mL / NET: 240 mL        T(C): 37 (12-03-23 @ 20:44), Max: 37.1 (12-03-23 @ 04:14)  HR: 73 (12-03-23 @ 20:44) (66 - 73)  BP: 172/80 (12-03-23 @ 20:44) (149/71 - 172/80)  RR: 18 (12-03-23 @ 20:44) (18 - 18)  SpO2: 99% (12-03-23 @ 20:44) (96% - 99%)    PHYSICAL EXAM:  GENERAL: NAD  NECK: Supple, No JVD  CHEST/LUNG: Clear to auscultation bilaterally; No wheezing.  HEART: Regular rate and rhythm; No murmurs, rubs, or gallops  ABDOMEN: Soft, Nontender, Nondistended; Bowel sounds present  EXTREMITIES:   No edema  NEUROLOGY: AAO X 3      LABS:                  CAPILLARY BLOOD GLUCOSE      POCT Blood Glucose.: 232 mg/dL (03 Dec 2023 21:35)  POCT Blood Glucose.: 143 mg/dL (03 Dec 2023 17:10)  POCT Blood Glucose.: 253 mg/dL (03 Dec 2023 12:32)  POCT Blood Glucose.: 163 mg/dL (03 Dec 2023 09:00)        RADIOLOGY & ADDITIONAL TESTS:    Imaging Personally Reviewed:    Consultant(s) Notes Reviewed:      Care Discussed with Consultants/Other Providers:    Luciano Lo MD, CMD, FACP    [address] [place] [address]  Office Tel: [phone]  Cell: [phone]   Patient is a 72y old  Male who presents with a chief complaint of The patient is a 72y Male complaining of Lt foot wound. (03 Dec 2023 10:36)      SUBJECTIVE / OVERNIGHT EVENTS:    Events noted.  CONSTITUTIONAL: No fever,  or fatigue  RESPIRATORY: No cough, wheezing,  No shortness of breath  CARDIOVASCULAR: No chest pain, palpitations, dizziness, or leg swelling  GASTROINTESTINAL: No abdominal or epigastric pain. No nausea, vomiting.  NEUROLOGICAL: No headache    MEDICATIONS  (STANDING):  amLODIPine   Tablet 2.5 milliGRAM(s) Oral daily  ascorbic acid 500 milliGRAM(s) Oral daily  aspirin enteric coated 81 milliGRAM(s) Oral daily  atorvastatin 40 milliGRAM(s) Oral at bedtime  dextrose 5%. 1000 milliLiter(s) (50 mL/Hr) IV Continuous <Continuous>  dextrose 5%. 1000 milliLiter(s) (100 mL/Hr) IV Continuous <Continuous>  dextrose 50% Injectable 25 Gram(s) IV Push once  dextrose 50% Injectable 12.5 Gram(s) IV Push once  dextrose 50% Injectable 25 Gram(s) IV Push once  gabapentin 100 milliGRAM(s) Oral every 8 hours  glucagon  Injectable 1 milliGRAM(s) IntraMuscular once  insulin lispro (ADMELOG) corrective regimen sliding scale   SubCutaneous three times a day before meals  insulin lispro (ADMELOG) corrective regimen sliding scale   SubCutaneous at bedtime  losartan 25 milliGRAM(s) Oral daily  multivitamin/minerals 1 Tablet(s) Oral daily  polyethylene glycol 3350 17 Gram(s) Oral daily  senna 2 Tablet(s) Oral at bedtime    MEDICATIONS  (PRN):  acetaminophen     Tablet .. 650 milliGRAM(s) Oral every 6 hours PRN Temp greater or equal to 38C (100.4F), Mild Pain (1 - 3)  dextrose Oral Gel 15 Gram(s) Oral once PRN Blood Glucose LESS THAN 70 milliGRAM(s)/deciliter  oxyCODONE    IR 10 milliGRAM(s) Oral every 4 hours PRN Severe Pain (7 - 10)  oxyCODONE    IR 5 milliGRAM(s) Oral every 4 hours PRN Moderate Pain (4 - 6)        CAPILLARY BLOOD GLUCOSE      POCT Blood Glucose.: 232 mg/dL (03 Dec 2023 21:35)  POCT Blood Glucose.: 143 mg/dL (03 Dec 2023 17:10)  POCT Blood Glucose.: 253 mg/dL (03 Dec 2023 12:32)  POCT Blood Glucose.: 163 mg/dL (03 Dec 2023 09:00)    I&O's Summary    02 Dec 2023 07:01  -  03 Dec 2023 07:00  --------------------------------------------------------  IN: 480 mL / OUT: 0 mL / NET: 480 mL    03 Dec 2023 07:01  -  03 Dec 2023 22:42  --------------------------------------------------------  IN: 240 mL / OUT: 0 mL / NET: 240 mL        T(C): 37 (12-03-23 @ 20:44), Max: 37.1 (12-03-23 @ 04:14)  HR: 73 (12-03-23 @ 20:44) (66 - 73)  BP: 172/80 (12-03-23 @ 20:44) (149/71 - 172/80)  RR: 18 (12-03-23 @ 20:44) (18 - 18)  SpO2: 99% (12-03-23 @ 20:44) (96% - 99%)    PHYSICAL EXAM:  GENERAL: NAD  NECK: Supple, No JVD  CHEST/LUNG: Clear to auscultation bilaterally; No wheezing.  HEART: Regular rate and rhythm; No murmurs, rubs, or gallops  ABDOMEN: Soft, Nontender, Nondistended; Bowel sounds present  EXTREMITIES:   No edema  NEUROLOGY: AAO X 3      LABS:                  CAPILLARY BLOOD GLUCOSE      POCT Blood Glucose.: 232 mg/dL (03 Dec 2023 21:35)  POCT Blood Glucose.: 143 mg/dL (03 Dec 2023 17:10)  POCT Blood Glucose.: 253 mg/dL (03 Dec 2023 12:32)  POCT Blood Glucose.: 163 mg/dL (03 Dec 2023 09:00)        RADIOLOGY & ADDITIONAL TESTS:    Imaging Personally Reviewed:    Consultant(s) Notes Reviewed:      Care Discussed with Consultants/Other Providers:    Luciano Lo MD, CMD, FACP    [address] [place] [address]  Office Tel: [phone]  Cell: [phone]

## 2023-12-03 NOTE — PROGRESS NOTE ADULT - SUBJECTIVE AND OBJECTIVE BOX
SUBJECTIVE: Patient seen and examined on AM rounds. Patient reports that they're feeling well. Denies fever, chills. Reports pain as controlled. No complaints at this time.     Vital Signs Last 24 Hrs  T(C): 37.1 (03 Dec 2023 04:14), Max: 37.1 (03 Dec 2023 04:14)  T(F): 98.7 (03 Dec 2023 04:14), Max: 98.7 (03 Dec 2023 04:14)  HR: 66 (03 Dec 2023 04:14) (66 - 74)  BP: 165/83 (03 Dec 2023 04:14) (164/73 - 166/74)  BP(mean): --  RR: 18 (03 Dec 2023 04:14) (18 - 18)  SpO2: 98% (03 Dec 2023 04:14) (95% - 98%)    Parameters below as of 03 Dec 2023 04:14  Patient On (Oxygen Delivery Method): room air      PHYSICAL EXAM:  Constitutional: A&Ox3, NAD  Respiratory: Unlabored breathing  Abdomen: Soft, nondistended, NTTP. No rebound or guarding.  Extremities: WWP, MAX spontaneouslyR groin soft, LLE + DP/PT signals      I&O's Summary    02 Dec 2023 07:01  -  03 Dec 2023 07:00  --------------------------------------------------------  IN: 480 mL / OUT: 0 mL / NET: 480 mL      I&O's Detail    02 Dec 2023 07:01  -  03 Dec 2023 07:00  --------------------------------------------------------  IN:    Oral Fluid: 480 mL  Total IN: 480 mL    OUT:  Total OUT: 0 mL    Total NET: 480 mL          LABS:                RADIOLOGY & ADDITIONAL STUDIES:

## 2023-12-03 NOTE — PROGRESS NOTE ADULT - ASSESSMENT
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 Patient now presents to ED w/ LLE 2nd and 3rd toe wound x2 weeks. Podiatry planning local wound care vs ray resection. Vascular surgery consulted for evaluation.    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 Patient now presents to ED w/ LLE 2nd and 3rd toe wound x2 weeks. Podiatry planning local wound care vs ray resection. Vascular surgery consulted for evaluation.    Recommendations:   - s/p LLE angio on 11/29  - Appreciate podiatry recs  - Continue local wound care for now   - please obtain bilateral vein mapping with GSV  - possible bypass vs deep vein arterialization planning- please document medicine/cardiology optimization/clearances  - Remainder care per primary

## 2023-12-03 NOTE — PROGRESS NOTE ADULT - ASSESSMENT
The patient is a 72y Male complaining of Lt foot wound.    Lt foot wound infection:    IV Unasyn  Pod/Vascular f/up appreciated  s/p LLE angio 11/29, awaiting Vascular intervention  cardiology f/up      DM II:    FSSS    Diabetic Neuropathy:    Cont Neurontin    Considering pt's medical condition and nature of sx, pt is at intermediate risk for the surgery.

## 2023-12-03 NOTE — PROGRESS NOTE ADULT - SUBJECTIVE AND OBJECTIVE BOX
DATE OF SERVICE: 12-03-23 @ 10:36    Patient is a 72y old  Male who presents with a chief complaint of The patient is a 72y Male complaining of Lt foot wound. (03 Dec 2023 09:54)      INTERVAL HISTORY:     REVIEW OF SYSTEMS:  CONSTITUTIONAL: No weakness  EYES/ENT: No visual changes;  No throat pain   NECK: No pain or stiffness  RESPIRATORY: No cough, wheezing; No shortness of breath  CARDIOVASCULAR: No chest pain or palpitations  GASTROINTESTINAL: No abdominal  pain. No nausea, vomiting, or hematemesis  GENITOURINARY: No dysuria, frequency or hematuria  NEUROLOGICAL: No stroke like symptoms  SKIN: R BKA     TELEMETRY Personally reviewed: SR 60-70  	  MEDICATIONS:  amLODIPine   Tablet 2.5 milliGRAM(s) Oral daily  losartan 25 milliGRAM(s) Oral daily        PHYSICAL EXAM:  T(C): 37.1 (12-03-23 @ 04:14), Max: 37.1 (12-03-23 @ 04:14)  HR: 66 (12-03-23 @ 04:14) (66 - 74)  BP: 165/83 (12-03-23 @ 04:14) (164/73 - 166/74)  RR: 18 (12-03-23 @ 04:14) (18 - 18)  SpO2: 98% (12-03-23 @ 04:14) (95% - 98%)  Wt(kg): --  I&O's Summary    02 Dec 2023 07:01  -  03 Dec 2023 07:00  --------------------------------------------------------  IN: 480 mL / OUT: 0 mL / NET: 480 mL          Appearance: In no distress	  HEENT:    PERRL, EOMI	  Cardiovascular:  S1 S2, No JVD  Respiratory: Lungs clear to auscultation	  Gastrointestinal:  Soft, Non-tender, + BS	  Vascularature:  No edema of LE, + R BKA   Psychiatric: Appropriate affect   Neuro: no acute focal deficits                     Labs personally reviewed      ASSESSMENT/PLAN: 	  72 year old male with hx of DM, HTN, right LE BKA, presents to the ED for left 2nd and 3rd toe wound x 2 weeks. He states he noticed a cut to the toe 2 weeks ago and it has progressively worsened. No infectious symptoms including fever, chills.    Problem/Plan -1  Problem: Cardiac Risk Stratification for podiatry surgery  - Denies CP or SOB  - TTE shows preserved EF, mild LVH  - Not in decompensated HF  - No tachy supa arrhythmia  - s/p cath with nonobstructive moderate CAD in March 2023  - Elevated risk for low-mod risk pods surgery procedure, no contraindication to proceed.    Problem/Plan -2  Problem: Hypertension - not controlled   check BMP    - amlodipine  2.5mg PO daily  - recommend increasing losartan from 25mg to 50mg for better bp control.     Problem/Plan -3  Problem: DM II  - HgbA1c improved to 7.2  - ISS as per primary team          LASHAUN Ramires DO [facility]  Cardiovascular Medicine  [address], [address]  Available through call or text on [employer] TEAMs  Office: [phone]   DATE OF SERVICE: 12-03-23 @ 10:36    Patient is a 72y old  Male who presents with a chief complaint of The patient is a 72y Male complaining of Lt foot wound. (03 Dec 2023 09:54)      INTERVAL HISTORY:     REVIEW OF SYSTEMS:  CONSTITUTIONAL: No weakness  EYES/ENT: No visual changes;  No throat pain   NECK: No pain or stiffness  RESPIRATORY: No cough, wheezing; No shortness of breath  CARDIOVASCULAR: No chest pain or palpitations  GASTROINTESTINAL: No abdominal  pain. No nausea, vomiting, or hematemesis  GENITOURINARY: No dysuria, frequency or hematuria  NEUROLOGICAL: No stroke like symptoms  SKIN: R BKA     TELEMETRY Personally reviewed: SR 60-70  	  MEDICATIONS:  amLODIPine   Tablet 2.5 milliGRAM(s) Oral daily  losartan 25 milliGRAM(s) Oral daily        PHYSICAL EXAM:  T(C): 37.1 (12-03-23 @ 04:14), Max: 37.1 (12-03-23 @ 04:14)  HR: 66 (12-03-23 @ 04:14) (66 - 74)  BP: 165/83 (12-03-23 @ 04:14) (164/73 - 166/74)  RR: 18 (12-03-23 @ 04:14) (18 - 18)  SpO2: 98% (12-03-23 @ 04:14) (95% - 98%)  Wt(kg): --  I&O's Summary    02 Dec 2023 07:01  -  03 Dec 2023 07:00  --------------------------------------------------------  IN: 480 mL / OUT: 0 mL / NET: 480 mL          Appearance: In no distress	  HEENT:    PERRL, EOMI	  Cardiovascular:  S1 S2, No JVD  Respiratory: Lungs clear to auscultation	  Gastrointestinal:  Soft, Non-tender, + BS	  Vascularature:  No edema of LE, + R BKA   Psychiatric: Appropriate affect   Neuro: no acute focal deficits                     Labs personally reviewed      ASSESSMENT/PLAN: 	  72 year old male with hx of DM, HTN, right LE BKA, presents to the ED for left 2nd and 3rd toe wound x 2 weeks. He states he noticed a cut to the toe 2 weeks ago and it has progressively worsened. No infectious symptoms including fever, chills.    Problem/Plan -1  Problem: Cardiac Risk Stratification for podiatry surgery  - Denies CP or SOB  - TTE shows preserved EF, mild LVH  - Not in decompensated HF  - No tachy supa arrhythmia  - s/p cath with nonobstructive moderate CAD in March 2023  - Elevated risk for low-mod risk pods surgery procedure, no contraindication to proceed.    Problem/Plan -2  Problem: Hypertension - not controlled   check BMP    - amlodipine  2.5mg PO daily  - recommend increasing losartan from 25mg to 50mg for better bp control.     Problem/Plan -3  Problem: DM II  - HgbA1c improved to 7.2  - ISS as per primary team          LASHAUN Ramires DO [facility]  Cardiovascular Medicine  [address], [address]  Available through call or text on [employer] TEAMs  Office: [phone]   DATE OF SERVICE: 12-03-23 @ 10:36    Patient is a 72y old  Male who presents with a chief complaint of The patient is a 72y Male complaining of Lt foot wound. (03 Dec 2023 09:54)      INTERVAL HISTORY:     REVIEW OF SYSTEMS:  CONSTITUTIONAL: No weakness  EYES/ENT: No visual changes;  No throat pain   NECK: No pain or stiffness  RESPIRATORY: No cough, wheezing; No shortness of breath  CARDIOVASCULAR: No chest pain or palpitations  GASTROINTESTINAL: No abdominal  pain. No nausea, vomiting, or hematemesis  GENITOURINARY: No dysuria, frequency or hematuria  NEUROLOGICAL: No stroke like symptoms  SKIN: R BKA     TELEMETRY Personally reviewed: SR 60-70  	  MEDICATIONS:  amLODIPine   Tablet 2.5 milliGRAM(s) Oral daily  losartan 25 milliGRAM(s) Oral daily        PHYSICAL EXAM:  T(C): 37.1 (12-03-23 @ 04:14), Max: 37.1 (12-03-23 @ 04:14)  HR: 66 (12-03-23 @ 04:14) (66 - 74)  BP: 165/83 (12-03-23 @ 04:14) (164/73 - 166/74)  RR: 18 (12-03-23 @ 04:14) (18 - 18)  SpO2: 98% (12-03-23 @ 04:14) (95% - 98%)  Wt(kg): --  I&O's Summary    02 Dec 2023 07:01  -  03 Dec 2023 07:00  --------------------------------------------------------  IN: 480 mL / OUT: 0 mL / NET: 480 mL          Appearance: In no distress	  HEENT:    PERRL, EOMI	  Cardiovascular:  S1 S2, No JVD  Respiratory: Lungs clear to auscultation	  Gastrointestinal:  Soft, Non-tender, + BS	  Vascularature:  No edema of LE, + R BKA   Psychiatric: Appropriate affect   Neuro: no acute focal deficits                     Labs personally reviewed      ASSESSMENT/PLAN: 	  72 year old male with hx of DM, HTN, right LE BKA, presents to the ED for left 2nd and 3rd toe wound x 2 weeks. He states he noticed a cut to the toe 2 weeks ago and it has progressively worsened. No infectious symptoms including fever, chills.    Problem/Plan -1  Problem: Cardiac Risk Stratification for podiatry surgery  - Denies CP or SOB  - TTE shows preserved EF, mild LVH  - Not in decompensated HF  - No tachy supa arrhythmia  - s/p cath with nonobstructive moderate CAD in March 2023  - Elevated risk for low-mod risk pods surgery procedure, no contraindication to proceed.    Problem/Plan -2  Problem: Hypertension - not controlled   check BMP    - amlodipine  2.5mg PO daily  - recommend increasing losartan from 25mg to 50mg for better bp control.     Problem/Plan -3  Problem: DM II  - HgbA1c improved to 7.2  - ISS as per primary team          LASHAUN Ramires DO [facility]  Cardiovascular Medicine  [address], [address]  Available through call or text on [employer] TEAMs  Office: [phone]

## 2023-12-04 LAB
ALBUMIN SERPL ELPH-MCNC: 3.5 G/DL — SIGNIFICANT CHANGE UP (ref 3.3–5)
ALBUMIN SERPL ELPH-MCNC: 3.5 G/DL — SIGNIFICANT CHANGE UP (ref 3.3–5)
ALP SERPL-CCNC: 74 U/L — SIGNIFICANT CHANGE UP (ref 40–120)
ALP SERPL-CCNC: 74 U/L — SIGNIFICANT CHANGE UP (ref 40–120)
ALT FLD-CCNC: 20 U/L — SIGNIFICANT CHANGE UP (ref 10–45)
ALT FLD-CCNC: 20 U/L — SIGNIFICANT CHANGE UP (ref 10–45)
ANION GAP SERPL CALC-SCNC: 9 MMOL/L — SIGNIFICANT CHANGE UP (ref 5–17)
ANION GAP SERPL CALC-SCNC: 9 MMOL/L — SIGNIFICANT CHANGE UP (ref 5–17)
AST SERPL-CCNC: 13 U/L — SIGNIFICANT CHANGE UP (ref 10–40)
AST SERPL-CCNC: 13 U/L — SIGNIFICANT CHANGE UP (ref 10–40)
BILIRUB SERPL-MCNC: 0.3 MG/DL — SIGNIFICANT CHANGE UP (ref 0.2–1.2)
BILIRUB SERPL-MCNC: 0.3 MG/DL — SIGNIFICANT CHANGE UP (ref 0.2–1.2)
BUN SERPL-MCNC: 23 MG/DL — SIGNIFICANT CHANGE UP (ref 7–23)
BUN SERPL-MCNC: 23 MG/DL — SIGNIFICANT CHANGE UP (ref 7–23)
CALCIUM SERPL-MCNC: 9.4 MG/DL — SIGNIFICANT CHANGE UP (ref 8.4–10.5)
CALCIUM SERPL-MCNC: 9.4 MG/DL — SIGNIFICANT CHANGE UP (ref 8.4–10.5)
CHLORIDE SERPL-SCNC: 104 MMOL/L — SIGNIFICANT CHANGE UP (ref 96–108)
CHLORIDE SERPL-SCNC: 104 MMOL/L — SIGNIFICANT CHANGE UP (ref 96–108)
CO2 SERPL-SCNC: 24 MMOL/L — SIGNIFICANT CHANGE UP (ref 22–31)
CO2 SERPL-SCNC: 24 MMOL/L — SIGNIFICANT CHANGE UP (ref 22–31)
CREAT SERPL-MCNC: 0.97 MG/DL — SIGNIFICANT CHANGE UP (ref 0.5–1.3)
CREAT SERPL-MCNC: 0.97 MG/DL — SIGNIFICANT CHANGE UP (ref 0.5–1.3)
EGFR: 83 ML/MIN/1.73M2 — SIGNIFICANT CHANGE UP
EGFR: 83 ML/MIN/1.73M2 — SIGNIFICANT CHANGE UP
GLUCOSE BLDC GLUCOMTR-MCNC: 189 MG/DL — HIGH (ref 70–99)
GLUCOSE BLDC GLUCOMTR-MCNC: 189 MG/DL — HIGH (ref 70–99)
GLUCOSE BLDC GLUCOMTR-MCNC: 204 MG/DL — HIGH (ref 70–99)
GLUCOSE BLDC GLUCOMTR-MCNC: 204 MG/DL — HIGH (ref 70–99)
GLUCOSE BLDC GLUCOMTR-MCNC: 215 MG/DL — HIGH (ref 70–99)
GLUCOSE BLDC GLUCOMTR-MCNC: 215 MG/DL — HIGH (ref 70–99)
GLUCOSE BLDC GLUCOMTR-MCNC: 246 MG/DL — HIGH (ref 70–99)
GLUCOSE BLDC GLUCOMTR-MCNC: 246 MG/DL — HIGH (ref 70–99)
GLUCOSE SERPL-MCNC: 187 MG/DL — HIGH (ref 70–99)
GLUCOSE SERPL-MCNC: 187 MG/DL — HIGH (ref 70–99)
HCT VFR BLD CALC: 33.6 % — LOW (ref 39–50)
HCT VFR BLD CALC: 33.6 % — LOW (ref 39–50)
HGB BLD-MCNC: 11 G/DL — LOW (ref 13–17)
HGB BLD-MCNC: 11 G/DL — LOW (ref 13–17)
MAGNESIUM SERPL-MCNC: 2.3 MG/DL — SIGNIFICANT CHANGE UP (ref 1.6–2.6)
MAGNESIUM SERPL-MCNC: 2.3 MG/DL — SIGNIFICANT CHANGE UP (ref 1.6–2.6)
MCHC RBC-ENTMCNC: 28.6 PG — SIGNIFICANT CHANGE UP (ref 27–34)
MCHC RBC-ENTMCNC: 28.6 PG — SIGNIFICANT CHANGE UP (ref 27–34)
MCHC RBC-ENTMCNC: 32.7 GM/DL — SIGNIFICANT CHANGE UP (ref 32–36)
MCHC RBC-ENTMCNC: 32.7 GM/DL — SIGNIFICANT CHANGE UP (ref 32–36)
MCV RBC AUTO: 87.5 FL — SIGNIFICANT CHANGE UP (ref 80–100)
MCV RBC AUTO: 87.5 FL — SIGNIFICANT CHANGE UP (ref 80–100)
NRBC # BLD: 0 /100 WBCS — SIGNIFICANT CHANGE UP (ref 0–0)
NRBC # BLD: 0 /100 WBCS — SIGNIFICANT CHANGE UP (ref 0–0)
PHOSPHATE SERPL-MCNC: 3.3 MG/DL — SIGNIFICANT CHANGE UP (ref 2.5–4.5)
PHOSPHATE SERPL-MCNC: 3.3 MG/DL — SIGNIFICANT CHANGE UP (ref 2.5–4.5)
PLATELET # BLD AUTO: 249 K/UL — SIGNIFICANT CHANGE UP (ref 150–400)
PLATELET # BLD AUTO: 249 K/UL — SIGNIFICANT CHANGE UP (ref 150–400)
POTASSIUM SERPL-MCNC: 4.2 MMOL/L — SIGNIFICANT CHANGE UP (ref 3.5–5.3)
POTASSIUM SERPL-MCNC: 4.2 MMOL/L — SIGNIFICANT CHANGE UP (ref 3.5–5.3)
POTASSIUM SERPL-SCNC: 4.2 MMOL/L — SIGNIFICANT CHANGE UP (ref 3.5–5.3)
POTASSIUM SERPL-SCNC: 4.2 MMOL/L — SIGNIFICANT CHANGE UP (ref 3.5–5.3)
PROT SERPL-MCNC: 7.6 G/DL — SIGNIFICANT CHANGE UP (ref 6–8.3)
PROT SERPL-MCNC: 7.6 G/DL — SIGNIFICANT CHANGE UP (ref 6–8.3)
RBC # BLD: 3.84 M/UL — LOW (ref 4.2–5.8)
RBC # BLD: 3.84 M/UL — LOW (ref 4.2–5.8)
RBC # FLD: 12.1 % — SIGNIFICANT CHANGE UP (ref 10.3–14.5)
RBC # FLD: 12.1 % — SIGNIFICANT CHANGE UP (ref 10.3–14.5)
SODIUM SERPL-SCNC: 137 MMOL/L — SIGNIFICANT CHANGE UP (ref 135–145)
SODIUM SERPL-SCNC: 137 MMOL/L — SIGNIFICANT CHANGE UP (ref 135–145)
WBC # BLD: 7.31 K/UL — SIGNIFICANT CHANGE UP (ref 3.8–10.5)
WBC # BLD: 7.31 K/UL — SIGNIFICANT CHANGE UP (ref 3.8–10.5)
WBC # FLD AUTO: 7.31 K/UL — SIGNIFICANT CHANGE UP (ref 3.8–10.5)
WBC # FLD AUTO: 7.31 K/UL — SIGNIFICANT CHANGE UP (ref 3.8–10.5)

## 2023-12-04 PROCEDURE — 99232 SBSQ HOSP IP/OBS MODERATE 35: CPT

## 2023-12-04 RX ADMIN — Medication 1: at 08:59

## 2023-12-04 RX ADMIN — Medication 2: at 17:34

## 2023-12-04 RX ADMIN — Medication 1 TABLET(S): at 12:32

## 2023-12-04 RX ADMIN — Medication 81 MILLIGRAM(S): at 12:32

## 2023-12-04 RX ADMIN — LOSARTAN POTASSIUM 25 MILLIGRAM(S): 100 TABLET, FILM COATED ORAL at 05:24

## 2023-12-04 RX ADMIN — GABAPENTIN 100 MILLIGRAM(S): 400 CAPSULE ORAL at 05:23

## 2023-12-04 RX ADMIN — GABAPENTIN 100 MILLIGRAM(S): 400 CAPSULE ORAL at 21:42

## 2023-12-04 RX ADMIN — AMLODIPINE BESYLATE 2.5 MILLIGRAM(S): 2.5 TABLET ORAL at 05:23

## 2023-12-04 RX ADMIN — Medication 500 MILLIGRAM(S): at 12:33

## 2023-12-04 RX ADMIN — GABAPENTIN 100 MILLIGRAM(S): 400 CAPSULE ORAL at 13:45

## 2023-12-04 RX ADMIN — ATORVASTATIN CALCIUM 40 MILLIGRAM(S): 80 TABLET, FILM COATED ORAL at 21:43

## 2023-12-04 RX ADMIN — Medication 2: at 12:37

## 2023-12-04 NOTE — PROGRESS NOTE ADULT - ASSESSMENT
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 Patient now presents to ED w/ LLE 2nd and 3rd toe wound x2 weeks. Podiatry planning local wound care vs ray resection. Vascular surgery consulted for evaluation.    Recommendations:   - s/p LLE angio on 11/29  - Appreciate podiatry recs  - Continue local wound care for now - patient unlikely to heal any resection prior to revascularization  - please obtain bilateral vein mapping with GSV  - possible bypass vs deep vein arterialization planning- medicine/cardiology optimization/clearances noted  - Remainder care per primary     Plan discussed with fellow on behalf of attending.    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 Patient now presents to ED w/ LLE 2nd and 3rd toe wound x2 weeks. Podiatry planning local wound care vs ray resection. Vascular surgery consulted for evaluation.    Recommendations:   - s/p LLE angio on 11/29  - Appreciate podiatry recs  - Continue local wound care for now - patient unlikely to heal any resection prior to revascularization  - please obtain bilateral vein mapping with GSV  - possible bypass vs deep vein arterialization planning- medicine/cardiology optimization/clearances noted  - Remainder care per primary     Plan discussed with fellow on behalf of attending.    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 Patient now presents to ED w/ LLE 2nd and 3rd toe wound x2 weeks. Podiatry planning local wound care vs ray resection. Vascular surgery consulted for evaluation.    Recommendations:   - s/p LLE angio on 11/29  - Appreciate podiatry recs  - Continue local wound care for now - patient unlikely to heal any resection prior to revascularization  - please obtain bilateral vein mapping with GSV  - possible deep vein arterialization planning- medicine/cardiology optimization/clearances noted  - Remainder care per primary     Plan discussed with fellow on behalf of attending.    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 Patient now presents to ED w/ LLE 2nd and 3rd toe wound x2 weeks. Podiatry planning local wound care vs ray resection. Vascular surgery consulted for evaluation.    Recommendations:   - s/p LLE angio on 11/29  - Appreciate podiatry recs  - Continue local wound care for now - patient unlikely to heal any resection prior to revascularization  - please obtain bilateral vein mapping with GSV  - possible deep vein arterialization planning- medicine/cardiology optimization/clearances noted  - Remainder care per primary     Plan discussed with fellow on behalf of attending.    Vascular [id number]

## 2023-12-04 NOTE — PROGRESS NOTE ADULT - SUBJECTIVE AND OBJECTIVE BOX
DATE OF SERVICE: 12-04-23 @ 14:48    Patient is a 72y old  Male who presents with a chief complaint of The patient is a 72y Male complaining of Lt foot wound. (04 Dec 2023 12:47)      INTERVAL HISTORY: Feels ok.     REVIEW OF SYSTEMS:  CONSTITUTIONAL: No weakness  EYES/ENT: No visual changes;  No throat pain   NECK: No pain or stiffness  RESPIRATORY: No cough, wheezing; No shortness of breath  CARDIOVASCULAR: No chest pain or palpitations  GASTROINTESTINAL: No abdominal  pain. No nausea, vomiting, or hematemesis  GENITOURINARY: No dysuria, frequency or hematuria  NEUROLOGICAL: No stroke like symptoms  SKIN: No rashes    	  MEDICATIONS:  amLODIPine   Tablet 2.5 milliGRAM(s) Oral daily  losartan 25 milliGRAM(s) Oral daily        PHYSICAL EXAM:  T(C): 36.9 (12-04-23 @ 13:28), Max: 37 (12-03-23 @ 20:44)  HR: 69 (12-04-23 @ 13:28) (69 - 77)  BP: 137/66 (12-04-23 @ 13:28) (137/66 - 172/80)  RR: 18 (12-04-23 @ 13:28) (18 - 18)  SpO2: 97% (12-04-23 @ 13:28) (97% - 99%)  Wt(kg): --  I&O's Summary    03 Dec 2023 07:01  -  04 Dec 2023 07:00  --------------------------------------------------------  IN: 390 mL / OUT: 0 mL / NET: 390 mL          Appearance: In no distress	  HEENT:    PERRL, EOMI	  Cardiovascular:  S1 S2, No JVD  Respiratory: Lungs clear to auscultation	  Gastrointestinal:  Soft, Non-tender, + BS	  Vascularature:  L BKA  Psychiatric: Appropriate affect   Neuro: no acute focal deficits                               11.0   7.31  )-----------( 249      ( 04 Dec 2023 07:14 )             33.6     12-04    137  |  104  |  23  ----------------------------<  187<H>  4.2   |  24  |  0.97    Ca    9.4      04 Dec 2023 07:12  Phos  3.3     12-04  Mg     2.3     12-04    TPro  7.6  /  Alb  3.5  /  TBili  0.3  /  DBili  x   /  AST  13  /  ALT  20  /  AlkPhos  74  12-04        Labs personally reviewed      ASSESSMENT/PLAN: 	    72 year old male with hx of DM, HTN, right LE BKA, presents to the ED for left 2nd and 3rd toe wound x 2 weeks. He states he noticed a cut to the toe 2 weeks ago and it has progressively worsened. No infectious symptoms including fever, chills.    Problem/Plan -1  Problem: Cardiac Risk Stratification for podiatry surgery  - Denies CP or SOB  - TTE shows preserved EF, mild LVH  - Not in decompensated HF  - No tachy supa arrhythmia  - s/p cath with nonobstructive moderate CAD in March 2023  - Elevated risk for low-mod risk pods surgery/vascular surgery, no contraindication to proceed.    Problem/Plan -2  Problem: Hypertension - not controlled   check BMP    - amlodipine  2.5mg PO daily  - recommend increasing losartan from 25mg to 50mg for better bp control.     Problem/Plan -3  Problem: DM II  - HgbA1c improved to 7.2  - ISS as per primary team            LUPILLO Bello DO [facility]  Cardiovascular Medicine  [address], [address]  Available through call or text on [employer] TE[employer]  Office: [phone]   DATE OF SERVICE: 12-04-23 @ 14:48    Patient is a 72y old  Male who presents with a chief complaint of The patient is a 72y Male complaining of Lt foot wound. (04 Dec 2023 12:47)      INTERVAL HISTORY: Feels ok.     REVIEW OF SYSTEMS:  CONSTITUTIONAL: No weakness  EYES/ENT: No visual changes;  No throat pain   NECK: No pain or stiffness  RESPIRATORY: No cough, wheezing; No shortness of breath  CARDIOVASCULAR: No chest pain or palpitations  GASTROINTESTINAL: No abdominal  pain. No nausea, vomiting, or hematemesis  GENITOURINARY: No dysuria, frequency or hematuria  NEUROLOGICAL: No stroke like symptoms  SKIN: No rashes    	  MEDICATIONS:  amLODIPine   Tablet 2.5 milliGRAM(s) Oral daily  losartan 25 milliGRAM(s) Oral daily        PHYSICAL EXAM:  T(C): 36.9 (12-04-23 @ 13:28), Max: 37 (12-03-23 @ 20:44)  HR: 69 (12-04-23 @ 13:28) (69 - 77)  BP: 137/66 (12-04-23 @ 13:28) (137/66 - 172/80)  RR: 18 (12-04-23 @ 13:28) (18 - 18)  SpO2: 97% (12-04-23 @ 13:28) (97% - 99%)  Wt(kg): --  I&O's Summary    03 Dec 2023 07:01  -  04 Dec 2023 07:00  --------------------------------------------------------  IN: 390 mL / OUT: 0 mL / NET: 390 mL          Appearance: In no distress	  HEENT:    PERRL, EOMI	  Cardiovascular:  S1 S2, No JVD  Respiratory: Lungs clear to auscultation	  Gastrointestinal:  Soft, Non-tender, + BS	  Vascularature:  L BKA  Psychiatric: Appropriate affect   Neuro: no acute focal deficits                               11.0   7.31  )-----------( 249      ( 04 Dec 2023 07:14 )             33.6     12-04    137  |  104  |  23  ----------------------------<  187<H>  4.2   |  24  |  0.97    Ca    9.4      04 Dec 2023 07:12  Phos  3.3     12-04  Mg     2.3     12-04    TPro  7.6  /  Alb  3.5  /  TBili  0.3  /  DBili  x   /  AST  13  /  ALT  20  /  AlkPhos  74  12-04        Labs personally reviewed      ASSESSMENT/PLAN: 	    72 year old male with hx of DM, HTN, right LE BKA, presents to the ED for left 2nd and 3rd toe wound x 2 weeks. He states he noticed a cut to the toe 2 weeks ago and it has progressively worsened. No infectious symptoms including fever, chills.    Problem/Plan -1  Problem: Cardiac Risk Stratification for podiatry surgery  - Denies CP or SOB  - TTE shows preserved EF, mild LVH  - Not in decompensated HF  - No tachy supa arrhythmia  - s/p cath with nonobstructive moderate CAD in March 2023  - Elevated risk for low-mod risk pods surgery/vascular surgery, no contraindication to proceed.    Problem/Plan -2  Problem: Hypertension - not controlled   check BMP    - amlodipine  2.5mg PO daily  - recommend increasing losartan from 25mg to 50mg for better bp control.     Problem/Plan -3  Problem: DM II  - HgbA1c improved to 7.2  - ISS as per primary team            LUPILLO Bello DO [facility]  Cardiovascular Medicine  [address], [address]  Available through call or text on [employer] TE[employer]  Office: [phone]   DATE OF SERVICE: 12-04-23 @ 14:48    Patient is a 72y old  Male who presents with a chief complaint of The patient is a 72y Male complaining of Lt foot wound. (04 Dec 2023 12:47)      INTERVAL HISTORY: Feels ok.     REVIEW OF SYSTEMS:  CONSTITUTIONAL: No weakness  EYES/ENT: No visual changes;  No throat pain   NECK: No pain or stiffness  RESPIRATORY: No cough, wheezing; No shortness of breath  CARDIOVASCULAR: No chest pain or palpitations  GASTROINTESTINAL: No abdominal  pain. No nausea, vomiting, or hematemesis  GENITOURINARY: No dysuria, frequency or hematuria  NEUROLOGICAL: No stroke like symptoms  SKIN: No rashes    	  MEDICATIONS:  amLODIPine   Tablet 2.5 milliGRAM(s) Oral daily  losartan 25 milliGRAM(s) Oral daily        PHYSICAL EXAM:  T(C): 36.9 (12-04-23 @ 13:28), Max: 37 (12-03-23 @ 20:44)  HR: 69 (12-04-23 @ 13:28) (69 - 77)  BP: 137/66 (12-04-23 @ 13:28) (137/66 - 172/80)  RR: 18 (12-04-23 @ 13:28) (18 - 18)  SpO2: 97% (12-04-23 @ 13:28) (97% - 99%)  Wt(kg): --  I&O's Summary    03 Dec 2023 07:01  -  04 Dec 2023 07:00  --------------------------------------------------------  IN: 390 mL / OUT: 0 mL / NET: 390 mL          Appearance: In no distress	  HEENT:    PERRL, EOMI	  Cardiovascular:  S1 S2, No JVD  Respiratory: Lungs clear to auscultation	  Gastrointestinal:  Soft, Non-tender, + BS	  Vascularature:  L BKA  Psychiatric: Appropriate affect   Neuro: no acute focal deficits                               11.0   7.31  )-----------( 249      ( 04 Dec 2023 07:14 )             33.6     12-04    137  |  104  |  23  ----------------------------<  187<H>  4.2   |  24  |  0.97    Ca    9.4      04 Dec 2023 07:12  Phos  3.3     12-04  Mg     2.3     12-04    TPro  7.6  /  Alb  3.5  /  TBili  0.3  /  DBili  x   /  AST  13  /  ALT  20  /  AlkPhos  74  12-04        Labs personally reviewed      ASSESSMENT/PLAN: 	  72 year old male with hx of DM, HTN, right LE BKA, presents to the ED for left 2nd and 3rd toe wound x 2 weeks. He states he noticed a cut to the toe 2 weeks ago and it has progressively worsened. No infectious symptoms including fever, chills.    Problem/Plan -1  Problem: Cardiac Risk Stratification for podiatry surgery  - Denies CP or SOB  - TTE shows preserved EF, mild LVH  - Not in decompensated HF  - No tachy supa arrhythmia  - s/p cath with nonobstructive moderate CAD in March 2023  - Elevated risk for low-mod risk pods surgery/vascular surgery, no contraindication to proceed.    Problem/Plan -2  Problem: Hypertension - not controlled   check BMP    - amlodipine  2.5mg PO daily  - recommend increasing losartan from 25mg to 50mg for better bp control.     Problem/Plan -3  Problem: DM II  - HgbA1c improved to 7.2  - ISS as per primary team            LUPILLO Bello DO [facility]  Cardiovascular Medicine  [address], [address]  Available through call or text on [employer] TE[employer]  Office: [phone]   DATE OF SERVICE: 12-04-23 @ 14:48    Patient is a 72y old  Male who presents with a chief complaint of The patient is a 72y Male complaining of Lt foot wound. (04 Dec 2023 12:47)      INTERVAL HISTORY: Feels ok.     REVIEW OF SYSTEMS:  CONSTITUTIONAL: No weakness  EYES/ENT: No visual changes;  No throat pain   NECK: No pain or stiffness  RESPIRATORY: No cough, wheezing; No shortness of breath  CARDIOVASCULAR: No chest pain or palpitations  GASTROINTESTINAL: No abdominal  pain. No nausea, vomiting, or hematemesis  GENITOURINARY: No dysuria, frequency or hematuria  NEUROLOGICAL: No stroke like symptoms  SKIN: No rashes    	  MEDICATIONS:  amLODIPine   Tablet 2.5 milliGRAM(s) Oral daily  losartan 25 milliGRAM(s) Oral daily        PHYSICAL EXAM:  T(C): 36.9 (12-04-23 @ 13:28), Max: 37 (12-03-23 @ 20:44)  HR: 69 (12-04-23 @ 13:28) (69 - 77)  BP: 137/66 (12-04-23 @ 13:28) (137/66 - 172/80)  RR: 18 (12-04-23 @ 13:28) (18 - 18)  SpO2: 97% (12-04-23 @ 13:28) (97% - 99%)  Wt(kg): --  I&O's Summary    03 Dec 2023 07:01  -  04 Dec 2023 07:00  --------------------------------------------------------  IN: 390 mL / OUT: 0 mL / NET: 390 mL          Appearance: In no distress	  HEENT:    PERRL, EOMI	  Cardiovascular:  S1 S2, No JVD  Respiratory: Lungs clear to auscultation	  Gastrointestinal:  Soft, Non-tender, + BS	  Vascularature:  L BKA  Psychiatric: Appropriate affect   Neuro: no acute focal deficits                               11.0   7.31  )-----------( 249      ( 04 Dec 2023 07:14 )             33.6     12-04    137  |  104  |  23  ----------------------------<  187<H>  4.2   |  24  |  0.97    Ca    9.4      04 Dec 2023 07:12  Phos  3.3     12-04  Mg     2.3     12-04    TPro  7.6  /  Alb  3.5  /  TBili  0.3  /  DBili  x   /  AST  13  /  ALT  20  /  AlkPhos  74  12-04        Labs personally reviewed      ASSESSMENT/PLAN: 	  72 year old male with hx of DM, HTN, right LE BKA, presents to the ED for left 2nd and 3rd toe wound x 2 weeks. He states he noticed a cut to the toe 2 weeks ago and it has progressively worsened. No infectious symptoms including fever, chills.    Problem/Plan -1  Problem: Cardiac Risk Stratification for podiatry surgery  - Denies CP or SOB  - TTE shows preserved EF, mild LVH  - Not in decompensated HF  - No tachy supa arrhythmia  - s/p cath with nonobstructive moderate CAD in March 2023  - Elevated risk for low-mod risk pods surgery/vascular surgery, no contraindication to proceed.    Problem/Plan -2  Problem: Hypertension - not controlled   check BMP    - amlodipine  2.5mg PO daily  - recommend increasing losartan from 25mg to 50mg for better bp control.     Problem/Plan -3  Problem: DM II  - HgbA1c improved to 7.2  - ISS as per primary team            LUPILLO Bello DO [facility]  Cardiovascular Medicine  [address], [address]  Available through call or text on [employer] TE[employer]  Office: [phone]

## 2023-12-04 NOTE — PROGRESS NOTE ADULT - SUBJECTIVE AND OBJECTIVE BOX
SUBJECTIVE: Patient seen and examined on AM rounds. Patient reports that they're feeling well. Denies fever, chills. Reports pain as controlled. No complaints at this time.     Vital Signs Last 24 Hrs  T(C): 36.6 (04 Dec 2023 05:34), Max: 37 (03 Dec 2023 20:44)  T(F): 97.9 (04 Dec 2023 05:34), Max: 98.6 (03 Dec 2023 20:44)  HR: 77 (04 Dec 2023 05:34) (68 - 77)  BP: 152/76 (04 Dec 2023 05:34) (149/71 - 172/80)  BP(mean): --  RR: 18 (04 Dec 2023 05:34) (18 - 18)  SpO2: 97% (04 Dec 2023 05:34) (96% - 99%)    Parameters below as of 04 Dec 2023 05:34  Patient On (Oxygen Delivery Method): room air        General Appearance: Appears well, NAD  Neck: Supple  Chest: Equal expansion bilaterally  CV: Pulse regular presently  Abdomen: Soft, nontense  Extremities: R AKA; LLE + DP/PT signals    I&O's Summary    03 Dec 2023 07:01  -  04 Dec 2023 07:00  --------------------------------------------------------  IN: 390 mL / OUT: 0 mL / NET: 390 mL      I&O's Detail    03 Dec 2023 07:01  -  04 Dec 2023 07:00  --------------------------------------------------------  IN:    Oral Fluid: 390 mL  Total IN: 390 mL    OUT:  Total OUT: 0 mL    Total NET: 390 mL          LABS:                        11.0   7.31  )-----------( 249      ( 04 Dec 2023 07:14 )             33.6                 RADIOLOGY & ADDITIONAL STUDIES:

## 2023-12-04 NOTE — PROGRESS NOTE ADULT - SUBJECTIVE AND OBJECTIVE BOX
[facility]  ERVIN Florentino Y. Patel, S. Shah, G. Ervin  [phone]  ([phone] - weekdays after 5pm and weekends)    Name: ALEE DUNN  Age/Gender: 72y Male  MRN: [id number]    Interval History:  Patient seen and examined this morning.   No new complaints noted.  Notes reviewed  No concerning overnight events  Afebrile   Allergies: No Known Allergies      Objective:  Vitals:   T(F): 97.9 (12-04-23 @ 05:34), Max: 98.6 (12-03-23 @ 20:44)  HR: 77 (12-04-23 @ 05:34) (68 - 77)  BP: 152/76 (12-04-23 @ 05:34) (149/71 - 172/80)  RR: 18 (12-04-23 @ 05:34) (18 - 18)  SpO2: 97% (12-04-23 @ 05:34) (96% - 99%)  Physical Examination:  General: no acute distress  HEENT: NC/AT, anicteric, neck supple  Respiratory: no acc muscle use, breathing comfortably  Cardiovascular: S1 and S2 present  Gastrointestinal: normal appearing, nondistended  Extremities: R BKA, L foot dressing   Skin: no visible rash    Laboratory Studies:  CBC:                       11.0   7.31  )-----------( 249      ( 04 Dec 2023 07:14 )             33.6     WBC Trend:  7.31 12-04-23 @ 07:14  6.43 11-29-23 @ 04:34    CMP: 12-04    137  |  104  |  23  ----------------------------<  187<H>  4.2   |  24  |  0.97    Ca    9.4      04 Dec 2023 07:12  Phos  3.3     12-04  Mg     2.3     12-04    TPro  7.6  /  Alb  3.5  /  TBili  0.3  /  DBili  x   /  AST  13  /  ALT  20  /  AlkPhos  74  12-04    Creatinine: 0.97 mg/dL (12-04-23 @ 07:12)  Creatinine: 1.10 mg/dL (11-29-23 @ 04:34)    LIVER FUNCTIONS - ( 04 Dec 2023 07:12 )  Alb: 3.5 g/dL / Pro: 7.6 g/dL / ALK PHOS: 74 U/L / ALT: 20 U/L / AST: 13 U/L / GGT: x           Microbiology: reviewed   Culture - Abscess with Gram Stain (collected 11-26-23 @ 19:14)  Source: .Abscess left foot  Gram Stain (11-27-23 @ 06:59):    Few polymorphonuclear leukocytes seen per low power field    Numerous Gram positive cocci in pairs seen per oil power field    Numerous Gram Negative Rods seen per oil power field  Final Report (11-30-23 @ 18:45):    Culture yields >4 types of aerobic and/or anaerobic bacteria    Call client services within 7 days if further workup is clinically    indicated.    Culture includes    Moderate Acinetobacter ursingii    Few Serratia liquefaciens  Organism: Acinetobacter mellisaingii  Serratia liquefaciens (11-30-23 @ 18:45)  Organism: Serratia liquefaciens (11-30-23 @ 18:45)      -  Levofloxacin: S <=0.5      -  Tobramycin: S <=2      -  Aztreonam: S <=4      -  Gentamicin: S <=2      -  Cefazolin: R 8      -  Cefepime: S <=2      -  Piperacillin/Tazobactam: S <=8      -  Ciprofloxacin: S <=0.25      -  Ceftriaxone: S <=1      -  Ampicillin: S <=8 These ampicillin results predict results for amoxicillin      Method Type: AMI      -  Meropenem: S <=1      -  Ampicillin/Sulbactam: S <=4/2      -  Cefoxitin: S <=8      -  Amoxicillin/Clavulanic Acid: S <=8/4      -  Trimethoprim/Sulfamethoxazole: S <=0.5/9.5      -  Ertapenem: S <=0.5  Organism: Acinetobacter mellisaingii (11-30-23 @ 18:45)      -  Piperacillin/Tazobactam: S      Method Type:   Organism: Acinetobacter mellisaingii (11-30-23 @ 18:45)      -  Levofloxacin: S <=0.5      -  Tobramycin: S <=2      -  Gentamicin: S <=2      -  Cefepime: S <=2      -  Ciprofloxacin: S <=0.25      -  Imipenem: S <=1      Method Type: AMI      -  Meropenem: S <=1      -  Ampicillin/Sulbactam: S <=4/2      -  Ceftazidime: S 4      -  Amikacin: S <=16      -  Trimethoprim/Sulfamethoxazole: S <=0.5/9.5    Radiology: reviewed     Medications:  acetaminophen     Tablet .. 650 milliGRAM(s) Oral every 6 hours PRN  amLODIPine   Tablet 2.5 milliGRAM(s) Oral daily  ascorbic acid 500 milliGRAM(s) Oral daily  aspirin enteric coated 81 milliGRAM(s) Oral daily  atorvastatin 40 milliGRAM(s) Oral at bedtime  dextrose 5%. 1000 milliLiter(s) IV Continuous <Continuous>  dextrose 5%. 1000 milliLiter(s) IV Continuous <Continuous>  dextrose 50% Injectable 25 Gram(s) IV Push once  dextrose 50% Injectable 25 Gram(s) IV Push once  dextrose 50% Injectable 12.5 Gram(s) IV Push once  dextrose Oral Gel 15 Gram(s) Oral once PRN  gabapentin 100 milliGRAM(s) Oral every 8 hours  glucagon  Injectable 1 milliGRAM(s) IntraMuscular once  insulin lispro (ADMELOG) corrective regimen sliding scale   SubCutaneous three times a day before meals  insulin lispro (ADMELOG) corrective regimen sliding scale   SubCutaneous at bedtime  losartan 25 milliGRAM(s) Oral daily  multivitamin/minerals 1 Tablet(s) Oral daily  oxyCODONE    IR 10 milliGRAM(s) Oral every 4 hours PRN  oxyCODONE    IR 5 milliGRAM(s) Oral every 4 hours PRN  polyethylene glycol 3350 17 Gram(s) Oral daily  senna 2 Tablet(s) Oral at bedtime    Prior/Completed Antimicrobials:  ampicillin/sulbactam  IVPB   [facility]  ERVIN Florentino Y. Patel, S. Shah, G. Ervin  [phone]  ([phone] - weekdays after 5pm and weekends)    Name: ALEE DUNN  Age/Gender: 72y Male  MRN: [id number]    Interval History:  Patient seen and examined this morning.   No new complaints noted.  Notes reviewed  No concerning overnight events  Afebrile   Allergies: No Known Allergies      Objective:  Vitals:   T(F): 97.9 (12-04-23 @ 05:34), Max: 98.6 (12-03-23 @ 20:44)  HR: 77 (12-04-23 @ 05:34) (68 - 77)  BP: 152/76 (12-04-23 @ 05:34) (149/71 - 172/80)  RR: 18 (12-04-23 @ 05:34) (18 - 18)  SpO2: 97% (12-04-23 @ 05:34) (96% - 99%)  Physical Examination:  General: no acute distress  HEENT: NC/AT, anicteric, neck supple  Respiratory: no acc muscle use, breathing comfortably  Cardiovascular: S1 and S2 present  Gastrointestinal: normal appearing, nondistended  Extremities: R BKA, L foot dressing   Skin: no visible rash    Laboratory Studies:  CBC:                       11.0   7.31  )-----------( 249      ( 04 Dec 2023 07:14 )             33.6     WBC Trend:  7.31 12-04-23 @ 07:14  6.43 11-29-23 @ 04:34    CMP: 12-04    137  |  104  |  23  ----------------------------<  187<H>  4.2   |  24  |  0.97    Ca    9.4      04 Dec 2023 07:12  Phos  3.3     12-04  Mg     2.3     12-04    TPro  7.6  /  Alb  3.5  /  TBili  0.3  /  DBili  x   /  AST  13  /  ALT  20  /  AlkPhos  74  12-04    Creatinine: 0.97 mg/dL (12-04-23 @ 07:12)  Creatinine: 1.10 mg/dL (11-29-23 @ 04:34)    LIVER FUNCTIONS - ( 04 Dec 2023 07:12 )  Alb: 3.5 g/dL / Pro: 7.6 g/dL / ALK PHOS: 74 U/L / ALT: 20 U/L / AST: 13 U/L / GGT: x           Microbiology: reviewed   Culture - Abscess with Gram Stain (collected 11-26-23 @ 19:14)  Source: .Abscess left foot  Gram Stain (11-27-23 @ 06:59):    Few polymorphonuclear leukocytes seen per low power field    Numerous Gram positive cocci in pairs seen per oil power field    Numerous Gram Negative Rods seen per oil power field  Final Report (11-30-23 @ 18:45):    Culture yields >4 types of aerobic and/or anaerobic bacteria    Call client services within 7 days if further workup is clinically    indicated.    Culture includes    Moderate Acinetobacter ursingii    Few Serratia liquefaciens  Organism: Acinetobacter mellisaingii  Serratia liquefaciens (11-30-23 @ 18:45)  Organism: Serratia liquefaciens (11-30-23 @ 18:45)      -  Levofloxacin: S <=0.5      -  Tobramycin: S <=2      -  Aztreonam: S <=4      -  Gentamicin: S <=2      -  Cefazolin: R 8      -  Cefepime: S <=2      -  Piperacillin/Tazobactam: S <=8      -  Ciprofloxacin: S <=0.25      -  Ceftriaxone: S <=1      -  Ampicillin: S <=8 These ampicillin results predict results for amoxicillin      Method Type: AMI      -  Meropenem: S <=1      -  Ampicillin/Sulbactam: S <=4/2      -  Cefoxitin: S <=8      -  Amoxicillin/Clavulanic Acid: S <=8/4      -  Trimethoprim/Sulfamethoxazole: S <=0.5/9.5      -  Ertapenem: S <=0.5  Organism: Acinetobacter mellisaingii (11-30-23 @ 18:45)      -  Piperacillin/Tazobactam: S      Method Type:   Organism: Acinetobacter mellisaingii (11-30-23 @ 18:45)      -  Levofloxacin: S <=0.5      -  Tobramycin: S <=2      -  Gentamicin: S <=2      -  Cefepime: S <=2      -  Ciprofloxacin: S <=0.25      -  Imipenem: S <=1      Method Type: AMI      -  Meropenem: S <=1      -  Ampicillin/Sulbactam: S <=4/2      -  Ceftazidime: S 4      -  Amikacin: S <=16      -  Trimethoprim/Sulfamethoxazole: S <=0.5/9.5    Radiology: reviewed     Medications:  acetaminophen     Tablet .. 650 milliGRAM(s) Oral every 6 hours PRN  amLODIPine   Tablet 2.5 milliGRAM(s) Oral daily  ascorbic acid 500 milliGRAM(s) Oral daily  aspirin enteric coated 81 milliGRAM(s) Oral daily  atorvastatin 40 milliGRAM(s) Oral at bedtime  dextrose 5%. 1000 milliLiter(s) IV Continuous <Continuous>  dextrose 5%. 1000 milliLiter(s) IV Continuous <Continuous>  dextrose 50% Injectable 25 Gram(s) IV Push once  dextrose 50% Injectable 25 Gram(s) IV Push once  dextrose 50% Injectable 12.5 Gram(s) IV Push once  dextrose Oral Gel 15 Gram(s) Oral once PRN  gabapentin 100 milliGRAM(s) Oral every 8 hours  glucagon  Injectable 1 milliGRAM(s) IntraMuscular once  insulin lispro (ADMELOG) corrective regimen sliding scale   SubCutaneous three times a day before meals  insulin lispro (ADMELOG) corrective regimen sliding scale   SubCutaneous at bedtime  losartan 25 milliGRAM(s) Oral daily  multivitamin/minerals 1 Tablet(s) Oral daily  oxyCODONE    IR 10 milliGRAM(s) Oral every 4 hours PRN  oxyCODONE    IR 5 milliGRAM(s) Oral every 4 hours PRN  polyethylene glycol 3350 17 Gram(s) Oral daily  senna 2 Tablet(s) Oral at bedtime    Prior/Completed Antimicrobials:  ampicillin/sulbactam  IVPB

## 2023-12-04 NOTE — PROGRESS NOTE ADULT - ATTENDING COMMENTS
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

## 2023-12-04 NOTE — PROGRESS NOTE ADULT - ASSESSMENT
Patient is a 72 year old male with PMH of DM, HTN, R BKA who presents to the ED for left 2nd and 3rd toe wound for 2 weeks after cutting his toe.     L foot 2nd and 3rd digit partial thickness with dry gangrene   - 11/30 overnight s/p LLE angiogram - vascular disease     Recommendations:  no fever, no leukocytosis  s/p unasyn, off abx - no clear infection     Vascular following, planning for bypass/revascularization   Podiatry to reschedule L foot partial 2&3rd ray resection pending above   Continue off antibiotics   Monitor temps/WBC    Azucena Castillo M.D.  [facility] Division of Infectious Diseases  [phone]  After 5pm on weekdays and all day on weekends - please call [phone]       Patient is a 72 year old male with PMH of DM, HTN, R BKA who presents to the ED for left 2nd and 3rd toe wound for 2 weeks after cutting his toe.     L foot 2nd and 3rd digit partial thickness with dry gangrene   - 11/30 overnight s/p LLE angiogram - vascular disease     Recommendations:  no fever, no leukocytosis  s/p unasyn, off abx - no clear infection     Vascular following, planning for bypass/revascularization   Podiatry to reschedule L foot partial 2&3rd ray resection pending above   Continue off antibiotics   Monitor temps/WBC    Azucena Castillo M.D.  [facility] of Infectious Diseases  [phone]  After 5pm on weekdays and all day on weekends - please call [phone]

## 2023-12-04 NOTE — PROGRESS NOTE ADULT - SUBJECTIVE AND OBJECTIVE BOX
Podiatry pager #: [phone] ([place])/ [phone] ([facility])    Patient is a 72y old  Male who presents with a chief complaint of The patient is a 72y Male complaining of Lt foot wound. (04 Dec 2023 07:55)       INTERVAL HPI/OVERNIGHT EVENTS:  Patient seen and evaluated at bedside.  Pt is resting comfortable in NAD. Denies N/V/F/C.     Allergies    No Known Allergies    Intolerances        Vital Signs Last 24 Hrs  T(C): 36.6 (04 Dec 2023 05:34), Max: 37 (03 Dec 2023 20:44)  T(F): 97.9 (04 Dec 2023 05:34), Max: 98.6 (03 Dec 2023 20:44)  HR: 77 (04 Dec 2023 05:34) (68 - 77)  BP: 152/76 (04 Dec 2023 05:34) (149/71 - 172/80)  BP(mean): --  RR: 18 (04 Dec 2023 05:34) (18 - 18)  SpO2: 97% (04 Dec 2023 05:34) (96% - 99%)    Parameters below as of 04 Dec 2023 05:34  Patient On (Oxygen Delivery Method): room air        LABS:                        11.0   7.31  )-----------( 249      ( 04 Dec 2023 07:14 )             33.6     12-04    137  |  104  |  23  ----------------------------<  187<H>  4.2   |  24  |  0.97    Ca    9.4      04 Dec 2023 07:12  Phos  3.3     12-04  Mg     2.3     12-04    TPro  7.6  /  Alb  3.5  /  TBili  0.3  /  DBili  x   /  AST  13  /  ALT  20  /  AlkPhos  74  12-04      Urinalysis Basic - ( 04 Dec 2023 07:12 )    Color: x / Appearance: x / SG: x / pH: x  Gluc: 187 mg/dL / Ketone: x  / Bili: x / Urobili: x   Blood: x / Protein: x / Nitrite: x   Leuk Esterase: x / RBC: x / WBC x   Sq Epi: x / Non Sq Epi: x / Bacteria: x      CAPILLARY BLOOD GLUCOSE      POCT Blood Glucose.: 189 mg/dL (04 Dec 2023 08:52)  POCT Blood Glucose.: 232 mg/dL (03 Dec 2023 21:35)  POCT Blood Glucose.: 143 mg/dL (03 Dec 2023 17:10)  POCT Blood Glucose.: 253 mg/dL (03 Dec 2023 12:32)      Lower Extremity Physical Exam:  Vascular: DP/PT 0/4, B/L, Temperature gradient warm to cool, B/L.   Neuro: Epicritic sensation diminished to the level of digits, B/L.  Musculoskeletal/Ortho: s/p R BKA  Skin: Left foot 2nd and 3rd digit dorsal partial thickness gangrene to level of MTPJ, plantar 2nd and 3rd digit dusky and early ischemic changes, no drainage, no malodor, no acute signs of infection. RADIOLOGY & ADDITIONAL TESTS:  RADIOLOGY & ADDITIONAL TESTS:   Podiatry pager #: [phone] ([place])/ [phone] ([facility])    Patient is a 72y old  Male who presents with a chief complaint of The patient is a 72y Male complaining of Lt foot wound. (04 Dec 2023 07:55)       INTERVAL HPI/OVERNIGHT EVENTS:  Patient seen and evaluated at bedside.  Pt is resting comfortable in NAD. Denies N/V/F/C.     Allergies    No Known Allergies    Intolerances        Vital Signs Last 24 Hrs  T(C): 36.6 (04 Dec 2023 05:34), Max: 37 (03 Dec 2023 20:44)  T(F): 97.9 (04 Dec 2023 05:34), Max: 98.6 (03 Dec 2023 20:44)  HR: 77 (04 Dec 2023 05:34) (68 - 77)  BP: 152/76 (04 Dec 2023 05:34) (149/71 - 172/80)  BP(mean): --  RR: 18 (04 Dec 2023 05:34) (18 - 18)  SpO2: 97% (04 Dec 2023 05:34) (96% - 99%)    Parameters below as of 04 Dec 2023 05:34  Patient On (Oxygen Delivery Method): room air        LABS:                        11.0   7.31  )-----------( 249      ( 04 Dec 2023 07:14 )             33.6     12-04    137  |  104  |  23  ----------------------------<  187<H>  4.2   |  24  |  0.97    Ca    9.4      04 Dec 2023 07:12  Phos  3.3     12-04  Mg     2.3     12-04    TPro  7.6  /  Alb  3.5  /  TBili  0.3  /  DBili  x   /  AST  13  /  ALT  20  /  AlkPhos  74  12-04      Urinalysis Basic - ( 04 Dec 2023 07:12 )    Color: x / Appearance: x / SG: x / pH: x  Gluc: 187 mg/dL / Ketone: x  / Bili: x / Urobili: x   Blood: x / Protein: x / Nitrite: x   Leuk Esterase: x / RBC: x / WBC x   Sq Epi: x / Non Sq Epi: x / Bacteria: x      CAPILLARY BLOOD GLUCOSE      POCT Blood Glucose.: 189 mg/dL (04 Dec 2023 08:52)  POCT Blood Glucose.: 232 mg/dL (03 Dec 2023 21:35)  POCT Blood Glucose.: 143 mg/dL (03 Dec 2023 17:10)  POCT Blood Glucose.: 253 mg/dL (03 Dec 2023 12:32)      Lower Extremity Physical Exam:  Vascular: DP/PT 0/4, B/L, Temperature gradient warm to cool, B/L.   Neuro: Epicritic sensation diminished to the level of digits, B/L.  Musculoskeletal/Ortho: s/p R BKA  Skin: Left foot 2nd and 3rd digit dorsal partial thickness gangrene to level of MTPJ, plantar 2nd and 3rd digit dusky and early ischemic changes, no drainage, no malodor, no acute signs of infection. RADIOLOGY & ADDITIONAL TESTS:  RADIOLOGY & ADDITIONAL TESTS:

## 2023-12-04 NOTE — PROGRESS NOTE ADULT - SUBJECTIVE AND OBJECTIVE BOX
Patient is a 72y old  Male who presents with a chief complaint of The patient is a 72y Male complaining of Lt foot wound. (04 Dec 2023 14:48)      SUBJECTIVE / OVERNIGHT EVENTS:    Events noted.  CONSTITUTIONAL: No fever,  or fatigue  RESPIRATORY: No cough, wheezing,  No shortness of breath  CARDIOVASCULAR: No chest pain, palpitations, dizziness, or leg swelling  GASTROINTESTINAL: No abdominal or epigastric pain. No nausea, vomiting.  NEUROLOGICAL: No headache    MEDICATIONS  (STANDING):  amLODIPine   Tablet 2.5 milliGRAM(s) Oral daily  ascorbic acid 500 milliGRAM(s) Oral daily  aspirin enteric coated 81 milliGRAM(s) Oral daily  atorvastatin 40 milliGRAM(s) Oral at bedtime  dextrose 5%. 1000 milliLiter(s) (50 mL/Hr) IV Continuous <Continuous>  dextrose 5%. 1000 milliLiter(s) (100 mL/Hr) IV Continuous <Continuous>  dextrose 50% Injectable 12.5 Gram(s) IV Push once  dextrose 50% Injectable 25 Gram(s) IV Push once  dextrose 50% Injectable 25 Gram(s) IV Push once  gabapentin 100 milliGRAM(s) Oral every 8 hours  glucagon  Injectable 1 milliGRAM(s) IntraMuscular once  insulin lispro (ADMELOG) corrective regimen sliding scale   SubCutaneous at bedtime  insulin lispro (ADMELOG) corrective regimen sliding scale   SubCutaneous three times a day before meals  losartan 25 milliGRAM(s) Oral daily  multivitamin/minerals 1 Tablet(s) Oral daily  polyethylene glycol 3350 17 Gram(s) Oral daily  senna 2 Tablet(s) Oral at bedtime    MEDICATIONS  (PRN):  acetaminophen     Tablet .. 650 milliGRAM(s) Oral every 6 hours PRN Temp greater or equal to 38C (100.4F), Mild Pain (1 - 3)  dextrose Oral Gel 15 Gram(s) Oral once PRN Blood Glucose LESS THAN 70 milliGRAM(s)/deciliter  oxyCODONE    IR 10 milliGRAM(s) Oral every 4 hours PRN Severe Pain (7 - 10)  oxyCODONE    IR 5 milliGRAM(s) Oral every 4 hours PRN Moderate Pain (4 - 6)        CAPILLARY BLOOD GLUCOSE      POCT Blood Glucose.: 215 mg/dL (04 Dec 2023 21:41)  POCT Blood Glucose.: 204 mg/dL (04 Dec 2023 17:23)  POCT Blood Glucose.: 246 mg/dL (04 Dec 2023 12:36)  POCT Blood Glucose.: 189 mg/dL (04 Dec 2023 08:52)    I&O's Summary    03 Dec 2023 07:01  -  04 Dec 2023 07:00  --------------------------------------------------------  IN: 390 mL / OUT: 0 mL / NET: 390 mL    04 Dec 2023 07:01  -  04 Dec 2023 22:54  --------------------------------------------------------  IN: 420 mL / OUT: 0 mL / NET: 420 mL        T(C): 37 (12-04-23 @ 21:01), Max: 37 (12-04-23 @ 21:01)  HR: 73 (12-04-23 @ 21:01) (69 - 77)  BP: 163/73 (12-04-23 @ 21:01) (137/66 - 163/73)  RR: 18 (12-04-23 @ 21:01) (18 - 18)  SpO2: 96% (12-04-23 @ 21:01) (96% - 97%)    PHYSICAL EXAM:  GENERAL: NAD  NECK: Supple, No JVD  CHEST/LUNG: Clear to auscultation bilaterally; No wheezing.  HEART: Regular rate and rhythm; No murmurs, rubs, or gallops  ABDOMEN: Soft, Nontender, Nondistended; Bowel sounds present  EXTREMITIES:   No edema  NEUROLOGY: AAO X 3      LABS:                        11.0   7.31  )-----------( 249      ( 04 Dec 2023 07:14 )             33.6     12-04    137  |  104  |  23  ----------------------------<  187<H>  4.2   |  24  |  0.97    Ca    9.4      04 Dec 2023 07:12  Phos  3.3     12-04  Mg     2.3     12-04    TPro  7.6  /  Alb  3.5  /  TBili  0.3  /  DBili  x   /  AST  13  /  ALT  20  /  AlkPhos  74  12-04          Urinalysis Basic - ( 04 Dec 2023 07:12 )    Color: x / Appearance: x / SG: x / pH: x  Gluc: 187 mg/dL / Ketone: x  / Bili: x / Urobili: x   Blood: x / Protein: x / Nitrite: x   Leuk Esterase: x / RBC: x / WBC x   Sq Epi: x / Non Sq Epi: x / Bacteria: x      CAPILLARY BLOOD GLUCOSE      POCT Blood Glucose.: 215 mg/dL (04 Dec 2023 21:41)  POCT Blood Glucose.: 204 mg/dL (04 Dec 2023 17:23)  POCT Blood Glucose.: 246 mg/dL (04 Dec 2023 12:36)  POCT Blood Glucose.: 189 mg/dL (04 Dec 2023 08:52)        RADIOLOGY & ADDITIONAL TESTS:    Imaging Personally Reviewed:    Consultant(s) Notes Reviewed:      Care Discussed with Consultants/Other Providers:    Luciano Lo MD, CMD, FACP    [address] [place] [address]  Office Tel: [phone]  Cell: [phone]   Patient is a 72y old  Male who presents with a chief complaint of The patient is a 72y Male complaining of Lt foot wound. (04 Dec 2023 14:48)      SUBJECTIVE / OVERNIGHT EVENTS:    Events noted.  CONSTITUTIONAL: No fever,  or fatigue  RESPIRATORY: No cough, wheezing,  No shortness of breath  CARDIOVASCULAR: No chest pain, palpitations, dizziness, or leg swelling  GASTROINTESTINAL: No abdominal or epigastric pain. No nausea, vomiting.  NEUROLOGICAL: No headache    MEDICATIONS  (STANDING):  amLODIPine   Tablet 2.5 milliGRAM(s) Oral daily  ascorbic acid 500 milliGRAM(s) Oral daily  aspirin enteric coated 81 milliGRAM(s) Oral daily  atorvastatin 40 milliGRAM(s) Oral at bedtime  dextrose 5%. 1000 milliLiter(s) (50 mL/Hr) IV Continuous <Continuous>  dextrose 5%. 1000 milliLiter(s) (100 mL/Hr) IV Continuous <Continuous>  dextrose 50% Injectable 12.5 Gram(s) IV Push once  dextrose 50% Injectable 25 Gram(s) IV Push once  dextrose 50% Injectable 25 Gram(s) IV Push once  gabapentin 100 milliGRAM(s) Oral every 8 hours  glucagon  Injectable 1 milliGRAM(s) IntraMuscular once  insulin lispro (ADMELOG) corrective regimen sliding scale   SubCutaneous at bedtime  insulin lispro (ADMELOG) corrective regimen sliding scale   SubCutaneous three times a day before meals  losartan 25 milliGRAM(s) Oral daily  multivitamin/minerals 1 Tablet(s) Oral daily  polyethylene glycol 3350 17 Gram(s) Oral daily  senna 2 Tablet(s) Oral at bedtime    MEDICATIONS  (PRN):  acetaminophen     Tablet .. 650 milliGRAM(s) Oral every 6 hours PRN Temp greater or equal to 38C (100.4F), Mild Pain (1 - 3)  dextrose Oral Gel 15 Gram(s) Oral once PRN Blood Glucose LESS THAN 70 milliGRAM(s)/deciliter  oxyCODONE    IR 10 milliGRAM(s) Oral every 4 hours PRN Severe Pain (7 - 10)  oxyCODONE    IR 5 milliGRAM(s) Oral every 4 hours PRN Moderate Pain (4 - 6)        CAPILLARY BLOOD GLUCOSE      POCT Blood Glucose.: 215 mg/dL (04 Dec 2023 21:41)  POCT Blood Glucose.: 204 mg/dL (04 Dec 2023 17:23)  POCT Blood Glucose.: 246 mg/dL (04 Dec 2023 12:36)  POCT Blood Glucose.: 189 mg/dL (04 Dec 2023 08:52)    I&O's Summary    03 Dec 2023 07:01  -  04 Dec 2023 07:00  --------------------------------------------------------  IN: 390 mL / OUT: 0 mL / NET: 390 mL    04 Dec 2023 07:01  -  04 Dec 2023 22:54  --------------------------------------------------------  IN: 420 mL / OUT: 0 mL / NET: 420 mL        T(C): 37 (12-04-23 @ 21:01), Max: 37 (12-04-23 @ 21:01)  HR: 73 (12-04-23 @ 21:01) (69 - 77)  BP: 163/73 (12-04-23 @ 21:01) (137/66 - 163/73)  RR: 18 (12-04-23 @ 21:01) (18 - 18)  SpO2: 96% (12-04-23 @ 21:01) (96% - 97%)    PHYSICAL EXAM:  GENERAL: NAD  NECK: Supple, No JVD  CHEST/LUNG: Clear to auscultation bilaterally; No wheezing.  HEART: Regular rate and rhythm; No murmurs, rubs, or gallops  ABDOMEN: Soft, Nontender, Nondistended; Bowel sounds present  EXTREMITIES:   No edema  NEUROLOGY: AAO X 3      LABS:                        11.0   7.31  )-----------( 249      ( 04 Dec 2023 07:14 )             33.6     12-04    137  |  104  |  23  ----------------------------<  187<H>  4.2   |  24  |  0.97    Ca    9.4      04 Dec 2023 07:12  Phos  3.3     12-04  Mg     2.3     12-04    TPro  7.6  /  Alb  3.5  /  TBili  0.3  /  DBili  x   /  AST  13  /  ALT  20  /  AlkPhos  74  12-04          Urinalysis Basic - ( 04 Dec 2023 07:12 )    Color: x / Appearance: x / SG: x / pH: x  Gluc: 187 mg/dL / Ketone: x  / Bili: x / Urobili: x   Blood: x / Protein: x / Nitrite: x   Leuk Esterase: x / RBC: x / WBC x   Sq Epi: x / Non Sq Epi: x / Bacteria: x      CAPILLARY BLOOD GLUCOSE      POCT Blood Glucose.: 215 mg/dL (04 Dec 2023 21:41)  POCT Blood Glucose.: 204 mg/dL (04 Dec 2023 17:23)  POCT Blood Glucose.: 246 mg/dL (04 Dec 2023 12:36)  POCT Blood Glucose.: 189 mg/dL (04 Dec 2023 08:52)        RADIOLOGY & ADDITIONAL TESTS:    Imaging Personally Reviewed:    Consultant(s) Notes Reviewed:      Care Discussed with Consultants/Other Providers:    Luciano Lo MD, CMD, FACP    [address] [place] [address]  Office Tel: [phone]  Cell: [phone]

## 2023-12-04 NOTE — PROGRESS NOTE ADULT - ASSESSMENT
72M presents with left foot 2nd & 3rd digit partial thickness gangrene.  - Patient seen and evaluated.  - Afebrile, no leukocytosis.  - Left foot 2nd and 3rd digit dorsal partial thickness gangrene to level of MTPJ, plantar 2nd and 3rd digit dusky and early ischemic changes, no drainage, no malodor, no acute signs of infection.   - Left Foot X-Ray: no OM, no gas.  - Left Foot Culture: >4 types of aerobic and anaerobic bacteria, Acinetobacter ursingii (prelim).  - ID recs, appreciated.  - Vasc planning LLE dep vein arterialization appreciated  - Will reschedule for odiatry intrvention once cleared by vascular  - Seen with attending.   72M presents with left foot 2nd & 3rd digit partial thickness gangrene.  - Patient seen and evaluated.  - Afebrile, no leukocytosis.  - Left foot 2nd and 3rd digit dorsal partial thickness gangrene to level of MTPJ, plantar 2nd and 3rd digit dusky and early ischemic changes, no drainage, no malodor, no acute signs of infection.   - Left Foot X-Ray: no OM, no gas.  - Left Foot Culture: >4 types of aerobic and anaerobic bacteria, Acinetobacter ursingii (prelim).  - ID recs, appreciated.  - Vasc planning LLE dep vein arterialization appreciated  - Will reschedule for podiatry intervention once cleared by vascular  - Seen with attending.

## 2023-12-05 ENCOUNTER — TRANSCRIPTION ENCOUNTER (OUTPATIENT)
Age: 73
End: 2023-12-05

## 2023-12-05 VITALS
DIASTOLIC BLOOD PRESSURE: 60 MMHG | TEMPERATURE: 98 F | HEART RATE: 72 BPM | OXYGEN SATURATION: 97 % | RESPIRATION RATE: 18 BRPM | SYSTOLIC BLOOD PRESSURE: 132 MMHG

## 2023-12-05 LAB
ALBUMIN SERPL ELPH-MCNC: 3.6 G/DL — SIGNIFICANT CHANGE UP (ref 3.3–5)
ALBUMIN SERPL ELPH-MCNC: 3.6 G/DL — SIGNIFICANT CHANGE UP (ref 3.3–5)
ALP SERPL-CCNC: 83 U/L — SIGNIFICANT CHANGE UP (ref 40–120)
ALP SERPL-CCNC: 83 U/L — SIGNIFICANT CHANGE UP (ref 40–120)
ALT FLD-CCNC: 24 U/L — SIGNIFICANT CHANGE UP (ref 10–45)
ALT FLD-CCNC: 24 U/L — SIGNIFICANT CHANGE UP (ref 10–45)
ANION GAP SERPL CALC-SCNC: 11 MMOL/L — SIGNIFICANT CHANGE UP (ref 5–17)
ANION GAP SERPL CALC-SCNC: 11 MMOL/L — SIGNIFICANT CHANGE UP (ref 5–17)
AST SERPL-CCNC: 14 U/L — SIGNIFICANT CHANGE UP (ref 10–40)
AST SERPL-CCNC: 14 U/L — SIGNIFICANT CHANGE UP (ref 10–40)
BASOPHILS # BLD AUTO: 0.03 K/UL — SIGNIFICANT CHANGE UP (ref 0–0.2)
BASOPHILS # BLD AUTO: 0.03 K/UL — SIGNIFICANT CHANGE UP (ref 0–0.2)
BASOPHILS NFR BLD AUTO: 0.4 % — SIGNIFICANT CHANGE UP (ref 0–2)
BASOPHILS NFR BLD AUTO: 0.4 % — SIGNIFICANT CHANGE UP (ref 0–2)
BILIRUB SERPL-MCNC: 0.3 MG/DL — SIGNIFICANT CHANGE UP (ref 0.2–1.2)
BILIRUB SERPL-MCNC: 0.3 MG/DL — SIGNIFICANT CHANGE UP (ref 0.2–1.2)
BUN SERPL-MCNC: 24 MG/DL — HIGH (ref 7–23)
BUN SERPL-MCNC: 24 MG/DL — HIGH (ref 7–23)
CALCIUM SERPL-MCNC: 9.6 MG/DL — SIGNIFICANT CHANGE UP (ref 8.4–10.5)
CALCIUM SERPL-MCNC: 9.6 MG/DL — SIGNIFICANT CHANGE UP (ref 8.4–10.5)
CHLORIDE SERPL-SCNC: 102 MMOL/L — SIGNIFICANT CHANGE UP (ref 96–108)
CHLORIDE SERPL-SCNC: 102 MMOL/L — SIGNIFICANT CHANGE UP (ref 96–108)
CO2 SERPL-SCNC: 22 MMOL/L — SIGNIFICANT CHANGE UP (ref 22–31)
CO2 SERPL-SCNC: 22 MMOL/L — SIGNIFICANT CHANGE UP (ref 22–31)
CREAT SERPL-MCNC: 1.11 MG/DL — SIGNIFICANT CHANGE UP (ref 0.5–1.3)
CREAT SERPL-MCNC: 1.11 MG/DL — SIGNIFICANT CHANGE UP (ref 0.5–1.3)
EGFR: 71 ML/MIN/1.73M2 — SIGNIFICANT CHANGE UP
EGFR: 71 ML/MIN/1.73M2 — SIGNIFICANT CHANGE UP
EOSINOPHIL # BLD AUTO: 0.18 K/UL — SIGNIFICANT CHANGE UP (ref 0–0.5)
EOSINOPHIL # BLD AUTO: 0.18 K/UL — SIGNIFICANT CHANGE UP (ref 0–0.5)
EOSINOPHIL NFR BLD AUTO: 2.4 % — SIGNIFICANT CHANGE UP (ref 0–6)
EOSINOPHIL NFR BLD AUTO: 2.4 % — SIGNIFICANT CHANGE UP (ref 0–6)
GLUCOSE BLDC GLUCOMTR-MCNC: 175 MG/DL — HIGH (ref 70–99)
GLUCOSE BLDC GLUCOMTR-MCNC: 175 MG/DL — HIGH (ref 70–99)
GLUCOSE BLDC GLUCOMTR-MCNC: 236 MG/DL — HIGH (ref 70–99)
GLUCOSE BLDC GLUCOMTR-MCNC: 236 MG/DL — HIGH (ref 70–99)
GLUCOSE BLDC GLUCOMTR-MCNC: 251 MG/DL — HIGH (ref 70–99)
GLUCOSE BLDC GLUCOMTR-MCNC: 251 MG/DL — HIGH (ref 70–99)
GLUCOSE SERPL-MCNC: 181 MG/DL — HIGH (ref 70–99)
GLUCOSE SERPL-MCNC: 181 MG/DL — HIGH (ref 70–99)
HCT VFR BLD CALC: 34.6 % — LOW (ref 39–50)
HCT VFR BLD CALC: 34.6 % — LOW (ref 39–50)
HGB BLD-MCNC: 11.1 G/DL — LOW (ref 13–17)
HGB BLD-MCNC: 11.1 G/DL — LOW (ref 13–17)
IMM GRANULOCYTES NFR BLD AUTO: 0.4 % — SIGNIFICANT CHANGE UP (ref 0–0.9)
IMM GRANULOCYTES NFR BLD AUTO: 0.4 % — SIGNIFICANT CHANGE UP (ref 0–0.9)
LYMPHOCYTES # BLD AUTO: 2.23 K/UL — SIGNIFICANT CHANGE UP (ref 1–3.3)
LYMPHOCYTES # BLD AUTO: 2.23 K/UL — SIGNIFICANT CHANGE UP (ref 1–3.3)
LYMPHOCYTES # BLD AUTO: 29.8 % — SIGNIFICANT CHANGE UP (ref 13–44)
LYMPHOCYTES # BLD AUTO: 29.8 % — SIGNIFICANT CHANGE UP (ref 13–44)
MAGNESIUM SERPL-MCNC: 2.4 MG/DL — SIGNIFICANT CHANGE UP (ref 1.6–2.6)
MAGNESIUM SERPL-MCNC: 2.4 MG/DL — SIGNIFICANT CHANGE UP (ref 1.6–2.6)
MCHC RBC-ENTMCNC: 28.8 PG — SIGNIFICANT CHANGE UP (ref 27–34)
MCHC RBC-ENTMCNC: 28.8 PG — SIGNIFICANT CHANGE UP (ref 27–34)
MCHC RBC-ENTMCNC: 32.1 GM/DL — SIGNIFICANT CHANGE UP (ref 32–36)
MCHC RBC-ENTMCNC: 32.1 GM/DL — SIGNIFICANT CHANGE UP (ref 32–36)
MCV RBC AUTO: 89.6 FL — SIGNIFICANT CHANGE UP (ref 80–100)
MCV RBC AUTO: 89.6 FL — SIGNIFICANT CHANGE UP (ref 80–100)
MONOCYTES # BLD AUTO: 0.51 K/UL — SIGNIFICANT CHANGE UP (ref 0–0.9)
MONOCYTES # BLD AUTO: 0.51 K/UL — SIGNIFICANT CHANGE UP (ref 0–0.9)
MONOCYTES NFR BLD AUTO: 6.8 % — SIGNIFICANT CHANGE UP (ref 2–14)
MONOCYTES NFR BLD AUTO: 6.8 % — SIGNIFICANT CHANGE UP (ref 2–14)
NEUTROPHILS # BLD AUTO: 4.51 K/UL — SIGNIFICANT CHANGE UP (ref 1.8–7.4)
NEUTROPHILS # BLD AUTO: 4.51 K/UL — SIGNIFICANT CHANGE UP (ref 1.8–7.4)
NEUTROPHILS NFR BLD AUTO: 60.2 % — SIGNIFICANT CHANGE UP (ref 43–77)
NEUTROPHILS NFR BLD AUTO: 60.2 % — SIGNIFICANT CHANGE UP (ref 43–77)
NRBC # BLD: 0 /100 WBCS — SIGNIFICANT CHANGE UP (ref 0–0)
NRBC # BLD: 0 /100 WBCS — SIGNIFICANT CHANGE UP (ref 0–0)
PHOSPHATE SERPL-MCNC: 3.3 MG/DL — SIGNIFICANT CHANGE UP (ref 2.5–4.5)
PHOSPHATE SERPL-MCNC: 3.3 MG/DL — SIGNIFICANT CHANGE UP (ref 2.5–4.5)
PLATELET # BLD AUTO: 283 K/UL — SIGNIFICANT CHANGE UP (ref 150–400)
PLATELET # BLD AUTO: 283 K/UL — SIGNIFICANT CHANGE UP (ref 150–400)
POTASSIUM SERPL-MCNC: 4.1 MMOL/L — SIGNIFICANT CHANGE UP (ref 3.5–5.3)
POTASSIUM SERPL-MCNC: 4.1 MMOL/L — SIGNIFICANT CHANGE UP (ref 3.5–5.3)
POTASSIUM SERPL-SCNC: 4.1 MMOL/L — SIGNIFICANT CHANGE UP (ref 3.5–5.3)
POTASSIUM SERPL-SCNC: 4.1 MMOL/L — SIGNIFICANT CHANGE UP (ref 3.5–5.3)
PROT SERPL-MCNC: 8 G/DL — SIGNIFICANT CHANGE UP (ref 6–8.3)
PROT SERPL-MCNC: 8 G/DL — SIGNIFICANT CHANGE UP (ref 6–8.3)
RBC # BLD: 3.86 M/UL — LOW (ref 4.2–5.8)
RBC # BLD: 3.86 M/UL — LOW (ref 4.2–5.8)
RBC # FLD: 12.2 % — SIGNIFICANT CHANGE UP (ref 10.3–14.5)
RBC # FLD: 12.2 % — SIGNIFICANT CHANGE UP (ref 10.3–14.5)
SODIUM SERPL-SCNC: 135 MMOL/L — SIGNIFICANT CHANGE UP (ref 135–145)
SODIUM SERPL-SCNC: 135 MMOL/L — SIGNIFICANT CHANGE UP (ref 135–145)
WBC # BLD: 7.49 K/UL — SIGNIFICANT CHANGE UP (ref 3.8–10.5)
WBC # BLD: 7.49 K/UL — SIGNIFICANT CHANGE UP (ref 3.8–10.5)
WBC # FLD AUTO: 7.49 K/UL — SIGNIFICANT CHANGE UP (ref 3.8–10.5)
WBC # FLD AUTO: 7.49 K/UL — SIGNIFICANT CHANGE UP (ref 3.8–10.5)

## 2023-12-05 PROCEDURE — 80053 COMPREHEN METABOLIC PANEL: CPT

## 2023-12-05 PROCEDURE — 83036 HEMOGLOBIN GLYCOSYLATED A1C: CPT

## 2023-12-05 PROCEDURE — C1887: CPT

## 2023-12-05 PROCEDURE — 73630 X-RAY EXAM OF FOOT: CPT

## 2023-12-05 PROCEDURE — 76000 FLUOROSCOPY <1 HR PHYS/QHP: CPT

## 2023-12-05 PROCEDURE — 36415 COLL VENOUS BLD VENIPUNCTURE: CPT

## 2023-12-05 PROCEDURE — 83605 ASSAY OF LACTIC ACID: CPT

## 2023-12-05 PROCEDURE — 84295 ASSAY OF SERUM SODIUM: CPT

## 2023-12-05 PROCEDURE — 85025 COMPLETE CBC W/AUTO DIFF WBC: CPT

## 2023-12-05 PROCEDURE — 71045 X-RAY EXAM CHEST 1 VIEW: CPT

## 2023-12-05 PROCEDURE — 83735 ASSAY OF MAGNESIUM: CPT

## 2023-12-05 PROCEDURE — 87184 SC STD DISK METHOD PER PLATE: CPT

## 2023-12-05 PROCEDURE — 82330 ASSAY OF CALCIUM: CPT

## 2023-12-05 PROCEDURE — 87186 SC STD MICRODIL/AGAR DIL: CPT

## 2023-12-05 PROCEDURE — 85018 HEMOGLOBIN: CPT

## 2023-12-05 PROCEDURE — 85652 RBC SED RATE AUTOMATED: CPT

## 2023-12-05 PROCEDURE — C1760: CPT

## 2023-12-05 PROCEDURE — 86880 COOMBS TEST DIRECT: CPT

## 2023-12-05 PROCEDURE — 82962 GLUCOSE BLOOD TEST: CPT

## 2023-12-05 PROCEDURE — 93971 EXTREMITY STUDY: CPT

## 2023-12-05 PROCEDURE — 86900 BLOOD TYPING SEROLOGIC ABO: CPT

## 2023-12-05 PROCEDURE — 86901 BLOOD TYPING SEROLOGIC RH(D): CPT

## 2023-12-05 PROCEDURE — 85610 PROTHROMBIN TIME: CPT

## 2023-12-05 PROCEDURE — 85027 COMPLETE CBC AUTOMATED: CPT

## 2023-12-05 PROCEDURE — 82803 BLOOD GASES ANY COMBINATION: CPT

## 2023-12-05 PROCEDURE — 99285 EMERGENCY DEPT VISIT HI MDM: CPT | Mod: 25

## 2023-12-05 PROCEDURE — 80048 BASIC METABOLIC PNL TOTAL CA: CPT

## 2023-12-05 PROCEDURE — 82435 ASSAY OF BLOOD CHLORIDE: CPT

## 2023-12-05 PROCEDURE — C8929: CPT

## 2023-12-05 PROCEDURE — C1894: CPT

## 2023-12-05 PROCEDURE — 87077 CULTURE AEROBIC IDENTIFY: CPT

## 2023-12-05 PROCEDURE — 84132 ASSAY OF SERUM POTASSIUM: CPT

## 2023-12-05 PROCEDURE — 93970 EXTREMITY STUDY: CPT

## 2023-12-05 PROCEDURE — 86922 COMPATIBILITY TEST ANTIGLOB: CPT

## 2023-12-05 PROCEDURE — 96374 THER/PROPH/DIAG INJ IV PUSH: CPT

## 2023-12-05 PROCEDURE — 86850 RBC ANTIBODY SCREEN: CPT

## 2023-12-05 PROCEDURE — 93923 UPR/LXTR ART STDY 3+ LVLS: CPT

## 2023-12-05 PROCEDURE — 84100 ASSAY OF PHOSPHORUS: CPT

## 2023-12-05 PROCEDURE — 86140 C-REACTIVE PROTEIN: CPT

## 2023-12-05 PROCEDURE — 87205 SMEAR GRAM STAIN: CPT

## 2023-12-05 PROCEDURE — 86870 RBC ANTIBODY IDENTIFICATION: CPT

## 2023-12-05 PROCEDURE — 85014 HEMATOCRIT: CPT

## 2023-12-05 PROCEDURE — 85730 THROMBOPLASTIN TIME PARTIAL: CPT

## 2023-12-05 PROCEDURE — C1769: CPT

## 2023-12-05 PROCEDURE — 87070 CULTURE OTHR SPECIMN AEROBIC: CPT

## 2023-12-05 PROCEDURE — 82947 ASSAY GLUCOSE BLOOD QUANT: CPT

## 2023-12-05 RX ORDER — ASCORBIC ACID 60 MG
1 TABLET,CHEWABLE ORAL
Qty: 0 | Refills: 0 | DISCHARGE
Start: 2023-12-05

## 2023-12-05 RX ORDER — POLYETHYLENE GLYCOL 3350 17 G/17G
17 POWDER, FOR SOLUTION ORAL
Qty: 0 | Refills: 0 | DISCHARGE
Start: 2023-12-05

## 2023-12-05 RX ORDER — SENNA PLUS 8.6 MG/1
2 TABLET ORAL
Qty: 0 | Refills: 0 | DISCHARGE
Start: 2023-12-05

## 2023-12-05 RX ADMIN — Medication 81 MILLIGRAM(S): at 11:21

## 2023-12-05 RX ADMIN — AMLODIPINE BESYLATE 2.5 MILLIGRAM(S): 2.5 TABLET ORAL at 05:09

## 2023-12-05 RX ADMIN — GABAPENTIN 100 MILLIGRAM(S): 400 CAPSULE ORAL at 05:09

## 2023-12-05 RX ADMIN — Medication 1: at 09:14

## 2023-12-05 RX ADMIN — Medication 2: at 13:34

## 2023-12-05 RX ADMIN — GABAPENTIN 100 MILLIGRAM(S): 400 CAPSULE ORAL at 13:31

## 2023-12-05 RX ADMIN — LOSARTAN POTASSIUM 25 MILLIGRAM(S): 100 TABLET, FILM COATED ORAL at 05:09

## 2023-12-05 RX ADMIN — Medication 1 TABLET(S): at 11:22

## 2023-12-05 RX ADMIN — Medication 500 MILLIGRAM(S): at 11:22

## 2023-12-05 NOTE — PROGRESS NOTE ADULT - SUBJECTIVE AND OBJECTIVE BOX
SUBJECTIVE: Patient seen and examined on AM rounds. Patient reports that they're feeling well. Denies fever, chills. Reports pain as controlled. No complaints at this time.     Vital Signs Last 24 Hrs  T(C): 36.4 (05 Dec 2023 04:50), Max: 37 (04 Dec 2023 21:01)  T(F): 97.6 (05 Dec 2023 04:50), Max: 98.6 (04 Dec 2023 21:01)  HR: 73 (05 Dec 2023 04:50) (69 - 73)  BP: 149/73 (05 Dec 2023 04:50) (137/66 - 163/73)  BP(mean): --  RR: 18 (05 Dec 2023 04:50) (18 - 18)  SpO2: 98% (05 Dec 2023 04:50) (96% - 98%)    Parameters below as of 05 Dec 2023 04:50  Patient On (Oxygen Delivery Method): room air        General Appearance: Appears well, NAD  Neck: Supple  Chest: Equal expansion bilaterally  CV: Pulse regular presently  Abdomen: Soft, nontense  Extremities: L toe gangrene stable, dressing c/d/i, DP signal    I&O's Summary    04 Dec 2023 07:01  -  05 Dec 2023 07:00  --------------------------------------------------------  IN: 720 mL / OUT: 0 mL / NET: 720 mL      I&O's Detail    04 Dec 2023 07:01  -  05 Dec 2023 07:00  --------------------------------------------------------  IN:    Oral Fluid: 720 mL  Total IN: 720 mL    OUT:  Total OUT: 0 mL    Total NET: 720 mL          LABS:                        11.1   7.49  )-----------( 283      ( 05 Dec 2023 06:47 )             34.6     12-05    135  |  102  |  24<H>  ----------------------------<  181<H>  4.1   |  22  |  1.11    Ca    9.6      05 Dec 2023 06:47  Phos  3.3     12-05  Mg     2.4     12-05    TPro  8.0  /  Alb  3.6  /  TBili  0.3  /  DBili  x   /  AST  14  /  ALT  24  /  AlkPhos  83  12-05      Urinalysis Basic - ( 05 Dec 2023 06:47 )    Color: x / Appearance: x / SG: x / pH: x  Gluc: 181 mg/dL / Ketone: x  / Bili: x / Urobili: x   Blood: x / Protein: x / Nitrite: x   Leuk Esterase: x / RBC: x / WBC x   Sq Epi: x / Non Sq Epi: x / Bacteria: x        RADIOLOGY & ADDITIONAL STUDIES: yes

## 2023-12-05 NOTE — ED PROVIDER NOTE - ENMT NEGATIVE STATEMENT, MLM
Will reschedule this medication for 0600am as per pt request, provider made aware, no new orders at this time. Provider made aware, will give AM BP medications as ordered, stat EKG ordered, nursing care ongoing Ears: no ear pain and no hearing problems.Nose: no nasal congestion and no nasal drainage.Mouth/Throat: no dysphagia, no hoarseness and no throat pain.Neck: no lumps, no pain, no stiffness and no swollen glands.

## 2023-12-05 NOTE — DISCHARGE NOTE NURSING/CASE MANAGEMENT/SOCIAL WORK - NSDCFUADDAPPT_GEN_ALL_CORE_FT
Podiatry Discharge Instructions:  Follow up: Please follow up with Dr. Mcclure within 1 week of discharge from the hospital, please call [phone] for appointment and discuss that you recently were seen in the hospital.  Wound Care: Please apply betadine to left foot wounds followed by 4x4 gauze, and radha daily   Weight bearing: Please weight bear as tolerated in a surgical shoe.  Antibiotics: Please continue as instructed.    APPTS ARE READY TO BE MADE: [ x] YES    Best Family or Patient Contact (if needed):    Additional Information about above appointments (if needed):    1: Ren	  2: Susanna  3:     Other comments or requests:    Podiatry Discharge Instructions:  Follow up: Please follow up with Dr. Mcclure within 1 week of discharge from the hospital, please call [phone] for appointment and discuss that you recently were seen in the hospital.  Wound Care: Please apply betadine to left foot wounds followed by 4x4 gauze, and radha daily   Weight bearing: Please weight bear as tolerated in a surgical shoe.  Antibiotics: Please continue as instructed.    APPTS ARE READY TO BE MADE: [ x] YES    Best Family or Patient Contact (if needed):    Additional Information about above appointments (if needed):    1: Ren	  2: Susanna  3:     Other comments or requests:

## 2023-12-05 NOTE — PROGRESS NOTE ADULT - SUBJECTIVE AND OBJECTIVE BOX
[facility]  ERVIN Florentino Y. Patel, S. Shah, G. Ervin  [phone]  ([phone] - weekdays after 5pm and weekends)    Name: ALEE DUNN  Age/Gender: 72y Male  MRN: [id number]    Interval History:  Patient seen and examined this morning.   No new complaints noted.  Notes reviewed  No concerning overnight events  Afebrile   Allergies: No Known Allergies      Objective:  Vitals:   T(F): 97.6 (12-05-23 @ 04:50), Max: 98.6 (12-04-23 @ 21:01)  HR: 73 (12-05-23 @ 04:50) (69 - 73)  BP: 149/73 (12-05-23 @ 04:50) (137/66 - 163/73)  RR: 18 (12-05-23 @ 04:50) (18 - 18)  SpO2: 98% (12-05-23 @ 04:50) (96% - 98%)  Physical Examination:  General: no acute distress  HEENT: NC/AT, anicteric, neck supple  Respiratory: no acc muscle use, breathing comfortably  Cardiovascular: S1 and S2 present  Gastrointestinal: normal appearing, nondistended  Extremities: R Bka, no edema  Skin: no visible rash    Laboratory Studies:  CBC:                       11.1   7.49  )-----------( 283      ( 05 Dec 2023 06:47 )             34.6     WBC Trend:  7.49 12-05-23 @ 06:47  7.31 12-04-23 @ 07:14  6.43 11-29-23 @ 04:34    CMP: 12-05    135  |  102  |  24<H>  ----------------------------<  181<H>  4.1   |  22  |  1.11    Ca    9.6      05 Dec 2023 06:47  Phos  3.3     12-05  Mg     2.4     12-05    TPro  8.0  /  Alb  3.6  /  TBili  0.3  /  DBili  x   /  AST  14  /  ALT  24  /  AlkPhos  83  12-05    Creatinine: 1.11 mg/dL (12-05-23 @ 06:47)  Creatinine: 0.97 mg/dL (12-04-23 @ 07:12)  Creatinine: 1.10 mg/dL (11-29-23 @ 04:34)    LIVER FUNCTIONS - ( 05 Dec 2023 06:47 )  Alb: 3.6 g/dL / Pro: 8.0 g/dL / ALK PHOS: 83 U/L / ALT: 24 U/L / AST: 14 U/L / GGT: x           Microbiology: reviewed   Culture - Abscess with Gram Stain (collected 11-26-23 @ 19:14)  Source: .Abscess left foot  Gram Stain (11-27-23 @ 06:59):    Few polymorphonuclear leukocytes seen per low power field    Numerous Gram positive cocci in pairs seen per oil power field    Numerous Gram Negative Rods seen per oil power field  Final Report (11-30-23 @ 18:45):    Culture yields >4 types of aerobic and/or anaerobic bacteria    Call client services within 7 days if further workup is clinically    indicated.    Culture includes    Moderate Acinetobacter ursingii    Few Serratia liquefaciens  Organism: Acinetobacter ursingii  Serratia liquefaciens (11-30-23 @ 18:45)  Organism: Serratia liquefaciens (11-30-23 @ 18:45)      Method Type: AMI      -  Amoxicillin/Clavulanic Acid: S <=8/4      -  Ampicillin: S <=8 These ampicillin results predict results for amoxicillin      -  Ampicillin/Sulbactam: S <=4/2      -  Aztreonam: S <=4      -  Cefazolin: R 8      -  Cefepime: S <=2      -  Cefoxitin: S <=8      -  Ceftriaxone: S <=1      -  Ciprofloxacin: S <=0.25      -  Ertapenem: S <=0.5      -  Gentamicin: S <=2      -  Levofloxacin: S <=0.5      -  Meropenem: S <=1      -  Piperacillin/Tazobactam: S <=8      -  Tobramycin: S <=2      -  Trimethoprim/Sulfamethoxazole: S <=0.5/9.5  Organism: Acinetobacter mellisaingii (11-30-23 @ 18:45)      Method Type: KB      -  Piperacillin/Tazobactam: S  Organism: Acinetobacter mellisaingii (11-30-23 @ 18:45)      Method Type: AMI      -  Amikacin: S <=16      -  Ampicillin/Sulbactam: S <=4/2      -  Cefepime: S <=2      -  Ceftazidime: S 4      -  Ciprofloxacin: S <=0.25      -  Gentamicin: S <=2      -  Imipenem: S <=1      -  Levofloxacin: S <=0.5      -  Meropenem: S <=1      -  Tobramycin: S <=2      -  Trimethoprim/Sulfamethoxazole: S <=0.5/9.5    Radiology: reviewed     Medications:  acetaminophen     Tablet .. 650 milliGRAM(s) Oral every 6 hours PRN  amLODIPine   Tablet 2.5 milliGRAM(s) Oral daily  ascorbic acid 500 milliGRAM(s) Oral daily  aspirin enteric coated 81 milliGRAM(s) Oral daily  atorvastatin 40 milliGRAM(s) Oral at bedtime  dextrose 5%. 1000 milliLiter(s) IV Continuous <Continuous>  dextrose 5%. 1000 milliLiter(s) IV Continuous <Continuous>  dextrose 50% Injectable 25 Gram(s) IV Push once  dextrose 50% Injectable 12.5 Gram(s) IV Push once  dextrose 50% Injectable 25 Gram(s) IV Push once  dextrose Oral Gel 15 Gram(s) Oral once PRN  gabapentin 100 milliGRAM(s) Oral every 8 hours  glucagon  Injectable 1 milliGRAM(s) IntraMuscular once  insulin lispro (ADMELOG) corrective regimen sliding scale   SubCutaneous three times a day before meals  insulin lispro (ADMELOG) corrective regimen sliding scale   SubCutaneous at bedtime  losartan 25 milliGRAM(s) Oral daily  multivitamin/minerals 1 Tablet(s) Oral daily  oxyCODONE    IR 10 milliGRAM(s) Oral every 4 hours PRN  oxyCODONE    IR 5 milliGRAM(s) Oral every 4 hours PRN  polyethylene glycol 3350 17 Gram(s) Oral daily  senna 2 Tablet(s) Oral at bedtime    Current Antimicrobials:    Prior/Completed Antimicrobials:  ampicillin/sulbactam  IVPB   [facility]  ERVIN Florentino Y. Patel, S. Shah, G. Ervin  [phone]  ([phone] - weekdays after 5pm and weekends)    Name: ALEE DUNN  Age/Gender: 72y Male  MRN: [id number]    Interval History:  Patient seen and examined this morning.   No new complaints noted.  Notes reviewed  No concerning overnight events  Afebrile   Allergies: No Known Allergies      Objective:  Vitals:   T(F): 97.6 (12-05-23 @ 04:50), Max: 98.6 (12-04-23 @ 21:01)  HR: 73 (12-05-23 @ 04:50) (69 - 73)  BP: 149/73 (12-05-23 @ 04:50) (137/66 - 163/73)  RR: 18 (12-05-23 @ 04:50) (18 - 18)  SpO2: 98% (12-05-23 @ 04:50) (96% - 98%)  Physical Examination:  General: no acute distress  HEENT: NC/AT, anicteric, neck supple  Respiratory: no acc muscle use, breathing comfortably  Cardiovascular: S1 and S2 present  Gastrointestinal: normal appearing, nondistended  Extremities: R Bka, no edema  Skin: no visible rash    Laboratory Studies:  CBC:                       11.1   7.49  )-----------( 283      ( 05 Dec 2023 06:47 )             34.6     WBC Trend:  7.49 12-05-23 @ 06:47  7.31 12-04-23 @ 07:14  6.43 11-29-23 @ 04:34    CMP: 12-05    135  |  102  |  24<H>  ----------------------------<  181<H>  4.1   |  22  |  1.11    Ca    9.6      05 Dec 2023 06:47  Phos  3.3     12-05  Mg     2.4     12-05    TPro  8.0  /  Alb  3.6  /  TBili  0.3  /  DBili  x   /  AST  14  /  ALT  24  /  AlkPhos  83  12-05    Creatinine: 1.11 mg/dL (12-05-23 @ 06:47)  Creatinine: 0.97 mg/dL (12-04-23 @ 07:12)  Creatinine: 1.10 mg/dL (11-29-23 @ 04:34)    LIVER FUNCTIONS - ( 05 Dec 2023 06:47 )  Alb: 3.6 g/dL / Pro: 8.0 g/dL / ALK PHOS: 83 U/L / ALT: 24 U/L / AST: 14 U/L / GGT: x           Microbiology: reviewed   Culture - Abscess with Gram Stain (collected 11-26-23 @ 19:14)  Source: .Abscess left foot  Gram Stain (11-27-23 @ 06:59):    Few polymorphonuclear leukocytes seen per low power field    Numerous Gram positive cocci in pairs seen per oil power field    Numerous Gram Negative Rods seen per oil power field  Final Report (11-30-23 @ 18:45):    Culture yields >4 types of aerobic and/or anaerobic bacteria    Call client services within 7 days if further workup is clinically    indicated.    Culture includes    Moderate Acinetobacter ursingii    Few Serratia liquefaciens  Organism: Acinetobacter ursingii  Serratia liquefaciens (11-30-23 @ 18:45)  Organism: Serratia liquefaciens (11-30-23 @ 18:45)      Method Type: AMI      -  Amoxicillin/Clavulanic Acid: S <=8/4      -  Ampicillin: S <=8 These ampicillin results predict results for amoxicillin      -  Ampicillin/Sulbactam: S <=4/2      -  Aztreonam: S <=4      -  Cefazolin: R 8      -  Cefepime: S <=2      -  Cefoxitin: S <=8      -  Ceftriaxone: S <=1      -  Ciprofloxacin: S <=0.25      -  Ertapenem: S <=0.5      -  Gentamicin: S <=2      -  Levofloxacin: S <=0.5      -  Meropenem: S <=1      -  Piperacillin/Tazobactam: S <=8      -  Tobramycin: S <=2      -  Trimethoprim/Sulfamethoxazole: S <=0.5/9.5  Organism: Acinetobacter mellisaingii (11-30-23 @ 18:45)      Method Type: KB      -  Piperacillin/Tazobactam: S  Organism: Acinetobacter mellisaingii (11-30-23 @ 18:45)      Method Type: AMI      -  Amikacin: S <=16      -  Ampicillin/Sulbactam: S <=4/2      -  Cefepime: S <=2      -  Ceftazidime: S 4      -  Ciprofloxacin: S <=0.25      -  Gentamicin: S <=2      -  Imipenem: S <=1      -  Levofloxacin: S <=0.5      -  Meropenem: S <=1      -  Tobramycin: S <=2      -  Trimethoprim/Sulfamethoxazole: S <=0.5/9.5    Radiology: reviewed     Medications:  acetaminophen     Tablet .. 650 milliGRAM(s) Oral every 6 hours PRN  amLODIPine   Tablet 2.5 milliGRAM(s) Oral daily  ascorbic acid 500 milliGRAM(s) Oral daily  aspirin enteric coated 81 milliGRAM(s) Oral daily  atorvastatin 40 milliGRAM(s) Oral at bedtime  dextrose 5%. 1000 milliLiter(s) IV Continuous <Continuous>  dextrose 5%. 1000 milliLiter(s) IV Continuous <Continuous>  dextrose 50% Injectable 25 Gram(s) IV Push once  dextrose 50% Injectable 12.5 Gram(s) IV Push once  dextrose 50% Injectable 25 Gram(s) IV Push once  dextrose Oral Gel 15 Gram(s) Oral once PRN  gabapentin 100 milliGRAM(s) Oral every 8 hours  glucagon  Injectable 1 milliGRAM(s) IntraMuscular once  insulin lispro (ADMELOG) corrective regimen sliding scale   SubCutaneous three times a day before meals  insulin lispro (ADMELOG) corrective regimen sliding scale   SubCutaneous at bedtime  losartan 25 milliGRAM(s) Oral daily  multivitamin/minerals 1 Tablet(s) Oral daily  oxyCODONE    IR 10 milliGRAM(s) Oral every 4 hours PRN  oxyCODONE    IR 5 milliGRAM(s) Oral every 4 hours PRN  polyethylene glycol 3350 17 Gram(s) Oral daily  senna 2 Tablet(s) Oral at bedtime    Current Antimicrobials:    Prior/Completed Antimicrobials:  ampicillin/sulbactam  IVPB

## 2023-12-05 NOTE — DISCHARGE NOTE PROVIDER - CARE PROVIDERS DIRECT ADDRESSES
,DirectAddress_Unknown,[email or web address],DirectAddress_Unknown ,DirectAddress_Unknown,[email or web address],[email or web address]

## 2023-12-05 NOTE — DISCHARGE NOTE PROVIDER - NSDCFUSCHEDAPPT_GEN_ALL_CORE_FT
Abdullahi Villalobos  [facility] Physician [address]  Scheduled Appointment: 01/09/2024     Abdullahi Villalobos  [facility] Physician [address]  Scheduled Appointment: 01/09/2024     Mateus Mullins  [facility]  VASCULAR [address]  Scheduled Appointment: 12/19/2023    [facility]  VASCULAR [address]  Scheduled Appointment: 12/19/2023    Abdullahi Villalobos  [address]  Scheduled Appointment: 01/09/2024     Mateus Mullins  [facility]  VASCULAR [address]  Scheduled Appointment: 12/19/2023    [facility]  VASCULAR [address]  Scheduled Appointment: 12/19/2023    Abdullahi Villalobos  [address]  Scheduled Appointment: 01/09/2024

## 2023-12-05 NOTE — PROGRESS NOTE ADULT - PROVIDER SPECIALTY LIST ADULT
Cardiology
Cardiology
Infectious Disease
Internal Medicine
Internal Medicine
Podiatry
Podiatry
Vascular Surgery
Vascular Surgery
Cardiology
Internal Medicine
Vascular Surgery
Cardiology
Infectious Disease
Internal Medicine
Vascular Surgery
Infectious Disease
Internal Medicine
Internal Medicine
Podiatry
Vascular Surgery
Vascular Surgery

## 2023-12-05 NOTE — PROGRESS NOTE ADULT - SUBJECTIVE AND OBJECTIVE BOX
DATE OF SERVICE: 12-05-23 @ 18:01    Patient is a 72y old  Male who presents with a chief complaint of The patient is a 72y Male complaining of Lt foot wound. (05 Dec 2023 13:43)      INTERVAL HISTORY: Feels ok.     REVIEW OF SYSTEMS:  CONSTITUTIONAL: No weakness  EYES/ENT: No visual changes;  No throat pain   NECK: No pain or stiffness  RESPIRATORY: No cough, wheezing; No shortness of breath  CARDIOVASCULAR: No chest pain or palpitations  GASTROINTESTINAL: No abdominal  pain. No nausea, vomiting, or hematemesis  GENITOURINARY: No dysuria, frequency or hematuria  NEUROLOGICAL: No stroke like symptoms  SKIN: No rashes    	  MEDICATIONS:  amLODIPine   Tablet 2.5 milliGRAM(s) Oral daily  losartan 25 milliGRAM(s) Oral daily        PHYSICAL EXAM:  T(C): 36.6 (12-05-23 @ 12:22), Max: 37 (12-04-23 @ 21:01)  HR: 72 (12-05-23 @ 12:22) (72 - 73)  BP: 132/60 (12-05-23 @ 12:22) (132/60 - 163/73)  RR: 18 (12-05-23 @ 12:22) (18 - 18)  SpO2: 97% (12-05-23 @ 12:22) (96% - 98%)  Wt(kg): --  I&O's Summary    04 Dec 2023 07:01  -  05 Dec 2023 07:00  --------------------------------------------------------  IN: 720 mL / OUT: 0 mL / NET: 720 mL          Appearance: In no distress	  HEENT:    PERRL, EOMI	  Cardiovascular:  S1 S2, No JVD  Respiratory: Lungs clear to auscultation	  Gastrointestinal:  Soft, Non-tender, + BS	  Vascularature:  L BKA  Psychiatric: Appropriate affect   Neuro: no acute focal deficits                               11.1   7.49  )-----------( 283      ( 05 Dec 2023 06:47 )             34.6     12-05    135  |  102  |  24<H>  ----------------------------<  181<H>  4.1   |  22  |  1.11    Ca    9.6      05 Dec 2023 06:47  Phos  3.3     12-05  Mg     2.4     12-05    TPro  8.0  /  Alb  3.6  /  TBili  0.3  /  DBili  x   /  AST  14  /  ALT  24  /  AlkPhos  83  12-05        Labs personally reviewed      ASSESSMENT/PLAN: 	  72 year old male with hx of DM, HTN, right LE BKA, presents to the ED for left 2nd and 3rd toe wound x 2 weeks. He states he noticed a cut to the toe 2 weeks ago and it has progressively worsened. No infectious symptoms including fever, chills.    Problem/Plan -1  Problem: Cardiac Risk Stratification for podiatry surgery  - Denies CP or SOB  - TTE shows preserved EF, mild LVH  - Not in decompensated HF  - No tachy supa arrhythmia  - s/p cath with nonobstructive moderate CAD in March 2023  - Elevated risk for low-mod risk pods surgery/vascular surgery, no contraindication to proceed.    Problem/Plan -2  Problem: Hypertension - not controlled   - amlodipine  2.5mg PO daily  - recommend increasing losartan from 25mg to 50mg for better bp control.     Problem/Plan -3  Problem: DM II  - HgbA1c improved to 7.2  - ISS as per primary team          LUPILLO Bello DO [facility]  Cardiovascular Medicine  [address], [address]  Available through call or text on [employer] TEAM[employer]  Office: [phone]   DATE OF SERVICE: 12-05-23 @ 18:01    Patient is a 72y old  Male who presents with a chief complaint of The patient is a 72y Male complaining of Lt foot wound. (05 Dec 2023 13:43)      INTERVAL HISTORY: Feels ok.     REVIEW OF SYSTEMS:  CONSTITUTIONAL: No weakness  EYES/ENT: No visual changes;  No throat pain   NECK: No pain or stiffness  RESPIRATORY: No cough, wheezing; No shortness of breath  CARDIOVASCULAR: No chest pain or palpitations  GASTROINTESTINAL: No abdominal  pain. No nausea, vomiting, or hematemesis  GENITOURINARY: No dysuria, frequency or hematuria  NEUROLOGICAL: No stroke like symptoms  SKIN: No rashes    	  MEDICATIONS:  amLODIPine   Tablet 2.5 milliGRAM(s) Oral daily  losartan 25 milliGRAM(s) Oral daily        PHYSICAL EXAM:  T(C): 36.6 (12-05-23 @ 12:22), Max: 37 (12-04-23 @ 21:01)  HR: 72 (12-05-23 @ 12:22) (72 - 73)  BP: 132/60 (12-05-23 @ 12:22) (132/60 - 163/73)  RR: 18 (12-05-23 @ 12:22) (18 - 18)  SpO2: 97% (12-05-23 @ 12:22) (96% - 98%)  Wt(kg): --  I&O's Summary    04 Dec 2023 07:01  -  05 Dec 2023 07:00  --------------------------------------------------------  IN: 720 mL / OUT: 0 mL / NET: 720 mL          Appearance: In no distress	  HEENT:    PERRL, EOMI	  Cardiovascular:  S1 S2, No JVD  Respiratory: Lungs clear to auscultation	  Gastrointestinal:  Soft, Non-tender, + BS	  Vascularature:  L BKA  Psychiatric: Appropriate affect   Neuro: no acute focal deficits                               11.1   7.49  )-----------( 283      ( 05 Dec 2023 06:47 )             34.6     12-05    135  |  102  |  24<H>  ----------------------------<  181<H>  4.1   |  22  |  1.11    Ca    9.6      05 Dec 2023 06:47  Phos  3.3     12-05  Mg     2.4     12-05    TPro  8.0  /  Alb  3.6  /  TBili  0.3  /  DBili  x   /  AST  14  /  ALT  24  /  AlkPhos  83  12-05        Labs personally reviewed      ASSESSMENT/PLAN: 	  72 year old male with hx of DM, HTN, right LE BKA, presents to the ED for left 2nd and 3rd toe wound x 2 weeks. He states he noticed a cut to the toe 2 weeks ago and it has progressively worsened. No infectious symptoms including fever, chills.    Problem/Plan -1  Problem: Cardiac Risk Stratification for podiatry surgery  - Denies CP or SOB  - TTE shows preserved EF, mild LVH  - Not in decompensated HF  - No tachy supa arrhythmia  - s/p cath with nonobstructive moderate CAD in March 2023  - Elevated risk for low-mod risk pods surgery/vascular surgery, no contraindication to proceed.    Problem/Plan -2  Problem: Hypertension - not controlled   - amlodipine  2.5mg PO daily  - recommend increasing losartan from 25mg to 50mg for better bp control.     Problem/Plan -3  Problem: DM II  - HgbA1c improved to 7.2  - ISS as per primary team          LUPILLO Bello DO [facility]  Cardiovascular Medicine  [address] [facility] [facility], [address]  Available through call or text on [employer] TEAMs  Office: [phone]

## 2023-12-05 NOTE — PROGRESS NOTE ADULT - REASON FOR ADMISSION
The patient is a 72y Male complaining of Lt foot wound.

## 2023-12-05 NOTE — DISCHARGE NOTE PROVIDER - NSDCFUADDAPPT_GEN_ALL_CORE_FT
APPTS ARE READY TO BE MADE: [ x] YES    Best Family or Patient Contact (if needed):    Additional Information about above appointments (if needed):    1: Ren	  2: Susanna  3:     Other comments or requests:    Podiatry Discharge Instructions:  Follow up: Please follow up with Dr. Mcclure within 1 week of discharge from the hospital, please call [phone] for appointment and discuss that you recently were seen in the hospital.  Wound Care: Please apply betadine to left foot wounds followed by 4x4 gauze, and radha daily   Weight bearing: Please weight bear as tolerated in a surgical shoe.  Antibiotics: Please continue as instructed.    APPTS ARE READY TO BE MADE: [ x] YES    Best Family or Patient Contact (if needed):    Additional Information about above appointments (if needed):    1: Ren	  2: Susanna  3:     Other comments or requests:    Podiatry Discharge Instructions:  Follow up: Please follow up with Dr. Mcclure within 1 week of discharge from the hospital, please call [phone] for appointment and discuss that you recently were seen in the hospital.  Wound Care: Please apply betadine to left foot wounds followed by 4x4 gauze, and radha daily   Weight bearing: Please weight bear as tolerated in a surgical shoe.  Antibiotics: Please continue as instructed.    APPTS ARE READY TO BE MADE: [ x] YES    Best Family or Patient Contact (if needed):    Additional Information about above appointments (if needed):    1: Ren	  2: Susanna  3:     Other comments or requests:    Podiatry Discharge Instructions:  Follow up: Please follow up with Dr. Mcclure within 1 week of discharge from the hospital, please call [phone] for appointment and discuss that you recently were seen in the hospital.  Wound Care: Please apply betadine to left foot wounds followed by 4x4 gauze, and radha daily   Weight bearing: Please weight bear as tolerated in a surgical shoe.  Antibiotics: Please continue as instructed.    APPTS ARE READY TO BE MADE: [ x] YES    Best Family or Patient Contact (if needed):    Additional Information about above appointments (if needed):    1: Ren	  2: Susanna  3:     Other comments or requests:   Patient was previously scheduled for an appointment on 12/08 at [address] [place] [address] with Dr. Mcclure  Patient was previously scheduled for an appointment on 12/19 12:45p at [address] [place] [address] with Dr. Mullins.  Patient/Caregiver was provided with follow up request details and prefers to call the providers office on their own to schedule.      Podiatry Discharge Instructions:  Follow up: Please follow up with Dr. Mcclure within 1 week of discharge from the hospital, please call [phone] for appointment and discuss that you recently were seen in the hospital.  Wound Care: Please apply betadine to left foot wounds followed by 4x4 gauze, and radha daily   Weight bearing: Please weight bear as tolerated in a surgical shoe.  Antibiotics: Please continue as instructed.    APPTS ARE READY TO BE MADE: [ x] YES    Best Family or Patient Contact (if needed):    Additional Information about above appointments (if needed):    1: Ren	  2: Susanna  3:     Other comments or requests:   Patient was previously scheduled for an appointment on 12/08 at [address] [place] [address] with Dr. Mcclure  Patient was previously scheduled for an appointment on 12/19 12:45p at [address] [place] [address] with Dr. Mullins.  Patient/Caregiver was provided with follow up request details and prefers to call the providers office on their own to schedule.

## 2023-12-05 NOTE — DISCHARGE NOTE PROVIDER - CARE PROVIDER_API CALL
Mateus Mullins  Vascular Surgery  [address] [place] [address]  Phone: [phone]  Fax: [phone]  Follow Up Time: 1 week    Abraham Mcclure  Podiatric Medicine and Surgery  [address] [place] [address]  Phone: [phone]  Fax: [phone]  Follow Up Time: 2 weeks    Hadley Brown  [address] [place] [address]  Phone: [phone]  Fax: ()-  Follow Up Time:    Mateus Mullins  Vascular Surgery  [address] [place] [address]  Phone: [phone]  Fax: [phone]  Follow Up Time: 1 week    Abraham Mcclure  Podiatric Medicine and Surgery  [address] [place] [address]  Phone: [phone]  Fax: [phone]  Follow Up Time: 2 weeks    Hadley Brown  [address] [place] [address]  Phone: [phone]  Fax: ()-  Follow Up Time:

## 2023-12-05 NOTE — DISCHARGE NOTE PROVIDER - NSDCMRMEDTOKEN_GEN_ALL_CORE_FT
Detail Level: Detailed
Quality 226: Preventive Care And Screening: Tobacco Use: Screening And Cessation Intervention: Patient screened for tobacco use and is an ex/non-smoker
Quality 431: Preventive Care And Screening: Unhealthy Alcohol Use - Screening: Patient not identified as an unhealthy alcohol user when screened for unhealthy alcohol use using a systematic screening method
amLODIPine 2.5 mg oral tablet: 1 orally once a day  ascorbic acid 500 mg oral tablet: 1 tab(s) orally once a day  aspirin 81 mg oral delayed release tablet: 1 tab(s) orally once a day  gabapentin 100 mg oral tablet: 1 orally every 8 hours  glipiZIDE 5 mg oral tablet: 1 tab(s) orally in the morning and 2 tabs in the evening  losartan 25 mg oral tablet: 1 tab(s) orally once a day  metFORMIN 750 mg oral tablet, extended release: 1 orally once a day  Multiple Vitamins with Minerals oral tablet: 1 tab(s) orally once a day  polyethylene glycol 3350 oral powder for reconstitution: 17 gram(s) orally once a day  rosuvastatin 10 mg oral tablet: 1 tab(s) orally once a day  senna leaf extract oral tablet: 2 tab(s) orally once a day (at bedtime)

## 2023-12-05 NOTE — DISCHARGE NOTE PROVIDER - PROVIDER TOKENS
PROVIDER:[TOKEN:[[id number]],FOLLOWUP:[1 week]],PROVIDER:[TOKEN:[[id number]],FOLLOWUP:[2 weeks]],PROVIDER:[TOKEN:[[id number]]] PROVIDER:[TOKEN:[[id number]],FOLLOWUP:[1 week]],PROVIDER:[TOKEN:[[id number]],FOLLOWUP:[2 weeks]],PROVIDER:[TOKEN:[[id number]]]

## 2023-12-05 NOTE — PROGRESS NOTE ADULT - ASSESSMENT
Patient is a 72 year old male with PMH of DM, HTN, R BKA who presents to the ED for left 2nd and 3rd toe wound for 2 weeks after cutting his toe.     L foot 2nd and 3rd digit partial thickness with dry gangrene   - 11/30 overnight s/p LLE angiogram - vascular disease     Recommendations:  no fever, no leukocytosis  s/p unasyn, off abx - no clear infection     Vascular following, noted planning for outpatient revascularization   Podiatry to reschedule L foot partial 2&3rd ray resection pending above   Continue off antibiotics   Stable from ID standpoint at this time.     Azucena Castillo M.D.  [facility], Division of Infectious Diseases  [phone]  After 5pm on weekdays and all day on weekends - please call [phone]       Patient is a 72 year old male with PMH of DM, HTN, R BKA who presents to the ED for left 2nd and 3rd toe wound for 2 weeks after cutting his toe.     L foot 2nd and 3rd digit partial thickness with dry gangrene   - 11/30 overnight s/p LLE angiogram - vascular disease     Recommendations:  no fever, no leukocytosis  s/p unasyn, off abx - no clear infection     Vascular following, noted planning for outpatient revascularization   Podiatry to reschedule L foot partial 2&3rd ray resection pending above   Continue off antibiotics   Stable from ID standpoint at this time.     Azucena Castillo M.D.  [facility], Division of Infectious Diseases  [phone]  After 5pm on weekdays and all day on weekends - please call [phone]

## 2023-12-05 NOTE — PROGRESS NOTE ADULT - ASSESSMENT
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 Patient now presents to ED w/ LLE 2nd and 3rd toe wound x2 weeks. Podiatry planning local wound care vs ray resection. Vascular surgery consulted for evaluation.    Recommendations:   - Plan for outpatient revascularization procedure. Patient is stable for discharge from a vascular standpoint and can follow up with Dr. Mullins for planning.   - s/p LLE angio on 11/29  - Appreciate podiatry recs  - Continue local wound care for now - patient unlikely to heal any resection prior to revascularization  - please obtain vein mapping with GSV  - possible deep vein arterialization planning- medicine/cardiology optimization/clearances noted  - Remainder care per primary     Plan discussed with fellow on behalf of attending.    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 Patient now presents to ED w/ LLE 2nd and 3rd toe wound x2 weeks. Podiatry planning local wound care vs ray resection. Vascular surgery consulted for evaluation.    Recommendations:   - Plan for outpatient revascularization procedure. Patient is stable for discharge from a vascular standpoint and can follow up with Dr. Mullins for planning.   - s/p LLE angio on 11/29  - Appreciate podiatry recs  - Continue local wound care for now - patient unlikely to heal any resection prior to revascularization  - please obtain vein mapping with GSV  - possible deep vein arterialization planning- medicine/cardiology optimization/clearances noted  - Remainder care per primary     Plan discussed with fellow on behalf of attending.    Vascular [id number]

## 2023-12-05 NOTE — DISCHARGE NOTE NURSING/CASE MANAGEMENT/SOCIAL WORK - NURSING SECTION COMPLETE
jaw surgery, maxillary osteotomy, lefort 1 Patient/Caregiver provided printed discharge information.

## 2023-12-05 NOTE — DISCHARGE NOTE NURSING/CASE MANAGEMENT/SOCIAL WORK - PATIENT PORTAL LINK FT
You can access the FollowMyHealth Patient Portal offered by [facility] by registering at the following website: [email or web address]. By joining [employer]’s FollowMyHealth portal, you will also be able to view your health information using other applications (apps) compatible with our system. You can access the FollowMyHealth Patient Portal offered by [facility] by registering at the following website: [email or web address]. By joining [email or web address] FollowMyHealth portal, you will also be able to view your health information using other applications (apps) compatible with our system.

## 2023-12-05 NOTE — DISCHARGE NOTE NURSING/CASE MANAGEMENT/SOCIAL WORK - NSDCPEFALRISK_GEN_ALL_CORE
For information on Fall & Injury Prevention, visit: [email or web address] OR  [email or web address] OR  https://www.cdc.gov/steadi/patient.html For information on Fall & Injury Prevention, visit: [email or web address]://[email or web address]/news/fall-[email or web address]-mobility OR  [email or web address]://www.[email or web address]/news/fall-prevention-tips-to-avoid-injury OR  https://www.cdc.gov/steadi/patient.html

## 2023-12-05 NOTE — DISCHARGE NOTE PROVIDER - HOSPITAL COURSE
HPI:  72 year old male with hx of DM, HTN, right LE BKA, presents to the ED for left 2nd and 3rd toe wound x 2 weeks. He states he noticed a cut to the toe 2 weeks ago and it has progressively worsened. He first noticed the color change 1 week ago. Associated 6/10 pain of RLE. States he tried to make an appointment with podiatry but was unable to. No infectious symptoms including fever, chills.   (26 Nov 2023 14:52)    Hospital Course:  Plan for outpatient revascularization procedure. Patient is stable for discharge from a vascular standpoint and can follow up with Dr. Mullins for planning.   s/p LLE angio on 11/29 s/p vein mapping with GSV   Continue local wound care for now - patient unlikely to heal any resection prior to revascularization  s/p unasyn, off abx - no clear infection   follow up with Podiatry for resection s/p revascularization         Important Medication Changes and Reason:  none     Active or Pending Issues Requiring Follow-up:  Vascular  Podiatry  PCP   Advanced Directives:   [ x] Full code  [ ] DNR  [ ] Hospice    Discharge Diagnoses:  Gangrene   PVD

## 2023-12-07 NOTE — CHART NOTE - NSCHARTNOTEFT_GEN_A_CORE
Post Operative Note  Patient: ALEE DUNN 72y (1950) Male   MRN: [id number]  Location: [place] [address] D1  Visit: 11-26-23 Inpatient  Date: 11-30-23 @ 02:20    Procedure: S/P diagnostic LLE angiogram on 11/29.    Subjective: Patient seen and examined post operatively. Reports pain as controlled. Denies nausea, vomiting, fever, chills, chest pain, SOB, cough.      Objective:  Vitals: T(F): 97.4 (11-29-23 @ 23:45), Max: 98.6 (11-29-23 @ 18:47)  HR: 85 (11-29-23 @ 23:45)  BP: 159/67 (11-29-23 @ 23:45) (154/65 - 186/77)  RR: 17 (11-29-23 @ 23:45)  SpO2: 99% (11-29-23 @ 23:45)  Vent Settings:     In:   11-28-23 @ 07:01  -  11-29-23 @ 07:00  --------------------------------------------------------  IN: 480 mL    11-29-23 @ 07:01  -  11-30-23 @ 02:20  --------------------------------------------------------  IN: 0 mL      IV Fluids: ascorbic acid 500 milliGRAM(s) Oral daily  dextrose 5%. 1000 milliLiter(s) (100 mL/Hr) IV Continuous <Continuous>  dextrose 5%. 1000 milliLiter(s) (50 mL/Hr) IV Continuous <Continuous>  multivitamin/minerals 1 Tablet(s) Oral daily      Out:   11-28-23 @ 07:01  -  11-29-23 @ 07:00  --------------------------------------------------------  OUT: 800 mL    11-29-23 @ 07:01  -  11-30-23 @ 02:20  --------------------------------------------------------  OUT: 0 mL      EBL:     Voided Urine:   11-28-23 @ 07:01  -  11-29-23 @ 07:00  --------------------------------------------------------  OUT: 800 mL    11-29-23 @ 07:01  -  11-30-23 @ 02:20  --------------------------------------------------------  OUT: 0 mL      Physical Examination:  General: NAD, resting comfortably in bed  HEENT: Normocephalic atraumatic  Respiratory: Nonlabored respirations, normal CW expansion.  Cardio: pulse present  Abdomen: soft/nontender  Extremities: RLE BKA, LLE    Imaging:  No post-op imaging studies    Assessment:  72yMale patient S/P diagnostic LLE angiogram showing PT runoff to ankle on 11/29. He is recovering well postoperatively with no signs of hematoma at the R groin.     Plan:  - IV Abx:   - Pain control PRN  - Diet:  - IVF  - ALIX Haas  - Activity:  - DVT ppx: SCD's &     Date/Time: 11-30-23 @ 02:20
Wound Care Team Note:    Request for wound care consult for foot/toe wound received and referred to Podiatry. Please refer to Podiatry for management. Will not follow.    Aspen Zaman NP-C, CWOCN via TEAMS
Left 1 message for patient in regards to follow up care with callback information.
.

## 2023-12-09 ENCOUNTER — INPATIENT (INPATIENT)
Facility: HOSPITAL | Age: 73
LOS: 12 days | Discharge: HOME HEALTH | DRG: 240 | End: 2023-12-22
Attending: INTERNAL MEDICINE | Admitting: INTERNAL MEDICINE
Payer: COMMERCIAL

## 2023-12-09 VITALS
DIASTOLIC BLOOD PRESSURE: 78 MMHG | HEART RATE: 84 BPM | RESPIRATION RATE: 16 BRPM | HEIGHT: 67 IN | TEMPERATURE: 98 F | OXYGEN SATURATION: 99 % | WEIGHT: 138.89 LBS | SYSTOLIC BLOOD PRESSURE: 161 MMHG

## 2023-12-09 DIAGNOSIS — I96 GANGRENE, NOT ELSEWHERE CLASSIFIED: ICD-10-CM

## 2023-12-09 DIAGNOSIS — Z89.429 ACQUIRED ABSENCE OF OTHER TOE(S), UNSPECIFIED SIDE: Chronic | ICD-10-CM

## 2023-12-09 LAB
ALBUMIN SERPL ELPH-MCNC: 3.4 G/DL — SIGNIFICANT CHANGE UP (ref 3.3–5)
ALBUMIN SERPL ELPH-MCNC: 3.4 G/DL — SIGNIFICANT CHANGE UP (ref 3.3–5)
ALP SERPL-CCNC: 90 U/L — SIGNIFICANT CHANGE UP (ref 40–120)
ALP SERPL-CCNC: 90 U/L — SIGNIFICANT CHANGE UP (ref 40–120)
ALT FLD-CCNC: 22 U/L — SIGNIFICANT CHANGE UP (ref 10–45)
ALT FLD-CCNC: 22 U/L — SIGNIFICANT CHANGE UP (ref 10–45)
ANION GAP SERPL CALC-SCNC: 9 MMOL/L — SIGNIFICANT CHANGE UP (ref 5–17)
ANION GAP SERPL CALC-SCNC: 9 MMOL/L — SIGNIFICANT CHANGE UP (ref 5–17)
AST SERPL-CCNC: 15 U/L — SIGNIFICANT CHANGE UP (ref 10–40)
AST SERPL-CCNC: 15 U/L — SIGNIFICANT CHANGE UP (ref 10–40)
BASOPHILS # BLD AUTO: 0.01 K/UL — SIGNIFICANT CHANGE UP (ref 0–0.2)
BASOPHILS # BLD AUTO: 0.01 K/UL — SIGNIFICANT CHANGE UP (ref 0–0.2)
BASOPHILS NFR BLD AUTO: 0.1 % — SIGNIFICANT CHANGE UP (ref 0–2)
BASOPHILS NFR BLD AUTO: 0.1 % — SIGNIFICANT CHANGE UP (ref 0–2)
BILIRUB SERPL-MCNC: 0.2 MG/DL — SIGNIFICANT CHANGE UP (ref 0.2–1.2)
BILIRUB SERPL-MCNC: 0.2 MG/DL — SIGNIFICANT CHANGE UP (ref 0.2–1.2)
BUN SERPL-MCNC: 23 MG/DL — SIGNIFICANT CHANGE UP (ref 7–23)
BUN SERPL-MCNC: 23 MG/DL — SIGNIFICANT CHANGE UP (ref 7–23)
CALCIUM SERPL-MCNC: 9.6 MG/DL — SIGNIFICANT CHANGE UP (ref 8.4–10.5)
CALCIUM SERPL-MCNC: 9.6 MG/DL — SIGNIFICANT CHANGE UP (ref 8.4–10.5)
CHLORIDE SERPL-SCNC: 102 MMOL/L — SIGNIFICANT CHANGE UP (ref 96–108)
CHLORIDE SERPL-SCNC: 102 MMOL/L — SIGNIFICANT CHANGE UP (ref 96–108)
CO2 SERPL-SCNC: 25 MMOL/L — SIGNIFICANT CHANGE UP (ref 22–31)
CO2 SERPL-SCNC: 25 MMOL/L — SIGNIFICANT CHANGE UP (ref 22–31)
CREAT SERPL-MCNC: 0.88 MG/DL — SIGNIFICANT CHANGE UP (ref 0.5–1.3)
CREAT SERPL-MCNC: 0.88 MG/DL — SIGNIFICANT CHANGE UP (ref 0.5–1.3)
CRP SERPL-MCNC: 25 MG/L — HIGH (ref 0–4)
CRP SERPL-MCNC: 25 MG/L — HIGH (ref 0–4)
EGFR: 91 ML/MIN/1.73M2 — SIGNIFICANT CHANGE UP
EGFR: 91 ML/MIN/1.73M2 — SIGNIFICANT CHANGE UP
EOSINOPHIL # BLD AUTO: 0.13 K/UL — SIGNIFICANT CHANGE UP (ref 0–0.5)
EOSINOPHIL # BLD AUTO: 0.13 K/UL — SIGNIFICANT CHANGE UP (ref 0–0.5)
EOSINOPHIL NFR BLD AUTO: 1.7 % — SIGNIFICANT CHANGE UP (ref 0–6)
EOSINOPHIL NFR BLD AUTO: 1.7 % — SIGNIFICANT CHANGE UP (ref 0–6)
ERYTHROCYTE [SEDIMENTATION RATE] IN BLOOD: 99 MM/HR — HIGH (ref 0–20)
ERYTHROCYTE [SEDIMENTATION RATE] IN BLOOD: 99 MM/HR — HIGH (ref 0–20)
GAS PNL BLDV: SIGNIFICANT CHANGE UP
GAS PNL BLDV: SIGNIFICANT CHANGE UP
GLUCOSE BLDC GLUCOMTR-MCNC: 251 MG/DL — HIGH (ref 70–99)
GLUCOSE BLDC GLUCOMTR-MCNC: 251 MG/DL — HIGH (ref 70–99)
GLUCOSE BLDC GLUCOMTR-MCNC: 99 MG/DL — SIGNIFICANT CHANGE UP (ref 70–99)
GLUCOSE BLDC GLUCOMTR-MCNC: 99 MG/DL — SIGNIFICANT CHANGE UP (ref 70–99)
GLUCOSE SERPL-MCNC: 294 MG/DL — HIGH (ref 70–99)
GLUCOSE SERPL-MCNC: 294 MG/DL — HIGH (ref 70–99)
HCT VFR BLD CALC: 31.5 % — LOW (ref 39–50)
HCT VFR BLD CALC: 31.5 % — LOW (ref 39–50)
HGB BLD-MCNC: 10.2 G/DL — LOW (ref 13–17)
HGB BLD-MCNC: 10.2 G/DL — LOW (ref 13–17)
IMM GRANULOCYTES NFR BLD AUTO: 0.3 % — SIGNIFICANT CHANGE UP (ref 0–0.9)
IMM GRANULOCYTES NFR BLD AUTO: 0.3 % — SIGNIFICANT CHANGE UP (ref 0–0.9)
LYMPHOCYTES # BLD AUTO: 1.1 K/UL — SIGNIFICANT CHANGE UP (ref 1–3.3)
LYMPHOCYTES # BLD AUTO: 1.1 K/UL — SIGNIFICANT CHANGE UP (ref 1–3.3)
LYMPHOCYTES # BLD AUTO: 14.4 % — SIGNIFICANT CHANGE UP (ref 13–44)
LYMPHOCYTES # BLD AUTO: 14.4 % — SIGNIFICANT CHANGE UP (ref 13–44)
MCHC RBC-ENTMCNC: 28.8 PG — SIGNIFICANT CHANGE UP (ref 27–34)
MCHC RBC-ENTMCNC: 28.8 PG — SIGNIFICANT CHANGE UP (ref 27–34)
MCHC RBC-ENTMCNC: 32.4 GM/DL — SIGNIFICANT CHANGE UP (ref 32–36)
MCHC RBC-ENTMCNC: 32.4 GM/DL — SIGNIFICANT CHANGE UP (ref 32–36)
MCV RBC AUTO: 89 FL — SIGNIFICANT CHANGE UP (ref 80–100)
MCV RBC AUTO: 89 FL — SIGNIFICANT CHANGE UP (ref 80–100)
MONOCYTES # BLD AUTO: 0.55 K/UL — SIGNIFICANT CHANGE UP (ref 0–0.9)
MONOCYTES # BLD AUTO: 0.55 K/UL — SIGNIFICANT CHANGE UP (ref 0–0.9)
MONOCYTES NFR BLD AUTO: 7.2 % — SIGNIFICANT CHANGE UP (ref 2–14)
MONOCYTES NFR BLD AUTO: 7.2 % — SIGNIFICANT CHANGE UP (ref 2–14)
NEUTROPHILS # BLD AUTO: 5.82 K/UL — SIGNIFICANT CHANGE UP (ref 1.8–7.4)
NEUTROPHILS # BLD AUTO: 5.82 K/UL — SIGNIFICANT CHANGE UP (ref 1.8–7.4)
NEUTROPHILS NFR BLD AUTO: 76.3 % — SIGNIFICANT CHANGE UP (ref 43–77)
NEUTROPHILS NFR BLD AUTO: 76.3 % — SIGNIFICANT CHANGE UP (ref 43–77)
NRBC # BLD: 0 /100 WBCS — SIGNIFICANT CHANGE UP (ref 0–0)
NRBC # BLD: 0 /100 WBCS — SIGNIFICANT CHANGE UP (ref 0–0)
PLATELET # BLD AUTO: 241 K/UL — SIGNIFICANT CHANGE UP (ref 150–400)
PLATELET # BLD AUTO: 241 K/UL — SIGNIFICANT CHANGE UP (ref 150–400)
POTASSIUM SERPL-MCNC: 4.3 MMOL/L — SIGNIFICANT CHANGE UP (ref 3.5–5.3)
POTASSIUM SERPL-MCNC: 4.3 MMOL/L — SIGNIFICANT CHANGE UP (ref 3.5–5.3)
POTASSIUM SERPL-SCNC: 4.3 MMOL/L — SIGNIFICANT CHANGE UP (ref 3.5–5.3)
POTASSIUM SERPL-SCNC: 4.3 MMOL/L — SIGNIFICANT CHANGE UP (ref 3.5–5.3)
PROT SERPL-MCNC: 7.4 G/DL — SIGNIFICANT CHANGE UP (ref 6–8.3)
PROT SERPL-MCNC: 7.4 G/DL — SIGNIFICANT CHANGE UP (ref 6–8.3)
RBC # BLD: 3.54 M/UL — LOW (ref 4.2–5.8)
RBC # BLD: 3.54 M/UL — LOW (ref 4.2–5.8)
RBC # FLD: 12.5 % — SIGNIFICANT CHANGE UP (ref 10.3–14.5)
RBC # FLD: 12.5 % — SIGNIFICANT CHANGE UP (ref 10.3–14.5)
SODIUM SERPL-SCNC: 136 MMOL/L — SIGNIFICANT CHANGE UP (ref 135–145)
SODIUM SERPL-SCNC: 136 MMOL/L — SIGNIFICANT CHANGE UP (ref 135–145)
WBC # BLD: 7.63 K/UL — SIGNIFICANT CHANGE UP (ref 3.8–10.5)
WBC # BLD: 7.63 K/UL — SIGNIFICANT CHANGE UP (ref 3.8–10.5)
WBC # FLD AUTO: 7.63 K/UL — SIGNIFICANT CHANGE UP (ref 3.8–10.5)
WBC # FLD AUTO: 7.63 K/UL — SIGNIFICANT CHANGE UP (ref 3.8–10.5)

## 2023-12-09 PROCEDURE — 73630 X-RAY EXAM OF FOOT: CPT | Mod: 26,LT

## 2023-12-09 PROCEDURE — 99285 EMERGENCY DEPT VISIT HI MDM: CPT

## 2023-12-09 RX ORDER — SODIUM CHLORIDE 9 MG/ML
1000 INJECTION, SOLUTION INTRAVENOUS
Refills: 0 | Status: DISCONTINUED | OUTPATIENT
Start: 2023-12-09 | End: 2023-12-15

## 2023-12-09 RX ORDER — GLUCAGON INJECTION, SOLUTION 0.5 MG/.1ML
1 INJECTION, SOLUTION SUBCUTANEOUS ONCE
Refills: 0 | Status: DISCONTINUED | OUTPATIENT
Start: 2023-12-09 | End: 2023-12-15

## 2023-12-09 RX ORDER — DEXTROSE 50 % IN WATER 50 %
25 SYRINGE (ML) INTRAVENOUS ONCE
Refills: 0 | Status: DISCONTINUED | OUTPATIENT
Start: 2023-12-09 | End: 2023-12-15

## 2023-12-09 RX ORDER — AMLODIPINE BESYLATE 2.5 MG/1
2.5 TABLET ORAL DAILY
Refills: 0 | Status: DISCONTINUED | OUTPATIENT
Start: 2023-12-09 | End: 2023-12-15

## 2023-12-09 RX ORDER — INSULIN LISPRO 100/ML
VIAL (ML) SUBCUTANEOUS
Refills: 0 | Status: DISCONTINUED | OUTPATIENT
Start: 2023-12-09 | End: 2023-12-15

## 2023-12-09 RX ORDER — ASPIRIN/CALCIUM CARB/MAGNESIUM 324 MG
81 TABLET ORAL DAILY
Refills: 0 | Status: DISCONTINUED | OUTPATIENT
Start: 2023-12-09 | End: 2023-12-15

## 2023-12-09 RX ORDER — ATORVASTATIN CALCIUM 80 MG/1
40 TABLET, FILM COATED ORAL AT BEDTIME
Refills: 0 | Status: DISCONTINUED | OUTPATIENT
Start: 2023-12-09 | End: 2023-12-15

## 2023-12-09 RX ORDER — INSULIN LISPRO 100/ML
VIAL (ML) SUBCUTANEOUS AT BEDTIME
Refills: 0 | Status: DISCONTINUED | OUTPATIENT
Start: 2023-12-09 | End: 2023-12-15

## 2023-12-09 RX ORDER — DEXTROSE 50 % IN WATER 50 %
12.5 SYRINGE (ML) INTRAVENOUS ONCE
Refills: 0 | Status: DISCONTINUED | OUTPATIENT
Start: 2023-12-09 | End: 2023-12-15

## 2023-12-09 RX ORDER — PIPERACILLIN AND TAZOBACTAM 4; .5 G/20ML; G/20ML
3.38 INJECTION, POWDER, LYOPHILIZED, FOR SOLUTION INTRAVENOUS EVERY 8 HOURS
Refills: 0 | Status: DISCONTINUED | OUTPATIENT
Start: 2023-12-09 | End: 2023-12-15

## 2023-12-09 RX ORDER — DEXTROSE 50 % IN WATER 50 %
15 SYRINGE (ML) INTRAVENOUS ONCE
Refills: 0 | Status: DISCONTINUED | OUTPATIENT
Start: 2023-12-09 | End: 2023-12-15

## 2023-12-09 RX ORDER — PIPERACILLIN AND TAZOBACTAM 4; .5 G/20ML; G/20ML
3.38 INJECTION, POWDER, LYOPHILIZED, FOR SOLUTION INTRAVENOUS ONCE
Refills: 0 | Status: COMPLETED | OUTPATIENT
Start: 2023-12-09 | End: 2023-12-09

## 2023-12-09 RX ORDER — LOSARTAN POTASSIUM 100 MG/1
25 TABLET, FILM COATED ORAL DAILY
Refills: 0 | Status: DISCONTINUED | OUTPATIENT
Start: 2023-12-09 | End: 2023-12-15

## 2023-12-09 RX ORDER — VANCOMYCIN HCL 1 G
1000 VIAL (EA) INTRAVENOUS ONCE
Refills: 0 | Status: COMPLETED | OUTPATIENT
Start: 2023-12-09 | End: 2023-12-09

## 2023-12-09 RX ADMIN — Medication 1: at 22:05

## 2023-12-09 RX ADMIN — Medication 81 MILLIGRAM(S): at 18:25

## 2023-12-09 RX ADMIN — AMLODIPINE BESYLATE 2.5 MILLIGRAM(S): 2.5 TABLET ORAL at 18:25

## 2023-12-09 RX ADMIN — PIPERACILLIN AND TAZOBACTAM 3.38 GRAM(S): 4; .5 INJECTION, POWDER, LYOPHILIZED, FOR SOLUTION INTRAVENOUS at 10:06

## 2023-12-09 RX ADMIN — LOSARTAN POTASSIUM 25 MILLIGRAM(S): 100 TABLET, FILM COATED ORAL at 18:25

## 2023-12-09 RX ADMIN — PIPERACILLIN AND TAZOBACTAM 25 GRAM(S): 4; .5 INJECTION, POWDER, LYOPHILIZED, FOR SOLUTION INTRAVENOUS at 22:02

## 2023-12-09 RX ADMIN — Medication 1000 MILLIGRAM(S): at 11:15

## 2023-12-09 RX ADMIN — ATORVASTATIN CALCIUM 40 MILLIGRAM(S): 80 TABLET, FILM COATED ORAL at 22:04

## 2023-12-09 RX ADMIN — Medication 250 MILLIGRAM(S): at 10:14

## 2023-12-09 RX ADMIN — PIPERACILLIN AND TAZOBACTAM 200 GRAM(S): 4; .5 INJECTION, POWDER, LYOPHILIZED, FOR SOLUTION INTRAVENOUS at 09:40

## 2023-12-09 NOTE — CONSULT NOTE ADULT - SUBJECTIVE AND OBJECTIVE BOX
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      PAD (peripheral artery disease)      History of amputation of toe          MEDICATIONS  (STANDING):    MEDICATIONS  (PRN):      Allergies    No Known Allergies    Intolerances        VITALS:    Vital Signs Last 24 Hrs  T(C): 36.9 (09 Dec 2023 08:15), Max: 36.9 (09 Dec 2023 08:15)  T(F): 98.5 (09 Dec 2023 08:15), Max: 98.5 (09 Dec 2023 08:15)  HR: 84 (09 Dec 2023 08:15) (84 - 84)  BP: 161/78 (09 Dec 2023 08:15) (161/78 - 161/78)  BP(mean): --  RR: 16 (09 Dec 2023 08:15) (16 - 16)  SpO2: 99% (09 Dec 2023 08:15) (99% - 99%)    Parameters below as of 09 Dec 2023 08:15  Patient On (Oxygen Delivery Method): room air        LABS:                          10.2   7.63  )-----------( 241      ( 09 Dec 2023 09:23 )             31.5       12-09    136  |  102  |  23  ----------------------------<  294<H>  4.3   |  25  |  0.88    Ca    9.6      09 Dec 2023 09:23    TPro  7.4  /  Alb  3.4  /  TBili  0.2  /  DBili  x   /  AST  15  /  ALT  22  /  AlkPhos  90  12-09      CAPILLARY BLOOD GLUCOSE              LOWER EXTREMITY PHYSICAL EXAM:  Vascular: DP/PT 0/4, B/L, Temperature gradient warm to cool, B/L.   Neuro: Epicritic sensation diminished to the level of digits, B/L.  Musculoskeletal/Ortho: s/p R BKA  Skin: Left foot 2nd and 3rd digit dorsal full thickness gangrene to level of MTPJ, w/ wet conversion, plantar 2nd and 3rd digit dusky and early ischemic changes, mild serous drainage, mild malodor, early dusky changes of fourth metatarsal.       RADIOLOGY & ADDITIONAL STUDIES:

## 2023-12-09 NOTE — ED ADULT NURSE NOTE - OBJECTIVE STATEMENT
Patient is alert and oriented x4.  Color is good and skin warm to touch.  He  is  c/o pain  to his left foot.  Gangrene noted to his left  toes.  Patient  has been afebrile.

## 2023-12-09 NOTE — CONSULT NOTE ADULT - ASSESSMENT
72M presents with left foot 2nd & 3rd digit partial thickness gangrene.  - Patient seen and evaluated.  - Afebrile, no leukocytosis.  - Left foot 2nd and 3rd digit dorsal full thickness gangrene to level of MTPJ, w/ wet conversion, plantar 2nd and 3rd digit dusky and early ischemic changes, mild serous drainage, mild malodor, early dusky changes of fourth metatarsal.   - Left Foot X-Ray: no OM, no gas.  - Left foot culture taken   - Recommend Vanco/zosyn  - Left foot MR ordered   - Recommend vascular consult w/ Dr. Mullins   - Pod plan Left foot second and 3rd ray resection vs. TMA pending vascular recommendations/demarcation.   - Discussed with attending.

## 2023-12-09 NOTE — ED ADULT NURSE NOTE - NSFALLUNIVINTERV_ED_ALL_ED
Bed/Stretcher in lowest position, wheels locked, appropriate side rails in place/Call bell, personal items and telephone in reach/Instruct patient to call for assistance before getting out of bed/chair/stretcher/Non-slip footwear applied when patient is off stretcher/[place] to call system/Physically safe environment - no spills, clutter or unnecessary equipment/Purposeful proactive rounding/Room/bathroom lighting operational, light cord in reach Bed/Stretcher in lowest position, wheels locked, appropriate side rails in place/Call bell, personal items and telephone in reach/Instruct patient to call for assistance before getting out of bed/chair/stretcher/Non-slip footwear applied when patient is off stretcher/[place] to call system/Physically safe environment - no spills, clutter or unnecessary equipment/Purposeful proactive rounding/Room/bathroom lighting operational, light cord in reach

## 2023-12-09 NOTE — PATIENT PROFILE ADULT - FALL HARM RISK - HARM RISK INTERVENTIONS
Assistance with ambulation/Assistance OOB with selected safe patient handling equipment/Communicate Risk of Fall with Harm to all staff/Discuss with provider need for PT consult/Monitor gait and stability/Reinforce activity limits and safety measures with patient and family/Tailored Fall Risk Interventions/Visual Cue: Yellow wristband and red socks/Bed in lowest position, wheels locked, appropriate side rails in place/Call bell, personal items and telephone in reach/Instruct patient to call for assistance before getting out of bed or chair/Non-slip footwear when patient is out of bed/[place] to call system/Physically safe environment - no spills, clutter or unnecessary equipment/Purposeful Proactive Rounding/Room/bathroom lighting operational, light cord in reach Assistance with ambulation/Assistance OOB with selected safe patient handling equipment/Communicate Risk of Fall with Harm to all staff/Discuss with provider need for PT consult/Monitor gait and stability/Reinforce activity limits and safety measures with patient and family/Tailored Fall Risk Interventions/Visual Cue: Yellow wristband and red socks/Bed in lowest position, wheels locked, appropriate side rails in place/Call bell, personal items and telephone in reach/Instruct patient to call for assistance before getting out of bed or chair/Non-slip footwear when patient is out of bed/[place] to call system/Physically safe environment - no spills, clutter or unnecessary equipment/Purposeful Proactive Rounding/Room/bathroom lighting operational, light cord in reach

## 2023-12-09 NOTE — ED CLERICAL - NS ED CLERK UNITS
APER Physical Therapy  Visit Type: initial evaluation  Precautions:  Medical precautions:  fall risk and swallowing precautions; standard precautions.    09/24: Pt admitted after a 5 minute episode at 13:30 that day of R-sided weakness and difficulty speaking.   MRI showed several punctate foci t/o right frontal obele c/w recent small infarcts.  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   No sx intervention per vascular sx.     PMH includes chronic AFib on Eliquis, hypertension, dyslipidemia, chronic kidney disease, thoracic aneurysm repair, peripheral vascular disease, CAD, CHF, known mesenteric ischemia  Pt was hospitalized 09/15 - 09/22/21 at OSH for mesenteric ischemia and a stent was placed in the celiac artery on 09/21. Pt was discharged and stayed at her dtr's home.  Lines:       Lines in chart and on patient reviewed, precautions maintained throughout session.  Vision:     Patient Visual Report: Per history, pt with blurred vision.  Safety Measures: bed alarm, chair alarm and bed rails    SUBJECTIVE  Patient agreed to participate in therapy this date.  Patient verbally agrees to allow the following to be present during session: daughter  \"I'm okay.\"  Patient / Family Goal: return to previous functional status, maximize function and return home    Pain     At onset of session (out of 10): 0  RN informed on pain level     OBJECTIVE     At rest in supine:  /96, HR 91, SpO2 99%  During standing and taking steps:   HR as high as 137bpm, in A-fib.  In supine after mobility:   /105, , once there was a good reading 96%.  Oriented to person and place     Disoriented to situation and time    Arousal alertness: delayed responses to stimuli    Affect/Behavior: alert, appropriate, calm, pleasant and cooperative  Functional Communication/Cognition    Overall status:  Impaired    Form of communication:   Verbal    Commands: follows one step commands consistently.  Additional Details: Pt with delayed responses, quiet in general. Pt had difficulty with the date, stating it was 08/28/2001, then stated it was 1921, then 2021. Pt did not recall event.  Range of Motion (measured in degrees unless otherwise noted, active unless indicated)  WNL: LLE, RLE  Strength (out of 5 unless otherwise indicated)   Hip:  Hip Flexion: Left: 2+ Right: 2+  Knee:   - Extension:      • Left: 4      • Right: 4  Ankle:    - Dorsiflexion:      • Left: 3+      • Right: 3+  Balance    Sitting: Static: stand by assist back unsupported, double lower extremity support and double upper extremity support    Standing - Firm Surface - Eyes Open: Static: minimal assist double upper extremity support  Neurological Comments / Details: Light touch grossly intact for B LE where tested.  Pt with dark red discoloration B distal legs, per dtr Robb d/t PVD.    Bed Mobility:        Supine to sit: stand by assist and with verbal cues    Sit to supine: with verbal cues and minimal assist  Training completed:    Tasks: supine to sit and sit to supine    Education details: body mechanics, patient safety and patient requires additional training    Pt needed assist to reposition to straight midline position.   Transfers:    Assistive devices: gait belt and 2-wheeled walker    Sit to stand: minimal assist, with tactile cues and with verbal cues    Stand to sit: minimal assist, with tactile cues and with verbal cues  Training completed:    Tasks: sit to stand and stand to sit    Education details: body mechanics, patient safety and patient requires additional training    Pt stood but seemed to be leaning with B lower legs against the bed and not really using B hands on RW for support.   Gait/Ambulation:     Assistance: minimal assist   Assistive device: 2-wheeled walker and gait belt    Distance (ft): 2    Pattern: step to    Type: unsteady and decreased  swathi  Training Completed:      Walked 2tf forward and backwards, pt then seemed to be needing increased cues and instructions and was slower to follow them. PT assisted pt with mod assist to step sideways along EOB for 1ft distance and pt was noted to have let go of RW with R hand and it was hovering laterally next to the RW handle in space. At that time, the pt's HR increased to 137bpm in A-fib and pt was assisted to sit down and then returned to supine.       Interventions     Training provided: activity tolerance, bed mobility training, balance retraining, functional ambulation, positioning, transfer training and use of adaptive equipment    Skilled input: Verbal instruction/cues and tactile instruction/cues  Verbal Consent: Writer verbally educated and received verbal consent for hand placement, positioning of patient, and techniques to be performed today from patient for hand placement and palpation for techniques, clothing adjustments for techniques and therapist position for techniques as described above and how they are pertinent to the patient's plan of care.       ASSESSMENT    Impairments: strength, balance deficits, endurance, activity tolerance and cognition  Functional Limitations: all functional mobility  Pt presented with delayed responses, quiet in general, A&O x2. Pt was assisted to standing and she walked 2tf forward and backwards with RW and min assist. Pt then seemed to be needing increased cues and instructions and was slower to follow them. PT assisted pt with mod assist to step sideways along EOB for 1ft distance and pt was noted to have let go of RW with R hand and it was hovering laterally next to the RW handle in space. At that time, the pt's HR increased to 137bpm in A-fib and pt was assisted to sit down and then returned to supine. Pt denied dizziness during this session. Pt's RN and Dr Hilario were made aware of level of assist, pt's reaction to mobility, and her vitals. Basil Zamudio was  present during most of this session.    Recommended Consults: PT: Physical Medicine and Rehabilitation Physician  Discharge Recommendations   Recommendation for Discharge: PT IL: Patient is appropriate for intensive daily Physical Therapy with the oversight of a Physical Medicine and Rehabilitation physician        PT/OT Mobility Equipment for Discharge: TBD, Pt owns rollator.          Pt was declining PT discharge recommendations at this time, dtr seemed supportive of PT recs.     Skilled therapy is required to address these limitations in attempt to maximize the patient's independence.    Pain at end of session: RN informed on pain level 0/10  Predicted patient presentation: Moderate (evolving) - Patient comorbidities and complexities, as defined above, may have varying impact on steady progress for prescribed plan of care.    End of Session:   Location: in bed  Safety measures: alarm system in place/re-engaged, bed rails x3, call light within reach, equipment intact and lines intact  Handoff to: nurse  Education Provided:   Learning assessment:  - Primary learner: patient  - Preferred learning style: verbal  - Barriers to learning: cognitive  Education provided during session:  - Receiving education: patient  - Results of above outlined education: Needs reinforcement    PLAN    Suggestions for next session as indicated: PT Frequency: 5 days/week  Frequency Comments: 09/25, JULIO, AIR (pt currently ref rehab), IPOC 3, mRs, monitor HR.      Interventions: balance, bed mobility, endurance training, body mechanics, gait training, HEP train/position, functional transfer training, stairs retraining, safety education and strengthening  Agreement to plan and goals: patient agrees with goals and treatment plan        GOALS  Review Date: 9/25/2021  Long Term Goals: (to be met by time of discharge from hospital)  Sit to supine: Patient will complete sit to supine without cues and supervision.  Supine to sit: Patient will  complete supine to sit without cues and supervision.  Sit to stand: Patient will complete sit to stand transfer with 2-wheeled walker, without cues and supervision.   Stand to sit: Patient will complete stand to sit transfer with 2-wheeled walker, without cues and supervision.   Ambulation (even): Patient will ambulate on even surface for 100 feet with 2-wheeled walker, without cues and supervision.     Documented in the chart in the following areas: Prior Level of Function. Assessment.      Therapy procedure time and total treatment time can be found documented on the Time Entry flowsheet

## 2023-12-09 NOTE — CONSULT NOTE ADULT - ASSESSMENT
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 Recently presented for 2/3rd toe gangrene with podiatry recommending toe resection vs TMA. Vascular was consulted and completed LLE angio showing PT runoff, no stent or balloon angioplasty. Vascular recommended possible bypass vs deep vein arterialization. Now returns d/t worsening LLE gangrene with extension to 4th toe. HDS, no WBC, glucose >200. LLE with +DP/PT signals, palp pop and b/l palpable fem. Vascular surgery consulted for possible revasc prior to resection.    Plan:  - possible plan revasc procedure DVA vs bypass timing to be finalized by Dr. Mullins  - c/w wound care and IV abx  - monitor for fever/WBC elevation, dry gangrene on exam 2/3/ poss 4th toe.  - pain control prn  - blood glucose elevated rec glucose goals 100-180.     Discussed with vascular fellow on behalf of Dr. 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 Recently presented for 2/3rd toe gangrene with podiatry recommending toe resection vs TMA. Vascular was consulted and completed LLE angio showing PT runoff, no stent or balloon angioplasty. Vascular recommended possible bypass vs deep vein arterialization. Now returns d/t worsening LLE gangrene with extension to 4th toe. HDS, no WBC, glucose >200. LLE with +DP/PT signals, palp pop and b/l palpable fem. Vascular surgery consulted for possible revasc prior to resection.    Plan:  - possible plan revasc procedure DVA vs bypass timing to be finalized by Dr. Mullins  - c/w wound care and IV abx  - monitor for fever/WBC elevation, dry gangrene on exam 2/3/ poss 4th toe.  - pain control prn  - blood glucose elevated rec glucose goals 100-180.     Discussed with vascular fellow on behalf of Dr. 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 Recently presented for 2/3rd toe gangrene with podiatry recommending toe resection vs TMA. Vascular was consulted and completed LLE angio showing PT runoff, no stent or balloon angioplasty. Vascular recommended possible bypass vs deep vein arterialization. Now returns d/t worsening LLE gangrene with extension to 4th toe. HDS, no WBC, glucose >200. LLE with +DP/PT signals, palp pop and b/l palpable fem. Vascular surgery consulted for possible revasc prior to resection.    Plan:  - possible plan revasc procedure DVA vs bypass timing to be finalized by Dr. Mullins  - c/w wound care and IV abx  - monitor for fever/WBC elevation, dry gangrene on exam 2/3/ poss 4th toe.  - pain control prn  - blood glucose elevated rec glucose goals 100-180.   - will follow    Discussed with vascular fellow on behalf of Dr. 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 Recently presented for 2/3rd toe gangrene with podiatry recommending toe resection vs TMA. Vascular was consulted and completed LLE angio showing PT runoff, no stent or balloon angioplasty. Vascular recommended possible bypass vs deep vein arterialization. Now returns d/t worsening LLE gangrene with extension to 4th toe. HDS, no WBC, glucose >200. LLE with +DP/PT signals, palp pop and b/l palpable fem. Vascular surgery consulted for possible revasc prior to resection.    Plan:  - possible plan revasc procedure DVA vs bypass timing to be finalized by Dr. Mullins  - c/w wound care and IV abx  - monitor for fever/WBC elevation, dry gangrene on exam 2/3/ poss 4th toe.  - pain control prn  - blood glucose elevated rec glucose goals 100-180.   - will follow    Discussed with vascular fellow on behalf of Dr. Johnson    Vascular Surgery [id number]

## 2023-12-09 NOTE — ED PROVIDER NOTE - PROGRESS NOTE DETAILS
Inna PGY1: vasc surgery consulted, will see patient. was following with Dr. Mullins as an outpatient

## 2023-12-09 NOTE — H&P ADULT - ASSESSMENT
73 y/o M with PMHx of DM, HTN, PAD and PSHX of R BKA presents to ED at recommendation of outpatient podiatrist.     Left 4 th toe infection   iv Zosyn   Vaculat sx consulted         DM HISS     HTN Home med s    71 y/o M with PMHx of DM, HTN, PAD and PSHX of R BKA presents to ED at recommendation of outpatient podiatrist.     Left 4 th toe infection   iv Zosyn   Vaculat sx consulted         DM HISS     HTN Home med s

## 2023-12-09 NOTE — ED PROVIDER NOTE - OBJECTIVE STATEMENT
71 y/o M with PMHx of DM, HTN, PAD and PSHX of R BKA presents to ED at recommendation of outpatient podiatrist. Patient was recently admitted and being followed by vascular and podiatry for wounds to the L foot. Was noted to have dry gangrene of L 2nd/3rd toes. [place] due to extent of vascular disease, patient would not heal if amputation was done. Was told to f/u with vascular as an outpatient. Saw podiatry yesterday, due to gangrene extending to 4th L toe. Recommended to come in for abx and possible procedure with podiatry. Patient is endorsing L toe pain. Denies fevers, chest pain, difficulty breathing, abdominal pain or other systemic symptoms. NKDA. 71 y/o M with PMHx of DM, HTN, PAD and PSHX of R BKA presents to ED at recommendation of outpatient podiatrist. Patient was recently admitted and being followed by vascular and podiatry for wounds to the L foot. Was noted to have dry gangrene of L 2nd/3rd toes. [place] due to extent of vascular disease, patient would not heal if amputation was done. Was told to f/u with vascular as an outpatient. Saw podiatry yesterday, due to gangrene extending to 4th L toe. Recommended to come in for abx and possible procedure with podiatry. Patient is endorsing L toe pain. Denies fevers, chest pain, difficulty breathing, abdominal pain or other systemic symptoms. NKDA.

## 2023-12-09 NOTE — CONSULT NOTE ADULT - SUBJECTIVE AND OBJECTIVE BOX
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 Recently presented for 2/3rd toe gangrene with podiatry recommending toe resection vs TMA. Vascular was consulted and completed LLE angio showing PT runoff, no stent or balloon angioplasty. Vascular recommended possible bypass vs deep vein arterialization. Cardiac/medical risk stratification and vein mapping was completed at that time and plac for outpatient revasc procedure was planned.     Patient now returns to ED with worsening LLE 2/3rd and now possible 4th toe gangrene. Patient only took 1 day of his abx. No fevers or chills. Patient HDS and afebrile in ED. Labs with no white count, blood glucose 294. Podiatry recommending resection of toes vs tma and IV abx. Vascular surgery consulted for possible revascularization procedure prior to resection.        PAST MEDICAL HISTORY:  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      PAD (peripheral artery disease)      History of amputation of toe          ALLERGIES:  Allergies    No Known Allergies    Intolerances        SOCIAL HISTORY: Negative for tobacco, etoh, or drug use    FAMILY HISTORY:  FAMILY HISTORY:      PHYSICAL EXAM:  General: NAD, resting comfortably  HEENT: NC/AT, EOMI, normal hearing, no oral lesions, no LAD, neck supple  Pulmonary: normal resp effort, CTA-B  Cardiovascular: NSR, no murmurs  Abdominal: soft, ND/NT, no organomegaly, no guarding or rebound tenderness  Extremities: RLE s/p BKA, LLE with 2/3rd toe gangrene, 4th toe dusky with purple/black discoloration possible dry gangrene. Toes all appear dry.   Neuro: A/O x 3, CNs II-XII grossly intact, normal sensation, no focal deficits    Vascular Pulse Exam:  Right Femoral:  Palpable       Left Femoral:  Palpable                                            Left Popliteal:  Palpable                                            Left DP:  +signal                                            Left PT:  +signal    VITAL SIGNS:  Vital Signs Last 24 Hrs  T(C): 36.5 (09 Dec 2023 11:33), Max: 36.9 (09 Dec 2023 08:15)  T(F): 97.7 (09 Dec 2023 11:33), Max: 98.5 (09 Dec 2023 08:15)  HR: 74 (09 Dec 2023 11:33) (74 - 84)  BP: 166/66 (09 Dec 2023 11:33) (161/78 - 166/66)  BP(mean): --  RR: 18 (09 Dec 2023 11:33) (16 - 18)  SpO2: 98% (09 Dec 2023 11:33) (98% - 99%)    Parameters below as of 09 Dec 2023 11:33  Patient On (Oxygen Delivery Method): room air        I&O's Summary      LABS:                        10.2   7.63  )-----------( 241      ( 09 Dec 2023 09:23 )             31.5     12-09    136  |  102  |  23  ----------------------------<  294<H>  4.3   |  25  |  0.88    Ca    9.6      09 Dec 2023 09:23    TPro  7.4  /  Alb  3.4  /  TBili  0.2  /  DBili  x   /  AST  15  /  ALT  22  /  AlkPhos  90  12-09      Urinalysis Basic - ( 09 Dec 2023 09:23 )    Color: x / Appearance: x / SG: x / pH: x  Gluc: 294 mg/dL / Ketone: x  / Bili: x / Urobili: x   Blood: x / Protein: x / Nitrite: x   Leuk Esterase: x / RBC: x / WBC x   Sq Epi: x / Non Sq Epi: x / Bacteria: x      CAPILLARY BLOOD GLUCOSE        LIVER FUNCTIONS - ( 09 Dec 2023 09:23 )  Alb: 3.4 g/dL / Pro: 7.4 g/dL / ALK PHOS: 90 U/L / ALT: 22 U/L / AST: 15 U/L / GGT: x             CULTURES:      RADIOLOGY & ADDITIONAL STUDIES:

## 2023-12-09 NOTE — H&P ADULT - HISTORY OF PRESENT ILLNESS
71 y/o M with PMHx of DM, HTN, PAD and PSHX of R BKA presents to ED at recommendation of outpatient podiatrist. Patient was recently admitted and being followed by vascular and podiatry for wounds to the L foot. Was noted to have dry gangrene of L 2nd/3rd toes. [place] due to extent of vascular disease, patient would not heal if amputation was done. Was told to f/u with vascular as an outpatient. Saw podiatry yesterday, due to gangrene extending to 4th L toe. Recommended to come in for abx and possible procedure with podiatry. Patient is endorsing L toe pain.  71 y/o M with PMHx of DM, HTN, PAD and PSHX of R BKA presents to ED at recommendation of outpatient podiatrist. Patient was recently admitted and being followed by vascular and podiatry for wounds to the L foot. Was noted to have dry gangrene of L 2nd/3rd toes. [place] due to extent of vascular disease, patient would not heal if amputation was done. Was told to f/u with vascular as an outpatient. Saw podiatry yesterday, due to gangrene extending to 4th L toe. Recommended to come in for abx and possible procedure with podiatry. Patient is endorsing L toe pain.

## 2023-12-09 NOTE — ED PROVIDER NOTE - CLINICAL SUMMARY MEDICAL DECISION MAKING FREE TEXT BOX
73 y/o M with PMHx of DM, HTN, PAD and PSHX of R BKA presents to ED at recommendation of outpatient podiatrist. Recently admitted for management of L toe infection, noted to be dry gangrene. Was not a candidate for surgical intervention at time of last admission, now with concern for spreading infection. Podiatry at bedside, recommending labs, including ESR/CRP, x-ray to evaluate underlying bone and initiation of IV abx. Will consult patients vascular surgeon. Will be admitted for abx. pt consented with podiatry for amputation of 2nd/3rd vs. transmetatarsal. 73 y/o M with PMHx of DM, HTN, PAD and PSHX of R BKA presents to ED at recommendation of outpatient podiatrist. Recently admitted for management of L toe infection, noted to be dry gangrene. Was not a candidate for surgical intervention at time of last admission, now with concern for spreading infection. Podiatry at bedside, recommending labs, including ESR/CRP, x-ray to evaluate underlying bone and initiation of IV abx. Will consult patients vascular surgeon. Will be admitted for abx. pt consented with podiatry for amputation of 2nd/3rd vs. transmetatarsal.    KEERTHI Patterson MD: Agree with resident/ACP MDM, assessment and plan as above.

## 2023-12-09 NOTE — ED CLERICAL - BED REQUESTED
09-Dec-2023 11:51
Full range of motion of upper and lower extremities, no joint tenderness/swelling.

## 2023-12-09 NOTE — ED PROVIDER NOTE - PHYSICAL EXAMINATION
Gen: well appearing, in no acute distress   Head: normal appearing  HEENT: normal conjunctiva, oral mucosa moist, vision grossly intact   Lung: no respiratory distress, speaking in full sentences, CTA b/l, no wheeze, crackles or rhonchi   CV: regular rate and rhythm, no murmurs  Abd: soft, non distended, non tender   MSK: no visible deformities, RLE s/p BKA, prosthetic in place   Neuro: No focal deficits, AAOx3  Skin: Warm, L 2nd, 3rd toe with gangrenous appearance, extending to medial aspect of 4th L toe as well, unable to palpate distal pulses in LLE, foot is warm   Psych: normal affect

## 2023-12-10 LAB
ANION GAP SERPL CALC-SCNC: 13 MMOL/L — SIGNIFICANT CHANGE UP (ref 5–17)
ANION GAP SERPL CALC-SCNC: 13 MMOL/L — SIGNIFICANT CHANGE UP (ref 5–17)
APTT BLD: 30.1 SEC — SIGNIFICANT CHANGE UP (ref 24.5–35.6)
APTT BLD: 30.1 SEC — SIGNIFICANT CHANGE UP (ref 24.5–35.6)
BLD GP AB SCN SERPL QL: POSITIVE — SIGNIFICANT CHANGE UP
BLD GP AB SCN SERPL QL: POSITIVE — SIGNIFICANT CHANGE UP
BUN SERPL-MCNC: 20 MG/DL — SIGNIFICANT CHANGE UP (ref 7–23)
BUN SERPL-MCNC: 20 MG/DL — SIGNIFICANT CHANGE UP (ref 7–23)
CALCIUM SERPL-MCNC: 9.6 MG/DL — SIGNIFICANT CHANGE UP (ref 8.4–10.5)
CALCIUM SERPL-MCNC: 9.6 MG/DL — SIGNIFICANT CHANGE UP (ref 8.4–10.5)
CHLORIDE SERPL-SCNC: 104 MMOL/L — SIGNIFICANT CHANGE UP (ref 96–108)
CHLORIDE SERPL-SCNC: 104 MMOL/L — SIGNIFICANT CHANGE UP (ref 96–108)
CO2 SERPL-SCNC: 20 MMOL/L — LOW (ref 22–31)
CO2 SERPL-SCNC: 20 MMOL/L — LOW (ref 22–31)
CREAT SERPL-MCNC: 1.02 MG/DL — SIGNIFICANT CHANGE UP (ref 0.5–1.3)
CREAT SERPL-MCNC: 1.02 MG/DL — SIGNIFICANT CHANGE UP (ref 0.5–1.3)
EGFR: 78 ML/MIN/1.73M2 — SIGNIFICANT CHANGE UP
EGFR: 78 ML/MIN/1.73M2 — SIGNIFICANT CHANGE UP
GLUCOSE BLDC GLUCOMTR-MCNC: 167 MG/DL — HIGH (ref 70–99)
GLUCOSE BLDC GLUCOMTR-MCNC: 167 MG/DL — HIGH (ref 70–99)
GLUCOSE BLDC GLUCOMTR-MCNC: 176 MG/DL — HIGH (ref 70–99)
GLUCOSE BLDC GLUCOMTR-MCNC: 176 MG/DL — HIGH (ref 70–99)
GLUCOSE BLDC GLUCOMTR-MCNC: 210 MG/DL — HIGH (ref 70–99)
GLUCOSE BLDC GLUCOMTR-MCNC: 210 MG/DL — HIGH (ref 70–99)
GLUCOSE BLDC GLUCOMTR-MCNC: 221 MG/DL — HIGH (ref 70–99)
GLUCOSE BLDC GLUCOMTR-MCNC: 221 MG/DL — HIGH (ref 70–99)
GLUCOSE SERPL-MCNC: 172 MG/DL — HIGH (ref 70–99)
GLUCOSE SERPL-MCNC: 172 MG/DL — HIGH (ref 70–99)
HCT VFR BLD CALC: 31.2 % — LOW (ref 39–50)
HCT VFR BLD CALC: 31.2 % — LOW (ref 39–50)
HGB BLD-MCNC: 9.8 G/DL — LOW (ref 13–17)
HGB BLD-MCNC: 9.8 G/DL — LOW (ref 13–17)
INR BLD: 1.1 RATIO — SIGNIFICANT CHANGE UP (ref 0.85–1.18)
INR BLD: 1.1 RATIO — SIGNIFICANT CHANGE UP (ref 0.85–1.18)
MCHC RBC-ENTMCNC: 28.4 PG — SIGNIFICANT CHANGE UP (ref 27–34)
MCHC RBC-ENTMCNC: 28.4 PG — SIGNIFICANT CHANGE UP (ref 27–34)
MCHC RBC-ENTMCNC: 31.4 GM/DL — LOW (ref 32–36)
MCHC RBC-ENTMCNC: 31.4 GM/DL — LOW (ref 32–36)
MCV RBC AUTO: 90.4 FL — SIGNIFICANT CHANGE UP (ref 80–100)
MCV RBC AUTO: 90.4 FL — SIGNIFICANT CHANGE UP (ref 80–100)
MRSA PCR RESULT.: SIGNIFICANT CHANGE UP
MRSA PCR RESULT.: SIGNIFICANT CHANGE UP
NRBC # BLD: 0 /100 WBCS — SIGNIFICANT CHANGE UP (ref 0–0)
NRBC # BLD: 0 /100 WBCS — SIGNIFICANT CHANGE UP (ref 0–0)
PLATELET # BLD AUTO: 206 K/UL — SIGNIFICANT CHANGE UP (ref 150–400)
PLATELET # BLD AUTO: 206 K/UL — SIGNIFICANT CHANGE UP (ref 150–400)
POTASSIUM SERPL-MCNC: 4.5 MMOL/L — SIGNIFICANT CHANGE UP (ref 3.5–5.3)
POTASSIUM SERPL-MCNC: 4.5 MMOL/L — SIGNIFICANT CHANGE UP (ref 3.5–5.3)
POTASSIUM SERPL-SCNC: 4.5 MMOL/L — SIGNIFICANT CHANGE UP (ref 3.5–5.3)
POTASSIUM SERPL-SCNC: 4.5 MMOL/L — SIGNIFICANT CHANGE UP (ref 3.5–5.3)
PROTHROM AB SERPL-ACNC: 11.5 SEC — SIGNIFICANT CHANGE UP (ref 9.5–13)
PROTHROM AB SERPL-ACNC: 11.5 SEC — SIGNIFICANT CHANGE UP (ref 9.5–13)
RBC # BLD: 3.45 M/UL — LOW (ref 4.2–5.8)
RBC # BLD: 3.45 M/UL — LOW (ref 4.2–5.8)
RBC # FLD: 12.4 % — SIGNIFICANT CHANGE UP (ref 10.3–14.5)
RBC # FLD: 12.4 % — SIGNIFICANT CHANGE UP (ref 10.3–14.5)
RH IG SCN BLD-IMP: POSITIVE — SIGNIFICANT CHANGE UP
RH IG SCN BLD-IMP: POSITIVE — SIGNIFICANT CHANGE UP
S AUREUS DNA NOSE QL NAA+PROBE: SIGNIFICANT CHANGE UP
S AUREUS DNA NOSE QL NAA+PROBE: SIGNIFICANT CHANGE UP
SODIUM SERPL-SCNC: 137 MMOL/L — SIGNIFICANT CHANGE UP (ref 135–145)
SODIUM SERPL-SCNC: 137 MMOL/L — SIGNIFICANT CHANGE UP (ref 135–145)
WBC # BLD: 6.61 K/UL — SIGNIFICANT CHANGE UP (ref 3.8–10.5)
WBC # BLD: 6.61 K/UL — SIGNIFICANT CHANGE UP (ref 3.8–10.5)
WBC # FLD AUTO: 6.61 K/UL — SIGNIFICANT CHANGE UP (ref 3.8–10.5)
WBC # FLD AUTO: 6.61 K/UL — SIGNIFICANT CHANGE UP (ref 3.8–10.5)

## 2023-12-10 PROCEDURE — 86077 PHYS BLOOD BANK SERV XMATCH: CPT

## 2023-12-10 PROCEDURE — 73720 MRI LWR EXTREMITY W/O&W/DYE: CPT | Mod: 26,LT

## 2023-12-10 RX ORDER — CHLORHEXIDINE GLUCONATE 213 G/1000ML
1 SOLUTION TOPICAL DAILY
Refills: 0 | Status: DISCONTINUED | OUTPATIENT
Start: 2023-12-10 | End: 2023-12-15

## 2023-12-10 RX ORDER — SODIUM CHLORIDE 9 MG/ML
1000 INJECTION INTRAMUSCULAR; INTRAVENOUS; SUBCUTANEOUS
Refills: 0 | Status: DISCONTINUED | OUTPATIENT
Start: 2023-12-11 | End: 2023-12-15

## 2023-12-10 RX ADMIN — PIPERACILLIN AND TAZOBACTAM 25 GRAM(S): 4; .5 INJECTION, POWDER, LYOPHILIZED, FOR SOLUTION INTRAVENOUS at 06:00

## 2023-12-10 RX ADMIN — Medication 2: at 17:46

## 2023-12-10 RX ADMIN — Medication 2: at 15:14

## 2023-12-10 RX ADMIN — Medication 81 MILLIGRAM(S): at 15:15

## 2023-12-10 RX ADMIN — LOSARTAN POTASSIUM 25 MILLIGRAM(S): 100 TABLET, FILM COATED ORAL at 05:38

## 2023-12-10 RX ADMIN — Medication 1: at 08:48

## 2023-12-10 RX ADMIN — PIPERACILLIN AND TAZOBACTAM 25 GRAM(S): 4; .5 INJECTION, POWDER, LYOPHILIZED, FOR SOLUTION INTRAVENOUS at 15:15

## 2023-12-10 RX ADMIN — AMLODIPINE BESYLATE 2.5 MILLIGRAM(S): 2.5 TABLET ORAL at 05:39

## 2023-12-10 RX ADMIN — CHLORHEXIDINE GLUCONATE 1 APPLICATION(S): 213 SOLUTION TOPICAL at 13:37

## 2023-12-10 RX ADMIN — ATORVASTATIN CALCIUM 40 MILLIGRAM(S): 80 TABLET, FILM COATED ORAL at 21:05

## 2023-12-10 NOTE — PROGRESS NOTE ADULT - ASSESSMENT
73 y/o M with PMHx of DM, HTN, PAD and PSHX of R BKA presents to ED at recommendation of outpatient podiatrist.     Left 4 th toe infection  PAD   Recommend Vanco/zosyn  - Left foot MR ordered      iv Zosyn   Vacular sx consulted         DM HISS     HTN Home meds

## 2023-12-10 NOTE — PROGRESS NOTE ADULT - SUBJECTIVE AND OBJECTIVE BOX
HPI:   71 y/o M with PMHx of DM, HTN, PAD and PSHX of R BKA presents to ED at recommendation of outpatient podiatrist. Patient was recently admitted and being followed by vascular and podiatry for wounds to the L foot. Was noted to have dry gangrene of L 2nd/3rd toes. [place] due to extent of vascular disease, patient would not heal if amputation was done. Was told to f/u with vascular as an outpatient. Saw podiatry yesterday, due to gangrene extending to 4th L toe. Recommended to come in for abx and possible procedure with podiatry. Patient is endorsing L toe pain. 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 No nausea, vomiting, or hematemesis; No diarrhea or constipation. No melena or hematochezia.  GENITOURINARY: No dysuria, frequency, hematuria, or incontinence  NEUROLOGICAL: No headaches, memory loss, loss of strength, numbness, or tremors  SKIN: No itching, burning, rashes, or lesions   LYMPH NODES: No enlarged glands  ENDOCRINE: No heat or cold intolerance; No hair loss  MUSCULOSKELETAL: No joint pain or swelling; No muscle, back, or extremity pain  PSYCHIATRIC: No depression, anxiety, mood swings, or difficulty sleeping  HEME/LYMPH: No easy bruising, or bleeding gums  ALLERY AND IMMUNOLOGIC: No hives or eczema    Allergies    No Known Allergies    Intolerances        Social History:     FAMILY HISTORY:      MEDICATIONS  (STANDING):  amLODIPine   Tablet 2.5 milliGRAM(s) Oral daily  aspirin enteric coated 81 milliGRAM(s) Oral daily  atorvastatin 40 milliGRAM(s) Oral at bedtime  chlorhexidine 2% Cloths 1 Application(s) Topical daily  dextrose 5%. 1000 milliLiter(s) (50 mL/Hr) IV Continuous <Continuous>  dextrose 5%. 1000 milliLiter(s) (100 mL/Hr) IV Continuous <Continuous>  dextrose 50% Injectable 25 Gram(s) IV Push once  dextrose 50% Injectable 12.5 Gram(s) IV Push once  dextrose 50% Injectable 25 Gram(s) IV Push once  glucagon  Injectable 1 milliGRAM(s) IntraMuscular once  insulin lispro (ADMELOG) corrective regimen sliding scale   SubCutaneous three times a day before meals  insulin lispro (ADMELOG) corrective regimen sliding scale   SubCutaneous at bedtime  losartan 25 milliGRAM(s) Oral daily  piperacillin/tazobactam IVPB.. 3.375 Gram(s) IV Intermittent every 8 hours    MEDICATIONS  (PRN):  dextrose Oral Gel 15 Gram(s) Oral once PRN Blood Glucose LESS THAN 70 milliGRAM(s)/deciliter        CAPILLARY BLOOD GLUCOSE      POCT Blood Glucose.: 167 mg/dL (10 Dec 2023 21:44)  POCT Blood Glucose.: 221 mg/dL (10 Dec 2023 17:03)  POCT Blood Glucose.: 210 mg/dL (10 Dec 2023 14:23)  POCT Blood Glucose.: 176 mg/dL (10 Dec 2023 08:24)    I&O's Summary    09 Dec 2023 07:01  -  10 Dec 2023 07:00  --------------------------------------------------------  IN: 120 mL / OUT: 0 mL / NET: 120 mL    10 Dec 2023 07:01  -  10 Dec 2023 23:37  --------------------------------------------------------  IN: 500 mL / OUT: 600 mL / NET: -100 mL        PHYSICAL EXAM:  GENERAL: NAD, well-developed  HEAD:  Atraumatic, Normocephalic  EYES: EOMI, PERRLA, conjunctiva and sclera clear  NECK: Supple, No JVD  CHEST/LUNG: Clear to auscultation bilaterally; No wheeze  HEART: Regular rate and rhythm; No murmurs, rubs, or gallops  ABDOMEN: Soft, Nontender, Nondistended; Bowel sounds present  EXTREMITIES:  2+ Peripheral Pulses, No clubbing, cyanosis, or edema  PSYCH: AAOx3  NEUROLOGY: non-focal  SKIN: No rashes or lesions    LABS:                        9.8    6.61  )-----------( 206      ( 10 Dec 2023 07:13 )             31.2     12-10    137  |  104  |  20  ----------------------------<  172<H>  4.5   |  20<L>  |  1.02    Ca    9.6      10 Dec 2023 07:13    TPro  7.4  /  Alb  3.4  /  TBili  0.2  /  DBili  x   /  AST  15  /  ALT  22  /  AlkPhos  90  12-09    PT/INR - ( 10 Dec 2023 14:35 )   PT: 11.5 sec;   INR: 1.10 ratio         PTT - ( 10 Dec 2023 14:35 )  PTT:30.1 sec      Urinalysis Basic - ( 10 Dec 2023 07:13 )    Color: x / Appearance: x / SG: x / pH: x  Gluc: 172 mg/dL / Ketone: x  / Bili: x / Urobili: x   Blood: x / Protein: x / Nitrite: x   Leuk Esterase: x / RBC: x / WBC x   Sq Epi: x / Non Sq Epi: x / Bacteria: x        RADIOLOGY & ADDITIONAL TESTS:    Imaging Personally Reviewed:    Consultant(s) Notes Reviewed:      Care Discussed with Consultants/Other Providers:   HPI:   71 y/o M with PMHx of DM, HTN, PAD and PSHX of R BKA presents to ED at recommendation of outpatient podiatrist. Patient was recently admitted and being followed by vascular and podiatry for wounds to the L foot. Was noted to have dry gangrene of L 2nd/3rd toes. [place] due to extent of vascular disease, patient would not heal if amputation was done. Was told to f/u with vascular as an outpatient. Saw podiatry yesterday, due to gangrene extending to 4th L toe. Recommended to come in for abx and possible procedure with podiatry. Patient is endorsing L toe pain. 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 No nausea, vomiting, or hematemesis; No diarrhea or constipation. No melena or hematochezia.  GENITOURINARY: No dysuria, frequency, hematuria, or incontinence  NEUROLOGICAL: No headaches, memory loss, loss of strength, numbness, or tremors  SKIN: No itching, burning, rashes, or lesions   LYMPH NODES: No enlarged glands  ENDOCRINE: No heat or cold intolerance; No hair loss  MUSCULOSKELETAL: No joint pain or swelling; No muscle, back, or extremity pain  PSYCHIATRIC: No depression, anxiety, mood swings, or difficulty sleeping  HEME/LYMPH: No easy bruising, or bleeding gums  ALLERY AND IMMUNOLOGIC: No hives or eczema    Allergies    No Known Allergies    Intolerances        Social History:     FAMILY HISTORY:      MEDICATIONS  (STANDING):  amLODIPine   Tablet 2.5 milliGRAM(s) Oral daily  aspirin enteric coated 81 milliGRAM(s) Oral daily  atorvastatin 40 milliGRAM(s) Oral at bedtime  chlorhexidine 2% Cloths 1 Application(s) Topical daily  dextrose 5%. 1000 milliLiter(s) (50 mL/Hr) IV Continuous <Continuous>  dextrose 5%. 1000 milliLiter(s) (100 mL/Hr) IV Continuous <Continuous>  dextrose 50% Injectable 25 Gram(s) IV Push once  dextrose 50% Injectable 12.5 Gram(s) IV Push once  dextrose 50% Injectable 25 Gram(s) IV Push once  glucagon  Injectable 1 milliGRAM(s) IntraMuscular once  insulin lispro (ADMELOG) corrective regimen sliding scale   SubCutaneous three times a day before meals  insulin lispro (ADMELOG) corrective regimen sliding scale   SubCutaneous at bedtime  losartan 25 milliGRAM(s) Oral daily  piperacillin/tazobactam IVPB.. 3.375 Gram(s) IV Intermittent every 8 hours    MEDICATIONS  (PRN):  dextrose Oral Gel 15 Gram(s) Oral once PRN Blood Glucose LESS THAN 70 milliGRAM(s)/deciliter        CAPILLARY BLOOD GLUCOSE      POCT Blood Glucose.: 167 mg/dL (10 Dec 2023 21:44)  POCT Blood Glucose.: 221 mg/dL (10 Dec 2023 17:03)  POCT Blood Glucose.: 210 mg/dL (10 Dec 2023 14:23)  POCT Blood Glucose.: 176 mg/dL (10 Dec 2023 08:24)    I&O's Summary    09 Dec 2023 07:01  -  10 Dec 2023 07:00  --------------------------------------------------------  IN: 120 mL / OUT: 0 mL / NET: 120 mL    10 Dec 2023 07:01  -  10 Dec 2023 23:37  --------------------------------------------------------  IN: 500 mL / OUT: 600 mL / NET: -100 mL        PHYSICAL EXAM:  GENERAL: NAD, well-developed  HEAD:  Atraumatic, Normocephalic  EYES: EOMI, PERRLA, conjunctiva and sclera clear  NECK: Supple, No JVD  CHEST/LUNG: Clear to auscultation bilaterally; No wheeze  HEART: Regular rate and rhythm; No murmurs, rubs, or gallops  ABDOMEN: Soft, Nontender, Nondistended; Bowel sounds present  EXTREMITIES:  2+ Peripheral Pulses, No clubbing, cyanosis, or edema  PSYCH: AAOx3  NEUROLOGY: non-focal  SKIN: No rashes or lesions    LABS:                        9.8    6.61  )-----------( 206      ( 10 Dec 2023 07:13 )             31.2     12-10    137  |  104  |  20  ----------------------------<  172<H>  4.5   |  20<L>  |  1.02    Ca    9.6      10 Dec 2023 07:13    TPro  7.4  /  Alb  3.4  /  TBili  0.2  /  DBili  x   /  AST  15  /  ALT  22  /  AlkPhos  90  12-09    PT/INR - ( 10 Dec 2023 14:35 )   PT: 11.5 sec;   INR: 1.10 ratio         PTT - ( 10 Dec 2023 14:35 )  PTT:30.1 sec      Urinalysis Basic - ( 10 Dec 2023 07:13 )    Color: x / Appearance: x / SG: x / pH: x  Gluc: 172 mg/dL / Ketone: x  / Bili: x / Urobili: x   Blood: x / Protein: x / Nitrite: x   Leuk Esterase: x / RBC: x / WBC x   Sq Epi: x / Non Sq Epi: x / Bacteria: x        RADIOLOGY & ADDITIONAL TESTS:    Imaging Personally Reviewed:    Consultant(s) Notes Reviewed:      Care Discussed with Consultants/Other Providers:

## 2023-12-11 LAB
ANION GAP SERPL CALC-SCNC: 14 MMOL/L — SIGNIFICANT CHANGE UP (ref 5–17)
ANION GAP SERPL CALC-SCNC: 14 MMOL/L — SIGNIFICANT CHANGE UP (ref 5–17)
BUN SERPL-MCNC: 22 MG/DL — SIGNIFICANT CHANGE UP (ref 7–23)
BUN SERPL-MCNC: 22 MG/DL — SIGNIFICANT CHANGE UP (ref 7–23)
CALCIUM SERPL-MCNC: 10.1 MG/DL — SIGNIFICANT CHANGE UP (ref 8.4–10.5)
CALCIUM SERPL-MCNC: 10.1 MG/DL — SIGNIFICANT CHANGE UP (ref 8.4–10.5)
CHLORIDE SERPL-SCNC: 102 MMOL/L — SIGNIFICANT CHANGE UP (ref 96–108)
CHLORIDE SERPL-SCNC: 102 MMOL/L — SIGNIFICANT CHANGE UP (ref 96–108)
CO2 SERPL-SCNC: 22 MMOL/L — SIGNIFICANT CHANGE UP (ref 22–31)
CO2 SERPL-SCNC: 22 MMOL/L — SIGNIFICANT CHANGE UP (ref 22–31)
CREAT SERPL-MCNC: 1.12 MG/DL — SIGNIFICANT CHANGE UP (ref 0.5–1.3)
CREAT SERPL-MCNC: 1.12 MG/DL — SIGNIFICANT CHANGE UP (ref 0.5–1.3)
EGFR: 70 ML/MIN/1.73M2 — SIGNIFICANT CHANGE UP
EGFR: 70 ML/MIN/1.73M2 — SIGNIFICANT CHANGE UP
GLUCOSE BLDC GLUCOMTR-MCNC: 149 MG/DL — HIGH (ref 70–99)
GLUCOSE BLDC GLUCOMTR-MCNC: 149 MG/DL — HIGH (ref 70–99)
GLUCOSE BLDC GLUCOMTR-MCNC: 172 MG/DL — HIGH (ref 70–99)
GLUCOSE BLDC GLUCOMTR-MCNC: 172 MG/DL — HIGH (ref 70–99)
GLUCOSE BLDC GLUCOMTR-MCNC: 174 MG/DL — HIGH (ref 70–99)
GLUCOSE BLDC GLUCOMTR-MCNC: 174 MG/DL — HIGH (ref 70–99)
GLUCOSE BLDC GLUCOMTR-MCNC: 306 MG/DL — HIGH (ref 70–99)
GLUCOSE BLDC GLUCOMTR-MCNC: 306 MG/DL — HIGH (ref 70–99)
GLUCOSE SERPL-MCNC: 186 MG/DL — HIGH (ref 70–99)
GLUCOSE SERPL-MCNC: 186 MG/DL — HIGH (ref 70–99)
HCT VFR BLD CALC: 33.8 % — LOW (ref 39–50)
HCT VFR BLD CALC: 33.8 % — LOW (ref 39–50)
HGB BLD-MCNC: 11 G/DL — LOW (ref 13–17)
HGB BLD-MCNC: 11 G/DL — LOW (ref 13–17)
MAGNESIUM SERPL-MCNC: 2.2 MG/DL — SIGNIFICANT CHANGE UP (ref 1.6–2.6)
MAGNESIUM SERPL-MCNC: 2.2 MG/DL — SIGNIFICANT CHANGE UP (ref 1.6–2.6)
MCHC RBC-ENTMCNC: 28.9 PG — SIGNIFICANT CHANGE UP (ref 27–34)
MCHC RBC-ENTMCNC: 28.9 PG — SIGNIFICANT CHANGE UP (ref 27–34)
MCHC RBC-ENTMCNC: 32.5 GM/DL — SIGNIFICANT CHANGE UP (ref 32–36)
MCHC RBC-ENTMCNC: 32.5 GM/DL — SIGNIFICANT CHANGE UP (ref 32–36)
MCV RBC AUTO: 88.7 FL — SIGNIFICANT CHANGE UP (ref 80–100)
MCV RBC AUTO: 88.7 FL — SIGNIFICANT CHANGE UP (ref 80–100)
NRBC # BLD: 0 /100 WBCS — SIGNIFICANT CHANGE UP (ref 0–0)
NRBC # BLD: 0 /100 WBCS — SIGNIFICANT CHANGE UP (ref 0–0)
PHOSPHATE SERPL-MCNC: 3.6 MG/DL — SIGNIFICANT CHANGE UP (ref 2.5–4.5)
PHOSPHATE SERPL-MCNC: 3.6 MG/DL — SIGNIFICANT CHANGE UP (ref 2.5–4.5)
PLATELET # BLD AUTO: 249 K/UL — SIGNIFICANT CHANGE UP (ref 150–400)
PLATELET # BLD AUTO: 249 K/UL — SIGNIFICANT CHANGE UP (ref 150–400)
POTASSIUM SERPL-MCNC: 4 MMOL/L — SIGNIFICANT CHANGE UP (ref 3.5–5.3)
POTASSIUM SERPL-MCNC: 4 MMOL/L — SIGNIFICANT CHANGE UP (ref 3.5–5.3)
POTASSIUM SERPL-SCNC: 4 MMOL/L — SIGNIFICANT CHANGE UP (ref 3.5–5.3)
POTASSIUM SERPL-SCNC: 4 MMOL/L — SIGNIFICANT CHANGE UP (ref 3.5–5.3)
RBC # BLD: 3.81 M/UL — LOW (ref 4.2–5.8)
RBC # BLD: 3.81 M/UL — LOW (ref 4.2–5.8)
RBC # FLD: 12.3 % — SIGNIFICANT CHANGE UP (ref 10.3–14.5)
RBC # FLD: 12.3 % — SIGNIFICANT CHANGE UP (ref 10.3–14.5)
SODIUM SERPL-SCNC: 138 MMOL/L — SIGNIFICANT CHANGE UP (ref 135–145)
SODIUM SERPL-SCNC: 138 MMOL/L — SIGNIFICANT CHANGE UP (ref 135–145)
WBC # BLD: 7.33 K/UL — SIGNIFICANT CHANGE UP (ref 3.8–10.5)
WBC # BLD: 7.33 K/UL — SIGNIFICANT CHANGE UP (ref 3.8–10.5)
WBC # FLD AUTO: 7.33 K/UL — SIGNIFICANT CHANGE UP (ref 3.8–10.5)
WBC # FLD AUTO: 7.33 K/UL — SIGNIFICANT CHANGE UP (ref 3.8–10.5)

## 2023-12-11 PROCEDURE — 71045 X-RAY EXAM CHEST 1 VIEW: CPT | Mod: 26

## 2023-12-11 RX ADMIN — PIPERACILLIN AND TAZOBACTAM 25 GRAM(S): 4; .5 INJECTION, POWDER, LYOPHILIZED, FOR SOLUTION INTRAVENOUS at 02:09

## 2023-12-11 RX ADMIN — SODIUM CHLORIDE 75 MILLILITER(S): 9 INJECTION INTRAMUSCULAR; INTRAVENOUS; SUBCUTANEOUS at 02:09

## 2023-12-11 RX ADMIN — ATORVASTATIN CALCIUM 40 MILLIGRAM(S): 80 TABLET, FILM COATED ORAL at 21:32

## 2023-12-11 RX ADMIN — PIPERACILLIN AND TAZOBACTAM 25 GRAM(S): 4; .5 INJECTION, POWDER, LYOPHILIZED, FOR SOLUTION INTRAVENOUS at 11:46

## 2023-12-11 RX ADMIN — Medication 4: at 17:22

## 2023-12-11 RX ADMIN — PIPERACILLIN AND TAZOBACTAM 25 GRAM(S): 4; .5 INJECTION, POWDER, LYOPHILIZED, FOR SOLUTION INTRAVENOUS at 17:22

## 2023-12-11 RX ADMIN — LOSARTAN POTASSIUM 25 MILLIGRAM(S): 100 TABLET, FILM COATED ORAL at 05:02

## 2023-12-11 RX ADMIN — Medication 1: at 08:57

## 2023-12-11 RX ADMIN — SODIUM CHLORIDE 75 MILLILITER(S): 9 INJECTION INTRAMUSCULAR; INTRAVENOUS; SUBCUTANEOUS at 11:46

## 2023-12-11 RX ADMIN — AMLODIPINE BESYLATE 2.5 MILLIGRAM(S): 2.5 TABLET ORAL at 05:02

## 2023-12-11 RX ADMIN — Medication 81 MILLIGRAM(S): at 17:22

## 2023-12-11 RX ADMIN — CHLORHEXIDINE GLUCONATE 1 APPLICATION(S): 213 SOLUTION TOPICAL at 11:48

## 2023-12-11 NOTE — PROGRESS NOTE ADULT - ASSESSMENT
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 Recently presented for 2/3rd toe gangrene with podiatry recommending toe resection vs TMA. Vascular was consulted and completed LLE angio showing PT runoff, no stent or balloon angioplasty. Vascular recommended possible bypass vs deep vein arterialization. Now returns d/t worsening LLE gangrene with extension to 4th toe. HDS, no WBC, glucose >200. LLE with +DP/PT signals, palp pop and b/l palpable fem. Vascular surgery consulted for possible revasc prior to resection.    Plan:  - OR today for LLE angio  - c/w wound care and IV abx  - monitor for fever/WBC elevation, dry gangrene on exam 2/3/ poss 4th toe.  - pain control prn  - blood glucose elevated rec glucose goals 100-180.   - will follow    Discussed with vascular fellow on behalf of Dr. 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 Recently presented for 2/3rd toe gangrene with podiatry recommending toe resection vs TMA. Vascular was consulted and completed LLE angio showing PT runoff, no stent or balloon angioplasty. Vascular recommended possible bypass vs deep vein arterialization. Now returns d/t worsening LLE gangrene with extension to 4th toe. HDS, no WBC, glucose >200. LLE with +DP/PT signals, palp pop and b/l palpable fem. Vascular surgery consulted for possible revasc prior to resection.    Plan:  - OR today for LLE angio  - c/w wound care and IV abx  - monitor for fever/WBC elevation, dry gangrene on exam 2/3/ poss 4th toe.  - pain control prn  - blood glucose elevated rec glucose goals 100-180.   - will follow    Discussed with vascular fellow on behalf of Dr. Johnson    Vascular Surgery [id number]

## 2023-12-11 NOTE — PROGRESS NOTE ADULT - SUBJECTIVE AND OBJECTIVE BOX
HPI:   73 y/o M with PMHx of DM, HTN, PAD and PSHX of R BKA presents to ED at recommendation of outpatient podiatrist. Patient was recently admitted and being followed by vascular and podiatry for wounds to the L foot. Was noted to have dry gangrene of L 2nd/3rd toes. [place] due to extent of vascular disease, patient would not heal if amputation was done. Was told to f/u with vascular as an outpatient. Saw podiatry yesterday, due to gangrene extending to 4th L toe. Recommended to come in for abx and possible procedure with podiatry. Patient is endorsing L toe pain. 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 No nausea, vomiting, or hematemesis; No diarrhea or constipation. No melena or hematochezia.  GENITOURINARY: No dysuria, frequency, hematuria, or incontinence  NEUROLOGICAL: No headaches, memory loss, loss of strength, numbness, or tremors  SKIN: No itching, burning, rashes, or lesions   LYMPH NODES: No enlarged glands  ENDOCRINE: No heat or cold intolerance; No hair loss  MUSCULOSKELETAL: No joint pain or swelling; No muscle, back, or extremity pain  PSYCHIATRIC: No depression, anxiety, mood swings, or difficulty sleeping  HEME/LYMPH: No easy bruising, or bleeding gums  ALLERY AND IMMUNOLOGIC: No hives or eczema    Allergies    No Known Allergies    Intolerances        Social History:     FAMILY HISTORY:    T(C): 37.1 (12-11-23 @ 19:37), Max: 37.1 (12-11-23 @ 19:37)  HR: 72 (12-11-23 @ 19:37) (70 - 76)  BP: 152/71 (12-11-23 @ 19:37) (152/71 - 169/75)  RR: 18 (12-11-23 @ 19:37) (18 - 18)  SpO2: 96% (12-11-23 @ 19:37) (96% - 99%)      MEDICATIONS  (STANDING):  amLODIPine   Tablet 2.5 milliGRAM(s) Oral daily  aspirin enteric coated 81 milliGRAM(s) Oral daily  atorvastatin 40 milliGRAM(s) Oral at bedtime  chlorhexidine 2% Cloths 1 Application(s) Topical daily  dextrose 5%. 1000 milliLiter(s) (50 mL/Hr) IV Continuous <Continuous>  dextrose 5%. 1000 milliLiter(s) (100 mL/Hr) IV Continuous <Continuous>  dextrose 50% Injectable 25 Gram(s) IV Push once  dextrose 50% Injectable 25 Gram(s) IV Push once  dextrose 50% Injectable 12.5 Gram(s) IV Push once  glucagon  Injectable 1 milliGRAM(s) IntraMuscular once  insulin lispro (ADMELOG) corrective regimen sliding scale   SubCutaneous at bedtime  insulin lispro (ADMELOG) corrective regimen sliding scale   SubCutaneous three times a day before meals  losartan 25 milliGRAM(s) Oral daily  piperacillin/tazobactam IVPB.. 3.375 Gram(s) IV Intermittent every 8 hours  sodium chloride 0.9%. 1000 milliLiter(s) (75 mL/Hr) IV Continuous <Continuous>    MEDICATIONS  (PRN):  dextrose Oral Gel 15 Gram(s) Oral once PRN Blood Glucose LESS THAN 70 milliGRAM(s)/deciliter      PHYSICAL EXAM:  GENERAL: NAD, well-developed  HEAD:  Atraumatic, Normocephalic  EYES: EOMI, PERRLA, conjunctiva and sclera clear  NECK: Supple, No JVD  CHEST/LUNG: Clear to auscultation bilaterally; No wheeze  HEART: Regular rate and rhythm; No murmurs, rubs, or gallops  ABDOMEN: Soft, Nontender, Nondistended; Bowel sounds present  EXTREMITIES:  Left foot abscess   PSYCH: AAOx3  NEUROLOGY: non-focal  SKIN: No rashes or lesions                            11.0   7.33  )-----------( 249      ( 11 Dec 2023 07:19 )             33.8             PT/INR - ( 10 Dec 2023 14:35 )   PT: 11.5 sec;   INR: 1.10 ratio         PTT - ( 10 Dec 2023 14:35 )  PTT:30.1 sec  138|102|22<186  4.0|22|1.12  10.1,2.2,3.6  12-11 @ 07:19    CAPILLARY BLOOD GLUCOSE      POCT Blood Glucose.: 306 mg/dL (11 Dec 2023 17:16)  POCT Blood Glucose.: 149 mg/dL (11 Dec 2023 12:33)  POCT Blood Glucose.: 172 mg/dL (11 Dec 2023 08:21)  POCT Blood Glucose.: 167 mg/dL (10 Dec 2023 21:44)   HPI:   73 y/o M with PMHx of DM, HTN, PAD and PSHX of R BKA presents to ED at recommendation of outpatient podiatrist. Patient was recently admitted and being followed by vascular and podiatry for wounds to the L foot. Was noted to have dry gangrene of L 2nd/3rd toes. [place] due to extent of vascular disease, patient would not heal if amputation was done. Was told to f/u with vascular as an outpatient. Saw podiatry yesterday, due to gangrene extending to 4th L toe. Recommended to come in for abx and possible procedure with podiatry. Patient is endorsing L toe pain. 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 No nausea, vomiting, or hematemesis; No diarrhea or constipation. No melena or hematochezia.  GENITOURINARY: No dysuria, frequency, hematuria, or incontinence  NEUROLOGICAL: No headaches, memory loss, loss of strength, numbness, or tremors  SKIN: No itching, burning, rashes, or lesions   LYMPH NODES: No enlarged glands  ENDOCRINE: No heat or cold intolerance; No hair loss  MUSCULOSKELETAL: No joint pain or swelling; No muscle, back, or extremity pain  PSYCHIATRIC: No depression, anxiety, mood swings, or difficulty sleeping  HEME/LYMPH: No easy bruising, or bleeding gums  ALLERY AND IMMUNOLOGIC: No hives or eczema    Allergies    No Known Allergies    Intolerances        Social History:     FAMILY HISTORY:    T(C): 37.1 (12-11-23 @ 19:37), Max: 37.1 (12-11-23 @ 19:37)  HR: 72 (12-11-23 @ 19:37) (70 - 76)  BP: 152/71 (12-11-23 @ 19:37) (152/71 - 169/75)  RR: 18 (12-11-23 @ 19:37) (18 - 18)  SpO2: 96% (12-11-23 @ 19:37) (96% - 99%)      MEDICATIONS  (STANDING):  amLODIPine   Tablet 2.5 milliGRAM(s) Oral daily  aspirin enteric coated 81 milliGRAM(s) Oral daily  atorvastatin 40 milliGRAM(s) Oral at bedtime  chlorhexidine 2% Cloths 1 Application(s) Topical daily  dextrose 5%. 1000 milliLiter(s) (50 mL/Hr) IV Continuous <Continuous>  dextrose 5%. 1000 milliLiter(s) (100 mL/Hr) IV Continuous <Continuous>  dextrose 50% Injectable 25 Gram(s) IV Push once  dextrose 50% Injectable 25 Gram(s) IV Push once  dextrose 50% Injectable 12.5 Gram(s) IV Push once  glucagon  Injectable 1 milliGRAM(s) IntraMuscular once  insulin lispro (ADMELOG) corrective regimen sliding scale   SubCutaneous at bedtime  insulin lispro (ADMELOG) corrective regimen sliding scale   SubCutaneous three times a day before meals  losartan 25 milliGRAM(s) Oral daily  piperacillin/tazobactam IVPB.. 3.375 Gram(s) IV Intermittent every 8 hours  sodium chloride 0.9%. 1000 milliLiter(s) (75 mL/Hr) IV Continuous <Continuous>    MEDICATIONS  (PRN):  dextrose Oral Gel 15 Gram(s) Oral once PRN Blood Glucose LESS THAN 70 milliGRAM(s)/deciliter      PHYSICAL EXAM:  GENERAL: NAD, well-developed  HEAD:  Atraumatic, Normocephalic  EYES: EOMI, PERRLA, conjunctiva and sclera clear  NECK: Supple, No JVD  CHEST/LUNG: Clear to auscultation bilaterally; No wheeze  HEART: Regular rate and rhythm; No murmurs, rubs, or gallops  ABDOMEN: Soft, Nontender, Nondistended; Bowel sounds present  EXTREMITIES:  Left foot abscess   PSYCH: AAOx3  NEUROLOGY: non-focal  SKIN: No rashes or lesions                            11.0   7.33  )-----------( 249      ( 11 Dec 2023 07:19 )             33.8             PT/INR - ( 10 Dec 2023 14:35 )   PT: 11.5 sec;   INR: 1.10 ratio         PTT - ( 10 Dec 2023 14:35 )  PTT:30.1 sec  138|102|22<186  4.0|22|1.12  10.1,2.2,3.6  12-11 @ 07:19    CAPILLARY BLOOD GLUCOSE      POCT Blood Glucose.: 306 mg/dL (11 Dec 2023 17:16)  POCT Blood Glucose.: 149 mg/dL (11 Dec 2023 12:33)  POCT Blood Glucose.: 172 mg/dL (11 Dec 2023 08:21)  POCT Blood Glucose.: 167 mg/dL (10 Dec 2023 21:44)

## 2023-12-11 NOTE — PROGRESS NOTE ADULT - SUBJECTIVE AND OBJECTIVE BOX
Patient is a 72y old  Male who presents with a chief complaint of Left 4 th toe infection (11 Dec 2023 10:31)       INTERVAL HPI/OVERNIGHT EVENTS:  Patient seen and evaluated at bedside.  Pt is resting comfortable in NAD. Denies N/V/F/C.    Allergies    No Known Allergies    Intolerances        Vital Signs Last 24 Hrs  T(C): 36.7 (11 Dec 2023 11:35), Max: 37 (11 Dec 2023 08:30)  T(F): 98.1 (11 Dec 2023 11:35), Max: 98.6 (11 Dec 2023 08:30)  HR: 70 (11 Dec 2023 11:35) (70 - 88)  BP: 169/75 (11 Dec 2023 11:35) (120/64 - 169/75)  BP(mean): --  RR: 18 (11 Dec 2023 11:35) (18 - 18)  SpO2: 97% (11 Dec 2023 11:35) (96% - 97%)    Parameters below as of 11 Dec 2023 11:35  Patient On (Oxygen Delivery Method): room air        LABS:                        11.0   7.33  )-----------( 249      ( 11 Dec 2023 07:19 )             33.8     12-11    138  |  102  |  22  ----------------------------<  186<H>  4.0   |  22  |  1.12    Ca    10.1      11 Dec 2023 07:19  Phos  3.6     12-11  Mg     2.2     12-11      PT/INR - ( 10 Dec 2023 14:35 )   PT: 11.5 sec;   INR: 1.10 ratio         PTT - ( 10 Dec 2023 14:35 )  PTT:30.1 sec  Urinalysis Basic - ( 11 Dec 2023 07:19 )    Color: x / Appearance: x / SG: x / pH: x  Gluc: 186 mg/dL / Ketone: x  / Bili: x / Urobili: x   Blood: x / Protein: x / Nitrite: x   Leuk Esterase: x / RBC: x / WBC x   Sq Epi: x / Non Sq Epi: x / Bacteria: x      CAPILLARY BLOOD GLUCOSE      POCT Blood Glucose.: 172 mg/dL (11 Dec 2023 08:21)  POCT Blood Glucose.: 167 mg/dL (10 Dec 2023 21:44)  POCT Blood Glucose.: 221 mg/dL (10 Dec 2023 17:03)  POCT Blood Glucose.: 210 mg/dL (10 Dec 2023 14:23)      Lower Extremity Physical Exam:  Vascular: DP/PT 0/4, B/L, Temperature gradient warm to cool, B/L.   Neuro: Epicritic sensation diminished to the level of digits, B/L.  Musculoskeletal/Ortho: s/p R BKA  Skin: Left foot 2nd and 3rd digit and 4th digit dorsal full thickness gangrene to level of MTPJ, no wet conversion noted plantarly today. , plantar 2nd and 3rd digit dusky and early ischemic changes, mild serous drainage, mild malodor, early dusky changes of fourth metatarsal.       RADIOLOGY & ADDITIONAL TESTS:

## 2023-12-11 NOTE — PROGRESS NOTE ADULT - ASSESSMENT
71 y/o M with PMHx of DM, HTN, PAD and PSHX of R BKA presents to ED at recommendation of outpatient podiatrist.     Left 4 th toe infection  PAD   Zosyn      L foot MRI with findings c/w septic arthritis and OM, no drainable abscess   - L foot abscess culture with moderate Serratia liquefaciens   - Bcx NGTD x2   - Podiatry and Vascular surgery following, recs noted   - afebrile, WBC wnl    Vascular planning for LLE angiogram today  Podiatry planning for L foot 2nd and 3rd ray resection vs TMA pending vascular recs    DM HISS   A1C 7.2    HTN Home meds     73 y/o M with PMHx of DM, HTN, PAD and PSHX of R BKA presents to ED at recommendation of outpatient podiatrist.     Left 4 th toe infection  PAD   Zosyn      L foot MRI with findings c/w septic arthritis and OM, no drainable abscess   - L foot abscess culture with moderate Serratia liquefaciens   - Bcx NGTD x2   - Podiatry and Vascular surgery following, recs noted   - afebrile, WBC wnl    Vascular planning for LLE angiogram today  Podiatry planning for L foot 2nd and 3rd ray resection vs TMA pending vascular recs    DM HISS   A1C 7.2    HTN Home meds

## 2023-12-11 NOTE — PROGRESS NOTE ADULT - SUBJECTIVE AND OBJECTIVE BOX
Vascular Surgery Progress Note  Patient is a 72y old  Male who presents with a chief complaint of Left 4 th toe infection (10 Dec 2023 18:37)      INTERVAL EVENTS: No acute events overnight.  SUBJECTIVE: Patient seen and examined at bedside with surgical team, patient without complaints. Denies fever, chills, CP, SOB nausea, vomiting, abdominal pain.      OBJECTIVE:    Vital Signs Last 24 Hrs  T(C): 36.4 (11 Dec 2023 04:51), Max: 36.9 (10 Dec 2023 20:15)  T(F): 97.6 (11 Dec 2023 04:51), Max: 98.4 (10 Dec 2023 20:15)  HR: 86 (11 Dec 2023 04:51) (71 - 88)  BP: 120/64 (11 Dec 2023 04:51) (120/64 - 168/75)  BP(mean): --  RR: 18 (11 Dec 2023 04:51) (18 - 18)  SpO2: 97% (11 Dec 2023 04:51) (96% - 97%)    Parameters below as of 11 Dec 2023 04:51  Patient On (Oxygen Delivery Method): room air    I&O's Detail    10 Dec 2023 07:01  -  11 Dec 2023 07:00  --------------------------------------------------------  IN:    Oral Fluid: 530 mL  Total IN: 530 mL    OUT:    Voided (mL): 600 mL  Total OUT: 600 mL    Total NET: -70 mL      MEDICATIONS  (STANDING):  amLODIPine   Tablet 2.5 milliGRAM(s) Oral daily  aspirin enteric coated 81 milliGRAM(s) Oral daily  atorvastatin 40 milliGRAM(s) Oral at bedtime  chlorhexidine 2% Cloths 1 Application(s) Topical daily  dextrose 5%. 1000 milliLiter(s) (50 mL/Hr) IV Continuous <Continuous>  dextrose 5%. 1000 milliLiter(s) (100 mL/Hr) IV Continuous <Continuous>  dextrose 50% Injectable 25 Gram(s) IV Push once  dextrose 50% Injectable 12.5 Gram(s) IV Push once  dextrose 50% Injectable 25 Gram(s) IV Push once  glucagon  Injectable 1 milliGRAM(s) IntraMuscular once  insulin lispro (ADMELOG) corrective regimen sliding scale   SubCutaneous at bedtime  insulin lispro (ADMELOG) corrective regimen sliding scale   SubCutaneous three times a day before meals  losartan 25 milliGRAM(s) Oral daily  piperacillin/tazobactam IVPB.. 3.375 Gram(s) IV Intermittent every 8 hours  sodium chloride 0.9%. 1000 milliLiter(s) (75 mL/Hr) IV Continuous <Continuous>    MEDICATIONS  (PRN):  dextrose Oral Gel 15 Gram(s) Oral once PRN Blood Glucose LESS THAN 70 milliGRAM(s)/deciliter      PHYSICAL EXAM:    General: NAD, resting comfortably  HEENT: NC/AT, EOMI, normal hearing, no oral lesions, no LAD, neck supple  Pulmonary: normal resp effort, CTA-B  Cardiovascular: NSR, no murmurs  Abdominal: soft, ND/NT, no organomegaly, no guarding or rebound tenderness  Extremities: RLE s/p BKA, LLE with 2/3rd toe gangrene, 4th toe dusky with purple/black discoloration possible dry gangrene. Toes all appear dry.   Neuro: A/O x 3, CNs II-XII grossly intact, normal sensation, no focal deficits  LABS:                        11.0   7.33  )-----------( 249      ( 11 Dec 2023 07:19 )             33.8     12-11    138  |  102  |  22  ----------------------------<  186<H>  4.0   |  22  |  1.12    Ca    10.1      11 Dec 2023 07:19  Phos  3.6     12-11  Mg     2.2     12-11    TPro  7.4  /  Alb  3.4  /  TBili  0.2  /  DBili  x   /  AST  15  /  ALT  22  /  AlkPhos  90  12-09    PT/INR - ( 10 Dec 2023 14:35 )   PT: 11.5 sec;   INR: 1.10 ratio         PTT - ( 10 Dec 2023 14:35 )  PTT:30.1 sec  LIVER FUNCTIONS - ( 09 Dec 2023 09:23 )  Alb: 3.4 g/dL / Pro: 7.4 g/dL / ALK PHOS: 90 U/L / ALT: 22 U/L / AST: 15 U/L / GGT: x           Urinalysis Basic - ( 11 Dec 2023 07:19 )    Color: x / Appearance: x / SG: x / pH: x  Gluc: 186 mg/dL / Ketone: x  / Bili: x / Urobili: x   Blood: x / Protein: x / Nitrite: x   Leuk Esterase: x / RBC: x / WBC x   Sq Epi: x / Non Sq Epi: x / Bacteria: x      ABO Interpretation: B (12-10-23 @ 14:34)      IMAGING:

## 2023-12-11 NOTE — CONSULT NOTE ADULT - SUBJECTIVE AND OBJECTIVE BOX
[facility] DIVISION OF INFECTIOUS DISEASES  ERVIN Florentino S. Shah, Y. Patel, [facility]  [phone]  ([phone] - weekdays after 5pm and weekends)    ALEE DUNN  72y, Male  [id number]    HPI:  Patient is a 72 year old male with PMH of DM, HTN, PAD and PSHX of R BKA presents to ED at recommendation of outpatient podiatrist. Patient was recently admitted and being followed by vascular and podiatry for wounds to the L foot. Was noted to have dry gangrene of L 2nd/3rd toes. [place] due to extent of vascular disease, patient would not heal if amputation was done. Was told to f/u with vascular as an outpatient. Saw podiatry yesterday, due to gangrene extending to 4th L toe. Recommended to come in for abx and possible procedure with podiatry. Patient is endorsing L toe pain. (09 Dec 2023 15:57) Patient seen and examined at bedside this morning. Patient confirms above, states he currently has no pain, denies fever or chills. No other complaints at this time.   ROS: 14 point review of systems completed, pertinent positives and negatives as per HPI.    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  PAD (peripheral artery disease)    PSH --History of amputation of toe, RLE BKA  FH -- noncontributory   Social History -- denies tobacco, alcohol or illicit drug use    Physical Exam--  Vital Signs Last 24 Hrs  T(F): 98.6 (11 Dec 2023 08:30), Max: 98.6 (11 Dec 2023 08:30)  HR: 76 (11 Dec 2023 08:30) (71 - 88)  BP: 167/72 (11 Dec 2023 08:30) (120/64 - 168/75)  RR: 18 (11 Dec 2023 08:30) (18 - 18)  SpO2: 96% (11 Dec 2023 08:30) (96% - 97%)  General: no acute distress  HEENT: NC/AT, EOMI, anicteric, neck supple  Lungs: Clear bilaterally without rales, wheezing or rhonchi  Heart: S1, S2 present, RRR. No murmur, rub or gallop.  Abdomen: Soft. Nondistended. Nontender. BS present.   Neuro: AAOx3, no obvious focal deficits   Extremities: No cyanosis or clubbing. No edema.   Skin: Warm. Dry. Good turgor. No visible rash.   Psychiatric: Appropriate affect and mood for situation.   Lines: PIV    Laboratory & Imaging Data--  CBC:                       11.0   7.33  )-----------( 249      ( 11 Dec 2023 07:19 )             33.8     WBC Count: 7.33 K/uL (12-11-23 @ 07:19)  WBC Count: 6.61 K/uL (12-10-23 @ 07:13)  WBC Count: 7.63 K/uL (12-09-23 @ 09:23)  WBC Count: 7.49 K/uL (12-05-23 @ 06:47)    CMP: 12-11    138  |  102  |  22  ----------------------------<  186<H>  4.0   |  22  |  1.12    Ca    10.1      11 Dec 2023 07:19  Phos  3.6     12-11  Mg     2.2     12-11    Microbiology: reviewed  Culture - Abscess with Gram Stain (collected 12-09-23 @ 10:45)  Source: .Abscess left foot  Preliminary Report (12-10-23 @ 16:08):    Moderate Serratia liquefaciens    Culture - Blood (collected 12-09-23 @ 09:05)  Source: .Blood Blood-Peripheral  Preliminary Report (12-10-23 @ 14:02):    No growth at 24 hours    Culture - Blood (collected 12-09-23 @ 09:00)  Source: .Blood Blood-Peripheral  Preliminary Report (12-10-23 @ 14:02):    No growth at 24 hours    Radiology--reviewed  < from: MR Foot w/wo IV Cont, Left (12.10.23 @ 14:10) >  Impression:    The second and third distal/middle phalanges are not well seen, as they   are on the edge of the field of view. Additionally, please note that CT   would be a more sensitive test to evaluate for soft tissue gas.    Osseous edema within the visualized proximal aspect of the second middle   phalanx, as well as within the mid to distal second proximal phalanx,   which may represent septic arthritis with osteomyelitis of the second   proximal interphalangeal joint. Recommend clinical correlation for   overlying ulcer.    Osseous edema with mild T1 marrow signal abnormality within the   visualized third proximal phalanx, which may represent early   osteomyelitis. Recommend clinical correlation for overlying ulcer.    Osseous edema surrounding the fourth proximal interphalangeal joint,   which may represent septic arthritis with early osteomyelitis or be   degenerative. Osseous edema without T1 marrow signal abnormality within   the base of the fourth proximal phalanx, which may be degenerative or be   related to early osteomyelitis.    Acute to subacute fracture of the fifth proximal phalangeal neck. Osseous   edema surrounding the fifth proximal interphalangeal joint, which may be   reactive/degenerative or be related to septic arthritis with   osteomyelitis.    Mild subcutaneous edema about the dorsum of the foot, which may be   related to lower extremity edema or cellulitis. No drainable fluid   collection seen within the soft tissues.    < end of copied text >  < from: Xray Foot AP + Lateral + Oblique, Left (12.09.23 @ 09:40) >    IMPRESSION: Diffuse demineralization. No acute fracture dislocation or   focal bone destruction. Impacted fracture neck of proximal phalanx of the   fifth toe similar to prior. Calcaneal osteophytes. Joint spaces   preserved. Vascular calcification. Diffuse soft tissue edema. No soft   tissue gas.    If there is a persistent concern for osteoarthritis consider MRI for more   sensitive evaluation    < end of copied text >    Active Medications--  amLODIPine   Tablet 2.5 milliGRAM(s) Oral daily  aspirin enteric coated 81 milliGRAM(s) Oral daily  atorvastatin 40 milliGRAM(s) Oral at bedtime  chlorhexidine 2% Cloths 1 Application(s) Topical daily  dextrose 5%. 1000 milliLiter(s) IV Continuous <Continuous>  dextrose 5%. 1000 milliLiter(s) IV Continuous <Continuous>  dextrose 50% Injectable 25 Gram(s) IV Push once  dextrose 50% Injectable 12.5 Gram(s) IV Push once  dextrose 50% Injectable 25 Gram(s) IV Push once  dextrose Oral Gel 15 Gram(s) Oral once PRN  glucagon  Injectable 1 milliGRAM(s) IntraMuscular once  insulin lispro (ADMELOG) corrective regimen sliding scale   SubCutaneous three times a day before meals  insulin lispro (ADMELOG) corrective regimen sliding scale   SubCutaneous at bedtime  losartan 25 milliGRAM(s) Oral daily  piperacillin/tazobactam IVPB.. 3.375 Gram(s) IV Intermittent every 8 hours  sodium chloride 0.9%. 1000 milliLiter(s) IV Continuous <Continuous>    Current Antimicrobials:   piperacillin/tazobactam IVPB.. 3.375 Gram(s) IV Intermittent every 8 hours    Prior/Completed Antimicrobials:  piperacillin/tazobactam IVPB...  vancomycin  IVPB.    Immunologic:    [facility] DIV[facility] OF INFECTIOUS DISEASES  ERVIN Florentino S. Shah, Y. Patel, [facility]  [phone]  ([phone] - weekdays after 5pm and weekends)    ALEE DUNN  72y, Male  [id number]    HPI:  Patient is a 72 year old male with PMH of DM, HTN, PAD and PSHX of R BKA presents to ED at recommendation of outpatient podiatrist. Patient was recently admitted and being followed by vascular and podiatry for wounds to the L foot. Was noted to have dry gangrene of L 2nd/3rd toes. [place] due to extent of vascular disease, patient would not heal if amputation was done. Was told to f/u with vascular as an outpatient. Saw podiatry yesterday, due to gangrene extending to 4th L toe. Recommended to come in for abx and possible procedure with podiatry. Patient is endorsing L toe pain. (09 Dec 2023 15:57) Patient seen and examined at bedside this morning. Patient confirms above, states he currently has no pain, denies fever or chills. No other complaints at this time.   ROS: 14 point review of systems completed, pertinent positives and negatives as per HPI.    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  PAD (peripheral artery disease)    PSH --History of amputation of toe, RLE BKA  FH -- noncontributory   Social History -- denies tobacco, alcohol or illicit drug use    Physical Exam--  Vital Signs Last 24 Hrs  T(F): 98.6 (11 Dec 2023 08:30), Max: 98.6 (11 Dec 2023 08:30)  HR: 76 (11 Dec 2023 08:30) (71 - 88)  BP: 167/72 (11 Dec 2023 08:30) (120/64 - 168/75)  RR: 18 (11 Dec 2023 08:30) (18 - 18)  SpO2: 96% (11 Dec 2023 08:30) (96% - 97%)  General: no acute distress  HEENT: NC/AT, EOMI, anicteric, neck supple  Lungs: Clear bilaterally without rales, wheezing or rhonchi  Heart: S1, S2 present, RRR. No murmur, rub or gallop.  Abdomen: Soft. Nondistended. Nontender. BS present.   Neuro: AAOx3, no obvious focal deficits   Extremities: No cyanosis or clubbing. No edema.   Skin: Warm. Dry. Good turgor. No visible rash.   Psychiatric: Appropriate affect and mood for situation.   Lines: PIV    Laboratory & Imaging Data--  CBC:                       11.0   7.33  )-----------( 249      ( 11 Dec 2023 07:19 )             33.8     WBC Count: 7.33 K/uL (12-11-23 @ 07:19)  WBC Count: 6.61 K/uL (12-10-23 @ 07:13)  WBC Count: 7.63 K/uL (12-09-23 @ 09:23)  WBC Count: 7.49 K/uL (12-05-23 @ 06:47)    CMP: 12-11    138  |  102  |  22  ----------------------------<  186<H>  4.0   |  22  |  1.12    Ca    10.1      11 Dec 2023 07:19  Phos  3.6     12-11  Mg     2.2     12-11    Microbiology: reviewed  Culture - Abscess with Gram Stain (collected 12-09-23 @ 10:45)  Source: .Abscess left foot  Preliminary Report (12-10-23 @ 16:08):    Moderate Serratia liquefaciens    Culture - Blood (collected 12-09-23 @ 09:05)  Source: .Blood Blood-Peripheral  Preliminary Report (12-10-23 @ 14:02):    No growth at 24 hours    Culture - Blood (collected 12-09-23 @ 09:00)  Source: .Blood Blood-Peripheral  Preliminary Report (12-10-23 @ 14:02):    No growth at 24 hours    Radiology--reviewed  < from: MR Foot w/wo IV Cont, Left (12.10.23 @ 14:10) >  Impression:    The second and third distal/middle phalanges are not well seen, as they   are on the edge of the field of view. Additionally, please note that CT   would be a more sensitive test to evaluate for soft tissue gas.    Osseous edema within the visualized proximal aspect of the second middle   phalanx, as well as within the mid to distal second proximal phalanx,   which may represent septic arthritis with osteomyelitis of the second   proximal interphalangeal joint. Recommend clinical correlation for   overlying ulcer.    Osseous edema with mild T1 marrow signal abnormality within the   visualized third proximal phalanx, which may represent early   osteomyelitis. Recommend clinical correlation for overlying ulcer.    Osseous edema surrounding the fourth proximal interphalangeal joint,   which may represent septic arthritis with early osteomyelitis or be   degenerative. Osseous edema without T1 marrow signal abnormality within   the base of the fourth proximal phalanx, which may be degenerative or be   related to early osteomyelitis.    Acute to subacute fracture of the fifth proximal phalangeal neck. Osseous   edema surrounding the fifth proximal interphalangeal joint, which may be   reactive/degenerative or be related to septic arthritis with   osteomyelitis.    Mild subcutaneous edema about the dorsum of the foot, which may be   related to lower extremity edema or cellulitis. No drainable fluid   collection seen within the soft tissues.    < end of copied text >  < from: Xray Foot AP + Lateral + Oblique, Left (12.09.23 @ 09:40) >    IMPRESSION: Diffuse demineralization. No acute fracture dislocation or   focal bone destruction. Impacted fracture neck of proximal phalanx of the   fifth toe similar to prior. Calcaneal osteophytes. Joint spaces   preserved. Vascular calcification. Diffuse soft tissue edema. No soft   tissue gas.    If there is a persistent concern for osteoarthritis consider MRI for more   sensitive evaluation    < end of copied text >    Active Medications--  amLODIPine   Tablet 2.5 milliGRAM(s) Oral daily  aspirin enteric coated 81 milliGRAM(s) Oral daily  atorvastatin 40 milliGRAM(s) Oral at bedtime  chlorhexidine 2% Cloths 1 Application(s) Topical daily  dextrose 5%. 1000 milliLiter(s) IV Continuous <Continuous>  dextrose 5%. 1000 milliLiter(s) IV Continuous <Continuous>  dextrose 50% Injectable 25 Gram(s) IV Push once  dextrose 50% Injectable 12.5 Gram(s) IV Push once  dextrose 50% Injectable 25 Gram(s) IV Push once  dextrose Oral Gel 15 Gram(s) Oral once PRN  glucagon  Injectable 1 milliGRAM(s) IntraMuscular once  insulin lispro (ADMELOG) corrective regimen sliding scale   SubCutaneous three times a day before meals  insulin lispro (ADMELOG) corrective regimen sliding scale   SubCutaneous at bedtime  losartan 25 milliGRAM(s) Oral daily  piperacillin/tazobactam IVPB.. 3.375 Gram(s) IV Intermittent every 8 hours  sodium chloride 0.9%. 1000 milliLiter(s) IV Continuous <Continuous>    Current Antimicrobials:   piperacillin/tazobactam IVPB.. 3.375 Gram(s) IV Intermittent every 8 hours    Prior/Completed Antimicrobials:  piperacillin/tazobactam IVPB...  vancomycin  IVPB.    Immunologic:

## 2023-12-11 NOTE — CONSULT NOTE ADULT - ASSESSMENT
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     LLE gangrene with extension to 4th toe with c/f infection  - L foot 2nd and 3rd digit dorsal full thickness gangrene to level of MTPJ, w/ wet conversion, plantar 2nd and 3rd digit dusky and early ischemic changes, mild serous drainage, mild malodor, early dusky changes of fourth metatarsal.  - L foot MRI with findings c/w septic arthritis and OM, no drainable abscess   - L foot abscess culture with moderate Serratia liquefaciens   - Bcx NGTD x2   - Podiatry and Vascular surgery following, recs noted   - afebrile, WBC wnl    Recommendations:   Vascular planning for LLE angiogram today  Podiatry planning for L foot 2nd and 3rd ray resection vs TMA pending vascular recs  Follow up culture for sensitivities  Continue on pip-tazo   Can hold off on vancomycin for now   Continue local care   Monitor temps/WBC      Azucena Castillo M.D.  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     LLE gangrene with extension to 4th toe with c/f infection  - L foot 2nd and 3rd digit dorsal full thickness gangrene to level of MTPJ, w/ wet conversion, plantar 2nd and 3rd digit dusky and early ischemic changes, mild serous drainage, mild malodor, early dusky changes of fourth metatarsal.  - L foot MRI with findings c/w septic arthritis and OM, no drainable abscess   - L foot abscess culture with moderate Serratia liquefaciens   - Bcx NGTD x2   - Podiatry and Vascular surgery following, recs noted   - afebrile, WBC wnl    Recommendations:   Vascular planning for LLE angiogram today  Podiatry planning for L foot 2nd and 3rd ray resection vs TMA pending vascular recs  Follow up culture for sensitivities  Continue on pip-tazo   Can hold off on vancomycin for now   Continue local care   Monitor temps/WBC      Azucena Castillo M.D.  [facility], Division of Infectious Diseases  [phone]  After 5pm on weekdays and all day on weekends - please call [phone]

## 2023-12-11 NOTE — PROGRESS NOTE ADULT - ASSESSMENT
72M presents with left foot 2nd & 3rd digit partial thickness gangrene.  - Patient seen and evaluated.  - Afebrile, no leukocytosis.  - Left foot 2nd and 3rd digit and 4th digit dorsal full thickness gangrene to level of MTPJ, no wet conversion noted plantarly today. , plantar 2nd and 3rd digit dusky and early ischemic changes, mild serous drainage, mild malodor, early dusky changes of fourth metatarsal.   - Left Foot X-Ray: no OM, no gas.  - Left foot culture pending   - Left foot MR: 2nd middle phalanx, proximal phalanx osteomyelitis, third proximal phalanx osteomyelitis, fourth proximal interphalangeal joint osteomyelitis, base of the fourth proximal phalanx osteomyelitis, fifth proximal interphalangeal joint osteomyelitis.  - Vascular recommendations, appreciated - added today for Angio today   - Pod plan Left footTMA pending vascular recommendations/demarcation.  - Please document medical and cardiac clearance for partial second ray resection of the left foot.   - Discussed with attending.

## 2023-12-12 LAB
ANION GAP SERPL CALC-SCNC: 13 MMOL/L — SIGNIFICANT CHANGE UP (ref 5–17)
ANION GAP SERPL CALC-SCNC: 13 MMOL/L — SIGNIFICANT CHANGE UP (ref 5–17)
BLD GP AB SCN SERPL QL: POSITIVE — SIGNIFICANT CHANGE UP
BLD GP AB SCN SERPL QL: POSITIVE — SIGNIFICANT CHANGE UP
BUN SERPL-MCNC: 20 MG/DL — SIGNIFICANT CHANGE UP (ref 7–23)
BUN SERPL-MCNC: 20 MG/DL — SIGNIFICANT CHANGE UP (ref 7–23)
CALCIUM SERPL-MCNC: 9.8 MG/DL — SIGNIFICANT CHANGE UP (ref 8.4–10.5)
CALCIUM SERPL-MCNC: 9.8 MG/DL — SIGNIFICANT CHANGE UP (ref 8.4–10.5)
CHLORIDE SERPL-SCNC: 101 MMOL/L — SIGNIFICANT CHANGE UP (ref 96–108)
CHLORIDE SERPL-SCNC: 101 MMOL/L — SIGNIFICANT CHANGE UP (ref 96–108)
CO2 SERPL-SCNC: 24 MMOL/L — SIGNIFICANT CHANGE UP (ref 22–31)
CO2 SERPL-SCNC: 24 MMOL/L — SIGNIFICANT CHANGE UP (ref 22–31)
CREAT SERPL-MCNC: 1.17 MG/DL — SIGNIFICANT CHANGE UP (ref 0.5–1.3)
CREAT SERPL-MCNC: 1.17 MG/DL — SIGNIFICANT CHANGE UP (ref 0.5–1.3)
EGFR: 66 ML/MIN/1.73M2 — SIGNIFICANT CHANGE UP
EGFR: 66 ML/MIN/1.73M2 — SIGNIFICANT CHANGE UP
GLUCOSE BLDC GLUCOMTR-MCNC: 177 MG/DL — HIGH (ref 70–99)
GLUCOSE BLDC GLUCOMTR-MCNC: 177 MG/DL — HIGH (ref 70–99)
GLUCOSE BLDC GLUCOMTR-MCNC: 195 MG/DL — HIGH (ref 70–99)
GLUCOSE BLDC GLUCOMTR-MCNC: 195 MG/DL — HIGH (ref 70–99)
GLUCOSE BLDC GLUCOMTR-MCNC: 201 MG/DL — HIGH (ref 70–99)
GLUCOSE BLDC GLUCOMTR-MCNC: 201 MG/DL — HIGH (ref 70–99)
GLUCOSE BLDC GLUCOMTR-MCNC: 204 MG/DL — HIGH (ref 70–99)
GLUCOSE BLDC GLUCOMTR-MCNC: 204 MG/DL — HIGH (ref 70–99)
GLUCOSE BLDC GLUCOMTR-MCNC: 246 MG/DL — HIGH (ref 70–99)
GLUCOSE BLDC GLUCOMTR-MCNC: 246 MG/DL — HIGH (ref 70–99)
GLUCOSE SERPL-MCNC: 195 MG/DL — HIGH (ref 70–99)
GLUCOSE SERPL-MCNC: 195 MG/DL — HIGH (ref 70–99)
HCT VFR BLD CALC: 34.7 % — LOW (ref 39–50)
HCT VFR BLD CALC: 34.7 % — LOW (ref 39–50)
HGB BLD-MCNC: 11.1 G/DL — LOW (ref 13–17)
HGB BLD-MCNC: 11.1 G/DL — LOW (ref 13–17)
INR BLD: 1.1 RATIO — SIGNIFICANT CHANGE UP (ref 0.85–1.18)
INR BLD: 1.1 RATIO — SIGNIFICANT CHANGE UP (ref 0.85–1.18)
MCHC RBC-ENTMCNC: 28.8 PG — SIGNIFICANT CHANGE UP (ref 27–34)
MCHC RBC-ENTMCNC: 28.8 PG — SIGNIFICANT CHANGE UP (ref 27–34)
MCHC RBC-ENTMCNC: 32 GM/DL — SIGNIFICANT CHANGE UP (ref 32–36)
MCHC RBC-ENTMCNC: 32 GM/DL — SIGNIFICANT CHANGE UP (ref 32–36)
MCV RBC AUTO: 89.9 FL — SIGNIFICANT CHANGE UP (ref 80–100)
MCV RBC AUTO: 89.9 FL — SIGNIFICANT CHANGE UP (ref 80–100)
NRBC # BLD: 0 /100 WBCS — SIGNIFICANT CHANGE UP (ref 0–0)
NRBC # BLD: 0 /100 WBCS — SIGNIFICANT CHANGE UP (ref 0–0)
PLATELET # BLD AUTO: 268 K/UL — SIGNIFICANT CHANGE UP (ref 150–400)
PLATELET # BLD AUTO: 268 K/UL — SIGNIFICANT CHANGE UP (ref 150–400)
POTASSIUM SERPL-MCNC: 3.7 MMOL/L — SIGNIFICANT CHANGE UP (ref 3.5–5.3)
POTASSIUM SERPL-MCNC: 3.7 MMOL/L — SIGNIFICANT CHANGE UP (ref 3.5–5.3)
POTASSIUM SERPL-SCNC: 3.7 MMOL/L — SIGNIFICANT CHANGE UP (ref 3.5–5.3)
POTASSIUM SERPL-SCNC: 3.7 MMOL/L — SIGNIFICANT CHANGE UP (ref 3.5–5.3)
PROTHROM AB SERPL-ACNC: 11.5 SEC — SIGNIFICANT CHANGE UP (ref 9.5–13)
PROTHROM AB SERPL-ACNC: 11.5 SEC — SIGNIFICANT CHANGE UP (ref 9.5–13)
RBC # BLD: 3.86 M/UL — LOW (ref 4.2–5.8)
RBC # BLD: 3.86 M/UL — LOW (ref 4.2–5.8)
RBC # FLD: 12.3 % — SIGNIFICANT CHANGE UP (ref 10.3–14.5)
RBC # FLD: 12.3 % — SIGNIFICANT CHANGE UP (ref 10.3–14.5)
RH IG SCN BLD-IMP: POSITIVE — SIGNIFICANT CHANGE UP
RH IG SCN BLD-IMP: POSITIVE — SIGNIFICANT CHANGE UP
SODIUM SERPL-SCNC: 138 MMOL/L — SIGNIFICANT CHANGE UP (ref 135–145)
SODIUM SERPL-SCNC: 138 MMOL/L — SIGNIFICANT CHANGE UP (ref 135–145)
WBC # BLD: 7.17 K/UL — SIGNIFICANT CHANGE UP (ref 3.8–10.5)
WBC # BLD: 7.17 K/UL — SIGNIFICANT CHANGE UP (ref 3.8–10.5)
WBC # FLD AUTO: 7.17 K/UL — SIGNIFICANT CHANGE UP (ref 3.8–10.5)
WBC # FLD AUTO: 7.17 K/UL — SIGNIFICANT CHANGE UP (ref 3.8–10.5)

## 2023-12-12 RX ADMIN — PIPERACILLIN AND TAZOBACTAM 25 GRAM(S): 4; .5 INJECTION, POWDER, LYOPHILIZED, FOR SOLUTION INTRAVENOUS at 11:37

## 2023-12-12 RX ADMIN — Medication 2: at 13:16

## 2023-12-12 RX ADMIN — PIPERACILLIN AND TAZOBACTAM 25 GRAM(S): 4; .5 INJECTION, POWDER, LYOPHILIZED, FOR SOLUTION INTRAVENOUS at 17:30

## 2023-12-12 RX ADMIN — Medication 1: at 09:10

## 2023-12-12 RX ADMIN — Medication 2: at 17:30

## 2023-12-12 RX ADMIN — Medication 81 MILLIGRAM(S): at 11:37

## 2023-12-12 RX ADMIN — CHLORHEXIDINE GLUCONATE 1 APPLICATION(S): 213 SOLUTION TOPICAL at 05:15

## 2023-12-12 RX ADMIN — LOSARTAN POTASSIUM 25 MILLIGRAM(S): 100 TABLET, FILM COATED ORAL at 05:15

## 2023-12-12 RX ADMIN — PIPERACILLIN AND TAZOBACTAM 25 GRAM(S): 4; .5 INJECTION, POWDER, LYOPHILIZED, FOR SOLUTION INTRAVENOUS at 02:19

## 2023-12-12 RX ADMIN — ATORVASTATIN CALCIUM 40 MILLIGRAM(S): 80 TABLET, FILM COATED ORAL at 21:13

## 2023-12-12 RX ADMIN — AMLODIPINE BESYLATE 2.5 MILLIGRAM(S): 2.5 TABLET ORAL at 05:15

## 2023-12-12 NOTE — PROGRESS NOTE ADULT - SUBJECTIVE AND OBJECTIVE BOX
Patient is a 72y old  Male who presents with a chief complaint of Left 4 th toe infection (12 Dec 2023 09:21)       INTERVAL HPI/OVERNIGHT EVENTS:  Patient seen and evaluated at bedside.  Pt is resting comfortable in NAD. Denies N/V/F/C.    Allergies    No Known Allergies    Intolerances        Vital Signs Last 24 Hrs  T(C): 36.6 (12 Dec 2023 08:09), Max: 37.1 (11 Dec 2023 19:37)  T(F): 97.9 (12 Dec 2023 08:09), Max: 98.8 (11 Dec 2023 19:37)  HR: 76 (12 Dec 2023 08:09) (70 - 84)  BP: 158/81 (12 Dec 2023 08:09) (152/71 - 181/80)  BP(mean): --  RR: 18 (12 Dec 2023 08:09) (18 - 18)  SpO2: 97% (12 Dec 2023 08:09) (96% - 99%)    Parameters below as of 12 Dec 2023 08:09  Patient On (Oxygen Delivery Method): room air        LABS:                        11.1   7.17  )-----------( 268      ( 12 Dec 2023 06:56 )             34.7     12-12    138  |  101  |  20  ----------------------------<  195<H>  3.7   |  24  |  1.17    Ca    9.8      12 Dec 2023 06:56  Phos  3.6     12-11  Mg     2.2     12-11      PT/INR - ( 12 Dec 2023 06:56 )   PT: 11.5 sec;   INR: 1.10 ratio         PTT - ( 10 Dec 2023 14:35 )  PTT:30.1 sec  Urinalysis Basic - ( 12 Dec 2023 06:56 )    Color: x / Appearance: x / SG: x / pH: x  Gluc: 195 mg/dL / Ketone: x  / Bili: x / Urobili: x   Blood: x / Protein: x / Nitrite: x   Leuk Esterase: x / RBC: x / WBC x   Sq Epi: x / Non Sq Epi: x / Bacteria: x      CAPILLARY BLOOD GLUCOSE      POCT Blood Glucose.: 177 mg/dL (12 Dec 2023 08:50)  POCT Blood Glucose.: 201 mg/dL (12 Dec 2023 07:14)  POCT Blood Glucose.: 174 mg/dL (11 Dec 2023 21:37)  POCT Blood Glucose.: 306 mg/dL (11 Dec 2023 17:16)  POCT Blood Glucose.: 149 mg/dL (11 Dec 2023 12:33)      Lower Extremity Physical Exam:  Vascular: DP/PT 0/4, B/L, Temperature gradient warm to cool, B/L.   Neuro: Epicritic sensation diminished to the level of digits, B/L.  Musculoskeletal/Ortho: s/p R BKA  Skin: Left foot 2nd and 3rd digit and 4th digit dorsal full thickness gangrene to level of MTPJ, no wet conversion noted plantarly today. , plantar 2nd and 3rd digit dusky and early ischemic changes, mild serous drainage, mild malodor, early dusky changes of fourth metatarsal.     RADIOLOGY & ADDITIONAL TESTS:

## 2023-12-12 NOTE — PROGRESS NOTE ADULT - ASSESSMENT
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     LLE gangrene with extension to 4th toe with c/f infection  - L foot 2nd and 3rd digit dorsal full thickness gangrene to level of MTPJ, w/ wet conversion, plantar 2nd and 3rd digit dusky and early ischemic changes, mild serous drainage, mild malodor, early dusky changes of fourth metatarsal.  - L foot MRI with findings c/w septic arthritis and OM, no drainable abscess   - L foot abscess culture with moderate Serratia liquefaciens   - Bcx NGTD x2   - Podiatry and Vascular surgery following, recs noted   - afebrile, WBC wnl    Recommendations:   Vascular planning for LLE angiogram  Podiatry planning for L foot 2nd and 3rd ray resection vs TMA pending vascular recs  Follow up culture for sensitivities  Continue on pip-tazo   Can hold off on vancomycin for now   Continue local care   Monitor temps/WBC      Azucena Castillo M.D.  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     LLE gangrene with extension to 4th toe with c/f infection  - L foot 2nd and 3rd digit dorsal full thickness gangrene to level of MTPJ, w/ wet conversion, plantar 2nd and 3rd digit dusky and early ischemic changes, mild serous drainage, mild malodor, early dusky changes of fourth metatarsal.  - L foot MRI with findings c/w septic arthritis and OM, no drainable abscess   - L foot abscess culture with moderate Serratia liquefaciens   - Bcx NGTD x2   - Podiatry and Vascular surgery following, recs noted   - afebrile, WBC wnl    Recommendations:   Vascular planning for LLE angiogram  Podiatry planning for L foot 2nd and 3rd ray resection vs TMA pending vascular recs  Follow up culture for sensitivities  Continue on pip-tazo   Can hold off on vancomycin for now   Continue local care   Monitor temps/WBC      Azucena Castillo M.D.  [facility], Division of Infectious Diseases  [phone]  After 5pm on weekdays and all day on weekends - please call [phone]

## 2023-12-12 NOTE — PROGRESS NOTE ADULT - SUBJECTIVE AND OBJECTIVE BOX
HPI:   73 y/o M with PMHx of DM, HTN, PAD and PSHX of R BKA presents to ED at recommendation of outpatient podiatrist. Patient was recently admitted and being followed by vascular and podiatry for wounds to the L foot. Was noted to have dry gangrene of L 2nd/3rd toes. [place] due to extent of vascular disease, patient would not heal if amputation was done. Was told to f/u with vascular as an outpatient. Saw podiatry yesterday, due to gangrene extending to 4th L toe. Recommended to come in for abx and possible procedure with podiatry. Patient is endorsing L toe pain. 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 No nausea, vomiting, or hematemesis; No diarrhea or constipation. No melena or hematochezia.  GENITOURINARY: No dysuria, frequency, hematuria, or incontinence  NEUROLOGICAL: No headaches, memory loss, loss of strength, numbness, or tremors  SKIN: No itching, burning, rashes, or lesions   LYMPH NODES: No enlarged glands  ENDOCRINE: No heat or cold intolerance; No hair loss  MUSCULOSKELETAL: No joint pain or swelling; No muscle, back, or extremity pain  PSYCHIATRIC: No depression, anxiety, mood swings, or difficulty sleeping  HEME/LYMPH: No easy bruising, or bleeding gums  ALLERY AND IMMUNOLOGIC: No hives or eczema    Allergies    No Known Allergies    Intolerances        Social History:     FAMILY HISTORY:    T(C): 36.4 (12-12-23 @ 11:34), Max: 36.9 (12-12-23 @ 04:46)  HR: 78 (12-12-23 @ 12:09) (71 - 84)  BP: 129/69 (12-12-23 @ 12:09) (129/69 - 181/80)  RR: 18 (12-12-23 @ 11:34) (18 - 18)  SpO2: 98% (12-12-23 @ 11:34) (97% - 99%)    MEDICATIONS  (STANDING):  amLODIPine   Tablet 2.5 milliGRAM(s) Oral daily  aspirin enteric coated 81 milliGRAM(s) Oral daily  atorvastatin 40 milliGRAM(s) Oral at bedtime  chlorhexidine 2% Cloths 1 Application(s) Topical daily  dextrose 5%. 1000 milliLiter(s) (50 mL/Hr) IV Continuous <Continuous>  dextrose 5%. 1000 milliLiter(s) (100 mL/Hr) IV Continuous <Continuous>  dextrose 50% Injectable 25 Gram(s) IV Push once  dextrose 50% Injectable 12.5 Gram(s) IV Push once  dextrose 50% Injectable 25 Gram(s) IV Push once  glucagon  Injectable 1 milliGRAM(s) IntraMuscular once  insulin lispro (ADMELOG) corrective regimen sliding scale   SubCutaneous at bedtime  insulin lispro (ADMELOG) corrective regimen sliding scale   SubCutaneous three times a day before meals  losartan 25 milliGRAM(s) Oral daily  piperacillin/tazobactam IVPB.. 3.375 Gram(s) IV Intermittent every 8 hours  sodium chloride 0.9%. 1000 milliLiter(s) (75 mL/Hr) IV Continuous <Continuous>    MEDICATIONS  (PRN):  dextrose Oral Gel 15 Gram(s) Oral once PRN Blood Glucose LESS THAN 70 milliGRAM(s)/deciliter        PHYSICAL EXAM:  GENERAL: NAD, well-developed  HEAD:  Atraumatic, Normocephalic  EYES: EOMI, PERRLA, conjunctiva and sclera clear  NECK: Supple, No JVD  CHEST/LUNG: Clear to auscultation bilaterally; No wheeze  HEART: Regular rate and rhythm; No murmurs, rubs, or gallops  ABDOMEN: Soft, Nontender, Nondistended; Bowel sounds present  EXTREMITIES:  Left foot abscess   PSYCH: AAOx3  NEUROLOGY: non-focal  SKIN: No rashes or lesions                                                  11.1   7.17  )-----------( 268      ( 12 Dec 2023 06:56 )             34.7             PT/INR - ( 12 Dec 2023 06:56 )   PT: 11.5 sec;   INR: 1.10 ratio           138|101|20<195  3.7|24|1.17  9.8,--,--  12-12 @ 06:56    CAPILLARY BLOOD GLUCOSE      POCT Blood Glucose.: 204 mg/dL (12 Dec 2023 12:48)  POCT Blood Glucose.: 177 mg/dL (12 Dec 2023 08:50)  POCT Blood Glucose.: 201 mg/dL (12 Dec 2023 07:14)  POCT Blood Glucose.: 174 mg/dL (11 Dec 2023 21:37)  POCT Blood Glucose.: 306 mg/dL (11 Dec 2023 17:16)   HPI:   71 y/o M with PMHx of DM, HTN, PAD and PSHX of R BKA presents to ED at recommendation of outpatient podiatrist. Patient was recently admitted and being followed by vascular and podiatry for wounds to the L foot. Was noted to have dry gangrene of L 2nd/3rd toes. [place] due to extent of vascular disease, patient would not heal if amputation was done. Was told to f/u with vascular as an outpatient. Saw podiatry yesterday, due to gangrene extending to 4th L toe. Recommended to come in for abx and possible procedure with podiatry. Patient is endorsing L toe pain. 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 No nausea, vomiting, or hematemesis; No diarrhea or constipation. No melena or hematochezia.  GENITOURINARY: No dysuria, frequency, hematuria, or incontinence  NEUROLOGICAL: No headaches, memory loss, loss of strength, numbness, or tremors  SKIN: No itching, burning, rashes, or lesions   LYMPH NODES: No enlarged glands  ENDOCRINE: No heat or cold intolerance; No hair loss  MUSCULOSKELETAL: No joint pain or swelling; No muscle, back, or extremity pain  PSYCHIATRIC: No depression, anxiety, mood swings, or difficulty sleeping  HEME/LYMPH: No easy bruising, or bleeding gums  ALLERY AND IMMUNOLOGIC: No hives or eczema    Allergies    No Known Allergies    Intolerances        Social History:     FAMILY HISTORY:    T(C): 36.4 (12-12-23 @ 11:34), Max: 36.9 (12-12-23 @ 04:46)  HR: 78 (12-12-23 @ 12:09) (71 - 84)  BP: 129/69 (12-12-23 @ 12:09) (129/69 - 181/80)  RR: 18 (12-12-23 @ 11:34) (18 - 18)  SpO2: 98% (12-12-23 @ 11:34) (97% - 99%)    MEDICATIONS  (STANDING):  amLODIPine   Tablet 2.5 milliGRAM(s) Oral daily  aspirin enteric coated 81 milliGRAM(s) Oral daily  atorvastatin 40 milliGRAM(s) Oral at bedtime  chlorhexidine 2% Cloths 1 Application(s) Topical daily  dextrose 5%. 1000 milliLiter(s) (50 mL/Hr) IV Continuous <Continuous>  dextrose 5%. 1000 milliLiter(s) (100 mL/Hr) IV Continuous <Continuous>  dextrose 50% Injectable 25 Gram(s) IV Push once  dextrose 50% Injectable 12.5 Gram(s) IV Push once  dextrose 50% Injectable 25 Gram(s) IV Push once  glucagon  Injectable 1 milliGRAM(s) IntraMuscular once  insulin lispro (ADMELOG) corrective regimen sliding scale   SubCutaneous at bedtime  insulin lispro (ADMELOG) corrective regimen sliding scale   SubCutaneous three times a day before meals  losartan 25 milliGRAM(s) Oral daily  piperacillin/tazobactam IVPB.. 3.375 Gram(s) IV Intermittent every 8 hours  sodium chloride 0.9%. 1000 milliLiter(s) (75 mL/Hr) IV Continuous <Continuous>    MEDICATIONS  (PRN):  dextrose Oral Gel 15 Gram(s) Oral once PRN Blood Glucose LESS THAN 70 milliGRAM(s)/deciliter        PHYSICAL EXAM:  GENERAL: NAD, well-developed  HEAD:  Atraumatic, Normocephalic  EYES: EOMI, PERRLA, conjunctiva and sclera clear  NECK: Supple, No JVD  CHEST/LUNG: Clear to auscultation bilaterally; No wheeze  HEART: Regular rate and rhythm; No murmurs, rubs, or gallops  ABDOMEN: Soft, Nontender, Nondistended; Bowel sounds present  EXTREMITIES:  Left foot abscess   PSYCH: AAOx3  NEUROLOGY: non-focal  SKIN: No rashes or lesions                                                  11.1   7.17  )-----------( 268      ( 12 Dec 2023 06:56 )             34.7             PT/INR - ( 12 Dec 2023 06:56 )   PT: 11.5 sec;   INR: 1.10 ratio           138|101|20<195  3.7|24|1.17  9.8,--,--  12-12 @ 06:56    CAPILLARY BLOOD GLUCOSE      POCT Blood Glucose.: 204 mg/dL (12 Dec 2023 12:48)  POCT Blood Glucose.: 177 mg/dL (12 Dec 2023 08:50)  POCT Blood Glucose.: 201 mg/dL (12 Dec 2023 07:14)  POCT Blood Glucose.: 174 mg/dL (11 Dec 2023 21:37)  POCT Blood Glucose.: 306 mg/dL (11 Dec 2023 17:16)

## 2023-12-12 NOTE — PROGRESS NOTE ADULT - ASSESSMENT
72M presents with left foot 2nd & 3rd digit partial thickness gangrene.  - Patient seen and evaluated.  - Afebrile, no leukocytosis.  - Left foot 2nd and 3rd digit and 4th digit dorsal full thickness gangrene to level of MTPJ, no wet conversion noted plantarly today. , plantar 2nd and 3rd digit dusky and early ischemic changes, mild serous drainage, mild malodor, early dusky changes of fourth metatarsal.   - Left Foot X-Ray: no OM, no gas.  - Left foot culture:  Serratia liquefaciens (prelim)  - Left foot MR: OM of 2nd middle phalanx, OM of 2nd proximal phalanx, OM of  third proximal phalanx, OM of fourth proximal interphalangeal joint, OM of base of the fourth proximal phalanx, OM of fifth proximal interphalangeal joint.  - Vascular recommendations, appreciated  - Pod plan Left foot TMA with YESENIA pending vascular recommendations/demarcation.  - Please document medical and cardiac clearance for partial second ray resection of the left foot.   - Discussed with attending.

## 2023-12-12 NOTE — PROGRESS NOTE ADULT - ASSESSMENT
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 Recently presented for 2/3rd toe gangrene with podiatry recommending toe resection vs TMA. Vascular was consulted and completed LLE angio showing PT runoff, no stent or balloon angioplasty. Vascular recommended possible bypass vs deep vein arterialization. Now returns d/t worsening LLE gangrene with extension to 4th toe. HDS, no WBC, glucose >200. LLE with +DP/PT signals, palp pop and b/l palpable fem. Vascular surgery consulted for possible revasc prior to resection.    Plan:  - OR plan for Thursday  - c/w wound care and IV abx  - monitor for fever/WBC elevation, dry gangrene on exam 2-4th toe.  - pain control prn  - blood glucose elevated rec glucose goals 100-180.   - will follow    Discussed with vascular fellow on behalf of Dr. 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 Recently presented for 2/3rd toe gangrene with podiatry recommending toe resection vs TMA. Vascular was consulted and completed LLE angio showing PT runoff, no stent or balloon angioplasty. Vascular recommended possible bypass vs deep vein arterialization. Now returns d/t worsening LLE gangrene with extension to 4th toe. HDS, no WBC, glucose >200. LLE with +DP/PT signals, palp pop and b/l palpable fem. Vascular surgery consulted for possible revasc prior to resection.    Plan:  - OR plan for Thursday  - c/w wound care and IV abx  - monitor for fever/WBC elevation, dry gangrene on exam 2-4th toe.  - pain control prn  - blood glucose elevated rec glucose goals 100-180.   - will follow    Discussed with vascular fellow on behalf of Dr. Johnson    Vascular Surgery [id number]

## 2023-12-12 NOTE — PROGRESS NOTE ADULT - SUBJECTIVE AND OBJECTIVE BOX
[facility]  ERVIN Florentino Y. Patel, S. Shah, G. Ervin  [phone]  ([phone] - weekdays after 5pm and weekends)    Name: ALEE DUNN  Age/Gender: 72y Male  MRN: [id number]    Interval History:  Patient seen and examined this morning.   Resting comfortably. Notes reviewed  No concerning overnight events  Afebrile   Allergies: No Known Allergies      Objective:  Vitals:   T(F): 97.9 (12-12-23 @ 08:09), Max: 98.8 (12-11-23 @ 19:37)  HR: 76 (12-12-23 @ 08:09) (70 - 84)  BP: 158/81 (12-12-23 @ 08:09) (152/71 - 181/80)  RR: 18 (12-12-23 @ 08:09) (18 - 18)  SpO2: 97% (12-12-23 @ 08:09) (96% - 99%)  Physical Examination:  General: no acute distress  HEENT: NC/AT, anicteric, neck supple  Respiratory: no acc muscle use, breathing comfortably  Cardiovascular: S1 and S2 present  Gastrointestinal: normal appearing, nondistended  Extremities: R BKA, L foot dressing   Skin: no visible rash    Laboratory Studies:  CBC:                       11.1   7.17  )-----------( 268      ( 12 Dec 2023 06:56 )             34.7     WBC Trend:  7.17 12-12-23 @ 06:56  7.33 12-11-23 @ 07:19  6.61 12-10-23 @ 07:13  7.63 12-09-23 @ 09:23    CMP: 12-12    138  |  101  |  20  ----------------------------<  195<H>  3.7   |  24  |  1.17    Ca    9.8      12 Dec 2023 06:56  Phos  3.6     12-11  Mg     2.2     12-11      Creatinine: 1.17 mg/dL (12-12-23 @ 06:56)  Creatinine: 1.12 mg/dL (12-11-23 @ 07:19)  Creatinine: 1.02 mg/dL (12-10-23 @ 07:13)  Creatinine: 0.88 mg/dL (12-09-23 @ 09:23)    Microbiology: reviewed   Culture - Abscess with Gram Stain (collected 12-09-23 @ 10:45)  Source: .Abscess left foot  Preliminary Report (12-10-23 @ 16:08):    Moderate Serratia liquefaciens    Culture - Blood (collected 12-09-23 @ 09:05)  Source: .Blood Blood-Peripheral  Preliminary Report (12-11-23 @ 14:02):    No growth at 48 Hours    Culture - Blood (collected 12-09-23 @ 09:00)  Source: .Blood Blood-Peripheral  Preliminary Report (12-11-23 @ 14:02):    No growth at 48 Hours    Radiology: reviewed     Medications:  amLODIPine   Tablet 2.5 milliGRAM(s) Oral daily  aspirin enteric coated 81 milliGRAM(s) Oral daily  atorvastatin 40 milliGRAM(s) Oral at bedtime  chlorhexidine 2% Cloths 1 Application(s) Topical daily  dextrose 5%. 1000 milliLiter(s) IV Continuous <Continuous>  dextrose 5%. 1000 milliLiter(s) IV Continuous <Continuous>  dextrose 50% Injectable 25 Gram(s) IV Push once  dextrose 50% Injectable 25 Gram(s) IV Push once  dextrose 50% Injectable 12.5 Gram(s) IV Push once  dextrose Oral Gel 15 Gram(s) Oral once PRN  glucagon  Injectable 1 milliGRAM(s) IntraMuscular once  insulin lispro (ADMELOG) corrective regimen sliding scale   SubCutaneous at bedtime  insulin lispro (ADMELOG) corrective regimen sliding scale   SubCutaneous three times a day before meals  losartan 25 milliGRAM(s) Oral daily  piperacillin/tazobactam IVPB.. 3.375 Gram(s) IV Intermittent every 8 hours  sodium chloride 0.9%. 1000 milliLiter(s) IV Continuous <Continuous>    Current Antimicrobials:  piperacillin/tazobactam IVPB.. 3.375 Gram(s) IV Intermittent every 8 hours    Prior/Completed Antimicrobials:  piperacillin/tazobactam IVPB...  vancomycin  IVPB.   [facility]  ERVIN Florentino Y. Patel, S. Shah, G. Ervin  [phone]  ([phone] - weekdays after 5pm and weekends)    Name: ALEE DUNN  Age/Gender: 72y Male  MRN: [id number]    Interval History:  Patient seen and examined this morning.   Resting comfortably. Notes reviewed  No concerning overnight events  Afebrile   Allergies: No Known Allergies      Objective:  Vitals:   T(F): 97.9 (12-12-23 @ 08:09), Max: 98.8 (12-11-23 @ 19:37)  HR: 76 (12-12-23 @ 08:09) (70 - 84)  BP: 158/81 (12-12-23 @ 08:09) (152/71 - 181/80)  RR: 18 (12-12-23 @ 08:09) (18 - 18)  SpO2: 97% (12-12-23 @ 08:09) (96% - 99%)  Physical Examination:  General: no acute distress  HEENT: NC/AT, anicteric, neck supple  Respiratory: no acc muscle use, breathing comfortably  Cardiovascular: S1 and S2 present  Gastrointestinal: normal appearing, nondistended  Extremities: R BKA, L foot dressing   Skin: no visible rash    Laboratory Studies:  CBC:                       11.1   7.17  )-----------( 268      ( 12 Dec 2023 06:56 )             34.7     WBC Trend:  7.17 12-12-23 @ 06:56  7.33 12-11-23 @ 07:19  6.61 12-10-23 @ 07:13  7.63 12-09-23 @ 09:23    CMP: 12-12    138  |  101  |  20  ----------------------------<  195<H>  3.7   |  24  |  1.17    Ca    9.8      12 Dec 2023 06:56  Phos  3.6     12-11  Mg     2.2     12-11      Creatinine: 1.17 mg/dL (12-12-23 @ 06:56)  Creatinine: 1.12 mg/dL (12-11-23 @ 07:19)  Creatinine: 1.02 mg/dL (12-10-23 @ 07:13)  Creatinine: 0.88 mg/dL (12-09-23 @ 09:23)    Microbiology: reviewed   Culture - Abscess with Gram Stain (collected 12-09-23 @ 10:45)  Source: .Abscess left foot  Preliminary Report (12-10-23 @ 16:08):    Moderate Serratia liquefaciens    Culture - Blood (collected 12-09-23 @ 09:05)  Source: .Blood Blood-Peripheral  Preliminary Report (12-11-23 @ 14:02):    No growth at 48 Hours    Culture - Blood (collected 12-09-23 @ 09:00)  Source: .Blood Blood-Peripheral  Preliminary Report (12-11-23 @ 14:02):    No growth at 48 Hours    Radiology: reviewed     Medications:  amLODIPine   Tablet 2.5 milliGRAM(s) Oral daily  aspirin enteric coated 81 milliGRAM(s) Oral daily  atorvastatin 40 milliGRAM(s) Oral at bedtime  chlorhexidine 2% Cloths 1 Application(s) Topical daily  dextrose 5%. 1000 milliLiter(s) IV Continuous <Continuous>  dextrose 5%. 1000 milliLiter(s) IV Continuous <Continuous>  dextrose 50% Injectable 25 Gram(s) IV Push once  dextrose 50% Injectable 25 Gram(s) IV Push once  dextrose 50% Injectable 12.5 Gram(s) IV Push once  dextrose Oral Gel 15 Gram(s) Oral once PRN  glucagon  Injectable 1 milliGRAM(s) IntraMuscular once  insulin lispro (ADMELOG) corrective regimen sliding scale   SubCutaneous at bedtime  insulin lispro (ADMELOG) corrective regimen sliding scale   SubCutaneous three times a day before meals  losartan 25 milliGRAM(s) Oral daily  piperacillin/tazobactam IVPB.. 3.375 Gram(s) IV Intermittent every 8 hours  sodium chloride 0.9%. 1000 milliLiter(s) IV Continuous <Continuous>    Current Antimicrobials:  piperacillin/tazobactam IVPB.. 3.375 Gram(s) IV Intermittent every 8 hours    Prior/Completed Antimicrobials:  piperacillin/tazobactam IVPB...  vancomycin  IVPB.

## 2023-12-12 NOTE — PROGRESS NOTE ADULT - SUBJECTIVE AND OBJECTIVE BOX
Vascular Surgery Progress Note  Patient is a 72y old  Male who presents with a chief complaint of Left 4 th toe infection (12 Dec 2023 09:49)      INTERVAL EVENTS: No acute events overnight.  SUBJECTIVE: Patient seen and examined at bedside with surgical team, patient without complaints. Denies fever, chills, CP, SOB nausea, vomiting, abdominal pain.      OBJECTIVE:    Vital Signs Last 24 Hrs  T(C): 36.4 (12 Dec 2023 11:34), Max: 37.1 (11 Dec 2023 19:37)  T(F): 97.6 (12 Dec 2023 11:34), Max: 98.8 (11 Dec 2023 19:37)  HR: 71 (12 Dec 2023 11:34) (71 - 84)  BP: 175/78 (12 Dec 2023 11:34) (152/71 - 181/80)  BP(mean): --  RR: 18 (12 Dec 2023 11:34) (18 - 18)  SpO2: 98% (12 Dec 2023 11:34) (96% - 99%)    Parameters below as of 12 Dec 2023 11:34  Patient On (Oxygen Delivery Method): room air    I&O's Detail    11 Dec 2023 07:01  -  12 Dec 2023 07:00  --------------------------------------------------------  IN:    IV PiggyBack: 200 mL    Oral Fluid: 620 mL    sodium chloride 0.9%: 600 mL  Total IN: 1420 mL    OUT:    Voided (mL): 500 mL  Total OUT: 500 mL    Total NET: 920 mL      12 Dec 2023 07:01  -  12 Dec 2023 11:46  --------------------------------------------------------  IN:    IV PiggyBack: 100 mL  Total IN: 100 mL    OUT:  Total OUT: 0 mL    Total NET: 100 mL      MEDICATIONS  (STANDING):  amLODIPine   Tablet 2.5 milliGRAM(s) Oral daily  aspirin enteric coated 81 milliGRAM(s) Oral daily  atorvastatin 40 milliGRAM(s) Oral at bedtime  chlorhexidine 2% Cloths 1 Application(s) Topical daily  dextrose 5%. 1000 milliLiter(s) (50 mL/Hr) IV Continuous <Continuous>  dextrose 5%. 1000 milliLiter(s) (100 mL/Hr) IV Continuous <Continuous>  dextrose 50% Injectable 25 Gram(s) IV Push once  dextrose 50% Injectable 12.5 Gram(s) IV Push once  dextrose 50% Injectable 25 Gram(s) IV Push once  glucagon  Injectable 1 milliGRAM(s) IntraMuscular once  insulin lispro (ADMELOG) corrective regimen sliding scale   SubCutaneous at bedtime  insulin lispro (ADMELOG) corrective regimen sliding scale   SubCutaneous three times a day before meals  losartan 25 milliGRAM(s) Oral daily  piperacillin/tazobactam IVPB.. 3.375 Gram(s) IV Intermittent every 8 hours  sodium chloride 0.9%. 1000 milliLiter(s) (75 mL/Hr) IV Continuous <Continuous>    MEDICATIONS  (PRN):  dextrose Oral Gel 15 Gram(s) Oral once PRN Blood Glucose LESS THAN 70 milliGRAM(s)/deciliter      PHYSICAL EXAM:    General: NAD, resting comfortably  Pulmonary: normal resp effort  Cardiovascular: NSR  Abdominal: soft, ND/NT, no organomegaly, no guarding or rebound tenderness  Extremities: RLE s/p BKA, LLE with 2/3rd toe gangrene, 4th toe dusky with purple/black discoloration possible dry gangrene. Toes all appear dry.   Neuro: A/O x 3    LABS:                        11.1   7.17  )-----------( 268      ( 12 Dec 2023 06:56 )             34.7     12-12    138  |  101  |  20  ----------------------------<  195<H>  3.7   |  24  |  1.17    Ca    9.8      12 Dec 2023 06:56  Phos  3.6     12-11  Mg     2.2     12-11      PT/INR - ( 12 Dec 2023 06:56 )   PT: 11.5 sec;   INR: 1.10 ratio         PTT - ( 10 Dec 2023 14:35 )  PTT:30.1 sec    Urinalysis Basic - ( 12 Dec 2023 06:56 )    Color: x / Appearance: x / SG: x / pH: x  Gluc: 195 mg/dL / Ketone: x  / Bili: x / Urobili: x   Blood: x / Protein: x / Nitrite: x   Leuk Esterase: x / RBC: x / WBC x   Sq Epi: x / Non Sq Epi: x / Bacteria: x      ABO Interpretation: B (12-12-23 @ 06:28)      IMAGING:

## 2023-12-12 NOTE — PROGRESS NOTE ADULT - ASSESSMENT
71 y/o M with PMHx of DM, HTN, PAD and PSHX of R BKA presents to ED at recommendation of outpatient podiatrist.     Left 4 th toe infection  PAD   Zosyn      L foot MRI with findings c/w septic arthritis and OM, no drainable abscess   - L foot abscess culture with moderate Serratia liquefaciens   - Bcx NGTD x2   - Podiatry and Vascular surgery following, recs noted   - afebrile, WBC wnl    Podiatry planning for L foot 2nd and 3rd ray resection vs TMA   OR Thursday   DM HISS   A1C 7.2    HTN Home meds

## 2023-12-13 LAB
ANION GAP SERPL CALC-SCNC: 9 MMOL/L — SIGNIFICANT CHANGE UP (ref 5–17)
ANION GAP SERPL CALC-SCNC: 9 MMOL/L — SIGNIFICANT CHANGE UP (ref 5–17)
BUN SERPL-MCNC: 20 MG/DL — SIGNIFICANT CHANGE UP (ref 7–23)
BUN SERPL-MCNC: 20 MG/DL — SIGNIFICANT CHANGE UP (ref 7–23)
CALCIUM SERPL-MCNC: 9.2 MG/DL — SIGNIFICANT CHANGE UP (ref 8.4–10.5)
CALCIUM SERPL-MCNC: 9.2 MG/DL — SIGNIFICANT CHANGE UP (ref 8.4–10.5)
CHLORIDE SERPL-SCNC: 105 MMOL/L — SIGNIFICANT CHANGE UP (ref 96–108)
CHLORIDE SERPL-SCNC: 105 MMOL/L — SIGNIFICANT CHANGE UP (ref 96–108)
CO2 SERPL-SCNC: 24 MMOL/L — SIGNIFICANT CHANGE UP (ref 22–31)
CO2 SERPL-SCNC: 24 MMOL/L — SIGNIFICANT CHANGE UP (ref 22–31)
CREAT SERPL-MCNC: 1.01 MG/DL — SIGNIFICANT CHANGE UP (ref 0.5–1.3)
CREAT SERPL-MCNC: 1.01 MG/DL — SIGNIFICANT CHANGE UP (ref 0.5–1.3)
EGFR: 79 ML/MIN/1.73M2 — SIGNIFICANT CHANGE UP
EGFR: 79 ML/MIN/1.73M2 — SIGNIFICANT CHANGE UP
GLUCOSE BLDC GLUCOMTR-MCNC: 183 MG/DL — HIGH (ref 70–99)
GLUCOSE BLDC GLUCOMTR-MCNC: 183 MG/DL — HIGH (ref 70–99)
GLUCOSE BLDC GLUCOMTR-MCNC: 198 MG/DL — HIGH (ref 70–99)
GLUCOSE BLDC GLUCOMTR-MCNC: 198 MG/DL — HIGH (ref 70–99)
GLUCOSE BLDC GLUCOMTR-MCNC: 202 MG/DL — HIGH (ref 70–99)
GLUCOSE BLDC GLUCOMTR-MCNC: 202 MG/DL — HIGH (ref 70–99)
GLUCOSE BLDC GLUCOMTR-MCNC: 240 MG/DL — HIGH (ref 70–99)
GLUCOSE BLDC GLUCOMTR-MCNC: 240 MG/DL — HIGH (ref 70–99)
GLUCOSE SERPL-MCNC: 209 MG/DL — HIGH (ref 70–99)
GLUCOSE SERPL-MCNC: 209 MG/DL — HIGH (ref 70–99)
HCT VFR BLD CALC: 30.9 % — LOW (ref 39–50)
HCT VFR BLD CALC: 30.9 % — LOW (ref 39–50)
HGB BLD-MCNC: 10.2 G/DL — LOW (ref 13–17)
HGB BLD-MCNC: 10.2 G/DL — LOW (ref 13–17)
MCHC RBC-ENTMCNC: 29.1 PG — SIGNIFICANT CHANGE UP (ref 27–34)
MCHC RBC-ENTMCNC: 29.1 PG — SIGNIFICANT CHANGE UP (ref 27–34)
MCHC RBC-ENTMCNC: 33 GM/DL — SIGNIFICANT CHANGE UP (ref 32–36)
MCHC RBC-ENTMCNC: 33 GM/DL — SIGNIFICANT CHANGE UP (ref 32–36)
MCV RBC AUTO: 88 FL — SIGNIFICANT CHANGE UP (ref 80–100)
MCV RBC AUTO: 88 FL — SIGNIFICANT CHANGE UP (ref 80–100)
NRBC # BLD: 0 /100 WBCS — SIGNIFICANT CHANGE UP (ref 0–0)
NRBC # BLD: 0 /100 WBCS — SIGNIFICANT CHANGE UP (ref 0–0)
PLATELET # BLD AUTO: 214 K/UL — SIGNIFICANT CHANGE UP (ref 150–400)
PLATELET # BLD AUTO: 214 K/UL — SIGNIFICANT CHANGE UP (ref 150–400)
POTASSIUM SERPL-MCNC: 4 MMOL/L — SIGNIFICANT CHANGE UP (ref 3.5–5.3)
POTASSIUM SERPL-MCNC: 4 MMOL/L — SIGNIFICANT CHANGE UP (ref 3.5–5.3)
POTASSIUM SERPL-SCNC: 4 MMOL/L — SIGNIFICANT CHANGE UP (ref 3.5–5.3)
POTASSIUM SERPL-SCNC: 4 MMOL/L — SIGNIFICANT CHANGE UP (ref 3.5–5.3)
RBC # BLD: 3.51 M/UL — LOW (ref 4.2–5.8)
RBC # BLD: 3.51 M/UL — LOW (ref 4.2–5.8)
RBC # FLD: 12.3 % — SIGNIFICANT CHANGE UP (ref 10.3–14.5)
RBC # FLD: 12.3 % — SIGNIFICANT CHANGE UP (ref 10.3–14.5)
SODIUM SERPL-SCNC: 138 MMOL/L — SIGNIFICANT CHANGE UP (ref 135–145)
SODIUM SERPL-SCNC: 138 MMOL/L — SIGNIFICANT CHANGE UP (ref 135–145)
WBC # BLD: 5.84 K/UL — SIGNIFICANT CHANGE UP (ref 3.8–10.5)
WBC # BLD: 5.84 K/UL — SIGNIFICANT CHANGE UP (ref 3.8–10.5)
WBC # FLD AUTO: 5.84 K/UL — SIGNIFICANT CHANGE UP (ref 3.8–10.5)
WBC # FLD AUTO: 5.84 K/UL — SIGNIFICANT CHANGE UP (ref 3.8–10.5)

## 2023-12-13 RX ADMIN — LOSARTAN POTASSIUM 25 MILLIGRAM(S): 100 TABLET, FILM COATED ORAL at 05:27

## 2023-12-13 RX ADMIN — Medication 81 MILLIGRAM(S): at 13:03

## 2023-12-13 RX ADMIN — Medication 1: at 09:23

## 2023-12-13 RX ADMIN — ATORVASTATIN CALCIUM 40 MILLIGRAM(S): 80 TABLET, FILM COATED ORAL at 22:01

## 2023-12-13 RX ADMIN — PIPERACILLIN AND TAZOBACTAM 25 GRAM(S): 4; .5 INJECTION, POWDER, LYOPHILIZED, FOR SOLUTION INTRAVENOUS at 21:59

## 2023-12-13 RX ADMIN — Medication 2: at 13:04

## 2023-12-13 RX ADMIN — CHLORHEXIDINE GLUCONATE 1 APPLICATION(S): 213 SOLUTION TOPICAL at 05:28

## 2023-12-13 RX ADMIN — PIPERACILLIN AND TAZOBACTAM 25 GRAM(S): 4; .5 INJECTION, POWDER, LYOPHILIZED, FOR SOLUTION INTRAVENOUS at 13:04

## 2023-12-13 RX ADMIN — PIPERACILLIN AND TAZOBACTAM 25 GRAM(S): 4; .5 INJECTION, POWDER, LYOPHILIZED, FOR SOLUTION INTRAVENOUS at 04:22

## 2023-12-13 RX ADMIN — Medication 2: at 17:25

## 2023-12-13 RX ADMIN — AMLODIPINE BESYLATE 2.5 MILLIGRAM(S): 2.5 TABLET ORAL at 05:27

## 2023-12-13 NOTE — PROGRESS NOTE ADULT - SUBJECTIVE AND OBJECTIVE BOX
[facility]  ERVIN Florentino Y. Patel, S. Shah, G. Ervin  [phone]  ([phone] - weekdays after 5pm and weekends)    Name: ALEE DUNN  Age/Gender: 72y Male  MRN: [id number]    Interval History:  Patient seen and examined this morning.   Denies fever, pain or any new complaints.  Notes reviewed  No concerning overnight events  Afebrile   Allergies: No Known Allergies      Objective:  Vitals:   T(F): 98 (12-13-23 @ 05:25), Max: 98.8 (12-12-23 @ 19:41)  HR: 69 (12-13-23 @ 05:25) (69 - 80)  BP: 153/71 (12-13-23 @ 05:25) (129/69 - 175/78)  RR: 18 (12-13-23 @ 05:25) (18 - 18)  SpO2: 99% (12-13-23 @ 05:25) (98% - 99%)  Physical Examination:  General: no acute distress  HEENT: NC/AT, anicteric, neck supple  Respiratory: no acc muscle use, breathing comfortably  Cardiovascular: S1 and S2 present  Gastrointestinal: normal appearing, nondistended  Extremities: R BKA, no LLE edema  Skin: no visible rash    Laboratory Studies:  CBC:                       10.2   5.84  )-----------( 214      ( 13 Dec 2023 07:29 )             30.9     WBC Trend:  5.84 12-13-23 @ 07:29  7.17 12-12-23 @ 06:56  7.33 12-11-23 @ 07:19  6.61 12-10-23 @ 07:13  7.63 12-09-23 @ 09:23    CMP: 12-13    138  |  105  |  20  ----------------------------<  209<H>  4.0   |  24  |  1.01    Ca    9.2      13 Dec 2023 07:27      Creatinine: 1.01 mg/dL (12-13-23 @ 07:27)  Creatinine: 1.17 mg/dL (12-12-23 @ 06:56)  Creatinine: 1.12 mg/dL (12-11-23 @ 07:19)  Creatinine: 1.02 mg/dL (12-10-23 @ 07:13)  Creatinine: 0.88 mg/dL (12-09-23 @ 09:23)    Microbiology: reviewed   Culture - Abscess with Gram Stain (collected 12-09-23 @ 10:45)  Source: .Abscess left foot  Preliminary Report (12-12-23 @ 14:37):    Moderate Serratia liquefaciens Susceptibility to follow.    Numerous Corynebacterium amycolatum "Susceptibilities not performed"    Moderate Finegoldia magna "Susceptibilities not performed"    Culture - Blood (collected 12-09-23 @ 09:05)  Source: .Blood Blood-Peripheral  Preliminary Report (12-12-23 @ 14:01):    No growth at 72 Hours    Culture - Blood (collected 12-09-23 @ 09:00)  Source: .Blood Blood-Peripheral  Preliminary Report (12-12-23 @ 14:01):    No growth at 72 Hours    Radiology: reviewed     Medications:  amLODIPine   Tablet 2.5 milliGRAM(s) Oral daily  aspirin enteric coated 81 milliGRAM(s) Oral daily  atorvastatin 40 milliGRAM(s) Oral at bedtime  chlorhexidine 2% Cloths 1 Application(s) Topical daily  dextrose 5%. 1000 milliLiter(s) IV Continuous <Continuous>  dextrose 5%. 1000 milliLiter(s) IV Continuous <Continuous>  dextrose 50% Injectable 25 Gram(s) IV Push once  dextrose 50% Injectable 12.5 Gram(s) IV Push once  dextrose 50% Injectable 25 Gram(s) IV Push once  dextrose Oral Gel 15 Gram(s) Oral once PRN  glucagon  Injectable 1 milliGRAM(s) IntraMuscular once  insulin lispro (ADMELOG) corrective regimen sliding scale   SubCutaneous at bedtime  insulin lispro (ADMELOG) corrective regimen sliding scale   SubCutaneous three times a day before meals  losartan 25 milliGRAM(s) Oral daily  piperacillin/tazobactam IVPB.. 3.375 Gram(s) IV Intermittent every 8 hours  sodium chloride 0.9%. 1000 milliLiter(s) IV Continuous <Continuous>    Current Antimicrobials:  piperacillin/tazobactam IVPB.. 3.375 Gram(s) IV Intermittent every 8 hours    Prior/Completed Antimicrobials:  piperacillin/tazobactam IVPB...  vancomycin  IVPB.   [facility]  ERVIN Florentino Y. Patel, S. Shah, G. Ervin  [phone]  ([phone] - weekdays after 5pm and weekends)    Name: ALEE DUNN  Age/Gender: 72y Male  MRN: [id number]    Interval History:  Patient seen and examined this morning.   Denies fever, pain or any new complaints.  Notes reviewed  No concerning overnight events  Afebrile   Allergies: No Known Allergies      Objective:  Vitals:   T(F): 98 (12-13-23 @ 05:25), Max: 98.8 (12-12-23 @ 19:41)  HR: 69 (12-13-23 @ 05:25) (69 - 80)  BP: 153/71 (12-13-23 @ 05:25) (129/69 - 175/78)  RR: 18 (12-13-23 @ 05:25) (18 - 18)  SpO2: 99% (12-13-23 @ 05:25) (98% - 99%)  Physical Examination:  General: no acute distress  HEENT: NC/AT, anicteric, neck supple  Respiratory: no acc muscle use, breathing comfortably  Cardiovascular: S1 and S2 present  Gastrointestinal: normal appearing, nondistended  Extremities: R BKA, no LLE edema  Skin: no visible rash    Laboratory Studies:  CBC:                       10.2   5.84  )-----------( 214      ( 13 Dec 2023 07:29 )             30.9     WBC Trend:  5.84 12-13-23 @ 07:29  7.17 12-12-23 @ 06:56  7.33 12-11-23 @ 07:19  6.61 12-10-23 @ 07:13  7.63 12-09-23 @ 09:23    CMP: 12-13    138  |  105  |  20  ----------------------------<  209<H>  4.0   |  24  |  1.01    Ca    9.2      13 Dec 2023 07:27      Creatinine: 1.01 mg/dL (12-13-23 @ 07:27)  Creatinine: 1.17 mg/dL (12-12-23 @ 06:56)  Creatinine: 1.12 mg/dL (12-11-23 @ 07:19)  Creatinine: 1.02 mg/dL (12-10-23 @ 07:13)  Creatinine: 0.88 mg/dL (12-09-23 @ 09:23)    Microbiology: reviewed   Culture - Abscess with Gram Stain (collected 12-09-23 @ 10:45)  Source: .Abscess left foot  Preliminary Report (12-12-23 @ 14:37):    Moderate Serratia liquefaciens Susceptibility to follow.    Numerous Corynebacterium amycolatum "Susceptibilities not performed"    Moderate Finegoldia magna "Susceptibilities not performed"    Culture - Blood (collected 12-09-23 @ 09:05)  Source: .Blood Blood-Peripheral  Preliminary Report (12-12-23 @ 14:01):    No growth at 72 Hours    Culture - Blood (collected 12-09-23 @ 09:00)  Source: .Blood Blood-Peripheral  Preliminary Report (12-12-23 @ 14:01):    No growth at 72 Hours    Radiology: reviewed     Medications:  amLODIPine   Tablet 2.5 milliGRAM(s) Oral daily  aspirin enteric coated 81 milliGRAM(s) Oral daily  atorvastatin 40 milliGRAM(s) Oral at bedtime  chlorhexidine 2% Cloths 1 Application(s) Topical daily  dextrose 5%. 1000 milliLiter(s) IV Continuous <Continuous>  dextrose 5%. 1000 milliLiter(s) IV Continuous <Continuous>  dextrose 50% Injectable 25 Gram(s) IV Push once  dextrose 50% Injectable 12.5 Gram(s) IV Push once  dextrose 50% Injectable 25 Gram(s) IV Push once  dextrose Oral Gel 15 Gram(s) Oral once PRN  glucagon  Injectable 1 milliGRAM(s) IntraMuscular once  insulin lispro (ADMELOG) corrective regimen sliding scale   SubCutaneous at bedtime  insulin lispro (ADMELOG) corrective regimen sliding scale   SubCutaneous three times a day before meals  losartan 25 milliGRAM(s) Oral daily  piperacillin/tazobactam IVPB.. 3.375 Gram(s) IV Intermittent every 8 hours  sodium chloride 0.9%. 1000 milliLiter(s) IV Continuous <Continuous>    Current Antimicrobials:  piperacillin/tazobactam IVPB.. 3.375 Gram(s) IV Intermittent every 8 hours    Prior/Completed Antimicrobials:  piperacillin/tazobactam IVPB...  vancomycin  IVPB.

## 2023-12-13 NOTE — PROGRESS NOTE ADULT - ASSESSMENT
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 Recently presented for 2/3rd toe gangrene with podiatry recommending toe resection vs TMA. Vascular was consulted and completed LLE angio showing PT runoff, no stent or balloon angioplasty. Vascular recommended possible bypass vs deep vein arterialization. Now returns d/t worsening LLE gangrene with extension to 4th toe. HDS, no WBC, glucose >200. LLE with +DP/PT signals, palp pop and b/l palpable fem. Vascular surgery consulted for possible revasc prior to resection.    Plan:  - OR plan for Thursday for angiography  - c/w wound care and IV abx  - monitor for fever/WBC elevation, dry gangrene on exam 2-4th toe.  - pain control prn  - blood glucose elevated rec glucose goals 100-180.   - will follow    Discussed with vascular fellow on behalf of Dr. 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 Recently presented for 2/3rd toe gangrene with podiatry recommending toe resection vs TMA. Vascular was consulted and completed LLE angio showing PT runoff, no stent or balloon angioplasty. Vascular recommended possible bypass vs deep vein arterialization. Now returns d/t worsening LLE gangrene with extension to 4th toe. HDS, no WBC, glucose >200. LLE with +DP/PT signals, palp pop and b/l palpable fem. Vascular surgery consulted for possible revasc prior to resection.    Plan:  - OR plan for Thursday for angiography  - c/w wound care and IV abx  - monitor for fever/WBC elevation, dry gangrene on exam 2-4th toe.  - pain control prn  - blood glucose elevated rec glucose goals 100-180.   - will follow    Discussed with vascular fellow on behalf of Dr. Mullins    Vascular Surgery [id number]

## 2023-12-13 NOTE — PROGRESS NOTE ADULT - SUBJECTIVE AND OBJECTIVE BOX
SUBJECTIVE: Patient seen and examined on AM rounds. Patient reports that they're feeling well. Denies fever, chills. Reports pain as controlled. No complaints at this time.     Vital Signs Last 24 Hrs  T(C): 36.7 (13 Dec 2023 05:25), Max: 37.1 (12 Dec 2023 19:41)  T(F): 98 (13 Dec 2023 05:25), Max: 98.8 (12 Dec 2023 19:41)  HR: 69 (13 Dec 2023 05:25) (69 - 80)  BP: 153/71 (13 Dec 2023 05:25) (129/69 - 175/78)  BP(mean): --  RR: 18 (13 Dec 2023 05:25) (18 - 18)  SpO2: 99% (13 Dec 2023 05:25) (97% - 99%)    Parameters below as of 13 Dec 2023 05:25  Patient On (Oxygen Delivery Method): room air        General Appearance: Appears well, NAD  Neck: Supple  Chest: Equal expansion bilaterally  CV: Pulse regular presently  Abdomen: Soft, nontense  Extremities: RLE amputation; Left foot with ischemic changes to toes 2-4, +PT/AT signals    I&O's Summary    12 Dec 2023 07:01  -  13 Dec 2023 07:00  --------------------------------------------------------  IN: 800 mL / OUT: 0 mL / NET: 800 mL      I&O's Detail    12 Dec 2023 07:01  -  13 Dec 2023 07:00  --------------------------------------------------------  IN:    IV PiggyBack: 200 mL    Oral Fluid: 600 mL  Total IN: 800 mL    OUT:  Total OUT: 0 mL    Total NET: 800 mL          LABS:                        11.1   7.17  )-----------( 268      ( 12 Dec 2023 06:56 )             34.7     12-12    138  |  101  |  20  ----------------------------<  195<H>  3.7   |  24  |  1.17    Ca    9.8      12 Dec 2023 06:56      PT/INR - ( 12 Dec 2023 06:56 )   PT: 11.5 sec;   INR: 1.10 ratio           Urinalysis Basic - ( 12 Dec 2023 06:56 )    Color: x / Appearance: x / SG: x / pH: x  Gluc: 195 mg/dL / Ketone: x  / Bili: x / Urobili: x   Blood: x / Protein: x / Nitrite: x   Leuk Esterase: x / RBC: x / WBC x   Sq Epi: x / Non Sq Epi: x / Bacteria: x        RADIOLOGY & ADDITIONAL STUDIES:

## 2023-12-13 NOTE — PROGRESS NOTE ADULT - ASSESSMENT
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     LLE gangrene with extension to 4th toe with c/f infection  - L foot 2nd and 3rd digit dorsal full thickness gangrene to level of MTPJ, w/ wet conversion, plantar 2nd and 3rd digit dusky and early ischemic changes, mild serous drainage, mild malodor, early dusky changes of fourth metatarsal.  - L foot MRI with findings c/w septic arthritis and OM, no drainable abscess   - L foot abscess culture with moderate Serratia liquefaciens, Corynebacterium amycolatum, Finegoldia magna  - Bcx NGTD x2   - Podiatry and Vascular surgery following, recs noted   - afebrile, WBC wnl    Recommendations:   Vascular planning for angiography tomorrow 12/14  Podiatry planning for L foot TMA with YESENIA pending vascular recs  Follow up culture for sensitivities  Continue on pip-tazo   Can hold off on vancomycin for now   Continue local care   Monitor temps/WBC      Azucena Castillo M.D.  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     LLE gangrene with extension to 4th toe with c/f infection  - L foot 2nd and 3rd digit dorsal full thickness gangrene to level of MTPJ, w/ wet conversion, plantar 2nd and 3rd digit dusky and early ischemic changes, mild serous drainage, mild malodor, early dusky changes of fourth metatarsal.  - L foot MRI with findings c/w septic arthritis and OM, no drainable abscess   - L foot abscess culture with moderate Serratia liquefaciens, Corynebacterium amycolatum, Finegoldia magna  - Bcx NGTD x2   - Podiatry and Vascular surgery following, recs noted   - afebrile, WBC wnl    Recommendations:   Vascular planning for angiography tomorrow 12/14  Podiatry planning for L foot TMA with YESENIA pending vascular recs  Follow up culture for sensitivities  Continue on pip-tazo   Can hold off on vancomycin for now   Continue local care   Monitor temps/WBC      Azucena Castillo M.D.  [facility], Division of Infectious Diseases  [phone]  After 5pm on weekdays and all day on weekends - please call [phone]

## 2023-12-13 NOTE — PROGRESS NOTE ADULT - SUBJECTIVE AND OBJECTIVE BOX
HPI:   71 y/o M with PMHx of DM, HTN, PAD and PSHX of R BKA presents to ED at recommendation of outpatient podiatrist. Patient was recently admitted and being followed by vascular and podiatry for wounds to the L foot. Was noted to have dry gangrene of L 2nd/3rd toes. [place] due to extent of vascular disease, patient would not heal if amputation was done. Was told to f/u with vascular as an outpatient. Saw podiatry yesterday, due to gangrene extending to 4th L toe. Recommended to come in for abx and possible procedure with podiatry. Patient is endorsing L toe pain. 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 No nausea, vomiting, or hematemesis; No diarrhea or constipation. No melena or hematochezia.  GENITOURINARY: No dysuria, frequency, hematuria, or incontinence  NEUROLOGICAL: No headaches, memory loss, loss of strength, numbness, or tremors  SKIN: No itching, burning, rashes, or lesions   LYMPH NODES: No enlarged glands  ENDOCRINE: No heat or cold intolerance; No hair loss  MUSCULOSKELETAL: No joint pain or swelling; No muscle, back, or extremity pain  PSYCHIATRIC: No depression, anxiety, mood swings, or difficulty sleeping  HEME/LYMPH: No easy bruising, or bleeding gums  ALLERY AND IMMUNOLOGIC: No hives or eczema    Allergies    No Known Allergies    Intolerances        Social History:     FAMILY HISTORY:    T(C): 36.4 (12-12-23 @ 11:34), Max: 36.9 (12-12-23 @ 04:46)  HR: 78 (12-12-23 @ 12:09) (71 - 84)  BP: 129/69 (12-12-23 @ 12:09) (129/69 - 181/80)  RR: 18 (12-12-23 @ 11:34) (18 - 18)  SpO2: 98% (12-12-23 @ 11:34) (97% - 99%)    MEDICATIONS  (STANDING):  amLODIPine   Tablet 2.5 milliGRAM(s) Oral daily  aspirin enteric coated 81 milliGRAM(s) Oral daily  atorvastatin 40 milliGRAM(s) Oral at bedtime  chlorhexidine 2% Cloths 1 Application(s) Topical daily  dextrose 5%. 1000 milliLiter(s) (50 mL/Hr) IV Continuous <Continuous>  dextrose 5%. 1000 milliLiter(s) (100 mL/Hr) IV Continuous <Continuous>  dextrose 50% Injectable 25 Gram(s) IV Push once  dextrose 50% Injectable 12.5 Gram(s) IV Push once  dextrose 50% Injectable 25 Gram(s) IV Push once  glucagon  Injectable 1 milliGRAM(s) IntraMuscular once  insulin lispro (ADMELOG) corrective regimen sliding scale   SubCutaneous at bedtime  insulin lispro (ADMELOG) corrective regimen sliding scale   SubCutaneous three times a day before meals  losartan 25 milliGRAM(s) Oral daily  piperacillin/tazobactam IVPB.. 3.375 Gram(s) IV Intermittent every 8 hours  sodium chloride 0.9%. 1000 milliLiter(s) (75 mL/Hr) IV Continuous <Continuous>    MEDICATIONS  (PRN):  dextrose Oral Gel 15 Gram(s) Oral once PRN Blood Glucose LESS THAN 70 milliGRAM(s)/deciliter        PHYSICAL EXAM:  GENERAL: NAD, well-developed  HEAD:  Atraumatic, Normocephalic  EYES: EOMI, PERRLA, conjunctiva and sclera clear  NECK: Supple, No JVD  CHEST/LUNG: Clear to auscultation bilaterally; No wheeze  HEART: Regular rate and rhythm; No murmurs, rubs, or gallops  ABDOMEN: Soft, Nontender, Nondistended; Bowel sounds present  EXTREMITIES:  Left foot abscess   PSYCH: AAOx3  NEUROLOGY: non-focal  SKIN: No rashes or lesions                                                  11.1   7.17  )-----------( 268      ( 12 Dec 2023 06:56 )             34.7             PT/INR - ( 12 Dec 2023 06:56 )   PT: 11.5 sec;   INR: 1.10 ratio           138|101|20<195  3.7|24|1.17  9.8,--,--  12-12 @ 06:56    CAPILLARY BLOOD GLUCOSE      POCT Blood Glucose.: 204 mg/dL (12 Dec 2023 12:48)  POCT Blood Glucose.: 177 mg/dL (12 Dec 2023 08:50)  POCT Blood Glucose.: 201 mg/dL (12 Dec 2023 07:14)  POCT Blood Glucose.: 174 mg/dL (11 Dec 2023 21:37)  POCT Blood Glucose.: 306 mg/dL (11 Dec 2023 17:16)   HPI:   73 y/o M with PMHx of DM, HTN, PAD and PSHX of R BKA presents to ED at recommendation of outpatient podiatrist. Patient was recently admitted and being followed by vascular and podiatry for wounds to the L foot. Was noted to have dry gangrene of L 2nd/3rd toes. [place] due to extent of vascular disease, patient would not heal if amputation was done. Was told to f/u with vascular as an outpatient. Saw podiatry yesterday, due to gangrene extending to 4th L toe. Recommended to come in for abx and possible procedure with podiatry. Patient is endorsing L toe pain. 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 No nausea, vomiting, or hematemesis; No diarrhea or constipation. No melena or hematochezia.  GENITOURINARY: No dysuria, frequency, hematuria, or incontinence  NEUROLOGICAL: No headaches, memory loss, loss of strength, numbness, or tremors  SKIN: No itching, burning, rashes, or lesions   LYMPH NODES: No enlarged glands  ENDOCRINE: No heat or cold intolerance; No hair loss  MUSCULOSKELETAL: No joint pain or swelling; No muscle, back, or extremity pain  PSYCHIATRIC: No depression, anxiety, mood swings, or difficulty sleeping  HEME/LYMPH: No easy bruising, or bleeding gums  ALLERY AND IMMUNOLOGIC: No hives or eczema    Allergies    No Known Allergies    Intolerances        Social History:     FAMILY HISTORY:    T(C): 36.4 (12-12-23 @ 11:34), Max: 36.9 (12-12-23 @ 04:46)  HR: 78 (12-12-23 @ 12:09) (71 - 84)  BP: 129/69 (12-12-23 @ 12:09) (129/69 - 181/80)  RR: 18 (12-12-23 @ 11:34) (18 - 18)  SpO2: 98% (12-12-23 @ 11:34) (97% - 99%)    MEDICATIONS  (STANDING):  amLODIPine   Tablet 2.5 milliGRAM(s) Oral daily  aspirin enteric coated 81 milliGRAM(s) Oral daily  atorvastatin 40 milliGRAM(s) Oral at bedtime  chlorhexidine 2% Cloths 1 Application(s) Topical daily  dextrose 5%. 1000 milliLiter(s) (50 mL/Hr) IV Continuous <Continuous>  dextrose 5%. 1000 milliLiter(s) (100 mL/Hr) IV Continuous <Continuous>  dextrose 50% Injectable 25 Gram(s) IV Push once  dextrose 50% Injectable 12.5 Gram(s) IV Push once  dextrose 50% Injectable 25 Gram(s) IV Push once  glucagon  Injectable 1 milliGRAM(s) IntraMuscular once  insulin lispro (ADMELOG) corrective regimen sliding scale   SubCutaneous at bedtime  insulin lispro (ADMELOG) corrective regimen sliding scale   SubCutaneous three times a day before meals  losartan 25 milliGRAM(s) Oral daily  piperacillin/tazobactam IVPB.. 3.375 Gram(s) IV Intermittent every 8 hours  sodium chloride 0.9%. 1000 milliLiter(s) (75 mL/Hr) IV Continuous <Continuous>    MEDICATIONS  (PRN):  dextrose Oral Gel 15 Gram(s) Oral once PRN Blood Glucose LESS THAN 70 milliGRAM(s)/deciliter        PHYSICAL EXAM:  GENERAL: NAD, well-developed  HEAD:  Atraumatic, Normocephalic  EYES: EOMI, PERRLA, conjunctiva and sclera clear  NECK: Supple, No JVD  CHEST/LUNG: Clear to auscultation bilaterally; No wheeze  HEART: Regular rate and rhythm; No murmurs, rubs, or gallops  ABDOMEN: Soft, Nontender, Nondistended; Bowel sounds present  EXTREMITIES:  Left foot abscess   PSYCH: AAOx3  NEUROLOGY: non-focal  SKIN: No rashes or lesions                                                  11.1   7.17  )-----------( 268      ( 12 Dec 2023 06:56 )             34.7             PT/INR - ( 12 Dec 2023 06:56 )   PT: 11.5 sec;   INR: 1.10 ratio           138|101|20<195  3.7|24|1.17  9.8,--,--  12-12 @ 06:56    CAPILLARY BLOOD GLUCOSE      POCT Blood Glucose.: 204 mg/dL (12 Dec 2023 12:48)  POCT Blood Glucose.: 177 mg/dL (12 Dec 2023 08:50)  POCT Blood Glucose.: 201 mg/dL (12 Dec 2023 07:14)  POCT Blood Glucose.: 174 mg/dL (11 Dec 2023 21:37)  POCT Blood Glucose.: 306 mg/dL (11 Dec 2023 17:16)

## 2023-12-14 ENCOUNTER — TRANSCRIPTION ENCOUNTER (OUTPATIENT)
Age: 73
End: 2023-12-14

## 2023-12-14 LAB
-  AMOXICILLIN/CLAVULANIC ACID: SIGNIFICANT CHANGE UP
-  AMOXICILLIN/CLAVULANIC ACID: SIGNIFICANT CHANGE UP
-  AMPICILLIN/SULBACTAM: SIGNIFICANT CHANGE UP
-  AMPICILLIN/SULBACTAM: SIGNIFICANT CHANGE UP
-  AMPICILLIN: SIGNIFICANT CHANGE UP
-  AMPICILLIN: SIGNIFICANT CHANGE UP
-  AZTREONAM: SIGNIFICANT CHANGE UP
-  AZTREONAM: SIGNIFICANT CHANGE UP
-  CEFAZOLIN: SIGNIFICANT CHANGE UP
-  CEFAZOLIN: SIGNIFICANT CHANGE UP
-  CEFEPIME: SIGNIFICANT CHANGE UP
-  CEFEPIME: SIGNIFICANT CHANGE UP
-  CEFOXITIN: SIGNIFICANT CHANGE UP
-  CEFOXITIN: SIGNIFICANT CHANGE UP
-  CEFTOLOZANE/TAZOBACTAM: SIGNIFICANT CHANGE UP
-  CEFTOLOZANE/TAZOBACTAM: SIGNIFICANT CHANGE UP
-  CEFTRIAXONE: SIGNIFICANT CHANGE UP
-  CEFTRIAXONE: SIGNIFICANT CHANGE UP
-  CIPROFLOXACIN: SIGNIFICANT CHANGE UP
-  CIPROFLOXACIN: SIGNIFICANT CHANGE UP
-  ERTAPENEM: SIGNIFICANT CHANGE UP
-  ERTAPENEM: SIGNIFICANT CHANGE UP
-  GENTAMICIN: SIGNIFICANT CHANGE UP
-  GENTAMICIN: SIGNIFICANT CHANGE UP
-  LEVOFLOXACIN: SIGNIFICANT CHANGE UP
-  LEVOFLOXACIN: SIGNIFICANT CHANGE UP
-  MEROPENEM: SIGNIFICANT CHANGE UP
-  MEROPENEM: SIGNIFICANT CHANGE UP
-  PIPERACILLIN/TAZOBACTAM: SIGNIFICANT CHANGE UP
-  PIPERACILLIN/TAZOBACTAM: SIGNIFICANT CHANGE UP
-  TOBRAMYCIN: SIGNIFICANT CHANGE UP
-  TOBRAMYCIN: SIGNIFICANT CHANGE UP
-  TRIMETHOPRIM/SULFAMETHOXAZOLE: SIGNIFICANT CHANGE UP
-  TRIMETHOPRIM/SULFAMETHOXAZOLE: SIGNIFICANT CHANGE UP
ANION GAP SERPL CALC-SCNC: 10 MMOL/L — SIGNIFICANT CHANGE UP (ref 5–17)
ANION GAP SERPL CALC-SCNC: 10 MMOL/L — SIGNIFICANT CHANGE UP (ref 5–17)
APTT BLD: 28.9 SEC — SIGNIFICANT CHANGE UP (ref 24.5–35.6)
APTT BLD: 28.9 SEC — SIGNIFICANT CHANGE UP (ref 24.5–35.6)
BLANDM BLD POS QL PROBE: SIGNIFICANT CHANGE UP
BLANDM BLD POS QL PROBE: SIGNIFICANT CHANGE UP
BUN SERPL-MCNC: 17 MG/DL — SIGNIFICANT CHANGE UP (ref 7–23)
BUN SERPL-MCNC: 17 MG/DL — SIGNIFICANT CHANGE UP (ref 7–23)
CALCIUM SERPL-MCNC: 9.3 MG/DL — SIGNIFICANT CHANGE UP (ref 8.4–10.5)
CALCIUM SERPL-MCNC: 9.3 MG/DL — SIGNIFICANT CHANGE UP (ref 8.4–10.5)
CHLORIDE SERPL-SCNC: 103 MMOL/L — SIGNIFICANT CHANGE UP (ref 96–108)
CHLORIDE SERPL-SCNC: 103 MMOL/L — SIGNIFICANT CHANGE UP (ref 96–108)
CO2 SERPL-SCNC: 24 MMOL/L — SIGNIFICANT CHANGE UP (ref 22–31)
CO2 SERPL-SCNC: 24 MMOL/L — SIGNIFICANT CHANGE UP (ref 22–31)
CREAT SERPL-MCNC: 0.99 MG/DL — SIGNIFICANT CHANGE UP (ref 0.5–1.3)
CREAT SERPL-MCNC: 0.99 MG/DL — SIGNIFICANT CHANGE UP (ref 0.5–1.3)
CULTURE RESULTS: SIGNIFICANT CHANGE UP
EGFR: 81 ML/MIN/1.73M2 — SIGNIFICANT CHANGE UP
EGFR: 81 ML/MIN/1.73M2 — SIGNIFICANT CHANGE UP
GLUCOSE BLDC GLUCOMTR-MCNC: 205 MG/DL — HIGH (ref 70–99)
GLUCOSE BLDC GLUCOMTR-MCNC: 205 MG/DL — HIGH (ref 70–99)
GLUCOSE BLDC GLUCOMTR-MCNC: 218 MG/DL — HIGH (ref 70–99)
GLUCOSE BLDC GLUCOMTR-MCNC: 218 MG/DL — HIGH (ref 70–99)
GLUCOSE BLDC GLUCOMTR-MCNC: 231 MG/DL — HIGH (ref 70–99)
GLUCOSE BLDC GLUCOMTR-MCNC: 231 MG/DL — HIGH (ref 70–99)
GLUCOSE BLDC GLUCOMTR-MCNC: 276 MG/DL — HIGH (ref 70–99)
GLUCOSE BLDC GLUCOMTR-MCNC: 276 MG/DL — HIGH (ref 70–99)
GLUCOSE SERPL-MCNC: 189 MG/DL — HIGH (ref 70–99)
GLUCOSE SERPL-MCNC: 189 MG/DL — HIGH (ref 70–99)
HCT VFR BLD CALC: 32.2 % — LOW (ref 39–50)
HCT VFR BLD CALC: 32.2 % — LOW (ref 39–50)
HGB BLD-MCNC: 10.4 G/DL — LOW (ref 13–17)
HGB BLD-MCNC: 10.4 G/DL — LOW (ref 13–17)
INR BLD: 1.03 RATIO — SIGNIFICANT CHANGE UP (ref 0.85–1.18)
INR BLD: 1.03 RATIO — SIGNIFICANT CHANGE UP (ref 0.85–1.18)
MAGNESIUM SERPL-MCNC: 2.4 MG/DL — SIGNIFICANT CHANGE UP (ref 1.6–2.6)
MAGNESIUM SERPL-MCNC: 2.4 MG/DL — SIGNIFICANT CHANGE UP (ref 1.6–2.6)
MCHC RBC-ENTMCNC: 28.8 PG — SIGNIFICANT CHANGE UP (ref 27–34)
MCHC RBC-ENTMCNC: 28.8 PG — SIGNIFICANT CHANGE UP (ref 27–34)
MCHC RBC-ENTMCNC: 32.3 GM/DL — SIGNIFICANT CHANGE UP (ref 32–36)
MCHC RBC-ENTMCNC: 32.3 GM/DL — SIGNIFICANT CHANGE UP (ref 32–36)
MCV RBC AUTO: 89.2 FL — SIGNIFICANT CHANGE UP (ref 80–100)
MCV RBC AUTO: 89.2 FL — SIGNIFICANT CHANGE UP (ref 80–100)
METHOD TYPE: SIGNIFICANT CHANGE UP
NRBC # BLD: 0 /100 WBCS — SIGNIFICANT CHANGE UP (ref 0–0)
NRBC # BLD: 0 /100 WBCS — SIGNIFICANT CHANGE UP (ref 0–0)
PHOSPHATE SERPL-MCNC: 3.1 MG/DL — SIGNIFICANT CHANGE UP (ref 2.5–4.5)
PHOSPHATE SERPL-MCNC: 3.1 MG/DL — SIGNIFICANT CHANGE UP (ref 2.5–4.5)
PLATELET # BLD AUTO: 234 K/UL — SIGNIFICANT CHANGE UP (ref 150–400)
PLATELET # BLD AUTO: 234 K/UL — SIGNIFICANT CHANGE UP (ref 150–400)
POTASSIUM SERPL-MCNC: 3.8 MMOL/L — SIGNIFICANT CHANGE UP (ref 3.5–5.3)
POTASSIUM SERPL-MCNC: 3.8 MMOL/L — SIGNIFICANT CHANGE UP (ref 3.5–5.3)
POTASSIUM SERPL-SCNC: 3.8 MMOL/L — SIGNIFICANT CHANGE UP (ref 3.5–5.3)
POTASSIUM SERPL-SCNC: 3.8 MMOL/L — SIGNIFICANT CHANGE UP (ref 3.5–5.3)
PROTHROM AB SERPL-ACNC: 11.3 SEC — SIGNIFICANT CHANGE UP (ref 9.5–13)
PROTHROM AB SERPL-ACNC: 11.3 SEC — SIGNIFICANT CHANGE UP (ref 9.5–13)
RBC # BLD: 3.61 M/UL — LOW (ref 4.2–5.8)
RBC # BLD: 3.61 M/UL — LOW (ref 4.2–5.8)
RBC # FLD: 12.3 % — SIGNIFICANT CHANGE UP (ref 10.3–14.5)
RBC # FLD: 12.3 % — SIGNIFICANT CHANGE UP (ref 10.3–14.5)
SODIUM SERPL-SCNC: 137 MMOL/L — SIGNIFICANT CHANGE UP (ref 135–145)
SODIUM SERPL-SCNC: 137 MMOL/L — SIGNIFICANT CHANGE UP (ref 135–145)
SPECIMEN SOURCE: SIGNIFICANT CHANGE UP
WBC # BLD: 6.65 K/UL — SIGNIFICANT CHANGE UP (ref 3.8–10.5)
WBC # BLD: 6.65 K/UL — SIGNIFICANT CHANGE UP (ref 3.8–10.5)
WBC # FLD AUTO: 6.65 K/UL — SIGNIFICANT CHANGE UP (ref 3.8–10.5)
WBC # FLD AUTO: 6.65 K/UL — SIGNIFICANT CHANGE UP (ref 3.8–10.5)

## 2023-12-14 RX ADMIN — CHLORHEXIDINE GLUCONATE 1 APPLICATION(S): 213 SOLUTION TOPICAL at 05:23

## 2023-12-14 RX ADMIN — LOSARTAN POTASSIUM 25 MILLIGRAM(S): 100 TABLET, FILM COATED ORAL at 05:23

## 2023-12-14 RX ADMIN — Medication 81 MILLIGRAM(S): at 11:26

## 2023-12-14 RX ADMIN — ATORVASTATIN CALCIUM 40 MILLIGRAM(S): 80 TABLET, FILM COATED ORAL at 21:11

## 2023-12-14 RX ADMIN — AMLODIPINE BESYLATE 2.5 MILLIGRAM(S): 2.5 TABLET ORAL at 05:23

## 2023-12-14 RX ADMIN — Medication 2: at 09:04

## 2023-12-14 RX ADMIN — Medication 2: at 17:45

## 2023-12-14 RX ADMIN — PIPERACILLIN AND TAZOBACTAM 25 GRAM(S): 4; .5 INJECTION, POWDER, LYOPHILIZED, FOR SOLUTION INTRAVENOUS at 18:17

## 2023-12-14 RX ADMIN — Medication 3: at 12:31

## 2023-12-14 NOTE — PROGRESS NOTE ADULT - ASSESSMENT
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 Recently presented for 2/3rd toe gangrene with podiatry recommending toe resection vs TMA. Vascular was consulted and completed LLE angio showing PT runoff, no stent or balloon angioplasty. Vascular recommended possible bypass vs deep vein arterialization. Now returns d/t worsening LLE gangrene with extension to 4th toe. HDS, no WBC, glucose >200. LLE with +DP/PT signals, palp pop and b/l palpable fem. Vascular surgery consulted for possible revasc prior to resection.    Plan:  - OR plan for FRIDAY for angiography  - c/w wound care and IV abx  - monitor for fever/WBC elevation, dry gangrene on exam 2-4th toe.  - pain control prn  - blood glucose elevated rec glucose goals 100-180.   - Please preop today - NPO at midnight, AM labs, T&S  - will follow    Discussed with vascular fellow on behalf of Dr. 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 Recently presented for 2/3rd toe gangrene with podiatry recommending toe resection vs TMA. Vascular was consulted and completed LLE angio showing PT runoff, no stent or balloon angioplasty. Vascular recommended possible bypass vs deep vein arterialization. Now returns d/t worsening LLE gangrene with extension to 4th toe. HDS, no WBC, glucose >200. LLE with +DP/PT signals, palp pop and b/l palpable fem. Vascular surgery consulted for possible revasc prior to resection.    Plan:  - OR plan for FRIDAY for angiography  - c/w wound care and IV abx  - monitor for fever/WBC elevation, dry gangrene on exam 2-4th toe.  - pain control prn  - blood glucose elevated rec glucose goals 100-180.   - Please preop today - NPO at midnight, AM labs, T&S  - will follow    Discussed with vascular fellow on behalf of Dr. Mullins    Vascular Surgery [id number]

## 2023-12-14 NOTE — PROGRESS NOTE ADULT - ASSESSMENT
71 y/o M with PMHx of DM, HTN, PAD and PSHX of R BKA presents to ED at recommendation of outpatient podiatrist.     Left 4 th toe infection  PAD   Zosyn      L foot MRI with findings c/w septic arthritis and OM, no drainable abscess   - L foot abscess culture with moderate Serratia liquefaciens   - Bcx NGTD x2   - Podiatry and Vascular surgery following, recs noted   - afebrile, WBC wnl    Podiatry planning for L foot 2nd and 3rd ray resection vs TMA   OR Friday Thursday   DM HISS   A1C 7.2    HTN Home meds     73 y/o M with PMHx of DM, HTN, PAD and PSHX of R BKA presents to ED at recommendation of outpatient podiatrist.     Left 4 th toe infection  PAD   Zosyn      L foot MRI with findings c/w septic arthritis and OM, no drainable abscess   - L foot abscess culture with moderate Serratia liquefaciens   - Bcx NGTD x2   - Podiatry and Vascular surgery following, recs noted   - afebrile, WBC wnl    Podiatry planning for L foot 2nd and 3rd ray resection vs TMA   OR Friday Thursday   DM HISS   A1C 7.2    HTN Home meds

## 2023-12-14 NOTE — PROGRESS NOTE ADULT - SUBJECTIVE AND OBJECTIVE BOX
Vascular Surgery Progress Note  Patient is a 72y old  Male who presents with a chief complaint of Left 4 th toe infection (14 Dec 2023 09:15)      INTERVAL EVENTS: No acute events overnight.  SUBJECTIVE: Patient seen and examined at bedside with surgical team, patient without complaints. Denies fever, chills, CP, SOB nausea, vomiting, abdominal pain.      OBJECTIVE:    Vital Signs Last 24 Hrs  T(C): 36.3 (14 Dec 2023 05:07), Max: 36.9 (13 Dec 2023 12:14)  T(F): 97.4 (14 Dec 2023 05:07), Max: 98.4 (13 Dec 2023 12:14)  HR: 70 (14 Dec 2023 05:07) (70 - 76)  BP: 176/73 (14 Dec 2023 05:07) (123/67 - 176/73)  BP(mean): --  RR: 18 (14 Dec 2023 05:07) (18 - 18)  SpO2: 98% (14 Dec 2023 05:07) (96% - 98%)    Parameters below as of 14 Dec 2023 05:07  Patient On (Oxygen Delivery Method): room air    I&O's Detail    13 Dec 2023 07:01  -  14 Dec 2023 07:00  --------------------------------------------------------  IN:    Oral Fluid: 1080 mL  Total IN: 1080 mL    OUT:  Total OUT: 0 mL    Total NET: 1080 mL      14 Dec 2023 07:01  -  14 Dec 2023 11:47  --------------------------------------------------------  IN:    Oral Fluid: 300 mL  Total IN: 300 mL    OUT:  Total OUT: 0 mL    Total NET: 300 mL      MEDICATIONS  (STANDING):  amLODIPine   Tablet 2.5 milliGRAM(s) Oral daily  aspirin enteric coated 81 milliGRAM(s) Oral daily  atorvastatin 40 milliGRAM(s) Oral at bedtime  chlorhexidine 2% Cloths 1 Application(s) Topical daily  dextrose 5%. 1000 milliLiter(s) (50 mL/Hr) IV Continuous <Continuous>  dextrose 5%. 1000 milliLiter(s) (100 mL/Hr) IV Continuous <Continuous>  dextrose 50% Injectable 25 Gram(s) IV Push once  dextrose 50% Injectable 12.5 Gram(s) IV Push once  dextrose 50% Injectable 25 Gram(s) IV Push once  glucagon  Injectable 1 milliGRAM(s) IntraMuscular once  insulin lispro (ADMELOG) corrective regimen sliding scale   SubCutaneous three times a day before meals  insulin lispro (ADMELOG) corrective regimen sliding scale   SubCutaneous at bedtime  losartan 25 milliGRAM(s) Oral daily  piperacillin/tazobactam IVPB.. 3.375 Gram(s) IV Intermittent every 8 hours  sodium chloride 0.9%. 1000 milliLiter(s) (75 mL/Hr) IV Continuous <Continuous>    MEDICATIONS  (PRN):  dextrose Oral Gel 15 Gram(s) Oral once PRN Blood Glucose LESS THAN 70 milliGRAM(s)/deciliter      PHYSICAL EXAM:  General Appearance: Appears well, NAD  Neck: Supple  Chest: Equal expansion bilaterally  CV: Pulse regular presently  Abdomen: Soft, nontense  Extremities: RLE amputation; Left foot with ischemic changes to toes 2-4, +PT/AT signals      LABS:                        10.4   6.65  )-----------( 234      ( 14 Dec 2023 07:10 )             32.2     12-14    137  |  103  |  17  ----------------------------<  189<H>  3.8   |  24  |  0.99    Ca    9.3      14 Dec 2023 07:12  Phos  3.1     12-14  Mg     2.4     12-14      PT/INR - ( 14 Dec 2023 07:10 )   PT: 11.3 sec;   INR: 1.03 ratio         PTT - ( 14 Dec 2023 07:10 )  PTT:28.9 sec    Urinalysis Basic - ( 14 Dec 2023 07:12 )    Color: x / Appearance: x / SG: x / pH: x  Gluc: 189 mg/dL / Ketone: x  / Bili: x / Urobili: x   Blood: x / Protein: x / Nitrite: x   Leuk Esterase: x / RBC: x / WBC x   Sq Epi: x / Non Sq Epi: x / Bacteria: x          IMAGING:

## 2023-12-14 NOTE — PROGRESS NOTE ADULT - SUBJECTIVE AND OBJECTIVE BOX
HPI:   73 y/o M with PMHx of DM, HTN, PAD and PSHX of R BKA presents to ED at recommendation of outpatient podiatrist. Patient was recently admitted and being followed by vascular and podiatry for wounds to the L foot. Was noted to have dry gangrene of L 2nd/3rd toes. [place] due to extent of vascular disease, patient would not heal if amputation was done. Was told to f/u with vascular as an outpatient. Saw podiatry yesterday, due to gangrene extending to 4th L toe. Recommended to come in for abx and possible procedure with podiatry. Patient is endorsing L toe pain. 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 No nausea, vomiting, or hematemesis; No diarrhea or constipation. No melena or hematochezia.  GENITOURINARY: No dysuria, frequency, hematuria, or incontinence  NEUROLOGICAL: No headaches, memory loss, loss of strength, numbness, or tremors  SKIN: No itching, burning, rashes, or lesions   LYMPH NODES: No enlarged glands  ENDOCRINE: No heat or cold intolerance; No hair loss  MUSCULOSKELETAL: No joint pain or swelling; No muscle, back, or extremity pain  PSYCHIATRIC: No depression, anxiety, mood swings, or difficulty sleeping  HEME/LYMPH: No easy bruising, or bleeding gums  ALLERY AND IMMUNOLOGIC: No hives or eczema    Allergies    No Known Allergies    Intolerances        Social History:     MEDICATIONS  (STANDING):  amLODIPine   Tablet 2.5 milliGRAM(s) Oral daily  aspirin enteric coated 81 milliGRAM(s) Oral daily  atorvastatin 40 milliGRAM(s) Oral at bedtime  chlorhexidine 2% Cloths 1 Application(s) Topical daily  dextrose 5%. 1000 milliLiter(s) (50 mL/Hr) IV Continuous <Continuous>  dextrose 5%. 1000 milliLiter(s) (100 mL/Hr) IV Continuous <Continuous>  dextrose 50% Injectable 25 Gram(s) IV Push once  dextrose 50% Injectable 12.5 Gram(s) IV Push once  dextrose 50% Injectable 25 Gram(s) IV Push once  glucagon  Injectable 1 milliGRAM(s) IntraMuscular once  insulin lispro (ADMELOG) corrective regimen sliding scale   SubCutaneous at bedtime  insulin lispro (ADMELOG) corrective regimen sliding scale   SubCutaneous three times a day before meals  losartan 25 milliGRAM(s) Oral daily  piperacillin/tazobactam IVPB.. 3.375 Gram(s) IV Intermittent every 8 hours  sodium chloride 0.9%. 1000 milliLiter(s) (75 mL/Hr) IV Continuous <Continuous>    MEDICATIONS  (PRN):  dextrose Oral Gel 15 Gram(s) Oral once PRN Blood Glucose LESS THAN 70 milliGRAM(s)/deciliter      PHYSICAL EXAM:  GENERAL: NAD, well-developed  HEAD:  Atraumatic, Normocephalic  EYES: EOMI, PERRLA, conjunctiva and sclera clear  NECK: Supple, No JVD  CHEST/LUNG: Clear to auscultation bilaterally; No wheeze  HEART: Regular rate and rhythm; No murmurs, rubs, or gallops  ABDOMEN: Soft, Nontender, Nondistended; Bowel sounds present  EXTREMITIES:  Left foot abscess   PSYCH: AAOx3  NEUROLOGY: non-focal  SKIN: No rashes or lesions                                                  11.1   7.17  )-----------( 268      ( 12 Dec 2023 06:56 )             34.7             PT/INR - ( 12 Dec 2023 06:56 )   PT: 11.5 sec;   INR: 1.10 ratio           138|101|20<195  3.7|24|1.17  9.8,--,--  12-12 @ 06:56    CAPILLARY BLOOD GLUCOSE      POCT Blood Glucose.: 204 mg/dL (12 Dec 2023 12:48)  POCT Blood Glucose.: 177 mg/dL (12 Dec 2023 08:50)  POCT Blood Glucose.: 201 mg/dL (12 Dec 2023 07:14)  POCT Blood Glucose.: 174 mg/dL (11 Dec 2023 21:37)  POCT Blood Glucose.: 306 mg/dL (11 Dec 2023 17:16)   HPI:   73 y/o M with PMHx of DM, HTN, PAD and PSHX of R BKA presents to ED at recommendation of outpatient podiatrist. Patient was recently admitted and being followed by vascular and podiatry for wounds to the L foot. Was noted to have dry gangrene of L 2nd/3rd toes. [place] due to extent of vascular disease, patient would not heal if amputation was done. Was told to f/u with vascular as an outpatient. Saw podiatry yesterday, due to gangrene extending to 4th L toe. Recommended to come in for abx and possible procedure with podiatry. Patient is endorsing L toe pain. 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 No nausea, vomiting, or hematemesis; No diarrhea or constipation. No melena or hematochezia.  GENITOURINARY: No dysuria, frequency, hematuria, or incontinence  NEUROLOGICAL: No headaches, memory loss, loss of strength, numbness, or tremors  SKIN: No itching, burning, rashes, or lesions   LYMPH NODES: No enlarged glands  ENDOCRINE: No heat or cold intolerance; No hair loss  MUSCULOSKELETAL: No joint pain or swelling; No muscle, back, or extremity pain  PSYCHIATRIC: No depression, anxiety, mood swings, or difficulty sleeping  HEME/LYMPH: No easy bruising, or bleeding gums  ALLERY AND IMMUNOLOGIC: No hives or eczema    Allergies    No Known Allergies    Intolerances        Social History:     MEDICATIONS  (STANDING):  amLODIPine   Tablet 2.5 milliGRAM(s) Oral daily  aspirin enteric coated 81 milliGRAM(s) Oral daily  atorvastatin 40 milliGRAM(s) Oral at bedtime  chlorhexidine 2% Cloths 1 Application(s) Topical daily  dextrose 5%. 1000 milliLiter(s) (50 mL/Hr) IV Continuous <Continuous>  dextrose 5%. 1000 milliLiter(s) (100 mL/Hr) IV Continuous <Continuous>  dextrose 50% Injectable 25 Gram(s) IV Push once  dextrose 50% Injectable 12.5 Gram(s) IV Push once  dextrose 50% Injectable 25 Gram(s) IV Push once  glucagon  Injectable 1 milliGRAM(s) IntraMuscular once  insulin lispro (ADMELOG) corrective regimen sliding scale   SubCutaneous at bedtime  insulin lispro (ADMELOG) corrective regimen sliding scale   SubCutaneous three times a day before meals  losartan 25 milliGRAM(s) Oral daily  piperacillin/tazobactam IVPB.. 3.375 Gram(s) IV Intermittent every 8 hours  sodium chloride 0.9%. 1000 milliLiter(s) (75 mL/Hr) IV Continuous <Continuous>    MEDICATIONS  (PRN):  dextrose Oral Gel 15 Gram(s) Oral once PRN Blood Glucose LESS THAN 70 milliGRAM(s)/deciliter      PHYSICAL EXAM:  GENERAL: NAD, well-developed  HEAD:  Atraumatic, Normocephalic  EYES: EOMI, PERRLA, conjunctiva and sclera clear  NECK: Supple, No JVD  CHEST/LUNG: Clear to auscultation bilaterally; No wheeze  HEART: Regular rate and rhythm; No murmurs, rubs, or gallops  ABDOMEN: Soft, Nontender, Nondistended; Bowel sounds present  EXTREMITIES:  Left foot abscess   PSYCH: AAOx3  NEUROLOGY: non-focal  SKIN: No rashes or lesions                                                  11.1   7.17  )-----------( 268      ( 12 Dec 2023 06:56 )             34.7             PT/INR - ( 12 Dec 2023 06:56 )   PT: 11.5 sec;   INR: 1.10 ratio           138|101|20<195  3.7|24|1.17  9.8,--,--  12-12 @ 06:56    CAPILLARY BLOOD GLUCOSE      POCT Blood Glucose.: 204 mg/dL (12 Dec 2023 12:48)  POCT Blood Glucose.: 177 mg/dL (12 Dec 2023 08:50)  POCT Blood Glucose.: 201 mg/dL (12 Dec 2023 07:14)  POCT Blood Glucose.: 174 mg/dL (11 Dec 2023 21:37)  POCT Blood Glucose.: 306 mg/dL (11 Dec 2023 17:16)

## 2023-12-14 NOTE — PRE PROCEDURE NOTE - PRE PROCEDURE EVALUATION
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    PAD (peripheral artery disease)        Allergies: No Known Allergies      Medications:  amLODIPine   Tablet: 2.5 milliGRAM(s) Oral (12-14-23 @ 05:23)  aspirin enteric coated: 81 milliGRAM(s) Oral (12-14-23 @ 11:26)  losartan: 25 milliGRAM(s) Oral (12-14-23 @ 05:23)  piperacillin/tazobactam IVPB..: 25 mL/Hr IV Intermittent (12-13-23 @ 21:59)      Vital Signs:   T(F): 97.4 (05:07), Max: 98.1 (20:13)  HR: 70 (05:07)  BP: 176/73 (05:07)  RR: 18 (05:07)  SpO2: 98% (05:07)    Labs:           10.4  6.65)-----(234     (12-14-23 @ 07:10)         32.2     137 | 103 | 17  --------------------< 189     (12-14-23 @ 07:12)  3.8 | 24 | 0.99       PT: 11.3 12-14-23 @ 07:10  aPTT: 28.9 12-14-23 @ 07:10   INR: 1.03 12-14-23 @ 07:10    \A/P: 72y Male     - OR 12/11/23 for angiogram with Dr. Mullins  - NPO past midnight, except medications  - IVF while NPO  - Consent signed and in chart  - Medical clearance for OR  
Preop Dx: PAD  Surgeon: Susanna  Procedure: LLE angio    Vital Signs Last 24 Hrs  T(C): 36.6 (13 Dec 2023 08:17), Max: 37.1 (12 Dec 2023 19:41)  T(F): 97.8 (13 Dec 2023 08:17), Max: 98.8 (12 Dec 2023 19:41)  HR: 74 (13 Dec 2023 08:17) (69 - 80)  BP: 136/60 (13 Dec 2023 08:17) (129/69 - 175/78)  BP(mean): --  RR: 18 (13 Dec 2023 08:17) (18 - 18)  SpO2: 97% (13 Dec 2023 08:17) (97% - 99%)    Parameters below as of 13 Dec 2023 08:17  Patient On (Oxygen Delivery Method): room air                            10.2   5.84  )-----------( 214      ( 13 Dec 2023 07:29 )             30.9     12-13    138  |  105  |  20  ----------------------------<  209<H>  4.0   |  24  |  1.01    Ca    9.2      13 Dec 2023 07:27      PT/INR - ( 12 Dec 2023 06:56 )   PT: 11.5 sec;   INR: 1.10 ratio           Daily     Daily     EKG:  CXR:   Type and Screen:         A/P: 72y Male     - OR 12/14/23 for LLE angio with Dr. Mullins  - NPO past midnight, except medications  - IVF while NPO  - Consent signed and in chart  - Medical clearance for OR
Preop Dx: LLE gangrene  Surgeon: Susanna  Procedure: LLE angiography, possible revascularization    Vital Signs Last 24 Hrs  T(C): 36.6 (10 Dec 2023 12:21), Max: 36.9 (09 Dec 2023 23:23)  T(F): 97.9 (10 Dec 2023 12:21), Max: 98.4 (09 Dec 2023 23:23)  HR: 71 (10 Dec 2023 12:21) (71 - 82)  BP: 138/68 (10 Dec 2023 12:21) (138/68 - 169/73)  BP(mean): 98 (09 Dec 2023 15:57) (98 - 98)  RR: 18 (10 Dec 2023 12:21) (18 - 19)  SpO2: 96% (10 Dec 2023 12:21) (96% - 99%)    Parameters below as of 10 Dec 2023 05:04  Patient On (Oxygen Delivery Method): room air                            9.8    6.61  )-----------( 206      ( 10 Dec 2023 07:13 )             31.2     12-10    137  |  104  |  20  ----------------------------<  172<H>  4.5   |  20<L>  |  1.02    Ca    9.6      10 Dec 2023 07:13    TPro  7.4  /  Alb  3.4  /  TBili  0.2  /  DBili  x   /  AST  15  /  ALT  22  /  AlkPhos  90  12-09      Daily Height in cm: 170.18 (09 Dec 2023 15:57)    Daily     EKG:  CXR:   Type and Screen:         A/P: 72y Male     - OR 12/11/23 for angiogram with Dr. Mullins  - NPO past midnight, except medications  - IVF while NPO  - Consent signed and in chart  - Medical clearance for OR

## 2023-12-14 NOTE — PROGRESS NOTE ADULT - ASSESSMENT
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     LLE gangrene with extension to 4th toe with c/f infection  - L foot 2nd and 3rd digit dorsal full thickness gangrene to level of MTPJ, w/ wet conversion, plantar 2nd and 3rd digit dusky and early ischemic changes, mild serous drainage, mild malodor, early dusky changes of fourth metatarsal.  - L foot MRI with findings c/w septic arthritis and OM, no drainable abscess   - L foot abscess culture with moderate Serratia liquefaciens, Corynebacterium amycolatum, Finegoldia magna  - Bcx NGTD x2   - remains afebrile, WBC wnl    Recommendations:   Vascular planning for LLE angiography today 12/14  Podiatry planning for L foot TMA with YESENIA pending vascular recs  Follow up culture for Serratia sensitivities  Continue on pip-tazo   Continue local care   Monitor temps/WBC      Azucena Castillo M.D.  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     LLE gangrene with extension to 4th toe with c/f infection  - L foot 2nd and 3rd digit dorsal full thickness gangrene to level of MTPJ, w/ wet conversion, plantar 2nd and 3rd digit dusky and early ischemic changes, mild serous drainage, mild malodor, early dusky changes of fourth metatarsal.  - L foot MRI with findings c/w septic arthritis and OM, no drainable abscess   - L foot abscess culture with moderate Serratia liquefaciens, Corynebacterium amycolatum, Finegoldia magna  - Bcx NGTD x2   - remains afebrile, WBC wnl    Recommendations:   Vascular planning for LLE angiography today 12/14  Podiatry planning for L foot TMA with YESENIA pending vascular recs  Follow up culture for Serratia sensitivities  Continue on pip-tazo   Continue local care   Monitor temps/WBC      Azucena Castillo M.D.  [facility], Division of Infectious Diseases  [phone]  After 5pm on weekdays and all day on weekends - please call [phone]

## 2023-12-14 NOTE — PROGRESS NOTE ADULT - SUBJECTIVE AND OBJECTIVE BOX
[facility]  ERVIN Florentino Y. Patel, S. Shah, G. Ervin  [phone]  ([phone] - weekdays after 5pm and weekends)    Name: ALEE DUNN  Age/Gender: 72y Male  MRN: [id number]    Interval History:  Patient seen and examined this morning.   Notes reviewed  No concerning overnight events  Afebrile   Allergies: No Known Allergies      Objective:  Vitals:   T(F): 97.4 (12-14-23 @ 05:07), Max: 98.4 (12-13-23 @ 12:14)  HR: 70 (12-14-23 @ 05:07) (70 - 76)  BP: 176/73 (12-14-23 @ 05:07) (123/67 - 176/73)  RR: 18 (12-14-23 @ 05:07) (18 - 18)  SpO2: 98% (12-14-23 @ 05:07) (96% - 98%)  Physical Examination:  General: no acute distress, nontoxic, RA  HEENT: NC/AT, anicteric, neck supple  Respiratory: no acc muscle use, breathing comfortably  Cardiovascular: S1 and S2 present  Gastrointestinal: normal appearing, nondistended  Extremities: R BKA  Skin: no visible rash    Laboratory Studies:  CBC:                       10.4   6.65  )-----------( 234      ( 14 Dec 2023 07:10 )             32.2     WBC Trend:  6.65 12-14-23 @ 07:10  5.84 12-13-23 @ 07:29  7.17 12-12-23 @ 06:56  7.33 12-11-23 @ 07:19  6.61 12-10-23 @ 07:13  7.63 12-09-23 @ 09:23    CMP: 12-14    137  |  103  |  17  ----------------------------<  189<H>  3.8   |  24  |  0.99    Ca    9.3      14 Dec 2023 07:12  Phos  3.1     12-14  Mg     2.4     12-14    Creatinine: 0.99 mg/dL (12-14-23 @ 07:12)  Creatinine: 1.01 mg/dL (12-13-23 @ 07:27)  Creatinine: 1.17 mg/dL (12-12-23 @ 06:56)  Creatinine: 1.12 mg/dL (12-11-23 @ 07:19)  Creatinine: 1.02 mg/dL (12-10-23 @ 07:13)  Creatinine: 0.88 mg/dL (12-09-23 @ 09:23)    Microbiology: reviewed   Culture - Abscess with Gram Stain (collected 12-09-23 @ 10:45)  Source: .Abscess left foot  Preliminary Report (12-12-23 @ 14:37):    Moderate Serratia liquefaciens Susceptibility to follow.    Numerous Corynebacterium amycolatum "Susceptibilities not performed"    Moderate Finegoldia magna "Susceptibilities not performed"    Culture - Blood (collected 12-09-23 @ 09:05)  Source: .Blood Blood-Peripheral  Preliminary Report (12-13-23 @ 14:00):    No growth at 4 days    Culture - Blood (collected 12-09-23 @ 09:00)  Source: .Blood Blood-Peripheral  Preliminary Report (12-13-23 @ 14:00):    No growth at 4 days    Radiology: reviewed     Medications:  amLODIPine   Tablet 2.5 milliGRAM(s) Oral daily  aspirin enteric coated 81 milliGRAM(s) Oral daily  atorvastatin 40 milliGRAM(s) Oral at bedtime  chlorhexidine 2% Cloths 1 Application(s) Topical daily  dextrose 5%. 1000 milliLiter(s) IV Continuous <Continuous>  dextrose 5%. 1000 milliLiter(s) IV Continuous <Continuous>  dextrose 50% Injectable 25 Gram(s) IV Push once  dextrose 50% Injectable 12.5 Gram(s) IV Push once  dextrose 50% Injectable 25 Gram(s) IV Push once  dextrose Oral Gel 15 Gram(s) Oral once PRN  glucagon  Injectable 1 milliGRAM(s) IntraMuscular once  insulin lispro (ADMELOG) corrective regimen sliding scale   SubCutaneous at bedtime  insulin lispro (ADMELOG) corrective regimen sliding scale   SubCutaneous three times a day before meals  losartan 25 milliGRAM(s) Oral daily  piperacillin/tazobactam IVPB.. 3.375 Gram(s) IV Intermittent every 8 hours  sodium chloride 0.9%. 1000 milliLiter(s) IV Continuous <Continuous>    Current Antimicrobials:  piperacillin/tazobactam IVPB.. 3.375 Gram(s) IV Intermittent every 8 hours    Prior/Completed Antimicrobials:  piperacillin/tazobactam IVPB...  vancomycin  IVPB.   [facility]  ERVIN Florentino Y. Patel, S. Shah, G. Ervin  [phone]  ([phone] - weekdays after 5pm and weekends)    Name: ALEE UDNN  Age/Gender: 72y Male  MRN: [id number]    Interval History:  Patient seen and examined this morning.   Notes reviewed  No concerning overnight events  Afebrile   Allergies: No Known Allergies      Objective:  Vitals:   T(F): 97.4 (12-14-23 @ 05:07), Max: 98.4 (12-13-23 @ 12:14)  HR: 70 (12-14-23 @ 05:07) (70 - 76)  BP: 176/73 (12-14-23 @ 05:07) (123/67 - 176/73)  RR: 18 (12-14-23 @ 05:07) (18 - 18)  SpO2: 98% (12-14-23 @ 05:07) (96% - 98%)  Physical Examination:  General: no acute distress, nontoxic, RA  HEENT: NC/AT, anicteric, neck supple  Respiratory: no acc muscle use, breathing comfortably  Cardiovascular: S1 and S2 present  Gastrointestinal: normal appearing, nondistended  Extremities: R BKA  Skin: no visible rash    Laboratory Studies:  CBC:                       10.4   6.65  )-----------( 234      ( 14 Dec 2023 07:10 )             32.2     WBC Trend:  6.65 12-14-23 @ 07:10  5.84 12-13-23 @ 07:29  7.17 12-12-23 @ 06:56  7.33 12-11-23 @ 07:19  6.61 12-10-23 @ 07:13  7.63 12-09-23 @ 09:23    CMP: 12-14    137  |  103  |  17  ----------------------------<  189<H>  3.8   |  24  |  0.99    Ca    9.3      14 Dec 2023 07:12  Phos  3.1     12-14  Mg     2.4     12-14    Creatinine: 0.99 mg/dL (12-14-23 @ 07:12)  Creatinine: 1.01 mg/dL (12-13-23 @ 07:27)  Creatinine: 1.17 mg/dL (12-12-23 @ 06:56)  Creatinine: 1.12 mg/dL (12-11-23 @ 07:19)  Creatinine: 1.02 mg/dL (12-10-23 @ 07:13)  Creatinine: 0.88 mg/dL (12-09-23 @ 09:23)    Microbiology: reviewed   Culture - Abscess with Gram Stain (collected 12-09-23 @ 10:45)  Source: .Abscess left foot  Preliminary Report (12-12-23 @ 14:37):    Moderate Serratia liquefaciens Susceptibility to follow.    Numerous Corynebacterium amycolatum "Susceptibilities not performed"    Moderate Finegoldia magna "Susceptibilities not performed"    Culture - Blood (collected 12-09-23 @ 09:05)  Source: .Blood Blood-Peripheral  Preliminary Report (12-13-23 @ 14:00):    No growth at 4 days    Culture - Blood (collected 12-09-23 @ 09:00)  Source: .Blood Blood-Peripheral  Preliminary Report (12-13-23 @ 14:00):    No growth at 4 days    Radiology: reviewed     Medications:  amLODIPine   Tablet 2.5 milliGRAM(s) Oral daily  aspirin enteric coated 81 milliGRAM(s) Oral daily  atorvastatin 40 milliGRAM(s) Oral at bedtime  chlorhexidine 2% Cloths 1 Application(s) Topical daily  dextrose 5%. 1000 milliLiter(s) IV Continuous <Continuous>  dextrose 5%. 1000 milliLiter(s) IV Continuous <Continuous>  dextrose 50% Injectable 25 Gram(s) IV Push once  dextrose 50% Injectable 12.5 Gram(s) IV Push once  dextrose 50% Injectable 25 Gram(s) IV Push once  dextrose Oral Gel 15 Gram(s) Oral once PRN  glucagon  Injectable 1 milliGRAM(s) IntraMuscular once  insulin lispro (ADMELOG) corrective regimen sliding scale   SubCutaneous at bedtime  insulin lispro (ADMELOG) corrective regimen sliding scale   SubCutaneous three times a day before meals  losartan 25 milliGRAM(s) Oral daily  piperacillin/tazobactam IVPB.. 3.375 Gram(s) IV Intermittent every 8 hours  sodium chloride 0.9%. 1000 milliLiter(s) IV Continuous <Continuous>    Current Antimicrobials:  piperacillin/tazobactam IVPB.. 3.375 Gram(s) IV Intermittent every 8 hours    Prior/Completed Antimicrobials:  piperacillin/tazobactam IVPB...  vancomycin  IVPB.

## 2023-12-15 ENCOUNTER — APPOINTMENT (OUTPATIENT)
Dept: VASCULAR SURGERY | Facility: HOSPITAL | Age: 73
End: 2023-12-15

## 2023-12-15 LAB
ANION GAP SERPL CALC-SCNC: 8 MMOL/L — SIGNIFICANT CHANGE UP (ref 5–17)
APTT BLD: 29.1 SEC — SIGNIFICANT CHANGE UP (ref 24.5–35.6)
APTT BLD: 29.1 SEC — SIGNIFICANT CHANGE UP (ref 24.5–35.6)
BUN SERPL-MCNC: 20 MG/DL — SIGNIFICANT CHANGE UP (ref 7–23)
CALCIUM SERPL-MCNC: 9.1 MG/DL — SIGNIFICANT CHANGE UP (ref 8.4–10.5)
CALCIUM SERPL-MCNC: 9.1 MG/DL — SIGNIFICANT CHANGE UP (ref 8.4–10.5)
CALCIUM SERPL-MCNC: 9.3 MG/DL — SIGNIFICANT CHANGE UP (ref 8.4–10.5)
CALCIUM SERPL-MCNC: 9.3 MG/DL — SIGNIFICANT CHANGE UP (ref 8.4–10.5)
CHLORIDE SERPL-SCNC: 103 MMOL/L — SIGNIFICANT CHANGE UP (ref 96–108)
CHLORIDE SERPL-SCNC: 103 MMOL/L — SIGNIFICANT CHANGE UP (ref 96–108)
CHLORIDE SERPL-SCNC: 105 MMOL/L — SIGNIFICANT CHANGE UP (ref 96–108)
CHLORIDE SERPL-SCNC: 105 MMOL/L — SIGNIFICANT CHANGE UP (ref 96–108)
CO2 SERPL-SCNC: 24 MMOL/L — SIGNIFICANT CHANGE UP (ref 22–31)
CO2 SERPL-SCNC: 24 MMOL/L — SIGNIFICANT CHANGE UP (ref 22–31)
CO2 SERPL-SCNC: 25 MMOL/L — SIGNIFICANT CHANGE UP (ref 22–31)
CO2 SERPL-SCNC: 25 MMOL/L — SIGNIFICANT CHANGE UP (ref 22–31)
CREAT SERPL-MCNC: 1.06 MG/DL — SIGNIFICANT CHANGE UP (ref 0.5–1.3)
CREAT SERPL-MCNC: 1.06 MG/DL — SIGNIFICANT CHANGE UP (ref 0.5–1.3)
CREAT SERPL-MCNC: 1.07 MG/DL — SIGNIFICANT CHANGE UP (ref 0.5–1.3)
CREAT SERPL-MCNC: 1.07 MG/DL — SIGNIFICANT CHANGE UP (ref 0.5–1.3)
EGFR: 74 ML/MIN/1.73M2 — SIGNIFICANT CHANGE UP
EGFR: 74 ML/MIN/1.73M2 — SIGNIFICANT CHANGE UP
EGFR: 75 ML/MIN/1.73M2 — SIGNIFICANT CHANGE UP
EGFR: 75 ML/MIN/1.73M2 — SIGNIFICANT CHANGE UP
GLUCOSE BLDC GLUCOMTR-MCNC: 193 MG/DL — HIGH (ref 70–99)
GLUCOSE BLDC GLUCOMTR-MCNC: 193 MG/DL — HIGH (ref 70–99)
GLUCOSE BLDC GLUCOMTR-MCNC: 196 MG/DL — HIGH (ref 70–99)
GLUCOSE BLDC GLUCOMTR-MCNC: 196 MG/DL — HIGH (ref 70–99)
GLUCOSE BLDC GLUCOMTR-MCNC: 210 MG/DL — HIGH (ref 70–99)
GLUCOSE BLDC GLUCOMTR-MCNC: 210 MG/DL — HIGH (ref 70–99)
GLUCOSE BLDC GLUCOMTR-MCNC: 215 MG/DL — HIGH (ref 70–99)
GLUCOSE BLDC GLUCOMTR-MCNC: 215 MG/DL — HIGH (ref 70–99)
GLUCOSE BLDC GLUCOMTR-MCNC: 239 MG/DL — HIGH (ref 70–99)
GLUCOSE BLDC GLUCOMTR-MCNC: 239 MG/DL — HIGH (ref 70–99)
GLUCOSE BLDC GLUCOMTR-MCNC: 344 MG/DL — HIGH (ref 70–99)
GLUCOSE BLDC GLUCOMTR-MCNC: 344 MG/DL — HIGH (ref 70–99)
GLUCOSE BLDC GLUCOMTR-MCNC: 407 MG/DL — HIGH (ref 70–99)
GLUCOSE BLDC GLUCOMTR-MCNC: 407 MG/DL — HIGH (ref 70–99)
GLUCOSE SERPL-MCNC: 205 MG/DL — HIGH (ref 70–99)
GLUCOSE SERPL-MCNC: 205 MG/DL — HIGH (ref 70–99)
GLUCOSE SERPL-MCNC: 208 MG/DL — HIGH (ref 70–99)
GLUCOSE SERPL-MCNC: 208 MG/DL — HIGH (ref 70–99)
HCT VFR BLD CALC: 32 % — LOW (ref 39–50)
HCT VFR BLD CALC: 32 % — LOW (ref 39–50)
HGB BLD-MCNC: 10.1 G/DL — LOW (ref 13–17)
HGB BLD-MCNC: 10.1 G/DL — LOW (ref 13–17)
INR BLD: 1.11 RATIO — SIGNIFICANT CHANGE UP (ref 0.85–1.18)
INR BLD: 1.11 RATIO — SIGNIFICANT CHANGE UP (ref 0.85–1.18)
MAGNESIUM SERPL-MCNC: 2.2 MG/DL — SIGNIFICANT CHANGE UP (ref 1.6–2.6)
MAGNESIUM SERPL-MCNC: 2.2 MG/DL — SIGNIFICANT CHANGE UP (ref 1.6–2.6)
MCHC RBC-ENTMCNC: 27.7 PG — SIGNIFICANT CHANGE UP (ref 27–34)
MCHC RBC-ENTMCNC: 27.7 PG — SIGNIFICANT CHANGE UP (ref 27–34)
MCHC RBC-ENTMCNC: 31.6 GM/DL — LOW (ref 32–36)
MCHC RBC-ENTMCNC: 31.6 GM/DL — LOW (ref 32–36)
MCV RBC AUTO: 87.7 FL — SIGNIFICANT CHANGE UP (ref 80–100)
MCV RBC AUTO: 87.7 FL — SIGNIFICANT CHANGE UP (ref 80–100)
NRBC # BLD: 0 /100 WBCS — SIGNIFICANT CHANGE UP (ref 0–0)
NRBC # BLD: 0 /100 WBCS — SIGNIFICANT CHANGE UP (ref 0–0)
PHOSPHATE SERPL-MCNC: 3 MG/DL — SIGNIFICANT CHANGE UP (ref 2.5–4.5)
PHOSPHATE SERPL-MCNC: 3 MG/DL — SIGNIFICANT CHANGE UP (ref 2.5–4.5)
PLATELET # BLD AUTO: 228 K/UL — SIGNIFICANT CHANGE UP (ref 150–400)
PLATELET # BLD AUTO: 228 K/UL — SIGNIFICANT CHANGE UP (ref 150–400)
POTASSIUM SERPL-MCNC: 4 MMOL/L — SIGNIFICANT CHANGE UP (ref 3.5–5.3)
POTASSIUM SERPL-MCNC: 4 MMOL/L — SIGNIFICANT CHANGE UP (ref 3.5–5.3)
POTASSIUM SERPL-MCNC: 4.1 MMOL/L — SIGNIFICANT CHANGE UP (ref 3.5–5.3)
POTASSIUM SERPL-MCNC: 4.1 MMOL/L — SIGNIFICANT CHANGE UP (ref 3.5–5.3)
POTASSIUM SERPL-SCNC: 4 MMOL/L — SIGNIFICANT CHANGE UP (ref 3.5–5.3)
POTASSIUM SERPL-SCNC: 4 MMOL/L — SIGNIFICANT CHANGE UP (ref 3.5–5.3)
POTASSIUM SERPL-SCNC: 4.1 MMOL/L — SIGNIFICANT CHANGE UP (ref 3.5–5.3)
POTASSIUM SERPL-SCNC: 4.1 MMOL/L — SIGNIFICANT CHANGE UP (ref 3.5–5.3)
PROTHROM AB SERPL-ACNC: 11.6 SEC — SIGNIFICANT CHANGE UP (ref 9.5–13)
PROTHROM AB SERPL-ACNC: 11.6 SEC — SIGNIFICANT CHANGE UP (ref 9.5–13)
RBC # BLD: 3.65 M/UL — LOW (ref 4.2–5.8)
RBC # BLD: 3.65 M/UL — LOW (ref 4.2–5.8)
RBC # FLD: 12.3 % — SIGNIFICANT CHANGE UP (ref 10.3–14.5)
RBC # FLD: 12.3 % — SIGNIFICANT CHANGE UP (ref 10.3–14.5)
SODIUM SERPL-SCNC: 136 MMOL/L — SIGNIFICANT CHANGE UP (ref 135–145)
SODIUM SERPL-SCNC: 136 MMOL/L — SIGNIFICANT CHANGE UP (ref 135–145)
SODIUM SERPL-SCNC: 137 MMOL/L — SIGNIFICANT CHANGE UP (ref 135–145)
SODIUM SERPL-SCNC: 137 MMOL/L — SIGNIFICANT CHANGE UP (ref 135–145)
WBC # BLD: 6.77 K/UL — SIGNIFICANT CHANGE UP (ref 3.8–10.5)
WBC # BLD: 6.77 K/UL — SIGNIFICANT CHANGE UP (ref 3.8–10.5)
WBC # FLD AUTO: 6.77 K/UL — SIGNIFICANT CHANGE UP (ref 3.8–10.5)
WBC # FLD AUTO: 6.77 K/UL — SIGNIFICANT CHANGE UP (ref 3.8–10.5)

## 2023-12-15 PROCEDURE — 75710 ARTERY X-RAYS ARM/LEG: CPT | Mod: 26,59

## 2023-12-15 PROCEDURE — 37228: CPT | Mod: LT

## 2023-12-15 PROCEDURE — 37224: CPT | Mod: LT,59

## 2023-12-15 DEVICE — SHEATH INTRODUCER TERUMO PINNACLE PERIPHERAL 5FR X 10CM: Type: IMPLANTABLE DEVICE | Site: LEFT | Status: FUNCTIONAL

## 2023-12-15 DEVICE — DEVICE CLOSURE 5F MYNX GRIP MUST ORDER MIN OF 10 EA: Type: IMPLANTABLE DEVICE | Site: LEFT | Status: FUNCTIONAL

## 2023-12-15 DEVICE — INTRO SHEATH FLEXOR ANSEL 5F: Type: IMPLANTABLE DEVICE | Site: LEFT | Status: FUNCTIONAL

## 2023-12-15 DEVICE — IMPLANTABLE DEVICE: Type: IMPLANTABLE DEVICE | Site: LEFT | Status: FUNCTIONAL

## 2023-12-15 DEVICE — INTRO MICROPUNC STIFF 5FRX10CM: Type: IMPLANTABLE DEVICE | Site: LEFT | Status: FUNCTIONAL

## 2023-12-15 DEVICE — WIRE APPROACH HYDRO STRT 300CM: Type: IMPLANTABLE DEVICE | Site: LEFT | Status: FUNCTIONAL

## 2023-12-15 DEVICE — GUIDEWIRE RADIFOCUS GLIDEWIRE ANGLED 0.035" X 260CM STIFF: Type: IMPLANTABLE DEVICE | Site: LEFT | Status: FUNCTIONAL

## 2023-12-15 DEVICE — GUIDEWIRE ADVANTAGE .014INX300CM: Type: IMPLANTABLE DEVICE | Site: LEFT | Status: FUNCTIONAL

## 2023-12-15 DEVICE — GUIDEWIRE BENTSON 0.035" X 145CM: Type: IMPLANTABLE DEVICE | Site: LEFT | Status: FUNCTIONAL

## 2023-12-15 DEVICE — GUIDEWIRE RADIFOCUS GLIDEWIRE STANDARD ANGLED TIP 0.035" X 260CM: Type: IMPLANTABLE DEVICE | Site: LEFT | Status: FUNCTIONAL

## 2023-12-15 DEVICE — CATH OMNI FLSH 0.035IN 5FRX65: Type: IMPLANTABLE DEVICE | Site: LEFT | Status: FUNCTIONAL

## 2023-12-15 DEVICE — CATH QUICK CROSS .018X135CM: Type: IMPLANTABLE DEVICE | Site: LEFT | Status: FUNCTIONAL

## 2023-12-15 RX ORDER — ASPIRIN/CALCIUM CARB/MAGNESIUM 324 MG
81 TABLET ORAL DAILY
Refills: 0 | Status: DISCONTINUED | OUTPATIENT
Start: 2023-12-15 | End: 2023-12-18

## 2023-12-15 RX ORDER — DEXTROSE 50 % IN WATER 50 %
15 SYRINGE (ML) INTRAVENOUS ONCE
Refills: 0 | Status: DISCONTINUED | OUTPATIENT
Start: 2023-12-15 | End: 2023-12-18

## 2023-12-15 RX ORDER — GLUCAGON INJECTION, SOLUTION 0.5 MG/.1ML
1 INJECTION, SOLUTION SUBCUTANEOUS ONCE
Refills: 0 | Status: DISCONTINUED | OUTPATIENT
Start: 2023-12-15 | End: 2023-12-18

## 2023-12-15 RX ORDER — SODIUM CHLORIDE 9 MG/ML
1000 INJECTION, SOLUTION INTRAVENOUS
Refills: 0 | Status: DISCONTINUED | OUTPATIENT
Start: 2023-12-15 | End: 2023-12-18

## 2023-12-15 RX ORDER — DEXTROSE 50 % IN WATER 50 %
12.5 SYRINGE (ML) INTRAVENOUS ONCE
Refills: 0 | Status: DISCONTINUED | OUTPATIENT
Start: 2023-12-15 | End: 2023-12-18

## 2023-12-15 RX ORDER — ATORVASTATIN CALCIUM 80 MG/1
40 TABLET, FILM COATED ORAL AT BEDTIME
Refills: 0 | Status: DISCONTINUED | OUTPATIENT
Start: 2023-12-15 | End: 2023-12-18

## 2023-12-15 RX ORDER — PIPERACILLIN AND TAZOBACTAM 4; .5 G/20ML; G/20ML
3.38 INJECTION, POWDER, LYOPHILIZED, FOR SOLUTION INTRAVENOUS EVERY 8 HOURS
Refills: 0 | Status: DISCONTINUED | OUTPATIENT
Start: 2023-12-15 | End: 2023-12-18

## 2023-12-15 RX ORDER — OXYCODONE HYDROCHLORIDE 5 MG/1
5 TABLET ORAL ONCE
Refills: 0 | Status: DISCONTINUED | OUTPATIENT
Start: 2023-12-15 | End: 2023-12-15

## 2023-12-15 RX ORDER — INSULIN LISPRO 100/ML
VIAL (ML) SUBCUTANEOUS AT BEDTIME
Refills: 0 | Status: DISCONTINUED | OUTPATIENT
Start: 2023-12-15 | End: 2023-12-18

## 2023-12-15 RX ORDER — RIVAROXABAN 15 MG-20MG
2.5 KIT ORAL
Refills: 0 | Status: DISCONTINUED | OUTPATIENT
Start: 2023-12-15 | End: 2023-12-18

## 2023-12-15 RX ORDER — ONDANSETRON 8 MG/1
4 TABLET, FILM COATED ORAL ONCE
Refills: 0 | Status: DISCONTINUED | OUTPATIENT
Start: 2023-12-15 | End: 2023-12-15

## 2023-12-15 RX ORDER — HYDRALAZINE HCL 50 MG
10 TABLET ORAL ONCE
Refills: 0 | Status: COMPLETED | OUTPATIENT
Start: 2023-12-15 | End: 2023-12-15

## 2023-12-15 RX ORDER — FENTANYL CITRATE 50 UG/ML
25 INJECTION INTRAVENOUS
Refills: 0 | Status: DISCONTINUED | OUTPATIENT
Start: 2023-12-15 | End: 2023-12-15

## 2023-12-15 RX ORDER — ACETAMINOPHEN 500 MG
1000 TABLET ORAL ONCE
Refills: 0 | Status: DISCONTINUED | OUTPATIENT
Start: 2023-12-15 | End: 2023-12-15

## 2023-12-15 RX ORDER — OXYCODONE HYDROCHLORIDE 5 MG/1
10 TABLET ORAL ONCE
Refills: 0 | Status: DISCONTINUED | OUTPATIENT
Start: 2023-12-15 | End: 2023-12-15

## 2023-12-15 RX ORDER — INSULIN LISPRO 100/ML
VIAL (ML) SUBCUTANEOUS
Refills: 0 | Status: DISCONTINUED | OUTPATIENT
Start: 2023-12-15 | End: 2023-12-15

## 2023-12-15 RX ORDER — CHLORHEXIDINE GLUCONATE 213 G/1000ML
1 SOLUTION TOPICAL DAILY
Refills: 0 | Status: DISCONTINUED | OUTPATIENT
Start: 2023-12-15 | End: 2023-12-18

## 2023-12-15 RX ORDER — AMLODIPINE BESYLATE 2.5 MG/1
2.5 TABLET ORAL DAILY
Refills: 0 | Status: DISCONTINUED | OUTPATIENT
Start: 2023-12-15 | End: 2023-12-18

## 2023-12-15 RX ORDER — INSULIN LISPRO 100/ML
VIAL (ML) SUBCUTANEOUS
Refills: 0 | Status: DISCONTINUED | OUTPATIENT
Start: 2023-12-15 | End: 2023-12-18

## 2023-12-15 RX ORDER — SODIUM CHLORIDE 9 MG/ML
1000 INJECTION INTRAMUSCULAR; INTRAVENOUS; SUBCUTANEOUS
Refills: 0 | Status: DISCONTINUED | OUTPATIENT
Start: 2023-12-15 | End: 2023-12-18

## 2023-12-15 RX ORDER — DEXTROSE 50 % IN WATER 50 %
25 SYRINGE (ML) INTRAVENOUS ONCE
Refills: 0 | Status: DISCONTINUED | OUTPATIENT
Start: 2023-12-15 | End: 2023-12-18

## 2023-12-15 RX ORDER — LOSARTAN POTASSIUM 100 MG/1
25 TABLET, FILM COATED ORAL DAILY
Refills: 0 | Status: DISCONTINUED | OUTPATIENT
Start: 2023-12-15 | End: 2023-12-18

## 2023-12-15 RX ADMIN — SODIUM CHLORIDE 75 MILLILITER(S): 9 INJECTION INTRAMUSCULAR; INTRAVENOUS; SUBCUTANEOUS at 00:12

## 2023-12-15 RX ADMIN — Medication 10 MILLIGRAM(S): at 14:30

## 2023-12-15 RX ADMIN — PIPERACILLIN AND TAZOBACTAM 25 GRAM(S): 4; .5 INJECTION, POWDER, LYOPHILIZED, FOR SOLUTION INTRAVENOUS at 15:19

## 2023-12-15 RX ADMIN — Medication 1: at 09:02

## 2023-12-15 RX ADMIN — Medication 1: at 15:30

## 2023-12-15 RX ADMIN — ATORVASTATIN CALCIUM 40 MILLIGRAM(S): 80 TABLET, FILM COATED ORAL at 22:02

## 2023-12-15 RX ADMIN — SODIUM CHLORIDE 75 MILLILITER(S): 9 INJECTION INTRAMUSCULAR; INTRAVENOUS; SUBCUTANEOUS at 15:20

## 2023-12-15 RX ADMIN — PIPERACILLIN AND TAZOBACTAM 25 GRAM(S): 4; .5 INJECTION, POWDER, LYOPHILIZED, FOR SOLUTION INTRAVENOUS at 01:15

## 2023-12-15 RX ADMIN — LOSARTAN POTASSIUM 25 MILLIGRAM(S): 100 TABLET, FILM COATED ORAL at 05:15

## 2023-12-15 RX ADMIN — CHLORHEXIDINE GLUCONATE 1 APPLICATION(S): 213 SOLUTION TOPICAL at 05:14

## 2023-12-15 RX ADMIN — RIVAROXABAN 2.5 MILLIGRAM(S): KIT at 22:02

## 2023-12-15 RX ADMIN — AMLODIPINE BESYLATE 2.5 MILLIGRAM(S): 2.5 TABLET ORAL at 05:15

## 2023-12-15 RX ADMIN — Medication 6: at 22:43

## 2023-12-15 RX ADMIN — PIPERACILLIN AND TAZOBACTAM 25 GRAM(S): 4; .5 INJECTION, POWDER, LYOPHILIZED, FOR SOLUTION INTRAVENOUS at 10:20

## 2023-12-15 NOTE — PROGRESS NOTE ADULT - SUBJECTIVE AND OBJECTIVE BOX
HPI:   73 y/o M with PMHx of DM, HTN, PAD and PSHX of R BKA presents to ED at recommendation of outpatient podiatrist. Patient was recently admitted and being followed by vascular and podiatry for wounds to the L foot. Was noted to have dry gangrene of L 2nd/3rd toes. [place] due to extent of vascular disease, patient would not heal if amputation was done. Was told to f/u with vascular as an outpatient. Saw podiatry yesterday, due to gangrene extending to 4th L toe. Recommended to come in for abx and possible procedure with podiatry. Patient is endorsing L toe pain. 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 1000 milliLiter(s) (50 mL/Hr) IV Continuous <Continuous>  dextrose 5%. 1000 milliLiter(s) (100 mL/Hr) IV Continuous <Continuous>  dextrose 50% Injectable 12.5 Gram(s) IV Push once  dextrose 50% Injectable 25 Gram(s) IV Push once  dextrose 50% Injectable 25 Gram(s) IV Push once  glucagon  Injectable 1 milliGRAM(s) IntraMuscular once  insulin lispro (ADMELOG) corrective regimen sliding scale   SubCutaneous at bedtime  insulin lispro (ADMELOG) corrective regimen sliding scale   SubCutaneous three times a day before meals  losartan 25 milliGRAM(s) Oral daily  piperacillin/tazobactam IVPB.. 3.375 Gram(s) IV Intermittent every 8 hours  rivaroxaban 2.5 milliGRAM(s) Oral two times a day  sodium chloride 0.9%. 1000 milliLiter(s) (75 mL/Hr) IV Continuous <Continuous>    MEDICATIONS  (PRN):  dextrose Oral Gel 15 Gram(s) Oral once PRN Blood Glucose LESS THAN 70 milliGRAM(s)/deciliter        Review of Systems:   CONSTITUTIONAL: No fever, weight loss, or fatigue  EYES: No eye pain, visual disturbances, or discharge  ENMT:  No difficulty hearing, tinnitus, vertigo; No sinus or throat pain  NECK: No pain or stiffness  BREASTS: No pain, masses, or nipple discharge  RESPIRATORY: No cough, wheezing, chills or hemoptysis; No shortness of breath  CARDIOVASCULAR: No chest pain, palpitations, dizziness, or leg swelling  GASTROINTESTINAL: No abdominal or epigastric pain. No nausea, vomiting, or hematemesis; No diarrhea or constipation. No melena or hematochezia.  GENITOURINARY: No dysuria, frequency, hematuria, or incontinence  NEUROLOGICAL: No headaches, memory loss, loss of strength, numbness, or tremors  SKIN: No itching, burning, rashes, or lesions   LYMPH NODES: No enlarged glands  ENDOCRINE: No heat or cold intolerance; No hair loss  MUSCULOSKELETAL: No joint pain or swelling; No muscle, back, or extremity pain  PSYCHIATRIC: No depression, anxiety, mood swings, or difficulty sleeping  HEME/LYMPH: No easy bruising, or bleeding gums  ALLERY AND IMMUNOLOGIC: No hives or eczema    Allergies    No Known Allergies    Intolerances        Social History:             PHYSICAL EXAM:  GENERAL: NAD, well-developed  HEAD:  Atraumatic, Normocephalic  EYES: EOMI, PERRLA, conjunctiva and sclera clear  NECK: Supple, No JVD  CHEST/LUNG: Clear to auscultation bilaterally; No wheeze  HEART: Regular rate and rhythm; No murmurs, rubs, or gallops  ABDOMEN: Soft, Nontender, Nondistended; Bowel sounds present  EXTREMITIES:  Left foot abscess   PSYCH: AAOx3  NEUROLOGY: non-focal  SKIN: No rashes or lesions                                               10.1   6.77  )-----------( 228      ( 15 Dec 2023 05:34 )             32.0             PT/INR - ( 15 Dec 2023 06:14 )   PT: 11.6 sec;   INR: 1.11 ratio         PTT - ( 15 Dec 2023 06:14 )  PTT:29.1 sec  137|105|20<208  4.0|24|1.06  9.1,--,--  12-15 @ 05:34  136|103|20<205  4.1|25|1.07  9.3,2.2,3.0  12-15 @ 04:25      CAPILLARY BLOOD GLUCOSE      POCT Blood Glucose.: 239 mg/dL (15 Dec 2023 20:32)  POCT Blood Glucose.: 215 mg/dL (15 Dec 2023 16:53)  POCT Blood Glucose.: 196 mg/dL (15 Dec 2023 15:29)  POCT Blood Glucose.: 210 mg/dL (15 Dec 2023 14:20)  POCT Blood Glucose.: 193 mg/dL (15 Dec 2023 08:11)  POCT Blood Glucose.: 205 mg/dL (14 Dec 2023 21:35)   HPI:   73 y/o M with PMHx of DM, HTN, PAD and PSHX of R BKA presents to ED at recommendation of outpatient podiatrist. Patient was recently admitted and being followed by vascular and podiatry for wounds to the L foot. Was noted to have dry gangrene of L 2nd/3rd toes. [place] due to extent of vascular disease, patient would not heal if amputation was done. Was told to f/u with vascular as an outpatient. Saw podiatry yesterday, due to gangrene extending to 4th L toe. Recommended to come in for abx and possible procedure with podiatry. Patient is endorsing L toe pain. 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 1000 milliLiter(s) (50 mL/Hr) IV Continuous <Continuous>  dextrose 5%. 1000 milliLiter(s) (100 mL/Hr) IV Continuous <Continuous>  dextrose 50% Injectable 12.5 Gram(s) IV Push once  dextrose 50% Injectable 25 Gram(s) IV Push once  dextrose 50% Injectable 25 Gram(s) IV Push once  glucagon  Injectable 1 milliGRAM(s) IntraMuscular once  insulin lispro (ADMELOG) corrective regimen sliding scale   SubCutaneous at bedtime  insulin lispro (ADMELOG) corrective regimen sliding scale   SubCutaneous three times a day before meals  losartan 25 milliGRAM(s) Oral daily  piperacillin/tazobactam IVPB.. 3.375 Gram(s) IV Intermittent every 8 hours  rivaroxaban 2.5 milliGRAM(s) Oral two times a day  sodium chloride 0.9%. 1000 milliLiter(s) (75 mL/Hr) IV Continuous <Continuous>    MEDICATIONS  (PRN):  dextrose Oral Gel 15 Gram(s) Oral once PRN Blood Glucose LESS THAN 70 milliGRAM(s)/deciliter        Review of Systems:   CONSTITUTIONAL: No fever, weight loss, or fatigue  EYES: No eye pain, visual disturbances, or discharge  ENMT:  No difficulty hearing, tinnitus, vertigo; No sinus or throat pain  NECK: No pain or stiffness  BREASTS: No pain, masses, or nipple discharge  RESPIRATORY: No cough, wheezing, chills or hemoptysis; No shortness of breath  CARDIOVASCULAR: No chest pain, palpitations, dizziness, or leg swelling  GASTROINTESTINAL: No abdominal or epigastric pain. No nausea, vomiting, or hematemesis; No diarrhea or constipation. No melena or hematochezia.  GENITOURINARY: No dysuria, frequency, hematuria, or incontinence  NEUROLOGICAL: No headaches, memory loss, loss of strength, numbness, or tremors  SKIN: No itching, burning, rashes, or lesions   LYMPH NODES: No enlarged glands  ENDOCRINE: No heat or cold intolerance; No hair loss  MUSCULOSKELETAL: No joint pain or swelling; No muscle, back, or extremity pain  PSYCHIATRIC: No depression, anxiety, mood swings, or difficulty sleeping  HEME/LYMPH: No easy bruising, or bleeding gums  ALLERY AND IMMUNOLOGIC: No hives or eczema    Allergies    No Known Allergies    Intolerances        Social History:             PHYSICAL EXAM:  GENERAL: NAD, well-developed  HEAD:  Atraumatic, Normocephalic  EYES: EOMI, PERRLA, conjunctiva and sclera clear  NECK: Supple, No JVD  CHEST/LUNG: Clear to auscultation bilaterally; No wheeze  HEART: Regular rate and rhythm; No murmurs, rubs, or gallops  ABDOMEN: Soft, Nontender, Nondistended; Bowel sounds present  EXTREMITIES:  Left foot abscess   PSYCH: AAOx3  NEUROLOGY: non-focal  SKIN: No rashes or lesions                                               10.1   6.77  )-----------( 228      ( 15 Dec 2023 05:34 )             32.0             PT/INR - ( 15 Dec 2023 06:14 )   PT: 11.6 sec;   INR: 1.11 ratio         PTT - ( 15 Dec 2023 06:14 )  PTT:29.1 sec  137|105|20<208  4.0|24|1.06  9.1,--,--  12-15 @ 05:34  136|103|20<205  4.1|25|1.07  9.3,2.2,3.0  12-15 @ 04:25      CAPILLARY BLOOD GLUCOSE      POCT Blood Glucose.: 239 mg/dL (15 Dec 2023 20:32)  POCT Blood Glucose.: 215 mg/dL (15 Dec 2023 16:53)  POCT Blood Glucose.: 196 mg/dL (15 Dec 2023 15:29)  POCT Blood Glucose.: 210 mg/dL (15 Dec 2023 14:20)  POCT Blood Glucose.: 193 mg/dL (15 Dec 2023 08:11)  POCT Blood Glucose.: 205 mg/dL (14 Dec 2023 21:35)

## 2023-12-15 NOTE — PROGRESS NOTE ADULT - SUBJECTIVE AND OBJECTIVE BOX
Patient is a 72y old  Male who presents with a chief complaint of Left 4 th toe infection (15 Dec 2023 09:28)       INTERVAL HPI/OVERNIGHT EVENTS:  Patient seen and evaluated at bedside.  Pt is resting comfortable in NAD. Denies N/V/F/C.    Allergies    No Known Allergies    Intolerances        Vital Signs Last 24 Hrs  T(C): 36.7 (15 Dec 2023 06:16), Max: 36.8 (14 Dec 2023 20:31)  T(F): 98 (15 Dec 2023 06:16), Max: 98.2 (14 Dec 2023 20:31)  HR: 76 (15 Dec 2023 06:16) (73 - 82)  BP: 155/83 (15 Dec 2023 06:16) (145/71 - 168/71)  BP(mean): --  RR: 18 (15 Dec 2023 06:16) (18 - 18)  SpO2: 97% (15 Dec 2023 06:16) (97% - 100%)    Parameters below as of 15 Dec 2023 04:52  Patient On (Oxygen Delivery Method): room air        LABS:                        10.1   6.77  )-----------( 228      ( 15 Dec 2023 05:34 )             32.0     12-15    137  |  105  |  20  ----------------------------<  208<H>  4.0   |  24  |  1.06    Ca    9.1      15 Dec 2023 05:34  Phos  3.0     12-15  Mg     2.2     12-15      PT/INR - ( 15 Dec 2023 06:14 )   PT: 11.6 sec;   INR: 1.11 ratio         PTT - ( 15 Dec 2023 06:14 )  PTT:29.1 sec  Urinalysis Basic - ( 15 Dec 2023 05:34 )    Color: x / Appearance: x / SG: x / pH: x  Gluc: 208 mg/dL / Ketone: x  / Bili: x / Urobili: x   Blood: x / Protein: x / Nitrite: x   Leuk Esterase: x / RBC: x / WBC x   Sq Epi: x / Non Sq Epi: x / Bacteria: x      CAPILLARY BLOOD GLUCOSE      POCT Blood Glucose.: 193 mg/dL (15 Dec 2023 08:11)  POCT Blood Glucose.: 205 mg/dL (14 Dec 2023 21:35)  POCT Blood Glucose.: 231 mg/dL (14 Dec 2023 17:16)  POCT Blood Glucose.: 276 mg/dL (14 Dec 2023 12:13)      Lower Extremity Physical Exam:  Vascular: DP/PT 0/4, B/L, Temperature gradient warm to cool, B/L.   Neuro: Epicritic sensation diminished to the level of digits, B/L.  Musculoskeletal/Ortho: s/p R BKA  Skin: Left foot digits 2-4 full thickness dry gangrene to the level of MPJs 2-4, no wet conversion noted, no drainage, mild malodor, no acute signs of infection.     RADIOLOGY & ADDITIONAL TESTS:

## 2023-12-15 NOTE — CHART NOTE - NSCHARTNOTEFT_GEN_A_CORE
Post Operative Note  Patient: ALEE DUNN 72y (1950) Male   MRN: [id number]  Location: [address]  Visit: 12-09-23 Inpatient  Date: 12-15-23 @ 21:15    Procedure: S/P LLE angiogram and angioplasty.     Subjective: Patient seen and examined post operatively. Reports pain as controlled. Denies nausea, vomiting, fever, chills, chest pain, SOB, cough.      Objective:  Vitals: T(F): 98.1 (12-15-23 @ 19:30), Max: 98.1 (12-15-23 @ 15:45)  HR: 90 (12-15-23 @ 19:30)  BP: 153/73 (12-15-23 @ 19:30) (125/62 - 195/87)  RR: 18 (12-15-23 @ 19:30)  SpO2: 96% (12-15-23 @ 19:30)    Physical Examination:  General: NAD, resting comfortably in bed  HEENT: Normocephalic atraumatic  Respiratory: Nonlabored respirations, normal CW expansion.  Cardio: pulse present  Abdomen: soft/nontender   Groin: no sign of hematoma  Extrem: LLE DP signal    Imaging:  No post-op imaging studies    Assessment:  72yMale patient S/P S/P LLE angiogram and angioplasty.     Plan:  - care per primary team    Vascular Surgery  [phone]     Date/Time: 12-15-23 @ 21:15 Post Operative Note  Patient: ALEE DUNN 72y (1950) Male   MRN: [id number]  Location: [address]  Visit: 12-09-23 Inpatient  Date: 12-15-23 @ 21:15    Procedure: S/P LLE angiogram and angioplasty.     Subjective: Patient seen and examined post operatively. Reports pain as controlled. Denies nausea, vomiting, fever, chills, chest pain, SOB, cough.      Objective:  Vitals: T(F): 98.1 (12-15-23 @ 19:30), Max: 98.1 (12-15-23 @ 15:45)  HR: 90 (12-15-23 @ 19:30)  BP: 153/73 (12-15-23 @ 19:30) (125/62 - 195/87)  RR: 18 (12-15-23 @ 19:30)  SpO2: 96% (12-15-23 @ 19:30)    Physical Examination:  General: NAD, resting comfortably in bed  HEENT: Normocephalic atraumatic  Respiratory: Nonlabored respirations, normal CW expansion.  Cardio: pulse present  Abdomen: soft/nontender   Groin: no sign of hematoma  Extrem: LLE DP signal    Imaging:  No post-op imaging studies    Assessment:  72yMale patient S/P S/P LLE angiogram and angioplasty.     Plan:  - care per primary team    Vascular Surgery  [phone]     Date/Time: 12-15-23 @ 21:15

## 2023-12-15 NOTE — PROGRESS NOTE ADULT - ASSESSMENT
72M presents with left foot digits 2-4 dry gangrene  - Patient seen and evaluated.  - Afebrile, no leukocytosis.  - Left foot digits 2-4 full thickness dry gangrene to the level of MPJs 2-4, no wet conversion noted, no drainage, mild malodor, no acute signs of infection.   - Left Foot X-Ray: no OM, no gas.  - Left Foot Wound Culture: Serratia liquefaciens, Corynebacterium amycolatum, Finegoldia magna (prelim).  - Left Foot MR: OM of 2nd middle phalanx, OM of 2nd proximal phalanx, OM of  third proximal phalanx, OM of fourth proximal interphalangeal joint, OM of base of the fourth proximal phalanx, OM of fifth proximal interphalangeal joint.  - Vasc recs, appreciated.  - Pod Plan: Booked for left foot TMA with [facility] on Mon 12/18 at 7:30am with Dr. Mishra pending vascular recs.  - Please document medical and cardiac clearance for podiatric surgery under anesthesia.   - Discussed with attending.

## 2023-12-15 NOTE — PROGRESS NOTE ADULT - SUBJECTIVE AND OBJECTIVE BOX
[facility]  ERVIN Florentino Y. Patel, S. Shah, G. Ervin  [phone]  ([phone] - weekdays after 5pm and weekends)    Name: ALEE DUNN  Age/Gender: 72y Male  MRN: [id number]    Interval History:  Patient seen and examined this morning.   Denies pain or any new complaints  Notes reviewed, for OR today  No concerning overnight events  Afebrile   Allergies: No Known Allergies      Objective:  Vitals:   T(F): 98 (12-15-23 @ 06:16), Max: 98.2 (12-14-23 @ 20:31)  HR: 76 (12-15-23 @ 06:16) (73 - 82)  BP: 155/83 (12-15-23 @ 06:16) (145/71 - 168/71)  RR: 18 (12-15-23 @ 06:16) (18 - 18)  SpO2: 97% (12-15-23 @ 06:16) (97% - 100%)  Physical Examination:  General: no acute distress, nontoxic  HEENT: NC/AT, anicteric, neck supple  Respiratory: no acc muscle use, breathing comfortably  Cardiovascular: S1 and S2 present  Gastrointestinal: normal appearing, nondistended  Extremities: R BKA; L foot clean dressing  Skin: no visible rash    Laboratory Studies:  CBC:                       10.1   6.77  )-----------( 228      ( 15 Dec 2023 05:34 )             32.0     WBC Trend:  6.77 12-15-23 @ 05:34  6.65 12-14-23 @ 07:10  5.84 12-13-23 @ 07:29  7.17 12-12-23 @ 06:56  7.33 12-11-23 @ 07:19  6.61 12-10-23 @ 07:13  7.63 12-09-23 @ 09:23    CMP: 12-15    137  |  105  |  20  ----------------------------<  208<H>  4.0   |  24  |  1.06    Ca    9.1      15 Dec 2023 05:34  Phos  3.0     12-15  Mg     2.2     12-15      Creatinine: 1.06 mg/dL (12-15-23 @ 05:34)  Creatinine: 1.07 mg/dL (12-15-23 @ 04:25)  Creatinine: 0.99 mg/dL (12-14-23 @ 07:12)  Creatinine: 1.01 mg/dL (12-13-23 @ 07:27)  Creatinine: 1.17 mg/dL (12-12-23 @ 06:56)  Creatinine: 1.12 mg/dL (12-11-23 @ 07:19)  Creatinine: 1.02 mg/dL (12-10-23 @ 07:13)  Creatinine: 0.88 mg/dL (12-09-23 @ 09:23)    Microbiology: reviewed   Culture - Abscess with Gram Stain (collected 12-09-23 @ 10:45)  Source: .Abscess left foot  Preliminary Report (12-12-23 @ 14:37):    Moderate Serratia liquefaciens Susceptibility to follow.    Numerous Corynebacterium amycolatum "Susceptibilities not performed"    Moderate Finegoldia magna "Susceptibilities not performed"  Organism: Serratia liquefaciens (12-14-23 @ 16:47)      Method Type: CarbaR      -  Resistance Gene to Carbapenem: Nondet  Organism: Serratia liquefaciens (12-14-23 @ 16:47)  Organism: Serratia liquefaciens (12-14-23 @ 16:47)      Method Type: AMI      -  Amoxicillin/Clavulanic Acid: R >16/8      -  Ampicillin: R >16 These ampicillin results predict results for amoxicillin      -  Ampicillin/Sulbactam: I 16/8      -  Aztreonam: R >16      -  Cefazolin: R >16      -  Cefepime: R >16      -  Cefoxitin: R >16      -  Ceftolozane/tazobactam: S <=2      -  Ceftriaxone: R >32      -  Ciprofloxacin: R 1      -  Ertapenem: R >1      -  Gentamicin: R 8      -  Levofloxacin: S <=0.5      -  Meropenem: R >8      -  Piperacillin/Tazobactam: S <=8      -  Tobramycin: R 8      -  Trimethoprim/Sulfamethoxazole: S <=0.5/9.5    Culture - Blood (collected 12-09-23 @ 09:05)  Source: .Blood Blood-Peripheral  Final Report (12-14-23 @ 14:01):    No growth at 5 days    Culture - Blood (collected 12-09-23 @ 09:00)  Source: .Blood Blood-Peripheral  Final Report (12-14-23 @ 14:01):    No growth at 5 days    Radiology: reviewed     Medications:  amLODIPine   Tablet 2.5 milliGRAM(s) Oral daily  aspirin enteric coated 81 milliGRAM(s) Oral daily  atorvastatin 40 milliGRAM(s) Oral at bedtime  chlorhexidine 2% Cloths 1 Application(s) Topical daily  dextrose 5%. 1000 milliLiter(s) IV Continuous <Continuous>  dextrose 5%. 1000 milliLiter(s) IV Continuous <Continuous>  dextrose 50% Injectable 25 Gram(s) IV Push once  dextrose 50% Injectable 12.5 Gram(s) IV Push once  dextrose 50% Injectable 25 Gram(s) IV Push once  dextrose Oral Gel 15 Gram(s) Oral once PRN  glucagon  Injectable 1 milliGRAM(s) IntraMuscular once  insulin lispro (ADMELOG) corrective regimen sliding scale   SubCutaneous at bedtime  insulin lispro (ADMELOG) corrective regimen sliding scale   SubCutaneous three times a day before meals  losartan 25 milliGRAM(s) Oral daily  piperacillin/tazobactam IVPB.. 3.375 Gram(s) IV Intermittent every 8 hours  sodium chloride 0.9%. 1000 milliLiter(s) IV Continuous <Continuous>    Current Antimicrobials:  piperacillin/tazobactam IVPB.. 3.375 Gram(s) IV Intermittent every 8 hours    Prior/Completed Antimicrobials:  piperacillin/tazobactam IVPB...  vancomycin  IVPB.   [facility]  ERVIN Florentino Y. Patel, S. Shah, G. Ervin  [phone]  ([phone] - weekdays after 5pm and weekends)    Name: ALEE DUNN  Age/Gender: 72y Male  MRN: [id number]    Interval History:  Patient seen and examined this morning.   Denies pain or any new complaints  Notes reviewed, for OR today  No concerning overnight events  Afebrile   Allergies: No Known Allergies      Objective:  Vitals:   T(F): 98 (12-15-23 @ 06:16), Max: 98.2 (12-14-23 @ 20:31)  HR: 76 (12-15-23 @ 06:16) (73 - 82)  BP: 155/83 (12-15-23 @ 06:16) (145/71 - 168/71)  RR: 18 (12-15-23 @ 06:16) (18 - 18)  SpO2: 97% (12-15-23 @ 06:16) (97% - 100%)  Physical Examination:  General: no acute distress, nontoxic  HEENT: NC/AT, anicteric, neck supple  Respiratory: no acc muscle use, breathing comfortably  Cardiovascular: S1 and S2 present  Gastrointestinal: normal appearing, nondistended  Extremities: R BKA; L foot clean dressing  Skin: no visible rash    Laboratory Studies:  CBC:                       10.1   6.77  )-----------( 228      ( 15 Dec 2023 05:34 )             32.0     WBC Trend:  6.77 12-15-23 @ 05:34  6.65 12-14-23 @ 07:10  5.84 12-13-23 @ 07:29  7.17 12-12-23 @ 06:56  7.33 12-11-23 @ 07:19  6.61 12-10-23 @ 07:13  7.63 12-09-23 @ 09:23    CMP: 12-15    137  |  105  |  20  ----------------------------<  208<H>  4.0   |  24  |  1.06    Ca    9.1      15 Dec 2023 05:34  Phos  3.0     12-15  Mg     2.2     12-15      Creatinine: 1.06 mg/dL (12-15-23 @ 05:34)  Creatinine: 1.07 mg/dL (12-15-23 @ 04:25)  Creatinine: 0.99 mg/dL (12-14-23 @ 07:12)  Creatinine: 1.01 mg/dL (12-13-23 @ 07:27)  Creatinine: 1.17 mg/dL (12-12-23 @ 06:56)  Creatinine: 1.12 mg/dL (12-11-23 @ 07:19)  Creatinine: 1.02 mg/dL (12-10-23 @ 07:13)  Creatinine: 0.88 mg/dL (12-09-23 @ 09:23)    Microbiology: reviewed   Culture - Abscess with Gram Stain (collected 12-09-23 @ 10:45)  Source: .Abscess left foot  Preliminary Report (12-12-23 @ 14:37):    Moderate Serratia liquefaciens Susceptibility to follow.    Numerous Corynebacterium amycolatum "Susceptibilities not performed"    Moderate Finegoldia magna "Susceptibilities not performed"  Organism: Serratia liquefaciens (12-14-23 @ 16:47)      Method Type: CarbaR      -  Resistance Gene to Carbapenem: Nondet  Organism: Serratia liquefaciens (12-14-23 @ 16:47)  Organism: Serratia liquefaciens (12-14-23 @ 16:47)      Method Type: AMI      -  Amoxicillin/Clavulanic Acid: R >16/8      -  Ampicillin: R >16 These ampicillin results predict results for amoxicillin      -  Ampicillin/Sulbactam: I 16/8      -  Aztreonam: R >16      -  Cefazolin: R >16      -  Cefepime: R >16      -  Cefoxitin: R >16      -  Ceftolozane/tazobactam: S <=2      -  Ceftriaxone: R >32      -  Ciprofloxacin: R 1      -  Ertapenem: R >1      -  Gentamicin: R 8      -  Levofloxacin: S <=0.5      -  Meropenem: R >8      -  Piperacillin/Tazobactam: S <=8      -  Tobramycin: R 8      -  Trimethoprim/Sulfamethoxazole: S <=0.5/9.5    Culture - Blood (collected 12-09-23 @ 09:05)  Source: .Blood Blood-Peripheral  Final Report (12-14-23 @ 14:01):    No growth at 5 days    Culture - Blood (collected 12-09-23 @ 09:00)  Source: .Blood Blood-Peripheral  Final Report (12-14-23 @ 14:01):    No growth at 5 days    Radiology: reviewed     Medications:  amLODIPine   Tablet 2.5 milliGRAM(s) Oral daily  aspirin enteric coated 81 milliGRAM(s) Oral daily  atorvastatin 40 milliGRAM(s) Oral at bedtime  chlorhexidine 2% Cloths 1 Application(s) Topical daily  dextrose 5%. 1000 milliLiter(s) IV Continuous <Continuous>  dextrose 5%. 1000 milliLiter(s) IV Continuous <Continuous>  dextrose 50% Injectable 25 Gram(s) IV Push once  dextrose 50% Injectable 12.5 Gram(s) IV Push once  dextrose 50% Injectable 25 Gram(s) IV Push once  dextrose Oral Gel 15 Gram(s) Oral once PRN  glucagon  Injectable 1 milliGRAM(s) IntraMuscular once  insulin lispro (ADMELOG) corrective regimen sliding scale   SubCutaneous at bedtime  insulin lispro (ADMELOG) corrective regimen sliding scale   SubCutaneous three times a day before meals  losartan 25 milliGRAM(s) Oral daily  piperacillin/tazobactam IVPB.. 3.375 Gram(s) IV Intermittent every 8 hours  sodium chloride 0.9%. 1000 milliLiter(s) IV Continuous <Continuous>    Current Antimicrobials:  piperacillin/tazobactam IVPB.. 3.375 Gram(s) IV Intermittent every 8 hours    Prior/Completed Antimicrobials:  piperacillin/tazobactam IVPB...  vancomycin  IVPB.

## 2023-12-15 NOTE — PROGRESS NOTE ADULT - ASSESSMENT
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     LLE gangrene with extension to 4th toe with c/f infection  - L foot 2nd and 3rd digit dorsal full thickness gangrene to level of MTPJ, w/ wet conversion, plantar 2nd and 3rd digit dusky and early ischemic changes, mild serous drainage, mild malodor, early dusky changes of fourth metatarsal.  - L foot MRI with findings c/w septic arthritis and OM, no drainable abscess   - L foot abscess culture with moderate Serratia liquefaciens sensitives reviewed), Corynebacterium amycolatum, Finegoldia magna  - Bcx negative x2   - remains afebrile, WBC wnl    Recommendations:   Vascular planning for LLE angiography, possible revascularization today 12/15  Podiatry planning for L foot TMA with YESENIA pending vascular recs  Continue on pip-tazo for now   Continue local care       Azucena Castillo M.D.  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     LLE gangrene with extension to 4th toe with c/f infection  - L foot 2nd and 3rd digit dorsal full thickness gangrene to level of MTPJ, w/ wet conversion, plantar 2nd and 3rd digit dusky and early ischemic changes, mild serous drainage, mild malodor, early dusky changes of fourth metatarsal.  - L foot MRI with findings c/w septic arthritis and OM, no drainable abscess   - L foot abscess culture with moderate Serratia liquefaciens sensitives reviewed), Corynebacterium amycolatum, Finegoldia magna  - Bcx negative x2   - remains afebrile, WBC wnl    Recommendations:   Vascular planning for LLE angiography, possible revascularization today 12/15  Podiatry planning for L foot TMA with YESENIA pending vascular recs  Continue on pip-tazo for now   Continue local care       Azucena Castillo M.D.  [facility], Division of Infectious Diseases  [phone]  After 5pm on weekdays and all day on weekends - please call [phone]

## 2023-12-15 NOTE — PRE-OP CHECKLIST - 1.
emotional support and preop teaching provided to pt and family.
pt with old right bKA - prosthesis off

## 2023-12-16 LAB
-  LEVOFLOXACIN: SIGNIFICANT CHANGE UP
-  LEVOFLOXACIN: SIGNIFICANT CHANGE UP
-  TRIMETHOPRIM/SULFAMETHOXAZOLE: SIGNIFICANT CHANGE UP
-  TRIMETHOPRIM/SULFAMETHOXAZOLE: SIGNIFICANT CHANGE UP
ANION GAP SERPL CALC-SCNC: 10 MMOL/L — SIGNIFICANT CHANGE UP (ref 5–17)
ANION GAP SERPL CALC-SCNC: 10 MMOL/L — SIGNIFICANT CHANGE UP (ref 5–17)
BUN SERPL-MCNC: 20 MG/DL — SIGNIFICANT CHANGE UP (ref 7–23)
BUN SERPL-MCNC: 20 MG/DL — SIGNIFICANT CHANGE UP (ref 7–23)
CALCIUM SERPL-MCNC: 9.4 MG/DL — SIGNIFICANT CHANGE UP (ref 8.4–10.5)
CALCIUM SERPL-MCNC: 9.4 MG/DL — SIGNIFICANT CHANGE UP (ref 8.4–10.5)
CHLORIDE SERPL-SCNC: 103 MMOL/L — SIGNIFICANT CHANGE UP (ref 96–108)
CHLORIDE SERPL-SCNC: 103 MMOL/L — SIGNIFICANT CHANGE UP (ref 96–108)
CO2 SERPL-SCNC: 22 MMOL/L — SIGNIFICANT CHANGE UP (ref 22–31)
CO2 SERPL-SCNC: 22 MMOL/L — SIGNIFICANT CHANGE UP (ref 22–31)
CREAT SERPL-MCNC: 1.09 MG/DL — SIGNIFICANT CHANGE UP (ref 0.5–1.3)
CREAT SERPL-MCNC: 1.09 MG/DL — SIGNIFICANT CHANGE UP (ref 0.5–1.3)
CULTURE RESULTS: ABNORMAL
CULTURE RESULTS: ABNORMAL
EGFR: 72 ML/MIN/1.73M2 — SIGNIFICANT CHANGE UP
EGFR: 72 ML/MIN/1.73M2 — SIGNIFICANT CHANGE UP
GLUCOSE BLDC GLUCOMTR-MCNC: 173 MG/DL — HIGH (ref 70–99)
GLUCOSE BLDC GLUCOMTR-MCNC: 173 MG/DL — HIGH (ref 70–99)
GLUCOSE BLDC GLUCOMTR-MCNC: 220 MG/DL — HIGH (ref 70–99)
GLUCOSE BLDC GLUCOMTR-MCNC: 220 MG/DL — HIGH (ref 70–99)
GLUCOSE BLDC GLUCOMTR-MCNC: 272 MG/DL — HIGH (ref 70–99)
GLUCOSE BLDC GLUCOMTR-MCNC: 272 MG/DL — HIGH (ref 70–99)
GLUCOSE BLDC GLUCOMTR-MCNC: 279 MG/DL — HIGH (ref 70–99)
GLUCOSE BLDC GLUCOMTR-MCNC: 279 MG/DL — HIGH (ref 70–99)
GLUCOSE BLDC GLUCOMTR-MCNC: 286 MG/DL — HIGH (ref 70–99)
GLUCOSE BLDC GLUCOMTR-MCNC: 286 MG/DL — HIGH (ref 70–99)
GLUCOSE SERPL-MCNC: 213 MG/DL — HIGH (ref 70–99)
GLUCOSE SERPL-MCNC: 213 MG/DL — HIGH (ref 70–99)
HCT VFR BLD CALC: 29.5 % — LOW (ref 39–50)
HCT VFR BLD CALC: 29.5 % — LOW (ref 39–50)
HGB BLD-MCNC: 9.4 G/DL — LOW (ref 13–17)
HGB BLD-MCNC: 9.4 G/DL — LOW (ref 13–17)
MCHC RBC-ENTMCNC: 28.1 PG — SIGNIFICANT CHANGE UP (ref 27–34)
MCHC RBC-ENTMCNC: 28.1 PG — SIGNIFICANT CHANGE UP (ref 27–34)
MCHC RBC-ENTMCNC: 31.9 GM/DL — LOW (ref 32–36)
MCHC RBC-ENTMCNC: 31.9 GM/DL — LOW (ref 32–36)
MCV RBC AUTO: 88.3 FL — SIGNIFICANT CHANGE UP (ref 80–100)
MCV RBC AUTO: 88.3 FL — SIGNIFICANT CHANGE UP (ref 80–100)
METHOD TYPE: SIGNIFICANT CHANGE UP
METHOD TYPE: SIGNIFICANT CHANGE UP
NRBC # BLD: 0 /100 WBCS — SIGNIFICANT CHANGE UP (ref 0–0)
NRBC # BLD: 0 /100 WBCS — SIGNIFICANT CHANGE UP (ref 0–0)
ORGANISM # SPEC MICROSCOPIC CNT: ABNORMAL
ORGANISM # SPEC MICROSCOPIC CNT: ABNORMAL
PLATELET # BLD AUTO: 221 K/UL — SIGNIFICANT CHANGE UP (ref 150–400)
PLATELET # BLD AUTO: 221 K/UL — SIGNIFICANT CHANGE UP (ref 150–400)
POTASSIUM SERPL-MCNC: 3.8 MMOL/L — SIGNIFICANT CHANGE UP (ref 3.5–5.3)
POTASSIUM SERPL-MCNC: 3.8 MMOL/L — SIGNIFICANT CHANGE UP (ref 3.5–5.3)
POTASSIUM SERPL-SCNC: 3.8 MMOL/L — SIGNIFICANT CHANGE UP (ref 3.5–5.3)
POTASSIUM SERPL-SCNC: 3.8 MMOL/L — SIGNIFICANT CHANGE UP (ref 3.5–5.3)
RBC # BLD: 3.34 M/UL — LOW (ref 4.2–5.8)
RBC # BLD: 3.34 M/UL — LOW (ref 4.2–5.8)
RBC # FLD: 12.7 % — SIGNIFICANT CHANGE UP (ref 10.3–14.5)
RBC # FLD: 12.7 % — SIGNIFICANT CHANGE UP (ref 10.3–14.5)
SODIUM SERPL-SCNC: 135 MMOL/L — SIGNIFICANT CHANGE UP (ref 135–145)
SODIUM SERPL-SCNC: 135 MMOL/L — SIGNIFICANT CHANGE UP (ref 135–145)
SPECIMEN SOURCE: SIGNIFICANT CHANGE UP
SPECIMEN SOURCE: SIGNIFICANT CHANGE UP
WBC # BLD: 7.67 K/UL — SIGNIFICANT CHANGE UP (ref 3.8–10.5)
WBC # BLD: 7.67 K/UL — SIGNIFICANT CHANGE UP (ref 3.8–10.5)
WBC # FLD AUTO: 7.67 K/UL — SIGNIFICANT CHANGE UP (ref 3.8–10.5)
WBC # FLD AUTO: 7.67 K/UL — SIGNIFICANT CHANGE UP (ref 3.8–10.5)

## 2023-12-16 RX ORDER — HYDRALAZINE HCL 50 MG
50 TABLET ORAL ONCE
Refills: 0 | Status: DISCONTINUED | OUTPATIENT
Start: 2023-12-16 | End: 2023-12-16

## 2023-12-16 RX ADMIN — PIPERACILLIN AND TAZOBACTAM 25 GRAM(S): 4; .5 INJECTION, POWDER, LYOPHILIZED, FOR SOLUTION INTRAVENOUS at 00:00

## 2023-12-16 RX ADMIN — AMLODIPINE BESYLATE 2.5 MILLIGRAM(S): 2.5 TABLET ORAL at 05:09

## 2023-12-16 RX ADMIN — LOSARTAN POTASSIUM 25 MILLIGRAM(S): 100 TABLET, FILM COATED ORAL at 05:09

## 2023-12-16 RX ADMIN — Medication 3: at 17:01

## 2023-12-16 RX ADMIN — PIPERACILLIN AND TAZOBACTAM 25 GRAM(S): 4; .5 INJECTION, POWDER, LYOPHILIZED, FOR SOLUTION INTRAVENOUS at 09:00

## 2023-12-16 RX ADMIN — Medication 1: at 21:39

## 2023-12-16 RX ADMIN — PIPERACILLIN AND TAZOBACTAM 25 GRAM(S): 4; .5 INJECTION, POWDER, LYOPHILIZED, FOR SOLUTION INTRAVENOUS at 17:01

## 2023-12-16 RX ADMIN — Medication 2: at 12:39

## 2023-12-16 RX ADMIN — CHLORHEXIDINE GLUCONATE 1 APPLICATION(S): 213 SOLUTION TOPICAL at 11:01

## 2023-12-16 RX ADMIN — ATORVASTATIN CALCIUM 40 MILLIGRAM(S): 80 TABLET, FILM COATED ORAL at 21:20

## 2023-12-16 RX ADMIN — RIVAROXABAN 2.5 MILLIGRAM(S): KIT at 21:20

## 2023-12-16 RX ADMIN — Medication 81 MILLIGRAM(S): at 11:01

## 2023-12-16 RX ADMIN — SODIUM CHLORIDE 75 MILLILITER(S): 9 INJECTION INTRAMUSCULAR; INTRAVENOUS; SUBCUTANEOUS at 09:00

## 2023-12-16 RX ADMIN — Medication 3: at 21:40

## 2023-12-16 RX ADMIN — Medication 1: at 09:00

## 2023-12-16 RX ADMIN — RIVAROXABAN 2.5 MILLIGRAM(S): KIT at 09:01

## 2023-12-16 NOTE — PROGRESS NOTE ADULT - ASSESSMENT
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 Recently presented for 2/3rd toe gangrene with podiatry recommending toe resection vs TMA. Vascular was consulted and completed LLE angio showing PT runoff, no stent or balloon angioplasty. Vascular recommended possible bypass vs deep vein arterialization. Returned to ED with worsening LLE gangrene with extension to 4th toe. HDS, no WBC, glucose >200. Now s/p LLE angiogram with popliteal and PT angioplasty on 12/15, recovering well with good signals in the foot.    Plan:  - Continue IV abx  - Local wound care per podiatry, planned for TMA and YESENIA on 12/18  - No further vascular intervention prior to planned podiatry surgery  - Monitor for fever/WBC elevation, 2-4th toe dry gangrene stable  - Pain control prn  - Remainder of care per primary    Discussed with vascular fellow on behalf of Dr. 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 Recently presented for 2/3rd toe gangrene with podiatry recommending toe resection vs TMA. Vascular was consulted and completed LLE angio showing PT runoff, no stent or balloon angioplasty. Vascular recommended possible bypass vs deep vein arterialization. Returned to ED with worsening LLE gangrene with extension to 4th toe. HDS, no WBC, glucose >200. Now s/p LLE angiogram with popliteal and PT angioplasty on 12/15, recovering well with good signals in the foot.    Plan:  - Continue IV abx  - Local wound care per podiatry, planned for TMA and YESENIA on 12/18  - No further vascular intervention prior to planned podiatry surgery  - Monitor for fever/WBC elevation, 2-4th toe dry gangrene stable  - Pain control prn  - Remainder of care per primary    Discussed with vascular fellow on behalf of Dr. Mullins    Vascular Surgery [id number]

## 2023-12-16 NOTE — PROGRESS NOTE ADULT - ASSESSMENT
73 y/o M with PMHx of DM, HTN, PAD and PSHX of R BKA presents to ED at recommendation of outpatient podiatrist.     Left 4 th toe infection  PAD   Zosyn    Left Foot MR: OM of 2nd middle phalanx, OM of 2nd proximal phalanx, OM of  third proximal phalanx, OM of fourth proximal interphalangeal joint, OM of base of the fourth proximal phalanx, OM of fifth proximal interphalangeal joint.  s/p Angiogram with angioplasty     TMA Monday   Patient medically optimized for the procedure   - Vasc recs, appreciated.  - L foot abscess culture with moderate Serratia liquefaciens   - Bcx NGTD x2   - Podiatry and Vascular surgery following, recs noted   - afebrile, WBC wnl       DM HISS   A1C 7.2    HTN Home meds

## 2023-12-16 NOTE — PROGRESS NOTE ADULT - SUBJECTIVE AND OBJECTIVE BOX
[facility]IOUS DISEASES  ERVIN Florentino Y. Patel, S. Shah, G. Ervin  [phone]  ([phone] - weekdays after 5pm and weekends)    Name: ALEE DUNN  Age/Gender: 72y Male  MRN: [id number]    Interval History:  Patient seen and examined this morning.   Resting comfortably, s/p OR yesterday.   Notes reviewed  No concerning overnight events  Afebrile   Allergies: No Known Allergies      Objective:  Vitals:   T(F): 97.9 (12-16-23 @ 04:37), Max: 98.4 (12-16-23 @ 01:12)  HR: 80 (12-16-23 @ 04:37) (74 - 92)  BP: 155/69 (12-16-23 @ 04:37) (125/62 - 195/87)  RR: 18 (12-16-23 @ 04:37) (15 - 18)  SpO2: 96% (12-16-23 @ 04:37) (95% - 100%)  Physical Examination:  General: no acute distress, nontoxic  HEENT: NC/AT, anicteric, neck supple  Respiratory: no acc muscle use, breathing comfortably  Cardiovascular: S1 and S2 present  Gastrointestinal: normal appearing, nondistended  Extremities: R BKA; L foot clean dressing   Skin: no visible rash    Laboratory Studies:  CBC:                       9.4    7.67  )-----------( 221      ( 16 Dec 2023 07:23 )             29.5     WBC Trend:  7.67 12-16-23 @ 07:23  6.77 12-15-23 @ 05:34  6.65 12-14-23 @ 07:10  5.84 12-13-23 @ 07:29  7.17 12-12-23 @ 06:56  7.33 12-11-23 @ 07:19  6.61 12-10-23 @ 07:13  7.63 12-09-23 @ 09:23    CMP: 12-16    135  |  103  |  20  ----------------------------<  213<H>  3.8   |  22  |  1.09    Ca    9.4      16 Dec 2023 07:22  Phos  3.0     12-15  Mg     2.2     12-15      Creatinine: 1.09 mg/dL (12-16-23 @ 07:22)  Creatinine: 1.06 mg/dL (12-15-23 @ 05:34)  Creatinine: 1.07 mg/dL (12-15-23 @ 04:25)  Creatinine: 0.99 mg/dL (12-14-23 @ 07:12)  Creatinine: 1.01 mg/dL (12-13-23 @ 07:27)  Creatinine: 1.17 mg/dL (12-12-23 @ 06:56)  Creatinine: 1.12 mg/dL (12-11-23 @ 07:19)  Creatinine: 1.02 mg/dL (12-10-23 @ 07:13)  Creatinine: 0.88 mg/dL (12-09-23 @ 09:23)    Microbiology: reviewed   Culture - Abscess with Gram Stain (collected 12-09-23 @ 10:45)  Source: .Abscess left foot  Preliminary Report (12-15-23 @ 16:04):    Moderate Serratia liquefaciens    Numerous Corynebacterium amycolatum "Susceptibilities not performed"    Moderate Finegoldia magna "Susceptibilities not performed"  Organism: Serratia liquefaciens (12-14-23 @ 16:47)      Method Type: CarbaR      -  Resistance Gene to Carbapenem: Nondet  Organism: Serratia liquefaciens (12-14-23 @ 16:47)  Organism: Serratia liquefaciens (12-14-23 @ 16:47)      Method Type: AMI      -  Amoxicillin/Clavulanic Acid: R >16/8      -  Ampicillin: R >16 These ampicillin results predict results for amoxicillin      -  Ampicillin/Sulbactam: I 16/8      -  Aztreonam: R >16      -  Cefazolin: R >16      -  Cefepime: R >16      -  Cefoxitin: R >16      -  Ceftolozane/tazobactam: S <=2      -  Ceftriaxone: R >32      -  Ciprofloxacin: R 1      -  Ertapenem: R >1      -  Gentamicin: R 8      -  Levofloxacin: S <=0.5      -  Meropenem: R >8      -  Piperacillin/Tazobactam: S <=8      -  Tobramycin: R 8      -  Trimethoprim/Sulfamethoxazole: S <=0.5/9.5    Culture - Blood (collected 12-09-23 @ 09:05)  Source: .Blood Blood-Peripheral  Final Report (12-14-23 @ 14:01):    No growth at 5 days    Culture - Blood (collected 12-09-23 @ 09:00)  Source: .Blood Blood-Peripheral  Final Report (12-14-23 @ 14:01):    No growth at 5 days    Radiology: reviewed     Medications:  amLODIPine   Tablet 2.5 milliGRAM(s) Oral daily  aspirin enteric coated 81 milliGRAM(s) Oral daily  atorvastatin 40 milliGRAM(s) Oral at bedtime  chlorhexidine 2% Cloths 1 Application(s) Topical daily  dextrose 5%. 1000 milliLiter(s) IV Continuous <Continuous>  dextrose 5%. 1000 milliLiter(s) IV Continuous <Continuous>  dextrose 50% Injectable 12.5 Gram(s) IV Push once  dextrose 50% Injectable 25 Gram(s) IV Push once  dextrose 50% Injectable 25 Gram(s) IV Push once  dextrose Oral Gel 15 Gram(s) Oral once PRN  glucagon  Injectable 1 milliGRAM(s) IntraMuscular once  insulin lispro (ADMELOG) corrective regimen sliding scale   SubCutaneous at bedtime  insulin lispro (ADMELOG) corrective regimen sliding scale   SubCutaneous Before meals and at bedtime  losartan 25 milliGRAM(s) Oral daily  piperacillin/tazobactam IVPB.. 3.375 Gram(s) IV Intermittent every 8 hours  rivaroxaban 2.5 milliGRAM(s) Oral two times a day  sodium chloride 0.9%. 1000 milliLiter(s) IV Continuous <Continuous>    Current Antimicrobials:  piperacillin/tazobactam IVPB.. 3.375 Gram(s) IV Intermittent every 8 hours    Prior/Completed Antimicrobials:  piperacillin/tazobactam IVPB...  vancomycin  IVPB.   [facility]  ERVIN Florentino Y. Patel, S. Shah, G. Ervin  [phone]  ([phone] - weekdays after 5pm and weekends)    Name: ALEE DUNN  Age/Gender: 72y Male  MRN: [id number]    Interval History:  Patient seen and examined this morning.   Resting comfortably, s/p OR yesterday.   Notes reviewed  No concerning overnight events  Afebrile   Allergies: No Known Allergies      Objective:  Vitals:   T(F): 97.9 (12-16-23 @ 04:37), Max: 98.4 (12-16-23 @ 01:12)  HR: 80 (12-16-23 @ 04:37) (74 - 92)  BP: 155/69 (12-16-23 @ 04:37) (125/62 - 195/87)  RR: 18 (12-16-23 @ 04:37) (15 - 18)  SpO2: 96% (12-16-23 @ 04:37) (95% - 100%)  Physical Examination:  General: no acute distress, nontoxic  HEENT: NC/AT, anicteric, neck supple  Respiratory: no acc muscle use, breathing comfortably  Cardiovascular: S1 and S2 present  Gastrointestinal: normal appearing, nondistended  Extremities: R BKA; L foot clean dressing   Skin: no visible rash    Laboratory Studies:  CBC:                       9.4    7.67  )-----------( 221      ( 16 Dec 2023 07:23 )             29.5     WBC Trend:  7.67 12-16-23 @ 07:23  6.77 12-15-23 @ 05:34  6.65 12-14-23 @ 07:10  5.84 12-13-23 @ 07:29  7.17 12-12-23 @ 06:56  7.33 12-11-23 @ 07:19  6.61 12-10-23 @ 07:13  7.63 12-09-23 @ 09:23    CMP: 12-16    135  |  103  |  20  ----------------------------<  213<H>  3.8   |  22  |  1.09    Ca    9.4      16 Dec 2023 07:22  Phos  3.0     12-15  Mg     2.2     12-15      Creatinine: 1.09 mg/dL (12-16-23 @ 07:22)  Creatinine: 1.06 mg/dL (12-15-23 @ 05:34)  Creatinine: 1.07 mg/dL (12-15-23 @ 04:25)  Creatinine: 0.99 mg/dL (12-14-23 @ 07:12)  Creatinine: 1.01 mg/dL (12-13-23 @ 07:27)  Creatinine: 1.17 mg/dL (12-12-23 @ 06:56)  Creatinine: 1.12 mg/dL (12-11-23 @ 07:19)  Creatinine: 1.02 mg/dL (12-10-23 @ 07:13)  Creatinine: 0.88 mg/dL (12-09-23 @ 09:23)    Microbiology: reviewed   Culture - Abscess with Gram Stain (collected 12-09-23 @ 10:45)  Source: .Abscess left foot  Preliminary Report (12-15-23 @ 16:04):    Moderate Serratia liquefaciens    Numerous Corynebacterium amycolatum "Susceptibilities not performed"    Moderate Finegoldia magna "Susceptibilities not performed"  Organism: Serratia liquefaciens (12-14-23 @ 16:47)      Method Type: CarbaR      -  Resistance Gene to Carbapenem: Nondet  Organism: Serratia liquefaciens (12-14-23 @ 16:47)  Organism: Serratia liquefaciens (12-14-23 @ 16:47)      Method Type: AMI      -  Amoxicillin/Clavulanic Acid: R >16/8      -  Ampicillin: R >16 These ampicillin results predict results for amoxicillin      -  Ampicillin/Sulbactam: I 16/8      -  Aztreonam: R >16      -  Cefazolin: R >16      -  Cefepime: R >16      -  Cefoxitin: R >16      -  Ceftolozane/tazobactam: S <=2      -  Ceftriaxone: R >32      -  Ciprofloxacin: R 1      -  Ertapenem: R >1      -  Gentamicin: R 8      -  Levofloxacin: S <=0.5      -  Meropenem: R >8      -  Piperacillin/Tazobactam: S <=8      -  Tobramycin: R 8      -  Trimethoprim/Sulfamethoxazole: S <=0.5/9.5    Culture - Blood (collected 12-09-23 @ 09:05)  Source: .Blood Blood-Peripheral  Final Report (12-14-23 @ 14:01):    No growth at 5 days    Culture - Blood (collected 12-09-23 @ 09:00)  Source: .Blood Blood-Peripheral  Final Report (12-14-23 @ 14:01):    No growth at 5 days    Radiology: reviewed     Medications:  amLODIPine   Tablet 2.5 milliGRAM(s) Oral daily  aspirin enteric coated 81 milliGRAM(s) Oral daily  atorvastatin 40 milliGRAM(s) Oral at bedtime  chlorhexidine 2% Cloths 1 Application(s) Topical daily  dextrose 5%. 1000 milliLiter(s) IV Continuous <Continuous>  dextrose 5%. 1000 milliLiter(s) IV Continuous <Continuous>  dextrose 50% Injectable 12.5 Gram(s) IV Push once  dextrose 50% Injectable 25 Gram(s) IV Push once  dextrose 50% Injectable 25 Gram(s) IV Push once  dextrose Oral Gel 15 Gram(s) Oral once PRN  glucagon  Injectable 1 milliGRAM(s) IntraMuscular once  insulin lispro (ADMELOG) corrective regimen sliding scale   SubCutaneous at bedtime  insulin lispro (ADMELOG) corrective regimen sliding scale   SubCutaneous Before meals and at bedtime  losartan 25 milliGRAM(s) Oral daily  piperacillin/tazobactam IVPB.. 3.375 Gram(s) IV Intermittent every 8 hours  rivaroxaban 2.5 milliGRAM(s) Oral two times a day  sodium chloride 0.9%. 1000 milliLiter(s) IV Continuous <Continuous>    Current Antimicrobials:  piperacillin/tazobactam IVPB.. 3.375 Gram(s) IV Intermittent every 8 hours    Prior/Completed Antimicrobials:  piperacillin/tazobactam IVPB...  vancomycin  IVPB.

## 2023-12-16 NOTE — PROGRESS NOTE ADULT - SUBJECTIVE AND OBJECTIVE BOX
HPI:   73 y/o M with PMHx of DM, HTN, PAD and PSHX of R BKA presents to ED at recommendation of outpatient podiatrist. Patient was recently admitted and being followed by vascular and podiatry for wounds to the L foot. Was noted to have dry gangrene of L 2nd/3rd toes. [place] due to extent of vascular disease, patient would not heal if amputation was done. Was told to f/u with vascular as an outpatient. Saw podiatry yesterday, due to gangrene extending to 4th L toe. Recommended to come in for abx and possible procedure with podiatry. Patient is endorsing L toe pain. 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 1000 milliLiter(s) (50 mL/Hr) IV Continuous <Continuous>  dextrose 5%. 1000 milliLiter(s) (100 mL/Hr) IV Continuous <Continuous>  dextrose 50% Injectable 12.5 Gram(s) IV Push once  dextrose 50% Injectable 25 Gram(s) IV Push once  dextrose 50% Injectable 25 Gram(s) IV Push once  glucagon  Injectable 1 milliGRAM(s) IntraMuscular once  insulin lispro (ADMELOG) corrective regimen sliding scale   SubCutaneous at bedtime  insulin lispro (ADMELOG) corrective regimen sliding scale   SubCutaneous Before meals and at bedtime  losartan 25 milliGRAM(s) Oral daily  piperacillin/tazobactam IVPB.. 3.375 Gram(s) IV Intermittent every 8 hours  rivaroxaban 2.5 milliGRAM(s) Oral two times a day  sodium chloride 0.9%. 1000 milliLiter(s) (75 mL/Hr) IV Continuous <Continuous>    MEDICATIONS  (PRN):  dextrose Oral Gel 15 Gram(s) Oral once PRN Blood Glucose LESS THAN 70 milliGRAM(s)/deciliter      Review of Systems:   CONSTITUTIONAL: No fever, weight loss, or fatigue  EYES: No eye pain, visual disturbances, or discharge  ENMT:  No difficulty hearing, tinnitus, vertigo; No sinus or throat pain  NECK: No pain or stiffness  BREASTS: No pain, masses, or nipple discharge  RESPIRATORY: No cough, wheezing, chills or hemoptysis; No shortness of breath  CARDIOVASCULAR: No chest pain, palpitations, dizziness, or leg swelling  GASTROINTESTINAL: No abdominal or epigastric pain. No nausea, vomiting, or hematemesis; No diarrhea or constipation. No melena or hematochezia.  GENITOURINARY: No dysuria, frequency, hematuria, or incontinence  NEUROLOGICAL: No headaches, memory loss, loss of strength, numbness, or tremors  SKIN: No itching, burning, rashes, or lesions   LYMPH NODES: No enlarged glands  ENDOCRINE: No heat or cold intolerance; No hair loss  MUSCULOSKELETAL: No joint pain or swelling; No muscle, back, or extremity pain  PSYCHIATRIC: No depression, anxiety, mood swings, or difficulty sleeping  HEME/LYMPH: No easy bruising, or bleeding gums  ALLERY AND IMMUNOLOGIC: No hives or eczema    Allergies    No Known Allergies    Intolerances        Social History:             PHYSICAL EXAM:  GENERAL: NAD, well-developed  HEAD:  Atraumatic, Normocephalic  EYES: EOMI, PERRLA, conjunctiva and sclera clear  NECK: Supple, No JVD  CHEST/LUNG: Clear to auscultation bilaterally; No wheeze  HEART: Regular rate and rhythm; No murmurs, rubs, or gallops  ABDOMEN: Soft, Nontender, Nondistended; Bowel sounds present  EXTREMITIES:  Left foot abscess   PSYCH: AAOx3  NEUROLOGY: non-focal  SKIN: No rashes or lesions                                               10.1   6.77  )-----------( 228      ( 15 Dec 2023 05:34 )             32.0             PT/INR - ( 15 Dec 2023 06:14 )   PT: 11.6 sec;   INR: 1.11 ratio         PTT - ( 15 Dec 2023 06:14 )  PTT:29.1 sec  137|105|20<208  4.0|24|1.06  9.1,--,--  12-15 @ 05:34  136|103|20<205  4.1|25|1.07  9.3,2.2,3.0  12-15 @ 04:25      CAPILLARY BLOOD GLUCOSE      POCT Blood Glucose.: 239 mg/dL (15 Dec 2023 20:32)  POCT Blood Glucose.: 215 mg/dL (15 Dec 2023 16:53)  POCT Blood Glucose.: 196 mg/dL (15 Dec 2023 15:29)  POCT Blood Glucose.: 210 mg/dL (15 Dec 2023 14:20)  POCT Blood Glucose.: 193 mg/dL (15 Dec 2023 08:11)  POCT Blood Glucose.: 205 mg/dL (14 Dec 2023 21:35)   HPI:   71 y/o M with PMHx of DM, HTN, PAD and PSHX of R BKA presents to ED at recommendation of outpatient podiatrist. Patient was recently admitted and being followed by vascular and podiatry for wounds to the L foot. Was noted to have dry gangrene of L 2nd/3rd toes. [place] due to extent of vascular disease, patient would not heal if amputation was done. Was told to f/u with vascular as an outpatient. Saw podiatry yesterday, due to gangrene extending to 4th L toe. Recommended to come in for abx and possible procedure with podiatry. Patient is endorsing L toe pain. 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 1000 milliLiter(s) (50 mL/Hr) IV Continuous <Continuous>  dextrose 5%. 1000 milliLiter(s) (100 mL/Hr) IV Continuous <Continuous>  dextrose 50% Injectable 12.5 Gram(s) IV Push once  dextrose 50% Injectable 25 Gram(s) IV Push once  dextrose 50% Injectable 25 Gram(s) IV Push once  glucagon  Injectable 1 milliGRAM(s) IntraMuscular once  insulin lispro (ADMELOG) corrective regimen sliding scale   SubCutaneous at bedtime  insulin lispro (ADMELOG) corrective regimen sliding scale   SubCutaneous Before meals and at bedtime  losartan 25 milliGRAM(s) Oral daily  piperacillin/tazobactam IVPB.. 3.375 Gram(s) IV Intermittent every 8 hours  rivaroxaban 2.5 milliGRAM(s) Oral two times a day  sodium chloride 0.9%. 1000 milliLiter(s) (75 mL/Hr) IV Continuous <Continuous>    MEDICATIONS  (PRN):  dextrose Oral Gel 15 Gram(s) Oral once PRN Blood Glucose LESS THAN 70 milliGRAM(s)/deciliter      Review of Systems:   CONSTITUTIONAL: No fever, weight loss, or fatigue  EYES: No eye pain, visual disturbances, or discharge  ENMT:  No difficulty hearing, tinnitus, vertigo; No sinus or throat pain  NECK: No pain or stiffness  BREASTS: No pain, masses, or nipple discharge  RESPIRATORY: No cough, wheezing, chills or hemoptysis; No shortness of breath  CARDIOVASCULAR: No chest pain, palpitations, dizziness, or leg swelling  GASTROINTESTINAL: No abdominal or epigastric pain. No nausea, vomiting, or hematemesis; No diarrhea or constipation. No melena or hematochezia.  GENITOURINARY: No dysuria, frequency, hematuria, or incontinence  NEUROLOGICAL: No headaches, memory loss, loss of strength, numbness, or tremors  SKIN: No itching, burning, rashes, or lesions   LYMPH NODES: No enlarged glands  ENDOCRINE: No heat or cold intolerance; No hair loss  MUSCULOSKELETAL: No joint pain or swelling; No muscle, back, or extremity pain  PSYCHIATRIC: No depression, anxiety, mood swings, or difficulty sleeping  HEME/LYMPH: No easy bruising, or bleeding gums  ALLERY AND IMMUNOLOGIC: No hives or eczema    Allergies    No Known Allergies    Intolerances        Social History:             PHYSICAL EXAM:  GENERAL: NAD, well-developed  HEAD:  Atraumatic, Normocephalic  EYES: EOMI, PERRLA, conjunctiva and sclera clear  NECK: Supple, No JVD  CHEST/LUNG: Clear to auscultation bilaterally; No wheeze  HEART: Regular rate and rhythm; No murmurs, rubs, or gallops  ABDOMEN: Soft, Nontender, Nondistended; Bowel sounds present  EXTREMITIES:  Left foot abscess   PSYCH: AAOx3  NEUROLOGY: non-focal  SKIN: No rashes or lesions                                               10.1   6.77  )-----------( 228      ( 15 Dec 2023 05:34 )             32.0             PT/INR - ( 15 Dec 2023 06:14 )   PT: 11.6 sec;   INR: 1.11 ratio         PTT - ( 15 Dec 2023 06:14 )  PTT:29.1 sec  137|105|20<208  4.0|24|1.06  9.1,--,--  12-15 @ 05:34  136|103|20<205  4.1|25|1.07  9.3,2.2,3.0  12-15 @ 04:25      CAPILLARY BLOOD GLUCOSE      POCT Blood Glucose.: 239 mg/dL (15 Dec 2023 20:32)  POCT Blood Glucose.: 215 mg/dL (15 Dec 2023 16:53)  POCT Blood Glucose.: 196 mg/dL (15 Dec 2023 15:29)  POCT Blood Glucose.: 210 mg/dL (15 Dec 2023 14:20)  POCT Blood Glucose.: 193 mg/dL (15 Dec 2023 08:11)  POCT Blood Glucose.: 205 mg/dL (14 Dec 2023 21:35)

## 2023-12-16 NOTE — PROGRESS NOTE ADULT - ASSESSMENT
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     LLE gangrene with extension to 4th toe with c/f infection  - L foot 2nd and 3rd digit dorsal full thickness gangrene to level of MTPJ, w/ wet conversion, plantar 2nd and 3rd digit dusky and early ischemic changes, mild serous drainage, mild malodor, early dusky changes of fourth metatarsal.  - L foot MRI with findings c/w septic arthritis and OM, no drainable abscess   - L foot abscess culture with moderate Serratia liquefaciens sensitives reviewed), Corynebacterium amycolatum, Finegoldia magna  - 12/15 s/p OR for LLE angiogram and angioplasty with vascular   - remains afebrile, WBC wnl    Recommendations:   Podiatry planning for L foot TMA with YESENIA on Monday  Continue on pip-tazo for now   Continue local care       Azucena Castillo M.D.  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     LLE gangrene with extension to 4th toe with c/f infection  - L foot 2nd and 3rd digit dorsal full thickness gangrene to level of MTPJ, w/ wet conversion, plantar 2nd and 3rd digit dusky and early ischemic changes, mild serous drainage, mild malodor, early dusky changes of fourth metatarsal.  - L foot MRI with findings c/w septic arthritis and OM, no drainable abscess   - L foot abscess culture with moderate Serratia liquefaciens sensitives reviewed), Corynebacterium amycolatum, Finegoldia magna  - 12/15 s/p OR for LLE angiogram and angioplasty with vascular   - remains afebrile, WBC wnl    Recommendations:   Podiatry planning for L foot TMA with YESENIA on Monday  Continue on pip-tazo for now   Continue local care       Azucena Castillo M.D.  [facility], Division of Infectious Diseases  [phone]  After 5pm on weekdays and all day on weekends - please call [phone]

## 2023-12-16 NOTE — PROGRESS NOTE ADULT - SUBJECTIVE AND OBJECTIVE BOX
SURGERY DAILY PROGRESS NOTE    STATUS POST:  LLE angiogram with pop and PT plasty 12/15    SUBJECTIVE: Patient seen and examined on AM rounds. Denies chest pain, SOB, palpitations, HA, fever, chills, N/V.      OBJECTIVE:  Vital Signs Last 24 Hrs  T(C): 36.6 (16 Dec 2023 04:37), Max: 36.9 (16 Dec 2023 01:12)  T(F): 97.9 (16 Dec 2023 04:37), Max: 98.4 (16 Dec 2023 01:12)  HR: 80 (16 Dec 2023 04:37) (74 - 92)  BP: 155/69 (16 Dec 2023 04:37) (125/62 - 195/87)  BP(mean): 89 (15 Dec 2023 16:00) (89 - 125)  RR: 18 (16 Dec 2023 04:37) (15 - 18)  SpO2: 96% (16 Dec 2023 04:37) (95% - 100%)    Parameters below as of 16 Dec 2023 04:37  Patient On (Oxygen Delivery Method): room air        I&O's Summary    15 Dec 2023 07:01  -  16 Dec 2023 07:00  --------------------------------------------------------  IN: 425 mL / OUT: 250 mL / NET: 175 mL    16 Dec 2023 07:01  -  16 Dec 2023 10:03  --------------------------------------------------------  IN: 0 mL / OUT: 500 mL / NET: -500 mL        Physical Exam:  General Appearance: Appears well, NAD   Chest: Nonlabored breathing on RA  CV: RRR  Extremities: Grossly symmetric, SCD's in place   Vascular: AT/PT ds+    LABS:                        9.4    7.67  )-----------( 221      ( 16 Dec 2023 07:23 )             29.5     12-16    135  |  103  |  20  ----------------------------<  213<H>  3.8   |  22  |  1.09    Ca    9.4      16 Dec 2023 07:22  Phos  3.0     12-15  Mg     2.2     12-15      PT/INR - ( 15 Dec 2023 06:14 )   PT: 11.6 sec;   INR: 1.11 ratio         PTT - ( 15 Dec 2023 06:14 )  PTT:29.1 sec  Urinalysis Basic - ( 16 Dec 2023 07:22 )    Color: x / Appearance: x / SG: x / pH: x  Gluc: 213 mg/dL / Ketone: x  / Bili: x / Urobili: x   Blood: x / Protein: x / Nitrite: x   Leuk Esterase: x / RBC: x / WBC x   Sq Epi: x / Non Sq Epi: x / Bacteria: x

## 2023-12-17 ENCOUNTER — TRANSCRIPTION ENCOUNTER (OUTPATIENT)
Age: 73
End: 2023-12-17

## 2023-12-17 LAB
GLUCOSE BLDC GLUCOMTR-MCNC: 201 MG/DL — HIGH (ref 70–99)
GLUCOSE BLDC GLUCOMTR-MCNC: 201 MG/DL — HIGH (ref 70–99)
GLUCOSE BLDC GLUCOMTR-MCNC: 216 MG/DL — HIGH (ref 70–99)
GLUCOSE BLDC GLUCOMTR-MCNC: 216 MG/DL — HIGH (ref 70–99)
GLUCOSE BLDC GLUCOMTR-MCNC: 242 MG/DL — HIGH (ref 70–99)
GLUCOSE BLDC GLUCOMTR-MCNC: 242 MG/DL — HIGH (ref 70–99)
GLUCOSE BLDC GLUCOMTR-MCNC: 271 MG/DL — HIGH (ref 70–99)
GLUCOSE BLDC GLUCOMTR-MCNC: 271 MG/DL — HIGH (ref 70–99)

## 2023-12-17 PROCEDURE — 93010 ELECTROCARDIOGRAM REPORT: CPT

## 2023-12-17 RX ADMIN — RIVAROXABAN 2.5 MILLIGRAM(S): KIT at 08:05

## 2023-12-17 RX ADMIN — Medication 2: at 17:15

## 2023-12-17 RX ADMIN — ATORVASTATIN CALCIUM 40 MILLIGRAM(S): 80 TABLET, FILM COATED ORAL at 21:10

## 2023-12-17 RX ADMIN — Medication 2: at 08:57

## 2023-12-17 RX ADMIN — RIVAROXABAN 2.5 MILLIGRAM(S): KIT at 20:46

## 2023-12-17 RX ADMIN — Medication 3: at 13:01

## 2023-12-17 RX ADMIN — PIPERACILLIN AND TAZOBACTAM 25 GRAM(S): 4; .5 INJECTION, POWDER, LYOPHILIZED, FOR SOLUTION INTRAVENOUS at 01:00

## 2023-12-17 RX ADMIN — PIPERACILLIN AND TAZOBACTAM 25 GRAM(S): 4; .5 INJECTION, POWDER, LYOPHILIZED, FOR SOLUTION INTRAVENOUS at 08:06

## 2023-12-17 RX ADMIN — PIPERACILLIN AND TAZOBACTAM 25 GRAM(S): 4; .5 INJECTION, POWDER, LYOPHILIZED, FOR SOLUTION INTRAVENOUS at 16:24

## 2023-12-17 RX ADMIN — LOSARTAN POTASSIUM 25 MILLIGRAM(S): 100 TABLET, FILM COATED ORAL at 05:59

## 2023-12-17 RX ADMIN — AMLODIPINE BESYLATE 2.5 MILLIGRAM(S): 2.5 TABLET ORAL at 06:00

## 2023-12-17 RX ADMIN — Medication 81 MILLIGRAM(S): at 13:00

## 2023-12-17 RX ADMIN — CHLORHEXIDINE GLUCONATE 1 APPLICATION(S): 213 SOLUTION TOPICAL at 08:55

## 2023-12-17 NOTE — PROGRESS NOTE ADULT - SUBJECTIVE AND OBJECTIVE BOX
Patient is a 72y old  Male who presents with a chief complaint of Left 4 th toe infection (16 Dec 2023 13:51)       INTERVAL HPI/OVERNIGHT EVENTS:  Patient seen and evaluated at bedside.  Pt is resting comfortable in NAD. Denies N/V/F/C.    Allergies    No Known Allergies    Intolerances        Vital Signs Last 24 Hrs  T(C): 36.7 (17 Dec 2023 06:00), Max: 37.2 (16 Dec 2023 20:11)  T(F): 98.1 (17 Dec 2023 06:00), Max: 99 (16 Dec 2023 20:11)  HR: 75 (17 Dec 2023 06:00) (75 - 87)  BP: 174/80 (17 Dec 2023 06:00) (150/74 - 174/80)  BP(mean): --  RR: 18 (17 Dec 2023 06:00) (18 - 18)  SpO2: 97% (17 Dec 2023 06:00) (97% - 99%)    Parameters below as of 17 Dec 2023 06:00  Patient On (Oxygen Delivery Method): room air        LABS:                        9.4    7.67  )-----------( 221      ( 16 Dec 2023 07:23 )             29.5     12-16    135  |  103  |  20  ----------------------------<  213<H>  3.8   |  22  |  1.09    Ca    9.4      16 Dec 2023 07:22        Urinalysis Basic - ( 16 Dec 2023 07:22 )    Color: x / Appearance: x / SG: x / pH: x  Gluc: 213 mg/dL / Ketone: x  / Bili: x / Urobili: x   Blood: x / Protein: x / Nitrite: x   Leuk Esterase: x / RBC: x / WBC x   Sq Epi: x / Non Sq Epi: x / Bacteria: x      CAPILLARY BLOOD GLUCOSE      POCT Blood Glucose.: 201 mg/dL (17 Dec 2023 08:47)  POCT Blood Glucose.: 279 mg/dL (16 Dec 2023 21:28)  POCT Blood Glucose.: 272 mg/dL (16 Dec 2023 16:47)  POCT Blood Glucose.: 220 mg/dL (16 Dec 2023 12:26)      Lower Extremity Physical Exam:  Vascular: DP/PT 0/4, B/L, Temperature gradient warm to cool, B/L.   Neuro: Epicritic sensation diminished to the level of digits, B/L.  Musculoskeletal/Ortho: s/p R BKA  Skin: Left foot digits 2-4 full thickness dry gangrene to the level of MPJs 2-4, no wet conversion noted, no drainage, mild malodor, no acute signs of infection.     RADIOLOGY & ADDITIONAL TESTS:

## 2023-12-17 NOTE — PROGRESS NOTE ADULT - ASSESSMENT
72M presents with left foot digits 2-4 dry gangrene.  - Patient seen and evaluated.  - Afebrile, no leukocytosis.  - Left foot digits 2-4 full thickness dry gangrene to the level of MPJs 2-4, no wet conversion noted, no drainage, mild malodor, no acute signs of infection.   - Left Foot X-Ray: no OM, no gas.  - Left Foot Wound Culture: Serratia liquefaciens, Corynebacterium amycolatum, Finegoldia magna, Stenotrophomonas maltophilia.  - Left Foot MR: OM of 2nd middle phalanx, OM of 2nd proximal phalanx, OM of  third proximal phalanx, OM of fourth proximal interphalangeal joint, OM of base of the fourth proximal phalanx, OM of fifth proximal interphalangeal joint.  - Vasc recs, appreciated.  - Pod Plan: Booked for left foot TMA with YESEN[facility] tomorrow 12/18 at 7:30am with Dr. Mishra.  - Documented medical clearance for podiatric surgery under anesthesia, appreciated.   - NPO tonight at 11:59pm.  - AM labs ordered including CBC, BMP, coags, and type and screen.  - Discussed with attending.

## 2023-12-17 NOTE — PROGRESS NOTE ADULT - ASSESSMENT
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 Recently presented for 2/3rd toe gangrene with podiatry recommending toe resection vs TMA. Vascular was consulted and completed LLE angio showing PT runoff, no stent or balloon angioplasty. Vascular recommended possible bypass vs deep vein arterialization. Returned to ED with worsening LLE gangrene with extension to 4th toe. HDS, no WBC, glucose >200. Now s/p LLE angiogram with popliteal and PT angioplasty on 12/15, recovering well with good signals in the foot.    Plan:  - Continue IV abx  - Local wound care per podiatry, planned for TMA and [facility] on 12/18  - No further vascular intervention prior to planned podiatry surgery  - Monitor for fever/WBC elevation, 2-4th toe dry gangrene stable  - Pain control prn  - Remainder of care per primary    Discussed with vascular fellow on behalf of Dr. 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 Recently presented for 2/3rd toe gangrene with podiatry recommending toe resection vs TMA. Vascular was consulted and completed LLE angio showing PT runoff, no stent or balloon angioplasty. Vascular recommended possible bypass vs deep vein arterialization. Returned to ED with worsening LLE gangrene with extension to 4th toe. HDS, no WBC, glucose >200. Now s/p LLE angiogram with popliteal and PT angioplasty on 12/15, recovering well with good signals in the foot.    Plan:  - Continue IV abx  - Local wound care per podiatry, planned for TMA and [facility] on 12/18  - No further vascular intervention prior to planned podiatry surgery  - Monitor for fever/WBC elevation, 2-4th toe dry gangrene stable  - Pain control prn  - Remainder of care per primary    Discussed with vascular fellow on behalf of Dr. Mullins    Vascular Surgery [id number]

## 2023-12-17 NOTE — PROGRESS NOTE ADULT - ASSESSMENT
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     LLE gangrene with extension to 4th toe with c/f infection  - L foot 2nd and 3rd digit dorsal full thickness gangrene to level of MTPJ, w/ wet conversion, plantar 2nd and 3rd digit dusky and early ischemic changes, mild serous drainage, mild malodor, early dusky changes of fourth metatarsal.  - L foot MRI with findings c/w septic arthritis and OM, no drainable abscess   - L foot abscess culture with moderate Serratia liquefaciens sensitives reviewed), Corynebacterium amycolatum, Finegoldia magna, also noted with Stenotrophomonas  - 12/15 s/p OR for LLE angiogram and angioplasty with vascular   - remains afebrile, WBC wnl    Recommendations:   Podiatry planning for L foot TMA with YESENIA on Monday  Continue on pip-tazo for now   Continue local care       Azucena Castillo M.D.  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     LLE gangrene with extension to 4th toe with c/f infection  - L foot 2nd and 3rd digit dorsal full thickness gangrene to level of MTPJ, w/ wet conversion, plantar 2nd and 3rd digit dusky and early ischemic changes, mild serous drainage, mild malodor, early dusky changes of fourth metatarsal.  - L foot MRI with findings c/w septic arthritis and OM, no drainable abscess   - L foot abscess culture with moderate Serratia liquefaciens sensitives reviewed), Corynebacterium amycolatum, Finegoldia magna, also noted with Stenotrophomonas  - 12/15 s/p OR for LLE angiogram and angioplasty with vascular   - remains afebrile, WBC wnl    Recommendations:   Podiatry planning for L foot TMA with YESENIA on Monday  Continue on pip-tazo for now   Continue local care       Azucena Castillo M.D.  [facility]ospit[facility], Division of Infectious Diseases  [phone]  After 5pm on weekdays and all day on weekends - please call [phone]

## 2023-12-17 NOTE — PROGRESS NOTE ADULT - SUBJECTIVE AND OBJECTIVE BOX
Vascular Progress Note    S: Patient seen and examined. No acute events overnight. Reports tolerating diet without nausea, vomiting, passing flatus, having bowel movements, voiding without issues. Denies fever, chills, SOB, chest pain.     O:  Physical Exam:  Gen: Laying in bed, NAD  HEENT: atrumatic, EMOI  Resp: Unlabored breathing  Abd: soft, NT,ND, no rebound or guarding.   Vascular:       Vital Signs Last 24 Hrs  T(C): 36.7 (17 Dec 2023 08:25), Max: 37.2 (16 Dec 2023 20:11)  T(F): 98.1 (17 Dec 2023 08:25), Max: 99 (16 Dec 2023 20:11)  HR: 71 (17 Dec 2023 08:25) (71 - 87)  BP: 169/76 (17 Dec 2023 08:25) (150/74 - 174/80)  BP(mean): --  RR: 18 (17 Dec 2023 08:25) (18 - 18)  SpO2: 98% (17 Dec 2023 08:25) (97% - 99%)    Parameters below as of 17 Dec 2023 08:25  Patient On (Oxygen Delivery Method): room air        I&O's Detail    16 Dec 2023 07:01  -  17 Dec 2023 07:00  --------------------------------------------------------  IN:  Total IN: 0 mL    OUT:    Voided (mL): 1800 mL  Total OUT: 1800 mL    Total NET: -1800 mL      17 Dec 2023 07:01  -  17 Dec 2023 14:33  --------------------------------------------------------  IN:    sodium chloride 0.9%: 450 mL  Total IN: 450 mL    OUT:    Voided (mL): 1200 mL  Total OUT: 1200 mL    Total NET: -750 mL                                9.4    7.67  )-----------( 221      ( 16 Dec 2023 07:23 )             29.5       12-16    135  |  103  |  20  ----------------------------<  213<H>  3.8   |  22  |  1.09    Ca    9.4      16 Dec 2023 07:22         Vascular Progress Note    S: Patient seen and examined on AM rounds. Denies chest pain, SOB, palpitations, HA, fever, chills, N/V.    O:  Physical Exam:  General Appearance: Appears well, NAD   Chest: Nonlabored breathing on RA  CV: RRR  Extremities: Grossly symmetric, SCD's in place   Vascular: AT/PT ds+      Vital Signs Last 24 Hrs  T(C): 36.7 (17 Dec 2023 08:25), Max: 37.2 (16 Dec 2023 20:11)  T(F): 98.1 (17 Dec 2023 08:25), Max: 99 (16 Dec 2023 20:11)  HR: 71 (17 Dec 2023 08:25) (71 - 87)  BP: 169/76 (17 Dec 2023 08:25) (150/74 - 174/80)  BP(mean): --  RR: 18 (17 Dec 2023 08:25) (18 - 18)  SpO2: 98% (17 Dec 2023 08:25) (97% - 99%)    Parameters below as of 17 Dec 2023 08:25  Patient On (Oxygen Delivery Method): room air        I&O's Detail    16 Dec 2023 07:01  -  17 Dec 2023 07:00  --------------------------------------------------------  IN:  Total IN: 0 mL    OUT:    Voided (mL): 1800 mL  Total OUT: 1800 mL    Total NET: -1800 mL      17 Dec 2023 07:01  -  17 Dec 2023 14:33  --------------------------------------------------------  IN:    sodium chloride 0.9%: 450 mL  Total IN: 450 mL    OUT:    Voided (mL): 1200 mL  Total OUT: 1200 mL    Total NET: -750 mL                                9.4    7.67  )-----------( 221      ( 16 Dec 2023 07:23 )             29.5       12-16    135  |  103  |  20  ----------------------------<  213<H>  3.8   |  22  |  1.09    Ca    9.4      16 Dec 2023 07:22

## 2023-12-17 NOTE — PROGRESS NOTE ADULT - ASSESSMENT
71 y/o M with PMHx of DM, HTN, PAD and PSHX of R BKA presents to ED at recommendation of outpatient podiatrist.     Left 4 th toe infection  PAD   Zosyn    Left Foot MR: OM of 2nd middle phalanx, OM of 2nd proximal phalanx, OM of  third proximal phalanx, OM of fourth proximal interphalangeal joint, OM of base of the fourth proximal phalanx, OM of fifth proximal interphalangeal joint.  s/p Angiogram with angioplasty     TMA Monday   Patient medically optimized for the procedure   - Vasc recs, appreciated.  - L foot abscess culture with moderate Serratia liquefaciens   - Bcx NGTD x2   - Podiatry and Vascular surgery following, recs noted   - afebrile, WBC wnl       DM HISS   A1C 7.2    HTN Home meds

## 2023-12-17 NOTE — PROGRESS NOTE ADULT - SUBJECTIVE AND OBJECTIVE BOX
[facility] DIVISION OF INFECTIOUS DISEASES  ERVIN Florentino Y. Patel, S. Shah, G. Ervin  [phone]  ([phone] - weekdays after 5pm and weekends)    Name: ALEE DUNN  Age/Gender: 72y Male  MRN: [id number]    Interval History:  Patient seen and examined this morning.   No new complaints noted.  Notes reviewed  No concerning overnight events  Afebrile   Allergies: No Known Allergies      Objective:  Vitals:   T(F): 98.1 (12-17-23 @ 08:25), Max: 99 (12-16-23 @ 20:11)  HR: 71 (12-17-23 @ 08:25) (71 - 87)  BP: 169/76 (12-17-23 @ 08:25) (150/74 - 174/80)  RR: 18 (12-17-23 @ 08:25) (18 - 18)  SpO2: 98% (12-17-23 @ 08:25) (97% - 99%)  Physical Examination:  General: no acute distress  HEENT: NC/AT, anicteric, neck supple  Respiratory: no acc muscle use, breathing comfortably  Cardiovascular: S1 and S2 present  Gastrointestinal: normal appearing, nondistended  Extremities: R BKA, L foot dressing  Skin: no visible rash    Laboratory Studies:  CBC:                       9.4    7.67  )-----------( 221      ( 16 Dec 2023 07:23 )             29.5     WBC Trend:  7.67 12-16-23 @ 07:23  6.77 12-15-23 @ 05:34  6.65 12-14-23 @ 07:10  5.84 12-13-23 @ 07:29  7.17 12-12-23 @ 06:56  7.33 12-11-23 @ 07:19    CMP: 12-16    135  |  103  |  20  ----------------------------<  213<H>  3.8   |  22  |  1.09    Ca    9.4      16 Dec 2023 07:22      Creatinine: 1.09 mg/dL (12-16-23 @ 07:22)  Creatinine: 1.06 mg/dL (12-15-23 @ 05:34)  Creatinine: 1.07 mg/dL (12-15-23 @ 04:25)  Creatinine: 0.99 mg/dL (12-14-23 @ 07:12)  Creatinine: 1.01 mg/dL (12-13-23 @ 07:27)  Creatinine: 1.17 mg/dL (12-12-23 @ 06:56)  Creatinine: 1.12 mg/dL (12-11-23 @ 07:19)    Microbiology: reviewed     Culture - Abscess with Gram Stain (collected 12-09-23 @ 10:45)  Source: .Abscess left foot  Final Report (12-16-23 @ 14:31):    Moderate Serratia liquefaciens    Numerous Corynebacterium amycolatum "Susceptibilities not performed"    Moderate Finegoldia magna "Susceptibilities not performed"    Few Stenotrophomonas maltophilia  Organism: Serratia liquefaciens  Stenotrophomonas maltophilia (12-16-23 @ 14:31)  Organism: Stenotrophomonas maltophilia (12-16-23 @ 14:30)      -  Levofloxacin: S <=0.5      Method Type: AMI      -  Trimethoprim/Sulfamethoxazole: S <=0.5/9.5  Organism: Serratia liquefaciens (12-14-23 @ 16:47)      -  Resistance Gene to Carbapenem: Nondet      Method Type: CarbaR  Organism: Serratia liquefaciens (12-14-23 @ 16:47)      -  Levofloxacin: S <=0.5      -  Tobramycin: R 8      -  Aztreonam: R >16      -  Gentamicin: R 8      -  Cefazolin: R >16      -  Cefepime: R >16      -  Piperacillin/Tazobactam: S <=8      -  Ciprofloxacin: R 1      -  Ceftriaxone: R >32      -  Ampicillin: R >16 These ampicillin results predict results for amoxicillin      Method Type: AMI      -  Meropenem: R >8      -  Ampicillin/Sulbactam: I 16/8      -  Cefoxitin: R >16      -  Amoxicillin/Clavulanic Acid: R >16/8      -  Trimethoprim/Sulfamethoxazole: S <=0.5/9.5      -  Ceftolozane/tazobactam: S <=2      -  Ertapenem: R >1    Culture - Blood (collected 12-09-23 @ 09:05)  Source: .Blood Blood-Peripheral  Final Report (12-14-23 @ 14:01):    No growth at 5 days    Culture - Blood (collected 12-09-23 @ 09:00)  Source: .Blood Blood-Peripheral  Final Report (12-14-23 @ 14:01):    No growth at 5 days    Radiology: reviewed     Medications:  amLODIPine   Tablet 2.5 milliGRAM(s) Oral daily  aspirin enteric coated 81 milliGRAM(s) Oral daily  atorvastatin 40 milliGRAM(s) Oral at bedtime  chlorhexidine 2% Cloths 1 Application(s) Topical daily  dextrose 5%. 1000 milliLiter(s) IV Continuous <Continuous>  dextrose 5%. 1000 milliLiter(s) IV Continuous <Continuous>  dextrose 50% Injectable 25 Gram(s) IV Push once  dextrose 50% Injectable 25 Gram(s) IV Push once  dextrose 50% Injectable 12.5 Gram(s) IV Push once  dextrose Oral Gel 15 Gram(s) Oral once PRN  glucagon  Injectable 1 milliGRAM(s) IntraMuscular once  insulin lispro (ADMELOG) corrective regimen sliding scale   SubCutaneous at bedtime  insulin lispro (ADMELOG) corrective regimen sliding scale   SubCutaneous Before meals and at bedtime  losartan 25 milliGRAM(s) Oral daily  piperacillin/tazobactam IVPB.. 3.375 Gram(s) IV Intermittent every 8 hours  rivaroxaban 2.5 milliGRAM(s) Oral two times a day  sodium chloride 0.9%. 1000 milliLiter(s) IV Continuous <Continuous>    Current Antimicrobials:  piperacillin/tazobactam IVPB.. 3.375 Gram(s) IV Intermittent every 8 hours    Prior/Completed Antimicrobials:  piperacillin/tazobactam IVPB...  vancomycin  IVPB.   [facility]  ERVIN Florentino Y. Patel, S. Shah, G. Ervin  [phone]  ([phone] - weekdays after 5pm and weekends)    Name: ALEE DUNN  Age/Gender: 72y Male  MRN: [id number]    Interval History:  Patient seen and examined this morning.   No new complaints noted.  Notes reviewed  No concerning overnight events  Afebrile   Allergies: No Known Allergies      Objective:  Vitals:   T(F): 98.1 (12-17-23 @ 08:25), Max: 99 (12-16-23 @ 20:11)  HR: 71 (12-17-23 @ 08:25) (71 - 87)  BP: 169/76 (12-17-23 @ 08:25) (150/74 - 174/80)  RR: 18 (12-17-23 @ 08:25) (18 - 18)  SpO2: 98% (12-17-23 @ 08:25) (97% - 99%)  Physical Examination:  General: no acute distress  HEENT: NC/AT, anicteric, neck supple  Respiratory: no acc muscle use, breathing comfortably  Cardiovascular: S1 and S2 present  Gastrointestinal: normal appearing, nondistended  Extremities: R BKA, L foot dressing  Skin: no visible rash    Laboratory Studies:  CBC:                       9.4    7.67  )-----------( 221      ( 16 Dec 2023 07:23 )             29.5     WBC Trend:  7.67 12-16-23 @ 07:23  6.77 12-15-23 @ 05:34  6.65 12-14-23 @ 07:10  5.84 12-13-23 @ 07:29  7.17 12-12-23 @ 06:56  7.33 12-11-23 @ 07:19    CMP: 12-16    135  |  103  |  20  ----------------------------<  213<H>  3.8   |  22  |  1.09    Ca    9.4      16 Dec 2023 07:22      Creatinine: 1.09 mg/dL (12-16-23 @ 07:22)  Creatinine: 1.06 mg/dL (12-15-23 @ 05:34)  Creatinine: 1.07 mg/dL (12-15-23 @ 04:25)  Creatinine: 0.99 mg/dL (12-14-23 @ 07:12)  Creatinine: 1.01 mg/dL (12-13-23 @ 07:27)  Creatinine: 1.17 mg/dL (12-12-23 @ 06:56)  Creatinine: 1.12 mg/dL (12-11-23 @ 07:19)    Microbiology: reviewed     Culture - Abscess with Gram Stain (collected 12-09-23 @ 10:45)  Source: .Abscess left foot  Final Report (12-16-23 @ 14:31):    Moderate Serratia liquefaciens    Numerous Corynebacterium amycolatum "Susceptibilities not performed"    Moderate Finegoldia magna "Susceptibilities not performed"    Few Stenotrophomonas maltophilia  Organism: Serratia liquefaciens  Stenotrophomonas maltophilia (12-16-23 @ 14:31)  Organism: Stenotrophomonas maltophilia (12-16-23 @ 14:30)      -  Levofloxacin: S <=0.5      Method Type: AMI      -  Trimethoprim/Sulfamethoxazole: S <=0.5/9.5  Organism: Serratia liquefaciens (12-14-23 @ 16:47)      -  Resistance Gene to Carbapenem: Nondet      Method Type: CarbaR  Organism: Serratia liquefaciens (12-14-23 @ 16:47)      -  Levofloxacin: S <=0.5      -  Tobramycin: R 8      -  Aztreonam: R >16      -  Gentamicin: R 8      -  Cefazolin: R >16      -  Cefepime: R >16      -  Piperacillin/Tazobactam: S <=8      -  Ciprofloxacin: R 1      -  Ceftriaxone: R >32      -  Ampicillin: R >16 These ampicillin results predict results for amoxicillin      Method Type: AMI      -  Meropenem: R >8      -  Ampicillin/Sulbactam: I 16/8      -  Cefoxitin: R >16      -  Amoxicillin/Clavulanic Acid: R >16/8      -  Trimethoprim/Sulfamethoxazole: S <=0.5/9.5      -  Ceftolozane/tazobactam: S <=2      -  Ertapenem: R >1    Culture - Blood (collected 12-09-23 @ 09:05)  Source: .Blood Blood-Peripheral  Final Report (12-14-23 @ 14:01):    No growth at 5 days    Culture - Blood (collected 12-09-23 @ 09:00)  Source: .Blood Blood-Peripheral  Final Report (12-14-23 @ 14:01):    No growth at 5 days    Radiology: reviewed     Medications:  amLODIPine   Tablet 2.5 milliGRAM(s) Oral daily  aspirin enteric coated 81 milliGRAM(s) Oral daily  atorvastatin 40 milliGRAM(s) Oral at bedtime  chlorhexidine 2% Cloths 1 Application(s) Topical daily  dextrose 5%. 1000 milliLiter(s) IV Continuous <Continuous>  dextrose 5%. 1000 milliLiter(s) IV Continuous <Continuous>  dextrose 50% Injectable 25 Gram(s) IV Push once  dextrose 50% Injectable 25 Gram(s) IV Push once  dextrose 50% Injectable 12.5 Gram(s) IV Push once  dextrose Oral Gel 15 Gram(s) Oral once PRN  glucagon  Injectable 1 milliGRAM(s) IntraMuscular once  insulin lispro (ADMELOG) corrective regimen sliding scale   SubCutaneous at bedtime  insulin lispro (ADMELOG) corrective regimen sliding scale   SubCutaneous Before meals and at bedtime  losartan 25 milliGRAM(s) Oral daily  piperacillin/tazobactam IVPB.. 3.375 Gram(s) IV Intermittent every 8 hours  rivaroxaban 2.5 milliGRAM(s) Oral two times a day  sodium chloride 0.9%. 1000 milliLiter(s) IV Continuous <Continuous>    Current Antimicrobials:  piperacillin/tazobactam IVPB.. 3.375 Gram(s) IV Intermittent every 8 hours    Prior/Completed Antimicrobials:  piperacillin/tazobactam IVPB...  vancomycin  IVPB.

## 2023-12-17 NOTE — PROGRESS NOTE ADULT - SUBJECTIVE AND OBJECTIVE BOX
HPI:   73 y/o M with PMHx of DM, HTN, PAD and PSHX of R BKA presents to ED at recommendation of outpatient podiatrist. Patient was recently admitted and being followed by vascular and podiatry for wounds to the L foot. Was noted to have dry gangrene of L 2nd/3rd toes. [place] due to extent of vascular disease, patient would not heal if amputation was done. Was told to f/u with vascular as an outpatient. Saw podiatry yesterday, due to gangrene extending to 4th L toe. Recommended to come in for abx and possible procedure with podiatry. Patient is endorsing L toe pain. 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 1000 milliLiter(s) (100 mL/Hr) IV Continuous <Continuous>  dextrose 5%. 1000 milliLiter(s) (50 mL/Hr) IV Continuous <Continuous>  dextrose 50% Injectable 12.5 Gram(s) IV Push once  dextrose 50% Injectable 25 Gram(s) IV Push once  dextrose 50% Injectable 25 Gram(s) IV Push once  glucagon  Injectable 1 milliGRAM(s) IntraMuscular once  insulin lispro (ADMELOG) corrective regimen sliding scale   SubCutaneous at bedtime  insulin lispro (ADMELOG) corrective regimen sliding scale   SubCutaneous Before meals and at bedtime  losartan 25 milliGRAM(s) Oral daily  piperacillin/tazobactam IVPB.. 3.375 Gram(s) IV Intermittent every 8 hours  rivaroxaban 2.5 milliGRAM(s) Oral two times a day  sodium chloride 0.9%. 1000 milliLiter(s) (75 mL/Hr) IV Continuous <Continuous>    MEDICATIONS  (PRN):  dextrose Oral Gel 15 Gram(s) Oral once PRN Blood Glucose LESS THAN 70 milliGRAM(s)/deciliter  MEDICATIONS  (STANDING):  amLODIPine   Tablet 2.5 milliGRAM(s) Oral daily  aspirin enteric coated 81 milliGRAM(s) Oral daily  atorvastatin 40 milliGRAM(s) Oral at bedtime  chlorhexidine 2% Cloths 1 Application(s) Topical daily  dextrose 5%. 1000 milliLiter(s) (50 mL/Hr) IV Continuous <Continuous>  dextrose 5%. 1000 milliLiter(s) (100 mL/Hr) IV Continuous <Continuous>  dextrose 50% Injectable 12.5 Gram(s) IV Push once  dextrose 50% Injectable 25 Gram(s) IV Push once  dextrose 50% Injectable 25 Gram(s) IV Push once  glucagon  Injectable 1 milliGRAM(s) IntraMuscular once  insulin lispro (ADMELOG) corrective regimen sliding scale   SubCutaneous at bedtime  insulin lispro (ADMELOG) corrective regimen sliding scale   SubCutaneous Before meals and at bedtime  losartan 25 milliGRAM(s) Oral daily  piperacillin/tazobactam IVPB.. 3.375 Gram(s) IV Intermittent every 8 hours  rivaroxaban 2.5 milliGRAM(s) Oral two times a day  sodium chloride 0.9%. 1000 milliLiter(s) (75 mL/Hr) IV Continuous <Continuous>    MEDICATIONS  (PRN):  dextrose Oral Gel 15 Gram(s) Oral once PRN Blood Glucose LESS THAN 70 milliGRAM(s)/deciliter      Review of Systems:   CONSTITUTIONAL: No fever, weight loss, or fatigue  EYES: No eye pain, visual disturbances, or discharge  ENMT:  No difficulty hearing, tinnitus, vertigo; No sinus or throat pain  NECK: No pain or stiffness  BREASTS: No pain, masses, or nipple discharge  RESPIRATORY: No cough, wheezing, chills or hemoptysis; No shortness of breath  CARDIOVASCULAR: No chest pain, palpitations, dizziness, or leg swelling  GASTROINTESTINAL: No abdominal or epigastric pain. No nausea, vomiting, or hematemesis; No diarrhea or constipation. No melena or hematochezia.  GENITOURINARY: No dysuria, frequency, hematuria, or incontinence  NEUROLOGICAL: No headaches, memory loss, loss of strength, numbness, or tremors  SKIN: No itching, burning, rashes, or lesions   LYMPH NODES: No enlarged glands  ENDOCRINE: No heat or cold intolerance; No hair loss  MUSCULOSKELETAL: No joint pain or swelling; No muscle, back, or extremity pain  PSYCHIATRIC: No depression, anxiety, mood swings, or difficulty sleeping  HEME/LYMPH: No easy bruising, or bleeding gums  ALLERY AND IMMUNOLOGIC: No hives or eczema    Allergies    No Known Allergies    Intolerances        Social History:             PHYSICAL EXAM:  GENERAL: NAD, well-developed  HEAD:  Atraumatic, Normocephalic  EYES: EOMI, PERRLA, conjunctiva and sclera clear  NECK: Supple, No JVD  CHEST/LUNG: Clear to auscultation bilaterally; No wheeze  HEART: Regular rate and rhythm; No murmurs, rubs, or gallops  ABDOMEN: Soft, Nontender, Nondistended; Bowel sounds present  EXTREMITIES:  Left foot abscess   PSYCH: AAOx3  NEUROLOGY: non-focal  SKIN: No rashes or lesions                                     9.4    7.67  )-----------( 221      ( 16 Dec 2023 07:23 )             29.5       CAPILLARY BLOOD GLUCOSE      POCT Blood Glucose.: 242 mg/dL (17 Dec 2023 21:29)  POCT Blood Glucose.: 216 mg/dL (17 Dec 2023 17:08)  POCT Blood Glucose.: 271 mg/dL (17 Dec 2023 12:49)  POCT Blood Glucose.: 201 mg/dL (17 Dec 2023 08:47)   HPI:   71 y/o M with PMHx of DM, HTN, PAD and PSHX of R BKA presents to ED at recommendation of outpatient podiatrist. Patient was recently admitted and being followed by vascular and podiatry for wounds to the L foot. Was noted to have dry gangrene of L 2nd/3rd toes. [place] due to extent of vascular disease, patient would not heal if amputation was done. Was told to f/u with vascular as an outpatient. Saw podiatry yesterday, due to gangrene extending to 4th L toe. Recommended to come in for abx and possible procedure with podiatry. Patient is endorsing L toe pain. 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 1000 milliLiter(s) (100 mL/Hr) IV Continuous <Continuous>  dextrose 5%. 1000 milliLiter(s) (50 mL/Hr) IV Continuous <Continuous>  dextrose 50% Injectable 12.5 Gram(s) IV Push once  dextrose 50% Injectable 25 Gram(s) IV Push once  dextrose 50% Injectable 25 Gram(s) IV Push once  glucagon  Injectable 1 milliGRAM(s) IntraMuscular once  insulin lispro (ADMELOG) corrective regimen sliding scale   SubCutaneous at bedtime  insulin lispro (ADMELOG) corrective regimen sliding scale   SubCutaneous Before meals and at bedtime  losartan 25 milliGRAM(s) Oral daily  piperacillin/tazobactam IVPB.. 3.375 Gram(s) IV Intermittent every 8 hours  rivaroxaban 2.5 milliGRAM(s) Oral two times a day  sodium chloride 0.9%. 1000 milliLiter(s) (75 mL/Hr) IV Continuous <Continuous>    MEDICATIONS  (PRN):  dextrose Oral Gel 15 Gram(s) Oral once PRN Blood Glucose LESS THAN 70 milliGRAM(s)/deciliter  MEDICATIONS  (STANDING):  amLODIPine   Tablet 2.5 milliGRAM(s) Oral daily  aspirin enteric coated 81 milliGRAM(s) Oral daily  atorvastatin 40 milliGRAM(s) Oral at bedtime  chlorhexidine 2% Cloths 1 Application(s) Topical daily  dextrose 5%. 1000 milliLiter(s) (50 mL/Hr) IV Continuous <Continuous>  dextrose 5%. 1000 milliLiter(s) (100 mL/Hr) IV Continuous <Continuous>  dextrose 50% Injectable 12.5 Gram(s) IV Push once  dextrose 50% Injectable 25 Gram(s) IV Push once  dextrose 50% Injectable 25 Gram(s) IV Push once  glucagon  Injectable 1 milliGRAM(s) IntraMuscular once  insulin lispro (ADMELOG) corrective regimen sliding scale   SubCutaneous at bedtime  insulin lispro (ADMELOG) corrective regimen sliding scale   SubCutaneous Before meals and at bedtime  losartan 25 milliGRAM(s) Oral daily  piperacillin/tazobactam IVPB.. 3.375 Gram(s) IV Intermittent every 8 hours  rivaroxaban 2.5 milliGRAM(s) Oral two times a day  sodium chloride 0.9%. 1000 milliLiter(s) (75 mL/Hr) IV Continuous <Continuous>    MEDICATIONS  (PRN):  dextrose Oral Gel 15 Gram(s) Oral once PRN Blood Glucose LESS THAN 70 milliGRAM(s)/deciliter      Review of Systems:   CONSTITUTIONAL: No fever, weight loss, or fatigue  EYES: No eye pain, visual disturbances, or discharge  ENMT:  No difficulty hearing, tinnitus, vertigo; No sinus or throat pain  NECK: No pain or stiffness  BREASTS: No pain, masses, or nipple discharge  RESPIRATORY: No cough, wheezing, chills or hemoptysis; No shortness of breath  CARDIOVASCULAR: No chest pain, palpitations, dizziness, or leg swelling  GASTROINTESTINAL: No abdominal or epigastric pain. No nausea, vomiting, or hematemesis; No diarrhea or constipation. No melena or hematochezia.  GENITOURINARY: No dysuria, frequency, hematuria, or incontinence  NEUROLOGICAL: No headaches, memory loss, loss of strength, numbness, or tremors  SKIN: No itching, burning, rashes, or lesions   LYMPH NODES: No enlarged glands  ENDOCRINE: No heat or cold intolerance; No hair loss  MUSCULOSKELETAL: No joint pain or swelling; No muscle, back, or extremity pain  PSYCHIATRIC: No depression, anxiety, mood swings, or difficulty sleeping  HEME/LYMPH: No easy bruising, or bleeding gums  ALLERY AND IMMUNOLOGIC: No hives or eczema    Allergies    No Known Allergies    Intolerances        Social History:             PHYSICAL EXAM:  GENERAL: NAD, well-developed  HEAD:  Atraumatic, Normocephalic  EYES: EOMI, PERRLA, conjunctiva and sclera clear  NECK: Supple, No JVD  CHEST/LUNG: Clear to auscultation bilaterally; No wheeze  HEART: Regular rate and rhythm; No murmurs, rubs, or gallops  ABDOMEN: Soft, Nontender, Nondistended; Bowel sounds present  EXTREMITIES:  Left foot abscess   PSYCH: AAOx3  NEUROLOGY: non-focal  SKIN: No rashes or lesions                                     9.4    7.67  )-----------( 221      ( 16 Dec 2023 07:23 )             29.5       CAPILLARY BLOOD GLUCOSE      POCT Blood Glucose.: 242 mg/dL (17 Dec 2023 21:29)  POCT Blood Glucose.: 216 mg/dL (17 Dec 2023 17:08)  POCT Blood Glucose.: 271 mg/dL (17 Dec 2023 12:49)  POCT Blood Glucose.: 201 mg/dL (17 Dec 2023 08:47)

## 2023-12-18 ENCOUNTER — RESULT REVIEW (OUTPATIENT)
Age: 73
End: 2023-12-18

## 2023-12-18 ENCOUNTER — TRANSCRIPTION ENCOUNTER (OUTPATIENT)
Age: 73
End: 2023-12-18

## 2023-12-18 LAB
ANION GAP SERPL CALC-SCNC: 9 MMOL/L — SIGNIFICANT CHANGE UP (ref 5–17)
ANION GAP SERPL CALC-SCNC: 9 MMOL/L — SIGNIFICANT CHANGE UP (ref 5–17)
APTT BLD: 30.5 SEC — SIGNIFICANT CHANGE UP (ref 24.5–35.6)
APTT BLD: 30.5 SEC — SIGNIFICANT CHANGE UP (ref 24.5–35.6)
BLD GP AB SCN SERPL QL: POSITIVE — SIGNIFICANT CHANGE UP
BLD GP AB SCN SERPL QL: POSITIVE — SIGNIFICANT CHANGE UP
BUN SERPL-MCNC: 24 MG/DL — HIGH (ref 7–23)
BUN SERPL-MCNC: 24 MG/DL — HIGH (ref 7–23)
CALCIUM SERPL-MCNC: 9.4 MG/DL — SIGNIFICANT CHANGE UP (ref 8.4–10.5)
CALCIUM SERPL-MCNC: 9.4 MG/DL — SIGNIFICANT CHANGE UP (ref 8.4–10.5)
CHLORIDE SERPL-SCNC: 104 MMOL/L — SIGNIFICANT CHANGE UP (ref 96–108)
CHLORIDE SERPL-SCNC: 104 MMOL/L — SIGNIFICANT CHANGE UP (ref 96–108)
CO2 SERPL-SCNC: 24 MMOL/L — SIGNIFICANT CHANGE UP (ref 22–31)
CO2 SERPL-SCNC: 24 MMOL/L — SIGNIFICANT CHANGE UP (ref 22–31)
CREAT SERPL-MCNC: 1.14 MG/DL — SIGNIFICANT CHANGE UP (ref 0.5–1.3)
CREAT SERPL-MCNC: 1.14 MG/DL — SIGNIFICANT CHANGE UP (ref 0.5–1.3)
EGFR: 68 ML/MIN/1.73M2 — SIGNIFICANT CHANGE UP
EGFR: 68 ML/MIN/1.73M2 — SIGNIFICANT CHANGE UP
GLUCOSE BLDC GLUCOMTR-MCNC: 162 MG/DL — HIGH (ref 70–99)
GLUCOSE BLDC GLUCOMTR-MCNC: 162 MG/DL — HIGH (ref 70–99)
GLUCOSE BLDC GLUCOMTR-MCNC: 194 MG/DL — HIGH (ref 70–99)
GLUCOSE BLDC GLUCOMTR-MCNC: 194 MG/DL — HIGH (ref 70–99)
GLUCOSE BLDC GLUCOMTR-MCNC: 207 MG/DL — HIGH (ref 70–99)
GLUCOSE BLDC GLUCOMTR-MCNC: 207 MG/DL — HIGH (ref 70–99)
GLUCOSE BLDC GLUCOMTR-MCNC: 341 MG/DL — HIGH (ref 70–99)
GLUCOSE BLDC GLUCOMTR-MCNC: 341 MG/DL — HIGH (ref 70–99)
GLUCOSE SERPL-MCNC: 221 MG/DL — HIGH (ref 70–99)
GLUCOSE SERPL-MCNC: 221 MG/DL — HIGH (ref 70–99)
GRAM STN FLD: SIGNIFICANT CHANGE UP
GRAM STN FLD: SIGNIFICANT CHANGE UP
HCT VFR BLD CALC: 30.1 % — LOW (ref 39–50)
HCT VFR BLD CALC: 30.1 % — LOW (ref 39–50)
HGB BLD-MCNC: 9.8 G/DL — LOW (ref 13–17)
HGB BLD-MCNC: 9.8 G/DL — LOW (ref 13–17)
INR BLD: 1.14 RATIO — SIGNIFICANT CHANGE UP (ref 0.85–1.18)
INR BLD: 1.14 RATIO — SIGNIFICANT CHANGE UP (ref 0.85–1.18)
MCHC RBC-ENTMCNC: 29 PG — SIGNIFICANT CHANGE UP (ref 27–34)
MCHC RBC-ENTMCNC: 29 PG — SIGNIFICANT CHANGE UP (ref 27–34)
MCHC RBC-ENTMCNC: 32.6 GM/DL — SIGNIFICANT CHANGE UP (ref 32–36)
MCHC RBC-ENTMCNC: 32.6 GM/DL — SIGNIFICANT CHANGE UP (ref 32–36)
MCV RBC AUTO: 89.1 FL — SIGNIFICANT CHANGE UP (ref 80–100)
MCV RBC AUTO: 89.1 FL — SIGNIFICANT CHANGE UP (ref 80–100)
NRBC # BLD: 0 /100 WBCS — SIGNIFICANT CHANGE UP (ref 0–0)
NRBC # BLD: 0 /100 WBCS — SIGNIFICANT CHANGE UP (ref 0–0)
PLATELET # BLD AUTO: 218 K/UL — SIGNIFICANT CHANGE UP (ref 150–400)
PLATELET # BLD AUTO: 218 K/UL — SIGNIFICANT CHANGE UP (ref 150–400)
POTASSIUM SERPL-MCNC: 3.9 MMOL/L — SIGNIFICANT CHANGE UP (ref 3.5–5.3)
POTASSIUM SERPL-MCNC: 3.9 MMOL/L — SIGNIFICANT CHANGE UP (ref 3.5–5.3)
POTASSIUM SERPL-SCNC: 3.9 MMOL/L — SIGNIFICANT CHANGE UP (ref 3.5–5.3)
POTASSIUM SERPL-SCNC: 3.9 MMOL/L — SIGNIFICANT CHANGE UP (ref 3.5–5.3)
PROTHROM AB SERPL-ACNC: 11.9 SEC — SIGNIFICANT CHANGE UP (ref 9.5–13)
PROTHROM AB SERPL-ACNC: 11.9 SEC — SIGNIFICANT CHANGE UP (ref 9.5–13)
RBC # BLD: 3.38 M/UL — LOW (ref 4.2–5.8)
RBC # BLD: 3.38 M/UL — LOW (ref 4.2–5.8)
RBC # FLD: 12.7 % — SIGNIFICANT CHANGE UP (ref 10.3–14.5)
RBC # FLD: 12.7 % — SIGNIFICANT CHANGE UP (ref 10.3–14.5)
RH IG SCN BLD-IMP: POSITIVE — SIGNIFICANT CHANGE UP
RH IG SCN BLD-IMP: POSITIVE — SIGNIFICANT CHANGE UP
SODIUM SERPL-SCNC: 137 MMOL/L — SIGNIFICANT CHANGE UP (ref 135–145)
SODIUM SERPL-SCNC: 137 MMOL/L — SIGNIFICANT CHANGE UP (ref 135–145)
SPECIMEN SOURCE: SIGNIFICANT CHANGE UP
SPECIMEN SOURCE: SIGNIFICANT CHANGE UP
WBC # BLD: 6.32 K/UL — SIGNIFICANT CHANGE UP (ref 3.8–10.5)
WBC # BLD: 6.32 K/UL — SIGNIFICANT CHANGE UP (ref 3.8–10.5)
WBC # FLD AUTO: 6.32 K/UL — SIGNIFICANT CHANGE UP (ref 3.8–10.5)
WBC # FLD AUTO: 6.32 K/UL — SIGNIFICANT CHANGE UP (ref 3.8–10.5)

## 2023-12-18 PROCEDURE — 88311 DECALCIFY TISSUE: CPT | Mod: 26

## 2023-12-18 PROCEDURE — 73630 X-RAY EXAM OF FOOT: CPT | Mod: 26,LT

## 2023-12-18 PROCEDURE — 88305 TISSUE EXAM BY PATHOLOGIST: CPT | Mod: 26

## 2023-12-18 RX ORDER — ASPIRIN/CALCIUM CARB/MAGNESIUM 324 MG
81 TABLET ORAL DAILY
Refills: 0 | Status: DISCONTINUED | OUTPATIENT
Start: 2023-12-18 | End: 2023-12-22

## 2023-12-18 RX ORDER — CHLORHEXIDINE GLUCONATE 213 G/1000ML
1 SOLUTION TOPICAL DAILY
Refills: 0 | Status: DISCONTINUED | OUTPATIENT
Start: 2023-12-18 | End: 2023-12-22

## 2023-12-18 RX ORDER — GLUCAGON INJECTION, SOLUTION 0.5 MG/.1ML
1 INJECTION, SOLUTION SUBCUTANEOUS ONCE
Refills: 0 | Status: DISCONTINUED | OUTPATIENT
Start: 2023-12-18 | End: 2023-12-22

## 2023-12-18 RX ORDER — INSULIN LISPRO 100/ML
VIAL (ML) SUBCUTANEOUS
Refills: 0 | Status: DISCONTINUED | OUTPATIENT
Start: 2023-12-18 | End: 2023-12-22

## 2023-12-18 RX ORDER — INSULIN LISPRO 100/ML
VIAL (ML) SUBCUTANEOUS AT BEDTIME
Refills: 0 | Status: DISCONTINUED | OUTPATIENT
Start: 2023-12-18 | End: 2023-12-18

## 2023-12-18 RX ORDER — HYDROMORPHONE HYDROCHLORIDE 2 MG/ML
0.5 INJECTION INTRAMUSCULAR; INTRAVENOUS; SUBCUTANEOUS EVERY 4 HOURS
Refills: 0 | Status: DISCONTINUED | OUTPATIENT
Start: 2023-12-18 | End: 2023-12-22

## 2023-12-18 RX ORDER — AMLODIPINE BESYLATE 2.5 MG/1
2.5 TABLET ORAL DAILY
Refills: 0 | Status: DISCONTINUED | OUTPATIENT
Start: 2023-12-18 | End: 2023-12-22

## 2023-12-18 RX ORDER — PIPERACILLIN AND TAZOBACTAM 4; .5 G/20ML; G/20ML
3.38 INJECTION, POWDER, LYOPHILIZED, FOR SOLUTION INTRAVENOUS EVERY 8 HOURS
Refills: 0 | Status: DISCONTINUED | OUTPATIENT
Start: 2023-12-18 | End: 2023-12-20

## 2023-12-18 RX ORDER — ATORVASTATIN CALCIUM 80 MG/1
40 TABLET, FILM COATED ORAL AT BEDTIME
Refills: 0 | Status: DISCONTINUED | OUTPATIENT
Start: 2023-12-18 | End: 2023-12-22

## 2023-12-18 RX ORDER — RIVAROXABAN 15 MG-20MG
2.5 KIT ORAL
Refills: 0 | Status: DISCONTINUED | OUTPATIENT
Start: 2023-12-18 | End: 2023-12-22

## 2023-12-18 RX ORDER — SODIUM CHLORIDE 9 MG/ML
1000 INJECTION, SOLUTION INTRAVENOUS
Refills: 0 | Status: DISCONTINUED | OUTPATIENT
Start: 2023-12-18 | End: 2023-12-22

## 2023-12-18 RX ORDER — FENTANYL CITRATE 50 UG/ML
25 INJECTION INTRAVENOUS
Refills: 0 | Status: DISCONTINUED | OUTPATIENT
Start: 2023-12-18 | End: 2023-12-18

## 2023-12-18 RX ORDER — SODIUM CHLORIDE 9 MG/ML
1000 INJECTION INTRAMUSCULAR; INTRAVENOUS; SUBCUTANEOUS
Refills: 0 | Status: DISCONTINUED | OUTPATIENT
Start: 2023-12-18 | End: 2023-12-20

## 2023-12-18 RX ORDER — ACETAMINOPHEN 500 MG
650 TABLET ORAL EVERY 6 HOURS
Refills: 0 | Status: DISCONTINUED | OUTPATIENT
Start: 2023-12-18 | End: 2023-12-22

## 2023-12-18 RX ORDER — HYDROMORPHONE HYDROCHLORIDE 2 MG/ML
0.25 INJECTION INTRAMUSCULAR; INTRAVENOUS; SUBCUTANEOUS
Refills: 0 | Status: DISCONTINUED | OUTPATIENT
Start: 2023-12-18 | End: 2023-12-18

## 2023-12-18 RX ADMIN — PIPERACILLIN AND TAZOBACTAM 25 GRAM(S): 4; .5 INJECTION, POWDER, LYOPHILIZED, FOR SOLUTION INTRAVENOUS at 22:07

## 2023-12-18 RX ADMIN — HYDROMORPHONE HYDROCHLORIDE 0.5 MILLIGRAM(S): 2 INJECTION INTRAMUSCULAR; INTRAVENOUS; SUBCUTANEOUS at 18:19

## 2023-12-18 RX ADMIN — Medication 4: at 17:02

## 2023-12-18 RX ADMIN — Medication 1: at 22:06

## 2023-12-18 RX ADMIN — SODIUM CHLORIDE 75 MILLILITER(S): 9 INJECTION INTRAMUSCULAR; INTRAVENOUS; SUBCUTANEOUS at 11:18

## 2023-12-18 RX ADMIN — SODIUM CHLORIDE 75 MILLILITER(S): 9 INJECTION INTRAMUSCULAR; INTRAVENOUS; SUBCUTANEOUS at 00:52

## 2023-12-18 RX ADMIN — HYDROMORPHONE HYDROCHLORIDE 0.5 MILLIGRAM(S): 2 INJECTION INTRAMUSCULAR; INTRAVENOUS; SUBCUTANEOUS at 19:00

## 2023-12-18 RX ADMIN — AMLODIPINE BESYLATE 2.5 MILLIGRAM(S): 2.5 TABLET ORAL at 11:17

## 2023-12-18 RX ADMIN — PIPERACILLIN AND TAZOBACTAM 25 GRAM(S): 4; .5 INJECTION, POWDER, LYOPHILIZED, FOR SOLUTION INTRAVENOUS at 13:45

## 2023-12-18 RX ADMIN — CHLORHEXIDINE GLUCONATE 1 APPLICATION(S): 213 SOLUTION TOPICAL at 11:18

## 2023-12-18 RX ADMIN — PIPERACILLIN AND TAZOBACTAM 25 GRAM(S): 4; .5 INJECTION, POWDER, LYOPHILIZED, FOR SOLUTION INTRAVENOUS at 00:52

## 2023-12-18 RX ADMIN — Medication 1: at 12:40

## 2023-12-18 RX ADMIN — AMLODIPINE BESYLATE 2.5 MILLIGRAM(S): 2.5 TABLET ORAL at 05:08

## 2023-12-18 RX ADMIN — Medication 81 MILLIGRAM(S): at 11:18

## 2023-12-18 RX ADMIN — SODIUM CHLORIDE 75 MILLILITER(S): 9 INJECTION INTRAMUSCULAR; INTRAVENOUS; SUBCUTANEOUS at 18:19

## 2023-12-18 RX ADMIN — LOSARTAN POTASSIUM 25 MILLIGRAM(S): 100 TABLET, FILM COATED ORAL at 05:08

## 2023-12-18 RX ADMIN — RIVAROXABAN 2.5 MILLIGRAM(S): KIT at 17:03

## 2023-12-18 RX ADMIN — ATORVASTATIN CALCIUM 40 MILLIGRAM(S): 80 TABLET, FILM COATED ORAL at 22:05

## 2023-12-18 NOTE — DISCHARGE NOTE PROVIDER - NSDCMRMEDTOKEN_GEN_ALL_CORE_FT
amLODIPine 2.5 mg oral tablet: 1 orally once a day  ascorbic acid 500 mg oral tablet: 1 tab(s) orally once a day  aspirin 81 mg oral delayed release tablet: 1 tab(s) orally once a day  gabapentin 100 mg oral tablet: 1 orally every 8 hours  glipiZIDE 5 mg oral tablet: 1 tab(s) orally in the morning and 2 tabs in the evening  losartan 25 mg oral tablet: 1 tab(s) orally once a day  metFORMIN 750 mg oral tablet, extended release: 1 orally once a day  Multiple Vitamins with Minerals oral tablet: 1 tab(s) orally once a day  polyethylene glycol 3350 oral powder for reconstitution: 17 gram(s) orally once a day  rosuvastatin 10 mg oral tablet: 1 tab(s) orally once a day  senna leaf extract oral tablet: 2 tab(s) orally once a day (at bedtime)   amLODIPine 2.5 mg oral tablet: 1 orally once a day  ascorbic acid 500 mg oral tablet: 1 tab(s) orally once a day  aspirin 81 mg oral delayed release tablet: 1 tab(s) orally once a day  gabapentin 100 mg oral tablet: 1 orally every 8 hours  glipiZIDE 5 mg oral tablet: 1 tab(s) orally in the morning and 2 tabs in the evening  Left CAM walker boot: Please provide left CAM walker boot  Ind: To wear daily as instructed   Dx: s/p left foot transmetatarsal amputation with tendoachilles lengthening  losartan 25 mg oral tablet: 1 tab(s) orally once a day  metFORMIN 750 mg oral tablet, extended release: 1 orally once a day  Multiple Vitamins with Minerals oral tablet: 1 tab(s) orally once a day  polyethylene glycol 3350 oral powder for reconstitution: 17 gram(s) orally once a day  rosuvastatin 10 mg oral tablet: 1 tab(s) orally once a day  senna leaf extract oral tablet: 2 tab(s) orally once a day (at bedtime)   acetaminophen 325 mg oral tablet: 2 tab(s) orally every 6 hours As needed Mild Pain (1 - 3)  amLODIPine 2.5 mg oral tablet: 1 orally once a day  ascorbic acid 500 mg oral tablet: 1 tab(s) orally once a day  aspirin 81 mg oral delayed release tablet: 1 tab(s) orally once a day  gabapentin 100 mg oral tablet: 1 orally every 8 hours  glipiZIDE 5 mg oral tablet: 1 tab(s) orally in the morning and 2 tabs in the evening  Left CAM walker boot: Please provide left CAM walker boot  Ind: To wear daily as instructed   Dx: s/p left foot transmetatarsal amputation with tendoachilles lengthening  losartan 25 mg oral tablet: 1 tab(s) orally once a day  metFORMIN 750 mg oral tablet, extended release: 1 orally once a day  Multiple Vitamins with Minerals oral tablet: 1 tab(s) orally once a day  polyethylene glycol 3350 oral powder for reconstitution: 17 gram(s) orally once a day  rosuvastatin 10 mg oral tablet: 1 tab(s) orally once a day  senna leaf extract oral tablet: 2 tab(s) orally once a day (at bedtime)   acetaminophen 325 mg oral tablet: 2 tab(s) orally every 6 hours As needed Mild Pain (1 - 3)  amLODIPine 2.5 mg oral tablet: 1 orally once a day  ascorbic acid 500 mg oral tablet: 1 tab(s) orally once a day  aspirin 81 mg oral delayed release tablet: 1 tab(s) orally once a day  gabapentin 100 mg oral tablet: 1 orally every 8 hours  glipiZIDE 5 mg oral tablet: 1 tab(s) orally in the morning and 2 tabs in the evening  Left CAM walker boot: Please provide left CAM walker boot  Ind: To wear daily as instructed   Dx: s/p left foot transmetatarsal amputation with tendoachilles lengthening  losartan 25 mg oral tablet: 1 tab(s) orally once a day  metFORMIN 750 mg oral tablet, extended release: 1 orally once a day  Multiple Vitamins with Minerals oral tablet: 1 tab(s) orally once a day  oxycodone-acetaminophen 5 mg-325 mg oral tablet: 1 tab(s) orally every 6 hours as needed for Moderate Pain (4 - 6) MDD: 4 tabs in 24 hours  polyethylene glycol 3350 oral powder for reconstitution: 17 gram(s) orally once a day  rosuvastatin 10 mg oral tablet: 1 tab(s) orally once a day  senna leaf extract oral tablet: 2 tab(s) orally once a day (at bedtime)  Xarelto 2.5 mg oral tablet: 1 tab(s) orally 2 times a day

## 2023-12-18 NOTE — PROGRESS NOTE ADULT - ASSESSMENT
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     LLE gangrene with extension to 4th toe with c/f infection  - L foot 2nd and 3rd digit dorsal full thickness gangrene to level of MTPJ, w/ wet conversion, plantar 2nd and 3rd digit dusky and early ischemic changes, mild serous drainage, mild malodor, early dusky changes of fourth metatarsal.  - L foot MRI with findings c/w septic arthritis and OM, no drainable abscess   - L foot abscess culture with moderate Serratia liquefaciens sensitives reviewed), Corynebacterium amycolatum, Finegoldia magna, also noted with Stenotrophomonas  - 12/15 s/p OR for LLE angiogram and angioplasty with vascular   - remains afebrile, WBC wnl    Recommendations:   Podiatry planning for L foot TMA with YESENIA this morning   please send culture and biopsy   Continue on pip-tazo for now   Continue local care       Azucena Castillo M.D.  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     LLE gangrene with extension to 4th toe with c/f infection  - L foot 2nd and 3rd digit dorsal full thickness gangrene to level of MTPJ, w/ wet conversion, plantar 2nd and 3rd digit dusky and early ischemic changes, mild serous drainage, mild malodor, early dusky changes of fourth metatarsal.  - L foot MRI with findings c/w septic arthritis and OM, no drainable abscess   - L foot abscess culture with moderate Serratia liquefaciens sensitives reviewed), Corynebacterium amycolatum, Finegoldia magna, also noted with Stenotrophomonas  - 12/15 s/p OR for LLE angiogram and angioplasty with vascular   - remains afebrile, WBC wnl    Recommendations:   Podiatry planning for L foot TMA with YESENIA this morning   please send culture and biopsy   Continue on pip-tazo for now   Continue local care       Azucena Castillo M.D.  [facility], Division of Infectious Diseases  [phone]  After 5pm on weekdays and all day on weekends - please call [phone]

## 2023-12-18 NOTE — CHART NOTE - NSCHARTNOTEFT_GEN_A_CORE
Pt seen at bedside to measure and fit with cambbot to be used after surgery  S/P TMA with YESENIA performed 12/18/23.   Rx request from podiatry to fit patient and remove posterior splint.  Boot assembled and patient instructed to don and doff.  Pt will follow up with dr Avendano after discharge.  Pt will need diabetic shoe and TMA filler after incision is closed.  Pt is s/p BKA on contralateral side.    Follow up after discharge. Office contact information provided    Gigi PATEL  [phone] Pt seen at bedside to measure and fit with cambbot to be used after surgery  S/P TMA with YESENIA performed 12/18/23.   Rx request from podiatry to fit patient and remove posterior splint.  Boot assembled and patient instructed to don and doff.  Pt will follow up with dr Avendano after discharge.  Pt will need diabetic shoe and TMA filler after incision is closed.  Pt is s/p BKA on contralateral side.    Follow up after discharge. Office contact information provided    Gigi PATEL  [phone]

## 2023-12-18 NOTE — BRIEF OPERATIVE NOTE - COMMENTS
Pt is s/p Left foot transmetatarsal amputation with tendoachilles lengthening, closed   - Low concern for residual infection  - High concern for viability, limited intraop bleeding   - Pod stable for discharge pending clean bone margin 48hrs no growth and final vasc recs Pt is s/p Left foot transmetatarsal amputation with tendoachilles lengthening, closed   - Low concern for residual infection  - High concern for viability, limited intraop bleeding   - Pod stable for discharge pending clean bone margin 48hrs no growth, final vasc recs, and left foot CAM boot delivery

## 2023-12-18 NOTE — BRIEF OPERATIVE NOTE - SPECIMENS
none
Pathology 1) Left forefoot bone and soft tissue 2) 2nd metatarsal clean bone margin; Culture 1) 2nd metatarsal clean bone margin

## 2023-12-18 NOTE — PROGRESS NOTE ADULT - ASSESSMENT
Plan:   To OR today at 7:30am with Dr. Mishra for Left foot transmetatarsal amputation with tendoachilles lengthening .   CXR on sunrise.  EKG on sunrise.  Medical clearance since 12/17 and documented in chart.  Consent signed and in chart.  Procedure was explained to patient in detail. All alternatives, risks and complications were discussed. All questions answered. Plan:   To OR today at 7:30am with Dr. Mishra for Left foot transmetatarsal amputation with tendoachilles lengthening .   CXR on sunrise.  EKG on sunrise.  Medical clearance since 12/17 and documented in chart.  Consent signed and in chart.  Procedure was explained to patient in detail. All alternatives, risks and complications were discussed. All questions answered.  H&P seen and acknowledged.

## 2023-12-18 NOTE — PROGRESS NOTE ADULT - ASSESSMENT
[facility] All anesthesia questions and concerns addressed with patient. Patient ok to proceed with surgery.

## 2023-12-18 NOTE — DISCHARGE NOTE PROVIDER - NSDCFUSCHEDAPPT_GEN_ALL_CORE_FT
Abdullahi Villalobos  [facility] Physician [address]  Scheduled Appointment: 01/09/2024     Abdullahi Villalobos  [facility] Physician [address]  Scheduled Appointment: 01/09/2024

## 2023-12-18 NOTE — PROGRESS NOTE ADULT - SUBJECTIVE AND OBJECTIVE BOX
Podiatry Pager #: [phone]    Patient is a 72y old  Male who presents with a chief complaint of Left 4 th toe infection (17 Dec 2023 18:35)      INTERVAL HPI/OVERNIGHT EVENTS:   Pt is scheduled for Left foot transmetatarsal amputation with tendoachilles lengthening with Dr. Mishra at 7:30am. Patient is aware of procedure and is NPO since midnight.    MEDICATIONS  (STANDING):  amLODIPine   Tablet 2.5 milliGRAM(s) Oral daily  aspirin enteric coated 81 milliGRAM(s) Oral daily  atorvastatin 40 milliGRAM(s) Oral at bedtime  chlorhexidine 2% Cloths 1 Application(s) Topical daily  dextrose 5%. 1000 milliLiter(s) (100 mL/Hr) IV Continuous <Continuous>  dextrose 5%. 1000 milliLiter(s) (50 mL/Hr) IV Continuous <Continuous>  dextrose 50% Injectable 25 Gram(s) IV Push once  dextrose 50% Injectable 25 Gram(s) IV Push once  dextrose 50% Injectable 12.5 Gram(s) IV Push once  glucagon  Injectable 1 milliGRAM(s) IntraMuscular once  insulin lispro (ADMELOG) corrective regimen sliding scale   SubCutaneous Before meals and at bedtime  insulin lispro (ADMELOG) corrective regimen sliding scale   SubCutaneous at bedtime  losartan 25 milliGRAM(s) Oral daily  piperacillin/tazobactam IVPB.. 3.375 Gram(s) IV Intermittent every 8 hours  rivaroxaban 2.5 milliGRAM(s) Oral two times a day  sodium chloride 0.9%. 1000 milliLiter(s) (75 mL/Hr) IV Continuous <Continuous>    MEDICATIONS  (PRN):  dextrose Oral Gel 15 Gram(s) Oral once PRN Blood Glucose LESS THAN 70 milliGRAM(s)/deciliter      Allergies    No Known Allergies    Intolerances        Vital Signs Last 24 Hrs  T(C): 36.8 (18 Dec 2023 05:36), Max: 36.9 (17 Dec 2023 16:19)  T(F): 98.2 (18 Dec 2023 05:36), Max: 98.5 (17 Dec 2023 16:19)  HR: 74 (18 Dec 2023 04:17) (71 - 81)  BP: 169/78 (18 Dec 2023 05:36) (158/68 - 177/79)  BP(mean): --  RR: 18 (18 Dec 2023 05:36) (18 - 18)  SpO2: 97% (18 Dec 2023 05:36) (94% - 98%)    Parameters below as of 18 Dec 2023 04:17  Patient On (Oxygen Delivery Method): room air        LABS:                        9.4    7.67  )-----------( 221      ( 16 Dec 2023 07:23 )             29.5     12-16    135  |  103  |  20  ----------------------------<  213<H>  3.8   |  22  |  1.09    Ca    9.4      16 Dec 2023 07:22        Urinalysis Basic - ( 16 Dec 2023 07:22 )    Color: x / Appearance: x / SG: x / pH: x  Gluc: 213 mg/dL / Ketone: x  / Bili: x / Urobili: x   Blood: x / Protein: x / Nitrite: x   Leuk Esterase: x / RBC: x / WBC x   Sq Epi: x / Non Sq Epi: x / Bacteria: x      CAPILLARY BLOOD GLUCOSE      POCT Blood Glucose.: 242 mg/dL (17 Dec 2023 21:29)  POCT Blood Glucose.: 216 mg/dL (17 Dec 2023 17:08)  POCT Blood Glucose.: 271 mg/dL (17 Dec 2023 12:49)  POCT Blood Glucose.: 201 mg/dL (17 Dec 2023 08:47)      RADIOLOGY & ADDITIONAL TESTS:     Podiatry Pager #: [phone]    Patient is a 72y old  Male who presents with a chief complaint of Left 4 th toe infection (17 Dec 2023 18:35)      INTERVAL HPI/OVERNIGHT EVENTS:   Pt is scheduled for Left foot transmetatarsal amputation with tendoachilles lengthening with Dr. Mishra at 7:30am. Patient is aware of procedure and is NPO since midnight.    MEDICATIONS  (STANDING):  amLODIPine   Tablet 2.5 milliGRAM(s) Oral daily  aspirin enteric coated 81 milliGRAM(s) Oral daily  atorvastatin 40 milliGRAM(s) Oral at bedtime  chlorhexidine 2% Cloths 1 Application(s) Topical daily  dextrose 5%. 1000 milliLiter(s) (100 mL/Hr) IV Continuous <Continuous>  dextrose 5%. 1000 milliLiter(s) (50 mL/Hr) IV Continuous <Continuous>  dextrose 50% Injectable 25 Gram(s) IV Push once  dextrose 50% Injectable 25 Gram(s) IV Push once  dextrose 50% Injectable 12.5 Gram(s) IV Push once  glucagon  Injectable 1 milliGRAM(s) IntraMuscular once  insulin lispro (ADMELOG) corrective regimen sliding scale   SubCutaneous Before meals and at bedtime  insulin lispro (ADMELOG) corrective regimen sliding scale   SubCutaneous at bedtime  losartan 25 milliGRAM(s) Oral daily  piperacillin/tazobactam IVPB.. 3.375 Gram(s) IV Intermittent every 8 hours  rivaroxaban 2.5 milliGRAM(s) Oral two times a day  sodium chloride 0.9%. 1000 milliLiter(s) (75 mL/Hr) IV Continuous <Continuous>    MEDICATIONS  (PRN):  dextrose Oral Gel 15 Gram(s) Oral once PRN Blood Glucose LESS THAN 70 milliGRAM(s)/deciliter      Allergies    No Known Allergies    Intolerances        Vital Signs Last 24 Hrs  T(C): 36.8 (18 Dec 2023 05:36), Max: 36.9 (17 Dec 2023 16:19)  T(F): 98.2 (18 Dec 2023 05:36), Max: 98.5 (17 Dec 2023 16:19)  HR: 74 (18 Dec 2023 04:17) (71 - 81)  BP: 169/78 (18 Dec 2023 05:36) (158/68 - 177/79)  BP(mean): --  RR: 18 (18 Dec 2023 05:36) (18 - 18)  SpO2: 97% (18 Dec 2023 05:36) (94% - 98%)    Parameters below as of 18 Dec 2023 04:17  Patient On (Oxygen Delivery Method): room air        LABS:                        9.4    7.67  )-----------( 221      ( 16 Dec 2023 07:23 )             29.5     12-16    135  |  103  |  20  ----------------------------<  213<H>  3.8   |  22  |  1.09    Ca    9.4      16 Dec 2023 07:22        Urinalysis Basic - ( 16 Dec 2023 07:22 )    Color: x / Appearance: x / SG: x / pH: x  Gluc: 213 mg/dL / Ketone: x  / Bili: x / Urobili: x   Blood: x / Protein: x / Nitrite: x   Leuk Esterase: x / RBC: x / WBC x   Sq Epi: x / Non Sq Epi: x / Bacteria: x      CAPILLARY BLOOD GLUCOSE      POCT Blood Glucose.: 242 mg/dL (17 Dec 2023 21:29)  POCT Blood Glucose.: 216 mg/dL (17 Dec 2023 17:08)  POCT Blood Glucose.: 271 mg/dL (17 Dec 2023 12:49)  POCT Blood Glucose.: 201 mg/dL (17 Dec 2023 08:47)      RADIOLOGY & ADDITIONAL TESTS:

## 2023-12-18 NOTE — BRIEF OPERATIVE NOTE - OPERATION/FINDINGS
Pt is s/p Left foot transmetatarsal amputation with tendoachilles lengthening, closed   - Bone at proximal resection was hard and of good quality   - Limited intraop bleeding   - No evidence of purulence or soft tissue infection   - Transmetatarsal amputation incision primarily closed with 3.0 vicryl and 2.0 nylon. Tendoachilles lengthening incision primarily closed with 3.0 nylon.
pop 70% stenosis  PT 90% stenosis at origin

## 2023-12-18 NOTE — DISCHARGE NOTE PROVIDER - CARE PROVIDERS DIRECT ADDRESSES
,DirectAddress_Unknown,DirectAddress_Unknown ,DirectAddress_Unknown,DirectAddress_Unknown,DirectAddress_Unknown ,DirectAddress_Unknown,DirectAddress_Unknown,DirectAddress_Unknown,[email or web address] ,DirectAddress_Unknown,DirectAddress_Unknown,DirectAddress_Unknown,[email or web address]

## 2023-12-18 NOTE — DISCHARGE NOTE PROVIDER - NPI NUMBER (FOR SYSADMIN USE ONLY) :
[[id number] [[id number]],[[id number]] [[id number]],[[id number]],[[id number]] [[id number]],[[id number]],[[id number]] [[id number]],[[id number]],[[id number]],[[id number]] [[id number]],[[id number]],[[id number]],[[id number]]

## 2023-12-18 NOTE — BRIEF OPERATIVE NOTE - NSICDXBRIEFPROCEDURE_GEN_ALL_CORE_FT
PROCEDURES:  Incision and drainage of bone abscess or osteomyelitis of foot 18-Dec-2023 09:34:16  Jo Ann Kang  Transmetatarsal amputation 18-Dec-2023 09:34:34  Jo Ann Kang  
PROCEDURES:  Angiogram, lower extremity, left 15-Dec-2023 14:30:40 pop and PT angioplasty Morales Pate

## 2023-12-18 NOTE — DISCHARGE NOTE PROVIDER - CARE PROVIDER_API CALL
Hadley Brown  [address] [place] [address]  Phone: [phone]  Fax: [phone])-  Follow Up Time: 1 week    Azucena Castillo  Infectious Disease  [address] [place] [address], [address] [address]  Phone: [phone]  Fax: [phone]  Follow Up Time: 1 week   Hadley Brown  [address] [place] [address]  Phone: [phone]  Fax: ()-  Follow Up Time: 1 week    Azucena Castillo  Infectious Disease  [address] [place] [address], [address] [address]  Phone: [phone]  Fax: [phone]  Follow Up Time: 1 week   Hadley Brown  [address] [place] [address]  Phone: [phone]  Fax: ()-  Follow Up Time: 1 week    Azucena Castillo  Infectious Disease  [address] [place] [address], [address]  [place] [address]  Phone: [phone]  Fax: [phone]  Follow Up Time: 1 week    Mateus Mullins  Vascular Surgery  [address] [place] [address]  Phone: [phone]  Fax: [phone]  Follow Up Time: 1 week   Hadley Brown  [address] [place] [address]  Phone: [phone]  Fax: ()-  Follow Up Time: 1 week    Azucena Castillo  Infectious Disease  [address] [place] [address], [facility] [address]  [place] [address]  Phone: [phone]  Fax: [phone]  Follow Up Time: 1 week    Mateus Mullins  Vascular Surgery  [address] [place] [address]  Phone: [phone]  Fax: [phone]  Follow Up Time: 1 week   Hadley Brown  [address] [place] [address]  Phone: [phone]  Fax: ()-  Follow Up Time: 1 week    Azucena Castillo  Infectious Disease  [address] [place] [address], [address]  [place] [address]  Phone: [phone]  Fax: [phone]  Follow Up Time: 1 week    Mateus Mullins  Vascular Surgery  [address] [place] [address]  Phone: [phone]  Fax: [phone]  Follow Up Time: 1 week    Abraham Mcclure  Podiatric Medicine and Surgery  [address] [place] [address]  Phone: [phone]  Fax: [phone]  Follow Up Time: 1 week   Hadley Brown  [address] [place] [address]  Phone: [phone]  Fax: ()-  Follow Up Time: 1 week    Azucena Castillo  Infectious Disease  [address] [place] [address], [address]  [place] [address]  Phone: [phone]  Fax: [phone]  Follow Up Time: 1 week    Mateus Mullins  Vascular Surgery  [address] [place] [address]  Phone: [phone]  Fax: [phone]  Follow Up Time: 1 week    Abraham Mcclure  Podiatric Medicine and Surgery  [address] [place] [address]  Phone: [phone]  Fax: [phone]  Follow Up Time: 1 week

## 2023-12-18 NOTE — DISCHARGE NOTE PROVIDER - NSDCCPCAREPLAN_GEN_ALL_CORE_FT
PRINCIPAL DISCHARGE DIAGNOSIS  Diagnosis: Wet gangrene  Assessment and Plan of Treatment: - L foot MRI with findings consistent with septic arthritis and OM, no drainable abscess   - L foot abscess culture with moderate Serratia liquefaciens sensitives reviewed), Corynebacterium amycolatum, Finegoldia magna, also noted with Stenotrophomonas  - 12/15 s/p OR for LLE angiogram and angioplasty with vascular   - 12/19 s/p L foot I&D, s/p TMA   -OR culture no growth to date. Discontinued pip-tazo given negative culture.  Patient can follow up with ID as outpatient within 1-2 weeks of dc  - Stable for discharge from the podiatry standpoint with a 1 week follow up.        PRINCIPAL DISCHARGE DIAGNOSIS  Diagnosis: Wet gangrene  Assessment and Plan of Treatment: - L foot MRI with findings consistent with septic arthritis and OM, no drainable abscess   - L foot abscess culture with moderate Serratia liquefaciens sensitives reviewed), Corynebacterium amycolatum, Finegoldia magna, also noted with Stenotrophomonas  - 12/15 s/p OR for LLE angiogram and angioplasty with vascular   - 12/19 s/p L foot I&D, s/p TMA   -OR culture no growth to date. Discontinued pip-tazo given negative culture.  Patient can follow up with ID as outpatient within 1-2 weeks of dc  - Stable for discharge from the podiatry standpoint with a 1 week follow up.         SECONDARY DISCHARGE DIAGNOSES  Diagnosis: DM (diabetes mellitus), type 2  Assessment and Plan of Treatment: YOur Hemoglobin A1c (HgA1C)  was 7.2 on November 27th.   Make sure you get your HgA1c checked every three months.  It's important not to skip any meals.  Keep a log of your blood glucose results and always take it with you to your doctor appointments.  Keep a list of your current medications including injectables and over the counter medications and bring this medication list with you to all your doctor appointments.  If you have not seen your ophthalmologist this year call for appointment.  Check your feet daily for redness, sores, or openings. Do not self treat. If no improvement in two days call your primary care physician for an appointment.  Low blood sugar (hypoglycemia) is a blood sugar below 70mg/dl. Check your blood sugar if you feel signs/symptoms of hypoglycemia. If your blood sugar is below 70 take 15 grams of carbohydrates (ex 4 oz of apple juice, 3-4 glucose tablets, or 4-6 oz of regular soda) wait 15 minutes and repeat blood sugar to make sure it comes up above 70.  If your blood sugar is above 70 and you are due for a meal, have a meal.  If you are not due for a meal have a snack.  This snack helps keeps your blood sugar at a safe range.

## 2023-12-18 NOTE — BRIEF OPERATIVE NOTE - NSICDXBRIEFPREOP_GEN_ALL_CORE_FT
PRE-OP DIAGNOSIS:  Osteomyelitis 18-Dec-2023 09:34:48  Jo Ann Kang  Gangrene of foot 18-Dec-2023 09:34:58  Jo Ann Kang   PRE-OP DIAGNOSIS:  Osteomyelitis 18-Dec-2023 09:34:48  Jo Ann Kang  Gangrene of foot 18-Dec-2023 09:34:58  JoA nn Kang

## 2023-12-18 NOTE — PROGRESS NOTE ADULT - SUBJECTIVE AND OBJECTIVE BOX
[facility] DIVISION OF INFECTIOUS DISEASES  ERVIN Florentino Y. Patel, S. Shah, G. Ervin  [phone]  ([phone] - weekdays after 5pm and weekends)    Name: ALEE DUNN  Age/Gender: 72y Male  MRN: [id number]    Interval History:  Patient seen and examined this morning.   No new complaints, waiting to go to OR.  Notes reviewed  No concerning overnight events  Afebrile   Allergies: No Known Allergies      Objective:  Vitals:   T(F): 98.2 (12-18-23 @ 05:36), Max: 98.5 (12-17-23 @ 16:19)  HR: 74 (12-18-23 @ 07:34) (74 - 81)  BP: 169/78 (12-18-23 @ 07:34) (158/68 - 177/79)  RR: 18 (12-18-23 @ 07:34) (18 - 18)  SpO2: 97% (12-18-23 @ 07:34) (94% - 98%)  Physical Examination:  General: no acute distress, nontoxic   HEENT: NC/AT, anicteric, neck supple  Respiratory: no acc muscle use, breathing comfortably  Cardiovascular: S1 and S2 present  Gastrointestinal: normal appearing, nondistended  Extremities: R BKA, L foot dressing   Skin: no visible rash    Laboratory Studies:  CBC:                       9.8    6.32  )-----------( 218      ( 18 Dec 2023 06:49 )             30.1     WBC Trend:  6.32 12-18-23 @ 06:49  7.67 12-16-23 @ 07:23  6.77 12-15-23 @ 05:34  6.65 12-14-23 @ 07:10  5.84 12-13-23 @ 07:29  7.17 12-12-23 @ 06:56    CMP: 12-18    137  |  104  |  24<H>  ----------------------------<  221<H>  3.9   |  24  |  1.14    Ca    9.4      18 Dec 2023 06:47      Creatinine: 1.14 mg/dL (12-18-23 @ 06:47)  Creatinine: 1.09 mg/dL (12-16-23 @ 07:22)  Creatinine: 1.06 mg/dL (12-15-23 @ 05:34)  Creatinine: 1.07 mg/dL (12-15-23 @ 04:25)  Creatinine: 0.99 mg/dL (12-14-23 @ 07:12)  Creatinine: 1.01 mg/dL (12-13-23 @ 07:27)  Creatinine: 1.17 mg/dL (12-12-23 @ 06:56)    Microbiology: reviewed   Culture - Abscess with Gram Stain (collected 12-09-23 @ 10:45)  Source: .Abscess left foot  Final Report (12-16-23 @ 14:31):    Moderate Serratia liquefaciens    Numerous Corynebacterium amycolatum "Susceptibilities not performed"    Moderate Finegoldia magna "Susceptibilities not performed"    Few Stenotrophomonas maltophilia  Organism: Serratia liquefaciens  Stenotrophomonas maltophilia (12-16-23 @ 14:31)  Organism: Stenotrophomonas maltophilia (12-16-23 @ 14:30)      Method Type: AMI      -  Levofloxacin: S <=0.5      -  Trimethoprim/Sulfamethoxazole: S <=0.5/9.5  Organism: Serratia liquefaciens (12-14-23 @ 16:47)      Method Type: CarbaR      -  Resistance Gene to Carbapenem: Nondet  Organism: Serratia liquefaciens (12-14-23 @ 16:47)      Method Type: AMI      -  Amoxicillin/Clavulanic Acid: R >16/8      -  Ampicillin: R >16 These ampicillin results predict results for amoxicillin      -  Ampicillin/Sulbactam: I 16/8      -  Aztreonam: R >16      -  Cefazolin: R >16      -  Cefepime: R >16      -  Cefoxitin: R >16      -  Ceftolozane/tazobactam: S <=2      -  Ceftriaxone: R >32      -  Ciprofloxacin: R 1      -  Ertapenem: R >1      -  Gentamicin: R 8      -  Levofloxacin: S <=0.5      -  Meropenem: R >8      -  Piperacillin/Tazobactam: S <=8      -  Tobramycin: R 8      -  Trimethoprim/Sulfamethoxazole: S <=0.5/9.5    Culture - Blood (collected 12-09-23 @ 09:05)  Source: .Blood Blood-Peripheral  Final Report (12-14-23 @ 14:01):    No growth at 5 days    Culture - Blood (collected 12-09-23 @ 09:00)  Source: .Blood Blood-Peripheral  Final Report (12-14-23 @ 14:01):    No growth at 5 days    Radiology: reviewed     Medications:  amLODIPine   Tablet 2.5 milliGRAM(s) Oral daily  aspirin enteric coated 81 milliGRAM(s) Oral daily  atorvastatin 40 milliGRAM(s) Oral at bedtime  chlorhexidine 2% Cloths 1 Application(s) Topical daily  dextrose 5%. 1000 milliLiter(s) IV Continuous <Continuous>  dextrose 5%. 1000 milliLiter(s) IV Continuous <Continuous>  dextrose 50% Injectable 12.5 Gram(s) IV Push once  dextrose 50% Injectable 25 Gram(s) IV Push once  dextrose 50% Injectable 25 Gram(s) IV Push once  dextrose Oral Gel 15 Gram(s) Oral once PRN  glucagon  Injectable 1 milliGRAM(s) IntraMuscular once  insulin lispro (ADMELOG) corrective regimen sliding scale   SubCutaneous at bedtime  insulin lispro (ADMELOG) corrective regimen sliding scale   SubCutaneous Before meals and at bedtime  losartan 25 milliGRAM(s) Oral daily  piperacillin/tazobactam IVPB.. 3.375 Gram(s) IV Intermittent every 8 hours  rivaroxaban 2.5 milliGRAM(s) Oral two times a day  sodium chloride 0.9%. 1000 milliLiter(s) IV Continuous <Continuous>    Current Antimicrobials:  piperacillin/tazobactam IVPB.. 3.375 Gram(s) IV Intermittent every 8 hours    Prior/Completed Antimicrobials:  piperacillin/tazobactam IVPB...  vancomycin  IVPB.   [facility] OF INF[facility]IOUS DISEASES  ERVIN Florentino Y. Patel, S. Shah, G. Ervin  [phone]  ([phone] - weekdays after 5pm and weekends)    Name: ALEE DUNN  Age/Gender: 72y Male  MRN: [id number]    Interval History:  Patient seen and examined this morning.   No new complaints, waiting to go to OR.  Notes reviewed  No concerning overnight events  Afebrile   Allergies: No Known Allergies      Objective:  Vitals:   T(F): 98.2 (12-18-23 @ 05:36), Max: 98.5 (12-17-23 @ 16:19)  HR: 74 (12-18-23 @ 07:34) (74 - 81)  BP: 169/78 (12-18-23 @ 07:34) (158/68 - 177/79)  RR: 18 (12-18-23 @ 07:34) (18 - 18)  SpO2: 97% (12-18-23 @ 07:34) (94% - 98%)  Physical Examination:  General: no acute distress, nontoxic   HEENT: NC/AT, anicteric, neck supple  Respiratory: no acc muscle use, breathing comfortably  Cardiovascular: S1 and S2 present  Gastrointestinal: normal appearing, nondistended  Extremities: R BKA, L foot dressing   Skin: no visible rash    Laboratory Studies:  CBC:                       9.8    6.32  )-----------( 218      ( 18 Dec 2023 06:49 )             30.1     WBC Trend:  6.32 12-18-23 @ 06:49  7.67 12-16-23 @ 07:23  6.77 12-15-23 @ 05:34  6.65 12-14-23 @ 07:10  5.84 12-13-23 @ 07:29  7.17 12-12-23 @ 06:56    CMP: 12-18    137  |  104  |  24<H>  ----------------------------<  221<H>  3.9   |  24  |  1.14    Ca    9.4      18 Dec 2023 06:47      Creatinine: 1.14 mg/dL (12-18-23 @ 06:47)  Creatinine: 1.09 mg/dL (12-16-23 @ 07:22)  Creatinine: 1.06 mg/dL (12-15-23 @ 05:34)  Creatinine: 1.07 mg/dL (12-15-23 @ 04:25)  Creatinine: 0.99 mg/dL (12-14-23 @ 07:12)  Creatinine: 1.01 mg/dL (12-13-23 @ 07:27)  Creatinine: 1.17 mg/dL (12-12-23 @ 06:56)    Microbiology: reviewed   Culture - Abscess with Gram Stain (collected 12-09-23 @ 10:45)  Source: .Abscess left foot  Final Report (12-16-23 @ 14:31):    Moderate Serratia liquefaciens    Numerous Corynebacterium amycolatum "Susceptibilities not performed"    Moderate Finegoldia magna "Susceptibilities not performed"    Few Stenotrophomonas maltophilia  Organism: Serratia liquefaciens  Stenotrophomonas maltophilia (12-16-23 @ 14:31)  Organism: Stenotrophomonas maltophilia (12-16-23 @ 14:30)      Method Type: AMI      -  Levofloxacin: S <=0.5      -  Trimethoprim/Sulfamethoxazole: S <=0.5/9.5  Organism: Serratia liquefaciens (12-14-23 @ 16:47)      Method Type: CarbaR      -  Resistance Gene to Carbapenem: Nondet  Organism: Serratia liquefaciens (12-14-23 @ 16:47)      Method Type: AMI      -  Amoxicillin/Clavulanic Acid: R >16/8      -  Ampicillin: R >16 These ampicillin results predict results for amoxicillin      -  Ampicillin/Sulbactam: I 16/8      -  Aztreonam: R >16      -  Cefazolin: R >16      -  Cefepime: R >16      -  Cefoxitin: R >16      -  Ceftolozane/tazobactam: S <=2      -  Ceftriaxone: R >32      -  Ciprofloxacin: R 1      -  Ertapenem: R >1      -  Gentamicin: R 8      -  Levofloxacin: S <=0.5      -  Meropenem: R >8      -  Piperacillin/Tazobactam: S <=8      -  Tobramycin: R 8      -  Trimethoprim/Sulfamethoxazole: S <=0.5/9.5    Culture - Blood (collected 12-09-23 @ 09:05)  Source: .Blood Blood-Peripheral  Final Report (12-14-23 @ 14:01):    No growth at 5 days    Culture - Blood (collected 12-09-23 @ 09:00)  Source: .Blood Blood-Peripheral  Final Report (12-14-23 @ 14:01):    No growth at 5 days    Radiology: reviewed     Medications:  amLODIPine   Tablet 2.5 milliGRAM(s) Oral daily  aspirin enteric coated 81 milliGRAM(s) Oral daily  atorvastatin 40 milliGRAM(s) Oral at bedtime  chlorhexidine 2% Cloths 1 Application(s) Topical daily  dextrose 5%. 1000 milliLiter(s) IV Continuous <Continuous>  dextrose 5%. 1000 milliLiter(s) IV Continuous <Continuous>  dextrose 50% Injectable 12.5 Gram(s) IV Push once  dextrose 50% Injectable 25 Gram(s) IV Push once  dextrose 50% Injectable 25 Gram(s) IV Push once  dextrose Oral Gel 15 Gram(s) Oral once PRN  glucagon  Injectable 1 milliGRAM(s) IntraMuscular once  insulin lispro (ADMELOG) corrective regimen sliding scale   SubCutaneous at bedtime  insulin lispro (ADMELOG) corrective regimen sliding scale   SubCutaneous Before meals and at bedtime  losartan 25 milliGRAM(s) Oral daily  piperacillin/tazobactam IVPB.. 3.375 Gram(s) IV Intermittent every 8 hours  rivaroxaban 2.5 milliGRAM(s) Oral two times a day  sodium chloride 0.9%. 1000 milliLiter(s) IV Continuous <Continuous>    Current Antimicrobials:  piperacillin/tazobactam IVPB.. 3.375 Gram(s) IV Intermittent every 8 hours    Prior/Completed Antimicrobials:  piperacillin/tazobactam IVPB...  vancomycin  IVPB.

## 2023-12-18 NOTE — DISCHARGE NOTE PROVIDER - NSDCFUADDAPPT_GEN_ALL_CORE_FT
Podiatry Discharge Instructions:  Follow up: Please follow up with Dr. Mcclure within 1 week of discharge from the hospital, please call [phone] for appointment and discuss that you recently were seen in the hospital.  Wound Care: Please leave your dressing clean dry intact until your follow up appointment   Weight bearing: Please weight bear as tolerated in CAM boot.  Antibiotics: Please continue as instructed. Podiatry Discharge Instructions:  Follow up: Please follow up with Dr. Mcclure within 1 week of discharge from the hospital, please call [phone] for appointment and discuss that you recently were seen in the hospital.  Wound Care: Please leave your dressing clean dry intact until your follow up appointment   Weight bearing: Please weight bear as tolerated in CAM boot.  Antibiotics: Please continue as instructed. Podiatry Discharge Instructions:  Follow up: Please follow up with Dr. Mcclure within 1 week of discharge from the hospital, please call [phone] for appointment and discuss that you recently were seen in the hospital.  Wound Care: Please leave your dressing clean dry intact until your follow up appointment   Weight bearing: Please weight bear as tolerated in CAM boot to the left foot.  Antibiotics: Please continue as instructed. Podiatry Discharge Instructions:  Follow up: Please follow up with Dr. Mcclure within 1 week of discharge from the hospital, please call [phone] for appointment and discuss that you recently were seen in the hospital.  Wound Care: Please leave your dressing clean dry intact until your follow up appointment   Weight bearing: Please weight bear as tolerated in CAM boot to the left foot.  Antibiotics: Please continue as instructed. Podiatry Discharge Instructions:  Follow up: Please follow up with Dr. Mcclure within 1 week of discharge from the hospital, please call [phone] for appointment and discuss that you recently were seen in the hospital.  Wound Care: Please leave your dressing clean dry intact until your follow up appointment   Weight bearing: Please weight bear as tolerated in CAM boot to the left foot.  Antibiotics: Completed.  Podiatry Discharge Instructions:  Follow up: Please follow up with Dr. Mcclure within 1 week of discharge from the hospital, please call [phone] for appointment and discuss that you recently were seen in the hospital.  Wound Care: Please leave your dressing clean dry intact until your follow up appointment   Weight bearing: Please weight bear as tolerated in CAM boot to the left foot.  Antibiotics: Completed.

## 2023-12-18 NOTE — PROGRESS NOTE ADULT - SUBJECTIVE AND OBJECTIVE BOX
Surgery Progress Note    S: FAYDEO    O:  Physical Exam:  General Appearance: Appears well, NAD   Chest: Nonlabored breathing on RA  CV: RRR  Extremities: Grossly symmetric, SCD's in place   Vascular: AT/PT ds+      Vital Signs Last 24 Hrs  T(C): 36.8 (18 Dec 2023 07:34), Max: 36.9 (17 Dec 2023 16:19)  T(F): 98.2 (18 Dec 2023 05:36), Max: 98.5 (17 Dec 2023 16:19)  HR: 74 (18 Dec 2023 07:34) (71 - 81)  BP: 169/78 (18 Dec 2023 07:34) (158/68 - 177/79)  BP(mean): 89 (18 Dec 2023 07:34) (89 - 89)  RR: 18 (18 Dec 2023 07:34) (18 - 18)  SpO2: 97% (18 Dec 2023 07:34) (94% - 98%)    Parameters below as of 18 Dec 2023 07:34    O2 Flow (L/min): 2  O2 Concentration (%): 28    I&O's Detail    17 Dec 2023 07:01  -  18 Dec 2023 07:00  --------------------------------------------------------  IN:    sodium chloride 0.9%: 600 mL  Total IN: 600 mL    OUT:    Voided (mL): 2700 mL  Total OUT: 2700 mL    Total NET: -2100 mL                                9.8    6.32  )-----------( 218      ( 18 Dec 2023 06:49 )             30.1       12-18    137  |  104  |  24<H>  ----------------------------<  221<H>  3.9   |  24  |  1.14    Ca    9.4      18 Dec 2023 06:47         Surgery Progress Note    S: FADYEO    O:  Physical Exam:  General Appearance: Appears well, NAD   Chest: Nonlabored breathing on RA  CV: RRR  Extremities: Grossly symmetric, SCD's in place   Vascular: AT/PT ds+      Vital Signs Last 24 Hrs  T(C): 36.8 (18 Dec 2023 07:34), Max: 36.9 (17 Dec 2023 16:19)  T(F): 98.2 (18 Dec 2023 05:36), Max: 98.5 (17 Dec 2023 16:19)  HR: 74 (18 Dec 2023 07:34) (71 - 81)  BP: 169/78 (18 Dec 2023 07:34) (158/68 - 177/79)  BP(mean): 89 (18 Dec 2023 07:34) (89 - 89)  RR: 18 (18 Dec 2023 07:34) (18 - 18)  SpO2: 97% (18 Dec 2023 07:34) (94% - 98%)    Parameters below as of 18 Dec 2023 07:34    O2 Flow (L/min): 2  O2 Concentration (%): 28    I&O's Detail    17 Dec 2023 07:01  -  18 Dec 2023 07:00  --------------------------------------------------------  IN:    sodium chloride 0.9%: 600 mL  Total IN: 600 mL    OUT:    Voided (mL): 2700 mL  Total OUT: 2700 mL    Total NET: -2100 mL                                9.8    6.32  )-----------( 218      ( 18 Dec 2023 06:49 )             30.1       12-18    137  |  104  |  24<H>  ----------------------------<  221<H>  3.9   |  24  |  1.14    Ca    9.4      18 Dec 2023 06:47

## 2023-12-18 NOTE — BRIEF OPERATIVE NOTE - NSICDXBRIEFPOSTOP_GEN_ALL_CORE_FT
POST-OP DIAGNOSIS:  Osteomyelitis 18-Dec-2023 09:35:08  Jo Ann Kang  Gangrene of foot 18-Dec-2023 09:35:19  Jo Ann Kang

## 2023-12-18 NOTE — PROGRESS NOTE ADULT - SUBJECTIVE AND OBJECTIVE BOX
HPI:   71 y/o M with PMHx of DM, HTN, PAD and PSHX of R BKA presents to ED at recommendation of outpatient podiatrist. Patient was recently admitted and being followed by vascular and podiatry for wounds to the L foot. Was noted to have dry gangrene of L 2nd/3rd toes. [place] due to extent of vascular disease, patient would not heal if amputation was done. Was told to f/u with vascular as an outpatient. Saw podiatry yesterday, due to gangrene extending to 4th L toe. Recommended to come in for abx and possible procedure with podiatry. Patient is endorsing L toe pain. 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 1000 milliLiter(s) (50 mL/Hr) IV Continuous <Continuous>  dextrose 5%. 1000 milliLiter(s) (100 mL/Hr) IV Continuous <Continuous>  glucagon  Injectable 1 milliGRAM(s) IntraMuscular once  insulin lispro (ADMELOG) corrective regimen sliding scale   SubCutaneous Before meals and at bedtime  piperacillin/tazobactam IVPB.. 3.375 Gram(s) IV Intermittent every 8 hours  rivaroxaban 2.5 milliGRAM(s) Oral two times a day  sodium chloride 0.9%. 1000 milliLiter(s) (75 mL/Hr) IV Continuous <Continuous>    MEDICATIONS  (PRN):  acetaminophen     Tablet .. 650 milliGRAM(s) Oral every 6 hours PRN Mild Pain (1 - 3)  HYDROmorphone  Injectable 0.5 milliGRAM(s) IV Push every 4 hours PRN Severe Pain (7 - 10)  oxycodone    5 mG/acetaminophen 325 mG 1 Tablet(s) Oral every 4 hours PRN Moderate Pain (4 - 6)    Review of Systems:   CONSTITUTIONAL: No fever, weight loss, or fatigue  EYES: No eye pain, visual disturbances, or discharge  ENMT:  No difficulty hearing, tinnitus, vertigo; No sinus or throat pain  NECK: No pain or stiffness  BREASTS: No pain, masses, or nipple discharge  RESPIRATORY: No cough, wheezing, chills or hemoptysis; No shortness of breath  CARDIOVASCULAR: No chest pain, palpitations, dizziness, or leg swelling  GASTROINTESTINAL: No abdominal or epigastric pain. No nausea, vomiting, or hematemesis; No diarrhea or constipation. No melena or hematochezia.  GENITOURINARY: No dysuria, frequency, hematuria, or incontinence  NEUROLOGICAL: No headaches, memory loss, loss of strength, numbness, or tremors  SKIN: No itching, burning, rashes, or lesions   LYMPH NODES: No enlarged glands  ENDOCRINE: No heat or cold intolerance; No hair loss  MUSCULOSKELETAL: No joint pain or swelling; No muscle, back, or extremity pain  PSYCHIATRIC: No depression, anxiety, mood swings, or difficulty sleeping  HEME/LYMPH: No easy bruising, or bleeding gums  ALLERY AND IMMUNOLOGIC: No hives or eczema    Allergies    No Known Allergies    Intolerances        Social History:             PHYSICAL EXAM:  GENERAL: NAD, well-developed  HEAD:  Atraumatic, Normocephalic  EYES: EOMI, PERRLA, conjunctiva and sclera clear  NECK: Supple, No JVD  CHEST/LUNG: Clear to auscultation bilaterally; No wheeze  HEART: Regular rate and rhythm; No murmurs, rubs, or gallops  ABDOMEN: Soft, Nontender, Nondistended; Bowel sounds present  EXTREMITIES:  Left foot abscess   PSYCH: AAOx3  NEUROLOGY: non-focal  SKIN: No rashes or lesions                                     9.4    7.67  )-----------( 221      ( 16 Dec 2023 07:23 )             29.5       CAPILLARY BLOOD GLUCOSE      POCT Blood Glucose.: 242 mg/dL (17 Dec 2023 21:29)  POCT Blood Glucose.: 216 mg/dL (17 Dec 2023 17:08)  POCT Blood Glucose.: 271 mg/dL (17 Dec 2023 12:49)  POCT Blood Glucose.: 201 mg/dL (17 Dec 2023 08:47)   HPI:   71 y/o M with PMHx of DM, HTN, PAD and PSHX of R BKA presents to ED at recommendation of outpatient podiatrist. Patient was recently admitted and being followed by vascular and podiatry for wounds to the L foot. Was noted to have dry gangrene of L 2nd/3rd toes. [place] due to extent of vascular disease, patient would not heal if amputation was done. Was told to f/u with vascular as an outpatient. Saw podiatry yesterday, due to gangrene extending to 4th L toe. Recommended to come in for abx and possible procedure with podiatry. Patient is endorsing L toe pain. 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 1000 milliLiter(s) (50 mL/Hr) IV Continuous <Continuous>  dextrose 5%. 1000 milliLiter(s) (100 mL/Hr) IV Continuous <Continuous>  glucagon  Injectable 1 milliGRAM(s) IntraMuscular once  insulin lispro (ADMELOG) corrective regimen sliding scale   SubCutaneous Before meals and at bedtime  piperacillin/tazobactam IVPB.. 3.375 Gram(s) IV Intermittent every 8 hours  rivaroxaban 2.5 milliGRAM(s) Oral two times a day  sodium chloride 0.9%. 1000 milliLiter(s) (75 mL/Hr) IV Continuous <Continuous>    MEDICATIONS  (PRN):  acetaminophen     Tablet .. 650 milliGRAM(s) Oral every 6 hours PRN Mild Pain (1 - 3)  HYDROmorphone  Injectable 0.5 milliGRAM(s) IV Push every 4 hours PRN Severe Pain (7 - 10)  oxycodone    5 mG/acetaminophen 325 mG 1 Tablet(s) Oral every 4 hours PRN Moderate Pain (4 - 6)    Review of Systems:   CONSTITUTIONAL: No fever, weight loss, or fatigue  EYES: No eye pain, visual disturbances, or discharge  ENMT:  No difficulty hearing, tinnitus, vertigo; No sinus or throat pain  NECK: No pain or stiffness  BREASTS: No pain, masses, or nipple discharge  RESPIRATORY: No cough, wheezing, chills or hemoptysis; No shortness of breath  CARDIOVASCULAR: No chest pain, palpitations, dizziness, or leg swelling  GASTROINTESTINAL: No abdominal or epigastric pain. No nausea, vomiting, or hematemesis; No diarrhea or constipation. No melena or hematochezia.  GENITOURINARY: No dysuria, frequency, hematuria, or incontinence  NEUROLOGICAL: No headaches, memory loss, loss of strength, numbness, or tremors  SKIN: No itching, burning, rashes, or lesions   LYMPH NODES: No enlarged glands  ENDOCRINE: No heat or cold intolerance; No hair loss  MUSCULOSKELETAL: No joint pain or swelling; No muscle, back, or extremity pain  PSYCHIATRIC: No depression, anxiety, mood swings, or difficulty sleeping  HEME/LYMPH: No easy bruising, or bleeding gums  ALLERY AND IMMUNOLOGIC: No hives or eczema    Allergies    No Known Allergies    Intolerances        Social History:             PHYSICAL EXAM:  GENERAL: NAD, well-developed  HEAD:  Atraumatic, Normocephalic  EYES: EOMI, PERRLA, conjunctiva and sclera clear  NECK: Supple, No JVD  CHEST/LUNG: Clear to auscultation bilaterally; No wheeze  HEART: Regular rate and rhythm; No murmurs, rubs, or gallops  ABDOMEN: Soft, Nontender, Nondistended; Bowel sounds present  EXTREMITIES:  Left foot abscess   PSYCH: AAOx3  NEUROLOGY: non-focal  SKIN: No rashes or lesions                                     9.4    7.67  )-----------( 221      ( 16 Dec 2023 07:23 )             29.5       CAPILLARY BLOOD GLUCOSE      POCT Blood Glucose.: 242 mg/dL (17 Dec 2023 21:29)  POCT Blood Glucose.: 216 mg/dL (17 Dec 2023 17:08)  POCT Blood Glucose.: 271 mg/dL (17 Dec 2023 12:49)  POCT Blood Glucose.: 201 mg/dL (17 Dec 2023 08:47)

## 2023-12-18 NOTE — PRE-ANESTHESIA EVALUATION ADULT - NSANTHTOTALSCORECAL_ENT_A_CORE
Discharge Planning Assessment  [facility]     Patient Name: Dorothy Zavala  MRN: [id number]  Today's Date: 3/9/2023    Admit Date: 2/23/2023    Plan: [facility]   Discharge Needs Assessment    No documentation.                Discharge Plan     Row Name 03/09/23 1619       Plan    Plan [facility]    Patient/Family in Agreement with Plan yes    Plan Comments Followed up with Jacqueline at [facility] regarding patient's rehab referral.  Ms. Zavala will require more review before SNF tem can accept her.  The DC plan is now rehab to Medicaid long term care, as Ms. Zavala now states that she no longer has a home and does not have the finances for Assisted living.    CM will follow up    Final Discharge Disposition Code 03 - skilled nursing facility (SNF)              Continued Care and Services - Admitted Since 2/23/2023     Destination     Service Provider Request Status Selected Services Address Phone Fax Patient Preferred    [facility] [address] [facility] [address] [phone] --              Expected Discharge Date and Time     Expected Discharge Date Expected Discharge Time    Mar 11, 2023                    Marta Kiran RN    
3
3

## 2023-12-18 NOTE — PROGRESS NOTE ADULT - ASSESSMENT
73 y/o M with PMHx of DM, HTN, PAD and PSHX of R BKA presents to ED at recommendation of outpatient podiatrist.     Left 4 th toe infection  PAD   Zosyn    Left Foot MR: OM of 2nd middle phalanx, OM of 2nd proximal phalanx, OM of  third proximal phalanx, OM of fourth proximal interphalangeal joint, OM of base of the fourth proximal phalanx, OM of fifth proximal interphalangeal joint.  s/p Angiogram with angioplasty     TMA today    Patient medically optimized for the procedure   - Vasc recs, appreciated.  - L foot abscess culture with moderate Serratia liquefaciens   - Bcx NGTD x2   - Podiatry and Vascular surgery following, recs noted   - afebrile, WBC wnl       DM HISS   A1C 7.2    HTN Home meds

## 2023-12-18 NOTE — DISCHARGE NOTE PROVIDER - PROVIDER TOKENS
PROVIDER:[TOKEN:[[id number]],FOLLOWUP:[1 week]],PROVIDER:[TOKEN:[[id number]],FOLLOWUP:[1 week]] PROVIDER:[TOKEN:[[id number]],FOLLOWUP:[1 week]],PROVIDER:[TOKEN:[[id number]],FOLLOWUP:[1 week]] PROVIDER:[TOKEN:[[id number]],FOLLOWUP:[1 week]],PROVIDER:[TOKEN:[[id number]],FOLLOWUP:[1 week]],PROVIDER:[TOKEN:[[id number]],FOLLOWUP:[1 week]] PROVIDER:[TOKEN:[[id number]],FOLLOWUP:[1 week]],PROVIDER:[TOKEN:[[id number]],FOLLOWUP:[1 week]],PROVIDER:[TOKEN:[[id number]],FOLLOWUP:[1 week]] PROVIDER:[TOKEN:[[id number]],FOLLOWUP:[1 week]],PROVIDER:[TOKEN:[[id number]],FOLLOWUP:[1 week]],PROVIDER:[TOKEN:[[id number]],FOLLOWUP:[1 week]],PROVIDER:[TOKEN:[[id number]],FOLLOWUP:[1 week]] PROVIDER:[TOKEN:[[id number]],FOLLOWUP:[1 week]],PROVIDER:[TOKEN:[[id number]],FOLLOWUP:[1 week]],PROVIDER:[TOKEN:[[id number]],FOLLOWUP:[1 week]],PROVIDER:[TOKEN:[[id number]],FOLLOWUP:[1 week]]

## 2023-12-18 NOTE — PROGRESS NOTE ADULT - ASSESSMENT
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 Recently presented for 2/3rd toe gangrene with podiatry recommending toe resection vs TMA. Vascular was consulted and completed LLE angio showing PT runoff, no stent or balloon angioplasty. Vascular recommended possible bypass vs deep vein arterialization. Returned to ED with worsening LLE gangrene with extension to 4th toe. HDS, no WBC, glucose >200. Now s/p LLE angiogram with popliteal and PT angioplasty on 12/15, recovering well with good signals in the foot.    Plan:  - Continue IV abx  - Local wound care per podiatry, planned for TMA and YESENIA today  - No further vascular intervention prior to planned podiatry surgery  - Monitor for fever/WBC elevation, 2-4th toe dry gangrene stable  - Pain control prn  - Remainder of care per primary    Discussed with vascular fellow on behalf of Dr. 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 Recently presented for 2/3rd toe gangrene with podiatry recommending toe resection vs TMA. Vascular was consulted and completed LLE angio showing PT runoff, no stent or balloon angioplasty. Vascular recommended possible bypass vs deep vein arterialization. Returned to ED with worsening LLE gangrene with extension to 4th toe. HDS, no WBC, glucose >200. Now s/p LLE angiogram with popliteal and PT angioplasty on 12/15, recovering well with good signals in the foot.    Plan:  - Continue IV abx  - Local wound care per podiatry, planned for TMA and YESENIA today  - No further vascular intervention prior to planned podiatry surgery  - Monitor for fever/WBC elevation, 2-4th toe dry gangrene stable  - Pain control prn  - Remainder of care per primary    Discussed with vascular fellow on behalf of Dr. Mullins    Vascular Surgery [id number]

## 2023-12-18 NOTE — DISCHARGE NOTE PROVIDER - HOSPITAL COURSE
HPI:   73 y/o M with PMHx of DM, HTN, PAD and PSHX of R BKA presents to ED at recommendation of outpatient podiatrist. Patient was recently admitted and being followed by vascular and podiatry for wounds to the L foot. Was noted to have dry gangrene of L 2nd/3rd toes. [place] due to extent of vascular disease, patient would not heal if amputation was done. Was told to f/u with vascular as an outpatient. Saw podiatry yesterday, due to gangrene extending to 4th L toe. Recommended to come in for abx and possible procedure with podiatry. Patient is endorsing L toe pain. 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   LLE gangrene with extension to 4th toe with OM  - L foot 2nd and 3rd digit dorsal full thickness gangrene to level of MTPJ, w/ wet conversion, plantar 2nd and 3rd digit dusky and early ischemic changes, mild serous drainage, mild malodor, early dusky changes of fourth metatarsal.  - L foot MRI with findings c/w septic arthritis and OM, no drainable abscess   - L foot abscess culture with moderate Serratia liquefaciens sensitives reviewed), Corynebacterium amycolatum, Finegoldia magna, also noted with Stenotrophomonas  - 12/15 s/p OR for LLE angiogram and angioplasty with vascular   - 12/19 s/p L foot I&D, s/p TMA   -OR culture NGTD. Discontinued pip-tazo given negative culture.  Patient can follow up with ID as outpatient within 1-2 weeks of dc  - Bcx NGTD x2    Patient is medically cleared and stable for discharge, dsicussed with .   - Stable for discharge from the podiatry standpoint.  /79, IVF dcd and lisinopril added, as per Dr Frias ok to discharge.    Important Medication Changes and Reason:  no changes      Active or Pending Issues Requiring Follow-up:  f/u pcp and podiatry       Advanced Directives:   [ x] Full code  [ ] DNR  [ ] Hospice    Discharge Diagnoses:  LLE gangrene        HPI:   73 y/o M with PMHx of DM, HTN, PAD and PSHX of R BKA presents to ED at recommendation of outpatient podiatrist. Patient was recently admitted and being followed by vascular and podiatry for wounds to the L foot. Was noted to have dry gangrene of L 2nd/3rd toes. [place] due to extent of vascular disease, patient would not heal if amputation was done. Was told to f/u with vascular as an outpatient. Saw podiatry yesterday, due to gangrene extending to 4th L toe. Recommended to come in for abx and possible procedure with podiatry. Patient is endorsing L toe pain. 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   LLE gangrene with extension to 4th toe with OM  - L foot 2nd and 3rd digit dorsal full thickness gangrene to level of MTPJ, w/ wet conversion, plantar 2nd and 3rd digit dusky and early ischemic changes, mild serous drainage, mild malodor, early dusky changes of fourth metatarsal.  - L foot MRI with findings c/w septic arthritis and OM, no drainable abscess   - L foot abscess culture with moderate Serratia liquefaciens sensitives reviewed), Corynebacterium amycolatum, Finegoldia magna, also noted with Stenotrophomonas  - 12/15 s/p OR for LLE angiogram and angioplasty with vascular   - 12/19 s/p L foot I&D, s/p TMA   -OR culture NGTD. Discontinued pip-tazo given negative culture.  Patient can follow up with ID as outpatient within 1-2 weeks of dc  - Bcx NGTD x2    Patient is medically cleared and stable for discharge, dsicussed with .   - Stable for discharge from the podiatry standpoint.  /79, IVF dcd and lisinopril added, as per Dr Frias ok to discharge.    Important Medication Changes and Reason:  no changes      Active or Pending Issues Requiring Follow-up:  f/u pcp and podiatry       Advanced Directives:   [ x] Full code  [ ] DNR  [ ] Hospice    Discharge Diagnoses:  LLE gangrene        HPI:   71 y/o M with PMHx of DM, HTN, PAD and PSHX of R BKA presents to ED at recommendation of outpatient podiatrist. Patient was recently admitted and being followed by vascular and podiatry for wounds to the L foot. Was noted to have dry gangrene of L 2nd/3rd toes. [place] due to extent of vascular disease, patient would not heal if amputation was done. Was told to f/u with vascular as an outpatient. Saw podiatry yesterday, due to gangrene extending to 4th L toe. Recommended to come in for abx and possible procedure with podiatry. Patient is endorsing L toe pain. 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   LLE gangrene with extension to 4th toe with OM  - L foot 2nd and 3rd digit dorsal full thickness gangrene to level of MTPJ, w/ wet conversion, plantar 2nd and 3rd digit dusky and early ischemic changes, mild serous drainage, mild malodor, early dusky changes of fourth metatarsal.  - L foot MRI with findings c/w septic arthritis and OM, no drainable abscess   - L foot abscess culture with moderate Serratia liquefaciens sensitives reviewed), Corynebacterium amycolatum, Finegoldia magna, also noted with Stenotrophomonas  - 12/15 s/p OR for LLE angiogram and angioplasty with vascular   - 12/19 s/p L foot I&D, s/p TMA   -OR culture NGTD. Discontinued pip-tazo given negative culture.  Patient can follow up with ID as outpatient within 1-2 weeks of dc  - Bcx NGTD x2    Patient is medically cleared and stable for discharge, discussed with .   - Stable for discharge from the podiatry standpoint.    Important Medication Changes and Reason:  percocet for 7 days and xarelto 2.5mg BID       Active or Pending Issues Requiring Follow-up:  f/u pcp, vascular, podiatry , and ID       Advanced Directives:   [ x] Full code  [ ] DNR  [ ] Hospice    Discharge Diagnoses:  LLE gangrene        HPI:   71 y/o M with PMHx of DM, HTN, PAD and PSHX of R BKA presents to ED at recommendation of outpatient podiatrist. Patient was recently admitted and being followed by vascular and podiatry for wounds to the L foot. Was noted to have dry gangrene of L 2nd/3rd toes. [place] due to extent of vascular disease, patient would not heal if amputation was done. Was told to f/u with vascular as an outpatient. Saw podiatry yesterday, due to gangrene extending to 4th L toe. Recommended to come in for abx and possible procedure with podiatry. Patient is endorsing L toe pain. 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   LLE gangrene with extension to 4th toe with OM  - L foot 2nd and 3rd digit dorsal full thickness gangrene to level of MTPJ, w/ wet conversion, plantar 2nd and 3rd digit dusky and early ischemic changes, mild serous drainage, mild malodor, early dusky changes of fourth metatarsal.  - L foot MRI with findings c/w septic arthritis and OM, no drainable abscess   - L foot abscess culture with moderate Serratia liquefaciens sensitives reviewed), Corynebacterium amycolatum, Finegoldia magna, also noted with Stenotrophomonas  - 12/15 s/p OR for LLE angiogram and angioplasty with vascular   - 12/19 s/p L foot I&D, s/p TMA   -OR culture NGTD. Discontinued pip-tazo given negative culture.  Patient can follow up with ID as outpatient within 1-2 weeks of dc  - Bcx NGTD x2    Patient is medically cleared and stable for discharge, discussed with .   - Stable for discharge from the podiatry standpoint.    Important Medication Changes and Reason:  percocet for 7 days and xarelto 2.5mg BID       Active or Pending Issues Requiring Follow-up:  f/u pcp, vascular, podiatry , and ID       Advanced Directives:   [ x] Full code  [ ] DNR  [ ] Hospice    Discharge Diagnoses:  LLE gangrene

## 2023-12-18 NOTE — PRE-ANESTHESIA EVALUATION ADULT - NSANTHOSAYNRD_GEN_A_CORE
No. DEBRA screening performed.  STOP BANG Legend: 0-2 = LOW Risk; 3-4 = INTERMEDIATE Risk; 5-8 = HIGH Risk
No. DEBRA screening performed.  STOP BANG Legend: 0-2 = LOW Risk; 3-4 = INTERMEDIATE Risk; 5-8 = HIGH Risk

## 2023-12-19 ENCOUNTER — APPOINTMENT (OUTPATIENT)
Dept: VASCULAR SURGERY | Facility: CLINIC | Age: 73
End: 2023-12-19

## 2023-12-19 LAB
ALBUMIN SERPL ELPH-MCNC: 3.5 G/DL — SIGNIFICANT CHANGE UP (ref 3.3–5)
ALBUMIN SERPL ELPH-MCNC: 3.5 G/DL — SIGNIFICANT CHANGE UP (ref 3.3–5)
ALP SERPL-CCNC: 69 U/L — SIGNIFICANT CHANGE UP (ref 40–120)
ALP SERPL-CCNC: 69 U/L — SIGNIFICANT CHANGE UP (ref 40–120)
ALT FLD-CCNC: 21 U/L — SIGNIFICANT CHANGE UP (ref 10–45)
ALT FLD-CCNC: 21 U/L — SIGNIFICANT CHANGE UP (ref 10–45)
ANION GAP SERPL CALC-SCNC: 10 MMOL/L — SIGNIFICANT CHANGE UP (ref 5–17)
ANION GAP SERPL CALC-SCNC: 10 MMOL/L — SIGNIFICANT CHANGE UP (ref 5–17)
ANION GAP SERPL CALC-SCNC: 21 MMOL/L — HIGH (ref 5–17)
ANION GAP SERPL CALC-SCNC: 21 MMOL/L — HIGH (ref 5–17)
ANION GAP SERPL CALC-SCNC: 7 MMOL/L — SIGNIFICANT CHANGE UP (ref 5–17)
ANION GAP SERPL CALC-SCNC: 7 MMOL/L — SIGNIFICANT CHANGE UP (ref 5–17)
AST SERPL-CCNC: 12 U/L — SIGNIFICANT CHANGE UP (ref 10–40)
AST SERPL-CCNC: 12 U/L — SIGNIFICANT CHANGE UP (ref 10–40)
BASOPHILS # BLD AUTO: 0.01 K/UL — SIGNIFICANT CHANGE UP (ref 0–0.2)
BASOPHILS # BLD AUTO: 0.01 K/UL — SIGNIFICANT CHANGE UP (ref 0–0.2)
BASOPHILS NFR BLD AUTO: 0.2 % — SIGNIFICANT CHANGE UP (ref 0–2)
BASOPHILS NFR BLD AUTO: 0.2 % — SIGNIFICANT CHANGE UP (ref 0–2)
BILIRUB SERPL-MCNC: 0.4 MG/DL — SIGNIFICANT CHANGE UP (ref 0.2–1.2)
BILIRUB SERPL-MCNC: 0.4 MG/DL — SIGNIFICANT CHANGE UP (ref 0.2–1.2)
BUN SERPL-MCNC: 13 MG/DL — SIGNIFICANT CHANGE UP (ref 7–23)
BUN SERPL-MCNC: 13 MG/DL — SIGNIFICANT CHANGE UP (ref 7–23)
BUN SERPL-MCNC: 15 MG/DL — SIGNIFICANT CHANGE UP (ref 7–23)
BUN SERPL-MCNC: 15 MG/DL — SIGNIFICANT CHANGE UP (ref 7–23)
BUN SERPL-MCNC: 21 MG/DL — SIGNIFICANT CHANGE UP (ref 7–23)
BUN SERPL-MCNC: 21 MG/DL — SIGNIFICANT CHANGE UP (ref 7–23)
CALCIUM SERPL-MCNC: 6.3 MG/DL — CRITICAL LOW (ref 8.4–10.5)
CALCIUM SERPL-MCNC: 6.3 MG/DL — CRITICAL LOW (ref 8.4–10.5)
CALCIUM SERPL-MCNC: 8.2 MG/DL — LOW (ref 8.4–10.5)
CALCIUM SERPL-MCNC: 8.2 MG/DL — LOW (ref 8.4–10.5)
CALCIUM SERPL-MCNC: 9.1 MG/DL — SIGNIFICANT CHANGE UP (ref 8.4–10.5)
CALCIUM SERPL-MCNC: 9.1 MG/DL — SIGNIFICANT CHANGE UP (ref 8.4–10.5)
CHLORIDE SERPL-SCNC: 102 MMOL/L — SIGNIFICANT CHANGE UP (ref 96–108)
CHLORIDE SERPL-SCNC: 102 MMOL/L — SIGNIFICANT CHANGE UP (ref 96–108)
CHLORIDE SERPL-SCNC: 115 MMOL/L — HIGH (ref 96–108)
CHLORIDE SERPL-SCNC: 115 MMOL/L — HIGH (ref 96–108)
CHLORIDE SERPL-SCNC: 88 MMOL/L — LOW (ref 96–108)
CHLORIDE SERPL-SCNC: 88 MMOL/L — LOW (ref 96–108)
CO2 SERPL-SCNC: 19 MMOL/L — LOW (ref 22–31)
CO2 SERPL-SCNC: 19 MMOL/L — LOW (ref 22–31)
CO2 SERPL-SCNC: 21 MMOL/L — LOW (ref 22–31)
CO2 SERPL-SCNC: 21 MMOL/L — LOW (ref 22–31)
CO2 SERPL-SCNC: 23 MMOL/L — SIGNIFICANT CHANGE UP (ref 22–31)
CO2 SERPL-SCNC: 23 MMOL/L — SIGNIFICANT CHANGE UP (ref 22–31)
CREAT SERPL-MCNC: 0.73 MG/DL — SIGNIFICANT CHANGE UP (ref 0.5–1.3)
CREAT SERPL-MCNC: 0.73 MG/DL — SIGNIFICANT CHANGE UP (ref 0.5–1.3)
CREAT SERPL-MCNC: 0.89 MG/DL — SIGNIFICANT CHANGE UP (ref 0.5–1.3)
CREAT SERPL-MCNC: 0.89 MG/DL — SIGNIFICANT CHANGE UP (ref 0.5–1.3)
CREAT SERPL-MCNC: 0.95 MG/DL — SIGNIFICANT CHANGE UP (ref 0.5–1.3)
CREAT SERPL-MCNC: 0.95 MG/DL — SIGNIFICANT CHANGE UP (ref 0.5–1.3)
EGFR: 85 ML/MIN/1.73M2 — SIGNIFICANT CHANGE UP
EGFR: 85 ML/MIN/1.73M2 — SIGNIFICANT CHANGE UP
EGFR: 91 ML/MIN/1.73M2 — SIGNIFICANT CHANGE UP
EGFR: 91 ML/MIN/1.73M2 — SIGNIFICANT CHANGE UP
EGFR: 97 ML/MIN/1.73M2 — SIGNIFICANT CHANGE UP
EGFR: 97 ML/MIN/1.73M2 — SIGNIFICANT CHANGE UP
EOSINOPHIL # BLD AUTO: 0.13 K/UL — SIGNIFICANT CHANGE UP (ref 0–0.5)
EOSINOPHIL # BLD AUTO: 0.13 K/UL — SIGNIFICANT CHANGE UP (ref 0–0.5)
EOSINOPHIL NFR BLD AUTO: 2 % — SIGNIFICANT CHANGE UP (ref 0–6)
EOSINOPHIL NFR BLD AUTO: 2 % — SIGNIFICANT CHANGE UP (ref 0–6)
GLUCOSE BLDC GLUCOMTR-MCNC: 171 MG/DL — HIGH (ref 70–99)
GLUCOSE BLDC GLUCOMTR-MCNC: 171 MG/DL — HIGH (ref 70–99)
GLUCOSE BLDC GLUCOMTR-MCNC: 214 MG/DL — HIGH (ref 70–99)
GLUCOSE BLDC GLUCOMTR-MCNC: 214 MG/DL — HIGH (ref 70–99)
GLUCOSE BLDC GLUCOMTR-MCNC: 217 MG/DL — HIGH (ref 70–99)
GLUCOSE BLDC GLUCOMTR-MCNC: 217 MG/DL — HIGH (ref 70–99)
GLUCOSE BLDC GLUCOMTR-MCNC: 255 MG/DL — HIGH (ref 70–99)
GLUCOSE BLDC GLUCOMTR-MCNC: 255 MG/DL — HIGH (ref 70–99)
GLUCOSE BLDC GLUCOMTR-MCNC: 314 MG/DL — HIGH (ref 70–99)
GLUCOSE BLDC GLUCOMTR-MCNC: 314 MG/DL — HIGH (ref 70–99)
GLUCOSE SERPL-MCNC: 136 MG/DL — HIGH (ref 70–99)
GLUCOSE SERPL-MCNC: 136 MG/DL — HIGH (ref 70–99)
GLUCOSE SERPL-MCNC: 180 MG/DL — HIGH (ref 70–99)
GLUCOSE SERPL-MCNC: 180 MG/DL — HIGH (ref 70–99)
GLUCOSE SERPL-MCNC: 639 MG/DL — CRITICAL HIGH (ref 70–99)
GLUCOSE SERPL-MCNC: 639 MG/DL — CRITICAL HIGH (ref 70–99)
HCT VFR BLD CALC: 26 % — LOW (ref 39–50)
HCT VFR BLD CALC: 26 % — LOW (ref 39–50)
HGB BLD-MCNC: 8.3 G/DL — LOW (ref 13–17)
HGB BLD-MCNC: 8.3 G/DL — LOW (ref 13–17)
IMM GRANULOCYTES NFR BLD AUTO: 0.3 % — SIGNIFICANT CHANGE UP (ref 0–0.9)
IMM GRANULOCYTES NFR BLD AUTO: 0.3 % — SIGNIFICANT CHANGE UP (ref 0–0.9)
LYMPHOCYTES # BLD AUTO: 1.39 K/UL — SIGNIFICANT CHANGE UP (ref 1–3.3)
LYMPHOCYTES # BLD AUTO: 1.39 K/UL — SIGNIFICANT CHANGE UP (ref 1–3.3)
LYMPHOCYTES # BLD AUTO: 20.9 % — SIGNIFICANT CHANGE UP (ref 13–44)
LYMPHOCYTES # BLD AUTO: 20.9 % — SIGNIFICANT CHANGE UP (ref 13–44)
MAGNESIUM SERPL-MCNC: 1.8 MG/DL — SIGNIFICANT CHANGE UP (ref 1.6–2.6)
MAGNESIUM SERPL-MCNC: 1.8 MG/DL — SIGNIFICANT CHANGE UP (ref 1.6–2.6)
MCHC RBC-ENTMCNC: 28.4 PG — SIGNIFICANT CHANGE UP (ref 27–34)
MCHC RBC-ENTMCNC: 28.4 PG — SIGNIFICANT CHANGE UP (ref 27–34)
MCHC RBC-ENTMCNC: 31.9 GM/DL — LOW (ref 32–36)
MCHC RBC-ENTMCNC: 31.9 GM/DL — LOW (ref 32–36)
MCV RBC AUTO: 89 FL — SIGNIFICANT CHANGE UP (ref 80–100)
MCV RBC AUTO: 89 FL — SIGNIFICANT CHANGE UP (ref 80–100)
MONOCYTES # BLD AUTO: 0.59 K/UL — SIGNIFICANT CHANGE UP (ref 0–0.9)
MONOCYTES # BLD AUTO: 0.59 K/UL — SIGNIFICANT CHANGE UP (ref 0–0.9)
MONOCYTES NFR BLD AUTO: 8.9 % — SIGNIFICANT CHANGE UP (ref 2–14)
MONOCYTES NFR BLD AUTO: 8.9 % — SIGNIFICANT CHANGE UP (ref 2–14)
NEUTROPHILS # BLD AUTO: 4.51 K/UL — SIGNIFICANT CHANGE UP (ref 1.8–7.4)
NEUTROPHILS # BLD AUTO: 4.51 K/UL — SIGNIFICANT CHANGE UP (ref 1.8–7.4)
NEUTROPHILS NFR BLD AUTO: 67.7 % — SIGNIFICANT CHANGE UP (ref 43–77)
NEUTROPHILS NFR BLD AUTO: 67.7 % — SIGNIFICANT CHANGE UP (ref 43–77)
NRBC # BLD: 0 /100 WBCS — SIGNIFICANT CHANGE UP (ref 0–0)
NRBC # BLD: 0 /100 WBCS — SIGNIFICANT CHANGE UP (ref 0–0)
PHOSPHATE SERPL-MCNC: 2.7 MG/DL — SIGNIFICANT CHANGE UP (ref 2.5–4.5)
PHOSPHATE SERPL-MCNC: 2.7 MG/DL — SIGNIFICANT CHANGE UP (ref 2.5–4.5)
PLATELET # BLD AUTO: 181 K/UL — SIGNIFICANT CHANGE UP (ref 150–400)
PLATELET # BLD AUTO: 181 K/UL — SIGNIFICANT CHANGE UP (ref 150–400)
POTASSIUM SERPL-MCNC: 2.9 MMOL/L — CRITICAL LOW (ref 3.5–5.3)
POTASSIUM SERPL-MCNC: 2.9 MMOL/L — CRITICAL LOW (ref 3.5–5.3)
POTASSIUM SERPL-MCNC: 3.5 MMOL/L — SIGNIFICANT CHANGE UP (ref 3.5–5.3)
POTASSIUM SERPL-MCNC: 3.5 MMOL/L — SIGNIFICANT CHANGE UP (ref 3.5–5.3)
POTASSIUM SERPL-MCNC: 3.8 MMOL/L — SIGNIFICANT CHANGE UP (ref 3.5–5.3)
POTASSIUM SERPL-MCNC: 3.8 MMOL/L — SIGNIFICANT CHANGE UP (ref 3.5–5.3)
POTASSIUM SERPL-SCNC: 2.9 MMOL/L — CRITICAL LOW (ref 3.5–5.3)
POTASSIUM SERPL-SCNC: 2.9 MMOL/L — CRITICAL LOW (ref 3.5–5.3)
POTASSIUM SERPL-SCNC: 3.5 MMOL/L — SIGNIFICANT CHANGE UP (ref 3.5–5.3)
POTASSIUM SERPL-SCNC: 3.5 MMOL/L — SIGNIFICANT CHANGE UP (ref 3.5–5.3)
POTASSIUM SERPL-SCNC: 3.8 MMOL/L — SIGNIFICANT CHANGE UP (ref 3.5–5.3)
POTASSIUM SERPL-SCNC: 3.8 MMOL/L — SIGNIFICANT CHANGE UP (ref 3.5–5.3)
PROT SERPL-MCNC: 7.2 G/DL — SIGNIFICANT CHANGE UP (ref 6–8.3)
PROT SERPL-MCNC: 7.2 G/DL — SIGNIFICANT CHANGE UP (ref 6–8.3)
RBC # BLD: 2.92 M/UL — LOW (ref 4.2–5.8)
RBC # BLD: 2.92 M/UL — LOW (ref 4.2–5.8)
RBC # FLD: 12.7 % — SIGNIFICANT CHANGE UP (ref 10.3–14.5)
RBC # FLD: 12.7 % — SIGNIFICANT CHANGE UP (ref 10.3–14.5)
SODIUM SERPL-SCNC: 130 MMOL/L — LOW (ref 135–145)
SODIUM SERPL-SCNC: 130 MMOL/L — LOW (ref 135–145)
SODIUM SERPL-SCNC: 135 MMOL/L — SIGNIFICANT CHANGE UP (ref 135–145)
SODIUM SERPL-SCNC: 135 MMOL/L — SIGNIFICANT CHANGE UP (ref 135–145)
SODIUM SERPL-SCNC: 141 MMOL/L — SIGNIFICANT CHANGE UP (ref 135–145)
SODIUM SERPL-SCNC: 141 MMOL/L — SIGNIFICANT CHANGE UP (ref 135–145)
WBC # BLD: 6.65 K/UL — SIGNIFICANT CHANGE UP (ref 3.8–10.5)
WBC # BLD: 6.65 K/UL — SIGNIFICANT CHANGE UP (ref 3.8–10.5)
WBC # FLD AUTO: 6.65 K/UL — SIGNIFICANT CHANGE UP (ref 3.8–10.5)
WBC # FLD AUTO: 6.65 K/UL — SIGNIFICANT CHANGE UP (ref 3.8–10.5)

## 2023-12-19 PROCEDURE — 99232 SBSQ HOSP IP/OBS MODERATE 35: CPT

## 2023-12-19 RX ORDER — INSULIN LISPRO 100/ML
8 VIAL (ML) SUBCUTANEOUS ONCE
Refills: 0 | Status: COMPLETED | OUTPATIENT
Start: 2023-12-19 | End: 2023-12-19

## 2023-12-19 RX ADMIN — PIPERACILLIN AND TAZOBACTAM 25 GRAM(S): 4; .5 INJECTION, POWDER, LYOPHILIZED, FOR SOLUTION INTRAVENOUS at 22:10

## 2023-12-19 RX ADMIN — RIVAROXABAN 2.5 MILLIGRAM(S): KIT at 05:21

## 2023-12-19 RX ADMIN — Medication 8 UNIT(S): at 11:46

## 2023-12-19 RX ADMIN — AMLODIPINE BESYLATE 2.5 MILLIGRAM(S): 2.5 TABLET ORAL at 05:21

## 2023-12-19 RX ADMIN — HYDROMORPHONE HYDROCHLORIDE 0.5 MILLIGRAM(S): 2 INJECTION INTRAMUSCULAR; INTRAVENOUS; SUBCUTANEOUS at 17:58

## 2023-12-19 RX ADMIN — HYDROMORPHONE HYDROCHLORIDE 0.5 MILLIGRAM(S): 2 INJECTION INTRAMUSCULAR; INTRAVENOUS; SUBCUTANEOUS at 02:23

## 2023-12-19 RX ADMIN — Medication 81 MILLIGRAM(S): at 11:47

## 2023-12-19 RX ADMIN — HYDROMORPHONE HYDROCHLORIDE 0.5 MILLIGRAM(S): 2 INJECTION INTRAMUSCULAR; INTRAVENOUS; SUBCUTANEOUS at 07:09

## 2023-12-19 RX ADMIN — Medication 2: at 17:13

## 2023-12-19 RX ADMIN — PIPERACILLIN AND TAZOBACTAM 25 GRAM(S): 4; .5 INJECTION, POWDER, LYOPHILIZED, FOR SOLUTION INTRAVENOUS at 14:35

## 2023-12-19 RX ADMIN — HYDROMORPHONE HYDROCHLORIDE 0.5 MILLIGRAM(S): 2 INJECTION INTRAMUSCULAR; INTRAVENOUS; SUBCUTANEOUS at 23:07

## 2023-12-19 RX ADMIN — HYDROMORPHONE HYDROCHLORIDE 0.5 MILLIGRAM(S): 2 INJECTION INTRAMUSCULAR; INTRAVENOUS; SUBCUTANEOUS at 17:43

## 2023-12-19 RX ADMIN — ATORVASTATIN CALCIUM 40 MILLIGRAM(S): 80 TABLET, FILM COATED ORAL at 22:10

## 2023-12-19 RX ADMIN — RIVAROXABAN 2.5 MILLIGRAM(S): KIT at 17:16

## 2023-12-19 RX ADMIN — HYDROMORPHONE HYDROCHLORIDE 0.5 MILLIGRAM(S): 2 INJECTION INTRAMUSCULAR; INTRAVENOUS; SUBCUTANEOUS at 13:04

## 2023-12-19 RX ADMIN — Medication 3: at 13:04

## 2023-12-19 RX ADMIN — HYDROMORPHONE HYDROCHLORIDE 0.5 MILLIGRAM(S): 2 INJECTION INTRAMUSCULAR; INTRAVENOUS; SUBCUTANEOUS at 13:19

## 2023-12-19 RX ADMIN — Medication 2: at 09:09

## 2023-12-19 RX ADMIN — CHLORHEXIDINE GLUCONATE 1 APPLICATION(S): 213 SOLUTION TOPICAL at 11:46

## 2023-12-19 RX ADMIN — HYDROMORPHONE HYDROCHLORIDE 0.5 MILLIGRAM(S): 2 INJECTION INTRAMUSCULAR; INTRAVENOUS; SUBCUTANEOUS at 03:00

## 2023-12-19 RX ADMIN — Medication 1: at 21:47

## 2023-12-19 RX ADMIN — HYDROMORPHONE HYDROCHLORIDE 0.5 MILLIGRAM(S): 2 INJECTION INTRAMUSCULAR; INTRAVENOUS; SUBCUTANEOUS at 07:25

## 2023-12-19 RX ADMIN — PIPERACILLIN AND TAZOBACTAM 25 GRAM(S): 4; .5 INJECTION, POWDER, LYOPHILIZED, FOR SOLUTION INTRAVENOUS at 05:21

## 2023-12-19 NOTE — PHYSICAL THERAPY INITIAL EVALUATION ADULT - ACTIVE RANGE OF MOTION EXAMINATION, REHAB EVAL
except L foot TMA 12/18 in cam boot/bilateral upper extremity Active ROM was WFL (within functional limits)/bilateral  lower extremity Active ROM was WFL (within functional limits)

## 2023-12-19 NOTE — PROGRESS NOTE ADULT - SUBJECTIVE AND OBJECTIVE BOX
[facility] DIVISION OF INFECTIOUS DISEASES  ERVIN Florentino Y. Patel, S. Shah, G. [facility]  [phone]  ([phone] - weekdays after 5pm and weekends)    Name: ALEE DNUN  Age/Gender: 72y Male  MRN: [id number]    Interval History:  Patient seen and examined this morning.   Complains of mild L foot pain.   No other new complaints noted.  Notes reviewed  No concerning overnight events  Afebrile   Allergies: No Known Allergies      Objective:  Vitals:   T(F): 97.9 (12-19-23 @ 04:37), Max: 97.9 (12-19-23 @ 04:37)  HR: 80 (12-19-23 @ 09:00) (68 - 80)  BP: 176/77 (12-19-23 @ 09:00) (168/77 - 179/76)  RR: 18 (12-19-23 @ 09:00) (18 - 18)  SpO2: 97% (12-19-23 @ 09:00) (97% - 97%)  Physical Examination:  General: no acute distress  HEENT: NC/AT, anicteric, neck supple  Respiratory: no acc muscle use, breathing comfortably  Cardiovascular: S1 and S2 present  Gastrointestinal: normal appearing, nondistended  Extremities: L foot dressing, R BKA  Skin: no visible rash    Laboratory Studies:  CBC:                       8.3    6.65  )-----------( 181      ( 19 Dec 2023 07:17 )             26.0     WBC Trend:  6.65 12-19-23 @ 07:17  6.32 12-18-23 @ 06:49  7.67 12-16-23 @ 07:23  6.77 12-15-23 @ 05:34  6.65 12-14-23 @ 07:10  5.84 12-13-23 @ 07:29    CMP: 12-19    130<L>  |  88<L>  |  15  ----------------------------<  639<HH>  3.5   |  21<L>  |  0.89    Ca    8.2<L>      19 Dec 2023 09:56  Phos  2.7     12-19  Mg     1.8     12-19    TPro  7.2  /  Alb  3.5  /  TBili  0.4  /  DBili  x   /  AST  12  /  ALT  21  /  AlkPhos  69  12-19    Creatinine: 0.89 mg/dL (12-19-23 @ 09:56)  Creatinine: 0.73 mg/dL (12-19-23 @ 07:12)  Creatinine: 1.14 mg/dL (12-18-23 @ 06:47)  Creatinine: 1.09 mg/dL (12-16-23 @ 07:22)  Creatinine: 1.06 mg/dL (12-15-23 @ 05:34)  Creatinine: 1.07 mg/dL (12-15-23 @ 04:25)  Creatinine: 0.99 mg/dL (12-14-23 @ 07:12)  Creatinine: 1.01 mg/dL (12-13-23 @ 07:27)    LIVER FUNCTIONS - ( 19 Dec 2023 09:56 )  Alb: 3.5 g/dL / Pro: 7.2 g/dL / ALK PHOS: 69 U/L / ALT: 21 U/L / AST: 12 U/L / GGT: x           Microbiology: reviewed   Culture - Tissue with Gram Stain (collected 12-18-23 @ 11:33)  Source: Bone 2nd lt meterasel clean bone culture  Gram Stain (12-18-23 @ 23:43):    No polymorphonuclear leukocytes per low power field    No organisms seen per oil power field    Culture - Abscess with Gram Stain (collected 12-09-23 @ 10:45)  Source: .Abscess left foot  Final Report (12-16-23 @ 14:31):    Moderate Serratia liquefaciens    Numerous Corynebacterium amycolatum "Susceptibilities not performed"    Moderate Finegoldia magna "Susceptibilities not performed"    Few Stenotrophomonas maltophilia  Organism: Serratia liquefaciens  Stenotrophomonas maltophilia (12-16-23 @ 14:31)  Organism: Stenotrophomonas maltophilia (12-16-23 @ 14:30)      Method Type: AMI      -  Levofloxacin: S <=0.5      -  Trimethoprim/Sulfamethoxazole: S <=0.5/9.5  Organism: Serratia liquefaciens (12-14-23 @ 16:47)      Method Type: CarbaR      -  Resistance Gene to Carbapenem: Nondet  Organism: Serratia liquefaciens (12-14-23 @ 16:47)      Method Type: AMI      -  Amoxicillin/Clavulanic Acid: R >16/8      -  Ampicillin: R >16 These ampicillin results predict results for amoxicillin      -  Ampicillin/Sulbactam: I 16/8      -  Aztreonam: R >16      -  Cefazolin: R >16      -  Cefepime: R >16      -  Cefoxitin: R >16      -  Ceftolozane/tazobactam: S <=2      -  Ceftriaxone: R >32      -  Ciprofloxacin: R 1      -  Ertapenem: R >1      -  Gentamicin: R 8      -  Levofloxacin: S <=0.5      -  Meropenem: R >8      -  Piperacillin/Tazobactam: S <=8      -  Tobramycin: R 8      -  Trimethoprim/Sulfamethoxazole: S <=0.5/9.5    Culture - Blood (collected 12-09-23 @ 09:05)  Source: .Blood Blood-Peripheral  Final Report (12-14-23 @ 14:01):    No growth at 5 days    Culture - Blood (collected 12-09-23 @ 09:00)  Source: .Blood Blood-Peripheral  Final Report (12-14-23 @ 14:01):    No growth at 5 days    Radiology: reviewed     Medications:  acetaminophen     Tablet .. 650 milliGRAM(s) Oral every 6 hours PRN  amLODIPine   Tablet 2.5 milliGRAM(s) Oral daily  aspirin enteric coated 81 milliGRAM(s) Oral daily  atorvastatin 40 milliGRAM(s) Oral at bedtime  chlorhexidine 2% Cloths 1 Application(s) Topical daily  dextrose 5%. 1000 milliLiter(s) IV Continuous <Continuous>  dextrose 5%. 1000 milliLiter(s) IV Continuous <Continuous>  glucagon  Injectable 1 milliGRAM(s) IntraMuscular once  HYDROmorphone  Injectable 0.5 milliGRAM(s) IV Push every 4 hours PRN  insulin lispro (ADMELOG) corrective regimen sliding scale   SubCutaneous Before meals and at bedtime  oxycodone    5 mG/acetaminophen 325 mG 1 Tablet(s) Oral every 4 hours PRN  piperacillin/tazobactam IVPB.. 3.375 Gram(s) IV Intermittent every 8 hours  rivaroxaban 2.5 milliGRAM(s) Oral two times a day  sodium chloride 0.9%. 1000 milliLiter(s) IV Continuous <Continuous>    Current Antimicrobials:  piperacillin/tazobactam IVPB.. 3.375 Gram(s) IV Intermittent every 8 hours    Prior/Completed Antimicrobials:  piperacillin/tazobactam IVPB...  vancomycin  IVPB.   [facility] DIV[facility] OF INFECTIOUS DISEASES  ERVIN Florentino Y. Patel, S. Shah, G. [facility]  [phone]  ([phone] - weekdays after 5pm and weekends)    Name: ALEE DUNN  Age/Gender: 72y Male  MRN: [id number]    Interval History:  Patient seen and examined this morning.   Complains of mild L foot pain.   No other new complaints noted.  Notes reviewed  No concerning overnight events  Afebrile   Allergies: No Known Allergies      Objective:  Vitals:   T(F): 97.9 (12-19-23 @ 04:37), Max: 97.9 (12-19-23 @ 04:37)  HR: 80 (12-19-23 @ 09:00) (68 - 80)  BP: 176/77 (12-19-23 @ 09:00) (168/77 - 179/76)  RR: 18 (12-19-23 @ 09:00) (18 - 18)  SpO2: 97% (12-19-23 @ 09:00) (97% - 97%)  Physical Examination:  General: no acute distress  HEENT: NC/AT, anicteric, neck supple  Respiratory: no acc muscle use, breathing comfortably  Cardiovascular: S1 and S2 present  Gastrointestinal: normal appearing, nondistended  Extremities: L foot dressing, R BKA  Skin: no visible rash    Laboratory Studies:  CBC:                       8.3    6.65  )-----------( 181      ( 19 Dec 2023 07:17 )             26.0     WBC Trend:  6.65 12-19-23 @ 07:17  6.32 12-18-23 @ 06:49  7.67 12-16-23 @ 07:23  6.77 12-15-23 @ 05:34  6.65 12-14-23 @ 07:10  5.84 12-13-23 @ 07:29    CMP: 12-19    130<L>  |  88<L>  |  15  ----------------------------<  639<HH>  3.5   |  21<L>  |  0.89    Ca    8.2<L>      19 Dec 2023 09:56  Phos  2.7     12-19  Mg     1.8     12-19    TPro  7.2  /  Alb  3.5  /  TBili  0.4  /  DBili  x   /  AST  12  /  ALT  21  /  AlkPhos  69  12-19    Creatinine: 0.89 mg/dL (12-19-23 @ 09:56)  Creatinine: 0.73 mg/dL (12-19-23 @ 07:12)  Creatinine: 1.14 mg/dL (12-18-23 @ 06:47)  Creatinine: 1.09 mg/dL (12-16-23 @ 07:22)  Creatinine: 1.06 mg/dL (12-15-23 @ 05:34)  Creatinine: 1.07 mg/dL (12-15-23 @ 04:25)  Creatinine: 0.99 mg/dL (12-14-23 @ 07:12)  Creatinine: 1.01 mg/dL (12-13-23 @ 07:27)    LIVER FUNCTIONS - ( 19 Dec 2023 09:56 )  Alb: 3.5 g/dL / Pro: 7.2 g/dL / ALK PHOS: 69 U/L / ALT: 21 U/L / AST: 12 U/L / GGT: x           Microbiology: reviewed   Culture - Tissue with Gram Stain (collected 12-18-23 @ 11:33)  Source: Bone 2nd lt meterasel clean bone culture  Gram Stain (12-18-23 @ 23:43):    No polymorphonuclear leukocytes per low power field    No organisms seen per oil power field    Culture - Abscess with Gram Stain (collected 12-09-23 @ 10:45)  Source: .Abscess left foot  Final Report (12-16-23 @ 14:31):    Moderate Serratia liquefaciens    Numerous Corynebacterium amycolatum "Susceptibilities not performed"    Moderate Finegoldia magna "Susceptibilities not performed"    Few Stenotrophomonas maltophilia  Organism: Serratia liquefaciens  Stenotrophomonas maltophilia (12-16-23 @ 14:31)  Organism: Stenotrophomonas maltophilia (12-16-23 @ 14:30)      Method Type: AMI      -  Levofloxacin: S <=0.5      -  Trimethoprim/Sulfamethoxazole: S <=0.5/9.5  Organism: Serratia liquefaciens (12-14-23 @ 16:47)      Method Type: CarbaR      -  Resistance Gene to Carbapenem: Nondet  Organism: Serratia liquefaciens (12-14-23 @ 16:47)      Method Type: AMI      -  Amoxicillin/Clavulanic Acid: R >16/8      -  Ampicillin: R >16 These ampicillin results predict results for amoxicillin      -  Ampicillin/Sulbactam: I 16/8      -  Aztreonam: R >16      -  Cefazolin: R >16      -  Cefepime: R >16      -  Cefoxitin: R >16      -  Ceftolozane/tazobactam: S <=2      -  Ceftriaxone: R >32      -  Ciprofloxacin: R 1      -  Ertapenem: R >1      -  Gentamicin: R 8      -  Levofloxacin: S <=0.5      -  Meropenem: R >8      -  Piperacillin/Tazobactam: S <=8      -  Tobramycin: R 8      -  Trimethoprim/Sulfamethoxazole: S <=0.5/9.5    Culture - Blood (collected 12-09-23 @ 09:05)  Source: .Blood Blood-Peripheral  Final Report (12-14-23 @ 14:01):    No growth at 5 days    Culture - Blood (collected 12-09-23 @ 09:00)  Source: .Blood Blood-Peripheral  Final Report (12-14-23 @ 14:01):    No growth at 5 days    Radiology: reviewed     Medications:  acetaminophen     Tablet .. 650 milliGRAM(s) Oral every 6 hours PRN  amLODIPine   Tablet 2.5 milliGRAM(s) Oral daily  aspirin enteric coated 81 milliGRAM(s) Oral daily  atorvastatin 40 milliGRAM(s) Oral at bedtime  chlorhexidine 2% Cloths 1 Application(s) Topical daily  dextrose 5%. 1000 milliLiter(s) IV Continuous <Continuous>  dextrose 5%. 1000 milliLiter(s) IV Continuous <Continuous>  glucagon  Injectable 1 milliGRAM(s) IntraMuscular once  HYDROmorphone  Injectable 0.5 milliGRAM(s) IV Push every 4 hours PRN  insulin lispro (ADMELOG) corrective regimen sliding scale   SubCutaneous Before meals and at bedtime  oxycodone    5 mG/acetaminophen 325 mG 1 Tablet(s) Oral every 4 hours PRN  piperacillin/tazobactam IVPB.. 3.375 Gram(s) IV Intermittent every 8 hours  rivaroxaban 2.5 milliGRAM(s) Oral two times a day  sodium chloride 0.9%. 1000 milliLiter(s) IV Continuous <Continuous>    Current Antimicrobials:  piperacillin/tazobactam IVPB.. 3.375 Gram(s) IV Intermittent every 8 hours    Prior/Completed Antimicrobials:  piperacillin/tazobactam IVPB...  vancomycin  IVPB.

## 2023-12-19 NOTE — PHYSICAL THERAPY INITIAL EVALUATION ADULT - BALANCE TRAINING, PT EVAL
GOAL: pt will inbcrease balance a half a grade in  2 weeks a full grade in 4 weeks GOAL: pt will increase balance a half a grade in  2 weeks a full grade in 4 weeks

## 2023-12-19 NOTE — PHYSICAL THERAPY INITIAL EVALUATION ADULT - PERTINENT HX OF CURRENT PROBLEM, REHAB EVAL
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 Recently presented for 2/3rd toe gangrene with podiatry recommending toe resection vs TMA. Vascular was consulted and completed LLE angio showing PT runoff, no stent or balloon angioplasty. Vascular recommended possible bypass vs deep vein arterialization. Returned to ED with worsening LLE gangrene with extension to 4th toe. HDS, no WBC, glucose >200. Now s/p LLE angiogram with popliteal and PT angioplasty on 12/15. 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 Recently presented for 2/3rd toe gangrene with podiatry recommending toe resection vs TMA. Vascular was consulted and completed LLE angio showing PT runoff, no stent or balloon angioplasty. Vascular recommended possible bypass vs deep vein arterialization. Returned to ED with worsening LLE gangrene with extension to 4th toe. HDS, no WBC, glucose >200. Now s/p LLE angiogram with popliteal and PT angioplasty on 12/15. S/p LLE TMA w/ pods on 12/18/23 w/ YESENIA [ Doppler B Le's 11/27/23 (-) ; L foot abscess Cx + Serratia liquefacien ; MR FOOT L 12/10/23 septic arthritis and OM ; cxr (-) on 12/11/23 ; xray L foot 12/18/23 soft tissue swell over distal aspect foot , mild retrocalcaneal enthesopathy , no dislocation }  ;

## 2023-12-19 NOTE — PHYSICAL THERAPY INITIAL EVALUATION ADULT - IMPAIRMENTS CONTRIBUTING IMPAIRED BED MOBILITY, REHAB EVAL
pt sat several min on EOB feels good per pt no cc's pain , pt place on R prosthesis mostly on own min assist intermittent ;

## 2023-12-19 NOTE — PROGRESS NOTE ADULT - ASSESSMENT
12/18 s/p Left foot transmetatarsal amputation w/ tendoachilles lengthening, closed   - Pt seen and evaluated  - Afebrile, no leukocytosis   - 12/18 s/p Left foot transmetatarsal amputation w/ tendoachilles lengthening, closed, sutures intact, skin well coapted, no hematoma expressed, mild lynne-wound erythema within normal post-op course, no pus or drainage noted, skin flap appears warm, viable with normal cap refill time.   - Low concern for residual bone infection, bone was hard at level of resection and appeared vitalized intra-op  - High concern for viability    - OR cultures and pathology, pending   - Vascular recommendations, appreciated   - Pod plan d/c pending clean bone margins x48 hours and final vascular recommendations   - Discussed with attending   72M s/p left foot transmetatarsal amputation w/ tendoachilles lengthening, closed (DOS 12/18/23).  - Pt seen and evaluated.  - Afebrile, no leukocytosis.  - 12/18 s/p left foot transmetatarsal amputation w/ tendoachilles lengthening, closed: sutures intact, flaps warm and viable, no hematoma expressed, no acute signs of infection.   - Intraop Findings: Low concern for residual bone infection, bone was hard at level of resection and appeared vitalized intra-op, high concern for viability.  - Left Foot Clean Bone Margin Culture: No growth (prelim).  - Vascular recs, appreciated.  - Do not recommend nitroglycerin paste to the surgical site at this time.  - Stable for discharge from the podiatry standpoint [facility]  - Wound care instructions and followup information listed in the discharge provider note.  - Seen with attending.   72M s/p left foot transmetatarsal amputation w/ tendoachilles lengthening, closed (DOS 12/18/23).  - Pt seen and evaluated.  - Afebrile, no leukocytosis.  - 12/18 s/p left foot transmetatarsal amputation w/ tendoachilles lengthening, closed: sutures intact, flaps warm and viable, no hematoma expressed, no acute signs of infection.   - Intraop Findings: Low concern for residual bone infection, bone was hard at level of resection and appeared vitalized intra-op, high concern for viability.  - Left Foot Clean Bone Margin Culture: No growth (prelim).  - Vascular recs, appreciated.  - Do not recommend nitroglycerin paste to the surgical site at this time.  - Stable for discharge from the podiatry standpoint [facility]  - Wound care instructions and followup information listed in the discharge provider note.  - Seen with attending.

## 2023-12-19 NOTE — PHYSICAL THERAPY INITIAL EVALUATION ADULT - NSPTDMEREC_GEN_A_CORE
pt owns a rolling walker , std cane , ramp to enter home , walk in shower with grab bars and shower seat , w/c , r LE prosthesis

## 2023-12-19 NOTE — PROGRESS NOTE ADULT - ASSESSMENT
73 y/o M with PMHx of DM, HTN, PAD and PSHX of R BKA presents to ED at recommendation of outpatient podiatrist.     Left 4 th toe infection s/p TMA     D/C planning if Bone cx negative   PAD   Zosyn    Left Foot MR: OM of 2nd middle phalanx, OM of 2nd proximal phalanx, OM of  third proximal phalanx, OM of fourth proximal interphalangeal joint, OM of base of the fourth proximal phalanx, OM of fifth proximal interphalangeal joint.  s/p Angiogram with angioplasty     TMA Patient medically optimized for the procedure   - Vasc recs, appreciated.  - L foot abscess culture with moderate Serratia liquefaciens   - Bcx NGTD x2   - Podiatry and Vascular surgery following, recs noted   - afebrile, WBC wnl       DM HISS   A1C 7.2    HTN Home meds

## 2023-12-19 NOTE — PHYSICAL THERAPY INITIAL EVALUATION ADULT - IMPAIRED TRANSFERS: SIT/STAND, REHAB EVAL
Prophylactic measure decrease endurance , max assist to barely clear butock off bed x 3 trials at rolling walker/impaired balance/impaired postural control/decreased sensation/impaired sensory feedback/decreased strength HTN (hypertension) T2DM (type 2 diabetes mellitus)

## 2023-12-19 NOTE — PROGRESS NOTE ADULT - ATTENDING COMMENTS
would consider nitropaste to posterior flap if okay with podiatry to aid in vasodilation in the acute wound healing phase  severe small vessel disease in the left foot  high risk for proximal amputation  will follow closely

## 2023-12-19 NOTE — PROGRESS NOTE ADULT - SUBJECTIVE AND OBJECTIVE BOX
Surgery Progress Note    S: NAEO; pt is s/p TMA amputation w/ pods    O:  Physical Exam:  General Appearance: Appears well, NAD   Chest: Nonlabored breathing on RA  CV: RRR  Extremities: Grossly symmetric, SCD's in place   Vascular: LLE wrapped in ace, boot in place. No strikethrough.       Vital Signs Last 24 Hrs  T(C): 36.6 (19 Dec 2023 04:37), Max: 36.6 (19 Dec 2023 04:37)  T(F): 97.9 (19 Dec 2023 04:37), Max: 97.9 (19 Dec 2023 04:37)  HR: 77 (19 Dec 2023 04:37) (63 - 77)  BP: 179/76 (19 Dec 2023 04:37) (144/65 - 179/76)  BP(mean): 118 (18 Dec 2023 10:15) (93 - 118)  RR: 18 (19 Dec 2023 04:37) (16 - 18)  SpO2: 97% (19 Dec 2023 04:37) (97% - 100%)    Parameters below as of 19 Dec 2023 04:37  Patient On (Oxygen Delivery Method): room air        I&O's Detail    18 Dec 2023 07:01  -  19 Dec 2023 07:00  --------------------------------------------------------  IN:    Oral Fluid: 240 mL  Total IN: 240 mL    OUT:    Voided (mL): 800 mL  Total OUT: 800 mL    Total NET: -560 mL                                9.8    6.32  )-----------( 218      ( 18 Dec 2023 06:49 )             30.1       12-18    137  |  104  |  24<H>  ----------------------------<  221<H>  3.9   |  24  |  1.14    Ca    9.4      18 Dec 2023 06:47

## 2023-12-19 NOTE — PROGRESS NOTE ADULT - ASSESSMENT
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 Recently presented for 2/3rd toe gangrene with podiatry recommending toe resection vs TMA. Vascular was consulted and completed LLE angio showing PT runoff, no stent or balloon angioplasty. Vascular recommended possible bypass vs deep vein arterialization. Returned to ED with worsening LLE gangrene with extension to 4th toe. HDS, no WBC, glucose >200. Now s/p LLE angiogram with popliteal and PT angioplasty on 12/15. S/p LLE TMA w/ pods on 12/18.     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 Recently presented for 2/3rd toe gangrene with podiatry recommending toe resection vs TMA. Vascular was consulted and completed LLE angio showing PT runoff, no stent or balloon angioplasty. Vascular recommended possible bypass vs deep vein arterialization. Returned to ED with worsening LLE gangrene with extension to 4th toe. HDS, no WBC, glucose >200. Now s/p LLE angiogram with popliteal and PT angioplasty on 12/15. S/p LLE TMA w/ pods on 12/18.     Plan:  - Continue IV abx  - Local wound care per podiatry, s/p TMA  - Limited intraoperative bleeding  - Monitor for fever/WBC elevation  - Pain control prn  - Remainder of care per primary    Vascular Surgery [id number]

## 2023-12-19 NOTE — PROGRESS NOTE ADULT - SUBJECTIVE AND OBJECTIVE BOX
Patient is a 72y old  Male who presents with a chief complaint of Left 4 th toe infection (19 Dec 2023 13:06)       INTERVAL HPI/OVERNIGHT EVENTS:  Patient seen and evaluated at bedside.  Pt is resting comfortable in NAD. Denies N/V/F/C.    Allergies    No Known Allergies    Intolerances        Vital Signs Last 24 Hrs  T(C): 36.3 (19 Dec 2023 13:34), Max: 36.6 (19 Dec 2023 04:37)  T(F): 97.4 (19 Dec 2023 13:34), Max: 97.9 (19 Dec 2023 04:37)  HR: 77 (19 Dec 2023 13:34) (68 - 80)  BP: 172/73 (19 Dec 2023 13:34) (168/77 - 179/76)  BP(mean): --  RR: 18 (19 Dec 2023 13:34) (18 - 18)  SpO2: 96% (19 Dec 2023 13:34) (96% - 97%)    Parameters below as of 19 Dec 2023 13:34  Patient On (Oxygen Delivery Method): room air        LABS:                        8.3    6.65  )-----------( 181      ( 19 Dec 2023 07:17 )             26.0     12-19    130<L>  |  88<L>  |  15  ----------------------------<  639<HH>  3.5   |  21<L>  |  0.89    Ca    8.2<L>      19 Dec 2023 09:56  Phos  2.7     12-19  Mg     1.8     12-19    TPro  7.2  /  Alb  3.5  /  TBili  0.4  /  DBili  x   /  AST  12  /  ALT  21  /  AlkPhos  69  12-19    PT/INR - ( 18 Dec 2023 06:49 )   PT: 11.9 sec;   INR: 1.14 ratio         PTT - ( 18 Dec 2023 06:49 )  PTT:30.5 sec  Urinalysis Basic - ( 19 Dec 2023 09:56 )    Color: x / Appearance: x / SG: x / pH: x  Gluc: 639 mg/dL / Ketone: x  / Bili: x / Urobili: x   Blood: x / Protein: x / Nitrite: x   Leuk Esterase: x / RBC: x / WBC x   Sq Epi: x / Non Sq Epi: x / Bacteria: x      CAPILLARY BLOOD GLUCOSE      POCT Blood Glucose.: 255 mg/dL (19 Dec 2023 12:46)  POCT Blood Glucose.: 314 mg/dL (19 Dec 2023 11:15)  POCT Blood Glucose.: 217 mg/dL (19 Dec 2023 08:31)  POCT Blood Glucose.: 162 mg/dL (18 Dec 2023 21:37)  POCT Blood Glucose.: 341 mg/dL (18 Dec 2023 16:33)      Lower Extremity Physical Exam:  Vascular: DP/PT 0/4, B/L, Temperature gradient warm to cool, B/L.   Neuro: Epicritic sensation diminished to the level of digits, B/L.  Musculoskeletal/Ortho: s/p R BKA  Skin: 12/18 s/p Left foot transmetatarsal amputation w/ tendoachilles lengthening, closed, sutures intact, skin well coapted, no hematoma expressed, mild lynne-wound erythema within normal post-op course, no pus or drainage noted, skin flap appears warm, viable with normal cap refill time.       RADIOLOGY & ADDITIONAL TESTS:   Patient is a 72y old  Male who presents with a chief complaint of Left 4 th toe infection (19 Dec 2023 13:06)       INTERVAL HPI/OVERNIGHT EVENTS:  Patient seen and evaluated at bedside.  Pt is resting comfortable in NAD. Denies N/V/F/C.    Allergies    No Known Allergies    Intolerances        Vital Signs Last 24 Hrs  T(C): 36.3 (19 Dec 2023 13:34), Max: 36.6 (19 Dec 2023 04:37)  T(F): 97.4 (19 Dec 2023 13:34), Max: 97.9 (19 Dec 2023 04:37)  HR: 77 (19 Dec 2023 13:34) (68 - 80)  BP: 172/73 (19 Dec 2023 13:34) (168/77 - 179/76)  BP(mean): --  RR: 18 (19 Dec 2023 13:34) (18 - 18)  SpO2: 96% (19 Dec 2023 13:34) (96% - 97%)    Parameters below as of 19 Dec 2023 13:34  Patient On (Oxygen Delivery Method): room air        LABS:                        8.3    6.65  )-----------( 181      ( 19 Dec 2023 07:17 )             26.0     12-19    130<L>  |  88<L>  |  15  ----------------------------<  639<HH>  3.5   |  21<L>  |  0.89    Ca    8.2<L>      19 Dec 2023 09:56  Phos  2.7     12-19  Mg     1.8     12-19    TPro  7.2  /  Alb  3.5  /  TBili  0.4  /  DBili  x   /  AST  12  /  ALT  21  /  AlkPhos  69  12-19    PT/INR - ( 18 Dec 2023 06:49 )   PT: 11.9 sec;   INR: 1.14 ratio         PTT - ( 18 Dec 2023 06:49 )  PTT:30.5 sec  Urinalysis Basic - ( 19 Dec 2023 09:56 )    Color: x / Appearance: x / SG: x / pH: x  Gluc: 639 mg/dL / Ketone: x  / Bili: x / Urobili: x   Blood: x / Protein: x / Nitrite: x   Leuk Esterase: x / RBC: x / WBC x   Sq Epi: x / Non Sq Epi: x / Bacteria: x      CAPILLARY BLOOD GLUCOSE      POCT Blood Glucose.: 255 mg/dL (19 Dec 2023 12:46)  POCT Blood Glucose.: 314 mg/dL (19 Dec 2023 11:15)  POCT Blood Glucose.: 217 mg/dL (19 Dec 2023 08:31)  POCT Blood Glucose.: 162 mg/dL (18 Dec 2023 21:37)  POCT Blood Glucose.: 341 mg/dL (18 Dec 2023 16:33)      Lower Extremity Physical Exam:  Vascular: DP/PT 0/4, B/L, Temperature gradient warm to cool, B/L.   Neuro: Epicritic sensation diminished to the level of digits, B/L.  Musculoskeletal/Ortho: s/p R BKA  Skin: 12/18 s/p left foot transmetatarsal amputation w/ tendoachilles lengthening, closed: sutures intact, flaps warm and viable, no hematoma expressed, no acute signs of infection.       RADIOLOGY & ADDITIONAL TESTS:

## 2023-12-19 NOTE — PROGRESS NOTE ADULT - SUBJECTIVE AND OBJECTIVE BOX
HPI:   71 y/o M with PMHx of DM, HTN, PAD and PSHX of R BKA presents to ED at recommendation of outpatient podiatrist. Patient was recently admitted and being followed by vascular and podiatry for wounds to the L foot. Was noted to have dry gangrene of L 2nd/3rd toes. [place] due to extent of vascular disease, patient would not heal if amputation was done. Was told to f/u with vascular as an outpatient. Saw podiatry yesterday, due to gangrene extending to 4th L toe. Recommended to come in for abx and possible procedure with podiatry. Patient is endorsing L toe pain. 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 1000 milliLiter(s) (50 mL/Hr) IV Continuous <Continuous>  dextrose 5%. 1000 milliLiter(s) (100 mL/Hr) IV Continuous <Continuous>  glucagon  Injectable 1 milliGRAM(s) IntraMuscular once  insulin lispro (ADMELOG) corrective regimen sliding scale   SubCutaneous Before meals and at bedtime  piperacillin/tazobactam IVPB.. 3.375 Gram(s) IV Intermittent every 8 hours  rivaroxaban 2.5 milliGRAM(s) Oral two times a day  sodium chloride 0.9%. 1000 milliLiter(s) (75 mL/Hr) IV Continuous <Continuous>    MEDICATIONS  (PRN):  acetaminophen     Tablet .. 650 milliGRAM(s) Oral every 6 hours PRN Mild Pain (1 - 3)  HYDROmorphone  Injectable 0.5 milliGRAM(s) IV Push every 4 hours PRN Severe Pain (7 - 10)  oxycodone    5 mG/acetaminophen 325 mG 1 Tablet(s) Oral every 4 hours PRN Moderate Pain (4 - 6)  Review of Systems:   CONSTITUTIONAL: No fever, weight loss, or fatigue  EYES: No eye pain, visual disturbances, or discharge  ENMT:  No difficulty hearing, tinnitus, vertigo; No sinus or throat pain  NECK: No pain or stiffness  BREASTS: No pain, masses, or nipple discharge  RESPIRATORY: No cough, wheezing, chills or hemoptysis; No shortness of breath  CARDIOVASCULAR: No chest pain, palpitations, dizziness, or leg swelling  GASTROINTESTINAL: No abdominal or epigastric pain. No nausea, vomiting, or hematemesis; No diarrhea or constipation. No melena or hematochezia.  GENITOURINARY: No dysuria, frequency, hematuria, or incontinence  NEUROLOGICAL: No headaches, memory loss, loss of strength, numbness, or tremors  SKIN: No itching, burning, rashes, or lesions   LYMPH NODES: No enlarged glands  ENDOCRINE: No heat or cold intolerance; No hair loss  MUSCULOSKELETAL: No joint pain or swelling; No muscle, back, or extremity pain  PSYCHIATRIC: No depression, anxiety, mood swings, or difficulty sleeping  HEME/LYMPH: No easy bruising, or bleeding gums  ALLERY AND IMMUNOLOGIC: No hives or eczema    Allergies    No Known Allergies    Intolerances        Social History:             PHYSICAL EXAM:  GENERAL: NAD, well-developed  HEAD:  Atraumatic, Normocephalic  EYES: EOMI, PERRLA, conjunctiva and sclera clear  NECK: Supple, No JVD  CHEST/LUNG: Clear to auscultation bilaterally; No wheeze  HEART: Regular rate and rhythm; No murmurs, rubs, or gallops  ABDOMEN: Soft, Nontender, Nondistended; Bowel sounds present  EXTREMITIES:  Left foot abscess   PSYCH: AAOx3  NEUROLOGY: non-focal  SKIN: No rashes or lesions                                                          8.3    6.65  )-----------( 181      ( 19 Dec 2023 07:17 )             26.0           LIVER FUNCTIONS - ( 19 Dec 2023 09:56 )  Alb: 3.5 g/dL / Pro: 7.2 g/dL / ALK PHOS: 69 U/L / ALT: 21 U/L / AST: 12 U/L / GGT: x           PT/INR - ( 18 Dec 2023 06:49 )   PT: 11.9 sec;   INR: 1.14 ratio         PTT - ( 18 Dec 2023 06:49 )  PTT:30.5 sec  135|102|21<180  3.8|23|0.95  9.1,--,--  12-19 @ 14:42  130|88|15<639  3.5|21|0.89  8.2,1.8,2.7  12-19 @ 09:56  141|115|13<136  2.9|19|0.73  6.3,--,--  12-19 @ 07:12      CAPILLARY BLOOD GLUCOSE      POCT Blood Glucose.: 171 mg/dL (19 Dec 2023 21:06)  POCT Blood Glucose.: 214 mg/dL (19 Dec 2023 16:49)  POCT Blood Glucose.: 255 mg/dL (19 Dec 2023 12:46)  POCT Blood Glucose.: 314 mg/dL (19 Dec 2023 11:15)  POCT Blood Glucose.: 217 mg/dL (19 Dec 2023 08:31)   HPI:   71 y/o M with PMHx of DM, HTN, PAD and PSHX of R BKA presents to ED at recommendation of outpatient podiatrist. Patient was recently admitted and being followed by vascular and podiatry for wounds to the L foot. Was noted to have dry gangrene of L 2nd/3rd toes. [place] due to extent of vascular disease, patient would not heal if amputation was done. Was told to f/u with vascular as an outpatient. Saw podiatry yesterday, due to gangrene extending to 4th L toe. Recommended to come in for abx and possible procedure with podiatry. Patient is endorsing L toe pain. 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 1000 milliLiter(s) (50 mL/Hr) IV Continuous <Continuous>  dextrose 5%. 1000 milliLiter(s) (100 mL/Hr) IV Continuous <Continuous>  glucagon  Injectable 1 milliGRAM(s) IntraMuscular once  insulin lispro (ADMELOG) corrective regimen sliding scale   SubCutaneous Before meals and at bedtime  piperacillin/tazobactam IVPB.. 3.375 Gram(s) IV Intermittent every 8 hours  rivaroxaban 2.5 milliGRAM(s) Oral two times a day  sodium chloride 0.9%. 1000 milliLiter(s) (75 mL/Hr) IV Continuous <Continuous>    MEDICATIONS  (PRN):  acetaminophen     Tablet .. 650 milliGRAM(s) Oral every 6 hours PRN Mild Pain (1 - 3)  HYDROmorphone  Injectable 0.5 milliGRAM(s) IV Push every 4 hours PRN Severe Pain (7 - 10)  oxycodone    5 mG/acetaminophen 325 mG 1 Tablet(s) Oral every 4 hours PRN Moderate Pain (4 - 6)  Review of Systems:   CONSTITUTIONAL: No fever, weight loss, or fatigue  EYES: No eye pain, visual disturbances, or discharge  ENMT:  No difficulty hearing, tinnitus, vertigo; No sinus or throat pain  NECK: No pain or stiffness  BREASTS: No pain, masses, or nipple discharge  RESPIRATORY: No cough, wheezing, chills or hemoptysis; No shortness of breath  CARDIOVASCULAR: No chest pain, palpitations, dizziness, or leg swelling  GASTROINTESTINAL: No abdominal or epigastric pain. No nausea, vomiting, or hematemesis; No diarrhea or constipation. No melena or hematochezia.  GENITOURINARY: No dysuria, frequency, hematuria, or incontinence  NEUROLOGICAL: No headaches, memory loss, loss of strength, numbness, or tremors  SKIN: No itching, burning, rashes, or lesions   LYMPH NODES: No enlarged glands  ENDOCRINE: No heat or cold intolerance; No hair loss  MUSCULOSKELETAL: No joint pain or swelling; No muscle, back, or extremity pain  PSYCHIATRIC: No depression, anxiety, mood swings, or difficulty sleeping  HEME/LYMPH: No easy bruising, or bleeding gums  ALLERY AND IMMUNOLOGIC: No hives or eczema    Allergies    No Known Allergies    Intolerances        Social History:             PHYSICAL EXAM:  GENERAL: NAD, well-developed  HEAD:  Atraumatic, Normocephalic  EYES: EOMI, PERRLA, conjunctiva and sclera clear  NECK: Supple, No JVD  CHEST/LUNG: Clear to auscultation bilaterally; No wheeze  HEART: Regular rate and rhythm; No murmurs, rubs, or gallops  ABDOMEN: Soft, Nontender, Nondistended; Bowel sounds present  EXTREMITIES:  Left foot abscess   PSYCH: AAOx3  NEUROLOGY: non-focal  SKIN: No rashes or lesions                                                          8.3    6.65  )-----------( 181      ( 19 Dec 2023 07:17 )             26.0           LIVER FUNCTIONS - ( 19 Dec 2023 09:56 )  Alb: 3.5 g/dL / Pro: 7.2 g/dL / ALK PHOS: 69 U/L / ALT: 21 U/L / AST: 12 U/L / GGT: x           PT/INR - ( 18 Dec 2023 06:49 )   PT: 11.9 sec;   INR: 1.14 ratio         PTT - ( 18 Dec 2023 06:49 )  PTT:30.5 sec  135|102|21<180  3.8|23|0.95  9.1,--,--  12-19 @ 14:42  130|88|15<639  3.5|21|0.89  8.2,1.8,2.7  12-19 @ 09:56  141|115|13<136  2.9|19|0.73  6.3,--,--  12-19 @ 07:12      CAPILLARY BLOOD GLUCOSE      POCT Blood Glucose.: 171 mg/dL (19 Dec 2023 21:06)  POCT Blood Glucose.: 214 mg/dL (19 Dec 2023 16:49)  POCT Blood Glucose.: 255 mg/dL (19 Dec 2023 12:46)  POCT Blood Glucose.: 314 mg/dL (19 Dec 2023 11:15)  POCT Blood Glucose.: 217 mg/dL (19 Dec 2023 08:31)

## 2023-12-19 NOTE — PHYSICAL THERAPY INITIAL EVALUATION ADULT - NS ASR WT BEARING DETAIL LLE
pt has cam boot and on at this time/nonweight-bearing in CAM boot on LLE and NWB when CAM boot not worn on LLE/weight-bearing as tolerated

## 2023-12-19 NOTE — PHYSICAL THERAPY INITIAL EVALUATION ADULT - GENERAL OBSERVATIONS, REHAB EVAL
pt received in bed top rails up and 1 siderail HOb 35 degrees L Le elevated call patiño,phone and tbale in reach bed in lowest position and bed alarm active , dressing L foot c,d I , + iv R UE intact , r le prosthesis at b/s ; pt educated re NWB L LE as per MD OrdeR and understood

## 2023-12-19 NOTE — PHYSICAL THERAPY INITIAL EVALUATION ADULT - MANUAL MUSCLE TESTING RESULTS, REHAB EVAL
ue's 4/5 and R LE 3+/5 hip/knee (h/o BKA july 2023 ) , L LE 3/5 hip/knee ; foot/ankle N/a TMA 12/18/23

## 2023-12-19 NOTE — PHYSICAL THERAPY INITIAL EVALUATION ADULT - PLANNED THERAPY INTERVENTIONS, PT EVAL
balance training/bed mobility training/strengthening/transfer training/wheelchair management/propulsion training balance training/bed mobility training/gait training/strengthening/transfer training/wheelchair management/propulsion training

## 2023-12-19 NOTE — PROGRESS NOTE ADULT - ASSESSMENT
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     LLE gangrene with extension to 4th toe with c/f infection  - L foot 2nd and 3rd digit dorsal full thickness gangrene to level of MTPJ, w/ wet conversion, plantar 2nd and 3rd digit dusky and early ischemic changes, mild serous drainage, mild malodor, early dusky changes of fourth metatarsal.  - L foot MRI with findings c/w septic arthritis and OM, no drainable abscess   - L foot abscess culture with moderate Serratia liquefaciens sensitives reviewed), Corynebacterium amycolatum, Finegoldia magna, also noted with Stenotrophomonas  - 12/15 s/p OR for LLE angiogram and angioplasty with vascular   - 12/19 s/p L foot I&D, s/p TMA    - brief op note reviewed - no evidence of pus or soft tissue infection, low c/f residual infection   - remains afebrile, WBC wnl    Recommendations:   Follow OR culture (gram stain negative) and biopsy   Continue on pip-tazo - will d/c if culture negative   Continue local care       Azucena Castillo M.D.  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     LLE gangrene with extension to 4th toe with c/f infection  - L foot 2nd and 3rd digit dorsal full thickness gangrene to level of MTPJ, w/ wet conversion, plantar 2nd and 3rd digit dusky and early ischemic changes, mild serous drainage, mild malodor, early dusky changes of fourth metatarsal.  - L foot MRI with findings c/w septic arthritis and OM, no drainable abscess   - L foot abscess culture with moderate Serratia liquefaciens sensitives reviewed), Corynebacterium amycolatum, Finegoldia magna, also noted with Stenotrophomonas  - 12/15 s/p OR for LLE angiogram and angioplasty with vascular   - 12/19 s/p L foot I&D, s/p TMA    - brief op note reviewed - no evidence of pus or soft tissue infection, low c/f residual infection   - remains afebrile, WBC wnl    Recommendations:   Follow OR culture (gram stain negative) and biopsy   Continue on pip-tazo - will d/c if culture negative   Continue local care       Azucena Castillo M.D.  [facility], Division of Infectious Diseases  [phone]  After 5pm on weekdays and all day on weekends - please call [phone]

## 2023-12-19 NOTE — PHYSICAL THERAPY INITIAL EVALUATION ADULT - ADDITIONAL COMMENTS
pt lives with spouse and family in house ; independent with adl's and personal care PTA ; pt amb with prosthesis R LE and std cane PTA independent ; pt has son Gerry Paredes [phone] pt lives with spouse and family in house ; independent with adl's and personal care PTA ; pt amb with prosthesis R LE and std cane PTA independent ; pt has son Gerry Paredes [phone] pt lives with spouse and family in house ( dtr ) , spouse home all the time and can assist as needed ; independent with adl's and personal care PTA ; pt amb with prosthesis R LE and std cane PTA independent ; pt has son Gerry Paredes [phone]; pt has a ramp to enter home with HR , then everything on one level for pt ; pt has a walk-in shower with shower seat and grab bars ; pt owns a regular w/c as well pt lives with spouse and family in house ( dtr ) , spouse home all the time and can assist as needed ; independent with adl's and personal care PTA ; pt amb with prosthesis R LE and std cane PTA independent ; pt has son Gerry Paredes [phone]; pt has a ramp to enter home with HR , then everything on one level for pt ; pt has a walk-in shower with shower seat and grab bars ; pt owns a regular w/c as well pt lives with spouse and family in house ( dtr ) , spouse home all the time and can assist as needed ; independent with adl's and personal care PTA ; pt amb with prosthesis R LE and std cane PTA independent ; pt has son Gerry Paredes [phone] id as emergency contact , no caregiver ID ; pt has a ramp to enter home with HR , then everything on one level for pt ; pt has a walk-in shower with shower seat and grab bars ; pt owns a regular w/c as well pt lives with spouse and family in house ( dtr ) , spouse home all the time and can assist as needed ; independent with adl's and personal care PTA ; pt amb with prosthesis R LE and std cane PTA independent ; pt has son Gerry Paredes [phone] id as emergency contact , no caregiver ID ; pt has a ramp to enter home with HR , then everything on one level for pt ; pt has a walk-in shower with shower seat and grab bars ; pt owns a regular w/c as well

## 2023-12-19 NOTE — PHYSICAL THERAPY INITIAL EVALUATION ADULT - NSPTDISCHREC_GEN_A_CORE
TBD once fxl assess can be completed ; all options discussed with pt If pt d/c home would require home PT, assist with all mobility/ADLs, & DME: RW, 3:1 commode, & polyfly w/c with: anti-tippers, seat belt, swing away leg rests, & removable arm rests./[facility]

## 2023-12-20 ENCOUNTER — TRANSCRIPTION ENCOUNTER (OUTPATIENT)
Age: 73
End: 2023-12-20

## 2023-12-20 LAB
ANION GAP SERPL CALC-SCNC: 13 MMOL/L — SIGNIFICANT CHANGE UP (ref 5–17)
ANION GAP SERPL CALC-SCNC: 13 MMOL/L — SIGNIFICANT CHANGE UP (ref 5–17)
ANION GAP SERPL CALC-SCNC: 9 MMOL/L — SIGNIFICANT CHANGE UP (ref 5–17)
ANION GAP SERPL CALC-SCNC: 9 MMOL/L — SIGNIFICANT CHANGE UP (ref 5–17)
BUN SERPL-MCNC: 11 MG/DL — SIGNIFICANT CHANGE UP (ref 7–23)
BUN SERPL-MCNC: 11 MG/DL — SIGNIFICANT CHANGE UP (ref 7–23)
BUN SERPL-MCNC: 14 MG/DL — SIGNIFICANT CHANGE UP (ref 7–23)
BUN SERPL-MCNC: 14 MG/DL — SIGNIFICANT CHANGE UP (ref 7–23)
CALCIUM SERPL-MCNC: 5.3 MG/DL — CRITICAL LOW (ref 8.4–10.5)
CALCIUM SERPL-MCNC: 5.3 MG/DL — CRITICAL LOW (ref 8.4–10.5)
CALCIUM SERPL-MCNC: 8.8 MG/DL — SIGNIFICANT CHANGE UP (ref 8.4–10.5)
CALCIUM SERPL-MCNC: 8.8 MG/DL — SIGNIFICANT CHANGE UP (ref 8.4–10.5)
CHLORIDE SERPL-SCNC: 102 MMOL/L — SIGNIFICANT CHANGE UP (ref 96–108)
CHLORIDE SERPL-SCNC: 102 MMOL/L — SIGNIFICANT CHANGE UP (ref 96–108)
CHLORIDE SERPL-SCNC: 103 MMOL/L — SIGNIFICANT CHANGE UP (ref 96–108)
CHLORIDE SERPL-SCNC: 103 MMOL/L — SIGNIFICANT CHANGE UP (ref 96–108)
CO2 SERPL-SCNC: 15 MMOL/L — LOW (ref 22–31)
CO2 SERPL-SCNC: 15 MMOL/L — LOW (ref 22–31)
CO2 SERPL-SCNC: 24 MMOL/L — SIGNIFICANT CHANGE UP (ref 22–31)
CO2 SERPL-SCNC: 24 MMOL/L — SIGNIFICANT CHANGE UP (ref 22–31)
CREAT SERPL-MCNC: 0.58 MG/DL — SIGNIFICANT CHANGE UP (ref 0.5–1.3)
CREAT SERPL-MCNC: 0.58 MG/DL — SIGNIFICANT CHANGE UP (ref 0.5–1.3)
CREAT SERPL-MCNC: 0.89 MG/DL — SIGNIFICANT CHANGE UP (ref 0.5–1.3)
CREAT SERPL-MCNC: 0.89 MG/DL — SIGNIFICANT CHANGE UP (ref 0.5–1.3)
EGFR: 104 ML/MIN/1.73M2 — SIGNIFICANT CHANGE UP
EGFR: 104 ML/MIN/1.73M2 — SIGNIFICANT CHANGE UP
EGFR: 91 ML/MIN/1.73M2 — SIGNIFICANT CHANGE UP
EGFR: 91 ML/MIN/1.73M2 — SIGNIFICANT CHANGE UP
GLUCOSE BLDC GLUCOMTR-MCNC: 195 MG/DL — HIGH (ref 70–99)
GLUCOSE BLDC GLUCOMTR-MCNC: 195 MG/DL — HIGH (ref 70–99)
GLUCOSE BLDC GLUCOMTR-MCNC: 211 MG/DL — HIGH (ref 70–99)
GLUCOSE BLDC GLUCOMTR-MCNC: 211 MG/DL — HIGH (ref 70–99)
GLUCOSE BLDC GLUCOMTR-MCNC: 212 MG/DL — HIGH (ref 70–99)
GLUCOSE BLDC GLUCOMTR-MCNC: 212 MG/DL — HIGH (ref 70–99)
GLUCOSE BLDC GLUCOMTR-MCNC: 302 MG/DL — HIGH (ref 70–99)
GLUCOSE BLDC GLUCOMTR-MCNC: 302 MG/DL — HIGH (ref 70–99)
GLUCOSE SERPL-MCNC: 198 MG/DL — HIGH (ref 70–99)
GLUCOSE SERPL-MCNC: 198 MG/DL — HIGH (ref 70–99)
GLUCOSE SERPL-MCNC: 527 MG/DL — CRITICAL HIGH (ref 70–99)
GLUCOSE SERPL-MCNC: 527 MG/DL — CRITICAL HIGH (ref 70–99)
HCT VFR BLD CALC: 23.6 % — LOW (ref 39–50)
HCT VFR BLD CALC: 23.6 % — LOW (ref 39–50)
HCT VFR BLD CALC: 28.5 % — LOW (ref 39–50)
HCT VFR BLD CALC: 28.5 % — LOW (ref 39–50)
HGB BLD-MCNC: 7.8 G/DL — LOW (ref 13–17)
HGB BLD-MCNC: 7.8 G/DL — LOW (ref 13–17)
HGB BLD-MCNC: 9.4 G/DL — LOW (ref 13–17)
HGB BLD-MCNC: 9.4 G/DL — LOW (ref 13–17)
MAGNESIUM SERPL-MCNC: 1.2 MG/DL — LOW (ref 1.6–2.6)
MAGNESIUM SERPL-MCNC: 1.2 MG/DL — LOW (ref 1.6–2.6)
MCHC RBC-ENTMCNC: 29 PG — SIGNIFICANT CHANGE UP (ref 27–34)
MCHC RBC-ENTMCNC: 29 PG — SIGNIFICANT CHANGE UP (ref 27–34)
MCHC RBC-ENTMCNC: 29.3 PG — SIGNIFICANT CHANGE UP (ref 27–34)
MCHC RBC-ENTMCNC: 29.3 PG — SIGNIFICANT CHANGE UP (ref 27–34)
MCHC RBC-ENTMCNC: 33 GM/DL — SIGNIFICANT CHANGE UP (ref 32–36)
MCHC RBC-ENTMCNC: 33 GM/DL — SIGNIFICANT CHANGE UP (ref 32–36)
MCHC RBC-ENTMCNC: 33.1 GM/DL — SIGNIFICANT CHANGE UP (ref 32–36)
MCHC RBC-ENTMCNC: 33.1 GM/DL — SIGNIFICANT CHANGE UP (ref 32–36)
MCV RBC AUTO: 88 FL — SIGNIFICANT CHANGE UP (ref 80–100)
MCV RBC AUTO: 88 FL — SIGNIFICANT CHANGE UP (ref 80–100)
MCV RBC AUTO: 88.7 FL — SIGNIFICANT CHANGE UP (ref 80–100)
MCV RBC AUTO: 88.7 FL — SIGNIFICANT CHANGE UP (ref 80–100)
NRBC # BLD: 0 /100 WBCS — SIGNIFICANT CHANGE UP (ref 0–0)
PHOSPHATE SERPL-MCNC: 1.7 MG/DL — LOW (ref 2.5–4.5)
PHOSPHATE SERPL-MCNC: 1.7 MG/DL — LOW (ref 2.5–4.5)
PLATELET # BLD AUTO: 164 K/UL — SIGNIFICANT CHANGE UP (ref 150–400)
PLATELET # BLD AUTO: 164 K/UL — SIGNIFICANT CHANGE UP (ref 150–400)
PLATELET # BLD AUTO: 201 K/UL — SIGNIFICANT CHANGE UP (ref 150–400)
PLATELET # BLD AUTO: 201 K/UL — SIGNIFICANT CHANGE UP (ref 150–400)
POTASSIUM SERPL-MCNC: 2.4 MMOL/L — CRITICAL LOW (ref 3.5–5.3)
POTASSIUM SERPL-MCNC: 2.4 MMOL/L — CRITICAL LOW (ref 3.5–5.3)
POTASSIUM SERPL-MCNC: 3.6 MMOL/L — SIGNIFICANT CHANGE UP (ref 3.5–5.3)
POTASSIUM SERPL-MCNC: 3.6 MMOL/L — SIGNIFICANT CHANGE UP (ref 3.5–5.3)
POTASSIUM SERPL-SCNC: 2.4 MMOL/L — CRITICAL LOW (ref 3.5–5.3)
POTASSIUM SERPL-SCNC: 2.4 MMOL/L — CRITICAL LOW (ref 3.5–5.3)
POTASSIUM SERPL-SCNC: 3.6 MMOL/L — SIGNIFICANT CHANGE UP (ref 3.5–5.3)
POTASSIUM SERPL-SCNC: 3.6 MMOL/L — SIGNIFICANT CHANGE UP (ref 3.5–5.3)
RBC # BLD: 2.66 M/UL — LOW (ref 4.2–5.8)
RBC # BLD: 2.66 M/UL — LOW (ref 4.2–5.8)
RBC # BLD: 3.24 M/UL — LOW (ref 4.2–5.8)
RBC # BLD: 3.24 M/UL — LOW (ref 4.2–5.8)
RBC # FLD: 12.6 % — SIGNIFICANT CHANGE UP (ref 10.3–14.5)
RBC # FLD: 12.6 % — SIGNIFICANT CHANGE UP (ref 10.3–14.5)
RBC # FLD: 12.7 % — SIGNIFICANT CHANGE UP (ref 10.3–14.5)
RBC # FLD: 12.7 % — SIGNIFICANT CHANGE UP (ref 10.3–14.5)
SODIUM SERPL-SCNC: 131 MMOL/L — LOW (ref 135–145)
SODIUM SERPL-SCNC: 131 MMOL/L — LOW (ref 135–145)
SODIUM SERPL-SCNC: 135 MMOL/L — SIGNIFICANT CHANGE UP (ref 135–145)
SODIUM SERPL-SCNC: 135 MMOL/L — SIGNIFICANT CHANGE UP (ref 135–145)
WBC # BLD: 7.34 K/UL — SIGNIFICANT CHANGE UP (ref 3.8–10.5)
WBC # BLD: 7.34 K/UL — SIGNIFICANT CHANGE UP (ref 3.8–10.5)
WBC # BLD: 8.17 K/UL — SIGNIFICANT CHANGE UP (ref 3.8–10.5)
WBC # BLD: 8.17 K/UL — SIGNIFICANT CHANGE UP (ref 3.8–10.5)
WBC # FLD AUTO: 7.34 K/UL — SIGNIFICANT CHANGE UP (ref 3.8–10.5)
WBC # FLD AUTO: 7.34 K/UL — SIGNIFICANT CHANGE UP (ref 3.8–10.5)
WBC # FLD AUTO: 8.17 K/UL — SIGNIFICANT CHANGE UP (ref 3.8–10.5)
WBC # FLD AUTO: 8.17 K/UL — SIGNIFICANT CHANGE UP (ref 3.8–10.5)

## 2023-12-20 RX ORDER — LOSARTAN POTASSIUM 100 MG/1
25 TABLET, FILM COATED ORAL DAILY
Refills: 0 | Status: DISCONTINUED | OUTPATIENT
Start: 2023-12-20 | End: 2023-12-22

## 2023-12-20 RX ORDER — OXYCODONE AND ACETAMINOPHEN 5; 325 MG/1; MG/1
1 TABLET ORAL
Qty: 28 | Refills: 0
Start: 2023-12-20 | End: 2023-12-26

## 2023-12-20 RX ORDER — ACETAMINOPHEN 500 MG
2 TABLET ORAL
Qty: 0 | Refills: 0 | DISCHARGE
Start: 2023-12-20

## 2023-12-20 RX ORDER — RIVAROXABAN 15 MG-20MG
1 KIT ORAL
Qty: 60 | Refills: 0
Start: 2023-12-20 | End: 2024-01-18

## 2023-12-20 RX ORDER — LISINOPRIL 2.5 MG/1
5 TABLET ORAL DAILY
Refills: 0 | Status: DISCONTINUED | OUTPATIENT
Start: 2023-12-20 | End: 2023-12-20

## 2023-12-20 RX ORDER — PIPERACILLIN AND TAZOBACTAM 4; .5 G/20ML; G/20ML
3.38 INJECTION, POWDER, LYOPHILIZED, FOR SOLUTION INTRAVENOUS EVERY 8 HOURS
Refills: 0 | Status: DISCONTINUED | OUTPATIENT
Start: 2023-12-20 | End: 2023-12-22

## 2023-12-20 RX ADMIN — RIVAROXABAN 2.5 MILLIGRAM(S): KIT at 05:16

## 2023-12-20 RX ADMIN — Medication 81 MILLIGRAM(S): at 12:20

## 2023-12-20 RX ADMIN — HYDROMORPHONE HYDROCHLORIDE 0.5 MILLIGRAM(S): 2 INJECTION INTRAMUSCULAR; INTRAVENOUS; SUBCUTANEOUS at 06:00

## 2023-12-20 RX ADMIN — HYDROMORPHONE HYDROCHLORIDE 0.5 MILLIGRAM(S): 2 INJECTION INTRAMUSCULAR; INTRAVENOUS; SUBCUTANEOUS at 00:00

## 2023-12-20 RX ADMIN — Medication 650 MILLIGRAM(S): at 18:49

## 2023-12-20 RX ADMIN — RIVAROXABAN 2.5 MILLIGRAM(S): KIT at 17:24

## 2023-12-20 RX ADMIN — Medication 2: at 17:23

## 2023-12-20 RX ADMIN — Medication 4: at 13:13

## 2023-12-20 RX ADMIN — SODIUM CHLORIDE 75 MILLILITER(S): 9 INJECTION INTRAMUSCULAR; INTRAVENOUS; SUBCUTANEOUS at 09:10

## 2023-12-20 RX ADMIN — Medication 650 MILLIGRAM(S): at 17:49

## 2023-12-20 RX ADMIN — HYDROMORPHONE HYDROCHLORIDE 0.5 MILLIGRAM(S): 2 INJECTION INTRAMUSCULAR; INTRAVENOUS; SUBCUTANEOUS at 05:16

## 2023-12-20 RX ADMIN — PIPERACILLIN AND TAZOBACTAM 25 GRAM(S): 4; .5 INJECTION, POWDER, LYOPHILIZED, FOR SOLUTION INTRAVENOUS at 17:23

## 2023-12-20 RX ADMIN — AMLODIPINE BESYLATE 2.5 MILLIGRAM(S): 2.5 TABLET ORAL at 05:16

## 2023-12-20 RX ADMIN — LISINOPRIL 5 MILLIGRAM(S): 2.5 TABLET ORAL at 10:46

## 2023-12-20 RX ADMIN — CHLORHEXIDINE GLUCONATE 1 APPLICATION(S): 213 SOLUTION TOPICAL at 12:20

## 2023-12-20 RX ADMIN — PIPERACILLIN AND TAZOBACTAM 25 GRAM(S): 4; .5 INJECTION, POWDER, LYOPHILIZED, FOR SOLUTION INTRAVENOUS at 05:16

## 2023-12-20 RX ADMIN — Medication 1: at 09:09

## 2023-12-20 RX ADMIN — Medication 2: at 21:49

## 2023-12-20 RX ADMIN — ATORVASTATIN CALCIUM 40 MILLIGRAM(S): 80 TABLET, FILM COATED ORAL at 21:49

## 2023-12-20 NOTE — PROGRESS NOTE ADULT - SUBJECTIVE AND OBJECTIVE BOX
Vascular Progress Note    S: Patient seen and examined. No acute events overnight. s/p TMA amputation w/ pods no complaints    O:  Physical Exam:  General Appearance: Appears well, NAD   Chest: Nonlabored breathing on RA  CV: RRR  Extremities: Grossly symmetric, SCD's in place   Vascular: LLE wrapped in ace, boot in place. No strikethrough. ds ds/pt pulses, redressed with gauze, kerlex, ACE bandage      Vital Signs Last 24 Hrs  T(C): 37.4 (20 Dec 2023 05:25), Max: 37.4 (20 Dec 2023 05:25)  T(F): 99.3 (20 Dec 2023 05:25), Max: 99.3 (20 Dec 2023 05:25)  HR: 82 (20 Dec 2023 05:25) (74 - 82)  BP: 189/81 (20 Dec 2023 05:25) (172/73 - 189/81)  BP(mean): --  RR: 18 (20 Dec 2023 05:25) (18 - 18)  SpO2: 94% (20 Dec 2023 05:25) (94% - 97%)    Parameters below as of 20 Dec 2023 05:25  Patient On (Oxygen Delivery Method): room air        I&O's Detail    19 Dec 2023 07:01  -  20 Dec 2023 07:00  --------------------------------------------------------  IN:    IV PiggyBack: 200 mL    Oral Fluid: 980 mL    sodium chloride 0.9%: 900 mL  Total IN: 2080 mL    OUT:    Voided (mL): 770 mL  Total OUT: 770 mL    Total NET: 1310 mL                                7.8    7.34  )-----------( 164      ( 20 Dec 2023 06:52 )             23.6       12-20    131<L>  |  103  |  11  ----------------------------<  527<HH>  2.4<LL>   |  15<L>  |  0.58    Ca    5.3<LL>      20 Dec 2023 06:52  Phos  1.7     12-20  Mg     1.2     12-20    TPro  7.2  /  Alb  3.5  /  TBili  0.4  /  DBili  x   /  AST  12  /  ALT  21  /  AlkPhos  69  12-19

## 2023-12-20 NOTE — PROGRESS NOTE ADULT - ASSESSMENT
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     LLE gangrene with extension to 4th toe with OM  - L foot 2nd and 3rd digit dorsal full thickness gangrene to level of MTPJ, w/ wet conversion, plantar 2nd and 3rd digit dusky and early ischemic changes, mild serous drainage, mild malodor, early dusky changes of fourth metatarsal.  - L foot MRI with findings c/w septic arthritis and OM, no drainable abscess   - L foot abscess culture with moderate Serratia liquefaciens sensitives reviewed), Corynebacterium amycolatum, Finegoldia magna, also noted with Stenotrophomonas  - 12/15 s/p OR for LLE angiogram and angioplasty with vascular   - 12/19 s/p L foot I&D, s/p TMA    - brief op note reviewed - no evidence of pus or soft tissue infection, low c/f residual infection, bone was hard at level of resection and appeared vitalized intra-op  - remains afebrile, WBC wnl    Recommendations:   Follow OR culture (NGTD) and biopsy   Discontinued pip-tazo given negative culture  Continue local care per Podiatry    Stable from ID standpoint at this time.  Patient can follow up as outpatient within 1-2 weeks of dc  Discharge planning per primary team     Azucena Castillo M.D.  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     LLE gangrene with extension to 4th toe with OM  - L foot 2nd and 3rd digit dorsal full thickness gangrene to level of MTPJ, w/ wet conversion, plantar 2nd and 3rd digit dusky and early ischemic changes, mild serous drainage, mild malodor, early dusky changes of fourth metatarsal.  - L foot MRI with findings c/w septic arthritis and OM, no drainable abscess   - L foot abscess culture with moderate Serratia liquefaciens sensitives reviewed), Corynebacterium amycolatum, Finegoldia magna, also noted with Stenotrophomonas  - 12/15 s/p OR for LLE angiogram and angioplasty with vascular   - 12/19 s/p L foot I&D, s/p TMA    - brief op note reviewed - no evidence of pus or soft tissue infection, low c/f residual infection, bone was hard at level of resection and appeared vitalized intra-op  - remains afebrile, WBC wnl    Recommendations:   Follow OR culture (NGTD) and biopsy   Discontinued pip-tazo given negative culture  Continue local care per Podiatry    Stable from ID standpoint at this time.  Patient can follow up as outpatient within 1-2 weeks of dc  Discharge planning per primary team     Azucena Castillo M.D.  [facility], Division of Infectious Diseases  [phone]  After 5pm on weekdays and all day on weekends - please call [phone]

## 2023-12-20 NOTE — DIETITIAN INITIAL EVALUATION ADULT - PERTINENT LABORATORY DATA
12-20    135  |  102  |  14  ----------------------------<  198<H>  3.6   |  24  |  0.89    Ca    8.8      20 Dec 2023 10:12  Phos  1.7     12-20  Mg     1.2     12-20    TPro  7.2  /  Alb  3.5  /  TBili  0.4  /  DBili  x   /  AST  12  /  ALT  21  /  AlkPhos  69  12-19  POCT Blood Glucose.: 195 mg/dL (12-20-23 @ 08:34)  A1C with Estimated Average Glucose Result: 7.2 % (11-27-23 @ 06:45)  A1C with Estimated Average Glucose Result: 7.9 % (07-09-23 @ 07:10)  A1C with Estimated Average Glucose Result: 8.4 % (04-01-23 @ 05:15)

## 2023-12-20 NOTE — DIETITIAN INITIAL EVALUATION ADULT - PERSON TAUGHT/METHOD
87291 Exp Problem Focused - Mod. Complex
adequate caloric/protein intake w/ food sources reviewed, consistent carbohydrate intake for DM, literature provided as reference, all questions were answered/verbal instruction/written material/teach back - (Patient repeats in own words)/patient instructed/spouse instructed

## 2023-12-20 NOTE — DIETITIAN INITIAL EVALUATION ADULT - ORAL INTAKE PTA/DIET HISTORY
Patient reports eating well PTA w/ no recent issues. Denied chewing/swallowing impairment or nausea/vomiting. Continues to follow consistent carbohydrate diet at home and SMBG, reports his BG readings have been good PTA.

## 2023-12-20 NOTE — DIETITIAN INITIAL EVALUATION ADULT - PERTINENT MEDS FT
MEDICATIONS  (STANDING):  amLODIPine   Tablet 2.5 milliGRAM(s) Oral daily  aspirin enteric coated 81 milliGRAM(s) Oral daily  atorvastatin 40 milliGRAM(s) Oral at bedtime  chlorhexidine 2% Cloths 1 Application(s) Topical daily  dextrose 5%. 1000 milliLiter(s) (50 mL/Hr) IV Continuous <Continuous>  dextrose 5%. 1000 milliLiter(s) (100 mL/Hr) IV Continuous <Continuous>  glucagon  Injectable 1 milliGRAM(s) IntraMuscular once  insulin lispro (ADMELOG) corrective regimen sliding scale   SubCutaneous Before meals and at bedtime  lisinopril 5 milliGRAM(s) Oral daily  rivaroxaban 2.5 milliGRAM(s) Oral two times a day    MEDICATIONS  (PRN):  acetaminophen     Tablet .. 650 milliGRAM(s) Oral every 6 hours PRN Mild Pain (1 - 3)  HYDROmorphone  Injectable 0.5 milliGRAM(s) IV Push every 4 hours PRN Severe Pain (7 - 10)  oxycodone    5 mG/acetaminophen 325 mG 1 Tablet(s) Oral every 4 hours PRN Moderate Pain (4 - 6)

## 2023-12-20 NOTE — DIETITIAN INITIAL EVALUATION ADULT - ENERGY INTAKE
Patient reports eating well during admission, notes he did not eat as much w/ breakfast today as he couldn't feel comfortable enough sitting up after repositioning himself to eat a larger portion of food. Adequate (%)

## 2023-12-20 NOTE — DIETITIAN INITIAL EVALUATION ADULT - REASON FOR ADMISSION
Gangrene, not elsewhere classified    Per chart, patient is a 71 y/o male with PMH including DM, HTN, PAD, s/p R BKA. Patient presents to [facility] for antibiotic treatment and possibly podiatric procedure for management of L 2nd-4th toe gangrene. S/p L foot TMA 12/18. Gangrene, not elsewhere classified    Per chart, patient is a 71 y/o male with PMH including DM, HTN, PAD, s/p R BKA. Patient presents to [facility] for antibiotic treatment and possibly podiatric procedure for management of L 2nd-4th toe gangrene. S/p L foot TMA 12/18.

## 2023-12-20 NOTE — DISCHARGE NOTE NURSING/CASE MANAGEMENT/SOCIAL WORK - NSDCFUADDAPPT_GEN_ALL_CORE_FT
Podiatry Discharge Instructions:  Follow up: Please follow up with Dr. Mcclure within 1 week of discharge from the hospital, please call [phone] for appointment and discuss that you recently were seen in the hospital.  Wound Care: Please leave your dressing clean dry intact until your follow up appointment   Weight bearing: Please weight bear as tolerated in CAM boot to the left foot.  Antibiotics: Please continue as instructed. Podiatry Discharge Instructions:  Follow up: Please follow up with Dr. Mcclure within 1 week of discharge from the hospital, please call [phone] for appointment and discuss that you recently were seen in the hospital.  Wound Care: Please leave your dressing clean dry intact until your follow up appointment   Weight bearing: Please weight bear as tolerated in CAM boot to the left foot.  Antibiotics: Please continue as instructed.

## 2023-12-20 NOTE — DIETITIAN INITIAL EVALUATION ADULT - REASON
Patient eating well during admission and PTA w/ no recent significant weight fluctuations reported apart from R LINDA in 7/2023, will monitor parameters

## 2023-12-20 NOTE — DISCHARGE NOTE NURSING/CASE MANAGEMENT/SOCIAL WORK - PATIENT PORTAL LINK FT
You can access the FollowMyHealth Patient Portal offered by [facility] by registering at the following website: [email or web address]. By joining [facility]’s FollowMyHealth portal, you will also be able to view your health information using other applications (apps) compatible with our system. You can access the FollowMyHealth Patient Portal offered by [facility] by registering at the following website: [email or web address]. By joining [facility]’s FollowMyHealth portal, you will also be able to view your health information using other applications (apps) compatible with our system.

## 2023-12-20 NOTE — DIETITIAN INITIAL EVALUATION ADULT - OTHER INFO
NKFA per patient. Patient reports his BW prior to R BKA was roughly 156lbs. S/p R BKA in 7/2023 patient had BW of 143lbs during that admission. Patient denies any significant weight fluctuations since then apart from the R BKA surgery. Will monitor weight trend.    HgbA1C 7.2% 11/27. Patient w/ h/o DM. Admelog in place. Hypokalemic, hypophosphatemic, hypomagnesemic on this AMs blood draw with a decline in BUN/Cr and other lab markers noted; potassium, BUN/Cr uptrending and WNL on repeat noted, request updated levels of electrolytes and replete as appropriate.

## 2023-12-20 NOTE — PROVIDER CONTACT NOTE (CRITICAL VALUE NOTIFICATION) - NS PROVIDER READ BACK TO LAB
[de-identified] : 63M here for routine left ear cleaning- h/o left CWD- a little bit a drainage starting about 2 weeks ago- denies otalgia, otorrhea, ear infections- hearing feels terrible- knows he has to get HA. 
yes

## 2023-12-20 NOTE — PROGRESS NOTE ADULT - ASSESSMENT
72M s/p left foot transmetatarsal amputation w/ tendoachilles lengthening, closed (DOS 12/18/23).  - Pt seen and evaluated.  - Afebrile, no leukocytosis.  - 12/18 s/p left foot transmetatarsal amputation w/ tendoachilles lengthening, closed: sutures intact, flaps warm and viable, no hematoma expressed, no acute signs of infection.   - Intraop Findings: Low concern for residual bone infection, bone was hard at level of resection and appeared vitalized intra-op, high concern for viability.  - Left Foot Clean Bone Margin Culture: No growth (prelim).  - Vascular recs, appreciated.  - Do not recommend nitroglycerin paste to the surgical site at this time.  - Stable for discharge from the podiatry standpoint [facility]  - Wound care instructions and followup information listed in the discharge provider note.  - Discussed with attending. 72M s/p left foot transmetatarsal amputation w/ tendoachilles lengthening, closed (DOS 12/18/23).  - Pt seen and evaluated.  - Afebrile, no leukocytosis.  - 12/18 s/p left foot transmetatarsal amputation w/ tendoachilles lengthening, closed: sutures intact, flaps warm and viable, no hematoma expressed, no acute signs of infection.   - Intraop Findings: Low concern for residual bone infection, bone was hard at level of resection and appeared vitalized intra-op, high concern for viability.  - Left Foot Clean Bone Margin Culture: No growth (prelim).  - Vascular recs, appreciated.  - Do not recommend nitroglycerin paste to the surgical site at this time.  - Stable for discharge from the podiatry standpoint [facility]  - Wound care instructions and followup information listed in the discharge provider note.  - Discussed with attending.

## 2023-12-20 NOTE — DIETITIAN INITIAL EVALUATION ADULT - ADD RECOMMEND
1. continue current diet as tolerated of: consistent carbohydrate diet  2. encourage PO intake, protein source with each meal as tolerated  3. request updated phosphorus/magnesium w/ next labs, replete levels as appropriate  4. if medically feasible, consider addition of MVI and vitamin C to help aid in wound healing  5. monitor PO intake, weight trend, electrolytes, blood glucose levels, labs, BMs, wound healing

## 2023-12-20 NOTE — PROGRESS NOTE ADULT - SUBJECTIVE AND OBJECTIVE BOX
[facility] INFECTIOUS DISEASES  ERVIN Florentino Y. Patel, S. Shah, G. Ervin  [phone]  ([phone] - weekdays after 5pm and weekends)    Name: ALEE DUNN  Age/Gender: 72y Male  MRN: [id number]    Interval History:  Patient seen and examined this morning.   No new complaints noted.  Notes reviewed  No concerning overnight events  Afebrile   Allergies: No Known Allergies      Objective:  Vitals:   T(F): 99.3 (12-20-23 @ 05:25), Max: 99.3 (12-20-23 @ 05:25)  HR: 82 (12-20-23 @ 05:25) (74 - 82)  BP: 189/81 (12-20-23 @ 05:25) (172/73 - 189/81)  RR: 18 (12-20-23 @ 05:25) (18 - 18)  SpO2: 94% (12-20-23 @ 05:25) (94% - 96%)  Physical Examination:  General: no acute distress, nontoxic   HEENT: NC/AT, anicteric, neck supple  Respiratory: no acc muscle use, breathing comfortably  Cardiovascular: S1 and S2 present  Gastrointestinal: normal appearing, nondistended  Extremities: R BKA, L foot dressing with boot  Skin: no visible rash    Laboratory Studies:  CBC:                       7.8    7.34  )-----------( 164      ( 20 Dec 2023 06:52 )             23.6     WBC Trend:  7.34 12-20-23 @ 06:52  6.65 12-19-23 @ 07:17  6.32 12-18-23 @ 06:49  7.67 12-16-23 @ 07:23  6.77 12-15-23 @ 05:34  6.65 12-14-23 @ 07:10    CMP: 12-20    131<L>  |  103  |  11  ----------------------------<  527<HH>  2.4<LL>   |  15<L>  |  0.58    Ca    5.3<LL>      20 Dec 2023 06:52  Phos  1.7     12-20  Mg     1.2     12-20    TPro  7.2  /  Alb  3.5  /  TBili  0.4  /  DBili  x   /  AST  12  /  ALT  21  /  AlkPhos  69  12-19    Creatinine: 0.58 mg/dL (12-20-23 @ 06:52)  Creatinine: 0.95 mg/dL (12-19-23 @ 14:42)  Creatinine: 0.89 mg/dL (12-19-23 @ 09:56)  Creatinine: 0.73 mg/dL (12-19-23 @ 07:12)  Creatinine: 1.14 mg/dL (12-18-23 @ 06:47)  Creatinine: 1.09 mg/dL (12-16-23 @ 07:22)  Creatinine: 1.06 mg/dL (12-15-23 @ 05:34)  Creatinine: 1.07 mg/dL (12-15-23 @ 04:25)  Creatinine: 0.99 mg/dL (12-14-23 @ 07:12)      LIVER FUNCTIONS - ( 19 Dec 2023 09:56 )  Alb: 3.5 g/dL / Pro: 7.2 g/dL / ALK PHOS: 69 U/L / ALT: 21 U/L / AST: 12 U/L / GGT: x           Microbiology: reviewed   Culture - Tissue with Gram Stain (collected 12-18-23 @ 11:33)  Source: Bone 2nd lt meterasel clean bone culture  Gram Stain (12-18-23 @ 23:43):    No polymorphonuclear leukocytes per low power field    No organisms seen per oil power field  Preliminary Report (12-19-23 @ 13:17):    No growth    Culture - Abscess with Gram Stain (collected 12-09-23 @ 10:45)  Source: .Abscess left foot  Final Report (12-16-23 @ 14:31):    Moderate Serratia liquefaciens    Numerous Corynebacterium amycolatum "Susceptibilities not performed"    Moderate Finegoldia magna "Susceptibilities not performed"    Few Stenotrophomonas maltophilia  Organism: Serratia liquefaciens  Stenotrophomonas maltophilia (12-16-23 @ 14:31)  Organism: Stenotrophomonas maltophilia (12-16-23 @ 14:30)      Method Type: AMI      -  Levofloxacin: S <=0.5      -  Trimethoprim/Sulfamethoxazole: S <=0.5/9.5  Organism: Serratia liquefaciens (12-14-23 @ 16:47)      Method Type: CarbaR      -  Resistance Gene to Carbapenem: Nondet  Organism: Serratia liquefaciens (12-14-23 @ 16:47)      Method Type: AMI      -  Amoxicillin/Clavulanic Acid: R >16/8      -  Ampicillin: R >16 These ampicillin results predict results for amoxicillin      -  Ampicillin/Sulbactam: I 16/8      -  Aztreonam: R >16      -  Cefazolin: R >16      -  Cefepime: R >16      -  Cefoxitin: R >16      -  Ceftolozane/tazobactam: S <=2      -  Ceftriaxone: R >32      -  Ciprofloxacin: R 1      -  Ertapenem: R >1      -  Gentamicin: R 8      -  Levofloxacin: S <=0.5      -  Meropenem: R >8      -  Piperacillin/Tazobactam: S <=8      -  Tobramycin: R 8      -  Trimethoprim/Sulfamethoxazole: S <=0.5/9.5    Culture - Blood (collected 12-09-23 @ 09:05)  Source: .Blood Blood-Peripheral  Final Report (12-14-23 @ 14:01):    No growth at 5 days    Culture - Blood (collected 12-09-23 @ 09:00)  Source: .Blood Blood-Peripheral  Final Report (12-14-23 @ 14:01):    No growth at 5 days    Radiology: reviewed     Medications:  acetaminophen     Tablet .. 650 milliGRAM(s) Oral every 6 hours PRN  amLODIPine   Tablet 2.5 milliGRAM(s) Oral daily  aspirin enteric coated 81 milliGRAM(s) Oral daily  atorvastatin 40 milliGRAM(s) Oral at bedtime  chlorhexidine 2% Cloths 1 Application(s) Topical daily  dextrose 5%. 1000 milliLiter(s) IV Continuous <Continuous>  dextrose 5%. 1000 milliLiter(s) IV Continuous <Continuous>  glucagon  Injectable 1 milliGRAM(s) IntraMuscular once  HYDROmorphone  Injectable 0.5 milliGRAM(s) IV Push every 4 hours PRN  insulin lispro (ADMELOG) corrective regimen sliding scale   SubCutaneous Before meals and at bedtime  oxycodone    5 mG/acetaminophen 325 mG 1 Tablet(s) Oral every 4 hours PRN  piperacillin/tazobactam IVPB.. 3.375 Gram(s) IV Intermittent every 8 hours  rivaroxaban 2.5 milliGRAM(s) Oral two times a day  sodium chloride 0.9%. 1000 milliLiter(s) IV Continuous <Continuous>    Current Antimicrobials:  piperacillin/tazobactam IVPB.. 3.375 Gram(s) IV Intermittent every 8 hours    Prior/Completed Antimicrobials:  piperacillin/tazobactam IVPB...  vancomycin  IVPB.   [facility]  ERVIN Florentino Y. Patel, S. Shah, G. Ervin  [phone]  ([phone] - weekdays after 5pm and weekends)    Name: ALEE DUNN  Age/Gender: 72y Male  MRN: [id number]    Interval History:  Patient seen and examined this morning.   No new complaints noted.  Notes reviewed  No concerning overnight events  Afebrile   Allergies: No Known Allergies      Objective:  Vitals:   T(F): 99.3 (12-20-23 @ 05:25), Max: 99.3 (12-20-23 @ 05:25)  HR: 82 (12-20-23 @ 05:25) (74 - 82)  BP: 189/81 (12-20-23 @ 05:25) (172/73 - 189/81)  RR: 18 (12-20-23 @ 05:25) (18 - 18)  SpO2: 94% (12-20-23 @ 05:25) (94% - 96%)  Physical Examination:  General: no acute distress, nontoxic   HEENT: NC/AT, anicteric, neck supple  Respiratory: no acc muscle use, breathing comfortably  Cardiovascular: S1 and S2 present  Gastrointestinal: normal appearing, nondistended  Extremities: R BKA, L foot dressing with boot  Skin: no visible rash    Laboratory Studies:  CBC:                       7.8    7.34  )-----------( 164      ( 20 Dec 2023 06:52 )             23.6     WBC Trend:  7.34 12-20-23 @ 06:52  6.65 12-19-23 @ 07:17  6.32 12-18-23 @ 06:49  7.67 12-16-23 @ 07:23  6.77 12-15-23 @ 05:34  6.65 12-14-23 @ 07:10    CMP: 12-20    131<L>  |  103  |  11  ----------------------------<  527<HH>  2.4<LL>   |  15<L>  |  0.58    Ca    5.3<LL>      20 Dec 2023 06:52  Phos  1.7     12-20  Mg     1.2     12-20    TPro  7.2  /  Alb  3.5  /  TBili  0.4  /  DBili  x   /  AST  12  /  ALT  21  /  AlkPhos  69  12-19    Creatinine: 0.58 mg/dL (12-20-23 @ 06:52)  Creatinine: 0.95 mg/dL (12-19-23 @ 14:42)  Creatinine: 0.89 mg/dL (12-19-23 @ 09:56)  Creatinine: 0.73 mg/dL (12-19-23 @ 07:12)  Creatinine: 1.14 mg/dL (12-18-23 @ 06:47)  Creatinine: 1.09 mg/dL (12-16-23 @ 07:22)  Creatinine: 1.06 mg/dL (12-15-23 @ 05:34)  Creatinine: 1.07 mg/dL (12-15-23 @ 04:25)  Creatinine: 0.99 mg/dL (12-14-23 @ 07:12)      LIVER FUNCTIONS - ( 19 Dec 2023 09:56 )  Alb: 3.5 g/dL / Pro: 7.2 g/dL / ALK PHOS: 69 U/L / ALT: 21 U/L / AST: 12 U/L / GGT: x           Microbiology: reviewed   Culture - Tissue with Gram Stain (collected 12-18-23 @ 11:33)  Source: Bone 2nd lt meterasel clean bone culture  Gram Stain (12-18-23 @ 23:43):    No polymorphonuclear leukocytes per low power field    No organisms seen per oil power field  Preliminary Report (12-19-23 @ 13:17):    No growth    Culture - Abscess with Gram Stain (collected 12-09-23 @ 10:45)  Source: .Abscess left foot  Final Report (12-16-23 @ 14:31):    Moderate Serratia liquefaciens    Numerous Corynebacterium amycolatum "Susceptibilities not performed"    Moderate Finegoldia magna "Susceptibilities not performed"    Few Stenotrophomonas maltophilia  Organism: Serratia liquefaciens  Stenotrophomonas maltophilia (12-16-23 @ 14:31)  Organism: Stenotrophomonas maltophilia (12-16-23 @ 14:30)      Method Type: AMI      -  Levofloxacin: S <=0.5      -  Trimethoprim/Sulfamethoxazole: S <=0.5/9.5  Organism: Serratia liquefaciens (12-14-23 @ 16:47)      Method Type: CarbaR      -  Resistance Gene to Carbapenem: Nondet  Organism: Serratia liquefaciens (12-14-23 @ 16:47)      Method Type: AMI      -  Amoxicillin/Clavulanic Acid: R >16/8      -  Ampicillin: R >16 These ampicillin results predict results for amoxicillin      -  Ampicillin/Sulbactam: I 16/8      -  Aztreonam: R >16      -  Cefazolin: R >16      -  Cefepime: R >16      -  Cefoxitin: R >16      -  Ceftolozane/tazobactam: S <=2      -  Ceftriaxone: R >32      -  Ciprofloxacin: R 1      -  Ertapenem: R >1      -  Gentamicin: R 8      -  Levofloxacin: S <=0.5      -  Meropenem: R >8      -  Piperacillin/Tazobactam: S <=8      -  Tobramycin: R 8      -  Trimethoprim/Sulfamethoxazole: S <=0.5/9.5    Culture - Blood (collected 12-09-23 @ 09:05)  Source: .Blood Blood-Peripheral  Final Report (12-14-23 @ 14:01):    No growth at 5 days    Culture - Blood (collected 12-09-23 @ 09:00)  Source: .Blood Blood-Peripheral  Final Report (12-14-23 @ 14:01):    No growth at 5 days    Radiology: reviewed     Medications:  acetaminophen     Tablet .. 650 milliGRAM(s) Oral every 6 hours PRN  amLODIPine   Tablet 2.5 milliGRAM(s) Oral daily  aspirin enteric coated 81 milliGRAM(s) Oral daily  atorvastatin 40 milliGRAM(s) Oral at bedtime  chlorhexidine 2% Cloths 1 Application(s) Topical daily  dextrose 5%. 1000 milliLiter(s) IV Continuous <Continuous>  dextrose 5%. 1000 milliLiter(s) IV Continuous <Continuous>  glucagon  Injectable 1 milliGRAM(s) IntraMuscular once  HYDROmorphone  Injectable 0.5 milliGRAM(s) IV Push every 4 hours PRN  insulin lispro (ADMELOG) corrective regimen sliding scale   SubCutaneous Before meals and at bedtime  oxycodone    5 mG/acetaminophen 325 mG 1 Tablet(s) Oral every 4 hours PRN  piperacillin/tazobactam IVPB.. 3.375 Gram(s) IV Intermittent every 8 hours  rivaroxaban 2.5 milliGRAM(s) Oral two times a day  sodium chloride 0.9%. 1000 milliLiter(s) IV Continuous <Continuous>    Current Antimicrobials:  piperacillin/tazobactam IVPB.. 3.375 Gram(s) IV Intermittent every 8 hours    Prior/Completed Antimicrobials:  piperacillin/tazobactam IVPB...  vancomycin  IVPB.

## 2023-12-20 NOTE — DISCHARGE NOTE NURSING/CASE MANAGEMENT/SOCIAL WORK - NSDCPEFALRISK_GEN_ALL_CORE
For information on Fall & Injury Prevention, visit: [email or web address] OR  [email or web address] OR  https://www.cdc.gov/steadi/patient.html For information on Fall & Injury Prevention, visit: [email or web address] OR  [email or web address] OR  https://www.cdc.gov/steadi/patient.html

## 2023-12-20 NOTE — DIETITIAN INITIAL EVALUATION ADULT - OTHER CALCULATIONS
Defer fluid needs to team. Calculations accounting for factor of s/p L TMA during admission.  Adjusted IBW for R BKA = 63.8kg/140.4lbs

## 2023-12-20 NOTE — PROGRESS NOTE ADULT - SUBJECTIVE AND OBJECTIVE BOX
Patient is a 72y old  Male who presents with a chief complaint of Left 4 th toe infection (19 Dec 2023 23:44)       INTERVAL HPI/OVERNIGHT EVENTS:  Patient seen and evaluated at bedside.  Pt is resting comfortable in NAD. Denies N/V/F/C.      Allergies    No Known Allergies    Intolerances        Vital Signs Last 24 Hrs  T(C): 37.4 (20 Dec 2023 05:25), Max: 37.4 (20 Dec 2023 05:25)  T(F): 99.3 (20 Dec 2023 05:25), Max: 99.3 (20 Dec 2023 05:25)  HR: 82 (20 Dec 2023 05:25) (74 - 82)  BP: 189/81 (20 Dec 2023 05:25) (172/73 - 189/81)  BP(mean): --  RR: 18 (20 Dec 2023 05:25) (18 - 18)  SpO2: 94% (20 Dec 2023 05:25) (94% - 97%)    Parameters below as of 20 Dec 2023 05:25  Patient On (Oxygen Delivery Method): room air        LABS:                        7.8    7.34  )-----------( 164      ( 20 Dec 2023 06:52 )             23.6     12-19    135  |  102  |  21  ----------------------------<  180<H>  3.8   |  23  |  0.95    Ca    9.1      19 Dec 2023 14:42  Phos  2.7     12-19  Mg     1.8     12-19    TPro  7.2  /  Alb  3.5  /  TBili  0.4  /  DBili  x   /  AST  12  /  ALT  21  /  AlkPhos  69  12-19      Urinalysis Basic - ( 19 Dec 2023 14:42 )    Color: x / Appearance: x / SG: x / pH: x  Gluc: 180 mg/dL / Ketone: x  / Bili: x / Urobili: x   Blood: x / Protein: x / Nitrite: x   Leuk Esterase: x / RBC: x / WBC x   Sq Epi: x / Non Sq Epi: x / Bacteria: x      CAPILLARY BLOOD GLUCOSE      POCT Blood Glucose.: 171 mg/dL (19 Dec 2023 21:06)  POCT Blood Glucose.: 214 mg/dL (19 Dec 2023 16:49)  POCT Blood Glucose.: 255 mg/dL (19 Dec 2023 12:46)  POCT Blood Glucose.: 314 mg/dL (19 Dec 2023 11:15)  POCT Blood Glucose.: 217 mg/dL (19 Dec 2023 08:31)      Lower Extremity Physical Exam:  Vascular: DP/PT 0/4, B/L, Temperature gradient warm to cool, B/L.   Neuro: Epicritic sensation diminished to the level of digits, B/L.  Musculoskeletal/Ortho: s/p R BKA  Skin: 12/18 s/p left foot transmetatarsal amputation w/ tendoachilles lengthening, closed: sutures intact, flaps warm and viable, no hematoma expressed, no acute signs of infection.     RADIOLOGY & ADDITIONAL TESTS:

## 2023-12-21 LAB
ANION GAP SERPL CALC-SCNC: 11 MMOL/L — SIGNIFICANT CHANGE UP (ref 5–17)
ANION GAP SERPL CALC-SCNC: 11 MMOL/L — SIGNIFICANT CHANGE UP (ref 5–17)
BUN SERPL-MCNC: 17 MG/DL — SIGNIFICANT CHANGE UP (ref 7–23)
BUN SERPL-MCNC: 17 MG/DL — SIGNIFICANT CHANGE UP (ref 7–23)
CALCIUM SERPL-MCNC: 9.1 MG/DL — SIGNIFICANT CHANGE UP (ref 8.4–10.5)
CALCIUM SERPL-MCNC: 9.1 MG/DL — SIGNIFICANT CHANGE UP (ref 8.4–10.5)
CHLORIDE SERPL-SCNC: 102 MMOL/L — SIGNIFICANT CHANGE UP (ref 96–108)
CHLORIDE SERPL-SCNC: 102 MMOL/L — SIGNIFICANT CHANGE UP (ref 96–108)
CO2 SERPL-SCNC: 22 MMOL/L — SIGNIFICANT CHANGE UP (ref 22–31)
CO2 SERPL-SCNC: 22 MMOL/L — SIGNIFICANT CHANGE UP (ref 22–31)
CREAT SERPL-MCNC: 1.01 MG/DL — SIGNIFICANT CHANGE UP (ref 0.5–1.3)
CREAT SERPL-MCNC: 1.01 MG/DL — SIGNIFICANT CHANGE UP (ref 0.5–1.3)
EGFR: 79 ML/MIN/1.73M2 — SIGNIFICANT CHANGE UP
EGFR: 79 ML/MIN/1.73M2 — SIGNIFICANT CHANGE UP
GLUCOSE BLDC GLUCOMTR-MCNC: 164 MG/DL — HIGH (ref 70–99)
GLUCOSE BLDC GLUCOMTR-MCNC: 164 MG/DL — HIGH (ref 70–99)
GLUCOSE BLDC GLUCOMTR-MCNC: 192 MG/DL — HIGH (ref 70–99)
GLUCOSE BLDC GLUCOMTR-MCNC: 192 MG/DL — HIGH (ref 70–99)
GLUCOSE BLDC GLUCOMTR-MCNC: 287 MG/DL — HIGH (ref 70–99)
GLUCOSE BLDC GLUCOMTR-MCNC: 287 MG/DL — HIGH (ref 70–99)
GLUCOSE BLDC GLUCOMTR-MCNC: 302 MG/DL — HIGH (ref 70–99)
GLUCOSE BLDC GLUCOMTR-MCNC: 302 MG/DL — HIGH (ref 70–99)
GLUCOSE SERPL-MCNC: 186 MG/DL — HIGH (ref 70–99)
GLUCOSE SERPL-MCNC: 186 MG/DL — HIGH (ref 70–99)
HCT VFR BLD CALC: 28 % — LOW (ref 39–50)
HCT VFR BLD CALC: 28 % — LOW (ref 39–50)
HGB BLD-MCNC: 9 G/DL — LOW (ref 13–17)
HGB BLD-MCNC: 9 G/DL — LOW (ref 13–17)
MCHC RBC-ENTMCNC: 28.8 PG — SIGNIFICANT CHANGE UP (ref 27–34)
MCHC RBC-ENTMCNC: 28.8 PG — SIGNIFICANT CHANGE UP (ref 27–34)
MCHC RBC-ENTMCNC: 32.1 GM/DL — SIGNIFICANT CHANGE UP (ref 32–36)
MCHC RBC-ENTMCNC: 32.1 GM/DL — SIGNIFICANT CHANGE UP (ref 32–36)
MCV RBC AUTO: 89.5 FL — SIGNIFICANT CHANGE UP (ref 80–100)
MCV RBC AUTO: 89.5 FL — SIGNIFICANT CHANGE UP (ref 80–100)
NRBC # BLD: 0 /100 WBCS — SIGNIFICANT CHANGE UP (ref 0–0)
NRBC # BLD: 0 /100 WBCS — SIGNIFICANT CHANGE UP (ref 0–0)
PLATELET # BLD AUTO: 189 K/UL — SIGNIFICANT CHANGE UP (ref 150–400)
PLATELET # BLD AUTO: 189 K/UL — SIGNIFICANT CHANGE UP (ref 150–400)
POTASSIUM SERPL-MCNC: 3.7 MMOL/L — SIGNIFICANT CHANGE UP (ref 3.5–5.3)
POTASSIUM SERPL-MCNC: 3.7 MMOL/L — SIGNIFICANT CHANGE UP (ref 3.5–5.3)
POTASSIUM SERPL-SCNC: 3.7 MMOL/L — SIGNIFICANT CHANGE UP (ref 3.5–5.3)
POTASSIUM SERPL-SCNC: 3.7 MMOL/L — SIGNIFICANT CHANGE UP (ref 3.5–5.3)
RBC # BLD: 3.13 M/UL — LOW (ref 4.2–5.8)
RBC # BLD: 3.13 M/UL — LOW (ref 4.2–5.8)
RBC # FLD: 13 % — SIGNIFICANT CHANGE UP (ref 10.3–14.5)
RBC # FLD: 13 % — SIGNIFICANT CHANGE UP (ref 10.3–14.5)
SODIUM SERPL-SCNC: 135 MMOL/L — SIGNIFICANT CHANGE UP (ref 135–145)
SODIUM SERPL-SCNC: 135 MMOL/L — SIGNIFICANT CHANGE UP (ref 135–145)
WBC # BLD: 9.11 K/UL — SIGNIFICANT CHANGE UP (ref 3.8–10.5)
WBC # BLD: 9.11 K/UL — SIGNIFICANT CHANGE UP (ref 3.8–10.5)
WBC # FLD AUTO: 9.11 K/UL — SIGNIFICANT CHANGE UP (ref 3.8–10.5)
WBC # FLD AUTO: 9.11 K/UL — SIGNIFICANT CHANGE UP (ref 3.8–10.5)

## 2023-12-21 RX ADMIN — AMLODIPINE BESYLATE 2.5 MILLIGRAM(S): 2.5 TABLET ORAL at 05:43

## 2023-12-21 RX ADMIN — PIPERACILLIN AND TAZOBACTAM 25 GRAM(S): 4; .5 INJECTION, POWDER, LYOPHILIZED, FOR SOLUTION INTRAVENOUS at 01:11

## 2023-12-21 RX ADMIN — Medication 650 MILLIGRAM(S): at 20:53

## 2023-12-21 RX ADMIN — PIPERACILLIN AND TAZOBACTAM 25 GRAM(S): 4; .5 INJECTION, POWDER, LYOPHILIZED, FOR SOLUTION INTRAVENOUS at 17:29

## 2023-12-21 RX ADMIN — Medication 1: at 17:28

## 2023-12-21 RX ADMIN — ATORVASTATIN CALCIUM 40 MILLIGRAM(S): 80 TABLET, FILM COATED ORAL at 22:21

## 2023-12-21 RX ADMIN — LOSARTAN POTASSIUM 25 MILLIGRAM(S): 100 TABLET, FILM COATED ORAL at 05:43

## 2023-12-21 RX ADMIN — RIVAROXABAN 2.5 MILLIGRAM(S): KIT at 17:29

## 2023-12-21 RX ADMIN — HYDROMORPHONE HYDROCHLORIDE 0.5 MILLIGRAM(S): 2 INJECTION INTRAMUSCULAR; INTRAVENOUS; SUBCUTANEOUS at 22:21

## 2023-12-21 RX ADMIN — PIPERACILLIN AND TAZOBACTAM 25 GRAM(S): 4; .5 INJECTION, POWDER, LYOPHILIZED, FOR SOLUTION INTRAVENOUS at 22:21

## 2023-12-21 RX ADMIN — Medication 650 MILLIGRAM(S): at 21:53

## 2023-12-21 RX ADMIN — Medication 81 MILLIGRAM(S): at 11:32

## 2023-12-21 RX ADMIN — PIPERACILLIN AND TAZOBACTAM 25 GRAM(S): 4; .5 INJECTION, POWDER, LYOPHILIZED, FOR SOLUTION INTRAVENOUS at 09:00

## 2023-12-21 RX ADMIN — RIVAROXABAN 2.5 MILLIGRAM(S): KIT at 05:43

## 2023-12-21 RX ADMIN — Medication 1: at 08:59

## 2023-12-21 RX ADMIN — Medication 650 MILLIGRAM(S): at 12:32

## 2023-12-21 RX ADMIN — Medication 650 MILLIGRAM(S): at 11:32

## 2023-12-21 RX ADMIN — HYDROMORPHONE HYDROCHLORIDE 0.5 MILLIGRAM(S): 2 INJECTION INTRAMUSCULAR; INTRAVENOUS; SUBCUTANEOUS at 23:21

## 2023-12-21 RX ADMIN — Medication 4: at 13:05

## 2023-12-21 RX ADMIN — CHLORHEXIDINE GLUCONATE 1 APPLICATION(S): 213 SOLUTION TOPICAL at 13:03

## 2023-12-21 NOTE — PROGRESS NOTE ADULT - ASSESSMENT
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     LLE gangrene with extension to 4th toe with OM  - L foot 2nd and 3rd digit dorsal full thickness gangrene to level of MTPJ, w/ wet conversion, plantar 2nd and 3rd digit dusky and early ischemic changes, mild serous drainage, mild malodor, early dusky changes of fourth metatarsal.  - L foot MRI with findings c/w septic arthritis and OM, no drainable abscess   - L foot abscess culture with moderate Serratia liquefaciens sensitives reviewed), Corynebacterium amycolatum, Finegoldia magna, also noted with Stenotrophomonas  - 12/15 s/p OR for LLE angiogram and angioplasty with vascular   - 12/19 s/p L foot I&D, s/p TMA    - brief op note reviewed - no evidence of pus or soft tissue infection, low c/f residual infection, bone was hard at level of resection and appeared vitalized intra-op  - 12/20 febrile to 100.6F x1, restarted on pip-tazo   - afebrile today, no new complaints or focal findings on exam    Recommendations:   Follow 12/20 Bcx - in process   Follow OR culture (NGTD) and biopsy   Continue pip-tazo pending above   Continue local care per Podiatry      Azucena Castillo M.D.  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     LLE gangrene with extension to 4th toe with OM  - L foot 2nd and 3rd digit dorsal full thickness gangrene to level of MTPJ, w/ wet conversion, plantar 2nd and 3rd digit dusky and early ischemic changes, mild serous drainage, mild malodor, early dusky changes of fourth metatarsal.  - L foot MRI with findings c/w septic arthritis and OM, no drainable abscess   - L foot abscess culture with moderate Serratia liquefaciens sensitives reviewed), Corynebacterium amycolatum, Finegoldia magna, also noted with Stenotrophomonas  - 12/15 s/p OR for LLE angiogram and angioplasty with vascular   - 12/19 s/p L foot I&D, s/p TMA    - brief op note reviewed - no evidence of pus or soft tissue infection, low c/f residual infection, bone was hard at level of resection and appeared vitalized intra-op  - 12/20 febrile to 100.6F x1, restarted on pip-tazo   - afebrile today, no new complaints or focal findings on exam    Recommendations:   Follow 12/20 Bcx - in process   Follow OR culture (NGTD) and biopsy   Continue pip-tazo pending above   Continue local care per Podiatry      Azucena Castillo M.D.  [facility], Division of Infectious Diseases  [phone]  After 5pm on weekdays and all day on weekends - please call [phone]

## 2023-12-21 NOTE — PROGRESS NOTE ADULT - SUBJECTIVE AND OBJECTIVE BOX
HPI:   73 y/o M with PMHx of DM, HTN, PAD and PSHX of R BKA presents to ED at recommendation of outpatient podiatrist. Patient was recently admitted and being followed by vascular and podiatry for wounds to the L foot. Was noted to have dry gangrene of L 2nd/3rd toes. [place] due to extent of vascular disease, patient would not heal if amputation was done. Was told to f/u with vascular as an outpatient. Saw podiatry yesterday, due to gangrene extending to 4th L toe. Recommended to come in for abx and possible procedure with podiatry. Patient is endorsing L toe pain. 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 1000 milliLiter(s) (50 mL/Hr) IV Continuous <Continuous>  dextrose 5%. 1000 milliLiter(s) (100 mL/Hr) IV Continuous <Continuous>  glucagon  Injectable 1 milliGRAM(s) IntraMuscular once  insulin lispro (ADMELOG) corrective regimen sliding scale   SubCutaneous Before meals and at bedtime  piperacillin/tazobactam IVPB.. 3.375 Gram(s) IV Intermittent every 8 hours  rivaroxaban 2.5 milliGRAM(s) Oral two times a day  sodium chloride 0.9%. 1000 milliLiter(s) (75 mL/Hr) IV Continuous <Continuous>    MEDICATIONS  (PRN):  acetaminophen     Tablet .. 650 milliGRAM(s) Oral every 6 hours PRN Mild Pain (1 - 3)  HYDROmorphone  Injectable 0.5 milliGRAM(s) IV Push every 4 hours PRN Severe Pain (7 - 10)  oxycodone    5 mG/acetaminophen 325 mG 1 Tablet(s) Oral every 4 hours PRN Moderate Pain (4 - 6)  Review of Systems:   CONSTITUTIONAL: No fever, weight loss, or fatigue  EYES: No eye pain, visual disturbances, or discharge  ENMT:  No difficulty hearing, tinnitus, vertigo; No sinus or throat pain  NECK: No pain or stiffness  BREASTS: No pain, masses, or nipple discharge  RESPIRATORY: No cough, wheezing, chills or hemoptysis; No shortness of breath  CARDIOVASCULAR: No chest pain, palpitations, dizziness, or leg swelling  GASTROINTESTINAL: No abdominal or epigastric pain. No nausea, vomiting, or hematemesis; No diarrhea or constipation. No melena or hematochezia.  GENITOURINARY: No dysuria, frequency, hematuria, or incontinence  NEUROLOGICAL: No headaches, memory loss, loss of strength, numbness, or tremors  SKIN: No itching, burning, rashes, or lesions   LYMPH NODES: No enlarged glands  ENDOCRINE: No heat or cold intolerance; No hair loss  MUSCULOSKELETAL: No joint pain or swelling; No muscle, back, or extremity pain  PSYCHIATRIC: No depression, anxiety, mood swings, or difficulty sleeping  HEME/LYMPH: No easy bruising, or bleeding gums  ALLERY AND IMMUNOLOGIC: No hives or eczema    Allergies    No Known Allergies    Intolerances        Social History:             PHYSICAL EXAM:  GENERAL: NAD, well-developed  HEAD:  Atraumatic, Normocephalic  EYES: EOMI, PERRLA, conjunctiva and sclera clear  NECK: Supple, No JVD  CHEST/LUNG: Clear to auscultation bilaterally; No wheeze  HEART: Regular rate and rhythm; No murmurs, rubs, or gallops  ABDOMEN: Soft, Nontender, Nondistended; Bowel sounds present  EXTREMITIES:  Left foot abscess   PSYCH: AAOx3  NEUROLOGY: non-focal  SKIN: No rashes or lesions                                                          8.3    6.65  )-----------( 181      ( 19 Dec 2023 07:17 )             26.0           LIVER FUNCTIONS - ( 19 Dec 2023 09:56 )  Alb: 3.5 g/dL / Pro: 7.2 g/dL / ALK PHOS: 69 U/L / ALT: 21 U/L / AST: 12 U/L / GGT: x           PT/INR - ( 18 Dec 2023 06:49 )   PT: 11.9 sec;   INR: 1.14 ratio         PTT - ( 18 Dec 2023 06:49 )  PTT:30.5 sec  135|102|21<180  3.8|23|0.95  9.1,--,--  12-19 @ 14:42  130|88|15<639  3.5|21|0.89  8.2,1.8,2.7  12-19 @ 09:56  141|115|13<136  2.9|19|0.73  6.3,--,--  12-19 @ 07:12      CAPILLARY BLOOD GLUCOSE      POCT Blood Glucose.: 171 mg/dL (19 Dec 2023 21:06)  POCT Blood Glucose.: 214 mg/dL (19 Dec 2023 16:49)  POCT Blood Glucose.: 255 mg/dL (19 Dec 2023 12:46)  POCT Blood Glucose.: 314 mg/dL (19 Dec 2023 11:15)  POCT Blood Glucose.: 217 mg/dL (19 Dec 2023 08:31)   HPI:   71 y/o M with PMHx of DM, HTN, PAD and PSHX of R BKA presents to ED at recommendation of outpatient podiatrist. Patient was recently admitted and being followed by vascular and podiatry for wounds to the L foot. Was noted to have dry gangrene of L 2nd/3rd toes. [place] due to extent of vascular disease, patient would not heal if amputation was done. Was told to f/u with vascular as an outpatient. Saw podiatry yesterday, due to gangrene extending to 4th L toe. Recommended to come in for abx and possible procedure with podiatry. Patient is endorsing L toe pain. 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 1000 milliLiter(s) (50 mL/Hr) IV Continuous <Continuous>  dextrose 5%. 1000 milliLiter(s) (100 mL/Hr) IV Continuous <Continuous>  glucagon  Injectable 1 milliGRAM(s) IntraMuscular once  insulin lispro (ADMELOG) corrective regimen sliding scale   SubCutaneous Before meals and at bedtime  piperacillin/tazobactam IVPB.. 3.375 Gram(s) IV Intermittent every 8 hours  rivaroxaban 2.5 milliGRAM(s) Oral two times a day  sodium chloride 0.9%. 1000 milliLiter(s) (75 mL/Hr) IV Continuous <Continuous>    MEDICATIONS  (PRN):  acetaminophen     Tablet .. 650 milliGRAM(s) Oral every 6 hours PRN Mild Pain (1 - 3)  HYDROmorphone  Injectable 0.5 milliGRAM(s) IV Push every 4 hours PRN Severe Pain (7 - 10)  oxycodone    5 mG/acetaminophen 325 mG 1 Tablet(s) Oral every 4 hours PRN Moderate Pain (4 - 6)  Review of Systems:   CONSTITUTIONAL: No fever, weight loss, or fatigue  EYES: No eye pain, visual disturbances, or discharge  ENMT:  No difficulty hearing, tinnitus, vertigo; No sinus or throat pain  NECK: No pain or stiffness  BREASTS: No pain, masses, or nipple discharge  RESPIRATORY: No cough, wheezing, chills or hemoptysis; No shortness of breath  CARDIOVASCULAR: No chest pain, palpitations, dizziness, or leg swelling  GASTROINTESTINAL: No abdominal or epigastric pain. No nausea, vomiting, or hematemesis; No diarrhea or constipation. No melena or hematochezia.  GENITOURINARY: No dysuria, frequency, hematuria, or incontinence  NEUROLOGICAL: No headaches, memory loss, loss of strength, numbness, or tremors  SKIN: No itching, burning, rashes, or lesions   LYMPH NODES: No enlarged glands  ENDOCRINE: No heat or cold intolerance; No hair loss  MUSCULOSKELETAL: No joint pain or swelling; No muscle, back, or extremity pain  PSYCHIATRIC: No depression, anxiety, mood swings, or difficulty sleeping  HEME/LYMPH: No easy bruising, or bleeding gums  ALLERY AND IMMUNOLOGIC: No hives or eczema    Allergies    No Known Allergies    Intolerances        Social History:             PHYSICAL EXAM:  GENERAL: NAD, well-developed  HEAD:  Atraumatic, Normocephalic  EYES: EOMI, PERRLA, conjunctiva and sclera clear  NECK: Supple, No JVD  CHEST/LUNG: Clear to auscultation bilaterally; No wheeze  HEART: Regular rate and rhythm; No murmurs, rubs, or gallops  ABDOMEN: Soft, Nontender, Nondistended; Bowel sounds present  EXTREMITIES:  Left foot abscess   PSYCH: AAOx3  NEUROLOGY: non-focal  SKIN: No rashes or lesions                                                          8.3    6.65  )-----------( 181      ( 19 Dec 2023 07:17 )             26.0           LIVER FUNCTIONS - ( 19 Dec 2023 09:56 )  Alb: 3.5 g/dL / Pro: 7.2 g/dL / ALK PHOS: 69 U/L / ALT: 21 U/L / AST: 12 U/L / GGT: x           PT/INR - ( 18 Dec 2023 06:49 )   PT: 11.9 sec;   INR: 1.14 ratio         PTT - ( 18 Dec 2023 06:49 )  PTT:30.5 sec  135|102|21<180  3.8|23|0.95  9.1,--,--  12-19 @ 14:42  130|88|15<639  3.5|21|0.89  8.2,1.8,2.7  12-19 @ 09:56  141|115|13<136  2.9|19|0.73  6.3,--,--  12-19 @ 07:12      CAPILLARY BLOOD GLUCOSE      POCT Blood Glucose.: 171 mg/dL (19 Dec 2023 21:06)  POCT Blood Glucose.: 214 mg/dL (19 Dec 2023 16:49)  POCT Blood Glucose.: 255 mg/dL (19 Dec 2023 12:46)  POCT Blood Glucose.: 314 mg/dL (19 Dec 2023 11:15)  POCT Blood Glucose.: 217 mg/dL (19 Dec 2023 08:31)

## 2023-12-21 NOTE — PROGRESS NOTE ADULT - SUBJECTIVE AND OBJECTIVE BOX
[facility] DIV[facility] OF INFECTIOUS DISEASES  ERVIN Florentino Y. Patel, S. Shah, G. Ervin  [phone]  ([phone] - weekdays after 5pm and weekends)    Name: ALEE DUNN  Age/Gender: 72y Male  MRN: [id number]    Interval History:  Patient seen and examined this morning.   States he felt warm with fever 100.6F yesterday.  No fevers or chills overnight, no other complaints.   Notes reviewed  Allergies: No Known Allergies      Objective:  Vitals:   T(F): 98.8 (12-21-23 @ 11:24), Max: 100.6 (12-20-23 @ 16:33)  HR: 77 (12-21-23 @ 11:24) (77 - 89)  BP: 137/74 (12-21-23 @ 11:24) (137/74 - 175/80)  RR: 18 (12-21-23 @ 11:24) (18 - 20)  SpO2: 99% (12-21-23 @ 11:24) (96% - 99%)  Physical Examination:  General: no acute distress, nontoxic appearing  HEENT: NC/AT, anicteric, neck supple  Respiratory: clear to auscultation bilaterally  Cardiovascular: S1 and S2 present, normal rate   Gastrointestinal: soft, nontender, nondistended  Neuro: AAOx3, no obvious focal deficits   Extremities: R BKA; LLE dressing with boot   Skin: no visible rash    Laboratory Studies:  CBC:                       9.0    9.11  )-----------( 189      ( 21 Dec 2023 07:15 )             28.0     WBC Trend:  9.11 12-21-23 @ 07:15  8.17 12-20-23 @ 10:12  7.34 12-20-23 @ 06:52  6.65 12-19-23 @ 07:17  6.32 12-18-23 @ 06:49  7.67 12-16-23 @ 07:23  6.77 12-15-23 @ 05:34    CMP: 12-21    135  |  102  |  17  ----------------------------<  186<H>  3.7   |  22  |  1.01    Ca    9.1      21 Dec 2023 07:15  Phos  1.7     12-20  Mg     1.2     12-20    Creatinine: 1.01 mg/dL (12-21-23 @ 07:15)  Creatinine: 0.89 mg/dL (12-20-23 @ 10:12)  Creatinine: 0.58 mg/dL (12-20-23 @ 06:52)  Creatinine: 0.95 mg/dL (12-19-23 @ 14:42)  Creatinine: 0.89 mg/dL (12-19-23 @ 09:56)  Creatinine: 0.73 mg/dL (12-19-23 @ 07:12)  Creatinine: 1.14 mg/dL (12-18-23 @ 06:47)  Creatinine: 1.09 mg/dL (12-16-23 @ 07:22)  Creatinine: 1.06 mg/dL (12-15-23 @ 05:34)  Creatinine: 1.07 mg/dL (12-15-23 @ 04:25)    Microbiology: reviewed   Culture - Tissue with Gram Stain (collected 12-18-23 @ 11:33)  Source: Bone 2nd lt meterasel clean bone culture  Gram Stain (12-18-23 @ 23:43):    No polymorphonuclear leukocytes per low power field    No organisms seen per oil power field  Preliminary Report (12-19-23 @ 13:17):    No growth    Culture - Abscess with Gram Stain (collected 12-09-23 @ 10:45)  Source: .Abscess left foot  Final Report (12-16-23 @ 14:31):    Moderate Serratia liquefaciens    Numerous Corynebacterium amycolatum "Susceptibilities not performed"    Moderate Finegoldia magna "Susceptibilities not performed"    Few Stenotrophomonas maltophilia  Organism: Serratia liquefaciens  Stenotrophomonas maltophilia (12-16-23 @ 14:31)  Organism: Stenotrophomonas maltophilia (12-16-23 @ 14:30)      Method Type: AMI      -  Levofloxacin: S <=0.5      -  Trimethoprim/Sulfamethoxazole: S <=0.5/9.5  Organism: Serratia liquefaciens (12-14-23 @ 16:47)      Method Type: CarbaR      -  Resistance Gene to Carbapenem: Nondet  Organism: Serratia liquefaciens (12-14-23 @ 16:47)      Method Type: AMI      -  Amoxicillin/Clavulanic Acid: R >16/8      -  Ampicillin: R >16 These ampicillin results predict results for amoxicillin      -  Ampicillin/Sulbactam: I 16/8      -  Aztreonam: R >16      -  Cefazolin: R >16      -  Cefepime: R >16      -  Cefoxitin: R >16      -  Ceftolozane/tazobactam: S <=2      -  Ceftriaxone: R >32      -  Ciprofloxacin: R 1      -  Ertapenem: R >1      -  Gentamicin: R 8      -  Levofloxacin: S <=0.5      -  Meropenem: R >8      -  Piperacillin/Tazobactam: S <=8      -  Tobramycin: R 8      -  Trimethoprim/Sulfamethoxazole: S <=0.5/9.5    Culture - Blood (collected 12-09-23 @ 09:05)  Source: .Blood Blood-Peripheral  Final Report (12-14-23 @ 14:01):    No growth at 5 days    Culture - Blood (collected 12-09-23 @ 09:00)  Source: .Blood Blood-Peripheral  Final Report (12-14-23 @ 14:01):    No growth at 5 days    Radiology: reviewed     Medications:  acetaminophen     Tablet .. 650 milliGRAM(s) Oral every 6 hours PRN  amLODIPine   Tablet 2.5 milliGRAM(s) Oral daily  aspirin enteric coated 81 milliGRAM(s) Oral daily  atorvastatin 40 milliGRAM(s) Oral at bedtime  chlorhexidine 2% Cloths 1 Application(s) Topical daily  dextrose 5%. 1000 milliLiter(s) IV Continuous <Continuous>  dextrose 5%. 1000 milliLiter(s) IV Continuous <Continuous>  glucagon  Injectable 1 milliGRAM(s) IntraMuscular once  HYDROmorphone  Injectable 0.5 milliGRAM(s) IV Push every 4 hours PRN  insulin lispro (ADMELOG) corrective regimen sliding scale   SubCutaneous Before meals and at bedtime  losartan 25 milliGRAM(s) Oral daily  oxycodone    5 mG/acetaminophen 325 mG 1 Tablet(s) Oral every 4 hours PRN  piperacillin/tazobactam IVPB.. 3.375 Gram(s) IV Intermittent every 8 hours  rivaroxaban 2.5 milliGRAM(s) Oral two times a day    Current Antimicrobials:  piperacillin/tazobactam IVPB.. 3.375 Gram(s) IV Intermittent every 8 hours    Prior/Completed Antimicrobials:  piperacillin/tazobactam IVPB...  vancomycin  IVPB.   [facility]EAS[facility]  ERVIN Florentino Y. Patel, S. Shah, G. Ervin  [phone]  ([phone] - weekdays after 5pm and weekends)    Name: ALEE DUNN  Age/Gender: 72y Male  MRN: [id number]    Interval History:  Patient seen and examined this morning.   States he felt warm with fever 100.6F yesterday.  No fevers or chills overnight, no other complaints.   Notes reviewed  Allergies: No Known Allergies      Objective:  Vitals:   T(F): 98.8 (12-21-23 @ 11:24), Max: 100.6 (12-20-23 @ 16:33)  HR: 77 (12-21-23 @ 11:24) (77 - 89)  BP: 137/74 (12-21-23 @ 11:24) (137/74 - 175/80)  RR: 18 (12-21-23 @ 11:24) (18 - 20)  SpO2: 99% (12-21-23 @ 11:24) (96% - 99%)  Physical Examination:  General: no acute distress, nontoxic appearing  HEENT: NC/AT, anicteric, neck supple  Respiratory: clear to auscultation bilaterally  Cardiovascular: S1 and S2 present, normal rate   Gastrointestinal: soft, nontender, nondistended  Neuro: AAOx3, no obvious focal deficits   Extremities: R BKA; LLE dressing with boot   Skin: no visible rash    Laboratory Studies:  CBC:                       9.0    9.11  )-----------( 189      ( 21 Dec 2023 07:15 )             28.0     WBC Trend:  9.11 12-21-23 @ 07:15  8.17 12-20-23 @ 10:12  7.34 12-20-23 @ 06:52  6.65 12-19-23 @ 07:17  6.32 12-18-23 @ 06:49  7.67 12-16-23 @ 07:23  6.77 12-15-23 @ 05:34    CMP: 12-21    135  |  102  |  17  ----------------------------<  186<H>  3.7   |  22  |  1.01    Ca    9.1      21 Dec 2023 07:15  Phos  1.7     12-20  Mg     1.2     12-20    Creatinine: 1.01 mg/dL (12-21-23 @ 07:15)  Creatinine: 0.89 mg/dL (12-20-23 @ 10:12)  Creatinine: 0.58 mg/dL (12-20-23 @ 06:52)  Creatinine: 0.95 mg/dL (12-19-23 @ 14:42)  Creatinine: 0.89 mg/dL (12-19-23 @ 09:56)  Creatinine: 0.73 mg/dL (12-19-23 @ 07:12)  Creatinine: 1.14 mg/dL (12-18-23 @ 06:47)  Creatinine: 1.09 mg/dL (12-16-23 @ 07:22)  Creatinine: 1.06 mg/dL (12-15-23 @ 05:34)  Creatinine: 1.07 mg/dL (12-15-23 @ 04:25)    Microbiology: reviewed   Culture - Tissue with Gram Stain (collected 12-18-23 @ 11:33)  Source: Bone 2nd lt meterasel clean bone culture  Gram Stain (12-18-23 @ 23:43):    No polymorphonuclear leukocytes per low power field    No organisms seen per oil power field  Preliminary Report (12-19-23 @ 13:17):    No growth    Culture - Abscess with Gram Stain (collected 12-09-23 @ 10:45)  Source: .Abscess left foot  Final Report (12-16-23 @ 14:31):    Moderate Serratia liquefaciens    Numerous Corynebacterium amycolatum "Susceptibilities not performed"    Moderate Finegoldia magna "Susceptibilities not performed"    Few Stenotrophomonas maltophilia  Organism: Serratia liquefaciens  Stenotrophomonas maltophilia (12-16-23 @ 14:31)  Organism: Stenotrophomonas maltophilia (12-16-23 @ 14:30)      Method Type: AMI      -  Levofloxacin: S <=0.5      -  Trimethoprim/Sulfamethoxazole: S <=0.5/9.5  Organism: Serratia liquefaciens (12-14-23 @ 16:47)      Method Type: CarbaR      -  Resistance Gene to Carbapenem: Nondet  Organism: Serratia liquefaciens (12-14-23 @ 16:47)      Method Type: AMI      -  Amoxicillin/Clavulanic Acid: R >16/8      -  Ampicillin: R >16 These ampicillin results predict results for amoxicillin      -  Ampicillin/Sulbactam: I 16/8      -  Aztreonam: R >16      -  Cefazolin: R >16      -  Cefepime: R >16      -  Cefoxitin: R >16      -  Ceftolozane/tazobactam: S <=2      -  Ceftriaxone: R >32      -  Ciprofloxacin: R 1      -  Ertapenem: R >1      -  Gentamicin: R 8      -  Levofloxacin: S <=0.5      -  Meropenem: R >8      -  Piperacillin/Tazobactam: S <=8      -  Tobramycin: R 8      -  Trimethoprim/Sulfamethoxazole: S <=0.5/9.5    Culture - Blood (collected 12-09-23 @ 09:05)  Source: .Blood Blood-Peripheral  Final Report (12-14-23 @ 14:01):    No growth at 5 days    Culture - Blood (collected 12-09-23 @ 09:00)  Source: .Blood Blood-Peripheral  Final Report (12-14-23 @ 14:01):    No growth at 5 days    Radiology: reviewed     Medications:  acetaminophen     Tablet .. 650 milliGRAM(s) Oral every 6 hours PRN  amLODIPine   Tablet 2.5 milliGRAM(s) Oral daily  aspirin enteric coated 81 milliGRAM(s) Oral daily  atorvastatin 40 milliGRAM(s) Oral at bedtime  chlorhexidine 2% Cloths 1 Application(s) Topical daily  dextrose 5%. 1000 milliLiter(s) IV Continuous <Continuous>  dextrose 5%. 1000 milliLiter(s) IV Continuous <Continuous>  glucagon  Injectable 1 milliGRAM(s) IntraMuscular once  HYDROmorphone  Injectable 0.5 milliGRAM(s) IV Push every 4 hours PRN  insulin lispro (ADMELOG) corrective regimen sliding scale   SubCutaneous Before meals and at bedtime  losartan 25 milliGRAM(s) Oral daily  oxycodone    5 mG/acetaminophen 325 mG 1 Tablet(s) Oral every 4 hours PRN  piperacillin/tazobactam IVPB.. 3.375 Gram(s) IV Intermittent every 8 hours  rivaroxaban 2.5 milliGRAM(s) Oral two times a day    Current Antimicrobials:  piperacillin/tazobactam IVPB.. 3.375 Gram(s) IV Intermittent every 8 hours    Prior/Completed Antimicrobials:  piperacillin/tazobactam IVPB...  vancomycin  IVPB.

## 2023-12-21 NOTE — PROGRESS NOTE ADULT - ASSESSMENT
73 y/o M with PMHx of DM, HTN, PAD and PSHX of R BKA presents to ED at recommendation of outpatient podiatrist.     Left 4 th toe infection s/p TMA     Fevers Zosyn   Follow up blood cx     PAD   Zosyn    Left Foot MR: OM of 2nd middle phalanx, OM of 2nd proximal phalanx, OM of  third proximal phalanx, OM of fourth proximal interphalangeal joint, OM of base of the fourth proximal phalanx, OM of fifth proximal interphalangeal joint.  s/p Angiogram with angioplasty     TMA Patient medically optimized for the procedure   - [facility] recs, appreciated.  - L foot abscess culture with moderate Serratia liquefaciens   - Bcx NGTD x2   - Podiatry and Vascular surgery following, recs noted   - afebrile, WBC wnl       DM HISS   A1C 7.2    HTN Home meds     71 y/o M with PMHx of DM, HTN, PAD and PSHX of R BKA presents to ED at recommendation of outpatient podiatrist.     Left 4 th toe infection s/p TMA     Fevers Zosyn   Follow up blood cx     PAD   Zosyn    Left Foot MR: OM of 2nd middle phalanx, OM of 2nd proximal phalanx, OM of  third proximal phalanx, OM of fourth proximal interphalangeal joint, OM of base of the fourth proximal phalanx, OM of fifth proximal interphalangeal joint.  s/p Angiogram with angioplasty     TMA Patient medically optimized for the procedure   - [facility] recs, appreciated.  - L foot abscess culture with moderate Serratia liquefaciens   - Bcx NGTD x2   - Podiatry and Vascular surgery following, recs noted   - afebrile, WBC wnl       DM HISS   A1C 7.2    HTN Home meds

## 2023-12-22 VITALS
HEART RATE: 75 BPM | SYSTOLIC BLOOD PRESSURE: 154 MMHG | RESPIRATION RATE: 16 BRPM | DIASTOLIC BLOOD PRESSURE: 70 MMHG | TEMPERATURE: 99 F | OXYGEN SATURATION: 97 %

## 2023-12-22 LAB
ALBUMIN SERPL ELPH-MCNC: 2.8 G/DL — LOW (ref 3.3–5)
ALBUMIN SERPL ELPH-MCNC: 2.8 G/DL — LOW (ref 3.3–5)
ALP SERPL-CCNC: 69 U/L — SIGNIFICANT CHANGE UP (ref 40–120)
ALP SERPL-CCNC: 69 U/L — SIGNIFICANT CHANGE UP (ref 40–120)
ALT FLD-CCNC: 21 U/L — SIGNIFICANT CHANGE UP (ref 10–45)
ALT FLD-CCNC: 21 U/L — SIGNIFICANT CHANGE UP (ref 10–45)
ANION GAP SERPL CALC-SCNC: 11 MMOL/L — SIGNIFICANT CHANGE UP (ref 5–17)
ANION GAP SERPL CALC-SCNC: 11 MMOL/L — SIGNIFICANT CHANGE UP (ref 5–17)
AST SERPL-CCNC: 18 U/L — SIGNIFICANT CHANGE UP (ref 10–40)
AST SERPL-CCNC: 18 U/L — SIGNIFICANT CHANGE UP (ref 10–40)
BASOPHILS # BLD AUTO: 0.01 K/UL — SIGNIFICANT CHANGE UP (ref 0–0.2)
BASOPHILS # BLD AUTO: 0.01 K/UL — SIGNIFICANT CHANGE UP (ref 0–0.2)
BASOPHILS NFR BLD AUTO: 0.2 % — SIGNIFICANT CHANGE UP (ref 0–2)
BASOPHILS NFR BLD AUTO: 0.2 % — SIGNIFICANT CHANGE UP (ref 0–2)
BILIRUB SERPL-MCNC: 0.3 MG/DL — SIGNIFICANT CHANGE UP (ref 0.2–1.2)
BILIRUB SERPL-MCNC: 0.3 MG/DL — SIGNIFICANT CHANGE UP (ref 0.2–1.2)
BUN SERPL-MCNC: 19 MG/DL — SIGNIFICANT CHANGE UP (ref 7–23)
BUN SERPL-MCNC: 19 MG/DL — SIGNIFICANT CHANGE UP (ref 7–23)
CALCIUM SERPL-MCNC: 8.4 MG/DL — SIGNIFICANT CHANGE UP (ref 8.4–10.5)
CALCIUM SERPL-MCNC: 8.4 MG/DL — SIGNIFICANT CHANGE UP (ref 8.4–10.5)
CHLORIDE SERPL-SCNC: 102 MMOL/L — SIGNIFICANT CHANGE UP (ref 96–108)
CHLORIDE SERPL-SCNC: 102 MMOL/L — SIGNIFICANT CHANGE UP (ref 96–108)
CO2 SERPL-SCNC: 23 MMOL/L — SIGNIFICANT CHANGE UP (ref 22–31)
CO2 SERPL-SCNC: 23 MMOL/L — SIGNIFICANT CHANGE UP (ref 22–31)
CREAT SERPL-MCNC: 1.09 MG/DL — SIGNIFICANT CHANGE UP (ref 0.5–1.3)
CREAT SERPL-MCNC: 1.09 MG/DL — SIGNIFICANT CHANGE UP (ref 0.5–1.3)
EGFR: 72 ML/MIN/1.73M2 — SIGNIFICANT CHANGE UP
EGFR: 72 ML/MIN/1.73M2 — SIGNIFICANT CHANGE UP
EOSINOPHIL # BLD AUTO: 0.13 K/UL — SIGNIFICANT CHANGE UP (ref 0–0.5)
EOSINOPHIL # BLD AUTO: 0.13 K/UL — SIGNIFICANT CHANGE UP (ref 0–0.5)
EOSINOPHIL NFR BLD AUTO: 2 % — SIGNIFICANT CHANGE UP (ref 0–6)
EOSINOPHIL NFR BLD AUTO: 2 % — SIGNIFICANT CHANGE UP (ref 0–6)
GLUCOSE BLDC GLUCOMTR-MCNC: 206 MG/DL — HIGH (ref 70–99)
GLUCOSE BLDC GLUCOMTR-MCNC: 206 MG/DL — HIGH (ref 70–99)
GLUCOSE BLDC GLUCOMTR-MCNC: 253 MG/DL — HIGH (ref 70–99)
GLUCOSE BLDC GLUCOMTR-MCNC: 253 MG/DL — HIGH (ref 70–99)
GLUCOSE BLDC GLUCOMTR-MCNC: 270 MG/DL — HIGH (ref 70–99)
GLUCOSE BLDC GLUCOMTR-MCNC: 270 MG/DL — HIGH (ref 70–99)
GLUCOSE SERPL-MCNC: 265 MG/DL — HIGH (ref 70–99)
GLUCOSE SERPL-MCNC: 265 MG/DL — HIGH (ref 70–99)
HCT VFR BLD CALC: 25.9 % — LOW (ref 39–50)
HCT VFR BLD CALC: 25.9 % — LOW (ref 39–50)
HGB BLD-MCNC: 8.1 G/DL — LOW (ref 13–17)
HGB BLD-MCNC: 8.1 G/DL — LOW (ref 13–17)
IMM GRANULOCYTES NFR BLD AUTO: 0.5 % — SIGNIFICANT CHANGE UP (ref 0–0.9)
IMM GRANULOCYTES NFR BLD AUTO: 0.5 % — SIGNIFICANT CHANGE UP (ref 0–0.9)
LYMPHOCYTES # BLD AUTO: 1.43 K/UL — SIGNIFICANT CHANGE UP (ref 1–3.3)
LYMPHOCYTES # BLD AUTO: 1.43 K/UL — SIGNIFICANT CHANGE UP (ref 1–3.3)
LYMPHOCYTES # BLD AUTO: 21.8 % — SIGNIFICANT CHANGE UP (ref 13–44)
LYMPHOCYTES # BLD AUTO: 21.8 % — SIGNIFICANT CHANGE UP (ref 13–44)
MCHC RBC-ENTMCNC: 28.3 PG — SIGNIFICANT CHANGE UP (ref 27–34)
MCHC RBC-ENTMCNC: 28.3 PG — SIGNIFICANT CHANGE UP (ref 27–34)
MCHC RBC-ENTMCNC: 31.3 GM/DL — LOW (ref 32–36)
MCHC RBC-ENTMCNC: 31.3 GM/DL — LOW (ref 32–36)
MCV RBC AUTO: 90.6 FL — SIGNIFICANT CHANGE UP (ref 80–100)
MCV RBC AUTO: 90.6 FL — SIGNIFICANT CHANGE UP (ref 80–100)
MONOCYTES # BLD AUTO: 0.71 K/UL — SIGNIFICANT CHANGE UP (ref 0–0.9)
MONOCYTES # BLD AUTO: 0.71 K/UL — SIGNIFICANT CHANGE UP (ref 0–0.9)
MONOCYTES NFR BLD AUTO: 10.8 % — SIGNIFICANT CHANGE UP (ref 2–14)
MONOCYTES NFR BLD AUTO: 10.8 % — SIGNIFICANT CHANGE UP (ref 2–14)
NEUTROPHILS # BLD AUTO: 4.25 K/UL — SIGNIFICANT CHANGE UP (ref 1.8–7.4)
NEUTROPHILS # BLD AUTO: 4.25 K/UL — SIGNIFICANT CHANGE UP (ref 1.8–7.4)
NEUTROPHILS NFR BLD AUTO: 64.7 % — SIGNIFICANT CHANGE UP (ref 43–77)
NEUTROPHILS NFR BLD AUTO: 64.7 % — SIGNIFICANT CHANGE UP (ref 43–77)
NRBC # BLD: 0 /100 WBCS — SIGNIFICANT CHANGE UP (ref 0–0)
NRBC # BLD: 0 /100 WBCS — SIGNIFICANT CHANGE UP (ref 0–0)
PLATELET # BLD AUTO: 189 K/UL — SIGNIFICANT CHANGE UP (ref 150–400)
PLATELET # BLD AUTO: 189 K/UL — SIGNIFICANT CHANGE UP (ref 150–400)
POTASSIUM SERPL-MCNC: 3.7 MMOL/L — SIGNIFICANT CHANGE UP (ref 3.5–5.3)
POTASSIUM SERPL-MCNC: 3.7 MMOL/L — SIGNIFICANT CHANGE UP (ref 3.5–5.3)
POTASSIUM SERPL-SCNC: 3.7 MMOL/L — SIGNIFICANT CHANGE UP (ref 3.5–5.3)
POTASSIUM SERPL-SCNC: 3.7 MMOL/L — SIGNIFICANT CHANGE UP (ref 3.5–5.3)
PROT SERPL-MCNC: 6.5 G/DL — SIGNIFICANT CHANGE UP (ref 6–8.3)
PROT SERPL-MCNC: 6.5 G/DL — SIGNIFICANT CHANGE UP (ref 6–8.3)
RBC # BLD: 2.86 M/UL — LOW (ref 4.2–5.8)
RBC # BLD: 2.86 M/UL — LOW (ref 4.2–5.8)
RBC # FLD: 13 % — SIGNIFICANT CHANGE UP (ref 10.3–14.5)
RBC # FLD: 13 % — SIGNIFICANT CHANGE UP (ref 10.3–14.5)
SODIUM SERPL-SCNC: 136 MMOL/L — SIGNIFICANT CHANGE UP (ref 135–145)
SODIUM SERPL-SCNC: 136 MMOL/L — SIGNIFICANT CHANGE UP (ref 135–145)
WBC # BLD: 6.56 K/UL — SIGNIFICANT CHANGE UP (ref 3.8–10.5)
WBC # BLD: 6.56 K/UL — SIGNIFICANT CHANGE UP (ref 3.8–10.5)
WBC # FLD AUTO: 6.56 K/UL — SIGNIFICANT CHANGE UP (ref 3.8–10.5)
WBC # FLD AUTO: 6.56 K/UL — SIGNIFICANT CHANGE UP (ref 3.8–10.5)

## 2023-12-22 PROCEDURE — 88311 DECALCIFY TISSUE: CPT

## 2023-12-22 PROCEDURE — C1769: CPT

## 2023-12-22 PROCEDURE — 97116 GAIT TRAINING THERAPY: CPT

## 2023-12-22 PROCEDURE — 71045 X-RAY EXAM CHEST 1 VIEW: CPT

## 2023-12-22 PROCEDURE — A9585: CPT

## 2023-12-22 PROCEDURE — 86140 C-REACTIVE PROTEIN: CPT

## 2023-12-22 PROCEDURE — 87641 MR-STAPH DNA AMP PROBE: CPT

## 2023-12-22 PROCEDURE — 84132 ASSAY OF SERUM POTASSIUM: CPT

## 2023-12-22 PROCEDURE — 87075 CULTR BACTERIA EXCEPT BLOOD: CPT

## 2023-12-22 PROCEDURE — 85652 RBC SED RATE AUTOMATED: CPT

## 2023-12-22 PROCEDURE — 97163 PT EVAL HIGH COMPLEX 45 MIN: CPT

## 2023-12-22 PROCEDURE — 85027 COMPLETE CBC AUTOMATED: CPT

## 2023-12-22 PROCEDURE — 73630 X-RAY EXAM OF FOOT: CPT

## 2023-12-22 PROCEDURE — 87640 STAPH A DNA AMP PROBE: CPT

## 2023-12-22 PROCEDURE — 87040 BLOOD CULTURE FOR BACTERIA: CPT

## 2023-12-22 PROCEDURE — C1725: CPT

## 2023-12-22 PROCEDURE — 82435 ASSAY OF BLOOD CHLORIDE: CPT

## 2023-12-22 PROCEDURE — 73720 MRI LWR EXTREMITY W/O&W/DYE: CPT | Mod: ME

## 2023-12-22 PROCEDURE — 85014 HEMATOCRIT: CPT

## 2023-12-22 PROCEDURE — 85730 THROMBOPLASTIN TIME PARTIAL: CPT

## 2023-12-22 PROCEDURE — 84295 ASSAY OF SERUM SODIUM: CPT

## 2023-12-22 PROCEDURE — 84100 ASSAY OF PHOSPHORUS: CPT

## 2023-12-22 PROCEDURE — C1894: CPT

## 2023-12-22 PROCEDURE — 82330 ASSAY OF CALCIUM: CPT

## 2023-12-22 PROCEDURE — 80053 COMPREHEN METABOLIC PANEL: CPT

## 2023-12-22 PROCEDURE — C1887: CPT

## 2023-12-22 PROCEDURE — 96365 THER/PROPH/DIAG IV INF INIT: CPT

## 2023-12-22 PROCEDURE — 86880 COOMBS TEST DIRECT: CPT

## 2023-12-22 PROCEDURE — 87205 SMEAR GRAM STAIN: CPT

## 2023-12-22 PROCEDURE — 83605 ASSAY OF LACTIC ACID: CPT

## 2023-12-22 PROCEDURE — 82962 GLUCOSE BLOOD TEST: CPT

## 2023-12-22 PROCEDURE — 76000 FLUOROSCOPY <1 HR PHYS/QHP: CPT

## 2023-12-22 PROCEDURE — C1760: CPT

## 2023-12-22 PROCEDURE — 36415 COLL VENOUS BLD VENIPUNCTURE: CPT

## 2023-12-22 PROCEDURE — 87077 CULTURE AEROBIC IDENTIFY: CPT

## 2023-12-22 PROCEDURE — 83735 ASSAY OF MAGNESIUM: CPT

## 2023-12-22 PROCEDURE — 85610 PROTHROMBIN TIME: CPT

## 2023-12-22 PROCEDURE — 82947 ASSAY GLUCOSE BLOOD QUANT: CPT

## 2023-12-22 PROCEDURE — 86901 BLOOD TYPING SEROLOGIC RH(D): CPT

## 2023-12-22 PROCEDURE — 85018 HEMOGLOBIN: CPT

## 2023-12-22 PROCEDURE — 93005 ELECTROCARDIOGRAM TRACING: CPT

## 2023-12-22 PROCEDURE — 82803 BLOOD GASES ANY COMBINATION: CPT

## 2023-12-22 PROCEDURE — 86870 RBC ANTIBODY IDENTIFICATION: CPT

## 2023-12-22 PROCEDURE — G1004: CPT

## 2023-12-22 PROCEDURE — 96367 TX/PROPH/DG ADDL SEQ IV INF: CPT

## 2023-12-22 PROCEDURE — 87070 CULTURE OTHR SPECIMN AEROBIC: CPT

## 2023-12-22 PROCEDURE — 80048 BASIC METABOLIC PNL TOTAL CA: CPT

## 2023-12-22 PROCEDURE — 97530 THERAPEUTIC ACTIVITIES: CPT

## 2023-12-22 PROCEDURE — 99285 EMERGENCY DEPT VISIT HI MDM: CPT

## 2023-12-22 PROCEDURE — 85025 COMPLETE CBC W/AUTO DIFF WBC: CPT

## 2023-12-22 PROCEDURE — 86922 COMPATIBILITY TEST ANTIGLOB: CPT

## 2023-12-22 PROCEDURE — 87186 SC STD MICRODIL/AGAR DIL: CPT

## 2023-12-22 PROCEDURE — 86850 RBC ANTIBODY SCREEN: CPT

## 2023-12-22 PROCEDURE — 93923 UPR/LXTR ART STDY 3+ LVLS: CPT

## 2023-12-22 PROCEDURE — 88305 TISSUE EXAM BY PATHOLOGIST: CPT

## 2023-12-22 PROCEDURE — 86900 BLOOD TYPING SEROLOGIC ABO: CPT

## 2023-12-22 PROCEDURE — C2623: CPT

## 2023-12-22 RX ADMIN — Medication 3: at 12:19

## 2023-12-22 RX ADMIN — LOSARTAN POTASSIUM 25 MILLIGRAM(S): 100 TABLET, FILM COATED ORAL at 05:15

## 2023-12-22 RX ADMIN — AMLODIPINE BESYLATE 2.5 MILLIGRAM(S): 2.5 TABLET ORAL at 05:16

## 2023-12-22 RX ADMIN — RIVAROXABAN 2.5 MILLIGRAM(S): KIT at 17:44

## 2023-12-22 RX ADMIN — CHLORHEXIDINE GLUCONATE 1 APPLICATION(S): 213 SOLUTION TOPICAL at 11:20

## 2023-12-22 RX ADMIN — Medication 81 MILLIGRAM(S): at 12:03

## 2023-12-22 RX ADMIN — Medication 3: at 17:44

## 2023-12-22 RX ADMIN — RIVAROXABAN 2.5 MILLIGRAM(S): KIT at 05:15

## 2023-12-22 RX ADMIN — HYDROMORPHONE HYDROCHLORIDE 0.5 MILLIGRAM(S): 2 INJECTION INTRAMUSCULAR; INTRAVENOUS; SUBCUTANEOUS at 12:20

## 2023-12-22 RX ADMIN — HYDROMORPHONE HYDROCHLORIDE 0.5 MILLIGRAM(S): 2 INJECTION INTRAMUSCULAR; INTRAVENOUS; SUBCUTANEOUS at 11:20

## 2023-12-22 RX ADMIN — Medication 2: at 09:17

## 2023-12-22 RX ADMIN — PIPERACILLIN AND TAZOBACTAM 25 GRAM(S): 4; .5 INJECTION, POWDER, LYOPHILIZED, FOR SOLUTION INTRAVENOUS at 14:07

## 2023-12-22 RX ADMIN — PIPERACILLIN AND TAZOBACTAM 25 GRAM(S): 4; .5 INJECTION, POWDER, LYOPHILIZED, FOR SOLUTION INTRAVENOUS at 05:16

## 2023-12-22 NOTE — PROGRESS NOTE ADULT - ASSESSMENT
72M s/p left foot transmetatarsal amputation w/ tendoachilles lengthening, closed (DOS 12/18/23).  - Pt seen and evaluated.  - Afebrile, no leukocytosis.  - 12/18 s/p left foot transmetatarsal amputation w/ tendoachilles lengthening, closed: sutures intact, flaps warm and viable, no hematoma expressed, no acute signs of infection.   - Left foot is stable with no acute signs of infection and likely not the source of fevers.  - Intraop Findings: Low concern for residual bone infection, bone was hard at level of resection and appeared vitalized intra-op, high concern for viability.  - Left Foot Clean Bone Margin Culture: No growth (prelim).  - Vascular recs, appreciated.  - Do not recommend nitroglycerin paste to the surgical site at this time.  - Stable for discharge from the podiatry standpoint pending final ID recs.  - Wound care instructions and followup information listed in the discharge provider note.  - Discussed with attending.

## 2023-12-22 NOTE — PROGRESS NOTE ADULT - PROVIDER SPECIALTY LIST ADULT
Hospitalist
Infectious Disease
Podiatry
Podiatry
Vascular Surgery
Vascular Surgery
Hospitalist
Infectious Disease
Vascular Surgery
Vascular Surgery
Anesthesia
Hospitalist
Hospitalist
Infectious Disease
Podiatry
Infectious Disease
Infectious Disease
Vascular Surgery
Infectious Disease
Infectious Disease
Podiatry
Vascular Surgery
Vascular Surgery
Hospitalist
Hospitalist
Infectious Disease
Hospitalist
Infectious Disease
Vascular Surgery
Vascular Surgery

## 2023-12-22 NOTE — ED ADULT NURSE NOTE - BREATHING, MLM
Laceration Repair Procedure Note    Indication: Laceration    Procedure: The patient was placed in the appropriate position and anesthesia around the laceration was not performed at the patient's request. The area was then irrigated with high pressure normal saline. The laceration was approximated using steri-strips. There were no additional lacerations requiring repair. The wound area was then dressed with a bandage.  The patient's tetanus status was up to date and did not require a booster dose    Total repaired wound length: 1.5 cm.     Other Items: None    The patient tolerated the procedure well, without difficulty.    Complications: None    Baldomero Horner MD   PGY1      Baldomero Horner MD  Resident  12/22/23 [id number]     Spontaneous, unlabored and symmetrical

## 2023-12-22 NOTE — PROGRESS NOTE ADULT - REASON FOR ADMISSION
Left 4 th toe infection

## 2023-12-22 NOTE — PROGRESS NOTE ADULT - SUBJECTIVE AND OBJECTIVE BOX
[facility] DISE[facility]ES  ERVIN Florentino Y. Patel, S. Shah, G. Ervin  [phone]  ([phone] - weekdays after 5pm and weekends)    Name: ALEE DUNN  Age/Gender: 72y Male  MRN: [id number]    Interval History:  Patient seen and examined this morning.   Has some L foot pain, no other complaints  Denies any further fevers, no chills, n/v/d, dysuria  Notes reviewed  No concerning overnight events  Afebrile   Allergies: No Known Allergies      Objective:  Vitals:   T(F): 98.9 (12-22-23 @ 09:15), Max: 99.7 (12-21-23 @ 19:54)  HR: 71 (12-22-23 @ 09:15) (71 - 94)  BP: 163/73 (12-22-23 @ 09:15) (137/74 - 175/73)  RR: 18 (12-22-23 @ 09:15) (18 - 18)  SpO2: 98% (12-22-23 @ 09:15) (96% - 99%)  Physical Examination:  General: no acute distress, nontoxic appearing  HEENT: NC/AT, anicteric, neck supple  Respiratory: clear to auscultation bilaterally  Cardiovascular: S1 and S2 present, normal rate   Gastrointestinal: soft, nontender, nondistended  Neuro: AAOx3, no obvious focal deficits   Extremities: R BKA; LLE dressing with boot   Skin: no visible rash    Laboratory Studies:  CBC:                       8.1    6.56  )-----------( 189      ( 22 Dec 2023 07:42 )             25.9     WBC Trend:  6.56 12-22-23 @ 07:42  9.11 12-21-23 @ 07:15  8.17 12-20-23 @ 10:12  7.34 12-20-23 @ 06:52  6.65 12-19-23 @ 07:17  6.32 12-18-23 @ 06:49  7.67 12-16-23 @ 07:23    CMP: 12-22    136  |  102  |  19  ----------------------------<  265<H>  3.7   |  23  |  1.09    Ca    8.4      22 Dec 2023 07:40    TPro  6.5  /  Alb  2.8<L>  /  TBili  0.3  /  DBili  x   /  AST  18  /  ALT  21  /  AlkPhos  69  12-22    Creatinine: 1.09 mg/dL (12-22-23 @ 07:40)  Creatinine: 1.01 mg/dL (12-21-23 @ 07:15)  Creatinine: 0.89 mg/dL (12-20-23 @ 10:12)  Creatinine: 0.58 mg/dL (12-20-23 @ 06:52)  Creatinine: 0.95 mg/dL (12-19-23 @ 14:42)  Creatinine: 0.89 mg/dL (12-19-23 @ 09:56)  Creatinine: 0.73 mg/dL (12-19-23 @ 07:12)  Creatinine: 1.14 mg/dL (12-18-23 @ 06:47)  Creatinine: 1.09 mg/dL (12-16-23 @ 07:22)    LIVER FUNCTIONS - ( 22 Dec 2023 07:40 )  Alb: 2.8 g/dL / Pro: 6.5 g/dL / ALK PHOS: 69 U/L / ALT: 21 U/L / AST: 18 U/L / GGT: x           Microbiology: reviewed   Culture - Blood (collected 12-20-23 @ 17:42)  Source: .Blood Blood-Peripheral  Preliminary Report (12-21-23 @ 23:02):    No growth at 24 hours    Culture - Blood (collected 12-20-23 @ 17:42)  Source: .Blood Blood-Peripheral  Preliminary Report (12-21-23 @ 23:02):    No growth at 24 hours    Culture - Tissue with Gram Stain (collected 12-18-23 @ 11:33)  Source: Bone 2nd lt meterasel clean bone culture  Gram Stain (12-18-23 @ 23:43):    No polymorphonuclear leukocytes per low power field    No organisms seen per oil power field  Preliminary Report (12-19-23 @ 13:17):    No growth    Culture - Abscess with Gram Stain (collected 12-09-23 @ 10:45)  Source: .Abscess left foot  Final Report (12-16-23 @ 14:31):    Moderate Serratia liquefaciens    Numerous Corynebacterium amycolatum "Susceptibilities not performed"    Moderate Finegoldia magna "Susceptibilities not performed"    Few Stenotrophomonas maltophilia  Organism: Serratia liquefaciens  Stenotrophomonas maltophilia (12-16-23 @ 14:31)  Organism: Stenotrophomonas maltophilia (12-16-23 @ 14:30)      Method Type: AMI      -  Levofloxacin: S <=0.5      -  Trimethoprim/Sulfamethoxazole: S <=0.5/9.5  Organism: Serratia liquefaciens (12-14-23 @ 16:47)      Method Type: CarbaR      -  Resistance Gene to Carbapenem: Nondet  Organism: Serratia liquefaciens (12-14-23 @ 16:47)      Method Type: AMI      -  Amoxicillin/Clavulanic Acid: R >16/8      -  Ampicillin: R >16 These ampicillin results predict results for amoxicillin      -  Ampicillin/Sulbactam: I 16/8      -  Aztreonam: R >16      -  Cefazolin: R >16      -  Cefepime: R >16      -  Cefoxitin: R >16      -  Ceftolozane/tazobactam: S <=2      -  Ceftriaxone: R >32      -  Ciprofloxacin: R 1      -  Ertapenem: R >1      -  Gentamicin: R 8      -  Levofloxacin: S <=0.5      -  Meropenem: R >8      -  Piperacillin/Tazobactam: S <=8      -  Tobramycin: R 8      -  Trimethoprim/Sulfamethoxazole: S <=0.5/9.5    Culture - Blood (collected 12-09-23 @ 09:05)  Source: .Blood Blood-Peripheral  Final Report (12-14-23 @ 14:01):    No growth at 5 days    Culture - Blood (collected 12-09-23 @ 09:00)  Source: .Blood Blood-Peripheral  Final Report (12-14-23 @ 14:01):    No growth at 5 days    Radiology: reviewed     Medications:  acetaminophen     Tablet .. 650 milliGRAM(s) Oral every 6 hours PRN  amLODIPine   Tablet 2.5 milliGRAM(s) Oral daily  aspirin enteric coated 81 milliGRAM(s) Oral daily  atorvastatin 40 milliGRAM(s) Oral at bedtime  chlorhexidine 2% Cloths 1 Application(s) Topical daily  dextrose 5%. 1000 milliLiter(s) IV Continuous <Continuous>  dextrose 5%. 1000 milliLiter(s) IV Continuous <Continuous>  glucagon  Injectable 1 milliGRAM(s) IntraMuscular once  HYDROmorphone  Injectable 0.5 milliGRAM(s) IV Push every 4 hours PRN  insulin lispro (ADMELOG) corrective regimen sliding scale   SubCutaneous Before meals and at bedtime  losartan 25 milliGRAM(s) Oral daily  oxycodone    5 mG/acetaminophen 325 mG 1 Tablet(s) Oral every 4 hours PRN  piperacillin/tazobactam IVPB.. 3.375 Gram(s) IV Intermittent every 8 hours  rivaroxaban 2.5 milliGRAM(s) Oral two times a day    Current Antimicrobials:  piperacillin/tazobactam IVPB.. 3.375 Gram(s) IV Intermittent every 8 hours    Prior/Completed Antimicrobials:  piperacillin/tazobactam IVPB...  vancomycin  IVPB.   [facility]  REVIN Florentino Y. Patel, S. Shah, G. Ervin  [phone]  ([phone] - weekdays after 5pm and weekends)    Name: ALEE DUNN  Age/Gender: 72y Male  MRN: [id number]    Interval History:  Patient seen and examined this morning.   Has some L foot pain, no other complaints  Denies any further fevers, no chills, n/v/d, dysuria  Notes reviewed  No concerning overnight events  Afebrile   Allergies: No Known Allergies      Objective:  Vitals:   T(F): 98.9 (12-22-23 @ 09:15), Max: 99.7 (12-21-23 @ 19:54)  HR: 71 (12-22-23 @ 09:15) (71 - 94)  BP: 163/73 (12-22-23 @ 09:15) (137/74 - 175/73)  RR: 18 (12-22-23 @ 09:15) (18 - 18)  SpO2: 98% (12-22-23 @ 09:15) (96% - 99%)  Physical Examination:  General: no acute distress, nontoxic appearing  HEENT: NC/AT, anicteric, neck supple  Respiratory: clear to auscultation bilaterally  Cardiovascular: S1 and S2 present, normal rate   Gastrointestinal: soft, nontender, nondistended  Neuro: AAOx3, no obvious focal deficits   Extremities: R BKA; LLE dressing with boot   Skin: no visible rash    Laboratory Studies:  CBC:                       8.1    6.56  )-----------( 189      ( 22 Dec 2023 07:42 )             25.9     WBC Trend:  6.56 12-22-23 @ 07:42  9.11 12-21-23 @ 07:15  8.17 12-20-23 @ 10:12  7.34 12-20-23 @ 06:52  6.65 12-19-23 @ 07:17  6.32 12-18-23 @ 06:49  7.67 12-16-23 @ 07:23    CMP: 12-22    136  |  102  |  19  ----------------------------<  265<H>  3.7   |  23  |  1.09    Ca    8.4      22 Dec 2023 07:40    TPro  6.5  /  Alb  2.8<L>  /  TBili  0.3  /  DBili  x   /  AST  18  /  ALT  21  /  AlkPhos  69  12-22    Creatinine: 1.09 mg/dL (12-22-23 @ 07:40)  Creatinine: 1.01 mg/dL (12-21-23 @ 07:15)  Creatinine: 0.89 mg/dL (12-20-23 @ 10:12)  Creatinine: 0.58 mg/dL (12-20-23 @ 06:52)  Creatinine: 0.95 mg/dL (12-19-23 @ 14:42)  Creatinine: 0.89 mg/dL (12-19-23 @ 09:56)  Creatinine: 0.73 mg/dL (12-19-23 @ 07:12)  Creatinine: 1.14 mg/dL (12-18-23 @ 06:47)  Creatinine: 1.09 mg/dL (12-16-23 @ 07:22)    LIVER FUNCTIONS - ( 22 Dec 2023 07:40 )  Alb: 2.8 g/dL / Pro: 6.5 g/dL / ALK PHOS: 69 U/L / ALT: 21 U/L / AST: 18 U/L / GGT: x           Microbiology: reviewed   Culture - Blood (collected 12-20-23 @ 17:42)  Source: .Blood Blood-Peripheral  Preliminary Report (12-21-23 @ 23:02):    No growth at 24 hours    Culture - Blood (collected 12-20-23 @ 17:42)  Source: .Blood Blood-Peripheral  Preliminary Report (12-21-23 @ 23:02):    No growth at 24 hours    Culture - Tissue with Gram Stain (collected 12-18-23 @ 11:33)  Source: Bone 2nd lt meterasel clean bone culture  Gram Stain (12-18-23 @ 23:43):    No polymorphonuclear leukocytes per low power field    No organisms seen per oil power field  Preliminary Report (12-19-23 @ 13:17):    No growth    Culture - Abscess with Gram Stain (collected 12-09-23 @ 10:45)  Source: .Abscess left foot  Final Report (12-16-23 @ 14:31):    Moderate Serratia liquefaciens    Numerous Corynebacterium amycolatum "Susceptibilities not performed"    Moderate Finegoldia magna "Susceptibilities not performed"    Few Stenotrophomonas maltophilia  Organism: Serratia liquefaciens  Stenotrophomonas maltophilia (12-16-23 @ 14:31)  Organism: Stenotrophomonas maltophilia (12-16-23 @ 14:30)      Method Type: AMI      -  Levofloxacin: S <=0.5      -  Trimethoprim/Sulfamethoxazole: S <=0.5/9.5  Organism: Serratia liquefaciens (12-14-23 @ 16:47)      Method Type: CarbaR      -  Resistance Gene to Carbapenem: Nondet  Organism: Serratia liquefaciens (12-14-23 @ 16:47)      Method Type: AMI      -  Amoxicillin/Clavulanic Acid: R >16/8      -  Ampicillin: R >16 These ampicillin results predict results for amoxicillin      -  Ampicillin/Sulbactam: I 16/8      -  Aztreonam: R >16      -  Cefazolin: R >16      -  Cefepime: R >16      -  Cefoxitin: R >16      -  Ceftolozane/tazobactam: S <=2      -  Ceftriaxone: R >32      -  Ciprofloxacin: R 1      -  Ertapenem: R >1      -  Gentamicin: R 8      -  Levofloxacin: S <=0.5      -  Meropenem: R >8      -  Piperacillin/Tazobactam: S <=8      -  Tobramycin: R 8      -  Trimethoprim/Sulfamethoxazole: S <=0.5/9.5    Culture - Blood (collected 12-09-23 @ 09:05)  Source: .Blood Blood-Peripheral  Final Report (12-14-23 @ 14:01):    No growth at 5 days    Culture - Blood (collected 12-09-23 @ 09:00)  Source: .Blood Blood-Peripheral  Final Report (12-14-23 @ 14:01):    No growth at 5 days    Radiology: reviewed     Medications:  acetaminophen     Tablet .. 650 milliGRAM(s) Oral every 6 hours PRN  amLODIPine   Tablet 2.5 milliGRAM(s) Oral daily  aspirin enteric coated 81 milliGRAM(s) Oral daily  atorvastatin 40 milliGRAM(s) Oral at bedtime  chlorhexidine 2% Cloths 1 Application(s) Topical daily  dextrose 5%. 1000 milliLiter(s) IV Continuous <Continuous>  dextrose 5%. 1000 milliLiter(s) IV Continuous <Continuous>  glucagon  Injectable 1 milliGRAM(s) IntraMuscular once  HYDROmorphone  Injectable 0.5 milliGRAM(s) IV Push every 4 hours PRN  insulin lispro (ADMELOG) corrective regimen sliding scale   SubCutaneous Before meals and at bedtime  losartan 25 milliGRAM(s) Oral daily  oxycodone    5 mG/acetaminophen 325 mG 1 Tablet(s) Oral every 4 hours PRN  piperacillin/tazobactam IVPB.. 3.375 Gram(s) IV Intermittent every 8 hours  rivaroxaban 2.5 milliGRAM(s) Oral two times a day    Current Antimicrobials:  piperacillin/tazobactam IVPB.. 3.375 Gram(s) IV Intermittent every 8 hours    Prior/Completed Antimicrobials:  piperacillin/tazobactam IVPB...  vancomycin  IVPB.

## 2023-12-22 NOTE — PROGRESS NOTE ADULT - SUBJECTIVE AND OBJECTIVE BOX
Patient is a 72y old  Male who presents with a chief complaint of Left 4 th toe infection (21 Dec 2023 19:57)       INTERVAL HPI/OVERNIGHT EVENTS:  Patient seen and evaluated at bedside.  Pt is resting comfortable in NAD. Denies N/V/F/C.    Allergies    No Known Allergies    Intolerances        Vital Signs Last 24 Hrs  T(C): 37.2 (22 Dec 2023 09:15), Max: 37.6 (21 Dec 2023 19:54)  T(F): 98.9 (22 Dec 2023 09:15), Max: 99.7 (21 Dec 2023 19:54)  HR: 71 (22 Dec 2023 09:15) (71 - 94)  BP: 163/73 (22 Dec 2023 09:15) (137/74 - 175/73)  BP(mean): --  RR: 18 (22 Dec 2023 09:15) (18 - 18)  SpO2: 98% (22 Dec 2023 09:15) (96% - 99%)    Parameters below as of 22 Dec 2023 09:15  Patient On (Oxygen Delivery Method): room air        LABS:                        8.1    6.56  )-----------( 189      ( 22 Dec 2023 07:42 )             25.9     12-22    136  |  102  |  19  ----------------------------<  265<H>  3.7   |  23  |  1.09    Ca    8.4      22 Dec 2023 07:40    TPro  6.5  /  Alb  2.8<L>  /  TBili  0.3  /  DBili  x   /  AST  18  /  ALT  21  /  AlkPhos  69  12-22      Urinalysis Basic - ( 22 Dec 2023 07:40 )    Color: x / Appearance: x / SG: x / pH: x  Gluc: 265 mg/dL / Ketone: x  / Bili: x / Urobili: x   Blood: x / Protein: x / Nitrite: x   Leuk Esterase: x / RBC: x / WBC x   Sq Epi: x / Non Sq Epi: x / Bacteria: x      CAPILLARY BLOOD GLUCOSE      POCT Blood Glucose.: 206 mg/dL (22 Dec 2023 08:39)  POCT Blood Glucose.: 287 mg/dL (21 Dec 2023 20:58)  POCT Blood Glucose.: 164 mg/dL (21 Dec 2023 17:06)  POCT Blood Glucose.: 302 mg/dL (21 Dec 2023 12:50)      Lower Extremity Physical Exam:  Vascular: DP/PT 0/4, B/L, Temperature gradient warm to cool, B/L.   Neuro: Epicritic sensation diminished to the level of digits, B/L.  Musculoskeletal/Ortho: s/p R BKA  Skin: 12/18 s/p left foot transmetatarsal amputation w/ tendoachilles lengthening, closed: sutures intact, flaps warm and viable, no hematoma expressed, no acute signs of infection.     RADIOLOGY & ADDITIONAL TESTS:

## 2023-12-22 NOTE — PROGRESS NOTE ADULT - ASSESSMENT
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     LLE gangrene with extension to 4th toe with OM  - L foot 2nd and 3rd digit dorsal full thickness gangrene to level of MTPJ, w/ wet conversion, plantar 2nd and 3rd digit dusky and early ischemic changes, mild serous drainage, mild malodor, early dusky changes of fourth metatarsal.  - L foot MRI with findings c/w septic arthritis and OM, no drainable abscess   - L foot abscess culture with moderate Serratia liquefaciens sensitives reviewed), Corynebacterium amycolatum, Finegoldia magna, also noted with Stenotrophomonas  - 12/15 s/p OR for LLE angiogram and angioplasty with vascular   - 12/19 s/p L foot I&D, s/p TMA    - brief op note reviewed - no evidence of pus or soft tissue infection, low c/f residual infection, bone was hard at level of resection and appeared vitalized intra-op  - 12/20 febrile to 100.6F x1 ?inflammatory, was restarted on pip-tazo   - remains afebrile today, some foot pain otherwise no new complaints or focal findings on exam    Recommendations:   Follow 12/20 Bcx - NGTD x2 (24h)  Follow OR culture (NGTD) and biopsy   Continue pip-tazo pending above   Continue local care per Podiatry, noted no s/o infection on exam today  If pt remains afebrile and Bcx negative for 48h, can d/c off antibiotics    Dr. Taz Hinds will be covering for our group from 12/23-12/25. If you have any questions, concerns or new micro data, please reach out to them at [phone].    D/w Dr. Altagracia Castillo M.D.  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     LLE gangrene with extension to 4th toe with OM  - L foot 2nd and 3rd digit dorsal full thickness gangrene to level of MTPJ, w/ wet conversion, plantar 2nd and 3rd digit dusky and early ischemic changes, mild serous drainage, mild malodor, early dusky changes of fourth metatarsal.  - L foot MRI with findings c/w septic arthritis and OM, no drainable abscess   - L foot abscess culture with moderate Serratia liquefaciens sensitives reviewed), Corynebacterium amycolatum, Finegoldia magna, also noted with Stenotrophomonas  - 12/15 s/p OR for LLE angiogram and angioplasty with vascular   - 12/19 s/p L foot I&D, s/p TMA    - brief op note reviewed - no evidence of pus or soft tissue infection, low c/f residual infection, bone was hard at level of resection and appeared vitalized intra-op  - 12/20 febrile to 100.6F x1 ?inflammatory, was restarted on pip-tazo   - remains afebrile today, some foot pain otherwise no new complaints or focal findings on exam    Recommendations:   Follow 12/20 Bcx - NGTD x2 (24h)  Follow OR culture (NGTD) and biopsy   Continue pip-tazo pending above   Continue local care per Podiatry, noted no s/o infection on exam today  If pt remains afebrile and Bcx negative for 48h, can d/c off antibiotics    Dr. Taz Hinds will be covering for our group from 12/23-12/25. If you have any questions, concerns or new micro data, please reach out to them at [phone].    D/w Dr. Altagracia Castillo M.D.  [facility], Division of Infectious Diseases  [phone]  After 5pm on weekdays and all day on weekends - please call [phone]

## 2023-12-25 LAB
CULTURE RESULTS: SIGNIFICANT CHANGE UP
SPECIMEN SOURCE: SIGNIFICANT CHANGE UP

## 2024-01-03 ENCOUNTER — APPOINTMENT (OUTPATIENT)
Dept: VASCULAR SURGERY | Facility: CLINIC | Age: 74
End: 2024-01-03
Payer: MEDICARE

## 2024-01-03 VITALS
HEIGHT: 66 IN | BODY MASS INDEX: 20.89 KG/M2 | SYSTOLIC BLOOD PRESSURE: 107 MMHG | WEIGHT: 130 LBS | TEMPERATURE: 98.4 F | HEART RATE: 82 BPM | DIASTOLIC BLOOD PRESSURE: 66 MMHG

## 2024-01-03 PROCEDURE — 99024 POSTOP FOLLOW-UP VISIT: CPT

## 2024-01-05 RX ORDER — RIVAROXABAN 2.5 MG/1
2.5 TABLET, FILM COATED ORAL
Qty: 60 | Refills: 0 | Status: ACTIVE | COMMUNITY
Start: 2023-12-20

## 2024-01-05 RX ORDER — LOSARTAN POTASSIUM 25 MG/1
25 TABLET, FILM COATED ORAL
Qty: 30 | Refills: 0 | Status: ACTIVE | COMMUNITY
Start: 2023-08-31

## 2024-01-05 RX ORDER — AMOXICILLIN AND CLAVULANATE POTASSIUM 875; 125 MG/1; MG/1
875-125 TABLET, COATED ORAL
Qty: 28 | Refills: 0 | Status: ACTIVE | COMMUNITY
Start: 2023-12-08

## 2024-01-05 RX ORDER — OXYCODONE AND ACETAMINOPHEN 5; 325 MG/1; MG/1
5-325 TABLET ORAL
Qty: 28 | Refills: 0 | Status: ACTIVE | COMMUNITY
Start: 2023-12-20

## 2024-01-05 RX ORDER — METFORMIN ER 750 MG 750 MG/1
750 TABLET ORAL
Qty: 90 | Refills: 0 | Status: ACTIVE | COMMUNITY
Start: 2023-11-01

## 2024-01-05 NOTE — PHYSICAL EXAM
[Alert] : alert [Oriented to Person] : oriented to person [Oriented to Place] : oriented to place [Oriented to Time] : oriented to time [Calm] : calm [FreeTextEntry1] : R BKA stump well healed, no erythema, fluctuance, or induration L TMA site with ischemic edges but warm

## 2024-01-05 NOTE — DISCUSSION/SUMMARY
[FreeTextEntry1] : Problem #1 peripheral arterial disease s/p right BKA and left TMA will try to expedite HBO therapy evaluation follow up as scheduled

## 2024-01-05 NOTE — REASON FOR VISIT
[de-identified] : left lower extremity endovascular revascularization [de-identified] : 12/15/23 [de-identified] : Doing well. Denies any fevers or chills. Denies drainage from left TMA site. States he was recommended to undergo evaluation for hyperbaric oxygen therapy. [Family Member] : family member

## 2024-01-08 ENCOUNTER — APPOINTMENT (OUTPATIENT)
Dept: WOUND CARE | Facility: HOSPITAL | Age: 74
End: 2024-01-08
Payer: MEDICARE

## 2024-01-08 VITALS
OXYGEN SATURATION: 98 % | DIASTOLIC BLOOD PRESSURE: 55 MMHG | RESPIRATION RATE: 16 BRPM | BODY MASS INDEX: 20.89 KG/M2 | TEMPERATURE: 98.1 F | SYSTOLIC BLOOD PRESSURE: 120 MMHG | HEART RATE: 80 BPM | WEIGHT: 130 LBS | HEIGHT: 66 IN

## 2024-01-08 PROCEDURE — 99205 OFFICE O/P NEW HI 60 MIN: CPT

## 2024-01-09 ENCOUNTER — APPOINTMENT (OUTPATIENT)
Dept: PHYSICAL MEDICINE AND REHAB | Facility: CLINIC | Age: 74
End: 2024-01-09
Payer: MEDICARE

## 2024-01-09 VITALS — OXYGEN SATURATION: 97 % | SYSTOLIC BLOOD PRESSURE: 116 MMHG | HEART RATE: 85 BPM | DIASTOLIC BLOOD PRESSURE: 75 MMHG

## 2024-01-09 DIAGNOSIS — S88.111D COMPLETE TRAUMATIC AMPUTATION AT LVL BETWEEN KNEE AND ANKLE, RIGHT LOWER LEG, SUBSEQUENT ENCOUNTER: ICD-10-CM

## 2024-01-09 PROCEDURE — 99213 OFFICE O/P EST LOW 20 MIN: CPT

## 2024-01-09 NOTE — PHYSICAL EXAM
[FreeTextEntry1] : General: Well-developed male in no apparent distress.  Patient is awake, alert, and oriented x3.  Cooperative. HEENT: Normocephalic, atraumatic.  MMM Extremities: Patient's left foot is dressed and in a cam boot.  Daughter did showed recent picture of the foot, status post left TMA, sutures in place and followed in the wound care service.  Right BKA: Cylindrical/conical in shape, well-healed.  No skin adhesions, no skin breakdown.  Hyperpigmentation noted at the at the inferior pole of the patella.  No tenderness to palpation.  Patient with prominent distal tibia.  Knee flexion to 90 degrees actively..  No erythema.  Motor: Both upper extremities: Tone normal, active range of motion within functional limits with 5/5 motor power throughout. Both lower extremities: Tone normal, active range of motion within functional limits with 5/5 motor power throughout.  Deferred range of motion at left foot.  Muscle stretch reflexes: 0 KJ bilaterally  Functional status: Sit to stand transfer independent.  Patient ambulated independently with a rolling walker with a right BKA prosthesis with a 5 ply sock and suction suspension.  Mild lateral thrusting noted.  Cam boot on the left.  Patient is a K2 ambulator.

## 2024-01-09 NOTE — REVIEW OF SYSTEMS
[Difficulty Walking] : difficulty walking [Negative] : Gastrointestinal [Recent Change In Weight] : ~T recent weight change [Skin Wound] : skin wound [Fever] : no fever [Incontinence] : no incontinence [Joint Pain] : no joint pain [Muscle Weakness] : no muscle weakness [de-identified] : left TMA

## 2024-01-09 NOTE — ASSESSMENT
[FreeTextEntry1] : Patient is a 73-year-old male history of hypertension, DM, PVD, diabetic neuropathy who underwent a right BKA (July, 2023), now status post left TMA (December, 2023).  The patient is current left BK prosthetic socket is significantly too large causing instability and pain at the patella during ambulation and requires replacement.  In addition the patient's silicone liners are stretched and worn, his prosthetic socks are frayed and require replacement as well.  Patient is a K2 ambulator.  Prescription provided for a right custom molded BK socket replacement with a total contact acrylic socket, flexible inner socket, test socket, silicone locking liner with locking mechanism, ultralight alignable components, outer protective cover, 6 multiple ply socks, 6 single ply socks.  Patient to continue home physical therapy.  Patient status post left TMA and currently in wound care service.  Would consider partial foot prosthesis in the future.  I spent a total of 20 minutes on the date of the encounter evaluating and treating the patient including a discussion of treatment options.

## 2024-01-09 NOTE — HISTORY OF PRESENT ILLNESS
[FreeTextEntry1] : Patient is a 73-year-old male history of hypertension, DM, PVD, diabetic neuropathy who underwent a right BKA (July, 2023).  Who was last seen September 12, 2023 and received his first right BKA prosthesis towards the end of September, 2023.  Patient presents today for a prosthetic evaluation.  Patient's main complaint is that the prosthetic socket is too large causing instability at the knee and pain at the patellar during ambulation.  No skin breakdown reported.  No prosthetic rotation reported.   added padding to the socket is currently using 5 ply socks.  Patient does report losing approximately 5 to 6 pounds since last visit.  Patient was hospitalized in December, 2023 and is status post left TMA (December 18, 2023) and is currently in a wound care service wearing a cam boot.  Patient is also receiving home physical therapy at this time.  Functionally the patient is ambulating independently with a rolling walker and transfers.  No phantom pain.  No falls reported.

## 2024-01-17 NOTE — PRE-OP CHECKLIST - LOOSE TEETH
Can we call him and give him my message about his lab results    Karthikeyan Rosario  Do you want to try taking two of your glimiperide to see if that will bring your sugars down? Let me know if you need a new prescription. That would be the cheapest option    Selene hSaw MD  
He said he just refilled it last week, so he should be good for a few weeks.   He said he'll take 2 of his glimepiride instead  
no
no

## 2024-01-22 ENCOUNTER — APPOINTMENT (OUTPATIENT)
Dept: WOUND CARE | Facility: HOSPITAL | Age: 74
End: 2024-01-22

## 2024-01-22 NOTE — DISCHARGE NOTE PROVIDER - NSDCCPCAREPLAN_GEN_ALL_CORE_FT
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 Mcclure within 1 week of Discharge/Dispo/Med rec discussed with attending. Patient medically cleared for discharge with outpatient follow up with PCP from the hospital, please call [phone] for appointment and discuss that you recently were seen in the hospital.  Wound Care: Please apply betadine to left foot wounds followed by 4x4 gauze, and radha daily   Weight bearing: Please weight bear as tolerated in a surgical shoe.  Antibiotics: Please continue as instructed.      SECONDARY DISCHARGE DIAGNOSES  Diagnosis: Severe peripheral arterial disease  Assessment and Plan of Treatment: Plan for outpatient revascularization procedure. Patient is stable for Discharge/Dispo/Med rec discussed with attending. Patient medically cleared for discharge with outpatient follow up with PCP from a vascular standpoint and can follow up with Dr. Mullins for planning.   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 Mcclure within 1 week of Discharge/Dispo/Med rec discussed with attending. Patient medically cleared for discharge with outpatient follow up with PCP from the hospital, please call [phone] for appointment and discuss that you recently were seen in the hospital.  Wound Care: Please apply betadine to left foot wounds followed by 4x4 gauze, and radha daily   Weight bearing: Please weight bear as tolerated in a surgical shoe.  Antibiotics: Please continue as instructed.      SECONDARY DISCHARGE DIAGNOSES  Diagnosis: Severe peripheral arterial disease  Assessment and Plan of Treatment: Plan for outpatient revascularization procedure. Patient is stable for Discharge/Dispo/Med rec discussed with attending. Patient medically cleared for discharge with outpatient follow up with PCP from a vascular standpoint and can follow up with Dr. Mullins for planning.   - s/p LLE angio on 11/29

## 2024-01-23 ENCOUNTER — OUTPATIENT (OUTPATIENT)
Dept: OUTPATIENT SERVICES | Facility: HOSPITAL | Age: 74
LOS: 1 days | End: 2024-01-23
Payer: COMMERCIAL

## 2024-01-23 ENCOUNTER — APPOINTMENT (OUTPATIENT)
Dept: HYPERBARIC MEDICINE | Facility: CLINIC | Age: 74
End: 2024-01-23
Payer: MEDICARE

## 2024-01-23 VITALS
OXYGEN SATURATION: 99 % | HEART RATE: 84 BPM | SYSTOLIC BLOOD PRESSURE: 116 MMHG | RESPIRATION RATE: 16 BRPM | TEMPERATURE: 97.4 F | DIASTOLIC BLOOD PRESSURE: 69 MMHG

## 2024-01-23 VITALS
HEART RATE: 80 BPM | SYSTOLIC BLOOD PRESSURE: 163 MMHG | RESPIRATION RATE: 16 BRPM | DIASTOLIC BLOOD PRESSURE: 76 MMHG | OXYGEN SATURATION: 99 %

## 2024-01-23 DIAGNOSIS — Z89.429 ACQUIRED ABSENCE OF OTHER TOE(S), UNSPECIFIED SIDE: Chronic | ICD-10-CM

## 2024-01-23 PROCEDURE — 99183 HYPERBARIC OXYGEN THERAPY: CPT

## 2024-01-23 PROCEDURE — 82962 GLUCOSE BLOOD TEST: CPT

## 2024-01-23 PROCEDURE — G0277: CPT

## 2024-01-24 ENCOUNTER — APPOINTMENT (OUTPATIENT)
Dept: HYPERBARIC MEDICINE | Facility: CLINIC | Age: 74
End: 2024-01-24
Payer: MEDICARE

## 2024-01-24 ENCOUNTER — OUTPATIENT (OUTPATIENT)
Dept: OUTPATIENT SERVICES | Facility: HOSPITAL | Age: 74
LOS: 1 days | End: 2024-01-24
Payer: COMMERCIAL

## 2024-01-24 VITALS
RESPIRATION RATE: 16 BRPM | HEART RATE: 87 BPM | DIASTOLIC BLOOD PRESSURE: 62 MMHG | TEMPERATURE: 98 F | SYSTOLIC BLOOD PRESSURE: 104 MMHG | OXYGEN SATURATION: 98 %

## 2024-01-24 VITALS
RESPIRATION RATE: 16 BRPM | SYSTOLIC BLOOD PRESSURE: 150 MMHG | DIASTOLIC BLOOD PRESSURE: 83 MMHG | HEART RATE: 79 BPM | OXYGEN SATURATION: 98 %

## 2024-01-24 DIAGNOSIS — Z89.429 ACQUIRED ABSENCE OF OTHER TOE(S), UNSPECIFIED SIDE: Chronic | ICD-10-CM

## 2024-01-24 DIAGNOSIS — M79.606 PAIN IN LEG, UNSPECIFIED: ICD-10-CM

## 2024-01-24 DIAGNOSIS — L97.522 NON-PRESSURE CHRONIC ULCER OF OTHER PART OF LEFT FOOT WITH FAT LAYER EXPOSED: ICD-10-CM

## 2024-01-24 DIAGNOSIS — T86.828 OTHER COMPLICATIONS OF SKIN GRAFT (ALLOGRAFT) (AUTOGRAFT): ICD-10-CM

## 2024-01-24 PROCEDURE — 82962 GLUCOSE BLOOD TEST: CPT

## 2024-01-24 PROCEDURE — G0277: CPT

## 2024-01-24 PROCEDURE — 99183 HYPERBARIC OXYGEN THERAPY: CPT

## 2024-01-24 NOTE — PROCEDURE
[Outpatient] : Outpatient [Ambulatory] : Patient is ambulatory. [Walker] : walker [] : Yes [THIS CHAMBER HAS BEEN CLEANED / DISINFECTED] : This chamber has been cleaned / disinfected according to local and hospital policy and procedure prior to this treatment. [____] : Recheck: Time - [unfilled] [___] : Recheck: Value - [unfilled] mg/dL [I have examined and evaluated this patient today, including ears and lungs and have cleared the patient] : I have examined and evaluated this patient today, including ears and lungs and have cleared the patient to continue HBOT as prescribed.  [Time MD/Provider assessed Patient: _____] : Time MD/Provider assessed Patient: [unfilled] [I have ordered 1 HBOT session at the indicated protocol as seen below] : I have ordered 1 HBOT session at the indicated protocol as seen below [Patient demonstrated and verbalized proper technique for using air break mask] : Patient demonstrated and verbalized proper technique for using air break mask [Patient educated on the risks of SMOKING prior to HBOT with understanding] : Patient educated on the risks of SMOKING prior to HBOT with understanding [Patient educated on the risks of CONSUMING ALCOHOL prior to HBOT with understanding] : Patient educated on the risks of CONSUMING ALCOHOL prior to HBOT with understanding [100% Cotton] : 100% cotton [Empty all pockets] : empty all pockets [No hair oils, wigs, hairpieces, pins] : no hair oils, wigs, hairpieces, pins  [Pre tx medications] : pre tx medications  [No make-up, creams] : no make-up, creams  [No jewelry] : no jewelry  [No matches, cigarettes, lighters] : no matches, cigarettes, lighters  [Hearing aid removed] : hearing aid removed [Dentures removed] : dentures removed [Ground bracelet on pt's wrist] : ground bracelet on pt's wrist  [Contacts removed] : contacts removed  [No reading material] : no reading material  [Remove nail polish] : remove nail polish  [Bra, undergarments removed] : bra, undergarments removed  [No contraindicated dressings] : no contraindicated dressings [Ground Continuity - Verified < 1 ohm w/ Wrist Strap Rafal] : Ground Continuity - Verified < 1 ohm w/ Wrist Strap Rafal [Ground Wire - VISUAL Verification - Intact/Free of Obstruction] : Ground Wire - VISUAL Verification - Intact/Free of Obstruction  [Diagnosis: ___] : Diagnosis: [unfilled] [Number: ___] : Number: [unfilled] [Clear all fields] : clear all fields

## 2024-01-24 NOTE — ADDENDUM
[FreeTextEntry1] : Patient was given brief introduction to HBOT and daily routine, and how to minimize risk of barotrauma by demonstrating different techniques by HBT. Patient's TM evaluated by NP prior to tx.  Patient descended to 2.0 CATA @ 1.5 PSI/min without incident in chamber #4. Patient is resting @ depth with equal chest rise and observed through out whole treatment. Patient ascended from 2.0 CATA @ 1.5 PSI/min without incident. Patient tolerated treatment well.

## 2024-01-24 NOTE — ASSESSMENT
[No change from previous assessment] : No change from previous assessment [Patient undergoing HBO treatment for __________] : Patient undergoing HBO treatment for [unfilled] [Patient prepared for dive] : Patient prepared for dive [Patient descended without problem for 9 minutes] : Patient descended without problem for 9 minutes [No dizziness or thirst] :  No dizziness or thirst [No ear problems] : No ear problems [Vital signs stable] : Vital signs stable [No Chest Pain, shortness of breath] : No Chest Pain, shortness of breath [Tolerating dive well] : Tolerating dive well [Respiratory Rate Stable] : Respiratory Rate Stable [No chest pain, shortness of breath, or ear pain] :  No chest pain, shortness of breath, or ear pain  [Tolerated Ascent well] : Tolerated Ascent well [Vital Signs stable] : Vital Signs stable [A physician was present throughout the entire HBOT] : A physician was present throughout the entire HBOT [Clinically Stable] : Clinically stable [Continue Treatment Plan] : Continue treatment plan

## 2024-01-24 NOTE — ASSESSMENT
[No change from previous assessment] : No change from previous assessment [Patient prepared for dive] : Patient prepared for dive [Time MD/Provider assessed Patient:_______] : Time MD/Provider assessed Patient: [unfilled] [Patient undergoing HBO treatment for __________] : Patient undergoing HBO treatment for [unfilled] [Patient descended without problem for 9 minutes] : Patient descended without problem for 9 minutes [No dizziness or thirst] :  No dizziness or thirst [Vital signs stable] : Vital signs stable [No ear problems] : No ear problems [Tolerating dive well] : Tolerating dive well [No Chest Pain, shortness of breath] : No Chest Pain, shortness of breath [Respiratory Rate Stable] : Respiratory Rate Stable [Yes] : Yes [No chest pain, shortness of breath, or ear pain] :  No chest pain, shortness of breath, or ear pain  [Tolerated Ascent well] : Tolerated Ascent well [A physician was present throughout the entire HBOT] : A physician was present throughout the entire HBOT [Vital Signs stable] : Vital Signs stable [Continue Treatment Plan] : Continue treatment plan [Clinically Stable] : Clinically stable [FreeTextEntry1] : Cleared to dive by LASHAUN MONTOYA

## 2024-01-24 NOTE — PROCEDURE
[Outpatient] : Outpatient [Ambulatory] : Patient is ambulatory. [Walker] : walker [THIS CHAMBER HAS BEEN CLEANED / DISINFECTED] : This chamber has been cleaned / disinfected according to local and hospital policy and procedure prior to this treatment. [____] : Recheck: Time - [unfilled] [___] : Recheck: Value - [unfilled] mg/dL [I have examined and evaluated this patient today, including ears and lungs and have cleared the patient] : I have examined and evaluated this patient today, including ears and lungs and have cleared the patient to continue HBOT as prescribed.  [Time MD/Provider assessed Patient: _____] : Time MD/Provider assessed Patient: [unfilled] [I have ordered 1 HBOT session at the indicated protocol as seen below] : I have ordered 1 HBOT session at the indicated protocol as seen below [Patient demonstrated and verbalized proper technique for using air break mask] : Patient demonstrated and verbalized proper technique for using air break mask [Patient educated on the risks of SMOKING prior to HBOT with understanding] : Patient educated on the risks of SMOKING prior to HBOT with understanding [Patient educated on the risks of CONSUMING ALCOHOL prior to HBOT with understanding] : Patient educated on the risks of CONSUMING ALCOHOL prior to HBOT with understanding [100% Cotton] : 100% cotton [Empty all pockets] : empty all pockets [No hair oils, wigs, hairpieces, pins] : no hair oils, wigs, hairpieces, pins  [Pre tx medications] : pre tx medications  [No make-up, creams] : no make-up, creams  [No jewelry] : no jewelry  [No matches, cigarettes, lighters] : no matches, cigarettes, lighters  [Hearing aid removed] : hearing aid removed [Dentures removed] : dentures removed [Ground bracelet on pt's wrist] : ground bracelet on pt's wrist  [Contacts removed] : contacts removed  [Remove nail polish] : remove nail polish  [No reading material] : no reading material  [Bra, undergarments removed] : bra, undergarments removed  [No contraindicated dressings] : no contraindicated dressings [Ground Wire - VISUAL Verification - Intact/Free of Obstruction] : Ground Wire - VISUAL Verification - Intact/Free of Obstruction  [Ground Continuity - Verified < 1 ohm w/ Wrist Strap Rafal] : Ground Continuity - Verified < 1 ohm w/ Wrist Strap Rafal [Clear all fields] : clear all fields [Number: ___] : Number: [unfilled] [Diagnosis: ___] : Diagnosis: [unfilled] [] : No [FreeTextEntry1] : 2.0 ramonita [FreeTextEntry3] : 90 min [FreeTextEntry5] : 1600 [FreeTextEntry7] : 6734 [[facility] CentertEntry9] : 4185 [de-identified] : 0368 [de-identified] : 110 mins [de-identified] : LASHAUN MONTOYA

## 2024-01-25 ENCOUNTER — APPOINTMENT (OUTPATIENT)
Dept: HYPERBARIC MEDICINE | Facility: CLINIC | Age: 74
End: 2024-01-25

## 2024-01-25 DIAGNOSIS — M79.606 PAIN IN LEG, UNSPECIFIED: ICD-10-CM

## 2024-01-26 ENCOUNTER — OUTPATIENT (OUTPATIENT)
Dept: OUTPATIENT SERVICES | Facility: HOSPITAL | Age: 74
LOS: 1 days | End: 2024-01-26
Payer: COMMERCIAL

## 2024-01-26 ENCOUNTER — APPOINTMENT (OUTPATIENT)
Dept: HYPERBARIC MEDICINE | Facility: CLINIC | Age: 74
End: 2024-01-26
Payer: MEDICARE

## 2024-01-26 VITALS
RESPIRATION RATE: 16 BRPM | OXYGEN SATURATION: 100 % | DIASTOLIC BLOOD PRESSURE: 68 MMHG | TEMPERATURE: 98.2 F | HEART RATE: 83 BPM | SYSTOLIC BLOOD PRESSURE: 156 MMHG

## 2024-01-26 DIAGNOSIS — T86.828 OTHER COMPLICATIONS OF SKIN GRAFT (ALLOGRAFT) (AUTOGRAFT): ICD-10-CM

## 2024-01-26 DIAGNOSIS — L97.522 NON-PRESSURE CHRONIC ULCER OF OTHER PART OF LEFT FOOT WITH FAT LAYER EXPOSED: ICD-10-CM

## 2024-01-26 DIAGNOSIS — Z89.429 ACQUIRED ABSENCE OF OTHER TOE(S), UNSPECIFIED SIDE: Chronic | ICD-10-CM

## 2024-01-26 PROCEDURE — 99183 HYPERBARIC OXYGEN THERAPY: CPT

## 2024-01-26 PROCEDURE — 82962 GLUCOSE BLOOD TEST: CPT

## 2024-01-26 PROCEDURE — G0277: CPT

## 2024-01-26 NOTE — ASSESSMENT
[No change from previous assessment] : No change from previous assessment [Time MD/Provider assessed Patient:_______] : Time MD/Provider assessed Patient: [unfilled] [Patient prepared for dive] : Patient prepared for dive [Patient undergoing HBO treatment for __________] : Patient undergoing HBO treatment for [unfilled] [Patient descended without problem for 9 minutes] : Patient descended without problem for 9 minutes [No dizziness or thirst] :  No dizziness or thirst [No ear problems] : No ear problems [Tolerating dive well] : Tolerating dive well [Vital signs stable] : Vital signs stable [Respiratory Rate Stable] : Respiratory Rate Stable [No Chest Pain, shortness of breath] : No Chest Pain, shortness of breath [No chest pain, shortness of breath, or ear pain] :  No chest pain, shortness of breath, or ear pain  [Tolerated Ascent well] : Tolerated Ascent well [Vital Signs stable] : Vital Signs stable [A physician was present throughout the entire HBOT] : A physician was present throughout the entire HBOT [Clinically Stable] : Clinically stable [0] : 0 out of 10 [Yes] : Yes [FreeTextEntry2] : /56 HR 79 T 98.2

## 2024-01-26 NOTE — PROCEDURE
[Outpatient] : Outpatient [Ambulatory] : Patient is ambulatory. [Cane] : cane [THIS CHAMBER HAS BEEN CLEANED / DISINFECTED] : This chamber has been cleaned / disinfected according to local and hospital policy and procedure prior to this treatment. [____] : Post-Dive: Time - [unfilled] [___] : Post-Dive: Value - [unfilled] mg/dL [I have examined and evaluated this patient today, including ears and lungs and have cleared the patient] : I have examined and evaluated this patient today, including ears and lungs and have cleared the patient to continue HBOT as prescribed.  [Time MD/Provider assessed Patient: _____] : Time MD/Provider assessed Patient: [unfilled] [I have ordered 1 HBOT session at the indicated protocol as seen below] : I have ordered 1 HBOT session at the indicated protocol as seen below [Patient demonstrated and verbalized proper technique for using air break mask] : Patient demonstrated and verbalized proper technique for using air break mask [Patient educated on the risks of CONSUMING ALCOHOL prior to HBOT with understanding] : Patient educated on the risks of CONSUMING ALCOHOL prior to HBOT with understanding [Patient educated on the risks of SMOKING prior to HBOT with understanding] : Patient educated on the risks of SMOKING prior to HBOT with understanding [100% Cotton] : 100% cotton [Empty all pockets] : empty all pockets [Pre tx medications] : pre tx medications  [No hair oils, wigs, hairpieces, pins] : no hair oils, wigs, hairpieces, pins  [No make-up, creams] : no make-up, creams  [No matches, cigarettes, lighters] : no matches, cigarettes, lighters  [No jewelry] : no jewelry  [Hearing aid removed] : hearing aid removed [Dentures removed] : dentures removed [Ground bracelet on pt's wrist] : ground bracelet on pt's wrist  [Remove nail polish] : remove nail polish  [Contacts removed] : contacts removed  [Bra, undergarments removed] : bra, undergarments removed  [No reading material] : no reading material  [No contraindicated dressings] : no contraindicated dressings [Ground Wire - VISUAL Verification - Intact/Free of Obstruction] : Ground Wire - VISUAL Verification - Intact/Free of Obstruction  [Ground Continuity - Verified < 1 ohm w/ Wrist Strap Rafal] : Ground Continuity - Verified < 1 ohm w/ Wrist Strap Rafal [Clear all fields] : clear all fields [Number: ___] : Number: [unfilled] [Diagnosis: ___] : Diagnosis: [unfilled] [] : No [FreeTextEntry1] : 2.0 [FreeTextEntry3] : 90 [FreeTextEntry5] : 5081 [FreeTextEntry7] : 2790 [FreeTextEntry9] : 1535 [de-identified] : 1540 [de-identified] : 110

## 2024-01-29 ENCOUNTER — OUTPATIENT (OUTPATIENT)
Dept: OUTPATIENT SERVICES | Facility: HOSPITAL | Age: 74
LOS: 1 days | End: 2024-01-29
Payer: COMMERCIAL

## 2024-01-29 ENCOUNTER — APPOINTMENT (OUTPATIENT)
Dept: HYPERBARIC MEDICINE | Facility: CLINIC | Age: 74
End: 2024-01-29
Payer: MEDICARE

## 2024-01-29 VITALS
SYSTOLIC BLOOD PRESSURE: 157 MMHG | OXYGEN SATURATION: 98 % | HEIGHT: 66 IN | BODY MASS INDEX: 20.89 KG/M2 | DIASTOLIC BLOOD PRESSURE: 77 MMHG | TEMPERATURE: 98.5 F | RESPIRATION RATE: 18 BRPM | WEIGHT: 130 LBS | HEART RATE: 81 BPM

## 2024-01-29 DIAGNOSIS — Z89.429 ACQUIRED ABSENCE OF OTHER TOE(S), UNSPECIFIED SIDE: Chronic | ICD-10-CM

## 2024-01-29 DIAGNOSIS — M79.606 PAIN IN LEG, UNSPECIFIED: ICD-10-CM

## 2024-01-29 PROCEDURE — G0277: CPT

## 2024-01-29 PROCEDURE — 82962 GLUCOSE BLOOD TEST: CPT

## 2024-01-29 PROCEDURE — 99183 HYPERBARIC OXYGEN THERAPY: CPT

## 2024-01-29 NOTE — ADDENDUM
[FreeTextEntry1] : Pt descended to 2.0 CATA @ 1.5 PSI/min without incident in chamber #3 Pt resting @ depth with chest rise and fall observed throughout tx.  Pt ascended from 2.0 CATA @ 1.5 PSI/min without incident.  Pt tolerated tx well.

## 2024-01-29 NOTE — ASSESSMENT
[Time MD/Provider assessed Patient:_______] : Time MD/Provider assessed Patient: [unfilled] [Patient prepared for dive] : Patient prepared for dive [Patient undergoing HBO treatment for __________] : Patient undergoing HBO treatment for [unfilled] [Patient descended without problem for 9 minutes] : Patient descended without problem for 9 minutes [No dizziness or thirst] :  No dizziness or thirst [No ear problems] : No ear problems [Vital signs stable] : Vital signs stable [Tolerating dive well] : Tolerating dive well [No Chest Pain, shortness of breath] : No Chest Pain, shortness of breath [Respiratory Rate Stable] : Respiratory Rate Stable [No chest pain, shortness of breath, or ear pain] :  No chest pain, shortness of breath, or ear pain  [Tolerated Ascent well] : Tolerated Ascent well [Vital Signs stable] : Vital Signs stable [A physician was present throughout the entire HBOT] : A physician was present throughout the entire HBOT [No] : No [Clinically Stable] : Clinically stable [Continue Treatment Plan] : Continue treatment plan [0] : 0 out of 10

## 2024-01-29 NOTE — PROCEDURE
[Outpatient] : Outpatient [Ambulatory] : Patient is ambulatory. [Walker] : walker [THIS CHAMBER HAS BEEN CLEANED / DISINFECTED] : This chamber has been cleaned / disinfected according to local and hospital policy and procedure prior to this treatment. [I have examined and evaluated this patient today, including ears and lungs and have cleared the patient] : I have examined and evaluated this patient today, including ears and lungs and have cleared the patient to continue HBOT as prescribed.  [100% Cotton] : 100% cotton [Empty all pockets] : empty all pockets [No hair oils, wigs, hairpieces, pins] : no hair oils, wigs, hairpieces, pins  [Pre tx medications] : pre tx medications  [No make-up, creams] : no make-up, creams  [No jewelry] : no jewelry  [No matches, cigarettes, lighters] : no matches, cigarettes, lighters  [Hearing aid removed] : hearing aid removed [Dentures removed] : dentures removed [Ground bracelet on pt's wrist] : ground bracelet on pt's wrist  [Contacts removed] : contacts removed  [Remove nail polish] : remove nail polish  [No reading material] : no reading material  [Bra, undergarments removed] : bra, undergarments removed  [No contraindicated dressings] : no contraindicated dressings [Ground Wire - VISUAL Verification - Intact/Free of Obstruction] : Ground Wire - VISUAL Verification - Intact/Free of Obstruction  [Ground Continuity - Verified < 1 ohm w/ Wrist Strap Rafal] : Ground Continuity - Verified < 1 ohm w/ Wrist Strap Rafal [Number: ___] : Number: [unfilled] [Diagnosis: ___] : Diagnosis: [unfilled] [____] : Post-Dive: Time - [unfilled] [___] : Post-Dive: Value - [unfilled] mg/dL [] : No [Clear all fields] : clear all fields [FreeTextEntry1] : 2.0 [FreeTextEntry3] : 90  [FreeTextEntry5] : 16:40 [FreeTextEntry7] : 16:54 [de-identified] : 18:34 [FreeTextEntry9] : 18:24 [de-identified] : 110

## 2024-01-30 ENCOUNTER — APPOINTMENT (OUTPATIENT)
Dept: WOUND CARE | Facility: HOSPITAL | Age: 74
End: 2024-01-30
Payer: MEDICARE

## 2024-01-30 ENCOUNTER — OUTPATIENT (OUTPATIENT)
Dept: OUTPATIENT SERVICES | Facility: HOSPITAL | Age: 74
LOS: 1 days | End: 2024-01-30

## 2024-01-30 ENCOUNTER — APPOINTMENT (OUTPATIENT)
Dept: HYPERBARIC MEDICINE | Facility: CLINIC | Age: 74
End: 2024-01-30

## 2024-01-30 DIAGNOSIS — M79.606 PAIN IN LEG, UNSPECIFIED: ICD-10-CM

## 2024-01-30 DIAGNOSIS — Z89.429 ACQUIRED ABSENCE OF OTHER TOE(S), UNSPECIFIED SIDE: Chronic | ICD-10-CM

## 2024-01-30 PROCEDURE — 11042 DBRDMT SUBQ TIS 1ST 20SQCM/<: CPT

## 2024-01-30 RX ORDER — COLLAGENASE SANTYL 250 [ARB'U]/G
250 OINTMENT TOPICAL DAILY
Qty: 1 | Refills: 2 | Status: ACTIVE | COMMUNITY
Start: 2024-01-30 | End: 1900-01-01

## 2024-01-30 NOTE — PHYSICAL EXAM
PLEASE FOLLOW UP WITH YOUR ENT PHYSICIAN IN REGARDS TO THIS URGENT CARE VISIT    TREATMENT PLAN FOR TODAY'S VISIT:        1.  Oral prednisone to reduce inflammation/swelling/pain    Oral prednisone 40 mg once daily x 5 days, then stop.  Take with food to reduce gastric irritation    2.  Humidifier 24/7, keep the head of the bed elevated.    3.  Nasal Congestion-Neti rinses   A neti pot is a container designed to rinse debris or mucus from your nasal cavity. You might use a neti pot to treat symptoms of nasal allergies, sinus problems or colds.    If you choose to make your own saltwater solution, it's important to use bottled water that has been distilled or sterilized. Tap water is acceptable if it's been passed through a filter with a pore size of 1 micron or smaller or if it's been boiled for several minutes and then left to cool until it's lukewarm.    To use the neti pot, tilt your head sideways over the sink and place the spout of the neti pot in the upper nostril. Breathing through your open mouth, gently pour the saltwater solution into your upper nostril so that the liquid drains through the lower nostril. Repeat on the other side.    Be sure to rinse the irrigation device after each use with similarly distilled, sterile, previously boiled and cooled, or filtered water and leave open to air-dry.    Neti pots are often available in pharmacies, health food stores and online. Other devices, such as squeeze bottles and pressurized canisters, also can be used to rinse or irrigate the nasal passages    Colds    What are colds?   Colds are an infection of the head and chest caused by a virus. They are a type of upper respiratory infection (URI). They can affect your nose, throat, sinuses, and ears. A cold can also affect the tube that connects your middle ear and throat, as well as your windpipe, voice box, and airways.    How do they occur?   Over 200 different viruses can cause colds. The infection spreads when  [Please See PDF for [employer]] : Please See PDF for [employer]. viruses are passed to others by sneezing, coughing, or personal contact. You may also become infected by handling objects that were touched by someone with a cold. Some of the cold viruses live up to 3 hours on the skin and on objects, such as telephones.  You are more likely to get a cold if:  • You are emotionally or physically stressed.   • You are tired.   • You do not have a healthy diet.   • You are a smoker.   • You are exposed to secondhand smoke.   • You are living or working in crowded conditions.   People tend to get fewer colds as they get older because they build up immunity to some of the viruses that can cause colds.    What are the symptoms?   You usually start having cold symptoms 1 to 3 days after contact with a cold virus. Symptoms may include:  • scratchy or sore throat   • sneezing   • runny or stuffy nose   • cough   • watery eyes   • ear congestion   • slight fever (99 to 100°F, or 37.2 to 37.8°C)   • tiredness   • headache   • loss of appetite     How are they diagnosed?   Colds can usually be diagnosed from your symptoms. Your healthcare provider may need to examine you to rule out other serious infections, such as strep throat and sinusitis.  Common colds are different from influenza (flu), even though both are caused by viruses. Influenza usually develops more suddenly than a cold. When you have the flu, you develop fever and muscle aches within a few hours, even as few as 1 or 2 hours. The symptoms of a cold develop more slowly and are usually milder.    How are they treated?   There are no medicines that cure a cold. You can treat your symptoms with nonprescription medicines such as aspirin, acetaminophen, ibuprofen, nose drops or sprays, cough syrups and drops, throat lozenges, and decongestants. Check with your provider before you take any of these drugs if you are already taking other medicines.  Nonsteroidal anti-inflammatory medicines (NSAIDs), such as ibuprofen and aspirin, may  cause stomach bleeding and other problems. These risks increase with age. Read the label and take as directed. Unless recommended by your healthcare provider, do not take for more than 10 days for any reason.   Check with your healthcare provider before you give any medicine that contains aspirin or salicylates to a child or teen. This includes medicines like baby aspirin, some cold medicines, and Pepto-Bismol. Children and teens who take aspirin are at risk for a serious illness called Reye's syndrome.   Do not give a child under age 4 any cough and cold medicines unless specifically instructed to do so by your healthcare provider. Children over 6 years of age may be given cough drops or hard candies to relieve a sore throat or cough.  Many cough and cold medicines may contain substances that should be avoided during pregnancy, such as caffeine, aspirin, and alcohol. Some cold remedies found in your local pharmacy can be safely used, but, if you are pregnant, check first with your healthcare provider before taking them. Most of the time, you can get by just by drinking plenty of fluids and getting extra rest.    How long do the effects last?   Colds usually last 1 to 2 weeks. Sometimes you may get a bacterial infection after a cold, such as an ear infection or sinus infection.    How can I take care of myself?   • Get lots of rest.   • Drink lots of fluids, such as water, fruit juice, tea, and soda.   • Use a humidifier to increase air moisture, especially in your bedroom.   • Use nose drops to relieve nasal congestion. You can buy nose drops or make your own. To make a solution for nose drops, add 1 teaspoon of salt to a quart of water.   See your healthcare provider if you have any of the following symptoms:  • worsening earache   • trouble breathing, wheezing, shortness of breath   • swollen, tender lymph nodes (glands) in your neck   • chest pain   • skin rash   • worsening sore throat   • white or yellow spots  on your tonsils or throat   • a cough that gets worse or becomes painful   • temperature of 101.5°F (38.6°C) or higher that lasts more than 2 days   • shaking chills   • headache that lasts several days   • confusion   • lips, skin, or nails that look blue     What can be done to help prevent the spread of colds?   The following suggestions may help prevent the spread of your cold to others.  • Wash your hands after coughing, sneezing, or blowing your nose.   • Wash your hands often and especially before touching food, dishes, glasses, silverware, or napkins.   • Turn away from others and use tissues when you cough or sneeze.   • Use paper cups and paper towels in bathrooms.   • Don't let your nose or mouth touch public telephones or drinking fountains.   • Don't share food or eating utensils with others.   • Avoid close contact with others until you no longer have coughing, sneezing, or a runny nose.     To lower your risk of catching a cold:  • Wash your hands often, especially after coming in contact with someone who has a cold.   • Wash your hands before eating and drinking.   • Avoid close contact with people who have a cold.   • Keep your hands away from your nose and mouth.   • Eat a healthy diet.   • Get plenty of rest.   • Do not smoke.

## 2024-01-31 ENCOUNTER — APPOINTMENT (OUTPATIENT)
Dept: HYPERBARIC MEDICINE | Facility: CLINIC | Age: 74
End: 2024-01-31
Payer: MEDICARE

## 2024-01-31 ENCOUNTER — OUTPATIENT (OUTPATIENT)
Dept: OUTPATIENT SERVICES | Facility: HOSPITAL | Age: 74
LOS: 1 days | End: 2024-01-31
Payer: COMMERCIAL

## 2024-01-31 VITALS
RESPIRATION RATE: 18 BRPM | OXYGEN SATURATION: 99 % | DIASTOLIC BLOOD PRESSURE: 76 MMHG | SYSTOLIC BLOOD PRESSURE: 170 MMHG | HEART RATE: 82 BPM

## 2024-01-31 VITALS
HEART RATE: 93 BPM | RESPIRATION RATE: 18 BRPM | TEMPERATURE: 98.2 F | SYSTOLIC BLOOD PRESSURE: 158 MMHG | DIASTOLIC BLOOD PRESSURE: 63 MMHG | OXYGEN SATURATION: 99 %

## 2024-01-31 DIAGNOSIS — Z89.429 ACQUIRED ABSENCE OF OTHER TOE(S), UNSPECIFIED SIDE: Chronic | ICD-10-CM

## 2024-01-31 PROCEDURE — 99183 HYPERBARIC OXYGEN THERAPY: CPT

## 2024-01-31 PROCEDURE — 82962 GLUCOSE BLOOD TEST: CPT

## 2024-01-31 PROCEDURE — G0277: CPT

## 2024-01-31 NOTE — PROCEDURE
[Outpatient] : Outpatient [Ambulatory] : Patient is ambulatory. [Walker] : walker [] : Yes [THIS CHAMBER HAS BEEN CLEANED / DISINFECTED] : This chamber has been cleaned / disinfected according to local and hospital policy and procedure prior to this treatment. [____] : Recheck: Time - [unfilled] [___] : Recheck: Value - [unfilled] mg/dL [I have examined and evaluated this patient today, including ears and lungs and have cleared the patient] : I have examined and evaluated this patient today, including ears and lungs and have cleared the patient to continue HBOT as prescribed.  [100% Cotton] : 100% cotton [Empty all pockets] : empty all pockets [No hair oils, wigs, hairpieces, pins] : no hair oils, wigs, hairpieces, pins  [Pre tx medications] : pre tx medications  [No make-up, creams] : no make-up, creams  [No jewelry] : no jewelry  [No matches, cigarettes, lighters] : no matches, cigarettes, lighters  [Hearing aid removed] : hearing aid removed [Dentures removed] : dentures removed [Ground bracelet on pt's wrist] : ground bracelet on pt's wrist  [Contacts removed] : contacts removed  [Remove nail polish] : remove nail polish  [No reading material] : no reading material  [Bra, undergarments removed] : bra, undergarments removed  [No contraindicated dressings] : no contraindicated dressings [Ground Wire - VISUAL Verification - Intact/Free of Obstruction] : Ground Wire - VISUAL Verification - Intact/Free of Obstruction  [Ground Continuity - Verified < 1 ohm w/ Wrist Strap Rafal] : Ground Continuity - Verified < 1 ohm w/ Wrist Strap Rafal [Clear all fields] : clear all fields [Diagnosis: ___] : Diagnosis: [unfilled] [I have ordered 1 HBOT session at the indicated protocol as seen below] : I have ordered 1 HBOT session at the indicated protocol as seen below [Patient demonstrated and verbalized proper technique for using air break mask] : Patient demonstrated and verbalized proper technique for using air break mask [Patient educated on the risks of SMOKING prior to HBOT with understanding] : Patient educated on the risks of SMOKING prior to HBOT with understanding [Patient educated on the risks of CONSUMING ALCOHOL prior to HBOT with understanding] : Patient educated on the risks of CONSUMING ALCOHOL prior to HBOT with understanding [Number: ___] : Number: [unfilled]

## 2024-01-31 NOTE — ASSESSMENT
[Patient prepared for dive] : Patient prepared for dive [Patient descended without problem for 9 minutes] : Patient descended without problem for 9 minutes [No dizziness or thirst] :  No dizziness or thirst [No ear problems] : No ear problems [Vital signs stable] : Vital signs stable [Tolerating dive well] : Tolerating dive well [No Chest Pain, shortness of breath] : No Chest Pain, shortness of breath [Respiratory Rate Stable] : Respiratory Rate Stable [No chest pain, shortness of breath, or ear pain] :  No chest pain, shortness of breath, or ear pain  [Tolerated Ascent well] : Tolerated Ascent well [Vital Signs stable] : Vital Signs stable [A physician was present throughout the entire HBOT] : A physician was present throughout the entire HBOT [No] : No [Clinically Stable] : Clinically stable [Continue Treatment Plan] : Continue treatment plan [0] : 0 out of 10 [No change from previous assessment] : No change from previous assessment [Patient undergoing HBO treatment for __________] : Patient undergoing HBO treatment for [unfilled]

## 2024-02-01 ENCOUNTER — APPOINTMENT (OUTPATIENT)
Dept: HYPERBARIC MEDICINE | Facility: CLINIC | Age: 74
End: 2024-02-01
Payer: MEDICARE

## 2024-02-01 ENCOUNTER — OUTPATIENT (OUTPATIENT)
Dept: OUTPATIENT SERVICES | Facility: HOSPITAL | Age: 74
LOS: 1 days | End: 2024-02-01
Payer: COMMERCIAL

## 2024-02-01 VITALS
DIASTOLIC BLOOD PRESSURE: 76 MMHG | HEART RATE: 83 BPM | SYSTOLIC BLOOD PRESSURE: 176 MMHG | OXYGEN SATURATION: 97 % | RESPIRATION RATE: 18 BRPM

## 2024-02-01 VITALS
SYSTOLIC BLOOD PRESSURE: 147 MMHG | HEART RATE: 94 BPM | OXYGEN SATURATION: 97 % | RESPIRATION RATE: 18 BRPM | TEMPERATURE: 98.5 F | DIASTOLIC BLOOD PRESSURE: 61 MMHG

## 2024-02-01 DIAGNOSIS — L97.522 NON-PRESSURE CHRONIC ULCER OF OTHER PART OF LEFT FOOT WITH FAT LAYER EXPOSED: ICD-10-CM

## 2024-02-01 DIAGNOSIS — T86.828 OTHER COMPLICATIONS OF SKIN GRAFT (ALLOGRAFT) (AUTOGRAFT): ICD-10-CM

## 2024-02-01 DIAGNOSIS — Z89.429 ACQUIRED ABSENCE OF OTHER TOE(S), UNSPECIFIED SIDE: Chronic | ICD-10-CM

## 2024-02-01 DIAGNOSIS — M79.606 PAIN IN LEG, UNSPECIFIED: ICD-10-CM

## 2024-02-01 PROCEDURE — 99183 HYPERBARIC OXYGEN THERAPY: CPT

## 2024-02-01 PROCEDURE — G0277: CPT

## 2024-02-01 PROCEDURE — 82962 GLUCOSE BLOOD TEST: CPT

## 2024-02-01 NOTE — PROCEDURE
[Outpatient] : Outpatient [Ambulatory] : Patient is ambulatory. [Walker] : walker [THIS CHAMBER HAS BEEN CLEANED / DISINFECTED] : This chamber has been cleaned / disinfected according to local and hospital policy and procedure prior to this treatment. [____] : Recheck: Time - [unfilled] [___] : Recheck: Value - [unfilled] mg/dL [I have examined and evaluated this patient today, including ears and lungs and have cleared the patient] : I have examined and evaluated this patient today, including ears and lungs and have cleared the patient to continue HBOT as prescribed.  [I have ordered 1 HBOT session at the indicated protocol as seen below] : I have ordered 1 HBOT session at the indicated protocol as seen below [Patient demonstrated and verbalized proper technique for using air break mask] : Patient demonstrated and verbalized proper technique for using air break mask [Patient educated on the risks of SMOKING prior to HBOT with understanding] : Patient educated on the risks of SMOKING prior to HBOT with understanding [Patient educated on the risks of CONSUMING ALCOHOL prior to HBOT with understanding] : Patient educated on the risks of CONSUMING ALCOHOL prior to HBOT with understanding [100% Cotton] : 100% cotton [Empty all pockets] : empty all pockets [No hair oils, wigs, hairpieces, pins] : no hair oils, wigs, hairpieces, pins  [Pre tx medications] : pre tx medications  [No make-up, creams] : no make-up, creams  [No jewelry] : no jewelry  [No matches, cigarettes, lighters] : no matches, cigarettes, lighters  [Hearing aid removed] : hearing aid removed [Dentures removed] : dentures removed [Ground bracelet on pt's wrist] : ground bracelet on pt's wrist  [Contacts removed] : contacts removed  [Remove nail polish] : remove nail polish  [No reading material] : no reading material  [Bra, undergarments removed] : bra, undergarments removed  [No contraindicated dressings] : no contraindicated dressings [Ground Wire - VISUAL Verification - Intact/Free of Obstruction] : Ground Wire - VISUAL Verification - Intact/Free of Obstruction  [Ground Continuity - Verified < 1 ohm w/ Wrist Strap Rafal] : Ground Continuity - Verified < 1 ohm w/ Wrist Strap Rafal [Diagnosis: ___] : Diagnosis: [unfilled] [Number: ___] : Number: [unfilled] [Clear all fields] : clear all fields [] : No [FreeTextEntry1] : 2.0 [FreeTextEntry3] : 90  min [FreeTextEntry5] : 1600 [FreeTextEntry7] : 8092 [[facility]] : [id number] [de-identified] : [id number] [de-identified] : 110 min

## 2024-02-02 ENCOUNTER — OUTPATIENT (OUTPATIENT)
Dept: OUTPATIENT SERVICES | Facility: HOSPITAL | Age: 74
LOS: 1 days | End: 2024-02-02
Payer: COMMERCIAL

## 2024-02-02 ENCOUNTER — APPOINTMENT (OUTPATIENT)
Dept: HYPERBARIC MEDICINE | Facility: CLINIC | Age: 74
End: 2024-02-02
Payer: MEDICARE

## 2024-02-02 VITALS
TEMPERATURE: 98.2 F | HEART RATE: 86 BPM | DIASTOLIC BLOOD PRESSURE: 68 MMHG | RESPIRATION RATE: 16 BRPM | OXYGEN SATURATION: 99 % | SYSTOLIC BLOOD PRESSURE: 164 MMHG

## 2024-02-02 VITALS
SYSTOLIC BLOOD PRESSURE: 173 MMHG | DIASTOLIC BLOOD PRESSURE: 81 MMHG | OXYGEN SATURATION: 99 % | HEART RATE: 79 BPM | RESPIRATION RATE: 16 BRPM

## 2024-02-02 DIAGNOSIS — M79.606 PAIN IN LEG, UNSPECIFIED: ICD-10-CM

## 2024-02-02 DIAGNOSIS — Z89.429 ACQUIRED ABSENCE OF OTHER TOE(S), UNSPECIFIED SIDE: Chronic | ICD-10-CM

## 2024-02-02 PROCEDURE — G0277: CPT

## 2024-02-02 PROCEDURE — 82962 GLUCOSE BLOOD TEST: CPT

## 2024-02-02 PROCEDURE — 99183 HYPERBARIC OXYGEN THERAPY: CPT

## 2024-02-02 RX ORDER — COLLAGENASE SANTYL 250 [ARB'U]/G
250 OINTMENT TOPICAL DAILY
Qty: 1 | Refills: 2 | Status: ACTIVE | COMMUNITY
Start: 2024-02-02 | End: 1900-01-01

## 2024-02-02 NOTE — ADDENDUM
[FreeTextEntry1] : Pt descended to 2.0 CATA @ 1.5 PSI/min without incident in chamber #4 Pt resting @ depth with chest rise and fall observed throughout tx.  Pt ascended from 2.0 CATA @ 1.5 PSI/min without incident.  Pt tolerated tx well.

## 2024-02-02 NOTE — PROCEDURE
[Outpatient] : Outpatient [Ambulatory] : Patient is ambulatory. [Walker] : walker [THIS CHAMBER HAS BEEN CLEANED / DISINFECTED] : This chamber has been cleaned / disinfected according to local and hospital policy and procedure prior to this treatment. [I have examined and evaluated this patient today, including ears and lungs and have cleared the patient] : I have examined and evaluated this patient today, including ears and lungs and have cleared the patient to continue HBOT as prescribed.  [I have ordered 1 HBOT session at the indicated protocol as seen below] : I have ordered 1 HBOT session at the indicated protocol as seen below [Patient demonstrated and verbalized proper technique for using air break mask] : Patient demonstrated and verbalized proper technique for using air break mask [Patient educated on the risks of SMOKING prior to HBOT with understanding] : Patient educated on the risks of SMOKING prior to HBOT with understanding [Patient educated on the risks of CONSUMING ALCOHOL prior to HBOT with understanding] : Patient educated on the risks of CONSUMING ALCOHOL prior to HBOT with understanding [100% Cotton] : 100% cotton [Empty all pockets] : empty all pockets [No hair oils, wigs, hairpieces, pins] : no hair oils, wigs, hairpieces, pins  [Pre tx medications] : pre tx medications  [No make-up, creams] : no make-up, creams  [No jewelry] : no jewelry  [No matches, cigarettes, lighters] : no matches, cigarettes, lighters  [Hearing aid removed] : hearing aid removed [Dentures removed] : dentures removed [Ground bracelet on pt's wrist] : ground bracelet on pt's wrist  [Contacts removed] : contacts removed  [No reading material] : no reading material  [Remove nail polish] : remove nail polish  [Bra, undergarments removed] : bra, undergarments removed  [No contraindicated dressings] : no contraindicated dressings [Ground Wire - VISUAL Verification - Intact/Free of Obstruction] : Ground Wire - VISUAL Verification - Intact/Free of Obstruction  [Ground Continuity - Verified < 1 ohm w/ Wrist Strap Rafal] : Ground Continuity - Verified < 1 ohm w/ Wrist Strap Rafal [Diagnosis: ___] : Diagnosis: [unfilled] [____] : Post-Dive: Time - [unfilled] [___] : Post-Dive: Value - [unfilled] mg/dL [Number: ___] : Number: [unfilled] [Clear all fields] : clear all fields [] : No [FreeTextEntry1] : 2.0 [FreeTextEntry3] : 90  min [FreeTextEntry7] : 9379 [FreeTextEntry5] : 1600 [[facility]] : [id number] [de-identified] : [id number] [de-identified] : 110 min

## 2024-02-05 ENCOUNTER — OUTPATIENT (OUTPATIENT)
Dept: OUTPATIENT SERVICES | Facility: HOSPITAL | Age: 74
LOS: 1 days | End: 2024-02-05
Payer: COMMERCIAL

## 2024-02-05 ENCOUNTER — APPOINTMENT (OUTPATIENT)
Dept: HYPERBARIC MEDICINE | Facility: CLINIC | Age: 74
End: 2024-02-05
Payer: MEDICARE

## 2024-02-05 VITALS
OXYGEN SATURATION: 98 % | RESPIRATION RATE: 16 BRPM | SYSTOLIC BLOOD PRESSURE: 170 MMHG | DIASTOLIC BLOOD PRESSURE: 77 MMHG | HEART RATE: 77 BPM

## 2024-02-05 VITALS
SYSTOLIC BLOOD PRESSURE: 110 MMHG | OXYGEN SATURATION: 98 % | HEART RATE: 84 BPM | DIASTOLIC BLOOD PRESSURE: 55 MMHG | RESPIRATION RATE: 16 BRPM | TEMPERATURE: 98.4 F

## 2024-02-05 DIAGNOSIS — M79.606 PAIN IN LEG, UNSPECIFIED: ICD-10-CM

## 2024-02-05 DIAGNOSIS — Z89.429 ACQUIRED ABSENCE OF OTHER TOE(S), UNSPECIFIED SIDE: Chronic | ICD-10-CM

## 2024-02-05 DIAGNOSIS — T86.828 OTHER COMPLICATIONS OF SKIN GRAFT (ALLOGRAFT) (AUTOGRAFT): ICD-10-CM

## 2024-02-05 DIAGNOSIS — L97.522 NON-PRESSURE CHRONIC ULCER OF OTHER PART OF LEFT FOOT WITH FAT LAYER EXPOSED: ICD-10-CM

## 2024-02-05 PROCEDURE — G0277: CPT

## 2024-02-05 PROCEDURE — 82962 GLUCOSE BLOOD TEST: CPT

## 2024-02-05 PROCEDURE — 99183 HYPERBARIC OXYGEN THERAPY: CPT

## 2024-02-05 NOTE — PROCEDURE
[Outpatient] : Outpatient [Ambulatory] : Patient is ambulatory. [Walker] : walker [THIS CHAMBER HAS BEEN CLEANED / DISINFECTED] : This chamber has been cleaned / disinfected according to local and hospital policy and procedure prior to this treatment. [____] : Recheck: Time - [unfilled] [___] : Recheck: Value - [unfilled] mg/dL [I have examined and evaluated this patient today, including ears and lungs and have cleared the patient] : I have examined and evaluated this patient today, including ears and lungs and have cleared the patient to continue HBOT as prescribed.  [I have ordered 1 HBOT session at the indicated protocol as seen below] : I have ordered 1 HBOT session at the indicated protocol as seen below [Patient demonstrated and verbalized proper technique for using air break mask] : Patient demonstrated and verbalized proper technique for using air break mask [Patient educated on the risks of SMOKING prior to HBOT with understanding] : Patient educated on the risks of SMOKING prior to HBOT with understanding [Patient educated on the risks of CONSUMING ALCOHOL prior to HBOT with understanding] : Patient educated on the risks of CONSUMING ALCOHOL prior to HBOT with understanding [100% Cotton] : 100% cotton [Empty all pockets] : empty all pockets [No hair oils, wigs, hairpieces, pins] : no hair oils, wigs, hairpieces, pins  [Pre tx medications] : pre tx medications  [No make-up, creams] : no make-up, creams  [No jewelry] : no jewelry  [No matches, cigarettes, lighters] : no matches, cigarettes, lighters  [Hearing aid removed] : hearing aid removed [Dentures removed] : dentures removed [Ground bracelet on pt's wrist] : ground bracelet on pt's wrist  [Contacts removed] : contacts removed  [Remove nail polish] : remove nail polish  [No reading material] : no reading material  [Bra, undergarments removed] : bra, undergarments removed  [No contraindicated dressings] : no contraindicated dressings [Ground Wire - VISUAL Verification - Intact/Free of Obstruction] : Ground Wire - VISUAL Verification - Intact/Free of Obstruction  [Ground Continuity - Verified < 1 ohm w/ Wrist Strap Rafal] : Ground Continuity - Verified < 1 ohm w/ Wrist Strap Rafal [Diagnosis: ___] : Diagnosis: [unfilled] [Number: ___] : Number: [unfilled] [Clear all fields] : clear all fields [] : No [FreeTextEntry1] : 2.0 [FreeTextEntry3] : 90  min [FreeTextEntry5] : 1600 [FreeTextEntry7] : 9660 [[facility]] : [id number] [de-identified] : [id number] [de-identified] : 110 min

## 2024-02-06 ENCOUNTER — APPOINTMENT (OUTPATIENT)
Dept: HYPERBARIC MEDICINE | Facility: CLINIC | Age: 74
End: 2024-02-06
Payer: MEDICARE

## 2024-02-06 ENCOUNTER — OUTPATIENT (OUTPATIENT)
Dept: OUTPATIENT SERVICES | Facility: HOSPITAL | Age: 74
LOS: 1 days | End: 2024-02-06
Payer: COMMERCIAL

## 2024-02-06 VITALS
OXYGEN SATURATION: 98 % | DIASTOLIC BLOOD PRESSURE: 69 MMHG | RESPIRATION RATE: 16 BRPM | HEART RATE: 92 BPM | TEMPERATURE: 98 F | SYSTOLIC BLOOD PRESSURE: 123 MMHG

## 2024-02-06 VITALS
HEART RATE: 87 BPM | DIASTOLIC BLOOD PRESSURE: 67 MMHG | OXYGEN SATURATION: 98 % | SYSTOLIC BLOOD PRESSURE: 139 MMHG | RESPIRATION RATE: 16 BRPM

## 2024-02-06 DIAGNOSIS — M79.606 PAIN IN LEG, UNSPECIFIED: ICD-10-CM

## 2024-02-06 PROCEDURE — 99183 HYPERBARIC OXYGEN THERAPY: CPT

## 2024-02-06 PROCEDURE — G0277: CPT

## 2024-02-06 PROCEDURE — 82962 GLUCOSE BLOOD TEST: CPT

## 2024-02-06 NOTE — PROCEDURE
[Outpatient] : Outpatient [Ambulatory] : Patient is ambulatory. [Walker] : walker [THIS CHAMBER HAS BEEN CLEANED / DISINFECTED] : This chamber has been cleaned / disinfected according to local and hospital policy and procedure prior to this treatment. [____] : Recheck: Time - [unfilled] [___] : Recheck: Value - [unfilled] mg/dL [I have examined and evaluated this patient today, including ears and lungs and have cleared the patient] : I have examined and evaluated this patient today, including ears and lungs and have cleared the patient to continue HBOT as prescribed.  [I have ordered 1 HBOT session at the indicated protocol as seen below] : I have ordered 1 HBOT session at the indicated protocol as seen below [Patient demonstrated and verbalized proper technique for using air break mask] : Patient demonstrated and verbalized proper technique for using air break mask [Patient educated on the risks of SMOKING prior to HBOT with understanding] : Patient educated on the risks of SMOKING prior to HBOT with understanding [Patient educated on the risks of CONSUMING ALCOHOL prior to HBOT with understanding] : Patient educated on the risks of CONSUMING ALCOHOL prior to HBOT with understanding [100% Cotton] : 100% cotton [Empty all pockets] : empty all pockets [No hair oils, wigs, hairpieces, pins] : no hair oils, wigs, hairpieces, pins  [Pre tx medications] : pre tx medications  [No make-up, creams] : no make-up, creams  [No jewelry] : no jewelry  [No matches, cigarettes, lighters] : no matches, cigarettes, lighters  [Hearing aid removed] : hearing aid removed [Dentures removed] : dentures removed [Ground bracelet on pt's wrist] : ground bracelet on pt's wrist  [Contacts removed] : contacts removed  [Remove nail polish] : remove nail polish  [No reading material] : no reading material  [Bra, undergarments removed] : bra, undergarments removed  [No contraindicated dressings] : no contraindicated dressings [Ground Wire - VISUAL Verification - Intact/Free of Obstruction] : Ground Wire - VISUAL Verification - Intact/Free of Obstruction  [Ground Continuity - Verified < 1 ohm w/ Wrist Strap Rafal] : Ground Continuity - Verified < 1 ohm w/ Wrist Strap Rafal [Clear all fields] : clear all fields [Diagnosis: ___] : Diagnosis: [unfilled] [Number: ___] : Number: [unfilled] [] : No [FreeTextEntry1] : 2.0 [FreeTextEntry3] : 90  min [FreeTextEntry5] : 1600 [FreeTextEntry7] : 5604 [[facility]] : [id number] [de-identified] : [id number] [de-identified] : 110 min

## 2024-02-07 ENCOUNTER — APPOINTMENT (OUTPATIENT)
Dept: VASCULAR SURGERY | Facility: CLINIC | Age: 74
End: 2024-02-07
Payer: MEDICARE

## 2024-02-07 ENCOUNTER — APPOINTMENT (OUTPATIENT)
Dept: HYPERBARIC MEDICINE | Facility: CLINIC | Age: 74
End: 2024-02-07
Payer: MEDICARE

## 2024-02-07 ENCOUNTER — OUTPATIENT (OUTPATIENT)
Dept: OUTPATIENT SERVICES | Facility: HOSPITAL | Age: 74
LOS: 1 days | End: 2024-02-07
Payer: COMMERCIAL

## 2024-02-07 VITALS
DIASTOLIC BLOOD PRESSURE: 70 MMHG | HEIGHT: 66 IN | WEIGHT: 130 LBS | SYSTOLIC BLOOD PRESSURE: 153 MMHG | BODY MASS INDEX: 20.89 KG/M2 | HEART RATE: 88 BPM

## 2024-02-07 VITALS
HEART RATE: 85 BPM | DIASTOLIC BLOOD PRESSURE: 83 MMHG | OXYGEN SATURATION: 98 % | RESPIRATION RATE: 16 BRPM | SYSTOLIC BLOOD PRESSURE: 155 MMHG

## 2024-02-07 VITALS
TEMPERATURE: 98 F | HEART RATE: 88 BPM | SYSTOLIC BLOOD PRESSURE: 160 MMHG | DIASTOLIC BLOOD PRESSURE: 67 MMHG | RESPIRATION RATE: 16 BRPM | OXYGEN SATURATION: 98 %

## 2024-02-07 DIAGNOSIS — Z89.429 ACQUIRED ABSENCE OF OTHER TOE(S), UNSPECIFIED SIDE: Chronic | ICD-10-CM

## 2024-02-07 DIAGNOSIS — M79.606 PAIN IN LEG, UNSPECIFIED: ICD-10-CM

## 2024-02-07 PROCEDURE — 99183 HYPERBARIC OXYGEN THERAPY: CPT

## 2024-02-07 PROCEDURE — 99213 OFFICE O/P EST LOW 20 MIN: CPT

## 2024-02-07 PROCEDURE — G0277: CPT

## 2024-02-07 PROCEDURE — 82962 GLUCOSE BLOOD TEST: CPT

## 2024-02-07 NOTE — PROCEDURE
[Outpatient] : Outpatient [Ambulatory] : Patient is ambulatory. [Walker] : walker [THIS CHAMBER HAS BEEN CLEANED / DISINFECTED] : This chamber has been cleaned / disinfected according to local and hospital policy and procedure prior to this treatment. [____] : Recheck: Time - [unfilled] [___] : Recheck: Value - [unfilled] mg/dL [I have examined and evaluated this patient today, including ears and lungs and have cleared the patient] : I have examined and evaluated this patient today, including ears and lungs and have cleared the patient to continue HBOT as prescribed.  [I have ordered 1 HBOT session at the indicated protocol as seen below] : I have ordered 1 HBOT session at the indicated protocol as seen below [Patient demonstrated and verbalized proper technique for using air break mask] : Patient demonstrated and verbalized proper technique for using air break mask [Patient educated on the risks of SMOKING prior to HBOT with understanding] : Patient educated on the risks of SMOKING prior to HBOT with understanding [Patient educated on the risks of CONSUMING ALCOHOL prior to HBOT with understanding] : Patient educated on the risks of CONSUMING ALCOHOL prior to HBOT with understanding [100% Cotton] : 100% cotton [Empty all pockets] : empty all pockets [No hair oils, wigs, hairpieces, pins] : no hair oils, wigs, hairpieces, pins  [Pre tx medications] : pre tx medications  [No make-up, creams] : no make-up, creams  [No jewelry] : no jewelry  [No matches, cigarettes, lighters] : no matches, cigarettes, lighters  [Hearing aid removed] : hearing aid removed [Dentures removed] : dentures removed [Ground bracelet on pt's wrist] : ground bracelet on pt's wrist  [Contacts removed] : contacts removed  [Remove nail polish] : remove nail polish  [No reading material] : no reading material  [Bra, undergarments removed] : bra, undergarments removed  [No contraindicated dressings] : no contraindicated dressings [Ground Wire - VISUAL Verification - Intact/Free of Obstruction] : Ground Wire - VISUAL Verification - Intact/Free of Obstruction  [Ground Continuity - Verified < 1 ohm w/ Wrist Strap Rafal] : Ground Continuity - Verified < 1 ohm w/ Wrist Strap Rafal [Diagnosis: ___] : Diagnosis: [unfilled] [Number: ___] : Number: [unfilled] [Clear all fields] : clear all fields [] : No [FreeTextEntry1] : 2.0 [FreeTextEntry3] : 90  min [FreeTextEntry5] : 8162 [FreeTextEntry7] : 1600 [FreeTextEntry9] : 0978 [de-identified] : [id number] [de-identified] : 110 min

## 2024-02-08 ENCOUNTER — APPOINTMENT (OUTPATIENT)
Dept: HYPERBARIC MEDICINE | Facility: CLINIC | Age: 74
End: 2024-02-08

## 2024-02-08 ENCOUNTER — OUTPATIENT (OUTPATIENT)
Dept: OUTPATIENT SERVICES | Facility: HOSPITAL | Age: 74
LOS: 1 days | End: 2024-02-08
Payer: COMMERCIAL

## 2024-02-08 ENCOUNTER — APPOINTMENT (OUTPATIENT)
Dept: HYPERBARIC MEDICINE | Facility: CLINIC | Age: 74
End: 2024-02-08
Payer: MEDICARE

## 2024-02-08 VITALS
OXYGEN SATURATION: 99 % | DIASTOLIC BLOOD PRESSURE: 87 MMHG | RESPIRATION RATE: 16 BRPM | SYSTOLIC BLOOD PRESSURE: 169 MMHG | HEART RATE: 76 BPM

## 2024-02-08 VITALS
TEMPERATURE: 98 F | OXYGEN SATURATION: 99 % | RESPIRATION RATE: 16 BRPM | SYSTOLIC BLOOD PRESSURE: 152 MMHG | HEART RATE: 78 BPM | DIASTOLIC BLOOD PRESSURE: 71 MMHG

## 2024-02-08 DIAGNOSIS — L97.522 NON-PRESSURE CHRONIC ULCER OF OTHER PART OF LEFT FOOT WITH FAT LAYER EXPOSED: ICD-10-CM

## 2024-02-08 DIAGNOSIS — T86.828 OTHER COMPLICATIONS OF SKIN GRAFT (ALLOGRAFT) (AUTOGRAFT): ICD-10-CM

## 2024-02-08 DIAGNOSIS — M79.606 PAIN IN LEG, UNSPECIFIED: ICD-10-CM

## 2024-02-08 PROCEDURE — 82962 GLUCOSE BLOOD TEST: CPT

## 2024-02-08 PROCEDURE — G0277: CPT

## 2024-02-08 PROCEDURE — 99183 HYPERBARIC OXYGEN THERAPY: CPT

## 2024-02-08 NOTE — PHYSICAL EXAM
[Alert] : alert [Oriented to Person] : oriented to person [Oriented to Place] : oriented to place [Oriented to Time] : oriented to time [Calm] : calm [Respiratory Effort] : normal respiratory effort [Normal Rate and Rhythm] : normal rate and rhythm [2+] : left 2+ [0] : left 0 [Ankle Swelling (On Exam)] : present [Ankle Swelling On The Left] : moderate [Varicose Veins Of Lower Extremities] : not present [] : not present [Abdomen Tenderness] : ~T ~M No abdominal tenderness [Skin Ulcer] : ulcer [de-identified] : appears stated age [de-identified] : normocephalic, atraumatic [de-identified] : supple [FreeTextEntry1] : open left TMA site with some granulation tissue, ischemic skin edges left heel boggy with overlying callus, foul odor, no pus

## 2024-02-08 NOTE — HISTORY OF PRESENT ILLNESS
[FreeTextEntry1] : 73-year-old man with history of peripheral arterial disease s/p right BKA who has chronic limb threatening ischemia of the left lower extremity, [place] 6. He is s/p left TMA which has been slow to heal.   12/15/23 - left leg endovascular revascularization  01/03/24 - Doing well. Denies any fevers or chills. Denies drainage from left TMA site. States he was recommended to undergo evaluation for hyperbaric oxygen therapy.   [de-identified] : Has been undergoing hyperbaric oxygen therapy.. Complains of pain in his left heel. He denies fevers or chills.

## 2024-02-08 NOTE — ASSESSMENT
[FreeTextEntry1] : Problem #1 peripheral arterial disease chronic limb threatening ischemia of the left lower extremity, worsening left foot tissue loss and non-healing TMA site will schedule for interval left lower extremity angiogram, possible revascularization patient has high risk of proximal limb loss on this side without intervention discussed patient with Dr. Ren JENSENM

## 2024-02-08 NOTE — PROCEDURE
[Outpatient] : Outpatient [Ambulatory] : Patient is ambulatory. [Walker] : walker [] : Yes [THIS CHAMBER HAS BEEN CLEANED / DISINFECTED] : This chamber has been cleaned / disinfected according to local and hospital policy and procedure prior to this treatment. [____] : Recheck: Time - [unfilled] [___] : Recheck: Value - [unfilled] mg/dL [I have examined and evaluated this patient today, including ears and lungs and have cleared the patient] : I have examined and evaluated this patient today, including ears and lungs and have cleared the patient to continue HBOT as prescribed.  [I have ordered 1 HBOT session at the indicated protocol as seen below] : I have ordered 1 HBOT session at the indicated protocol as seen below [Patient demonstrated and verbalized proper technique for using air break mask] : Patient demonstrated and verbalized proper technique for using air break mask [Patient educated on the risks of SMOKING prior to HBOT with understanding] : Patient educated on the risks of SMOKING prior to HBOT with understanding [Patient educated on the risks of CONSUMING ALCOHOL prior to HBOT with understanding] : Patient educated on the risks of CONSUMING ALCOHOL prior to HBOT with understanding [100% Cotton] : 100% cotton [Empty all pockets] : empty all pockets [No hair oils, wigs, hairpieces, pins] : no hair oils, wigs, hairpieces, pins  [Pre tx medications] : pre tx medications  [No make-up, creams] : no make-up, creams  [No jewelry] : no jewelry  [No matches, cigarettes, lighters] : no matches, cigarettes, lighters  [Hearing aid removed] : hearing aid removed [Dentures removed] : dentures removed [Ground bracelet on pt's wrist] : ground bracelet on pt's wrist  [Contacts removed] : contacts removed  [Remove nail polish] : remove nail polish  [No reading material] : no reading material  [Bra, undergarments removed] : bra, undergarments removed  [No contraindicated dressings] : no contraindicated dressings [Ground Wire - VISUAL Verification - Intact/Free of Obstruction] : Ground Wire - VISUAL Verification - Intact/Free of Obstruction  [Ground Continuity - Verified < 1 ohm w/ Wrist Strap Rafal] : Ground Continuity - Verified < 1 ohm w/ Wrist Strap Rafal [Clear all fields] : clear all fields [Diagnosis: ___] : Diagnosis: [unfilled] [Number: ___] : Number: [unfilled]

## 2024-02-09 ENCOUNTER — APPOINTMENT (OUTPATIENT)
Dept: HYPERBARIC MEDICINE | Facility: CLINIC | Age: 74
End: 2024-02-09
Payer: MEDICARE

## 2024-02-09 ENCOUNTER — OUTPATIENT (OUTPATIENT)
Dept: OUTPATIENT SERVICES | Facility: HOSPITAL | Age: 74
LOS: 1 days | End: 2024-02-09
Payer: COMMERCIAL

## 2024-02-09 VITALS
HEART RATE: 83 BPM | DIASTOLIC BLOOD PRESSURE: 66 MMHG | TEMPERATURE: 97.9 F | OXYGEN SATURATION: 99 % | SYSTOLIC BLOOD PRESSURE: 118 MMHG | RESPIRATION RATE: 16 BRPM

## 2024-02-09 VITALS
DIASTOLIC BLOOD PRESSURE: 93 MMHG | OXYGEN SATURATION: 99 % | RESPIRATION RATE: 16 BRPM | HEART RATE: 86 BPM | SYSTOLIC BLOOD PRESSURE: 138 MMHG

## 2024-02-09 DIAGNOSIS — L97.522 NON-PRESSURE CHRONIC ULCER OF OTHER PART OF LEFT FOOT WITH FAT LAYER EXPOSED: ICD-10-CM

## 2024-02-09 DIAGNOSIS — T86.828 OTHER COMPLICATIONS OF SKIN GRAFT (ALLOGRAFT) (AUTOGRAFT): ICD-10-CM

## 2024-02-09 DIAGNOSIS — M79.606 PAIN IN LEG, UNSPECIFIED: ICD-10-CM

## 2024-02-09 PROCEDURE — G0277: CPT

## 2024-02-09 PROCEDURE — 82962 GLUCOSE BLOOD TEST: CPT

## 2024-02-09 PROCEDURE — 99183 HYPERBARIC OXYGEN THERAPY: CPT

## 2024-02-09 NOTE — PROCEDURE
[Outpatient] : Outpatient [Ambulatory] : Patient is ambulatory. [Walker] : walker [THIS CHAMBER HAS BEEN CLEANED / DISINFECTED] : This chamber has been cleaned / disinfected according to local and hospital policy and procedure prior to this treatment. [] : Yes [____] : Recheck: Time - [unfilled] [I have examined and evaluated this patient today, including ears and lungs and have cleared the patient] : I have examined and evaluated this patient today, including ears and lungs and have cleared the patient to continue HBOT as prescribed.  [___] : Recheck: Value - [unfilled] mg/dL [I have ordered 1 HBOT session at the indicated protocol as seen below] : I have ordered 1 HBOT session at the indicated protocol as seen below [Patient demonstrated and verbalized proper technique for using air break mask] : Patient demonstrated and verbalized proper technique for using air break mask [Patient educated on the risks of SMOKING prior to HBOT with understanding] : Patient educated on the risks of SMOKING prior to HBOT with understanding [Patient educated on the risks of CONSUMING ALCOHOL prior to HBOT with understanding] : Patient educated on the risks of CONSUMING ALCOHOL prior to HBOT with understanding [100% Cotton] : 100% cotton [Empty all pockets] : empty all pockets [Pre tx medications] : pre tx medications  [No hair oils, wigs, hairpieces, pins] : no hair oils, wigs, hairpieces, pins  [No make-up, creams] : no make-up, creams  [No jewelry] : no jewelry  [No matches, cigarettes, lighters] : no matches, cigarettes, lighters  [Hearing aid removed] : hearing aid removed [Ground bracelet on pt's wrist] : ground bracelet on pt's wrist  [Dentures removed] : dentures removed [Contacts removed] : contacts removed  [Remove nail polish] : remove nail polish  [No reading material] : no reading material  [Bra, undergarments removed] : bra, undergarments removed  [No contraindicated dressings] : no contraindicated dressings [Ground Wire - VISUAL Verification - Intact/Free of Obstruction] : Ground Wire - VISUAL Verification - Intact/Free of Obstruction  [Ground Continuity - Verified < 1 ohm w/ Wrist Strap Rafal] : Ground Continuity - Verified < 1 ohm w/ Wrist Strap Rafal [Clear all fields] : clear all fields [Diagnosis: ___] : Diagnosis: [unfilled] [Number: ___] : Number: [unfilled]

## 2024-02-09 NOTE — ASSESSMENT
[No change from previous assessment] : No change from previous assessment [Patient prepared for dive] : Patient prepared for dive [Patient undergoing HBO treatment for __________] : Patient undergoing HBO treatment for [unfilled] [Patient descended without problem for 9 minutes] : Patient descended without problem for 9 minutes [No dizziness or thirst] :  No dizziness or thirst [No ear problems] : No ear problems [Vital signs stable] : Vital signs stable [Tolerating dive well] : Tolerating dive well [No Chest Pain, shortness of breath] : No Chest Pain, shortness of breath [Respiratory Rate Stable] : Respiratory Rate Stable [No chest pain, shortness of breath, or ear pain] :  No chest pain, shortness of breath, or ear pain  [Tolerated Ascent well] : Tolerated Ascent well [Vital Signs stable] : Vital Signs stable [A physician was present throughout the entire HBOT] : A physician was present throughout the entire HBOT [No] : No [Continue Treatment Plan] : Continue treatment plan [Clinically Stable] : Clinically stable [0] : 0 out of 10

## 2024-02-12 ENCOUNTER — APPOINTMENT (OUTPATIENT)
Dept: HYPERBARIC MEDICINE | Facility: CLINIC | Age: 74
End: 2024-02-12
Payer: MEDICARE

## 2024-02-12 ENCOUNTER — OUTPATIENT (OUTPATIENT)
Dept: OUTPATIENT SERVICES | Facility: HOSPITAL | Age: 74
LOS: 1 days | End: 2024-02-12
Payer: COMMERCIAL

## 2024-02-12 VITALS
OXYGEN SATURATION: 99 % | HEART RATE: 88 BPM | TEMPERATURE: 97.8 F | RESPIRATION RATE: 16 BRPM | DIASTOLIC BLOOD PRESSURE: 53 MMHG | SYSTOLIC BLOOD PRESSURE: 119 MMHG

## 2024-02-12 VITALS
SYSTOLIC BLOOD PRESSURE: 135 MMHG | DIASTOLIC BLOOD PRESSURE: 81 MMHG | OXYGEN SATURATION: 99 % | RESPIRATION RATE: 16 BRPM | HEART RATE: 84 BPM

## 2024-02-12 DIAGNOSIS — L97.522 NON-PRESSURE CHRONIC ULCER OF OTHER PART OF LEFT FOOT WITH FAT LAYER EXPOSED: ICD-10-CM

## 2024-02-12 DIAGNOSIS — T86.828 OTHER COMPLICATIONS OF SKIN GRAFT (ALLOGRAFT) (AUTOGRAFT): ICD-10-CM

## 2024-02-12 DIAGNOSIS — Z89.429 ACQUIRED ABSENCE OF OTHER TOE(S), UNSPECIFIED SIDE: Chronic | ICD-10-CM

## 2024-02-12 PROCEDURE — G0277: CPT

## 2024-02-12 PROCEDURE — 99183 HYPERBARIC OXYGEN THERAPY: CPT

## 2024-02-12 PROCEDURE — 82962 GLUCOSE BLOOD TEST: CPT

## 2024-02-12 NOTE — PROCEDURE
[Outpatient] : Outpatient [Ambulatory] : Patient is ambulatory. [Walker] : walker [THIS CHAMBER HAS BEEN CLEANED / DISINFECTED] : This chamber has been cleaned / disinfected according to local and hospital policy and procedure prior to this treatment. [I have examined and evaluated this patient today, including ears and lungs and have cleared the patient] : I have examined and evaluated this patient today, including ears and lungs and have cleared the patient to continue HBOT as prescribed.  [I have ordered 1 HBOT session at the indicated protocol as seen below] : I have ordered 1 HBOT session at the indicated protocol as seen below [Patient demonstrated and verbalized proper technique for using air break mask] : Patient demonstrated and verbalized proper technique for using air break mask [Patient educated on the risks of SMOKING prior to HBOT with understanding] : Patient educated on the risks of SMOKING prior to HBOT with understanding [Patient educated on the risks of CONSUMING ALCOHOL prior to HBOT with understanding] : Patient educated on the risks of CONSUMING ALCOHOL prior to HBOT with understanding [100% Cotton] : 100% cotton [Empty all pockets] : empty all pockets [No hair oils, wigs, hairpieces, pins] : no hair oils, wigs, hairpieces, pins  [Pre tx medications] : pre tx medications  [No make-up, creams] : no make-up, creams  [No jewelry] : no jewelry  [No matches, cigarettes, lighters] : no matches, cigarettes, lighters  [Hearing aid removed] : hearing aid removed [Dentures removed] : dentures removed [Ground bracelet on pt's wrist] : ground bracelet on pt's wrist  [Contacts removed] : contacts removed  [Remove nail polish] : remove nail polish  [No reading material] : no reading material  [Bra, undergarments removed] : bra, undergarments removed  [No contraindicated dressings] : no contraindicated dressings [Ground Wire - VISUAL Verification - Intact/Free of Obstruction] : Ground Wire - VISUAL Verification - Intact/Free of Obstruction  [Ground Continuity - Verified < 1 ohm w/ Wrist Strap Rafal] : Ground Continuity - Verified < 1 ohm w/ Wrist Strap Rafal [Diagnosis: ___] : Diagnosis: [unfilled] [____] : Recheck: Time - [unfilled] [___] : Recheck: Value - [unfilled] mg/dL [Number: ___] : Number: [unfilled] [Clear all fields] : clear all fields [] : No [FreeTextEntry1] : 2.0 [FreeTextEntry3] : 90  min [FreeTextEntry5] : 0015 [FreeTextEntry9] : 1580 [[facility]The [facility] Camp[facility]Entry7] : 4882 [de-identified] : 1800 [de-identified] : 110 min

## 2024-02-13 ENCOUNTER — APPOINTMENT (OUTPATIENT)
Dept: HYPERBARIC MEDICINE | Facility: CLINIC | Age: 74
End: 2024-02-13

## 2024-02-14 ENCOUNTER — APPOINTMENT (OUTPATIENT)
Dept: HYPERBARIC MEDICINE | Facility: CLINIC | Age: 74
End: 2024-02-14
Payer: MEDICARE

## 2024-02-14 ENCOUNTER — OUTPATIENT (OUTPATIENT)
Dept: OUTPATIENT SERVICES | Facility: HOSPITAL | Age: 74
LOS: 1 days | End: 2024-02-14
Payer: COMMERCIAL

## 2024-02-14 VITALS
HEART RATE: 81 BPM | DIASTOLIC BLOOD PRESSURE: 81 MMHG | SYSTOLIC BLOOD PRESSURE: 164 MMHG | OXYGEN SATURATION: 98 % | RESPIRATION RATE: 16 BRPM

## 2024-02-14 VITALS
TEMPERATURE: 98.2 F | OXYGEN SATURATION: 98 % | HEART RATE: 88 BPM | DIASTOLIC BLOOD PRESSURE: 68 MMHG | SYSTOLIC BLOOD PRESSURE: 169 MMHG | RESPIRATION RATE: 16 BRPM

## 2024-02-14 DIAGNOSIS — M79.606 PAIN IN LEG, UNSPECIFIED: ICD-10-CM

## 2024-02-14 DIAGNOSIS — Z89.429 ACQUIRED ABSENCE OF OTHER TOE(S), UNSPECIFIED SIDE: Chronic | ICD-10-CM

## 2024-02-14 PROCEDURE — 82962 GLUCOSE BLOOD TEST: CPT

## 2024-02-14 PROCEDURE — G0277: CPT

## 2024-02-14 PROCEDURE — 99183 HYPERBARIC OXYGEN THERAPY: CPT

## 2024-02-14 NOTE — PROCEDURE
[Outpatient] : Outpatient [Ambulatory] : Patient is ambulatory. [Walker] : walker [THIS CHAMBER HAS BEEN CLEANED / DISINFECTED] : This chamber has been cleaned / disinfected according to local and hospital policy and procedure prior to this treatment. [____] : Recheck: Time - [unfilled] [___] : Recheck: Value - [unfilled] mg/dL [I have examined and evaluated this patient today, including ears and lungs and have cleared the patient] : I have examined and evaluated this patient today, including ears and lungs and have cleared the patient to continue HBOT as prescribed.  [I have ordered 1 HBOT session at the indicated protocol as seen below] : I have ordered 1 HBOT session at the indicated protocol as seen below [Patient demonstrated and verbalized proper technique for using air break mask] : Patient demonstrated and verbalized proper technique for using air break mask [Patient educated on the risks of SMOKING prior to HBOT with understanding] : Patient educated on the risks of SMOKING prior to HBOT with understanding [Patient educated on the risks of CONSUMING ALCOHOL prior to HBOT with understanding] : Patient educated on the risks of CONSUMING ALCOHOL prior to HBOT with understanding [100% Cotton] : 100% cotton [Empty all pockets] : empty all pockets [No hair oils, wigs, hairpieces, pins] : no hair oils, wigs, hairpieces, pins  [Pre tx medications] : pre tx medications  [No make-up, creams] : no make-up, creams  [No jewelry] : no jewelry  [No matches, cigarettes, lighters] : no matches, cigarettes, lighters  [Hearing aid removed] : hearing aid removed [Dentures removed] : dentures removed [Ground bracelet on pt's wrist] : ground bracelet on pt's wrist  [Contacts removed] : contacts removed  [Remove nail polish] : remove nail polish  [No reading material] : no reading material  [Bra, undergarments removed] : bra, undergarments removed  [No contraindicated dressings] : no contraindicated dressings [Ground Wire - VISUAL Verification - Intact/Free of Obstruction] : Ground Wire - VISUAL Verification - Intact/Free of Obstruction  [Ground Continuity - Verified < 1 ohm w/ Wrist Strap Rafal] : Ground Continuity - Verified < 1 ohm w/ Wrist Strap Rafal [Diagnosis: ___] : Diagnosis: [unfilled] [Number: ___] : Number: [unfilled] [Clear all fields] : clear all fields [] : No [FreeTextEntry1] : 2.0 [FreeTextEntry3] : 90 min [FreeTextEntry5] : 1600 [FreeTextEntry7] : 2838 [Free[facility] Cent[facility]Entry9] : 6239 [de-identified] : 6181 [de-identified] : 110 min

## 2024-02-15 ENCOUNTER — APPOINTMENT (OUTPATIENT)
Dept: HYPERBARIC MEDICINE | Facility: CLINIC | Age: 74
End: 2024-02-15
Payer: MEDICARE

## 2024-02-15 ENCOUNTER — OUTPATIENT (OUTPATIENT)
Dept: OUTPATIENT SERVICES | Facility: HOSPITAL | Age: 74
LOS: 1 days | End: 2024-02-15
Payer: COMMERCIAL

## 2024-02-15 VITALS
HEART RATE: 82 BPM | SYSTOLIC BLOOD PRESSURE: 167 MMHG | RESPIRATION RATE: 16 BRPM | DIASTOLIC BLOOD PRESSURE: 79 MMHG | OXYGEN SATURATION: 99 %

## 2024-02-15 VITALS
HEART RATE: 81 BPM | SYSTOLIC BLOOD PRESSURE: 161 MMHG | OXYGEN SATURATION: 99 % | RESPIRATION RATE: 16 BRPM | TEMPERATURE: 98.3 F | DIASTOLIC BLOOD PRESSURE: 69 MMHG

## 2024-02-15 DIAGNOSIS — M79.606 PAIN IN LEG, UNSPECIFIED: ICD-10-CM

## 2024-02-15 DIAGNOSIS — Z89.429 ACQUIRED ABSENCE OF OTHER TOE(S), UNSPECIFIED SIDE: Chronic | ICD-10-CM

## 2024-02-15 DIAGNOSIS — L97.522 NON-PRESSURE CHRONIC ULCER OF OTHER PART OF LEFT FOOT WITH FAT LAYER EXPOSED: ICD-10-CM

## 2024-02-15 DIAGNOSIS — T86.828 OTHER COMPLICATIONS OF SKIN GRAFT (ALLOGRAFT) (AUTOGRAFT): ICD-10-CM

## 2024-02-15 PROCEDURE — 82962 GLUCOSE BLOOD TEST: CPT

## 2024-02-15 PROCEDURE — 99183 HYPERBARIC OXYGEN THERAPY: CPT

## 2024-02-15 PROCEDURE — G0277: CPT

## 2024-02-15 NOTE — PROCEDURE
[Outpatient] : Outpatient [Ambulatory] : Patient is ambulatory. [Walker] : walker [THIS CHAMBER HAS BEEN CLEANED / DISINFECTED] : This chamber has been cleaned / disinfected according to local and hospital policy and procedure prior to this treatment. [____] : Post-Dive: Time - [unfilled] [___] : Post-Dive: Value - [unfilled] mg/dL [I have examined and evaluated this patient today, including ears and lungs and have cleared the patient] : I have examined and evaluated this patient today, including ears and lungs and have cleared the patient to continue HBOT as prescribed.  [I have ordered 1 HBOT session at the indicated protocol as seen below] : I have ordered 1 HBOT session at the indicated protocol as seen below [Patient demonstrated and verbalized proper technique for using air break mask] : Patient demonstrated and verbalized proper technique for using air break mask [Patient educated on the risks of SMOKING prior to HBOT with understanding] : Patient educated on the risks of SMOKING prior to HBOT with understanding [Patient educated on the risks of CONSUMING ALCOHOL prior to HBOT with understanding] : Patient educated on the risks of CONSUMING ALCOHOL prior to HBOT with understanding [100% Cotton] : 100% cotton [Empty all pockets] : empty all pockets [No hair oils, wigs, hairpieces, pins] : no hair oils, wigs, hairpieces, pins  [Pre tx medications] : pre tx medications  [No make-up, creams] : no make-up, creams  [No jewelry] : no jewelry  [No matches, cigarettes, lighters] : no matches, cigarettes, lighters  [Hearing aid removed] : hearing aid removed [Dentures removed] : dentures removed [Ground bracelet on pt's wrist] : ground bracelet on pt's wrist  [Contacts removed] : contacts removed  [Remove nail polish] : remove nail polish  [No reading material] : no reading material  [Bra, undergarments removed] : bra, undergarments removed  [No contraindicated dressings] : no contraindicated dressings [Ground Wire - VISUAL Verification - Intact/Free of Obstruction] : Ground Wire - VISUAL Verification - Intact/Free of Obstruction  [Ground Continuity - Verified < 1 ohm w/ Wrist Strap Rafal] : Ground Continuity - Verified < 1 ohm w/ Wrist Strap Rafal [Diagnosis: ___] : Diagnosis: [unfilled] [Number: ___] : Number: [unfilled] [Clear all fields] : clear all fields [] : No [FreeTextEntry1] : 2.0 [FreeTextEntry3] : 90 min [FreeTextEntry5] : 2683 [FreeTextEntry7] : 1600 [FreeTextEntry9] : 2748 [de-identified] : 1254 [de-identified] : 110 min

## 2024-02-15 NOTE — ADDENDUM
[FreeTextEntry1] : Pt descended to 2.0 CATA @ 1.5 PSI/min without incident in chamber #4 Pt resting @ depth with chest rise and fall observed throughout tx.  Pt ascended from 2.0 CATA @ 1.5 PSI/min without incident.  Pt tolerated tx well.  pt given kalpana crackers and OJ to raise bgl

## 2024-02-16 ENCOUNTER — APPOINTMENT (OUTPATIENT)
Dept: HYPERBARIC MEDICINE | Facility: CLINIC | Age: 74
End: 2024-02-16
Payer: MEDICARE

## 2024-02-16 ENCOUNTER — OUTPATIENT (OUTPATIENT)
Dept: OUTPATIENT SERVICES | Facility: HOSPITAL | Age: 74
LOS: 1 days | End: 2024-02-16
Payer: COMMERCIAL

## 2024-02-16 VITALS
RESPIRATION RATE: 16 BRPM | OXYGEN SATURATION: 99 % | TEMPERATURE: 98.2 F | HEART RATE: 92 BPM | DIASTOLIC BLOOD PRESSURE: 63 MMHG | SYSTOLIC BLOOD PRESSURE: 123 MMHG

## 2024-02-16 VITALS
HEART RATE: 87 BPM | RESPIRATION RATE: 16 BRPM | DIASTOLIC BLOOD PRESSURE: 82 MMHG | OXYGEN SATURATION: 99 % | SYSTOLIC BLOOD PRESSURE: 131 MMHG

## 2024-02-16 DIAGNOSIS — M79.606 PAIN IN LEG, UNSPECIFIED: ICD-10-CM

## 2024-02-16 DIAGNOSIS — Z89.429 ACQUIRED ABSENCE OF OTHER TOE(S), UNSPECIFIED SIDE: Chronic | ICD-10-CM

## 2024-02-16 PROCEDURE — 82962 GLUCOSE BLOOD TEST: CPT

## 2024-02-16 PROCEDURE — G0277: CPT

## 2024-02-16 PROCEDURE — 99183 HYPERBARIC OXYGEN THERAPY: CPT

## 2024-02-16 NOTE — PROCEDURE
[Outpatient] : Outpatient [Ambulatory] : Patient is ambulatory. [Walker] : walker [THIS CHAMBER HAS BEEN CLEANED / DISINFECTED] : This chamber has been cleaned / disinfected according to local and hospital policy and procedure prior to this treatment. [____] : Post-Dive: Time - [unfilled] [___] : Post-Dive: Value - [unfilled] mg/dL [I have examined and evaluated this patient today, including ears and lungs and have cleared the patient] : I have examined and evaluated this patient today, including ears and lungs and have cleared the patient to continue HBOT as prescribed.  [I have ordered 1 HBOT session at the indicated protocol as seen below] : I have ordered 1 HBOT session at the indicated protocol as seen below [Patient demonstrated and verbalized proper technique for using air break mask] : Patient demonstrated and verbalized proper technique for using air break mask [Patient educated on the risks of SMOKING prior to HBOT with understanding] : Patient educated on the risks of SMOKING prior to HBOT with understanding [Patient educated on the risks of CONSUMING ALCOHOL prior to HBOT with understanding] : Patient educated on the risks of CONSUMING ALCOHOL prior to HBOT with understanding [100% Cotton] : 100% cotton [Empty all pockets] : empty all pockets [No hair oils, wigs, hairpieces, pins] : no hair oils, wigs, hairpieces, pins  [Pre tx medications] : pre tx medications  [No make-up, creams] : no make-up, creams  [No jewelry] : no jewelry  [No matches, cigarettes, lighters] : no matches, cigarettes, lighters  [Hearing aid removed] : hearing aid removed [Dentures removed] : dentures removed [Ground bracelet on pt's wrist] : ground bracelet on pt's wrist  [Contacts removed] : contacts removed  [Remove nail polish] : remove nail polish  [No reading material] : no reading material  [Bra, undergarments removed] : bra, undergarments removed  [No contraindicated dressings] : no contraindicated dressings [Ground Wire - VISUAL Verification - Intact/Free of Obstruction] : Ground Wire - VISUAL Verification - Intact/Free of Obstruction  [Ground Continuity - Verified < 1 ohm w/ Wrist Strap Rafal] : Ground Continuity - Verified < 1 ohm w/ Wrist Strap Rafal [Diagnosis: ___] : Diagnosis: [unfilled] [Number: ___] : Number: [unfilled] [Clear all fields] : clear all fields [] : No [FreeTextEntry1] : 2.0 [FreeTextEntry3] : 90 min [FreeTextEntry5] : 5892 [FreeTextEntry7] : 4592 [FreeTextEntry9] : 1176 [de-identified] : [id number] [de-identified] : 110 min

## 2024-02-20 ENCOUNTER — OUTPATIENT (OUTPATIENT)
Dept: OUTPATIENT SERVICES | Facility: HOSPITAL | Age: 74
LOS: 1 days | End: 2024-02-20
Payer: COMMERCIAL

## 2024-02-20 ENCOUNTER — APPOINTMENT (OUTPATIENT)
Dept: HYPERBARIC MEDICINE | Facility: CLINIC | Age: 74
End: 2024-02-20
Payer: MEDICARE

## 2024-02-20 VITALS
OXYGEN SATURATION: 98 % | SYSTOLIC BLOOD PRESSURE: 104 MMHG | TEMPERATURE: 98 F | RESPIRATION RATE: 16 BRPM | HEART RATE: 88 BPM | DIASTOLIC BLOOD PRESSURE: 55 MMHG

## 2024-02-20 VITALS
OXYGEN SATURATION: 98 % | RESPIRATION RATE: 16 BRPM | DIASTOLIC BLOOD PRESSURE: 79 MMHG | HEART RATE: 80 BPM | SYSTOLIC BLOOD PRESSURE: 132 MMHG

## 2024-02-20 DIAGNOSIS — Z89.429 ACQUIRED ABSENCE OF OTHER TOE(S), UNSPECIFIED SIDE: Chronic | ICD-10-CM

## 2024-02-20 PROCEDURE — 82962 GLUCOSE BLOOD TEST: CPT

## 2024-02-20 PROCEDURE — 99183 HYPERBARIC OXYGEN THERAPY: CPT

## 2024-02-20 PROCEDURE — G0277: CPT

## 2024-02-20 NOTE — PROCEDURE
[Outpatient] : Outpatient [Ambulatory] : Patient is ambulatory. [Walker] : walker [THIS CHAMBER HAS BEEN CLEANED / DISINFECTED] : This chamber has been cleaned / disinfected according to local and hospital policy and procedure prior to this treatment. [____] : Recheck: Time - [unfilled] [___] : Recheck: Value - [unfilled] mg/dL [I have examined and evaluated this patient today, including ears and lungs and have cleared the patient] : I have examined and evaluated this patient today, including ears and lungs and have cleared the patient to continue HBOT as prescribed.  [I have ordered 1 HBOT session at the indicated protocol as seen below] : I have ordered 1 HBOT session at the indicated protocol as seen below [Patient demonstrated and verbalized proper technique for using air break mask] : Patient demonstrated and verbalized proper technique for using air break mask [Patient educated on the risks of SMOKING prior to HBOT with understanding] : Patient educated on the risks of SMOKING prior to HBOT with understanding [Patient educated on the risks of CONSUMING ALCOHOL prior to HBOT with understanding] : Patient educated on the risks of CONSUMING ALCOHOL prior to HBOT with understanding [100% Cotton] : 100% cotton [Empty all pockets] : empty all pockets [No hair oils, wigs, hairpieces, pins] : no hair oils, wigs, hairpieces, pins  [Pre tx medications] : pre tx medications  [No make-up, creams] : no make-up, creams  [No jewelry] : no jewelry  [No matches, cigarettes, lighters] : no matches, cigarettes, lighters  [Hearing aid removed] : hearing aid removed [Dentures removed] : dentures removed [Ground bracelet on pt's wrist] : ground bracelet on pt's wrist  [Contacts removed] : contacts removed  [Remove nail polish] : remove nail polish  [No reading material] : no reading material  [Bra, undergarments removed] : bra, undergarments removed  [No contraindicated dressings] : no contraindicated dressings [Ground Wire - VISUAL Verification - Intact/Free of Obstruction] : Ground Wire - VISUAL Verification - Intact/Free of Obstruction  [Ground Continuity - Verified < 1 ohm w/ Wrist Strap Rfaal] : Ground Continuity - Verified < 1 ohm w/ Wrist Strap Rafal [Diagnosis: ___] : Diagnosis: [unfilled] [Number: ___] : Number: [unfilled] [Clear all fields] : clear all fields [] : No [FreeTextEntry1] : 2.0 [FreeTextEntry3] : 90 min [FreeTextEntry5] : 5494 [FreeTextEntry7] : 7980 [FreeTextEntry9] : 4936 [de-identified] : 3627 [de-identified] : 110 min

## 2024-02-20 NOTE — ADDENDUM
[FreeTextEntry1] : Pt descended to 2.0 CATA @ 1.5 PSI/min without incident in chamber #4 Pt resting @ depth with chest rise and fall observed throughout tx.  Pt ascended from 2.0 CATA @ 1.5 PSI/min without incident.  Pt tolerated tx well.  Pt given OJ and kalpana crackers to raise bgl.

## 2024-02-21 ENCOUNTER — APPOINTMENT (OUTPATIENT)
Dept: HYPERBARIC MEDICINE | Facility: CLINIC | Age: 74
End: 2024-02-21
Payer: MEDICARE

## 2024-02-21 ENCOUNTER — OUTPATIENT (OUTPATIENT)
Dept: OUTPATIENT SERVICES | Facility: HOSPITAL | Age: 74
LOS: 1 days | End: 2024-02-21
Payer: COMMERCIAL

## 2024-02-21 VITALS
RESPIRATION RATE: 16 BRPM | HEART RATE: 91 BPM | TEMPERATURE: 97.8 F | OXYGEN SATURATION: 99 % | SYSTOLIC BLOOD PRESSURE: 130 MMHG | DIASTOLIC BLOOD PRESSURE: 66 MMHG

## 2024-02-21 VITALS
HEART RATE: 82 BPM | DIASTOLIC BLOOD PRESSURE: 80 MMHG | RESPIRATION RATE: 16 BRPM | OXYGEN SATURATION: 99 % | SYSTOLIC BLOOD PRESSURE: 151 MMHG

## 2024-02-21 DIAGNOSIS — M79.606 PAIN IN LEG, UNSPECIFIED: ICD-10-CM

## 2024-02-21 DIAGNOSIS — Z89.429 ACQUIRED ABSENCE OF OTHER TOE(S), UNSPECIFIED SIDE: Chronic | ICD-10-CM

## 2024-02-21 PROCEDURE — 99183 HYPERBARIC OXYGEN THERAPY: CPT

## 2024-02-21 PROCEDURE — 82962 GLUCOSE BLOOD TEST: CPT

## 2024-02-21 PROCEDURE — G0277: CPT

## 2024-02-22 ENCOUNTER — APPOINTMENT (OUTPATIENT)
Dept: HYPERBARIC MEDICINE | Facility: CLINIC | Age: 74
End: 2024-02-22

## 2024-02-22 DIAGNOSIS — M79.606 PAIN IN LEG, UNSPECIFIED: ICD-10-CM

## 2024-02-23 ENCOUNTER — OUTPATIENT (OUTPATIENT)
Dept: OUTPATIENT SERVICES | Facility: HOSPITAL | Age: 74
LOS: 1 days | Discharge: HOME | End: 2024-02-23
Payer: MEDICARE

## 2024-02-23 ENCOUNTER — APPOINTMENT (OUTPATIENT)
Dept: VASCULAR SURGERY | Facility: HOSPITAL | Age: 74
End: 2024-02-23

## 2024-02-23 ENCOUNTER — APPOINTMENT (OUTPATIENT)
Dept: HYPERBARIC MEDICINE | Facility: CLINIC | Age: 74
End: 2024-02-23

## 2024-02-23 DIAGNOSIS — I73.9 PERIPHERAL VASCULAR DISEASE, UNSPECIFIED: ICD-10-CM

## 2024-02-23 DIAGNOSIS — Z89.429 ACQUIRED ABSENCE OF OTHER TOE(S), UNSPECIFIED SIDE: Chronic | ICD-10-CM

## 2024-02-23 LAB
ANION GAP SERPL CALC-SCNC: 10 MMOL/L — SIGNIFICANT CHANGE UP (ref 7–14)
BLD GP AB SCN SERPL QL: POSITIVE — SIGNIFICANT CHANGE UP
BUN SERPL-MCNC: 29 MG/DL — HIGH (ref 7–23)
CALCIUM SERPL-MCNC: 9.4 MG/DL — SIGNIFICANT CHANGE UP (ref 8.4–10.5)
CHLORIDE SERPL-SCNC: 102 MMOL/L — SIGNIFICANT CHANGE UP (ref 98–107)
CO2 SERPL-SCNC: 24 MMOL/L — SIGNIFICANT CHANGE UP (ref 22–31)
CREAT SERPL-MCNC: 1.04 MG/DL — SIGNIFICANT CHANGE UP (ref 0.5–1.3)
EGFR: 76 ML/MIN/1.73M2 — SIGNIFICANT CHANGE UP
GLUCOSE BLDC GLUCOMTR-MCNC: 135 MG/DL — HIGH (ref 70–99)
GLUCOSE SERPL-MCNC: 143 MG/DL — HIGH (ref 70–99)
HCT VFR BLD CALC: 26.5 % — LOW (ref 39–50)
HGB BLD-MCNC: 8.3 G/DL — LOW (ref 13–17)
MAGNESIUM SERPL-MCNC: 2.2 MG/DL — SIGNIFICANT CHANGE UP (ref 1.6–2.6)
MCHC RBC-ENTMCNC: 27.3 PG — SIGNIFICANT CHANGE UP (ref 27–34)
MCHC RBC-ENTMCNC: 31.3 GM/DL — LOW (ref 32–36)
MCV RBC AUTO: 87.2 FL — SIGNIFICANT CHANGE UP (ref 80–100)
NRBC # BLD: 0 /100 WBCS — SIGNIFICANT CHANGE UP (ref 0–0)
NRBC # FLD: 0 K/UL — SIGNIFICANT CHANGE UP (ref 0–0)
PLATELET # BLD AUTO: 240 K/UL — SIGNIFICANT CHANGE UP (ref 150–400)
POTASSIUM SERPL-MCNC: 4.9 MMOL/L — SIGNIFICANT CHANGE UP (ref 3.5–5.3)
POTASSIUM SERPL-SCNC: 4.9 MMOL/L — SIGNIFICANT CHANGE UP (ref 3.5–5.3)
RBC # BLD: 3.04 M/UL — LOW (ref 4.2–5.8)
RBC # FLD: 13.6 % — SIGNIFICANT CHANGE UP (ref 10.3–14.5)
RH IG SCN BLD-IMP: POSITIVE — SIGNIFICANT CHANGE UP
RH IG SCN BLD-IMP: POSITIVE — SIGNIFICANT CHANGE UP
SODIUM SERPL-SCNC: 136 MMOL/L — SIGNIFICANT CHANGE UP (ref 135–145)
WBC # BLD: 7.91 K/UL — SIGNIFICANT CHANGE UP (ref 3.8–10.5)
WBC # FLD AUTO: 7.91 K/UL — SIGNIFICANT CHANGE UP (ref 3.8–10.5)

## 2024-02-23 PROCEDURE — 75710 ARTERY X-RAYS ARM/LEG: CPT | Mod: 26,59

## 2024-02-23 PROCEDURE — 93010 ELECTROCARDIOGRAM REPORT: CPT

## 2024-02-23 PROCEDURE — 99152 MOD SED SAME PHYS/QHP 5/>YRS: CPT

## 2024-02-23 PROCEDURE — 37224: CPT | Mod: LT,59

## 2024-02-23 PROCEDURE — 75625 CONTRAST EXAM ABDOMINL AORTA: CPT | Mod: 26

## 2024-02-23 PROCEDURE — 86077 PHYS BLOOD BANK SERV XMATCH: CPT

## 2024-02-23 PROCEDURE — ZZZZZ: CPT

## 2024-02-23 PROCEDURE — 37228: CPT | Mod: LT

## 2024-02-23 RX ORDER — SODIUM CHLORIDE 9 MG/ML
3 INJECTION INTRAMUSCULAR; INTRAVENOUS; SUBCUTANEOUS EVERY 8 HOURS
Refills: 0 | Status: DISCONTINUED | OUTPATIENT
Start: 2024-02-23 | End: 2024-03-08

## 2024-02-23 RX ORDER — CLOPIDOGREL BISULFATE 75 MG/1
1 TABLET, FILM COATED ORAL
Qty: 90 | Refills: 0
Start: 2024-02-23 | End: 2024-05-22

## 2024-02-23 RX ORDER — SODIUM CHLORIDE 9 MG/ML
1000 INJECTION INTRAMUSCULAR; INTRAVENOUS; SUBCUTANEOUS
Refills: 0 | Status: DISCONTINUED | OUTPATIENT
Start: 2024-02-23 | End: 2024-03-08

## 2024-02-23 RX ADMIN — SODIUM CHLORIDE 75 MILLILITER(S): 9 INJECTION INTRAMUSCULAR; INTRAVENOUS; SUBCUTANEOUS at 10:30

## 2024-02-23 NOTE — H&P CARDIOLOGY - SOURCE
Pt needs a follow up CT of the head in 4 weeks, may be followed by primary neurologist or by neurosurgery Patient Pt needs a follow up CT of the head in 2weeks, may be followed by primary neurologist or by neurosurgery

## 2024-02-23 NOTE — CHART NOTE - NSCHARTNOTEFT_GEN_A_CORE
Spoke with Dr. Mullins who recommended to start patient on Clopidogrel 75mg daily with the aspirin 81mg(that he is taking). No need to load with Plavix while in recovery room. Script for Plavix was sent to patient's primary pharmacy and also educated family and patient on the new medication that was started.

## 2024-02-23 NOTE — H&P CARDIOLOGY - HISTORY OF PRESENT ILLNESS
72 y/o M with PMH of DM type II, PAD(s/p bilateral LE stents) and right BKA in July of 2023, HTN, HLD presented to [facility] for LLE angiogram. Patient stated that for the past few months he has been having intermittent left heel/calf and thigh pain both at rest and with exertion. Patient is currently getting wound care for complete left TMA in Dec 2023 and family stated that patient's wound has been healing well. Patient stated that he would get rest claudication for which he would take Tylenol and that would help the pain. Patient described the pain as a burning sensation in his heel, dorsal aspect of the foot and thigh. Patient otherwise denied any CP, SOB, fevers, chills, N/V/D/C, abdominal pain, dysuria. melena, hematochezia, recent travel, sick contact, cough, body aches, pleuritic or positional chest pain.

## 2024-02-23 NOTE — H&P CARDIOLOGY - BIRTH SEX
Dx mammogram order pended.  Please advise, thank you.         ----- Message from Kristy Dunn sent at 10/24/2019 10:34 AM CDT -----  Contact: Self  Pt called scheduling to see if we had her order for a mammo.  She had a screening 9/12/19 and I see another screening order was put in 10/18/19.  Can you make sure this order is not supposed to be DIAGNOSTIC MAMMO please    
I have placed the orders  
Male

## 2024-02-23 NOTE — PROCEDURE
[] : No [FreeTextEntry1] : 2.0 [FreeTextEntry3] : 90 min [FreeTextEntry5] : 8881 [FreeTextEntry7] : 4643 [FreeTextEntry9] : 9489 [de-identified] : 4637 [de-identified] : 110 min

## 2024-02-26 ENCOUNTER — OUTPATIENT (OUTPATIENT)
Dept: OUTPATIENT SERVICES | Facility: HOSPITAL | Age: 74
LOS: 1 days | End: 2024-02-26
Payer: COMMERCIAL

## 2024-02-26 ENCOUNTER — APPOINTMENT (OUTPATIENT)
Dept: HYPERBARIC MEDICINE | Facility: CLINIC | Age: 74
End: 2024-02-26
Payer: MEDICARE

## 2024-02-26 VITALS
SYSTOLIC BLOOD PRESSURE: 115 MMHG | DIASTOLIC BLOOD PRESSURE: 65 MMHG | OXYGEN SATURATION: 98 % | TEMPERATURE: 98.1 F | RESPIRATION RATE: 16 BRPM | HEART RATE: 72 BPM

## 2024-02-26 VITALS
DIASTOLIC BLOOD PRESSURE: 80 MMHG | RESPIRATION RATE: 16 BRPM | HEART RATE: 75 BPM | OXYGEN SATURATION: 98 % | SYSTOLIC BLOOD PRESSURE: 139 MMHG

## 2024-02-26 DIAGNOSIS — Z89.429 ACQUIRED ABSENCE OF OTHER TOE(S), UNSPECIFIED SIDE: Chronic | ICD-10-CM

## 2024-02-26 PROCEDURE — G0277: CPT

## 2024-02-26 PROCEDURE — 82962 GLUCOSE BLOOD TEST: CPT

## 2024-02-26 PROCEDURE — 99183 HYPERBARIC OXYGEN THERAPY: CPT

## 2024-02-26 NOTE — PROCEDURE
[Ambulatory] : Patient is ambulatory. [Outpatient] : Outpatient [Walker] : walker [THIS CHAMBER HAS BEEN CLEANED / DISINFECTED] : This chamber has been cleaned / disinfected according to local and hospital policy and procedure prior to this treatment. [] : Yes [___] : Post-Dive: Value - [unfilled] mg/dL [____] : Post-Dive: Time - [unfilled] [I have examined and evaluated this patient today, including ears and lungs and have cleared the patient] : I have examined and evaluated this patient today, including ears and lungs and have cleared the patient to continue HBOT as prescribed.  [Patient demonstrated and verbalized proper technique for using air break mask] : Patient demonstrated and verbalized proper technique for using air break mask [I have ordered 1 HBOT session at the indicated protocol as seen below] : I have ordered 1 HBOT session at the indicated protocol as seen below [Patient educated on the risks of CONSUMING ALCOHOL prior to HBOT with understanding] : Patient educated on the risks of CONSUMING ALCOHOL prior to HBOT with understanding [Patient educated on the risks of SMOKING prior to HBOT with understanding] : Patient educated on the risks of SMOKING prior to HBOT with understanding [100% Cotton] : 100% cotton [No hair oils, wigs, hairpieces, pins] : no hair oils, wigs, hairpieces, pins  [Empty all pockets] : empty all pockets [Pre tx medications] : pre tx medications  [No jewelry] : no jewelry  [No make-up, creams] : no make-up, creams  [Hearing aid removed] : hearing aid removed [No matches, cigarettes, lighters] : no matches, cigarettes, lighters  [Ground bracelet on pt's wrist] : ground bracelet on pt's wrist  [Dentures removed] : dentures removed [Contacts removed] : contacts removed  [Remove nail polish] : remove nail polish  [No reading material] : no reading material  [No contraindicated dressings] : no contraindicated dressings [Bra, undergarments removed] : bra, undergarments removed  [Ground Wire - VISUAL Verification - Intact/Free of Obstruction] : Ground Wire - VISUAL Verification - Intact/Free of Obstruction  [Ground Continuity - Verified < 1 ohm w/ Wrist Strap Rafal] : Ground Continuity - Verified < 1 ohm w/ Wrist Strap Rafal [Clear all fields] : clear all fields [Diagnosis: ___] : Diagnosis: [unfilled] [Number: ___] : Number: [unfilled]

## 2024-02-27 ENCOUNTER — APPOINTMENT (OUTPATIENT)
Dept: HYPERBARIC MEDICINE | Facility: CLINIC | Age: 74
End: 2024-02-27
Payer: MEDICARE

## 2024-02-27 ENCOUNTER — OUTPATIENT (OUTPATIENT)
Dept: OUTPATIENT SERVICES | Facility: HOSPITAL | Age: 74
LOS: 1 days | End: 2024-02-27
Payer: COMMERCIAL

## 2024-02-27 VITALS
TEMPERATURE: 98.5 F | HEART RATE: 81 BPM | OXYGEN SATURATION: 98 % | SYSTOLIC BLOOD PRESSURE: 103 MMHG | DIASTOLIC BLOOD PRESSURE: 58 MMHG | RESPIRATION RATE: 16 BRPM

## 2024-02-27 VITALS
HEART RATE: 77 BPM | SYSTOLIC BLOOD PRESSURE: 169 MMHG | OXYGEN SATURATION: 98 % | RESPIRATION RATE: 16 BRPM | DIASTOLIC BLOOD PRESSURE: 83 MMHG

## 2024-02-27 DIAGNOSIS — M79.606 PAIN IN LEG, UNSPECIFIED: ICD-10-CM

## 2024-02-27 PROCEDURE — 99183 HYPERBARIC OXYGEN THERAPY: CPT

## 2024-02-27 PROCEDURE — 82962 GLUCOSE BLOOD TEST: CPT

## 2024-02-27 PROCEDURE — G0277: CPT

## 2024-02-27 NOTE — PROCEDURE
[Outpatient] : Outpatient [Ambulatory] : Patient is ambulatory. [Walker] : walker [] : Yes [THIS CHAMBER HAS BEEN CLEANED / DISINFECTED] : This chamber has been cleaned / disinfected according to local and hospital policy and procedure prior to this treatment. [____] : Post-Dive: Time - [unfilled] [___] : Post-Dive: Value - [unfilled] mg/dL [I have examined and evaluated this patient today, including ears and lungs and have cleared the patient] : I have examined and evaluated this patient today, including ears and lungs and have cleared the patient to continue HBOT as prescribed.  [I have ordered 1 HBOT session at the indicated protocol as seen below] : I have ordered 1 HBOT session at the indicated protocol as seen below [Patient educated on the risks of SMOKING prior to HBOT with understanding] : Patient educated on the risks of SMOKING prior to HBOT with understanding [Patient demonstrated and verbalized proper technique for using air break mask] : Patient demonstrated and verbalized proper technique for using air break mask [Patient educated on the risks of CONSUMING ALCOHOL prior to HBOT with understanding] : Patient educated on the risks of CONSUMING ALCOHOL prior to HBOT with understanding [100% Cotton] : 100% cotton [Empty all pockets] : empty all pockets [No hair oils, wigs, hairpieces, pins] : no hair oils, wigs, hairpieces, pins  [Pre tx medications] : pre tx medications  [No jewelry] : no jewelry  [No make-up, creams] : no make-up, creams  [No matches, cigarettes, lighters] : no matches, cigarettes, lighters  [Hearing aid removed] : hearing aid removed [Dentures removed] : dentures removed [Ground bracelet on pt's wrist] : ground bracelet on pt's wrist  [Contacts removed] : contacts removed  [No reading material] : no reading material  [Remove nail polish] : remove nail polish  [Bra, undergarments removed] : bra, undergarments removed  [Ground Wire - VISUAL Verification - Intact/Free of Obstruction] : Ground Wire - VISUAL Verification - Intact/Free of Obstruction  [No contraindicated dressings] : no contraindicated dressings [Ground Continuity - Verified < 1 ohm w/ Wrist Strap Rafal] : Ground Continuity - Verified < 1 ohm w/ Wrist Strap Rafal [Clear all fields] : clear all fields [Diagnosis: ___] : Diagnosis: [unfilled] [Number: ___] : Number: [unfilled]

## 2024-02-27 NOTE — ASSESSMENT
[No change from previous assessment] : No change from previous assessment [Patient prepared for dive] : Patient prepared for dive [Patient undergoing HBO treatment for __________] : Patient undergoing HBO treatment for [unfilled] [Patient descended without problem for 9 minutes] : Patient descended without problem for 9 minutes [No dizziness or thirst] :  No dizziness or thirst [No ear problems] : No ear problems [Vital signs stable] : Vital signs stable [No Chest Pain, shortness of breath] : No Chest Pain, shortness of breath [Tolerating dive well] : Tolerating dive well [Respiratory Rate Stable] : Respiratory Rate Stable [No chest pain, shortness of breath, or ear pain] :  No chest pain, shortness of breath, or ear pain  [Vital Signs stable] : Vital Signs stable [Tolerated Ascent well] : Tolerated Ascent well [A physician was present throughout the entire HBOT] : A physician was present throughout the entire HBOT [No] : No [Clinically Stable] : Clinically stable [Continue Treatment Plan] : Continue treatment plan [0] : 0 out of 10

## 2024-02-28 ENCOUNTER — APPOINTMENT (OUTPATIENT)
Dept: HYPERBARIC MEDICINE | Facility: CLINIC | Age: 74
End: 2024-02-28
Payer: MEDICARE

## 2024-02-28 ENCOUNTER — OUTPATIENT (OUTPATIENT)
Dept: OUTPATIENT SERVICES | Facility: HOSPITAL | Age: 74
LOS: 1 days | End: 2024-02-28
Payer: COMMERCIAL

## 2024-02-28 VITALS
HEART RATE: 80 BPM | OXYGEN SATURATION: 99 % | DIASTOLIC BLOOD PRESSURE: 84 MMHG | SYSTOLIC BLOOD PRESSURE: 131 MMHG | RESPIRATION RATE: 16 BRPM

## 2024-02-28 VITALS
SYSTOLIC BLOOD PRESSURE: 109 MMHG | TEMPERATURE: 97.4 F | DIASTOLIC BLOOD PRESSURE: 61 MMHG | OXYGEN SATURATION: 99 % | HEART RATE: 84 BPM | RESPIRATION RATE: 15 BRPM

## 2024-02-28 DIAGNOSIS — M79.606 PAIN IN LEG, UNSPECIFIED: ICD-10-CM

## 2024-02-28 PROCEDURE — 99183 HYPERBARIC OXYGEN THERAPY: CPT

## 2024-02-28 PROCEDURE — 82962 GLUCOSE BLOOD TEST: CPT

## 2024-02-28 PROCEDURE — G0277: CPT

## 2024-02-28 NOTE — ASSESSMENT
[No change from previous assessment] : No change from previous assessment [Patient prepared for dive] : Patient prepared for dive [Patient undergoing HBO treatment for __________] : Patient undergoing HBO treatment for [unfilled] [Patient descended without problem for 9 minutes] : Patient descended without problem for 9 minutes [No dizziness or thirst] :  No dizziness or thirst [No ear problems] : No ear problems [Vital signs stable] : Vital signs stable [Tolerating dive well] : Tolerating dive well [No Chest Pain, shortness of breath] : No Chest Pain, shortness of breath [Respiratory Rate Stable] : Respiratory Rate Stable [Tolerated Ascent well] : Tolerated Ascent well [No chest pain, shortness of breath, or ear pain] :  No chest pain, shortness of breath, or ear pain  [Vital Signs stable] : Vital Signs stable [A physician was present throughout the entire HBOT] : A physician was present throughout the entire HBOT [No] : No [Clinically Stable] : Clinically stable [0] : 0 out of 10 [Continue Treatment Plan] : Continue treatment plan

## 2024-02-28 NOTE — PROCEDURE
[Outpatient] : Outpatient [Ambulatory] : Patient is ambulatory. [Walker] : walker [THIS CHAMBER HAS BEEN CLEANED / DISINFECTED] : This chamber has been cleaned / disinfected according to local and hospital policy and procedure prior to this treatment. [____] : Post-Dive: Time - [unfilled] [___] : Post-Dive: Value - [unfilled] mg/dL [I have examined and evaluated this patient today, including ears and lungs and have cleared the patient] : I have examined and evaluated this patient today, including ears and lungs and have cleared the patient to continue HBOT as prescribed.  [I have ordered 1 HBOT session at the indicated protocol as seen below] : I have ordered 1 HBOT session at the indicated protocol as seen below [Patient demonstrated and verbalized proper technique for using air break mask] : Patient demonstrated and verbalized proper technique for using air break mask [Patient educated on the risks of SMOKING prior to HBOT with understanding] : Patient educated on the risks of SMOKING prior to HBOT with understanding [Patient educated on the risks of CONSUMING ALCOHOL prior to HBOT with understanding] : Patient educated on the risks of CONSUMING ALCOHOL prior to HBOT with understanding [100% Cotton] : 100% cotton [Empty all pockets] : empty all pockets [No hair oils, wigs, hairpieces, pins] : no hair oils, wigs, hairpieces, pins  [Pre tx medications] : pre tx medications  [No make-up, creams] : no make-up, creams  [No jewelry] : no jewelry  [No matches, cigarettes, lighters] : no matches, cigarettes, lighters  [Hearing aid removed] : hearing aid removed [Dentures removed] : dentures removed [Contacts removed] : contacts removed  [Ground bracelet on pt's wrist] : ground bracelet on pt's wrist  [Remove nail polish] : remove nail polish  [No reading material] : no reading material  [No contraindicated dressings] : no contraindicated dressings [Bra, undergarments removed] : bra, undergarments removed  [Ground Continuity - Verified < 1 ohm w/ Wrist Strap Rafal] : Ground Continuity - Verified < 1 ohm w/ Wrist Strap Rafal [Ground Wire - VISUAL Verification - Intact/Free of Obstruction] : Ground Wire - VISUAL Verification - Intact/Free of Obstruction  [Diagnosis: ___] : Diagnosis: [unfilled] [Number: ___] : Number: [unfilled] [Clear all fields] : clear all fields [FreeTextEntry1] : 2.0 ramonita [] : No [FreeTextEntry3] : 90 min [FreeTextEntry7] : 6946 [FreeTextEntry5] : 1600 [FreeB[facility]htEntry9] : 3531 [de-identified] : 110 min [de-identified] : 1521

## 2024-02-29 ENCOUNTER — OUTPATIENT (OUTPATIENT)
Dept: OUTPATIENT SERVICES | Facility: HOSPITAL | Age: 74
LOS: 1 days | End: 2024-02-29
Payer: COMMERCIAL

## 2024-02-29 ENCOUNTER — APPOINTMENT (OUTPATIENT)
Dept: HYPERBARIC MEDICINE | Facility: CLINIC | Age: 74
End: 2024-02-29
Payer: MEDICARE

## 2024-02-29 VITALS
OXYGEN SATURATION: 99 % | SYSTOLIC BLOOD PRESSURE: 156 MMHG | DIASTOLIC BLOOD PRESSURE: 80 MMHG | RESPIRATION RATE: 16 BRPM | HEART RATE: 83 BPM

## 2024-02-29 VITALS
TEMPERATURE: 97.9 F | RESPIRATION RATE: 16 BRPM | HEART RATE: 85 BPM | SYSTOLIC BLOOD PRESSURE: 115 MMHG | DIASTOLIC BLOOD PRESSURE: 67 MMHG | OXYGEN SATURATION: 99 %

## 2024-02-29 DIAGNOSIS — T86.828 OTHER COMPLICATIONS OF SKIN GRAFT (ALLOGRAFT) (AUTOGRAFT): ICD-10-CM

## 2024-02-29 DIAGNOSIS — M79.606 PAIN IN LEG, UNSPECIFIED: ICD-10-CM

## 2024-02-29 DIAGNOSIS — L97.522 NON-PRESSURE CHRONIC ULCER OF OTHER PART OF LEFT FOOT WITH FAT LAYER EXPOSED: ICD-10-CM

## 2024-02-29 PROCEDURE — 82962 GLUCOSE BLOOD TEST: CPT

## 2024-02-29 PROCEDURE — G0277: CPT

## 2024-02-29 PROCEDURE — 99183 HYPERBARIC OXYGEN THERAPY: CPT

## 2024-02-29 NOTE — ASSESSMENT
[No change from previous assessment] : No change from previous assessment [Patient prepared for dive] : Patient prepared for dive [Patient undergoing HBO treatment for __________] : Patient undergoing HBO treatment for [unfilled] [Patient descended without problem for 9 minutes] : Patient descended without problem for 9 minutes [No dizziness or thirst] :  No dizziness or thirst [No ear problems] : No ear problems [Vital signs stable] : Vital signs stable [Tolerating dive well] : Tolerating dive well [No Chest Pain, shortness of breath] : No Chest Pain, shortness of breath [Respiratory Rate Stable] : Respiratory Rate Stable [No chest pain, shortness of breath, or ear pain] :  No chest pain, shortness of breath, or ear pain  [Vital Signs stable] : Vital Signs stable [Tolerated Ascent well] : Tolerated Ascent well [No] : No [A physician was present throughout the entire HBOT] : A physician was present throughout the entire HBOT [Clinically Stable] : Clinically stable [Continue Treatment Plan] : Continue treatment plan [0] : 0 out of 10

## 2024-02-29 NOTE — PROCEDURE
[Outpatient] : Outpatient [Walker] : walker [Ambulatory] : Patient is ambulatory. [] : Yes [THIS CHAMBER HAS BEEN CLEANED / DISINFECTED] : This chamber has been cleaned / disinfected according to local and hospital policy and procedure prior to this treatment. [____] : Post-Dive: Time - [unfilled] [I have examined and evaluated this patient today, including ears and lungs and have cleared the patient] : I have examined and evaluated this patient today, including ears and lungs and have cleared the patient to continue HBOT as prescribed.  [___] : Post-Dive: Value - [unfilled] mg/dL [Patient demonstrated and verbalized proper technique for using air break mask] : Patient demonstrated and verbalized proper technique for using air break mask [I have ordered 1 HBOT session at the indicated protocol as seen below] : I have ordered 1 HBOT session at the indicated protocol as seen below [Patient educated on the risks of SMOKING prior to HBOT with understanding] : Patient educated on the risks of SMOKING prior to HBOT with understanding [Patient educated on the risks of CONSUMING ALCOHOL prior to HBOT with understanding] : Patient educated on the risks of CONSUMING ALCOHOL prior to HBOT with understanding [100% Cotton] : 100% cotton [Empty all pockets] : empty all pockets [No hair oils, wigs, hairpieces, pins] : no hair oils, wigs, hairpieces, pins  [No make-up, creams] : no make-up, creams  [Pre tx medications] : pre tx medications  [No matches, cigarettes, lighters] : no matches, cigarettes, lighters  [No jewelry] : no jewelry  [Dentures removed] : dentures removed [Hearing aid removed] : hearing aid removed [Contacts removed] : contacts removed  [Ground bracelet on pt's wrist] : ground bracelet on pt's wrist  [Remove nail polish] : remove nail polish  [No reading material] : no reading material  [Bra, undergarments removed] : bra, undergarments removed  [No contraindicated dressings] : no contraindicated dressings [Ground Wire - VISUAL Verification - Intact/Free of Obstruction] : Ground Wire - VISUAL Verification - Intact/Free of Obstruction  [Ground Continuity - Verified < 1 ohm w/ Wrist Strap Rafal] : Ground Continuity - Verified < 1 ohm w/ Wrist Strap Rafal [Clear all fields] : clear all fields [Diagnosis: ___] : Diagnosis: [unfilled] [Number: ___] : Number: [unfilled]

## 2024-03-01 ENCOUNTER — OUTPATIENT (OUTPATIENT)
Dept: OUTPATIENT SERVICES | Facility: HOSPITAL | Age: 74
LOS: 1 days | End: 2024-03-01
Payer: COMMERCIAL

## 2024-03-01 ENCOUNTER — APPOINTMENT (OUTPATIENT)
Dept: HYPERBARIC MEDICINE | Facility: CLINIC | Age: 74
End: 2024-03-01
Payer: MEDICARE

## 2024-03-01 VITALS
HEART RATE: 94 BPM | OXYGEN SATURATION: 97 % | SYSTOLIC BLOOD PRESSURE: 111 MMHG | RESPIRATION RATE: 16 BRPM | TEMPERATURE: 98.5 F | DIASTOLIC BLOOD PRESSURE: 63 MMHG

## 2024-03-01 DIAGNOSIS — Z89.429 ACQUIRED ABSENCE OF OTHER TOE(S), UNSPECIFIED SIDE: Chronic | ICD-10-CM

## 2024-03-01 DIAGNOSIS — T86.828 OTHER COMPLICATIONS OF SKIN GRAFT (ALLOGRAFT) (AUTOGRAFT): ICD-10-CM

## 2024-03-01 DIAGNOSIS — L97.522 NON-PRESSURE CHRONIC ULCER OF OTHER PART OF LEFT FOOT WITH FAT LAYER EXPOSED: ICD-10-CM

## 2024-03-01 DIAGNOSIS — M79.606 PAIN IN LEG, UNSPECIFIED: ICD-10-CM

## 2024-03-01 PROCEDURE — 99183 HYPERBARIC OXYGEN THERAPY: CPT

## 2024-03-01 PROCEDURE — 82962 GLUCOSE BLOOD TEST: CPT

## 2024-03-01 PROCEDURE — G0277: CPT

## 2024-03-01 NOTE — ASSESSMENT
[No change from previous assessment] : No change from previous assessment [Patient prepared for dive] : Patient prepared for dive [Patient undergoing HBO treatment for __________] : Patient undergoing HBO treatment for [unfilled] [Patient descended without problem for 9 minutes] : Patient descended without problem for 9 minutes [No ear problems] : No ear problems [No dizziness or thirst] :  No dizziness or thirst [Vital signs stable] : Vital signs stable [Tolerating dive well] : Tolerating dive well [No Chest Pain, shortness of breath] : No Chest Pain, shortness of breath [Respiratory Rate Stable] : Respiratory Rate Stable [Tolerated Ascent well] : Tolerated Ascent well [No chest pain, shortness of breath, or ear pain] :  No chest pain, shortness of breath, or ear pain  [Vital Signs stable] : Vital Signs stable [A physician was present throughout the entire HBOT] : A physician was present throughout the entire HBOT [No] : No [Clinically Stable] : Clinically stable [0] : 0 out of 10 [Continue Treatment Plan] : Continue treatment plan

## 2024-03-01 NOTE — PROCEDURE
[Outpatient] : Outpatient [Ambulatory] : Patient is ambulatory. [Walker] : walker [THIS CHAMBER HAS BEEN CLEANED / DISINFECTED] : This chamber has been cleaned / disinfected according to local and hospital policy and procedure prior to this treatment. [____] : Post-Dive: Time - [unfilled] [___] : Post-Dive: Value - [unfilled] mg/dL [I have ordered 1 HBOT session at the indicated protocol as seen below] : I have ordered 1 HBOT session at the indicated protocol as seen below [I have examined and evaluated this patient today, including ears and lungs and have cleared the patient] : I have examined and evaluated this patient today, including ears and lungs and have cleared the patient to continue HBOT as prescribed.  [Patient demonstrated and verbalized proper technique for using air break mask] : Patient demonstrated and verbalized proper technique for using air break mask [Patient educated on the risks of SMOKING prior to HBOT with understanding] : Patient educated on the risks of SMOKING prior to HBOT with understanding [Patient educated on the risks of CONSUMING ALCOHOL prior to HBOT with understanding] : Patient educated on the risks of CONSUMING ALCOHOL prior to HBOT with understanding [Empty all pockets] : empty all pockets [100% Cotton] : 100% cotton [No make-up, creams] : no make-up, creams  [Pre tx medications] : pre tx medications  [No hair oils, wigs, hairpieces, pins] : no hair oils, wigs, hairpieces, pins  [No jewelry] : no jewelry  [Hearing aid removed] : hearing aid removed [No matches, cigarettes, lighters] : no matches, cigarettes, lighters  [Dentures removed] : dentures removed [Ground bracelet on pt's wrist] : ground bracelet on pt's wrist  [Remove nail polish] : remove nail polish  [Contacts removed] : contacts removed  [No reading material] : no reading material  [Bra, undergarments removed] : bra, undergarments removed  [No contraindicated dressings] : no contraindicated dressings [Ground Wire - VISUAL Verification - Intact/Free of Obstruction] : Ground Wire - VISUAL Verification - Intact/Free of Obstruction  [Ground Continuity - Verified < 1 ohm w/ Wrist Strap Rafal] : Ground Continuity - Verified < 1 ohm w/ Wrist Strap Rafal [Diagnosis: ___] : Diagnosis: [unfilled] [Number: ___] : Number: [unfilled] [Clear all fields] : clear all fields [] : No [FreeTextEntry1] : 2.0 ramonita [FreeTextEntry3] : 90 min [FreeLong[facility] CentertEnLe[facility]5] : 3510 [FreeTextEntry7] : 9624 [FreeTextEntry9] : 3718 [de-identified] : 5164 [de-identified] : 110 min

## 2024-03-02 NOTE — PROGRESS NOTE ADULT - SUBJECTIVE AND OBJECTIVE BOX
Vascular Surgery Progress Note     Subjective/24hour Events: No acute events overnight.     Vital Signs:  Vital Signs Last 24 Hrs  T(C): 36.9 (12 Apr 2023 05:25), Max: 37.2 (11 Apr 2023 13:46)  T(F): 98.4 (12 Apr 2023 05:25), Max: 99 (11 Apr 2023 13:46)  HR: 89 (12 Apr 2023 05:25) (80 - 103)  BP: 137/63 (12 Apr 2023 05:25) (117/67 - 146/68)  BP(mean): --  RR: 18 (12 Apr 2023 05:25) (18 - 18)  SpO2: 95% (12 Apr 2023 05:25) (92% - 95%)    Parameters below as of 12 Apr 2023 05:25  Patient On (Oxygen Delivery Method): room air            I&O's Detail    11 Apr 2023 07:01  -  12 Apr 2023 07:00  --------------------------------------------------------  IN:    Oral Fluid: 850 mL  Total IN: 850 mL    OUT:    Voided (mL): 900 mL  Total OUT: 900 mL    Total NET: -50 mL            MEDICATIONS  (STANDING):  acetaminophen     Tablet .. 975 milliGRAM(s) Oral every 8 hours  amLODIPine   Tablet 10 milliGRAM(s) Oral daily  aspirin enteric coated 81 milliGRAM(s) Oral daily  atorvastatin 40 milliGRAM(s) Oral at bedtime  ceFAZolin   IVPB      ceFAZolin   IVPB 2000 milliGRAM(s) IV Intermittent every 8 hours  dextrose 5%. 1000 milliLiter(s) (50 mL/Hr) IV Continuous <Continuous>  dextrose 5%. 1000 milliLiter(s) (100 mL/Hr) IV Continuous <Continuous>  dextrose 50% Injectable 25 Gram(s) IV Push once  dextrose 50% Injectable 12.5 Gram(s) IV Push once  dextrose 50% Injectable 25 Gram(s) IV Push once  gabapentin 100 milliGRAM(s) Oral every 8 hours  glucagon  Injectable 1 milliGRAM(s) IntraMuscular once  heparin   Injectable 5000 Unit(s) SubCutaneous every 12 hours  insulin lispro (ADMELOG) corrective regimen sliding scale   SubCutaneous three times a day before meals  insulin lispro (ADMELOG) corrective regimen sliding scale   SubCutaneous at bedtime  levoFLOXacin  Tablet 500 milliGRAM(s) Oral every 24 hours  losartan 25 milliGRAM(s) Oral daily    MEDICATIONS  (PRN):  dextrose Oral Gel 15 Gram(s) Oral once PRN Blood Glucose LESS THAN 70 milliGRAM(s)/deciliter  oxyCODONE    IR 5 milliGRAM(s) Oral every 4 hours PRN Moderate Pain (4 - 6)  oxyCODONE    IR 10 milliGRAM(s) Oral every 4 hours PRN Severe Pain (7 - 10)        Physical Exam:  General: NAD, resting comfortably in bed  Pulmonary: No respiratory distress  Incision: RLE incision C/D/I, aquacel dressing  Extremities: WWP, RLE AT, PT signals  : Samina to gravity      Labs:    04-11    137  |  103  |  19  ----------------------------<  184<H>  4.1   |  24  |  0.85    Ca    8.7      11 Apr 2023 07:16                              8.6    8.51  )-----------( 216      ( 11 Apr 2023 07:15 )             26.8         CAPILLARY BLOOD GLUCOSE      POCT Blood Glucose.: 256 mg/dL (11 Apr 2023 21:45)  POCT Blood Glucose.: 212 mg/dL (11 Apr 2023 18:16)  POCT Blood Glucose.: 284 mg/dL (11 Apr 2023 13:36)  POCT Blood Glucose.: 186 mg/dL (11 Apr 2023 08:58)          Imaging:   no

## 2024-03-04 ENCOUNTER — INPATIENT (INPATIENT)
Facility: HOSPITAL | Age: 74
LOS: 5 days | Discharge: HOME HEALTH | DRG: 464 | End: 2024-03-10
Attending: SURGERY | Admitting: SURGERY
Payer: COMMERCIAL

## 2024-03-04 ENCOUNTER — APPOINTMENT (OUTPATIENT)
Dept: HYPERBARIC MEDICINE | Facility: CLINIC | Age: 74
End: 2024-03-04

## 2024-03-04 VITALS
HEIGHT: 66 IN | SYSTOLIC BLOOD PRESSURE: 150 MMHG | RESPIRATION RATE: 19 BRPM | WEIGHT: 147.05 LBS | HEART RATE: 85 BPM | TEMPERATURE: 99 F | DIASTOLIC BLOOD PRESSURE: 70 MMHG | OXYGEN SATURATION: 99 %

## 2024-03-04 DIAGNOSIS — Z89.429 ACQUIRED ABSENCE OF OTHER TOE(S), UNSPECIFIED SIDE: Chronic | ICD-10-CM

## 2024-03-04 LAB
ALBUMIN SERPL ELPH-MCNC: 3.7 G/DL — SIGNIFICANT CHANGE UP (ref 3.3–5)
ALP SERPL-CCNC: 81 U/L — SIGNIFICANT CHANGE UP (ref 40–120)
ALT FLD-CCNC: 30 U/L — SIGNIFICANT CHANGE UP (ref 10–45)
ANION GAP SERPL CALC-SCNC: 13 MMOL/L — SIGNIFICANT CHANGE UP (ref 5–17)
APTT BLD: 33.3 SEC — SIGNIFICANT CHANGE UP (ref 24.5–35.6)
AST SERPL-CCNC: 19 U/L — SIGNIFICANT CHANGE UP (ref 10–40)
BASE EXCESS BLDV CALC-SCNC: -0.8 MMOL/L — SIGNIFICANT CHANGE UP (ref -2–3)
BASOPHILS # BLD AUTO: 0.01 K/UL — SIGNIFICANT CHANGE UP (ref 0–0.2)
BASOPHILS NFR BLD AUTO: 0.2 % — SIGNIFICANT CHANGE UP (ref 0–2)
BILIRUB SERPL-MCNC: 0.2 MG/DL — SIGNIFICANT CHANGE UP (ref 0.2–1.2)
BUN SERPL-MCNC: 30 MG/DL — HIGH (ref 7–23)
CA-I SERPL-SCNC: 1.29 MMOL/L — SIGNIFICANT CHANGE UP (ref 1.15–1.33)
CALCIUM SERPL-MCNC: 9.9 MG/DL — SIGNIFICANT CHANGE UP (ref 8.4–10.5)
CHLORIDE BLDV-SCNC: 103 MMOL/L — SIGNIFICANT CHANGE UP (ref 96–108)
CHLORIDE SERPL-SCNC: 101 MMOL/L — SIGNIFICANT CHANGE UP (ref 96–108)
CO2 BLDV-SCNC: 27 MMOL/L — HIGH (ref 22–26)
CO2 SERPL-SCNC: 24 MMOL/L — SIGNIFICANT CHANGE UP (ref 22–31)
CREAT SERPL-MCNC: 1.07 MG/DL — SIGNIFICANT CHANGE UP (ref 0.5–1.3)
CRP SERPL-MCNC: 25 MG/L — HIGH (ref 0–4)
EGFR: 73 ML/MIN/1.73M2 — SIGNIFICANT CHANGE UP
EOSINOPHIL # BLD AUTO: 0.21 K/UL — SIGNIFICANT CHANGE UP (ref 0–0.5)
EOSINOPHIL NFR BLD AUTO: 3.5 % — SIGNIFICANT CHANGE UP (ref 0–6)
GAS PNL BLDV: 135 MMOL/L — LOW (ref 136–145)
GAS PNL BLDV: SIGNIFICANT CHANGE UP
GLUCOSE BLDV-MCNC: 238 MG/DL — HIGH (ref 70–99)
GLUCOSE SERPL-MCNC: 238 MG/DL — HIGH (ref 70–99)
HCO3 BLDV-SCNC: 26 MMOL/L — SIGNIFICANT CHANGE UP (ref 22–29)
HCT VFR BLD CALC: 28.4 % — LOW (ref 39–50)
HCT VFR BLDA CALC: 34 % — LOW (ref 39–51)
HGB BLD CALC-MCNC: 11.4 G/DL — LOW (ref 12.6–17.4)
HGB BLD-MCNC: 8.9 G/DL — LOW (ref 13–17)
IMM GRANULOCYTES NFR BLD AUTO: 0.2 % — SIGNIFICANT CHANGE UP (ref 0–0.9)
INR BLD: 1.05 RATIO — SIGNIFICANT CHANGE UP (ref 0.85–1.18)
LACTATE BLDV-MCNC: 1.8 MMOL/L — SIGNIFICANT CHANGE UP (ref 0.5–2)
LYMPHOCYTES # BLD AUTO: 1.32 K/UL — SIGNIFICANT CHANGE UP (ref 1–3.3)
LYMPHOCYTES # BLD AUTO: 21.8 % — SIGNIFICANT CHANGE UP (ref 13–44)
MCHC RBC-ENTMCNC: 27 PG — SIGNIFICANT CHANGE UP (ref 27–34)
MCHC RBC-ENTMCNC: 31.3 GM/DL — LOW (ref 32–36)
MCV RBC AUTO: 86.1 FL — SIGNIFICANT CHANGE UP (ref 80–100)
MONOCYTES # BLD AUTO: 0.54 K/UL — SIGNIFICANT CHANGE UP (ref 0–0.9)
MONOCYTES NFR BLD AUTO: 8.9 % — SIGNIFICANT CHANGE UP (ref 2–14)
NEUTROPHILS # BLD AUTO: 3.96 K/UL — SIGNIFICANT CHANGE UP (ref 1.8–7.4)
NEUTROPHILS NFR BLD AUTO: 65.4 % — SIGNIFICANT CHANGE UP (ref 43–77)
NRBC # BLD: 0 /100 WBCS — SIGNIFICANT CHANGE UP (ref 0–0)
PCO2 BLDV: 50 MMHG — SIGNIFICANT CHANGE UP (ref 42–55)
PH BLDV: 7.32 — SIGNIFICANT CHANGE UP (ref 7.32–7.43)
PLATELET # BLD AUTO: 271 K/UL — SIGNIFICANT CHANGE UP (ref 150–400)
PO2 BLDV: 25 MMHG — SIGNIFICANT CHANGE UP (ref 25–45)
POTASSIUM BLDV-SCNC: 4.7 MMOL/L — SIGNIFICANT CHANGE UP (ref 3.5–5.1)
POTASSIUM SERPL-MCNC: 4.7 MMOL/L — SIGNIFICANT CHANGE UP (ref 3.5–5.3)
POTASSIUM SERPL-SCNC: 4.7 MMOL/L — SIGNIFICANT CHANGE UP (ref 3.5–5.3)
PROT SERPL-MCNC: 8.1 G/DL — SIGNIFICANT CHANGE UP (ref 6–8.3)
PROTHROM AB SERPL-ACNC: 11 SEC — SIGNIFICANT CHANGE UP (ref 9.5–13)
RBC # BLD: 3.3 M/UL — LOW (ref 4.2–5.8)
RBC # FLD: 13.9 % — SIGNIFICANT CHANGE UP (ref 10.3–14.5)
SAO2 % BLDV: 31.7 % — LOW (ref 67–88)
SODIUM SERPL-SCNC: 138 MMOL/L — SIGNIFICANT CHANGE UP (ref 135–145)
WBC # BLD: 6.05 K/UL — SIGNIFICANT CHANGE UP (ref 3.8–10.5)
WBC # FLD AUTO: 6.05 K/UL — SIGNIFICANT CHANGE UP (ref 3.8–10.5)

## 2024-03-04 PROCEDURE — 71045 X-RAY EXAM CHEST 1 VIEW: CPT | Mod: 26

## 2024-03-04 PROCEDURE — 73590 X-RAY EXAM OF LOWER LEG: CPT | Mod: 26,LT

## 2024-03-04 PROCEDURE — 73610 X-RAY EXAM OF ANKLE: CPT | Mod: 26,LT

## 2024-03-04 PROCEDURE — 73630 X-RAY EXAM OF FOOT: CPT | Mod: 26,LT

## 2024-03-04 PROCEDURE — 99285 EMERGENCY DEPT VISIT HI MDM: CPT

## 2024-03-04 RX ORDER — VANCOMYCIN HCL 1 G
1000 VIAL (EA) INTRAVENOUS ONCE
Refills: 0 | Status: COMPLETED | OUTPATIENT
Start: 2024-03-04 | End: 2024-03-04

## 2024-03-04 RX ORDER — PIPERACILLIN AND TAZOBACTAM 4; .5 G/20ML; G/20ML
3.38 INJECTION, POWDER, LYOPHILIZED, FOR SOLUTION INTRAVENOUS ONCE
Refills: 0 | Status: COMPLETED | OUTPATIENT
Start: 2024-03-04 | End: 2024-03-04

## 2024-03-04 RX ORDER — ACETAMINOPHEN 500 MG
975 TABLET ORAL ONCE
Refills: 0 | Status: COMPLETED | OUTPATIENT
Start: 2024-03-04 | End: 2024-03-04

## 2024-03-04 RX ADMIN — Medication 975 MILLIGRAM(S): at 20:30

## 2024-03-04 RX ADMIN — PIPERACILLIN AND TAZOBACTAM 200 GRAM(S): 4; .5 INJECTION, POWDER, LYOPHILIZED, FOR SOLUTION INTRAVENOUS at 23:21

## 2024-03-04 RX ADMIN — Medication 250 MILLIGRAM(S): at 23:59

## 2024-03-04 NOTE — ED PROVIDER NOTE - CLINICAL SUMMARY MEDICAL DECISION MAKING FREE TEXT BOX
Patient is a 73-year-old male past medical history peripheral artery disease status post lower extremity stents status post right BKA, type 2 diabetes, hypertension, hyperlipidemia presenting for podiatry eval. With vital signs currently afebrile, within normal limits and stable.  Presenting for podiatry and vascular surgery eval in the setting of recent vascular surgery procedure on his foot, with worsening of his foot wounds outpatient podiatry.  Here was seen by podiatry and by Q doc in the waiting room, was found to have x-ray findings concerning for acute osteomyelitis, no white count or significant elevation to inflammatory markers.  Podiatry recommending procedure with vascular for graft in 2 days, to admit to vascular surgery.  Vascular surgery consulted and will see patient.

## 2024-03-04 NOTE — ED PROVIDER NOTE - PHYSICAL EXAMINATION
CONSTITUTIONAL: awake; in no acute distress.   SKIN: L foot and heel wound with purulent drainage from foot.   HEAD: Normocephalic; atraumatic.  EYES: no conjunctival injection. no scleral icterus   ENT: No nasal discharge; airway clear.  CARD: S1, S2 normal; no murmurs, gallops, or rubs. Regular rate and rhythm.   RESP: No wheezes, rales or rhonchi. Good air movement bilaterally.   ABD: soft ntnd, no guarding, no distention, no rigidity.   EXT: R BKA. L foot skin as above. No palpable DP or PT pulse.  NEURO: Alert, oriented, grossly unremarkable  PSYCH: Cooperative, appropriate.

## 2024-03-04 NOTE — ED PROVIDER NOTE - ATTENDING CONTRIBUTION TO CARE
Patient presents as per recommendation by his podiatrist for wound debridement and possible graft.  Patient has had a worsening wound over his left distal foot as well as left heel for several weeks to months.  He has no sensation at the distal end of the foot so it does not bother him that much but he does at the proximal end and his heel has been very painful.  On exam patient appears to have wet gangrene around the distal part of the left foot stump with surrounding purulent discharge.  He has also a large wound over the left heel with small areas of dry gangrene, no discharge.  He is hemodynamically stable without signs of sepsis.  X-rays do show concern for osteomyelitis.  Patient received IV Vanco and Zosyn as per recommendation by podiatry, who saw the patient in the ED, who recommends admission to vascular surgery service.  Vascular surgery to evaluate patient and likely admit for further wound care.

## 2024-03-04 NOTE — ED PROVIDER NOTE - RAPID ASSESSMENT
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 Mcclure for left foot wound debridement and graft application.  Patient denies fevers or chills, has mild pain of the left lower leg.  No pain medication taken prior to arrival.  Patient currently on antibiotics, reportedly amoxicillin    Patient was seen as a rapid assessment (QPA) patient. The patient will be seen and further worked up in the main emergency department and their care will be completed by the main emergency department team along with a thorough physical exam. Receiving team will follow up on labs, analgesia, any clinical imaging, reassess and disposition as clinically indicated, all decisions regarding the progression of care will be made at their discretion. 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 Mcclure for left foot wound debridement and graft application.  Patient denies fevers or chills, has mild pain of the left lower leg.  No pain medication taken prior to arrival.  Patient currently on antibiotics, reportedly amoxicillin    Patient was seen as a rapid assessment (QPA) patient. The patient will be seen and further worked up in the main emergency department and their care will be completed by the main emergency department team along with a thorough physical exam. Receiving team will follow up on labs, analgesia, any clinical imaging, reassess and disposition as clinically indicated, all decisions regarding the progression of care will be made at their discretion. - Oscar Shrestha PA-C    Attending MD Olson: This patient was seen and orders were placed by the PA as per our department's QPA model.  I was not consulted in regards to this patient although I was present and available in the Emergency Department to the PA.  Patient was to be sent to main ED for full medical evaluation and receiving team was to follow up on any labs, analgesia, clinical imaging ordered by the PA.  Any reassessment and disposition decisions were to be made by receiving team as clinically indicated, all decisions regarding the progression of care to be made at their discretion.  I did not perform a comprehensive history and physical on this patient.

## 2024-03-04 NOTE — ED PROVIDER NOTE - OBJECTIVE STATEMENT
Patient is a 73-year-old male past medical history peripheral artery disease status post lower extremity stents status post right BKA, type 2 diabetes, hypertension, hyperlipidemia presenting for podiatry leticia.  Patient was at outpatient follow-up with podiatry Dr. Mcclure following a procedure last week with vascular surgery.  Was concerned for worsening left foot pain and pus drainage.  Was sent to the emergency department.  Patient states that he has been having gradually worsening achy pain to his left foot.  No fevers, chills.  Has been taking outpatient antibiotics but does not remember which one.  No nausea, vomiting, urine or stool changes.

## 2024-03-04 NOTE — CONSULT NOTE ADULT - SUBJECTIVE AND OBJECTIVE BOX
Patient is a 73y old  Male who presents with a chief complaint of Left foot wound     HPI:    Pt sent in by podiatrist, Dr. Mcclure for left foot wound debridement and graft application.     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      PAD (peripheral artery disease)      History of amputation of toe          MEDICATIONS  (STANDING):    MEDICATIONS  (PRN):      Allergies    No Known Allergies    Intolerances        VITALS:    Vital Signs Last 24 Hrs  T(C): 37.2 (04 Mar 2024 17:41), Max: 37.2 (04 Mar 2024 17:41)  T(F): 99 (04 Mar 2024 17:41), Max: 99 (04 Mar 2024 17:41)  HR: 85 (04 Mar 2024 17:41) (85 - 85)  BP: 150/70 (04 Mar 2024 17:41) (150/70 - 150/70)  BP(mean): --  RR: 19 (04 Mar 2024 17:41) (19 - 19)  SpO2: 99% (04 Mar 2024 17:41) (99% - 99%)    Parameters below as of 04 Mar 2024 17:41  Patient On (Oxygen Delivery Method): room air        LABS:                CAPILLARY BLOOD GLUCOSE              LOWER EXTREMITY PHYSICAL EXAM:    Vascular: DP/PT 0/4, B/L, Temperature gradient warm to cool, B/L.   Neuro: Epicritic sensation diminished to the level of digits, B/L.  Musculoskeletal/Ortho: s/p R BKA, 12/18 s/p left foot transmetatarsal amputation w/ tendoachilles lengthening  Skin: Left foot TMA stump fibrogranular wound to bone, mild pus, mild malodor. Left foot heel wound well adhered eschar, no malodor, no pus.     RADIOLOGY & ADDITIONAL STUDIES:

## 2024-03-04 NOTE — CONSULT NOTE ADULT - ASSESSMENT
73M presents with left foot wounds   - Patient seen and evaluated  - Afebrile, WBC/ESR/CRP (p).   - Left foot TMA stump fibrogranular wound to bone, mild pus, mild malodor. Left foot heel wound well adhered eschar, no malodor, no pus.   - Recommend Left foot xray   - Recommend admission to medicine   - Recommend IV Vanco/ zosyn   - Pod plan left foot wound debridement and graft application on Wed 3/6 at 1pm with Dr. Mcclure  - Please document medical clearance for podiatric procedure under anesthesia   - Discussed with attending  73M presents with left foot wounds   - Patient seen and evaluated  - Afebrile, WBC/ESR/CRP (p).   - Left foot TMA stump fibrogranular wound to bone, mild pus, mild malodor. Left foot heel wound well adhered eschar, no malodor, no pus.   - Recommend Left foot xray   - Recommend admission to vascular under Dr Mullins   - Recommend IV Vanco/ zosyn   - Pod plan left foot wound debridement and graft application on Wed 3/6 at 1pm with Dr. Mcclure  - Please document medical clearance for podiatric procedure under anesthesia   - Discussed with attending  73M presents with left foot wounds   - Patient seen and evaluated  - Afebrile, WBC/ESR/CRP (p).   - Left foot TMA stump fibrogranular wound to bone, mild pus, mild malodor. Left foot heel wound well adhered eschar, no malodor, no pus.   - Recommend Left foot xray   - Recommend admission to vascular under Dr Mullins   - Recommend IV Vanco/ zosyn   - Left foot wound culture taken and ordered  - Pod plan left foot wound debridement and graft application on Wed 3/6 at 1pm with Dr. Mcclure  - Please document medical clearance for podiatric procedure under anesthesia   - Discussed with attending

## 2024-03-05 ENCOUNTER — APPOINTMENT (OUTPATIENT)
Dept: HYPERBARIC MEDICINE | Facility: CLINIC | Age: 74
End: 2024-03-05

## 2024-03-05 ENCOUNTER — TRANSCRIPTION ENCOUNTER (OUTPATIENT)
Age: 74
End: 2024-03-05

## 2024-03-05 DIAGNOSIS — L97.522 NON-PRESSURE CHRONIC ULCER OF OTHER PART OF LEFT FOOT WITH FAT LAYER EXPOSED: ICD-10-CM

## 2024-03-05 DIAGNOSIS — M86.10 OTHER ACUTE OSTEOMYELITIS, UNSPECIFIED SITE: ICD-10-CM

## 2024-03-05 DIAGNOSIS — T86.828 OTHER COMPLICATIONS OF SKIN GRAFT (ALLOGRAFT) (AUTOGRAFT): ICD-10-CM

## 2024-03-05 DIAGNOSIS — M79.606 PAIN IN LEG, UNSPECIFIED: ICD-10-CM

## 2024-03-05 LAB
ANION GAP SERPL CALC-SCNC: 10 MMOL/L — SIGNIFICANT CHANGE UP (ref 5–17)
BUN SERPL-MCNC: 28 MG/DL — HIGH (ref 7–23)
CALCIUM SERPL-MCNC: 9.7 MG/DL — SIGNIFICANT CHANGE UP (ref 8.4–10.5)
CHLORIDE SERPL-SCNC: 104 MMOL/L — SIGNIFICANT CHANGE UP (ref 96–108)
CO2 SERPL-SCNC: 23 MMOL/L — SIGNIFICANT CHANGE UP (ref 22–31)
CREAT SERPL-MCNC: 0.96 MG/DL — SIGNIFICANT CHANGE UP (ref 0.5–1.3)
EGFR: 83 ML/MIN/1.73M2 — SIGNIFICANT CHANGE UP
ERYTHROCYTE [SEDIMENTATION RATE] IN BLOOD: 83 MM/HR — HIGH (ref 0–20)
GLUCOSE SERPL-MCNC: 152 MG/DL — HIGH (ref 70–99)
GRAM STN FLD: ABNORMAL
HCT VFR BLD CALC: 28.4 % — LOW (ref 39–50)
HGB BLD-MCNC: 8.7 G/DL — LOW (ref 13–17)
MAGNESIUM SERPL-MCNC: 2.4 MG/DL — SIGNIFICANT CHANGE UP (ref 1.6–2.6)
MCHC RBC-ENTMCNC: 26.4 PG — LOW (ref 27–34)
MCHC RBC-ENTMCNC: 30.6 GM/DL — LOW (ref 32–36)
MCV RBC AUTO: 86.1 FL — SIGNIFICANT CHANGE UP (ref 80–100)
MRSA PCR RESULT.: SIGNIFICANT CHANGE UP
NRBC # BLD: 0 /100 WBCS — SIGNIFICANT CHANGE UP (ref 0–0)
PHOSPHATE SERPL-MCNC: 3.9 MG/DL — SIGNIFICANT CHANGE UP (ref 2.5–4.5)
PLATELET # BLD AUTO: 239 K/UL — SIGNIFICANT CHANGE UP (ref 150–400)
POTASSIUM SERPL-MCNC: 4.4 MMOL/L — SIGNIFICANT CHANGE UP (ref 3.5–5.3)
POTASSIUM SERPL-SCNC: 4.4 MMOL/L — SIGNIFICANT CHANGE UP (ref 3.5–5.3)
RBC # BLD: 3.3 M/UL — LOW (ref 4.2–5.8)
RBC # FLD: 13.8 % — SIGNIFICANT CHANGE UP (ref 10.3–14.5)
S AUREUS DNA NOSE QL NAA+PROBE: SIGNIFICANT CHANGE UP
SODIUM SERPL-SCNC: 137 MMOL/L — SIGNIFICANT CHANGE UP (ref 135–145)
SPECIMEN SOURCE: SIGNIFICANT CHANGE UP
WBC # BLD: 5.43 K/UL — SIGNIFICANT CHANGE UP (ref 3.8–10.5)
WBC # FLD AUTO: 5.43 K/UL — SIGNIFICANT CHANGE UP (ref 3.8–10.5)

## 2024-03-05 PROCEDURE — 99223 1ST HOSP IP/OBS HIGH 75: CPT

## 2024-03-05 PROCEDURE — 93010 ELECTROCARDIOGRAM REPORT: CPT

## 2024-03-05 RX ORDER — CHLORHEXIDINE GLUCONATE 213 G/1000ML
1 SOLUTION TOPICAL
Refills: 0 | Status: DISCONTINUED | OUTPATIENT
Start: 2024-03-05 | End: 2024-03-06

## 2024-03-05 RX ORDER — INSULIN LISPRO 100/ML
VIAL (ML) SUBCUTANEOUS AT BEDTIME
Refills: 0 | Status: DISCONTINUED | OUTPATIENT
Start: 2024-03-05 | End: 2024-03-06

## 2024-03-05 RX ORDER — GABAPENTIN 400 MG/1
100 CAPSULE ORAL
Refills: 0 | Status: DISCONTINUED | OUTPATIENT
Start: 2024-03-05 | End: 2024-03-06

## 2024-03-05 RX ORDER — DEXTROSE 50 % IN WATER 50 %
25 SYRINGE (ML) INTRAVENOUS ONCE
Refills: 0 | Status: DISCONTINUED | OUTPATIENT
Start: 2024-03-05 | End: 2024-03-06

## 2024-03-05 RX ORDER — PIPERACILLIN AND TAZOBACTAM 4; .5 G/20ML; G/20ML
3.38 INJECTION, POWDER, LYOPHILIZED, FOR SOLUTION INTRAVENOUS EVERY 8 HOURS
Refills: 0 | Status: DISCONTINUED | OUTPATIENT
Start: 2024-03-05 | End: 2024-03-06

## 2024-03-05 RX ORDER — ACETAMINOPHEN 500 MG
975 TABLET ORAL EVERY 6 HOURS
Refills: 0 | Status: DISCONTINUED | OUTPATIENT
Start: 2024-03-05 | End: 2024-03-06

## 2024-03-05 RX ORDER — CLOPIDOGREL BISULFATE 75 MG/1
75 TABLET, FILM COATED ORAL DAILY
Refills: 0 | Status: DISCONTINUED | OUTPATIENT
Start: 2024-03-05 | End: 2024-03-06

## 2024-03-05 RX ORDER — GLUCAGON INJECTION, SOLUTION 0.5 MG/.1ML
1 INJECTION, SOLUTION SUBCUTANEOUS ONCE
Refills: 0 | Status: DISCONTINUED | OUTPATIENT
Start: 2024-03-05 | End: 2024-03-06

## 2024-03-05 RX ORDER — INSULIN LISPRO 100/ML
VIAL (ML) SUBCUTANEOUS
Refills: 0 | Status: DISCONTINUED | OUTPATIENT
Start: 2024-03-05 | End: 2024-03-06

## 2024-03-05 RX ORDER — ENOXAPARIN SODIUM 100 MG/ML
40 INJECTION SUBCUTANEOUS EVERY 24 HOURS
Refills: 0 | Status: DISCONTINUED | OUTPATIENT
Start: 2024-03-05 | End: 2024-03-06

## 2024-03-05 RX ORDER — DEXTROSE 50 % IN WATER 50 %
12.5 SYRINGE (ML) INTRAVENOUS ONCE
Refills: 0 | Status: DISCONTINUED | OUTPATIENT
Start: 2024-03-05 | End: 2024-03-06

## 2024-03-05 RX ORDER — AMLODIPINE BESYLATE 2.5 MG/1
2.5 TABLET ORAL ONCE
Refills: 0 | Status: COMPLETED | OUTPATIENT
Start: 2024-03-05 | End: 2024-03-05

## 2024-03-05 RX ORDER — DEXTROSE 50 % IN WATER 50 %
15 SYRINGE (ML) INTRAVENOUS ONCE
Refills: 0 | Status: DISCONTINUED | OUTPATIENT
Start: 2024-03-05 | End: 2024-03-06

## 2024-03-05 RX ORDER — SODIUM CHLORIDE 9 MG/ML
1000 INJECTION, SOLUTION INTRAVENOUS
Refills: 0 | Status: DISCONTINUED | OUTPATIENT
Start: 2024-03-05 | End: 2024-03-06

## 2024-03-05 RX ORDER — ATORVASTATIN CALCIUM 80 MG/1
40 TABLET, FILM COATED ORAL AT BEDTIME
Refills: 0 | Status: DISCONTINUED | OUTPATIENT
Start: 2024-03-05 | End: 2024-03-06

## 2024-03-05 RX ORDER — LOSARTAN POTASSIUM 100 MG/1
25 TABLET, FILM COATED ORAL DAILY
Refills: 0 | Status: DISCONTINUED | OUTPATIENT
Start: 2024-03-05 | End: 2024-03-06

## 2024-03-05 RX ORDER — INFLUENZA VIRUS VACCINE 15; 15; 15; 15 UG/.5ML; UG/.5ML; UG/.5ML; UG/.5ML
0.7 SUSPENSION INTRAMUSCULAR ONCE
Refills: 0 | Status: DISCONTINUED | OUTPATIENT
Start: 2024-03-05 | End: 2024-03-10

## 2024-03-05 RX ORDER — HYDRALAZINE HCL 50 MG
10 TABLET ORAL ONCE
Refills: 0 | Status: COMPLETED | OUTPATIENT
Start: 2024-03-05 | End: 2024-03-05

## 2024-03-05 RX ORDER — MULTIVIT-MIN/FERROUS GLUCONATE 9 MG/15 ML
1 LIQUID (ML) ORAL DAILY
Refills: 0 | Status: DISCONTINUED | OUTPATIENT
Start: 2024-03-05 | End: 2024-03-06

## 2024-03-05 RX ORDER — VANCOMYCIN HCL 1 G
1000 VIAL (EA) INTRAVENOUS EVERY 12 HOURS
Refills: 0 | Status: DISCONTINUED | OUTPATIENT
Start: 2024-03-05 | End: 2024-03-05

## 2024-03-05 RX ORDER — ASPIRIN/CALCIUM CARB/MAGNESIUM 324 MG
81 TABLET ORAL DAILY
Refills: 0 | Status: DISCONTINUED | OUTPATIENT
Start: 2024-03-05 | End: 2024-03-06

## 2024-03-05 RX ORDER — OXYCODONE HYDROCHLORIDE 5 MG/1
5 TABLET ORAL EVERY 4 HOURS
Refills: 0 | Status: DISCONTINUED | OUTPATIENT
Start: 2024-03-05 | End: 2024-03-06

## 2024-03-05 RX ADMIN — LOSARTAN POTASSIUM 25 MILLIGRAM(S): 100 TABLET, FILM COATED ORAL at 05:32

## 2024-03-05 RX ADMIN — PIPERACILLIN AND TAZOBACTAM 25 GRAM(S): 4; .5 INJECTION, POWDER, LYOPHILIZED, FOR SOLUTION INTRAVENOUS at 17:06

## 2024-03-05 RX ADMIN — ATORVASTATIN CALCIUM 40 MILLIGRAM(S): 80 TABLET, FILM COATED ORAL at 21:54

## 2024-03-05 RX ADMIN — PIPERACILLIN AND TAZOBACTAM 25 GRAM(S): 4; .5 INJECTION, POWDER, LYOPHILIZED, FOR SOLUTION INTRAVENOUS at 08:53

## 2024-03-05 RX ADMIN — Medication 4: at 12:53

## 2024-03-05 RX ADMIN — AMLODIPINE BESYLATE 2.5 MILLIGRAM(S): 2.5 TABLET ORAL at 01:56

## 2024-03-05 RX ADMIN — Medication 2: at 08:52

## 2024-03-05 RX ADMIN — GABAPENTIN 100 MILLIGRAM(S): 400 CAPSULE ORAL at 05:32

## 2024-03-05 RX ADMIN — Medication 250 MILLIGRAM(S): at 12:53

## 2024-03-05 RX ADMIN — Medication 10 MILLIGRAM(S): at 17:43

## 2024-03-05 RX ADMIN — Medication 1 TABLET(S): at 12:53

## 2024-03-05 RX ADMIN — ENOXAPARIN SODIUM 40 MILLIGRAM(S): 100 INJECTION SUBCUTANEOUS at 05:36

## 2024-03-05 RX ADMIN — CLOPIDOGREL BISULFATE 75 MILLIGRAM(S): 75 TABLET, FILM COATED ORAL at 12:53

## 2024-03-05 RX ADMIN — Medication 81 MILLIGRAM(S): at 12:53

## 2024-03-05 RX ADMIN — PIPERACILLIN AND TAZOBACTAM 25 GRAM(S): 4; .5 INJECTION, POWDER, LYOPHILIZED, FOR SOLUTION INTRAVENOUS at 23:27

## 2024-03-05 RX ADMIN — GABAPENTIN 100 MILLIGRAM(S): 400 CAPSULE ORAL at 17:06

## 2024-03-05 NOTE — H&P ADULT - NSHPPHYSICALEXAM_GEN_ALL_CORE
Vital Signs Last 24 Hrs  T(C): 37.1 (04 Mar 2024 22:32), Max: 37.2 (04 Mar 2024 17:41)  T(F): 98.8 (04 Mar 2024 22:32), Max: 99 (04 Mar 2024 17:41)  HR: 80 (04 Mar 2024 22:32) (80 - 85)  BP: 149/76 (04 Mar 2024 22:32) (149/76 - 150/70)  BP(mean): --  RR: 18 (04 Mar 2024 22:32) (18 - 19)  SpO2: 99% (04 Mar 2024 22:32) (99% - 99%)    Parameters below as of 04 Mar 2024 22:32  Patient On (Oxygen Delivery Method): room air      PHYSICAL EXAM:  GENERAL: NAD, well-groomed, well-developed  EYES: EOMI, PERRLA, conjunctiva and sclera clear  HEENT: Neck supple, No JVD  NERVOUS SYSTEM: AOX3, motor and sensation grossly intact in b/l UE and b/l LE  CHEST/LUNG: Clear to auscultation bilaterally; No rales, rhonchi, wheezing, or rubs  HEART: Regular rate and rhythm  ABDOMEN: Soft, Nontender, Nondistended  LOWER EXTREMITIES:  No motor/sensation deficits appreciated R BKA, L s/p TMA w/ fibrous tissue on open wound w/o discharge or malodor, L heel w/ eschar and tenderness w/o discharge or crepitus, palpable PT pulse, DP signal

## 2024-03-05 NOTE — PROGRESS NOTE ADULT - ASSESSMENT
73M presents with left foot wounds   - Patient seen and evaluated  - Afebrile, no leukocytosis, ESR 83, CRP 2.5   - Left foot TMA stump fibrogranular wound to bone, scant purulence, mild malodor. Left foot heel wound well adhered eschar, no malodor, no pus.   - left foot wound culture pending  - Left foot xrays: OM remnants 1-5 mets, no gas  - Booked for left foot revisional TMA w left foot wound debridement and graft application Tomorrow 3/6 with Dr Mcclure  - NPO after midnight  - Pre-op labs: CBC, BMP, PTT, PT and INR  - Please document medical clearance for podiatric procedure under anesthesia   - Discussed with attending

## 2024-03-05 NOTE — CONSULT NOTE ADULT - SUBJECTIVE AND OBJECTIVE BOX
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 Mcclure for left foot wound debridement and graft application. Patient reports he had the Angiogram with Dr. Mullins last week and then over the last week he had increased pain of the heel of the LLE. Patient denies fever/chills, SOB, nausea/vomiting, Patient denies motor/sensory changes of the foot. Pt is unclear when the eschar developed. Patient denies pain of the dorsal aspect of the foot.    Patient was seen and examined in ED. Hemodynamically stable and nontoxic appearing. Reports significant pain of LLE heel. 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 No nausea, vomiting, or hematemesis; No diarrhea or constipation. No melena or hematochezia.  GENITOURINARY: No dysuria, frequency, hematuria, or incontinence  NEUROLOGICAL: No headaches, memory loss, loss of strength, numbness, or tremors  SKIN: No itching, burning, rashes, or lesions   LYMPH NODES: No enlarged glands  ENDOCRINE: No heat or cold intolerance; No hair loss  MUSCULOSKELETAL: No joint pain or swelling; No muscle, back, or extremity pain  PSYCHIATRIC: No depression, anxiety, mood swings, or difficulty sleeping  HEME/LYMPH: No easy bruising, or bleeding gums  ALLERY AND IMMUNOLOGIC: No hives or eczema    Allergies    No Known Allergies    Intolerances        Social History:     FAMILY HISTORY:  No pertinent family history in first degree relatives        MEDICATIONS  (STANDING):  aspirin enteric coated 81 milliGRAM(s) Oral daily  atorvastatin 40 milliGRAM(s) Oral at bedtime  clopidogrel Tablet 75 milliGRAM(s) Oral daily  dextrose 5%. 1000 milliLiter(s) (100 mL/Hr) IV Continuous <Continuous>  dextrose 5%. 1000 milliLiter(s) (50 mL/Hr) IV Continuous <Continuous>  dextrose 50% Injectable 25 Gram(s) IV Push once  dextrose 50% Injectable 12.5 Gram(s) IV Push once  dextrose 50% Injectable 25 Gram(s) IV Push once  enoxaparin Injectable 40 milliGRAM(s) SubCutaneous every 24 hours  gabapentin 100 milliGRAM(s) Oral two times a day  glucagon  Injectable 1 milliGRAM(s) IntraMuscular once  influenza  Vaccine (HIGH DOSE) 0.7 milliLiter(s) IntraMuscular once  insulin lispro (ADMELOG) corrective regimen sliding scale   SubCutaneous three times a day before meals  insulin lispro (ADMELOG) corrective regimen sliding scale   SubCutaneous at bedtime  losartan 25 milliGRAM(s) Oral daily  multivitamin/minerals 1 Tablet(s) Oral daily  piperacillin/tazobactam IVPB.. 3.375 Gram(s) IV Intermittent every 8 hours    MEDICATIONS  (PRN):  acetaminophen     Tablet .. 975 milliGRAM(s) Oral every 6 hours PRN Temp greater or equal to 38C (100.4F), Mild Pain (1 - 3)  dextrose Oral Gel 15 Gram(s) Oral once PRN Blood Glucose LESS THAN 70 milliGRAM(s)/deciliter  oxyCODONE    IR 5 milliGRAM(s) Oral every 4 hours PRN Moderate Pain (4 - 6)        CAPILLARY BLOOD GLUCOSE      POCT Blood Glucose.: 220 mg/dL (05 Mar 2024 11:59)  POCT Blood Glucose.: 160 mg/dL (05 Mar 2024 07:59)    I&O's Summary    04 Mar 2024 07:01  -  05 Mar 2024 07:00  --------------------------------------------------------  IN: 240 mL / OUT: 0 mL / NET: 240 mL    05 Mar 2024 07:01  -  05 Mar 2024 15:38  --------------------------------------------------------  IN: 720 mL / OUT: 500 mL / NET: 220 mL        PHYSICAL EXAM:  GENERAL: NAD, well-developed  HEAD:  Atraumatic, Normocephalic  EYES: EOMI, PERRLA, conjunctiva and sclera clear  NECK: Supple, No JVD  CHEST/LUNG: Clear to auscultation bilaterally; No wheeze  HEART: Regular rate and rhythm; No murmurs, rubs, or gallops  ABDOMEN: Soft, Nontender, Nondistended; Bowel sounds present  EXTREMITIES:  2+ Peripheral Pulses, No clubbing, cyanosis, or edema  PSYCH: AAOx3  NEUROLOGY: non-focal  SKIN: No rashes or lesions    LABS:                        8.7    5.43  )-----------( 239      ( 05 Mar 2024 07:12 )             28.4     03-05    137  |  104  |  28<H>  ----------------------------<  152<H>  4.4   |  23  |  0.96    Ca    9.7      05 Mar 2024 07:14  Phos  3.9     03-05  Mg     2.4     03-05    TPro  8.1  /  Alb  3.7  /  TBili  0.2  /  DBili  x   /  AST  19  /  ALT  30  /  AlkPhos  81  03-04    PT/INR - ( 04 Mar 2024 20:34 )   PT: 11.0 sec;   INR: 1.05 ratio         PTT - ( 04 Mar 2024 20:34 )  PTT:33.3 sec      Urinalysis Basic - ( 05 Mar 2024 07:14 )    Color: x / Appearance: x / SG: x / pH: x  Gluc: 152 mg/dL / Ketone: x  / Bili: x / Urobili: x   Blood: x / Protein: x / Nitrite: x   Leuk Esterase: x / RBC: x / WBC x   Sq Epi: x / Non Sq Epi: x / Bacteria: x        RADIOLOGY & ADDITIONAL TESTS:    Imaging Personally Reviewed:    Consultant(s) Notes Reviewed:      Care Discussed with Consultants/Other Providers:   O-T Plasty Text: The defect edges were debeveled with a #15 scalpel blade.  Given the location of the defect, shape of the defect and the proximity to free margins an O-T plasty was deemed most appropriate.  Using a sterile surgical marker, an appropriate O-T plasty was drawn incorporating the defect and placing the expected incisions within the relaxed skin tension lines where possible.    The area thus outlined was incised deep to adipose tissue with a #15 scalpel blade.  The skin margins were undermined to an appropriate distance in all directions utilizing iris scissors.

## 2024-03-05 NOTE — H&P ADULT - HISTORY OF PRESENT ILLNESS
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 Mcclure for left foot wound debridement and graft application. Patient reports he had the Angiogram with Dr. Mullins last week and then over the last week he had increased pain of the heel of the LLE. Patient denies fever/chills, SOB, nausea/vomiting, Patient denies motor/sensory changes of the foot. Pt is unclear when the eschar developed. Patient denies pain of the dorsal aspect of the foot.    Patient was seen and examined in ED. Hemodynamically stable and nontoxic appearing. Reports significant pain of LLE heel. Physical exam notable for fibrous tissue over TMA site, eschar on LLE heel, palpable PT pulse and dopplerable DP signal

## 2024-03-05 NOTE — CONSULT NOTE ADULT - SUBJECTIVE AND OBJECTIVE BOX
[facility] DISEASES  ERVIN Florentino S. Shah, Y. Patel, G. 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 Mcclure for left foot wound debridement and graft application. Patient reports he had the Angiogram with Dr. Mullins last week and then over the last week he had increased pain of the heel of the LLE. Patient denies fever/chills, SOB, nausea/vomiting, Patient denies motor/sensory changes of the foot. Pt is unclear when the eschar developed. Patient denies pain of the dorsal aspect of the foot. Patient in the ER hemodynamically stable and nontoxic appearing. Reports significant pain of LLE heel. Physical exam notable for fibrous tissue over TMA site, eschar on LLE heel, palpable PT pulse and dopplerable DP signal (05 Mar 2024 01:34)  ROS: 14 point review of systems completed, pertinent positives and negatives as per HPI.    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, PAD   PSH -- History of amputation of toe  FH -- No pertinent family history in first degree relatives  Social History -- denies tobacco, alcohol or illicit drug use    Physical Exam--  Vital Signs Last 24 Hrs  T(F): 97.6 (05 Mar 2024 13:26), Max: 99 (04 Mar 2024 17:41)  HR: 75 (05 Mar 2024 13:26) (70 - 85)  BP: 186/72 (05 Mar 2024 13:38) (147/74 - 186/72)  RR: 18 (05 Mar 2024 13:26) (18 - 19)  SpO2: 100% (05 Mar 2024 13:26) (98% - 100%)  General: no acute distress  HEENT: NC/AT, EOMI, anicteric, neck supple  Lungs: Clear bilaterally without rales, wheezing or rhonchi  Heart: S1, S2 present, RRR. No murmur, rub or gallop.  Abdomen: Soft. Nondistended. Nontender. BS present.   Neuro: AAOx3, no obvious focal deficits   Extremities: R BKA. No cyanosis. No edema.   Skin: Warm. Dry. L foot dressing  Psychiatric: Appropriate affect and mood for situation.   Lines: PIV    Laboratory & Imaging Data--  CBC:                       8.7    5.43  )-----------( 239      ( 05 Mar 2024 07:12 )             28.4     WBC Count: 5.43 K/uL (03-05-24 @ 07:12)  WBC Count: 6.05 K/uL (03-04-24 @ 20:34)    CMP: 03-05    137  |  104  |  28<H>  ----------------------------<  152<H>  4.4   |  23  |  0.96    Ca    9.7      05 Mar 2024 07:14  Phos  3.9     03-05  Mg     2.4     03-05    TPro  8.1  /  Alb  3.7  /  TBili  0.2  /  DBili  x   /  AST  19  /  ALT  30  /  AlkPhos  81  03-04    LIVER FUNCTIONS - ( 04 Mar 2024 20:34 )  Alb: 3.7 g/dL / Pro: 8.1 g/dL / ALK PHOS: 81 U/L / ALT: 30 U/L / AST: 19 U/L / GGT: x           Microbiology: reviewed  Culture - Abscess with Gram Stain (collected 03-04-24 @ 19:02)  Source: .Abscess left foot culture  Gram Stain (03-05-24 @ 02:25):    Few polymorphonuclear leukocytes seen per low power field    Rare Gram Variable Rods seen per oil power field    Rare Gram positive cocci in pairs seen per oil power field    Radiology--reviewed  < from: Xray Ankle Complete 3 Views, Left (03.04.24 @ 21:36) >  IMPRESSION:  Findings suspicious for osteomyelitis of the first and fifth residual    metatarsals.    < end of copied text >  < from: Xray Tibia + Fibula 2 Views, Left (03.04.24 @ 21:36) >    IMPRESSION:  Findings suspicious for osteomyelitis of the first and fifth residual    metatarsals.    < end of copied text >  < from: Xray Chest 1 View AP/PA (03.04.24 @ 21:35) >  IMPRESSION:  Clear lungs.    < end of copied text >    Active Medications--  acetaminophen     Tablet .. 975 milliGRAM(s) Oral every 6 hours PRN  aspirin enteric coated 81 milliGRAM(s) Oral daily  atorvastatin 40 milliGRAM(s) Oral at bedtime  clopidogrel Tablet 75 milliGRAM(s) Oral daily  dextrose 5%. 1000 milliLiter(s) IV Continuous <Continuous>  dextrose 5%. 1000 milliLiter(s) IV Continuous <Continuous>  dextrose 50% Injectable 25 Gram(s) IV Push once  dextrose 50% Injectable 12.5 Gram(s) IV Push once  dextrose 50% Injectable 25 Gram(s) IV Push once  dextrose Oral Gel 15 Gram(s) Oral once PRN  enoxaparin Injectable 40 milliGRAM(s) SubCutaneous every 24 hours  gabapentin 100 milliGRAM(s) Oral two times a day  glucagon  Injectable 1 milliGRAM(s) IntraMuscular once  influenza  Vaccine (HIGH DOSE) 0.7 milliLiter(s) IntraMuscular once  insulin lispro (ADMELOG) corrective regimen sliding scale   SubCutaneous at bedtime  insulin lispro (ADMELOG) corrective regimen sliding scale   SubCutaneous three times a day before meals  losartan 25 milliGRAM(s) Oral daily  multivitamin/minerals 1 Tablet(s) Oral daily  oxyCODONE    IR 5 milliGRAM(s) Oral every 4 hours PRN  piperacillin/tazobactam IVPB.. 3.375 Gram(s) IV Intermittent every 8 hours  vancomycin  IVPB 1000 milliGRAM(s) IV Intermittent every 12 hours    Current Antimicrobials:   piperacillin/tazobactam IVPB.. 3.375 Gram(s) IV Intermittent every 8 hours  vancomycin  IVPB 1000 milliGRAM(s) IV Intermittent every 12 hours    Prior/Completed Antimicrobials:  piperacillin/tazobactam IVPB...  vancomycin  IVPB.    Immunologic:   influenza  Vaccine (HIGH DOSE) 0.7 milliLiter(s) IntraMuscular once [facility]  ERVIN Florentino S. Shah, Y. Patel, G. 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 Mcclure for left foot wound debridement and graft application. Patient reports he had the Angiogram with Dr. Mullins last week and then over the last week he had increased pain of the heel of the LLE. Patient denies fever/chills, SOB, nausea/vomiting, Patient denies motor/sensory changes of the foot. Pt is unclear when the eschar developed. Patient denies pain of the dorsal aspect of the foot. Patient in the ER hemodynamically stable and nontoxic appearing. Reports significant pain of LLE heel. Physical exam notable for fibrous tissue over TMA site, eschar on LLE heel, palpable PT pulse and dopplerable DP signal (05 Mar 2024 01:34)  Patient seen and examined at bedside this afternoon, reports currently feels ok, has no fevers or new complaints.   ROS: 14 point review of systems completed, pertinent positives and negatives as per HPI.    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, PAD   PSH -- History of amputation of toe  FH -- No pertinent family history in first degree relatives  Social History -- denies tobacco, alcohol or illicit drug use    Physical Exam--  Vital Signs Last 24 Hrs  T(F): 97.6 (05 Mar 2024 13:26), Max: 99 (04 Mar 2024 17:41)  HR: 75 (05 Mar 2024 13:26) (70 - 85)  BP: 186/72 (05 Mar 2024 13:38) (147/74 - 186/72)  RR: 18 (05 Mar 2024 13:26) (18 - 19)  SpO2: 100% (05 Mar 2024 13:26) (98% - 100%)  General: no acute distress  HEENT: NC/AT, EOMI, anicteric, neck supple  Lungs: clear to auscultation bilaterally  Heart: S1, S2 present, normal rate   Abdomen: Soft. Nondistended. Nontender.  Neuro: AAOx3, no obvious focal deficits   Extremities: R BKA. LLE dressing with brace   Skin: Warm. Dry. No visible rash.   Psychiatric: Appropriate affect and mood for situation.   Lines: PIV    Laboratory & Imaging Data--  CBC:                       8.7    5.43  )-----------( 239      ( 05 Mar 2024 07:12 )             28.4     WBC Count: 5.43 K/uL (03-05-24 @ 07:12)  WBC Count: 6.05 K/uL (03-04-24 @ 20:34)    CMP: 03-05    137  |  104  |  28<H>  ----------------------------<  152<H>  4.4   |  23  |  0.96    Ca    9.7      05 Mar 2024 07:14  Phos  3.9     03-05  Mg     2.4     03-05    TPro  8.1  /  Alb  3.7  /  TBili  0.2  /  DBili  x   /  AST  19  /  ALT  30  /  AlkPhos  81  03-04    LIVER FUNCTIONS - ( 04 Mar 2024 20:34 )  Alb: 3.7 g/dL / Pro: 8.1 g/dL / ALK PHOS: 81 U/L / ALT: 30 U/L / AST: 19 U/L / GGT: x           Microbiology: reviewed  Culture - Abscess with Gram Stain (collected 03-04-24 @ 19:02)  Source: .Abscess left foot culture  Gram Stain (03-05-24 @ 02:25):    Few polymorphonuclear leukocytes seen per low power field    Rare Gram Variable Rods seen per oil power field    Rare Gram positive cocci in pairs seen per oil power field    Radiology--reviewed  < from: Xray Ankle Complete 3 Views, Left (03.04.24 @ 21:36) >  IMPRESSION:  Findings suspicious for osteomyelitis of the first and fifth residual    metatarsals.    < end of copied text >  < from: Xray Tibia + Fibula 2 Views, Left (03.04.24 @ 21:36) >    IMPRESSION:  Findings suspicious for osteomyelitis of the first and fifth residual    metatarsals.    < end of copied text >  < from: Xray Chest 1 View AP/PA (03.04.24 @ 21:35) >  IMPRESSION:  Clear lungs.    < end of copied text >    Active Medications--  acetaminophen     Tablet .. 975 milliGRAM(s) Oral every 6 hours PRN  aspirin enteric coated 81 milliGRAM(s) Oral daily  atorvastatin 40 milliGRAM(s) Oral at bedtime  clopidogrel Tablet 75 milliGRAM(s) Oral daily  dextrose 5%. 1000 milliLiter(s) IV Continuous <Continuous>  dextrose 5%. 1000 milliLiter(s) IV Continuous <Continuous>  dextrose 50% Injectable 25 Gram(s) IV Push once  dextrose 50% Injectable 12.5 Gram(s) IV Push once  dextrose 50% Injectable 25 Gram(s) IV Push once  dextrose Oral Gel 15 Gram(s) Oral once PRN  enoxaparin Injectable 40 milliGRAM(s) SubCutaneous every 24 hours  gabapentin 100 milliGRAM(s) Oral two times a day  glucagon  Injectable 1 milliGRAM(s) IntraMuscular once  influenza  Vaccine (HIGH DOSE) 0.7 milliLiter(s) IntraMuscular once  insulin lispro (ADMELOG) corrective regimen sliding scale   SubCutaneous at bedtime  insulin lispro (ADMELOG) corrective regimen sliding scale   SubCutaneous three times a day before meals  losartan 25 milliGRAM(s) Oral daily  multivitamin/minerals 1 Tablet(s) Oral daily  oxyCODONE    IR 5 milliGRAM(s) Oral every 4 hours PRN  piperacillin/tazobactam IVPB.. 3.375 Gram(s) IV Intermittent every 8 hours  vancomycin  IVPB 1000 milliGRAM(s) IV Intermittent every 12 hours    Current Antimicrobials:   piperacillin/tazobactam IVPB.. 3.375 Gram(s) IV Intermittent every 8 hours  vancomycin  IVPB 1000 milliGRAM(s) IV Intermittent every 12 hours    Prior/Completed Antimicrobials:  piperacillin/tazobactam IVPB...  vancomycin  IVPB.    Immunologic:   influenza  Vaccine (HIGH DOSE) 0.7 milliLiter(s) IntraMuscular once

## 2024-03-05 NOTE — PATIENT PROFILE ADULT - FALL HARM RISK - HARM RISK INTERVENTIONS

## 2024-03-05 NOTE — H&P ADULT - ASSESSMENT
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 Mcclure for left foot wound debridement and graft application.    PLAN:  - admit to Dr. Bradley  - Appreciate podiatry plan  - IV abx (zosyn, vanc)  - pain control  - Diet: DASH  - c/w DAPT    Discussed w/ vascular fellow    Gavin Plummer, PGY2  Vascular Surgery  [id number]

## 2024-03-05 NOTE — CONSULT NOTE ADULT - ASSESSMENT
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 Mcclure for left foot wound debridement and graft application. Patient reports he had the Angiogram with Dr. Mullins last week and then over the last week he had increased pain of the heel of the LLE. Patient denies fever/chills, SOB, nausea/vomiting, Patient denies motor/sensory changes of the foot. Pt is unclear when the eschar developed. Patient denies pain of the dorsal aspect of the foot. Patient in the ER hemodynamically stable and nontoxic appearing. Reports significant pain of LLE heel. 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 Mcclure for left foot wound debridement and graft application. Patient reports he had the Angiogram with Dr. Mullins last week and then over the last week he had increased pain of the heel of the LLE. Patient denies fever/chills, SOB, nausea/vomiting, Patient denies motor/sensory changes of the foot. Pt is unclear when the eschar developed. Patient denies pain of the dorsal aspect of the foot. Patient in the ER hemodynamically stable and nontoxic appearing. Reports significant pain of LLE heel. Physical exam notable for fibrous tissue over TMA site, eschar on LLE heel, palpable PT pulse and dopplerable DP signal      L foot wounds with c/f OM of L first and fifth residual metatarsal  Plan for OR tomorrow  Podiatry planning for OR tomorrow 3/6 for L foot revisional TMA with L foot wound debridement and graft application  ID Podiatry following   Continue with Zosyn as per ID    DM [id number]  Add Pre meal 5  units     A1C    HTN  Continue with Losartan     Patient medically optimized for the procedure

## 2024-03-05 NOTE — ED ADULT NURSE NOTE - OBJECTIVE STATEMENT
Pt is a 73 y.o male c.o LT foot pain and infection. Pt is A&Ox3 resting in stretcher. PT sent in by his podiatrist after noting eschar and pain to his LT heel s/p angiogram. PT has a hx of RT BKA and B/L stents to LE. Spontaneously breathing on RA>95%, afebrile, eschar noted to LLE, peripheral pulses present, abdomen soft nontender. Pt endorses pain relief with po Tylenol. Pt denies any fever, chills, SOB, N/V/D. Safety and comfort measures in place bed in lowest position, siderails upright and blanket given.

## 2024-03-05 NOTE — H&P ADULT - NSHPLABSRESULTS_GEN_ALL_CORE
CBC (03-04 @ 20:34)                              8.9<L>                         6.05    )----------------(  271        65.4  % Neutrophils, 21.8  % Lymphocytes, ANC: 3.96                                28.4<L>                BMP (03-04 @ 20:34)             138     |  101     |  30<H> 		Ca++ --      Ca 9.9                ---------------------------------( 238<H>		Mg --                 4.7     |  24      |  1.07  			Ph --        LFTs (03-04 @ 20:34)      TPro 8.1 / Alb 3.7 / TBili 0.2 / DBili -- / AST 19 / ALT 30 / AlkPhos 81    Coags (03-04 @ 20:34)  aPTT 33.3 / INR 1.05 / PT 11.0    ABG (03-04 @ 20:20)      /  /  /  /  / %     Lactate:   1.8    VBG (03-04 @ 20:20)     7.32 / 50 / 25 / 26 / -0.8 / 31.7<L>%      IMAGING:    < from: Xray Ankle Complete 3 Views, Left (03.04.24 @ 21:36) >    FINDINGS:  Transmetatarsal amputation. Soft tissue defects along the medial and   lateral forefoot. Adjacent osseous lucencies at the distal TMA stumps of   the first and fifth metatarsal remnants may represent erosive changes.  Degenerative changes of the knee and ankle.  Posterior calcaneal enthesopathy.  Prominent vascular calcifications.    IMPRESSION:  Findings suspicious for osteomyelitis of the first and fifth metatarsal   remnants.

## 2024-03-05 NOTE — PATIENT PROFILE ADULT - PATIENT REPRESENTATIVE: ( YOU CAN CHOOSE ANY PERSON THAT CAN ASSIST YOU WITH YOUR HEALTH CARE PREFERENCES, DOES NOT HAVE TO BE A SPOUSE, IMMEDIATE FAMILY OR SIGNIFICANT OTHER/PARTNER)
Pt to clinic for Bebtelovimab IV push  Pt given EUA and reviewed  Pt gives verbal consent to proceed with treatment  Pt resting comfortably and has no further questions or concerns  Call bell within reach  declines

## 2024-03-05 NOTE — PATIENT PROFILE ADULT - OVER THE PAST TWO WEEKS, HAVE YOU FELT LITTLE INTEREST OR PLEASURE IN DOING THINGS?
PT DAILY TREATMENT NOTE - [facility]     Patient Name: Katherine Talley  Date:2017  : 1951  [x]  Patient  Verified  Payor: BLUE CROSS MEDICARE / Plan: [address] / Product Type: Managed Care Medicare /    In time:2:59  Out time:3:43  Total Treatment Time (min): 44  Total Timed Codes (min): 34  1:1 Treatment Time ( only): 8  Visit #: 3 of 12    Treatment Area: Back pain [M54.9]  Neck pain [M54.2]    SUBJECTIVE  Pain Level (0-10 scale): 5/10  Any medication changes, allergies to medications, adverse drug reactions, diagnosis change, or new procedure performed?: [x] No    [] Yes (see summary sheet for update)  Subjective functional status/changes:   [] No changes reported  Pt reports that he believes therapy is helping so far, but he also just started therapy. He is doing his HEP daily.        OBJECTIVE    Modality rationale: decrease pain and increase tissue extensibility to improve the patients ability to reduce pain and improve ease of head movements with ADLs   Min Type Additional Details    [] Estim:  []Unatt       []IFC  []Premod                        []Other:  []w/ice   []w/heat  Position:  Location:    [] Estim: []Att    []TENS instruct  []NMES                    []Other:  []w/US   []w/ice   []w/heat  Position:  Location:    []  Traction: [] Cervical       []Lumbar                       [] Prone          []Supine                       []Intermittent   []Continuous Lbs:  [] before manual  [] after manual    []  Ultrasound: []Continuous   [] Pulsed                           []1MHz   []3MHz W/cm2:  Location:    []  Iontophoresis with dexamethasone         Location: [] Take home patch   [] In clinic   10 []  Ice     [x]  heat  []  Ice massage  []  Laser   []  Anodyne Position: seated   Location: B UT    []  Laser with stim  []  Other:  Position:  Location:    []  Vasopneumatic Device Pressure:       [] lo [] med [] hi   Temperature: [] lo [] med [] hi   [x] Skin assessment post-treatment:  [x]intact []redness- no adverse reaction    []redness  adverse reaction:       34 min Therapeutic Exercise:  [x] See flow sheet :   Rationale: increase ROM and increase strength to improve the patients ability to improve ease of ambulation and daily tasks             With   [] TE   [] TA   [] neuro   [] other: Patient Education: [x] Review HEP    [] Progressed/Changed HEP based on:   [] positioning   [] body mechanics   [] transfers   [] heat/ice application    [] other:      Other Objective/Functional Measures:     No difficulty with interventions  Cues to remain upright and avoid lumbar flexion with hip x 3  No LOB with Romberg Foam EO/EC   Cues for form with mini squats to keep knees behind toes/avoid anterior weight-shift and decrease range  No pain with therapy      Pain Level (0-10 scale) post treatment: 0/10    ASSESSMENT/Changes in Function:     Pt tolerated therapy without complaint of increased sx and is making slow, steady progress towards therapy goals. Pt is compliant with HEP and puts forth good effort with therex, demonstrating ease with multiple interventions. Upgrade interventions per flow sheet/pt tolerance. Will continue to address strength, ROM, and flexibility deficits for return to PLOF. Patient will continue to benefit from skilled PT services to modify and progress therapeutic interventions, address ROM deficits, address strength deficits, analyze and address soft tissue restrictions, analyze and cue movement patterns, assess and modify postural abnormalities and instruct in home and community integration to attain remaining goals. Progress towards goals / Updated goals:  Short Term Goals: To be accomplished in 1 weeks:   1. Pt will be compliant with a HEP to improve walking tolerance. Goal met. 8/29/17  Long Term Goals: To be accomplished in 6 weeks:  1. Pt will increase L/S FOTO score by 8 pts to improve with ambulation endurance.   2. Pt will tolerate ambulation for approx 1 hr to improve with return to daily walking activities. 3. Pt will perform EC on foam x 30 sec to improve static balance and decrease falls. 4. Pt will increase C/S Rotation to >50 deg to ease with driving.      PLAN  []  Upgrade activities as tolerated     [x]  Continue plan of care  []  Update interventions per flow sheet       []  Discharge due to:_  []  Other:_      Carter Hong, PT 8/29/2017  3:23 PM    Future Appointments  Date Time Provider Jaime Reeder   9/1/2017 10:30 AM Nabila Hardwick, PTA [id number]   9/6/2017 11:00 AM HAILEY Ho   9/8/2017 11:30 AM Jonny Nathan, PT [id number]   9/11/2017 10:00 AM Izabela Wu [id number]   9/14/2017 10:30 AM Carter Hong, PT ROSALES [facility]   9/15/2017 11:00 AM Helga Guido, PT KIKE [facility]   2/20/2018 8:15 AM Eric Medina  [address] [place] no

## 2024-03-05 NOTE — CONSULT NOTE ADULT - ASSESSMENT
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 Mcclure for left foot wound debridement and graft application. Patient reports he had the Angiogram with Dr. Mullins last week and then over the last week he had increased pain of the heel of the LLE. Patient denies fever/chills, SOB, nausea/vomiting, Patient denies motor/sensory changes of the foot. Pt is unclear when the eschar developed. Patient denies pain of the dorsal aspect of the foot. Patient in the ER hemodynamically stable and nontoxic appearing. Reports significant pain of LLE heel. Physical exam notable for fibrous tissue over TMA site, eschar on LLE heel, palpable PT pulse and dopplerable DP signal     L foot wounds with c/f OM of L first and fifth residual metatarsal  - LLE/ankle xrays with findings suspicious for OM of the first and fifth residual metatarsals  - Podiatry following - noted with L foot TMA stump fibrogranular wound to bone, scant purulence, mild malodor. Left foot heel wound well adhered eschar, no malodor, no pus  - prior cultures reviewed; OR culture from 12/18/23 from clean bone was negative   - started on empiric zosyn and vancomycin     Recommendations:  Follow up L foot wound culture - gram stain noted  Podiatry planning for OR tomorrow 3/6 for L foot revisional TMA with L foot wound debridement and graft application  Discontinue vancomycin, MRSA PCR screen negative, no prior h/o MRSA  Continue zosyn for now pending above   Further recs to follow pending above   Monitor temps/WBC  Pain mgmt and rest of care per primary team    Azucena Castillo M.D.  [facility], Division of Infectious Diseases  [phone]  After 5pm on weekdays and all day on weekends - please call [phone]

## 2024-03-06 ENCOUNTER — APPOINTMENT (OUTPATIENT)
Dept: HYPERBARIC MEDICINE | Facility: CLINIC | Age: 74
End: 2024-03-06

## 2024-03-06 ENCOUNTER — RESULT REVIEW (OUTPATIENT)
Age: 74
End: 2024-03-06

## 2024-03-06 ENCOUNTER — TRANSCRIPTION ENCOUNTER (OUTPATIENT)
Age: 74
End: 2024-03-06

## 2024-03-06 LAB
-  AMOXICILLIN/CLAVULANIC ACID: SIGNIFICANT CHANGE UP
-  AMPICILLIN/SULBACTAM: SIGNIFICANT CHANGE UP
-  AMPICILLIN: SIGNIFICANT CHANGE UP
-  AZTREONAM: SIGNIFICANT CHANGE UP
-  CEFAZOLIN: SIGNIFICANT CHANGE UP
-  CEFEPIME: SIGNIFICANT CHANGE UP
-  CEFOXITIN: SIGNIFICANT CHANGE UP
-  CEFTRIAXONE: SIGNIFICANT CHANGE UP
-  CIPROFLOXACIN: SIGNIFICANT CHANGE UP
-  ERTAPENEM: SIGNIFICANT CHANGE UP
-  GENTAMICIN: SIGNIFICANT CHANGE UP
-  IMIPENEM: SIGNIFICANT CHANGE UP
-  LEVOFLOXACIN: SIGNIFICANT CHANGE UP
-  MEROPENEM: SIGNIFICANT CHANGE UP
-  PIPERACILLIN/TAZOBACTAM: SIGNIFICANT CHANGE UP
-  TOBRAMYCIN: SIGNIFICANT CHANGE UP
-  TRIMETHOPRIM/SULFAMETHOXAZOLE: SIGNIFICANT CHANGE UP
ANION GAP SERPL CALC-SCNC: 10 MMOL/L — SIGNIFICANT CHANGE UP (ref 5–17)
APTT BLD: 29.7 SEC — SIGNIFICANT CHANGE UP (ref 24.5–35.6)
BLD GP AB SCN SERPL QL: NEGATIVE — SIGNIFICANT CHANGE UP
BUN SERPL-MCNC: 25 MG/DL — HIGH (ref 7–23)
CALCIUM SERPL-MCNC: 9.7 MG/DL — SIGNIFICANT CHANGE UP (ref 8.4–10.5)
CHLORIDE SERPL-SCNC: 103 MMOL/L — SIGNIFICANT CHANGE UP (ref 96–108)
CO2 SERPL-SCNC: 21 MMOL/L — LOW (ref 22–31)
CREAT SERPL-MCNC: 1.05 MG/DL — SIGNIFICANT CHANGE UP (ref 0.5–1.3)
EGFR: 75 ML/MIN/1.73M2 — SIGNIFICANT CHANGE UP
GLUCOSE SERPL-MCNC: 164 MG/DL — HIGH (ref 70–99)
HCT VFR BLD CALC: 28.1 % — LOW (ref 39–50)
HGB BLD-MCNC: 9.1 G/DL — LOW (ref 13–17)
INR BLD: 1.07 RATIO — SIGNIFICANT CHANGE UP (ref 0.85–1.18)
MAGNESIUM SERPL-MCNC: 2.3 MG/DL — SIGNIFICANT CHANGE UP (ref 1.6–2.6)
MCHC RBC-ENTMCNC: 27.1 PG — SIGNIFICANT CHANGE UP (ref 27–34)
MCHC RBC-ENTMCNC: 32.4 GM/DL — SIGNIFICANT CHANGE UP (ref 32–36)
MCV RBC AUTO: 83.6 FL — SIGNIFICANT CHANGE UP (ref 80–100)
METHOD TYPE: SIGNIFICANT CHANGE UP
NRBC # BLD: 0 /100 WBCS — SIGNIFICANT CHANGE UP (ref 0–0)
PHOSPHATE SERPL-MCNC: 4 MG/DL — SIGNIFICANT CHANGE UP (ref 2.5–4.5)
PLATELET # BLD AUTO: 247 K/UL — SIGNIFICANT CHANGE UP (ref 150–400)
POTASSIUM SERPL-MCNC: 5 MMOL/L — SIGNIFICANT CHANGE UP (ref 3.5–5.3)
POTASSIUM SERPL-SCNC: 5 MMOL/L — SIGNIFICANT CHANGE UP (ref 3.5–5.3)
PROTHROM AB SERPL-ACNC: 11.7 SEC — SIGNIFICANT CHANGE UP (ref 9.5–13)
RBC # BLD: 3.36 M/UL — LOW (ref 4.2–5.8)
RBC # FLD: 13.8 % — SIGNIFICANT CHANGE UP (ref 10.3–14.5)
RH IG SCN BLD-IMP: POSITIVE — SIGNIFICANT CHANGE UP
SODIUM SERPL-SCNC: 134 MMOL/L — LOW (ref 135–145)
WBC # BLD: 5.58 K/UL — SIGNIFICANT CHANGE UP (ref 3.8–10.5)
WBC # FLD AUTO: 5.58 K/UL — SIGNIFICANT CHANGE UP (ref 3.8–10.5)

## 2024-03-06 PROCEDURE — 88304 TISSUE EXAM BY PATHOLOGIST: CPT | Mod: 26

## 2024-03-06 PROCEDURE — 73630 X-RAY EXAM OF FOOT: CPT | Mod: 26,LT

## 2024-03-06 PROCEDURE — 88311 DECALCIFY TISSUE: CPT | Mod: 26

## 2024-03-06 PROCEDURE — 99231 SBSQ HOSP IP/OBS SF/LOW 25: CPT

## 2024-03-06 DEVICE — GRAFT FISH SKIN OMEGA3 WOUND 5X7CM: Type: IMPLANTABLE DEVICE | Site: LEFT | Status: FUNCTIONAL

## 2024-03-06 DEVICE — GRAFT FISH SKIN MICRO 38SQCM: Type: IMPLANTABLE DEVICE | Site: LEFT | Status: FUNCTIONAL

## 2024-03-06 RX ORDER — PIPERACILLIN AND TAZOBACTAM 4; .5 G/20ML; G/20ML
3.38 INJECTION, POWDER, LYOPHILIZED, FOR SOLUTION INTRAVENOUS EVERY 8 HOURS
Refills: 0 | Status: DISCONTINUED | OUTPATIENT
Start: 2024-03-06 | End: 2024-03-10

## 2024-03-06 RX ORDER — FENTANYL CITRATE 50 UG/ML
25 INJECTION INTRAVENOUS
Refills: 0 | Status: DISCONTINUED | OUTPATIENT
Start: 2024-03-06 | End: 2024-03-06

## 2024-03-06 RX ORDER — DEXTROSE 50 % IN WATER 50 %
15 SYRINGE (ML) INTRAVENOUS ONCE
Refills: 0 | Status: DISCONTINUED | OUTPATIENT
Start: 2024-03-06 | End: 2024-03-10

## 2024-03-06 RX ORDER — ACETAMINOPHEN 500 MG
650 TABLET ORAL EVERY 6 HOURS
Refills: 0 | Status: DISCONTINUED | OUTPATIENT
Start: 2024-03-06 | End: 2024-03-10

## 2024-03-06 RX ORDER — ASPIRIN/CALCIUM CARB/MAGNESIUM 324 MG
81 TABLET ORAL DAILY
Refills: 0 | Status: DISCONTINUED | OUTPATIENT
Start: 2024-03-06 | End: 2024-03-10

## 2024-03-06 RX ORDER — INSULIN LISPRO 100/ML
VIAL (ML) SUBCUTANEOUS AT BEDTIME
Refills: 0 | Status: DISCONTINUED | OUTPATIENT
Start: 2024-03-06 | End: 2024-03-10

## 2024-03-06 RX ORDER — OXYCODONE HYDROCHLORIDE 5 MG/1
10 TABLET ORAL EVERY 4 HOURS
Refills: 0 | Status: DISCONTINUED | OUTPATIENT
Start: 2024-03-06 | End: 2024-03-07

## 2024-03-06 RX ORDER — ATORVASTATIN CALCIUM 80 MG/1
40 TABLET, FILM COATED ORAL AT BEDTIME
Refills: 0 | Status: DISCONTINUED | OUTPATIENT
Start: 2024-03-06 | End: 2024-03-10

## 2024-03-06 RX ORDER — DEXTROSE 50 % IN WATER 50 %
12.5 SYRINGE (ML) INTRAVENOUS ONCE
Refills: 0 | Status: DISCONTINUED | OUTPATIENT
Start: 2024-03-06 | End: 2024-03-10

## 2024-03-06 RX ORDER — DEXTROSE 50 % IN WATER 50 %
25 SYRINGE (ML) INTRAVENOUS ONCE
Refills: 0 | Status: DISCONTINUED | OUTPATIENT
Start: 2024-03-06 | End: 2024-03-10

## 2024-03-06 RX ORDER — CLOPIDOGREL BISULFATE 75 MG/1
75 TABLET, FILM COATED ORAL DAILY
Refills: 0 | Status: DISCONTINUED | OUTPATIENT
Start: 2024-03-06 | End: 2024-03-10

## 2024-03-06 RX ORDER — MULTIVIT-MIN/FERROUS GLUCONATE 9 MG/15 ML
1 LIQUID (ML) ORAL DAILY
Refills: 0 | Status: DISCONTINUED | OUTPATIENT
Start: 2024-03-06 | End: 2024-03-10

## 2024-03-06 RX ORDER — INSULIN LISPRO 100/ML
VIAL (ML) SUBCUTANEOUS EVERY 6 HOURS
Refills: 0 | Status: DISCONTINUED | OUTPATIENT
Start: 2024-03-06 | End: 2024-03-06

## 2024-03-06 RX ORDER — SODIUM CHLORIDE 9 MG/ML
1000 INJECTION, SOLUTION INTRAVENOUS
Refills: 0 | Status: DISCONTINUED | OUTPATIENT
Start: 2024-03-06 | End: 2024-03-10

## 2024-03-06 RX ORDER — GABAPENTIN 400 MG/1
100 CAPSULE ORAL
Refills: 0 | Status: DISCONTINUED | OUTPATIENT
Start: 2024-03-06 | End: 2024-03-10

## 2024-03-06 RX ORDER — MORPHINE SULFATE 50 MG/1
2 CAPSULE, EXTENDED RELEASE ORAL EVERY 6 HOURS
Refills: 0 | Status: DISCONTINUED | OUTPATIENT
Start: 2024-03-06 | End: 2024-03-06

## 2024-03-06 RX ORDER — INSULIN LISPRO 100/ML
VIAL (ML) SUBCUTANEOUS
Refills: 0 | Status: DISCONTINUED | OUTPATIENT
Start: 2024-03-06 | End: 2024-03-10

## 2024-03-06 RX ORDER — INSULIN LISPRO 100/ML
VIAL (ML) SUBCUTANEOUS
Refills: 0 | Status: DISCONTINUED | OUTPATIENT
Start: 2024-03-06 | End: 2024-03-06

## 2024-03-06 RX ORDER — OXYCODONE AND ACETAMINOPHEN 5; 325 MG/1; MG/1
1 TABLET ORAL EVERY 6 HOURS
Refills: 0 | Status: DISCONTINUED | OUTPATIENT
Start: 2024-03-06 | End: 2024-03-06

## 2024-03-06 RX ORDER — OXYCODONE HYDROCHLORIDE 5 MG/1
10 TABLET ORAL EVERY 6 HOURS
Refills: 0 | Status: DISCONTINUED | OUTPATIENT
Start: 2024-03-06 | End: 2024-03-06

## 2024-03-06 RX ORDER — PIPERACILLIN AND TAZOBACTAM 4; .5 G/20ML; G/20ML
3.38 INJECTION, POWDER, LYOPHILIZED, FOR SOLUTION INTRAVENOUS EVERY 8 HOURS
Refills: 0 | Status: DISCONTINUED | OUTPATIENT
Start: 2024-03-06 | End: 2024-03-06

## 2024-03-06 RX ORDER — ENOXAPARIN SODIUM 100 MG/ML
40 INJECTION SUBCUTANEOUS EVERY 24 HOURS
Refills: 0 | Status: DISCONTINUED | OUTPATIENT
Start: 2024-03-06 | End: 2024-03-10

## 2024-03-06 RX ORDER — SODIUM CHLORIDE 9 MG/ML
1000 INJECTION, SOLUTION INTRAVENOUS
Refills: 0 | Status: DISCONTINUED | OUTPATIENT
Start: 2024-03-06 | End: 2024-03-06

## 2024-03-06 RX ORDER — CHLORHEXIDINE GLUCONATE 213 G/1000ML
1 SOLUTION TOPICAL
Refills: 0 | Status: DISCONTINUED | OUTPATIENT
Start: 2024-03-06 | End: 2024-03-10

## 2024-03-06 RX ORDER — GLUCAGON INJECTION, SOLUTION 0.5 MG/.1ML
1 INJECTION, SOLUTION SUBCUTANEOUS ONCE
Refills: 0 | Status: DISCONTINUED | OUTPATIENT
Start: 2024-03-06 | End: 2024-03-10

## 2024-03-06 RX ORDER — LOSARTAN POTASSIUM 100 MG/1
25 TABLET, FILM COATED ORAL DAILY
Refills: 0 | Status: DISCONTINUED | OUTPATIENT
Start: 2024-03-06 | End: 2024-03-10

## 2024-03-06 RX ORDER — OXYCODONE HYDROCHLORIDE 5 MG/1
5 TABLET ORAL EVERY 4 HOURS
Refills: 0 | Status: DISCONTINUED | OUTPATIENT
Start: 2024-03-06 | End: 2024-03-07

## 2024-03-06 RX ORDER — ONDANSETRON 8 MG/1
4 TABLET, FILM COATED ORAL ONCE
Refills: 0 | Status: DISCONTINUED | OUTPATIENT
Start: 2024-03-06 | End: 2024-03-06

## 2024-03-06 RX ADMIN — Medication 650 MILLIGRAM(S): at 21:30

## 2024-03-06 RX ADMIN — PIPERACILLIN AND TAZOBACTAM 25 GRAM(S): 4; .5 INJECTION, POWDER, LYOPHILIZED, FOR SOLUTION INTRAVENOUS at 08:37

## 2024-03-06 RX ADMIN — CHLORHEXIDINE GLUCONATE 1 APPLICATION(S): 213 SOLUTION TOPICAL at 05:35

## 2024-03-06 RX ADMIN — ATORVASTATIN CALCIUM 40 MILLIGRAM(S): 80 TABLET, FILM COATED ORAL at 20:56

## 2024-03-06 RX ADMIN — ENOXAPARIN SODIUM 40 MILLIGRAM(S): 100 INJECTION SUBCUTANEOUS at 05:35

## 2024-03-06 RX ADMIN — Medication 650 MILLIGRAM(S): at 20:54

## 2024-03-06 RX ADMIN — GABAPENTIN 100 MILLIGRAM(S): 400 CAPSULE ORAL at 05:34

## 2024-03-06 RX ADMIN — CLOPIDOGREL BISULFATE 75 MILLIGRAM(S): 75 TABLET, FILM COATED ORAL at 12:01

## 2024-03-06 RX ADMIN — LOSARTAN POTASSIUM 25 MILLIGRAM(S): 100 TABLET, FILM COATED ORAL at 05:34

## 2024-03-06 RX ADMIN — Medication 1 TABLET(S): at 12:01

## 2024-03-06 RX ADMIN — GABAPENTIN 100 MILLIGRAM(S): 400 CAPSULE ORAL at 20:53

## 2024-03-06 RX ADMIN — Medication 975 MILLIGRAM(S): at 13:11

## 2024-03-06 RX ADMIN — Medication 975 MILLIGRAM(S): at 12:11

## 2024-03-06 RX ADMIN — Medication 81 MILLIGRAM(S): at 12:01

## 2024-03-06 NOTE — DISCHARGE NOTE PROVIDER - NSDCFUADDAPPT_GEN_ALL_CORE_FT
Podiatry Discharge Instructions:  Follow up: Please follow up with Dr. Abraham Mcclure at the wound care center for hyperbaraic oxygen therapy within 1 week of discharge from the hospital, please call [phone] for appointment and discuss that you recently were seen in the hospital.  Wound Care: Please leave your dressing clean dry intact until your follow up appointment   Weight bearing: No weight bearing to the left lower extremity   Antibiotics: Please continue as instructed. Podiatry Discharge Instructions:  Follow up: Please follow up with Dr. Abraham Mcclure at the wound care center for hyperbaric oxygen therapy within 1 week of discharge from the hospital, please call [phone] for appointment and discuss that you recently were seen in the hospital.  Wound Care: Please leave your dressing clean dry intact until your follow up appointment   Weight bearing: No weight bearing to the left lower extremity   Antibiotics: Please take antibiotics as prescribed for full course.

## 2024-03-06 NOTE — CHART NOTE - NSCHARTNOTEFT_GEN_A_CORE
POST-OPERATIVE CHECK    SUBJECTIVE  Patient is s/p L revisional TMA with L heel wound debridement and graft. Pt resting comfortably in 9monti bed. Denies pain, nausea, vomiting, chest pain.    Vital Signs Last 24 Hrs  T(C): 36.7 (06 Mar 2024 21:40), Max: 37 (06 Mar 2024 01:05)  T(F): 98 (06 Mar 2024 21:40), Max: 98.6 (06 Mar 2024 01:05)  HR: 75 (06 Mar 2024 21:40) (65 - 81)  BP: 126/66 (06 Mar 2024 21:40) (113/58 - 161/77)  BP(mean): 95 (06 Mar 2024 19:00) (83 - 106)  RR: 18 (06 Mar 2024 21:40) (16 - 19)  SpO2: 99% (06 Mar 2024 21:40) (97% - 100%)    Parameters below as of 06 Mar 2024 21:40  Patient On (Oxygen Delivery Method): room air      I&O's Detail    05 Mar 2024 07:01  -  06 Mar 2024 07:00  --------------------------------------------------------  IN:    Oral Fluid: 1500 mL  Total IN: 1500 mL    OUT:    Voided (mL): 1850 mL  Total OUT: 1850 mL    Total NET: -350 mL      06 Mar 2024 07:01  -  06 Mar 2024 22:53  --------------------------------------------------------  IN:    Oral Fluid: 240 mL  Total IN: 240 mL    OUT:    Voided (mL): 750 mL  Total OUT: 750 mL    Total NET: -510 mL        piperacillin/tazobactam IVPB.. 3.375  aspirin enteric coated 81  clopidogrel Tablet 75  enoxaparin Injectable 40  losartan 25  piperacillin/tazobactam IVPB.. 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     LABS                        9.1    5.58  )-----------( 247      ( 06 Mar 2024 04:48 )             28.1     03-06    134<L>  |  103  |  25<H>  ----------------------------<  164<H>  5.0   |  21<L>  |  1.05    Ca    9.7      06 Mar 2024 04:47  Phos  4.0     03-06  Mg     2.3     03-06      PT/INR - ( 06 Mar 2024 04:48 )   PT: 11.7 sec;   INR: 1.07 ratio         PTT - ( 06 Mar 2024 04:48 )  PTT:29.7 sec  CAPILLARY BLOOD GLUCOSE      POCT Blood Glucose.: 178 mg/dL (06 Mar 2024 21:04)  POCT Blood Glucose.: 132 mg/dL (06 Mar 2024 17:32)  POCT Blood Glucose.: 134 mg/dL (06 Mar 2024 15:39)  POCT Blood Glucose.: 150 mg/dL (06 Mar 2024 12:01)  POCT Blood Glucose.: 145 mg/dL (06 Mar 2024 05:33)      IMAGING    ASSESSMENT  73yMale s/p L revisional TMA with L heel wound debridement and graft 03-06. Recovering appropriately on the floor.    PLAN  - ASA, plavix, lovenox  - Diet - reg, restarted sliding scale. IM recommended 5U premeal. But given euglycemia and no PO intake 03/06 PM, will re-evaluate 03/07 AM  - Restarted   - Pain control as needed  - Lovenox  - OOB and ambulating as tolerated  - F/u AM labs

## 2024-03-06 NOTE — DISCHARGE NOTE PROVIDER - CARE PROVIDERS DIRECT ADDRESSES
,[email or web address],[email or web address] ,[email or web address]

## 2024-03-06 NOTE — DISCHARGE NOTE PROVIDER - NSDCCPCAREPLAN_GEN_ALL_CORE_FT
PRINCIPAL DISCHARGE DIAGNOSIS  Diagnosis: Acute osteomyelitis  Assessment and Plan of Treatment:      PRINCIPAL DISCHARGE DIAGNOSIS  Diagnosis: Acute osteomyelitis  Assessment and Plan of Treatment: WOUND CARE: Leave dressings clean, dry and intact until podiatry follow up visit.   BATHING: Please do not submerge wound or dressings underwater. No swimming pools, no hot tubs, no tub baths. You may shower and/or sponge bathe. DO NOT get dressings wet.   ACTIVITY: You may complete your daily activities as tolerated. DO NOT weight bear on your left foot.   MEDICATIONS:  If you are taking narcotic pain medication (such as Oxycodone), do NOT drive a car, operate machinery or make important decisions. Complete your full course of antibiotics as prescribed.   DIET: Regular diet as tolerated  NOTIFY YOUR SURGEON IF: You have any bleeding that does not stop, any pus draining from your wound, any fever (over 100.4 F) or chills, persistent nausea/vomiting with inability to tolerate food or liquids, persistent diarrhea, or if your pain is not controlled on your discharge pain medications.  FOLLOW-UP:  1. Please call to make a follow-up appointment with Dr. Mcclure (Podiatry) within one week of discharge.  2. Please follow up with your primary care physician in one week regarding your hospitalization.

## 2024-03-06 NOTE — BRIEF OPERATIVE NOTE - COMMENTS
Pt is s/p Left foot revisional transmetatarsal amputation, closed, with left heel wound debridement w/ graft application, open  - Low concern for residual soft tissue or bone infection.  - Low concern for viability, adequate intro-op bleeding.   - Podiatry Plan: Followup OR data for 48 hours of no growth, followup ID recs, stable to discharge from podiatry standpoint to Dr. Mcclure's wound care center for HBO therapy  pending 48 hours of no growth and vascular recommendations.

## 2024-03-06 NOTE — DISCHARGE NOTE PROVIDER - NSDCFUSCHEDAPPT_GEN_ALL_CORE_FT
[facility]  HYPERBARIC WOO [address]  Scheduled Appointment: 03/11/2024    [facility]  HYPERBARIC WOO [address]  Scheduled Appointment: 03/12/2024    [facility]  HYPERBARIC [address]OO [address]  Scheduled Appointment: 03/13/2024    [facility]  HYPERBARIC [address]OO [address]  Scheduled Appointment: 03/14/2024    [facility]  HYPERBARIC [address]OO [address]  Scheduled Appointment: 03/15/2024    [facility]  HYPERBARIC [address]OO [address]  Scheduled Appointment: 03/18/2024    [facility]  HYPERBARIC [address]OO [address]  Scheduled Appointment: 03/19/2024    [facility]  HYPERBARIC [address]OO [address]  Scheduled Appointment: 03/20/2024    [facility]  HYPERBARIC [address]OO [address]  Scheduled Appointment: 03/21/2024    [facility]  HYPERBARIC [address]OO [address]  Scheduled Appointment: 03/22/2024    [facility]  VASCULAR [address]  Scheduled Appointment: 05/15/2024    Mateus Mullins  [facility]  VASCULAR [address]  Scheduled Appointment: 05/15/2024

## 2024-03-06 NOTE — PROGRESS NOTE ADULT - ASSESSMENT
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 Mcclure for left foot wound debridement and graft application. Patient reports he had the Angiogram with Dr. Mullins last week and then over the last week he had increased pain of the heel of the LLE. Patient denies fever/chills, SOB, nausea/vomiting, Patient denies motor/sensory changes of the foot. Pt is unclear when the eschar developed. Patient denies pain of the dorsal aspect of the foot. Patient in the ER hemodynamically stable and nontoxic appearing. Reports significant pain of LLE heel. Physical exam notable for fibrous tissue over TMA site, eschar on LLE heel, palpable PT pulse and dopplerable DP signal     L foot wounds with c/f OM of L first and fifth residual metatarsal  - LLE/ankle xrays with findings suspicious for OM of the first and fifth residual metatarsals  - Podiatry following - noted with L foot TMA stump fibrogranular wound to bone, scant purulence, mild malodor. Left foot heel wound well adhered eschar, no malodor, no pus  - prior cultures reviewed, no new imaging in past 24hiewed; OR culture from 12/18/23 from clean bone was negative   - s/p vancomycin, MRSA PCR screen negative     Recommendations:  Follow up L foot Wcx for Enterobacter sensitivities   Podiatry planning for OR today 3/6 for L foot revisional TMA with L foot wound debridement and graft application   - please send bone biopsy and culture  Continue zosyn for now pending above   Further recs to follow pending above   Monitor temps/WBC  Pain mgmt and rest of care per primary team    Azucena Castillo M.D.  [facility], Division of Infectious Diseases  [phone]  After 5pm on weekdays and all day on weekends - please call [phone]

## 2024-03-06 NOTE — DISCHARGE NOTE PROVIDER - NSDCMRMEDTOKEN_GEN_ALL_CORE_FT
acetaminophen 325 mg oral tablet: 2 tab(s) orally every 6 hours As needed Mild Pain (1 - 3)  amLODIPine 2.5 mg oral tablet: 1 orally once a day  aspirin 81 mg oral delayed release tablet: 1 tab(s) orally once a day  clopidogrel 75 mg oral tablet: 1 tab(s) orally once a day  gabapentin 100 mg oral tablet: 1 tab(s) orally 2 times a day  glipiZIDE 5 mg oral tablet: 1 tab(s) orally once a day  losartan 25 mg oral tablet: 1 tab(s) orally once a day  metFORMIN 750 mg oral tablet, extended release: 1 orally once a day  Multiple Vitamins with Minerals oral tablet: 1 tab(s) orally once a day  rosuvastatin 10 mg oral tablet: 1 tab(s) orally once a day   acetaminophen 325 mg oral tablet: 2 tab(s) orally every 6 hours As needed Mild Pain (1 - 3)  amLODIPine 2.5 mg oral tablet: 1 orally once a day  aspirin 81 mg oral delayed release tablet: 1 tab(s) orally once a day  clopidogrel 75 mg oral tablet: 1 tab(s) orally once a day  gabapentin 100 mg oral tablet: 1 tab(s) orally 2 times a day  glipiZIDE 5 mg oral tablet: 1 tab(s) orally once a day  losartan 25 mg oral tablet: 1 tab(s) orally once a day  metFORMIN 750 mg oral tablet, extended release: 1 orally once a day  Multiple Vitamins with Minerals oral tablet: 1 tab(s) orally once a day  rosuvastatin 10 mg oral tablet: 1 tab(s) orally once a day  sulfamethoxazole-trimethoprim 800 mg-160 mg oral tablet: 1 tab(s) orally 2 times a day   acetaminophen 325 mg oral tablet: 2 tab(s) orally every 6 hours As needed Mild Pain (1 - 3)  amLODIPine 2.5 mg oral tablet: 1 orally once a day  aspirin 81 mg oral delayed release tablet: 1 tab(s) orally once a day  clopidogrel 75 mg oral tablet: 1 tab(s) orally once a day  gabapentin 100 mg oral tablet: 1 tab(s) orally 2 times a day  glipiZIDE 5 mg oral tablet: 1 tab(s) orally once a day  losartan 25 mg oral tablet: 1 tab(s) orally once a day  metFORMIN 750 mg oral tablet, extended release: 1 orally once a day  Multiple Vitamins with Minerals oral tablet: 1 tab(s) orally once a day  oxyCODONE 5 mg oral tablet: 1 tab(s) orally every 4 hours as needed for  severe pain Only take for severe pain not responding to acetaminophen (Tylenol). MDD: 2  rosuvastatin 10 mg oral tablet: 1 tab(s) orally once a day  sulfamethoxazole-trimethoprim 800 mg-160 mg oral tablet: 1 tab(s) orally 2 times a day

## 2024-03-06 NOTE — BRIEF OPERATIVE NOTE - OPERATION/FINDINGS
s/p Left foot revisional transmetatarsal amputation, closed, with left heel wound debridement w/ graft application, open  - Bone at proximal resection bone was hard and of good quality.  - Adequate intraop bleeding.  - No evidence of purulence or soft tissue infection.  - Incision primarily closed with  3-0 nylon

## 2024-03-06 NOTE — PROGRESS NOTE ADULT - ASSESSMENT
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 Mcclure for left foot wound debridement and graft application. Patient reports he had the Angiogram with Dr. Mullins last week and then over the last week he had increased pain of the heel of the LLE. Patient denies fever/chills, SOB, nausea/vomiting, Patient denies motor/sensory changes of the foot. Pt is unclear when the eschar developed. Patient denies pain of the dorsal aspect of the foot. Patient in the ER hemodynamically stable and nontoxic appearing. Reports significant pain of LLE heel. Physical exam notable for fibrous tissue over TMA site, eschar on LLE heel, palpable PT pulse and dopplerable DP signal      L foot wounds with c/f OM of L first and fifth residual metatarsal  S/p  L foot revisional TMA with L foot wound debridement and graft application  ID Podiatry following   Continue with Zosyn as per ID    DM [id number]  Add Pre meal 5  units       HTN  Continue with Losartan

## 2024-03-06 NOTE — DISCHARGE NOTE PROVIDER - CARE PROVIDER_API CALL
Abraham Mcclure  Podiatric Medicine and Surgery  [address] [place] [id number]  Phone: [phone]  Fax: [phone]  Follow Up Time:    Abraham Mcclure  Podiatric Medicine and Surgery  [address] [place] [id number]  Phone: [phone]  Fax: [phone]  Follow Up Time:     Primary Care Physician,   Phone: (   )    -  Fax: (   )    -  Follow Up Time: 1 week   Abraham Mcclure  Podiatric Medicine and Surgery  [address] [place] [address]  Phone: [phone]  Fax: [phone]  Follow Up Time:     Primary Care Physician,   Phone: (   )    -  Fax: (   )    -  Follow Up Time: 1 week    Azucena Castillo  Infectious Disease  [address] [facility], [address]  [place] [address]  Phone: [phone]  Fax: [phone]  Follow Up Time: 1 week

## 2024-03-06 NOTE — DISCHARGE NOTE PROVIDER - PROVIDER TOKENS
PROVIDER:[TOKEN:[[id number]]] PROVIDER:[TOKEN:[[id number]]],FREE:[LAST:[Primary Care Physician],PHONE:[(   )    -],FAX:[(   )    -],FOLLOWUP:[1 week]] PROVIDER:[TOKEN:[[id number]]],FREE:[LAST:[Primary Care Physician],PHONE:[(   )    -],FAX:[(   )    -],FOLLOWUP:[1 week]],PROVIDER:[TOKEN:[[id number]],FOLLOWUP:[1 week]]

## 2024-03-06 NOTE — DISCHARGE NOTE PROVIDER - HOSPITAL COURSE
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 Mcclure for left foot wound debridement and graft application. Patient reports he had the Angiogram with Dr. Mullins last week and then over the last week he had increased pain of the heel of the LLE. Patient denies fever/chills, SOB, nausea/vomiting, Patient denies motor/sensory changes of the foot. Pt is unclear when the eschar developed. Patient denies pain of the dorsal aspect of the foot.    Patient was seen and examined in ED. Hemodynamically stable and nontoxic appearing. Reports significant pain of LLE heel. Physical exam notable for fibrous tissue over TMA site, eschar on LLE heel, palpable PT pulse and dopplerable DP signal (05 Mar 2024 01:34)    Admitted to vascular surgery. Podiatry consulted and recommended OR. Medicine consulted for optimization and clearance. ID consulted for abx recommendations. Patient underwent L revisional TMA with L heel wound debridement and graft. When PACU criteria was met, patient transferred to the floor for observation. Postop, patient pain well controlled. Diet advanced as tolerated. PT evaluated patient and recommended home PT. Incentive spirometry encouraged. Labs trended. DVT ppx given with Lovenox. Aspirin and Plavix given daily. Cleared for discharge with outpatient follow up. 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 Mcclure for left foot wound debridement and graft application. Patient reports he had the Angiogram with Dr. Mullins last week and then over the last week he had increased pain of the heel of the LLE. Patient denies fever/chills, SOB, nausea/vomiting, Patient denies motor/sensory changes of the foot. Pt is unclear when the eschar developed. Patient denies pain of the dorsal aspect of the foot.    Patient was seen and examined in ED. Hemodynamically stable and nontoxic appearing. Reports significant pain of LLE heel. Physical exam notable for fibrous tissue over TMA site, eschar on LLE heel, palpable PT pulse and dopplerable DP signal (05 Mar 2024 01:34)    Admitted to vascular surgery. Podiatry consulted and recommended OR. Medicine consulted for optimization and clearance. ID consulted for abx recommendations. Patient underwent L revisional TMA with L heel wound debridement and graft. When PACU criteria was met, patient transferred to the floor for observation. Postop, patient pain well controlled. Diet advanced as tolerated. PT evaluated patient and recommended home PT. Discharge was delayed due to fever 3/8 prompting workup including blood cultures, CXR, and UA, which were unrevealing; fever abated with vancomycin and piperacillin/tazobactam, later discontinued by ID. Infectious diseases consulted and recommended discharge with outpatient follow-up.     Incentive spirometry encouraged. Labs trended. DVT ppx given with Lovenox. Aspirin and Plavix given daily. Cleared for discharge with outpatient follow up.

## 2024-03-06 NOTE — PROGRESS NOTE ADULT - ASSESSMENT
Mr. Paredes is a 73 year old man with a history of peripheral arterial disease requiring bilateral lower extremity vascular stent placement and right BKA (7/20/2023), left TMA with recent angioplasty, and popliteal and peroneal artery stent placement (2/24/2024), now admitted after being sent in by his podiatrist Dr. Mcclure for a left foot wound. Plan for podiatric wound debridement and graft application 3/6.     - Follow up podiatric brief operative report for intra-operative bleeding and concern for residual infection.   - Preoperative labs obtained and reviewed, consent documented by podiatry.   - Patient to be admitted post-operatively to the vascular surgical service for further care.    Greyson Charles, PGY-1  Vascular Surgery ([phone])

## 2024-03-07 ENCOUNTER — TRANSCRIPTION ENCOUNTER (OUTPATIENT)
Age: 74
End: 2024-03-07

## 2024-03-07 ENCOUNTER — APPOINTMENT (OUTPATIENT)
Dept: HYPERBARIC MEDICINE | Facility: CLINIC | Age: 74
End: 2024-03-07

## 2024-03-07 LAB
ANION GAP SERPL CALC-SCNC: 11 MMOL/L — SIGNIFICANT CHANGE UP (ref 5–17)
ANION GAP SERPL CALC-SCNC: 12 MMOL/L — SIGNIFICANT CHANGE UP (ref 5–17)
APPEARANCE UR: CLEAR — SIGNIFICANT CHANGE UP
BACTERIA # UR AUTO: NEGATIVE /HPF — SIGNIFICANT CHANGE UP
BASOPHILS # BLD AUTO: 0.01 K/UL — SIGNIFICANT CHANGE UP (ref 0–0.2)
BASOPHILS NFR BLD AUTO: 0.1 % — SIGNIFICANT CHANGE UP (ref 0–2)
BILIRUB UR-MCNC: NEGATIVE — SIGNIFICANT CHANGE UP
BUN SERPL-MCNC: 24 MG/DL — HIGH (ref 7–23)
BUN SERPL-MCNC: 30 MG/DL — HIGH (ref 7–23)
CALCIUM SERPL-MCNC: 9.3 MG/DL — SIGNIFICANT CHANGE UP (ref 8.4–10.5)
CALCIUM SERPL-MCNC: 9.4 MG/DL — SIGNIFICANT CHANGE UP (ref 8.4–10.5)
CAST: 0 /LPF — SIGNIFICANT CHANGE UP (ref 0–4)
CHLORIDE SERPL-SCNC: 104 MMOL/L — SIGNIFICANT CHANGE UP (ref 96–108)
CHLORIDE SERPL-SCNC: 96 MMOL/L — SIGNIFICANT CHANGE UP (ref 96–108)
CO2 SERPL-SCNC: 23 MMOL/L — SIGNIFICANT CHANGE UP (ref 22–31)
CO2 SERPL-SCNC: 23 MMOL/L — SIGNIFICANT CHANGE UP (ref 22–31)
COLOR SPEC: YELLOW — SIGNIFICANT CHANGE UP
CREAT SERPL-MCNC: 1.03 MG/DL — SIGNIFICANT CHANGE UP (ref 0.5–1.3)
CREAT SERPL-MCNC: 1.5 MG/DL — HIGH (ref 0.5–1.3)
DIFF PNL FLD: NEGATIVE — SIGNIFICANT CHANGE UP
EGFR: 49 ML/MIN/1.73M2 — LOW
EGFR: 77 ML/MIN/1.73M2 — SIGNIFICANT CHANGE UP
EOSINOPHIL # BLD AUTO: 0.03 K/UL — SIGNIFICANT CHANGE UP (ref 0–0.5)
EOSINOPHIL NFR BLD AUTO: 0.3 % — SIGNIFICANT CHANGE UP (ref 0–6)
GLUCOSE SERPL-MCNC: 233 MG/DL — HIGH (ref 70–99)
GLUCOSE SERPL-MCNC: 237 MG/DL — HIGH (ref 70–99)
GLUCOSE UR QL: NEGATIVE MG/DL — SIGNIFICANT CHANGE UP
HCT VFR BLD CALC: 24.2 % — LOW (ref 39–50)
HCT VFR BLD CALC: 25.1 % — LOW (ref 39–50)
HGB BLD-MCNC: 7.5 G/DL — LOW (ref 13–17)
HGB BLD-MCNC: 7.7 G/DL — LOW (ref 13–17)
IMM GRANULOCYTES NFR BLD AUTO: 0.2 % — SIGNIFICANT CHANGE UP (ref 0–0.9)
KETONES UR-MCNC: ABNORMAL MG/DL
LEUKOCYTE ESTERASE UR-ACNC: NEGATIVE — SIGNIFICANT CHANGE UP
LYMPHOCYTES # BLD AUTO: 1.18 K/UL — SIGNIFICANT CHANGE UP (ref 1–3.3)
LYMPHOCYTES # BLD AUTO: 12.4 % — LOW (ref 13–44)
MAGNESIUM SERPL-MCNC: 2.2 MG/DL — SIGNIFICANT CHANGE UP (ref 1.6–2.6)
MAGNESIUM SERPL-MCNC: 2.4 MG/DL — SIGNIFICANT CHANGE UP (ref 1.6–2.6)
MCHC RBC-ENTMCNC: 26.6 PG — LOW (ref 27–34)
MCHC RBC-ENTMCNC: 26.8 PG — LOW (ref 27–34)
MCHC RBC-ENTMCNC: 30.7 GM/DL — LOW (ref 32–36)
MCHC RBC-ENTMCNC: 31 GM/DL — LOW (ref 32–36)
MCV RBC AUTO: 86.4 FL — SIGNIFICANT CHANGE UP (ref 80–100)
MCV RBC AUTO: 86.6 FL — SIGNIFICANT CHANGE UP (ref 80–100)
MONOCYTES # BLD AUTO: 0.91 K/UL — HIGH (ref 0–0.9)
MONOCYTES NFR BLD AUTO: 9.6 % — SIGNIFICANT CHANGE UP (ref 2–14)
NEUTROPHILS # BLD AUTO: 7.35 K/UL — SIGNIFICANT CHANGE UP (ref 1.8–7.4)
NEUTROPHILS NFR BLD AUTO: 77.4 % — HIGH (ref 43–77)
NITRITE UR-MCNC: NEGATIVE — SIGNIFICANT CHANGE UP
NRBC # BLD: 0 /100 WBCS — SIGNIFICANT CHANGE UP (ref 0–0)
NRBC # BLD: 0 /100 WBCS — SIGNIFICANT CHANGE UP (ref 0–0)
PH UR: 5 — SIGNIFICANT CHANGE UP (ref 5–8)
PHOSPHATE SERPL-MCNC: 2.9 MG/DL — SIGNIFICANT CHANGE UP (ref 2.5–4.5)
PHOSPHATE SERPL-MCNC: 4.9 MG/DL — HIGH (ref 2.5–4.5)
PLATELET # BLD AUTO: 239 K/UL — SIGNIFICANT CHANGE UP (ref 150–400)
PLATELET # BLD AUTO: 241 K/UL — SIGNIFICANT CHANGE UP (ref 150–400)
POTASSIUM SERPL-MCNC: 4.1 MMOL/L — SIGNIFICANT CHANGE UP (ref 3.5–5.3)
POTASSIUM SERPL-MCNC: 4.4 MMOL/L — SIGNIFICANT CHANGE UP (ref 3.5–5.3)
POTASSIUM SERPL-SCNC: 4.1 MMOL/L — SIGNIFICANT CHANGE UP (ref 3.5–5.3)
POTASSIUM SERPL-SCNC: 4.4 MMOL/L — SIGNIFICANT CHANGE UP (ref 3.5–5.3)
PROT UR-MCNC: 100 MG/DL
RBC # BLD: 2.8 M/UL — LOW (ref 4.2–5.8)
RBC # BLD: 2.9 M/UL — LOW (ref 4.2–5.8)
RBC # FLD: 13.9 % — SIGNIFICANT CHANGE UP (ref 10.3–14.5)
RBC # FLD: 14.1 % — SIGNIFICANT CHANGE UP (ref 10.3–14.5)
RBC CASTS # UR COMP ASSIST: 1 /HPF — SIGNIFICANT CHANGE UP (ref 0–4)
SODIUM SERPL-SCNC: 130 MMOL/L — LOW (ref 135–145)
SODIUM SERPL-SCNC: 139 MMOL/L — SIGNIFICANT CHANGE UP (ref 135–145)
SP GR SPEC: 1.02 — SIGNIFICANT CHANGE UP (ref 1–1.03)
SQUAMOUS # UR AUTO: 1 /HPF — SIGNIFICANT CHANGE UP (ref 0–5)
UROBILINOGEN FLD QL: 0.2 MG/DL — SIGNIFICANT CHANGE UP (ref 0.2–1)
WBC # BLD: 6.37 K/UL — SIGNIFICANT CHANGE UP (ref 3.8–10.5)
WBC # BLD: 9.5 K/UL — SIGNIFICANT CHANGE UP (ref 3.8–10.5)
WBC # FLD AUTO: 6.37 K/UL — SIGNIFICANT CHANGE UP (ref 3.8–10.5)
WBC # FLD AUTO: 9.5 K/UL — SIGNIFICANT CHANGE UP (ref 3.8–10.5)
WBC UR QL: 0 /HPF — SIGNIFICANT CHANGE UP (ref 0–5)

## 2024-03-07 PROCEDURE — 99231 SBSQ HOSP IP/OBS SF/LOW 25: CPT

## 2024-03-07 PROCEDURE — 71045 X-RAY EXAM CHEST 1 VIEW: CPT | Mod: 26

## 2024-03-07 RX ORDER — SODIUM CHLORIDE 9 MG/ML
1000 INJECTION INTRAMUSCULAR; INTRAVENOUS; SUBCUTANEOUS
Refills: 0 | Status: DISCONTINUED | OUTPATIENT
Start: 2024-03-07 | End: 2024-03-10

## 2024-03-07 RX ORDER — OXYCODONE HYDROCHLORIDE 5 MG/1
2.5 TABLET ORAL EVERY 6 HOURS
Refills: 0 | Status: DISCONTINUED | OUTPATIENT
Start: 2024-03-07 | End: 2024-03-10

## 2024-03-07 RX ORDER — OXYCODONE HYDROCHLORIDE 5 MG/1
1 TABLET ORAL
Qty: 3 | Refills: 0
Start: 2024-03-07

## 2024-03-07 RX ADMIN — OXYCODONE HYDROCHLORIDE 10 MILLIGRAM(S): 5 TABLET ORAL at 08:55

## 2024-03-07 RX ADMIN — PIPERACILLIN AND TAZOBACTAM 25 GRAM(S): 4; .5 INJECTION, POWDER, LYOPHILIZED, FOR SOLUTION INTRAVENOUS at 15:50

## 2024-03-07 RX ADMIN — ENOXAPARIN SODIUM 40 MILLIGRAM(S): 100 INJECTION SUBCUTANEOUS at 06:19

## 2024-03-07 RX ADMIN — OXYCODONE HYDROCHLORIDE 10 MILLIGRAM(S): 5 TABLET ORAL at 01:40

## 2024-03-07 RX ADMIN — SODIUM CHLORIDE 90 MILLILITER(S): 9 INJECTION INTRAMUSCULAR; INTRAVENOUS; SUBCUTANEOUS at 21:27

## 2024-03-07 RX ADMIN — Medication 650 MILLIGRAM(S): at 19:27

## 2024-03-07 RX ADMIN — Medication 650 MILLIGRAM(S): at 18:57

## 2024-03-07 RX ADMIN — PIPERACILLIN AND TAZOBACTAM 25 GRAM(S): 4; .5 INJECTION, POWDER, LYOPHILIZED, FOR SOLUTION INTRAVENOUS at 07:54

## 2024-03-07 RX ADMIN — GABAPENTIN 100 MILLIGRAM(S): 400 CAPSULE ORAL at 06:19

## 2024-03-07 RX ADMIN — OXYCODONE HYDROCHLORIDE 10 MILLIGRAM(S): 5 TABLET ORAL at 07:55

## 2024-03-07 RX ADMIN — PIPERACILLIN AND TAZOBACTAM 25 GRAM(S): 4; .5 INJECTION, POWDER, LYOPHILIZED, FOR SOLUTION INTRAVENOUS at 23:42

## 2024-03-07 RX ADMIN — LOSARTAN POTASSIUM 25 MILLIGRAM(S): 100 TABLET, FILM COATED ORAL at 06:20

## 2024-03-07 RX ADMIN — Medication 2: at 10:23

## 2024-03-07 RX ADMIN — CLOPIDOGREL BISULFATE 75 MILLIGRAM(S): 75 TABLET, FILM COATED ORAL at 12:28

## 2024-03-07 RX ADMIN — PIPERACILLIN AND TAZOBACTAM 25 GRAM(S): 4; .5 INJECTION, POWDER, LYOPHILIZED, FOR SOLUTION INTRAVENOUS at 01:06

## 2024-03-07 RX ADMIN — CHLORHEXIDINE GLUCONATE 1 APPLICATION(S): 213 SOLUTION TOPICAL at 10:24

## 2024-03-07 RX ADMIN — Medication 1 TABLET(S): at 12:28

## 2024-03-07 RX ADMIN — Medication 4: at 18:29

## 2024-03-07 RX ADMIN — OXYCODONE HYDROCHLORIDE 10 MILLIGRAM(S): 5 TABLET ORAL at 01:10

## 2024-03-07 RX ADMIN — Medication 81 MILLIGRAM(S): at 12:28

## 2024-03-07 RX ADMIN — Medication 4: at 12:30

## 2024-03-07 RX ADMIN — GABAPENTIN 100 MILLIGRAM(S): 400 CAPSULE ORAL at 17:12

## 2024-03-07 RX ADMIN — ATORVASTATIN CALCIUM 40 MILLIGRAM(S): 80 TABLET, FILM COATED ORAL at 21:28

## 2024-03-07 NOTE — PROGRESS NOTE ADULT - ASSESSMENT
73M s/p left foot revisional transmetatarsal amputation with posterior heel wound debridement and kerecis graft application (DOS 3/6/24)   - Patient seen and evaluated  - Afebrile, no leukocytosis  - s/p left foot revisional transmetatarsal amputation with posterior heel wound debridement and kerecis graft application: sutures intact, staples intact, graft intact, no malodor, mild serosanguinous drainage.   - Intraop findings: low concern for residual infection, low concern for viability  - Clean bone margin: no growth (prelim)   - Recommend discharge on PO augmentin 875mg BID x 10 days   - Pod stable for discharge, patient has HBOT set up for tomorrow afternoon outpatient, if possible discharge before then.   - Wound care instruction and follow up information in discharge note provider   - Seen with attending

## 2024-03-07 NOTE — DISCHARGE NOTE NURSING/CASE MANAGEMENT/SOCIAL WORK - NSDCFUADDAPPT_GEN_ALL_CORE_FT
Podiatry Discharge Instructions:  Follow up: Please follow up with Dr. Abraham Mcclure at the wound care center for hyperbaric oxygen therapy within 1 week of discharge from the hospital, please call [phone] for appointment and discuss that you recently were seen in the hospital.  Wound Care: Please leave your dressing clean dry intact until your follow up appointment   Weight bearing: No weight bearing to the left lower extremity   Antibiotics: Please take antibiotics as prescribed for full course.

## 2024-03-07 NOTE — PHYSICAL THERAPY INITIAL EVALUATION ADULT - ADDITIONAL COMMENTS
pt resides in a private home with wife and daughter. pt has 2 stairs to enter and 1 floor once inside. pt ambulated with RW pta and family assist at home with all ADLs. pt owns wheelchair and cane.

## 2024-03-07 NOTE — DISCHARGE NOTE NURSING/CASE MANAGEMENT/SOCIAL WORK - NSDCPNINST_GEN_ALL_CORE
Call MD or go to ER if severe leg pain not relieved with pain meds, nausea, vomiting, fever, signs and symptoms of infections.

## 2024-03-07 NOTE — PHYSICAL THERAPY INITIAL EVALUATION ADULT - LEVEL OF INDEPENDENCE: SUPINE/SIT, REHAB EVAL
CR/PT: Orders received, chart reviewed, pt in cath lab for angio and potential intervention. Will defer CR until cleared for activity post-procedure.   independent

## 2024-03-07 NOTE — DISCHARGE NOTE NURSING/CASE MANAGEMENT/SOCIAL WORK - NSDCPEFALRISK_GEN_ALL_CORE
For information on Fall & Injury Prevention, visit: [email or web address] OR  [email or web address] OR  https://www.cdc.gov/steadi/patient.html

## 2024-03-07 NOTE — PHYSICAL THERAPY INITIAL EVALUATION ADULT - ACTIVE RANGE OF MOTION EXAMINATION, REHAB EVAL
RLE Hip ROM WNL, LLE hip and knee WNL, ankle not assessed/corinne. upper extremity Active ROM was WNL (within normal limits)

## 2024-03-07 NOTE — DISCHARGE NOTE NURSING/CASE MANAGEMENT/SOCIAL WORK - PATIENT PORTAL LINK FT
You can access the FollowMyHealth Patient Portal offered by [facility] by registering at the following website: [email or web address]. By joining [facility]’s FollowMyHealth portal, you will also be able to view your health information using other applications (apps) compatible with our system.

## 2024-03-07 NOTE — DISCHARGE NOTE NURSING/CASE MANAGEMENT/SOCIAL WORK - NSSCTYPOFSERV_GEN_ALL_CORE
Home RN and physical therapy services. RN will contact you to schedule a visit time. Anticipated start of care 3/8. Home RN and physical therapy services. RN will contact you to schedule a visit time. Anticipated start of care 3/11.

## 2024-03-07 NOTE — PROGRESS NOTE ADULT - ASSESSMENT
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 Mcclure for left foot wound debridement and graft application. Patient reports he had the Angiogram with Dr. Mullins last week and then over the last week he had increased pain of the heel of the LLE. Patient denies fever/chills, SOB, nausea/vomiting, Patient denies motor/sensory changes of the foot. Pt is unclear when the eschar developed. Patient denies pain of the dorsal aspect of the foot. Patient in the ER hemodynamically stable and nontoxic appearing. Reports significant pain of LLE heel. Physical exam notable for fibrous tissue over TMA site, eschar on LLE heel, palpable PT pulse and dopplerable DP signal      L foot wounds with c/f OM of L first and fifth residual metatarsal  S/p  L foot revisional TMA with L foot wound debridement and graft application  ID Podiatry following   Continue with Zosyn as per ID  Follow up OR culture (gram stain noted) and surg path   Continue zosyn for now  On discharge, can transition to bactrim DS 1 tablet PO BID x10d with outpatient ID follow up     DM [id number]  Add Pre meal 5  units       HTN  Continue with Losartan

## 2024-03-07 NOTE — PHYSICAL THERAPY INITIAL EVALUATION ADULT - GAIT DEVIATIONS NOTED, PT EVAL
[TextBox_4] : Veronica is here for follow up of left hydronephrosis. A renal ultrasound (July 2022) demonstrated mild left pelviectasis.  \par \par Follows with pediatric cardiology, previously on Lasix.\par \par Returns today for in office ultrasounds. Since the last visit, she has been well without any UTIs, unexplained fevers, voiding complaints, issues feeding.  decreased lara/decreased step length

## 2024-03-07 NOTE — PROGRESS NOTE ADULT - ASSESSMENT
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 Mcclure for left foot wound debridement and graft application. Patient reports he had the Angiogram with Dr. Mullins last week and then over the last week he had increased pain of the heel of the LLE. Patient denies fever/chills, SOB, nausea/vomiting, Patient denies motor/sensory changes of the foot. Pt is unclear when the eschar developed. Patient denies pain of the dorsal aspect of the foot. Patient in the ER hemodynamically stable and nontoxic appearing. Reports significant pain of LLE heel. Physical exam notable for fibrous tissue over TMA site, eschar on LLE heel, palpable PT pulse and dopplerable DP signal     L foot wounds with c/f OM of L first and fifth residual metatarsal  - LLE/ankle xrays with findings suspicious for OM of the first and fifth residual metatarsals  - Podiatry following - noted with L foot TMA stump fibrogranular wound to bone, scant purulence, mild malodor. Left foot heel wound well adhered eschar, no malodor, no pus  - prior cultures reviewed, no new imaging in past 24hiewed; OR culture from 12/18/23 from clean bone was negative   - s/p vancomycin, MRSA PCR screen negative   - L foot Wcx for Enterobacter sensitivities noted   - 3/6 s/p OR for L foot revisional TMA with L foot wound debridement and graft application   - brief op note reviewed, low suspicion for residual infection    Recommendations:  Follow up OR culture (gram stain noted) and surg path   Continue zosyn for now  Further recs to follow pending above   Monitor temps/WBC  Pain mgmt and rest of care per primary team    Azucena Castillo M.D.  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 Mcclure for left foot wound debridement and graft application. Patient reports he had the Angiogram with Dr. Mullins last week and then over the last week he had increased pain of the heel of the LLE. Patient denies fever/chills, SOB, nausea/vomiting, Patient denies motor/sensory changes of the foot. Pt is unclear when the eschar developed. Patient denies pain of the dorsal aspect of the foot. Patient in the ER hemodynamically stable and nontoxic appearing. Reports significant pain of LLE heel. Physical exam notable for fibrous tissue over TMA site, eschar on LLE heel, palpable PT pulse and dopplerable DP signal     L foot wounds with c/f OM of L first and fifth residual metatarsal  - LLE/ankle xrays with findings suspicious for OM of the first and fifth residual metatarsals  - Podiatry following - noted with L foot TMA stump fibrogranular wound to bone, scant purulence, mild malodor. Left foot heel wound well adhered eschar, no malodor, no pus  - prior cultures reviewed, no new imaging in past 24hiewed; OR culture from 12/18/23 from clean bone was negative   - s/p vancomycin, MRSA PCR screen negative   - L foot Wcx for Enterobacter sensitivities noted   - 3/6 s/p OR for L foot revisional TMA with L foot wound debridement and graft application   - brief op note reviewed, low suspicion for residual infection    Recommendations:  Follow up OR culture (gram stain noted) and surg path   Continue zosyn for now  On discharge, can transition to bactrim DS 1 tablet PO BID x10d with outpatient ID follow up to follow up above and determine final duration   Pain mgmt and rest of care per primary team    D/w Dr. Araceli Castillo M.D.  [facility], Division of Infectious Diseases  [phone]  After 5pm on weekdays and all day on weekends - please call [phone]

## 2024-03-07 NOTE — PHYSICAL THERAPY INITIAL EVALUATION ADULT - PERTINENT HX OF CURRENT PROBLEM, REHAB EVAL
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 Mcclure for left foot wound debridement and graft application.

## 2024-03-07 NOTE — PROGRESS NOTE ADULT - ASSESSMENT
Mr. Paredes is a 73 year old man with a history of peripheral arterial disease requiring bilateral lower extremity vascular stent placement and right BKA (7/20/2023), left TMA with recent angioplasty, and popliteal and peroneal artery stent placement (2/24/2024), now admitted after being sent in by his podiatrist Dr. Mcclure for a left foot wound. Now s/p L revisional TMA with L heel wound debridement and graft 03-06    PLAN  ASA, Plavix, Lovenox  Pain control   Zosyn as per ID. F/u cx  Regular Diet - CC/DASH  Glucose control  PT eval  AM labs    Vascular Surgery  p [phone]      DISPLAY PLAN FREE TEXT

## 2024-03-08 ENCOUNTER — APPOINTMENT (OUTPATIENT)
Dept: HYPERBARIC MEDICINE | Facility: CLINIC | Age: 74
End: 2024-03-08

## 2024-03-08 LAB
ADD ON TEST-SPECIMEN IN LAB: SIGNIFICANT CHANGE UP
ANION GAP SERPL CALC-SCNC: 10 MMOL/L — SIGNIFICANT CHANGE UP (ref 5–17)
BASE EXCESS BLDV CALC-SCNC: -3.4 MMOL/L — LOW (ref -2–3)
BASOPHILS # BLD AUTO: 0.02 K/UL — SIGNIFICANT CHANGE UP (ref 0–0.2)
BASOPHILS NFR BLD AUTO: 0.3 % — SIGNIFICANT CHANGE UP (ref 0–2)
BUN SERPL-MCNC: 31 MG/DL — HIGH (ref 7–23)
CA-I SERPL-SCNC: 1.23 MMOL/L — SIGNIFICANT CHANGE UP (ref 1.15–1.33)
CALCIUM SERPL-MCNC: 8.5 MG/DL — SIGNIFICANT CHANGE UP (ref 8.4–10.5)
CHLORIDE BLDV-SCNC: 100 MMOL/L — SIGNIFICANT CHANGE UP (ref 96–108)
CHLORIDE SERPL-SCNC: 101 MMOL/L — SIGNIFICANT CHANGE UP (ref 96–108)
CO2 BLDV-SCNC: 23 MMOL/L — SIGNIFICANT CHANGE UP (ref 22–26)
CO2 SERPL-SCNC: 22 MMOL/L — SIGNIFICANT CHANGE UP (ref 22–31)
CREAT SERPL-MCNC: 1.51 MG/DL — HIGH (ref 0.5–1.3)
EGFR: 48 ML/MIN/1.73M2 — LOW
EOSINOPHIL # BLD AUTO: 0.07 K/UL — SIGNIFICANT CHANGE UP (ref 0–0.5)
EOSINOPHIL NFR BLD AUTO: 0.9 % — SIGNIFICANT CHANGE UP (ref 0–6)
GAS PNL BLDV: 133 MMOL/L — LOW (ref 136–145)
GAS PNL BLDV: SIGNIFICANT CHANGE UP
GLUCOSE BLDV-MCNC: 277 MG/DL — HIGH (ref 70–99)
GLUCOSE SERPL-MCNC: 198 MG/DL — HIGH (ref 70–99)
HCO3 BLDV-SCNC: 22 MMOL/L — SIGNIFICANT CHANGE UP (ref 22–29)
HCT VFR BLD CALC: 20 % — CRITICAL LOW (ref 39–50)
HCT VFR BLD CALC: 21.5 % — LOW (ref 39–50)
HCT VFR BLD CALC: 29.1 % — LOW (ref 39–50)
HCT VFR BLDA CALC: 29 % — LOW (ref 39–51)
HGB BLD CALC-MCNC: 9.5 G/DL — LOW (ref 12.6–17.4)
HGB BLD-MCNC: 6.3 G/DL — CRITICAL LOW (ref 13–17)
HGB BLD-MCNC: 6.7 G/DL — CRITICAL LOW (ref 13–17)
HGB BLD-MCNC: 9.7 G/DL — LOW (ref 13–17)
IMM GRANULOCYTES NFR BLD AUTO: 0.3 % — SIGNIFICANT CHANGE UP (ref 0–0.9)
LACTATE BLDV-MCNC: 1.1 MMOL/L — SIGNIFICANT CHANGE UP (ref 0.5–2)
LACTATE BLDV-MCNC: 1.3 MMOL/L — SIGNIFICANT CHANGE UP (ref 0.5–2)
LYMPHOCYTES # BLD AUTO: 1.37 K/UL — SIGNIFICANT CHANGE UP (ref 1–3.3)
LYMPHOCYTES # BLD AUTO: 17.1 % — SIGNIFICANT CHANGE UP (ref 13–44)
MAGNESIUM SERPL-MCNC: 2.2 MG/DL — SIGNIFICANT CHANGE UP (ref 1.6–2.6)
MCHC RBC-ENTMCNC: 26.8 PG — LOW (ref 27–34)
MCHC RBC-ENTMCNC: 27 PG — SIGNIFICANT CHANGE UP (ref 27–34)
MCHC RBC-ENTMCNC: 28.1 PG — SIGNIFICANT CHANGE UP (ref 27–34)
MCHC RBC-ENTMCNC: 31.2 GM/DL — LOW (ref 32–36)
MCHC RBC-ENTMCNC: 31.5 GM/DL — LOW (ref 32–36)
MCHC RBC-ENTMCNC: 33.3 GM/DL — SIGNIFICANT CHANGE UP (ref 32–36)
MCV RBC AUTO: 84.3 FL — SIGNIFICANT CHANGE UP (ref 80–100)
MCV RBC AUTO: 85.8 FL — SIGNIFICANT CHANGE UP (ref 80–100)
MCV RBC AUTO: 86 FL — SIGNIFICANT CHANGE UP (ref 80–100)
MONOCYTES # BLD AUTO: 0.96 K/UL — HIGH (ref 0–0.9)
MONOCYTES NFR BLD AUTO: 12 % — SIGNIFICANT CHANGE UP (ref 2–14)
NEUTROPHILS # BLD AUTO: 5.55 K/UL — SIGNIFICANT CHANGE UP (ref 1.8–7.4)
NEUTROPHILS NFR BLD AUTO: 69.4 % — SIGNIFICANT CHANGE UP (ref 43–77)
NRBC # BLD: 0 /100 WBCS — SIGNIFICANT CHANGE UP (ref 0–0)
PCO2 BLDV: 41 MMHG — LOW (ref 42–55)
PH BLDV: 7.34 — SIGNIFICANT CHANGE UP (ref 7.32–7.43)
PHOSPHATE SERPL-MCNC: 3.6 MG/DL — SIGNIFICANT CHANGE UP (ref 2.5–4.5)
PLATELET # BLD AUTO: 197 K/UL — SIGNIFICANT CHANGE UP (ref 150–400)
PLATELET # BLD AUTO: 199 K/UL — SIGNIFICANT CHANGE UP (ref 150–400)
PLATELET # BLD AUTO: 223 K/UL — SIGNIFICANT CHANGE UP (ref 150–400)
PO2 BLDV: 43 MMHG — SIGNIFICANT CHANGE UP (ref 25–45)
POTASSIUM BLDV-SCNC: 4.2 MMOL/L — SIGNIFICANT CHANGE UP (ref 3.5–5.1)
POTASSIUM SERPL-MCNC: 4.2 MMOL/L — SIGNIFICANT CHANGE UP (ref 3.5–5.3)
POTASSIUM SERPL-SCNC: 4.2 MMOL/L — SIGNIFICANT CHANGE UP (ref 3.5–5.3)
RBC # BLD: 2.33 M/UL — LOW (ref 4.2–5.8)
RBC # BLD: 2.5 M/UL — LOW (ref 4.2–5.8)
RBC # BLD: 3.45 M/UL — LOW (ref 4.2–5.8)
RBC # FLD: 13.9 % — SIGNIFICANT CHANGE UP (ref 10.3–14.5)
RBC # FLD: 14.4 % — SIGNIFICANT CHANGE UP (ref 10.3–14.5)
RBC # FLD: 14.4 % — SIGNIFICANT CHANGE UP (ref 10.3–14.5)
SAO2 % BLDV: 79.9 % — SIGNIFICANT CHANGE UP (ref 67–88)
SODIUM SERPL-SCNC: 133 MMOL/L — LOW (ref 135–145)
WBC # BLD: 10.61 K/UL — HIGH (ref 3.8–10.5)
WBC # BLD: 7.99 K/UL — SIGNIFICANT CHANGE UP (ref 3.8–10.5)
WBC # BLD: 8.53 K/UL — SIGNIFICANT CHANGE UP (ref 3.8–10.5)
WBC # FLD AUTO: 10.61 K/UL — HIGH (ref 3.8–10.5)
WBC # FLD AUTO: 7.99 K/UL — SIGNIFICANT CHANGE UP (ref 3.8–10.5)
WBC # FLD AUTO: 8.53 K/UL — SIGNIFICANT CHANGE UP (ref 3.8–10.5)

## 2024-03-08 PROCEDURE — 99231 SBSQ HOSP IP/OBS SF/LOW 25: CPT

## 2024-03-08 RX ORDER — SENNA PLUS 8.6 MG/1
2 TABLET ORAL AT BEDTIME
Refills: 0 | Status: DISCONTINUED | OUTPATIENT
Start: 2024-03-08 | End: 2024-03-08

## 2024-03-08 RX ORDER — LABETALOL HCL 100 MG
20 TABLET ORAL ONCE
Refills: 0 | Status: DISCONTINUED | OUTPATIENT
Start: 2024-03-08 | End: 2024-03-08

## 2024-03-08 RX ORDER — LABETALOL HCL 100 MG
20 TABLET ORAL ONCE
Refills: 0 | Status: COMPLETED | OUTPATIENT
Start: 2024-03-08 | End: 2024-03-08

## 2024-03-08 RX ORDER — LABETALOL HCL 100 MG
10 TABLET ORAL ONCE
Refills: 0 | Status: DISCONTINUED | OUTPATIENT
Start: 2024-03-08 | End: 2024-03-08

## 2024-03-08 RX ADMIN — CLOPIDOGREL BISULFATE 75 MILLIGRAM(S): 75 TABLET, FILM COATED ORAL at 11:43

## 2024-03-08 RX ADMIN — ATORVASTATIN CALCIUM 40 MILLIGRAM(S): 80 TABLET, FILM COATED ORAL at 21:04

## 2024-03-08 RX ADMIN — GABAPENTIN 100 MILLIGRAM(S): 400 CAPSULE ORAL at 05:34

## 2024-03-08 RX ADMIN — PIPERACILLIN AND TAZOBACTAM 25 GRAM(S): 4; .5 INJECTION, POWDER, LYOPHILIZED, FOR SOLUTION INTRAVENOUS at 15:34

## 2024-03-08 RX ADMIN — GABAPENTIN 100 MILLIGRAM(S): 400 CAPSULE ORAL at 17:27

## 2024-03-08 RX ADMIN — CHLORHEXIDINE GLUCONATE 1 APPLICATION(S): 213 SOLUTION TOPICAL at 09:00

## 2024-03-08 RX ADMIN — LOSARTAN POTASSIUM 25 MILLIGRAM(S): 100 TABLET, FILM COATED ORAL at 05:32

## 2024-03-08 RX ADMIN — Medication 20 MILLIGRAM(S): at 23:37

## 2024-03-08 RX ADMIN — OXYCODONE HYDROCHLORIDE 2.5 MILLIGRAM(S): 5 TABLET ORAL at 12:04

## 2024-03-08 RX ADMIN — Medication 81 MILLIGRAM(S): at 11:44

## 2024-03-08 RX ADMIN — Medication 650 MILLIGRAM(S): at 23:35

## 2024-03-08 RX ADMIN — ENOXAPARIN SODIUM 40 MILLIGRAM(S): 100 INJECTION SUBCUTANEOUS at 05:33

## 2024-03-08 RX ADMIN — PIPERACILLIN AND TAZOBACTAM 25 GRAM(S): 4; .5 INJECTION, POWDER, LYOPHILIZED, FOR SOLUTION INTRAVENOUS at 08:58

## 2024-03-08 RX ADMIN — Medication 6: at 17:27

## 2024-03-08 RX ADMIN — Medication 1 TABLET(S): at 11:43

## 2024-03-08 RX ADMIN — OXYCODONE HYDROCHLORIDE 2.5 MILLIGRAM(S): 5 TABLET ORAL at 13:04

## 2024-03-08 RX ADMIN — Medication 650 MILLIGRAM(S): at 03:45

## 2024-03-08 RX ADMIN — Medication 4: at 13:32

## 2024-03-08 RX ADMIN — Medication 650 MILLIGRAM(S): at 03:07

## 2024-03-08 RX ADMIN — Medication 2: at 09:45

## 2024-03-08 NOTE — PROGRESS NOTE ADULT - ASSESSMENT
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 Mcclure for left foot wound debridement and graft application. Patient reports he had the Angiogram with Dr. Mullins last week and then over the last week he had increased pain of the heel of the LLE. Patient denies fever/chills, SOB, nausea/vomiting, Patient denies motor/sensory changes of the foot. Pt is unclear when the eschar developed. Patient denies pain of the dorsal aspect of the foot. Patient in the ER hemodynamically stable and nontoxic appearing. Reports significant pain of LLE heel. Physical exam notable for fibrous tissue over TMA site, eschar on LLE heel, palpable PT pulse and dopplerable DP signal     L foot wounds with c/f OM of L first and fifth residual metatarsal  - LLE/ankle xrays with findings suspicious for OM of the first and fifth residual metatarsals  - Podiatry following - noted with L foot TMA stump fibrogranular wound to bone, scant purulence, mild malodor. Left foot heel wound well adhered eschar, no malodor, no pus  - prior cultures reviewed, no new imaging in past 24hiewed; OR culture from 12/18/23 from clean bone was negative   - s/p vancomycin, MRSA PCR screen negative   - L foot Wcx for Enterobacter sensitivities noted   - 3/6 s/p OR for L foot revisional TMA with L foot wound debridement and graft application   - brief op note reviewed, low suspicion for residual infection    Recommendations:  Follow up OR culture (NGTD) and surg path   Continue zosyn for now  On discharge, transition to bactrim DS 1 tablet PO BID x10d with outpatient ID follow up to determine final duration   Pain mgmt and rest of care per primary team    Over the weekend Dr. Tesha Gibson will be covering for our group. If you have any questions, concerns or new micro data, please reach out to them at [phone]    Azucena Castillo M.D.  [facility], Division of Infectious Diseases  [phone]  After 5pm on weekdays and all day on weekends - please call [phone]

## 2024-03-08 NOTE — PROGRESS NOTE ADULT - ASSESSMENT
Mr. Paredes is a 73 year old man with a history of peripheral arterial disease requiring bilateral lower extremity vascular stent placement and right BKA (7/20/2023), left TMA with recent angioplasty, and popliteal and peroneal artery stent placement (2/24/2024), now admitted after being sent in by his podiatrist Dr. Mcclure for a left foot wound. Now s/p L revisional TMA with L heel wound debridement and graft 03-06    This morning labs were concerning for hemoglobin drop from 7.6 -> 6.3. No signs of bleeding around surgical dressing, patient denying melena or BRBPR.     PLAN  - 1 unit PRBC, post transfusion cbc   - ASA, Plavix, Lovenox  - Pain control APAP, Oxy  - Zosyn as per ID. F/u cx  - Regular Diet - CC/DASH  - Glucose control    Vascular Surgery  p [phone]

## 2024-03-08 NOTE — PROGRESS NOTE ADULT - ATTENDING COMMENTS
food debridement by podiatry today  cont IV Abx
Hg 6.7, responded appropriately to transfusion  pt is stable  will plan dispo tomorrow
wound care and ABx as per podiatry  will plan for dispo

## 2024-03-08 NOTE — PROGRESS NOTE ADULT - ASSESSMENT
73M s/p left foot revisional transmetatarsal amputation with posterior heel wound debridement and kerecis graft application (DOS 3/6/24)   - Patient seen and evaluated  - Afebrile, no leukocytosis  - s/p left foot revisional transmetatarsal amputation with posterior heel wound debridement and kerecis graft application: sutures intact, staples intact, graft intact, no malodor, mild serosanguinous drainage.   - Intraop findings: low concern for residual infection, low concern for viability  - Clean bone margin: no growth (prelim)   - ID recs, appreciated   - Pod stable for discharge pending medical stability   - Wound care instruction and follow up information in discharge note provider   - Discussed with attending

## 2024-03-09 LAB
ANION GAP SERPL CALC-SCNC: 9 MMOL/L — SIGNIFICANT CHANGE UP (ref 5–17)
ANION GAP SERPL CALC-SCNC: 9 MMOL/L — SIGNIFICANT CHANGE UP (ref 5–17)
APPEARANCE UR: CLEAR — SIGNIFICANT CHANGE UP
BACTERIA # UR AUTO: NEGATIVE /HPF — SIGNIFICANT CHANGE UP
BASOPHILS # BLD AUTO: 0.01 K/UL — SIGNIFICANT CHANGE UP (ref 0–0.2)
BASOPHILS NFR BLD AUTO: 0.1 % — SIGNIFICANT CHANGE UP (ref 0–2)
BILIRUB UR-MCNC: NEGATIVE — SIGNIFICANT CHANGE UP
BUN SERPL-MCNC: 26 MG/DL — HIGH (ref 7–23)
BUN SERPL-MCNC: 30 MG/DL — HIGH (ref 7–23)
CALCIUM SERPL-MCNC: 8.6 MG/DL — SIGNIFICANT CHANGE UP (ref 8.4–10.5)
CALCIUM SERPL-MCNC: 8.6 MG/DL — SIGNIFICANT CHANGE UP (ref 8.4–10.5)
CAST: 2 /LPF — SIGNIFICANT CHANGE UP (ref 0–4)
CHLORIDE SERPL-SCNC: 102 MMOL/L — SIGNIFICANT CHANGE UP (ref 96–108)
CHLORIDE SERPL-SCNC: 105 MMOL/L — SIGNIFICANT CHANGE UP (ref 96–108)
CO2 SERPL-SCNC: 19 MMOL/L — LOW (ref 22–31)
CO2 SERPL-SCNC: 20 MMOL/L — LOW (ref 22–31)
COLOR SPEC: YELLOW — SIGNIFICANT CHANGE UP
CREAT SERPL-MCNC: 1.23 MG/DL — SIGNIFICANT CHANGE UP (ref 0.5–1.3)
CREAT SERPL-MCNC: 1.27 MG/DL — SIGNIFICANT CHANGE UP (ref 0.5–1.3)
CULTURE RESULTS: ABNORMAL
DIFF PNL FLD: ABNORMAL
EGFR: 60 ML/MIN/1.73M2 — SIGNIFICANT CHANGE UP
EGFR: 62 ML/MIN/1.73M2 — SIGNIFICANT CHANGE UP
EOSINOPHIL # BLD AUTO: 0.09 K/UL — SIGNIFICANT CHANGE UP (ref 0–0.5)
EOSINOPHIL NFR BLD AUTO: 0.9 % — SIGNIFICANT CHANGE UP (ref 0–6)
FLUAV AG NPH QL: SIGNIFICANT CHANGE UP
FLUBV AG NPH QL: SIGNIFICANT CHANGE UP
GLUCOSE SERPL-MCNC: 193 MG/DL — HIGH (ref 70–99)
GLUCOSE SERPL-MCNC: 194 MG/DL — HIGH (ref 70–99)
GLUCOSE UR QL: NEGATIVE MG/DL — SIGNIFICANT CHANGE UP
HCT VFR BLD CALC: 24.6 % — LOW (ref 39–50)
HCT VFR BLD CALC: 24.9 % — LOW (ref 39–50)
HGB BLD-MCNC: 8 G/DL — LOW (ref 13–17)
HGB BLD-MCNC: 8 G/DL — LOW (ref 13–17)
IMM GRANULOCYTES NFR BLD AUTO: 0.6 % — SIGNIFICANT CHANGE UP (ref 0–0.9)
KETONES UR-MCNC: NEGATIVE MG/DL — SIGNIFICANT CHANGE UP
LEUKOCYTE ESTERASE UR-ACNC: NEGATIVE — SIGNIFICANT CHANGE UP
LYMPHOCYTES # BLD AUTO: 1.44 K/UL — SIGNIFICANT CHANGE UP (ref 1–3.3)
LYMPHOCYTES # BLD AUTO: 13.8 % — SIGNIFICANT CHANGE UP (ref 13–44)
MAGNESIUM SERPL-MCNC: 2.2 MG/DL — SIGNIFICANT CHANGE UP (ref 1.6–2.6)
MCHC RBC-ENTMCNC: 27.9 PG — SIGNIFICANT CHANGE UP (ref 27–34)
MCHC RBC-ENTMCNC: 28.2 PG — SIGNIFICANT CHANGE UP (ref 27–34)
MCHC RBC-ENTMCNC: 32.1 GM/DL — SIGNIFICANT CHANGE UP (ref 32–36)
MCHC RBC-ENTMCNC: 32.5 GM/DL — SIGNIFICANT CHANGE UP (ref 32–36)
MCV RBC AUTO: 86.6 FL — SIGNIFICANT CHANGE UP (ref 80–100)
MCV RBC AUTO: 86.8 FL — SIGNIFICANT CHANGE UP (ref 80–100)
MONOCYTES # BLD AUTO: 1 K/UL — HIGH (ref 0–0.9)
MONOCYTES NFR BLD AUTO: 9.6 % — SIGNIFICANT CHANGE UP (ref 2–14)
NEUTROPHILS # BLD AUTO: 7.86 K/UL — HIGH (ref 1.8–7.4)
NEUTROPHILS NFR BLD AUTO: 75 % — SIGNIFICANT CHANGE UP (ref 43–77)
NITRITE UR-MCNC: NEGATIVE — SIGNIFICANT CHANGE UP
NRBC # BLD: 0 /100 WBCS — SIGNIFICANT CHANGE UP (ref 0–0)
NRBC # BLD: 0 /100 WBCS — SIGNIFICANT CHANGE UP (ref 0–0)
ORGANISM # SPEC MICROSCOPIC CNT: ABNORMAL
ORGANISM # SPEC MICROSCOPIC CNT: ABNORMAL
PH UR: 5.5 — SIGNIFICANT CHANGE UP (ref 5–8)
PHOSPHATE SERPL-MCNC: 3.4 MG/DL — SIGNIFICANT CHANGE UP (ref 2.5–4.5)
PLATELET # BLD AUTO: 210 K/UL — SIGNIFICANT CHANGE UP (ref 150–400)
PLATELET # BLD AUTO: 211 K/UL — SIGNIFICANT CHANGE UP (ref 150–400)
POTASSIUM SERPL-MCNC: 4 MMOL/L — SIGNIFICANT CHANGE UP (ref 3.5–5.3)
POTASSIUM SERPL-MCNC: 4.5 MMOL/L — SIGNIFICANT CHANGE UP (ref 3.5–5.3)
POTASSIUM SERPL-SCNC: 4 MMOL/L — SIGNIFICANT CHANGE UP (ref 3.5–5.3)
POTASSIUM SERPL-SCNC: 4.5 MMOL/L — SIGNIFICANT CHANGE UP (ref 3.5–5.3)
PROT UR-MCNC: 100 MG/DL
RBC # BLD: 2.84 M/UL — LOW (ref 4.2–5.8)
RBC # BLD: 2.87 M/UL — LOW (ref 4.2–5.8)
RBC # FLD: 13.8 % — SIGNIFICANT CHANGE UP (ref 10.3–14.5)
RBC # FLD: 13.8 % — SIGNIFICANT CHANGE UP (ref 10.3–14.5)
RBC CASTS # UR COMP ASSIST: 1 /HPF — SIGNIFICANT CHANGE UP (ref 0–4)
RSV RNA NPH QL NAA+NON-PROBE: SIGNIFICANT CHANGE UP
SARS-COV-2 RNA SPEC QL NAA+PROBE: SIGNIFICANT CHANGE UP
SODIUM SERPL-SCNC: 130 MMOL/L — LOW (ref 135–145)
SODIUM SERPL-SCNC: 134 MMOL/L — LOW (ref 135–145)
SP GR SPEC: 1.01 — SIGNIFICANT CHANGE UP (ref 1–1.03)
SPECIMEN SOURCE: SIGNIFICANT CHANGE UP
SQUAMOUS # UR AUTO: 0 /HPF — SIGNIFICANT CHANGE UP (ref 0–5)
UROBILINOGEN FLD QL: 0.2 MG/DL — SIGNIFICANT CHANGE UP (ref 0.2–1)
WBC # BLD: 10.02 K/UL — SIGNIFICANT CHANGE UP (ref 3.8–10.5)
WBC # BLD: 10.46 K/UL — SIGNIFICANT CHANGE UP (ref 3.8–10.5)
WBC # FLD AUTO: 10.02 K/UL — SIGNIFICANT CHANGE UP (ref 3.8–10.5)
WBC # FLD AUTO: 10.46 K/UL — SIGNIFICANT CHANGE UP (ref 3.8–10.5)
WBC UR QL: 0 /HPF — SIGNIFICANT CHANGE UP (ref 0–5)

## 2024-03-09 RX ORDER — POLYETHYLENE GLYCOL 3350 17 G/17G
17 POWDER, FOR SOLUTION ORAL DAILY
Refills: 0 | Status: DISCONTINUED | OUTPATIENT
Start: 2024-03-09 | End: 2024-03-10

## 2024-03-09 RX ORDER — VANCOMYCIN HCL 1 G
1000 VIAL (EA) INTRAVENOUS EVERY 24 HOURS
Refills: 0 | Status: DISCONTINUED | OUTPATIENT
Start: 2024-03-09 | End: 2024-03-10

## 2024-03-09 RX ORDER — SENNA PLUS 8.6 MG/1
2 TABLET ORAL AT BEDTIME
Refills: 0 | Status: DISCONTINUED | OUTPATIENT
Start: 2024-03-09 | End: 2024-03-10

## 2024-03-09 RX ADMIN — SENNA PLUS 2 TABLET(S): 8.6 TABLET ORAL at 22:40

## 2024-03-09 RX ADMIN — Medication 4: at 10:30

## 2024-03-09 RX ADMIN — Medication 650 MILLIGRAM(S): at 00:30

## 2024-03-09 RX ADMIN — PIPERACILLIN AND TAZOBACTAM 25 GRAM(S): 4; .5 INJECTION, POWDER, LYOPHILIZED, FOR SOLUTION INTRAVENOUS at 18:05

## 2024-03-09 RX ADMIN — Medication 81 MILLIGRAM(S): at 12:33

## 2024-03-09 RX ADMIN — SODIUM CHLORIDE 90 MILLILITER(S): 9 INJECTION INTRAMUSCULAR; INTRAVENOUS; SUBCUTANEOUS at 09:11

## 2024-03-09 RX ADMIN — POLYETHYLENE GLYCOL 3350 17 GRAM(S): 17 POWDER, FOR SOLUTION ORAL at 12:36

## 2024-03-09 RX ADMIN — ENOXAPARIN SODIUM 40 MILLIGRAM(S): 100 INJECTION SUBCUTANEOUS at 06:20

## 2024-03-09 RX ADMIN — Medication 4: at 19:42

## 2024-03-09 RX ADMIN — PIPERACILLIN AND TAZOBACTAM 25 GRAM(S): 4; .5 INJECTION, POWDER, LYOPHILIZED, FOR SOLUTION INTRAVENOUS at 23:33

## 2024-03-09 RX ADMIN — CHLORHEXIDINE GLUCONATE 1 APPLICATION(S): 213 SOLUTION TOPICAL at 09:12

## 2024-03-09 RX ADMIN — Medication 250 MILLIGRAM(S): at 05:27

## 2024-03-09 RX ADMIN — Medication 4: at 13:59

## 2024-03-09 RX ADMIN — PIPERACILLIN AND TAZOBACTAM 25 GRAM(S): 4; .5 INJECTION, POWDER, LYOPHILIZED, FOR SOLUTION INTRAVENOUS at 09:07

## 2024-03-09 RX ADMIN — SODIUM CHLORIDE 90 MILLILITER(S): 9 INJECTION INTRAMUSCULAR; INTRAVENOUS; SUBCUTANEOUS at 23:31

## 2024-03-09 RX ADMIN — GABAPENTIN 100 MILLIGRAM(S): 400 CAPSULE ORAL at 18:05

## 2024-03-09 RX ADMIN — LOSARTAN POTASSIUM 25 MILLIGRAM(S): 100 TABLET, FILM COATED ORAL at 05:29

## 2024-03-09 RX ADMIN — Medication 5 MILLIGRAM(S): at 22:40

## 2024-03-09 RX ADMIN — PIPERACILLIN AND TAZOBACTAM 25 GRAM(S): 4; .5 INJECTION, POWDER, LYOPHILIZED, FOR SOLUTION INTRAVENOUS at 00:43

## 2024-03-09 RX ADMIN — ATORVASTATIN CALCIUM 40 MILLIGRAM(S): 80 TABLET, FILM COATED ORAL at 22:40

## 2024-03-09 RX ADMIN — CLOPIDOGREL BISULFATE 75 MILLIGRAM(S): 75 TABLET, FILM COATED ORAL at 12:33

## 2024-03-09 RX ADMIN — Medication 1 TABLET(S): at 12:33

## 2024-03-09 RX ADMIN — GABAPENTIN 100 MILLIGRAM(S): 400 CAPSULE ORAL at 05:29

## 2024-03-09 NOTE — CHART NOTE - NSCHARTNOTEFT_GEN_A_CORE
Overnight Mr. Paredes was hypertensive to the 190s. Provider notified after 2310 blood pressure reading of 192/81. Patient asymptomatic, checked several times on both upper extremities, not in pain, HR 90s-100. Discussed with senior night residents and ordered Labetalol 20mg IVP x1. Was given at 2337. BP responded to 160s/70s. Will continue to monitor.     He was also febrile, rechecked several times. Sent Bcx x2, cbc with diff, UA (no cxr - cxr yesterday with fevers no changes in resp status or cough). Additionally, his OR bone cultures just resulted overnight from 03/06 showing Corynebacterium striatum. Lit review showed it's susceptible to vancomycin and sometimes vancomycin with zosyn. Discussed with senior night residents and made decision to restart vancomycin. Discussed dosing given Cr 1.5 with pharmacy who recommended 1g q24 (trough before 4th dose).     Plan:  - Overnight, continue to monitor BP and fevers closely  - In am: re- discuss hypertension with IM team  - In am: Reach out to ID re: further abx recs given new or culture results they commented they were waiting for in 03/08 note.     Glen Guzman  PGY-1

## 2024-03-09 NOTE — PROVIDER CONTACT NOTE (CRITICAL VALUE NOTIFICATION) - ACTION/TREATMENT ORDERED:
MD Glen Davalos made aware. Repeat CBC.
MD Glen Guzman made aware.
MD Ángel Simon made aware. Pending 1unit PRBC to be given.

## 2024-03-09 NOTE — PROVIDER CONTACT NOTE (OTHER) - BACKGROUND
Pt w/ recent TMA during current admission.
pt recent surgical procedure L foot revisional transmetatarsal amputation

## 2024-03-09 NOTE — PROGRESS NOTE ADULT - ASSESSMENT
Mr. Paredes is a 73 year old man with a history of peripheral arterial disease requiring bilateral lower extremity vascular stent placement and right BKA (7/20/2023), left TMA with recent angioplasty, and popliteal and peroneal artery stent placement (2/24/2024), now admitted after being sent in by his podiatrist Dr. Mcclure for a left foot wound. Now s/p L revisional TMA with L heel wound debridement and graft 03-06    PLAN  - ASA, Plavix, Lovenox  - Pain control APAP, Oxy  - Zosyn and vanc, f/u ID with new bone culture results  - Regular Diet - CC/DASH  - Glucose control    Vascular Surgery  p [phone]

## 2024-03-10 VITALS
HEART RATE: 80 BPM | SYSTOLIC BLOOD PRESSURE: 171 MMHG | TEMPERATURE: 98 F | RESPIRATION RATE: 18 BRPM | DIASTOLIC BLOOD PRESSURE: 68 MMHG | OXYGEN SATURATION: 99 %

## 2024-03-10 LAB
ANION GAP SERPL CALC-SCNC: 11 MMOL/L — SIGNIFICANT CHANGE UP (ref 5–17)
BUN SERPL-MCNC: 19 MG/DL — SIGNIFICANT CHANGE UP (ref 7–23)
CALCIUM SERPL-MCNC: 9.2 MG/DL — SIGNIFICANT CHANGE UP (ref 8.4–10.5)
CHLORIDE SERPL-SCNC: 104 MMOL/L — SIGNIFICANT CHANGE UP (ref 96–108)
CO2 SERPL-SCNC: 21 MMOL/L — LOW (ref 22–31)
CREAT SERPL-MCNC: 1.04 MG/DL — SIGNIFICANT CHANGE UP (ref 0.5–1.3)
CULTURE RESULTS: SIGNIFICANT CHANGE UP
CULTURE RESULTS: SIGNIFICANT CHANGE UP
EGFR: 76 ML/MIN/1.73M2 — SIGNIFICANT CHANGE UP
GLUCOSE SERPL-MCNC: 214 MG/DL — HIGH (ref 70–99)
HCT VFR BLD CALC: 28 % — LOW (ref 39–50)
HGB BLD-MCNC: 8.9 G/DL — LOW (ref 13–17)
MAGNESIUM SERPL-MCNC: 2.2 MG/DL — SIGNIFICANT CHANGE UP (ref 1.6–2.6)
MCHC RBC-ENTMCNC: 27.4 PG — SIGNIFICANT CHANGE UP (ref 27–34)
MCHC RBC-ENTMCNC: 31.8 GM/DL — LOW (ref 32–36)
MCV RBC AUTO: 86.2 FL — SIGNIFICANT CHANGE UP (ref 80–100)
NRBC # BLD: 0 /100 WBCS — SIGNIFICANT CHANGE UP (ref 0–0)
PHOSPHATE SERPL-MCNC: 2.2 MG/DL — LOW (ref 2.5–4.5)
PLATELET # BLD AUTO: 253 K/UL — SIGNIFICANT CHANGE UP (ref 150–400)
POTASSIUM SERPL-MCNC: 4.2 MMOL/L — SIGNIFICANT CHANGE UP (ref 3.5–5.3)
POTASSIUM SERPL-SCNC: 4.2 MMOL/L — SIGNIFICANT CHANGE UP (ref 3.5–5.3)
RBC # BLD: 3.25 M/UL — LOW (ref 4.2–5.8)
RBC # FLD: 13.9 % — SIGNIFICANT CHANGE UP (ref 10.3–14.5)
SODIUM SERPL-SCNC: 136 MMOL/L — SIGNIFICANT CHANGE UP (ref 135–145)
SPECIMEN SOURCE: SIGNIFICANT CHANGE UP
SPECIMEN SOURCE: SIGNIFICANT CHANGE UP
WBC # BLD: 7.38 K/UL — SIGNIFICANT CHANGE UP (ref 3.8–10.5)
WBC # FLD AUTO: 7.38 K/UL — SIGNIFICANT CHANGE UP (ref 3.8–10.5)

## 2024-03-10 PROCEDURE — 73630 X-RAY EXAM OF FOOT: CPT

## 2024-03-10 PROCEDURE — 84132 ASSAY OF SERUM POTASSIUM: CPT

## 2024-03-10 PROCEDURE — 82947 ASSAY GLUCOSE BLOOD QUANT: CPT

## 2024-03-10 PROCEDURE — 71045 X-RAY EXAM CHEST 1 VIEW: CPT

## 2024-03-10 PROCEDURE — 84295 ASSAY OF SERUM SODIUM: CPT

## 2024-03-10 PROCEDURE — 86900 BLOOD TYPING SEROLOGIC ABO: CPT

## 2024-03-10 PROCEDURE — 87186 SC STD MICRODIL/AGAR DIL: CPT

## 2024-03-10 PROCEDURE — 85025 COMPLETE CBC W/AUTO DIFF WBC: CPT

## 2024-03-10 PROCEDURE — 88304 TISSUE EXAM BY PATHOLOGIST: CPT

## 2024-03-10 PROCEDURE — 83735 ASSAY OF MAGNESIUM: CPT

## 2024-03-10 PROCEDURE — 73610 X-RAY EXAM OF ANKLE: CPT

## 2024-03-10 PROCEDURE — 82962 GLUCOSE BLOOD TEST: CPT

## 2024-03-10 PROCEDURE — 99261: CPT

## 2024-03-10 PROCEDURE — 73590 X-RAY EXAM OF LOWER LEG: CPT

## 2024-03-10 PROCEDURE — 85610 PROTHROMBIN TIME: CPT

## 2024-03-10 PROCEDURE — 85652 RBC SED RATE AUTOMATED: CPT

## 2024-03-10 PROCEDURE — 36430 TRANSFUSION BLD/BLD COMPNT: CPT

## 2024-03-10 PROCEDURE — 87077 CULTURE AEROBIC IDENTIFY: CPT

## 2024-03-10 PROCEDURE — 82330 ASSAY OF CALCIUM: CPT

## 2024-03-10 PROCEDURE — 85027 COMPLETE CBC AUTOMATED: CPT

## 2024-03-10 PROCEDURE — 83605 ASSAY OF LACTIC ACID: CPT

## 2024-03-10 PROCEDURE — 0225U NFCT DS DNA&RNA 21 SARSCOV2: CPT

## 2024-03-10 PROCEDURE — 87205 SMEAR GRAM STAIN: CPT

## 2024-03-10 PROCEDURE — 85014 HEMATOCRIT: CPT

## 2024-03-10 PROCEDURE — 87637 SARSCOV2&INF A&B&RSV AMP PRB: CPT

## 2024-03-10 PROCEDURE — 86850 RBC ANTIBODY SCREEN: CPT

## 2024-03-10 PROCEDURE — 85730 THROMBOPLASTIN TIME PARTIAL: CPT

## 2024-03-10 PROCEDURE — 99285 EMERGENCY DEPT VISIT HI MDM: CPT

## 2024-03-10 PROCEDURE — 81001 URINALYSIS AUTO W/SCOPE: CPT

## 2024-03-10 PROCEDURE — 93005 ELECTROCARDIOGRAM TRACING: CPT

## 2024-03-10 PROCEDURE — 80048 BASIC METABOLIC PNL TOTAL CA: CPT

## 2024-03-10 PROCEDURE — 88311 DECALCIFY TISSUE: CPT

## 2024-03-10 PROCEDURE — 87075 CULTR BACTERIA EXCEPT BLOOD: CPT

## 2024-03-10 PROCEDURE — 36415 COLL VENOUS BLD VENIPUNCTURE: CPT

## 2024-03-10 PROCEDURE — 87640 STAPH A DNA AMP PROBE: CPT

## 2024-03-10 PROCEDURE — 86901 BLOOD TYPING SEROLOGIC RH(D): CPT

## 2024-03-10 PROCEDURE — 82803 BLOOD GASES ANY COMBINATION: CPT

## 2024-03-10 PROCEDURE — 84100 ASSAY OF PHOSPHORUS: CPT

## 2024-03-10 PROCEDURE — 97161 PT EVAL LOW COMPLEX 20 MIN: CPT

## 2024-03-10 PROCEDURE — 87641 MR-STAPH DNA AMP PROBE: CPT

## 2024-03-10 PROCEDURE — 87070 CULTURE OTHR SPECIMN AEROBIC: CPT

## 2024-03-10 PROCEDURE — 82435 ASSAY OF BLOOD CHLORIDE: CPT

## 2024-03-10 PROCEDURE — 86922 COMPATIBILITY TEST ANTIGLOB: CPT

## 2024-03-10 PROCEDURE — 87040 BLOOD CULTURE FOR BACTERIA: CPT

## 2024-03-10 PROCEDURE — P9016: CPT

## 2024-03-10 PROCEDURE — 85018 HEMOGLOBIN: CPT

## 2024-03-10 PROCEDURE — 83615 LACTATE (LD) (LDH) ENZYME: CPT

## 2024-03-10 RX ORDER — AMLODIPINE BESYLATE 2.5 MG/1
2.5 TABLET ORAL DAILY
Refills: 0 | Status: DISCONTINUED | OUTPATIENT
Start: 2024-03-10 | End: 2024-03-10

## 2024-03-10 RX ADMIN — LOSARTAN POTASSIUM 25 MILLIGRAM(S): 100 TABLET, FILM COATED ORAL at 05:15

## 2024-03-10 RX ADMIN — GABAPENTIN 100 MILLIGRAM(S): 400 CAPSULE ORAL at 05:11

## 2024-03-10 RX ADMIN — AMLODIPINE BESYLATE 2.5 MILLIGRAM(S): 2.5 TABLET ORAL at 12:32

## 2024-03-10 RX ADMIN — CLOPIDOGREL BISULFATE 75 MILLIGRAM(S): 75 TABLET, FILM COATED ORAL at 12:32

## 2024-03-10 RX ADMIN — Medication 250 MILLIGRAM(S): at 05:09

## 2024-03-10 RX ADMIN — POLYETHYLENE GLYCOL 3350 17 GRAM(S): 17 POWDER, FOR SOLUTION ORAL at 12:34

## 2024-03-10 RX ADMIN — ENOXAPARIN SODIUM 40 MILLIGRAM(S): 100 INJECTION SUBCUTANEOUS at 06:00

## 2024-03-10 RX ADMIN — Medication 4: at 09:10

## 2024-03-10 RX ADMIN — GABAPENTIN 100 MILLIGRAM(S): 400 CAPSULE ORAL at 18:09

## 2024-03-10 RX ADMIN — Medication 1 TABLET(S): at 12:33

## 2024-03-10 RX ADMIN — Medication 6: at 15:19

## 2024-03-10 RX ADMIN — Medication 81 MILLIGRAM(S): at 12:32

## 2024-03-10 RX ADMIN — PIPERACILLIN AND TAZOBACTAM 25 GRAM(S): 4; .5 INJECTION, POWDER, LYOPHILIZED, FOR SOLUTION INTRAVENOUS at 09:08

## 2024-03-10 RX ADMIN — CHLORHEXIDINE GLUCONATE 1 APPLICATION(S): 213 SOLUTION TOPICAL at 05:10

## 2024-03-10 NOTE — PROGRESS NOTE ADULT - ASSESSMENT
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 Mcclure for left foot wound debridement and graft application. Patient reports he had the Angiogram with Dr. Mullins last week and then over the last week he had increased pain of the heel of the LLE. Patient denies fever/chills, SOB, nausea/vomiting, Patient denies motor/sensory changes of the foot. Pt is unclear when the eschar developed. Patient denies pain of the dorsal aspect of the foot. Patient in the ER hemodynamically stable and nontoxic appearing. Reports significant pain of LLE heel. Physical exam notable for fibrous tissue over TMA site, eschar on LLE heel, palpable PT pulse and dopplerable DP signal     L foot wounds with c/f OM of L first and fifth residual metatarsal  - LLE/ankle xrays with findings suspicious for OM of the first and fifth residual metatarsals  - Podiatry following - noted with L foot TMA stump fibrogranular wound to bone, scant purulence, mild malodor. Left foot heel wound well adhered eschar, no malodor, no pus  - prior cultures reviewed, no new imaging in past 24hiewed; OR culture from 12/18/23 from clean bone was negative   - s/p vancomycin, MRSA PCR screen negative   - L foot Wcx for Enterobacter sensitivities noted   - 3/6 s/p OR for L foot revisional TMA with L foot wound debridement and graft application   - brief op note reviewed, low suspicion for residual infection    Recommendations:  bone cx corynebacterium   zosyn day 5 - can stop   vancomycin added   on path if proximal bone is clean and no signs of infection, om- can transition to po bactrim ds  if path with infection, pt will need a picc for iv antibx          Patient is a 73 year old male PMH of type II DM, PAD with bilateral lower extremity stents, 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 Mcclure for left foot wound debridement and graft application. Patient reports he had the Angiogram with Dr. Mullins last week and then over the last week he had increased pain of the heel of the LLE. Patient denies fever/chills, SOB, nausea/vomiting, Patient denies motor/sensory changes of the foot. Pt is unclear when the eschar developed. Patient denies pain of the dorsal aspect of the foot. Patient in the ER hemodynamically stable and nontoxic appearing. Reports significant pain of LLE heel. Physical exam notable for fibrous tissue over TMA site, eschar on LLE heel, palpable PT pulse and dopplerable DP signal     L foot wounds with c/f OM of L first and fifth residual metatarsal  - LLE/ankle xrays with findings suspicious for OM of the first and fifth residual metatarsals  - Podiatry following - noted with L foot TMA stump fibrogranular wound to bone, scant purulence, mild malodor. Left foot heel wound well adhered eschar, no malodor, no pus  - prior cultures reviewed, no new imaging in past 24hiewed; OR culture from 12/18/23 from clean bone was negative   - s/p vancomycin, MRSA PCR screen negative   - L foot Wcx for Enterobacter sensitivities noted   - 3/6 s/p OR for L foot revisional TMA with L foot wound debridement and graft application   - brief op note reviewed, low suspicion for residual infection    Recommendations:  bone cx corynebacterium   zosyn day 5 - can stop   vancomycin added     plan can still be to transition to bactrim ds po to complete 10 days  as low clinical suspicion of residual infection  he can f/u in ID office for further recommendations  [phone]      [facility] planning  d/w Greyson Charles

## 2024-03-10 NOTE — PROGRESS NOTE ADULT - SUBJECTIVE AND OBJECTIVE BOX
Patient is a 73y old  Male who presents with a chief complaint of Left foot debridement and ulcer (07 Mar 2024 10:47)      SUBJECTIVE / OVERNIGHT EVENTS:    Events noted.  CONSTITUTIONAL: No fever,  or fatigue  RESPIRATORY: No cough, wheezing,  No shortness of breath  CARDIOVASCULAR: No chest pain, palpitations, dizziness, or leg swelling  GASTROINTESTINAL: No abdominal or epigastric pain. No nausea, vomiting.  NEUROLOGICAL: No headache    MEDICATIONS  (STANDING):  aspirin enteric coated 81 milliGRAM(s) Oral daily  atorvastatin 40 milliGRAM(s) Oral at bedtime  chlorhexidine 2% Cloths 1 Application(s) Topical <User Schedule>  clopidogrel Tablet 75 milliGRAM(s) Oral daily  dextrose 5%. 1000 milliLiter(s) (50 mL/Hr) IV Continuous <Continuous>  dextrose 5%. 1000 milliLiter(s) (100 mL/Hr) IV Continuous <Continuous>  dextrose 5%. 1000 milliLiter(s) (50 mL/Hr) IV Continuous <Continuous>  dextrose 50% Injectable 25 Gram(s) IV Push once  dextrose 50% Injectable 25 Gram(s) IV Push once  dextrose 50% Injectable 12.5 Gram(s) IV Push once  dextrose 50% Injectable 25 Gram(s) IV Push once  enoxaparin Injectable 40 milliGRAM(s) SubCutaneous every 24 hours  gabapentin 100 milliGRAM(s) Oral two times a day  glucagon  Injectable 1 milliGRAM(s) IntraMuscular once  glucagon  Injectable 1 milliGRAM(s) IntraMuscular once  influenza  Vaccine (HIGH DOSE) 0.7 milliLiter(s) IntraMuscular once  insulin lispro (ADMELOG) corrective regimen sliding scale   SubCutaneous three times a day before meals  insulin lispro (ADMELOG) corrective regimen sliding scale   SubCutaneous at bedtime  losartan 25 milliGRAM(s) Oral daily  multivitamin/minerals 1 Tablet(s) Oral daily  piperacillin/tazobactam IVPB.. 3.375 Gram(s) IV Intermittent every 8 hours    MEDICATIONS  (PRN):  acetaminophen     Tablet .. 650 milliGRAM(s) Oral every 6 hours PRN Mild Pain (1 - 3)  dextrose Oral Gel 15 Gram(s) Oral once PRN Blood Glucose LESS THAN 70 milliGRAM(s)/deciliter  dextrose Oral Gel 15 Gram(s) Oral once PRN Blood Glucose LESS THAN 70 milliGRAM(s)/deciliter  oxyCODONE    IR 10 milliGRAM(s) Oral every 4 hours PRN Severe Pain (7 - 10)  oxyCODONE    IR 5 milliGRAM(s) Oral every 4 hours PRN Moderate Pain (4 - 6)        CAPILLARY BLOOD GLUCOSE      POCT Blood Glucose.: 205 mg/dL (07 Mar 2024 12:20)  POCT Blood Glucose.: 175 mg/dL (07 Mar 2024 09:28)  POCT Blood Glucose.: 178 mg/dL (06 Mar 2024 21:04)  POCT Blood Glucose.: 132 mg/dL (06 Mar 2024 17:32)  POCT Blood Glucose.: 134 mg/dL (06 Mar 2024 15:39)    I&O's Summary    06 Mar 2024 07:01  -  07 Mar 2024 07:00  --------------------------------------------------------  IN: 420 mL / OUT: 850 mL / NET: -430 mL    07 Mar 2024 07:01  -  07 Mar 2024 14:54  --------------------------------------------------------  IN: 480 mL / OUT: 650 mL / NET: -170 mL        T(C): 36.6 (03-07-24 @ 13:51), Max: 36.8 (03-07-24 @ 01:25)  HR: 96 (03-07-24 @ 13:51) (65 - 96)  BP: 128/82 (03-07-24 @ 13:51) (100/58 - 161/77)  RR: 18 (03-07-24 @ 13:51) (16 - 19)  SpO2: 98% (03-07-24 @ 13:51) (97% - 100%)    PHYSICAL EXAM:  GENERAL: NAD  NECK: Supple, No JVD  CHEST/LUNG: Clear to auscultation bilaterally; No wheezing.  HEART: Regular rate and rhythm; No murmurs, rubs, or gallops  ABDOMEN: Soft, Nontender, Nondistended; Bowel sounds present  EXTREMITIES:   No edema  NEUROLOGY: AAO X 3      LABS:                        7.7    6.37  )-----------( 239      ( 07 Mar 2024 07:10 )             25.1     03-07    139  |  104  |  24<H>  ----------------------------<  237<H>  4.1   |  23  |  1.03    Ca    9.3      07 Mar 2024 07:10  Phos  4.9     03-07  Mg     2.4     03-07      PT/INR - ( 06 Mar 2024 04:48 )   PT: 11.7 sec;   INR: 1.07 ratio         PTT - ( 06 Mar 2024 04:48 )  PTT:29.7 sec      Urinalysis Basic - ( 07 Mar 2024 07:10 )    Color: x / Appearance: x / SG: x / pH: x  Gluc: 237 mg/dL / Ketone: x  / Bili: x / Urobili: x   Blood: x / Protein: x / Nitrite: x   Leuk Esterase: x / RBC: x / WBC x   Sq Epi: x / Non Sq Epi: x / Bacteria: x      CAPILLARY BLOOD GLUCOSE      POCT Blood Glucose.: 205 mg/dL (07 Mar 2024 12:20)  POCT Blood Glucose.: 175 mg/dL (07 Mar 2024 09:28)  POCT Blood Glucose.: 178 mg/dL (06 Mar 2024 21:04)  POCT Blood Glucose.: 132 mg/dL (06 Mar 2024 17:32)  POCT Blood Glucose.: 134 mg/dL (06 Mar 2024 15:39)        RADIOLOGY & ADDITIONAL TESTS:    Imaging Personally Reviewed:    Consultant(s) Notes Reviewed:      Care Discussed with Consultants/Other Providers:    Luciano Lo MD, CMD, FACP    [address] [place] [address]  Office Tel: [phone]  Cell: [phone]  
Vascular Surgery Daily Resident Progress Note    Subjective and interval  Seen and examined at bedside. No acute events overnight. Pain well-controlled, denies fever, cough, SOB, CP, chills, arthralgias/myalgias.   ___________________________________________________  Vital Signs  T(C): 36.6 (13:15), Max: 37 (17:18)  HR: 80 (13:15) (80 - 86)  BP: 176/85 (13:15) (149/73 - 186/69)  RR: 18 (13:15) (17 - 18)  SpO2: 99% (13:15) (97% - 99%)    In/Out  03-09  -  03-10  --------------------------------------------------------  IN: 3830 mL / OUT: 2920 mL / NET: 910 mL  Voided (mL): 43.79 mL/kg/d  ___________________________________________________  Physical Exam  General: NAD, resting comfortably in bed.   Cardiac: WWP.  Respiratory: Equal chest wall expansion bilaterally, nonlabored respirations.  Extremities: Left lower extremity nontender to palpation, covered with ace bandage noted to be clean, dry and intact. Compartments soft.   ___________________________________________________  Microbiology  Culture - Abscess with Gram Stain (collected 03-04 @ 19:02)  Final Report: Few Enterobacter cloacae complex. Few Corynebacterium striatum group    Culture - Blood (collected 03-04 @ 20:19)  Final Report: No growth at 5 days.    Culture - Blood (collected 03-04 @ 20:25)  Final Report: No growth at 5 days.    Culture - Tissue with Gram Stain (collected 03-06 @ 22:02)  Preliminary Report: Few Corynebacterium striatum group. Susceptibilities not performed.    Culture - Blood (collected 03-07 @ 19:40)  Preliminary Report: No growth at 48 Hours.    Culture - Blood (collected 03-07 @ 19:40)  Preliminary Report: No growth at 48 Hours.    Culture - Blood (collected 03-08 @ 04:22)  Preliminary Report: No growth at 48 Hours.    Culture - Blood (collected 03-08 @ 04:22)  Preliminary Report: No growth at 48 Hours.    Culture - Blood (collected 03-09 @ 02:00)  Preliminary Report: No growth at 24 hours.    Culture - Blood (collected 03-09 @ 02:00)  Preliminary Report: No growth at 24 hours.  ___________________________________________________  Medications  Antimicrobial and Immunologic  vancomycin  IVPB 1000 milliGRAM(s) every 24 hours    Neuro, Psych and Analgesia  acetaminophen     Tablet .. 650 milliGRAM(s) every 6 hours PRN  gabapentin 100 milliGRAM(s) two times a day  oxyCODONE    IR 2.5 milliGRAM(s) every 6 hours PRN    Cardiovascular and Hematologic (includes DVT ppx/AC/Antiplatelet agents)  amLODIPine   Tablet 2.5 milliGRAM(s) daily  aspirin enteric coated 81 milliGRAM(s) daily  atorvastatin 40 milliGRAM(s) at bedtime  clopidogrel Tablet 75 milliGRAM(s) daily  enoxaparin Injectable 40 milliGRAM(s) every 24 hours  losartan 25 milliGRAM(s) daily    GI, Endocrine and Nutrition  bisacodyl 5 milliGRAM(s) at bedtime  insulin lispro (ADMELOG) corrective regimen sliding scale   three times a day before meals  insulin lispro (ADMELOG) corrective regimen sliding scale   at bedtime  multivitamin/minerals 1 Tablet(s) daily  polyethylene glycol 3350 17 Gram(s) daily  senna 2 Tablet(s) at bedtime
[facility]  ERVIN Florentino Y. Patel, S. Shah, G. Ervin  [phone]  ([phone] - weekdays after 5pm and weekends)    Name: ALEE DUNN  Age/Gender: 73y Male  MRN: [id number]    Interval History:  Patient seen and examined this morning.   No new complaints noted.  Notes reviewed  No concerning overnight events  Afebrile   Allergies: No Known Allergies      Objective:  Vitals:   T(F): 97.6 (03-06-24 @ 08:10), Max: 98.6 (03-06-24 @ 01:05)  HR: 73 (03-06-24 @ 08:10) (73 - 86)  BP: 157/75 (03-06-24 @ 08:10) (131/70 - 196/82)  RR: 16 (03-06-24 @ 08:10) (16 - 18)  SpO2: 99% (03-06-24 @ 08:10) (98% - 100%)  Physical Examination:  General: no acute distress, nontoxic   HEENT: NC/AT, anicteric, neck supple  Respiratory: no acc muscle use, breathing comfortably  Cardiovascular: S1 and S2 present  Gastrointestinal: normal appearing, nondistended  Extremities: RLE prosthesis, LLE brace   Skin: no visible rash    Laboratory Studies:  CBC:                       9.1    5.58  )-----------( 247      ( 06 Mar 2024 04:48 )             28.1     WBC Trend:  5.58 03-06-24 @ 04:48  5.43 03-05-24 @ 07:12  6.05 03-04-24 @ 20:34    CMP: 03-06    134<L>  |  103  |  25<H>  ----------------------------<  164<H>  5.0   |  21<L>  |  1.05    Ca    9.7      06 Mar 2024 04:47  Phos  4.0     03-06  Mg     2.3     03-06    TPro  8.1  /  Alb  3.7  /  TBili  0.2  /  DBili  x   /  AST  19  /  ALT  30  /  AlkPhos  81  03-04    Creatinine: 1.05 mg/dL (03-06-24 @ 04:47)  Creatinine: 0.96 mg/dL (03-05-24 @ 07:14)  Creatinine: 1.07 mg/dL (03-04-24 @ 20:34)    LIVER FUNCTIONS - ( 04 Mar 2024 20:34 )  Alb: 3.7 g/dL / Pro: 8.1 g/dL / ALK PHOS: 81 U/L / ALT: 30 U/L / AST: 19 U/L / GGT: x           Microbiology: reviewed   Culture - Blood (collected 03-04-24 @ 20:25)  Source: .Blood Blood-Peripheral  Preliminary Report (03-06-24 @ 01:03):    No growth at 24 hours    Culture - Blood (collected 03-04-24 @ 20:19)  Source: .Blood Blood-Peripheral  Preliminary Report (03-06-24 @ 01:03):    No growth at 24 hours    Culture - Abscess with Gram Stain (collected 03-04-24 @ 19:02)  Source: .Abscess left foot culture  Gram Stain (03-05-24 @ 02:25):    Few polymorphonuclear leukocytes seen per low power field    Rare Gram Variable Rods seen per oil power field    Rare Gram positive cocci in pairs seen per oil power field  Preliminary Report (03-05-24 @ 21:07):    Few Enterobacter cloacae complex    Radiology: reviewed     Medications:  acetaminophen     Tablet .. 975 milliGRAM(s) Oral every 6 hours PRN  aspirin enteric coated 81 milliGRAM(s) Oral daily  atorvastatin 40 milliGRAM(s) Oral at bedtime  chlorhexidine 2% Cloths 1 Application(s) Topical <User Schedule>  clopidogrel Tablet 75 milliGRAM(s) Oral daily  dextrose 5%. 1000 milliLiter(s) IV Continuous <Continuous>  dextrose 5%. 1000 milliLiter(s) IV Continuous <Continuous>  dextrose 50% Injectable 25 Gram(s) IV Push once  dextrose 50% Injectable 12.5 Gram(s) IV Push once  dextrose 50% Injectable 25 Gram(s) IV Push once  dextrose Oral Gel 15 Gram(s) Oral once PRN  enoxaparin Injectable 40 milliGRAM(s) SubCutaneous every 24 hours  gabapentin 100 milliGRAM(s) Oral two times a day  glucagon  Injectable 1 milliGRAM(s) IntraMuscular once  influenza  Vaccine (HIGH DOSE) 0.7 milliLiter(s) IntraMuscular once  insulin lispro (ADMELOG) corrective regimen sliding scale   SubCutaneous every 6 hours  losartan 25 milliGRAM(s) Oral daily  multivitamin/minerals 1 Tablet(s) Oral daily  oxyCODONE    IR 5 milliGRAM(s) Oral every 4 hours PRN  piperacillin/tazobactam IVPB.. 3.375 Gram(s) IV Intermittent every 8 hours    Current Antimicrobials:  piperacillin/tazobactam IVPB.. 3.375 Gram(s) IV Intermittent every 8 hours    Prior/Completed Antimicrobials:  piperacillin/tazobactam IVPB...  vancomycin  IVPB.  
[facility]  ERVIN Florentino Y. Patel, S. Shah, G. [facility]  [phone]  ([phone] - weekdays after 5pm and weekends)    Name: ALEE DUNN  Age/Gender: 73y Male  MRN: [id number]    Interval History:  Patient seen and examined this morning.   Complains of L foot pain.   Denies fever or any new complaints.   Notes reviewed  No concerning overnight events  Afebrile   Allergies: No Known Allergies      Objective:  Vitals:   T(F): 98 (03-08-24 @ 14:00), Max: 101.3 (03-07-24 @ 18:25)  HR: 88 (03-08-24 @ 14:00) (79 - 109)  BP: 163/67 (03-08-24 @ 14:00) (111/64 - 166/68)  RR: 18 (03-08-24 @ 14:00) (16 - 18)  SpO2: 98% (03-08-24 @ 14:00) (94% - 98%)  Physical Examination:  General: no acute distress, nontoxic appearing   HEENT: NC/AT, anicteric, neck supple  Respiratory: no acc muscle use, breathing comfortably  Cardiovascular: S1 and S2 present  Gastrointestinal: normal appearing, nondistended  Extremities: L foot dressing, R BKA  Skin: no visible rash    Laboratory Studies:  CBC:                       9.7    10.61 )-----------( 223      ( 08 Mar 2024 11:13 )             29.1     WBC Trend:  10.61 03-08-24 @ 11:13  8.53 03-08-24 @ 05:42  7.99 03-08-24 @ 04:22  9.50 03-07-24 @ 19:55  6.37 03-07-24 @ 07:10  5.58 03-06-24 @ 04:48  5.43 03-05-24 @ 07:12  6.05 03-04-24 @ 20:34    CMP: 03-08    133<L>  |  101  |  31<H>  ----------------------------<  198<H>  4.2   |  22  |  1.51<H>    Ca    8.5      08 Mar 2024 04:22  Phos  3.6     03-08  Mg     2.2     03-08    Creatinine: 1.51 mg/dL (03-08-24 @ 04:22)  Creatinine: 1.50 mg/dL (03-07-24 @ 19:55)  Creatinine: 1.03 mg/dL (03-07-24 @ 07:10)  Creatinine: 1.05 mg/dL (03-06-24 @ 04:47)  Creatinine: 0.96 mg/dL (03-05-24 @ 07:14)  Creatinine: 1.07 mg/dL (03-04-24 @ 20:34)    Microbiology: reviewed   Culture - Tissue with Gram Stain (collected 03-06-24 @ 22:02)  Source: Bone Bone, Left Foot Culture  Gram Stain (03-07-24 @ 02:47):    No polymorphonuclear leukocytes seen per low power field    No organisms seen per oil power field  Preliminary Report (03-07-24 @ 21:16):    No growth    Culture - Blood (collected 03-04-24 @ 20:25)  Source: .Blood Blood-Peripheral  Preliminary Report (03-08-24 @ 01:02):    No growth at 72 Hours    Culture - Blood (collected 03-04-24 @ 20:19)  Source: .Blood Blood-Peripheral  Preliminary Report (03-08-24 @ 01:02):    No growth at 72 Hours    Culture - Abscess with Gram Stain (collected 03-04-24 @ 19:02)  Source: .Abscess left foot culture  Gram Stain (03-05-24 @ 02:25):    Few polymorphonuclear leukocytes seen per low power field    Rare Gram Variable Rods seen per oil power field    Rare Gram positive cocci in pairs seen per oil power field  Preliminary Report (03-05-24 @ 21:07):    Few Enterobacter cloacae complex  Organism: Enterobacter cloacae complex (03-06-24 @ 17:53)  Organism: Enterobacter cloacae complex (03-06-24 @ 17:53)      Method Type: AMI      -  Amoxicillin/Clavulanic Acid: R >16/8      -  Ampicillin: R >16 These ampicillin results predict results for amoxicillin      -  Ampicillin/Sulbactam: R >16/8      -  Aztreonam: S <=4      -  Cefazolin: R >16      -  Cefepime: S <=2      -  Cefoxitin: R >16      -  Ceftriaxone: S <=1 Enterobacter cloacae, Klebsiella aerogenes, and Citrobacter freundii may develop resistance during prolonged therapy.      -  Ciprofloxacin: S <=0.25      -  Ertapenem: S <=0.5      -  Gentamicin: S <=2      -  Imipenem: S <=1      -  Levofloxacin: S <=0.5      -  Meropenem: S <=1      -  Piperacillin/Tazobactam: S <=8      -  Tobramycin: S <=2      -  Trimethoprim/Sulfamethoxazole: S <=0.5/9.5    Radiology: reviewed     Medications:  acetaminophen     Tablet .. 650 milliGRAM(s) Oral every 6 hours PRN  aspirin enteric coated 81 milliGRAM(s) Oral daily  atorvastatin 40 milliGRAM(s) Oral at bedtime  chlorhexidine 2% Cloths 1 Application(s) Topical <User Schedule>  clopidogrel Tablet 75 milliGRAM(s) Oral daily  dextrose 5%. 1000 milliLiter(s) IV Continuous <Continuous>  dextrose 5%. 1000 milliLiter(s) IV Continuous <Continuous>  dextrose 5%. 1000 milliLiter(s) IV Continuous <Continuous>  dextrose 50% Injectable 25 Gram(s) IV Push once  dextrose 50% Injectable 25 Gram(s) IV Push once  dextrose 50% Injectable 12.5 Gram(s) IV Push once  dextrose 50% Injectable 25 Gram(s) IV Push once  dextrose Oral Gel 15 Gram(s) Oral once PRN  dextrose Oral Gel 15 Gram(s) Oral once PRN  enoxaparin Injectable 40 milliGRAM(s) SubCutaneous every 24 hours  gabapentin 100 milliGRAM(s) Oral two times a day  glucagon  Injectable 1 milliGRAM(s) IntraMuscular once  glucagon  Injectable 1 milliGRAM(s) IntraMuscular once  influenza  Vaccine (HIGH DOSE) 0.7 milliLiter(s) IntraMuscular once  insulin lispro (ADMELOG) corrective regimen sliding scale   SubCutaneous three times a day before meals  insulin lispro (ADMELOG) corrective regimen sliding scale   SubCutaneous at bedtime  losartan 25 milliGRAM(s) Oral daily  multivitamin/minerals 1 Tablet(s) Oral daily  oxyCODONE    IR 2.5 milliGRAM(s) Oral every 6 hours PRN  piperacillin/tazobactam IVPB.. 3.375 Gram(s) IV Intermittent every 8 hours  sodium chloride 0.9%. 1000 milliLiter(s) IV Continuous <Continuous>    Current Antimicrobials:  piperacillin/tazobactam IVPB.. 3.375 Gram(s) IV Intermittent every 8 hours    Prior/Completed Antimicrobials:  piperacillin/tazobactam IVPB...  vancomycin  IVPB.  
Patient is a 73y old  Male who presents with a chief complaint of Left foot debridement and ulcer (06 Mar 2024 05:23)      INTERVAL HPI/OVERNIGHT EVENTS:   Pt is scheduled for left foot revision of transmetatarsal amputation with wound debridement and graft application with Dr. Mcclure at 3:30 PM. Patient is aware of procedure and is NPO since midnight.    MEDICATIONS  (STANDING):  aspirin enteric coated 81 milliGRAM(s) Oral daily  atorvastatin 40 milliGRAM(s) Oral at bedtime  chlorhexidine 2% Cloths 1 Application(s) Topical <User Schedule>  clopidogrel Tablet 75 milliGRAM(s) Oral daily  dextrose 5%. 1000 milliLiter(s) (100 mL/Hr) IV Continuous <Continuous>  dextrose 5%. 1000 milliLiter(s) (50 mL/Hr) IV Continuous <Continuous>  dextrose 50% Injectable 25 Gram(s) IV Push once  dextrose 50% Injectable 12.5 Gram(s) IV Push once  dextrose 50% Injectable 25 Gram(s) IV Push once  enoxaparin Injectable 40 milliGRAM(s) SubCutaneous every 24 hours  gabapentin 100 milliGRAM(s) Oral two times a day  glucagon  Injectable 1 milliGRAM(s) IntraMuscular once  influenza  Vaccine (HIGH DOSE) 0.7 milliLiter(s) IntraMuscular once  insulin lispro (ADMELOG) corrective regimen sliding scale   SubCutaneous every 6 hours  losartan 25 milliGRAM(s) Oral daily  multivitamin/minerals 1 Tablet(s) Oral daily  piperacillin/tazobactam IVPB.. 3.375 Gram(s) IV Intermittent every 8 hours    MEDICATIONS  (PRN):  acetaminophen     Tablet .. 975 milliGRAM(s) Oral every 6 hours PRN Temp greater or equal to 38C (100.4F), Mild Pain (1 - 3)  dextrose Oral Gel 15 Gram(s) Oral once PRN Blood Glucose LESS THAN 70 milliGRAM(s)/deciliter  oxyCODONE    IR 5 milliGRAM(s) Oral every 4 hours PRN Moderate Pain (4 - 6)      Allergies    No Known Allergies    Intolerances        Vital Signs Last 24 Hrs  T(C): 36.6 (06 Mar 2024 05:08), Max: 37 (06 Mar 2024 01:05)  T(F): 97.9 (06 Mar 2024 05:08), Max: 98.6 (06 Mar 2024 01:05)  HR: 75 (06 Mar 2024 05:08) (73 - 86)  BP: 144/63 (06 Mar 2024 05:08) (131/70 - 196/82)  BP(mean): --  RR: 18 (06 Mar 2024 05:08) (17 - 18)  SpO2: 99% (06 Mar 2024 05:08) (98% - 100%)    Parameters below as of 06 Mar 2024 05:08  Patient On (Oxygen Delivery Method): room air        LABS:                        9.1    5.58  )-----------( 247      ( 06 Mar 2024 04:48 )             28.1     03-06    134<L>  |  103  |  25<H>  ----------------------------<  164<H>  5.0   |  21<L>  |  1.05    Ca    9.7      06 Mar 2024 04:47  Phos  4.0     03-06  Mg     2.3     03-06    TPro  8.1  /  Alb  3.7  /  TBili  0.2  /  DBili  x   /  AST  19  /  ALT  30  /  AlkPhos  81  03-04    PT/INR - ( 06 Mar 2024 04:48 )   PT: 11.7 sec;   INR: 1.07 ratio         PTT - ( 06 Mar 2024 04:48 )  PTT:29.7 sec  Urinalysis Basic - ( 06 Mar 2024 04:47 )    Color: x / Appearance: x / SG: x / pH: x  Gluc: 164 mg/dL / Ketone: x  / Bili: x / Urobili: x   Blood: x / Protein: x / Nitrite: x   Leuk Esterase: x / RBC: x / WBC x   Sq Epi: x / Non Sq Epi: x / Bacteria: x      CAPILLARY BLOOD GLUCOSE      POCT Blood Glucose.: 145 mg/dL (06 Mar 2024 05:33)  POCT Blood Glucose.: 216 mg/dL (05 Mar 2024 21:18)  POCT Blood Glucose.: 111 mg/dL (05 Mar 2024 17:29)  POCT Blood Glucose.: 220 mg/dL (05 Mar 2024 11:59)  POCT Blood Glucose.: 160 mg/dL (05 Mar 2024 07:59)      RADIOLOGY & ADDITIONAL TESTS:    Plan:   Pt is scheduled for left foot revision of transmetatarsal amputation with wound debridement and graft application with Dr. Mcclure at 3:30 PM. Patient is aware of procedure and is NPO since midnight.  CXR on sunrise.  EKG on sunrise.  HPI reviewed and acknowledged   Medical/Cardiac clearance since 3/5 and documented in chart.  Consent signed and in chart.  Procedure was explained to patient in detail. All alternatives, risks and complications were discussed. All questions answered.
Patient is a 73y old  Male who presents with a chief complaint of Left foot debridement and ulcer (07 Mar 2024 09:21)       INTERVAL HPI/OVERNIGHT EVENTS:  Patient seen and evaluated at bedside.  Pt is resting comfortable in NAD. Denies N/V/F/C.    Allergies    No Known Allergies    Intolerances        Vital Signs Last 24 Hrs  T(C): 36.5 (07 Mar 2024 10:36), Max: 36.8 (07 Mar 2024 01:25)  T(F): 97.7 (07 Mar 2024 10:36), Max: 98.2 (07 Mar 2024 01:25)  HR: 89 (07 Mar 2024 10:36) (65 - 89)  BP: 100/58 (07 Mar 2024 10:46) (100/58 - 161/77)  BP(mean): 95 (06 Mar 2024 19:00) (83 - 106)  RR: 18 (07 Mar 2024 10:36) (16 - 19)  SpO2: 100% (07 Mar 2024 10:36) (97% - 100%)    Parameters below as of 07 Mar 2024 10:36  Patient On (Oxygen Delivery Method): room air        LABS:                        7.7    6.37  )-----------( 239      ( 07 Mar 2024 07:10 )             25.1     03-07    139  |  104  |  24<H>  ----------------------------<  237<H>  4.1   |  23  |  1.03    Ca    9.3      07 Mar 2024 07:10  Phos  4.9     03-07  Mg     2.4     03-07      PT/INR - ( 06 Mar 2024 04:48 )   PT: 11.7 sec;   INR: 1.07 ratio         PTT - ( 06 Mar 2024 04:48 )  PTT:29.7 sec  Urinalysis Basic - ( 07 Mar 2024 07:10 )    Color: x / Appearance: x / SG: x / pH: x  Gluc: 237 mg/dL / Ketone: x  / Bili: x / Urobili: x   Blood: x / Protein: x / Nitrite: x   Leuk Esterase: x / RBC: x / WBC x   Sq Epi: x / Non Sq Epi: x / Bacteria: x      CAPILLARY BLOOD GLUCOSE      POCT Blood Glucose.: 175 mg/dL (07 Mar 2024 09:28)  POCT Blood Glucose.: 178 mg/dL (06 Mar 2024 21:04)  POCT Blood Glucose.: 132 mg/dL (06 Mar 2024 17:32)  POCT Blood Glucose.: 134 mg/dL (06 Mar 2024 15:39)  POCT Blood Glucose.: 150 mg/dL (06 Mar 2024 12:01)      Lower Extremity Physical Exam:  Vascular: DP/PT 0/4, B/L, Temperature gradient warm to cool, B/L.   Neuro: Epicritic sensation diminished to the level of digits, B/L.  Musculoskeletal/Ortho: s/p R BKA, 12/18 s/p left foot transmetatarsal amputation w/ tendoachilles lengthening  Skin: s/p left foot revisional transmetatarsal amputation with posterior heel wound debridement and kerecis graft application: sutures intact, staples intact, graft intact, no malodor, mild serosanguinous drainage.     RADIOLOGY & ADDITIONAL TESTS:  
Patient is a 73y old  Male who presents with a chief complaint of Left foot debridement and ulcer (08 Mar 2024 08:49)       INTERVAL HPI/OVERNIGHT EVENTS:  Patient seen and evaluated at bedside.  Pt is resting comfortable in NAD. Denies N/V/F/C.    Allergies    No Known Allergies    Intolerances        Vital Signs Last 24 Hrs  T(C): 37 (08 Mar 2024 05:30), Max: 38.5 (07 Mar 2024 18:25)  T(F): 98.6 (08 Mar 2024 05:30), Max: 101.3 (07 Mar 2024 18:25)  HR: 79 (08 Mar 2024 05:30) (79 - 109)  BP: 119/67 (08 Mar 2024 05:30) (100/58 - 166/68)  BP(mean): --  RR: 18 (08 Mar 2024 05:30) (16 - 18)  SpO2: 96% (08 Mar 2024 05:30) (94% - 100%)    Parameters below as of 08 Mar 2024 05:30  Patient On (Oxygen Delivery Method): room air        LABS:                        6.3    8.53  )-----------( 197      ( 08 Mar 2024 05:42 )             20.0     03-08    133<L>  |  101  |  31<H>  ----------------------------<  198<H>  4.2   |  22  |  1.51<H>    Ca    8.5      08 Mar 2024 04:22  Phos  3.6     03-08  Mg     2.2     03-08        Urinalysis Basic - ( 08 Mar 2024 04:22 )    Color: x / Appearance: x / SG: x / pH: x  Gluc: 198 mg/dL / Ketone: x  / Bili: x / Urobili: x   Blood: x / Protein: x / Nitrite: x   Leuk Esterase: x / RBC: x / WBC x   Sq Epi: x / Non Sq Epi: x / Bacteria: x      CAPILLARY BLOOD GLUCOSE      POCT Blood Glucose.: 240 mg/dL (07 Mar 2024 21:28)  POCT Blood Glucose.: 246 mg/dL (07 Mar 2024 18:24)  POCT Blood Glucose.: 205 mg/dL (07 Mar 2024 12:20)  POCT Blood Glucose.: 175 mg/dL (07 Mar 2024 09:28)      Lower Extremity Physical Exam:  Vascular: DP/PT 0/4, B/L, Temperature gradient warm to cool, B/L.   Neuro: Epicritic sensation diminished to the level of digits, B/L.  Musculoskeletal/Ortho: s/p R BKA, 12/18 s/p left foot transmetatarsal amputation w/ tendoachilles lengthening  Skin: s/p left foot revisional transmetatarsal amputation with posterior heel wound debridement and kerecis graft application: sutures intact, staples intact, graft intact, no malodor, mild serosanguinous drainage.     RADIOLOGY & ADDITIONAL TESTS:  
[facility]  ERVIN Florentino Y. Patel, S. Shah, G. Ervin  [phone]  ([phone] - weekdays after 5pm and weekends)    Name: ALEE DUNN  Age/Gender: 73y Male  MRN: [id number]    Interval History:  Patient seen and examined this morning.   Complains of L foot pain, less with pain meds.   No other new complaints noted.  Notes reviewed  No concerning overnight events  Afebrile   Allergies: No Known Allergies      Objective:  Vitals:   T(F): 97.9 (03-07-24 @ 05:08), Max: 98.2 (03-07-24 @ 01:25)  HR: 75 (03-07-24 @ 05:08) (65 - 75)  BP: 121/66 (03-07-24 @ 05:08) (113/58 - 161/77)  RR: 18 (03-07-24 @ 05:08) (16 - 19)  SpO2: 98% (03-07-24 @ 05:08) (97% - 100%)  Physical Examination:  General: no acute distress, nontoxic appearing   HEENT: NC/AT, anicteric, neck supple  Respiratory: no acc muscle use, breathing comfortably  Cardiovascular: S1 and S2 present  Gastrointestinal: normal appearing, nondistended  Extremities: L foot dressing, R BKA  Skin: no visible rash    Laboratory Studies:  CBC:                       7.7    6.37  )-----------( 239      ( 07 Mar 2024 07:10 )             25.1     WBC Trend:  6.37 03-07-24 @ 07:10  5.58 03-06-24 @ 04:48  5.43 03-05-24 @ 07:12  6.05 03-04-24 @ 20:34    CMP: 03-07    139  |  104  |  24<H>  ----------------------------<  237<H>  4.1   |  23  |  1.03    Ca    9.3      07 Mar 2024 07:10  Phos  4.9     03-07  Mg     2.4     03-07      Creatinine: 1.03 mg/dL (03-07-24 @ 07:10)  Creatinine: 1.05 mg/dL (03-06-24 @ 04:47)  Creatinine: 0.96 mg/dL (03-05-24 @ 07:14)  Creatinine: 1.07 mg/dL (03-04-24 @ 20:34)    Microbiology: reviewed   Culture - Tissue with Gram Stain (collected 03-06-24 @ 22:02)  Source: Bone Bone, Left Foot Culture  Gram Stain (03-07-24 @ 02:47):    No polymorphonuclear leukocytes seen per low power field    No organisms seen per oil power field    Culture - Blood (collected 03-04-24 @ 20:25)  Source: .Blood Blood-Peripheral  Preliminary Report (03-07-24 @ 01:02):    No growth at 48 Hours    Culture - Blood (collected 03-04-24 @ 20:19)  Source: .Blood Blood-Peripheral  Preliminary Report (03-07-24 @ 01:02):    No growth at 48 Hours    Culture - Abscess with Gram Stain (collected 03-04-24 @ 19:02)  Source: .Abscess left foot culture  Gram Stain (03-05-24 @ 02:25):    Few polymorphonuclear leukocytes seen per low power field    Rare Gram Variable Rods seen per oil power field    Rare Gram positive cocci in pairs seen per oil power field  Preliminary Report (03-05-24 @ 21:07):    Few Enterobacter cloacae complex  Organism: Enterobacter cloacae complex (03-06-24 @ 17:53)  Organism: Enterobacter cloacae complex (03-06-24 @ 17:53)      Method Type: AMI      -  Amoxicillin/Clavulanic Acid: R >16/8      -  Ampicillin: R >16 These ampicillin results predict results for amoxicillin      -  Ampicillin/Sulbactam: R >16/8      -  Aztreonam: S <=4      -  Cefazolin: R >16      -  Cefepime: S <=2      -  Cefoxitin: R >16      -  Ceftriaxone: S <=1 Enterobacter cloacae, Klebsiella aerogenes, and Citrobacter freundii may develop resistance during prolonged therapy.      -  Ciprofloxacin: S <=0.25      -  Ertapenem: S <=0.5      -  Gentamicin: S <=2      -  Imipenem: S <=1      -  Levofloxacin: S <=0.5      -  Meropenem: S <=1      -  Piperacillin/Tazobactam: S <=8      -  Tobramycin: S <=2      -  Trimethoprim/Sulfamethoxazole: S <=0.5/9.5          Radiology: reviewed     Medications:  acetaminophen     Tablet .. 650 milliGRAM(s) Oral every 6 hours PRN  aspirin enteric coated 81 milliGRAM(s) Oral daily  atorvastatin 40 milliGRAM(s) Oral at bedtime  chlorhexidine 2% Cloths 1 Application(s) Topical <User Schedule>  clopidogrel Tablet 75 milliGRAM(s) Oral daily  dextrose 5%. 1000 milliLiter(s) IV Continuous <Continuous>  dextrose 5%. 1000 milliLiter(s) IV Continuous <Continuous>  dextrose 5%. 1000 milliLiter(s) IV Continuous <Continuous>  dextrose 50% Injectable 25 Gram(s) IV Push once  dextrose 50% Injectable 25 Gram(s) IV Push once  dextrose 50% Injectable 12.5 Gram(s) IV Push once  dextrose 50% Injectable 25 Gram(s) IV Push once  dextrose Oral Gel 15 Gram(s) Oral once PRN  dextrose Oral Gel 15 Gram(s) Oral once PRN  enoxaparin Injectable 40 milliGRAM(s) SubCutaneous every 24 hours  gabapentin 100 milliGRAM(s) Oral two times a day  glucagon  Injectable 1 milliGRAM(s) IntraMuscular once  glucagon  Injectable 1 milliGRAM(s) IntraMuscular once  influenza  Vaccine (HIGH DOSE) 0.7 milliLiter(s) IntraMuscular once  insulin lispro (ADMELOG) corrective regimen sliding scale   SubCutaneous three times a day before meals  insulin lispro (ADMELOG) corrective regimen sliding scale   SubCutaneous at bedtime  losartan 25 milliGRAM(s) Oral daily  multivitamin/minerals 1 Tablet(s) Oral daily  oxyCODONE    IR 5 milliGRAM(s) Oral every 4 hours PRN  oxyCODONE    IR 10 milliGRAM(s) Oral every 4 hours PRN  piperacillin/tazobactam IVPB.. 3.375 Gram(s) IV Intermittent every 8 hours    Current Antimicrobials:  piperacillin/tazobactam IVPB.. 3.375 Gram(s) IV Intermittent every 8 hours    Prior/Completed Antimicrobials:  piperacillin/tazobactam IVPB...  vancomycin  IVPB.  
[facility], Division of Infectious Diseases  ERVIN Florentino Y. Patel, S. Shah, G. Ervin  [phone]  after hours and weekends [phone]    Name: ALEE DUNN  Age: 73y  Gender: Male  MRN: [id number]    Interval History--  Notes reviewed  no events      Allergies    No Known Allergies    Intolerances        Medications--  Antibiotics:  piperacillin/tazobactam IVPB.. 3.375 Gram(s) IV Intermittent every 8 hours  vancomycin  IVPB 1000 milliGRAM(s) IV Intermittent every 24 hours    Immunologic:  influenza  Vaccine (HIGH DOSE) 0.7 milliLiter(s) IntraMuscular once    Other:  acetaminophen     Tablet .. PRN  amLODIPine   Tablet  aspirin enteric coated  atorvastatin  bisacodyl  chlorhexidine 2% Cloths  clopidogrel Tablet  dextrose 5%.  dextrose 5%.  dextrose 5%.  dextrose 50% Injectable  dextrose 50% Injectable  dextrose 50% Injectable  dextrose 50% Injectable  dextrose Oral Gel PRN  dextrose Oral Gel PRN  enoxaparin Injectable  gabapentin  glucagon  Injectable  glucagon  Injectable  insulin lispro (ADMELOG) corrective regimen sliding scale  insulin lispro (ADMELOG) corrective regimen sliding scale  losartan  multivitamin/minerals  oxyCODONE    IR PRN  polyethylene glycol 3350  senna  sodium chloride 0.9%.      Review of Systems--  A 10-point review of systems was obtained.     Pertinent positives and negatives--  Constitutional: No fevers. No Chills. No Rigors.   Cardiovascular: No chest pain. No palpitations.  Respiratory: No shortness of breath. No cough.  Gastrointestinal: No nausea or vomiting. No diarrhea or constipation.   Psychiatric: Pleasant. Appropriate affect.    Review of systems otherwise negative except as previously noted.    Physical Examination--  Vital Signs: T(F): 97.8 (03-10-24 @ 13:15), Max: 98.6 (03-09-24 @ 17:18)  HR: 80 (03-10-24 @ 13:15)  BP: 176/85 (03-10-24 @ 13:15)  RR: 18 (03-10-24 @ 13:15)  SpO2: 99% (03-10-24 @ 13:15)  Wt(kg): --  General: Nontoxic-appearing Male in no acute distress.  HEENT: AT/NC. PERRL. EOMI. Anic  Neck: Not rigid. No sense of mass.  Nodes: None palpable.  Lungs: Clear bilaterally without rales, wheezing or rhonchi  Heart: Regular rate and rhythm. No Murmur. No rub. No gallop. No palpable thrill.  Abdomen: Bowel sounds present and normoactive. Soft. Nondistended. Nontender.    Extremities: No cyanosis or clubbing. No edema.   Skin: Warm. Dry. Good turgor. No rash. No vasculitic stigmata.  Psychiatric: Appropriate affect and mood for situation.         Laboratory Studies--  CBC                        8.0    10.02 )-----------( 211      ( 09 Mar 2024 06:27 )             24.9       Chemistries  03-09    134<L>  |  105  |  26<H>  ----------------------------<  193<H>  4.0   |  20<L>  |  1.23    Ca    8.6      09 Mar 2024 06:27  Phos  3.4     03-09  Mg     2.2     03-09        Culture Data    Culture - Blood (collected 09 Mar 2024 02:00)  Source: .Blood Blood-Venous  Preliminary Report (10 Mar 2024 09:01):    No growth at 24 hours    Culture - Blood (collected 09 Mar 2024 02:00)  Source: .Blood Blood-Venous  Preliminary Report (10 Mar 2024 09:01):    No growth at 24 hours    Culture - Blood (collected 08 Mar 2024 04:22)  Source: .Blood Blood  Preliminary Report (10 Mar 2024 10:01):    No growth at 48 Hours    Culture - Blood (collected 08 Mar 2024 04:22)  Source: .Blood Blood  Preliminary Report (10 Mar 2024 10:01):    No growth at 48 Hours    Culture - Blood (collected 07 Mar 2024 19:40)  Source: .Blood Blood  Preliminary Report (10 Mar 2024 01:02):    No growth at 48 Hours    Culture - Blood (collected 07 Mar 2024 19:40)  Source: .Blood Blood  Preliminary Report (10 Mar 2024 01:02):    No growth at 48 Hours    Culture - Tissue with Gram Stain (collected 06 Mar 2024 22:02)  Source: Bone Bone, Left Foot Culture  Gram Stain (08 Mar 2024 23:12):    No polymorphonuclear leukocytes seen per low power field    No organisms seen per oil power field  Preliminary Report (08 Mar 2024 23:12):    Few Corynebacterium striatum group    "Susceptibilities not performed"    Culture - Blood (collected 04 Mar 2024 20:25)  Source: .Blood Blood-Peripheral  Final Report (10 Mar 2024 01:01):    No growth at 5 days    Culture - Blood (collected 04 Mar 2024 20:19)  Source: .Blood Blood-Peripheral  Final Report (10 Mar 2024 01:01):    No growth at 5 days    Culture - Abscess with Gram Stain (collected 04 Mar 2024 19:02)  Source: .Abscess left foot culture  Gram Stain (05 Mar 2024 02:25):    Few polymorphonuclear leukocytes seen per low power field    Rare Gram Variable Rods seen per oil power field    Rare Gram positive cocci in pairs seen per oil power field  Final Report (09 Mar 2024 17:18):    Few Enterobacter cloacae complex    Few Corynebacterium striatum group  Organism: Enterobacter cloacae complex (09 Mar 2024 17:18)  Organism: Enterobacter cloacae complex (09 Mar 2024 17:18)            
STATUS POST:      POST OPERATIVE DAY #:     Vital Signs Last 24 Hrs  T(C): 37 (09 Mar 2024 17:18), Max: 39 (08 Mar 2024 21:11)  T(F): 98.6 (09 Mar 2024 17:18), Max: 102.2 (08 Mar 2024 21:11)  HR: 85 (09 Mar 2024 17:18) (76 - 101)  BP: 167/68 (09 Mar 2024 17:18) (159/70 - 210/80)  BP(mean): --  RR: 17 (09 Mar 2024 17:18) (17 - 18)  SpO2: 97% (09 Mar 2024 17:18) (95% - 98%)    Parameters below as of 09 Mar 2024 17:18  Patient On (Oxygen Delivery Method): room air    SUBJECTIVE: Pt seen and examined bedside on morning rounds. Patient states that he feels well and that his fevers are not real despite being febrile overnight. He is very concerned about making it to his hyperbaric oxygen appointment on Monday afternoon. Denies any fevers, chills, n/v/d, SOB, or chest pain.     General: NAD, resting comfortably in bed  Head: NC, AT  Pulmonary: Nonlabored breathing  Abdominal: soft, NT/ND  Extremities: WWP, LLE covered by ace bandage, compartment soft with no strikethrough.     I&O's Summary    08 Mar 2024 07:01  -  09 Mar 2024 07:00  --------------------------------------------------------  IN: 3180 mL / OUT: 1550 mL / NET: 1630 mL    09 Mar 2024 06:01  -  09 Mar 2024 19:27  --------------------------------------------------------  IN: 720 mL / OUT: 850 mL / NET: -130 mL      I&O's Detail    08 Mar 2024 07:01  -  09 Mar 2024 07:00  --------------------------------------------------------  IN:    Oral Fluid: 1200 mL    sodium chloride 0.9%: 1980 mL  Total IN: 3180 mL    OUT:    Voided (mL): 1550 mL  Total OUT: 1550 mL    Total NET: 1630 mL      09 Mar 2024 06:01  -  09 Mar 2024 19:27  --------------------------------------------------------  IN:    Oral Fluid: 720 mL  Total IN: 720 mL    OUT:    Voided (mL): 850 mL  Total OUT: 850 mL    Total NET: -130 mL          MEDICATIONS  (STANDING):  aspirin enteric coated 81 milliGRAM(s) Oral daily  atorvastatin 40 milliGRAM(s) Oral at bedtime  bisacodyl 5 milliGRAM(s) Oral at bedtime  chlorhexidine 2% Cloths 1 Application(s) Topical <User Schedule>  clopidogrel Tablet 75 milliGRAM(s) Oral daily  dextrose 5%. 1000 milliLiter(s) (50 mL/Hr) IV Continuous <Continuous>  dextrose 5%. 1000 milliLiter(s) (100 mL/Hr) IV Continuous <Continuous>  dextrose 5%. 1000 milliLiter(s) (50 mL/Hr) IV Continuous <Continuous>  dextrose 50% Injectable 25 Gram(s) IV Push once  dextrose 50% Injectable 25 Gram(s) IV Push once  dextrose 50% Injectable 12.5 Gram(s) IV Push once  dextrose 50% Injectable 25 Gram(s) IV Push once  enoxaparin Injectable 40 milliGRAM(s) SubCutaneous every 24 hours  gabapentin 100 milliGRAM(s) Oral two times a day  glucagon  Injectable 1 milliGRAM(s) IntraMuscular once  glucagon  Injectable 1 milliGRAM(s) IntraMuscular once  influenza  Vaccine (HIGH DOSE) 0.7 milliLiter(s) IntraMuscular once  insulin lispro (ADMELOG) corrective regimen sliding scale   SubCutaneous three times a day before meals  insulin lispro (ADMELOG) corrective regimen sliding scale   SubCutaneous at bedtime  losartan 25 milliGRAM(s) Oral daily  multivitamin/minerals 1 Tablet(s) Oral daily  piperacillin/tazobactam IVPB.. 3.375 Gram(s) IV Intermittent every 8 hours  polyethylene glycol 3350 17 Gram(s) Oral daily  senna 2 Tablet(s) Oral at bedtime  sodium chloride 0.9%. 1000 milliLiter(s) (90 mL/Hr) IV Continuous <Continuous>  vancomycin  IVPB 1000 milliGRAM(s) IV Intermittent every 24 hours    MEDICATIONS  (PRN):  acetaminophen     Tablet .. 650 milliGRAM(s) Oral every 6 hours PRN Mild Pain (1 - 3)  dextrose Oral Gel 15 Gram(s) Oral once PRN Blood Glucose LESS THAN 70 milliGRAM(s)/deciliter  dextrose Oral Gel 15 Gram(s) Oral once PRN Blood Glucose LESS THAN 70 milliGRAM(s)/deciliter  oxyCODONE    IR 2.5 milliGRAM(s) Oral every 6 hours PRN Severe Pain (7 - 10)      LABS:                        8.0    10.02 )-----------( 211      ( 09 Mar 2024 06:27 )             24.9     03-09    134<L>  |  105  |  26<H>  ----------------------------<  193<H>  4.0   |  20<L>  |  1.23    Ca    8.6      09 Mar 2024 06:27  Phos  3.4     03-09  Mg     2.2     03-09        
VASCULAR SURGERY DAILY PROGRESS NOTE    SUBJECTIVE:  Patient seen and examined at bedside during morning rounds. No overnight events. Patient resting comfortably, no complaints.     Vital Signs Last 24 Hrs  T(C): 36.6 (07 Mar 2024 05:08), Max: 36.8 (07 Mar 2024 01:25)  T(F): 97.9 (07 Mar 2024 05:08), Max: 98.2 (07 Mar 2024 01:25)  HR: 75 (07 Mar 2024 05:08) (65 - 75)  BP: 121/66 (07 Mar 2024 05:08) (113/58 - 161/77)  BP(mean): 95 (06 Mar 2024 19:00) (83 - 106)  RR: 18 (07 Mar 2024 05:08) (16 - 19)  SpO2: 98% (07 Mar 2024 05:08) (97% - 100%)    Parameters below as of 07 Mar 2024 05:08  Patient On (Oxygen Delivery Method): room air      I&Os    03-06-24 @ 07:01  -  03-07-24 @ 07:00  --------------------------------------------------------  IN: 240 mL / OUT: 750 mL / NET: -510 mL      PHYSICAL EXAM:  General: NAD, resting comfortably in bed  Head: NC, AT  Pulmonary: Nonlabored breathing  Abdominal: soft, NT/ND  Extremities: WWP, LLE covered by ace bandage, compartment soft with no strikethrough.     MEDICATIONS:  acetaminophen     Tablet .. 650 milliGRAM(s) Oral every 6 hours PRN  aspirin enteric coated 81 milliGRAM(s) Oral daily  atorvastatin 40 milliGRAM(s) Oral at bedtime  chlorhexidine 2% Cloths 1 Application(s) Topical <User Schedule>  clopidogrel Tablet 75 milliGRAM(s) Oral daily  dextrose 5%. 1000 milliLiter(s) IV Continuous <Continuous>  dextrose 5%. 1000 milliLiter(s) IV Continuous <Continuous>  dextrose 5%. 1000 milliLiter(s) IV Continuous <Continuous>  dextrose 50% Injectable 25 Gram(s) IV Push once  dextrose 50% Injectable 25 Gram(s) IV Push once  dextrose 50% Injectable 12.5 Gram(s) IV Push once  dextrose 50% Injectable 25 Gram(s) IV Push once  dextrose Oral Gel 15 Gram(s) Oral once PRN  dextrose Oral Gel 15 Gram(s) Oral once PRN  enoxaparin Injectable 40 milliGRAM(s) SubCutaneous every 24 hours  gabapentin 100 milliGRAM(s) Oral two times a day  glucagon  Injectable 1 milliGRAM(s) IntraMuscular once  glucagon  Injectable 1 milliGRAM(s) IntraMuscular once  influenza  Vaccine (HIGH DOSE) 0.7 milliLiter(s) IntraMuscular once  insulin lispro (ADMELOG) corrective regimen sliding scale   SubCutaneous three times a day before meals  insulin lispro (ADMELOG) corrective regimen sliding scale   SubCutaneous at bedtime  losartan 25 milliGRAM(s) Oral daily  multivitamin/minerals 1 Tablet(s) Oral daily  oxyCODONE    IR 5 milliGRAM(s) Oral every 4 hours PRN  oxyCODONE    IR 10 milliGRAM(s) Oral every 4 hours PRN  piperacillin/tazobactam IVPB.. 3.375 Gram(s) IV Intermittent every 8 hours      LABS:                        9.1    5.58  )-----------( 247      ( 06 Mar 2024 04:48 )             28.1     03-06    134<L>  |  103  |  25<H>  ----------------------------<  164<H>  5.0   |  21<L>  |  1.05    Ca    9.7      06 Mar 2024 04:47  Phos  4.0     03-06  Mg     2.3     03-06      PT/INR - ( 06 Mar 2024 04:48 )   PT: 11.7 sec;   INR: 1.07 ratio    PTT - ( 06 Mar 2024 04:48 )  PTT:29.7 sec    Urinalysis Basic - ( 06 Mar 2024 04:47 )  Color: x / Appearance: x / SG: x / pH: x  Gluc: 164 mg/dL / Ketone: x  / Bili: x / Urobili: x   Blood: x / Protein: x / Nitrite: x   Leuk Esterase: x / RBC: x / WBC x   Sq Epi: x / Non Sq Epi: x / Bacteria: x
Vascular Surgery Daily Resident Progress Note    Subjective and interval  Seen and examined at bedside. Overnight hypertensive to 190s systolic, asymptomatic, responded appropriately to 1x hydralazine 10mg IV push. Otherwise no acute events, pain well-controlled.   ___________________________________________________  Vital Signs  T(C): 37 (01:05), Max: 37 (01:05)  HR: 81 (01:05) (70 - 86)  BP: 150/66 (01:05) (131/70 - 196/82)  RR: 18 (01:05) (17 - 18)  SpO2: 98% (01:05) (98% - 100%)    In/Out  03-05  -  03-06  --------------------------------------------------------  IN: 1260 mL / OUT: 1450 mL / NET: -190 mL  Voided (mL): 21.74 mL/kg/d  ___________________________________________________  Physical Exam  General: NAD, resting comfortably in bed.   Cardiac: WWP.  Respiratory: Equal chest wall expansion bilaterally, nonlabored respirations.  Abdomen:   Extremities:  ___________________________________________________  Labs  CBC Basic (03-06 @ 04:48)  WBC: 5.58 Hgb: 9.1 Hct: 28.1 Plt: 247    Metabolic Panel (03-05 @ 07:14)  137  |  104  |  28  ----------------------------<  152  4.4   |  23  |  0.96  Ca: 9.7/Phos: 3.9/Magnesium: 2.4    Coags (03-06 @ 04:48)  PT/INR: 11.7/1.07, aPTT: 29.7  ___________________________________________________  Microbiology  Culture - Abscess with Gram Stain (collected 03-04 @ 19:02)  Preliminary Report: Few Enterobacter cloacae complex.    Culture - Blood (collected 03-04 @ 20:19)  Preliminary Report: No growth at 24 hours.    Culture - Blood (collected 03-04 @ 20:25)  Preliminary Report: No growth at 24 hours.  ___________________________________________________  Medications  Antimicrobial and Immunologic  piperacillin/tazobactam IVPB.. 3.375 Gram(s) every 8 hours    Neuro, Psych and Analgesia  acetaminophen     Tablet .. 975 milliGRAM(s) every 6 hours PRN  gabapentin 100 milliGRAM(s) two times a day  oxyCODONE    IR 5 milliGRAM(s) every 4 hours PRN    Cardiovascular and Hematologic (includes DVT ppx/AC/Antiplatelet agents)  aspirin enteric coated 81 milliGRAM(s) daily  atorvastatin 40 milliGRAM(s) at bedtime  clopidogrel Tablet 75 milliGRAM(s) daily  enoxaparin Injectable 40 milliGRAM(s) every 24 hours  losartan 25 milliGRAM(s) daily    GI, Endocrine and Nutrition  insulin lispro (ADMELOG) corrective regimen sliding scale   every 6 hours  multivitamin/minerals 1 Tablet(s) daily
Patient is a 73y old  Male who presents with a chief complaint of Left foot debridement and ulcer (06 Mar 2024 17:23)      SUBJECTIVE / OVERNIGHT EVENTS:    Events noted.  CONSTITUTIONAL: No fever,  or fatigue  RESPIRATORY: No cough, wheezing,  No shortness of breath  CARDIOVASCULAR: No chest pain, palpitations, dizziness, or leg swelling  GASTROINTESTINAL: No abdominal or epigastric pain.       MEDICATIONS  (STANDING):  aspirin enteric coated 81 milliGRAM(s) Oral daily  atorvastatin 40 milliGRAM(s) Oral at bedtime  chlorhexidine 2% Cloths 1 Application(s) Topical <User Schedule>  clopidogrel Tablet 75 milliGRAM(s) Oral daily  dextrose 5%. 1000 milliLiter(s) (100 mL/Hr) IV Continuous <Continuous>  dextrose 5%. 1000 milliLiter(s) (50 mL/Hr) IV Continuous <Continuous>  dextrose 5%. 1000 milliLiter(s) (50 mL/Hr) IV Continuous <Continuous>  dextrose 50% Injectable 12.5 Gram(s) IV Push once  dextrose 50% Injectable 25 Gram(s) IV Push once  dextrose 50% Injectable 25 Gram(s) IV Push once  dextrose 50% Injectable 25 Gram(s) IV Push once  enoxaparin Injectable 40 milliGRAM(s) SubCutaneous every 24 hours  gabapentin 100 milliGRAM(s) Oral two times a day  glucagon  Injectable 1 milliGRAM(s) IntraMuscular once  glucagon  Injectable 1 milliGRAM(s) IntraMuscular once  influenza  Vaccine (HIGH DOSE) 0.7 milliLiter(s) IntraMuscular once  insulin lispro (ADMELOG) corrective regimen sliding scale   SubCutaneous three times a day before meals  insulin lispro (ADMELOG) corrective regimen sliding scale   SubCutaneous at bedtime  losartan 25 milliGRAM(s) Oral daily  multivitamin/minerals 1 Tablet(s) Oral daily  piperacillin/tazobactam IVPB.. 3.375 Gram(s) IV Intermittent every 8 hours    MEDICATIONS  (PRN):  acetaminophen     Tablet .. 650 milliGRAM(s) Oral every 6 hours PRN Mild Pain (1 - 3)  dextrose Oral Gel 15 Gram(s) Oral once PRN Blood Glucose LESS THAN 70 milliGRAM(s)/deciliter  dextrose Oral Gel 15 Gram(s) Oral once PRN Blood Glucose LESS THAN 70 milliGRAM(s)/deciliter  oxyCODONE    IR 5 milliGRAM(s) Oral every 4 hours PRN Moderate Pain (4 - 6)  oxyCODONE    IR 10 milliGRAM(s) Oral every 4 hours PRN Severe Pain (7 - 10)        CAPILLARY BLOOD GLUCOSE      POCT Blood Glucose.: 132 mg/dL (06 Mar 2024 17:32)  POCT Blood Glucose.: 134 mg/dL (06 Mar 2024 15:39)  POCT Blood Glucose.: 150 mg/dL (06 Mar 2024 12:01)  POCT Blood Glucose.: 145 mg/dL (06 Mar 2024 05:33)  POCT Blood Glucose.: 216 mg/dL (05 Mar 2024 21:18)    I&O's Summary    05 Mar 2024 07:01  -  06 Mar 2024 07:00  --------------------------------------------------------  IN: 1500 mL / OUT: 1850 mL / NET: -350 mL    06 Mar 2024 07:01  -  06 Mar 2024 19:52  --------------------------------------------------------  IN: 0 mL / OUT: 600 mL / NET: -600 mL        T(C): 36.3 (03-06-24 @ 18:45), Max: 37 (03-06-24 @ 01:05)  HR: 67 (03-06-24 @ 19:00) (65 - 83)  BP: 144/60 (03-06-24 @ 19:00) (113/58 - 161/77)  RR: 18 (03-06-24 @ 19:00) (16 - 19)  SpO2: 100% (03-06-24 @ 19:00) (97% - 100%)    PHYSICAL EXAM:    NECK: Supple, No JVD  CHEST/LUNG: Clear to auscultation bilaterally; No wheezing.  HEART: Regular rate and rhythm; No murmurs, rubs, or gallops  ABDOMEN: Soft, Nontender, Nondistended; Bowel sounds present  EXTREMITIES:   No edema  NEUROLOGY: AAO X 3      LABS:                        9.1    5.58  )-----------( 247      ( 06 Mar 2024 04:48 )             28.1     03-06    134<L>  |  103  |  25<H>  ----------------------------<  164<H>  5.0   |  21<L>  |  1.05    Ca    9.7      06 Mar 2024 04:47  Phos  4.0     03-06  Mg     2.3     03-06    TPro  8.1  /  Alb  3.7  /  TBili  0.2  /  DBili  x   /  AST  19  /  ALT  30  /  AlkPhos  81  03-04    PT/INR - ( 06 Mar 2024 04:48 )   PT: 11.7 sec;   INR: 1.07 ratio         PTT - ( 06 Mar 2024 04:48 )  PTT:29.7 sec      Urinalysis Basic - ( 06 Mar 2024 04:47 )    Color: x / Appearance: x / SG: x / pH: x  Gluc: 164 mg/dL / Ketone: x  / Bili: x / Urobili: x   Blood: x / Protein: x / Nitrite: x   Leuk Esterase: x / RBC: x / WBC x   Sq Epi: x / Non Sq Epi: x / Bacteria: x      CAPILLARY BLOOD GLUCOSE      POCT Blood Glucose.: 132 mg/dL (06 Mar 2024 17:32)  POCT Blood Glucose.: 134 mg/dL (06 Mar 2024 15:39)  POCT Blood Glucose.: 150 mg/dL (06 Mar 2024 12:01)  POCT Blood Glucose.: 145 mg/dL (06 Mar 2024 05:33)  POCT Blood Glucose.: 216 mg/dL (05 Mar 2024 21:18)        RADIOLOGY & ADDITIONAL TESTS:    Imaging Personally Reviewed:    Consultant(s) Notes Reviewed:      Care Discussed with Consultants/Other Providers:    Luciano Lo MD, CMD, FACP    [address] [place] [address]  Office Tel: [phone]  Cell: [phone]  
Podiatry pager #: [phone] ([place])/ [phone] ([facility])    Patient is a 73y old  Male who presents with a chief complaint of Left foot debridement and ulcer (05 Mar 2024 01:34)       INTERVAL HPI/OVERNIGHT EVENTS:  Patient seen and evaluated at bedside.  Pt is resting comfortable in NAD. Denies N/V/F/C.     Allergies    No Known Allergies    Intolerances        Vital Signs Last 24 Hrs  T(C): 36.3 (05 Mar 2024 09:13), Max: 37.2 (04 Mar 2024 17:41)  T(F): 97.4 (05 Mar 2024 09:13), Max: 99 (04 Mar 2024 17:41)  HR: 73 (05 Mar 2024 09:13) (70 - 85)  BP: 147/74 (05 Mar 2024 09:13) (147/74 - 172/71)  BP(mean): 99 (05 Mar 2024 01:45) (99 - 99)  RR: 18 (05 Mar 2024 09:13) (18 - 19)  SpO2: 99% (05 Mar 2024 09:13) (98% - 99%)    Parameters below as of 05 Mar 2024 09:13  Patient On (Oxygen Delivery Method): room air        LABS:                        8.7    5.43  )-----------( 239      ( 05 Mar 2024 07:12 )             28.4     03-05    137  |  104  |  28<H>  ----------------------------<  152<H>  4.4   |  23  |  0.96    Ca    9.7      05 Mar 2024 07:14  Phos  3.9     03-05  Mg     2.4     03-05    TPro  8.1  /  Alb  3.7  /  TBili  0.2  /  DBili  x   /  AST  19  /  ALT  30  /  AlkPhos  81  03-04    PT/INR - ( 04 Mar 2024 20:34 )   PT: 11.0 sec;   INR: 1.05 ratio         PTT - ( 04 Mar 2024 20:34 )  PTT:33.3 sec  Urinalysis Basic - ( 05 Mar 2024 07:14 )    Color: x / Appearance: x / SG: x / pH: x  Gluc: 152 mg/dL / Ketone: x  / Bili: x / Urobili: x   Blood: x / Protein: x / Nitrite: x   Leuk Esterase: x / RBC: x / WBC x   Sq Epi: x / Non Sq Epi: x / Bacteria: x      CAPILLARY BLOOD GLUCOSE      POCT Blood Glucose.: 160 mg/dL (05 Mar 2024 07:59)      Lower Extremity Physical Exam:  Vascular: DP/PT 0/4, B/L, Temperature gradient warm to cool, B/L.   Neuro: Epicritic sensation diminished to the level of digits, B/L.  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 Left foot heel wound well adhered eschar, no malodor, no pus.   RADIOLOGY & ADDITIONAL TESTS:  
Vital Signs Last 24 Hrs  T(C): 37 (08 Mar 2024 05:30), Max: 38.5 (07 Mar 2024 18:25)  T(F): 98.6 (08 Mar 2024 05:30), Max: 101.3 (07 Mar 2024 18:25)  HR: 79 (08 Mar 2024 05:30) (79 - 109)  BP: 119/67 (08 Mar 2024 05:30) (100/58 - 166/68)  BP(mean): --  RR: 18 (08 Mar 2024 05:30) (16 - 18)  SpO2: 96% (08 Mar 2024 05:30) (94% - 100%)    Parameters below as of 08 Mar 2024 05:30  Patient On (Oxygen Delivery Method): room air      SUBJECTIVE: Pt seen and examined this morning on rounds. Patient states he is okay, feeling tired. Denies any fever, chills, n/v/d, SOB, or chest pain.    General: NAD, resting comfortably in bed  Head: NC, AT  Pulmonary: Nonlabored breathing  Abdominal: soft, NT/ND  Extremities: WWP, LLE covered by ace bandage, compartment soft with no strikethrough.       I&O's Summary    07 Mar 2024 07:01  -  08 Mar 2024 07:00  --------------------------------------------------------  IN: 1950 mL / OUT: 650 mL / NET: 1300 mL      I&O's Detail    07 Mar 2024 07:01  -  08 Mar 2024 07:00  --------------------------------------------------------  IN:    IV PiggyBack: 1080 mL    Oral Fluid: 870 mL  Total IN: 1950 mL    OUT:    Voided (mL): 650 mL  Total OUT: 650 mL    Total NET: 1300 mL      MEDICATIONS  (STANDING):  aspirin enteric coated 81 milliGRAM(s) Oral daily  atorvastatin 40 milliGRAM(s) Oral at bedtime  chlorhexidine 2% Cloths 1 Application(s) Topical <User Schedule>  clopidogrel Tablet 75 milliGRAM(s) Oral daily  dextrose 5%. 1000 milliLiter(s) (50 mL/Hr) IV Continuous <Continuous>  dextrose 5%. 1000 milliLiter(s) (100 mL/Hr) IV Continuous <Continuous>  dextrose 5%. 1000 milliLiter(s) (50 mL/Hr) IV Continuous <Continuous>  dextrose 50% Injectable 25 Gram(s) IV Push once  dextrose 50% Injectable 25 Gram(s) IV Push once  dextrose 50% Injectable 12.5 Gram(s) IV Push once  dextrose 50% Injectable 25 Gram(s) IV Push once  enoxaparin Injectable 40 milliGRAM(s) SubCutaneous every 24 hours  gabapentin 100 milliGRAM(s) Oral two times a day  glucagon  Injectable 1 milliGRAM(s) IntraMuscular once  glucagon  Injectable 1 milliGRAM(s) IntraMuscular once  influenza  Vaccine (HIGH DOSE) 0.7 milliLiter(s) IntraMuscular once  insulin lispro (ADMELOG) corrective regimen sliding scale   SubCutaneous three times a day before meals  insulin lispro (ADMELOG) corrective regimen sliding scale   SubCutaneous at bedtime  losartan 25 milliGRAM(s) Oral daily  multivitamin/minerals 1 Tablet(s) Oral daily  piperacillin/tazobactam IVPB.. 3.375 Gram(s) IV Intermittent every 8 hours  sodium chloride 0.9%. 1000 milliLiter(s) (90 mL/Hr) IV Continuous <Continuous>    MEDICATIONS  (PRN):  acetaminophen     Tablet .. 650 milliGRAM(s) Oral every 6 hours PRN Mild Pain (1 - 3)  dextrose Oral Gel 15 Gram(s) Oral once PRN Blood Glucose LESS THAN 70 milliGRAM(s)/deciliter  dextrose Oral Gel 15 Gram(s) Oral once PRN Blood Glucose LESS THAN 70 milliGRAM(s)/deciliter  oxyCODONE    IR 2.5 milliGRAM(s) Oral every 6 hours PRN Severe Pain (7 - 10)      LABS:                        6.3    8.53  )-----------( 197      ( 08 Mar 2024 05:42 )             20.0     03-08    133<L>  |  101  |  31<H>  ----------------------------<  198<H>  4.2   |  22  |  1.51<H>    Ca    8.5      08 Mar 2024 04:22  Phos  3.6     03-08  Mg     2.2     03-08

## 2024-03-10 NOTE — PROGRESS NOTE ADULT - ASSESSMENT
Mr. Paredes is a 73 year old man with a history of peripheral arterial disease requiring bilateral lower extremity vascular stent placement and right BKA (7/20/2023), left TMA with recent angioplasty, and popliteal and peroneal artery stent placement (2/24/2024), now admitted after being sent in by his podiatrist Dr. Mcclure for a left foot wound. He is now status post revisional TMA and left heel wound debridement left open with graft application 3/6, per podiatry brief operative note low concern for viability or residual infection, however remained admitted for transient fever associated with AMS PM 3/7. OR cultures now growing Corynebacterium striatum and Enterobacter cloacae, for which he is receiving IV vancomycin with plan for discharge on trimethoprim/sulfamethoxazole pending final pathology, per ID recommendations.     - AC/DVT/Antiplatelet: DAPT (ASA/Clopidogrel) + LVX ppx.   - Antimicrobials: Vancomycin.   - Diet: Regular.   - IVF: None.   - Pain: APAP/Oxy PRN.   - Wound care per podiatry.   - Dispo: Discharge to home, pending final antibiotic plan from ID.    Greyson Charles, PGY-1  Vascular Surgery ([id number])

## 2024-03-10 NOTE — PROGRESS NOTE ADULT - PROVIDER SPECIALTY LIST ADULT
Infectious Disease
Infectious Disease
Internal Medicine
Podiatry
Infectious Disease
Internal Medicine
Vascular Surgery
Infectious Disease
Podiatry
Vascular Surgery
Vascular Surgery

## 2024-03-10 NOTE — PROGRESS NOTE ADULT - REASON FOR ADMISSION
Left foot debridement and ulcer

## 2024-03-11 ENCOUNTER — APPOINTMENT (OUTPATIENT)
Dept: HYPERBARIC MEDICINE | Facility: CLINIC | Age: 74
End: 2024-03-11
Payer: MEDICARE

## 2024-03-11 ENCOUNTER — OUTPATIENT (OUTPATIENT)
Dept: OUTPATIENT SERVICES | Facility: HOSPITAL | Age: 74
LOS: 1 days | End: 2024-03-11
Payer: COMMERCIAL

## 2024-03-11 VITALS
RESPIRATION RATE: 16 BRPM | SYSTOLIC BLOOD PRESSURE: 110 MMHG | DIASTOLIC BLOOD PRESSURE: 54 MMHG | TEMPERATURE: 97.7 F | OXYGEN SATURATION: 97 % | HEART RATE: 82 BPM

## 2024-03-11 VITALS
RESPIRATION RATE: 16 BRPM | DIASTOLIC BLOOD PRESSURE: 77 MMHG | SYSTOLIC BLOOD PRESSURE: 137 MMHG | HEART RATE: 79 BPM | OXYGEN SATURATION: 97 %

## 2024-03-11 PROCEDURE — 82962 GLUCOSE BLOOD TEST: CPT

## 2024-03-11 PROCEDURE — G0277: CPT

## 2024-03-11 PROCEDURE — 99183 HYPERBARIC OXYGEN THERAPY: CPT

## 2024-03-11 NOTE — PROCEDURE
[Outpatient] : Outpatient [Ambulatory] : Patient is ambulatory. [Walker] : walker [THIS CHAMBER HAS BEEN CLEANED / DISINFECTED] : This chamber has been cleaned / disinfected according to local and hospital policy and procedure prior to this treatment. [] : Yes [____] : Post-Dive: Time - [unfilled] [___] : Post-Dive: Value - [unfilled] mg/dL [I have examined and evaluated this patient today, including ears and lungs and have cleared the patient] : I have examined and evaluated this patient today, including ears and lungs and have cleared the patient to continue HBOT as prescribed.  [I have ordered 1 HBOT session at the indicated protocol as seen below] : I have ordered 1 HBOT session at the indicated protocol as seen below [Patient demonstrated and verbalized proper technique for using air break mask] : Patient demonstrated and verbalized proper technique for using air break mask [Patient educated on the risks of SMOKING prior to HBOT with understanding] : Patient educated on the risks of SMOKING prior to HBOT with understanding [Patient educated on the risks of CONSUMING ALCOHOL prior to HBOT with understanding] : Patient educated on the risks of CONSUMING ALCOHOL prior to HBOT with understanding [100% Cotton] : 100% cotton [Empty all pockets] : empty all pockets [No hair oils, wigs, hairpieces, pins] : no hair oils, wigs, hairpieces, pins  [Pre tx medications] : pre tx medications  [No make-up, creams] : no make-up, creams  [No jewelry] : no jewelry  [No matches, cigarettes, lighters] : no matches, cigarettes, lighters  [Hearing aid removed] : hearing aid removed [Dentures removed] : dentures removed [Ground bracelet on pt's wrist] : ground bracelet on pt's wrist  [Contacts removed] : contacts removed  [Remove nail polish] : remove nail polish  [No reading material] : no reading material  [Bra, undergarments removed] : bra, undergarments removed  [No contraindicated dressings] : no contraindicated dressings [Ground Wire - VISUAL Verification - Intact/Free of Obstruction] : Ground Wire - VISUAL Verification - Intact/Free of Obstruction  [Ground Continuity - Verified < 1 ohm w/ Wrist Strap Rafal] : Ground Continuity - Verified < 1 ohm w/ Wrist Strap Rafal [Clear all fields] : clear all fields [Diagnosis: ___] : Diagnosis: [unfilled] [Number: ___] : Number: [unfilled]

## 2024-03-12 ENCOUNTER — APPOINTMENT (OUTPATIENT)
Dept: HYPERBARIC MEDICINE | Facility: CLINIC | Age: 74
End: 2024-03-12
Payer: MEDICARE

## 2024-03-12 ENCOUNTER — OUTPATIENT (OUTPATIENT)
Dept: OUTPATIENT SERVICES | Facility: HOSPITAL | Age: 74
LOS: 1 days | End: 2024-03-12
Payer: COMMERCIAL

## 2024-03-12 VITALS
DIASTOLIC BLOOD PRESSURE: 61 MMHG | HEART RATE: 77 BPM | RESPIRATION RATE: 16 BRPM | TEMPERATURE: 97.5 F | OXYGEN SATURATION: 98 % | SYSTOLIC BLOOD PRESSURE: 120 MMHG

## 2024-03-12 VITALS
RESPIRATION RATE: 16 BRPM | HEART RATE: 72 BPM | OXYGEN SATURATION: 98 % | DIASTOLIC BLOOD PRESSURE: 75 MMHG | SYSTOLIC BLOOD PRESSURE: 137 MMHG

## 2024-03-12 DIAGNOSIS — M79.606 PAIN IN LEG, UNSPECIFIED: ICD-10-CM

## 2024-03-12 PROCEDURE — 99183 HYPERBARIC OXYGEN THERAPY: CPT

## 2024-03-12 PROCEDURE — G0277: CPT

## 2024-03-12 PROCEDURE — 82962 GLUCOSE BLOOD TEST: CPT

## 2024-03-12 NOTE — ASSESSMENT
[No change from previous assessment] : No change from previous assessment [Patient prepared for dive] : Patient prepared for dive [Patient undergoing HBO treatment for __________] : Patient undergoing HBO treatment for [unfilled] [Patient descended without problem for 9 minutes] : Patient descended without problem for 9 minutes [No ear problems] : No ear problems [No dizziness or thirst] :  No dizziness or thirst [Tolerating dive well] : Tolerating dive well [Vital signs stable] : Vital signs stable [Respiratory Rate Stable] : Respiratory Rate Stable [No Chest Pain, shortness of breath] : No Chest Pain, shortness of breath [No chest pain, shortness of breath, or ear pain] :  No chest pain, shortness of breath, or ear pain  [Tolerated Ascent well] : Tolerated Ascent well [A physician was present throughout the entire HBOT] : A physician was present throughout the entire HBOT [Vital Signs stable] : Vital Signs stable [Clinically Stable] : Clinically stable [No] : No [0] : 0 out of 10 [Continue Treatment Plan] : Continue treatment plan

## 2024-03-12 NOTE — PROCEDURE
[Outpatient] : Outpatient [Ambulatory] : Patient is ambulatory. [Walker] : walker [THIS CHAMBER HAS BEEN CLEANED / DISINFECTED] : This chamber has been cleaned / disinfected according to local and hospital policy and procedure prior to this treatment. [____] : Post-Dive: Time - [unfilled] [___] : Post-Dive: Value - [unfilled] mg/dL [I have examined and evaluated this patient today, including ears and lungs and have cleared the patient] : I have examined and evaluated this patient today, including ears and lungs and have cleared the patient to continue HBOT as prescribed.  [I have ordered 1 HBOT session at the indicated protocol as seen below] : I have ordered 1 HBOT session at the indicated protocol as seen below [Patient demonstrated and verbalized proper technique for using air break mask] : Patient demonstrated and verbalized proper technique for using air break mask [Patient educated on the risks of SMOKING prior to HBOT with understanding] : Patient educated on the risks of SMOKING prior to HBOT with understanding [Patient educated on the risks of CONSUMING ALCOHOL prior to HBOT with understanding] : Patient educated on the risks of CONSUMING ALCOHOL prior to HBOT with understanding [100% Cotton] : 100% cotton [Empty all pockets] : empty all pockets [No make-up, creams] : no make-up, creams  [No hair oils, wigs, hairpieces, pins] : no hair oils, wigs, hairpieces, pins  [Pre tx medications] : pre tx medications  [No jewelry] : no jewelry  [No matches, cigarettes, lighters] : no matches, cigarettes, lighters  [Hearing aid removed] : hearing aid removed [Dentures removed] : dentures removed [Ground bracelet on pt's wrist] : ground bracelet on pt's wrist  [Contacts removed] : contacts removed  [Remove nail polish] : remove nail polish  [Bra, undergarments removed] : bra, undergarments removed  [No reading material] : no reading material  [Ground Wire - VISUAL Verification - Intact/Free of Obstruction] : Ground Wire - VISUAL Verification - Intact/Free of Obstruction  [No contraindicated dressings] : no contraindicated dressings [Ground Continuity - Verified < 1 ohm w/ Wrist Strap Rafal] : Ground Continuity - Verified < 1 ohm w/ Wrist Strap Rafal [Diagnosis: ___] : Diagnosis: [unfilled] [Number: ___] : Number: [unfilled] [Clear all fields] : clear all fields [] : No [FreeTextEntry3] : 90 min [FreeTextEntry1] : 2.0 ramonita [FreeTextEntry5] : 1600 [FreeTextEntry7] : 5340 [de-identified] : 9445 [de-identified] : 110 min [FreeCH[facility] Hospital[facility]Entry9] : 0807

## 2024-03-13 ENCOUNTER — OUTPATIENT (OUTPATIENT)
Dept: OUTPATIENT SERVICES | Facility: HOSPITAL | Age: 74
LOS: 1 days | End: 2024-03-13
Payer: COMMERCIAL

## 2024-03-13 ENCOUNTER — APPOINTMENT (OUTPATIENT)
Dept: HYPERBARIC MEDICINE | Facility: CLINIC | Age: 74
End: 2024-03-13
Payer: MEDICARE

## 2024-03-13 VITALS
SYSTOLIC BLOOD PRESSURE: 163 MMHG | RESPIRATION RATE: 80 BRPM | TEMPERATURE: 98 F | OXYGEN SATURATION: 99 % | DIASTOLIC BLOOD PRESSURE: 69 MMHG | HEART RATE: 80 BPM

## 2024-03-13 VITALS
SYSTOLIC BLOOD PRESSURE: 163 MMHG | HEART RATE: 73 BPM | OXYGEN SATURATION: 99 % | RESPIRATION RATE: 16 BRPM | DIASTOLIC BLOOD PRESSURE: 78 MMHG

## 2024-03-13 DIAGNOSIS — M79.606 PAIN IN LEG, UNSPECIFIED: ICD-10-CM

## 2024-03-13 DIAGNOSIS — L97.522 NON-PRESSURE CHRONIC ULCER OF OTHER PART OF LEFT FOOT WITH FAT LAYER EXPOSED: ICD-10-CM

## 2024-03-13 DIAGNOSIS — Z89.429 ACQUIRED ABSENCE OF OTHER TOE(S), UNSPECIFIED SIDE: Chronic | ICD-10-CM

## 2024-03-13 DIAGNOSIS — T86.828 OTHER COMPLICATIONS OF SKIN GRAFT (ALLOGRAFT) (AUTOGRAFT): ICD-10-CM

## 2024-03-13 LAB
CULTURE RESULTS: SIGNIFICANT CHANGE UP
SPECIMEN SOURCE: SIGNIFICANT CHANGE UP

## 2024-03-13 PROCEDURE — 82962 GLUCOSE BLOOD TEST: CPT

## 2024-03-13 PROCEDURE — G0277: CPT

## 2024-03-13 PROCEDURE — 99183 HYPERBARIC OXYGEN THERAPY: CPT

## 2024-03-13 NOTE — PROCEDURE
[Outpatient] : Outpatient [Ambulatory] : Patient is ambulatory. [Walker] : walker [THIS CHAMBER HAS BEEN CLEANED / DISINFECTED] : This chamber has been cleaned / disinfected according to local and hospital policy and procedure prior to this treatment. [____] : Post-Dive: Time - [unfilled] [___] : Post-Dive: Value - [unfilled] mg/dL [I have examined and evaluated this patient today, including ears and lungs and have cleared the patient] : I have examined and evaluated this patient today, including ears and lungs and have cleared the patient to continue HBOT as prescribed.  [I have ordered 1 HBOT session at the indicated protocol as seen below] : I have ordered 1 HBOT session at the indicated protocol as seen below [Patient demonstrated and verbalized proper technique for using air break mask] : Patient demonstrated and verbalized proper technique for using air break mask [Patient educated on the risks of CONSUMING ALCOHOL prior to HBOT with understanding] : Patient educated on the risks of CONSUMING ALCOHOL prior to HBOT with understanding [Patient educated on the risks of SMOKING prior to HBOT with understanding] : Patient educated on the risks of SMOKING prior to HBOT with understanding [Empty all pockets] : empty all pockets [100% Cotton] : 100% cotton [Pre tx medications] : pre tx medications  [No hair oils, wigs, hairpieces, pins] : no hair oils, wigs, hairpieces, pins  [No jewelry] : no jewelry  [No make-up, creams] : no make-up, creams  [Hearing aid removed] : hearing aid removed [No matches, cigarettes, lighters] : no matches, cigarettes, lighters  [Dentures removed] : dentures removed [Ground bracelet on pt's wrist] : ground bracelet on pt's wrist  [Remove nail polish] : remove nail polish  [Contacts removed] : contacts removed  [No reading material] : no reading material  [Bra, undergarments removed] : bra, undergarments removed  [No contraindicated dressings] : no contraindicated dressings [Ground Wire - VISUAL Verification - Intact/Free of Obstruction] : Ground Wire - VISUAL Verification - Intact/Free of Obstruction  [Ground Continuity - Verified < 1 ohm w/ Wrist Strap Rafal] : Ground Continuity - Verified < 1 ohm w/ Wrist Strap [place]fal [Clear all fields] : clear all fields [Diagnosis: ___] : Diagnosis: [unfilled] [Number: ___] : Number: [unfilled] [] : No [FreeTextEntry1] : 2.0 ramonita [FreeTextEntry3] : 90 min [FreeTextEntry5] : 1600 [FreeTextEntry7] : 3932 [FreeMem[facility] HospitaltEntry9] : 9357 [de-identified] : 8309 [de-identified] : 110 min

## 2024-03-13 NOTE — ASSESSMENT
[No change from previous assessment] : No change from previous assessment [Patient prepared for dive] : Patient prepared for dive [Patient descended without problem for 9 minutes] : Patient descended without problem for 9 minutes [Patient undergoing HBO treatment for __________] : Patient undergoing HBO treatment for [unfilled] [No dizziness or thirst] :  No dizziness or thirst [No ear problems] : No ear problems [Vital signs stable] : Vital signs stable [Tolerating dive well] : Tolerating dive well [No Chest Pain, shortness of breath] : No Chest Pain, shortness of breath [No chest pain, shortness of breath, or ear pain] :  No chest pain, shortness of breath, or ear pain  [Respiratory Rate Stable] : Respiratory Rate Stable [Tolerated Ascent well] : Tolerated Ascent well [Vital Signs stable] : Vital Signs stable [A physician was present throughout the entire HBOT] : A physician was present throughout the entire HBOT [No] : No [Clinically Stable] : Clinically stable [Continue Treatment Plan] : Continue treatment plan [0] : 0 out of 10

## 2024-03-14 ENCOUNTER — OUTPATIENT (OUTPATIENT)
Dept: OUTPATIENT SERVICES | Facility: HOSPITAL | Age: 74
LOS: 1 days | End: 2024-03-14

## 2024-03-14 ENCOUNTER — APPOINTMENT (OUTPATIENT)
Dept: HYPERBARIC MEDICINE | Facility: CLINIC | Age: 74
End: 2024-03-14
Payer: MEDICARE

## 2024-03-14 VITALS
RESPIRATION RATE: 16 BRPM | OXYGEN SATURATION: 98 % | HEART RATE: 80 BPM | DIASTOLIC BLOOD PRESSURE: 62 MMHG | SYSTOLIC BLOOD PRESSURE: 151 MMHG | TEMPERATURE: 98.2 F

## 2024-03-14 VITALS
OXYGEN SATURATION: 98 % | DIASTOLIC BLOOD PRESSURE: 83 MMHG | SYSTOLIC BLOOD PRESSURE: 136 MMHG | HEART RATE: 72 BPM | RESPIRATION RATE: 16 BRPM

## 2024-03-14 DIAGNOSIS — L97.522 NON-PRESSURE CHRONIC ULCER OF OTHER PART OF LEFT FOOT WITH FAT LAYER EXPOSED: ICD-10-CM

## 2024-03-14 DIAGNOSIS — T86.828 OTHER COMPLICATIONS OF SKIN GRAFT (ALLOGRAFT) (AUTOGRAFT): ICD-10-CM

## 2024-03-14 DIAGNOSIS — M79.606 PAIN IN LEG, UNSPECIFIED: ICD-10-CM

## 2024-03-14 DIAGNOSIS — Z89.429 ACQUIRED ABSENCE OF OTHER TOE(S), UNSPECIFIED SIDE: Chronic | ICD-10-CM

## 2024-03-14 PROCEDURE — 99183 HYPERBARIC OXYGEN THERAPY: CPT

## 2024-03-14 NOTE — PROCEDURE
[Ambulatory] : Patient is ambulatory. [Outpatient] : Outpatient [Walker] : walker [THIS CHAMBER HAS BEEN CLEANED / DISINFECTED] : This chamber has been cleaned / disinfected according to local and hospital policy and procedure prior to this treatment. [____] : Post-Dive: Time - [unfilled] [___] : Post-Dive: Value - [unfilled] mg/dL [I have examined and evaluated this patient today, including ears and lungs and have cleared the patient] : I have examined and evaluated this patient today, including ears and lungs and have cleared the patient to continue HBOT as prescribed.  [I have ordered 1 HBOT session at the indicated protocol as seen below] : I have ordered 1 HBOT session at the indicated protocol as seen below [Patient educated on the risks of SMOKING prior to HBOT with understanding] : Patient educated on the risks of SMOKING prior to HBOT with understanding [Patient demonstrated and verbalized proper technique for using air break mask] : Patient demonstrated and verbalized proper technique for using air break mask [Patient educated on the risks of CONSUMING ALCOHOL prior to HBOT with understanding] : Patient educated on the risks of CONSUMING ALCOHOL prior to HBOT with understanding [100% Cotton] : 100% cotton [No hair oils, wigs, hairpieces, pins] : no hair oils, wigs, hairpieces, pins  [Empty all pockets] : empty all pockets [Pre tx medications] : pre tx medications  [No make-up, creams] : no make-up, creams  [No jewelry] : no jewelry  [No matches, cigarettes, lighters] : no matches, cigarettes, lighters  [Hearing aid removed] : hearing aid removed [Dentures removed] : dentures removed [Ground bracelet on pt's wrist] : ground bracelet on pt's wrist  [Contacts removed] : contacts removed  [Remove nail polish] : remove nail polish  [Bra, undergarments removed] : bra, undergarments removed  [No reading material] : no reading material  [Ground Wire - VISUAL Verification - Intact/Free of Obstruction] : Ground Wire - VISUAL Verification - Intact/Free of Obstruction  [No contraindicated dressings] : no contraindicated dressings [Ground Continuity - Verified < 1 ohm w/ Wrist Strap Rafal] : Ground Continuity - Verified < 1 ohm w/ Wrist Strap Rafal [Diagnosis: ___] : Diagnosis: [unfilled] [Number: ___] : Number: [unfilled] [Clear all fields] : clear all fields [FreeTextEntry1] : 2.0 ramonita [] : No [FreeTextEntry3] : 90 min [FreeTextEntry5] : 5644 [FreeTextEntry7] : 4857 [FreeUniversity [facility] CentertEntry9] : 1848 [de-identified] : 7801 [de-identified] : 110 min

## 2024-03-14 NOTE — ASSESSMENT
[No change from previous assessment] : No change from previous assessment [Patient prepared for dive] : Patient prepared for dive [Patient undergoing HBO treatment for __________] : Patient undergoing HBO treatment for [unfilled] [Patient descended without problem for 9 minutes] : Patient descended without problem for 9 minutes [No dizziness or thirst] :  No dizziness or thirst [No ear problems] : No ear problems [Vital signs stable] : Vital signs stable [Tolerating dive well] : Tolerating dive well [No Chest Pain, shortness of breath] : No Chest Pain, shortness of breath [Respiratory Rate Stable] : Respiratory Rate Stable [Tolerated Ascent well] : Tolerated Ascent well [No chest pain, shortness of breath, or ear pain] :  No chest pain, shortness of breath, or ear pain  [Vital Signs stable] : Vital Signs stable [A physician was present throughout the entire HBOT] : A physician was present throughout the entire HBOT [No] : No [Continue Treatment Plan] : Continue treatment plan [Clinically Stable] : Clinically stable [0] : 0 out of 10

## 2024-03-15 ENCOUNTER — OUTPATIENT (OUTPATIENT)
Dept: OUTPATIENT SERVICES | Facility: HOSPITAL | Age: 74
LOS: 1 days | End: 2024-03-15
Payer: COMMERCIAL

## 2024-03-15 ENCOUNTER — APPOINTMENT (OUTPATIENT)
Dept: HYPERBARIC MEDICINE | Facility: CLINIC | Age: 74
End: 2024-03-15
Payer: MEDICARE

## 2024-03-15 VITALS
RESPIRATION RATE: 16 BRPM | SYSTOLIC BLOOD PRESSURE: 162 MMHG | OXYGEN SATURATION: 99 % | HEART RATE: 76 BPM | DIASTOLIC BLOOD PRESSURE: 80 MMHG

## 2024-03-15 VITALS
OXYGEN SATURATION: 99 % | DIASTOLIC BLOOD PRESSURE: 64 MMHG | HEART RATE: 83 BPM | RESPIRATION RATE: 16 BRPM | SYSTOLIC BLOOD PRESSURE: 148 MMHG | TEMPERATURE: 98.3 F

## 2024-03-15 DIAGNOSIS — Z89.429 ACQUIRED ABSENCE OF OTHER TOE(S), UNSPECIFIED SIDE: Chronic | ICD-10-CM

## 2024-03-15 PROCEDURE — 99183 HYPERBARIC OXYGEN THERAPY: CPT

## 2024-03-15 PROCEDURE — 82962 GLUCOSE BLOOD TEST: CPT

## 2024-03-15 PROCEDURE — G0277: CPT

## 2024-03-15 NOTE — PROCEDURE
[Outpatient] : Outpatient [Ambulatory] : Patient is ambulatory. [Walker] : walker [THIS CHAMBER HAS BEEN CLEANED / DISINFECTED] : This chamber has been cleaned / disinfected according to local and hospital policy and procedure prior to this treatment. [____] : Post-Dive: Time - [unfilled] [___] : Post-Dive: Value - [unfilled] mg/dL [I have ordered 1 HBOT session at the indicated protocol as seen below] : I have ordered 1 HBOT session at the indicated protocol as seen below [I have examined and evaluated this patient today, including ears and lungs and have cleared the patient] : I have examined and evaluated this patient today, including ears and lungs and have cleared the patient to continue HBOT as prescribed.  [Patient demonstrated and verbalized proper technique for using air break mask] : Patient demonstrated and verbalized proper technique for using air break mask [Patient educated on the risks of SMOKING prior to HBOT with understanding] : Patient educated on the risks of SMOKING prior to HBOT with understanding [Patient educated on the risks of CONSUMING ALCOHOL prior to HBOT with understanding] : Patient educated on the risks of CONSUMING ALCOHOL prior to HBOT with understanding [100% Cotton] : 100% cotton [Empty all pockets] : empty all pockets [No hair oils, wigs, hairpieces, pins] : no hair oils, wigs, hairpieces, pins  [Pre tx medications] : pre tx medications  [No make-up, creams] : no make-up, creams  [No jewelry] : no jewelry  [No matches, cigarettes, lighters] : no matches, cigarettes, lighters  [Hearing aid removed] : hearing aid removed [Dentures removed] : dentures removed [Ground bracelet on pt's wrist] : ground bracelet on pt's wrist  [Contacts removed] : contacts removed  [Remove nail polish] : remove nail polish  [Bra, undergarments removed] : bra, undergarments removed  [No reading material] : no reading material  [No contraindicated dressings] : no contraindicated dressings [Ground Wire - VISUAL Verification - Intact/Free of Obstruction] : Ground Wire - VISUAL Verification - Intact/Free of Obstruction  [Ground Continuity - Verified < 1 ohm w/ Wrist Strap Rafal] : Ground Continuity - Verified < 1 ohm w/ Wrist Strap Rafal [Diagnosis: ___] : Diagnosis: [unfilled] [Number: ___] : Number: [unfilled] [Clear all fields] : clear all fields [] : No [FreeTextEntry1] : 2.0 ramonita [FreeTextEntry3] : 90 min [FreeTextEntry5] : 0235 [FreeTextEntry7] : 2040 [FreeTextEntry9] : 5547 [de-identified] : 5948 [de-identified] : 110 min

## 2024-03-15 NOTE — ASSESSMENT
[No change from previous assessment] : No change from previous assessment [Patient prepared for dive] : Patient prepared for dive [Patient undergoing HBO treatment for __________] : Patient undergoing HBO treatment for [unfilled] [Patient descended without problem for 9 minutes] : Patient descended without problem for 9 minutes [No dizziness or thirst] :  No dizziness or thirst [Vital signs stable] : Vital signs stable [No ear problems] : No ear problems [Tolerating dive well] : Tolerating dive well [No Chest Pain, shortness of breath] : No Chest Pain, shortness of breath [Respiratory Rate Stable] : Respiratory Rate Stable [No chest pain, shortness of breath, or ear pain] :  No chest pain, shortness of breath, or ear pain  [Tolerated Ascent well] : Tolerated Ascent well [A physician was present throughout the entire HBOT] : A physician was present throughout the entire HBOT [Vital Signs stable] : Vital Signs stable [No] : No [Clinically Stable] : Clinically stable [Continue Treatment Plan] : Continue treatment plan [0] : 0 out of 10

## 2024-03-18 ENCOUNTER — APPOINTMENT (OUTPATIENT)
Dept: HYPERBARIC MEDICINE | Facility: CLINIC | Age: 74
End: 2024-03-18
Payer: MEDICARE

## 2024-03-18 ENCOUNTER — OUTPATIENT (OUTPATIENT)
Dept: OUTPATIENT SERVICES | Facility: HOSPITAL | Age: 74
LOS: 1 days | End: 2024-03-18
Payer: COMMERCIAL

## 2024-03-18 VITALS
HEART RATE: 98 BPM | SYSTOLIC BLOOD PRESSURE: 131 MMHG | TEMPERATURE: 97.4 F | RESPIRATION RATE: 16 BRPM | DIASTOLIC BLOOD PRESSURE: 57 MMHG

## 2024-03-18 DIAGNOSIS — Z89.429 ACQUIRED ABSENCE OF OTHER TOE(S), UNSPECIFIED SIDE: Chronic | ICD-10-CM

## 2024-03-18 PROCEDURE — G0277: CPT

## 2024-03-18 PROCEDURE — 99183 HYPERBARIC OXYGEN THERAPY: CPT

## 2024-03-18 PROCEDURE — 82962 GLUCOSE BLOOD TEST: CPT

## 2024-03-18 NOTE — PROCEDURE
[Outpatient] : Outpatient [Ambulatory] : Patient is ambulatory. [Walker] : walker [THIS CHAMBER HAS BEEN CLEANED / DISINFECTED] : This chamber has been cleaned / disinfected according to local and hospital policy and procedure prior to this treatment. [____] : Post-Dive: Time - [unfilled] [___] : Post-Dive: Value - [unfilled] mg/dL [I have examined and evaluated this patient today, including ears and lungs and have cleared the patient] : I have examined and evaluated this patient today, including ears and lungs and have cleared the patient to continue HBOT as prescribed.  [I have ordered 1 HBOT session at the indicated protocol as seen below] : I have ordered 1 HBOT session at the indicated protocol as seen below [Patient demonstrated and verbalized proper technique for using air break mask] : Patient demonstrated and verbalized proper technique for using air break mask [Patient educated on the risks of SMOKING prior to HBOT with understanding] : Patient educated on the risks of SMOKING prior to HBOT with understanding [Patient educated on the risks of CONSUMING ALCOHOL prior to HBOT with understanding] : Patient educated on the risks of CONSUMING ALCOHOL prior to HBOT with understanding [100% Cotton] : 100% cotton [Empty all pockets] : empty all pockets [No hair oils, wigs, hairpieces, pins] : no hair oils, wigs, hairpieces, pins  [Pre tx medications] : pre tx medications  [No make-up, creams] : no make-up, creams  [No jewelry] : no jewelry  [No matches, cigarettes, lighters] : no matches, cigarettes, lighters  [Hearing aid removed] : hearing aid removed [Dentures removed] : dentures removed [Ground bracelet on pt's wrist] : ground bracelet on pt's wrist  [Contacts removed] : contacts removed  [Remove nail polish] : remove nail polish  [No reading material] : no reading material  [Bra, undergarments removed] : bra, undergarments removed  [No contraindicated dressings] : no contraindicated dressings [Ground Wire - VISUAL Verification - Intact/Free of Obstruction] : Ground Wire - VISUAL Verification - Intact/Free of Obstruction  [Ground Continuity - Verified < 1 ohm w/ Wrist Strap Rafal] : Ground Continuity - Verified < 1 ohm w/ Wrist Strap Rafal [Clear all fields] : clear all fields [Number: ___] : Number: [unfilled] [Diagnosis: ___] : Diagnosis: [unfilled] [] : No [FreeTextEntry1] : 2.0 ramonita [FreeTextEntry3] : 90 min [FreeTextEntry5] : 1279 [FreeTextEntry7] : 8513 [de-identified] : 4478 [FreeTextEntry9] : 8672 [de-identified] : 110 min

## 2024-03-18 NOTE — ASSESSMENT

## 2024-03-19 ENCOUNTER — OUTPATIENT (OUTPATIENT)
Dept: OUTPATIENT SERVICES | Facility: HOSPITAL | Age: 74
LOS: 1 days | End: 2024-03-19
Payer: COMMERCIAL

## 2024-03-19 ENCOUNTER — APPOINTMENT (OUTPATIENT)
Dept: HYPERBARIC MEDICINE | Facility: CLINIC | Age: 74
End: 2024-03-19
Payer: MEDICARE

## 2024-03-19 VITALS
SYSTOLIC BLOOD PRESSURE: 136 MMHG | OXYGEN SATURATION: 100 % | HEART RATE: 84 BPM | TEMPERATURE: 97.9 F | RESPIRATION RATE: 16 BRPM | DIASTOLIC BLOOD PRESSURE: 75 MMHG

## 2024-03-19 VITALS
SYSTOLIC BLOOD PRESSURE: 138 MMHG | DIASTOLIC BLOOD PRESSURE: 80 MMHG | OXYGEN SATURATION: 100 % | HEART RATE: 76 BPM | RESPIRATION RATE: 16 BRPM

## 2024-03-19 DIAGNOSIS — M79.606 PAIN IN LEG, UNSPECIFIED: ICD-10-CM

## 2024-03-19 PROCEDURE — G0277: CPT

## 2024-03-19 PROCEDURE — 82962 GLUCOSE BLOOD TEST: CPT

## 2024-03-19 PROCEDURE — 99183 HYPERBARIC OXYGEN THERAPY: CPT

## 2024-03-19 NOTE — PROCEDURE
[Outpatient] : Outpatient [Ambulatory] : Patient is ambulatory. [Walker] : walker [THIS CHAMBER HAS BEEN CLEANED / DISINFECTED] : This chamber has been cleaned / disinfected according to local and hospital policy and procedure prior to this treatment. [____] : Post-Dive: Time - [unfilled] [___] : Post-Dive: Value - [unfilled] mg/dL [I have examined and evaluated this patient today, including ears and lungs and have cleared the patient] : I have examined and evaluated this patient today, including ears and lungs and have cleared the patient to continue HBOT as prescribed.  [I have ordered 1 HBOT session at the indicated protocol as seen below] : I have ordered 1 HBOT session at the indicated protocol as seen below [Patient educated on the risks of SMOKING prior to HBOT with understanding] : Patient educated on the risks of SMOKING prior to HBOT with understanding [Patient demonstrated and verbalized proper technique for using air break mask] : Patient demonstrated and verbalized proper technique for using air break mask [Patient educated on the risks of CONSUMING ALCOHOL prior to HBOT with understanding] : Patient educated on the risks of CONSUMING ALCOHOL prior to HBOT with understanding [100% Cotton] : 100% cotton [Empty all pockets] : empty all pockets [No hair oils, wigs, hairpieces, pins] : no hair oils, wigs, hairpieces, pins  [Pre tx medications] : pre tx medications  [No make-up, creams] : no make-up, creams  [No jewelry] : no jewelry  [No matches, cigarettes, lighters] : no matches, cigarettes, lighters  [Hearing aid removed] : hearing aid removed [Dentures removed] : dentures removed [Ground bracelet on pt's wrist] : ground bracelet on pt's wrist  [Contacts removed] : contacts removed  [Remove nail polish] : remove nail polish  [Bra, undergarments removed] : bra, undergarments removed  [No reading material] : no reading material  [No contraindicated dressings] : no contraindicated dressings [Ground Wire - VISUAL Verification - Intact/Free of Obstruction] : Ground Wire - VISUAL Verification - Intact/Free of Obstruction  [Ground Continuity - Verified < 1 ohm w/ Wrist Strap Rafal] : Ground Continuity - Verified < 1 ohm w/ Wrist Strap Rafal [Diagnosis: ___] : Diagnosis: [unfilled] [Number: ___] : Number: [unfilled] [Clear all fields] : clear all fields [] : No [FreeTextEntry1] : 2.0 ramonita [FreeTextEntry5] : 5366 [FreeTextEntry3] : 90 min [FreeTextEntry9] : 1181 [de-identified] : 7938 [[facility] : 4691 [de-identified] : 110 min

## 2024-03-20 ENCOUNTER — OUTPATIENT (OUTPATIENT)
Dept: OUTPATIENT SERVICES | Facility: HOSPITAL | Age: 74
LOS: 1 days | End: 2024-03-20
Payer: COMMERCIAL

## 2024-03-20 ENCOUNTER — APPOINTMENT (OUTPATIENT)
Dept: HYPERBARIC MEDICINE | Facility: CLINIC | Age: 74
End: 2024-03-20
Payer: MEDICARE

## 2024-03-20 VITALS
TEMPERATURE: 98.1 F | RESPIRATION RATE: 16 BRPM | SYSTOLIC BLOOD PRESSURE: 171 MMHG | HEART RATE: 82 BPM | DIASTOLIC BLOOD PRESSURE: 79 MMHG

## 2024-03-20 VITALS
DIASTOLIC BLOOD PRESSURE: 82 MMHG | HEART RATE: 76 BPM | SYSTOLIC BLOOD PRESSURE: 157 MMHG | RESPIRATION RATE: 16 BRPM | OXYGEN SATURATION: 100 %

## 2024-03-20 DIAGNOSIS — M79.606 PAIN IN LEG, UNSPECIFIED: ICD-10-CM

## 2024-03-20 DIAGNOSIS — Z89.429 ACQUIRED ABSENCE OF OTHER TOE(S), UNSPECIFIED SIDE: Chronic | ICD-10-CM

## 2024-03-20 PROCEDURE — 82962 GLUCOSE BLOOD TEST: CPT

## 2024-03-20 PROCEDURE — G0277: CPT

## 2024-03-20 PROCEDURE — 99183 HYPERBARIC OXYGEN THERAPY: CPT

## 2024-03-20 NOTE — PROCEDURE
[Outpatient] : Outpatient [Ambulatory] : Patient is ambulatory. [Walker] : walker [THIS CHAMBER HAS BEEN CLEANED / DISINFECTED] : This chamber has been cleaned / disinfected according to local and hospital policy and procedure prior to this treatment. [___] : Post-Dive: Value - [unfilled] mg/dL [____] : Post-Dive: Time - [unfilled] [I have ordered 1 HBOT session at the indicated protocol as seen below] : I have ordered 1 HBOT session at the indicated protocol as seen below [I have examined and evaluated this patient today, including ears and lungs and have cleared the patient] : I have examined and evaluated this patient today, including ears and lungs and have cleared the patient to continue HBOT as prescribed.  [Patient demonstrated and verbalized proper technique for using air break mask] : Patient demonstrated and verbalized proper technique for using air break mask [Patient educated on the risks of SMOKING prior to HBOT with understanding] : Patient educated on the risks of SMOKING prior to HBOT with understanding [Patient educated on the risks of CONSUMING ALCOHOL prior to HBOT with understanding] : Patient educated on the risks of CONSUMING ALCOHOL prior to HBOT with understanding [100% Cotton] : 100% cotton [Empty all pockets] : empty all pockets [No hair oils, wigs, hairpieces, pins] : no hair oils, wigs, hairpieces, pins  [No make-up, creams] : no make-up, creams  [Pre tx medications] : pre tx medications  [No jewelry] : no jewelry  [No matches, cigarettes, lighters] : no matches, cigarettes, lighters  [Hearing aid removed] : hearing aid removed [Dentures removed] : dentures removed [Ground bracelet on pt's wrist] : ground bracelet on pt's wrist  [Contacts removed] : contacts removed  [Remove nail polish] : remove nail polish  [No reading material] : no reading material  [Bra, undergarments removed] : bra, undergarments removed  [Ground Wire - VISUAL Verification - Intact/Free of Obstruction] : Ground Wire - VISUAL Verification - Intact/Free of Obstruction  [No contraindicated dressings] : no contraindicated dressings [Ground Continuity - Verified < 1 ohm w/ Wrist Strap Rafal] : Ground Continuity - Verified < 1 ohm w/ Wrist Strap Rafal [Clear all fields] : clear all fields [Number: ___] : Number: [unfilled] [Diagnosis: ___] : Diagnosis: [unfilled] [] : No [FreeTextEntry5] : 6242 [FreeTextEntry1] : 2.0 ramonita [FreeTextEntry7] : 1215 [FreeTextEntry3] : 90 min [de-identified] : 110 min [de-identified] : 7321 [FreeTextEntry9] : 2387

## 2024-03-21 ENCOUNTER — APPOINTMENT (OUTPATIENT)
Dept: HYPERBARIC MEDICINE | Facility: CLINIC | Age: 74
End: 2024-03-21
Payer: MEDICARE

## 2024-03-21 ENCOUNTER — OUTPATIENT (OUTPATIENT)
Dept: OUTPATIENT SERVICES | Facility: HOSPITAL | Age: 74
LOS: 1 days | End: 2024-03-21
Payer: COMMERCIAL

## 2024-03-21 VITALS
OXYGEN SATURATION: 99 % | DIASTOLIC BLOOD PRESSURE: 68 MMHG | TEMPERATURE: 98 F | RESPIRATION RATE: 16 BRPM | HEART RATE: 83 BPM | SYSTOLIC BLOOD PRESSURE: 147 MMHG

## 2024-03-21 VITALS
RESPIRATION RATE: 16 BRPM | OXYGEN SATURATION: 99 % | DIASTOLIC BLOOD PRESSURE: 76 MMHG | SYSTOLIC BLOOD PRESSURE: 149 MMHG | HEART RATE: 76 BPM

## 2024-03-21 DIAGNOSIS — L97.522 NON-PRESSURE CHRONIC ULCER OF OTHER PART OF LEFT FOOT WITH FAT LAYER EXPOSED: ICD-10-CM

## 2024-03-21 DIAGNOSIS — E11.621 TYPE 2 DIABETES MELLITUS WITH FOOT ULCER: ICD-10-CM

## 2024-03-21 DIAGNOSIS — M79.606 PAIN IN LEG, UNSPECIFIED: ICD-10-CM

## 2024-03-21 DIAGNOSIS — T86.828 OTHER COMPLICATIONS OF SKIN GRAFT (ALLOGRAFT) (AUTOGRAFT): ICD-10-CM

## 2024-03-21 PROCEDURE — 82962 GLUCOSE BLOOD TEST: CPT

## 2024-03-21 PROCEDURE — 99183 HYPERBARIC OXYGEN THERAPY: CPT

## 2024-03-21 PROCEDURE — G0277: CPT

## 2024-03-21 NOTE — ASSESSMENT
[No change from previous assessment] : No change from previous assessment [Patient undergoing HBO treatment for __________] : Patient undergoing HBO treatment for [unfilled] [Patient prepared for dive] : Patient prepared for dive [Patient descended without problem for 9 minutes] : Patient descended without problem for 9 minutes [No ear problems] : No ear problems [No dizziness or thirst] :  No dizziness or thirst [Vital signs stable] : Vital signs stable [Tolerating dive well] : Tolerating dive well [Respiratory Rate Stable] : Respiratory Rate Stable [No Chest Pain, shortness of breath] : No Chest Pain, shortness of breath [Tolerated Ascent well] : Tolerated Ascent well [No chest pain, shortness of breath, or ear pain] :  No chest pain, shortness of breath, or ear pain  [Vital Signs stable] : Vital Signs stable [A physician was present throughout the entire HBOT] : A physician was present throughout the entire HBOT [No] : No [Clinically Stable] : Clinically stable [Continue Treatment Plan] : Continue treatment plan [0] : 0 out of 10

## 2024-03-21 NOTE — PROCEDURE
[Outpatient] : Outpatient [Ambulatory] : Patient is ambulatory. [Walker] : walker [] : Yes [THIS CHAMBER HAS BEEN CLEANED / DISINFECTED] : This chamber has been cleaned / disinfected according to local and hospital policy and procedure prior to this treatment. [____] : Post-Dive: Time - [unfilled] [___] : Post-Dive: Value - [unfilled] mg/dL [I have examined and evaluated this patient today, including ears and lungs and have cleared the patient] : I have examined and evaluated this patient today, including ears and lungs and have cleared the patient to continue HBOT as prescribed.  [I have ordered 1 HBOT session at the indicated protocol as seen below] : I have ordered 1 HBOT session at the indicated protocol as seen below [Patient demonstrated and verbalized proper technique for using air break mask] : Patient demonstrated and verbalized proper technique for using air break mask [Patient educated on the risks of CONSUMING ALCOHOL prior to HBOT with understanding] : Patient educated on the risks of CONSUMING ALCOHOL prior to HBOT with understanding [Patient educated on the risks of SMOKING prior to HBOT with understanding] : Patient educated on the risks of SMOKING prior to HBOT with understanding [100% Cotton] : 100% cotton [Empty all pockets] : empty all pockets [No hair oils, wigs, hairpieces, pins] : no hair oils, wigs, hairpieces, pins  [No make-up, creams] : no make-up, creams  [Pre tx medications] : pre tx medications  [No jewelry] : no jewelry  [No matches, cigarettes, lighters] : no matches, cigarettes, lighters  [Hearing aid removed] : hearing aid removed [Ground bracelet on pt's wrist] : ground bracelet on pt's wrist  [Dentures removed] : dentures removed [Remove nail polish] : remove nail polish  [Contacts removed] : contacts removed  [No reading material] : no reading material  [Bra, undergarments removed] : bra, undergarments removed  [No contraindicated dressings] : no contraindicated dressings [Ground Continuity - Verified < 1 ohm w/ Wrist Strap Rafal] : Ground Continuity - Verified < 1 ohm w/ Wrist Strap Rafal [Ground Wire - VISUAL Verification - Intact/Free of Obstruction] : Ground Wire - VISUAL Verification - Intact/Free of Obstruction  [Diagnosis: ___] : Diagnosis: [unfilled] [Number: ___] : Number: [unfilled] [Clear all fields] : clear all fields

## 2024-03-22 ENCOUNTER — APPOINTMENT (OUTPATIENT)
Dept: HYPERBARIC MEDICINE | Facility: CLINIC | Age: 74
End: 2024-03-22
Payer: MEDICARE

## 2024-03-22 ENCOUNTER — OUTPATIENT (OUTPATIENT)
Dept: OUTPATIENT SERVICES | Facility: HOSPITAL | Age: 74
LOS: 1 days | End: 2024-03-22
Payer: COMMERCIAL

## 2024-03-22 VITALS
RESPIRATION RATE: 16 BRPM | HEART RATE: 81 BPM | OXYGEN SATURATION: 100 % | DIASTOLIC BLOOD PRESSURE: 74 MMHG | SYSTOLIC BLOOD PRESSURE: 158 MMHG

## 2024-03-22 VITALS
RESPIRATION RATE: 16 BRPM | SYSTOLIC BLOOD PRESSURE: 144 MMHG | HEART RATE: 85 BPM | OXYGEN SATURATION: 100 % | TEMPERATURE: 98 F | DIASTOLIC BLOOD PRESSURE: 66 MMHG

## 2024-03-22 DIAGNOSIS — T86.828 OTHER COMPLICATIONS OF SKIN GRAFT (ALLOGRAFT) (AUTOGRAFT): ICD-10-CM

## 2024-03-22 DIAGNOSIS — M79.606 PAIN IN LEG, UNSPECIFIED: ICD-10-CM

## 2024-03-22 DIAGNOSIS — L97.522 NON-PRESSURE CHRONIC ULCER OF OTHER PART OF LEFT FOOT WITH FAT LAYER EXPOSED: ICD-10-CM

## 2024-03-22 PROCEDURE — 99183 HYPERBARIC OXYGEN THERAPY: CPT

## 2024-03-22 PROCEDURE — G0277: CPT

## 2024-03-22 PROCEDURE — 82962 GLUCOSE BLOOD TEST: CPT

## 2024-03-22 NOTE — PROCEDURE
[Outpatient] : Outpatient [Ambulatory] : Patient is ambulatory. [Walker] : walker [THIS CHAMBER HAS BEEN CLEANED / DISINFECTED] : This chamber has been cleaned / disinfected according to local and hospital policy and procedure prior to this treatment. [I have examined and evaluated this patient today, including ears and lungs and have cleared the patient] : I have examined and evaluated this patient today, including ears and lungs and have cleared the patient to continue HBOT as prescribed.  [I have ordered 1 HBOT session at the indicated protocol as seen below] : I have ordered 1 HBOT session at the indicated protocol as seen below [Patient demonstrated and verbalized proper technique for using air break mask] : Patient demonstrated and verbalized proper technique for using air break mask [Patient educated on the risks of SMOKING prior to HBOT with understanding] : Patient educated on the risks of SMOKING prior to HBOT with understanding [Patient educated on the risks of CONSUMING ALCOHOL prior to HBOT with understanding] : Patient educated on the risks of CONSUMING ALCOHOL prior to HBOT with understanding [100% Cotton] : 100% cotton [Empty all pockets] : empty all pockets [No hair oils, wigs, hairpieces, pins] : no hair oils, wigs, hairpieces, pins  [Pre tx medications] : pre tx medications  [No make-up, creams] : no make-up, creams  [No jewelry] : no jewelry  [No matches, cigarettes, lighters] : no matches, cigarettes, lighters  [Hearing aid removed] : hearing aid removed [Dentures removed] : dentures removed [Ground bracelet on pt's wrist] : ground bracelet on pt's wrist  [Contacts removed] : contacts removed  [Remove nail polish] : remove nail polish  [No reading material] : no reading material  [Bra, undergarments removed] : bra, undergarments removed  [No contraindicated dressings] : no contraindicated dressings [Ground Wire - VISUAL Verification - Intact/Free of Obstruction] : Ground Wire - VISUAL Verification - Intact/Free of Obstruction  [Ground Continuity - Verified < 1 ohm w/ Wrist Strap Rafal] : Ground Continuity - Verified < 1 ohm w/ Wrist Strap Rafal [Diagnosis: ___] : Diagnosis: [unfilled] [____] : Recheck: Time - [unfilled] [___] : Recheck: Value - [unfilled] mg/dL [Number: ___] : Number: [unfilled] [Clear all fields] : clear all fields [] : No [FreeTextEntry3] : 90 min [FreeTextEntry1] : 2.0 ramonita [FreeTextEntry5] : 1524 [FreeTextEntry9] : 9370 [FreeTextEntry7] : 2703 [de-identified] : 3402 [de-identified] : 110 min

## 2024-03-25 ENCOUNTER — OUTPATIENT (OUTPATIENT)
Dept: OUTPATIENT SERVICES | Facility: HOSPITAL | Age: 74
LOS: 1 days | End: 2024-03-25
Payer: COMMERCIAL

## 2024-03-25 ENCOUNTER — APPOINTMENT (OUTPATIENT)
Dept: HYPERBARIC MEDICINE | Facility: CLINIC | Age: 74
End: 2024-03-25
Payer: MEDICARE

## 2024-03-25 VITALS
OXYGEN SATURATION: 100 % | HEART RATE: 60 BPM | SYSTOLIC BLOOD PRESSURE: 131 MMHG | RESPIRATION RATE: 16 BRPM | DIASTOLIC BLOOD PRESSURE: 68 MMHG | TEMPERATURE: 98 F

## 2024-03-25 DIAGNOSIS — Z89.429 ACQUIRED ABSENCE OF OTHER TOE(S), UNSPECIFIED SIDE: Chronic | ICD-10-CM

## 2024-03-25 PROCEDURE — 82962 GLUCOSE BLOOD TEST: CPT

## 2024-03-25 PROCEDURE — 99183 HYPERBARIC OXYGEN THERAPY: CPT

## 2024-03-25 PROCEDURE — G0277: CPT

## 2024-03-25 NOTE — ASSESSMENT
[No change from previous assessment] : No change from previous assessment [Time MD/Provider assessed Patient:_______] : Time MD/Provider assessed Patient: [unfilled] [Patient prepared for dive] : Patient prepared for dive [Patient undergoing HBO treatment for __________] : Patient undergoing HBO treatment for [unfilled] [Patient descended without problem for 9 minutes] : Patient descended without problem for 9 minutes [No ear problems] : No ear problems [Vital signs stable] : Vital signs stable [No dizziness or thirst] :  No dizziness or thirst [Tolerating dive well] : Tolerating dive well [No Chest Pain, shortness of breath] : No Chest Pain, shortness of breath [Respiratory Rate Stable] : Respiratory Rate Stable [No chest pain, shortness of breath, or ear pain] :  No chest pain, shortness of breath, or ear pain  [Tolerated Ascent well] : Tolerated Ascent well [Vital Signs stable] : Vital Signs stable [A physician was present throughout the entire HBOT] : A physician was present throughout the entire HBOT [Clinically Stable] : Clinically stable [No] : No [0] : 0 out of 10 [Continue Treatment Plan] : Continue treatment plan

## 2024-03-25 NOTE — PROCEDURE
[Ambulatory] : Patient is ambulatory. [Outpatient] : Outpatient [] : Yes [Walker] : walker [THIS CHAMBER HAS BEEN CLEANED / DISINFECTED] : This chamber has been cleaned / disinfected according to local and hospital policy and procedure prior to this treatment. [___] : Pre-Dive: Value - [unfilled] mg/dL [____] : Pre-Dive: Time - [unfilled] [I have examined and evaluated this patient today, including ears and lungs and have cleared the patient] : I have examined and evaluated this patient today, including ears and lungs and have cleared the patient to continue HBOT as prescribed.  [Time MD/Provider assessed Patient: _____] : Time MD/Provider assessed Patient: [unfilled] [I have canceled HBOT session for the following reason: _____] : I have canceled HBOT session for the following reason: [unfilled] [Patient demonstrated and verbalized proper technique for using air break mask] : Patient demonstrated and verbalized proper technique for using air break mask [Patient educated on the risks of SMOKING prior to HBOT with understanding] : Patient educated on the risks of SMOKING prior to HBOT with understanding [100% Cotton] : 100% cotton [Patient educated on the risks of CONSUMING ALCOHOL prior to HBOT with understanding] : Patient educated on the risks of CONSUMING ALCOHOL prior to HBOT with understanding [Empty all pockets] : empty all pockets [Pre tx medications] : pre tx medications  [No hair oils, wigs, hairpieces, pins] : no hair oils, wigs, hairpieces, pins  [No make-up, creams] : no make-up, creams  [No jewelry] : no jewelry  [No matches, cigarettes, lighters] : no matches, cigarettes, lighters  [Hearing aid removed] : hearing aid removed [Dentures removed] : dentures removed [Ground bracelet on pt's wrist] : ground bracelet on pt's wrist  [Contacts removed] : contacts removed  [No reading material] : no reading material  [Bra, undergarments removed] : bra, undergarments removed  [Remove nail polish] : remove nail polish  [No contraindicated dressings] : no contraindicated dressings [Ground Wire - VISUAL Verification - Intact/Free of Obstruction] : Ground Wire - VISUAL Verification - Intact/Free of Obstruction  [Ground Continuity - Verified < 1 ohm w/ Wrist Strap Rafal] : Ground Continuity - Verified < 1 ohm w/ Wrist Strap Rafal [Clear all fields] : clear all fields [Diagnosis: ___] : Diagnosis: [unfilled] [Number: ___] : Number: [unfilled] [FreeTextEntry1] : 2.0 CATA [FreeTextEntry3] : 90 [FreeTextEntry5] : 152 [FreeTextEntry7] : 7932 [FreeTextEntry9] : 5688 [de-identified] : 1757 [de-identified] : 110

## 2024-03-26 ENCOUNTER — OUTPATIENT (OUTPATIENT)
Dept: OUTPATIENT SERVICES | Facility: HOSPITAL | Age: 74
LOS: 1 days | End: 2024-03-26
Payer: COMMERCIAL

## 2024-03-26 ENCOUNTER — APPOINTMENT (OUTPATIENT)
Dept: HYPERBARIC MEDICINE | Facility: CLINIC | Age: 74
End: 2024-03-26
Payer: MEDICARE

## 2024-03-26 VITALS
RESPIRATION RATE: 16 BRPM | SYSTOLIC BLOOD PRESSURE: 136 MMHG | OXYGEN SATURATION: 98 % | DIASTOLIC BLOOD PRESSURE: 83 MMHG | HEART RATE: 82 BPM

## 2024-03-26 VITALS
RESPIRATION RATE: 16 BRPM | TEMPERATURE: 97.6 F | OXYGEN SATURATION: 98 % | SYSTOLIC BLOOD PRESSURE: 105 MMHG | HEART RATE: 81 BPM | DIASTOLIC BLOOD PRESSURE: 54 MMHG

## 2024-03-26 DIAGNOSIS — M79.606 PAIN IN LEG, UNSPECIFIED: ICD-10-CM

## 2024-03-26 PROCEDURE — 99183 HYPERBARIC OXYGEN THERAPY: CPT

## 2024-03-26 PROCEDURE — 82962 GLUCOSE BLOOD TEST: CPT

## 2024-03-26 PROCEDURE — G0277: CPT

## 2024-03-26 NOTE — ASSESSMENT
[No change from previous assessment] : No change from previous assessment [Patient prepared for dive] : Patient prepared for dive [Patient descended without problem for 9 minutes] : Patient descended without problem for 9 minutes [No dizziness or thirst] :  No dizziness or thirst [Vital signs stable] : Vital signs stable [No ear problems] : No ear problems [No Chest Pain, shortness of breath] : No Chest Pain, shortness of breath [Tolerating dive well] : Tolerating dive well [No chest pain, shortness of breath, or ear pain] :  No chest pain, shortness of breath, or ear pain  [Respiratory Rate Stable] : Respiratory Rate Stable [Tolerated Ascent well] : Tolerated Ascent well [Vital Signs stable] : Vital Signs stable [A physician was present throughout the entire HBOT] : A physician was present throughout the entire HBOT [No] : No [Clinically Stable] : Clinically stable [Continue Treatment Plan] : Continue treatment plan [0] : 0 out of 10 [Time MD/Provider assessed Patient:_______] : Time MD/Provider assessed Patient: [unfilled] [Patient undergoing HBO treatment for __________] : Patient undergoing HBO treatment for [unfilled]

## 2024-03-26 NOTE — PROCEDURE
[Outpatient] : Outpatient [Ambulatory] : Patient is ambulatory. [Walker] : walker [THIS CHAMBER HAS BEEN CLEANED / DISINFECTED] : This chamber has been cleaned / disinfected according to local and hospital policy and procedure prior to this treatment. [I have examined and evaluated this patient today, including ears and lungs and have cleared the patient] : I have examined and evaluated this patient today, including ears and lungs and have cleared the patient to continue HBOT as prescribed.  [Patient demonstrated and verbalized proper technique for using air break mask] : Patient demonstrated and verbalized proper technique for using air break mask [Patient educated on the risks of CONSUMING ALCOHOL prior to HBOT with understanding] : Patient educated on the risks of CONSUMING ALCOHOL prior to HBOT with understanding [Patient educated on the risks of SMOKING prior to HBOT with understanding] : Patient educated on the risks of SMOKING prior to HBOT with understanding [100% Cotton] : 100% cotton [Empty all pockets] : empty all pockets [No hair oils, wigs, hairpieces, pins] : no hair oils, wigs, hairpieces, pins  [Pre tx medications] : pre tx medications  [No make-up, creams] : no make-up, creams  [No jewelry] : no jewelry  [No matches, cigarettes, lighters] : no matches, cigarettes, lighters  [Hearing aid removed] : hearing aid removed [Dentures removed] : dentures removed [Ground bracelet on pt's wrist] : ground bracelet on pt's wrist  [Contacts removed] : contacts removed  [Remove nail polish] : remove nail polish  [No reading material] : no reading material  [Bra, undergarments removed] : bra, undergarments removed  [No contraindicated dressings] : no contraindicated dressings [Ground Wire - VISUAL Verification - Intact/Free of Obstruction] : Ground Wire - VISUAL Verification - Intact/Free of Obstruction  [Ground Continuity - Verified < 1 ohm w/ Wrist Strap Rafal] : Ground Continuity - Verified < 1 ohm w/ Wrist Strap Rafal [Diagnosis: ___] : Diagnosis: [unfilled] [I have ordered 1 HBOT session at the indicated protocol as seen below] : I have ordered 1 HBOT session at the indicated protocol as seen below [Number: ___] : Number: [unfilled] [____] : Post-Dive: Time - [unfilled] [___] : Post-Dive: Value - [unfilled] mg/dL [Clear all fields] : clear all fields [] : No [FreeTextEntry1] : 2.0 ramonita [FreeTextEntry3] : 90 min [[facility]] : 2393 [FreeTextEntry7] : 2838 [de-identified] : 7885 [FreeTextEntry9] : 6187 [de-identified] : 110 min

## 2024-03-27 ENCOUNTER — OUTPATIENT (OUTPATIENT)
Dept: OUTPATIENT SERVICES | Facility: HOSPITAL | Age: 74
LOS: 1 days | End: 2024-03-27
Payer: COMMERCIAL

## 2024-03-27 ENCOUNTER — APPOINTMENT (OUTPATIENT)
Dept: HYPERBARIC MEDICINE | Facility: CLINIC | Age: 74
End: 2024-03-27
Payer: MEDICARE

## 2024-03-27 VITALS
OXYGEN SATURATION: 98 % | RESPIRATION RATE: 16 BRPM | TEMPERATURE: 97.8 F | SYSTOLIC BLOOD PRESSURE: 121 MMHG | HEART RATE: 81 BPM | DIASTOLIC BLOOD PRESSURE: 64 MMHG

## 2024-03-27 VITALS
RESPIRATION RATE: 16 BRPM | HEART RATE: 74 BPM | SYSTOLIC BLOOD PRESSURE: 136 MMHG | OXYGEN SATURATION: 98 % | DIASTOLIC BLOOD PRESSURE: 76 MMHG

## 2024-03-27 PROCEDURE — 82962 GLUCOSE BLOOD TEST: CPT

## 2024-03-27 PROCEDURE — G0277: CPT

## 2024-03-27 PROCEDURE — 99183 HYPERBARIC OXYGEN THERAPY: CPT

## 2024-03-27 NOTE — PATIENT PROFILE ADULT - PUBLIC BENEFITS
51 year old female from home ambulates independently, with PMH of HTN, DM2, ESRD on HD M/W/F (last session 3/22 )sarcoidosis on prednisone, anemia, seizure disorder (no longer on meds) who presented to the ED for shortness of breath admitted for AHRF secondary to pulmonary edema due to fluid overload in the setting of ESRD requiring urgent hemodialysis.   She had AHRF. She refused bipap and was put on NRB. After emergent dialysis on 3/24, she was placed on 2L NC. She was positive for Influenza A. Tamiflu was started. It is to be given at 30mg after each dialysis for at least 5 days.  She was started on Nitroglycerin drip in ED. She was later transitioned to home meds. Lactate was 2.6 -> 3.4 -> 2.6  Home sarcoidosis of lung med Prednisone 10mg was continued. To follow up with outpatient pulmonologist Dr. Leslei in [place] April 2024 Patient is stable for downgrade to medical floors.  - Order Tamiflu 30mg after each dialysis for 5 days at least (starting 3/24~) with dialysis, Received 2 doses on 3/24 and 3/26 will need to receive 1-3 doses with dialysis    Please note that this a brief summary of hospital course please refer to daily progress notes and consult notes for full course and events. Patient seen and examined at bedside, discussed with medical attending. Patient medically cleared for discharge to home, SPo2 100% RA. Patient to reinstate CDPAP and HD outpatient.      no

## 2024-03-27 NOTE — PROCEDURE
[Outpatient] : Outpatient [Ambulatory] : Patient is ambulatory. [Walker] : walker [] : Yes [THIS CHAMBER HAS BEEN CLEANED / DISINFECTED] : This chamber has been cleaned / disinfected according to local and hospital policy and procedure prior to this treatment. [____] : Post-Dive: Time - [unfilled] [___] : Post-Dive: Value - [unfilled] mg/dL [I have ordered 1 HBOT session at the indicated protocol as seen below] : I have ordered 1 HBOT session at the indicated protocol as seen below [I have examined and evaluated this patient today, including ears and lungs and have cleared the patient] : I have examined and evaluated this patient today, including ears and lungs and have cleared the patient to continue HBOT as prescribed.  [Patient demonstrated and verbalized proper technique for using air break mask] : Patient demonstrated and verbalized proper technique for using air break mask [Patient educated on the risks of SMOKING prior to HBOT with understanding] : Patient educated on the risks of SMOKING prior to HBOT with understanding [Patient educated on the risks of CONSUMING ALCOHOL prior to HBOT with understanding] : Patient educated on the risks of CONSUMING ALCOHOL prior to HBOT with understanding [100% Cotton] : 100% cotton [Empty all pockets] : empty all pockets [No hair oils, wigs, hairpieces, pins] : no hair oils, wigs, hairpieces, pins  [Pre tx medications] : pre tx medications  [No make-up, creams] : no make-up, creams  [No jewelry] : no jewelry  [No matches, cigarettes, lighters] : no matches, cigarettes, lighters  [Hearing aid removed] : hearing aid removed [Dentures removed] : dentures removed [Ground bracelet on pt's wrist] : ground bracelet on pt's wrist  [Contacts removed] : contacts removed  [Remove nail polish] : remove nail polish  [No reading material] : no reading material  [Bra, undergarments removed] : bra, undergarments removed  [No contraindicated dressings] : no contraindicated dressings [Ground Wire - VISUAL Verification - Intact/Free of Obstruction] : Ground Wire - VISUAL Verification - Intact/Free of Obstruction  [Ground Continuity - Verified < 1 ohm w/ Wrist Strap Rafal] : Ground Continuity - Verified < 1 ohm w/ Wrist Strap Rafal [Clear all fields] : clear all fields [Diagnosis: ___] : Diagnosis: [unfilled] [Number: ___] : Number: [unfilled]

## 2024-03-28 ENCOUNTER — OUTPATIENT (OUTPATIENT)
Dept: OUTPATIENT SERVICES | Facility: HOSPITAL | Age: 74
LOS: 1 days | End: 2024-03-28
Payer: COMMERCIAL

## 2024-03-28 ENCOUNTER — APPOINTMENT (OUTPATIENT)
Dept: HYPERBARIC MEDICINE | Facility: CLINIC | Age: 74
End: 2024-03-28
Payer: MEDICARE

## 2024-03-28 VITALS
HEART RATE: 82 BPM | SYSTOLIC BLOOD PRESSURE: 132 MMHG | DIASTOLIC BLOOD PRESSURE: 91 MMHG | RESPIRATION RATE: 16 BRPM | OXYGEN SATURATION: 97 %

## 2024-03-28 VITALS
TEMPERATURE: 98 F | OXYGEN SATURATION: 97 % | DIASTOLIC BLOOD PRESSURE: 67 MMHG | SYSTOLIC BLOOD PRESSURE: 114 MMHG | HEART RATE: 75 BPM | RESPIRATION RATE: 16 BRPM

## 2024-03-28 DIAGNOSIS — M79.606 PAIN IN LEG, UNSPECIFIED: ICD-10-CM

## 2024-03-28 DIAGNOSIS — L97.522 NON-PRESSURE CHRONIC ULCER OF OTHER PART OF LEFT FOOT WITH FAT LAYER EXPOSED: ICD-10-CM

## 2024-03-28 DIAGNOSIS — T86.828 OTHER COMPLICATIONS OF SKIN GRAFT (ALLOGRAFT) (AUTOGRAFT): ICD-10-CM

## 2024-03-28 DIAGNOSIS — Z89.429 ACQUIRED ABSENCE OF OTHER TOE(S), UNSPECIFIED SIDE: Chronic | ICD-10-CM

## 2024-03-28 PROCEDURE — 82962 GLUCOSE BLOOD TEST: CPT

## 2024-03-28 PROCEDURE — 99183 HYPERBARIC OXYGEN THERAPY: CPT

## 2024-03-28 PROCEDURE — G0277: CPT

## 2024-03-28 NOTE — PROCEDURE
[Outpatient] : Outpatient [Ambulatory] : Patient is ambulatory. [Walker] : walker [THIS CHAMBER HAS BEEN CLEANED / DISINFECTED] : This chamber has been cleaned / disinfected according to local and hospital policy and procedure prior to this treatment. [I have examined and evaluated this patient today, including ears and lungs and have cleared the patient] : I have examined and evaluated this patient today, including ears and lungs and have cleared the patient to continue HBOT as prescribed.  [I have ordered 1 HBOT session at the indicated protocol as seen below] : I have ordered 1 HBOT session at the indicated protocol as seen below [Patient demonstrated and verbalized proper technique for using air break mask] : Patient demonstrated and verbalized proper technique for using air break mask [Patient educated on the risks of SMOKING prior to HBOT with understanding] : Patient educated on the risks of SMOKING prior to HBOT with understanding [Patient educated on the risks of CONSUMING ALCOHOL prior to HBOT with understanding] : Patient educated on the risks of CONSUMING ALCOHOL prior to HBOT with understanding [100% Cotton] : 100% cotton [Empty all pockets] : empty all pockets [No hair oils, wigs, hairpieces, pins] : no hair oils, wigs, hairpieces, pins  [Pre tx medications] : pre tx medications  [No jewelry] : no jewelry  [No make-up, creams] : no make-up, creams  [No matches, cigarettes, lighters] : no matches, cigarettes, lighters  [Hearing aid removed] : hearing aid removed [Ground bracelet on pt's wrist] : ground bracelet on pt's wrist  [Dentures removed] : dentures removed [Remove nail polish] : remove nail polish  [Contacts removed] : contacts removed  [No reading material] : no reading material  [Bra, undergarments removed] : bra, undergarments removed  [No contraindicated dressings] : no contraindicated dressings [Ground Wire - VISUAL Verification - Intact/Free of Obstruction] : Ground Wire - VISUAL Verification - Intact/Free of Obstruction  [Ground Continuity - Verified < 1 ohm w/ Wrist Strap Rafal] : Ground Continuity - Verified < 1 ohm w/ Wrist Strap Rafal [Diagnosis: ___] : Diagnosis: [unfilled] [____] : Post-Dive: Time - [unfilled] [___] : Post-Dive: Value - [unfilled] mg/dL [Number: ___] : Number: [unfilled] [Clear all fields] : clear all fields [] : No [FreeTextEntry1] : 2.0 ramonita [FreeTextEntry7] : 3716 [FreeTextEntry5] : 1600 [FreeTextEntry3] : 90 min [[facility]] : 1537 [de-identified] : 1739 [de-identified] : 110 min

## 2024-03-28 NOTE — ASSESSMENT
[No change from previous assessment] : No change from previous assessment [Patient prepared for dive] : Patient prepared for dive [Patient undergoing HBO treatment for __________] : Patient undergoing HBO treatment for [unfilled] [No dizziness or thirst] :  No dizziness or thirst [Patient descended without problem for 9 minutes] : Patient descended without problem for 9 minutes [No ear problems] : No ear problems [Tolerating dive well] : Tolerating dive well [Vital signs stable] : Vital signs stable [Respiratory Rate Stable] : Respiratory Rate Stable [No Chest Pain, shortness of breath] : No Chest Pain, shortness of breath [No chest pain, shortness of breath, or ear pain] :  No chest pain, shortness of breath, or ear pain  [Tolerated Ascent well] : Tolerated Ascent well [Vital Signs stable] : Vital Signs stable [No] : No [A physician was present throughout the entire HBOT] : A physician was present throughout the entire HBOT [Clinically Stable] : Clinically stable [0] : 0 out of 10 [Continue Treatment Plan] : Continue treatment plan

## 2024-03-29 ENCOUNTER — APPOINTMENT (OUTPATIENT)
Dept: HYPERBARIC MEDICINE | Facility: CLINIC | Age: 74
End: 2024-03-29
Payer: MEDICARE

## 2024-03-29 ENCOUNTER — OUTPATIENT (OUTPATIENT)
Dept: OUTPATIENT SERVICES | Facility: HOSPITAL | Age: 74
LOS: 1 days | End: 2024-03-29
Payer: COMMERCIAL

## 2024-03-29 VITALS
SYSTOLIC BLOOD PRESSURE: 123 MMHG | DIASTOLIC BLOOD PRESSURE: 67 MMHG | RESPIRATION RATE: 16 BRPM | OXYGEN SATURATION: 97 % | HEART RATE: 80 BPM

## 2024-03-29 VITALS
DIASTOLIC BLOOD PRESSURE: 74 MMHG | SYSTOLIC BLOOD PRESSURE: 152 MMHG | OXYGEN SATURATION: 97 % | HEART RATE: 82 BPM | RESPIRATION RATE: 16 BRPM

## 2024-03-29 DIAGNOSIS — T86.828 OTHER COMPLICATIONS OF SKIN GRAFT (ALLOGRAFT) (AUTOGRAFT): ICD-10-CM

## 2024-03-29 DIAGNOSIS — L97.522 NON-PRESSURE CHRONIC ULCER OF OTHER PART OF LEFT FOOT WITH FAT LAYER EXPOSED: ICD-10-CM

## 2024-03-29 DIAGNOSIS — M79.606 PAIN IN LEG, UNSPECIFIED: ICD-10-CM

## 2024-03-29 PROCEDURE — 99183 HYPERBARIC OXYGEN THERAPY: CPT

## 2024-03-29 PROCEDURE — 82962 GLUCOSE BLOOD TEST: CPT

## 2024-03-29 PROCEDURE — G0277: CPT

## 2024-04-02 ENCOUNTER — OUTPATIENT (OUTPATIENT)
Dept: OUTPATIENT SERVICES | Facility: HOSPITAL | Age: 74
LOS: 1 days | End: 2024-04-02
Payer: COMMERCIAL

## 2024-04-02 ENCOUNTER — APPOINTMENT (OUTPATIENT)
Dept: WOUND CARE | Facility: HOSPITAL | Age: 74
End: 2024-04-02
Payer: MEDICARE

## 2024-04-02 DIAGNOSIS — T86.828 OTHER COMPLICATIONS OF SKIN GRAFT (ALLOGRAFT) (AUTOGRAFT): ICD-10-CM

## 2024-04-02 DIAGNOSIS — M79.606 PAIN IN LEG, UNSPECIFIED: ICD-10-CM

## 2024-04-02 DIAGNOSIS — T86.821 SKIN GRAFT (ALLOGRAFT) (AUTOGRAFT) FAILURE: ICD-10-CM

## 2024-04-02 DIAGNOSIS — L97.522 NON-PRESSURE CHRONIC ULCER OF OTHER PART OF LEFT FOOT WITH FAT LAYER EXPOSED: ICD-10-CM

## 2024-04-02 DIAGNOSIS — E11.42 TYPE 2 DIABETES MELLITUS WITH DIABETIC POLYNEUROPATHY: ICD-10-CM

## 2024-04-02 PROCEDURE — 11042 DBRDMT SUBQ TIS 1ST 20SQCM/<: CPT | Mod: 79

## 2024-04-09 ENCOUNTER — OUTPATIENT (OUTPATIENT)
Dept: OUTPATIENT SERVICES | Facility: HOSPITAL | Age: 74
LOS: 1 days | End: 2024-04-09
Payer: COMMERCIAL

## 2024-04-09 ENCOUNTER — APPOINTMENT (OUTPATIENT)
Dept: WOUND CARE | Facility: HOSPITAL | Age: 74
End: 2024-04-09

## 2024-04-09 VITALS
TEMPERATURE: 98.2 F | SYSTOLIC BLOOD PRESSURE: 183 MMHG | RESPIRATION RATE: 18 BRPM | HEART RATE: 75 BPM | DIASTOLIC BLOOD PRESSURE: 76 MMHG

## 2024-04-09 PROBLEM — T86.821 FAILED FLAP: Status: ACTIVE | Noted: 2024-01-08

## 2024-04-09 PROBLEM — E11.42 DIABETIC POLYNEUROPATHY ASSOCIATED WITH TYPE 2 DIABETES MELLITUS: Status: ACTIVE | Noted: 2023-09-12

## 2024-04-09 PROCEDURE — 11042 DBRDMT SUBQ TIS 1ST 20SQCM/<: CPT | Mod: 79

## 2024-04-09 NOTE — ASSESSMENT
[FreeTextEntry1] : --spent 30 min in f/u consultation with patient for left foot ulceration --aseptic debridemont with suture siccors and dermal currette down to level of sub q and not beyond with santyl and dsd after surgical scrub -- will continue to monitor and will inquire with insuance company regarding future applications of kerecis grafting, continue with cam walker at all times with ambulation. Patient understands condition is guarded for healing. --continue with lwc and f/u one week

## 2024-04-09 NOTE — ASSESSMENT
[FreeTextEntry1] : --spent 30 min in f/u consultation with patient for left foot ulceration --aseptic debridemont with suture siccors and dermal currette down to level of sub q and not beyond with santyl and myah after surgical scrub --patient to get HBO evaluaiton --continue with lwc and f/u one week

## 2024-04-09 NOTE — HISTORY OF PRESENT ILLNESS
[FreeTextEntry1] : Patient presents for f/u for chronic left heel and distal foot ulceration s/p TMA. Patient had surgical revision of distal ulceration with graft application of left heel. Patient is s/p BKA right LE. Patient is undergoing HBO therapy. Patient relates no fever, chills, ns, nausea or calf tenderness and wife and daughter present for visit. Patient has been recieving [facility] .

## 2024-04-16 ENCOUNTER — OUTPATIENT (OUTPATIENT)
Dept: OUTPATIENT SERVICES | Facility: HOSPITAL | Age: 74
LOS: 1 days | End: 2024-04-16
Payer: COMMERCIAL

## 2024-04-16 ENCOUNTER — APPOINTMENT (OUTPATIENT)
Dept: WOUND CARE | Facility: HOSPITAL | Age: 74
End: 2024-04-16

## 2024-04-16 DIAGNOSIS — Z89.429 ACQUIRED ABSENCE OF OTHER TOE(S), UNSPECIFIED SIDE: Chronic | ICD-10-CM

## 2024-04-16 PROCEDURE — 11042 DBRDMT SUBQ TIS 1ST 20SQCM/<: CPT | Mod: 79

## 2024-04-18 NOTE — ED PROVIDER NOTE - PROGRESS NOTE DETAILS
Pt a & o x4, tachycardic & tachypneic, on RA, bedrest, afebrile.      No c/o n/v/d. Pain well controlled with continuous  Morphine, infusing @3.5mg/hr. L PICC flushing well with good blood return.      Voiding well, no BM. Poor appetite.     No acute events this shift. All due medications given per MAR. Will endorse to oncoming RN.       Ángel Taylor MD PGY2: sgy seen pt. to admit.

## 2024-04-23 ENCOUNTER — OUTPATIENT (OUTPATIENT)
Dept: OUTPATIENT SERVICES | Facility: HOSPITAL | Age: 74
LOS: 1 days | End: 2024-04-23
Payer: COMMERCIAL

## 2024-04-23 ENCOUNTER — APPOINTMENT (OUTPATIENT)
Dept: WOUND CARE | Facility: HOSPITAL | Age: 74
End: 2024-04-23

## 2024-04-23 PROCEDURE — 11042 DBRDMT SUBQ TIS 1ST 20SQCM/<: CPT

## 2024-04-23 PROCEDURE — 11042 DBRDMT SUBQ TIS 1ST 20SQCM/<: CPT | Mod: 79

## 2024-04-29 DIAGNOSIS — E11.42 TYPE 2 DIABETES MELLITUS WITH DIABETIC POLYNEUROPATHY: ICD-10-CM

## 2024-04-29 DIAGNOSIS — T86.821 SKIN GRAFT (ALLOGRAFT) (AUTOGRAFT) FAILURE: ICD-10-CM

## 2024-04-29 DIAGNOSIS — L97.522 NON-PRESSURE CHRONIC ULCER OF OTHER PART OF LEFT FOOT WITH FAT LAYER EXPOSED: ICD-10-CM

## 2024-04-29 DIAGNOSIS — I73.9 PERIPHERAL VASCULAR DISEASE, UNSPECIFIED: ICD-10-CM

## 2024-05-07 ENCOUNTER — OUTPATIENT (OUTPATIENT)
Dept: OUTPATIENT SERVICES | Facility: HOSPITAL | Age: 74
LOS: 1 days | End: 2024-05-07
Payer: COMMERCIAL

## 2024-05-07 ENCOUNTER — APPOINTMENT (OUTPATIENT)
Dept: WOUND CARE | Facility: HOSPITAL | Age: 74
End: 2024-05-07

## 2024-05-07 VITALS
HEART RATE: 79 BPM | TEMPERATURE: 97.8 F | DIASTOLIC BLOOD PRESSURE: 89 MMHG | RESPIRATION RATE: 18 BRPM | SYSTOLIC BLOOD PRESSURE: 157 MMHG | OXYGEN SATURATION: 100 %

## 2024-05-07 PROCEDURE — 11042 DBRDMT SUBQ TIS 1ST 20SQCM/<: CPT

## 2024-05-07 PROCEDURE — 11042 DBRDMT SUBQ TIS 1ST 20SQCM/<: CPT | Mod: 79

## 2024-05-07 RX ORDER — COLLAGENASE SANTYL 250 [ARB'U]/G
250 OINTMENT TOPICAL DAILY
Qty: 1 | Refills: 2 | Status: ACTIVE | COMMUNITY
Start: 2024-05-07 | End: 1900-01-01

## 2024-05-07 NOTE — ASSESSMENT
[FreeTextEntry1] : --spent 30 min in f/u consultation with patient for left foot ulceration --aseptic debridemont with suture siccors and dermal currette down to level of sub q and not beyond with santyl and dsd after surgical scrub -- will continue to monitor and will inquire with insuance company regarding future applications of kerecis grafting, continue with cam walker at all times with ambulation. Patient understands condition is guarded for healing. Patient has completed 38 Hyperbaric Oxygen therapy Treatments with improvement Patient needs additional 30 Hyperbaric Oxygen treatment for expediting healing process on left foot ulcer.  Auth will be submitted 
stand-by assist

## 2024-05-07 NOTE — HISTORY OF PRESENT ILLNESS
[FreeTextEntry1] : Patient presents for f/u for chronic left heel and distal foot ulceration s/p TMA. Patient had surgical revision of distal ulceration with graft application of left heel. Patient is s/p BKA right LE. Patient has completed 38 Hyperbaric oxygen treatments with improvement, however needs an additional 30 treatment for full efficacy. Patient relates no fever, chills, ns, nausea or calf tenderness and wife and daughter present for visit. Patient has been recieving [facility]wc .

## 2024-05-15 ENCOUNTER — APPOINTMENT (OUTPATIENT)
Dept: VASCULAR SURGERY | Facility: CLINIC | Age: 74
End: 2024-05-15
Payer: MEDICARE

## 2024-05-15 VITALS
SYSTOLIC BLOOD PRESSURE: 146 MMHG | BODY MASS INDEX: 20.89 KG/M2 | HEIGHT: 66 IN | WEIGHT: 130 LBS | HEART RATE: 82 BPM | DIASTOLIC BLOOD PRESSURE: 67 MMHG | TEMPERATURE: 98.4 F

## 2024-05-15 DIAGNOSIS — L97.522 NON-PRESSURE CHRONIC ULCER OF OTHER PART OF LEFT FOOT WITH FAT LAYER EXPOSED: ICD-10-CM

## 2024-05-15 DIAGNOSIS — I73.9 PERIPHERAL VASCULAR DISEASE, UNSPECIFIED: ICD-10-CM

## 2024-05-15 PROCEDURE — 99214 OFFICE O/P EST MOD 30 MIN: CPT

## 2024-05-15 PROCEDURE — 93923 UPR/LXTR ART STDY 3+ LVLS: CPT

## 2024-05-16 PROBLEM — L97.522 CHRONIC FOOT ULCER WITH FAT LAYER EXPOSED, LEFT: Status: ACTIVE | Noted: 2024-01-30

## 2024-05-16 PROBLEM — I73.9 PERIPHERAL ARTERIAL DISEASE: Status: ACTIVE | Noted: 2023-06-21

## 2024-05-16 NOTE — ASSESSMENT
[FreeTextEntry1] : Problem #1 peripheral arterial disease chronic limb threatening ischemia of the left lower extremity, Linh 5  SVS Threatened Limb Classification System WIfI: 2 2 1 Clinical Stage 4 Amputation Risk: High Potential Benefit of Revascularization: High  Will schedule for left lower extremity angiogram, possible revascularization

## 2024-05-16 NOTE — HISTORY OF PRESENT ILLNESS
DISPLAY PLAN FREE TEXT [FreeTextEntry1] : 73-year-old man with history of peripheral arterial disease s/p right BKA who has chronic limb threatening ischemia of the left lower extremity, Wise 6. He is s/p left TMA which has been slow to heal.   12/15/23 - left leg endovascular revascularization 01/03/24 - Doing well. Denies any fevers or chills. Denies drainage from left TMA site. States he was recommended to undergo evaluation for hyperbaric oxygen therapy. 02/07/24 - Has been undergoing hyperbaric oxygen therapy.. Complains of pain in his left heel. He denies fevers or chills.  [de-identified] : Persistent pain and non-healing wound of left heel. Left TMA site has healed completely. Denies fevers or chills.

## 2024-05-16 NOTE — PHYSICAL EXAM
[Respiratory Effort] : normal respiratory effort [Normal Rate and Rhythm] : normal rate and rhythm [2+] : left 2+ [0] : left 0 [Ankle Swelling (On Exam)] : present [Ankle Swelling On The Left] : moderate [Varicose Veins Of Lower Extremities] : not present [] : not present [Abdomen Tenderness] : ~T ~M No abdominal tenderness [Skin Ulcer] : ulcer [Alert] : alert [Oriented to Person] : oriented to person [Oriented to Place] : oriented to place [Oriented to Time] : oriented to time [Calm] : calm [de-identified] : appears stated age [de-identified] : normocephalic, atraumatic [de-identified] : supple

## 2024-05-16 NOTE — REASON FOR VISIT
Pt will ambulate using RW or least restrictive AD for 100 ft with S/U by discharge to facilitate return to PLOF. [Follow-Up: _____] : a [unfilled] follow-up visit

## 2024-05-21 ENCOUNTER — OUTPATIENT (OUTPATIENT)
Dept: OUTPATIENT SERVICES | Facility: HOSPITAL | Age: 74
LOS: 1 days | End: 2024-05-21
Payer: COMMERCIAL

## 2024-05-21 ENCOUNTER — APPOINTMENT (OUTPATIENT)
Dept: WOUND CARE | Facility: HOSPITAL | Age: 74
End: 2024-05-21

## 2024-05-21 PROCEDURE — 11042 DBRDMT SUBQ TIS 1ST 20SQCM/<: CPT | Mod: 79

## 2024-05-24 ENCOUNTER — NON-APPOINTMENT (OUTPATIENT)
Age: 74
End: 2024-05-24

## 2024-06-04 ENCOUNTER — APPOINTMENT (OUTPATIENT)
Dept: WOUND CARE | Facility: HOSPITAL | Age: 74
End: 2024-06-04

## 2024-06-04 ENCOUNTER — OUTPATIENT (OUTPATIENT)
Dept: OUTPATIENT SERVICES | Facility: HOSPITAL | Age: 74
LOS: 1 days | End: 2024-06-04
Payer: COMMERCIAL

## 2024-06-04 VITALS
OXYGEN SATURATION: 98 % | RESPIRATION RATE: 18 BRPM | HEART RATE: 89 BPM | DIASTOLIC BLOOD PRESSURE: 67 MMHG | SYSTOLIC BLOOD PRESSURE: 124 MMHG | TEMPERATURE: 98.2 F

## 2024-06-04 DIAGNOSIS — Z89.429 ACQUIRED ABSENCE OF OTHER TOE(S), UNSPECIFIED SIDE: Chronic | ICD-10-CM

## 2024-06-04 PROCEDURE — 11042 DBRDMT SUBQ TIS 1ST 20SQCM/<: CPT | Mod: 79

## 2024-06-04 RX ORDER — AMOXICILLIN AND CLAVULANATE POTASSIUM 875; 125 MG/1; MG/1
875-125 TABLET, COATED ORAL
Qty: 20 | Refills: 0 | Status: ACTIVE | COMMUNITY
Start: 2024-06-04 | End: 1900-01-01

## 2024-06-10 ENCOUNTER — INPATIENT (INPATIENT)
Facility: HOSPITAL | Age: 74
LOS: 15 days | Discharge: NURSING, REHAB OR LONG-TERM CARE | DRG: 240 | End: 2024-06-26
Attending: STUDENT IN AN ORGANIZED HEALTH CARE EDUCATION/TRAINING PROGRAM | Admitting: STUDENT IN AN ORGANIZED HEALTH CARE EDUCATION/TRAINING PROGRAM
Payer: COMMERCIAL

## 2024-06-10 ENCOUNTER — APPOINTMENT (OUTPATIENT)
Dept: VASCULAR SURGERY | Facility: HOSPITAL | Age: 74
End: 2024-06-10

## 2024-06-10 VITALS
TEMPERATURE: 98 F | WEIGHT: 139.99 LBS | HEIGHT: 66 IN | HEART RATE: 80 BPM | OXYGEN SATURATION: 98 % | RESPIRATION RATE: 16 BRPM | SYSTOLIC BLOOD PRESSURE: 146 MMHG | DIASTOLIC BLOOD PRESSURE: 69 MMHG

## 2024-06-10 DIAGNOSIS — Z89.429 ACQUIRED ABSENCE OF OTHER TOE(S), UNSPECIFIED SIDE: Chronic | ICD-10-CM

## 2024-06-10 DIAGNOSIS — M79.605 PAIN IN LEFT LEG: ICD-10-CM

## 2024-06-10 LAB
ALBUMIN SERPL ELPH-MCNC: 3.5 G/DL — SIGNIFICANT CHANGE UP (ref 3.3–5)
ALP SERPL-CCNC: 67 U/L — SIGNIFICANT CHANGE UP (ref 40–120)
ALT FLD-CCNC: 18 U/L — SIGNIFICANT CHANGE UP (ref 10–45)
ANION GAP SERPL CALC-SCNC: 14 MMOL/L — SIGNIFICANT CHANGE UP (ref 5–17)
APTT BLD: 31.4 SEC — SIGNIFICANT CHANGE UP (ref 24.5–35.6)
AST SERPL-CCNC: 13 U/L — SIGNIFICANT CHANGE UP (ref 10–40)
BASOPHILS # BLD AUTO: 0.02 K/UL — SIGNIFICANT CHANGE UP (ref 0–0.2)
BASOPHILS NFR BLD AUTO: 0.2 % — SIGNIFICANT CHANGE UP (ref 0–2)
BILIRUB SERPL-MCNC: 0.1 MG/DL — LOW (ref 0.2–1.2)
BLD GP AB SCN SERPL QL: NEGATIVE — SIGNIFICANT CHANGE UP
BUN SERPL-MCNC: 23 MG/DL — SIGNIFICANT CHANGE UP (ref 7–23)
CALCIUM SERPL-MCNC: 10.2 MG/DL — SIGNIFICANT CHANGE UP (ref 8.4–10.5)
CHLORIDE SERPL-SCNC: 102 MMOL/L — SIGNIFICANT CHANGE UP (ref 96–108)
CO2 SERPL-SCNC: 24 MMOL/L — SIGNIFICANT CHANGE UP (ref 22–31)
CREAT SERPL-MCNC: 0.96 MG/DL — SIGNIFICANT CHANGE UP (ref 0.5–1.3)
EGFR: 83 ML/MIN/1.73M2 — SIGNIFICANT CHANGE UP
EOSINOPHIL # BLD AUTO: 0.21 K/UL — SIGNIFICANT CHANGE UP (ref 0–0.5)
EOSINOPHIL NFR BLD AUTO: 2.2 % — SIGNIFICANT CHANGE UP (ref 0–6)
GLUCOSE BLDC GLUCOMTR-MCNC: 187 MG/DL — HIGH (ref 70–99)
GLUCOSE BLDC GLUCOMTR-MCNC: 222 MG/DL — HIGH (ref 70–99)
GLUCOSE SERPL-MCNC: 127 MG/DL — HIGH (ref 70–99)
HCT VFR BLD CALC: 28.4 % — LOW (ref 39–50)
HGB BLD-MCNC: 8.7 G/DL — LOW (ref 13–17)
IMM GRANULOCYTES NFR BLD AUTO: 0.3 % — SIGNIFICANT CHANGE UP (ref 0–0.9)
INR BLD: 1.13 RATIO — SIGNIFICANT CHANGE UP (ref 0.85–1.18)
LYMPHOCYTES # BLD AUTO: 1.28 K/UL — SIGNIFICANT CHANGE UP (ref 1–3.3)
LYMPHOCYTES # BLD AUTO: 13.5 % — SIGNIFICANT CHANGE UP (ref 13–44)
MCHC RBC-ENTMCNC: 27 PG — SIGNIFICANT CHANGE UP (ref 27–34)
MCHC RBC-ENTMCNC: 30.6 GM/DL — LOW (ref 32–36)
MCV RBC AUTO: 88.2 FL — SIGNIFICANT CHANGE UP (ref 80–100)
MONOCYTES # BLD AUTO: 0.59 K/UL — SIGNIFICANT CHANGE UP (ref 0–0.9)
MONOCYTES NFR BLD AUTO: 6.2 % — SIGNIFICANT CHANGE UP (ref 2–14)
NEUTROPHILS # BLD AUTO: 7.34 K/UL — SIGNIFICANT CHANGE UP (ref 1.8–7.4)
NEUTROPHILS NFR BLD AUTO: 77.6 % — HIGH (ref 43–77)
NRBC # BLD: 0 /100 WBCS — SIGNIFICANT CHANGE UP (ref 0–0)
PLATELET # BLD AUTO: 330 K/UL — SIGNIFICANT CHANGE UP (ref 150–400)
POTASSIUM SERPL-MCNC: 4.6 MMOL/L — SIGNIFICANT CHANGE UP (ref 3.5–5.3)
POTASSIUM SERPL-SCNC: 4.6 MMOL/L — SIGNIFICANT CHANGE UP (ref 3.5–5.3)
PROT SERPL-MCNC: 8.5 G/DL — HIGH (ref 6–8.3)
PROTHROM AB SERPL-ACNC: 11.8 SEC — SIGNIFICANT CHANGE UP (ref 9.5–13)
RBC # BLD: 3.22 M/UL — LOW (ref 4.2–5.8)
RBC # FLD: 13.2 % — SIGNIFICANT CHANGE UP (ref 10.3–14.5)
RH IG SCN BLD-IMP: POSITIVE — SIGNIFICANT CHANGE UP
SODIUM SERPL-SCNC: 140 MMOL/L — SIGNIFICANT CHANGE UP (ref 135–145)
WBC # BLD: 9.47 K/UL — SIGNIFICANT CHANGE UP (ref 3.8–10.5)
WBC # FLD AUTO: 9.47 K/UL — SIGNIFICANT CHANGE UP (ref 3.8–10.5)

## 2024-06-10 PROCEDURE — 71045 X-RAY EXAM CHEST 1 VIEW: CPT | Mod: 26

## 2024-06-10 PROCEDURE — 99223 1ST HOSP IP/OBS HIGH 75: CPT

## 2024-06-10 PROCEDURE — 99285 EMERGENCY DEPT VISIT HI MDM: CPT

## 2024-06-10 RX ORDER — GABAPENTIN
100 POWDER (GRAM) MISCELLANEOUS THREE TIMES A DAY
Refills: 0 | Status: DISCONTINUED | OUTPATIENT
Start: 2024-06-10 | End: 2024-06-13

## 2024-06-10 RX ORDER — DEXTROSE MONOHYDRATE AND SODIUM CHLORIDE 5; .3 G/100ML; G/100ML
1000 INJECTION, SOLUTION INTRAVENOUS
Refills: 0 | Status: DISCONTINUED | OUTPATIENT
Start: 2024-06-10 | End: 2024-06-13

## 2024-06-10 RX ORDER — INSULIN LISPRO 100 [IU]/ML
INJECTION, SOLUTION SUBCUTANEOUS AT BEDTIME
Refills: 0 | Status: DISCONTINUED | OUTPATIENT
Start: 2024-06-10 | End: 2024-06-13

## 2024-06-10 RX ORDER — DEXTROSE 30 % IN WATER 30 %
25 VIAL (ML) INTRAVENOUS ONCE
Refills: 0 | Status: DISCONTINUED | OUTPATIENT
Start: 2024-06-10 | End: 2024-06-13

## 2024-06-10 RX ORDER — ATORVASTATIN CALCIUM 20 MG/1
40 TABLET, FILM COATED ORAL AT BEDTIME
Refills: 0 | Status: DISCONTINUED | OUTPATIENT
Start: 2024-06-10 | End: 2024-06-13

## 2024-06-10 RX ORDER — DEXTROSE 30 % IN WATER 30 %
15 VIAL (ML) INTRAVENOUS ONCE
Refills: 0 | Status: DISCONTINUED | OUTPATIENT
Start: 2024-06-10 | End: 2024-06-13

## 2024-06-10 RX ORDER — OXYCODONE HYDROCHLORIDE 100 MG/5ML
5 SOLUTION ORAL EVERY 4 HOURS
Refills: 0 | Status: DISCONTINUED | OUTPATIENT
Start: 2024-06-10 | End: 2024-06-13

## 2024-06-10 RX ORDER — DEXTROSE MONOHYDRATE 100 MG/ML
125 INJECTION, SOLUTION INTRAVENOUS ONCE
Refills: 0 | Status: DISCONTINUED | OUTPATIENT
Start: 2024-06-10 | End: 2024-06-13

## 2024-06-10 RX ORDER — LOSARTAN POTASSIUM 100 MG/1
25 TABLET, FILM COATED ORAL DAILY
Refills: 0 | Status: DISCONTINUED | OUTPATIENT
Start: 2024-06-10 | End: 2024-06-13

## 2024-06-10 RX ORDER — ASPIRIN 325 MG/1
81 TABLET, FILM COATED ORAL DAILY
Refills: 0 | Status: DISCONTINUED | OUTPATIENT
Start: 2024-06-10 | End: 2024-06-13

## 2024-06-10 RX ORDER — PIPERACILLIN SODIUM AND TAZOBACTAM SODIUM 3; .375 G/15ML; G/15ML
3.38 INJECTION, POWDER, LYOPHILIZED, FOR SOLUTION INTRAVENOUS ONCE
Refills: 0 | Status: COMPLETED | OUTPATIENT
Start: 2024-06-10 | End: 2024-06-10

## 2024-06-10 RX ORDER — CLOPIDOGREL BISULFATE 75 MG/1
75 TABLET, FILM COATED ORAL DAILY
Refills: 0 | Status: DISCONTINUED | OUTPATIENT
Start: 2024-06-10 | End: 2024-06-13

## 2024-06-10 RX ORDER — HYDRALAZINE HYDROCHLORIDE 50 MG/1
10 TABLET ORAL ONCE
Refills: 0 | Status: COMPLETED | OUTPATIENT
Start: 2024-06-10 | End: 2024-06-10

## 2024-06-10 RX ORDER — AMLODIPINE BESYLATE 2.5 MG/1
2.5 TABLET ORAL DAILY
Refills: 0 | Status: DISCONTINUED | OUTPATIENT
Start: 2024-06-10 | End: 2024-06-13

## 2024-06-10 RX ORDER — INSULIN LISPRO 100 [IU]/ML
INJECTION, SOLUTION SUBCUTANEOUS
Refills: 0 | Status: DISCONTINUED | OUTPATIENT
Start: 2024-06-10 | End: 2024-06-13

## 2024-06-10 RX ORDER — ACETAMINOPHEN 325 MG
1000 TABLET ORAL EVERY 6 HOURS
Refills: 0 | Status: COMPLETED | OUTPATIENT
Start: 2024-06-10 | End: 2024-06-11

## 2024-06-10 RX ORDER — VANCOMYCIN HYDROCHLORIDE 50 MG/ML
1000 KIT ORAL ONCE
Refills: 0 | Status: DISCONTINUED | OUTPATIENT
Start: 2024-06-10 | End: 2024-06-10

## 2024-06-10 RX ORDER — SODIUM CHLORIDE 0.9 % (FLUSH) 0.9 %
1000 SYRINGE (ML) INJECTION ONCE
Refills: 0 | Status: COMPLETED | OUTPATIENT
Start: 2024-06-10 | End: 2024-06-10

## 2024-06-10 RX ORDER — GLUCAGON HYDROCHLORIDE 1 MG/ML
1 INJECTION, POWDER, FOR SOLUTION INTRAMUSCULAR; INTRAVENOUS; SUBCUTANEOUS ONCE
Refills: 0 | Status: DISCONTINUED | OUTPATIENT
Start: 2024-06-10 | End: 2024-06-13

## 2024-06-10 RX ORDER — DEXTROSE 30 % IN WATER 30 %
12.5 VIAL (ML) INTRAVENOUS ONCE
Refills: 0 | Status: DISCONTINUED | OUTPATIENT
Start: 2024-06-10 | End: 2024-06-13

## 2024-06-10 RX ADMIN — Medication 1000 MILLIGRAM(S): at 22:30

## 2024-06-10 RX ADMIN — Medication 100 MILLIGRAM(S): at 18:13

## 2024-06-10 RX ADMIN — PIPERACILLIN SODIUM AND TAZOBACTAM SODIUM 200 GRAM(S): 3; .375 INJECTION, POWDER, LYOPHILIZED, FOR SOLUTION INTRAVENOUS at 11:33

## 2024-06-10 RX ADMIN — Medication 400 MILLIGRAM(S): at 22:07

## 2024-06-10 RX ADMIN — INSULIN LISPRO 4: 100 INJECTION, SOLUTION SUBCUTANEOUS at 18:13

## 2024-06-10 RX ADMIN — HYDRALAZINE HYDROCHLORIDE 10 MILLIGRAM(S): 50 TABLET ORAL at 18:13

## 2024-06-10 RX ADMIN — ATORVASTATIN CALCIUM 40 MILLIGRAM(S): 20 TABLET, FILM COATED ORAL at 22:08

## 2024-06-10 RX ADMIN — Medication 1000 MILLILITER(S): at 10:35

## 2024-06-11 ENCOUNTER — APPOINTMENT (OUTPATIENT)
Dept: VASCULAR SURGERY | Facility: HOSPITAL | Age: 74
End: 2024-06-11

## 2024-06-11 LAB
A1C WITH ESTIMATED AVERAGE GLUCOSE RESULT: 7.2 % — HIGH (ref 4–5.6)
ANION GAP SERPL CALC-SCNC: 14 MMOL/L — SIGNIFICANT CHANGE UP (ref 5–17)
BUN SERPL-MCNC: 20 MG/DL — SIGNIFICANT CHANGE UP (ref 7–23)
CALCIUM SERPL-MCNC: 9.8 MG/DL — SIGNIFICANT CHANGE UP (ref 8.4–10.5)
CHLORIDE SERPL-SCNC: 102 MMOL/L — SIGNIFICANT CHANGE UP (ref 96–108)
CO2 SERPL-SCNC: 22 MMOL/L — SIGNIFICANT CHANGE UP (ref 22–31)
CREAT SERPL-MCNC: 0.91 MG/DL — SIGNIFICANT CHANGE UP (ref 0.5–1.3)
EGFR: 89 ML/MIN/1.73M2 — SIGNIFICANT CHANGE UP
ESTIMATED AVERAGE GLUCOSE: 160 MG/DL — HIGH (ref 68–114)
GLUCOSE BLDC GLUCOMTR-MCNC: 142 MG/DL — HIGH (ref 70–99)
GLUCOSE BLDC GLUCOMTR-MCNC: 165 MG/DL — HIGH (ref 70–99)
GLUCOSE BLDC GLUCOMTR-MCNC: 199 MG/DL — HIGH (ref 70–99)
GLUCOSE BLDC GLUCOMTR-MCNC: 206 MG/DL — HIGH (ref 70–99)
GLUCOSE SERPL-MCNC: 121 MG/DL — HIGH (ref 70–99)
HCT VFR BLD CALC: 26.3 % — LOW (ref 39–50)
HGB BLD-MCNC: 8.2 G/DL — LOW (ref 13–17)
MAGNESIUM SERPL-MCNC: 2.5 MG/DL — SIGNIFICANT CHANGE UP (ref 1.6–2.6)
MCHC RBC-ENTMCNC: 26.8 PG — LOW (ref 27–34)
MCHC RBC-ENTMCNC: 31.2 GM/DL — LOW (ref 32–36)
MCV RBC AUTO: 85.9 FL — SIGNIFICANT CHANGE UP (ref 80–100)
NRBC # BLD: 0 /100 WBCS — SIGNIFICANT CHANGE UP (ref 0–0)
PHOSPHATE SERPL-MCNC: 3.9 MG/DL — SIGNIFICANT CHANGE UP (ref 2.5–4.5)
PLATELET # BLD AUTO: 297 K/UL — SIGNIFICANT CHANGE UP (ref 150–400)
POTASSIUM SERPL-MCNC: 4.7 MMOL/L — SIGNIFICANT CHANGE UP (ref 3.5–5.3)
POTASSIUM SERPL-SCNC: 4.7 MMOL/L — SIGNIFICANT CHANGE UP (ref 3.5–5.3)
RBC # BLD: 3.06 M/UL — LOW (ref 4.2–5.8)
RBC # FLD: 13.2 % — SIGNIFICANT CHANGE UP (ref 10.3–14.5)
SODIUM SERPL-SCNC: 138 MMOL/L — SIGNIFICANT CHANGE UP (ref 135–145)
WBC # BLD: 6.88 K/UL — SIGNIFICANT CHANGE UP (ref 3.8–10.5)
WBC # FLD AUTO: 6.88 K/UL — SIGNIFICANT CHANGE UP (ref 3.8–10.5)

## 2024-06-11 RX ORDER — HYDRALAZINE HYDROCHLORIDE 50 MG/1
5 TABLET ORAL ONCE
Refills: 0 | Status: COMPLETED | OUTPATIENT
Start: 2024-06-11 | End: 2024-06-11

## 2024-06-11 RX ORDER — HEPARIN SODIUM 50 [USP'U]/ML
5000 INJECTION, SOLUTION INTRAVENOUS EVERY 8 HOURS
Refills: 0 | Status: DISCONTINUED | OUTPATIENT
Start: 2024-06-11 | End: 2024-06-13

## 2024-06-11 RX ADMIN — LOSARTAN POTASSIUM 25 MILLIGRAM(S): 100 TABLET, FILM COATED ORAL at 05:40

## 2024-06-11 RX ADMIN — Medication 400 MILLIGRAM(S): at 12:10

## 2024-06-11 RX ADMIN — Medication 1000 MILLIGRAM(S): at 04:37

## 2024-06-11 RX ADMIN — ASPIRIN 81 MILLIGRAM(S): 325 TABLET, FILM COATED ORAL at 12:11

## 2024-06-11 RX ADMIN — CLOPIDOGREL BISULFATE 75 MILLIGRAM(S): 75 TABLET, FILM COATED ORAL at 12:11

## 2024-06-11 RX ADMIN — HYDRALAZINE HYDROCHLORIDE 5 MILLIGRAM(S): 50 TABLET ORAL at 18:56

## 2024-06-11 RX ADMIN — Medication 100 MILLIGRAM(S): at 02:12

## 2024-06-11 RX ADMIN — Medication 100 MILLIGRAM(S): at 20:51

## 2024-06-11 RX ADMIN — HEPARIN SODIUM 5000 UNIT(S): 50 INJECTION, SOLUTION INTRAVENOUS at 13:54

## 2024-06-11 RX ADMIN — INSULIN LISPRO 2: 100 INJECTION, SOLUTION SUBCUTANEOUS at 17:58

## 2024-06-11 RX ADMIN — ATORVASTATIN CALCIUM 40 MILLIGRAM(S): 20 TABLET, FILM COATED ORAL at 21:52

## 2024-06-11 RX ADMIN — HEPARIN SODIUM 5000 UNIT(S): 50 INJECTION, SOLUTION INTRAVENOUS at 21:52

## 2024-06-11 RX ADMIN — HEPARIN SODIUM 5000 UNIT(S): 50 INJECTION, SOLUTION INTRAVENOUS at 05:42

## 2024-06-11 RX ADMIN — Medication 100 MILLIGRAM(S): at 12:10

## 2024-06-11 RX ADMIN — AMLODIPINE BESYLATE 2.5 MILLIGRAM(S): 2.5 TABLET ORAL at 05:40

## 2024-06-11 RX ADMIN — Medication 400 MILLIGRAM(S): at 04:07

## 2024-06-11 RX ADMIN — INSULIN LISPRO 2: 100 INJECTION, SOLUTION SUBCUTANEOUS at 13:51

## 2024-06-12 ENCOUNTER — RESULT REVIEW (OUTPATIENT)
Age: 74
End: 2024-06-12

## 2024-06-12 ENCOUNTER — TRANSCRIPTION ENCOUNTER (OUTPATIENT)
Age: 74
End: 2024-06-12

## 2024-06-12 LAB
A1C WITH ESTIMATED AVERAGE GLUCOSE RESULT: 7.4 % — HIGH (ref 4–5.6)
ANION GAP SERPL CALC-SCNC: 14 MMOL/L — SIGNIFICANT CHANGE UP (ref 5–17)
BUN SERPL-MCNC: 22 MG/DL — SIGNIFICANT CHANGE UP (ref 7–23)
CALCIUM SERPL-MCNC: 9.8 MG/DL — SIGNIFICANT CHANGE UP (ref 8.4–10.5)
CHLORIDE SERPL-SCNC: 101 MMOL/L — SIGNIFICANT CHANGE UP (ref 96–108)
CHOLEST SERPL-MCNC: 96 MG/DL — SIGNIFICANT CHANGE UP
CO2 SERPL-SCNC: 20 MMOL/L — LOW (ref 22–31)
CREAT SERPL-MCNC: 1.09 MG/DL — SIGNIFICANT CHANGE UP (ref 0.5–1.3)
EGFR: 72 ML/MIN/1.73M2 — SIGNIFICANT CHANGE UP
ESTIMATED AVERAGE GLUCOSE: 166 MG/DL — HIGH (ref 68–114)
FERRITIN SERPL-MCNC: 275 NG/ML — SIGNIFICANT CHANGE UP (ref 30–400)
FOLATE SERPL-MCNC: >20 NG/ML — SIGNIFICANT CHANGE UP
GLUCOSE BLDC GLUCOMTR-MCNC: 144 MG/DL — HIGH (ref 70–99)
GLUCOSE BLDC GLUCOMTR-MCNC: 185 MG/DL — HIGH (ref 70–99)
GLUCOSE BLDC GLUCOMTR-MCNC: 249 MG/DL — HIGH (ref 70–99)
GLUCOSE BLDC GLUCOMTR-MCNC: 263 MG/DL — HIGH (ref 70–99)
GLUCOSE SERPL-MCNC: 140 MG/DL — HIGH (ref 70–99)
HCT VFR BLD CALC: 26.2 % — LOW (ref 39–50)
HDLC SERPL-MCNC: 33 MG/DL — LOW
HGB BLD-MCNC: 8.1 G/DL — LOW (ref 13–17)
IRON SATN MFR SERPL: 12 % — LOW (ref 16–55)
IRON SATN MFR SERPL: 25 UG/DL — LOW (ref 45–165)
LIPID PNL WITH DIRECT LDL SERPL: 46 MG/DL — SIGNIFICANT CHANGE UP
MAGNESIUM SERPL-MCNC: 2.3 MG/DL — SIGNIFICANT CHANGE UP (ref 1.6–2.6)
MCHC RBC-ENTMCNC: 27.2 PG — SIGNIFICANT CHANGE UP (ref 27–34)
MCHC RBC-ENTMCNC: 30.9 GM/DL — LOW (ref 32–36)
MCV RBC AUTO: 87.9 FL — SIGNIFICANT CHANGE UP (ref 80–100)
NON HDL CHOLESTEROL: 63 MG/DL — SIGNIFICANT CHANGE UP
NRBC # BLD: 0 /100 WBCS — SIGNIFICANT CHANGE UP (ref 0–0)
PHOSPHATE SERPL-MCNC: 3.7 MG/DL — SIGNIFICANT CHANGE UP (ref 2.5–4.5)
PLATELET # BLD AUTO: 333 K/UL — SIGNIFICANT CHANGE UP (ref 150–400)
POTASSIUM SERPL-MCNC: 4.4 MMOL/L — SIGNIFICANT CHANGE UP (ref 3.5–5.3)
POTASSIUM SERPL-SCNC: 4.4 MMOL/L — SIGNIFICANT CHANGE UP (ref 3.5–5.3)
RBC # BLD: 2.98 M/UL — LOW (ref 4.2–5.8)
RBC # FLD: 13.4 % — SIGNIFICANT CHANGE UP (ref 10.3–14.5)
SODIUM SERPL-SCNC: 135 MMOL/L — SIGNIFICANT CHANGE UP (ref 135–145)
TIBC SERPL-MCNC: 211 UG/DL — LOW (ref 220–430)
TRIGL SERPL-MCNC: 87 MG/DL — SIGNIFICANT CHANGE UP
UIBC SERPL-MCNC: 186 UG/DL — SIGNIFICANT CHANGE UP (ref 110–370)
VIT B12 SERPL-MCNC: 858 PG/ML — SIGNIFICANT CHANGE UP (ref 232–1245)
WBC # BLD: 8.46 K/UL — SIGNIFICANT CHANGE UP (ref 3.8–10.5)
WBC # FLD AUTO: 8.46 K/UL — SIGNIFICANT CHANGE UP (ref 3.8–10.5)

## 2024-06-12 PROCEDURE — 93306 TTE W/DOPPLER COMPLETE: CPT | Mod: 26

## 2024-06-12 PROCEDURE — 93356 MYOCRD STRAIN IMG SPCKL TRCK: CPT

## 2024-06-12 RX ORDER — ACETAMINOPHEN 325 MG
975 TABLET ORAL EVERY 6 HOURS
Refills: 0 | Status: DISCONTINUED | OUTPATIENT
Start: 2024-06-12 | End: 2024-06-13

## 2024-06-12 RX ORDER — INSULIN GLARGINE 100 [IU]/ML
6 INJECTION, SOLUTION SUBCUTANEOUS AT BEDTIME
Refills: 0 | Status: DISCONTINUED | OUTPATIENT
Start: 2024-06-12 | End: 2024-06-13

## 2024-06-12 RX ORDER — INSULIN LISPRO 100 [IU]/ML
3 INJECTION, SOLUTION SUBCUTANEOUS
Refills: 0 | Status: DISCONTINUED | OUTPATIENT
Start: 2024-06-12 | End: 2024-06-13

## 2024-06-12 RX ADMIN — AMLODIPINE BESYLATE 2.5 MILLIGRAM(S): 2.5 TABLET ORAL at 05:29

## 2024-06-12 RX ADMIN — HEPARIN SODIUM 5000 UNIT(S): 50 INJECTION, SOLUTION INTRAVENOUS at 21:34

## 2024-06-12 RX ADMIN — Medication 100 MILLIGRAM(S): at 05:09

## 2024-06-12 RX ADMIN — Medication 975 MILLIGRAM(S): at 22:42

## 2024-06-12 RX ADMIN — Medication 100 MILLIGRAM(S): at 12:32

## 2024-06-12 RX ADMIN — INSULIN LISPRO 2: 100 INJECTION, SOLUTION SUBCUTANEOUS at 17:38

## 2024-06-12 RX ADMIN — HEPARIN SODIUM 5000 UNIT(S): 50 INJECTION, SOLUTION INTRAVENOUS at 12:31

## 2024-06-12 RX ADMIN — LOSARTAN POTASSIUM 25 MILLIGRAM(S): 100 TABLET, FILM COATED ORAL at 05:10

## 2024-06-12 RX ADMIN — CLOPIDOGREL BISULFATE 75 MILLIGRAM(S): 75 TABLET, FILM COATED ORAL at 12:32

## 2024-06-12 RX ADMIN — ASPIRIN 81 MILLIGRAM(S): 325 TABLET, FILM COATED ORAL at 12:32

## 2024-06-12 RX ADMIN — Medication 100 MILLIGRAM(S): at 20:26

## 2024-06-12 RX ADMIN — HEPARIN SODIUM 5000 UNIT(S): 50 INJECTION, SOLUTION INTRAVENOUS at 05:10

## 2024-06-12 RX ADMIN — INSULIN GLARGINE 6 UNIT(S): 100 INJECTION, SOLUTION SUBCUTANEOUS at 21:43

## 2024-06-12 RX ADMIN — Medication 975 MILLIGRAM(S): at 21:42

## 2024-06-12 RX ADMIN — INSULIN LISPRO 4: 100 INJECTION, SOLUTION SUBCUTANEOUS at 12:45

## 2024-06-12 RX ADMIN — ATORVASTATIN CALCIUM 40 MILLIGRAM(S): 20 TABLET, FILM COATED ORAL at 21:34

## 2024-06-12 RX ADMIN — INSULIN LISPRO 3 UNIT(S): 100 INJECTION, SOLUTION SUBCUTANEOUS at 17:38

## 2024-06-13 ENCOUNTER — RESULT REVIEW (OUTPATIENT)
Age: 74
End: 2024-06-13

## 2024-06-13 ENCOUNTER — APPOINTMENT (OUTPATIENT)
Dept: VASCULAR SURGERY | Facility: HOSPITAL | Age: 74
End: 2024-06-13

## 2024-06-13 LAB
ANION GAP SERPL CALC-SCNC: 12 MMOL/L — SIGNIFICANT CHANGE UP (ref 5–17)
APTT BLD: 33.4 SEC — SIGNIFICANT CHANGE UP (ref 24.5–35.6)
BLD GP AB SCN SERPL QL: NEGATIVE — SIGNIFICANT CHANGE UP
BUN SERPL-MCNC: 20 MG/DL — SIGNIFICANT CHANGE UP (ref 7–23)
CALCIUM SERPL-MCNC: 9.7 MG/DL — SIGNIFICANT CHANGE UP (ref 8.4–10.5)
CHLORIDE SERPL-SCNC: 105 MMOL/L — SIGNIFICANT CHANGE UP (ref 96–108)
CO2 SERPL-SCNC: 21 MMOL/L — LOW (ref 22–31)
CREAT SERPL-MCNC: 0.94 MG/DL — SIGNIFICANT CHANGE UP (ref 0.5–1.3)
EGFR: 86 ML/MIN/1.73M2 — SIGNIFICANT CHANGE UP
GLUCOSE BLDC GLUCOMTR-MCNC: 118 MG/DL — HIGH (ref 70–99)
GLUCOSE BLDC GLUCOMTR-MCNC: 124 MG/DL — HIGH (ref 70–99)
GLUCOSE BLDC GLUCOMTR-MCNC: 147 MG/DL — HIGH (ref 70–99)
GLUCOSE BLDC GLUCOMTR-MCNC: 152 MG/DL — HIGH (ref 70–99)
GLUCOSE SERPL-MCNC: 125 MG/DL — HIGH (ref 70–99)
HCT VFR BLD CALC: 25.5 % — LOW (ref 39–50)
HGB BLD-MCNC: 7.8 G/DL — LOW (ref 13–17)
INR BLD: 1.07 RATIO — SIGNIFICANT CHANGE UP (ref 0.85–1.18)
MAGNESIUM SERPL-MCNC: 2.4 MG/DL — SIGNIFICANT CHANGE UP (ref 1.6–2.6)
MCHC RBC-ENTMCNC: 27 PG — SIGNIFICANT CHANGE UP (ref 27–34)
MCHC RBC-ENTMCNC: 30.6 GM/DL — LOW (ref 32–36)
MCV RBC AUTO: 88.2 FL — SIGNIFICANT CHANGE UP (ref 80–100)
NRBC # BLD: 0 /100 WBCS — SIGNIFICANT CHANGE UP (ref 0–0)
PHOSPHATE SERPL-MCNC: 4.2 MG/DL — SIGNIFICANT CHANGE UP (ref 2.5–4.5)
PLATELET # BLD AUTO: 309 K/UL — SIGNIFICANT CHANGE UP (ref 150–400)
POTASSIUM SERPL-MCNC: 4.2 MMOL/L — SIGNIFICANT CHANGE UP (ref 3.5–5.3)
POTASSIUM SERPL-SCNC: 4.2 MMOL/L — SIGNIFICANT CHANGE UP (ref 3.5–5.3)
PROTHROM AB SERPL-ACNC: 11.8 SEC — SIGNIFICANT CHANGE UP (ref 9.5–13)
RBC # BLD: 2.89 M/UL — LOW (ref 4.2–5.8)
RBC # FLD: 13.4 % — SIGNIFICANT CHANGE UP (ref 10.3–14.5)
RH IG SCN BLD-IMP: POSITIVE — SIGNIFICANT CHANGE UP
SODIUM SERPL-SCNC: 138 MMOL/L — SIGNIFICANT CHANGE UP (ref 135–145)
WBC # BLD: 6.49 K/UL — SIGNIFICANT CHANGE UP (ref 3.8–10.5)
WBC # FLD AUTO: 6.49 K/UL — SIGNIFICANT CHANGE UP (ref 3.8–10.5)

## 2024-06-13 PROCEDURE — 88311 DECALCIFY TISSUE: CPT | Mod: 26

## 2024-06-13 PROCEDURE — 93010 ELECTROCARDIOGRAM REPORT: CPT

## 2024-06-13 PROCEDURE — 88307 TISSUE EXAM BY PATHOLOGIST: CPT | Mod: 26

## 2024-06-13 PROCEDURE — 27882 AMPUTATION OF LOWER LEG: CPT | Mod: LT

## 2024-06-13 DEVICE — SURGICEL NU-KNIT 6 X 9": Type: IMPLANTABLE DEVICE | Site: LEFT | Status: FUNCTIONAL

## 2024-06-13 DEVICE — BONE WAX 2.5GM: Type: IMPLANTABLE DEVICE | Site: LEFT | Status: FUNCTIONAL

## 2024-06-13 RX ORDER — AMLODIPINE BESYLATE 2.5 MG/1
5 TABLET ORAL DAILY
Refills: 0 | Status: DISCONTINUED | OUTPATIENT
Start: 2024-06-13 | End: 2024-06-13

## 2024-06-13 RX ORDER — ACETAMINOPHEN 325 MG
975 TABLET ORAL EVERY 6 HOURS
Refills: 0 | Status: DISCONTINUED | OUTPATIENT
Start: 2024-06-13 | End: 2024-06-21

## 2024-06-13 RX ORDER — DEXTROSE MONOHYDRATE 100 MG/ML
125 INJECTION, SOLUTION INTRAVENOUS ONCE
Refills: 0 | Status: DISCONTINUED | OUTPATIENT
Start: 2024-06-13 | End: 2024-06-20

## 2024-06-13 RX ORDER — INSULIN LISPRO 100 [IU]/ML
INJECTION, SOLUTION SUBCUTANEOUS EVERY 6 HOURS
Refills: 0 | Status: DISCONTINUED | OUTPATIENT
Start: 2024-06-13 | End: 2024-06-13

## 2024-06-13 RX ORDER — DEXTROSE 30 % IN WATER 30 %
12.5 VIAL (ML) INTRAVENOUS ONCE
Refills: 0 | Status: DISCONTINUED | OUTPATIENT
Start: 2024-06-13 | End: 2024-06-20

## 2024-06-13 RX ORDER — INSULIN LISPRO 100 [IU]/ML
INJECTION, SOLUTION SUBCUTANEOUS
Refills: 0 | Status: DISCONTINUED | OUTPATIENT
Start: 2024-06-13 | End: 2024-06-20

## 2024-06-13 RX ORDER — DEXTROSE MONOHYDRATE AND SODIUM CHLORIDE 5; .3 G/100ML; G/100ML
1000 INJECTION, SOLUTION INTRAVENOUS
Refills: 0 | Status: DISCONTINUED | OUTPATIENT
Start: 2024-06-13 | End: 2024-06-20

## 2024-06-13 RX ORDER — DEXTROSE 30 % IN WATER 30 %
25 VIAL (ML) INTRAVENOUS ONCE
Refills: 0 | Status: DISCONTINUED | OUTPATIENT
Start: 2024-06-13 | End: 2024-06-20

## 2024-06-13 RX ORDER — INSULIN LISPRO 100 [IU]/ML
3 INJECTION, SOLUTION SUBCUTANEOUS
Refills: 0 | Status: DISCONTINUED | OUTPATIENT
Start: 2024-06-13 | End: 2024-06-17

## 2024-06-13 RX ORDER — HYDRALAZINE HYDROCHLORIDE 50 MG/1
10 TABLET ORAL ONCE
Refills: 0 | Status: COMPLETED | OUTPATIENT
Start: 2024-06-13 | End: 2024-06-13

## 2024-06-13 RX ORDER — ASPIRIN 325 MG/1
81 TABLET, FILM COATED ORAL DAILY
Refills: 0 | Status: DISCONTINUED | OUTPATIENT
Start: 2024-06-13 | End: 2024-06-21

## 2024-06-13 RX ORDER — HEPARIN SODIUM 50 [USP'U]/ML
5000 INJECTION, SOLUTION INTRAVENOUS EVERY 8 HOURS
Refills: 0 | Status: DISCONTINUED | OUTPATIENT
Start: 2024-06-13 | End: 2024-06-21

## 2024-06-13 RX ORDER — OXYCODONE HYDROCHLORIDE 100 MG/5ML
5 SOLUTION ORAL EVERY 4 HOURS
Refills: 0 | Status: DISCONTINUED | OUTPATIENT
Start: 2024-06-13 | End: 2024-06-13

## 2024-06-13 RX ORDER — GLUCAGON HYDROCHLORIDE 1 MG/ML
1 INJECTION, POWDER, FOR SOLUTION INTRAMUSCULAR; INTRAVENOUS; SUBCUTANEOUS ONCE
Refills: 0 | Status: DISCONTINUED | OUTPATIENT
Start: 2024-06-13 | End: 2024-06-20

## 2024-06-13 RX ORDER — DEXTROSE 30 % IN WATER 30 %
15 VIAL (ML) INTRAVENOUS ONCE
Refills: 0 | Status: DISCONTINUED | OUTPATIENT
Start: 2024-06-13 | End: 2024-06-20

## 2024-06-13 RX ORDER — HYDROMORPHONE HCL 0.2 MG/ML
1 INJECTION, SOLUTION INTRAVENOUS
Refills: 0 | Status: DISCONTINUED | OUTPATIENT
Start: 2024-06-13 | End: 2024-06-14

## 2024-06-13 RX ORDER — PIPERACILLIN SODIUM AND TAZOBACTAM SODIUM 3; .375 G/15ML; G/15ML
3.38 INJECTION, POWDER, LYOPHILIZED, FOR SOLUTION INTRAVENOUS EVERY 8 HOURS
Refills: 0 | Status: DISCONTINUED | OUTPATIENT
Start: 2024-06-13 | End: 2024-06-18

## 2024-06-13 RX ORDER — ONDANSETRON HYDROCHLORIDE 2 MG/ML
4 INJECTION INTRAMUSCULAR; INTRAVENOUS ONCE
Refills: 0 | Status: DISCONTINUED | OUTPATIENT
Start: 2024-06-13 | End: 2024-06-14

## 2024-06-13 RX ORDER — CLOPIDOGREL BISULFATE 75 MG/1
75 TABLET, FILM COATED ORAL DAILY
Refills: 0 | Status: DISCONTINUED | OUTPATIENT
Start: 2024-06-13 | End: 2024-06-21

## 2024-06-13 RX ORDER — LOSARTAN POTASSIUM 100 MG/1
50 TABLET, FILM COATED ORAL DAILY
Refills: 0 | Status: DISCONTINUED | OUTPATIENT
Start: 2024-06-13 | End: 2024-06-19

## 2024-06-13 RX ORDER — LOSARTAN POTASSIUM 100 MG/1
50 TABLET, FILM COATED ORAL DAILY
Refills: 0 | Status: DISCONTINUED | OUTPATIENT
Start: 2024-06-13 | End: 2024-06-13

## 2024-06-13 RX ORDER — ATORVASTATIN CALCIUM 20 MG/1
40 TABLET, FILM COATED ORAL AT BEDTIME
Refills: 0 | Status: DISCONTINUED | OUTPATIENT
Start: 2024-06-13 | End: 2024-06-21

## 2024-06-13 RX ORDER — AMLODIPINE BESYLATE 2.5 MG/1
5 TABLET ORAL DAILY
Refills: 0 | Status: DISCONTINUED | OUTPATIENT
Start: 2024-06-13 | End: 2024-06-21

## 2024-06-13 RX ORDER — INSULIN GLARGINE 100 [IU]/ML
6 INJECTION, SOLUTION SUBCUTANEOUS AT BEDTIME
Refills: 0 | Status: DISCONTINUED | OUTPATIENT
Start: 2024-06-13 | End: 2024-06-19

## 2024-06-13 RX ORDER — GABAPENTIN
100 POWDER (GRAM) MISCELLANEOUS THREE TIMES A DAY
Refills: 0 | Status: DISCONTINUED | OUTPATIENT
Start: 2024-06-13 | End: 2024-06-21

## 2024-06-13 RX ORDER — VANCOMYCIN HYDROCHLORIDE 50 MG/ML
1000 KIT ORAL EVERY 12 HOURS
Refills: 0 | Status: DISCONTINUED | OUTPATIENT
Start: 2024-06-13 | End: 2024-06-18

## 2024-06-13 RX ORDER — HYDROMORPHONE HCL 0.2 MG/ML
0.5 INJECTION, SOLUTION INTRAVENOUS
Refills: 0 | Status: DISCONTINUED | OUTPATIENT
Start: 2024-06-13 | End: 2024-06-14

## 2024-06-13 RX ADMIN — ASPIRIN 81 MILLIGRAM(S): 325 TABLET, FILM COATED ORAL at 12:41

## 2024-06-13 RX ADMIN — ATORVASTATIN CALCIUM 40 MILLIGRAM(S): 20 TABLET, FILM COATED ORAL at 23:45

## 2024-06-13 RX ADMIN — HEPARIN SODIUM 5000 UNIT(S): 50 INJECTION, SOLUTION INTRAVENOUS at 13:52

## 2024-06-13 RX ADMIN — AMLODIPINE BESYLATE 2.5 MILLIGRAM(S): 2.5 TABLET ORAL at 05:27

## 2024-06-13 RX ADMIN — INSULIN LISPRO 2: 100 INJECTION, SOLUTION SUBCUTANEOUS at 12:40

## 2024-06-13 RX ADMIN — Medication 100 MILLIGRAM(S): at 05:27

## 2024-06-13 RX ADMIN — INSULIN GLARGINE 6 UNIT(S): 100 INJECTION, SOLUTION SUBCUTANEOUS at 23:45

## 2024-06-13 RX ADMIN — Medication 100 MILLIGRAM(S): at 23:45

## 2024-06-13 RX ADMIN — HYDRALAZINE HYDROCHLORIDE 10 MILLIGRAM(S): 50 TABLET ORAL at 18:44

## 2024-06-13 RX ADMIN — Medication 100 MILLIGRAM(S): at 12:41

## 2024-06-13 RX ADMIN — LOSARTAN POTASSIUM 25 MILLIGRAM(S): 100 TABLET, FILM COATED ORAL at 05:27

## 2024-06-13 RX ADMIN — CLOPIDOGREL BISULFATE 75 MILLIGRAM(S): 75 TABLET, FILM COATED ORAL at 12:41

## 2024-06-13 RX ADMIN — HEPARIN SODIUM 5000 UNIT(S): 50 INJECTION, SOLUTION INTRAVENOUS at 05:27

## 2024-06-14 LAB
ANION GAP SERPL CALC-SCNC: 15 MMOL/L — SIGNIFICANT CHANGE UP (ref 5–17)
BUN SERPL-MCNC: 17 MG/DL — SIGNIFICANT CHANGE UP (ref 7–23)
CALCIUM SERPL-MCNC: 9.5 MG/DL — SIGNIFICANT CHANGE UP (ref 8.4–10.5)
CHLORIDE SERPL-SCNC: 102 MMOL/L — SIGNIFICANT CHANGE UP (ref 96–108)
CO2 SERPL-SCNC: 23 MMOL/L — SIGNIFICANT CHANGE UP (ref 22–31)
CREAT SERPL-MCNC: 0.89 MG/DL — SIGNIFICANT CHANGE UP (ref 0.5–1.3)
EGFR: 90 ML/MIN/1.73M2 — SIGNIFICANT CHANGE UP
GLUCOSE BLDC GLUCOMTR-MCNC: 179 MG/DL — HIGH (ref 70–99)
GLUCOSE BLDC GLUCOMTR-MCNC: 190 MG/DL — HIGH (ref 70–99)
GLUCOSE BLDC GLUCOMTR-MCNC: 242 MG/DL — HIGH (ref 70–99)
GLUCOSE BLDC GLUCOMTR-MCNC: 283 MG/DL — HIGH (ref 70–99)
GLUCOSE SERPL-MCNC: 189 MG/DL — HIGH (ref 70–99)
HCT VFR BLD CALC: 26.4 % — LOW (ref 39–50)
HGB BLD-MCNC: 8.1 G/DL — LOW (ref 13–17)
MAGNESIUM SERPL-MCNC: 2.4 MG/DL — SIGNIFICANT CHANGE UP (ref 1.6–2.6)
MCHC RBC-ENTMCNC: 27.3 PG — SIGNIFICANT CHANGE UP (ref 27–34)
MCHC RBC-ENTMCNC: 30.7 GM/DL — LOW (ref 32–36)
MCV RBC AUTO: 88.9 FL — SIGNIFICANT CHANGE UP (ref 80–100)
NRBC # BLD: 0 /100 WBCS — SIGNIFICANT CHANGE UP (ref 0–0)
PHOSPHATE SERPL-MCNC: 4.4 MG/DL — SIGNIFICANT CHANGE UP (ref 2.5–4.5)
PLATELET # BLD AUTO: 300 K/UL — SIGNIFICANT CHANGE UP (ref 150–400)
POTASSIUM SERPL-MCNC: 3.7 MMOL/L — SIGNIFICANT CHANGE UP (ref 3.5–5.3)
POTASSIUM SERPL-SCNC: 3.7 MMOL/L — SIGNIFICANT CHANGE UP (ref 3.5–5.3)
RBC # BLD: 2.97 M/UL — LOW (ref 4.2–5.8)
RBC # FLD: 13.4 % — SIGNIFICANT CHANGE UP (ref 10.3–14.5)
SODIUM SERPL-SCNC: 140 MMOL/L — SIGNIFICANT CHANGE UP (ref 135–145)
WBC # BLD: 5.31 K/UL — SIGNIFICANT CHANGE UP (ref 3.8–10.5)
WBC # FLD AUTO: 5.31 K/UL — SIGNIFICANT CHANGE UP (ref 3.8–10.5)

## 2024-06-14 RX ORDER — POTASSIUM CHLORIDE 600 MG/1
40 TABLET, FILM COATED, EXTENDED RELEASE ORAL ONCE
Refills: 0 | Status: COMPLETED | OUTPATIENT
Start: 2024-06-14 | End: 2024-06-14

## 2024-06-14 RX ADMIN — INSULIN LISPRO 2: 100 INJECTION, SOLUTION SUBCUTANEOUS at 12:12

## 2024-06-14 RX ADMIN — Medication 975 MILLIGRAM(S): at 05:51

## 2024-06-14 RX ADMIN — PIPERACILLIN SODIUM AND TAZOBACTAM SODIUM 25 GRAM(S): 3; .375 INJECTION, POWDER, LYOPHILIZED, FOR SOLUTION INTRAVENOUS at 22:25

## 2024-06-14 RX ADMIN — PIPERACILLIN SODIUM AND TAZOBACTAM SODIUM 25 GRAM(S): 3; .375 INJECTION, POWDER, LYOPHILIZED, FOR SOLUTION INTRAVENOUS at 08:53

## 2024-06-14 RX ADMIN — Medication 100 MILLIGRAM(S): at 05:50

## 2024-06-14 RX ADMIN — Medication 100 MILLIGRAM(S): at 22:24

## 2024-06-14 RX ADMIN — VANCOMYCIN HYDROCHLORIDE 250 MILLIGRAM(S): KIT at 18:00

## 2024-06-14 RX ADMIN — INSULIN LISPRO 4: 100 INJECTION, SOLUTION SUBCUTANEOUS at 18:00

## 2024-06-14 RX ADMIN — INSULIN LISPRO 2: 100 INJECTION, SOLUTION SUBCUTANEOUS at 22:25

## 2024-06-14 RX ADMIN — AMLODIPINE BESYLATE 5 MILLIGRAM(S): 2.5 TABLET ORAL at 05:50

## 2024-06-14 RX ADMIN — LOSARTAN POTASSIUM 50 MILLIGRAM(S): 100 TABLET, FILM COATED ORAL at 05:50

## 2024-06-14 RX ADMIN — VANCOMYCIN HYDROCHLORIDE 250 MILLIGRAM(S): KIT at 05:50

## 2024-06-14 RX ADMIN — INSULIN LISPRO 6: 100 INJECTION, SOLUTION SUBCUTANEOUS at 08:53

## 2024-06-14 RX ADMIN — HEPARIN SODIUM 5000 UNIT(S): 50 INJECTION, SOLUTION INTRAVENOUS at 22:24

## 2024-06-14 RX ADMIN — HEPARIN SODIUM 5000 UNIT(S): 50 INJECTION, SOLUTION INTRAVENOUS at 14:37

## 2024-06-14 RX ADMIN — ASPIRIN 81 MILLIGRAM(S): 325 TABLET, FILM COATED ORAL at 11:57

## 2024-06-14 RX ADMIN — INSULIN LISPRO 3 UNIT(S): 100 INJECTION, SOLUTION SUBCUTANEOUS at 18:00

## 2024-06-14 RX ADMIN — Medication 100 MILLIGRAM(S): at 14:37

## 2024-06-14 RX ADMIN — PIPERACILLIN SODIUM AND TAZOBACTAM SODIUM 25 GRAM(S): 3; .375 INJECTION, POWDER, LYOPHILIZED, FOR SOLUTION INTRAVENOUS at 14:37

## 2024-06-14 RX ADMIN — CLOPIDOGREL BISULFATE 75 MILLIGRAM(S): 75 TABLET, FILM COATED ORAL at 11:57

## 2024-06-14 RX ADMIN — INSULIN GLARGINE 6 UNIT(S): 100 INJECTION, SOLUTION SUBCUTANEOUS at 22:25

## 2024-06-14 RX ADMIN — POTASSIUM CHLORIDE 40 MILLIEQUIVALENT(S): 600 TABLET, FILM COATED, EXTENDED RELEASE ORAL at 10:34

## 2024-06-14 RX ADMIN — ATORVASTATIN CALCIUM 40 MILLIGRAM(S): 20 TABLET, FILM COATED ORAL at 22:24

## 2024-06-14 RX ADMIN — Medication 975 MILLIGRAM(S): at 06:21

## 2024-06-14 RX ADMIN — HEPARIN SODIUM 5000 UNIT(S): 50 INJECTION, SOLUTION INTRAVENOUS at 05:50

## 2024-06-14 RX ADMIN — INSULIN LISPRO 3 UNIT(S): 100 INJECTION, SOLUTION SUBCUTANEOUS at 08:52

## 2024-06-14 RX ADMIN — INSULIN LISPRO 3 UNIT(S): 100 INJECTION, SOLUTION SUBCUTANEOUS at 12:11

## 2024-06-15 LAB
ANION GAP SERPL CALC-SCNC: 10 MMOL/L — SIGNIFICANT CHANGE UP (ref 5–17)
BUN SERPL-MCNC: 13 MG/DL — SIGNIFICANT CHANGE UP (ref 7–23)
CALCIUM SERPL-MCNC: 9.2 MG/DL — SIGNIFICANT CHANGE UP (ref 8.4–10.5)
CHLORIDE SERPL-SCNC: 105 MMOL/L — SIGNIFICANT CHANGE UP (ref 96–108)
CO2 SERPL-SCNC: 23 MMOL/L — SIGNIFICANT CHANGE UP (ref 22–31)
CREAT SERPL-MCNC: 0.99 MG/DL — SIGNIFICANT CHANGE UP (ref 0.5–1.3)
CULTURE RESULTS: SIGNIFICANT CHANGE UP
CULTURE RESULTS: SIGNIFICANT CHANGE UP
EGFR: 80 ML/MIN/1.73M2 — SIGNIFICANT CHANGE UP
GLUCOSE BLDC GLUCOMTR-MCNC: 150 MG/DL — HIGH (ref 70–99)
GLUCOSE BLDC GLUCOMTR-MCNC: 157 MG/DL — HIGH (ref 70–99)
GLUCOSE BLDC GLUCOMTR-MCNC: 214 MG/DL — HIGH (ref 70–99)
GLUCOSE BLDC GLUCOMTR-MCNC: 226 MG/DL — HIGH (ref 70–99)
GLUCOSE SERPL-MCNC: 107 MG/DL — HIGH (ref 70–99)
HCT VFR BLD CALC: 25 % — LOW (ref 39–50)
HGB BLD-MCNC: 7.8 G/DL — LOW (ref 13–17)
MAGNESIUM SERPL-MCNC: 2.3 MG/DL — SIGNIFICANT CHANGE UP (ref 1.6–2.6)
MCHC RBC-ENTMCNC: 27.4 PG — SIGNIFICANT CHANGE UP (ref 27–34)
MCHC RBC-ENTMCNC: 31.2 GM/DL — LOW (ref 32–36)
MCV RBC AUTO: 87.7 FL — SIGNIFICANT CHANGE UP (ref 80–100)
NRBC # BLD: 0 /100 WBCS — SIGNIFICANT CHANGE UP (ref 0–0)
PHOSPHATE SERPL-MCNC: 3.5 MG/DL — SIGNIFICANT CHANGE UP (ref 2.5–4.5)
PLATELET # BLD AUTO: 316 K/UL — SIGNIFICANT CHANGE UP (ref 150–400)
POTASSIUM SERPL-MCNC: 4.1 MMOL/L — SIGNIFICANT CHANGE UP (ref 3.5–5.3)
POTASSIUM SERPL-SCNC: 4.1 MMOL/L — SIGNIFICANT CHANGE UP (ref 3.5–5.3)
RBC # BLD: 2.85 M/UL — LOW (ref 4.2–5.8)
RBC # FLD: 13.2 % — SIGNIFICANT CHANGE UP (ref 10.3–14.5)
SODIUM SERPL-SCNC: 138 MMOL/L — SIGNIFICANT CHANGE UP (ref 135–145)
SPECIMEN SOURCE: SIGNIFICANT CHANGE UP
SPECIMEN SOURCE: SIGNIFICANT CHANGE UP
VANCOMYCIN TROUGH SERPL-MCNC: 15 UG/ML — SIGNIFICANT CHANGE UP (ref 10–20)
WBC # BLD: 6.41 K/UL — SIGNIFICANT CHANGE UP (ref 3.8–10.5)
WBC # FLD AUTO: 6.41 K/UL — SIGNIFICANT CHANGE UP (ref 3.8–10.5)

## 2024-06-15 RX ADMIN — VANCOMYCIN HYDROCHLORIDE 250 MILLIGRAM(S): KIT at 06:50

## 2024-06-15 RX ADMIN — INSULIN LISPRO 2: 100 INJECTION, SOLUTION SUBCUTANEOUS at 08:58

## 2024-06-15 RX ADMIN — INSULIN LISPRO 3 UNIT(S): 100 INJECTION, SOLUTION SUBCUTANEOUS at 19:09

## 2024-06-15 RX ADMIN — INSULIN LISPRO 3 UNIT(S): 100 INJECTION, SOLUTION SUBCUTANEOUS at 08:58

## 2024-06-15 RX ADMIN — INSULIN LISPRO 3 UNIT(S): 100 INJECTION, SOLUTION SUBCUTANEOUS at 14:13

## 2024-06-15 RX ADMIN — CLOPIDOGREL BISULFATE 75 MILLIGRAM(S): 75 TABLET, FILM COATED ORAL at 12:44

## 2024-06-15 RX ADMIN — INSULIN LISPRO 4: 100 INJECTION, SOLUTION SUBCUTANEOUS at 19:10

## 2024-06-15 RX ADMIN — PIPERACILLIN SODIUM AND TAZOBACTAM SODIUM 25 GRAM(S): 3; .375 INJECTION, POWDER, LYOPHILIZED, FOR SOLUTION INTRAVENOUS at 22:16

## 2024-06-15 RX ADMIN — LOSARTAN POTASSIUM 50 MILLIGRAM(S): 100 TABLET, FILM COATED ORAL at 06:50

## 2024-06-15 RX ADMIN — VANCOMYCIN HYDROCHLORIDE 250 MILLIGRAM(S): KIT at 18:25

## 2024-06-15 RX ADMIN — Medication 100 MILLIGRAM(S): at 06:49

## 2024-06-15 RX ADMIN — Medication 100 MILLIGRAM(S): at 14:10

## 2024-06-15 RX ADMIN — HEPARIN SODIUM 5000 UNIT(S): 50 INJECTION, SOLUTION INTRAVENOUS at 14:09

## 2024-06-15 RX ADMIN — ASPIRIN 81 MILLIGRAM(S): 325 TABLET, FILM COATED ORAL at 12:44

## 2024-06-15 RX ADMIN — Medication 975 MILLIGRAM(S): at 23:23

## 2024-06-15 RX ADMIN — HEPARIN SODIUM 5000 UNIT(S): 50 INJECTION, SOLUTION INTRAVENOUS at 06:49

## 2024-06-15 RX ADMIN — PIPERACILLIN SODIUM AND TAZOBACTAM SODIUM 25 GRAM(S): 3; .375 INJECTION, POWDER, LYOPHILIZED, FOR SOLUTION INTRAVENOUS at 14:09

## 2024-06-15 RX ADMIN — AMLODIPINE BESYLATE 5 MILLIGRAM(S): 2.5 TABLET ORAL at 06:49

## 2024-06-15 RX ADMIN — PIPERACILLIN SODIUM AND TAZOBACTAM SODIUM 25 GRAM(S): 3; .375 INJECTION, POWDER, LYOPHILIZED, FOR SOLUTION INTRAVENOUS at 06:52

## 2024-06-16 LAB
ANION GAP SERPL CALC-SCNC: 10 MMOL/L — SIGNIFICANT CHANGE UP (ref 5–17)
BUN SERPL-MCNC: 15 MG/DL — SIGNIFICANT CHANGE UP (ref 7–23)
CALCIUM SERPL-MCNC: 9.3 MG/DL — SIGNIFICANT CHANGE UP (ref 8.4–10.5)
CHLORIDE SERPL-SCNC: 105 MMOL/L — SIGNIFICANT CHANGE UP (ref 96–108)
CO2 SERPL-SCNC: 23 MMOL/L — SIGNIFICANT CHANGE UP (ref 22–31)
CREAT SERPL-MCNC: 0.98 MG/DL — SIGNIFICANT CHANGE UP (ref 0.5–1.3)
EGFR: 81 ML/MIN/1.73M2 — SIGNIFICANT CHANGE UP
GLUCOSE BLDC GLUCOMTR-MCNC: 160 MG/DL — HIGH (ref 70–99)
GLUCOSE BLDC GLUCOMTR-MCNC: 186 MG/DL — HIGH (ref 70–99)
GLUCOSE BLDC GLUCOMTR-MCNC: 206 MG/DL — HIGH (ref 70–99)
GLUCOSE BLDC GLUCOMTR-MCNC: 238 MG/DL — HIGH (ref 70–99)
GLUCOSE SERPL-MCNC: 126 MG/DL — HIGH (ref 70–99)
HCT VFR BLD CALC: 24.5 % — LOW (ref 39–50)
HGB BLD-MCNC: 7.7 G/DL — LOW (ref 13–17)
MAGNESIUM SERPL-MCNC: 2.2 MG/DL — SIGNIFICANT CHANGE UP (ref 1.6–2.6)
MCHC RBC-ENTMCNC: 27 PG — SIGNIFICANT CHANGE UP (ref 27–34)
MCHC RBC-ENTMCNC: 31.4 GM/DL — LOW (ref 32–36)
MCV RBC AUTO: 86 FL — SIGNIFICANT CHANGE UP (ref 80–100)
NRBC # BLD: 0 /100 WBCS — SIGNIFICANT CHANGE UP (ref 0–0)
PHOSPHATE SERPL-MCNC: 3.7 MG/DL — SIGNIFICANT CHANGE UP (ref 2.5–4.5)
PLATELET # BLD AUTO: 278 K/UL — SIGNIFICANT CHANGE UP (ref 150–400)
POTASSIUM SERPL-MCNC: 4 MMOL/L — SIGNIFICANT CHANGE UP (ref 3.5–5.3)
POTASSIUM SERPL-SCNC: 4 MMOL/L — SIGNIFICANT CHANGE UP (ref 3.5–5.3)
RBC # BLD: 2.85 M/UL — LOW (ref 4.2–5.8)
RBC # FLD: 13.2 % — SIGNIFICANT CHANGE UP (ref 10.3–14.5)
SODIUM SERPL-SCNC: 138 MMOL/L — SIGNIFICANT CHANGE UP (ref 135–145)
WBC # BLD: 5.37 K/UL — SIGNIFICANT CHANGE UP (ref 3.8–10.5)
WBC # FLD AUTO: 5.37 K/UL — SIGNIFICANT CHANGE UP (ref 3.8–10.5)

## 2024-06-16 RX ADMIN — INSULIN LISPRO 4: 100 INJECTION, SOLUTION SUBCUTANEOUS at 18:01

## 2024-06-16 RX ADMIN — LOSARTAN POTASSIUM 50 MILLIGRAM(S): 100 TABLET, FILM COATED ORAL at 05:50

## 2024-06-16 RX ADMIN — HEPARIN SODIUM 5000 UNIT(S): 50 INJECTION, SOLUTION INTRAVENOUS at 05:50

## 2024-06-16 RX ADMIN — INSULIN LISPRO 3 UNIT(S): 100 INJECTION, SOLUTION SUBCUTANEOUS at 08:28

## 2024-06-16 RX ADMIN — AMLODIPINE BESYLATE 5 MILLIGRAM(S): 2.5 TABLET ORAL at 05:51

## 2024-06-16 RX ADMIN — ATORVASTATIN CALCIUM 40 MILLIGRAM(S): 20 TABLET, FILM COATED ORAL at 21:43

## 2024-06-16 RX ADMIN — Medication 100 MILLIGRAM(S): at 13:26

## 2024-06-16 RX ADMIN — INSULIN LISPRO 2: 100 INJECTION, SOLUTION SUBCUTANEOUS at 08:29

## 2024-06-16 RX ADMIN — CLOPIDOGREL BISULFATE 75 MILLIGRAM(S): 75 TABLET, FILM COATED ORAL at 11:06

## 2024-06-16 RX ADMIN — HEPARIN SODIUM 5000 UNIT(S): 50 INJECTION, SOLUTION INTRAVENOUS at 13:23

## 2024-06-16 RX ADMIN — PIPERACILLIN SODIUM AND TAZOBACTAM SODIUM 25 GRAM(S): 3; .375 INJECTION, POWDER, LYOPHILIZED, FOR SOLUTION INTRAVENOUS at 06:52

## 2024-06-16 RX ADMIN — INSULIN LISPRO 2: 100 INJECTION, SOLUTION SUBCUTANEOUS at 13:25

## 2024-06-16 RX ADMIN — PIPERACILLIN SODIUM AND TAZOBACTAM SODIUM 25 GRAM(S): 3; .375 INJECTION, POWDER, LYOPHILIZED, FOR SOLUTION INTRAVENOUS at 13:23

## 2024-06-16 RX ADMIN — PIPERACILLIN SODIUM AND TAZOBACTAM SODIUM 25 GRAM(S): 3; .375 INJECTION, POWDER, LYOPHILIZED, FOR SOLUTION INTRAVENOUS at 21:42

## 2024-06-16 RX ADMIN — INSULIN GLARGINE 6 UNIT(S): 100 INJECTION, SOLUTION SUBCUTANEOUS at 21:42

## 2024-06-16 RX ADMIN — Medication 100 MILLIGRAM(S): at 05:52

## 2024-06-16 RX ADMIN — INSULIN LISPRO 3 UNIT(S): 100 INJECTION, SOLUTION SUBCUTANEOUS at 18:00

## 2024-06-16 RX ADMIN — VANCOMYCIN HYDROCHLORIDE 250 MILLIGRAM(S): KIT at 05:08

## 2024-06-16 RX ADMIN — HEPARIN SODIUM 5000 UNIT(S): 50 INJECTION, SOLUTION INTRAVENOUS at 21:42

## 2024-06-16 RX ADMIN — Medication 975 MILLIGRAM(S): at 21:43

## 2024-06-16 RX ADMIN — ASPIRIN 81 MILLIGRAM(S): 325 TABLET, FILM COATED ORAL at 11:06

## 2024-06-16 RX ADMIN — INSULIN LISPRO 3 UNIT(S): 100 INJECTION, SOLUTION SUBCUTANEOUS at 13:25

## 2024-06-16 RX ADMIN — VANCOMYCIN HYDROCHLORIDE 250 MILLIGRAM(S): KIT at 18:00

## 2024-06-16 RX ADMIN — Medication 975 MILLIGRAM(S): at 22:43

## 2024-06-16 RX ADMIN — INSULIN LISPRO 4: 100 INJECTION, SOLUTION SUBCUTANEOUS at 21:42

## 2024-06-16 RX ADMIN — Medication 100 MILLIGRAM(S): at 21:43

## 2024-06-17 LAB
ANION GAP SERPL CALC-SCNC: 10 MMOL/L — SIGNIFICANT CHANGE UP (ref 5–17)
BLD GP AB SCN SERPL QL: NEGATIVE — SIGNIFICANT CHANGE UP
BUN SERPL-MCNC: 17 MG/DL — SIGNIFICANT CHANGE UP (ref 7–23)
CALCIUM SERPL-MCNC: 9.7 MG/DL — SIGNIFICANT CHANGE UP (ref 8.4–10.5)
CHLORIDE SERPL-SCNC: 105 MMOL/L — SIGNIFICANT CHANGE UP (ref 96–108)
CO2 SERPL-SCNC: 24 MMOL/L — SIGNIFICANT CHANGE UP (ref 22–31)
CREAT SERPL-MCNC: 1.08 MG/DL — SIGNIFICANT CHANGE UP (ref 0.5–1.3)
EGFR: 72 ML/MIN/1.73M2 — SIGNIFICANT CHANGE UP
GLUCOSE BLDC GLUCOMTR-MCNC: 142 MG/DL — HIGH (ref 70–99)
GLUCOSE BLDC GLUCOMTR-MCNC: 162 MG/DL — HIGH (ref 70–99)
GLUCOSE BLDC GLUCOMTR-MCNC: 193 MG/DL — HIGH (ref 70–99)
GLUCOSE BLDC GLUCOMTR-MCNC: 251 MG/DL — HIGH (ref 70–99)
GLUCOSE SERPL-MCNC: 135 MG/DL — HIGH (ref 70–99)
HCT VFR BLD CALC: 25.3 % — LOW (ref 39–50)
HGB BLD-MCNC: 7.8 G/DL — LOW (ref 13–17)
MAGNESIUM SERPL-MCNC: 2.3 MG/DL — SIGNIFICANT CHANGE UP (ref 1.6–2.6)
MCHC RBC-ENTMCNC: 27.2 PG — SIGNIFICANT CHANGE UP (ref 27–34)
MCHC RBC-ENTMCNC: 30.8 GM/DL — LOW (ref 32–36)
MCV RBC AUTO: 88.2 FL — SIGNIFICANT CHANGE UP (ref 80–100)
NRBC # BLD: 0 /100 WBCS — SIGNIFICANT CHANGE UP (ref 0–0)
PHOSPHATE SERPL-MCNC: 4 MG/DL — SIGNIFICANT CHANGE UP (ref 2.5–4.5)
PLATELET # BLD AUTO: 296 K/UL — SIGNIFICANT CHANGE UP (ref 150–400)
POTASSIUM SERPL-MCNC: 4.1 MMOL/L — SIGNIFICANT CHANGE UP (ref 3.5–5.3)
POTASSIUM SERPL-SCNC: 4.1 MMOL/L — SIGNIFICANT CHANGE UP (ref 3.5–5.3)
RBC # BLD: 2.87 M/UL — LOW (ref 4.2–5.8)
RBC # FLD: 13.5 % — SIGNIFICANT CHANGE UP (ref 10.3–14.5)
RH IG SCN BLD-IMP: POSITIVE — SIGNIFICANT CHANGE UP
SODIUM SERPL-SCNC: 139 MMOL/L — SIGNIFICANT CHANGE UP (ref 135–145)
VANCOMYCIN TROUGH SERPL-MCNC: 18.6 UG/ML — SIGNIFICANT CHANGE UP (ref 10–20)
WBC # BLD: 4.82 K/UL — SIGNIFICANT CHANGE UP (ref 3.8–10.5)
WBC # FLD AUTO: 4.82 K/UL — SIGNIFICANT CHANGE UP (ref 3.8–10.5)

## 2024-06-17 RX ORDER — INSULIN LISPRO 100 [IU]/ML
5 INJECTION, SOLUTION SUBCUTANEOUS
Refills: 0 | Status: DISCONTINUED | OUTPATIENT
Start: 2024-06-17 | End: 2024-06-20

## 2024-06-17 RX ADMIN — Medication 975 MILLIGRAM(S): at 05:30

## 2024-06-17 RX ADMIN — CLOPIDOGREL BISULFATE 75 MILLIGRAM(S): 75 TABLET, FILM COATED ORAL at 11:35

## 2024-06-17 RX ADMIN — HEPARIN SODIUM 5000 UNIT(S): 50 INJECTION, SOLUTION INTRAVENOUS at 21:19

## 2024-06-17 RX ADMIN — HEPARIN SODIUM 5000 UNIT(S): 50 INJECTION, SOLUTION INTRAVENOUS at 13:04

## 2024-06-17 RX ADMIN — Medication 100 MILLIGRAM(S): at 05:30

## 2024-06-17 RX ADMIN — VANCOMYCIN HYDROCHLORIDE 250 MILLIGRAM(S): KIT at 21:14

## 2024-06-17 RX ADMIN — INSULIN LISPRO 2: 100 INJECTION, SOLUTION SUBCUTANEOUS at 21:22

## 2024-06-17 RX ADMIN — INSULIN LISPRO 3 UNIT(S): 100 INJECTION, SOLUTION SUBCUTANEOUS at 08:56

## 2024-06-17 RX ADMIN — INSULIN LISPRO 3 UNIT(S): 100 INJECTION, SOLUTION SUBCUTANEOUS at 13:01

## 2024-06-17 RX ADMIN — VANCOMYCIN HYDROCHLORIDE 250 MILLIGRAM(S): KIT at 10:04

## 2024-06-17 RX ADMIN — PIPERACILLIN SODIUM AND TAZOBACTAM SODIUM 25 GRAM(S): 3; .375 INJECTION, POWDER, LYOPHILIZED, FOR SOLUTION INTRAVENOUS at 05:30

## 2024-06-17 RX ADMIN — PIPERACILLIN SODIUM AND TAZOBACTAM SODIUM 25 GRAM(S): 3; .375 INJECTION, POWDER, LYOPHILIZED, FOR SOLUTION INTRAVENOUS at 23:15

## 2024-06-17 RX ADMIN — Medication 100 MILLIGRAM(S): at 13:04

## 2024-06-17 RX ADMIN — HEPARIN SODIUM 5000 UNIT(S): 50 INJECTION, SOLUTION INTRAVENOUS at 05:28

## 2024-06-17 RX ADMIN — INSULIN GLARGINE 6 UNIT(S): 100 INJECTION, SOLUTION SUBCUTANEOUS at 21:17

## 2024-06-17 RX ADMIN — INSULIN LISPRO 2: 100 INJECTION, SOLUTION SUBCUTANEOUS at 08:57

## 2024-06-17 RX ADMIN — ATORVASTATIN CALCIUM 40 MILLIGRAM(S): 20 TABLET, FILM COATED ORAL at 21:18

## 2024-06-17 RX ADMIN — ASPIRIN 81 MILLIGRAM(S): 325 TABLET, FILM COATED ORAL at 11:35

## 2024-06-17 RX ADMIN — INSULIN LISPRO 6: 100 INJECTION, SOLUTION SUBCUTANEOUS at 13:01

## 2024-06-17 RX ADMIN — Medication 975 MILLIGRAM(S): at 22:24

## 2024-06-17 RX ADMIN — INSULIN LISPRO 5 UNIT(S): 100 INJECTION, SOLUTION SUBCUTANEOUS at 17:49

## 2024-06-17 RX ADMIN — Medication 100 MILLIGRAM(S): at 21:18

## 2024-06-17 RX ADMIN — Medication 975 MILLIGRAM(S): at 06:30

## 2024-06-17 RX ADMIN — LOSARTAN POTASSIUM 50 MILLIGRAM(S): 100 TABLET, FILM COATED ORAL at 05:28

## 2024-06-17 RX ADMIN — PIPERACILLIN SODIUM AND TAZOBACTAM SODIUM 25 GRAM(S): 3; .375 INJECTION, POWDER, LYOPHILIZED, FOR SOLUTION INTRAVENOUS at 13:05

## 2024-06-17 RX ADMIN — Medication 975 MILLIGRAM(S): at 21:24

## 2024-06-17 RX ADMIN — AMLODIPINE BESYLATE 5 MILLIGRAM(S): 2.5 TABLET ORAL at 05:28

## 2024-06-18 LAB
ANION GAP SERPL CALC-SCNC: 12 MMOL/L — SIGNIFICANT CHANGE UP (ref 5–17)
BUN SERPL-MCNC: 17 MG/DL — SIGNIFICANT CHANGE UP (ref 7–23)
CALCIUM SERPL-MCNC: 9.5 MG/DL — SIGNIFICANT CHANGE UP (ref 8.4–10.5)
CHLORIDE SERPL-SCNC: 106 MMOL/L — SIGNIFICANT CHANGE UP (ref 96–108)
CO2 SERPL-SCNC: 22 MMOL/L — SIGNIFICANT CHANGE UP (ref 22–31)
CREAT SERPL-MCNC: 1.03 MG/DL — SIGNIFICANT CHANGE UP (ref 0.5–1.3)
EGFR: 77 ML/MIN/1.73M2 — SIGNIFICANT CHANGE UP
GLUCOSE BLDC GLUCOMTR-MCNC: 158 MG/DL — HIGH (ref 70–99)
GLUCOSE BLDC GLUCOMTR-MCNC: 161 MG/DL — HIGH (ref 70–99)
GLUCOSE BLDC GLUCOMTR-MCNC: 166 MG/DL — HIGH (ref 70–99)
GLUCOSE BLDC GLUCOMTR-MCNC: 297 MG/DL — HIGH (ref 70–99)
GLUCOSE SERPL-MCNC: 149 MG/DL — HIGH (ref 70–99)
HCT VFR BLD CALC: 26.6 % — LOW (ref 39–50)
HGB BLD-MCNC: 7.8 G/DL — LOW (ref 13–17)
MAGNESIUM SERPL-MCNC: 2.2 MG/DL — SIGNIFICANT CHANGE UP (ref 1.6–2.6)
MCHC RBC-ENTMCNC: 26.4 PG — LOW (ref 27–34)
MCHC RBC-ENTMCNC: 29.3 GM/DL — LOW (ref 32–36)
MCV RBC AUTO: 90.2 FL — SIGNIFICANT CHANGE UP (ref 80–100)
NRBC # BLD: 0 /100 WBCS — SIGNIFICANT CHANGE UP (ref 0–0)
PHOSPHATE SERPL-MCNC: 3.4 MG/DL — SIGNIFICANT CHANGE UP (ref 2.5–4.5)
PLATELET # BLD AUTO: 282 K/UL — SIGNIFICANT CHANGE UP (ref 150–400)
POTASSIUM SERPL-MCNC: 4.1 MMOL/L — SIGNIFICANT CHANGE UP (ref 3.5–5.3)
POTASSIUM SERPL-SCNC: 4.1 MMOL/L — SIGNIFICANT CHANGE UP (ref 3.5–5.3)
RBC # BLD: 2.95 M/UL — LOW (ref 4.2–5.8)
RBC # FLD: 13.5 % — SIGNIFICANT CHANGE UP (ref 10.3–14.5)
SODIUM SERPL-SCNC: 140 MMOL/L — SIGNIFICANT CHANGE UP (ref 135–145)
VANCOMYCIN TROUGH SERPL-MCNC: 23.7 UG/ML — HIGH (ref 10–20)
WBC # BLD: 5.42 K/UL — SIGNIFICANT CHANGE UP (ref 3.8–10.5)
WBC # FLD AUTO: 5.42 K/UL — SIGNIFICANT CHANGE UP (ref 3.8–10.5)

## 2024-06-18 RX ORDER — LIDOCAINE HCL 28 MG/G
1 GEL TOPICAL EVERY 24 HOURS
Refills: 0 | Status: DISCONTINUED | OUTPATIENT
Start: 2024-06-18 | End: 2024-06-21

## 2024-06-18 RX ORDER — VANCOMYCIN HYDROCHLORIDE 50 MG/ML
1000 KIT ORAL EVERY 12 HOURS
Refills: 0 | Status: DISCONTINUED | OUTPATIENT
Start: 2024-06-18 | End: 2024-06-19

## 2024-06-18 RX ORDER — PIPERACILLIN SODIUM AND TAZOBACTAM SODIUM 3; .375 G/15ML; G/15ML
3.38 INJECTION, POWDER, LYOPHILIZED, FOR SOLUTION INTRAVENOUS EVERY 8 HOURS
Refills: 0 | Status: DISCONTINUED | OUTPATIENT
Start: 2024-06-18 | End: 2024-06-20

## 2024-06-18 RX ADMIN — LIDOCAINE HCL 1 PATCH: 28 GEL TOPICAL at 19:30

## 2024-06-18 RX ADMIN — HEPARIN SODIUM 5000 UNIT(S): 50 INJECTION, SOLUTION INTRAVENOUS at 14:30

## 2024-06-18 RX ADMIN — HEPARIN SODIUM 5000 UNIT(S): 50 INJECTION, SOLUTION INTRAVENOUS at 21:48

## 2024-06-18 RX ADMIN — Medication 100 MILLIGRAM(S): at 06:33

## 2024-06-18 RX ADMIN — INSULIN GLARGINE 6 UNIT(S): 100 INJECTION, SOLUTION SUBCUTANEOUS at 21:49

## 2024-06-18 RX ADMIN — ASPIRIN 81 MILLIGRAM(S): 325 TABLET, FILM COATED ORAL at 12:41

## 2024-06-18 RX ADMIN — PIPERACILLIN SODIUM AND TAZOBACTAM SODIUM 25 GRAM(S): 3; .375 INJECTION, POWDER, LYOPHILIZED, FOR SOLUTION INTRAVENOUS at 14:38

## 2024-06-18 RX ADMIN — Medication 100 MILLIGRAM(S): at 16:31

## 2024-06-18 RX ADMIN — INSULIN LISPRO 5 UNIT(S): 100 INJECTION, SOLUTION SUBCUTANEOUS at 12:44

## 2024-06-18 RX ADMIN — INSULIN LISPRO 2: 100 INJECTION, SOLUTION SUBCUTANEOUS at 21:49

## 2024-06-18 RX ADMIN — Medication 100 MILLIGRAM(S): at 21:48

## 2024-06-18 RX ADMIN — VANCOMYCIN HYDROCHLORIDE 250 MILLIGRAM(S): KIT at 10:07

## 2024-06-18 RX ADMIN — INSULIN LISPRO 2: 100 INJECTION, SOLUTION SUBCUTANEOUS at 08:48

## 2024-06-18 RX ADMIN — LIDOCAINE HCL 1 PATCH: 28 GEL TOPICAL at 22:03

## 2024-06-18 RX ADMIN — AMLODIPINE BESYLATE 5 MILLIGRAM(S): 2.5 TABLET ORAL at 05:19

## 2024-06-18 RX ADMIN — LOSARTAN POTASSIUM 50 MILLIGRAM(S): 100 TABLET, FILM COATED ORAL at 05:19

## 2024-06-18 RX ADMIN — ATORVASTATIN CALCIUM 40 MILLIGRAM(S): 20 TABLET, FILM COATED ORAL at 21:48

## 2024-06-18 RX ADMIN — PIPERACILLIN SODIUM AND TAZOBACTAM SODIUM 25 GRAM(S): 3; .375 INJECTION, POWDER, LYOPHILIZED, FOR SOLUTION INTRAVENOUS at 06:33

## 2024-06-18 RX ADMIN — INSULIN LISPRO 5 UNIT(S): 100 INJECTION, SOLUTION SUBCUTANEOUS at 17:32

## 2024-06-18 RX ADMIN — PIPERACILLIN SODIUM AND TAZOBACTAM SODIUM 25 GRAM(S): 3; .375 INJECTION, POWDER, LYOPHILIZED, FOR SOLUTION INTRAVENOUS at 21:48

## 2024-06-18 RX ADMIN — INSULIN LISPRO 2: 100 INJECTION, SOLUTION SUBCUTANEOUS at 17:31

## 2024-06-18 RX ADMIN — CLOPIDOGREL BISULFATE 75 MILLIGRAM(S): 75 TABLET, FILM COATED ORAL at 12:41

## 2024-06-18 RX ADMIN — LIDOCAINE HCL 1 PATCH: 28 GEL TOPICAL at 10:09

## 2024-06-18 RX ADMIN — INSULIN LISPRO 5 UNIT(S): 100 INJECTION, SOLUTION SUBCUTANEOUS at 08:49

## 2024-06-18 RX ADMIN — HEPARIN SODIUM 5000 UNIT(S): 50 INJECTION, SOLUTION INTRAVENOUS at 05:19

## 2024-06-18 RX ADMIN — INSULIN LISPRO 6: 100 INJECTION, SOLUTION SUBCUTANEOUS at 12:43

## 2024-06-19 LAB
ANION GAP SERPL CALC-SCNC: 12 MMOL/L — SIGNIFICANT CHANGE UP (ref 5–17)
BUN SERPL-MCNC: 21 MG/DL — SIGNIFICANT CHANGE UP (ref 7–23)
CALCIUM SERPL-MCNC: 9.4 MG/DL — SIGNIFICANT CHANGE UP (ref 8.4–10.5)
CHLORIDE SERPL-SCNC: 105 MMOL/L — SIGNIFICANT CHANGE UP (ref 96–108)
CO2 SERPL-SCNC: 22 MMOL/L — SIGNIFICANT CHANGE UP (ref 22–31)
CREAT SERPL-MCNC: 0.91 MG/DL — SIGNIFICANT CHANGE UP (ref 0.5–1.3)
EGFR: 89 ML/MIN/1.73M2 — SIGNIFICANT CHANGE UP
GLUCOSE BLDC GLUCOMTR-MCNC: 197 MG/DL — HIGH (ref 70–99)
GLUCOSE BLDC GLUCOMTR-MCNC: 199 MG/DL — HIGH (ref 70–99)
GLUCOSE BLDC GLUCOMTR-MCNC: 259 MG/DL — HIGH (ref 70–99)
GLUCOSE BLDC GLUCOMTR-MCNC: 295 MG/DL — HIGH (ref 70–99)
GLUCOSE SERPL-MCNC: 158 MG/DL — HIGH (ref 70–99)
HCT VFR BLD CALC: 25.6 % — LOW (ref 39–50)
HGB BLD-MCNC: 7.8 G/DL — LOW (ref 13–17)
MAGNESIUM SERPL-MCNC: 2.2 MG/DL — SIGNIFICANT CHANGE UP (ref 1.6–2.6)
MCHC RBC-ENTMCNC: 27.2 PG — SIGNIFICANT CHANGE UP (ref 27–34)
MCHC RBC-ENTMCNC: 30.5 GM/DL — LOW (ref 32–36)
MCV RBC AUTO: 89.2 FL — SIGNIFICANT CHANGE UP (ref 80–100)
NRBC # BLD: 0 /100 WBCS — SIGNIFICANT CHANGE UP (ref 0–0)
PHOSPHATE SERPL-MCNC: 3.7 MG/DL — SIGNIFICANT CHANGE UP (ref 2.5–4.5)
PLATELET # BLD AUTO: 300 K/UL — SIGNIFICANT CHANGE UP (ref 150–400)
POTASSIUM SERPL-MCNC: 4 MMOL/L — SIGNIFICANT CHANGE UP (ref 3.5–5.3)
POTASSIUM SERPL-SCNC: 4 MMOL/L — SIGNIFICANT CHANGE UP (ref 3.5–5.3)
RBC # BLD: 2.87 M/UL — LOW (ref 4.2–5.8)
RBC # FLD: 13.9 % — SIGNIFICANT CHANGE UP (ref 10.3–14.5)
SODIUM SERPL-SCNC: 139 MMOL/L — SIGNIFICANT CHANGE UP (ref 135–145)
WBC # BLD: 5.31 K/UL — SIGNIFICANT CHANGE UP (ref 3.8–10.5)
WBC # FLD AUTO: 5.31 K/UL — SIGNIFICANT CHANGE UP (ref 3.8–10.5)

## 2024-06-19 RX ORDER — VANCOMYCIN HYDROCHLORIDE 50 MG/ML
750 KIT ORAL EVERY 12 HOURS
Refills: 0 | Status: DISCONTINUED | OUTPATIENT
Start: 2024-06-19 | End: 2024-06-20

## 2024-06-19 RX ORDER — DEXTROSE MONOHYDRATE AND SODIUM CHLORIDE 5; .3 G/100ML; G/100ML
1000 INJECTION, SOLUTION INTRAVENOUS
Refills: 0 | Status: DISCONTINUED | OUTPATIENT
Start: 2024-06-19 | End: 2024-06-21

## 2024-06-19 RX ORDER — INSULIN GLARGINE 100 [IU]/ML
16 INJECTION, SOLUTION SUBCUTANEOUS AT BEDTIME
Refills: 0 | Status: DISCONTINUED | OUTPATIENT
Start: 2024-06-19 | End: 2024-06-20

## 2024-06-19 RX ADMIN — INSULIN GLARGINE 16 UNIT(S): 100 INJECTION, SOLUTION SUBCUTANEOUS at 21:31

## 2024-06-19 RX ADMIN — INSULIN LISPRO 6: 100 INJECTION, SOLUTION SUBCUTANEOUS at 17:20

## 2024-06-19 RX ADMIN — AMLODIPINE BESYLATE 5 MILLIGRAM(S): 2.5 TABLET ORAL at 05:26

## 2024-06-19 RX ADMIN — HEPARIN SODIUM 5000 UNIT(S): 50 INJECTION, SOLUTION INTRAVENOUS at 13:49

## 2024-06-19 RX ADMIN — INSULIN LISPRO 2: 100 INJECTION, SOLUTION SUBCUTANEOUS at 09:30

## 2024-06-19 RX ADMIN — Medication 100 MILLIGRAM(S): at 21:31

## 2024-06-19 RX ADMIN — INSULIN LISPRO 5 UNIT(S): 100 INJECTION, SOLUTION SUBCUTANEOUS at 09:31

## 2024-06-19 RX ADMIN — PIPERACILLIN SODIUM AND TAZOBACTAM SODIUM 25 GRAM(S): 3; .375 INJECTION, POWDER, LYOPHILIZED, FOR SOLUTION INTRAVENOUS at 05:25

## 2024-06-19 RX ADMIN — INSULIN LISPRO 6: 100 INJECTION, SOLUTION SUBCUTANEOUS at 21:32

## 2024-06-19 RX ADMIN — ATORVASTATIN CALCIUM 40 MILLIGRAM(S): 20 TABLET, FILM COATED ORAL at 21:30

## 2024-06-19 RX ADMIN — Medication 100 MILLIGRAM(S): at 05:25

## 2024-06-19 RX ADMIN — INSULIN LISPRO 5 UNIT(S): 100 INJECTION, SOLUTION SUBCUTANEOUS at 13:50

## 2024-06-19 RX ADMIN — INSULIN LISPRO 5 UNIT(S): 100 INJECTION, SOLUTION SUBCUTANEOUS at 17:20

## 2024-06-19 RX ADMIN — HEPARIN SODIUM 5000 UNIT(S): 50 INJECTION, SOLUTION INTRAVENOUS at 21:31

## 2024-06-19 RX ADMIN — PIPERACILLIN SODIUM AND TAZOBACTAM SODIUM 25 GRAM(S): 3; .375 INJECTION, POWDER, LYOPHILIZED, FOR SOLUTION INTRAVENOUS at 21:31

## 2024-06-19 RX ADMIN — HEPARIN SODIUM 5000 UNIT(S): 50 INJECTION, SOLUTION INTRAVENOUS at 05:26

## 2024-06-19 RX ADMIN — VANCOMYCIN HYDROCHLORIDE 250 MILLIGRAM(S): KIT at 17:22

## 2024-06-19 RX ADMIN — VANCOMYCIN HYDROCHLORIDE 250 MILLIGRAM(S): KIT at 05:25

## 2024-06-19 RX ADMIN — Medication 100 MILLIGRAM(S): at 13:48

## 2024-06-19 RX ADMIN — CLOPIDOGREL BISULFATE 75 MILLIGRAM(S): 75 TABLET, FILM COATED ORAL at 11:41

## 2024-06-19 RX ADMIN — LIDOCAINE HCL 1 PATCH: 28 GEL TOPICAL at 22:02

## 2024-06-19 RX ADMIN — LIDOCAINE HCL 1 PATCH: 28 GEL TOPICAL at 10:02

## 2024-06-19 RX ADMIN — INSULIN LISPRO 2: 100 INJECTION, SOLUTION SUBCUTANEOUS at 13:49

## 2024-06-19 RX ADMIN — ASPIRIN 81 MILLIGRAM(S): 325 TABLET, FILM COATED ORAL at 11:41

## 2024-06-19 RX ADMIN — LIDOCAINE HCL 1 PATCH: 28 GEL TOPICAL at 20:15

## 2024-06-19 RX ADMIN — PIPERACILLIN SODIUM AND TAZOBACTAM SODIUM 25 GRAM(S): 3; .375 INJECTION, POWDER, LYOPHILIZED, FOR SOLUTION INTRAVENOUS at 13:49

## 2024-06-20 ENCOUNTER — RESULT REVIEW (OUTPATIENT)
Age: 74
End: 2024-06-20

## 2024-06-20 LAB
ANION GAP SERPL CALC-SCNC: 11 MMOL/L — SIGNIFICANT CHANGE UP (ref 5–17)
APTT BLD: 32.4 SEC — SIGNIFICANT CHANGE UP (ref 24.5–35.6)
BLD GP AB SCN SERPL QL: NEGATIVE — SIGNIFICANT CHANGE UP
BUN SERPL-MCNC: 26 MG/DL — HIGH (ref 7–23)
CALCIUM SERPL-MCNC: 9.6 MG/DL — SIGNIFICANT CHANGE UP (ref 8.4–10.5)
CHLORIDE SERPL-SCNC: 106 MMOL/L — SIGNIFICANT CHANGE UP (ref 96–108)
CO2 SERPL-SCNC: 24 MMOL/L — SIGNIFICANT CHANGE UP (ref 22–31)
CREAT SERPL-MCNC: 1.22 MG/DL — SIGNIFICANT CHANGE UP (ref 0.5–1.3)
EGFR: 63 ML/MIN/1.73M2 — SIGNIFICANT CHANGE UP
GLUCOSE BLDC GLUCOMTR-MCNC: 136 MG/DL — HIGH (ref 70–99)
GLUCOSE BLDC GLUCOMTR-MCNC: 142 MG/DL — HIGH (ref 70–99)
GLUCOSE BLDC GLUCOMTR-MCNC: 157 MG/DL — HIGH (ref 70–99)
GLUCOSE BLDC GLUCOMTR-MCNC: 168 MG/DL — HIGH (ref 70–99)
GLUCOSE BLDC GLUCOMTR-MCNC: 185 MG/DL — HIGH (ref 70–99)
GLUCOSE SERPL-MCNC: 168 MG/DL — HIGH (ref 70–99)
HCT VFR BLD CALC: 24.7 % — LOW (ref 39–50)
HGB BLD-MCNC: 7.5 G/DL — LOW (ref 13–17)
INR BLD: 0.99 RATIO — SIGNIFICANT CHANGE UP (ref 0.85–1.18)
MAGNESIUM SERPL-MCNC: 2.1 MG/DL — SIGNIFICANT CHANGE UP (ref 1.6–2.6)
MCHC RBC-ENTMCNC: 27 PG — SIGNIFICANT CHANGE UP (ref 27–34)
MCHC RBC-ENTMCNC: 30.4 GM/DL — LOW (ref 32–36)
MCV RBC AUTO: 88.8 FL — SIGNIFICANT CHANGE UP (ref 80–100)
NRBC # BLD: 0 /100 WBCS — SIGNIFICANT CHANGE UP (ref 0–0)
PHOSPHATE SERPL-MCNC: 3.5 MG/DL — SIGNIFICANT CHANGE UP (ref 2.5–4.5)
PLATELET # BLD AUTO: 272 K/UL — SIGNIFICANT CHANGE UP (ref 150–400)
POTASSIUM SERPL-MCNC: 4 MMOL/L — SIGNIFICANT CHANGE UP (ref 3.5–5.3)
POTASSIUM SERPL-SCNC: 4 MMOL/L — SIGNIFICANT CHANGE UP (ref 3.5–5.3)
PROTHROM AB SERPL-ACNC: 10.9 SEC — SIGNIFICANT CHANGE UP (ref 9.5–13)
RBC # BLD: 2.78 M/UL — LOW (ref 4.2–5.8)
RBC # FLD: 14 % — SIGNIFICANT CHANGE UP (ref 10.3–14.5)
RH IG SCN BLD-IMP: POSITIVE — SIGNIFICANT CHANGE UP
SODIUM SERPL-SCNC: 141 MMOL/L — SIGNIFICANT CHANGE UP (ref 135–145)
SURGICAL PATHOLOGY STUDY: SIGNIFICANT CHANGE UP
WBC # BLD: 6.53 K/UL — SIGNIFICANT CHANGE UP (ref 3.8–10.5)
WBC # FLD AUTO: 6.53 K/UL — SIGNIFICANT CHANGE UP (ref 3.8–10.5)

## 2024-06-20 RX ORDER — INSULIN LISPRO 100 [IU]/ML
INJECTION, SOLUTION SUBCUTANEOUS EVERY 6 HOURS
Refills: 0 | Status: DISCONTINUED | OUTPATIENT
Start: 2024-06-20 | End: 2024-06-21

## 2024-06-20 RX ORDER — HYDRALAZINE HYDROCHLORIDE 50 MG/1
5 TABLET ORAL ONCE
Refills: 0 | Status: COMPLETED | OUTPATIENT
Start: 2024-06-20 | End: 2024-06-20

## 2024-06-20 RX ORDER — DEXTROSE MONOHYDRATE 100 MG/ML
125 INJECTION, SOLUTION INTRAVENOUS ONCE
Refills: 0 | Status: DISCONTINUED | OUTPATIENT
Start: 2024-06-20 | End: 2024-06-21

## 2024-06-20 RX ORDER — DEXTROSE 30 % IN WATER 30 %
25 VIAL (ML) INTRAVENOUS ONCE
Refills: 0 | Status: DISCONTINUED | OUTPATIENT
Start: 2024-06-20 | End: 2024-06-21

## 2024-06-20 RX ORDER — INSULIN GLARGINE 100 [IU]/ML
16 INJECTION, SOLUTION SUBCUTANEOUS AT BEDTIME
Refills: 0 | Status: DISCONTINUED | OUTPATIENT
Start: 2024-06-20 | End: 2024-06-20

## 2024-06-20 RX ORDER — GLUCAGON HYDROCHLORIDE 1 MG/ML
1 INJECTION, POWDER, FOR SOLUTION INTRAMUSCULAR; INTRAVENOUS; SUBCUTANEOUS ONCE
Refills: 0 | Status: DISCONTINUED | OUTPATIENT
Start: 2024-06-20 | End: 2024-06-21

## 2024-06-20 RX ORDER — INSULIN LISPRO 100 [IU]/ML
5 INJECTION, SOLUTION SUBCUTANEOUS
Refills: 0 | Status: DISCONTINUED | OUTPATIENT
Start: 2024-06-20 | End: 2024-06-21

## 2024-06-20 RX ORDER — DEXTROSE 30 % IN WATER 30 %
15 VIAL (ML) INTRAVENOUS ONCE
Refills: 0 | Status: DISCONTINUED | OUTPATIENT
Start: 2024-06-20 | End: 2024-06-21

## 2024-06-20 RX ORDER — DEXTROSE MONOHYDRATE AND SODIUM CHLORIDE 5; .3 G/100ML; G/100ML
1000 INJECTION, SOLUTION INTRAVENOUS
Refills: 0 | Status: DISCONTINUED | OUTPATIENT
Start: 2024-06-20 | End: 2024-06-21

## 2024-06-20 RX ORDER — INSULIN GLARGINE 100 [IU]/ML
6 INJECTION, SOLUTION SUBCUTANEOUS AT BEDTIME
Refills: 0 | Status: DISCONTINUED | OUTPATIENT
Start: 2024-06-20 | End: 2024-06-21

## 2024-06-20 RX ORDER — INSULIN LISPRO 100 [IU]/ML
INJECTION, SOLUTION SUBCUTANEOUS EVERY 6 HOURS
Refills: 0 | Status: DISCONTINUED | OUTPATIENT
Start: 2024-06-20 | End: 2024-06-20

## 2024-06-20 RX ORDER — DEXTROSE 30 % IN WATER 30 %
12.5 VIAL (ML) INTRAVENOUS ONCE
Refills: 0 | Status: DISCONTINUED | OUTPATIENT
Start: 2024-06-20 | End: 2024-06-21

## 2024-06-20 RX ADMIN — Medication 100 MILLIGRAM(S): at 13:32

## 2024-06-20 RX ADMIN — HEPARIN SODIUM 5000 UNIT(S): 50 INJECTION, SOLUTION INTRAVENOUS at 22:10

## 2024-06-20 RX ADMIN — AMLODIPINE BESYLATE 5 MILLIGRAM(S): 2.5 TABLET ORAL at 05:15

## 2024-06-20 RX ADMIN — HYDRALAZINE HYDROCHLORIDE 5 MILLIGRAM(S): 50 TABLET ORAL at 05:25

## 2024-06-20 RX ADMIN — ASPIRIN 81 MILLIGRAM(S): 325 TABLET, FILM COATED ORAL at 11:04

## 2024-06-20 RX ADMIN — ATORVASTATIN CALCIUM 40 MILLIGRAM(S): 20 TABLET, FILM COATED ORAL at 22:06

## 2024-06-20 RX ADMIN — Medication 100 MILLIGRAM(S): at 22:06

## 2024-06-20 RX ADMIN — Medication 100 MILLIGRAM(S): at 05:13

## 2024-06-20 RX ADMIN — LIDOCAINE HCL 1 PATCH: 28 GEL TOPICAL at 19:20

## 2024-06-20 RX ADMIN — HEPARIN SODIUM 5000 UNIT(S): 50 INJECTION, SOLUTION INTRAVENOUS at 13:32

## 2024-06-20 RX ADMIN — CLOPIDOGREL BISULFATE 75 MILLIGRAM(S): 75 TABLET, FILM COATED ORAL at 11:06

## 2024-06-20 RX ADMIN — INSULIN LISPRO 2: 100 INJECTION, SOLUTION SUBCUTANEOUS at 12:02

## 2024-06-20 RX ADMIN — LIDOCAINE HCL 1 PATCH: 28 GEL TOPICAL at 11:05

## 2024-06-20 RX ADMIN — INSULIN LISPRO 2: 100 INJECTION, SOLUTION SUBCUTANEOUS at 05:14

## 2024-06-20 RX ADMIN — HEPARIN SODIUM 5000 UNIT(S): 50 INJECTION, SOLUTION INTRAVENOUS at 05:13

## 2024-06-20 RX ADMIN — DEXTROSE MONOHYDRATE AND SODIUM CHLORIDE 75 MILLILITER(S): 5; .3 INJECTION, SOLUTION INTRAVENOUS at 00:00

## 2024-06-21 LAB
ANION GAP SERPL CALC-SCNC: 13 MMOL/L — SIGNIFICANT CHANGE UP (ref 5–17)
BUN SERPL-MCNC: 20 MG/DL — SIGNIFICANT CHANGE UP (ref 7–23)
CALCIUM SERPL-MCNC: 9.1 MG/DL — SIGNIFICANT CHANGE UP (ref 8.4–10.5)
CHLORIDE SERPL-SCNC: 106 MMOL/L — SIGNIFICANT CHANGE UP (ref 96–108)
CO2 SERPL-SCNC: 23 MMOL/L — SIGNIFICANT CHANGE UP (ref 22–31)
CREAT SERPL-MCNC: 0.91 MG/DL — SIGNIFICANT CHANGE UP (ref 0.5–1.3)
EGFR: 89 ML/MIN/1.73M2 — SIGNIFICANT CHANGE UP
GLUCOSE BLDC GLUCOMTR-MCNC: 173 MG/DL — HIGH (ref 70–99)
GLUCOSE BLDC GLUCOMTR-MCNC: 199 MG/DL — HIGH (ref 70–99)
GLUCOSE BLDC GLUCOMTR-MCNC: 230 MG/DL — HIGH (ref 70–99)
GLUCOSE BLDC GLUCOMTR-MCNC: 240 MG/DL — HIGH (ref 70–99)
GLUCOSE BLDC GLUCOMTR-MCNC: 244 MG/DL — HIGH (ref 70–99)
GLUCOSE BLDC GLUCOMTR-MCNC: 273 MG/DL — HIGH (ref 70–99)
GLUCOSE SERPL-MCNC: 191 MG/DL — HIGH (ref 70–99)
HCT VFR BLD CALC: 25.6 % — LOW (ref 39–50)
HCT VFR BLD CALC: 27.7 % — LOW (ref 39–50)
HGB BLD-MCNC: 8 G/DL — LOW (ref 13–17)
HGB BLD-MCNC: 8.5 G/DL — LOW (ref 13–17)
MAGNESIUM SERPL-MCNC: 2.2 MG/DL — SIGNIFICANT CHANGE UP (ref 1.6–2.6)
MCHC RBC-ENTMCNC: 27.2 PG — SIGNIFICANT CHANGE UP (ref 27–34)
MCHC RBC-ENTMCNC: 27.2 PG — SIGNIFICANT CHANGE UP (ref 27–34)
MCHC RBC-ENTMCNC: 30.7 GM/DL — LOW (ref 32–36)
MCHC RBC-ENTMCNC: 31.3 GM/DL — LOW (ref 32–36)
MCV RBC AUTO: 87.1 FL — SIGNIFICANT CHANGE UP (ref 80–100)
MCV RBC AUTO: 88.5 FL — SIGNIFICANT CHANGE UP (ref 80–100)
NRBC # BLD: 0 /100 WBCS — SIGNIFICANT CHANGE UP (ref 0–0)
NRBC # BLD: 0 /100 WBCS — SIGNIFICANT CHANGE UP (ref 0–0)
PHOSPHATE SERPL-MCNC: 4.3 MG/DL — SIGNIFICANT CHANGE UP (ref 2.5–4.5)
PLATELET # BLD AUTO: 234 K/UL — SIGNIFICANT CHANGE UP (ref 150–400)
PLATELET # BLD AUTO: 241 K/UL — SIGNIFICANT CHANGE UP (ref 150–400)
POTASSIUM SERPL-MCNC: 4 MMOL/L — SIGNIFICANT CHANGE UP (ref 3.5–5.3)
POTASSIUM SERPL-SCNC: 4 MMOL/L — SIGNIFICANT CHANGE UP (ref 3.5–5.3)
RBC # BLD: 2.94 M/UL — LOW (ref 4.2–5.8)
RBC # BLD: 3.13 M/UL — LOW (ref 4.2–5.8)
RBC # FLD: 14 % — SIGNIFICANT CHANGE UP (ref 10.3–14.5)
RBC # FLD: 14.6 % — HIGH (ref 10.3–14.5)
SODIUM SERPL-SCNC: 142 MMOL/L — SIGNIFICANT CHANGE UP (ref 135–145)
WBC # BLD: 10.74 K/UL — HIGH (ref 3.8–10.5)
WBC # BLD: 7.86 K/UL — SIGNIFICANT CHANGE UP (ref 3.8–10.5)
WBC # FLD AUTO: 10.74 K/UL — HIGH (ref 3.8–10.5)
WBC # FLD AUTO: 7.86 K/UL — SIGNIFICANT CHANGE UP (ref 3.8–10.5)

## 2024-06-21 PROCEDURE — 27884 AMPUTATION FOLLOW-UP SURGERY: CPT | Mod: 58,LT

## 2024-06-21 PROCEDURE — 88307 TISSUE EXAM BY PATHOLOGIST: CPT | Mod: 26

## 2024-06-21 DEVICE — VISTASEAL FIBRIN HUMAN 10ML: Type: IMPLANTABLE DEVICE | Site: LEFT | Status: FUNCTIONAL

## 2024-06-21 DEVICE — SURGICEL POWDER 3 GRAMS: Type: IMPLANTABLE DEVICE | Site: LEFT | Status: FUNCTIONAL

## 2024-06-21 RX ORDER — INSULIN LISPRO 100 [IU]/ML
5 INJECTION, SOLUTION SUBCUTANEOUS
Refills: 0 | Status: DISCONTINUED | OUTPATIENT
Start: 2024-06-21 | End: 2024-06-26

## 2024-06-21 RX ORDER — ACETAMINOPHEN 325 MG
1000 TABLET ORAL EVERY 6 HOURS
Refills: 0 | Status: DISCONTINUED | OUTPATIENT
Start: 2024-06-21 | End: 2024-06-26

## 2024-06-21 RX ORDER — OXYCODONE HYDROCHLORIDE 100 MG/5ML
2.5 SOLUTION ORAL EVERY 4 HOURS
Refills: 0 | Status: DISCONTINUED | OUTPATIENT
Start: 2024-06-21 | End: 2024-06-26

## 2024-06-21 RX ORDER — INSULIN LISPRO 100 [IU]/ML
INJECTION, SOLUTION SUBCUTANEOUS AT BEDTIME
Refills: 0 | Status: DISCONTINUED | OUTPATIENT
Start: 2024-06-21 | End: 2024-06-26

## 2024-06-21 RX ORDER — INSULIN GLARGINE 100 [IU]/ML
6 INJECTION, SOLUTION SUBCUTANEOUS AT BEDTIME
Refills: 0 | Status: DISCONTINUED | OUTPATIENT
Start: 2024-06-21 | End: 2024-06-21

## 2024-06-21 RX ORDER — DEXTROSE 30 % IN WATER 30 %
12.5 VIAL (ML) INTRAVENOUS ONCE
Refills: 0 | Status: DISCONTINUED | OUTPATIENT
Start: 2024-06-21 | End: 2024-06-26

## 2024-06-21 RX ORDER — GABAPENTIN
100 POWDER (GRAM) MISCELLANEOUS THREE TIMES A DAY
Refills: 0 | Status: DISCONTINUED | OUTPATIENT
Start: 2024-06-21 | End: 2024-06-26

## 2024-06-21 RX ORDER — INSULIN LISPRO 100 [IU]/ML
INJECTION, SOLUTION SUBCUTANEOUS
Refills: 0 | Status: DISCONTINUED | OUTPATIENT
Start: 2024-06-21 | End: 2024-06-26

## 2024-06-21 RX ORDER — AMLODIPINE BESYLATE 2.5 MG/1
5 TABLET ORAL DAILY
Refills: 0 | Status: DISCONTINUED | OUTPATIENT
Start: 2024-06-21 | End: 2024-06-25

## 2024-06-21 RX ORDER — DEXTROSE MONOHYDRATE AND SODIUM CHLORIDE 5; .3 G/100ML; G/100ML
1000 INJECTION, SOLUTION INTRAVENOUS
Refills: 0 | Status: DISCONTINUED | OUTPATIENT
Start: 2024-06-21 | End: 2024-06-26

## 2024-06-21 RX ORDER — OXYCODONE HYDROCHLORIDE 100 MG/5ML
5 SOLUTION ORAL EVERY 4 HOURS
Refills: 0 | Status: DISCONTINUED | OUTPATIENT
Start: 2024-06-21 | End: 2024-06-26

## 2024-06-21 RX ORDER — DEXTROSE MONOHYDRATE 100 MG/ML
125 INJECTION, SOLUTION INTRAVENOUS ONCE
Refills: 0 | Status: DISCONTINUED | OUTPATIENT
Start: 2024-06-21 | End: 2024-06-26

## 2024-06-21 RX ORDER — ASPIRIN 325 MG/1
81 TABLET, FILM COATED ORAL DAILY
Refills: 0 | Status: DISCONTINUED | OUTPATIENT
Start: 2024-06-21 | End: 2024-06-26

## 2024-06-21 RX ORDER — DEXTROSE 30 % IN WATER 30 %
15 VIAL (ML) INTRAVENOUS ONCE
Refills: 0 | Status: DISCONTINUED | OUTPATIENT
Start: 2024-06-21 | End: 2024-06-26

## 2024-06-21 RX ORDER — HYDROMORPHONE HCL 0.2 MG/ML
1 INJECTION, SOLUTION INTRAVENOUS
Refills: 0 | Status: DISCONTINUED | OUTPATIENT
Start: 2024-06-21 | End: 2024-06-21

## 2024-06-21 RX ORDER — ATORVASTATIN CALCIUM 20 MG/1
40 TABLET, FILM COATED ORAL AT BEDTIME
Refills: 0 | Status: DISCONTINUED | OUTPATIENT
Start: 2024-06-21 | End: 2024-06-26

## 2024-06-21 RX ORDER — DEXTROSE 30 % IN WATER 30 %
25 VIAL (ML) INTRAVENOUS ONCE
Refills: 0 | Status: DISCONTINUED | OUTPATIENT
Start: 2024-06-21 | End: 2024-06-26

## 2024-06-21 RX ORDER — ACETAMINOPHEN 325 MG
975 TABLET ORAL EVERY 6 HOURS
Refills: 0 | Status: DISCONTINUED | OUTPATIENT
Start: 2024-06-21 | End: 2024-06-21

## 2024-06-21 RX ORDER — DEXTROSE 30 % IN WATER 30 %
25 VIAL (ML) INTRAVENOUS ONCE
Refills: 0 | Status: DISCONTINUED | OUTPATIENT
Start: 2024-06-21 | End: 2024-06-21

## 2024-06-21 RX ORDER — CLOPIDOGREL BISULFATE 75 MG/1
75 TABLET, FILM COATED ORAL DAILY
Refills: 0 | Status: DISCONTINUED | OUTPATIENT
Start: 2024-06-21 | End: 2024-06-26

## 2024-06-21 RX ORDER — LABETALOL HYDROCHLORIDE 300 MG/1
10 TABLET ORAL ONCE
Refills: 0 | Status: DISCONTINUED | OUTPATIENT
Start: 2024-06-21 | End: 2024-06-24

## 2024-06-21 RX ORDER — LOSARTAN POTASSIUM 100 MG/1
50 TABLET, FILM COATED ORAL DAILY
Refills: 0 | Status: DISCONTINUED | OUTPATIENT
Start: 2024-06-21 | End: 2024-06-26

## 2024-06-21 RX ORDER — SENNOSIDES 8.6 MG
2 TABLET ORAL AT BEDTIME
Refills: 0 | Status: DISCONTINUED | OUTPATIENT
Start: 2024-06-21 | End: 2024-06-26

## 2024-06-21 RX ORDER — ONDANSETRON HYDROCHLORIDE 2 MG/ML
4 INJECTION INTRAMUSCULAR; INTRAVENOUS ONCE
Refills: 0 | Status: DISCONTINUED | OUTPATIENT
Start: 2024-06-21 | End: 2024-06-21

## 2024-06-21 RX ORDER — DEXTROSE MONOHYDRATE AND SODIUM CHLORIDE 5; .3 G/100ML; G/100ML
1000 INJECTION, SOLUTION INTRAVENOUS
Refills: 0 | Status: DISCONTINUED | OUTPATIENT
Start: 2024-06-21 | End: 2024-06-21

## 2024-06-21 RX ORDER — DEXTROSE 30 % IN WATER 30 %
15 VIAL (ML) INTRAVENOUS ONCE
Refills: 0 | Status: DISCONTINUED | OUTPATIENT
Start: 2024-06-21 | End: 2024-06-21

## 2024-06-21 RX ORDER — HEPARIN SODIUM 50 [USP'U]/ML
5000 INJECTION, SOLUTION INTRAVENOUS EVERY 8 HOURS
Refills: 0 | Status: DISCONTINUED | OUTPATIENT
Start: 2024-06-21 | End: 2024-06-26

## 2024-06-21 RX ORDER — HYDROMORPHONE HCL 0.2 MG/ML
0.5 INJECTION, SOLUTION INTRAVENOUS
Refills: 0 | Status: DISCONTINUED | OUTPATIENT
Start: 2024-06-21 | End: 2024-06-21

## 2024-06-21 RX ORDER — DEXTROSE 30 % IN WATER 30 %
12.5 VIAL (ML) INTRAVENOUS ONCE
Refills: 0 | Status: DISCONTINUED | OUTPATIENT
Start: 2024-06-21 | End: 2024-06-21

## 2024-06-21 RX ORDER — DEXTROSE MONOHYDRATE 100 MG/ML
125 INJECTION, SOLUTION INTRAVENOUS ONCE
Refills: 0 | Status: DISCONTINUED | OUTPATIENT
Start: 2024-06-21 | End: 2024-06-21

## 2024-06-21 RX ORDER — OXYCODONE HYDROCHLORIDE 100 MG/5ML
5 SOLUTION ORAL ONCE
Refills: 0 | Status: DISCONTINUED | OUTPATIENT
Start: 2024-06-21 | End: 2024-06-21

## 2024-06-21 RX ORDER — POLYETHYLENE GLYCOL 3350 1 G/G
17 POWDER ORAL DAILY
Refills: 0 | Status: DISCONTINUED | OUTPATIENT
Start: 2024-06-21 | End: 2024-06-26

## 2024-06-21 RX ORDER — INSULIN LISPRO 100 [IU]/ML
5 INJECTION, SOLUTION SUBCUTANEOUS
Refills: 0 | Status: DISCONTINUED | OUTPATIENT
Start: 2024-06-21 | End: 2024-06-21

## 2024-06-21 RX ORDER — INSULIN LISPRO 100 [IU]/ML
INJECTION, SOLUTION SUBCUTANEOUS EVERY 6 HOURS
Refills: 0 | Status: DISCONTINUED | OUTPATIENT
Start: 2024-06-21 | End: 2024-06-21

## 2024-06-21 RX ORDER — INSULIN GLARGINE 100 [IU]/ML
6 INJECTION, SOLUTION SUBCUTANEOUS AT BEDTIME
Refills: 0 | Status: DISCONTINUED | OUTPATIENT
Start: 2024-06-21 | End: 2024-06-24

## 2024-06-21 RX ORDER — GLUCAGON HYDROCHLORIDE 1 MG/ML
1 INJECTION, POWDER, FOR SOLUTION INTRAMUSCULAR; INTRAVENOUS; SUBCUTANEOUS ONCE
Refills: 0 | Status: DISCONTINUED | OUTPATIENT
Start: 2024-06-21 | End: 2024-06-21

## 2024-06-21 RX ORDER — LIDOCAINE HCL 28 MG/G
1 GEL TOPICAL EVERY 24 HOURS
Refills: 0 | Status: DISCONTINUED | OUTPATIENT
Start: 2024-06-21 | End: 2024-06-26

## 2024-06-21 RX ORDER — GLUCAGON HYDROCHLORIDE 1 MG/ML
1 INJECTION, POWDER, FOR SOLUTION INTRAMUSCULAR; INTRAVENOUS; SUBCUTANEOUS ONCE
Refills: 0 | Status: DISCONTINUED | OUTPATIENT
Start: 2024-06-21 | End: 2024-06-26

## 2024-06-21 RX ADMIN — OXYCODONE HYDROCHLORIDE 5 MILLIGRAM(S): 100 SOLUTION ORAL at 10:24

## 2024-06-21 RX ADMIN — DEXTROSE MONOHYDRATE AND SODIUM CHLORIDE 75 MILLILITER(S): 5; .3 INJECTION, SOLUTION INTRAVENOUS at 03:15

## 2024-06-21 RX ADMIN — Medication 100 MILLIGRAM(S): at 13:16

## 2024-06-21 RX ADMIN — HYDROMORPHONE HCL 1 MILLIGRAM(S): 0.2 INJECTION, SOLUTION INTRAVENOUS at 03:39

## 2024-06-21 RX ADMIN — AMLODIPINE BESYLATE 5 MILLIGRAM(S): 2.5 TABLET ORAL at 05:21

## 2024-06-21 RX ADMIN — OXYCODONE HYDROCHLORIDE 5 MILLIGRAM(S): 100 SOLUTION ORAL at 11:43

## 2024-06-21 RX ADMIN — ATORVASTATIN CALCIUM 40 MILLIGRAM(S): 20 TABLET, FILM COATED ORAL at 22:33

## 2024-06-21 RX ADMIN — INSULIN LISPRO 3: 100 INJECTION, SOLUTION SUBCUTANEOUS at 14:04

## 2024-06-21 RX ADMIN — OXYCODONE HYDROCHLORIDE 5 MILLIGRAM(S): 100 SOLUTION ORAL at 13:16

## 2024-06-21 RX ADMIN — OXYCODONE HYDROCHLORIDE 5 MILLIGRAM(S): 100 SOLUTION ORAL at 17:25

## 2024-06-21 RX ADMIN — Medication 1000 MILLIGRAM(S): at 11:22

## 2024-06-21 RX ADMIN — HYDROMORPHONE HCL 1 MILLIGRAM(S): 0.2 INJECTION, SOLUTION INTRAVENOUS at 03:50

## 2024-06-21 RX ADMIN — INSULIN GLARGINE 6 UNIT(S): 100 INJECTION, SOLUTION SUBCUTANEOUS at 22:33

## 2024-06-21 RX ADMIN — HEPARIN SODIUM 5000 UNIT(S): 50 INJECTION, SOLUTION INTRAVENOUS at 22:34

## 2024-06-21 RX ADMIN — Medication 1000 MILLIGRAM(S): at 12:26

## 2024-06-21 RX ADMIN — POLYETHYLENE GLYCOL 3350 17 GRAM(S): 1 POWDER ORAL at 11:23

## 2024-06-21 RX ADMIN — OXYCODONE HYDROCHLORIDE 5 MILLIGRAM(S): 100 SOLUTION ORAL at 16:25

## 2024-06-21 RX ADMIN — LIDOCAINE HCL 1 PATCH: 28 GEL TOPICAL at 23:23

## 2024-06-21 RX ADMIN — INSULIN LISPRO 2: 100 INJECTION, SOLUTION SUBCUTANEOUS at 17:38

## 2024-06-21 RX ADMIN — Medication 1000 MILLIGRAM(S): at 17:05

## 2024-06-21 RX ADMIN — INSULIN LISPRO 5 UNIT(S): 100 INJECTION, SOLUTION SUBCUTANEOUS at 14:04

## 2024-06-21 RX ADMIN — Medication 100 MILLIGRAM(S): at 05:21

## 2024-06-21 RX ADMIN — LIDOCAINE HCL 1 PATCH: 28 GEL TOPICAL at 11:23

## 2024-06-21 RX ADMIN — Medication 2 TABLET(S): at 22:33

## 2024-06-21 RX ADMIN — LIDOCAINE HCL 1 PATCH: 28 GEL TOPICAL at 00:00

## 2024-06-21 RX ADMIN — LIDOCAINE HCL 1 PATCH: 28 GEL TOPICAL at 19:52

## 2024-06-21 RX ADMIN — INSULIN GLARGINE 6 UNIT(S): 100 INJECTION, SOLUTION SUBCUTANEOUS at 00:00

## 2024-06-21 RX ADMIN — OXYCODONE HYDROCHLORIDE 5 MILLIGRAM(S): 100 SOLUTION ORAL at 22:33

## 2024-06-21 RX ADMIN — CLOPIDOGREL BISULFATE 75 MILLIGRAM(S): 75 TABLET, FILM COATED ORAL at 11:24

## 2024-06-21 RX ADMIN — INSULIN LISPRO 5 UNIT(S): 100 INJECTION, SOLUTION SUBCUTANEOUS at 10:23

## 2024-06-21 RX ADMIN — Medication 100 MILLIGRAM(S): at 22:33

## 2024-06-21 RX ADMIN — INSULIN LISPRO 2: 100 INJECTION, SOLUTION SUBCUTANEOUS at 10:24

## 2024-06-21 RX ADMIN — Medication 1000 MILLIGRAM(S): at 17:48

## 2024-06-21 RX ADMIN — OXYCODONE HYDROCHLORIDE 5 MILLIGRAM(S): 100 SOLUTION ORAL at 14:11

## 2024-06-21 RX ADMIN — OXYCODONE HYDROCHLORIDE 5 MILLIGRAM(S): 100 SOLUTION ORAL at 23:23

## 2024-06-21 RX ADMIN — HEPARIN SODIUM 5000 UNIT(S): 50 INJECTION, SOLUTION INTRAVENOUS at 13:17

## 2024-06-21 RX ADMIN — ASPIRIN 81 MILLIGRAM(S): 325 TABLET, FILM COATED ORAL at 11:22

## 2024-06-21 RX ADMIN — INSULIN LISPRO 5 UNIT(S): 100 INJECTION, SOLUTION SUBCUTANEOUS at 17:38

## 2024-06-22 LAB
ANION GAP SERPL CALC-SCNC: 12 MMOL/L — SIGNIFICANT CHANGE UP (ref 5–17)
BUN SERPL-MCNC: 20 MG/DL — SIGNIFICANT CHANGE UP (ref 7–23)
CALCIUM SERPL-MCNC: 9.2 MG/DL — SIGNIFICANT CHANGE UP (ref 8.4–10.5)
CHLORIDE SERPL-SCNC: 101 MMOL/L — SIGNIFICANT CHANGE UP (ref 96–108)
CO2 SERPL-SCNC: 22 MMOL/L — SIGNIFICANT CHANGE UP (ref 22–31)
CREAT SERPL-MCNC: 1.05 MG/DL — SIGNIFICANT CHANGE UP (ref 0.5–1.3)
EGFR: 75 ML/MIN/1.73M2 — SIGNIFICANT CHANGE UP
GLUCOSE BLDC GLUCOMTR-MCNC: 134 MG/DL — HIGH (ref 70–99)
GLUCOSE BLDC GLUCOMTR-MCNC: 158 MG/DL — HIGH (ref 70–99)
GLUCOSE BLDC GLUCOMTR-MCNC: 171 MG/DL — HIGH (ref 70–99)
GLUCOSE BLDC GLUCOMTR-MCNC: 221 MG/DL — HIGH (ref 70–99)
GLUCOSE SERPL-MCNC: 187 MG/DL — HIGH (ref 70–99)
HCT VFR BLD CALC: 23.9 % — LOW (ref 39–50)
HGB BLD-MCNC: 7.6 G/DL — LOW (ref 13–17)
MAGNESIUM SERPL-MCNC: 2.1 MG/DL — SIGNIFICANT CHANGE UP (ref 1.6–2.6)
MCHC RBC-ENTMCNC: 27.6 PG — SIGNIFICANT CHANGE UP (ref 27–34)
MCHC RBC-ENTMCNC: 31.8 GM/DL — LOW (ref 32–36)
MCV RBC AUTO: 86.9 FL — SIGNIFICANT CHANGE UP (ref 80–100)
NRBC # BLD: 0 /100 WBCS — SIGNIFICANT CHANGE UP (ref 0–0)
PHOSPHATE SERPL-MCNC: 3.2 MG/DL — SIGNIFICANT CHANGE UP (ref 2.5–4.5)
PLATELET # BLD AUTO: 237 K/UL — SIGNIFICANT CHANGE UP (ref 150–400)
POTASSIUM SERPL-MCNC: 4 MMOL/L — SIGNIFICANT CHANGE UP (ref 3.5–5.3)
POTASSIUM SERPL-SCNC: 4 MMOL/L — SIGNIFICANT CHANGE UP (ref 3.5–5.3)
RBC # BLD: 2.75 M/UL — LOW (ref 4.2–5.8)
RBC # FLD: 14.9 % — HIGH (ref 10.3–14.5)
SODIUM SERPL-SCNC: 135 MMOL/L — SIGNIFICANT CHANGE UP (ref 135–145)
WBC # BLD: 9.42 K/UL — SIGNIFICANT CHANGE UP (ref 3.8–10.5)
WBC # FLD AUTO: 9.42 K/UL — SIGNIFICANT CHANGE UP (ref 3.8–10.5)

## 2024-06-22 RX ADMIN — OXYCODONE HYDROCHLORIDE 5 MILLIGRAM(S): 100 SOLUTION ORAL at 04:24

## 2024-06-22 RX ADMIN — INSULIN GLARGINE 6 UNIT(S): 100 INJECTION, SOLUTION SUBCUTANEOUS at 21:50

## 2024-06-22 RX ADMIN — LIDOCAINE HCL 1 PATCH: 28 GEL TOPICAL at 19:50

## 2024-06-22 RX ADMIN — Medication 1000 MILLIGRAM(S): at 17:37

## 2024-06-22 RX ADMIN — INSULIN LISPRO 5 UNIT(S): 100 INJECTION, SOLUTION SUBCUTANEOUS at 14:21

## 2024-06-22 RX ADMIN — INSULIN LISPRO 5 UNIT(S): 100 INJECTION, SOLUTION SUBCUTANEOUS at 09:49

## 2024-06-22 RX ADMIN — Medication 1000 MILLIGRAM(S): at 18:25

## 2024-06-22 RX ADMIN — HEPARIN SODIUM 5000 UNIT(S): 50 INJECTION, SOLUTION INTRAVENOUS at 14:22

## 2024-06-22 RX ADMIN — OXYCODONE HYDROCHLORIDE 5 MILLIGRAM(S): 100 SOLUTION ORAL at 20:54

## 2024-06-22 RX ADMIN — Medication 100 MILLIGRAM(S): at 05:37

## 2024-06-22 RX ADMIN — Medication 1000 MILLIGRAM(S): at 06:07

## 2024-06-22 RX ADMIN — OXYCODONE HYDROCHLORIDE 5 MILLIGRAM(S): 100 SOLUTION ORAL at 03:54

## 2024-06-22 RX ADMIN — HEPARIN SODIUM 5000 UNIT(S): 50 INJECTION, SOLUTION INTRAVENOUS at 05:37

## 2024-06-22 RX ADMIN — OXYCODONE HYDROCHLORIDE 5 MILLIGRAM(S): 100 SOLUTION ORAL at 19:54

## 2024-06-22 RX ADMIN — OXYCODONE HYDROCHLORIDE 5 MILLIGRAM(S): 100 SOLUTION ORAL at 09:49

## 2024-06-22 RX ADMIN — INSULIN LISPRO 1: 100 INJECTION, SOLUTION SUBCUTANEOUS at 17:38

## 2024-06-22 RX ADMIN — INSULIN LISPRO 5 UNIT(S): 100 INJECTION, SOLUTION SUBCUTANEOUS at 17:38

## 2024-06-22 RX ADMIN — LIDOCAINE HCL 1 PATCH: 28 GEL TOPICAL at 12:33

## 2024-06-22 RX ADMIN — POLYETHYLENE GLYCOL 3350 17 GRAM(S): 1 POWDER ORAL at 12:33

## 2024-06-22 RX ADMIN — Medication 1000 MILLIGRAM(S): at 12:33

## 2024-06-22 RX ADMIN — Medication 1000 MILLIGRAM(S): at 05:37

## 2024-06-22 RX ADMIN — ATORVASTATIN CALCIUM 40 MILLIGRAM(S): 20 TABLET, FILM COATED ORAL at 21:51

## 2024-06-22 RX ADMIN — AMLODIPINE BESYLATE 5 MILLIGRAM(S): 2.5 TABLET ORAL at 05:37

## 2024-06-22 RX ADMIN — HEPARIN SODIUM 5000 UNIT(S): 50 INJECTION, SOLUTION INTRAVENOUS at 21:51

## 2024-06-22 RX ADMIN — LOSARTAN POTASSIUM 50 MILLIGRAM(S): 100 TABLET, FILM COATED ORAL at 05:37

## 2024-06-22 RX ADMIN — OXYCODONE HYDROCHLORIDE 5 MILLIGRAM(S): 100 SOLUTION ORAL at 10:49

## 2024-06-22 RX ADMIN — ASPIRIN 81 MILLIGRAM(S): 325 TABLET, FILM COATED ORAL at 12:33

## 2024-06-22 RX ADMIN — CLOPIDOGREL BISULFATE 75 MILLIGRAM(S): 75 TABLET, FILM COATED ORAL at 12:33

## 2024-06-22 RX ADMIN — Medication 1000 MILLIGRAM(S): at 13:33

## 2024-06-22 RX ADMIN — Medication 100 MILLIGRAM(S): at 14:22

## 2024-06-22 RX ADMIN — Medication 2 TABLET(S): at 21:51

## 2024-06-22 RX ADMIN — INSULIN LISPRO 2: 100 INJECTION, SOLUTION SUBCUTANEOUS at 14:21

## 2024-06-22 RX ADMIN — Medication 100 MILLIGRAM(S): at 21:51

## 2024-06-23 LAB
ANION GAP SERPL CALC-SCNC: 12 MMOL/L — SIGNIFICANT CHANGE UP (ref 5–17)
BUN SERPL-MCNC: 18 MG/DL — SIGNIFICANT CHANGE UP (ref 7–23)
CALCIUM SERPL-MCNC: 9.3 MG/DL — SIGNIFICANT CHANGE UP (ref 8.4–10.5)
CHLORIDE SERPL-SCNC: 104 MMOL/L — SIGNIFICANT CHANGE UP (ref 96–108)
CO2 SERPL-SCNC: 23 MMOL/L — SIGNIFICANT CHANGE UP (ref 22–31)
CREAT SERPL-MCNC: 1.04 MG/DL — SIGNIFICANT CHANGE UP (ref 0.5–1.3)
EGFR: 76 ML/MIN/1.73M2 — SIGNIFICANT CHANGE UP
GLUCOSE BLDC GLUCOMTR-MCNC: 172 MG/DL — HIGH (ref 70–99)
GLUCOSE BLDC GLUCOMTR-MCNC: 176 MG/DL — HIGH (ref 70–99)
GLUCOSE BLDC GLUCOMTR-MCNC: 214 MG/DL — HIGH (ref 70–99)
GLUCOSE BLDC GLUCOMTR-MCNC: 248 MG/DL — HIGH (ref 70–99)
GLUCOSE SERPL-MCNC: 144 MG/DL — HIGH (ref 70–99)
HCT VFR BLD CALC: 23.7 % — LOW (ref 39–50)
HGB BLD-MCNC: 7.3 G/DL — LOW (ref 13–17)
MAGNESIUM SERPL-MCNC: 2.2 MG/DL — SIGNIFICANT CHANGE UP (ref 1.6–2.6)
MCHC RBC-ENTMCNC: 27.4 PG — SIGNIFICANT CHANGE UP (ref 27–34)
MCHC RBC-ENTMCNC: 30.8 GM/DL — LOW (ref 32–36)
MCV RBC AUTO: 89.1 FL — SIGNIFICANT CHANGE UP (ref 80–100)
NRBC # BLD: 0 /100 WBCS — SIGNIFICANT CHANGE UP (ref 0–0)
PHOSPHATE SERPL-MCNC: 3 MG/DL — SIGNIFICANT CHANGE UP (ref 2.5–4.5)
PLATELET # BLD AUTO: 223 K/UL — SIGNIFICANT CHANGE UP (ref 150–400)
POTASSIUM SERPL-MCNC: 4.1 MMOL/L — SIGNIFICANT CHANGE UP (ref 3.5–5.3)
POTASSIUM SERPL-SCNC: 4.1 MMOL/L — SIGNIFICANT CHANGE UP (ref 3.5–5.3)
RBC # BLD: 2.66 M/UL — LOW (ref 4.2–5.8)
RBC # FLD: 15 % — HIGH (ref 10.3–14.5)
SODIUM SERPL-SCNC: 139 MMOL/L — SIGNIFICANT CHANGE UP (ref 135–145)
WBC # BLD: 9.33 K/UL — SIGNIFICANT CHANGE UP (ref 3.8–10.5)
WBC # FLD AUTO: 9.33 K/UL — SIGNIFICANT CHANGE UP (ref 3.8–10.5)

## 2024-06-23 RX ADMIN — OXYCODONE HYDROCHLORIDE 5 MILLIGRAM(S): 100 SOLUTION ORAL at 08:52

## 2024-06-23 RX ADMIN — INSULIN LISPRO 2: 100 INJECTION, SOLUTION SUBCUTANEOUS at 13:00

## 2024-06-23 RX ADMIN — INSULIN LISPRO 1: 100 INJECTION, SOLUTION SUBCUTANEOUS at 09:33

## 2024-06-23 RX ADMIN — Medication 1000 MILLIGRAM(S): at 06:51

## 2024-06-23 RX ADMIN — Medication 1000 MILLIGRAM(S): at 13:01

## 2024-06-23 RX ADMIN — Medication 1000 MILLIGRAM(S): at 23:04

## 2024-06-23 RX ADMIN — Medication 1000 MILLIGRAM(S): at 14:01

## 2024-06-23 RX ADMIN — Medication 100 MILLIGRAM(S): at 05:51

## 2024-06-23 RX ADMIN — OXYCODONE HYDROCHLORIDE 5 MILLIGRAM(S): 100 SOLUTION ORAL at 17:23

## 2024-06-23 RX ADMIN — LIDOCAINE HCL 1 PATCH: 28 GEL TOPICAL at 00:40

## 2024-06-23 RX ADMIN — INSULIN LISPRO 2: 100 INJECTION, SOLUTION SUBCUTANEOUS at 18:02

## 2024-06-23 RX ADMIN — ASPIRIN 81 MILLIGRAM(S): 325 TABLET, FILM COATED ORAL at 13:01

## 2024-06-23 RX ADMIN — POLYETHYLENE GLYCOL 3350 17 GRAM(S): 1 POWDER ORAL at 13:01

## 2024-06-23 RX ADMIN — INSULIN GLARGINE 6 UNIT(S): 100 INJECTION, SOLUTION SUBCUTANEOUS at 21:30

## 2024-06-23 RX ADMIN — LIDOCAINE HCL 1 PATCH: 28 GEL TOPICAL at 23:07

## 2024-06-23 RX ADMIN — LOSARTAN POTASSIUM 50 MILLIGRAM(S): 100 TABLET, FILM COATED ORAL at 05:51

## 2024-06-23 RX ADMIN — Medication 1000 MILLIGRAM(S): at 23:34

## 2024-06-23 RX ADMIN — HEPARIN SODIUM 5000 UNIT(S): 50 INJECTION, SOLUTION INTRAVENOUS at 21:29

## 2024-06-23 RX ADMIN — ATORVASTATIN CALCIUM 40 MILLIGRAM(S): 20 TABLET, FILM COATED ORAL at 21:30

## 2024-06-23 RX ADMIN — OXYCODONE HYDROCHLORIDE 5 MILLIGRAM(S): 100 SOLUTION ORAL at 16:23

## 2024-06-23 RX ADMIN — Medication 1000 MILLIGRAM(S): at 05:51

## 2024-06-23 RX ADMIN — Medication 100 MILLIGRAM(S): at 21:29

## 2024-06-23 RX ADMIN — HEPARIN SODIUM 5000 UNIT(S): 50 INJECTION, SOLUTION INTRAVENOUS at 13:11

## 2024-06-23 RX ADMIN — CLOPIDOGREL BISULFATE 75 MILLIGRAM(S): 75 TABLET, FILM COATED ORAL at 13:01

## 2024-06-23 RX ADMIN — Medication 1000 MILLIGRAM(S): at 18:01

## 2024-06-23 RX ADMIN — LIDOCAINE HCL 1 PATCH: 28 GEL TOPICAL at 19:42

## 2024-06-23 RX ADMIN — Medication 2 TABLET(S): at 21:30

## 2024-06-23 RX ADMIN — INSULIN LISPRO 5 UNIT(S): 100 INJECTION, SOLUTION SUBCUTANEOUS at 13:00

## 2024-06-23 RX ADMIN — INSULIN LISPRO 5 UNIT(S): 100 INJECTION, SOLUTION SUBCUTANEOUS at 09:34

## 2024-06-23 RX ADMIN — AMLODIPINE BESYLATE 5 MILLIGRAM(S): 2.5 TABLET ORAL at 05:51

## 2024-06-23 RX ADMIN — Medication 100 MILLIGRAM(S): at 13:10

## 2024-06-23 RX ADMIN — OXYCODONE HYDROCHLORIDE 5 MILLIGRAM(S): 100 SOLUTION ORAL at 09:52

## 2024-06-23 RX ADMIN — HEPARIN SODIUM 5000 UNIT(S): 50 INJECTION, SOLUTION INTRAVENOUS at 05:52

## 2024-06-23 RX ADMIN — LIDOCAINE HCL 1 PATCH: 28 GEL TOPICAL at 12:59

## 2024-06-23 RX ADMIN — Medication 1000 MILLIGRAM(S): at 18:31

## 2024-06-23 RX ADMIN — INSULIN LISPRO 5 UNIT(S): 100 INJECTION, SOLUTION SUBCUTANEOUS at 18:02

## 2024-06-24 ENCOUNTER — TRANSCRIPTION ENCOUNTER (OUTPATIENT)
Age: 74
End: 2024-06-24

## 2024-06-24 LAB
ANION GAP SERPL CALC-SCNC: 14 MMOL/L — SIGNIFICANT CHANGE UP (ref 5–17)
BUN SERPL-MCNC: 21 MG/DL — SIGNIFICANT CHANGE UP (ref 7–23)
CALCIUM SERPL-MCNC: 9.1 MG/DL — SIGNIFICANT CHANGE UP (ref 8.4–10.5)
CHLORIDE SERPL-SCNC: 103 MMOL/L — SIGNIFICANT CHANGE UP (ref 96–108)
CO2 SERPL-SCNC: 22 MMOL/L — SIGNIFICANT CHANGE UP (ref 22–31)
CREAT SERPL-MCNC: 0.92 MG/DL — SIGNIFICANT CHANGE UP (ref 0.5–1.3)
EGFR: 88 ML/MIN/1.73M2 — SIGNIFICANT CHANGE UP
GLUCOSE BLDC GLUCOMTR-MCNC: 171 MG/DL — HIGH (ref 70–99)
GLUCOSE BLDC GLUCOMTR-MCNC: 212 MG/DL — HIGH (ref 70–99)
GLUCOSE BLDC GLUCOMTR-MCNC: 222 MG/DL — HIGH (ref 70–99)
GLUCOSE BLDC GLUCOMTR-MCNC: 224 MG/DL — HIGH (ref 70–99)
GLUCOSE SERPL-MCNC: 156 MG/DL — HIGH (ref 70–99)
HCT VFR BLD CALC: 23 % — LOW (ref 39–50)
HGB BLD-MCNC: 7.2 G/DL — LOW (ref 13–17)
MAGNESIUM SERPL-MCNC: 2.3 MG/DL — SIGNIFICANT CHANGE UP (ref 1.6–2.6)
MCHC RBC-ENTMCNC: 27.7 PG — SIGNIFICANT CHANGE UP (ref 27–34)
MCHC RBC-ENTMCNC: 31.3 GM/DL — LOW (ref 32–36)
MCV RBC AUTO: 88.5 FL — SIGNIFICANT CHANGE UP (ref 80–100)
NRBC # BLD: 0 /100 WBCS — SIGNIFICANT CHANGE UP (ref 0–0)
PHOSPHATE SERPL-MCNC: 3.1 MG/DL — SIGNIFICANT CHANGE UP (ref 2.5–4.5)
PLATELET # BLD AUTO: 232 K/UL — SIGNIFICANT CHANGE UP (ref 150–400)
POTASSIUM SERPL-MCNC: 3.9 MMOL/L — SIGNIFICANT CHANGE UP (ref 3.5–5.3)
POTASSIUM SERPL-SCNC: 3.9 MMOL/L — SIGNIFICANT CHANGE UP (ref 3.5–5.3)
RBC # BLD: 2.6 M/UL — LOW (ref 4.2–5.8)
RBC # FLD: 14.7 % — HIGH (ref 10.3–14.5)
SODIUM SERPL-SCNC: 139 MMOL/L — SIGNIFICANT CHANGE UP (ref 135–145)
WBC # BLD: 7.16 K/UL — SIGNIFICANT CHANGE UP (ref 3.8–10.5)
WBC # FLD AUTO: 7.16 K/UL — SIGNIFICANT CHANGE UP (ref 3.8–10.5)

## 2024-06-24 RX ORDER — HYDRALAZINE HYDROCHLORIDE 50 MG/1
10 TABLET ORAL ONCE
Refills: 0 | Status: DISCONTINUED | OUTPATIENT
Start: 2024-06-24 | End: 2024-06-24

## 2024-06-24 RX ORDER — INSULIN GLARGINE 100 [IU]/ML
10 INJECTION, SOLUTION SUBCUTANEOUS AT BEDTIME
Refills: 0 | Status: DISCONTINUED | OUTPATIENT
Start: 2024-06-24 | End: 2024-06-26

## 2024-06-24 RX ADMIN — ATORVASTATIN CALCIUM 40 MILLIGRAM(S): 20 TABLET, FILM COATED ORAL at 21:26

## 2024-06-24 RX ADMIN — LIDOCAINE HCL 1 PATCH: 28 GEL TOPICAL at 13:21

## 2024-06-24 RX ADMIN — Medication 100 MILLIGRAM(S): at 21:25

## 2024-06-24 RX ADMIN — HEPARIN SODIUM 5000 UNIT(S): 50 INJECTION, SOLUTION INTRAVENOUS at 05:38

## 2024-06-24 RX ADMIN — INSULIN LISPRO 2: 100 INJECTION, SOLUTION SUBCUTANEOUS at 13:20

## 2024-06-24 RX ADMIN — Medication 1000 MILLIGRAM(S): at 13:22

## 2024-06-24 RX ADMIN — INSULIN LISPRO 2: 100 INJECTION, SOLUTION SUBCUTANEOUS at 17:54

## 2024-06-24 RX ADMIN — Medication 1000 MILLIGRAM(S): at 14:22

## 2024-06-24 RX ADMIN — Medication 1000 MILLIGRAM(S): at 06:08

## 2024-06-24 RX ADMIN — CLOPIDOGREL BISULFATE 75 MILLIGRAM(S): 75 TABLET, FILM COATED ORAL at 13:22

## 2024-06-24 RX ADMIN — Medication 100 MILLIGRAM(S): at 05:38

## 2024-06-24 RX ADMIN — HEPARIN SODIUM 5000 UNIT(S): 50 INJECTION, SOLUTION INTRAVENOUS at 13:23

## 2024-06-24 RX ADMIN — INSULIN LISPRO 5 UNIT(S): 100 INJECTION, SOLUTION SUBCUTANEOUS at 09:12

## 2024-06-24 RX ADMIN — LIDOCAINE HCL 1 PATCH: 28 GEL TOPICAL at 19:30

## 2024-06-24 RX ADMIN — Medication 1000 MILLIGRAM(S): at 17:54

## 2024-06-24 RX ADMIN — HEPARIN SODIUM 5000 UNIT(S): 50 INJECTION, SOLUTION INTRAVENOUS at 21:25

## 2024-06-24 RX ADMIN — POLYETHYLENE GLYCOL 3350 17 GRAM(S): 1 POWDER ORAL at 13:22

## 2024-06-24 RX ADMIN — AMLODIPINE BESYLATE 5 MILLIGRAM(S): 2.5 TABLET ORAL at 05:38

## 2024-06-24 RX ADMIN — INSULIN LISPRO 5 UNIT(S): 100 INJECTION, SOLUTION SUBCUTANEOUS at 13:21

## 2024-06-24 RX ADMIN — INSULIN GLARGINE 10 UNIT(S): 100 INJECTION, SOLUTION SUBCUTANEOUS at 21:26

## 2024-06-24 RX ADMIN — LOSARTAN POTASSIUM 50 MILLIGRAM(S): 100 TABLET, FILM COATED ORAL at 05:38

## 2024-06-24 RX ADMIN — Medication 2 TABLET(S): at 21:25

## 2024-06-24 RX ADMIN — Medication 1000 MILLIGRAM(S): at 05:38

## 2024-06-24 RX ADMIN — ASPIRIN 81 MILLIGRAM(S): 325 TABLET, FILM COATED ORAL at 13:22

## 2024-06-24 RX ADMIN — INSULIN LISPRO 1: 100 INJECTION, SOLUTION SUBCUTANEOUS at 09:12

## 2024-06-24 RX ADMIN — INSULIN LISPRO 5 UNIT(S): 100 INJECTION, SOLUTION SUBCUTANEOUS at 17:54

## 2024-06-24 RX ADMIN — Medication 100 MILLIGRAM(S): at 13:26

## 2024-06-25 LAB
ANION GAP SERPL CALC-SCNC: 10 MMOL/L — SIGNIFICANT CHANGE UP (ref 5–17)
BLD GP AB SCN SERPL QL: NEGATIVE — SIGNIFICANT CHANGE UP
BUN SERPL-MCNC: 20 MG/DL — SIGNIFICANT CHANGE UP (ref 7–23)
CALCIUM SERPL-MCNC: 8.9 MG/DL — SIGNIFICANT CHANGE UP (ref 8.4–10.5)
CHLORIDE SERPL-SCNC: 103 MMOL/L — SIGNIFICANT CHANGE UP (ref 96–108)
CO2 SERPL-SCNC: 22 MMOL/L — SIGNIFICANT CHANGE UP (ref 22–31)
CREAT SERPL-MCNC: 1 MG/DL — SIGNIFICANT CHANGE UP (ref 0.5–1.3)
EGFR: 79 ML/MIN/1.73M2 — SIGNIFICANT CHANGE UP
GLUCOSE BLDC GLUCOMTR-MCNC: 143 MG/DL — HIGH (ref 70–99)
GLUCOSE BLDC GLUCOMTR-MCNC: 148 MG/DL — HIGH (ref 70–99)
GLUCOSE BLDC GLUCOMTR-MCNC: 174 MG/DL — HIGH (ref 70–99)
GLUCOSE BLDC GLUCOMTR-MCNC: 215 MG/DL — HIGH (ref 70–99)
GLUCOSE SERPL-MCNC: 155 MG/DL — HIGH (ref 70–99)
HCT VFR BLD CALC: 21.6 % — LOW (ref 39–50)
HGB BLD-MCNC: 6.9 G/DL — CRITICAL LOW (ref 13–17)
MAGNESIUM SERPL-MCNC: 2.2 MG/DL — SIGNIFICANT CHANGE UP (ref 1.6–2.6)
MCHC RBC-ENTMCNC: 28 PG — SIGNIFICANT CHANGE UP (ref 27–34)
MCHC RBC-ENTMCNC: 31.9 GM/DL — LOW (ref 32–36)
MCV RBC AUTO: 87.8 FL — SIGNIFICANT CHANGE UP (ref 80–100)
NRBC # BLD: 0 /100 WBCS — SIGNIFICANT CHANGE UP (ref 0–0)
PHOSPHATE SERPL-MCNC: 3.1 MG/DL — SIGNIFICANT CHANGE UP (ref 2.5–4.5)
PLATELET # BLD AUTO: 241 K/UL — SIGNIFICANT CHANGE UP (ref 150–400)
POTASSIUM SERPL-MCNC: 3.9 MMOL/L — SIGNIFICANT CHANGE UP (ref 3.5–5.3)
POTASSIUM SERPL-SCNC: 3.9 MMOL/L — SIGNIFICANT CHANGE UP (ref 3.5–5.3)
RBC # BLD: 2.46 M/UL — LOW (ref 4.2–5.8)
RBC # FLD: 14.3 % — SIGNIFICANT CHANGE UP (ref 10.3–14.5)
RH IG SCN BLD-IMP: POSITIVE — SIGNIFICANT CHANGE UP
SODIUM SERPL-SCNC: 135 MMOL/L — SIGNIFICANT CHANGE UP (ref 135–145)
SURGICAL PATHOLOGY STUDY: SIGNIFICANT CHANGE UP
WBC # BLD: 6.07 K/UL — SIGNIFICANT CHANGE UP (ref 3.8–10.5)
WBC # FLD AUTO: 6.07 K/UL — SIGNIFICANT CHANGE UP (ref 3.8–10.5)

## 2024-06-25 RX ORDER — AMLODIPINE BESYLATE 2.5 MG/1
10 TABLET ORAL DAILY
Refills: 0 | Status: DISCONTINUED | OUTPATIENT
Start: 2024-06-25 | End: 2024-06-26

## 2024-06-25 RX ADMIN — INSULIN LISPRO 1: 100 INJECTION, SOLUTION SUBCUTANEOUS at 13:26

## 2024-06-25 RX ADMIN — Medication 1000 MILLIGRAM(S): at 13:23

## 2024-06-25 RX ADMIN — LIDOCAINE HCL 1 PATCH: 28 GEL TOPICAL at 19:30

## 2024-06-25 RX ADMIN — Medication 1000 MILLIGRAM(S): at 17:59

## 2024-06-25 RX ADMIN — OXYCODONE HYDROCHLORIDE 5 MILLIGRAM(S): 100 SOLUTION ORAL at 13:27

## 2024-06-25 RX ADMIN — OXYCODONE HYDROCHLORIDE 5 MILLIGRAM(S): 100 SOLUTION ORAL at 02:00

## 2024-06-25 RX ADMIN — INSULIN GLARGINE 10 UNIT(S): 100 INJECTION, SOLUTION SUBCUTANEOUS at 21:15

## 2024-06-25 RX ADMIN — LIDOCAINE HCL 1 PATCH: 28 GEL TOPICAL at 13:22

## 2024-06-25 RX ADMIN — Medication 1000 MILLIGRAM(S): at 06:00

## 2024-06-25 RX ADMIN — LOSARTAN POTASSIUM 50 MILLIGRAM(S): 100 TABLET, FILM COATED ORAL at 05:28

## 2024-06-25 RX ADMIN — Medication 1000 MILLIGRAM(S): at 17:29

## 2024-06-25 RX ADMIN — OXYCODONE HYDROCHLORIDE 5 MILLIGRAM(S): 100 SOLUTION ORAL at 17:29

## 2024-06-25 RX ADMIN — INSULIN LISPRO 5 UNIT(S): 100 INJECTION, SOLUTION SUBCUTANEOUS at 17:30

## 2024-06-25 RX ADMIN — Medication 100 MILLIGRAM(S): at 05:30

## 2024-06-25 RX ADMIN — HEPARIN SODIUM 5000 UNIT(S): 50 INJECTION, SOLUTION INTRAVENOUS at 05:29

## 2024-06-25 RX ADMIN — LIDOCAINE HCL 1 PATCH: 28 GEL TOPICAL at 01:20

## 2024-06-25 RX ADMIN — HEPARIN SODIUM 5000 UNIT(S): 50 INJECTION, SOLUTION INTRAVENOUS at 21:16

## 2024-06-25 RX ADMIN — ATORVASTATIN CALCIUM 40 MILLIGRAM(S): 20 TABLET, FILM COATED ORAL at 21:16

## 2024-06-25 RX ADMIN — INSULIN LISPRO 5 UNIT(S): 100 INJECTION, SOLUTION SUBCUTANEOUS at 09:15

## 2024-06-25 RX ADMIN — Medication 1000 MILLIGRAM(S): at 02:00

## 2024-06-25 RX ADMIN — OXYCODONE HYDROCHLORIDE 5 MILLIGRAM(S): 100 SOLUTION ORAL at 17:59

## 2024-06-25 RX ADMIN — Medication 2 TABLET(S): at 21:16

## 2024-06-25 RX ADMIN — HEPARIN SODIUM 5000 UNIT(S): 50 INJECTION, SOLUTION INTRAVENOUS at 13:23

## 2024-06-25 RX ADMIN — Medication 1000 MILLIGRAM(S): at 05:28

## 2024-06-25 RX ADMIN — Medication 1000 MILLIGRAM(S): at 23:09

## 2024-06-25 RX ADMIN — Medication 1000 MILLIGRAM(S): at 01:18

## 2024-06-25 RX ADMIN — INSULIN LISPRO 5 UNIT(S): 100 INJECTION, SOLUTION SUBCUTANEOUS at 13:27

## 2024-06-25 RX ADMIN — ASPIRIN 81 MILLIGRAM(S): 325 TABLET, FILM COATED ORAL at 13:23

## 2024-06-25 RX ADMIN — INSULIN LISPRO 2: 100 INJECTION, SOLUTION SUBCUTANEOUS at 17:30

## 2024-06-25 RX ADMIN — POLYETHYLENE GLYCOL 3350 17 GRAM(S): 1 POWDER ORAL at 13:22

## 2024-06-25 RX ADMIN — Medication 100 MILLIGRAM(S): at 21:16

## 2024-06-25 RX ADMIN — AMLODIPINE BESYLATE 5 MILLIGRAM(S): 2.5 TABLET ORAL at 05:29

## 2024-06-25 RX ADMIN — OXYCODONE HYDROCHLORIDE 5 MILLIGRAM(S): 100 SOLUTION ORAL at 13:57

## 2024-06-25 RX ADMIN — OXYCODONE HYDROCHLORIDE 5 MILLIGRAM(S): 100 SOLUTION ORAL at 01:19

## 2024-06-25 RX ADMIN — Medication 100 MILLIGRAM(S): at 13:23

## 2024-06-25 RX ADMIN — Medication 1000 MILLIGRAM(S): at 13:57

## 2024-06-25 RX ADMIN — CLOPIDOGREL BISULFATE 75 MILLIGRAM(S): 75 TABLET, FILM COATED ORAL at 13:23

## 2024-06-26 ENCOUNTER — TRANSCRIPTION ENCOUNTER (OUTPATIENT)
Age: 74
End: 2024-06-26

## 2024-06-26 VITALS — TEMPERATURE: 98 F

## 2024-06-26 LAB
ANION GAP SERPL CALC-SCNC: 15 MMOL/L — SIGNIFICANT CHANGE UP (ref 5–17)
BUN SERPL-MCNC: 21 MG/DL — SIGNIFICANT CHANGE UP (ref 7–23)
CALCIUM SERPL-MCNC: 9.3 MG/DL — SIGNIFICANT CHANGE UP (ref 8.4–10.5)
CHLORIDE SERPL-SCNC: 103 MMOL/L — SIGNIFICANT CHANGE UP (ref 96–108)
CO2 SERPL-SCNC: 20 MMOL/L — LOW (ref 22–31)
CREAT SERPL-MCNC: 0.99 MG/DL — SIGNIFICANT CHANGE UP (ref 0.5–1.3)
EGFR: 80 ML/MIN/1.73M2 — SIGNIFICANT CHANGE UP
GLUCOSE BLDC GLUCOMTR-MCNC: 166 MG/DL — HIGH (ref 70–99)
GLUCOSE BLDC GLUCOMTR-MCNC: 207 MG/DL — HIGH (ref 70–99)
GLUCOSE SERPL-MCNC: 156 MG/DL — HIGH (ref 70–99)
HCT VFR BLD CALC: 27.2 % — LOW (ref 39–50)
HGB BLD-MCNC: 8.5 G/DL — LOW (ref 13–17)
MAGNESIUM SERPL-MCNC: 2.3 MG/DL — SIGNIFICANT CHANGE UP (ref 1.6–2.6)
MCHC RBC-ENTMCNC: 27.2 PG — SIGNIFICANT CHANGE UP (ref 27–34)
MCHC RBC-ENTMCNC: 31.3 GM/DL — LOW (ref 32–36)
MCV RBC AUTO: 87.2 FL — SIGNIFICANT CHANGE UP (ref 80–100)
NRBC # BLD: 0 /100 WBCS — SIGNIFICANT CHANGE UP (ref 0–0)
PHOSPHATE SERPL-MCNC: 3.1 MG/DL — SIGNIFICANT CHANGE UP (ref 2.5–4.5)
PLATELET # BLD AUTO: 269 K/UL — SIGNIFICANT CHANGE UP (ref 150–400)
POTASSIUM SERPL-MCNC: 4 MMOL/L — SIGNIFICANT CHANGE UP (ref 3.5–5.3)
POTASSIUM SERPL-SCNC: 4 MMOL/L — SIGNIFICANT CHANGE UP (ref 3.5–5.3)
RBC # BLD: 3.12 M/UL — LOW (ref 4.2–5.8)
RBC # FLD: 14.6 % — HIGH (ref 10.3–14.5)
SODIUM SERPL-SCNC: 138 MMOL/L — SIGNIFICANT CHANGE UP (ref 135–145)
WBC # BLD: 6.36 K/UL — SIGNIFICANT CHANGE UP (ref 3.8–10.5)
WBC # FLD AUTO: 6.36 K/UL — SIGNIFICANT CHANGE UP (ref 3.8–10.5)

## 2024-06-26 PROCEDURE — 87040 BLOOD CULTURE FOR BACTERIA: CPT

## 2024-06-26 PROCEDURE — 85730 THROMBOPLASTIN TIME PARTIAL: CPT

## 2024-06-26 PROCEDURE — 93306 TTE W/DOPPLER COMPLETE: CPT

## 2024-06-26 PROCEDURE — 96374 THER/PROPH/DIAG INJ IV PUSH: CPT

## 2024-06-26 PROCEDURE — 80048 BASIC METABOLIC PNL TOTAL CA: CPT

## 2024-06-26 PROCEDURE — 82728 ASSAY OF FERRITIN: CPT

## 2024-06-26 PROCEDURE — 71045 X-RAY EXAM CHEST 1 VIEW: CPT

## 2024-06-26 PROCEDURE — 36430 TRANSFUSION BLD/BLD COMPNT: CPT

## 2024-06-26 PROCEDURE — 86922 COMPATIBILITY TEST ANTIGLOB: CPT

## 2024-06-26 PROCEDURE — P9016: CPT

## 2024-06-26 PROCEDURE — 93356 MYOCRD STRAIN IMG SPCKL TRCK: CPT

## 2024-06-26 PROCEDURE — 86901 BLOOD TYPING SEROLOGIC RH(D): CPT

## 2024-06-26 PROCEDURE — 97166 OT EVAL MOD COMPLEX 45 MIN: CPT

## 2024-06-26 PROCEDURE — 93005 ELECTROCARDIOGRAM TRACING: CPT

## 2024-06-26 PROCEDURE — 88311 DECALCIFY TISSUE: CPT

## 2024-06-26 PROCEDURE — 80061 LIPID PANEL: CPT

## 2024-06-26 PROCEDURE — 86900 BLOOD TYPING SEROLOGIC ABO: CPT

## 2024-06-26 PROCEDURE — 97530 THERAPEUTIC ACTIVITIES: CPT

## 2024-06-26 PROCEDURE — 82607 VITAMIN B-12: CPT

## 2024-06-26 PROCEDURE — 85027 COMPLETE CBC AUTOMATED: CPT

## 2024-06-26 PROCEDURE — 86850 RBC ANTIBODY SCREEN: CPT

## 2024-06-26 PROCEDURE — 83036 HEMOGLOBIN GLYCOSYLATED A1C: CPT

## 2024-06-26 PROCEDURE — 80202 ASSAY OF VANCOMYCIN: CPT

## 2024-06-26 PROCEDURE — 88307 TISSUE EXAM BY PATHOLOGIST: CPT

## 2024-06-26 PROCEDURE — 83550 IRON BINDING TEST: CPT

## 2024-06-26 PROCEDURE — 85610 PROTHROMBIN TIME: CPT

## 2024-06-26 PROCEDURE — C1889: CPT

## 2024-06-26 PROCEDURE — 84100 ASSAY OF PHOSPHORUS: CPT

## 2024-06-26 PROCEDURE — 99285 EMERGENCY DEPT VISIT HI MDM: CPT

## 2024-06-26 PROCEDURE — 97161 PT EVAL LOW COMPLEX 20 MIN: CPT

## 2024-06-26 PROCEDURE — 85025 COMPLETE CBC W/AUTO DIFF WBC: CPT

## 2024-06-26 PROCEDURE — 97110 THERAPEUTIC EXERCISES: CPT

## 2024-06-26 PROCEDURE — 83735 ASSAY OF MAGNESIUM: CPT

## 2024-06-26 PROCEDURE — 97116 GAIT TRAINING THERAPY: CPT

## 2024-06-26 PROCEDURE — 83540 ASSAY OF IRON: CPT

## 2024-06-26 PROCEDURE — 82962 GLUCOSE BLOOD TEST: CPT

## 2024-06-26 PROCEDURE — 36415 COLL VENOUS BLD VENIPUNCTURE: CPT

## 2024-06-26 PROCEDURE — 80053 COMPREHEN METABOLIC PANEL: CPT

## 2024-06-26 PROCEDURE — 82746 ASSAY OF FOLIC ACID SERUM: CPT

## 2024-06-26 RX ORDER — LOSARTAN POTASSIUM 100 MG/1
1 TABLET, FILM COATED ORAL
Refills: 0 | DISCHARGE

## 2024-06-26 RX ORDER — AMLODIPINE BESYLATE 2.5 MG/1
1 TABLET ORAL
Refills: 0 | DISCHARGE

## 2024-06-26 RX ORDER — SENNOSIDES 8.6 MG
2 TABLET ORAL
Qty: 0 | Refills: 0 | DISCHARGE
Start: 2024-06-26

## 2024-06-26 RX ORDER — METFORMIN HYDROCHLORIDE 850 MG/1
1 TABLET ORAL
Refills: 0 | DISCHARGE

## 2024-06-26 RX ORDER — AMLODIPINE BESYLATE 2.5 MG/1
1 TABLET ORAL
Qty: 0 | Refills: 0 | DISCHARGE
Start: 2024-06-26

## 2024-06-26 RX ORDER — POLYETHYLENE GLYCOL 3350 1 G/G
17 POWDER ORAL
Qty: 0 | Refills: 0 | DISCHARGE
Start: 2024-06-26

## 2024-06-26 RX ORDER — OXYCODONE HYDROCHLORIDE 100 MG/5ML
2.5 SOLUTION ORAL
Qty: 0 | Refills: 0 | DISCHARGE
Start: 2024-06-26

## 2024-06-26 RX ORDER — LIDOCAINE HCL 28 MG/G
1 GEL TOPICAL
Qty: 0 | Refills: 0 | DISCHARGE
Start: 2024-06-26

## 2024-06-26 RX ORDER — ATORVASTATIN CALCIUM 20 MG/1
1 TABLET, FILM COATED ORAL
Qty: 0 | Refills: 0 | DISCHARGE
Start: 2024-06-26

## 2024-06-26 RX ORDER — HYDRALAZINE HYDROCHLORIDE 50 MG/1
5 TABLET ORAL ONCE
Refills: 0 | Status: COMPLETED | OUTPATIENT
Start: 2024-06-26 | End: 2024-06-26

## 2024-06-26 RX ORDER — OXYCODONE HYDROCHLORIDE 100 MG/5ML
1 SOLUTION ORAL
Qty: 0 | Refills: 0 | DISCHARGE
Start: 2024-06-26

## 2024-06-26 RX ORDER — INSULIN LISPRO 100 [IU]/ML
5 INJECTION, SOLUTION SUBCUTANEOUS
Qty: 0 | Refills: 0 | DISCHARGE
Start: 2024-06-26

## 2024-06-26 RX ORDER — ROSUVASTATIN CALCIUM 5 MG/1
1 TABLET ORAL
Refills: 0 | DISCHARGE

## 2024-06-26 RX ORDER — ACETAMINOPHEN 325 MG
2 TABLET ORAL
Qty: 0 | Refills: 0 | DISCHARGE
Start: 2024-06-26

## 2024-06-26 RX ORDER — GABAPENTIN
1 POWDER (GRAM) MISCELLANEOUS
Qty: 0 | Refills: 0 | DISCHARGE
Start: 2024-06-26

## 2024-06-26 RX ORDER — LOSARTAN POTASSIUM 100 MG/1
1 TABLET, FILM COATED ORAL
Qty: 0 | Refills: 0 | DISCHARGE
Start: 2024-06-26

## 2024-06-26 RX ORDER — GABAPENTIN 400 MG/1
1 CAPSULE ORAL
Refills: 0 | DISCHARGE

## 2024-06-26 RX ORDER — INSULIN GLARGINE 100 [IU]/ML
10 INJECTION, SOLUTION SUBCUTANEOUS
Qty: 0 | Refills: 0 | DISCHARGE
Start: 2024-06-26

## 2024-06-26 RX ADMIN — HEPARIN SODIUM 5000 UNIT(S): 50 INJECTION, SOLUTION INTRAVENOUS at 05:55

## 2024-06-26 RX ADMIN — CLOPIDOGREL BISULFATE 75 MILLIGRAM(S): 75 TABLET, FILM COATED ORAL at 11:24

## 2024-06-26 RX ADMIN — LIDOCAINE HCL 1 PATCH: 28 GEL TOPICAL at 11:25

## 2024-06-26 RX ADMIN — ASPIRIN 81 MILLIGRAM(S): 325 TABLET, FILM COATED ORAL at 11:24

## 2024-06-26 RX ADMIN — Medication 100 MILLIGRAM(S): at 05:55

## 2024-06-26 RX ADMIN — Medication 1000 MILLIGRAM(S): at 00:05

## 2024-06-26 RX ADMIN — LIDOCAINE HCL 1 PATCH: 28 GEL TOPICAL at 01:30

## 2024-06-26 RX ADMIN — INSULIN LISPRO 1: 100 INJECTION, SOLUTION SUBCUTANEOUS at 09:17

## 2024-06-26 RX ADMIN — AMLODIPINE BESYLATE 10 MILLIGRAM(S): 2.5 TABLET ORAL at 05:56

## 2024-06-26 RX ADMIN — Medication 1000 MILLIGRAM(S): at 11:23

## 2024-06-26 RX ADMIN — INSULIN LISPRO 5 UNIT(S): 100 INJECTION, SOLUTION SUBCUTANEOUS at 09:17

## 2024-06-26 RX ADMIN — LOSARTAN POTASSIUM 50 MILLIGRAM(S): 100 TABLET, FILM COATED ORAL at 05:55

## 2024-06-26 RX ADMIN — Medication 1000 MILLIGRAM(S): at 05:55

## 2024-06-26 RX ADMIN — INSULIN LISPRO 2: 100 INJECTION, SOLUTION SUBCUTANEOUS at 12:36

## 2024-06-26 RX ADMIN — POLYETHYLENE GLYCOL 3350 17 GRAM(S): 1 POWDER ORAL at 11:27

## 2024-06-26 RX ADMIN — Medication 1000 MILLIGRAM(S): at 06:45

## 2024-06-26 RX ADMIN — INSULIN LISPRO 5 UNIT(S): 100 INJECTION, SOLUTION SUBCUTANEOUS at 12:37

## 2024-06-26 RX ADMIN — HYDRALAZINE HYDROCHLORIDE 5 MILLIGRAM(S): 50 TABLET ORAL at 02:44

## 2024-07-10 ENCOUNTER — APPOINTMENT (OUTPATIENT)
Dept: VASCULAR SURGERY | Facility: CLINIC | Age: 74
End: 2024-07-10
Payer: MEDICARE

## 2024-07-10 VITALS
DIASTOLIC BLOOD PRESSURE: 74 MMHG | HEIGHT: 66 IN | BODY MASS INDEX: 20.89 KG/M2 | TEMPERATURE: 97.6 F | HEART RATE: 73 BPM | SYSTOLIC BLOOD PRESSURE: 126 MMHG | WEIGHT: 130 LBS

## 2024-07-10 DIAGNOSIS — L97.522 NON-PRESSURE CHRONIC ULCER OF OTHER PART OF LEFT FOOT WITH FAT LAYER EXPOSED: ICD-10-CM

## 2024-07-10 DIAGNOSIS — I73.9 PERIPHERAL VASCULAR DISEASE, UNSPECIFIED: ICD-10-CM

## 2024-07-10 PROCEDURE — 99024 POSTOP FOLLOW-UP VISIT: CPT

## 2024-07-12 PROBLEM — L97.522 CHRONIC FOOT ULCER WITH FAT LAYER EXPOSED, LEFT: Status: RESOLVED | Noted: 2024-01-30 | Resolved: 2024-07-12

## 2024-07-15 NOTE — PATIENT PROFILE ADULT - FUNCTIONAL ASSESSMENT - BASIC MOBILITY 6.
[facility] Established clinic visit    Referred by: Dr Ld Ward  Reason for referral: Back and right knee    CC:   No chief complaint on file.          3/7/2023     1:28 PM 8/24/2022     2:16 PM 7/11/2022     2:26 PM   Last 3 PDI Scores   Pain Disability Index (PDI) 50 51 45     Interval Update 07/15/2024: Patient return to clinic to discuss injections.  She is known to our clinic and has a history of chronic low back and bilateral leg pain specifically pain at begins in her but talks bilaterally.  She was recently provided x2 lumbar SAMMY targeting L4-5 she has a former patient of Dr. Chau in which she was provided a interlaminar at L5-S1 which she reports to me provided her her most relief.  She would like to be considered for another injection at this level to be her 3rd injection for the year of 2024.  Today she denies any new pain denies any bowel bladder dysfunction saddle anesthesia at this time.  Lastly she denies any profound weakness.    Interval History 11/9/2023:    76-year-old female that presents today complaining of returning low back and bilateral leg pain she is also complaining of pain in her buttocks bilaterally.  Onset 1 month, note she was previously scheduled for a lumbar SAMMY targeting L5-S1 but this injection did not take place due to the patient getting sick.  She is currently in physical therapy for hand pain.   She reports weakness in her legs bilaterally she denies profound weakness.  Back pain is constant with radiating symptoms into her legs equally.  She also states that the pain radiates into her calves bilaterally.  She denies any bowel or bladder dysfunction, denies any saddle anesthesia denies any recent incident or trauma.      Interval Updates 03/07/2023:   -Mirna Cline is a 76 y.o. female who  has a past medical history of Angio-edema, Arthritis, Cancer, CHF (congestive heart failure), Dementia, GERD (gastroesophageal reflux disease), History of 2019 novel  coronavirus disease (COVID-19) (9/23/2020), History of psychiatric hospitalization, Hyperlipidemia, Hypertension, Hypertensive kidney disease with stage 3a chronic kidney disease (11/30/2021), MR (congenital mitral regurgitation), Obesity, IRAJ (obstructive sleep apnea) (2/15/2024), Palpitations, Recurrent upper respiratory infection (URI), Syncope, Type 2 diabetes mellitus with other specified complication, unspecified whether long term insulin use (1/10/2024), Urticaria, and VPC's.  See pain described below.         Interval History 8/24/2022 - Ms. Cline is following up for low back and right leg pain.  Pain is currently rate 9/10 with a weekly range 9-8/10.  Her worse pain is right low back, right hip, down her right leg to her foot. She reports paraesthesia like symptoms that have returned lately. She reports a fall x3 weeks ago, right leg weakness occurred and patient lost her balance, she didn't report to an ED or Urgent care.  Surgery was recommended but she is declined. She denies any profound weakness, denies B/B dysfunctions  She would like to be considered for chronic opoid therapy.        7/11/2022 -  Son returns to clinic s/p  Lumbar Epidural Steroid Injection at L5-S1 on 6/16/22 with 60% relief and improved functionality.  Patient reports she has to [place] 3-4 boss is T even make her clinic appointments, she states that following the injection her mobility during this has improved.  She reports a pain intensity 7/10 today with a weekly range of 7-8/10.         6/9/2022  Ms. Cline is following up for No chief complaint on file. and  right leg Pain is currently rate 9/10 with a weekly range 9-10/10.   74-year-old female who has not been seen in our office for approximately 1 year she is status post a right knee TKA on 04/01/2019 with Dr. aWrd.  Today she is reporting a fall 3-4 weeks ago on her entire right side.  She reports to me that her entire right leg feels heavy and weak.  She has  established our office and has been provided a previous lumbar SAMMY targeting L5-S1 that did give her 6 months of relief at about 50-60%.  Today she denies any bowel bladder dysfunction, denies any saddle anesthesia denies any profound weakness.  Pain score today is 9 out of    10/21/2021- Ms. Cline is following up for No chief complaint on file.. Pain is currently rate 9/10 with a weekly range 9-10/10. Mrs. Cline presents with multifactorial complaints, today we will address her low back and right leg pain. Pain has become intolerable her pain starts in the low back radiating into the medial aspect of her thigh down to her right foot. She has recently been diagnosed with neuropathy in her feet.  She denies any profound weakness, denies any bowel bladder dysfunction, denies any saddle anesthesia.  She has benefited from a lumbar SAMMY targeting L5-S1 this provided and May.  Ms Cline did request a Rx of pain medication to help she was provided a Rx of Percocet [phone] pills per Dr. Chau in April 2021 she stated that this prescription lasted up until a few days ago.  She does not want chronic opioids however due to her pathology in her lumbar spine she would like something to assist with the pain noted to attend her [employer] activities.    5/4/21 - Ms. Cline returns to clinic for follow up visit reporting stable low back and right leg pain.  Patient is s/p Lumbar Epidural Steroid Injection  on 4/22/21 with 50% relief.  She is scheduled for her 2nd lumbar SAMMY with Dr. Chau on 05/13 as he ordered back to back lumbar SAMMY's.  Pain intensity is currently 5/10.      04/12/2021 - Ms. Cline returns to clinic for follow up visit reporting worse back and right knee pain. Pain intensity is currently 10/10.  Pain in the right lower extremity is reportedly associated with increasing numbness and tingling, worse in the thigh, anterior leg, and foot.  She reports difficulty sleeping at night due to the pain.  At the  last visit we ordered imaging of her lumbar spine, and she presents today for discussion of the results and treatment options.    HPI:   Mirna Cline is a 76 y.o. female who complains of back and right knee pain. Patient was referred to us by Dr. Ward for lumbar radiculopathy. She had a right knee TKA done 2 years ago and saw Dr. Ward yesterday for continued right knee pain. She also reported low back pain and a feeling of heaviness in her right thigh. She had Xray of lumbar spine which showed grade 1 anterolisthesis of L4 on L5, multilevel degenerative disc space narrowing most pronounced at L4-L5 and multilevel facet hypertrophic changes most pronounced from L3-L4 through L5-S1. She reports that when she goes to the grocery store she has to lean forward on the shopping cart. She has been taking gabapentin 400 mg tid which does not help with the pain. She used to do regular PT before the pandemic. She denies significant weakness, saddle anesthesia, bowel/bladder issues.    Location: low back and right leg   Onset: 3 months  Current Pain Score: 8/10  Daily Pain of Range: 8-10/10  Quality: Grabbing, Numb and Hot  Radiation: right leg  Worsened by: extension, standing, walking for more than several minutes and daily activity  Improved by: nothing    Previous Therapies:  PT/OT: in the past that with benefit.   HEP:   Interventions:   -04/17/2024 Bilateral  L4/5 Lumbar Transforaminal   -03/06/2024 Bilateral  L4/5 Lumbar Transforaminal  -06/16/2022 Lumbar Epidural Steroid Injection at L5-S1 60%  -04/22/2021Lumbar Epidural Steroid Injection at L5-S1-50% relief  Surgery:  Medications:   - NSAIDS:   - MSK Relaxants: Flexeril, Tizanidine  - TCAs:   - SNRIs:   - Topicals:   - Anticonvulsants: Gabapentin  - Opioids: Yes, rarely    Current Pain Medications:  Gabapentin 600 TID  Tizanidine 2-4 mg - not effective  Tylenol   Voltaren gel    Review of Systems:  Review of Systems   Constitutional:         Obesity   HENT:  4 = No assist / stand by assistance Negative.     Eyes: Negative.    Respiratory: Negative.     Cardiovascular:         Hypertension, hyperlipidemia, CHF   Gastrointestinal:         GERD   Genitourinary: Negative.    Musculoskeletal:  Positive for back pain and joint pain.   Skin: Negative.    Neurological:  Positive for headaches.   Endo/Heme/Allergies: Negative.    Psychiatric/Behavioral:  Positive for depression. The patient is nervous/anxious.        History:    Current Outpatient Medications:     albuterol (PROVENTIL/VENTOLIN HFA) 90 mcg/actuation inhaler, Inhale 1-2 puffs into the lungs every 6 (six) hours as needed for Wheezing or Shortness of Breath. Rescue (Patient not taking: Reported on 7/1/2024), Disp: 6.7 g, Rfl: 0    amLODIPine (NORVASC) 10 MG tablet, Take 1 tablet (10 mg total) by mouth every evening., Disp: 90 tablet, Rfl: 1    ammonium lactate 12 % Crea, Apply topically 2 (two) times daily., Disp: , Rfl:     atorvastatin (LIPITOR) 80 MG tablet, Take 1 tablet (80 mg total) by mouth once daily., Disp: 90 tablet, Rfl: 3    biotin 10 mg Tab, 1 tablet Orally Once a day (Patient not taking: Reported on 7/1/2024), Disp: , Rfl:     busPIRone (BUSPAR) 30 MG Tab, TAKE 1 TABLET(30 MG) BY MOUTH TWICE DAILY, Disp: 180 tablet, Rfl: 0    carbidopa-levodopa  mg (SINEMET)  mg per tablet, Do not take medication 45 minutes before or after a protein rich meal - Take 1/2 tablet in AM for 1 week, then 1 tablet in AM for 1 week, then 1 tablet twice daily for 2 weeks, then 1 tablet three times daily, Disp: 90 tablet, Rfl: 1    cholecalciferol, vitamin D3, (VITAMIN D3) 50 mcg (2,000 unit) Tab, Take 1 tablet (2,000 Units total) by mouth once daily. (Patient not taking: Reported on 7/1/2024), Disp: 90 tablet, Rfl: 3    cyanocobalamin, vitamin B-12, 5,000 mcg Subl, Place one under tongue once a day for 1 month, then continue to take under tongue per week, Disp: 30 tablet, Rfl: 3    cyclobenzaprine (FLEXERIL) 10 MG tablet, Take 1 tablet (10 mg total)  by mouth 2 (two) times daily as needed for Muscle spasms. (Patient not taking: Reported on 7/1/2024), Disp: 60 tablet, Rfl: 1    donepeziL (ARICEPT) 10 MG tablet, TAKE 1 TABLET(10 MG) BY MOUTH EVERY EVENING, Disp: 90 tablet, Rfl: 3    EPINEPHrine (EPIPEN) 0.3 mg/0.3 mL AtIn, INJECT 0.3 ML IN THE MUSCLE ONCE as needed, Disp: 2 each, Rfl: 0    estradioL (ESTRACE) 2 MG tablet, Take 1 tablet (2 mg total) by mouth once daily., Disp: 90 tablet, Rfl: 4    fluticasone propionate (FLONASE) 50 mcg/actuation nasal spray, SHAKE LIQUID AND USE 2 SPRAYS(100 MCG) IN EACH NOSTRIL EVERY DAY, Disp: 48 mL, Rfl: 1    gabapentin (NEURONTIN) 600 MG tablet, Take 1 tablet (600 mg total) by mouth 3 (three) times daily., Disp: 270 tablet, Rfl: 1    hydroCHLOROthiazide (MICROZIDE) 12.5 mg capsule, Take 1 capsule (12.5 mg total) by mouth once daily., Disp: 90 capsule, Rfl: 1    Lactobacillus acidophilus 1 billion cell Cap, Take 1 tablet by mouth once daily. (Patient not taking: Reported on 7/1/2024), Disp: 30 capsule, Rfl: 11    LINZESS 145 mcg Cap capsule, Takes PRN, Disp: 30 capsule, Rfl: 12    losartan (COZAAR) 100 MG tablet, Take 1 tablet (100 mg total) by mouth once daily., Disp: 90 tablet, Rfl: 1    memantine (NAMENDA) 10 MG Tab, Take 1 tablet (10 mg total) by mouth 2 (two) times daily., Disp: 180 tablet, Rfl: 3    memantine (NAMENDA) 10 MG Tab, Take 1 tablet (10 mg total) by mouth 2 (two) times daily., Disp: 60 tablet, Rfl: 11    multivitamin (THERAGRAN) per tablet, Take 1 tablet by mouth. (Patient not taking: Reported on 7/1/2024), Disp: , Rfl:     omeprazole (PRILOSEC) 40 MG capsule, Take 40 mg by mouth 2 (two) times daily., Disp: , Rfl:     pantoprazole (PROTONIX) 40 MG tablet, 1 tablet Orally Once a day, Disp: , Rfl:     ramelteon (ROZEREM) 8 mg tablet, Take 1 tablet (8 mg total) by mouth every evening., Disp: 30 tablet, Rfl: 1    ramelteon (ROZEREM) 8 mg tablet, Take 1 tablet (8 mg total) by mouth every evening., Disp: 30 tablet,  Rfl: 3    sertraline (ZOLOFT) 100 MG tablet, Take 2 tablets (200 mg total) by mouth once daily., Disp: 180 tablet, Rfl: 0    suvorexant (BELSOMRA) 10 mg Tab, Take 1 tablet (10 mg) by mouth nightly., Disp: 30 tablet, Rfl: 3    traZODone (DESYREL) 100 MG tablet, Take 1 tablet (100 mg total) by mouth every evening., Disp: 90 tablet, Rfl: 0    Past Medical History:   Diagnosis Date    Angio-edema     Arthritis     Cancer     stomach cancer; lymphoma    CHF (congestive heart failure)     Dementia     GERD (gastroesophageal reflux disease)     History of 2019 novel coronavirus disease (COVID-19) 9/23/2020    History of psychiatric hospitalization     Hyperlipidemia     Hypertension     Hypertensive kidney disease with stage 3a chronic kidney disease 11/30/2021    MR (congenital mitral regurgitation)     mild    Obesity     IRAJ (obstructive sleep apnea) 2/15/2024    Palpitations     Recurrent upper respiratory infection (URI)     Syncope     Type 2 diabetes mellitus with other specified complication, unspecified whether long term insulin use 1/10/2024    Urticaria     VPC's        Past Surgical History:   Procedure Laterality Date    BREAST BIOPSY Left 1990    EPIDURAL STEROID INJECTION INTO LUMBAR SPINE N/A 4/22/2021    Procedure: Injection-steroid-epidural-lumbar--L5-S1;  Surgeon: Elizabeth Chau Jr., MD;  Location: [facility] PAIN MGT;  Service: Pain Management;  Laterality: N/A;  Oral    EPIDURAL STEROID INJECTION INTO LUMBAR SPINE N/A 5/13/2021    Procedure: Injection-steroid-epidural-lumbar--L5-S1;  Surgeon: Elizabeth Chau Jr., MD;  Location: [facility]IN [facility]GT;  Service: Pain Management;  Laterality: N/A;  Oral    EPIDURAL STEROID INJECTION INTO LUMBAR SPINE N/A 10/28/2021    Procedure: Injection-steroid-epidural-lumbar L5-S1;  Surgeon: Elizabeth Chau Jr., MD;  Location: [facility]IN [facility]GT;  Service: Pain Management;  Laterality: N/A;  asa  no pacemaker     EPIDURAL STEROID INJECTION INTO LUMBAR SPINE N/A 6/16/2022     Procedure: Injection-steroid-epidural-lumbar L5-S1;  Surgeon: Elizabeth Chau Jr., MD;  Location: [facility] PAIN MGT;  Service: Pain Management;  Laterality: N/A;    EPIDURAL STEROID INJECTION INTO LUMBAR SPINE Bilateral 3/6/2024    Procedure: B/L L4-5 TFESI;  Surgeon: Juhi Franklin DO;  Location: [facility] PAIN MANAGEMENT;  Service: Pain Management;  Laterality: Bilateral;  20 mins    HYSTERECTOMY      INJECTION, SPINE, LUMBOSACRAL, TRANSFORAMINAL APPROACH Bilateral 4/17/2024    Procedure: TFESI B/L  L4-5;  Surgeon: Juhi Franklin DO;  Location: [facility] PAIN MANAGEMENT;  Service: Pain Management;  Laterality: Bilateral;  20mins-no ac    KNEE ARTHROSCOPY W/ LASER      OOPHORECTOMY      PERCUTANEOUS CRYOTHERAPY OF PERIPHERAL NERVE USING LIQUID NITROUS OXIDE IN CLOSED NEEDLE DEVICE Right 3/28/2019    Procedure: CRYOTHERAPY, NERVE, PERIPHERAL, PERCUTANEOUS, USING LIQUID NITROUS OXIDE IN CLOSED NEEDLE DEVICE;  Surgeon: GERALD Blakely;  Location: [facility] OR;  Service: Orthopedics;  Laterality: Right;    SINUS SURGERY      SKIN BIOPSY      x 4    TONSILLECTOMY         Family History   Problem Relation Name Age of Onset    Hypertension Mother      Arthritis Mother      Heart disease Father      Cancer Father          colon    Allergic rhinitis Sister 2     Allergies Sister 2     Immunodeficiency Sister 2     Bipolar disorder Sister 2     Ovarian cancer Sister Dennys 71    Bipolar disorder Son 1     Asthma Cousin      Angioedema Neg Hx      Eczema Neg Hx         Social History     Socioeconomic History    Marital status: Single   Tobacco Use    Smoking status: Never     Passive exposure: Never    Smokeless tobacco: Never   Substance and Sexual Activity    Alcohol use: Not Currently    Drug use: No    Sexual activity: Not Currently     Partners: Male     Social Determinants of Health     Financial Resource Strain: Medium Risk (1/18/2022)    Overall Financial Resource Strain (CARDIA)     Difficulty of Paying Living Expenses:  "Somewhat hard   Food Insecurity: No Food Insecurity (1/18/2022)    Hunger Vital Sign     Worried About Running Out of Food in the Last Year: Never true     Ran Out of Food in the Last Year: Never true   Transportation Needs: No Transportation Needs (1/18/2022)    PRAPARE - Transportation     Lack of Transportation (Medical): No     Lack of Transportation (Non-Medical): No   Physical Activity: Sufficiently Active (1/18/2022)    Exercise Vital Sign     Days of Exercise per Week: 3 days     Minutes of Exercise per Session: 120 min   Stress: Stress Concern Present (1/18/2022)    [facility] Occupational [facility] - Occupational Stress Questionnaire     Feeling of Stress : Rather much   Housing Stability: Low Risk  (1/18/2022)    Housing Stability Vital Sign     Unable to Pay for Housing in the Last Year: No     Number of Places Lived in the Last Year: 1     Unstable Housing in the Last Year: No       Review of patient's allergies indicates:   Allergen Reactions    Allergen ext-venom-honey bee Swelling    Honey Shortness Of Breath    Venom-honey bee Shortness Of Breath and Swelling    Insect venom      Swelling, severe         Physical Exam:  Vitals:    07/15/24 1338   BP: 125/61   Pulse: 60   Height: 5' 5" (1.651 m)   PainSc:   7       General    Constitutional: She is oriented to person, place, and time.   HENT:   Head: Normocephalic and atraumatic.   Right Ear: External ear normal.   Left Ear: External ear normal.   Nose: Nose normal.   Eyes: Conjunctivae and EOM are normal. Pupils are equal, round, and reactive to light.   Neurological: She is alert and oriented to person, place, and time. She has normal reflexes.   Psychiatric: She has a normal mood and affect. Her behavior is normal. Judgment and thought content normal.           Right Knee Exam     Tenderness   The patient is tender to palpation of the patella.    Right Hip Exam     Tests   Pain w/ forced internal rotation (AMRIT): absent  Pain w/ forced " external rotation (FADIR): present  Log Roll: positive  Left Hip Exam     Tests   Pain w/ forced internal rotation (AMRIT): absent  Pain w/ forced external rotation (FADIR): absent  Log Roll: negative      Back (L-Spine & T-Spine) / Neck (C-Spine) Exam     Tenderness Right paramedian tenderness of the Lower L-Spine. Left paramedian tenderness of the Lower L-Spine.     Back (L-Spine & T-Spine) Range of Motion   Extension:  abnormal   Flexion:  abnormal   Lateral bend right:  abnormal   Lateral bend left:  abnormal   Rotation right:  abnormal   Rotation left:  abnormal     Comments:  Positive facet loading bilaterally      Muscle Strength   Right Lower Extremity   Hip Flexion: 4/5   Quadriceps:  5/5   Anterior tibial:  5/5   Gastrocsoleus:  5/5   EHL:  5/5  Left Lower Extremity   Hip Flexion: 5/5   Quadriceps:  5/5   Anterior tibial:  5/5   Gastrocsoleus:  5/5   EHL:  5/5    Reflexes     Left Side  Achilles:  2+  Babinski Sign:  absent  Ankle Clonus:  absent  Quadriceps:  2+    Right Side   Achilles:  2+  Babinski Sign:  absent  Ankle Clonus:  absent  Quadriceps:  2+      Imaging:  MRI Lumbar Spine Without Contrast 04/06/2021   FINDINGS:  Alignment: Grade 1 anterolisthesis of L4 on L5, approximately 5-6 mm.  Vertebrae: Degenerative endplate changes with probable Schmorl's nodes at L3-L4 and to a lesser degree L2-L3.  Mild left facet edema at L4-L5.  No fracture.  No diffuse marrow replacement process.  Discs: Multilevel disc desiccation.  Mild to moderate height loss from L1-L5.  Cord: Normal.  Conus terminates at L1.     Degenerative findings:  T12-L1: Broad-based posterior disc bulge and facet arthropathy contributing to mild spinal canal stenosis and mild neural foraminal narrowing bilaterally.  L1-L2: Broad-based posterior disc bulge and ligamentum flavum hypertrophy contributing to mild spinal canal stenosis.  No significant neural foraminal narrowing.  L2-L3: Broad-based posterior disc bulge, mild facet  arthropathy, and ligamentum flavum hypertrophy contributing to mild spinal canal stenosis and mild right neural foraminal narrowing.  L3-L4: Broad-based posterior disc bulge, facet arthropathy, and ligamentum flavum hypertrophy contributing to mild spinal canal stenosis and mild right neural foraminal narrowing  L4-L5: Grade 1 anterolisthesis with disc uncovering and mild broad-based posterior disc bulge.  Severe facet arthropathy.  Findings contribute to moderate to severe spinal canal stenosis as well as moderate bilateral neural foraminal narrowing.  L5-S1: Moderate facet arthropathy resulting in mild right and moderate left neural foraminal narrowing   Paraspinal muscles & soft tissues: Left renal cysts.     Impression:  Lumbar degenerative changes resulting in moderate to severe spinal canal stenosis at L4-L5 and mild spinal canal stenosis from T12-L4.  Mild to moderate neural foraminal narrowing at multiple levels as above.      03/23/2021  X-Ray Lumbar Spine 2 Or 3 Views  CLINICAL HISTORY:  Back pain or radiculopathy, osteoporosis presence or risk;Lumbosacral osteoarthritis;  Dorsalgia, unspecified  TECHNIQUE:  Multiple views of the spine.  COMPARISON:  Radiograph 01/09/2017.  FINDINGS:  There is mild lumbar levoscoliosis with grade 1 anterolisthesis of L4 on L5.  No definite spondylolysis.  Vertebral body heights are well maintained without evidence for fracture.  There is multilevel degenerative disc space narrowing most pronounced at L4-L5.  There are multilevel facet hypertrophic changes most pronounced from L3-L4 through L5-S1.  Visualized pedicles, spinous processes and transverse processes demonstrate no significant abnormalities.  There are symmetric degenerative changes of the SI joints.  Sacrum is unremarkable.  Visualized soft tissues are within normal limits.  Impression:  1. Multilevel degenerative changes of the lumbar spine, progressed when compared to radiograph dated 01/09/2017.      03/23/2021  X-Ray Knee 3 View Left   CLINICAL HISTORY:  Pain in left knee  TECHNIQUE:  AP, lateral, and Merchant views of the left knee were performed.  COMPARISON:  None  FINDINGS:  No fracture or dislocation.  Moderate degenerative changes are present in the patellofemoral compartment.  Mild degenerative changes are present in the medial compartment.  No joint effusion or other soft tissue abnormality.  Impression:  As above     03/23/2021  X-Ray Knee 3 View Right   CLINICAL HISTORY:  Pain in right knee  TECHNIQUE:  AP, lateral, and Merchant views of the right knee were performed.  COMPARISON:  04/01/2019  FINDINGS:  Patient is status post right total knee arthroplasty.  No evidence of fracture, loosening or other complication.  No joint effusion.  Limited evaluation of the left knee she demonstrates mild degenerative changes in the medial compartment.  Impression:  As above      Labs:  BMP  Lab Results   Component Value Date     01/03/2024    K 4.1 01/03/2024     01/03/2024    CO2 27 01/03/2024    BUN 12 01/03/2024    CREATININE 1.2 01/03/2024    CALCIUM 8.7 01/03/2024    ANIONGAP 9 01/03/2024    ESTGFRAFRICA >60.0 06/06/2022    EGFRNONAA >60.0 06/06/2022     Lab Results   Component Value Date    ALT 19 01/03/2024    AST 19 01/03/2024    ALKPHOS 105 01/03/2024    BILITOT 0.4 01/03/2024       Assessment:  Problem List Items Addressed This Visit    None  Visit Diagnoses       Other spondylosis with radiculopathy, lumbar region                3/24/2021 - Mirna Cline is a 76 y.o. female with low back, right leg and right knee pain that may be multifactorial including lumbar spinal stenosis, lumbar spondylosis, hip osteoarthritis and post TKA knee pain. Xray lumbar spine shows anterolisthesis, disc space narrowing, and facet arthropathy. We will order Xray lumbar spine with flexion/extension to look for any instability, MRI lumbar spine to evaluate for spinal/neuroforaminal stenosis and lumbar  spondylosis, Xray right hip to evaluate for osteoarthritis. We will follow up with her after imaging in a week to discuss further steps based on imaging. She will continue to take gabapentin, we will add tizanidine 2 mg qhs to help with pain at night.    4/12/21 - this is a 73-year-old female with symptoms consistent with right lower extremity radiculopathy who presents for follow-up after completion of MRI lumbar spine.  MRI shows anterolisthesis of L4 on L5 with associated unroofing of the L4-5 intervertebral disc.  This derangement is also causing moderate to severe central canal stenosis.  There are varying degrees of lateral recess stenosis and foraminal stenosis as well.  There also appears to be degenerative endplate disease most notably at L3-4 with an associated Schmorl's node.  This may be the cause of her axial low back pain, though it does not appear to be the cause of her radicular symptoms affecting the right lower extremity.  I will schedule her for lumbar epidural steroid injection at L5-S1 x2, approximately 3 weeks apart.  I will increase her gabapentin to 600 mg t.i.d..  We will also prescribe a short course of Percocet 5-325 mg b.i.d. p.r.n. for 14 days to help treat her pain until she is able to receive the epidural injection.    05/04/2021- 73-year-old female presented today for follow-up visit she is status post a lumbar SAMMY targeting L5-S1 04/22/2021 in which she reported 50% relief of her pain she also noted small improvements with her functionality follow this inject.  Her pain is likely related to anterolisthesis of L4 on L5 with associated unroofing of L4-5 intervertebral disc.  This ultimately causing moderate to severe central canal stenosis which is why we providing her with these injections.  Dr. Chau ordered back to back lumbar SAMMY targeting L5-S1 she is scheduled for her 2nd lumbar SAMMY on 05/13.    10/21/2021-Mirna Cline is a 76 y.o. female who  has a past medical history  of Angio-edema, Arthritis, Cancer, CHF (congestive heart failure), Dementia, GERD (gastroesophageal reflux disease), History of 2019 novel coronavirus disease (COVID-19) (9/23/2020), History of psychiatric hospitalization, Hyperlipidemia, Hypertension, Hypertensive kidney disease with stage 3a chronic kidney disease (11/30/2021), MR (congenital mitral regurgitation), Obesity, IRAJ (obstructive sleep apnea) (2/15/2024), Palpitations, Recurrent upper respiratory infection (URI), Syncope, Type 2 diabetes mellitus with other specified complication, unspecified whether long term insulin use (1/10/2024), Urticaria, and VPC's.  By history and examination this patient has chronic low back pain with radiculopathy.   MRI shows anterolisthesis of L4 on L5 with associated unroofing of the L4-5 intervertebral disc.  This derangement is also causing moderate to severe central canal stenosis.  There are varying degrees of lateral recess stenosis and foraminal stenosis as well.  There also appears to be degenerative endplate disease most notably at L3-4 with an associated Schmorl's node.   The underlying cause cause is degenerative disc disease, foraminal stenosis, central canal stenosis and deconditioning.  Pathology is confirmed by imaging.  We discussed the underlying diagnoses and multiple treatment options including non-opioid medications, opioid medications, injections, physical therapy, home exercise, activity modification / rest and weight loss.  The risks and benefits of each treatment option were discussed and all questions were answered.    Discussed with patient I will consult Dr. Chau in reference to her request for pain medication patient verbalized understanding agreed.    06/09/[id number]-[place]-old female with history of chronic low back and right greater than left leg pain.  By history and exam patient has chronic low back pain with radiculopathy.  Her MRI does show anterior listhesis of L4 on L5 with associated  unroofing of the L4-5 intervertebral disc.  As result she has moderate to severe central canal stenosis at this level.  She also has varying degrees of lateral recess stenosis and foraminal stenosis.  Additionally she has degenerative disc disease at L3-4 with an associated Schmorl's node.  Today I recommended repeating the lumbar SAMMY targeting L5-S1, also reorder a new lumbar MRI as I am concerned that her pathology has gotten worse I will also refer her to neurosurgery for a surgical consult.  We discussed that we will consider physical therapy following the injection as this has benefited her in the past.    07/11/[id number]-[place]-old female with a history of chronic low back and right greater than left leg radiculopathy.  She has a history of chronic low back pain likely related to anterior listhesis of L4 on L5 with associated unroofing of the L4-5 intervertebral disc.  This results in moderate to severe central canal stenosis at this level.  She experienced 60% relief following her most recent lumbar SAMMY targeting L5-S1 still has right leg pain but is much better since injection.  I have updated her lumbar MRI today, I will also order a 4 point cane to assist with ambulation.  Once her lumbar MRI has been completed she will then follow-up with Neurosurgery to rule out progressive pathology, but she did show a significant improvement following the lumbar SAMMY.  We also discussed I will consult physical therapy as this has helped her in the past.    08/24/2022- 74-year-old female with a history of chronic low back and right greater than left lumbar radiculopathy.  By history and exam her pain is likely related to anterior listhesis of L4 on L5 associated with unroofing of the L4-5 intervertebral disc.  She also has moderate to severe central canal stenosis at this level.  In addition she has various degrees of lateral recess stenosis and foraminal stenosis.  She was consulted to Neurosurgery and they have recommended  surgery to likely fix her issues.  She has declined surgery and would like to be considered for chronic opioid therapy.  Upon review of her  I see no red flags at this time, I see no signs of potential abuse of this medication.  For now we will start her on Percocet 5-325 b.i.d. 1st fill on 08/26/2022.  See plan discussed agreed upon below    3/7/2023- 75-year-old female presents today with returning low back and leg pain.  By history and exam she has lower pain with right lumbar radiculopathy.  Her lumbar MRI shows anterior listhesis of L4 on L5 associated with unroofing of the L4-5 intervertebral disc.  She has moderate to severe central canal stenosis at this level.  She has benefitted from previous lumbar SAMMY targeting L5-S1.       11/09/2023 -Mirna Cline is a 76 y.o. female who  has a past medical history of Angio-edema, Arthritis, Cancer, CHF (congestive heart failure), Dementia, GERD (gastroesophageal reflux disease), History of 2019 novel coronavirus disease (COVID-19) (9/23/2020), History of psychiatric hospitalization, Hyperlipidemia, Hypertension, Hypertensive kidney disease with stage 3a chronic kidney disease (11/30/2021), MR (congenital mitral regurgitation), Obesity, IRAJ (obstructive sleep apnea) (2/15/2024), Palpitations, Recurrent upper respiratory infection (URI), Syncope, Type 2 diabetes mellitus with other specified complication, unspecified whether long term insulin use (1/10/2024), Urticaria, and VPC's.  By history and examination this patient has chronic low back pain with radiculopathy.  The underlying cause cause is facet arthritis, degenerative disc disease, foraminal stenosis, central canal stenosis, and deconditioning.  Pathology is confirmed by imaging.  We discussed the underlying diagnoses and multiple treatment options including non-opioid medications, interventional procedures, physical therapy, home exercise, core muscle enhancement, activity modification, and weight  loss.  The risks and benefits of each treatment option were discussed and all questions were answered.       2022 lumbar MRI is significant for multilevel circumferential disc bulges and bilateral facet arthropathy these findings contribute to mild-to-moderate spinal canal and lateral recess stenosis and mild bilateral neural foraminal narrowing.  Upon review of her lumbar MRI it appears to me that the worst pathology is at L4-5 her previous injection was scheduled for L5-S1 but upon further review of the lumbar MRI she has no spinal canal stenosis or neural foraminal narrowing so I  I am going to recommend that we schedule the lumbar SAMMY targeting L4-5 as I think she would get better results if unsuccessful will repeat injection L5-S1.   She did request something for pain she has been previously provided short term prescriptions of Percocet 5-325 I will review her medications and consider optimizing her gabapentin versus  prescribing her opioids.    07/15/2024-Mirna Cline is a 76 y.o. female who  has a past medical history of Angio-edema, Arthritis, Cancer, CHF (congestive heart failure), Dementia, GERD (gastroesophageal reflux disease), History of 2019 novel coronavirus disease (COVID-19) (9/23/2020), History of psychiatric hospitalization, Hyperlipidemia, Hypertension, Hypertensive kidney disease with stage 3a chronic kidney disease (11/30/2021), MR (congenital mitral regurgitation), Obesity, IRAJ (obstructive sleep apnea) (2/15/2024), Palpitations, Recurrent upper respiratory infection (URI), Syncope, Type 2 diabetes mellitus with other specified complication, unspecified whether long term insulin use (1/10/2024), Urticaria, and VPC's.  By history and examination this patient has chronic low back pain with radiculopathy.  The underlying cause cause is facet arthritis, degenerative disc disease, foraminal stenosis, central canal stenosis, muscles strain, and deconditioning.  Pathology is confirmed by  imaging.  We discussed the underlying diagnoses and multiple treatment options including non-opioid medications, interventional procedures, home exercise, core muscle enhancement, activity modification, and weight loss.  The risks and benefits of each treatment option were discussed and all questions were answered.          : Reviewed and consistent with medication use as prescribed.    Treatment Plan:   Procedures:  Scheduled for lumbar SAMMY targeting L5-S1 (3rd injection for the year 2024)              - consider Intracept procedure for discogenic pain at L3-4  PT/OT/HEP:   consult to external physical therapy today for low back and bilateral leg weakness  I encouraged the patient to maintain a home exercise regimen that includes daily, moderate cardiovascular exercise lasting at least 30 minutes.  This may include yoga, stevo chi, walking, swimming, aqua aerobics, or other exercises that maintain a heart rate of 50-70% of the calculated maximum heart rate.  I also encouraged light, daily stretching focused on the target area.  Medications:    - continue gabapentin to 600 mg t.i.d. no SEs reported today               - Continue Tylenol 1000 mg TID PRN               -Currently taking Buspar 30 mg daily followed by Psychiatry/Dr. Mendoza      Labs: reviewed and medications are appropriately dosed for current hepatorenal function.    Pain Contract signed on previous visit  Imaging: Reviewed.        Follow Up: RTC p carolee Mauricio, NP-C  [facility]        Disclaimer: This note was partly generated using dictation software which may occasionally result in transcription errors.

## 2024-07-18 DIAGNOSIS — I73.9 PERIPHERAL VASCULAR DISEASE, UNSPECIFIED: ICD-10-CM

## 2024-07-18 DIAGNOSIS — E11.42 TYPE 2 DIABETES MELLITUS WITH DIABETIC POLYNEUROPATHY: ICD-10-CM

## 2024-07-18 DIAGNOSIS — L97.522 NON-PRESSURE CHRONIC ULCER OF OTHER PART OF LEFT FOOT WITH FAT LAYER EXPOSED: ICD-10-CM

## 2024-07-29 DIAGNOSIS — I73.9 PERIPHERAL VASCULAR DISEASE, UNSPECIFIED: ICD-10-CM

## 2024-07-29 DIAGNOSIS — E11.42 TYPE 2 DIABETES MELLITUS WITH DIABETIC POLYNEUROPATHY: ICD-10-CM

## 2024-07-29 DIAGNOSIS — T86.821 SKIN GRAFT (ALLOGRAFT) (AUTOGRAFT) FAILURE: ICD-10-CM

## 2024-07-29 DIAGNOSIS — L97.422 NON-PRESSURE CHRONIC ULCER OF LEFT HEEL AND MIDFOOT WITH FAT LAYER EXPOSED: ICD-10-CM

## 2024-08-05 NOTE — DIETITIAN INITIAL EVALUATION ADULT - WEIGHT (LBS)
143.9
43yo M c/o epigastric pain radiaitng to chest and nausea. CT last week unremarkable. saw GI scheduled for endoscopy. Sx worse after eating. tolerating PO. denies blood in stool or melena. abd soft non-tender. Labs unremarkable. EKG no acute findings. Feels better after pepcid and carafate. Likely gastritis . Will dc on carafate, outpt EGD as scheduled  return precautions

## 2024-09-03 ENCOUNTER — APPOINTMENT (OUTPATIENT)
Dept: VASCULAR SURGERY | Facility: CLINIC | Age: 74
End: 2024-09-03
Payer: MEDICARE

## 2024-09-03 VITALS
WEIGHT: 196.21 LBS | DIASTOLIC BLOOD PRESSURE: 63 MMHG | HEART RATE: 71 BPM | SYSTOLIC BLOOD PRESSURE: 145 MMHG | TEMPERATURE: 97.9 F | BODY MASS INDEX: 31.53 KG/M2 | HEIGHT: 66 IN

## 2024-09-03 DIAGNOSIS — I73.9 PERIPHERAL VASCULAR DISEASE, UNSPECIFIED: ICD-10-CM

## 2024-09-03 DIAGNOSIS — L97.422 NON-PRESSURE CHRONIC ULCER OF LEFT HEEL AND MIDFOOT WITH FAT LAYER EXPOSED: ICD-10-CM

## 2024-09-03 DIAGNOSIS — S88.111D COMPLETE TRAUMATIC AMPUTATION AT LVL BETWEEN KNEE AND ANKLE, RIGHT LOWER LEG, SUBSEQUENT ENCOUNTER: ICD-10-CM

## 2024-09-03 PROCEDURE — 99024 POSTOP FOLLOW-UP VISIT: CPT

## 2024-09-03 NOTE — REASON FOR VISIT
[Spouse] : spouse [Family Member] : family member [de-identified] : Left below knee amputation  [de-identified] : 06/21/24 [de-identified] : Doing well. Has no pain or discomfort. Has been fitted for prosthetic and is walking with assistance.

## 2024-09-03 NOTE — PHYSICAL EXAM
[Respiratory Effort] : normal respiratory effort [Normal Rate and Rhythm] : normal rate and rhythm [2+] : left 2+ [Ankle Swelling (On Exam)] : present [Ankle Swelling On The Left] : moderate [Skin Ulcer] : ulcer [Alert] : alert [Oriented to Person] : oriented to person [Oriented to Place] : oriented to place [Oriented to Time] : oriented to time [Calm] : calm [Varicose Veins Of Lower Extremities] : not present [] : not present [Abdomen Tenderness] : ~T ~M No abdominal tenderness [de-identified] : appears stated age [de-identified] : normocephalic, atraumatic [de-identified] : supple [FreeTextEntry1] : bilateral BKA stump well healed

## 2024-09-03 NOTE — DISCUSSION/SUMMARY
[FreeTextEntry1] : Problem #1 peripheral arterial disease s/p bilateral BKA healed well follow up in 6 months

## 2024-12-10 ENCOUNTER — APPOINTMENT (OUTPATIENT)
Dept: VASCULAR SURGERY | Facility: CLINIC | Age: 74
End: 2024-12-10

## 2025-01-06 NOTE — DISCHARGE NOTE PROVIDER - NSDCCPGOAL_GEN_ALL_CORE_FT
To get better and follow your care plan as instructed. Detail Level: Zone Detail Level: Generalized Detail Level: Detailed

## 2025-03-05 ENCOUNTER — APPOINTMENT (OUTPATIENT)
Dept: PHYSICAL MEDICINE AND REHAB | Facility: CLINIC | Age: 75
End: 2025-03-05

## 2025-03-05 DIAGNOSIS — S88.111D COMPLETE TRAUMATIC AMPUTATION AT LVL BETWEEN KNEE AND ANKLE, RIGHT LOWER LEG, SUBSEQUENT ENCOUNTER: ICD-10-CM

## 2025-03-05 DIAGNOSIS — S88.112D COMPLETE TRAUMATIC AMPUTATION AT LVL BETWEEN KNEE AND ANKLE, LEFT LOWER LEG, SUBSEQUENT ENCOUNTER: ICD-10-CM

## 2025-03-05 PROCEDURE — 99213 OFFICE O/P EST LOW 20 MIN: CPT | Mod: GC

## 2025-03-12 ENCOUNTER — APPOINTMENT (OUTPATIENT)
Dept: VASCULAR SURGERY | Facility: CLINIC | Age: 75
End: 2025-03-12

## 2025-04-06 NOTE — H&P ADULT - NSCORESITESY/N_GEN_A_CORE_RD
Diet:  Follow full liquid diet (without dairy) for 24 hours from the last episode of nausea, vomiting or diarrhea.   Then may advance to BRAT (Bananas, Rice, Applesauce, Toast) diet for 24 hours from the last episode of nausea, vomiting or diarrhea.  Then advance to normal or general diet.  Staying hydrated it critical to avoiding dehydration.     No

## (undated) DEVICE — PREP CHLORAPREP HI-LITE ORANGE 26ML

## (undated) DEVICE — DRSG OPSITE 13.75 X 4"

## (undated) DEVICE — DRAPE LIGHT HANDLE COVER (BLUE)

## (undated) DEVICE — SUT SURGIPRO 2-0 18" C-15

## (undated) DEVICE — LAP PAD 18 X 18"

## (undated) DEVICE — SYR LUER LOK 10CC

## (undated) DEVICE — DRAPE FEMORAL ANGIOGRAPHY W TROUGH

## (undated) DEVICE — BLADE SCALPEL SAFETYLOCK #11

## (undated) DEVICE — GOWN XL

## (undated) DEVICE — POSITIONER FOAM EGG CRATE ULNAR 2PCS (PINK)

## (undated) DEVICE — NDL PERC BASEPLT 18GX7CM

## (undated) DEVICE — DRAPE 3/4 SHEET W REINFORCEMENT 56X77"

## (undated) DEVICE — MEDICATION LABELS W MARKER

## (undated) DEVICE — PACKING GAUZE PLAIN 0.5"

## (undated) DEVICE — DRAPE IOBAN 23" X 23"

## (undated) DEVICE — VENODYNE/SCD SLEEVE CALF LARGE

## (undated) DEVICE — DRAPE MAGNETIC INSTRUMENT MEDIUM

## (undated) DEVICE — SUT POLYSORB 3-0 30" V-20 UNDYED

## (undated) DEVICE — TUBING HIGH POWER CONTRAST INJ

## (undated) DEVICE — DRSG STOCKINETTE IMPERVIOUS XL

## (undated) DEVICE — SYR LUER LOK 20CC

## (undated) DEVICE — CONN DUAL HOSE

## (undated) DEVICE — SYR ASEPTO

## (undated) DEVICE — DRAPE TOWEL BLUE 17" X 24"

## (undated) DEVICE — SUT PROLENE 6-0 4-30" C-1

## (undated) DEVICE — DRAPE EQUIPMENT BANDED BAG 30 X 30" (SHOWER CAP)

## (undated) DEVICE — SPECIMEN CONTAINER 100ML

## (undated) DEVICE — DRAPE CAMERA VIDEO 7"X96"

## (undated) DEVICE — WARMING BLANKET UPPER ADULT

## (undated) DEVICE — SUCTION YANKAUER NO CONTROL VENT

## (undated) DEVICE — BLADE SCALPEL SAFETYLOCK #15

## (undated) DEVICE — Device

## (undated) DEVICE — SUT PLAIN GUT 4-0 18" P-12

## (undated) DEVICE — DRSG CURITY GAUZE SPONGE 4 X 4" 12-PLY

## (undated) DEVICE — BLADE SCALPEL SAFETYLOCK #10

## (undated) DEVICE — SOL IRR POUR H2O 250ML

## (undated) DEVICE — SUT BIOSYN 4-0 18" P-12

## (undated) DEVICE — MARKING PEN W RULER

## (undated) DEVICE — GOWN TRIMAX LG

## (undated) DEVICE — SOL IRR POUR NS 0.9% 500ML

## (undated) DEVICE — STRYKER INTERPULSE HANDPIECE W IRR SUCTION TUBE

## (undated) DEVICE — DRSG ACE BANDAGE 6"

## (undated) DEVICE — NDL ENTRY PERC MCKNIGHT 18G

## (undated) DEVICE — PACK BASIN SPECIAL PROCEDURE

## (undated) DEVICE — BUR STRYKER OVAL SOLID CARBIDE MED 4MM

## (undated) DEVICE — NDL COUNTER FOAM AND MAGNET 40-70

## (undated) DEVICE — SAW BLADE MICROAIRE OSCILATING 25.4MM X 90MM X 1.27MM

## (undated) DEVICE — SUT PROLENE 7-0 4-24" BV-1

## (undated) DEVICE — SUT POLYSORB 0 18" MP UNDYED

## (undated) DEVICE — INFLATION DEVICE BASIXCOMPAK

## (undated) DEVICE — DRAPE MAYO STAND 30"

## (undated) DEVICE — DRSG STOCKINETTE IMPERVIOUS MED

## (undated) DEVICE — DRSG XEROFORM 1 X 8"

## (undated) DEVICE — SUT SOFSILK 0 18" TIES

## (undated) DEVICE — SAW BLADE MICROAIRE SAGITTAL 5.8X25.4X0.6 MM

## (undated) DEVICE — SOL IRR BAG NS 0.9% 3000ML

## (undated) DEVICE — SUT POLYSORB 2-0 30" GS-21 UNDYED

## (undated) DEVICE — GLV 7.5 PROTEXIS (WHITE)

## (undated) DEVICE — DRAPE 1/2 SHEET 40X57"

## (undated) DEVICE — TAPE GLO-N-TELL RADIOPAQUE 20 STRIPS

## (undated) DEVICE — SUT SOFSILK 4-0 30" TIES

## (undated) DEVICE — STAPLER SKIN VISI-STAT 35 WIDE

## (undated) DEVICE — PACK EXTREMITY

## (undated) DEVICE — SAW BLADE MICROAIRE SAGITTAL 9.4MMX25.4MMX0.6MM

## (undated) DEVICE — GLV 6.5 PROTEXIS (WHITE)

## (undated) DEVICE — VISITEC 4X4

## (undated) DEVICE — SOL INJ NS 0.9% 500ML 1-PORT

## (undated) DEVICE — DRSG COMBINE 5X9"

## (undated) DEVICE — DRSG TEGADERM 6"X8"

## (undated) DEVICE — PACK FEM/POP

## (undated) DEVICE — GLV 7 PROTEXIS (WHITE)

## (undated) DEVICE — DRSG KLING 6"

## (undated) DEVICE — PACKING GAUZE PLAIN 2"

## (undated) DEVICE — GOWN TRIMAX XXL

## (undated) DEVICE — SUT PROLENE 7-0 24" BV175-6

## (undated) DEVICE — CLAMP BULLDOG MAXI 45 DEGREE (ORANGE) DISP

## (undated) DEVICE — PACKING GAUZE IODOFORM 2"

## (undated) DEVICE — NDL HYPO REGULAR BEVEL 25G X 1.5" (BLUE)

## (undated) DEVICE — SAW BLADE STRYKER SAGITTAL 12.5X89.5X0.89MM

## (undated) DEVICE — DRAPE INSTRUMENT POUCH 6.75" X 11"

## (undated) DEVICE — DRAPE ISOLATION BAG 20X20"

## (undated) DEVICE — FOLEY TRAY 16FR 5CC LTX UMETER CLOSED

## (undated) DEVICE — DRSG STERISTRIPS 0.5 X 4"

## (undated) DEVICE — TORQUE DEVICE FOR GUIDEWIRE 0.0100.038"

## (undated) DEVICE — SUT SOFSILK 3-0 30" V-20

## (undated) DEVICE — STOPCOCK 4-WAY W SWIVEL MALE LUER LOCK NON VENTED RED CAP

## (undated) DEVICE — DRSG AQUACEL 3.5 X 12"

## (undated) DEVICE — PACK GENERAL MINOR

## (undated) DEVICE — GLV 8 PROTEXIS (WHITE)

## (undated) DEVICE — SUT SOFSILK 2-0 30" V-20

## (undated) DEVICE — STEALTH CLAMP INSERT SOFT/SOFT 30MM

## (undated) DEVICE — CLAMP BULLDOG MIDI 45 DEGREE (YELLOW) DISP

## (undated) DEVICE — SUT PROLENE 6-0 4-30" BV-1

## (undated) DEVICE — SUT MONOSOF 2-0 18" C-15

## (undated) DEVICE — SUT POLYSORB 3-0 18" TIES UNDYED

## (undated) DEVICE — DRSG TEGADERM 8 X 12"

## (undated) DEVICE — VENODYNE/SCD SLEEVE CALF MEDIUM

## (undated) DEVICE — TOURNIQUET SET SURE-SNARE 22FR (2 TUBES, 2 UMBILICAL TAPES, 2 PLASTIC SNARES) 5"

## (undated) DEVICE — SUT PROLENE 5-0 30" RB-2

## (undated) DEVICE — DRSG XEROFORM 5 X 9"

## (undated) DEVICE — PREP BETADINE KIT

## (undated) DEVICE — SUT POLYSORB 2-0 18" TIES UNDYED

## (undated) DEVICE — DRSG KLING 4"

## (undated) DEVICE — POSITIONER CARDIAC BUMP

## (undated) DEVICE — SUT SOFSILK 4-0 18" TIES

## (undated) DEVICE — BULLDOG SPRING CLIP 6MM SOFT/SOFT

## (undated) DEVICE — SUT POLYSORB 4-0 18" TIES UNDYED

## (undated) DEVICE — SYR TB 1CC 25G X 5/8 (BLUE)

## (undated) DEVICE — SUT SOFSILK 3-0 30" TIES

## (undated) DEVICE — DRAPE SPLIT SHEET 77" X 108"

## (undated) DEVICE — DRAIN RESERVOIR FOR JACKSON PRATT 100CC CARDINAL

## (undated) DEVICE — FOLEY HOLDER STATLOCK 2 WAY ADULT

## (undated) DEVICE — ELCTR BVI ACCU-TEMP CAUTERY SHAFT FINE TIP 1/2"

## (undated) DEVICE — WARMING BLANKET LOWER ADULT

## (undated) DEVICE — SUT MONOSOF 3-0 30" SC-2

## (undated) DEVICE — DRSG TELFA 3 X 8

## (undated) DEVICE — TUNNELER SHEATH GREEN LARGE

## (undated) DEVICE — DRSG XEROFORM 2 X 2"

## (undated) DEVICE — DRAIN JACKSON PRATT 10MM FLAT FULL NO TROCAR

## (undated) DEVICE — SUT SOFSILK 2-0 30" TIES

## (undated) DEVICE — ELCTR HEX BLADE